# Patient Record
Sex: FEMALE | Race: WHITE | NOT HISPANIC OR LATINO | Employment: OTHER | URBAN - METROPOLITAN AREA
[De-identification: names, ages, dates, MRNs, and addresses within clinical notes are randomized per-mention and may not be internally consistent; named-entity substitution may affect disease eponyms.]

---

## 2017-01-13 DIAGNOSIS — E78.5 HYPERLIPIDEMIA LDL GOAL <100: Primary | ICD-10-CM

## 2017-01-13 DIAGNOSIS — I10 HYPERTENSION GOAL BP (BLOOD PRESSURE) < 140/90: ICD-10-CM

## 2017-01-17 RX ORDER — ATORVASTATIN CALCIUM 40 MG/1
TABLET, FILM COATED ORAL
Qty: 90 TABLET | Refills: 0 | Status: SHIPPED | OUTPATIENT
Start: 2017-01-17 | End: 2017-05-15

## 2017-01-17 RX ORDER — LOSARTAN POTASSIUM 100 MG/1
TABLET ORAL
Qty: 30 TABLET | Refills: 0 | Status: SHIPPED | OUTPATIENT
Start: 2017-01-17 | End: 2017-01-23

## 2017-01-17 NOTE — TELEPHONE ENCOUNTER
LOSARTAN 100MG      Last Written Prescription Date: 12/14/16  Last Fill Quantity: 30, # refills: 0  Last Office Visit with G, P or [facility] prescribing provider: 7/27/15       POTASSIUM   Date Value Ref Range Status   05/18/2015 4.3 3.4 - 5.3 mmol/L Final     CREATININE   Date Value Ref Range Status   05/18/2015 1.00 0.52 - 1.04 mg/dL Final     BP Readings from Last 3 Encounters:   07/27/15 136/64   07/06/15 128/66   06/22/15 138/66

## 2017-01-17 NOTE — TELEPHONE ENCOUNTER
ATORVASTATIN 40MG   Last Written Prescription Date: 5/9/16  Last Fill Quantity: 90, # refills: 0  Last Office Visit with [facility], [facility] or [facility] prescribing provider: 7/27/16       CHOL      231   5/18/2015  HDL       54   5/18/2015  LDL      159   5/18/2015  LDL      109   6/2/2014  TRIG       92   5/18/2015  CHOLHDLRATIO      4.3   5/18/2015    LOSARTAN 100MG   Last Written Prescription Date: 12/14/16  Last Fill Quantity: 30, # refills: 0  Last Office Visit with [facility], [facility] or [facility] prescribing provider: 7/27/16       POTASSIUM   Date Value Ref Range Status   05/18/2015 4.3 3.4 - 5.3 mmol/L Final     CREATININE   Date Value Ref Range Status   05/18/2015 1.00 0.52 - 1.04 mg/dL Final     BP Readings from Last 3 Encounters:   07/27/15 136/64   07/06/15 128/66   06/22/15 138/66

## 2017-01-19 RX ORDER — LOSARTAN POTASSIUM 100 MG/1
TABLET ORAL
Qty: 30 TABLET | Refills: 0
Start: 2017-01-19

## 2017-01-19 NOTE — TELEPHONE ENCOUNTER
Last Written Prescription Date: Losartan 100mg  Last Fill Quantity: 30,  # refills: 0   Last Office Visit with [facility], [facility] or [facility] prescribing provider: 7/27/15     Medication was filled 2 days ago by PCP with a note to pharmacy: need appt.

## 2017-01-23 ENCOUNTER — RADIANT APPOINTMENT (OUTPATIENT)
Dept: GENERAL RADIOLOGY | Facility: CLINIC | Age: 67
End: 2017-01-23
Attending: INTERNAL MEDICINE
Payer: COMMERCIAL

## 2017-01-23 ENCOUNTER — OFFICE VISIT (OUTPATIENT)
Dept: FAMILY MEDICINE | Facility: CLINIC | Age: 67
End: 2017-01-23
Payer: COMMERCIAL

## 2017-01-23 VITALS
HEART RATE: 66 BPM | TEMPERATURE: 98.9 F | BODY MASS INDEX: 25.27 KG/M2 | WEIGHT: 148 LBS | DIASTOLIC BLOOD PRESSURE: 62 MMHG | HEIGHT: 64 IN | RESPIRATION RATE: 20 BRPM | OXYGEN SATURATION: 96 % | SYSTOLIC BLOOD PRESSURE: 112 MMHG

## 2017-01-23 DIAGNOSIS — R10.12 ABDOMINAL PAIN, LEFT UPPER QUADRANT: Primary | ICD-10-CM

## 2017-01-23 DIAGNOSIS — E11.9 TYPE 2 DIABETES MELLITUS WITHOUT COMPLICATION, WITHOUT LONG-TERM CURRENT USE OF INSULIN (H): ICD-10-CM

## 2017-01-23 DIAGNOSIS — E03.9 HYPOTHYROIDISM, UNSPECIFIED TYPE: ICD-10-CM

## 2017-01-23 DIAGNOSIS — I10 HYPERTENSION GOAL BP (BLOOD PRESSURE) < 140/90: ICD-10-CM

## 2017-01-23 DIAGNOSIS — E78.5 HYPERLIPIDEMIA LDL GOAL <100: ICD-10-CM

## 2017-01-23 DIAGNOSIS — J06.9 UPPER RESPIRATORY TRACT INFECTION, UNSPECIFIED TYPE: ICD-10-CM

## 2017-01-23 DIAGNOSIS — F43.21 GRIEF REACTION: ICD-10-CM

## 2017-01-23 LAB
BASOPHILS # BLD AUTO: 0.1 10E9/L (ref 0–0.2)
BASOPHILS NFR BLD AUTO: 0.5 %
DIFFERENTIAL METHOD BLD: ABNORMAL
EOSINOPHIL # BLD AUTO: 0.2 10E9/L (ref 0–0.7)
EOSINOPHIL NFR BLD AUTO: 1.8 %
ERYTHROCYTE [DISTWIDTH] IN BLOOD BY AUTOMATED COUNT: 11.8 % (ref 10–15)
HBA1C MFR BLD: 11.2 % (ref 4.3–6)
HCT VFR BLD AUTO: 38.6 % (ref 35–47)
HGB BLD-MCNC: 12.9 G/DL (ref 11.7–15.7)
LYMPHOCYTES # BLD AUTO: 2.4 10E9/L (ref 0.8–5.3)
LYMPHOCYTES NFR BLD AUTO: 22.1 %
MCH RBC QN AUTO: 31.2 PG (ref 26.5–33)
MCHC RBC AUTO-ENTMCNC: 33.4 G/DL (ref 31.5–36.5)
MCV RBC AUTO: 94 FL (ref 78–100)
MONOCYTES # BLD AUTO: 1.7 10E9/L (ref 0–1.3)
MONOCYTES NFR BLD AUTO: 16 %
NEUTROPHILS # BLD AUTO: 6.4 10E9/L (ref 1.6–8.3)
NEUTROPHILS NFR BLD AUTO: 59.6 %
PLATELET # BLD AUTO: 526 10E9/L (ref 150–450)
RBC # BLD AUTO: 4.13 10E12/L (ref 3.8–5.2)
WBC # BLD AUTO: 10.7 10E9/L (ref 4–11)

## 2017-01-23 PROCEDURE — 36415 COLL VENOUS BLD VENIPUNCTURE: CPT | Performed by: INTERNAL MEDICINE

## 2017-01-23 PROCEDURE — 80061 LIPID PANEL: CPT | Performed by: INTERNAL MEDICINE

## 2017-01-23 PROCEDURE — 83036 HEMOGLOBIN GLYCOSYLATED A1C: CPT | Performed by: INTERNAL MEDICINE

## 2017-01-23 PROCEDURE — 99214 OFFICE O/P EST MOD 30 MIN: CPT | Performed by: INTERNAL MEDICINE

## 2017-01-23 PROCEDURE — 80050 GENERAL HEALTH PANEL: CPT | Performed by: INTERNAL MEDICINE

## 2017-01-23 PROCEDURE — 71020 XR CHEST 2 VW: CPT

## 2017-01-23 NOTE — NURSING NOTE
"Chief Complaint   Patient presents with     Cough     Abdominal Pain     Consult       Initial /62 mmHg  Pulse 66  Temp(Src) 98.9  F (37.2  C)  Resp 20  Ht 5' 3.5\" (1.613 m)  Wt 148 lb (67.132 kg)  BMI 25.80 kg/m2  SpO2 96%  Breastfeeding? No Estimated body mass index is 25.8 kg/(m^2) as calculated from the following:    Height as of this encounter: 5' 3.5\" (1.613 m).    Weight as of this encounter: 148 lb (67.132 kg).  BP completed using cuff size: lori Ernst LPN  "

## 2017-01-23 NOTE — MR AVS SNAPSHOT
After Visit Summary   1/23/2017    Keerthi Montoya    MRN: [id number]           Patient Information     Date Of Birth          1950        Visit Information        Provider Department      1/23/2017 2:00 PM Nathan Dhillon MD [facility]        Today's Diagnoses     Abdominal pain, left upper quadrant    -  1     Upper respiratory tract infection, unspecified type         Type 2 diabetes mellitus without complication, without long-term current use of insulin (H)         Hyperlipidemia LDL goal <100         Hypertension goal BP (blood pressure) < 140/90         Hypothyroidism, unspecified type           Care Instructions    I will let you know your lab results.   Please return for a physical exam.             Follow-ups after your visit        Follow-up notes from your care team     Return for Physical Exam.      Who to contact     If you have questions or need follow up information about today's clinic visit or your schedule please contact [facility] directly at [phone].  Normal or non-critical lab and imaging results will be communicated to you by MyChart, letter or phone within 4 business days after the clinic has received the results. If you do not hear from us within 7 days, please contact the clinic through MyChart or phone. If you have a critical or abnormal lab result, we will notify you by phone as soon as possible.  Submit refill requests through [email or web address] or call your pharmacy and they will forward the refill request to us. Please allow 3 business days for your refill to be completed.          Additional Information About Your Visit        Care EveryWhere ID     This is your Care EveryWhere ID. This could be used by other organizations to access your [place] medical records  [id number]        Your Vitals Were     Pulse Temperature Respirations Height BMI (Body Mass Index) Pulse Oximetry    66 98.9  F (37.2  C)  "20 5' 3.5\" (1.613 m) 25.80 kg/m2 96%    Breastfeeding?                   No            Blood Pressure from Last 3 Encounters:   01/23/17 112/62   07/27/15 136/64   07/06/15 128/66    Weight from Last 3 Encounters:   01/23/17 148 lb (67.132 kg)   07/27/15 156 lb (70.761 kg)   07/06/15 153 lb (69.4 kg)              We Performed the Following     CBC with platelets and differential     Comprehensive metabolic panel (BMP + Alb, Alk Phos, ALT, AST, Total. Bili, TP)     Hemoglobin A1c     Lipid Profile (Chol, Trig, HDL, LDL calc)     TSH with free T4 reflex        Primary Care Provider Office Phone # Fax #    Nathan Dhillon [phone]       [facility] [address]  [facility] [address]        Thank you!     Thank you for choosing [facility]  for your care. Our goal is always to provide you with excellent care. Hearing back from our patients is one way we can continue to improve our services. Please take a few minutes to complete the written survey that you may receive in the mail after your visit with us. Thank you!             Your Updated Medication List - Protect others around you: Learn how to safely use, store and throw away your medicines at www.disposemymeds.org.          This list is accurate as of: 1/23/17  2:57 PM.  Always use your most recent med list.                   Brand Name Dispense Instructions for use    * ACCU-CHEK FELIPE PLUS test strip   Generic drug:  blood glucose monitoring     100 each    TEST ONCE DAILY       * blood glucose monitoring test strip    ACCU-CHEK FELIPE PLUS    100 each    Pt tests 1x/day       amLODIPine 5 MG tablet    NORVASC    90 tablet    Take 1 tablet (5 mg) by mouth daily       atenolol 25 MG tablet    TENORMIN    90 tablet    Take 1 tablet (25 mg) by mouth daily       atorvastatin 40 MG tablet    LIPITOR    90 tablet    TAKE 1 TABLET BY MOUTH DAILY       glipiZIDE 10 MG 24 hr tablet    GLUCOTROL XL "    60 tablet    TAKE 1 TABLET BY MOUTH TWICE DAILY       levothyroxine 125 MCG tablet    SYNTHROID/LEVOTHROID    90 tablet    TAKE 1 TABLET BY MOUTH DAILY       losartan 100 MG tablet    COZAAR    90 tablet    Take 1 tablet (100 mg) by mouth daily       metFORMIN 500 MG tablet    GLUCOPHAGE    360 tablet    Take 2 tablets (1,000 mg) by mouth 2 times daily (with meals)       * Notice:  This list has 2 medication(s) that are the same as other medications prescribed for you. Read the directions carefully, and ask your doctor or other care provider to review them with you.

## 2017-01-23 NOTE — PROGRESS NOTES
"  SUBJECTIVE:                                                    Keerthi Montoya is a 66 year old female who presents to clinic today for the following health issues:      ABDOMINAL PAIN     Onset: 1-2 weeks     Description:   Character: Sharp, Dull ache and Gnawing  Location: left upper quadrant  Radiation: None    Intensity: moderate    Progression of Symptoms:  same    Accompanying Signs & Symptoms:  Fever/Chills?: no   Gas/Bloating: no   Nausea: no   Vomitting: no   Diarrhea?: no   Constipation:no   Dysuria or Hematuria: no    History:   Trauma: poss? Related to coughing   Previous similar pain: no    Previous tests done: none    Precipitating factors:   Does the pain change with:     Food: no      BM: no     Urination: no     Alleviating factors:  nothing    Therapies Tried and outcome: nothing    LMP:  not applicable     Acute Illness   Acute illness concerns: cough  Onset: 1-2 weeks     Fever: no     Chills/Sweats: no     Headache (location?): YES    Sinus Pressure:YES    Conjunctivitis:  no    Ear Pain: no    Rhinorrhea: YES    Congestion: occ.     Sore Throat: no      Cough: YES    Wheeze: no     Decreased Appetite: no     Nausea: no     Vomiting: no     Diarrhea:  no     Dysuria/Freq.: no     Fatigue/Achiness: YES- occ.    Sick/Strep Exposure: no      Therapies Tried and outcome: nothing        Sudden L sided pain after coughing,4 days ago.          VERY painful.               resp sx are slowly improving.                                           just passed away 2 wks ago.         Grieving.                                           Last OV  With me in 6/15.                     Only taking 500 mg bid metformin,and not this every day due to GI distress.             Checks glucoses occas; results are \"ok\".                    Taking her other meds.              Problem list and histories reviewed & adjusted, as indicated.      Patient Active Problem List    Diagnosis Date Noted     Colitis 09/10/2015 " "    Priority: High     Colonoscopy 8/15:  \"mildly active chronic colitis\" on bx of sigmoid and rectum; Dx?          Type 2 diabetes mellitus without complication (H) 07/26/2013     Priority: High     Hyperlipidemia LDL goal <100 07/26/2013     Priority: High      in 7/13; not taking prava 40 regularly       Hypertension goal BP (blood pressure) < 140/90 10/01/2012     Priority: High     Rectal bleeding 07/27/2015     Priority: Medium     Abdominal pain, left lower quadrant 06/28/2015     Priority: Medium     CT in 6/15 diverticulosis, no diverticulitis.         \"prominent lymph nodes of uncertain etiology\"; 6 month f/u CT suggested by Radiology             Aspirin not indicated 05/18/2015     Priority: Medium     Not tolerated by pt; GI distress       Diverticulosis of large intestine 10/01/2012     Priority: Medium     Colonoscopy 8/12; tics only, no strictures.            CT shows 2.6 cm R ovarian cyst.         Pt has hx of ovarian cysts on CT and US in 2006. In 1/07, she saw Dr. Stiven Griffin re: a 3 cm L ovarian cyst; CA-125 nml at 16 ( his 1/07 letter describes a L ovarian cyst; pt recalls this was R sided, like the current CT states)                     CT in 6/15 diverticulosis no diverticulitis; \"some increased number and mildly prominent nodes within the small bowel mesentery and posterior pelvis of ? Significance; f/u CT 3-6 months advised.               Problem list name updated by automated process. Provider to review       Vitamin D deficiency 10/01/2012     Priority: Medium     Problem list name updated by automated process. Provider to review       Hypercalcemia 10/01/2012     Priority: Medium     Hypothyroidism 10/01/2012     Priority: Medium     Problem list name updated by automated process. Provider to review       Preventive measure 07/26/2013     Priority: Low     APRIMA DATA BASE UNDER THE 7/26/13 NOTE  Colonoscopy 8/12; recheck in 10 yrs  Pap 2/10,6/15;                                " Mammogram 1/14, 6/16  DXA 2005; all scores >+1         Past Surgical History   Procedure Laterality Date     Hernia repair  12/2006     recurrent incisional hernia     Cholecystectomy  3/1987     Colon surgery  01/2001     Left colon resection; diverticulitis     Throat surgery  12/1974     thyroidectomy     Gyn surgery  9/1983     tubal ligation       Social History   Substance Use Topics     Smoking status: Never Smoker      Smokeless tobacco: Never Used     Alcohol Use: Yes      Comment: ocasionally     Family History   Problem Relation Age of Onset     CEREBROVASCULAR DISEASE Mother      CANCER Father      bladder         Current Outpatient Prescriptions   Medication Sig Dispense Refill     atorvastatin (LIPITOR) 40 MG tablet TAKE 1 TABLET BY MOUTH DAILY 90 tablet 0     glipiZIDE (GLUCOTROL XL) 10 MG 24 hr tablet TAKE 1 TABLET BY MOUTH TWICE DAILY 60 tablet 0     blood glucose monitoring (ACCU-CHEK FELIPE PLUS) test strip Pt tests 1x/day 100 each 1     levothyroxine (SYNTHROID, LEVOTHROID) 125 MCG tablet TAKE 1 TABLET BY MOUTH DAILY 90 tablet 0     ACCU-CHEK FELIPE PLUS test strip TEST ONCE DAILY 100 each 2     atenolol (TENORMIN) 25 MG tablet Take 1 tablet (25 mg) by mouth daily 90 tablet 2     losartan (COZAAR) 100 MG tablet Take 1 tablet (100 mg) by mouth daily 90 tablet 2     amLODIPine (NORVASC) 5 MG tablet Take 1 tablet (5 mg) by mouth daily 90 tablet 2     metFORMIN (GLUCOPHAGE) 500 MG tablet Take 2 tablets (1,000 mg) by mouth 2 times daily (with meals) 360 tablet 3                                                                     Allergies   Allergen Reactions     Animal Dander      Doxycycline Hives     Dust Mites      Grass      Keflex [Cephalexin Hcl] Hives     Metformin Diarrhea     At 1000mg     Other [Seasonal Allergies]      pollen     Synthroid [Levothroid] Swelling     ???     Tetracycline Hives     Tylenol Hives     Ziac [Bisoprolol-Hydrochlorothiazide]      BP Readings from Last 3 Encounters:  "  01/23/17 112/62   07/27/15 136/64   07/06/15 128/66    Wt Readings from Last 3 Encounters:   01/23/17 148 lb (67.132 kg)   07/27/15 156 lb (70.761 kg)   07/06/15 153 lb (69.4 kg)                    ROS:  CONSTITUTIONAL:POSITIVE  for weight loss and NEGATIVE  for chills and fever   ENT/MOUTH: NEGATIVE for rhinorrhea-purulent and sinus pressure  RESP:POSITIVE for cough-non productive and NEGATIVE for dyspnea on exertion  GI: NEGATIVE for melena and poor appetite    OBJECTIVE:                                                    /62 mmHg  Pulse 66  Temp(Src) 98.9  F (37.2  C)  Resp 20  Ht 5' 3.5\" (1.613 m)  Wt 148 lb (67.132 kg)  BMI 25.80 kg/m2  SpO2 96%  Breastfeeding? No  Body mass index is 25.8 kg/(m^2).  GENERAL APPEARANCE: alert, moderate distress and tearful  RESP: rhonchi bibasilar  CV: regular rates and rhythm, normal S1 S2, no S3 or S4 and no murmur, click or rub  ABDOMEN: tender L abdomen; but soft, no masses felt. Not tender over ribs.   PSYCH: tearful    Diagnostic test results:  CBC - WBC     10.7   1/23/2017  RBC     4.13   1/23/2017  HGB     12.9   1/23/2017  HCT     38.6   1/23/2017  No components found with this name: mct  MCV       94   1/23/2017  MCH     31.2   1/23/2017  MCHC     33.4   1/23/2017  RDW     11.8   1/23/2017  PLT      526   1/23/2017    CXR -   Recent Results (from the past 744 hour(s))   XR Chest 2 Views    Narrative    CHEST TWO VIEWS January 23, 2017 2:36 PM     HISTORY: Acute upper respiratory infection, unspecified.      Impression    IMPRESSION: PA and lateral views of the chest. Lungs are clear. Heart  is normal in size. No effusions are evident. No pneumothorax.    NAT ROBLERO MD          ASSESSMENT/PLAN:                                                        ICD-10-CM    1. Abdominal pain, left upper quadrant R10.12    2. Upper respiratory tract infection, unspecified type J06.9 XR Chest 2 Views     CBC with platelets and differential   3. Type 2 diabetes " mellitus without complication, without long-term current use of insulin (H) E11.9 Comprehensive metabolic panel (BMP + Alb, Alk Phos, ALT, AST, Total. Bili, TP)     Hemoglobin A1c   4. Grief reaction F43.20    5. Hyperlipidemia LDL goal <100 E78.5 Lipid Profile (Chol, Trig, HDL, LDL calc)   6. Hypertension goal BP (blood pressure) < 140/90 I10    7. Hypothyroidism, unspecified type E03.9 TSH with free T4 reflex     CBC with platelets and differential       Pain is c/w abdominal wall strain due to coughing.                   Antibiotics are not needed for URI sx.                                       Overdue for labs; very likely to have uncontrolled diabetes.           Will have new ins as of Feb 1.                        D/w pt grief rxn.       Patient Instructions   I will let you know your lab results.   Please return for a physical exam.             Nathan Dhillon MD  [facility]       Results for orders placed or performed in visit on 01/23/17   Comprehensive metabolic panel (BMP + Alb, Alk Phos, ALT, AST, Total. Bili, TP)   Result Value Ref Range    Sodium 134 133 - 144 mmol/L    Potassium 4.7 3.4 - 5.3 mmol/L    Chloride 100 94 - 109 mmol/L    Carbon Dioxide 26 20 - 32 mmol/L    Anion Gap 8 3 - 14 mmol/L    Glucose 231 (H) 70 - 99 mg/dL    Urea Nitrogen 22 7 - 30 mg/dL    Creatinine 1.32 (H) 0.52 - 1.04 mg/dL    GFR Estimate 40 (L) >60 mL/min/1.7m2    GFR Estimate If Black 49 (L) >60 mL/min/1.7m2    Calcium 9.2 8.5 - 10.1 mg/dL    Bilirubin Total 0.8 0.2 - 1.3 mg/dL    Albumin 3.4 3.4 - 5.0 g/dL    Protein Total 8.3 6.8 - 8.8 g/dL    Alkaline Phosphatase 132 40 - 150 U/L    ALT 13 0 - 50 U/L    AST 18 0 - 45 U/L   Lipid Profile (Chol, Trig, HDL, LDL calc)   Result Value Ref Range    Cholesterol 120 <200 mg/dL    Triglycerides 147 <150 mg/dL    HDL Cholesterol 32 (L) >49 mg/dL    LDL Cholesterol Calculated 59 <100 mg/dL    Non HDL Cholesterol 88 <130 mg/dL   Hemoglobin A1c    Result Value Ref Range    Hemoglobin A1C 11.2 (H) 4.3 - 6.0 %   TSH with free T4 reflex   Result Value Ref Range    TSH 3.09 0.40 - 4.00 mU/L                                                                                                                                     Discussed with patient.    Since her last visit with me, she went to the emergency room with rectal bleeding and ongoing chest wall pain. CT abdomen and pelvis suggested a muscle strain.          A colonoscopy was advised.     She has not had further bleeding.            She did receive IV contrast for the CT.    Later she developed some dysuria. Today she left a urine sample showing a few white cells and rare red blood cells, and the culture is pending.      We reviewed her lab findings including an A1c of 11.2.     She is not able to tolerate any metformin. She does not want to start on any injectable medication at this time.    She has new insurance starting tomorrow.     We discussed that there are no real good treatment options in terms of oral medications with an A1c that is this elevated.            Will add Actos 30 mg per day, and d/w pt further at her CPE in 3 wks.

## 2017-01-24 LAB
ALBUMIN SERPL-MCNC: 3.4 G/DL (ref 3.4–5)
ALP SERPL-CCNC: 132 U/L (ref 40–150)
ALT SERPL W P-5'-P-CCNC: 13 U/L (ref 0–50)
ANION GAP SERPL CALCULATED.3IONS-SCNC: 8 MMOL/L (ref 3–14)
AST SERPL W P-5'-P-CCNC: 18 U/L (ref 0–45)
BILIRUB SERPL-MCNC: 0.8 MG/DL (ref 0.2–1.3)
BUN SERPL-MCNC: 22 MG/DL (ref 7–30)
CALCIUM SERPL-MCNC: 9.2 MG/DL (ref 8.5–10.1)
CHLORIDE SERPL-SCNC: 100 MMOL/L (ref 94–109)
CHOLEST SERPL-MCNC: 120 MG/DL
CO2 SERPL-SCNC: 26 MMOL/L (ref 20–32)
CREAT SERPL-MCNC: 1.32 MG/DL (ref 0.52–1.04)
GFR SERPL CREATININE-BSD FRML MDRD: 40 ML/MIN/1.7M2
GLUCOSE SERPL-MCNC: 231 MG/DL (ref 70–99)
HDLC SERPL-MCNC: 32 MG/DL
LDLC SERPL CALC-MCNC: 59 MG/DL
NONHDLC SERPL-MCNC: 88 MG/DL
POTASSIUM SERPL-SCNC: 4.7 MMOL/L (ref 3.4–5.3)
PROT SERPL-MCNC: 8.3 G/DL (ref 6.8–8.8)
SODIUM SERPL-SCNC: 134 MMOL/L (ref 133–144)
TRIGL SERPL-MCNC: 147 MG/DL
TSH SERPL DL<=0.005 MIU/L-ACNC: 3.09 MU/L (ref 0.4–4)

## 2017-01-26 ENCOUNTER — HOSPITAL ENCOUNTER (EMERGENCY)
Facility: CLINIC | Age: 67
Discharge: HOME | End: 2017-01-26
Attending: EMERGENCY MEDICINE | Admitting: EMERGENCY MEDICINE
Payer: COMMERCIAL

## 2017-01-26 ENCOUNTER — TELEPHONE (OUTPATIENT)
Dept: NURSING | Facility: CLINIC | Age: 67
End: 2017-01-26

## 2017-01-26 ENCOUNTER — APPOINTMENT (OUTPATIENT)
Dept: CT IMAGING | Facility: CLINIC | Age: 67
End: 2017-01-26
Attending: EMERGENCY MEDICINE
Payer: COMMERCIAL

## 2017-01-26 VITALS
SYSTOLIC BLOOD PRESSURE: 122 MMHG | OXYGEN SATURATION: 99 % | HEIGHT: 62 IN | WEIGHT: 149 LBS | TEMPERATURE: 98.7 F | DIASTOLIC BLOOD PRESSURE: 58 MMHG | BODY MASS INDEX: 27.42 KG/M2

## 2017-01-26 DIAGNOSIS — S39.011A ABDOMINAL MUSCLE STRAIN, INITIAL ENCOUNTER: ICD-10-CM

## 2017-01-26 DIAGNOSIS — K62.5 RECTAL HEMORRHAGE: ICD-10-CM

## 2017-01-26 LAB
ABO + RH BLD: NORMAL
ABO + RH BLD: NORMAL
ANION GAP SERPL CALCULATED.3IONS-SCNC: 8 MMOL/L (ref 3–14)
BASOPHILS # BLD AUTO: 0.1 10E9/L (ref 0–0.2)
BASOPHILS NFR BLD AUTO: 0.6 %
BLD GP AB SCN SERPL QL: NORMAL
BLOOD BANK CMNT PATIENT-IMP: NORMAL
BUN SERPL-MCNC: 16 MG/DL (ref 7–30)
CALCIUM SERPL-MCNC: 9.2 MG/DL (ref 8.5–10.1)
CHLORIDE SERPL-SCNC: 101 MMOL/L (ref 94–109)
CO2 SERPL-SCNC: 26 MMOL/L (ref 20–32)
CREAT SERPL-MCNC: 1.02 MG/DL (ref 0.52–1.04)
DIFFERENTIAL METHOD BLD: ABNORMAL
EOSINOPHIL # BLD AUTO: 0.2 10E9/L (ref 0–0.7)
EOSINOPHIL NFR BLD AUTO: 1.9 %
ERYTHROCYTE [DISTWIDTH] IN BLOOD BY AUTOMATED COUNT: 11.9 % (ref 10–15)
GFR SERPL CREATININE-BSD FRML MDRD: 54 ML/MIN/1.7M2
GLUCOSE SERPL-MCNC: 227 MG/DL (ref 70–99)
HCT VFR BLD AUTO: 36.4 % (ref 35–47)
HGB BLD-MCNC: 12.4 G/DL (ref 11.7–15.7)
IMM GRANULOCYTES # BLD: 0 10E9/L (ref 0–0.4)
IMM GRANULOCYTES NFR BLD: 0.3 %
LYMPHOCYTES # BLD AUTO: 2.3 10E9/L (ref 0.8–5.3)
LYMPHOCYTES NFR BLD AUTO: 22.8 %
MCH RBC QN AUTO: 31.5 PG (ref 26.5–33)
MCHC RBC AUTO-ENTMCNC: 34.1 G/DL (ref 31.5–36.5)
MCV RBC AUTO: 92 FL (ref 78–100)
MONOCYTES # BLD AUTO: 1.4 10E9/L (ref 0–1.3)
MONOCYTES NFR BLD AUTO: 13.2 %
NEUTROPHILS # BLD AUTO: 6.3 10E9/L (ref 1.6–8.3)
NEUTROPHILS NFR BLD AUTO: 61.2 %
NRBC # BLD AUTO: 0 10*3/UL
NRBC BLD AUTO-RTO: 0 /100
PLATELET # BLD AUTO: 497 10E9/L (ref 150–450)
POTASSIUM SERPL-SCNC: 4 MMOL/L (ref 3.4–5.3)
RBC # BLD AUTO: 3.94 10E12/L (ref 3.8–5.2)
SODIUM SERPL-SCNC: 135 MMOL/L (ref 133–144)
SPECIMEN EXP DATE BLD: NORMAL
WBC # BLD AUTO: 10.3 10E9/L (ref 4–11)

## 2017-01-26 PROCEDURE — 25500064 ZZH RX 255 OP 636: Performed by: EMERGENCY MEDICINE

## 2017-01-26 PROCEDURE — 80048 BASIC METABOLIC PNL TOTAL CA: CPT | Performed by: EMERGENCY MEDICINE

## 2017-01-26 PROCEDURE — 86850 RBC ANTIBODY SCREEN: CPT | Performed by: EMERGENCY MEDICINE

## 2017-01-26 PROCEDURE — 85025 COMPLETE CBC W/AUTO DIFF WBC: CPT | Performed by: EMERGENCY MEDICINE

## 2017-01-26 PROCEDURE — 86901 BLOOD TYPING SEROLOGIC RH(D): CPT | Performed by: EMERGENCY MEDICINE

## 2017-01-26 PROCEDURE — 74177 CT ABD & PELVIS W/CONTRAST: CPT

## 2017-01-26 PROCEDURE — 99285 EMERGENCY DEPT VISIT HI MDM: CPT | Mod: 25

## 2017-01-26 PROCEDURE — 86900 BLOOD TYPING SEROLOGIC ABO: CPT | Performed by: EMERGENCY MEDICINE

## 2017-01-26 PROCEDURE — 25000125 ZZHC RX 250: Performed by: EMERGENCY MEDICINE

## 2017-01-26 RX ORDER — IOPAMIDOL 755 MG/ML
74 INJECTION, SOLUTION INTRAVASCULAR ONCE
Status: COMPLETED | OUTPATIENT
Start: 2017-01-26 | End: 2017-01-26

## 2017-01-26 RX ADMIN — IOPAMIDOL 74 ML: 755 INJECTION, SOLUTION INTRAVENOUS at 15:51

## 2017-01-26 RX ADMIN — SODIUM CHLORIDE 61 ML: 9 INJECTION, SOLUTION INTRAVENOUS at 15:51

## 2017-01-26 ASSESSMENT — ENCOUNTER SYMPTOMS
NAUSEA: 0
ABDOMINAL PAIN: 1
DIZZINESS: 0
VOMITING: 0
BLOOD IN STOOL: 1
RECTAL PAIN: 0
HEMATURIA: 0
DIARRHEA: 0
LIGHT-HEADEDNESS: 0
CONSTIPATION: 0
WEAKNESS: 0
SHORTNESS OF BREATH: 0
FEVER: 0

## 2017-01-26 NOTE — ED PROVIDER NOTES
"  History     Chief Complaint:  Rectal Bleeding    HPI   Keerthi Montoya is a 66 year old female not on blood thinners with a history of diverticulosis s/p colectomy (9 inch resection) with 11 x 7 cm mesh in her abdomen who presents with rectal bleeding. The patient reports that she has been dealing with an upper respiratory illness recent with a cough. She states she was coughing really hard last week and felt like something \"popped\" in her abdomen. She had left sided abdominal pain following this and was seen by her primary on Monday for this pain. She had a chest x-ray and blood work and was discharged to home. The patient reports that this morning she developed rectal bleeding and had 4 episodes of bloody stools, prompting her to come to the ED for evaluation. On arrival to the ED, the patient reports she is having moderately formed stools with bright red blood mixed into the toilet water. She states there are clots mixed in and reports there is \"quite a bit of blood\" but not a huge amount. The patient states she has mild lower abdominal pain along with the bleeding. The patient denies any rectal pain or hemorrhoids. She denies any diarrhea, constipation, light-headedness, weakness, or shortness of breath.     Allergies:  Doxycycline  Keflex  Metformin  Synthroid  Tetracycline  Tylenol  Ziac      Medications:    Atorvastatin  Glipizide  Levothyroxine  Atenolol  Losartan  Amlodipine  Metformin      Past Medical History:    Diverticulosis    Colitis  Type 2 diabetes  Hypertension  Hyperlipidemia     Past Surgical History:    Hernia repair  Cholecystectomy  Colon resection   Thyroidectomy  Tubal ligation      Family History:    Cerebrovascular disease  Bladder cancer     Social History:  Smoking status: No  Alcohol use: Yes, occasional   Marital Status:   [5]     Review of Systems   Constitutional: Negative for fever.   Respiratory: Negative for shortness of breath.    Gastrointestinal: Positive for " "abdominal pain and blood in stool. Negative for nausea, vomiting, diarrhea, constipation and rectal pain.   Genitourinary: Negative for hematuria.   Neurological: Negative for dizziness, weakness and light-headedness.   All other systems reviewed and are negative.      Physical Exam   Patient Vitals for the past 24 hrs:   BP Temp Temp src Heart Rate SpO2 Height Weight   01/26/17 1530 122/58 mmHg - - - 99 % - -   01/26/17 1349 144/67 mmHg 98.7  F (37.1  C) Oral 69 99 % 1.575 m (5' 2\") 67.586 kg (149 lb)        Physical Exam  GENERAL: well developed, pleasant  HEAD: atraumatic  EYES: pupils reactive, extraocular muscles intact, conjunctivae normal  ENT:  mucus membranes moist  NECK:  trachea midline, normal range of motion  RESPIRATORY: no tachypnea, breath sounds clear to auscultation   CVS: normal S1/S2, no murmurs, intact distal pulses  ABDOMEN: soft, nondistention. Mild lower abdominal tenderness.   MUSCULOSKELETAL: no deformities  SKIN: warm and dry, no acute rashes or ulceration  NEURO: GCS 15, cranial nerves intact, alert and oriented x3  PSYCH:  Mood/affect normal    Emergency Department Course   Imaging:  Radiographic findings were communicated with the patient who voiced understanding of the findings.    CT-scan Abdomen/Pelvis w contrast:  1. There is relative prominence of the left anterior abdominal wall  musculature when compared to the right with what appears to be mild  edema in the adjacent soft tissues. Given the history of pain and  coughing, this could represent a muscle strain. There is relative  atrophy of the remaining abdominal wall musculature.  2. Colonic diverticulosis with no evidence of diverticulitis. No other  gastrointestinal tract abnormality is identified.  Preliminary result per radiology.     Laboratory:  CBC: (H), o/w WNL (WBC 10.3, HGB 12.4)   BMP: Glucose 227(H), GFR 54(L), o/w WNL (Creatinine 1.02)    ABO/Rh: O Pos    Emergency Department Course:  Past medical records, " nursing notes, and vitals reviewed.  1459: I performed an exam of the patient and obtained history, as documented above.  IV inserted and blood drawn.  The patient was sent for a CT abdomen pelvis while in the emergency department, findings above.    1633: I rechecked the patient. Explained findings to the patient.    I rechecked the patient.  Findings and plan explained to the Patient. Patient discharged home with instructions regarding supportive care, medications, and reasons to return. The importance of close follow-up was reviewed.     Impression & Plan      Medical Decision Making:  Considered differential including abdominal muscle strain, hernia, tearing of the mesh. For the abdominal pain after coughing component and history of mesh, I had a difficult time correlating this with the rectal bleeding. Hemoglobin is stable. Rectal exam shows no hemorrhoids or fissure. She does have a history of diverticulosis, possibly this is a diverticuli bleed versus a polyp. The patient looks to be stable for discharge. She is not anticoagulated. She is comfortable with this. Discussed with her possibility of some ongoing bleeding which should be mild. If it accelerates or she is symptomatic she needs to return but ultimately needs to follow up with GI to likely have a repeat scope.     Diagnosis:    ICD-10-CM   1. Rectal bleed, stable  K62.5   2. Abdominal muscle strain, initial encounter S39.011A     Plan: Tylenol, follow up with GI, return if worse. I did refer her back to her gastroenterologist that scoped her in 2006.     Darby Schmidt  1/26/2017    EMERGENCY DEPARTMENT    I, Darby Schmidt, am serving as a scribe at 2:59 PM on 1/26/2017 to document services personally performed by Bucky Smiley MD based on my observations and the provider's statements to me.       Bucky Smiley MD  01/27/17 [id number]

## 2017-01-26 NOTE — ED NOTES
Assumed care of pt, report from VERONIQUE Salas. Pt resting quietly, ambulated to BR with steady gait, denies needs.

## 2017-01-26 NOTE — ED AVS SNAPSHOT
Emergency Department    [address] [facility] [address]    Phone:  [phone]    Fax:  [phone]                                       Keerthi Montoya   MRN: [id number]    Department:   Emergency Department   Date of Visit:  1/26/2017           Patient Information     Date Of Birth          1950        Your diagnoses for this visit were:     Rectal hemorrhage     Abdominal muscle strain, initial encounter        You were seen by Trierweiler, Chad A, MD and Bucky Smiley MD.      Follow-up Information     Follow up with Nathan Dhillon MD.    Specialty:  Internal Medicine    Contact information:    [facility]  [address]  [facility] [address] [phone]          Follow up with Tejinder Chauhan MD.    Specialty:  Gastroenterology    Contact information:    [facility]  [address]  [facility] [address]  [phone]          Discharge Instructions         Lower GI Bleeding (Stable)  You have signs of blood in your stool. This is called rectal bleeding. The bleeding may have begun in another part of your gastrointestinal (GI) tract. If the blood is bright red, it is likely coming from the lower part of the GI tract. If the blood is black or dark, it might be coming from higher up in the GI tract. Very small amounts of GI bleeding may not be visible and can only be discovered during a test on your stool. Possible causes of lower GI bleeding include:    Hemorrhoids    Anal fissures    Diverticulitis    Inflammatory bowel disease (Crohn's disease or ulcerative colitis)    Polyps (growths) in the intestine  Note: Iron supplements and medicines for diarrhea or upset stomach can cause black stools. Foods such as licorice and red beets can also discolor the stool and be mistaken for bleeding. These are not bleeding and are not a cause for alarm.  Home care  You have not lost a large amount of blood and your condition appears stable at this  time. You may resume normal activity as long as you feel well.  Avoid NSAIDs, such as aspirin, ibuprofen, or naproxen. They can irritate the stomach and cause further bleeding. If you are taking these medicines for other medical reasons, talk to your healthcare provider before you stop them.   Follow-up care  Follow up with your healthcare provider as advised. Further tests may be needed to find the cause of your bleeding.  When to seek medical advice  Call your healthcare provider for any of the following:    Large amount of rectal bleeding     Increasing abdominal pain    Weakness, dizziness  Call 911  Get emergency medical care if any of the following occur:    Loss of consciousness    Vomiting blood    2944-1554 [employer]. [address] [place] [address]. All rights reserved. This information is not intended as a substitute for professional medical care. Always follow your healthcare professional's instructions.          Future Appointments        Provider Department Dept Phone Center    2/20/2017 2:00 PM Nathan Dhillon MD [facility] [phone] [place]      24 Hour Appointment Hotline       To make an appointment at any [facility], call [phone]). If you don't have a family doctor or clinic, we will help you find one. [facility] are conveniently located to serve the needs of you and your family.             Review of your medicines      Our records show that you are taking the medicines listed below. If these are incorrect, please call your family doctor or clinic.        Dose / Directions Last dose taken    * ACCU-CHEK FELIPE PLUS test strip   Quantity:  100 each   Generic drug:  blood glucose monitoring        TEST ONCE DAILY   Refills:  2        * blood glucose monitoring test strip   Commonly known as:  ACCU-CHEK FELIPE PLUS   Quantity:  100 each        Pt tests 1x/day   Refills:  1        amLODIPine 5 MG  tablet   Commonly known as:  NORVASC   Dose:  5 mg   Quantity:  90 tablet        Take 1 tablet (5 mg) by mouth daily   Refills:  2        atenolol 25 MG tablet   Commonly known as:  TENORMIN   Dose:  25 mg   Quantity:  90 tablet        Take 1 tablet (25 mg) by mouth daily   Refills:  2        atorvastatin 40 MG tablet   Commonly known as:  LIPITOR   Quantity:  90 tablet        TAKE 1 TABLET BY MOUTH DAILY   Refills:  0        glipiZIDE 10 MG 24 hr tablet   Commonly known as:  GLUCOTROL XL   Quantity:  60 tablet        TAKE 1 TABLET BY MOUTH TWICE DAILY   Refills:  0        levothyroxine 125 MCG tablet   Commonly known as:  SYNTHROID/LEVOTHROID   Quantity:  90 tablet        TAKE 1 TABLET BY MOUTH DAILY   Refills:  0        losartan 100 MG tablet   Commonly known as:  COZAAR   Dose:  100 mg   Quantity:  90 tablet        Take 1 tablet (100 mg) by mouth daily   Refills:  2        metFORMIN 500 MG tablet   Commonly known as:  GLUCOPHAGE   Dose:  1000 mg   Quantity:  360 tablet        Take 2 tablets (1,000 mg) by mouth 2 times daily (with meals)   Refills:  3        * Notice:  This list has 2 medication(s) that are the same as other medications prescribed for you. Read the directions carefully, and ask your doctor or other care provider to review them with you.            Procedures and tests performed during your visit     ABO/Rh type and screen    Basic metabolic panel    CBC with platelets differential    CT Abdomen Pelvis w Contrast    Peripheral IV: Standard      Orders Needing Specimen Collection     None      Pending Results     No orders found from 1/25/2017 to 1/27/2017.            Pending Culture Results     No orders found from 1/25/2017 to 1/27/2017.       Test Results from your hospital stay           1/26/2017  2:39 PM - Interface, Flexerik Results      Component Results     Component Value Ref Range & Units Status    WBC 10.3 4.0 - 11.0 10e9/L Final    RBC Count 3.94 3.8 - 5.2 10e12/L Final    Hemoglobin  12.4 11.7 - 15.7 g/dL Final    Hematocrit 36.4 35.0 - 47.0 % Final    MCV 92 78 - 100 fl Final    MCH 31.5 26.5 - 33.0 pg Final    MCHC 34.1 31.5 - 36.5 g/dL Final    RDW 11.9 10.0 - 15.0 % Final    Platelet Count 497 (H) 150 - 450 10e9/L Final    Diff Method Automated Method  Final    % Neutrophils 61.2 % Final    % Lymphocytes 22.8 % Final    % Monocytes 13.2 % Final    % Eosinophils 1.9 % Final    % Basophils 0.6 % Final    % Immature Granulocytes 0.3 % Final    Nucleated RBCs 0 0 /100 Final    Absolute Neutrophil 6.3 1.6 - 8.3 10e9/L Final    Absolute Lymphocytes 2.3 0.8 - 5.3 10e9/L Final    Absolute Monocytes 1.4 (H) 0.0 - 1.3 10e9/L Final    Absolute Eosinophils 0.2 0.0 - 0.7 10e9/L Final    Absolute Basophils 0.1 0.0 - 0.2 10e9/L Final    Abs Immature Granulocytes 0.0 0 - 0.4 10e9/L Final    Absolute Nucleated RBC 0.0  Final         1/26/2017  3:03 PM - Interface, Flexilab Results      Component Results     Component Value Ref Range & Units Status    Sodium 135 133 - 144 mmol/L Final    Potassium 4.0 3.4 - 5.3 mmol/L Final    Chloride 101 94 - 109 mmol/L Final    Carbon Dioxide 26 20 - 32 mmol/L Final    Anion Gap 8 3 - 14 mmol/L Final    Glucose 227 (H) 70 - 99 mg/dL Final    Urea Nitrogen 16 7 - 30 mg/dL Final    Creatinine 1.02 0.52 - 1.04 mg/dL Final    GFR Estimate 54 (L) >60 mL/min/1.7m2 Final    Non  GFR Calc    GFR Estimate If Black 66 >60 mL/min/1.7m2 Final    African American GFR Calc    Calcium 9.2 8.5 - 10.1 mg/dL Final         1/26/2017  3:14 PM - Interface, Flexilab Results      Component Results     Component    ABO    O    RH(D)     Pos    Antibody Screen    Neg    Test Valid Only At    [facility]    Specimen Expires    01/29/2017 1/26/2017  4:33 PM - Interface, Radiant Ib      Narrative     CT ABDOMEN AND PELVIS WITH CONTRAST  1/26/2017 3:51 PM    HISTORY: Pain and rectal bleeding. Coughing.    COMPARISON: None.    TECHNIQUE: Routine transverse CT  imaging of the abdomen and pelvis was  performed following the uneventful administration of 74 mL Isovue-370  intravenous contrast. Radiation dose for this scan was reduced using  automated exposure control, adjustment of the mA and/or kV according  to patient size, or iterative reconstruction technique.    FINDINGS: The visualized lung bases are clear. The liver, spleen,  pancreas, adrenal glands, kidneys, and bladder are normal. No enlarged  lymph node or other abnormal mass is demonstrated. No free fluid is  seen. No free intraperitoneal gas is identified. There are a few  scattered diverticula of the distal colon with no evidence of  diverticulitis. No other gastrointestinal tract abnormality is seen.  The appendix is not identified. There is no additional evidence of  appendicitis. There is calcification of the vascular structures. There  are degenerative changes in the spine. There is overall diffuse  atrophy of the abdominal wall musculature. However, there is moderate  relative prominence of the left anterior wall musculature compared to  the right with suggestion of mild adjacent inflammation or edema. This  is at the level of the kidneys. No other abdominal or pelvic wall  pathology is noted.         Impression     IMPRESSION:   1. There is relative prominence of the left anterior abdominal wall  musculature when compared to the right with what appears to be mild  edema in the adjacent soft tissues. Given the history of pain and  coughing, this could represent a muscle strain. There is relative  atrophy of the remaining abdominal wall musculature.  2. Colonic diverticulosis with no evidence of diverticulitis. No other  gastrointestinal tract abnormality is identified.    RIVAS MONTESINOS MD                Clinical Quality Measure: Blood Pressure Screening     Your blood pressure was checked while you were in the emergency department today. The last reading we obtained was  BP: 122/58 mmHg . Please read  the guidelines below about what these numbers mean and what you should do about them.  If your systolic blood pressure (the top number) is less than 120 and your diastolic blood pressure (the bottom number) is less than 80, then your blood pressure is normal. There is nothing more that you need to do about it.  If your systolic blood pressure (the top number) is 120-139 or your diastolic blood pressure (the bottom number) is 80-89, your blood pressure may be higher than it should be. You should have your blood pressure rechecked within a year by a primary care provider.  If your systolic blood pressure (the top number) is 140 or greater or your diastolic blood pressure (the bottom number) is 90 or greater, you may have high blood pressure. High blood pressure is treatable, but if left untreated over time it can put you at risk for heart attack, stroke, or kidney failure. You should have your blood pressure rechecked by a primary care provider within the next 4 weeks.  If your provider in the emergency department today gave you specific instructions to follow-up with your doctor or provider even sooner than that, you should follow that instruction and not wait for up to 4 weeks for your follow-up visit.        Thank you for choosing [facility]       Thank you for choosing [facility] for your care. Our goal is always to provide you with excellent care. Hearing back from our patients is one way we can continue to improve our services. Please take a few minutes to complete the written survey that you may receive in the mail after you visit with us. Thank you!        Care EveryWhere ID     This is your Care EveryWhere ID. This could be used by other organizations to access your [facility] medical records  [id number]        After Visit Summary       This is your record. Keep this with you and show to your community pharmacist(s) and doctor(s) at your next visit.

## 2017-01-26 NOTE — ED AVS SNAPSHOT
Emergency Department    [address] [facility] [address]    Phone:  [phone]    Fax:  [phone]                                       Keerthi Montoya   MRN: [id number]    Department:   Emergency Department   Date of Visit:  1/26/2017           After Visit Summary Signature Page     I have received my discharge instructions, and my questions have been answered. I have discussed any challenges I see with this plan with the nurse or doctor.    ..........................................................................................................................................  Patient/Patient Representative Signature      ..........................................................................................................................................  Patient Representative Print Name and Relationship to Patient    ..................................................               ................................................  Date                                            Time    ..........................................................................................................................................  Reviewed by Signature/Title    ...................................................              ..............................................  Date                                                            Time

## 2017-01-26 NOTE — DISCHARGE INSTRUCTIONS
Lower GI Bleeding (Stable)  You have signs of blood in your stool. This is called rectal bleeding. The bleeding may have begun in another part of your gastrointestinal (GI) tract. If the blood is bright red, it is likely coming from the lower part of the GI tract. If the blood is black or dark, it might be coming from higher up in the GI tract. Very small amounts of GI bleeding may not be visible and can only be discovered during a test on your stool. Possible causes of lower GI bleeding include:    Hemorrhoids    Anal fissures    Diverticulitis    Inflammatory bowel disease (Crohn's disease or ulcerative colitis)    Polyps (growths) in the intestine  Note: Iron supplements and medicines for diarrhea or upset stomach can cause black stools. Foods such as licorice and red beets can also discolor the stool and be mistaken for bleeding. These are not bleeding and are not a cause for alarm.  Home care  You have not lost a large amount of blood and your condition appears stable at this time. You may resume normal activity as long as you feel well.  Avoid NSAIDs, such as aspirin, ibuprofen, or naproxen. They can irritate the stomach and cause further bleeding. If you are taking these medicines for other medical reasons, talk to your healthcare provider before you stop them.   Follow-up care  Follow up with your healthcare provider as advised. Further tests may be needed to find the cause of your bleeding.  When to seek medical advice  Call your healthcare provider for any of the following:    Large amount of rectal bleeding     Increasing abdominal pain    Weakness, dizziness  Call 911  Get emergency medical care if any of the following occur:    Loss of consciousness    Vomiting blood    5533-6709 [employer]. [address] [place] [address], [place] [address]. All rights reserved. This information is not intended as a substitute for professional medical care. Always follow your healthcare professional's  instructions.

## 2017-01-26 NOTE — TELEPHONE ENCOUNTER
Patient calling to say that she had a abdominal surgery 10 years ago which mesh was put in.She has been suffering from a respiratory infection and has been coughing and yesterday she was coughing so hard and felt a pop and states that now today she is having rectal bleeding with clots.Pt denies having hemmorhoids. Patient was advised to go to the ED.Will comply.

## 2017-01-27 ENCOUNTER — TELEPHONE (OUTPATIENT)
Dept: FAMILY MEDICINE | Facility: CLINIC | Age: 67
End: 2017-01-27

## 2017-01-27 DIAGNOSIS — R30.0 DYSURIA: Primary | ICD-10-CM

## 2017-01-27 NOTE — TELEPHONE ENCOUNTER
Patient called clinic, she went to ED for rectal bleeding on 1/26/17 and had a CT with contrast.  She now reports burning in the bladder and frequency (q20min.) which started after contrast.   She is drinking lots of water, urine is clear, and smells okay, no fever.   Per ED visit if bleeding accelerates and she is symptomatic - she is to f/u with GI to repeat scope.   Does patient need an appointment to give a urine sample?   Will route to providers team 2 for advice.

## 2017-01-27 NOTE — TELEPHONE ENCOUNTER
I put order in for UA/UC.  It may be just side effect of the contrast that she had for the CT yesterday 1/26/17

## 2017-01-31 DIAGNOSIS — R30.0 DYSURIA: ICD-10-CM

## 2017-01-31 DIAGNOSIS — R82.90 NONSPECIFIC FINDING ON EXAMINATION OF URINE: Primary | ICD-10-CM

## 2017-01-31 LAB
ALBUMIN UR-MCNC: NEGATIVE MG/DL
APPEARANCE UR: CLEAR
BACTERIA #/AREA URNS HPF: ABNORMAL /HPF
BILIRUB UR QL STRIP: NEGATIVE
COLOR UR AUTO: YELLOW
GLUCOSE UR STRIP-MCNC: NEGATIVE MG/DL
HGB UR QL STRIP: ABNORMAL
KETONES UR STRIP-MCNC: NEGATIVE MG/DL
LEUKOCYTE ESTERASE UR QL STRIP: ABNORMAL
NITRATE UR QL: NEGATIVE
NON-SQ EPI CELLS #/AREA URNS LPF: ABNORMAL /LPF
PH UR STRIP: 6 PH (ref 5–7)
RBC #/AREA URNS AUTO: ABNORMAL /HPF (ref 0–2)
SP GR UR STRIP: <=1.005 (ref 1–1.03)
URN SPEC COLLECT METH UR: ABNORMAL
UROBILINOGEN UR STRIP-ACNC: 0.2 EU/DL (ref 0.2–1)
WBC #/AREA URNS AUTO: ABNORMAL /HPF (ref 0–2)

## 2017-01-31 PROCEDURE — 81001 URINALYSIS AUTO W/SCOPE: CPT | Performed by: FAMILY MEDICINE

## 2017-01-31 PROCEDURE — 87088 URINE BACTERIA CULTURE: CPT | Performed by: FAMILY MEDICINE

## 2017-01-31 PROCEDURE — 87186 SC STD MICRODIL/AGAR DIL: CPT | Performed by: FAMILY MEDICINE

## 2017-01-31 PROCEDURE — 87086 URINE CULTURE/COLONY COUNT: CPT | Performed by: FAMILY MEDICINE

## 2017-01-31 NOTE — TELEPHONE ENCOUNTER
Call to patient without answer left detailed message regarding U/A,to call back with any concerns.

## 2017-02-01 RX ORDER — PIOGLITAZONEHYDROCHLORIDE 30 MG/1
30 TABLET ORAL DAILY
Qty: 30 TABLET | Refills: 3 | Status: SHIPPED | OUTPATIENT
Start: 2017-02-01 | End: 2017-05-15

## 2017-02-03 LAB
BACTERIA SPEC CULT: ABNORMAL
MICRO REPORT STATUS: ABNORMAL
MICROORGANISM SPEC CULT: ABNORMAL
SPECIMEN SOURCE: ABNORMAL

## 2017-02-03 RX ORDER — CIPROFLOXACIN 500 MG/1
500 TABLET, FILM COATED ORAL 2 TIMES DAILY
Qty: 14 TABLET | Refills: 0 | Status: SHIPPED | OUTPATIENT
Start: 2017-02-03 | End: 2017-02-20

## 2017-02-03 NOTE — PROGRESS NOTES
UC does show infection.  Discussed with Pt.  Rx with Cipro 500 mg BID for 7 days sent to her [facility] pharmacy

## 2017-02-13 DIAGNOSIS — I10 HYPERTENSION GOAL BP (BLOOD PRESSURE) < 140/90: ICD-10-CM

## 2017-02-13 DIAGNOSIS — E11.9 TYPE 2 DIABETES MELLITUS WITHOUT COMPLICATION, WITHOUT LONG-TERM CURRENT USE OF INSULIN (H): ICD-10-CM

## 2017-02-14 DIAGNOSIS — I10 HYPERTENSION GOAL BP (BLOOD PRESSURE) < 140/90: ICD-10-CM

## 2017-02-14 RX ORDER — ATENOLOL 25 MG/1
TABLET ORAL
Qty: 30 TABLET | Refills: 11 | Status: SHIPPED | OUTPATIENT
Start: 2017-02-14 | End: 2017-10-30 | Stop reason: ALTCHOICE

## 2017-02-14 NOTE — TELEPHONE ENCOUNTER
glipiZIDE (GLUCOTROL XL) 10 MG        Last Written Prescription Date: 12/14/16  Last Fill Quantity: 60, # refills: 0  Last Office Visit with [facility], [facility] or [facility] prescribing provider:  1/23/17   Next 5 appointments (look out 90 days)     Feb 20, 2017  2:00 PM CST   Office Visit with Nathan Dhillon MD   [facility] ([facility])    [address] [place] [address] [phone]                   BP Readings from Last 3 Encounters:   01/26/17 122/58   01/23/17 112/62   07/27/15 136/64     Lab Results   Component Value Date    MICROL 12 05/18/2015     No results found for: MICROALBUMIN  Creatinine   Date Value Ref Range Status   01/26/2017 1.02 0.52 - 1.04 mg/dL Final   ]  GFR Estimate   Date Value Ref Range Status   01/26/2017 54 (L) >60 mL/min/1.7m2 Final     Comment:     Non  GFR Calc   01/23/2017 40 (L) >60 mL/min/1.7m2 Final     Comment:     Non  GFR Calc   05/18/2015 56 (L) >60 mL/min/1.7m2 Final     GFR Estimate If Black   Date Value Ref Range Status   01/26/2017 66 >60 mL/min/1.7m2 Final     Comment:      GFR Calc   01/23/2017 49 (L) >60 mL/min/1.7m2 Final     Comment:      GFR Calc   05/18/2015 68 >60 mL/min/1.7m2 Final     Lab Results   Component Value Date    CHOL 120 01/23/2017     Lab Results   Component Value Date    HDL 32 01/23/2017     Lab Results   Component Value Date    LDL 59 01/23/2017     06/02/2014     Lab Results   Component Value Date    TRIG 147 01/23/2017     Lab Results   Component Value Date    CHOLHDLRATIO 4.3 05/18/2015     Lab Results   Component Value Date    AST 18 01/23/2017     Lab Results   Component Value Date    ALT 13 01/23/2017     Lab Results   Component Value Date    A1C 11.2 01/23/2017    A1C 9.4 05/18/2015    A1C 10.7 06/02/2014    A1C 10.7 07/17/2013    A1C 11.7 11/25/2011     Potassium   Date Value Ref Range  Status   01/26/2017 4.0 3.4 - 5.3 mmol/L Final       losartan (COZAAR) 100 MG    Last Written Prescription Date: 5/18/15  Last Fill Quantity: 90, # refills: 2  Last Office Visit with [facility], P or [facility] prescribing provider: 1/23/17  Next 5 appointments (look out 90 days)     Feb 20, 2017  2:00 PM CST   Office Visit with Nathan Dhillon MD   [facility] ([facility]    [address] [place] [address] [phone]                   Potassium   Date Value Ref Range Status   01/26/2017 4.0 3.4 - 5.3 mmol/L Final     Creatinine   Date Value Ref Range Status   01/26/2017 1.02 0.52 - 1.04 mg/dL Final     BP Readings from Last 3 Encounters:   01/26/17 122/58   01/23/17 112/62   07/27/15 136/64

## 2017-02-14 NOTE — TELEPHONE ENCOUNTER
Prescription approved per [facility] Refill Protocol.  Arelis Mar RN- Triage [employer]

## 2017-02-15 RX ORDER — GLIPIZIDE 10 MG/1
TABLET, FILM COATED, EXTENDED RELEASE ORAL
Qty: 60 TABLET | Refills: 5 | Status: SHIPPED | OUTPATIENT
Start: 2017-02-15 | End: 2017-08-10

## 2017-02-15 RX ORDER — LOSARTAN POTASSIUM 100 MG/1
TABLET ORAL
Qty: 30 TABLET | Refills: 11 | Status: SHIPPED | OUTPATIENT
Start: 2017-02-15 | End: 2017-10-30

## 2017-02-15 NOTE — TELEPHONE ENCOUNTER
Prescription approved per [facility] Refill Protocol.  Arelis Mar RN- Triage [employer]

## 2017-02-20 ENCOUNTER — OFFICE VISIT (OUTPATIENT)
Dept: FAMILY MEDICINE | Facility: CLINIC | Age: 67
End: 2017-02-20
Payer: MEDICARE

## 2017-02-20 VITALS
TEMPERATURE: 98 F | SYSTOLIC BLOOD PRESSURE: 124 MMHG | OXYGEN SATURATION: 98 % | DIASTOLIC BLOOD PRESSURE: 70 MMHG | WEIGHT: 153 LBS | RESPIRATION RATE: 16 BRPM | BODY MASS INDEX: 27.98 KG/M2 | HEART RATE: 60 BPM

## 2017-02-20 DIAGNOSIS — Z13.89 SCREENING FOR DIABETIC PERIPHERAL NEUROPATHY: ICD-10-CM

## 2017-02-20 DIAGNOSIS — I10 HYPERTENSION GOAL BP (BLOOD PRESSURE) < 140/90: ICD-10-CM

## 2017-02-20 DIAGNOSIS — E11.9 TYPE 2 DIABETES MELLITUS WITHOUT COMPLICATION, WITHOUT LONG-TERM CURRENT USE OF INSULIN (H): ICD-10-CM

## 2017-02-20 DIAGNOSIS — K62.5 RECTAL BLEEDING: ICD-10-CM

## 2017-02-20 DIAGNOSIS — K52.9 COLITIS: ICD-10-CM

## 2017-02-20 DIAGNOSIS — Z00.00 ROUTINE GENERAL MEDICAL EXAMINATION AT A HEALTH CARE FACILITY: Primary | ICD-10-CM

## 2017-02-20 PROCEDURE — 99396 PREV VISIT EST AGE 40-64: CPT | Performed by: INTERNAL MEDICINE

## 2017-02-20 PROCEDURE — 99207 C FOOT EXAM  NO CHARGE: CPT | Mod: 25 | Performed by: INTERNAL MEDICINE

## 2017-02-20 NOTE — MR AVS SNAPSHOT
After Visit Summary   2/20/2017    Keerthi Montoya    MRN: [id number]           Patient Information     Date Of Birth          1950        Visit Information        Provider Department      2/20/2017 2:00 PM Nathan Dhillon MD [facility]        Today's Diagnoses     Routine general medical examination at a health care facility    -  1    Type 2 diabetes mellitus without complication, without long-term current use of insulin (H)        Hypertension goal BP (blood pressure) < 140/90        Rectal bleeding        Colitis        Screening for diabetic peripheral neuropathy          Care Instructions     We are planning a colonoscopy.    Continue the glipizide alone, with your diet and exercise program.                        We should recheck some of your labs again in 3-4 months.              See you then.              Follow-ups after your visit        Additional Services     GASTROENTEROLOGY ADULT REF PROCEDURE ONLY       Last Lab Result: Creatinine (mg/dL)       Date                     Value                 01/26/2017               1.02             ----------  Body mass index is 27.98 kg/(m^2).     Needed:  No  Language:  English    Patient will be contacted to schedule procedure.     Please be aware that coverage of these services is subject to the terms and limitations of your health insurance plan.  Call member services at your health plan with any benefit or coverage questions.  Any procedures must be performed at a [place] facility OR coordinated by your clinic's referral office.    Please bring the following with you to your appointment:    (1) Any X-Rays, CTs or MRIs which have been performed.  Contact the facility where they were done to arrange for  prior to your scheduled appointment.    (2) List of current medications   (3) This referral request   (4) Any documents/labs given to you for this referral                  Who to contact      If you have questions or need follow up information about today's clinic visit or your schedule please contact [facility] directly at [phone].  Normal or non-critical lab and imaging results will be communicated to you by MyChart, letter or phone within 4 business days after the clinic has received the results. If you do not hear from us within 7 days, please contact the clinic through MyChart or phone. If you have a critical or abnormal lab result, we will notify you by phone as soon as possible.  Submit refill requests through [employer] or call your pharmacy and they will forward the refill request to us. Please allow 3 business days for your refill to be completed.          Additional Information About Your Visit        Care EveryWhere ID     This is your Care EveryWhere ID. This could be used by other organizations to access your [place] medical records  [id number]        Your Vitals Were     Pulse Temperature Respirations Pulse Oximetry BMI (Body Mass Index)       60 98  F (36.7  C) 16 98% 27.98 kg/m2        Blood Pressure from Last 3 Encounters:   02/20/17 124/70   01/26/17 122/58   01/23/17 112/62    Weight from Last 3 Encounters:   02/20/17 153 lb (69.4 kg)   01/26/17 149 lb (67.6 kg)   01/23/17 148 lb (67.1 kg)              We Performed the Following     FOOT EXAM  NO CHARGE [[id number]]     GASTROENTEROLOGY ADULT REF PROCEDURE ONLY        Primary Care Provider Office Phone # Fax #    Nathan Dhillon [phone]       [facility] [address]  [facility] [address]        Thank you!     Thank you for choosing [facility]  for your care. Our goal is always to provide you with excellent care. Hearing back from our patients is one way we can continue to improve our services. Please take a few minutes to complete the written survey that you may receive in the mail after your visit with us. Thank  you!             Your Updated Medication List - Protect others around you: Learn how to safely use, store and throw away your medicines at www.disposemymeds.org.          This list is accurate as of: 2/20/17  2:47 PM.  Always use your most recent med list.                   Brand Name Dispense Instructions for use    * ACCU-CHEK FELIPE PLUS test strip   Generic drug:  blood glucose monitoring     100 each    TEST ONCE DAILY       * blood glucose monitoring test strip    ACCU-CHEK FELIPE PLUS    100 each    Pt tests 1x/day       amLODIPine 5 MG tablet    NORVASC    90 tablet    Take 1 tablet (5 mg) by mouth daily       atenolol 25 MG tablet    TENORMIN    30 tablet    TAKE 1 TABLET BY MOUTH DAILY       atorvastatin 40 MG tablet    LIPITOR    90 tablet    TAKE 1 TABLET BY MOUTH DAILY       glipiZIDE 10 MG 24 hr tablet    GLUCOTROL XL    60 tablet    TAKE 1 TABLET BY MOUTH TWICE DAILY       levothyroxine 125 MCG tablet    SYNTHROID/LEVOTHROID    90 tablet    TAKE 1 TABLET BY MOUTH DAILY       losartan 100 MG tablet    COZAAR    30 tablet    TAKE 1 TABLET BY MOUTH DAILY       pioglitazone 30 MG tablet    ACTOS    30 tablet    Take 1 tablet (30 mg) by mouth daily       * Notice:  This list has 2 medication(s) that are the same as other medications prescribed for you. Read the directions carefully, and ask your doctor or other care provider to review them with you.

## 2017-02-20 NOTE — PATIENT INSTRUCTIONS
We are planning a colonoscopy.    Continue the glipizide alone, with your diet and exercise program.                        We should recheck some of your labs again in 3-4 months.              See you then.

## 2017-02-20 NOTE — NURSING NOTE
"Chief Complaint   Patient presents with     Physical       Initial /70  Pulse 60  Temp 98  F (36.7  C)  Resp 16  Wt 153 lb (69.4 kg)  SpO2 98%  BMI 27.98 kg/m2 Estimated body mass index is 27.98 kg/(m^2) as calculated from the following:    Height as of 1/26/17: 5' 2\" (1.575 m).    Weight as of this encounter: 153 lb (69.4 kg).  Medication Reconciliation: sophy Stevens CMA      "

## 2017-02-20 NOTE — PROGRESS NOTES
SUBJECTIVE:     CC: Keerthi Montoya is an 66 year old woman who presents for preventive health visit.     Healthy Habits:    Do you get at least three servings of calcium containing foods daily (dairy, green leafy vegetables, etc.)? yes    Amount of exercise or daily activities, outside of work: 7 day(s) per week    Problems taking medications regularly No    Medication side effects: No    Have you had an eye exam in the past two years? yes    Do you see a dentist twice per year? yes    Do you have sleep apnea, excessive snoring or daytime drowsiness?no                              from the addendum after her last visit:  Discussed with patient. Since her last visit with me, she went to the emergency room with rectal bleeding and ongoing chest wall pain. CT abdomen and pelvis suggested a muscle strain. A colonoscopy was advised. She has not had further bleeding. She did receive IV contrast for the CT. Later she developed some dysuria. Today she left a urine sample showing a few white cells and rare red blood cells, and the culture is pending. We reviewed her lab findings including an A1c of 11.2. She is not able to tolerate any metformin. She does not want to start on any injectable medication at this time. She has new insurance starting tomorrow. We discussed that there are no real good treatment options in terms of oral medications with an A1c that is this elevated. Will add Actos 30 mg per day, and d/w pt further at her CPE in 3 wks.              This information is new:                               Had fluid retention with actos; so she stopped right away.  She has new insurance.      Meds are through [facility].                  She has an ambitious plan to exercise and eat properly and manage her diabetes in that manner. She states her diabetic brother and sister both have been able to make very good progress via diet and exercise.       She is recovering emotionally after the death of her  after his  prolonged dementing illness and other severe medical problems.                                   Today's PHQ-2 Score:   PHQ-2 ( 1999 [employer]) 2/20/2017 1/23/2017   Q1: Little interest or pleasure in doing things 0 1   Q2: Feeling down, depressed or hopeless 0 1   PHQ-2 Score 0 2       Abuse: Current or Past(Physical, Sexual or Emotional)- No  Do you feel safe in your environment - Yes    Social History   Substance Use Topics     Smoking status: Never Smoker     Smokeless tobacco: Never Used     Alcohol use Yes      Comment: ocasionally     The patient does not drink >3 drinks per day nor >7 drinks per week.    Recent Labs   Lab Test  01/23/17   1433  05/18/15   0914   07/17/13   0817   CHOL  120  231*   --   196   HDL  32*  54   --   47*   LDL  59  159*   < >  126   TRIG  147  92   --   113   CHOLHDLRATIO   --   4.3   --   4.2   NHDL  88   --    --    --     < > = values in this interval not displayed.       Reviewed orders with patient.  Reviewed health maintenance and updated orders accordingly - Yes    Mammo Decision Support:  Patient over age 50, mutual decision to screen reflected in health maintenance.    Pertinent mammograms are reviewed under the imaging tab.  History of abnormal Pap smear: NO - age 30-65 PAP every 5 years with negative HPV co-testing recommended  All Histories reviewed and updated in Epic.      ROS:  C: NEGATIVE for fever, chills, change in weight  I: NEGATIVE for worrisome rashes, moles or lesions  E: NEGATIVE for vision changes or irritation  ENT: NEGATIVE for ear, mouth and throat problems  R: NEGATIVE for significant cough or SOB  B: NEGATIVE for masses, tenderness or discharge  CV: NEGATIVE for chest pain, palpitations or peripheral edema  GI: POSITIVE for still having some occasional rectal bleeding  : NEGATIVE for unusual urinary or vaginal symptoms. No vaginal bleeding.  M: NEGATIVE for significant arthralgias or myalgia  N: NEGATIVE for weakness, dizziness or paresthesias  P:  "NEGATIVE for changes in mood or affect     BP Readings from Last 3 Encounters:   02/20/17 124/70   01/26/17 122/58   01/23/17 112/62    Wt Readings from Last 3 Encounters:   02/20/17 153 lb (69.4 kg)   01/26/17 149 lb (67.6 kg)   01/23/17 148 lb (67.1 kg)                  Patient Active Problem List    Diagnosis Date Noted     Colitis 09/10/2015     Priority: High     Colonoscopy 8/15:  \"mildly active chronic colitis\" on bx of sigmoid and rectum; Dx?          Type 2 diabetes mellitus without complication (H) 07/26/2013     Priority: High     Hyperlipidemia LDL goal <100 07/26/2013     Priority: High      in 7/13; not taking prava 40 regularly       Hypertension goal BP (blood pressure) < 140/90 10/01/2012     Priority: High     Rectal bleeding 07/27/2015     Priority: Medium     Abdominal pain, left lower quadrant 06/28/2015     Priority: Medium     CT in 6/15 diverticulosis, no diverticulitis.         \"prominent lymph nodes of uncertain etiology\"; 6 month f/u CT suggested by Radiology             Aspirin not indicated 05/18/2015     Priority: Medium     Not tolerated by pt; GI distress       Diverticulosis of large intestine 10/01/2012     Priority: Medium     Colonoscopy 8/12; tics only, no strictures.            CT shows 2.6 cm R ovarian cyst.         Pt has hx of ovarian cysts on CT and US in 2006. In 1/07, she saw Dr. Stiven Griffin re: a 3 cm L ovarian cyst; CA-125 nml at 16 ( his 1/07 letter describes a L ovarian cyst; pt recalls this was R sided, like the current CT states)                     CT in 6/15 diverticulosis no diverticulitis; \"some increased number and mildly prominent nodes within the small bowel mesentery and posterior pelvis of ? Significance; f/u CT 3-6 months advised.               Problem list name updated by automated process. Provider to review       Vitamin D deficiency 10/01/2012     Priority: Medium     Problem list name updated by automated process. Provider to review       " Hypercalcemia 10/01/2012     Priority: Medium     Hypothyroidism 10/01/2012     Priority: Medium     Problem list name updated by automated process. Provider to review       Preventive measure 07/26/2013     Priority: Low     APRIMA DATA BASE UNDER THE 7/26/13 NOTE  Colonoscopy 8/12; recheck in 10 yrs  Pap 2/10,6/15;                                Mammogram 1/14, 6/16  DXA 2005; all scores >+1         Past Surgical History   Procedure Laterality Date     Hernia repair  12/2006     recurrent incisional hernia     Cholecystectomy  3/1987     Colon surgery  01/2001     Left colon resection; diverticulitis     Throat surgery  12/1974     thyroidectomy     Gyn surgery  9/1983     tubal ligation       Social History   Substance Use Topics     Smoking status: Never Smoker     Smokeless tobacco: Never Used     Alcohol use Yes      Comment: ocasionally     Family History   Problem Relation Age of Onset     CEREBROVASCULAR DISEASE Mother      CANCER Father      bladder         Current Outpatient Prescriptions   Medication Sig Dispense Refill     glipiZIDE (GLUCOTROL XL) 10 MG 24 hr tablet TAKE 1 TABLET BY MOUTH TWICE DAILY 60 tablet 5     losartan (COZAAR) 100 MG tablet TAKE 1 TABLET BY MOUTH DAILY 30 tablet 11     atenolol (TENORMIN) 25 MG tablet TAKE 1 TABLET BY MOUTH DAILY 30 tablet 11             atorvastatin (LIPITOR) 40 MG tablet TAKE 1 TABLET BY MOUTH DAILY 90 tablet 0     blood glucose monitoring (ACCU-CHEK FELIPE PLUS) test strip Pt tests 1x/day 100 each 1     levothyroxine (SYNTHROID, LEVOTHROID) 125 MCG tablet TAKE 1 TABLET BY MOUTH DAILY 90 tablet 0     ACCU-CHEK FELIPE PLUS test strip TEST ONCE DAILY 100 each 2     amLODIPine (NORVASC) 5 MG tablet Take 1 tablet (5 mg) by mouth daily 90 tablet 2                     Allergies   Allergen Reactions     Animal Dander      Doxycycline Hives     Dust Mites      Grass      Keflex [Cephalexin Hcl] Hives     Metformin Diarrhea     At 1000mg     Other [Seasonal Allergies]       pollen     Synthroid [Levothroid] Swelling     ???     Tetracycline Hives     Tylenol Hives     Ziac [Bisoprolol-Hydrochlorothiazide]      OBJECTIVE:     /70  Pulse 60  Temp 98  F (36.7  C)  Resp 16  Wt 153 lb (69.4 kg)  SpO2 98%  BMI 27.98 kg/m2  EXAM:  GENERAL APPEARANCE: alert and no distress  HENT: ear canals and TM's normal, nose and mouth without ulcers or lesions, oropharynx clear and oral mucous membranes moist  NECK: no adenopathy, no asymmetry, masses, or scars and thyroid normal to palpation  RESP: lungs clear to auscultation - no rales, rhonchi or wheezes  BREAST: normal without masses, tenderness or nipple discharge and no palpable axillary masses or adenopathy  CV: regular rate and rhythm, normal S1 S2, no S3 or S4, no murmur, click or rub, no peripheral edema and peripheral pulses strong  ABDOMEN: soft, nontender, no hepatosplenomegaly and no masses  MS: no musculoskeletal defects are noted and gait is age appropriate without ataxia  SKIN: no suspicious lesions or rashes  NEURO: Normal strength and tone, sensory exam grossly normal, mentation intact and speech normal  PSYCH: mentation appears normal and affect normal/bright    ASSESSMENT/PLAN:     Georgia was seen today for physical.    Diagnoses and all orders for this visit:    Routine general medical examination at a health care facility    Type 2 diabetes mellitus without complication, without long-term current use of insulin (H)    Hypertension goal BP (blood pressure) < 140/90    Rectal bleeding  -     GASTROENTEROLOGY ADULT REF PROCEDURE ONLY    Colitis    Screening for diabetic peripheral neuropathy  -     FOOT EXAM  NO CHARGE [[id number]]     Summary and implications:  We reviewed multiple issues today.            Patient Instructions    We are planning a colonoscopy.    Continue the glipizide alone, with your diet and exercise program.                        We should recheck some of your labs again in 3-4 months.              See you  "then.                 COUNSELING:   Reviewed preventive health counseling, as reflected in patient instructions  Special attention given to:        Regular exercise       Healthy diet/nutrition         reports that she has never smoked. She has never used smokeless tobacco.    Estimated body mass index is 27.98 kg/(m^2) as calculated from the following:    Height as of 1/26/17: 5' 2\" (1.575 m).    Weight as of this encounter: 153 lb (69.4 kg).   Weight management plan: Discussed healthy diet and exercise guidelines and patient will follow up in 12 months in clinic to re-evaluate.    Counseling Resources:  ATP IV Guidelines  Pooled Cohorts Equation Calculator  Breast Cancer Risk Calculator  FRAX Risk Assessment  ICSI Preventive Guidelines  Dietary Guidelines for Americans, 2010  USDA's MyPlate  ASA Prophylaxis  Lung CA Screening    Nathan Dhillon MD  [facility]  "

## 2017-03-02 ENCOUNTER — TELEPHONE (OUTPATIENT)
Dept: OTHER | Facility: CLINIC | Age: 67
End: 2017-03-02

## 2017-04-12 DIAGNOSIS — E03.9 HYPOTHYROIDISM: ICD-10-CM

## 2017-04-12 DIAGNOSIS — E78.5 HYPERLIPIDEMIA LDL GOAL <100: ICD-10-CM

## 2017-04-13 RX ORDER — LEVOTHYROXINE SODIUM 125 UG/1
TABLET ORAL
Qty: 90 TABLET | Refills: 2 | Status: SHIPPED | OUTPATIENT
Start: 2017-04-13 | End: 2018-01-07

## 2017-04-13 RX ORDER — ATORVASTATIN CALCIUM 40 MG/1
TABLET, FILM COATED ORAL
Qty: 90 TABLET | Refills: 2 | Status: SHIPPED | OUTPATIENT
Start: 2017-04-13 | End: 2018-01-07

## 2017-04-13 NOTE — TELEPHONE ENCOUNTER
Levothyroxine     Last Written Prescription Date: 5/31/16  Last Quantity: 90, # refills: 0  [place] [facility] [place] [facility] or  Health prescribing provider: 2/20/17   Next 5 appointments (look out 90 days)     May 15, 2017  2:00 PM CDT   SHORT with Nathan Dhillon MD   [facility]    [address] [place] [address] [address]  [facility] [address]   [phone]                   TSH   Date Value Ref Range Status   01/23/2017 3.09 0.40 - 4.00 mU/L Final     Atorvastatin     Last Written Prescription Date: 1/17/17  Last Fill Quantity: 90, # refills: 0  Last Office Visit [place] [facility] or  Health prescribing provider: 2/20/17  Next 5 appointments (look out 90 days)     May 15, 2017  2:00 PM CDT   SHORT with Nathan Dhillon MD   [facility]    [address] [place] [address] [address]  [facility] [address]   [phone]                   Lab Results   Component Value Date    CHOL 120 01/23/2017     Lab Results   Component Value Date    HDL 32 01/23/2017     Lab Results   Component Value Date    LDL 59 01/23/2017     Lab Results   Component Value Date    TRIG 147 01/23/2017     Lab Results   Component Value Date    CHOLHDLRATIO 4.3 05/18/2015

## 2017-04-17 ENCOUNTER — TELEPHONE (OUTPATIENT)
Dept: FAMILY MEDICINE | Facility: CLINIC | Age: 67
End: 2017-04-17

## 2017-04-17 NOTE — TELEPHONE ENCOUNTER
Reason for Call:  Form, our goal is to have forms completed with 72 hours, however, some forms may require a visit or additional information.    Type of letter, form or note:  Luz Marina    Who is the form from?: Luz Marina (if other please explain)    Where did the form come from: form was faxed in    What clinic location was the form placed at?: [facility]    Where the form was placed: Dr's Box: Nathan Dhillon MD    What number is listed as a contact on the form?: Luz Marina, fax # [phone]       Additional comments: Luz Marina - Diabetic Form - Test Strips    Call taken on 4/17/2017 at 10:00 AM by CANDIS BOYLE

## 2017-04-18 ENCOUNTER — MEDICAL CORRESPONDENCE (OUTPATIENT)
Dept: HEALTH INFORMATION MANAGEMENT | Facility: CLINIC | Age: 67
End: 2017-04-18

## 2017-04-18 NOTE — TELEPHONE ENCOUNTER
Form reviewed, completed, and signed by Dr. Nathan Dhillon and faxed to [facility].  [facility]  Teresa Salmon TC

## 2017-04-24 ENCOUNTER — SURGERY (OUTPATIENT)
Age: 67
End: 2017-04-24
Payer: MEDICARE

## 2017-04-24 ENCOUNTER — HOSPITAL ENCOUNTER (OUTPATIENT)
Facility: AMBULATORY SURGERY CENTER | Age: 67
Discharge: HOME | End: 2017-04-24
Attending: SURGERY | Admitting: SURGERY
Payer: MEDICARE

## 2017-04-24 VITALS
SYSTOLIC BLOOD PRESSURE: 128 MMHG | DIASTOLIC BLOOD PRESSURE: 61 MMHG | OXYGEN SATURATION: 98 % | RESPIRATION RATE: 16 BRPM | TEMPERATURE: 97.8 F

## 2017-04-24 LAB
COLONOSCOPY: NORMAL
GLUCOSE BLDC GLUCOMTR-MCNC: 191 MG/DL (ref 70–99)

## 2017-04-24 PROCEDURE — G8907 PT DOC NO EVENTS ON DISCHARG: HCPCS

## 2017-04-24 PROCEDURE — G8918 PT W/O PREOP ORDER IV AB PRO: HCPCS

## 2017-04-24 PROCEDURE — 45380 COLONOSCOPY AND BIOPSY: CPT | Performed by: SURGERY

## 2017-04-24 PROCEDURE — 45380 COLONOSCOPY AND BIOPSY: CPT

## 2017-04-24 RX ORDER — LIDOCAINE 40 MG/G
CREAM TOPICAL
Status: DISCONTINUED | OUTPATIENT
Start: 2017-04-24 | End: 2017-04-25 | Stop reason: HOSPADM

## 2017-04-24 RX ORDER — FLUMAZENIL 0.1 MG/ML
0.2 INJECTION, SOLUTION INTRAVENOUS
Status: CANCELLED | OUTPATIENT
Start: 2017-04-24 | End: 2017-04-24

## 2017-04-24 RX ORDER — NALOXONE HYDROCHLORIDE 0.4 MG/ML
.1-.4 INJECTION, SOLUTION INTRAMUSCULAR; INTRAVENOUS; SUBCUTANEOUS
Status: CANCELLED | OUTPATIENT
Start: 2017-04-24 | End: 2017-04-25

## 2017-04-24 RX ORDER — ONDANSETRON 2 MG/ML
4 INJECTION INTRAMUSCULAR; INTRAVENOUS EVERY 6 HOURS PRN
Status: CANCELLED | OUTPATIENT
Start: 2017-04-24

## 2017-04-24 RX ORDER — ONDANSETRON 2 MG/ML
4 INJECTION INTRAMUSCULAR; INTRAVENOUS
Status: DISCONTINUED | OUTPATIENT
Start: 2017-04-24 | End: 2017-04-25 | Stop reason: HOSPADM

## 2017-04-24 RX ORDER — ONDANSETRON 4 MG/1
4 TABLET, ORALLY DISINTEGRATING ORAL EVERY 6 HOURS PRN
Status: CANCELLED | OUTPATIENT
Start: 2017-04-24

## 2017-04-24 NOTE — LETTER
[facility]  [address] [facility] [place] [id number] [phone]           May 1, 2017    Keerthi Montoya                                                                                                                                                       [address] [place] [id number]              Dear Georgia,    As we discussed over the phone, the biopsies from your colonoscopy last week identified colitis, both in the rectum and in the sigmoid colon. I have placed a gastroenterology referral for you, and follow-up colonoscopy will be based on the treatment plan there. If you have any questions, please do not hesitate to contact us at [phone].           Sincerely,    Radha Salguero MD

## 2017-04-27 LAB — COPATH REPORT: NORMAL

## 2017-05-01 ENCOUNTER — TELEPHONE (OUTPATIENT)
Dept: SURGERY | Facility: CLINIC | Age: 67
End: 2017-05-01

## 2017-05-01 DIAGNOSIS — K52.9 COLITIS: Primary | ICD-10-CM

## 2017-05-10 ENCOUNTER — PRE VISIT (OUTPATIENT)
Dept: GASTROENTEROLOGY | Facility: CLINIC | Age: 67
End: 2017-05-10

## 2017-05-10 NOTE — TELEPHONE ENCOUNTER
1.  Date/reason for appt: 5/15/17 7:40AM Colitis   2.  Referring provider: REANNA HAWLEY MD   3.  Call to patient (Yes / No - short description): No, pt was referred.   4.  Previous care at / records requested from:  [facility]  5/18/15  Referral 5/1/17  Images in PACS   Colonoscopy - 4/24/17

## 2017-05-15 ENCOUNTER — TELEPHONE (OUTPATIENT)
Dept: GASTROENTEROLOGY | Facility: CLINIC | Age: 67
End: 2017-05-15

## 2017-05-15 ENCOUNTER — OFFICE VISIT (OUTPATIENT)
Dept: GASTROENTEROLOGY | Facility: CLINIC | Age: 67
End: 2017-05-15

## 2017-05-15 VITALS
BODY MASS INDEX: 27.98 KG/M2 | OXYGEN SATURATION: 97 % | HEART RATE: 61 BPM | DIASTOLIC BLOOD PRESSURE: 61 MMHG | WEIGHT: 153 LBS | SYSTOLIC BLOOD PRESSURE: 136 MMHG

## 2017-05-15 DIAGNOSIS — E89.0 POSTOPERATIVE HYPOTHYROIDISM: ICD-10-CM

## 2017-05-15 DIAGNOSIS — K51.311 ULCERATIVE RECTOSIGMOIDITIS WITH RECTAL BLEEDING (H): Primary | ICD-10-CM

## 2017-05-15 DIAGNOSIS — K51.311 ULCERATIVE RECTOSIGMOIDITIS WITH RECTAL BLEEDING (H): ICD-10-CM

## 2017-05-15 LAB
ALBUMIN SERPL-MCNC: 3.7 G/DL (ref 3.4–5)
ALP SERPL-CCNC: 178 U/L (ref 40–150)
ALT SERPL W P-5'-P-CCNC: 15 U/L (ref 0–50)
ANION GAP SERPL CALCULATED.3IONS-SCNC: 9 MMOL/L (ref 3–14)
AST SERPL W P-5'-P-CCNC: 18 U/L (ref 0–45)
BASOPHILS # BLD AUTO: 0.1 10E9/L (ref 0–0.2)
BASOPHILS NFR BLD AUTO: 1.2 %
BILIRUB DIRECT SERPL-MCNC: <0.1 MG/DL (ref 0–0.2)
BILIRUB SERPL-MCNC: 0.4 MG/DL (ref 0.2–1.3)
BUN SERPL-MCNC: 28 MG/DL (ref 7–30)
CALCIUM SERPL-MCNC: 9.4 MG/DL (ref 8.5–10.1)
CHLORIDE SERPL-SCNC: 100 MMOL/L (ref 94–109)
CO2 SERPL-SCNC: 22 MMOL/L (ref 20–32)
CREAT SERPL-MCNC: 1.05 MG/DL (ref 0.52–1.04)
CRP SERPL-MCNC: <2.9 MG/L (ref 0–8)
DIFFERENTIAL METHOD BLD: NORMAL
EOSINOPHIL # BLD AUTO: 0.3 10E9/L (ref 0–0.7)
EOSINOPHIL NFR BLD AUTO: 3.6 %
ERYTHROCYTE [DISTWIDTH] IN BLOOD BY AUTOMATED COUNT: 12.4 % (ref 10–15)
ERYTHROCYTE [SEDIMENTATION RATE] IN BLOOD BY WESTERGREN METHOD: 60 MM/H (ref 0–30)
FERRITIN SERPL-MCNC: 33 NG/ML (ref 8–252)
FOLATE SERPL-MCNC: 13.8 NG/ML
GFR SERPL CREATININE-BSD FRML MDRD: 53 ML/MIN/1.7M2
GLUCOSE SERPL-MCNC: 412 MG/DL (ref 70–99)
HCT VFR BLD AUTO: 38 % (ref 35–47)
HGB BLD-MCNC: 12.3 G/DL (ref 11.7–15.7)
IMM GRANULOCYTES # BLD: 0 10E9/L (ref 0–0.4)
IMM GRANULOCYTES NFR BLD: 0.1 %
IRON SATN MFR SERPL: 20 % (ref 15–46)
IRON SERPL-MCNC: 67 UG/DL (ref 35–180)
LYMPHOCYTES # BLD AUTO: 2.3 10E9/L (ref 0.8–5.3)
LYMPHOCYTES NFR BLD AUTO: 31.8 %
MCH RBC QN AUTO: 30.3 PG (ref 26.5–33)
MCHC RBC AUTO-ENTMCNC: 32.4 G/DL (ref 31.5–36.5)
MCV RBC AUTO: 94 FL (ref 78–100)
MONOCYTES # BLD AUTO: 0.9 10E9/L (ref 0–1.3)
MONOCYTES NFR BLD AUTO: 12.9 %
NEUTROPHILS # BLD AUTO: 3.7 10E9/L (ref 1.6–8.3)
NEUTROPHILS NFR BLD AUTO: 50.4 %
NRBC # BLD AUTO: 0 10*3/UL
NRBC BLD AUTO-RTO: 0 /100
PLATELET # BLD AUTO: 397 10E9/L (ref 150–450)
POTASSIUM SERPL-SCNC: 4.3 MMOL/L (ref 3.4–5.3)
PROT SERPL-MCNC: 8.6 G/DL (ref 6.8–8.8)
RBC # BLD AUTO: 4.06 10E12/L (ref 3.8–5.2)
SODIUM SERPL-SCNC: 131 MMOL/L (ref 133–144)
TIBC SERPL-MCNC: 335 UG/DL (ref 240–430)
TSH SERPL DL<=0.05 MIU/L-ACNC: 1.12 MU/L (ref 0.4–4)
VIT B12 SERPL-MCNC: 368 PG/ML (ref 193–986)
WBC # BLD AUTO: 7.3 10E9/L (ref 4–11)

## 2017-05-15 PROCEDURE — 83516 IMMUNOASSAY NONANTIBODY: CPT | Performed by: INTERNAL MEDICINE

## 2017-05-15 PROCEDURE — 82746 ASSAY OF FOLIC ACID SERUM: CPT | Performed by: INTERNAL MEDICINE

## 2017-05-15 RX ORDER — MESALAMINE 1.2 G/1
2400 TABLET, DELAYED RELEASE ORAL EVERY MORNING
Qty: 180 TABLET | Refills: 3 | Status: SHIPPED | OUTPATIENT
Start: 2017-05-15 | End: 2017-05-25

## 2017-05-15 ASSESSMENT — ENCOUNTER SYMPTOMS
JAUNDICE: 0
HEARTBURN: 1
CONSTIPATION: 0
ABDOMINAL PAIN: 1
BOWEL INCONTINENCE: 1
NAUSEA: 0
BLOOD IN STOOL: 1
BLOATING: 1
RECTAL PAIN: 1
RECTAL BLEEDING: 1
DIARRHEA: 1
VOMITING: 0

## 2017-05-15 NOTE — MR AVS SNAPSHOT
After Visit Summary   5/15/2017    Keerthi Montoya    MRN: [id number]           Patient Information     Date Of Birth          1950        Visit Information        Provider Department      5/15/2017 7:40 AM Tejinder Leal MD [facility] Gastroenterology and IBD        Today's Diagnoses     Ulcerative rectosigmoiditis with rectal bleeding (H)    -  1      Care Instructions    I've included a brief summary of our discussion and care plan from today's visit below.  Please review this information with your primary care provider.  _______________________________________________________________________      1. It was a pleasure getting a chance to meet with you to discuss the likely diagnosis of new-onset Ulcerative Colitis (proctosigmoid distribution) based on the clinical symptoms and persistent endoscopically/histologically-visible inflammation on your subsequent colonoscopies in 8/2015 and 4/2017. Discussed the diagnosis and its natural history, as well as treatment options for management (medical and surgical) at length today.  - Would advise considering starting treatment with a trial of mesalamine (5-ASA agent) as we discussed today, particularly as this is a non-immunosuppressive anti-inflammatory that can help improve symptoms and aid mucosal healing in some UC patients.  - Discussed the medication risks/benefits/alternatives with you today, including the short-term and long-term medication side effects and need for periodic monitoring if necessary. Discussed the hypersensitivity reaction today, contact us ASAP should this occur.  - Strongly advise contacting your insurances to discuss which 5-ASA agents they cover preferentially, particularly as this will help with cost/affordability over time. Our preferred agent for efficacy and ease of use would typically be Lialda or Asacol (preferably not Colazal if possible given the distribution/activity of your current disease).  - Briefly discussed  Preventive Care in IBD today.    2. Recommend routine studies including nutritional and inflammatory markers as we discussed today.   - Recommend having the following studies checked at least 2-3 times/year for disease and medication safety monitoring: BMP, LFT, CBC with differential, ESR/CRP.    3. Recommend checking out the Crohn's & Colitis Foundation of [place] website (www.ccfa.org), particularly as this is an excellent resource for patients.  Please feel free to bring any questions you have to clinic for us to discuss together, or you can call/email us as well.    4. Continue to monitor for the danger signs/symptoms we reviewed together today:  worsening abdominal pain, worsening diarrhea, obstructive symptoms (nausea/vomiting, abdominal distention, inability to pass stool/flatus), blood mixed into stools, persistent fevers/chills, progressive anemia (particulary with iron deficiency), unexpected weight loss, etc.  - Contact us via Fengxiafeit should these symptoms occur, particularly as we may advise further evaluation accordingly.    5. Return to GI Clinic with me in 2-3 months to review your progress, sooner if symptomatic.   - If you are unable to schedule this follow-up appointment today, please contact Yanelis Dejesus at [phone] within the next week to help set up this necessary appointment.    PREVENTIVE CARE IN INFLAMMATORY BOWEL DISEASE    - Strongly recommend tobacco abstinence.    - Recommend considering daily calcium + Vitamin D supplementation.    - Recommend age-appropriate cancer surveillance:  - Yearly Dermatology visit for visual skin inspection if immunosuppressed, monthly skin self-check after bathing.  - Dysplasia surveillance colonoscopy every 1-2 years in patients with Crohn's colitis or ulcerative colitis >8-10 years since diagnosis.  - (For men and women ages 9-26) Consideration for HPV vaccination, should be discussed with your PCP further if you feel you'd like to pursue this.  - (For  women) Annual Pap smears if immunosuppressed, mammogram when deemed appropriate by your PCP.    - Recommend follow-up with your PCP to ensure the following vaccinations have been updated: Hepatitis A/B, Tdap, Pneumococcal pneumonia, yearly flu SHOT, Meningococcal meningitis (if college-age), HPV (all aged 18-26), etc.  - Would also recommend VZV and MMR titers be checked to confirm immunity.  - Live/attenuated vaccines (such as the INTRANASAL flu vaccine, MMR, Yellow Fever, or Varicella/VZV vaccine) should not be administered to immunosuppressed patients, except in very specific instances which you should discuss with a GI and Infectious Disease provider first.    - Family planning:  If you or your partner are planning to become pregnant, please schedule time with us to discuss issues surrounding IBD and Fertility/Pregnancy.    _______________________________________________________________________    It was a pleasure seeing you in clinic today - please be in touch if there are any further questions that arise following today's visit.  During business hours, you may reach Clinic Nurse Triage Line at [phone].  For urgent/emergent questions after business hours, you may reach the on-call GI Fellow by contacting the [facility]  at [phone].    Any benign/non-urgent test results are usually communicated via letter or Quantancehart message within 1-2 weeks after completion.  Urgent results (those that require a change in the previously-discussed care plan) are usually communicated via a phone call once available from our clinic staff to discuss the results and the next steps in your evaluation.    I recommend signing up for SnapNames access if you have not already done so and are comfortable with using a computer.  This allows for online access to your lab results and also helps you communicate efficiently with my clinic should any questions arise in your care.    We have Financial Counseling  services available through our clinic.  If you have questions about your insurance coverage or payment responsibilities, particularly before you undergo any tests or procedures, please let us know and we can arrange a consultation accordingly to help you make informed decisions about your healthcare.    Sincerely,    Tejinder Leal MD    [facility] - Department of Medicine  Division of Gastroenterology          Follow-ups after your visit        Follow-up notes from your care team     Return in about 3 months (around 8/15/2017).      Your next 10 appointments already scheduled     May 15, 2017  8:45 AM CDT   LAB with  LAB   [facility] ([facility])    [address] [id number] [phone]           Patient must bring picture ID.  Patient should be prepared to give a urine specimen  Please do not eat 10-12 hours before your appointment if you are coming in fasting for labs on lipids, cholesterol, or glucose (sugar).  Pregnant women should follow their Care Team instructions. Water with medications is okay. Do not drink coffee or other fluids.   If you have concerns about taking  your medications, please ask at office or if scheduling via Vamp Communicationst, send a message by clicking on Secure Messaging, Message Your Care Team.            Jun 26, 2017  2:00 PM CDT   SHORT with Nathan Dhillon MD   [facility] ([facility])    [address]  [address]  [facility] [id number] [phone]            Aug 04, 2017  8:00 AM CDT   (Arrive by 7:45 AM)   RETURN IRRITABLE BOWEL DISEASE with Tejinder Leal MD   [facility] ([facility])    [address] [place] [address]  [address] [facility] [id number] [phone]              Who to contact     Please call your clinic at [phone] to:    Ask questions about  your health    Make or cancel appointments    Discuss your medicines    Learn about your test results    Speak to your doctor   If you have compliments or concerns about an experience at your clinic, or if you wish to file a complaint, please contact [facility] Patient Relations at [phone] or email us at [email or web address]         Additional Information About Your Visit        Sonarworkshart Information     [employer] is an electronic gateway that provides easy, online access to your medical records. With [employer], you can request a clinic appointment, read your test results, renew a prescription or communicate with your care team.     To sign up for [employer] visit the website at [email or web address]   You will be asked to enter the access code listed below, as well as some personal information. Please follow the directions to create your username and password.     Your access code is: [email or web address]  Expires: 2017  6:30 AM     Your access code will  in 90 days. If you need help or a new code, please contact your [facility] Clinic or call [phone] for assistance.        Care EveryWhere ID     This is your Care EveryWhere ID. This could be used by other organizations to access your [place] medical records  [id number]        Your Vitals Were     Pulse Pulse Oximetry BMI (Body Mass Index)             61 97% 27.98 kg/m2          Blood Pressure from Last 3 Encounters:   05/15/17 136/61   17 128/61   17 124/70    Weight from Last 3 Encounters:   05/15/17 69.4 kg (153 lb)   17 69.4 kg (153 lb)   17 67.6 kg (149 lb)              Today, you had the following     No orders found for display         Today's Medication Changes          These changes are accurate as of: 5/15/17  8:31 AM.  If you have any questions, ask your nurse or doctor.               Start taking these medicines.        Dose/Directions    mesalamine 1.2 G  EC tablet   Commonly known as:  LIALDA   Used for:  Ulcerative rectosigmoiditis with rectal bleeding (H)   Started by:  Tejinder Leal MD        Dose:  2400 mg   Take 2 tablets (2,400 mg) by mouth every morning Take 4 tablets daily for 6 weeks. Then take 2 tablets daily thereafter.   Quantity:  180 tablet   Refills:  3            Where to get your medicines      These medications were sent to [id number] [address] [facility]  [address] [address]    Hours:  24-hours Phone:  [phone]     mesalamine 1.2 G EC tablet                Primary Care Provider Office Phone # Fax #    Nathan Dhillon [phone]       [facility] [address] [facility] [address]        Thank you!     Thank you for choosing [facility] GASTROENTEROLOGY AND IBD  for your care. Our goal is always to provide you with excellent care. Hearing back from our patients is one way we can continue to improve our services. Please take a few minutes to complete the written survey that you may receive in the mail after your visit with us. Thank you!             Your Updated Medication List - Protect others around you: Learn how to safely use, store and throw away your medicines at www.disposemymeds.org.          This list is accurate as of: 5/15/17  8:31 AM.  Always use your most recent med list.                   Brand Name Dispense Instructions for use    * ACCU-CHEK FELIPE PLUS test strip   Generic drug:  blood glucose monitoring     100 each    TEST ONCE DAILY       * blood glucose monitoring test strip    ACCU-CHEK FELIPE PLUS    100 each    Pt tests 1x/day       atenolol 25 MG tablet    TENORMIN    30 tablet    TAKE 1 TABLET BY MOUTH DAILY       atorvastatin 40 MG tablet    LIPITOR    90 tablet    TAKE 1 TABLET BY MOUTH DAILY       glipiZIDE 10 MG 24 hr tablet    GLUCOTROL XL    60 tablet    TAKE 1 TABLET BY MOUTH TWICE  DAILY       levothyroxine 125 MCG tablet    SYNTHROID/LEVOTHROID    90 tablet    TAKE 1 TABLET BY MOUTH DAILY       losartan 100 MG tablet    COZAAR    30 tablet    TAKE 1 TABLET BY MOUTH DAILY       mesalamine 1.2 G EC tablet    LIALDA    180 tablet    Take 2 tablets (2,400 mg) by mouth every morning Take 4 tablets daily for 6 weeks. Then take 2 tablets daily thereafter.       * Notice:  This list has 2 medication(s) that are the same as other medications prescribed for you. Read the directions carefully, and ask your doctor or other care provider to review them with you.

## 2017-05-15 NOTE — NURSING NOTE
Chief Complaint   Patient presents with     Consult     colitis       Vitals:    05/15/17 0730   BP: 136/61   Pulse: 61   SpO2: 97%   Weight: 69.4 kg (153 lb)       Body mass index is 27.98 kg/(m^2).

## 2017-05-15 NOTE — PROGRESS NOTES
"GI CLINIC VISIT - NEW PATIENT    CC/REFERRING PROVIDER: Nathan Borrero  REASON FOR CONSULTATION: UC    HPI: 67 year old female w/ h/o HTN, DM2, multinodular goiter s/p complete thyroidectomy ~40yrs ago complicated by postoperative hypothyroidism, diverticulosis complicated by diverticulitis w/ prior limited L hemicolectomy 2002, s/p open cholecystectomy 2000, s/p ex-lap ~30yrs ago for presumed ectopic pregnancy (apparent diverticulitis, non-surgical management on discovery), s/p TL 1983, complicated post-surgical [facility] s/p mesh repair 2007, among other medical issues - presenting for evaluation of UC, recently established diagnosis. Reports having 3-5 variable consistency stools/day w/ intermittent BRBPR x1.5yrs; underwent diagnostic colonoscopy at [facility] in 8/2015 which revealed active inflammatory proctosigmoiditis (pt brings report w/ her today), suspected diverticulitis vs. SCAD. Underwent repeat colonoscopy in 4/2017 given persistence of intermittent BRBPR, confirmed active inflammatory proctosigmoiditis again (now present for >1yr, suspicion for IBD). Referred to [facility] for further evaluation accordingly. +marked urgency w/ mild tenesmus and mild rectal discomfort at time of defecation, +insecurity passing flatus, +intermittent fecal incontinence (2-3 \"accidents\" in the last 1yr). Denies any new perianal/nocturnal sxs noted. Denies any N/V/F/C/HA/NS or other const/syst/cardiopulmonary sxs, no melena/urinary changes, no unintentional wt loss or appetite/satiety changes. No other bowel/bladder habit changes, no dysphagia/odynophagia. No jaundice/icterus/pruritus, no acholic stools/steatorrhea, no new lumps/bumps, no jt pain/oral ulcer/rash/eye sxs noted.    ROS: 10pt ROS performed and otherwise negative.    PERTINENT PAST MEDICAL HISTORY:  As noted above.    PREVIOUS ABDOMINAL/GYNECOLOGIC SURGERIES:  As noted above.    PREVIOUS ENDOSCOPY:  - Colonoscopy 4/24/17 ([place], " Jose M): +inflammatory polyp x1 (histologically-confirmed), +active inflammation involving the sigmoid and rectum, colon o/w normal. TI was not evaluated.    PERTINENT MEDICATIONS:  Medications reviewed with patient today, see Medication List/Assessment for details.  No other NSAID/anticoagulation reported by patient.  No other OTC/herbal/supplements reported by patient.    SOCIAL HISTORY:  Rare social etoh, o/w negative.    FAMILY HISTORY:  Father w/ bladder CA. No colon/panc/esophageal/other GI CA, no other HNPCC-related [place]. No IBD/celiac, no other AI/liver/thyroid disease.    PHYSICAL EXAMINATION:  Vitals reviewed, AFVSS  Wt 153# today (stable)  Gen: aaox3, cooperative, pleasant, not diaphoretic, nad  HEENT: ncat, neck supple, no clad/sclad, normal op w/o ulcer/exudate, anicteric, mmm  Mallampati Score 2  Resp/CV without acute findings, not dyspneic/tachycardic  Abd: +nabs, soft, nt, nd, no peritoneal s/s noted. No ecchymoses, +RUQ surgical scar, +large midline vertical surgical scar w/ palpable mesh underneath, +suprapubic surgical scar, no persistent incisional VAH, no CVA/spinal tenderness noted.  Ext: no c/c/e  Skin: warm, perfused, no jaundice  Neuro: grossly intact, no asterixis noted    PERTINENT STUDIES:  Labs ordered today, currently pending    Surgical Pathology 4/25/17  A. COLON, SIGMOID AND RECTUM, BIOPSY:   - Moderate chronic active colitis with cryptitis, crypt abscess   formation, and focal ulceration, see Comment   - Negative for granuloma formation and dysplasia     B. COLON, RECTAL POLYP, POLYPECTOMY:   - Inflammatory polyp (polypoid granulation tissue)   - Negative for dysplasia and malignancy     ASSESSMENT/PLAN:    1. Chronic active inflammation of the distal colon spanning ~2 years with typical IBD intestinal symptoms, clinically suggestive of new diagnosis of Ulcerative Colitis (proctosigmoid distribution) - recommend moving forward with trial of PO +/- DE 5-ASA therapy (if renal  function allows), discussed role for judicious immunosuppressive therapy vs. surgical management at length today as well  1. It was a pleasure getting a chance to meet with you to discuss the likely diagnosis of new-onset Ulcerative Colitis (proctosigmoid distribution) based on the clinical symptoms and persistent endoscopically/histologically-visible inflammation on your subsequent colonoscopies in 8/2015 and 4/2017. Discussed the diagnosis and its natural history, as well as treatment options for management (medical and surgical) at length today.  - Would advise considering starting treatment with a trial of mesalamine (5-ASA agent) as we discussed today, particularly as this is a non-immunosuppressive anti-inflammatory that can help improve symptoms and aid mucosal healing in some UC patients.  - Discussed the medication risks/benefits/alternatives with you today, including the short-term and long-term medication side effects and need for periodic monitoring if necessary. Discussed the hypersensitivity reaction today, contact us ASAP should this occur.  - Strongly advise contacting your insurances to discuss which 5-ASA agents they cover preferentially, particularly as this will help with cost/affordability over time. Our preferred agent for efficacy and ease of use would typically be Lialda or Asacol (preferably not Colazal if possible given the distribution/activity of your current disease).  - Briefly discussed Preventive Care in IBD today.    2. Recommend routine studies including nutritional and inflammatory markers as we discussed today.   - Recommend having the following studies checked at least 2-3 times/year for disease and medication safety monitoring: BMP, LFT, CBC with differential, ESR/CRP.    3. Recommend checking out the Crohn's & Colitis Foundation of [place] website (www.ccfa.org), particularly as this is an excellent resource for patients.  Please feel free to bring any questions you have to  clinic for us to discuss together, or you can call/email us as well.    4. Continue to monitor for the danger signs/symptoms we reviewed together today:  worsening abdominal pain, worsening diarrhea, obstructive symptoms (nausea/vomiting, abdominal distention, inability to pass stool/flatus), blood mixed into stools, persistent fevers/chills, progressive anemia (particulary with iron deficiency), unexpected weight loss, etc.  - Contact us via MyChart should these symptoms occur, particularly as we may advise further evaluation accordingly.    2. Colorectal Cancer Screening  No known FH CRC, will readdress once active issues have stabilized.    RTC 2-3 months, sooner if symptomatic.      Thank you for this consultation. It was a pleasure to participate in the care of this patient; please contact us with any further questions.    Tejinder Leal MD   of Medicine  [facility] - Department of Medicine  Division of Gastroenterology

## 2017-05-15 NOTE — LETTER
"5/15/2017       RE: Keerthi Montoya  [address] [place] [id number]     Dear Colleague,    Thank you for referring your patient, Keerthi Montoya, to the [facility] GASTROENTEROLOGY AND IBD at [facility]. Please see a copy of my visit note below.    GI CLINIC VISIT - NEW PATIENT    CC/REFERRING PROVIDER: Nathan Borrero  REASON FOR CONSULTATION: UC    HPI: 67 year old female w/ h/o HTN, DM2, multinodular goiter s/p complete thyroidectomy ~40yrs ago complicated by postoperative hypothyroidism, diverticulosis complicated by diverticulitis w/ prior limited L hemicolectomy 2002, s/p open cholecystectomy 2000, s/p ex-lap ~30yrs ago for presumed ectopic pregnancy (apparent diverticulitis, non-surgical management on discovery), s/p TL 1983, complicated post-surgical [facility] s/p mesh repair 2007, among other medical issues - presenting for evaluation of UC, recently established diagnosis. Reports having 3-5 variable consistency stools/day w/ intermittent BRBPR x1.5yrs; underwent diagnostic colonoscopy at [facility] in 8/2015 which revealed active inflammatory proctosigmoiditis (pt brings report w/ her today), suspected diverticulitis vs. SCAD. Underwent repeat colonoscopy in 4/2017 given persistence of intermittent BRBPR, confirmed active inflammatory proctosigmoiditis again (now present for >1yr, suspicion for IBD). Referred to [facility] for further evaluation accordingly. +marked urgency w/ mild tenesmus and mild rectal discomfort at time of defecation, +insecurity passing flatus, +intermittent fecal incontinence (2-3 \"accidents\" in the last 1yr). Denies any new perianal/nocturnal sxs noted. Denies any N/V/F/C/HA/NS or other const/syst/cardiopulmonary sxs, no melena/urinary changes, no unintentional wt loss or appetite/satiety changes. No other bowel/bladder habit changes, no dysphagia/odynophagia. No jaundice/icterus/pruritus, no acholic " stools/steatorrhea, no new lumps/bumps, no jt pain/oral ulcer/rash/eye sxs noted.    ROS: 10pt ROS performed and otherwise negative.    PERTINENT PAST MEDICAL HISTORY:  As noted above.    PREVIOUS ABDOMINAL/GYNECOLOGIC SURGERIES:  As noted above.    PREVIOUS ENDOSCOPY:  - [place] +inflammatory polyp x1 (histologically-confirmed), +active inflammation involving the sigmoid and rectum, colon o/w normal. TI was not evaluated.    PERTINENT MEDICATIONS:  Medications reviewed with patient today, see Medication List/Assessment for details.  No other NSAID/anticoagulation reported by patient.  No other OTC/herbal/supplements reported by patient.    SOCIAL HISTORY:  Rare social etoh, o/w negative.    FAMILY HISTORY:  Father w/ bladder CA. No colon/panc/esophageal/other GI CA, no other HNPCC-related [place]. No IBD/celiac, no other AI/liver/thyroid disease.    PHYSICAL EXAMINATION:  Vitals reviewed, AFVSS  Wt 153# today (stable)  Gen: aaox3, cooperative, pleasant, not diaphoretic, nad  HEENT: ncat, neck supple, no clad/sclad, normal op w/o ulcer/exudate, anicteric, mmm  Mallampati Score 2  Resp/CV without acute findings, not dyspneic/tachycardic  Abd: +nabs, soft, nt, nd, no peritoneal s/s noted. No ecchymoses, +RUQ surgical scar, +large midline vertical surgical scar w/ palpable mesh underneath, +suprapubic surgical scar, no persistent incisional VAH, no CVA/spinal tenderness noted.  Ext: no c/c/e  Skin: warm, perfused, no jaundice  Neuro: grossly intact, no asterixis noted    PERTINENT STUDIES:  Labs ordered today, currently pending    Surgical Pathology 4/25/17  A. COLON, SIGMOID AND RECTUM, BIOPSY:   - Moderate chronic active colitis with cryptitis, crypt abscess   formation, and focal ulceration, see Comment   - Negative for granuloma formation and dysplasia     B. COLON, RECTAL POLYP, POLYPECTOMY:   - Inflammatory polyp (polypoid granulation tissue)   - Negative for dysplasia and  malignancy     ASSESSMENT/PLAN:    1. Chronic active inflammation of the distal colon spanning ~2 years with typical IBD intestinal symptoms, clinically suggestive of new diagnosis of Ulcerative Colitis (proctosigmoid distribution) - recommend moving forward with trial of PO +/- PA 5-ASA therapy (if renal function allows), discussed role for judicious immunosuppressive therapy vs. surgical management at length today as well  1. It was a pleasure getting a chance to meet with you to discuss the likely diagnosis of new-onset Ulcerative Colitis (proctosigmoid distribution) based on the clinical symptoms and persistent endoscopically/histologically-visible inflammation on your subsequent colonoscopies in 8/2015 and 4/2017. Discussed the diagnosis and its natural history, as well as treatment options for management (medical and surgical) at length today.  - Would advise considering starting treatment with a trial of mesalamine (5-ASA agent) as we discussed today, particularly as this is a non-immunosuppressive anti-inflammatory that can help improve symptoms and aid mucosal healing in some UC patients.  - Discussed the medication risks/benefits/alternatives with you today, including the short-term and long-term medication side effects and need for periodic monitoring if necessary. Discussed the hypersensitivity reaction today, contact us ASAP should this occur.  - Strongly advise contacting your insurances to discuss which 5-ASA agents they cover preferentially, particularly as this will help with cost/affordability over time. Our preferred agent for efficacy and ease of use would typically be Lialda or Asacol (preferably not Colazal if possible given the distribution/activity of your current disease).  - Briefly discussed Preventive Care in IBD today.    2. Recommend routine studies including nutritional and inflammatory markers as we discussed today.   - Recommend having the following studies checked at least 2-3  times/year for disease and medication safety monitoring: BMP, LFT, CBC with differential, ESR/CRP.    3. Recommend checking out the Crohn's & Colitis Foundation of [place] website (www.ccfa.org), particularly as this is an excellent resource for patients.  Please feel free to bring any questions you have to clinic for us to discuss together, or you can call/email us as well.    4. Continue to monitor for the danger signs/symptoms we reviewed together today:  worsening abdominal pain, worsening diarrhea, obstructive symptoms (nausea/vomiting, abdominal distention, inability to pass stool/flatus), blood mixed into stools, persistent fevers/chills, progressive anemia (particulary with iron deficiency), unexpected weight loss, etc.  - Contact us via Omniturehart should these symptoms occur, particularly as we may advise further evaluation accordingly.    2. Colorectal Cancer Screening  No known FH CRC, will readdress once active issues have stabilized.    RTC 2-3 months, sooner if symptomatic.      Thank you for this consultation. It was a pleasure to participate in the care of this patient; please contact us with any further questions.    Tejinder Leal MD   of Medicine  [facility] - Department of Medicine  Division of Gastroenterology

## 2017-05-15 NOTE — PATIENT INSTRUCTIONS
I've included a brief summary of our discussion and care plan from today's visit below.  Please review this information with your primary care provider.  _______________________________________________________________________      1. It was a pleasure getting a chance to meet with you to discuss the likely diagnosis of new-onset Ulcerative Colitis (proctosigmoid distribution) based on the clinical symptoms and persistent endoscopically/histologically-visible inflammation on your subsequent colonoscopies in 8/2015 and 4/2017. Discussed the diagnosis and its natural history, as well as treatment options for management (medical and surgical) at length today.  - Would advise considering starting treatment with a trial of mesalamine (5-ASA agent) as we discussed today, particularly as this is a non-immunosuppressive anti-inflammatory that can help improve symptoms and aid mucosal healing in some UC patients.  - Discussed the medication risks/benefits/alternatives with you today, including the short-term and long-term medication side effects and need for periodic monitoring if necessary. Discussed the hypersensitivity reaction today, contact us ASAP should this occur.  - Strongly advise contacting your insurances to discuss which 5-ASA agents they cover preferentially, particularly as this will help with cost/affordability over time. Our preferred agent for efficacy and ease of use would typically be Lialda or Asacol (preferably not Colazal if possible given the distribution/activity of your current disease).  - Briefly discussed Preventive Care in IBD today.    2. Recommend routine studies including nutritional and inflammatory markers as we discussed today.   - Recommend having the following studies checked at least 2-3 times/year for disease and medication safety monitoring: BMP, LFT, CBC with differential, ESR/CRP.    3. Recommend checking out the Crohn's & Colitis Foundation of [place] website (www.ccfa.org),  particularly as this is an excellent resource for patients.  Please feel free to bring any questions you have to clinic for us to discuss together, or you can call/email us as well.    4. Continue to monitor for the danger signs/symptoms we reviewed together today:  worsening abdominal pain, worsening diarrhea, obstructive symptoms (nausea/vomiting, abdominal distention, inability to pass stool/flatus), blood mixed into stools, persistent fevers/chills, progressive anemia (particulary with iron deficiency), unexpected weight loss, etc.  - Contact us via [email or web address] should these symptoms occur, particularly as we may advise further evaluation accordingly.    5. Return to GI Clinic with me in 2-3 months to review your progress, sooner if symptomatic.   - If you are unable to schedule this follow-up appointment today, please contact Yanelis Dejesus at [phone] within the next week to help set up this necessary appointment.    PREVENTIVE CARE IN INFLAMMATORY BOWEL DISEASE    - Strongly recommend tobacco abstinence.    - Recommend considering daily calcium + Vitamin D supplementation.    - Recommend age-appropriate cancer surveillance:  - Yearly Dermatology visit for visual skin inspection if immunosuppressed, monthly skin self-check after bathing.  - Dysplasia surveillance colonoscopy every 1-2 years in patients with Crohn's colitis or ulcerative colitis >8-10 years since diagnosis.  - (For men and women ages 9-26) Consideration for HPV vaccination, should be discussed with your PCP further if you feel you'd like to pursue this.  - (For women) Annual Pap smears if immunosuppressed, mammogram when deemed appropriate by your PCP.    - Recommend follow-up with your PCP to ensure the following vaccinations have been updated: Hepatitis A/B, Tdap, Pneumococcal pneumonia, yearly flu SHOT, Meningococcal meningitis (if college-age), HPV (all aged 18-26), etc.  - Would also recommend VZV and MMR titers be checked to confirm  immunity.  - Live/attenuated vaccines (such as the INTRANASAL flu vaccine, MMR, Yellow Fever, or Varicella/VZV vaccine) should not be administered to immunosuppressed patients, except in very specific instances which you should discuss with a GI and Infectious Disease provider first.    - Family planning:  If you or your partner are planning to become pregnant, please schedule time with us to discuss issues surrounding IBD and Fertility/Pregnancy.    _______________________________________________________________________    It was a pleasure seeing you in clinic today - please be in touch if there are any further questions that arise following today's visit.  During business hours, you may reach Clinic Nurse Triage Line at [phone].  For urgent/emergent questions after business hours, you may reach the on-call GI Fellow by contacting the [facility]  at [phone].    Any benign/non-urgent test results are usually communicated via letter or [employer] message within 1-2 weeks after completion.  Urgent results (those that require a change in the previously-discussed care plan) are usually communicated via a phone call once available from our clinic staff to discuss the results and the next steps in your evaluation.    I recommend signing up for K[employer]us access if you have not already done so and are comfortable with using a computer.  This allows for online access to your lab results and also helps you communicate efficiently with my clinic should any questions arise in your care.    We have Financial Counseling services available through our clinic.  If you have questions about your insurance coverage or payment responsibilities, particularly before you undergo any tests or procedures, please let us know and we can arrange a consultation accordingly to help you make informed decisions about your healthcare.    Sincerely,    Tejinder Leal MD    [facility] -  Department of Medicine  Division of Gastroenterology

## 2017-05-15 NOTE — TELEPHONE ENCOUNTER
Prior Authorization Specialty Medication Request    Medication/Dose: Lialda Take 4 tablets daily for 6 weeks. Then take 2 tablets daily thereafter.  Diagnosis and ICD: ulcerative colitis with rectal bleeding  New/Renewal/Insurance Change PA: New    Important Lab Values:     Previously Tried and Failed Therapies:    Rationale: ASSESSMENT/PLAN:     1. Chronic active inflammation of the distal colon spanning ~2 years with typical IBD intestinal symptoms, clinically suggestive of new diagnosis of Ulcerative Colitis (proctosigmoid distribution) - recommend moving forward with trial of PO +/- [place] 5-ASA therapy (if renal function allows), discussed role for judicious immunosuppressive therapy vs. surgical management at length today as well  1. It was a pleasure getting a chance to meet with you to discuss the likely diagnosis of new-onset Ulcerative Colitis (proctosigmoid distribution) based on the clinical symptoms and persistent endoscopically/histologically-visible inflammation on your subsequent colonoscopies in 8/2015 and 4/2017. Discussed the diagnosis and its natural history, as well as treatment options for management (medical and surgical) at length today.  - Would advise considering starting treatment with a trial of mesalamine (5-ASA agent) as we discussed today, particularly as this is a non-immunosuppressive anti-inflammatory that can help improve symptoms and aid mucosal healing in some UC patients.  - Discussed the medication risks/benefits/alternatives with you today, including the short-term and long-term medication side effects and need for periodic monitoring if necessary. Discussed the hypersensitivity reaction today, contact us ASAP should this occur.  - Strongly advise contacting your insurances to discuss which 5-ASA agents they cover preferentially, particularly as this will help with cost/affordability over time. Our preferred agent for efficacy and ease of use would typically be Lialda or Asacol  (preferably not Colazal if possible given the distribution/activity of your current disease).  - Briefly discussed Preventive Care in IBD today.     2. Recommend routine studies including nutritional and inflammatory markers as we discussed today.   - Recommend having the following studies checked at least 2-3 times/year for disease and medication safety monitoring: BMP, LFT, CBC with differential, ESR/CRP.     3. Recommend checking out the Crohn's & Colitis Foundation of [place] website (www.ccfa.org), particularly as this is an excellent resource for patients. Please feel free to bring any questions you have to clinic for us to discuss together, or you can call/email us as well.     4. Continue to monitor for the danger signs/symptoms we reviewed together today: worsening abdominal pain, worsening diarrhea, obstructive symptoms (nausea/vomiting, abdominal distention, inability to pass stool/flatus), blood mixed into stools, persistent fevers/chills, progressive anemia (particulary with iron deficiency), unexpected weight loss, etc.  - Contact us via [email or web address] should these symptoms occur, particularly as we may advise further evaluation accordingly.     2. Colorectal Cancer Screening  No known FH CRC, will readdress once active issues have stabilized.     RTC 2-3 months, sooner if symptomatic.      Thank you for this consultation. It was a pleasure to participate in the care of this patient; please contact us with any further questions.     Tejinder Leal MD   of Medicine  [facility] - Department of Medicine  Division of Gastroenterology       Would you like to include any research articles?    If yes please include the hyperlink(s) below or fax @ [phone].    (Include Name and MRN)    If you received a fax notification from an outside Pharmacy;

## 2017-05-16 LAB
TTG IGA SER-ACNC: 1 U/ML
TTG IGG SER-ACNC: 1 U/ML

## 2017-05-22 NOTE — TELEPHONE ENCOUNTER
[facility] Prior Authorization Team   Phone: [phone]  Fax: [phone]    PA Initiation    Medication: LIALDA 1.2GM  Insurance Company: [employer] ([facility]) - Phone [phone] Fax [phone]  Pharm[facility] the Rx[facility] [address] [place] [address] [address] AT [facility]  Filling Pharmacy Phone: [phone]  Filling Pharmacy Fax:    Start Date: 5/22/2017

## 2017-05-24 NOTE — TELEPHONE ENCOUNTER
PRIOR AUTHORIZATION DENIED    Medication: LIALDA 1.2GM - Denied    Denial Date: 5/24/2017    Denial Rational: Patient must have a history of trial & failure of 2 of the formulary alternative(s), or a contraindication or intolerance to the formulary alternative(s): Apriso and Sulfasalazine.  If you feel there is additional information related to this case that might affect this decision and an appeal is desired, please submit a written letter of medical necessity to our [place] Team.        Appeal Information:     Plan is faxing denial letter with the appeal information.

## 2017-05-25 ENCOUNTER — CARE COORDINATION (OUTPATIENT)
Dept: GASTROENTEROLOGY | Facility: CLINIC | Age: 67
End: 2017-05-25

## 2017-05-25 DIAGNOSIS — K51.311: Primary | ICD-10-CM

## 2017-05-25 RX ORDER — MESALAMINE 0.38 G/1
1.5 CAPSULE, EXTENDED RELEASE ORAL EVERY MORNING
Qty: 120 CAPSULE | Refills: 3 | Status: SHIPPED | OUTPATIENT
Start: 2017-05-25 | End: 2017-08-04

## 2017-05-25 NOTE — PROGRESS NOTES
Patient aware Lialda is not covered by her insurance. Will start Apriso 1.5g daily and have repeat BMP lab in 1-2 week after starting medication.

## 2017-06-07 DIAGNOSIS — K51.311: ICD-10-CM

## 2017-06-07 LAB
ANION GAP SERPL CALCULATED.3IONS-SCNC: 8 MMOL/L (ref 3–14)
BUN SERPL-MCNC: 18 MG/DL (ref 7–30)
CALCIUM SERPL-MCNC: 9.4 MG/DL (ref 8.5–10.1)
CHLORIDE SERPL-SCNC: 100 MMOL/L (ref 94–109)
CO2 SERPL-SCNC: 24 MMOL/L (ref 20–32)
CREAT SERPL-MCNC: 0.99 MG/DL (ref 0.52–1.04)
GFR SERPL CREATININE-BSD FRML MDRD: 56 ML/MIN/1.7M2
GLUCOSE SERPL-MCNC: 190 MG/DL (ref 70–99)
POTASSIUM SERPL-SCNC: 4.2 MMOL/L (ref 3.4–5.3)
SODIUM SERPL-SCNC: 132 MMOL/L (ref 133–144)

## 2017-06-10 DIAGNOSIS — E11.9 TYPE 2 DIABETES, HBA1C GOAL < 7% (H): ICD-10-CM

## 2017-06-12 RX ORDER — BLOOD SUGAR DIAGNOSTIC
STRIP MISCELLANEOUS
Qty: 100 STRIP | Refills: 1 | Status: SHIPPED | OUTPATIENT
Start: 2017-06-12 | End: 2017-06-13

## 2017-06-12 NOTE — TELEPHONE ENCOUNTER
ACCU-CHEK FELIPE PLUS test strip      Last Written Prescription Date: 10/13/15  Last Fill Quantity: 100,  # refills: 2   Last Office Visit with [facility], [facility] or [facility] prescribing provider: 2/20/17                                         Next 5 appointments (look out 90 days)     Jun 26, 2017  2:00 PM CDT   SHORT with Nathan Dhillon MD   [facility] ([facility]    [facility] [address] [address] [phone]

## 2017-06-13 DIAGNOSIS — E11.9 TYPE 2 DIABETES, HBA1C GOAL < 7% (H): ICD-10-CM

## 2017-06-14 RX ORDER — BLOOD SUGAR DIAGNOSTIC
STRIP MISCELLANEOUS
Qty: 100 STRIP | Refills: 1 | Status: SHIPPED | OUTPATIENT
Start: 2017-06-14 | End: 2018-06-25

## 2017-06-14 NOTE — TELEPHONE ENCOUNTER
ACCU-CHEK FELIPE PLUS test strip      Last Written Prescription Date: 6/12/17  Last Fill Quantity: 100,  # refills: 1   Last Office Visit with [facility], [facility] or [facility] prescribing provider: 2/20/17                                         Next 5 appointments (look out 90 days)     Aug 07, 2017  8:00 AM CDT   SHORT with Nathan Dhillon MD   [facility]    [facility] [address] [address] [phone]

## 2017-08-04 ENCOUNTER — TELEPHONE (OUTPATIENT)
Dept: GASTROENTEROLOGY | Facility: CLINIC | Age: 67
End: 2017-08-04

## 2017-08-04 ENCOUNTER — OFFICE VISIT (OUTPATIENT)
Dept: PULMONOLOGY | Facility: CLINIC | Age: 67
End: 2017-08-04
Attending: INTERNAL MEDICINE
Payer: MEDICARE

## 2017-08-04 ENCOUNTER — CARE COORDINATION (OUTPATIENT)
Dept: GASTROENTEROLOGY | Facility: CLINIC | Age: 67
End: 2017-08-04

## 2017-08-04 VITALS
HEART RATE: 56 BPM | WEIGHT: 152 LBS | OXYGEN SATURATION: 100 % | RESPIRATION RATE: 16 BRPM | DIASTOLIC BLOOD PRESSURE: 74 MMHG | BODY MASS INDEX: 27.8 KG/M2 | SYSTOLIC BLOOD PRESSURE: 167 MMHG

## 2017-08-04 DIAGNOSIS — K51.30 ULCERATIVE RECTOSIGMOIDITIS WITHOUT COMPLICATION (H): ICD-10-CM

## 2017-08-04 DIAGNOSIS — K51.311 ULCERATIVE RECTOSIGMOIDITIS WITH RECTAL BLEEDING (H): Primary | ICD-10-CM

## 2017-08-04 DIAGNOSIS — K51.30 ULCERATIVE RECTOSIGMOIDITIS WITHOUT COMPLICATION (H): Primary | ICD-10-CM

## 2017-08-04 LAB
ALBUMIN SERPL-MCNC: 3.9 G/DL (ref 3.4–5)
ALP SERPL-CCNC: 118 U/L (ref 40–150)
ALT SERPL W P-5'-P-CCNC: 18 U/L (ref 0–50)
ANION GAP SERPL CALCULATED.3IONS-SCNC: 9 MMOL/L (ref 3–14)
AST SERPL W P-5'-P-CCNC: 21 U/L (ref 0–45)
BASOPHILS # BLD AUTO: 0.1 10E9/L (ref 0–0.2)
BASOPHILS NFR BLD AUTO: 1.2 %
BILIRUB DIRECT SERPL-MCNC: 0.2 MG/DL (ref 0–0.2)
BILIRUB SERPL-MCNC: 0.7 MG/DL (ref 0.2–1.3)
BUN SERPL-MCNC: 19 MG/DL (ref 7–30)
CALCIUM SERPL-MCNC: 9.4 MG/DL (ref 8.5–10.1)
CHLORIDE SERPL-SCNC: 101 MMOL/L (ref 94–109)
CO2 SERPL-SCNC: 24 MMOL/L (ref 20–32)
CREAT SERPL-MCNC: 1.16 MG/DL (ref 0.52–1.04)
CRP SERPL-MCNC: <2.9 MG/L (ref 0–8)
DIFFERENTIAL METHOD BLD: NORMAL
EOSINOPHIL # BLD AUTO: 0.2 10E9/L (ref 0–0.7)
EOSINOPHIL NFR BLD AUTO: 2.6 %
ERYTHROCYTE [DISTWIDTH] IN BLOOD BY AUTOMATED COUNT: 11.9 % (ref 10–15)
ERYTHROCYTE [SEDIMENTATION RATE] IN BLOOD BY WESTERGREN METHOD: 19 MM/H (ref 0–30)
GFR SERPL CREATININE-BSD FRML MDRD: 47 ML/MIN/1.7M2
GLUCOSE SERPL-MCNC: 231 MG/DL (ref 70–99)
HCT VFR BLD AUTO: 42.6 % (ref 35–47)
HGB BLD-MCNC: 13.9 G/DL (ref 11.7–15.7)
IMM GRANULOCYTES # BLD: 0 10E9/L (ref 0–0.4)
IMM GRANULOCYTES NFR BLD: 0.2 %
LYMPHOCYTES # BLD AUTO: 2.1 10E9/L (ref 0.8–5.3)
LYMPHOCYTES NFR BLD AUTO: 25.5 %
MCH RBC QN AUTO: 30.8 PG (ref 26.5–33)
MCHC RBC AUTO-ENTMCNC: 32.6 G/DL (ref 31.5–36.5)
MCV RBC AUTO: 94 FL (ref 78–100)
MONOCYTES # BLD AUTO: 0.8 10E9/L (ref 0–1.3)
MONOCYTES NFR BLD AUTO: 9.3 %
NEUTROPHILS # BLD AUTO: 4.9 10E9/L (ref 1.6–8.3)
NEUTROPHILS NFR BLD AUTO: 61.2 %
NRBC # BLD AUTO: 0 10*3/UL
NRBC BLD AUTO-RTO: 0 /100
PLATELET # BLD AUTO: 318 10E9/L (ref 150–450)
POTASSIUM SERPL-SCNC: 4.4 MMOL/L (ref 3.4–5.3)
PROT SERPL-MCNC: 8.3 G/DL (ref 6.8–8.8)
RBC # BLD AUTO: 4.52 10E12/L (ref 3.8–5.2)
SODIUM SERPL-SCNC: 134 MMOL/L (ref 133–144)
WBC # BLD AUTO: 8.1 10E9/L (ref 4–11)

## 2017-08-04 PROCEDURE — 86140 C-REACTIVE PROTEIN: CPT | Performed by: INTERNAL MEDICINE

## 2017-08-04 PROCEDURE — 80076 HEPATIC FUNCTION PANEL: CPT | Performed by: INTERNAL MEDICINE

## 2017-08-04 PROCEDURE — 99212 OFFICE O/P EST SF 10 MIN: CPT | Mod: ZF

## 2017-08-04 PROCEDURE — 80048 BASIC METABOLIC PNL TOTAL CA: CPT | Performed by: INTERNAL MEDICINE

## 2017-08-04 PROCEDURE — 36415 COLL VENOUS BLD VENIPUNCTURE: CPT | Performed by: INTERNAL MEDICINE

## 2017-08-04 PROCEDURE — 85025 COMPLETE CBC W/AUTO DIFF WBC: CPT | Performed by: INTERNAL MEDICINE

## 2017-08-04 PROCEDURE — 85652 RBC SED RATE AUTOMATED: CPT | Performed by: INTERNAL MEDICINE

## 2017-08-04 RX ORDER — MESALAMINE 0.38 G/1
1.5 CAPSULE, EXTENDED RELEASE ORAL EVERY MORNING
Qty: 120 CAPSULE | Refills: 3 | Status: SHIPPED | OUTPATIENT
Start: 2017-08-04 | End: 2018-01-15

## 2017-08-04 ASSESSMENT — PAIN SCALES - GENERAL: PAINLEVEL: NO PAIN (0)

## 2017-08-04 NOTE — PATIENT INSTRUCTIONS
I've included a brief summary of our discussion and care plan from today's visit below.  Please review this information with your primary care provider.  _______________________________________________________________________      1. It was wonderful getting a chance to meet with you to discuss your UC Proctosigmoiditis, particularly in light of your excellent interval response to the Apriso - this is great to see, keep up the good work!  - Continue the full dose of Apriso 1.5g (4tabs) daily as ordered for now. Our experience is that dosages lower than this tend to be less effective, particularly when we're dealing with distal colonic inflammation such as yours.  - No immediate need for starting a rectal 5-ASA therapy at this time.  - Recommend follow-up colonoscopy to confirm endoscopic/histologic response (corroborating your excellent interval clinical response) in the next 3-4 months. This will be scheduled at [facility] under MAC sedation with me, preferably.  - Would like to have your clinical + endoscopic remission ingrained for at least 1-2 years before discussions regarding treatment holiday, briefly reviewed considerations for treatment holiday today.  - Discussed Preventive Care in IBD today.    2. Recommend routine studies including nutritional and inflammatory markers as we discussed today.   - Recommend having the following studies checked at least 2-3 times/year for disease and medication safety monitoring: BMP, LFT, CBC with differential, ESR/CRP.    3. Continue to monitor for the danger signs/symptoms we reviewed together today:  worsening abdominal pain, worsening diarrhea, obstructive symptoms (nausea/vomiting, abdominal distention, inability to pass stool/flatus), blood mixed into stools, persistent fevers/chills, progressive anemia (particulary with iron deficiency), unexpected weight loss, etc.  - Contact us via [facility] should these symptoms occur, particularly as we may advise further  evaluation accordingly.    4. Return to GI Clinic with my GI Physician Assistant (Jerry Robbins) in 6 months to review your progress, sooner if symptomatic.   - If you are unable to schedule this follow-up appointment today, please contact Yanelis Dejesus at [phone] within the next week to help set up this necessary appointment.    PREVENTIVE CARE IN INFLAMMATORY BOWEL DISEASE    - Strongly recommend tobacco abstinence.    - Recommend considering daily calcium + Vitamin D supplementation.    - Recommend age-appropriate cancer surveillance:  - Yearly Dermatology visit for visual skin inspection if immunosuppressed, monthly skin self-check after bathing.  - Dysplasia surveillance colonoscopy every 1-2 years in patients with Crohn's colitis or ulcerative colitis >8-10 years since diagnosis.  - (For men and women ages 9-26) Consideration for HPV vaccination, should be discussed with your PCP further if you feel you'd like to pursue this.  - (For women) Annual Pap smears if immunosuppressed, mammogram when deemed appropriate by your PCP.    - Recommend follow-up with your PCP to ensure the following vaccinations have been updated: Hepatitis A/B, Tdap, Pneumococcal pneumonia, yearly flu SHOT, Meningococcal meningitis (if college-age), HPV (all aged 18-26), etc.  - Would also recommend VZV and MMR titers be checked to confirm immunity.  - Live/attenuated vaccines (such as the INTRANASAL flu vaccine, MMR, Yellow Fever, or Varicella/VZV vaccine) should not be administered to immunosuppressed patients, except in very specific instances which you should discuss with a GI and Infectious Disease provider first.    - Family planning:  If you or your partner are planning to become pregnant, please schedule time with us to discuss issues surrounding IBD and Fertility/Pregnancy.    _______________________________________________________________________    It was a pleasure seeing you in clinic today - please be in touch if there  are any further questions that arise following today's visit.  During business hours, you may reach Clinic Nurse Triage Line at [phone].  For urgent/emergent questions after business hours, you may reach the on-call GI Fellow by contacting the [facility]  at [phone].    Any benign/non-urgent test results are usually communicated via letter or [email or web address] message within 1-2 weeks after completion.  Urgent results (those that require a change in the previously-discussed care plan) are usually communicated via a phone call once available from our clinic staff to discuss the results and the next steps in your evaluation.    I recommend signing up for [employer] Live access if you have not already done so and are comfortable with using a computer.  This allows for online access to your lab results and also helps you communicate efficiently with my clinic should any questions arise in your care.    We have Financial Counseling services available through our clinic.  If you have questions about your insurance coverage or payment responsibilities, particularly before you undergo any tests or procedures, please let us know and we can arrange a consultation accordingly to help you make informed decisions about your healthcare.    Sincerely,    Tejinder Leal MD    [facility] - Department of Medicine  Division of Gastroenterology

## 2017-08-04 NOTE — LETTER
8/4/2017       RE: Keerthi Montoya  [address] [place] [id number]     Dear Colleague,    Thank you for referring your patient, Keerthi Montoya, to the [facility]. Please see a copy of my visit note below.    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 Doing much better overall since starting PO 5-ASA, now reporting having 1-2 soft formed BM/day w/ very rare scant BRBPR (much improved!). Denies any tenesmus or insecurity passing flatus (both completely resolved since last visit w/ treatment), no fecal incontinence (also completely resolved since last visit w/ treatment) or new perianal/nocturnal sxs noted. Denies any N/V/F/C/HA/NS or other const/syst/cardiopulmonary sxs, no melena/urinary changes, no unintentional wt loss or appetite/satiety changes. No other bowel/bladder habit changes, no dysphagia/odynophagia. No jaundice/icterus/pruritus, no acholic stools/steatorrhea, no new lumps/bumps, no jt pain/oral ulcer/rash/eye sxs noted. +mild recurrent bloating and abd discomfort following reduction in Apriso to 2tabs (from 4tabs) daily, advised to resume standard 4tab dosing.     ROS: 10pt ROS performed and otherwise negative.    PERTINENT PAST MEDICAL/SURGICAL HISTORY:  As noted above.    PERTINENT MEDICATIONS:  -  Apriso 1.5g PO QDAY (recently reduced to 0.75g QDAY w/ some breakthrough sxs, advised to return to standard dosing)  Medications reviewed with patient today, see Medication List/Assessment for details.  No other NSAID/anticoagulation reported by patient.  No other OTC/herbal/supplements reported by patient.    SOCIAL HISTORY: Tobacco: none.    PHYSICAL EXAMINATION:  Vitals reviewed, AFVSS  Wt 152# today (stable)  Gen: aaox3, cooperative, pleasant, not diaphoretic, nad  HEENT: ncat, neck supple, no clad/sclad, normal op w/o ulcer/exudate, anicteric, mmm  Resp/CV without acute findings, not dyspneic/tachycardic  Abd: +nabs, soft, nt, nd, no peritoneal s/s noted  Ext: no c/c/e  Skin: warm, perfused, no jaundice  Neuro: grossly intact, no asterixis noted    PERTINENT STUDIES:  Labs ordered today, currently pending    ASSESSMENT/PLAN:    1. UC proctosigmoiditis with excellent brisk interval clinical response to PO 5-ASA - continue supportive care as ordered for now  1. It was wonderful getting a chance to meet with you to discuss your UC Proctosigmoiditis, particularly in light of your excellent interval response to the Apriso - this is great to see, keep up the good work!  - Continue the full dose of Apriso 1.5g (4tabs) daily as ordered for now. Our experience is that dosages lower than this tend to be less effective, particularly when we're dealing with distal colonic inflammation such as yours.  - No immediate need for starting a rectal 5-ASA therapy at this time.  - Recommend follow-up colonoscopy to confirm endoscopic/histologic response (corroborating your excellent interval clinical response) in the next 3-4 months. This will be scheduled at [facility] under MAC sedation with me, preferably.  - Would like to have your clinical + endoscopic remission ingrained for at least 1-2 years before discussions regarding treatment holiday, briefly reviewed considerations for treatment holiday today.  - Discussed  Preventive Care in IBD today.    2. Recommend routine studies including nutritional and inflammatory markers as we discussed today.   - Recommend having the following studies checked at least 2-3 times/year for disease and medication safety monitoring: BMP, LFT, CBC with differential, ESR/CRP.    3. Continue to monitor for the danger signs/symptoms we reviewed together today:  worsening abdominal pain, worsening diarrhea, obstructive symptoms (nausea/vomiting, abdominal distention, inability to pass stool/flatus), blood mixed into stools, persistent fevers/chills, progressive anemia (particulary with iron deficiency), unexpected weight loss, etc.  - Contact us via [employer] should these symptoms occur, particularly as we may advise further evaluation accordingly.    2. Colorectal Cancer Screening  No PSC or known FH CRC, will readdress once acute issues have stabilized.    RTC 6 months with GI PA, sooner if symptomatic.      Thank you for this consultation. It was a pleasure to participate in the care of this patient; please contact us with any further questions.     Tejinder Leal MD   of Medicine  [facility] - Department of Medicine  Division of Gastroenterology

## 2017-08-04 NOTE — PROGRESS NOTES
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 Doing much better overall since starting PO 5-ASA, now reporting having 1-2 soft formed BM/day w/ very rare scant BRBPR (much improved!). Denies any tenesmus or insecurity passing flatus (both completely resolved since last visit w/ treatment), no fecal incontinence (also completely resolved since last visit w/ treatment) or new perianal/nocturnal sxs noted. Denies any N/V/F/C/HA/NS or other const/syst/cardiopulmonary sxs, no melena/urinary changes, no unintentional wt loss or appetite/satiety changes. No other bowel/bladder habit changes, no dysphagia/odynophagia. No jaundice/icterus/pruritus, no acholic stools/steatorrhea, no new lumps/bumps, no jt pain/oral ulcer/rash/eye sxs noted. +mild recurrent bloating and abd discomfort following reduction in Apriso to 2tabs (from 4tabs) daily, advised to resume standard 4tab dosing.     ROS: 10pt ROS performed and otherwise negative.    PERTINENT PAST MEDICAL/SURGICAL HISTORY:  As noted above.    PERTINENT MEDICATIONS:  - Apriso 1.5g PO QDAY (recently reduced to 0.75g QDAY w/ some breakthrough sxs, advised to return to standard dosing)  Medications reviewed with patient today, see Medication List/Assessment for details.  No other NSAID/anticoagulation reported by patient.  No other OTC/herbal/supplements reported by  patient.    SOCIAL HISTORY: Tobacco: none.    PHYSICAL EXAMINATION:  Vitals reviewed, AFVSS  Wt 152# today (stable)  Gen: aaox3, cooperative, pleasant, not diaphoretic, nad  HEENT: ncat, neck supple, no clad/sclad, normal op w/o ulcer/exudate, anicteric, mmm  Resp/CV without acute findings, not dyspneic/tachycardic  Abd: +nabs, soft, nt, nd, no peritoneal s/s noted  Ext: no c/c/e  Skin: warm, perfused, no jaundice  Neuro: grossly intact, no asterixis noted    PERTINENT STUDIES:  Labs ordered today, currently pending    ASSESSMENT/PLAN:    1. UC proctosigmoiditis with excellent brisk interval clinical response to PO 5-ASA - continue supportive care as ordered for now  1. It was wonderful getting a chance to meet with you to discuss your UC Proctosigmoiditis, particularly in light of your excellent interval response to the Apriso - this is great to see, keep up the good work!  - Continue the full dose of Apriso 1.5g (4tabs) daily as ordered for now. Our experience is that dosages lower than this tend to be less effective, particularly when we're dealing with distal colonic inflammation such as yours.  - No immediate need for starting a rectal 5-ASA therapy at this time.  - Recommend follow-up colonoscopy to confirm endoscopic/histologic response (corroborating your excellent interval clinical response) in the next 3-4 months. This will be scheduled at [facility] under MAC sedation with me, preferably.  - Would like to have your clinical + endoscopic remission ingrained for at least 1-2 years before discussions regarding treatment holiday, briefly reviewed considerations for treatment holiday today.  - Discussed Preventive Care in IBD today.    2. Recommend routine studies including nutritional and inflammatory markers as we discussed today.   - Recommend having the following studies checked at least 2-3 times/year for disease and medication safety monitoring: BMP, LFT, CBC with differential, ESR/CRP.    3.  Continue to monitor for the danger signs/symptoms we reviewed together today:  worsening abdominal pain, worsening diarrhea, obstructive symptoms (nausea/vomiting, abdominal distention, inability to pass stool/flatus), blood mixed into stools, persistent fevers/chills, progressive anemia (particulary with iron deficiency), unexpected weight loss, etc.  - Contact us via MyChart should these symptoms occur, particularly as we may advise further evaluation accordingly.    2. Colorectal Cancer Screening  No PSC or known FH CRC, will readdress once acute issues have stabilized.    RTC 6 months with GI PA, sooner if symptomatic.      Thank you for this consultation. It was a pleasure to participate in the care of this patient; please contact us with any further questions.     Tejinder Leal MD   of Medicine  [facility] - Department of Medicine  Division of Gastroenterology

## 2017-08-04 NOTE — MR AVS SNAPSHOT
After Visit Summary   8/4/2017    Keerthi Montoya    MRN: [id number]           Patient Information     Date Of Birth          1950        Visit Information        Provider Department      8/4/2017 8:00 AM Tejinder Leal MD [facility]        Today's Diagnoses     Ulcerative rectosigmoiditis without complication (H)    -  1      Care Instructions    I've included a brief summary of our discussion and care plan from today's visit below.  Please review this information with your primary care provider.  _______________________________________________________________________      1. It was wonderful getting a chance to meet with you to discuss your UC Proctosigmoiditis, particularly in light of your excellent interval response to the Apriso - this is great to see, keep up the good work!  - Continue the full dose of Apriso 1.5g (4tabs) daily as ordered for now. Our experience is that dosages lower than this tend to be less effective, particularly when we're dealing with distal colonic inflammation such as yours.  - No immediate need for starting a rectal 5-ASA therapy at this time.  - Recommend follow-up colonoscopy to confirm endoscopic/histologic response (corroborating your excellent interval clinical response) in the next 3-4 months. This will be scheduled at [facility] under MAC sedation with me, preferably.  - Would like to have your clinical + endoscopic remission ingrained for at least 1-2 years before discussions regarding treatment holiday, briefly reviewed considerations for treatment holiday today.  - Discussed Preventive Care in IBD today.    2. Recommend routine studies including nutritional and inflammatory markers as we discussed today.   - Recommend having the following studies checked at least 2-3 times/year for disease and medication safety monitoring: BMP, LFT, CBC with differential, ESR/CRP.    3. Continue to monitor for the danger  signs/symptoms we reviewed together today:  worsening abdominal pain, worsening diarrhea, obstructive symptoms (nausea/vomiting, abdominal distention, inability to pass stool/flatus), blood mixed into stools, persistent fevers/chills, progressive anemia (particulary with iron deficiency), unexpected weight loss, etc.  - Contact us via [employer] should these symptoms occur, particularly as we may advise further evaluation accordingly.    4. Return to GI Clinic with my GI Physician Assistant (Jerry Robbins) in 6 months to review your progress, sooner if symptomatic.   - If you are unable to schedule this follow-up appointment today, please contact Yanelis Dejesus at [phone] within the next week to help set up this necessary appointment.    PREVENTIVE CARE IN INFLAMMATORY BOWEL DISEASE    - Strongly recommend tobacco abstinence.    - Recommend considering daily calcium + Vitamin D supplementation.    - Recommend age-appropriate cancer surveillance:  - Yearly Dermatology visit for visual skin inspection if immunosuppressed, monthly skin self-check after bathing.  - Dysplasia surveillance colonoscopy every 1-2 years in patients with Crohn's colitis or ulcerative colitis >8-10 years since diagnosis.  - (For men and women ages 9-26) Consideration for HPV vaccination, should be discussed with your PCP further if you feel you'd like to pursue this.  - (For women) Annual Pap smears if immunosuppressed, mammogram when deemed appropriate by your PCP.    - Recommend follow-up with your PCP to ensure the following vaccinations have been updated: Hepatitis A/B, Tdap, Pneumococcal pneumonia, yearly flu SHOT, Meningococcal meningitis (if college-age), HPV (all aged 18-26), etc.  - Would also recommend VZV and MMR titers be checked to confirm immunity.  - Live/attenuated vaccines (such as the INTRANASAL flu vaccine, MMR, Yellow Fever, or Varicella/VZV vaccine) should not be administered to immunosuppressed patients, except in very  specific instances which you should discuss with a GI and Infectious Disease provider first.    - Family planning:  If you or your partner are planning to become pregnant, please schedule time with us to discuss issues surrounding IBD and Fertility/Pregnancy.    _______________________________________________________________________    It was a pleasure seeing you in clinic today - please be in touch if there are any further questions that arise following today's visit.  During business hours, you may reach Clinic Nurse Triage Line at [phone].  For urgent/emergent questions after business hours, you may reach the on-call GI Fellow by contacting the [facility]  at [phone].    Any benign/non-urgent test results are usually communicated via letter or [employer] message within 1-2 weeks after completion.  Urgent results (those that require a change in the previously-discussed care plan) are usually communicated via a phone call once available from our clinic staff to discuss the results and the next steps in your evaluation.    I recommend signing up for [employer] access if you have not already done so and are comfortable with using a computer.  This allows for online access to your lab results and also helps you communicate efficiently with my clinic should any questions arise in your care.    We have Financial Counseling services available through our clinic.  If you have questions about your insurance coverage or payment responsibilities, particularly before you undergo any tests or procedures, please let us know and we can arrange a consultation accordingly to help you make informed decisions about your healthcare.    Sincerely,    Tejinder Leal MD    [facility] - Department of Medicine  Division of Gastroenterology            Follow-ups after your visit        Follow-up notes from your care team     Return in about 6 months (around 2/4/2018).      Your  next 10 appointments already scheduled     Aug 04, 2017  8:45 AM CDT   Lab with UC LAB   [facility] ([facility])    [address]  1st Floor  [place] [facility] [id number] [phone]            Sep 18, 2017  8:00 AM CDT   Office Visit with Nathan Dhillon MD   [facility] ([facility])    [address]  [address]  [place] [facility] [address] [phone]           Bring a current list of meds and any records pertaining to this visit. For Physicals, please bring immunization records and any forms needing to be filled out. Please arrive 10 minutes early to complete paperwork.              Future tests that were ordered for you today     Open Future Orders        Priority Expected Expires Ordered    CRP inflammation Routine  10/3/2017 8/4/2017    Basic metabolic panel Routine  10/3/2017 8/4/2017    CBC with platelets differential Routine  10/3/2017 8/4/2017    Hepatic panel Routine  10/3/2017 8/4/2017    Erythrocyte sedimentation rate auto Routine  10/3/2017 8/4/2017            Who to contact     If you have questions or need follow up information about today's clinic visit or your schedule please contact [facility] directly at [phone].  Normal or non-critical lab and imaging results will be communicated to you by [employer], letter or phone within 4 business days after the clinic has received the results. If you do not hear from us within 7 days, please contact the clinic through [employer] or phone. If you have a critical or abnormal lab result, we will notify you by phone as soon as possible.  Submit refill requests through [employer] or call your pharmacy and they will forward the refill request to us. Please allow 3 business days for your refill to be completed.          Additional Information About Your Visit        [employer] Information     [employer] gives you secure access to your  electronic health record. If you see a primary care provider, you can also send messages to your care team and make appointments. If you have questions, please call your primary care clinic.  If you do not have a primary care provider, please call [phone] and they will assist you.        Care EveryWhere ID     This is your Care EveryWhere ID. This could be used by other organizations to access your [facility] medical records  [id number]        Your Vitals Were     Pulse Respirations Pulse Oximetry BMI (Body Mass Index)          56 16 100% 27.8 kg/m2         Blood Pressure from Last 3 Encounters:   08/04/17 167/74   05/15/17 136/61   04/24/17 128/61    Weight from Last 3 Encounters:   08/04/17 68.9 kg (152 lb)   05/15/17 69.4 kg (153 lb)   02/20/17 69.4 kg (153 lb)               Primary Care Provider Office Phone # Fax #    Nathan Dhillon [phone]       [facility] [address] [facility] [address]        Equal Access to Services     MAGGI BRAUN : Hadii aad ku hadasho Soalexali, waaxda luqadaha, qaybta kaalmada aad, celia sullivan. So [facility] [phone].    ATENCIÓN: Si habla español, tiene a hobbs disposición servicios gratuitos de asistencia lingüística. [facility] [phone].    We comply with applicable federal civil rights laws and [place] laws. We do not discriminate on the basis of race, color, national origin, age, disability sex, sexual orientation or gender identity.            Thank you!     Thank you for choosing [facility]  for your care. Our goal is always to provide you with excellent care. Hearing back from our patients is one way we can continue to improve our services. Please take a few minutes to complete the written survey that you may receive in the mail after your visit with us. Thank you!             Your Updated Medication List - Protect others around you: Learn how to safely use,  store and throw away your medicines at www.disposemymeds.org.          This list is accurate as of: 8/4/17  8:19 AM.  Always use your most recent med list.                   Brand Name Dispense Instructions for use Diagnosis    atenolol 25 MG tablet    TENORMIN    30 tablet    TAKE 1 TABLET BY MOUTH DAILY    Hypertension goal BP (blood pressure) < 140/90       atorvastatin 40 MG tablet    LIPITOR    90 tablet    TAKE 1 TABLET BY MOUTH DAILY    Hyperlipidemia LDL goal <100       * blood glucose monitoring test strip    ACCU-CHEK FELIPE PLUS    100 each    Pt tests 1x/day    Type 2 diabetes mellitus without complication, without long-term current use of insulin (H)       * ACCU-CHEK FELIPE PLUS test strip   Generic drug:  blood glucose monitoring     100 strip    TEST DAILY    Type 2 diabetes, HbA1c goal < 7% (H)       glipiZIDE 10 MG 24 hr tablet    GLUCOTROL XL    60 tablet    TAKE 1 TABLET BY MOUTH TWICE DAILY    Type 2 diabetes mellitus without complication, without long-term current use of insulin (H)       levothyroxine 125 MCG tablet    SYNTHROID/LEVOTHROID    90 tablet    TAKE 1 TABLET BY MOUTH DAILY    Hypothyroidism       losartan 100 MG tablet    COZAAR    30 tablet    TAKE 1 TABLET BY MOUTH DAILY    Hypertension goal BP (blood pressure) < 140/90       mesalamine 0.375 G Cp24 24 hr capsule    APRISO ER    120 capsule    Take 4 capsules (1.5 g) by mouth every morning    Ulcerative rectosigmoiditis, with rectal bleeding       * Notice:  This list has 2 medication(s) that are the same as other medications prescribed for you. Read the directions carefully, and ask your doctor or other care provider to review them with you.

## 2017-08-04 NOTE — NURSING NOTE
Chief Complaint   Patient presents with     Gastrointestinal Problem     Patient is being seen for IBD      Alanis Renner CMA at 7:43 AM on 8/4/2017

## 2017-08-07 ENCOUNTER — TELEPHONE (OUTPATIENT)
Dept: GASTROENTEROLOGY | Facility: CLINIC | Age: 67
End: 2017-08-07

## 2017-08-08 ENCOUNTER — TELEPHONE (OUTPATIENT)
Dept: GASTROENTEROLOGY | Facility: CLINIC | Age: 67
End: 2017-08-08

## 2017-08-10 DIAGNOSIS — E11.9 TYPE 2 DIABETES MELLITUS WITHOUT COMPLICATION, WITHOUT LONG-TERM CURRENT USE OF INSULIN (H): ICD-10-CM

## 2017-08-11 NOTE — TELEPHONE ENCOUNTER
glipiZIDE (GLUCOTROL XL) 10 MG 24 hr tablet    Last Written Prescription Date: 02/15/2017  Last Fill Quantity: 60, # refills: 5  Last Office Visit with [facility], [facility] or [facility] prescribing provider:  01/23/2017   Next 5 appointments (look out 90 days)     Sep 18, 2017  8:00 AM CDT   Office Visit with Nathan Dhillon MD   [facility] ([facility]    [address] [address] [phone]                   BP Readings from Last 3 Encounters:   08/04/17 167/74   05/15/17 136/61   04/24/17 128/61     Lab Results   Component Value Date    MICROL 12 05/18/2015     Lab Results   Component Value Date    UMALCR 41.81 05/18/2015     Creatinine   Date Value Ref Range Status   08/04/2017 1.16 (H) 0.52 - 1.04 mg/dL Final   ]  GFR Estimate   Date Value Ref Range Status   08/04/2017 47 (L) >60 mL/min/1.7m2 Final     Comment:     Non  GFR Calc   06/07/2017 56 (L) >60 mL/min/1.7m2 Final     Comment:     Non  GFR Calc   05/15/2017 53 (L) >60 mL/min/1.7m2 Final     Comment:     Non  GFR Calc     GFR Estimate If Black   Date Value Ref Range Status   08/04/2017 57 (L) >60 mL/min/1.7m2 Final     Comment:      GFR Calc   06/07/2017 68 >60 mL/min/1.7m2 Final     Comment:      GFR Calc   05/15/2017 64 >60 mL/min/1.7m2 Final     Comment:      GFR Calc     Lab Results   Component Value Date    CHOL 120 01/23/2017     Lab Results   Component Value Date    HDL 32 01/23/2017     Lab Results   Component Value Date    LDL 59 01/23/2017     Lab Results   Component Value Date    TRIG 147 01/23/2017     Lab Results   Component Value Date    CHOLHDLRATIO 4.3 05/18/2015     Lab Results   Component Value Date    AST 21 08/04/2017     Lab Results   Component Value Date    ALT 18 08/04/2017     Lab Results   Component Value Date    A1C 11.2 01/23/2017    A1C  9.4 05/18/2015    A1C 10.7 06/02/2014    A1C 10.7 07/17/2013    A1C 11.7 11/25/2011     Potassium   Date Value Ref Range Status   08/04/2017 4.4 3.4 - 5.3 mmol/L Final

## 2017-08-14 RX ORDER — GLIPIZIDE 10 MG/1
TABLET, FILM COATED, EXTENDED RELEASE ORAL
Qty: 60 TABLET | Refills: 0 | Status: SHIPPED | OUTPATIENT
Start: 2017-08-14 | End: 2017-10-30

## 2017-08-14 NOTE — TELEPHONE ENCOUNTER
Medication is being filled for 1 time refill only due to:  Patient needs labs fasting labs and 6 month diabetic follow up office visit.

## 2017-09-11 DIAGNOSIS — I10 HYPERTENSION GOAL BP (BLOOD PRESSURE) < 140/90: ICD-10-CM

## 2017-09-12 RX ORDER — AMLODIPINE BESYLATE 5 MG/1
TABLET ORAL
Qty: 90 TABLET | Refills: 1 | OUTPATIENT
Start: 2017-09-12

## 2017-09-12 NOTE — TELEPHONE ENCOUNTER
Routing refill request to provider for review/approval because:  BP not to goal at 8-4-17 OV

## 2017-09-12 NOTE — TELEPHONE ENCOUNTER
AMLODIPINE BESYLATE 5MG TABLETS      Last Written Prescription Date: 9/11/2017  Last Fill Quantity: 90, # refills: 3    Last Office Visit with [facility], [facility] or [facility] prescribing provider:  2/20/2017   Future Office Visit:    Next 5 appointments (look out 90 days)     Oct 30, 2017  7:30 AM CDT   SHORT with Nathan Dhillon MD   [facility] ([facility]    [address] [place] [address] [phone]                    BP Readings from Last 3 Encounters:   08/04/17 167/74   05/15/17 136/61   04/24/17 128/61

## 2017-09-15 DIAGNOSIS — E11.9 TYPE 2 DIABETES MELLITUS WITHOUT COMPLICATION, WITHOUT LONG-TERM CURRENT USE OF INSULIN (H): ICD-10-CM

## 2017-09-15 DIAGNOSIS — I10 HYPERTENSION GOAL BP (BLOOD PRESSURE) < 140/90: ICD-10-CM

## 2017-09-18 RX ORDER — AMLODIPINE BESYLATE 5 MG/1
TABLET ORAL
Qty: 90 TABLET | Refills: 1 | Status: SHIPPED | OUTPATIENT
Start: 2017-09-18 | End: 2017-10-30

## 2017-09-18 RX ORDER — GLIPIZIDE 10 MG/1
TABLET, FILM COATED, EXTENDED RELEASE ORAL
Qty: 180 TABLET | Refills: 0 | Status: SHIPPED | OUTPATIENT
Start: 2017-09-18 | End: 2017-10-30

## 2017-09-18 NOTE — TELEPHONE ENCOUNTER
glipizide         Last Written Prescription Date: 8-18-17  Last Fill Quantity: 60, # refills: 0  Last Office Visit with G, P or  Health prescribing provider:  2-20-17   Next 5 appointments (look out 90 days)     Oct 30, 2017  7:30 AM CDT   SHORT with Nathan Dhillon MD   [facility]    [address] [place] [address] [phone]                   BP Readings from Last 3 Encounters:   08/04/17 167/74   05/15/17 136/61   04/24/17 128/61     Lab Results   Component Value Date    MICROL 12 05/18/2015     Lab Results   Component Value Date    UMALCR 41.81 05/18/2015     Creatinine   Date Value Ref Range Status   08/04/2017 1.16 (H) 0.52 - 1.04 mg/dL Final   ]  GFR Estimate   Date Value Ref Range Status   08/04/2017 47 (L) >60 mL/min/1.7m2 Final     Comment:     Non  GFR Calc   06/07/2017 56 (L) >60 mL/min/1.7m2 Final     Comment:     Non  GFR Calc   05/15/2017 53 (L) >60 mL/min/1.7m2 Final     Comment:     Non  GFR Calc     GFR Estimate If Black   Date Value Ref Range Status   08/04/2017 57 (L) >60 mL/min/1.7m2 Final     Comment:      GFR Calc   06/07/2017 68 >60 mL/min/1.7m2 Final     Comment:      GFR Calc   05/15/2017 64 >60 mL/min/1.7m2 Final     Comment:      GFR Calc     Lab Results   Component Value Date    CHOL 120 01/23/2017     Lab Results   Component Value Date    HDL 32 01/23/2017     Lab Results   Component Value Date    LDL 59 01/23/2017     Lab Results   Component Value Date    TRIG 147 01/23/2017     Lab Results   Component Value Date    CHOLHDLRATIO 4.3 05/18/2015     Lab Results   Component Value Date    AST 21 08/04/2017     Lab Results   Component Value Date    ALT 18 08/04/2017     Lab Results   Component Value Date    A1C 11.2 01/23/2017    A1C 9.4 05/18/2015    A1C 10.7 06/02/2014    A1C  10.7 07/17/2013    A1C 11.7 11/25/2011     Potassium   Date Value Ref Range Status   08/04/2017 4.4 3.4 - 5.3 mmol/L Final       norvasc      Last Written Prescription Date: 9-11-17  Last Fill Quantity: 90, # refills: 3    Last Office Visit with [facility] or [facility] prescribing provider:  2-20-17   Future Office Visit:    Next 5 appointments (look out 90 days)     Oct 30, 2017  7:30 AM CDT   SHORT with Nathan Dhillon MD   [facility]    [address] [place] [address] [phone]                    BP Readings from Last 3 Encounters:   08/04/17 167/74   05/15/17 136/61   04/24/17 128/61     Prescription approved per [facility] Refill Protocol.

## 2017-10-30 ENCOUNTER — OFFICE VISIT (OUTPATIENT)
Dept: FAMILY MEDICINE | Facility: CLINIC | Age: 67
End: 2017-10-30
Payer: MEDICARE

## 2017-10-30 VITALS
RESPIRATION RATE: 16 BRPM | TEMPERATURE: 96.9 F | DIASTOLIC BLOOD PRESSURE: 76 MMHG | BODY MASS INDEX: 29.08 KG/M2 | OXYGEN SATURATION: 100 % | HEART RATE: 57 BPM | WEIGHT: 159 LBS | SYSTOLIC BLOOD PRESSURE: 138 MMHG

## 2017-10-30 DIAGNOSIS — I10 HYPERTENSION GOAL BP (BLOOD PRESSURE) < 140/90: ICD-10-CM

## 2017-10-30 DIAGNOSIS — E78.5 HYPERLIPIDEMIA LDL GOAL <100: ICD-10-CM

## 2017-10-30 DIAGNOSIS — E11.9 TYPE 2 DIABETES MELLITUS WITHOUT COMPLICATION, WITHOUT LONG-TERM CURRENT USE OF INSULIN (H): Primary | ICD-10-CM

## 2017-10-30 DIAGNOSIS — E89.0 POSTOPERATIVE HYPOTHYROIDISM: ICD-10-CM

## 2017-10-30 LAB
ALBUMIN SERPL-MCNC: 4 G/DL (ref 3.4–5)
ALP SERPL-CCNC: 108 U/L (ref 40–150)
ALT SERPL W P-5'-P-CCNC: 17 U/L (ref 0–50)
ANION GAP SERPL CALCULATED.3IONS-SCNC: 11 MMOL/L (ref 3–14)
AST SERPL W P-5'-P-CCNC: 25 U/L (ref 0–45)
BILIRUB SERPL-MCNC: 0.6 MG/DL (ref 0.2–1.3)
BUN SERPL-MCNC: 14 MG/DL (ref 7–30)
CALCIUM SERPL-MCNC: 9.7 MG/DL (ref 8.5–10.1)
CHLORIDE SERPL-SCNC: 103 MMOL/L (ref 94–109)
CO2 SERPL-SCNC: 22 MMOL/L (ref 20–32)
CREAT SERPL-MCNC: 1.11 MG/DL (ref 0.52–1.04)
GFR SERPL CREATININE-BSD FRML MDRD: 49 ML/MIN/1.7M2
GLUCOSE SERPL-MCNC: 179 MG/DL (ref 70–99)
HBA1C MFR BLD: 8.6 % (ref 4.3–6)
LDLC SERPL DIRECT ASSAY-MCNC: 60 MG/DL
POTASSIUM SERPL-SCNC: 4.5 MMOL/L (ref 3.4–5.3)
PROT SERPL-MCNC: 8.5 G/DL (ref 6.8–8.8)
SODIUM SERPL-SCNC: 136 MMOL/L (ref 133–144)
TSH SERPL DL<=0.005 MIU/L-ACNC: 0.88 MU/L (ref 0.4–4)

## 2017-10-30 PROCEDURE — 83721 ASSAY OF BLOOD LIPOPROTEIN: CPT | Performed by: INTERNAL MEDICINE

## 2017-10-30 PROCEDURE — 82043 UR ALBUMIN QUANTITATIVE: CPT | Performed by: INTERNAL MEDICINE

## 2017-10-30 PROCEDURE — 84443 ASSAY THYROID STIM HORMONE: CPT | Performed by: INTERNAL MEDICINE

## 2017-10-30 PROCEDURE — 36415 COLL VENOUS BLD VENIPUNCTURE: CPT | Performed by: INTERNAL MEDICINE

## 2017-10-30 PROCEDURE — 80053 COMPREHEN METABOLIC PANEL: CPT | Performed by: INTERNAL MEDICINE

## 2017-10-30 PROCEDURE — 99214 OFFICE O/P EST MOD 30 MIN: CPT | Performed by: INTERNAL MEDICINE

## 2017-10-30 PROCEDURE — 83036 HEMOGLOBIN GLYCOSYLATED A1C: CPT | Performed by: INTERNAL MEDICINE

## 2017-10-30 RX ORDER — GLIPIZIDE 10 MG/1
10 TABLET, FILM COATED, EXTENDED RELEASE ORAL 2 TIMES DAILY
Qty: 180 TABLET | Refills: 3 | Status: SHIPPED | OUTPATIENT
Start: 2017-10-30 | End: 2018-12-31

## 2017-10-30 RX ORDER — METOPROLOL TARTRATE 25 MG/1
25 TABLET, FILM COATED ORAL 2 TIMES DAILY
Qty: 180 TABLET | Refills: 3 | Status: SHIPPED | OUTPATIENT
Start: 2017-10-30 | End: 2018-07-30

## 2017-10-30 RX ORDER — LOSARTAN POTASSIUM 100 MG/1
100 TABLET ORAL DAILY
Qty: 90 TABLET | Refills: 3 | Status: SHIPPED | OUTPATIENT
Start: 2017-10-30 | End: 2018-11-08

## 2017-10-30 NOTE — PROGRESS NOTES
SUBJECTIVE:   Keerthi Montoya is a 67 year old female who presents to clinic today for the following health issues:      Diabetes Follow-up    Patient is checking blood sugars: once daily.  Results are as follows:       am -  varies    Diabetic concerns: None     Symptoms of hypoglycemia (low blood sugar): none     Paresthesias (numbness or burning in feet) or sores: No   Date of last diabetic eye exam: last Dec.      Amount of exercise or physical activity: 4-5 days/week for an average of 45-60 minutes    Problems taking medications regularly: No    Medication side effects: none    Diet: regular (no restrictions)          Traveling; spreading ashes.          5 days per wk.      Now,trying to get back on a regular exercise schedule.                          Variable glucoses.                    She tried metformin again; tolerating 500 mg per day!        Her GI sx are in remission; she feels great!    Problem list and histories reviewed & adjusted, as indicated.  Additional history: as documented    Current Outpatient Prescriptions   Medication Sig Dispense Refill     amLODIPine (NORVASC) 5 MG tablet TAKE 1 TABLET BY MOUTH EVERY DAY 90 tablet 3     glipiZIDE (GLUCOTROL XL) 10 MG 24 hr tablet TAKE 1 TABLET BY MOUTH TWICE DAILY 60 tablet 0     mesalamine (APRISO ER) 0.375 G CP24 24 hr capsule Take 4 capsules (1.5 g) by mouth every morning 120 capsule 3     ACCU-CHEK FELIPE PLUS test strip TEST DAILY 100 strip 1     levothyroxine (SYNTHROID/LEVOTHROID) 125 MCG tablet TAKE 1 TABLET BY MOUTH DAILY 90 tablet 2     atorvastatin (LIPITOR) 40 MG tablet TAKE 1 TABLET BY MOUTH DAILY 90 tablet 2     losartan (COZAAR) 100 MG tablet TAKE 1 TABLET BY MOUTH DAILY 30 tablet 11     atenolol (TENORMIN) 25 MG tablet TAKE 1 TABLET BY MOUTH DAILY 30 tablet 11     blood glucose monitoring (ACCU-CHEK FELIPE PLUS) test strip Pt tests 1x/day 100 each 1                     Recent Labs   Lab Test  08/04/17   0836  06/07/17   0850   05/15/17   0912   01/23/17   1433  05/18/15   0914   06/02/14   1607  07/17/13   0817   A1C   --    --    --    --   11.2*  9.4*   --   10.7*  10.7*   LDL   --    --    --    --   59  159*   --   109  126   HDL   --    --    --    --   32*  54   --    --   47*   TRIG   --    --    --    --   147  92   --    --   113   ALT  18   --   15   --   13  14   < >   --   19   CR  1.16*  0.99  1.05*   < >  1.32*  1.00   --   1.20*  1.10*   GFRESTIMATED  47*  56*  53*   < >  40*  56*   --   46*  50*   GFRESTBLACK  57*  68  64   < >  49*  68   --   55*  61   POTASSIUM  4.4  4.2  4.3   < >  4.7  4.3   --   4.7  4.7   TSH   --    --   1.12   --   3.09  1.29   --    --   0.43    < > = values in this interval not displayed.      BP Readings from Last 3 Encounters:   10/30/17 138/76   08/04/17 167/74   05/15/17 136/61    Wt Readings from Last 3 Encounters:   10/30/17 159 lb (72.1 kg)   08/04/17 152 lb (68.9 kg)   05/15/17 153 lb (69.4 kg)                        Reviewed and updated as needed this visit by clinical staff     Reviewed and updated as needed this visit by Provider         ROS:  RESP:NEGATIVE for significant cough or SOB  CV: NEGATIVE for chest pain, palpitations or peripheral edema  NEURO: NEGATIVE for weakness, dizziness or paresthesias    OBJECTIVE:                                                    /76  Pulse 57  Temp 96.9  F (36.1  C) (Tympanic)  Resp 16  Wt 159 lb (72.1 kg)  SpO2 100%  BMI 29.08 kg/m2  Body mass index is 29.08 kg/(m^2).  GENERAL APPEARANCE: healthy, alert and no distress  CV: regular rates and rhythm, normal S1 S2, no S3 or S4 and no murmur, click or rub    Diagnostic test results:  pending       ASSESSMENT/PLAN:                                                        ICD-10-CM    1. Type 2 diabetes mellitus without complication, without long-term current use of insulin (H) E11.9 Comprehensive metabolic panel (BMP + Alb, Alk Phos, ALT, AST, Total. Bili, TP)     Hemoglobin A1c      Albumin Random Urine Quantitative with Creat Ratio     metFORMIN (GLUCOPHAGE) 500 MG tablet     glipiZIDE (GLUCOTROL XL) 10 MG 24 hr tablet   2. Hypertension goal BP (blood pressure) < 140/90 I10 Comprehensive metabolic panel (BMP + Alb, Alk Phos, ALT, AST, Total. Bili, TP)     metoprolol (LOPRESSOR) 25 MG tablet     losartan (COZAAR) 100 MG tablet   3. Hyperlipidemia LDL goal <100 E78.5 LDL cholesterol direct   4. Postoperative hypothyroidism E89.0 TSH with free T4 reflex     Summary and implications:  Multiple issues.                Excellent news re: her GI sx.           Addition of metformin may help with diabetes control.  She wants to avoid adding insulin.                    Patient Instructions   I will let you know your lab results.   You are planning to resume the metformin, at 500 mg twice per day.                                                                                                            You are also planning a physical after next February 20.                                                                                                                                  The metoprolol 25 mg twice per day replaces the atenolol.       Nathan Dhillon MD  [facility]                                Results for orders placed or performed in visit on 10/30/17   Comprehensive metabolic panel (BMP + Alb, Alk Phos, ALT, AST, Total. Bili, TP)   Result Value Ref Range    Sodium 136 133 - 144 mmol/L    Potassium 4.5 3.4 - 5.3 mmol/L    Chloride 103 94 - 109 mmol/L    Carbon Dioxide 22 20 - 32 mmol/L    Anion Gap 11 3 - 14 mmol/L    Glucose 179 (H) 70 - 99 mg/dL    Urea Nitrogen 14 7 - 30 mg/dL    Creatinine 1.11 (H) 0.52 - 1.04 mg/dL    GFR Estimate 49 (L) >60 mL/min/1.7m2    GFR Estimate If Black 60 (L) >60 mL/min/1.7m2    Calcium 9.7 8.5 - 10.1 mg/dL    Bilirubin Total 0.6 0.2 - 1.3 mg/dL    Albumin 4.0 3.4 - 5.0 g/dL    Protein Total 8.5 6.8 - 8.8 g/dL    Alkaline  Phosphatase 108 40 - 150 U/L    ALT 17 0 - 50 U/L    AST 25 0 - 45 U/L   Hemoglobin A1c   Result Value Ref Range    Hemoglobin A1C 8.6 (H) 4.3 - 6.0 %   Albumin Random Urine Quantitative with Creat Ratio   Result Value Ref Range    Creatinine Urine 20 mg/dL    Albumin Urine mg/L 126 mg/L    Albumin Urine mg/g Cr 623.76 (H) 0 - 25 mg/g Cr   LDL cholesterol direct   Result Value Ref Range    LDL Cholesterol Direct 60 <100 mg/dL   TSH with free T4 reflex   Result Value Ref Range    TSH 0.88 0.40 - 4.00 mU/L                  Phone: Results discussed with patient              significant microalbuminuria is concerning.                     Presumably, this is due to several years of uncontrolled diabetes.                        So far, she is tolerating low-dose metformin.                  I suggested adding Trulicity, 0.75 mg dose, weekly. Follow-up in 6 weeks.

## 2017-10-30 NOTE — NURSING NOTE
"Chief Complaint   Patient presents with     Diabetes       Initial /76  Pulse 57  Temp 96.9  F (36.1  C) (Tympanic)  Resp 16  Wt 159 lb (72.1 kg)  SpO2 100%  BMI 29.08 kg/m2 Estimated body mass index is 29.08 kg/(m^2) as calculated from the following:    Height as of 1/26/17: 5' 2\" (1.575 m).    Weight as of this encounter: 159 lb (72.1 kg).  Medication Reconciliation: complete   Maria De Jesus Caicedo CMA    "

## 2017-10-30 NOTE — MR AVS SNAPSHOT
After Visit Summary   10/30/2017    Keerthi Montoya    MRN: [id number]           Patient Information     Date Of Birth          1950        Visit Information        Provider Department      10/30/2017 7:30 AM Nathan Dhillon MD [facility]        Today's Diagnoses     Type 2 diabetes mellitus without complication, without long-term current use of insulin (H)    -  1    Hypertension goal BP (blood pressure) < 140/90        Hyperlipidemia LDL goal <100        Postoperative hypothyroidism          Care Instructions    I will let you know your lab results.   You are planning to resume the metformin, at 500 mg twice per day.                                                                                                            You are also planning a physical after next February 20.                                                                                                                                  The metoprolol 25 mg twice per day replaces the atenolol.           Follow-ups after your visit        Your next 10 appointments already scheduled     Dec 12, 2017  7:30 AM CST   Colonoscopy with Tejinder Leal MD   [facility] ([facility])    [address] [place] [id number] [phone]            Feb 05, 2018  8:40 AM CST   (Arrive by 8:25 AM)   Return Visit with Tejinder Leal MD   [facility] ([facility])    [address] [place] [id number] [phone]              Who to contact     If you have questions or need follow up information about today's clinic visit or your schedule please contact [facility] directly at [phone].  Normal or non-critical lab and imaging results will be communicated to you by MyChart, letter or phone within 4 business days after the clinic has  received the results. If you do not hear from us within 7 days, please contact the clinic through [employer] or phone. If you have a critical or abnormal lab result, we will notify you by phone as soon as possible.  Submit refill requests through [employer] or call your pharmacy and they will forward the refill request to us. Please allow 3 business days for your refill to be completed.          Additional Information About Your Visit        [facility] Information     [employer] gives you secure access to your electronic health record. If you see a primary care provider, you can also send messages to your care team and make appointments. If you have questions, please call your primary care clinic.  If you do not have a primary care provider, please call [phone] and they will assist you.        Care EveryWhere ID     This is your Care EveryWhere ID. This could be used by other organizations to access your [facility] medical records  [id number]        Your Vitals Were     Pulse Temperature Respirations Pulse Oximetry BMI (Body Mass Index)       57 96.9  F (36.1  C) (Tympanic) 16 100% 29.08 kg/m2        Blood Pressure from Last 3 Encounters:   10/30/17 138/76   08/04/17 167/74   05/15/17 136/61    Weight from Last 3 Encounters:   10/30/17 159 lb (72.1 kg)   08/04/17 152 lb (68.9 kg)   05/15/17 153 lb (69.4 kg)              We Performed the Following     Albumin Random Urine Quantitative with Creat Ratio     Comprehensive metabolic panel (BMP + Alb, Alk Phos, ALT, AST, Total. Bili, TP)     Hemoglobin A1c     LDL cholesterol direct     TSH with free T4 reflex          Today's Medication Changes          These changes are accurate as of: 10/30/17  7:51 AM.  If you have any questions, ask your nurse or doctor.               Start taking these medicines.        Dose/Directions    metFORMIN 500 MG tablet   Commonly known as:  GLUCOPHAGE   Used for:  Type 2 diabetes mellitus without complication, without long-term current use of  insulin (H)   Started by:  Nathan Dhillon MD        Dose:  500 mg   Take 1 tablet (500 mg) by mouth 2 times daily (with meals)   Quantity:  180 tablet   Refills:  3       metoprolol 25 MG tablet   Commonly known as:  LOPRESSOR   Used for:  Hypertension goal BP (blood pressure) < 140/90   Started by:  Nathan Dhillon MD        Dose:  25 mg   Take 1 tablet (25 mg) by mouth 2 times daily   Quantity:  180 tablet   Refills:  3         These medicines have changed or have updated prescriptions.        Dose/Directions    glipiZIDE 10 MG 24 hr tablet   Commonly known as:  GLUCOTROL XL   This may have changed:  See the new instructions.   Used for:  Type 2 diabetes mellitus without complication, without long-term current use of insulin (H)   Changed by:  Nathan Dhillon MD        Dose:  10 mg   Take 1 tablet (10 mg) by mouth 2 times daily   Quantity:  180 tablet   Refills:  3       losartan 100 MG tablet   Commonly known as:  COZAAR   This may have changed:  See the new instructions.   Used for:  Hypertension goal BP (blood pressure) < 140/90   Changed by:  Nathan Dhillon MD        Dose:  100 mg   Take 1 tablet (100 mg) by mouth daily   Quantity:  90 tablet   Refills:  3         Stop taking these medicines if you haven't already. Please contact your care team if you have questions.     atenolol 25 MG tablet   Commonly known as:  TENORMIN   Stopped by:  Nathan Dhillon MD                Where to get your medicines      These medications were sent to [facility] [address] [place] [address] [address] [place] [address]  [address] [facility] [address]    Hours:  24-hours Phone:  [phone]     glipiZIDE 10 MG 24 hr tablet    losartan 100 MG tablet    metFORMIN 500 MG tablet    metoprolol 25 MG tablet                Primary Care Provider Office Phone # Fax #    Nathan Dhillon [phone] [address]  [facility] [address]        Equal Access  to Services     MAGGI BRAUN : Parish Lomax, wajeanethda luqadaha, qaybta kaalmaángel caballero, celia sullivan. So [facility] [phone].    ATENCIÓN: Si habla barber, tiene a hobbs disposición servicios gratuitos de asistencia lingüística. Llame al [phone].    We comply with applicable federal civil rights laws and [place] laws. We do not discriminate on the basis of race, color, national origin, age, disability, sex, sexual orientation, or gender identity.            Thank you!     Thank you for choosing [facility]  for your care. Our goal is always to provide you with excellent care. Hearing back from our patients is one way we can continue to improve our services. Please take a few minutes to complete the written survey that you may receive in the mail after your visit with us. Thank you!             Your Updated Medication List - Protect others around you: Learn how to safely use, store and throw away your medicines at www.disposemymeds.org.          This list is accurate as of: 10/30/17  7:51 AM.  Always use your most recent med list.                   Brand Name Dispense Instructions for use Diagnosis    amLODIPine 5 MG tablet    NORVASC    90 tablet    TAKE 1 TABLET BY MOUTH EVERY DAY    Hypertension goal BP (blood pressure) < 140/90       atorvastatin 40 MG tablet    LIPITOR    90 tablet    TAKE 1 TABLET BY MOUTH DAILY    Hyperlipidemia LDL goal <100       * blood glucose monitoring test strip    ACCU-CHEK FELIPE PLUS    100 each    Pt tests 1x/day    Type 2 diabetes mellitus without complication, without long-term current use of insulin (H)       * ACCU-CHEK FELIPE PLUS test strip   Generic drug:  blood glucose monitoring     100 strip    TEST DAILY    Type 2 diabetes, HbA1c goal < 7% (H)       glipiZIDE 10 MG 24 hr tablet    GLUCOTROL XL    180 tablet    Take 1 tablet (10 mg) by mouth 2 times daily    Type 2 diabetes mellitus without  complication, without long-term current use of insulin (H)       levothyroxine 125 MCG tablet    SYNTHROID/LEVOTHROID    90 tablet    TAKE 1 TABLET BY MOUTH DAILY    Hypothyroidism       losartan 100 MG tablet    COZAAR    90 tablet    Take 1 tablet (100 mg) by mouth daily    Hypertension goal BP (blood pressure) < 140/90       mesalamine 0.375 G Cp24 24 hr capsule    APRISO ER    120 capsule    Take 4 capsules (1.5 g) by mouth every morning    Ulcerative rectosigmoiditis with rectal bleeding (H)       metFORMIN 500 MG tablet    GLUCOPHAGE    180 tablet    Take 1 tablet (500 mg) by mouth 2 times daily (with meals)    Type 2 diabetes mellitus without complication, without long-term current use of insulin (H)       metoprolol 25 MG tablet    LOPRESSOR    180 tablet    Take 1 tablet (25 mg) by mouth 2 times daily    Hypertension goal BP (blood pressure) < 140/90       * Notice:  This list has 2 medication(s) that are the same as other medications prescribed for you. Read the directions carefully, and ask your doctor or other care provider to review them with you.

## 2017-10-30 NOTE — PATIENT INSTRUCTIONS
I will let you know your lab results.   You are planning to resume the metformin, at 500 mg twice per day.                                                                                                            You are also planning a physical after next February 20.                                                                                                                                  The metoprolol 25 mg twice per day replaces the atenolol.

## 2017-11-01 LAB
CREAT UR-MCNC: 20 MG/DL
MICROALBUMIN UR-MCNC: 126 MG/L
MICROALBUMIN/CREAT UR: 623.76 MG/G CR (ref 0–25)

## 2017-12-05 ENCOUNTER — TELEPHONE (OUTPATIENT)
Dept: GASTROENTEROLOGY | Facility: OUTPATIENT CENTER | Age: 67
End: 2017-12-05

## 2017-12-05 NOTE — TELEPHONE ENCOUNTER
Patient taking any blood thinners ? no    Heart disease ? denies    Lung disease ? denies      Sleep apnea ? denies    Diabetic ? Type 2    Kidney disease ? denies    Dialysis ? n/a    Electronic implanted medical devices ? denies    Are you taking any narcotic pain medication ?  no What is your daily dosage ?    PTSD ? n/a    Prep instructions reviewed with patient ? Patient declined review.  policy, MAC sedation plan reviewed. Advised patient to have someone stay with her post exam    Ph[facility] : [facility]    Indication for procedure :Ulcerative rectosigmoiditis with rectal bleeding (H) [K51.311]     Referring provider :Tejinder Leal MD     Arrival Time : Instructed patient to arrive at 6:30 AM

## 2017-12-12 ENCOUNTER — TRANSFERRED RECORDS (OUTPATIENT)
Dept: HEALTH INFORMATION MANAGEMENT | Facility: CLINIC | Age: 67
End: 2017-12-12

## 2017-12-12 ENCOUNTER — DOCUMENTATION ONLY (OUTPATIENT)
Dept: GASTROENTEROLOGY | Facility: OUTPATIENT CENTER | Age: 67
End: 2017-12-12

## 2017-12-12 DIAGNOSIS — E11.9 TYPE 2 DIABETES, HBA1C GOAL < 7% (H): ICD-10-CM

## 2017-12-12 DIAGNOSIS — K51.30 ULCERATIVE RECTOSIGMOIDITIS (H): Primary | ICD-10-CM

## 2017-12-13 LAB — COPATH REPORT: NORMAL

## 2017-12-13 RX ORDER — BLOOD SUGAR DIAGNOSTIC
STRIP MISCELLANEOUS
Qty: 100 STRIP | Refills: 1 | Status: SHIPPED | OUTPATIENT
Start: 2017-12-13 | End: 2020-05-07

## 2017-12-13 NOTE — TELEPHONE ENCOUNTER
Prescription approved per [facility] Refill Protocol.  10-30-17 OV  Requested Prescriptions   Pending Prescriptions Disp Refills     ACCU-CHEK FELIPE PLUS test strip [Pharmacy Med Name: ACCU-CHEK FELIPE PLUS STRIPS 100'S] 100 strip 0     Sig: TEST DAILY    Diabetic Supplies Protocol Passed    12/12/2017 11:17 AM       Passed - Patient is 18 years of age or older       Passed - Patient has had appt within past 6 mos    IV to PO - Antibiotics     None

## 2018-01-07 DIAGNOSIS — E78.5 HYPERLIPIDEMIA LDL GOAL <100: ICD-10-CM

## 2018-01-07 DIAGNOSIS — E89.0 POSTOPERATIVE HYPOTHYROIDISM: Primary | ICD-10-CM

## 2018-01-10 RX ORDER — LEVOTHYROXINE SODIUM 125 UG/1
TABLET ORAL
Qty: 90 TABLET | Refills: 2 | Status: SHIPPED | OUTPATIENT
Start: 2018-01-10 | End: 2018-09-19

## 2018-01-10 RX ORDER — ATORVASTATIN CALCIUM 40 MG/1
TABLET, FILM COATED ORAL
Qty: 90 TABLET | Refills: 2 | Status: SHIPPED | OUTPATIENT
Start: 2018-01-10 | End: 2018-10-02

## 2018-01-10 NOTE — TELEPHONE ENCOUNTER
Routing refill request to provider for review/approval because:  Drug warning- allergy to Levothyroxine

## 2018-01-10 NOTE — TELEPHONE ENCOUNTER
"10-30-17 OV  Requested Prescriptions   Pending Prescriptions Disp Refills     levothyroxine (SYNTHROID/LEVOTHROID) 125 MCG tablet [Pharmacy Med Name: LEVOTHYROXINE 0.125MG (125MCG) TAB] 90 tablet 0     Sig: TAKE 1 TABLET BY MOUTH DAILY    Thyroid Protocol Passed    1/7/2018  9:50 AM       Passed - Patient is 12 years or older       Passed - Recent or future visit with authorizing provider's specialty    Patient had office visit in the last year or has a visit in the next 30 days with authorizing provider.  See \"Patient Info\" tab in inbasket, or \"Choose Columns\" in Meds & Orders section of the refill encounter.              Passed - Normal TSH on file in past 12 months    Recent Labs   Lab Test  10/30/17   0754   TSH  0.88             Passed - No active pregnancy on record    If patient is pregnant or has had a positive pregnancy test, please check TSH.         Passed - No positive pregnancy test in past 12 months    If patient is pregnant or has had a positive pregnancy test, please check TSH.          atorvastatin (LIPITOR) 40 MG tablet [Pharmacy Med Name: ATORVASTATIN 40MG TABLETS] 90 tablet 0     Sig: TAKE 1 TABLET BY MOUTH DAILY    Statins Protocol Passed    1/7/2018  9:50 AM       Passed - LDL on file in past 12 months    Recent Labs   Lab Test  10/30/17   0754   LDL  60            Passed - No abnormal creatine kinase in past 12 months    No lab results found.         Passed - Recent or future visit with authorizing provider    Patient had office visit in the last year or has a visit in the next 30 days with authorizing provider.  See \"Patient Info\" tab in inbasket, or \"Choose Columns\" in Meds & Orders section of the refill encounter.              Passed - Patient is age 18 or older       Passed - No active pregnancy on record       Passed - No positive pregnancy test in past 12 months        "

## 2018-01-15 DIAGNOSIS — K51.311 ULCERATIVE RECTOSIGMOIDITIS WITH RECTAL BLEEDING (H): ICD-10-CM

## 2018-01-16 RX ORDER — MESALAMINE 0.38 G/1
1.5 CAPSULE, EXTENDED RELEASE ORAL EVERY MORNING
Qty: 120 CAPSULE | Refills: 3 | Status: SHIPPED | OUTPATIENT
Start: 2018-01-16 | End: 2018-05-30

## 2018-01-16 NOTE — TELEPHONE ENCOUNTER
mesalamine (APRISO  Last Written Prescription Date:  8/4/17  Last Fill Quantity: 120,   # refills: 3  Last Office Visit : 8/4/17  Future Office visit:  2/19/18

## 2018-05-30 DIAGNOSIS — K51.311 ULCERATIVE RECTOSIGMOIDITIS WITH RECTAL BLEEDING (H): ICD-10-CM

## 2018-05-31 RX ORDER — MESALAMINE 0.38 G/1
1.5 CAPSULE, EXTENDED RELEASE ORAL EVERY MORNING
Qty: 120 CAPSULE | Refills: 4 | Status: SHIPPED | OUTPATIENT
Start: 2018-05-31 | End: 2018-09-10

## 2018-05-31 NOTE — TELEPHONE ENCOUNTER
mesalamine (APRISO ER) 0.375 G CP24 24 hr capsule      Last Written Prescription Date:  1/16/18  Last Fill Quantity: 120,   # refills: 3  Last Office Visit : 5/15/17  Future Office visit: 9/10/18    Creatinine   Date Value Ref Range Status   10/30/2017 1.11 (H) 0.52 - 1.04 mg/dL Final   ]      Routed because:  Mesalamine  Not on protocol for tabs. Creat H

## 2018-06-15 ENCOUNTER — TELEPHONE (OUTPATIENT)
Dept: FAMILY MEDICINE | Facility: CLINIC | Age: 68
End: 2018-06-15

## 2018-06-15 NOTE — TELEPHONE ENCOUNTER
Panel Management Review      Patient has the following on her problem list:     Diabetes    ASA: Failed    Last A1C  Lab Results   Component Value Date    A1C 8.6 10/30/2017    A1C 11.2 01/23/2017    A1C 9.4 05/18/2015    A1C 10.7 06/02/2014    A1C 10.7 07/17/2013     A1C tested: FAILED    Last LDL:    Lab Results   Component Value Date    CHOL 120 01/23/2017     Lab Results   Component Value Date    HDL 32 01/23/2017     Lab Results   Component Value Date    LDL 60 10/30/2017    LDL 59 01/23/2017     Lab Results   Component Value Date    TRIG 147 01/23/2017     Lab Results   Component Value Date    CHOLHDLRATIO 4.3 05/18/2015     Lab Results   Component Value Date    NHDL 88 01/23/2017       Is the patient on a Statin? YES             Is the patient on Aspirin? NO    Medications     HMG CoA Reductase Inhibitors    atorvastatin (LIPITOR) 40 MG tablet          Last three blood pressure readings:  BP Readings from Last 3 Encounters:   10/30/17 138/76   08/04/17 167/74   05/15/17 136/61       Date of last diabetes office visit: 10/30/2017     Tobacco History:     History   Smoking Status     Never Smoker   Smokeless Tobacco     Never Used           Composite cancer screening  Chart review shows that this patient is due/due soon for the following Pap Smear and Mammogram  Summary:    Patient is due/failing the following:   A1C, MAMMOGRAM and PAP    Action needed:   Patient needs office visit for wellness visit, pap, mammo, and diabetes.    Type of outreach:    Sent letter.    Questions for provider review:    None                                                                                                                                    DAVID Stevens [facility]       Chart routed to  .

## 2018-06-15 NOTE — LETTER
Rufina 15, 2018    Keerthi Montoya  [address] [place] [id number]    Dear Viraj Pendleton cares about your health and your health plan.  I have reviewed your medical conditions, medication list and lab results, and am making recommendations based on this review to better manage your health.    You are in particular need of attention regarding:  -Diabetes  -Breast Cancer Screening  -Cervical Cancer Screening  -Wellness (Physical) Visit     I am recommending that you:     -schedule a WELLNESS (Physical) APPOINTMENT with me.   I will check fasting labs the same day - nothing to eat except water and meds for 8-10 hours prior.    -schedule a MAMMOGRAM which is due.    1 in 8 women will develop invasive breast cancer during her lifetime and it is the most common non-skin cancer in American women.  EARLY detection, new treatments, and a better understanding of the disease have increased survival rates - the 5 year survival rate in the 1960s was 63% and today it is close to 90%.    If you are under/uninsured, we recommend you contact the [facility]. They offer mammograms at no charge or on a sliding fee charge. You can schedule with them at [phone]. Please have them send us the results.      Please disregard this reminder if you have had this exam elsewhere within the last year.  It would be helpful for us to have a copy of your mammogram report in your file so that we can best coordinate your care - please contact us with when your test was done so we can update your record.             -schedule a PAP SMEAR EXAM which is due.  Please disregard this reminder if you have had this exam elsewhere within the last year.  It would be helpful for us to have a copy of your recent pap smear report in our file so that we can best coordinate your care.    If you are under/uninsured, we recommend you contact the [facility]. They offer pap smears at no charge or on a sliding fee charge. You can  schedule with them at [phone]. Please have them send us the results.      Please call us at the [place] location:  [phone] or use [employer] to address the above recommendations.     Thank you for trusting [facility].  We appreciate the opportunity to serve you and look forward to supporting your healthcare in the future.    If you have (or plan to have) any of these tests done at a facility other than a [facility] or a [facility], please have the results sent to the [facility] location noted above.      Best Regards,    Nathan Dhillon MD

## 2018-06-23 PROBLEM — E11.9 TYPE 2 DIABETES MELLITUS WITHOUT COMPLICATION, WITHOUT LONG-TERM CURRENT USE OF INSULIN (H): Status: ACTIVE | Noted: 2017-10-30

## 2018-06-25 ENCOUNTER — OFFICE VISIT (OUTPATIENT)
Dept: FAMILY MEDICINE | Facility: CLINIC | Age: 68
End: 2018-06-25
Payer: MEDICARE

## 2018-06-25 VITALS
DIASTOLIC BLOOD PRESSURE: 82 MMHG | RESPIRATION RATE: 20 BRPM | TEMPERATURE: 97.8 F | BODY MASS INDEX: 29.44 KG/M2 | OXYGEN SATURATION: 100 % | HEART RATE: 58 BPM | SYSTOLIC BLOOD PRESSURE: 138 MMHG | HEIGHT: 62 IN | WEIGHT: 160 LBS

## 2018-06-25 DIAGNOSIS — Z13.89 SCREENING FOR DIABETIC PERIPHERAL NEUROPATHY: ICD-10-CM

## 2018-06-25 DIAGNOSIS — E53.8 VITAMIN B12 DEFICIENCY (NON ANEMIC): ICD-10-CM

## 2018-06-25 DIAGNOSIS — R80.9 PROTEINURIA, UNSPECIFIED TYPE: ICD-10-CM

## 2018-06-25 DIAGNOSIS — R80.9 MICROALBUMINURIA: ICD-10-CM

## 2018-06-25 DIAGNOSIS — E11.9 TYPE 2 DIABETES MELLITUS WITHOUT COMPLICATION, WITHOUT LONG-TERM CURRENT USE OF INSULIN (H): Primary | ICD-10-CM

## 2018-06-25 DIAGNOSIS — E78.5 HYPERLIPIDEMIA LDL GOAL <100: ICD-10-CM

## 2018-06-25 DIAGNOSIS — I10 HYPERTENSION GOAL BP (BLOOD PRESSURE) < 130/80: ICD-10-CM

## 2018-06-25 DIAGNOSIS — E89.0 POSTOPERATIVE HYPOTHYROIDISM: ICD-10-CM

## 2018-06-25 DIAGNOSIS — Z78.0 ASYMPTOMATIC POSTMENOPAUSAL STATUS: ICD-10-CM

## 2018-06-25 LAB
ALBUMIN SERPL-MCNC: 4 G/DL (ref 3.4–5)
ALP SERPL-CCNC: 112 U/L (ref 40–150)
ALT SERPL W P-5'-P-CCNC: 15 U/L (ref 0–50)
ANION GAP SERPL CALCULATED.3IONS-SCNC: 13 MMOL/L (ref 3–14)
AST SERPL W P-5'-P-CCNC: 20 U/L (ref 0–45)
BASOPHILS # BLD AUTO: 0.1 10E9/L (ref 0–0.2)
BASOPHILS NFR BLD AUTO: 0.6 %
BILIRUB SERPL-MCNC: 0.7 MG/DL (ref 0.2–1.3)
BUN SERPL-MCNC: 14 MG/DL (ref 7–30)
CALCIUM SERPL-MCNC: 9.6 MG/DL (ref 8.5–10.1)
CHLORIDE SERPL-SCNC: 106 MMOL/L (ref 94–109)
CO2 SERPL-SCNC: 21 MMOL/L (ref 20–32)
CREAT SERPL-MCNC: 0.89 MG/DL (ref 0.52–1.04)
CREAT UR-MCNC: 16 MG/DL
DIFFERENTIAL METHOD BLD: NORMAL
EOSINOPHIL # BLD AUTO: 0.4 10E9/L (ref 0–0.7)
EOSINOPHIL NFR BLD AUTO: 4 %
ERYTHROCYTE [DISTWIDTH] IN BLOOD BY AUTOMATED COUNT: 12.6 % (ref 10–15)
GFR SERPL CREATININE-BSD FRML MDRD: 64 ML/MIN/1.7M2
GLUCOSE SERPL-MCNC: 164 MG/DL (ref 70–99)
HBA1C MFR BLD: 7.6 % (ref 0–5.6)
HCT VFR BLD AUTO: 42.5 % (ref 35–47)
HGB BLD-MCNC: 14.1 G/DL (ref 11.7–15.7)
LDLC SERPL DIRECT ASSAY-MCNC: 62 MG/DL
LYMPHOCYTES # BLD AUTO: 1.8 10E9/L (ref 0.8–5.3)
LYMPHOCYTES NFR BLD AUTO: 19.4 %
MCH RBC QN AUTO: 31.7 PG (ref 26.5–33)
MCHC RBC AUTO-ENTMCNC: 33.2 G/DL (ref 31.5–36.5)
MCV RBC AUTO: 96 FL (ref 78–100)
MICROALBUMIN UR-MCNC: 511 MG/L
MICROALBUMIN/CREAT UR: 3213.84 MG/G CR (ref 0–25)
MONOCYTES # BLD AUTO: 1.1 10E9/L (ref 0–1.3)
MONOCYTES NFR BLD AUTO: 11.6 %
NEUTROPHILS # BLD AUTO: 6 10E9/L (ref 1.6–8.3)
NEUTROPHILS NFR BLD AUTO: 64.4 %
PLATELET # BLD AUTO: 359 10E9/L (ref 150–450)
POTASSIUM SERPL-SCNC: 4.3 MMOL/L (ref 3.4–5.3)
PROT SERPL-MCNC: 8.7 G/DL (ref 6.8–8.8)
RBC # BLD AUTO: 4.45 10E12/L (ref 3.8–5.2)
SODIUM SERPL-SCNC: 140 MMOL/L (ref 133–144)
TSH SERPL DL<=0.005 MIU/L-ACNC: 0.73 MU/L (ref 0.4–4)
VIT B12 SERPL-MCNC: 224 PG/ML (ref 193–986)
WBC # BLD AUTO: 9.2 10E9/L (ref 4–11)

## 2018-06-25 PROCEDURE — 83721 ASSAY OF BLOOD LIPOPROTEIN: CPT | Performed by: INTERNAL MEDICINE

## 2018-06-25 PROCEDURE — 82607 VITAMIN B-12: CPT | Performed by: INTERNAL MEDICINE

## 2018-06-25 PROCEDURE — 83036 HEMOGLOBIN GLYCOSYLATED A1C: CPT | Performed by: INTERNAL MEDICINE

## 2018-06-25 PROCEDURE — 99214 OFFICE O/P EST MOD 30 MIN: CPT | Mod: 25 | Performed by: INTERNAL MEDICINE

## 2018-06-25 PROCEDURE — 99207 C FOOT EXAM  NO CHARGE: CPT | Performed by: INTERNAL MEDICINE

## 2018-06-25 PROCEDURE — 85025 COMPLETE CBC W/AUTO DIFF WBC: CPT | Performed by: INTERNAL MEDICINE

## 2018-06-25 PROCEDURE — 36415 COLL VENOUS BLD VENIPUNCTURE: CPT | Performed by: INTERNAL MEDICINE

## 2018-06-25 PROCEDURE — 84443 ASSAY THYROID STIM HORMONE: CPT | Performed by: INTERNAL MEDICINE

## 2018-06-25 PROCEDURE — 82043 UR ALBUMIN QUANTITATIVE: CPT | Performed by: INTERNAL MEDICINE

## 2018-06-25 PROCEDURE — 80053 COMPREHEN METABOLIC PANEL: CPT | Performed by: INTERNAL MEDICINE

## 2018-06-25 RX ORDER — ATENOLOL 25 MG/1
25 TABLET ORAL DAILY
Qty: 90 TABLET | Refills: 3 | Status: SHIPPED | OUTPATIENT
Start: 2018-06-25 | End: 2019-01-28 | Stop reason: ALTCHOICE

## 2018-06-25 NOTE — PROGRESS NOTES
SUBJECTIVE:   Keerthi Montoya is a 68 year old female who presents to clinic today for the following health issues:      Diabetes Follow-up    Patient is checking blood sugars: once daily.    Diabetic concerns: None     Symptoms of hypoglycemia (low blood sugar): none     Paresthesias (numbness or burning in feet) or sores: No     Date of last diabetic eye exam: fall 2017    BP Readings from Last 2 Encounters:   10/30/17 138/76   08/04/17 167/74     Hemoglobin A1C (%)   Date Value   10/30/2017 8.6 (H)   01/23/2017 11.2 (H)     LDL Cholesterol Calculated (mg/dL)   Date Value   01/23/2017 59   05/18/2015 159 (H)     LDL Cholesterol Direct (mg/dL)   Date Value   10/30/2017 60     Hypertension Follow-up      Outpatient blood pressures are not being checked.    Low Salt Diet: no added salt    Hypothyroidism Follow-up      Since last visit, patient describes the following symptoms: Weight stable, no hair loss, no skin changes, no constipation, no loose stools      Amount of exercise or physical activity: occ.    Problems taking medications regularly: No    Medication side effects: poss? From Metoprolol    Diet: low salt, low fat/cholesterol and diabetic        C/o fatigue; attributes this to metoprolol.     Seemed to do better with atenolol.                    Similar SE with propranolol yrs ago.                   Never took Trulicity; never got the Rx.          Able to take metformin bid.              No bowel sx.          AM glucoses .               Occasionally higher.              Problem list and histories reviewed & adjusted, as indicated.      Current Outpatient Prescriptions   Medication Sig Dispense Refill     ACCU-CHEK FELIPE PLUS test strip TEST DAILY 100 strip 1     amLODIPine (NORVASC) 5 MG tablet TAKE 1 TABLET BY MOUTH EVERY DAY 90 tablet 3     atorvastatin (LIPITOR) 40 MG tablet TAKE 1 TABLET BY MOUTH DAILY 90 tablet 2     blood glucose monitoring (ACCU-CHEK FELIPE PLUS) test strip Pt tests 1x/day  100 each 1     glipiZIDE (GLUCOTROL XL) 10 MG 24 hr tablet Take 1 tablet (10 mg) by mouth 2 times daily 180 tablet 3     losartan (COZAAR) 100 MG tablet Take 1 tablet (100 mg) by mouth daily 90 tablet 3     mesalamine (APRISO ER) 0.375 g CP24 24 hr capsule Take 4 capsules (1.5 g) by mouth every morning 120 capsule 4     metFORMIN (GLUCOPHAGE) 500 MG tablet Take 1 tablet (500 mg) by mouth 2 times daily (with meals) 180 tablet 3     metoprolol (LOPRESSOR) 25 MG tablet Take 1 tablet (25 mg) by mouth 2 times daily 180 tablet 3             levothyroxine (SYNTHROID/LEVOTHROID) 125 MCG tablet TAKE 1 TABLET BY MOUTH DAILY 90 tablet 2             Allergies   Allergen Reactions     Actos [Pioglitazone]      Fluid retention     Animal Dander      Doxycycline Hives     Dust Mites      Grass      Keflex [Cephalexin Hcl] Hives     Metformin Diarrhea     At 1000mg     Other [Seasonal Allergies]      pollen     Synthroid [Levothroid] Swelling     ???     Tetracycline Hives     Tylenol Hives     Ziac [Bisoprolol-Hydrochlorothiazide]      Recent Labs   Lab Test  10/30/17   0754  08/04/17   0836   05/15/17   0912   01/23/17   1433  05/18/15   0914   07/17/13   0817   A1C  8.6*   --    --    --    --   11.2*  9.4*   < >  10.7*   LDL  60   --    --    --    --   59  159*   < >  126   HDL   --    --    --    --    --   32*  54   --   47*   TRIG   --    --    --    --    --   147  92   --   113   ALT  17  18   --   15   --   13  14   < >  19   CR  1.11*  1.16*   < >  1.05*   < >  1.32*  1.00   < >  1.10*   GFRESTIMATED  49*  47*   < >  53*   < >  40*  56*   < >  50*   GFRESTBLACK  60*  57*   < >  64   < >  49*  68   < >  61   POTASSIUM  4.5  4.4   < >  4.3   < >  4.7  4.3   < >  4.7   TSH  0.88   --    --   1.12   --   3.09  1.29   --   0.43    < > = values in this interval not displayed.      BP Readings from Last 3 Encounters:   06/25/18 138/82   10/30/17 138/76   08/04/17 167/74    Wt Readings from Last 3 Encounters:   06/25/18 160 lb  "(72.6 kg)   10/30/17 159 lb (72.1 kg)   08/04/17 152 lb (68.9 kg)                    Reviewed and updated as needed this visit by clinical staff       Reviewed and updated as needed this visit by Provider         ROS:  CONSTITUTIONAL:NEGATIVE for fever, chills, change in weight  RESP:NEGATIVE for significant cough or SOB  CV: NEGATIVE for chest pain/chest pressure and palpitations  GI: NEGATIVE for diarrhea and hematochezia  NEURO: NEGATIVE for numbness or tingling     OBJECTIVE:                                                    /82 (BP Location: Left arm, Patient Position: Chair, Cuff Size: Adult Regular)  Pulse 58  Temp 97.8  F (36.6  C)  Resp 20  Ht 5' 2\" (1.575 m)  Wt 160 lb (72.6 kg)  SpO2 100%  Breastfeeding? No  BMI 29.26 kg/m2  Body mass index is 29.26 kg/(m^2).  GENERAL APPEARANCE: alert and no distress  RESP: lungs clear to auscultation - no rales, rhonchi or wheezes  CV: regular rates and rhythm, normal S1 S2, no S3 or S4 and no murmur, click or rub  DIABETIC FOOT EXAM: no trophic changes or ulcerative lesions, DP reduced bilateral and PT reduced bilateral    Diagnostic test results:  pending     ASSESSMENT/PLAN:                                                        ICD-10-CM    1. Type 2 diabetes mellitus without complication, without long-term current use of insulin (H) E11.9 HEMOGLOBIN A1C     Comprehensive metabolic panel (BMP + Alb, Alk Phos, ALT, AST, Total. Bili, TP)     Albumin Random Urine Quantitative with Creat Ratio     Vitamin B12   2. Hypertension goal BP (blood pressure) < 130/80 I10 atenolol (TENORMIN) 25 MG tablet   3. Microalbuminuria R80.9 Albumin Random Urine Quantitative with Creat Ratio   4. Hyperlipidemia LDL goal <100 E78.5 LDL cholesterol direct     TSH with free T4 reflex   5. Postoperative hypothyroidism E89.0 CBC with platelets and differential   6. Asymptomatic postmenopausal status Z78.0 DEXA HIP/PELVIS/SPINE - Future   7. Screening for diabetic peripheral " neuropathy Z13.89 FOOT EXAM  NO CHARGE [69974.114]       Summary and implications:  Multiple issues.                Diabetes control likely not adequate.   I am reluctant to increase the metformin dose, given GI hx.                       Again consider GLP-1 agonist.                                                          Fatigue may be related to beta blocker.            Switch to atenolol.                Check TFT's and Hgb. Check B12 with metformin use.              Patient Instructions   I will let you know your lab results.   You are planning a physical exam with a Pap smear within the next few weeks.                  You are also planning a mammogram and a bone density.        Nathan Dhillon MD  [facility]          Results for orders placed or performed in visit on 06/25/18   HEMOGLOBIN A1C   Result Value Ref Range    Hemoglobin A1C 7.6 (H) 0 - 5.6 %   Comprehensive metabolic panel (BMP + Alb, Alk Phos, ALT, AST, Total. Bili, TP)   Result Value Ref Range    Sodium 140 133 - 144 mmol/L    Potassium 4.3 3.4 - 5.3 mmol/L    Chloride 106 94 - 109 mmol/L    Carbon Dioxide 21 20 - 32 mmol/L    Anion Gap 13 3 - 14 mmol/L    Glucose 164 (H) 70 - 99 mg/dL    Urea Nitrogen 14 7 - 30 mg/dL    Creatinine 0.89 0.52 - 1.04 mg/dL    GFR Estimate 64 >60 mL/min/1.7m2    GFR Estimate If Black 77 >60 mL/min/1.7m2    Calcium 9.6 8.5 - 10.1 mg/dL    Bilirubin Total 0.7 0.2 - 1.3 mg/dL    Albumin 4.0 3.4 - 5.0 g/dL    Protein Total 8.7 6.8 - 8.8 g/dL    Alkaline Phosphatase 112 40 - 150 U/L    ALT 15 0 - 50 U/L    AST 20 0 - 45 U/L   Albumin Random Urine Quantitative with Creat Ratio   Result Value Ref Range    Creatinine Urine 16 mg/dL    Albumin Urine mg/L 511 mg/L    Albumin Urine mg/g Cr 3213.84 (H) 0 - 25 mg/g Cr   LDL cholesterol direct   Result Value Ref Range    LDL Cholesterol Direct 62 <100 mg/dL   TSH with free T4 reflex   Result Value Ref Range    TSH 0.73 0.40 - 4.00 mU/L   CBC  with platelets and differential   Result Value Ref Range    WBC 9.2 4.0 - 11.0 10e9/L    RBC Count 4.45 3.8 - 5.2 10e12/L    Hemoglobin 14.1 11.7 - 15.7 g/dL    Hematocrit 42.5 35.0 - 47.0 %    MCV 96 78 - 100 fl    MCH 31.7 26.5 - 33.0 pg    MCHC 33.2 31.5 - 36.5 g/dL    RDW 12.6 10.0 - 15.0 %    Platelet Count 359 150 - 450 10e9/L    Diff Method Automated Method     % Neutrophils 64.4 %    % Lymphocytes 19.4 %    % Monocytes 11.6 %    % Eosinophils 4.0 %    % Basophils 0.6 %    Absolute Neutrophil 6.0 1.6 - 8.3 10e9/L    Absolute Lymphocytes 1.8 0.8 - 5.3 10e9/L    Absolute Monocytes 1.1 0.0 - 1.3 10e9/L    Absolute Eosinophils 0.4 0.0 - 0.7 10e9/L    Absolute Basophils 0.1 0.0 - 0.2 10e9/L   Vitamin B12   Result Value Ref Range    Vitamin B12 224 193 - 986 pg/mL                  6/26/18; 6:20 PM phone; YANNI FRANCISCO on 6/27/18.          Letter also sent.                                                       As per my phone message, you need to see a kidney specialist regarding the significant proteinuria that you now have.            You can see them in [place] at [phone], or in [place] at [phone]. Call for an appointment.   They use our computer system.                                                                    Your diabetes control is better; should be even better.       The A1C of 7.6 correlates to an average blood sugar of approximately 168.         Try to add a 3rd metformin tablet; take it with supper.                       Your vitamin B12 level is a bit low.  This is a very important vitamin for brain and nerve and bone marrow function.                 Please get some vitamin B12 and take 1,000 mcg every day and DO NOT STOP.        This is very important.           You do not need a prescription for this.

## 2018-06-25 NOTE — PATIENT INSTRUCTIONS
I will let you know your lab results.   You are planning a physical exam with a Pap smear within the next few weeks.                  You are also planning a mammogram and a bone density.

## 2018-06-25 NOTE — MR AVS SNAPSHOT
After Visit Summary   6/25/2018    Keerthi Montoya    MRN: [id number]           Patient Information     Date Of Birth          1950        Visit Information        Provider Department      6/25/2018 7:30 AM Nathan Dhillon MD [facility]        Today's Diagnoses     Type 2 diabetes mellitus without complication, without long-term current use of insulin (H)    -  1    Hypertension goal BP (blood pressure) < 130/80        Microalbuminuria        Hyperlipidemia LDL goal <100        Postoperative hypothyroidism        Asymptomatic postmenopausal status        Screening for diabetic peripheral neuropathy          Care Instructions    I will let you know your lab results.   You are planning a physical exam with a Pap smear within the next few weeks.                  You are also planning a mammogram and a bone density.            Follow-ups after your visit        Your next 10 appointments already scheduled     Sep 10, 2018  8:40 AM CDT   (Arrive by 8:25 AM)   Return Visit with Tejinder Leal MD   [facility] ([facility])    [address] [place] [id number] [phone]              Future tests that were ordered for you today     Open Future Orders        Priority Expected Expires Ordered    DEXA HIP/PELVIS/SPINE - Future Routine  6/25/2019 6/25/2018            Who to contact     If you have questions or need follow up information about today's clinic visit or your schedule please contact [facility] directly at [phone].  Normal or non-critical lab and imaging results will be communicated to you by MyChart, letter or phone within 4 business days after the clinic has received the results. If you do not hear from us within 7 days, please contact the clinic through MyChart or phone. If you have a critical or abnormal lab result, we will notify you by  "phone as soon as possible.  Submit refill requests through [employer] or call your pharmacy and they will forward the refill request to us. Please allow 3 business days for your refill to be completed.          Additional Information About Your Visit        [employer] Information     [employer] gives you secure access to your electronic health record. If you see a primary care provider, you can also send messages to your care team and make appointments. If you have questions, please call your primary care clinic.  If you do not have a primary care provider, please call [phone] and they will assist you.        Care EveryWhere ID     This is your Care EveryWhere ID. This could be used by other organizations to access your [facility] medical records  [id number]        Your Vitals Were     Pulse Temperature Respirations Height Pulse Oximetry Breastfeeding?    58 97.8  F (36.6  C) 20 5' 2\" (1.575 m) 100% No    BMI (Body Mass Index)                   29.26 kg/m2            Blood Pressure from Last 3 Encounters:   06/25/18 138/82   10/30/17 138/76   08/04/17 167/74    Weight from Last 3 Encounters:   06/25/18 160 lb (72.6 kg)   10/30/17 159 lb (72.1 kg)   08/04/17 152 lb (68.9 kg)              We Performed the Following     Albumin Random Urine Quantitative with Creat Ratio     CBC with platelets and differential     Comprehensive metabolic panel (BMP + Alb, Alk Phos, ALT, AST, Total. Bili, TP)     FOOT EXAM  NO CHARGE [45858.114]     HEMOGLOBIN A1C     LDL cholesterol direct     TSH with free T4 reflex     Vitamin B12          Today's Medication Changes          These changes are accurate as of 6/25/18  7:59 AM.  If you have any questions, ask your nurse or doctor.               Start taking these medicines.        Dose/Directions    atenolol 25 MG tablet   Commonly known as:  TENORMIN   Used for:  Hypertension goal BP (blood pressure) < 130/80   Started by:  Nathan Dhillon MD        Dose:  25 mg   Take 1 tablet (25 mg) by " mouth daily   Quantity:  90 tablet   Refills:  3            Where to get your medicines      These medications were sent to [id number] - [place], [place] - [address]  [address] [address]    Hours:  24-hours Phone:  [phone]     atenolol 25 MG tablet                Primary Care Provider Office Phone # Fax #    Nathan Dhillon [phone]       [address] [facility] [address]        Equal Access to Services     [facility]: Hadii aad ku hadasho Soomaali, waaxda luqadaha, qaybta kaalmada adeegyada, waxay idiin hayaan adeeg misbahararenay ramirez . So [facility] [phone].    ATENCIÓN: Si habla español, tiene a hobbs disposición servicios gratuitos de asistencia lingüística. [facility] [phone].    We comply with applicable federal civil rights laws and [place] laws. We do not discriminate on the basis of race, color, national origin, age, disability, sex, sexual orientation, or gender identity.            Thank you!     Thank you for choosing [facility]  for your care. Our goal is always to provide you with excellent care. Hearing back from our patients is one way we can continue to improve our services. Please take a few minutes to complete the written survey that you may receive in the mail after your visit with us. Thank you!             Your Updated Medication List - Protect others around you: Learn how to safely use, store and throw away your medicines at www.disposemymeds.org.          This list is accurate as of 6/25/18  7:59 AM.  Always use your most recent med list.                   Brand Name Dispense Instructions for use Diagnosis    amLODIPine 5 MG tablet    NORVASC    90 tablet    TAKE 1 TABLET BY MOUTH EVERY DAY    Hypertension goal BP (blood pressure) < 140/90       atenolol 25 MG tablet    TENORMIN    90 tablet    Take 1 tablet (25 mg) by mouth daily     Hypertension goal BP (blood pressure) < 130/80       atorvastatin 40 MG tablet    LIPITOR    90 tablet    TAKE 1 TABLET BY MOUTH DAILY    Hyperlipidemia LDL goal <100       * blood glucose monitoring test strip    ACCU-CHEK FELIPE PLUS    100 each    Pt tests 1x/day    Type 2 diabetes mellitus without complication, without long-term current use of insulin (H)       * ACCU-CHEK FELIPE PLUS test strip   Generic drug:  blood glucose monitoring     100 strip    TEST DAILY    Type 2 diabetes, HbA1c goal < 7% (H)       dulaglutide 0.75 MG/0.5ML pen    TRULICITY    2 mL    Inject 0.75 mg Subcutaneous every 7 days    Type 2 diabetes mellitus without complication, without long-term current use of insulin (H)       glipiZIDE 10 MG 24 hr tablet    GLUCOTROL XL    180 tablet    Take 1 tablet (10 mg) by mouth 2 times daily    Type 2 diabetes mellitus without complication, without long-term current use of insulin (H)       levothyroxine 125 MCG tablet    SYNTHROID/LEVOTHROID    90 tablet    TAKE 1 TABLET BY MOUTH DAILY    Postoperative hypothyroidism       losartan 100 MG tablet    COZAAR    90 tablet    Take 1 tablet (100 mg) by mouth daily    Hypertension goal BP (blood pressure) < 140/90       mesalamine 0.375 g Cp24 24 hr capsule    APRISO ER    120 capsule    Take 4 capsules (1.5 g) by mouth every morning    Ulcerative rectosigmoiditis with rectal bleeding (H)       metFORMIN 500 MG tablet    GLUCOPHAGE    180 tablet    Take 1 tablet (500 mg) by mouth 2 times daily (with meals)    Type 2 diabetes mellitus without complication, without long-term current use of insulin (H)       metoprolol tartrate 25 MG tablet    LOPRESSOR    180 tablet    Take 1 tablet (25 mg) by mouth 2 times daily    Hypertension goal BP (blood pressure) < 140/90       * Notice:  This list has 2 medication(s) that are the same as other medications prescribed for you. Read the directions carefully, and ask your doctor or other care provider to review them  with you.

## 2018-06-25 NOTE — LETTER
June 27, 2018      Keerthi Montoya  [address] [place] [id number]        Dear ,    We are writing to inform you of your test results.           As per my phone message, you need to see a kidney specialist regarding the significant proteinuria that you now have.     You can see them in [place] at [phone], or in [place] at [phone]. I have sent referrals to them.            Call for an appointment.   They use our computer system,so that all of your medical and lab information is available to them.             Your diabetes control is better; should be even better.       The A1C of 7.6 correlates to an average blood sugar of approximately 168.         Try to add a 3rd metformin tablet; take it with supper.                                                                                                                                                   Your vitamin B12 level is a bit low.  This is a very important vitamin for brain and nerve and bone marrow function.                 Please get some vitamin B12 and take 1,000 mcg every day and DO NOT STOP.        This is very important.           You do not need a prescription for this.          Your lab results are either good, or else stable, including the liver,bone marrow,cholesterol,and thyroid results.           Resulted Orders   HEMOGLOBIN A1C   Result Value Ref Range    Hemoglobin A1C 7.6 (H) 0 - 5.6 %      Comment:      Normal <5.7% Prediabetes 5.7-6.4%  Diabetes 6.5% or higher - adopted from ADA   consensus guidelines.     Comprehensive metabolic panel (BMP + Alb, Alk Phos, ALT, AST, Total. Bili, TP)   Result Value Ref Range    Sodium 140 133 - 144 mmol/L    Potassium 4.3 3.4 - 5.3 mmol/L    Chloride 106 94 - 109 mmol/L    Carbon Dioxide 21 20 - 32 mmol/L    Anion Gap 13 3 - 14 mmol/L    Glucose 164 (H) 70 - 99 mg/dL      Comment:      Fasting specimen    Urea Nitrogen 14 7 - 30 mg/dL    Creatinine 0.89 0.52 - 1.04 mg/dL     GFR Estimate 64 >60 mL/min/1.7m2      Comment:      Non  GFR Calc    GFR Estimate If Black 77 >60 mL/min/1.7m2      Comment:       GFR Calc    Calcium 9.6 8.5 - 10.1 mg/dL    Bilirubin Total 0.7 0.2 - 1.3 mg/dL    Albumin 4.0 3.4 - 5.0 g/dL    Protein Total 8.7 6.8 - 8.8 g/dL    Alkaline Phosphatase 112 40 - 150 U/L    ALT 15 0 - 50 U/L    AST 20 0 - 45 U/L   Albumin Random Urine Quantitative with Creat Ratio   Result Value Ref Range    Creatinine Urine 16 mg/dL    Albumin Urine mg/L 511 mg/L    Albumin Urine mg/g Cr 3213.84 (H) 0 - 25 mg/g Cr   LDL cholesterol direct   Result Value Ref Range    LDL Cholesterol Direct 62 <100 mg/dL      Comment:      Desirable:       <100 mg/dl   TSH with free T4 reflex   Result Value Ref Range    TSH 0.73 0.40 - 4.00 mU/L   CBC with platelets and differential   Result Value Ref Range    WBC 9.2 4.0 - 11.0 10e9/L    RBC Count 4.45 3.8 - 5.2 10e12/L    Hemoglobin 14.1 11.7 - 15.7 g/dL    Hematocrit 42.5 35.0 - 47.0 %    MCV 96 78 - 100 fl    MCH 31.7 26.5 - 33.0 pg    MCHC 33.2 31.5 - 36.5 g/dL    RDW 12.6 10.0 - 15.0 %    Platelet Count 359 150 - 450 10e9/L    Diff Method Automated Method     % Neutrophils 64.4 %    % Lymphocytes 19.4 %    % Monocytes 11.6 %    % Eosinophils 4.0 %    % Basophils 0.6 %    Absolute Neutrophil 6.0 1.6 - 8.3 10e9/L    Absolute Lymphocytes 1.8 0.8 - 5.3 10e9/L    Absolute Monocytes 1.1 0.0 - 1.3 10e9/L    Absolute Eosinophils 0.4 0.0 - 0.7 10e9/L    Absolute Basophils 0.1 0.0 - 0.2 10e9/L   Vitamin B12   Result Value Ref Range    Vitamin B12 224 193 - 986 pg/mL       If you have any questions or concerns, please call the clinic at the number listed above.       Sincerely,        Nathan Dhillon MD

## 2018-06-26 RX ORDER — CYANOCOBALAMIN (VITAMIN B-12) 1000 MCG
TABLET ORAL
COMMUNITY
Start: 2018-06-26 | End: 2019-01-29

## 2018-07-02 ENCOUNTER — TELEPHONE (OUTPATIENT)
Dept: FAMILY MEDICINE | Facility: CLINIC | Age: 68
End: 2018-07-02

## 2018-07-02 NOTE — TELEPHONE ENCOUNTER
Reason for Call:  Form, our goal is to have forms completed with 72 hours, however, some forms may require a visit or additional information.    Type of letter, form or note:  medical    Who is the form from?: Home care    Where did the form come from: form was faxed in    What clinic location was the form placed at?: [facility]    Where the form was placed: 's Box: Nathan Dhillon MD    What number is listed as a contact on the form?: [phone]  Phone [phone]       Additional comments: [facility] diabetic form  test strips    Call taken on 7/2/2018 at 4:09 PM by Oralia Connelly

## 2018-07-13 ENCOUNTER — RADIANT APPOINTMENT (OUTPATIENT)
Dept: BONE DENSITY | Facility: CLINIC | Age: 68
End: 2018-07-13
Attending: INTERNAL MEDICINE
Payer: MEDICARE

## 2018-07-13 DIAGNOSIS — Z78.0 ASYMPTOMATIC POSTMENOPAUSAL STATUS: ICD-10-CM

## 2018-07-16 ENCOUNTER — RADIANT APPOINTMENT (OUTPATIENT)
Dept: MAMMOGRAPHY | Facility: CLINIC | Age: 68
End: 2018-07-16
Attending: INTERNAL MEDICINE
Payer: MEDICARE

## 2018-07-16 DIAGNOSIS — Z12.31 VISIT FOR SCREENING MAMMOGRAM: ICD-10-CM

## 2018-07-16 PROCEDURE — 77067 SCR MAMMO BI INCL CAD: CPT

## 2018-07-20 DIAGNOSIS — E11.9 TYPE 2 DIABETES MELLITUS WITHOUT COMPLICATION, WITHOUT LONG-TERM CURRENT USE OF INSULIN (H): ICD-10-CM

## 2018-07-20 NOTE — TELEPHONE ENCOUNTER
Requested Prescriptions   Pending Prescriptions Disp Refills     blood glucose monitoring (ACCU-CHEK FELIPE PLUS) test strip  Last Written Prescription Date:  12/13/2017  Last Fill Quantity: 100 strip,  # refills: 1   Last Office Visit: 6/25/2018   Future Office Visit:    Next 5 appointments (look out 90 days)     Jul 30, 2018  7:30 AM CDT   PHYSICAL with Nathan Dhillon MD   [facility]    [address] [place] [id number] [phone]                100 each 1     Sig: Pt tests 1x/day    There is no refill protocol information for this order

## 2018-07-24 ENCOUNTER — TELEPHONE (OUTPATIENT)
Dept: FAMILY MEDICINE | Facility: CLINIC | Age: 68
End: 2018-07-24

## 2018-07-30 ENCOUNTER — OFFICE VISIT (OUTPATIENT)
Dept: FAMILY MEDICINE | Facility: CLINIC | Age: 68
End: 2018-07-30
Payer: MEDICARE

## 2018-07-30 VITALS
SYSTOLIC BLOOD PRESSURE: 122 MMHG | RESPIRATION RATE: 20 BRPM | WEIGHT: 156 LBS | OXYGEN SATURATION: 97 % | HEIGHT: 62 IN | BODY MASS INDEX: 28.71 KG/M2 | DIASTOLIC BLOOD PRESSURE: 70 MMHG | TEMPERATURE: 98.8 F | HEART RATE: 72 BPM

## 2018-07-30 DIAGNOSIS — Z00.00 ROUTINE GENERAL MEDICAL EXAMINATION AT A HEALTH CARE FACILITY: Primary | ICD-10-CM

## 2018-07-30 DIAGNOSIS — R80.9 TYPE 2 DIABETES MELLITUS WITH MICROALBUMINURIA, WITHOUT LONG-TERM CURRENT USE OF INSULIN (H): ICD-10-CM

## 2018-07-30 DIAGNOSIS — E11.29 TYPE 2 DIABETES MELLITUS WITH MICROALBUMINURIA, WITHOUT LONG-TERM CURRENT USE OF INSULIN (H): ICD-10-CM

## 2018-07-30 DIAGNOSIS — I10 HYPERTENSION GOAL BP (BLOOD PRESSURE) < 130/80: ICD-10-CM

## 2018-07-30 DIAGNOSIS — Z23 NEED FOR PNEUMOCOCCAL VACCINATION: ICD-10-CM

## 2018-07-30 DIAGNOSIS — Z12.4 SCREENING FOR MALIGNANT NEOPLASM OF CERVIX: ICD-10-CM

## 2018-07-30 LAB
CREAT UR-MCNC: 25 MG/DL
MICROALBUMIN UR-MCNC: 442 MG/L
MICROALBUMIN/CREAT UR: 1768 MG/G CR (ref 0–25)

## 2018-07-30 PROCEDURE — G0145 SCR C/V CYTO,THINLAYER,RESCR: HCPCS | Performed by: INTERNAL MEDICINE

## 2018-07-30 PROCEDURE — G0009 ADMIN PNEUMOCOCCAL VACCINE: HCPCS | Performed by: INTERNAL MEDICINE

## 2018-07-30 PROCEDURE — G0476 HPV COMBO ASSAY CA SCREEN: HCPCS | Performed by: INTERNAL MEDICINE

## 2018-07-30 PROCEDURE — G0438 PPPS, INITIAL VISIT: HCPCS | Performed by: INTERNAL MEDICINE

## 2018-07-30 PROCEDURE — G0123 SCREEN CERV/VAG THIN LAYER: HCPCS | Performed by: INTERNAL MEDICINE

## 2018-07-30 PROCEDURE — 82043 UR ALBUMIN QUANTITATIVE: CPT | Performed by: INTERNAL MEDICINE

## 2018-07-30 PROCEDURE — 90732 PPSV23 VACC 2 YRS+ SUBQ/IM: CPT | Performed by: INTERNAL MEDICINE

## 2018-07-30 ASSESSMENT — ACTIVITIES OF DAILY LIVING (ADL)
CURRENT_FUNCTION: NO ASSISTANCE NEEDED
I_NEED_ASSISTANCE_FOR_THE_FOLLOWING_DAILY_ACTIVITIES:: NO ASSISTANCE IS NEEDED

## 2018-07-30 NOTE — PROGRESS NOTES
SUBJECTIVE:   Keerthi Montoya is a 68 year old female who presents for Preventive Visit.      Are you in the first 12 months of your Medicare coverage?  No    Physical   Annual:     Getting at least 3 servings of Calcium per day:  Yes    Bi-annual eye exam:  Yes    Dental care twice a year:  Yes    Sleep apnea or symptoms of sleep apnea:  None    Diet:  Diabetic    Frequency of exercise:  2-3 days/week    Duration of exercise:  15-30 minutes    Taking medications regularly:  Yes    Medication side effects:  None    Additional concerns today:  No    Ability to successfully perform activities of daily living: no assistance needed    Home Safety:  No safety concerns identified    Hearing Impairment: no hearing concerns        Fall risk:     No falls/no injuriesd dueto within l;ast year  COGNITIVE SCREEN  1) Repeat 3 items (Leader, Season, Table)    2) Clock draw: NORMAL  3) 3 item recall: Recalls 3 objects  Results: 3 items recalled: COGNITIVE IMPAIRMENT LESS LIKELY    Mini-CogTM Copyright S [employer]ule[employer]. Licensed by the author for use in [facility]; reprinted with permission (javid@[facility]). All rights reserved.        Reviewed and updated as needed this visit by clinical staff  Allergies         Reviewed and updated as needed this visit by Provider            Alcohol Use 7/30/2018   If you drink alcohol do you typically have greater than 3 drinks per day OR greater than 7 drinks per week? No                     She has an appointment with GI September 10, and with nephrology September 11.                She has increased her metformin to 1000 mg twice daily.        Glucose was 85 today.                                   She is taking vitamin B12.                  Overall, her energy level is better since she stopped taking metoprolol and resumed low-dose atenolol.                                                     Today's PHQ-2 Score:   PHQ-2 ( 1999 Pf[employer]) 7/30/2018   Q1: Little interest or pleasure in  "doing things 0   Q2: Feeling down, depressed or hopeless 0   PHQ-2 Score 0   Q1: Little interest or pleasure in doing things Not at all   Q2: Feeling down, depressed or hopeless Not at all   PHQ-2 Score 0       Do you feel safe in your environment - Yes    Do you have a Health Care Directive?: Yes: Patient states has Advance Directive and will bring in a copy to clinic.    Current providers sharing in care for this patient include:   Patient Care Team:  Nathan Dhillon MD as PCP - General (Internal Medicine)    The following health maintenance items are reviewed in Epic and correct as of today:  Health Maintenance   Topic Date Due     EYE EXAM Q1 YEAR  02/23/1951     HEPATITIS C SCREENING  02/23/1970     ADVANCE DIRECTIVE PLANNING Q5 YRS  02/23/2007     FALL RISK ASSESSMENT  02/23/2017     PNEUMOCOCCAL (2 of 2 - PPSV23) 02/23/2017     PAP Q3 YR  06/22/2018     INFLUENZA VACCINE (1) 09/01/2018     A1C Q6 MO  12/25/2018     FOOT EXAM Q1 YEAR  06/25/2019     CREATININE Q1 YEAR  06/25/2019     LIPID MONITORING Q1 YEAR  06/25/2019     MICROALBUMIN Q1 YEAR  06/25/2019     PHQ-2 Q1 YR  06/25/2019     TSH W/ FREE T4 REFLEX Q2 YEAR  06/25/2020     MAMMO SCREEN Q2 YR (SYSTEM ASSIGNED)  07/16/2020     TETANUS IMMUNIZATION (SYSTEM ASSIGNED)  07/26/2023     COLON CANCER SCREEN (SYSTEM ASSIGNED)  12/12/2027     DEXA SCAN SCREENING (SYSTEM ASSIGNED)  Completed     Patient Active Problem List    Diagnosis Date Noted     Type 2 diabetes mellitus without complication, without long-term current use of insulin (H) 10/30/2017     Priority: High     Ulcerative rectosigmoiditis with rectal bleeding (H) 05/15/2017     Priority: High     Colitis 09/10/2015     Priority: High     Colonoscopy 8/15:  \"mildly active chronic colitis\" on bx of sigmoid and rectum; Dx?;                    In 4/17, \"colitis\" rectum and distal sigmoid; path pending          Type 2 diabetes mellitus without complication (H) 07/26/2013     Priority: High     Does not " "tolerate metformin or pioglitazone       Hyperlipidemia LDL goal <100 07/26/2013     Priority: High      in 7/13; not taking prava 40 regularly       Type 2 diabetes mellitus with microalbuminuria, without long-term current use of insulin (H) 07/30/2018     Priority: Medium     Hypertension goal BP (blood pressure) < 130/80 06/25/2018     Priority: Medium     Postoperative hypothyroidism 05/15/2017     Priority: Medium     Rectal bleeding 07/27/2015     Priority: Medium     Abdominal pain, left lower quadrant 06/28/2015     Priority: Medium     CT in 6/15 diverticulosis, no diverticulitis.         \"prominent lymph nodes of uncertain etiology\"; 6 month f/u CT suggested by Radiology             Aspirin not indicated 05/18/2015     Priority: Medium     Not tolerated by pt; GI distress       Diverticulosis of large intestine 10/01/2012     Priority: Medium     Colonoscopy 8/12; tics only, no strictures.            CT shows 2.6 cm R ovarian cyst.         Pt has hx of ovarian cysts on CT and US in 2006. In 1/07, she saw Dr. Stiven Griffin re: a 3 cm L ovarian cyst; CA-125 nml at 18 ( his 1/07 letter describes a L ovarian cyst; pt recalls this was R sided, like the current CT states)                     CT in 6/15 diverticulosis no diverticulitis; \"some increased number and mildly prominent nodes within the small bowel mesentery and posterior pelvis of ? Significance; f/u CT 3-6 months advised.               Problem list name updated by automated process. Provider to review       Vitamin D deficiency 10/01/2012     Priority: Medium     Problem list name updated by automated process. Provider to review       Hypercalcemia 10/01/2012     Priority: Medium     Preventive measure 07/26/2013     Priority: Low     APRIMA DATA BASE UNDER THE 7/26/13 NOTE  Colonoscopy 8/12; 8/15; 4/17, 12/17  Pap 2/10,6/15;                                Mammogram 1/14, 6/16, 7/17  DXA 2005; all scores >+1 ;                  in 7/18, spine " is +, fem necks are 0       Past Surgical History:   Procedure Laterality Date     CHOLECYSTECTOMY  3/1987     COLON SURGERY  2001    Left colon resection; diverticulitis     COLONOSCOPY N/A 2017    Procedure: COMBINED COLONOSCOPY, SINGLE OR MULTIPLE BIOPSY/POLYPECTOMY BY BIOPSY;;  Surgeon: Radha Salguero MD;  Location: [facility] OR     COLONOSCOPY WITH CO2 INSUFFLATION N/A 2017    Procedure: COLONOSCOPY WITH CO2 INSUFFLATION;  Colonoscopy Dx rectal bleeding, BMI 25.86, Pharm [facility] .[phone], ;  Surgeon: Radha Salguero MD;  Location: [facility] OR     GYN SURGERY  1983    tubal ligation     HERNIA REPAIR  2006    recurrent incisional hernia     THROAT SURGERY  1974    thyroidectomy     Social History     Social History     Marital status:      Spouse name: Raymundo; dementia;  16     Number of children: 3     Years of education: N/A     Occupational History      [employer]     Social History Main Topics     Smoking status: Never Smoker     Smokeless tobacco: Never Used     Alcohol use Yes      Comment: ocasionally     Drug use: No     Sexual activity: No     Other Topics Concern      Service No     Blood Transfusions No     Caffeine Concern No     Occupational Exposure No     Hobby Hazards No     Sleep Concern No     Stress Concern No     Weight Concern No     Special Diet No     Back Care No     Exercise Yes     off and on     Bike Helmet No     Seat Belt Yes     Self-Exams No     Social History Narrative    Laid off her job      Immunization History   Administered Date(s) Administered     Pneumo Conj 13-V (&after) 2015     TDAP Vaccine (Adacel) 2013     Tetanus 2000       BP Readings from Last 3 Encounters:   18 122/70   18 138/82   10/30/17 138/76    Wt Readings from Last 3 Encounters:   18 156 lb (70.8 kg)   18 160 lb (72.6 kg)   10/30/17 159 lb (72.1 kg)                  Current Outpatient Prescriptions    Medication Sig Dispense Refill     ACCU-CHEK FELIPE PLUS test strip TEST DAILY 100 strip 1     amLODIPine (NORVASC) 5 MG tablet TAKE 1 TABLET BY MOUTH EVERY DAY 90 tablet 3     atenolol (TENORMIN) 25 MG tablet Take 1 tablet (25 mg) by mouth daily 90 tablet 3     atorvastatin (LIPITOR) 40 MG tablet TAKE 1 TABLET BY MOUTH DAILY 90 tablet 2     blood glucose monitoring (ACCU-CHEK FELIPE PLUS) test strip Pt tests 1x/day 100 each 1     Cyanocobalamin (B-12) 1000 MCG TABS        glipiZIDE (GLUCOTROL XL) 10 MG 24 hr tablet Take 1 tablet (10 mg) by mouth 2 times daily 180 tablet 3     levothyroxine (SYNTHROID/LEVOTHROID) 125 MCG tablet TAKE 1 TABLET BY MOUTH DAILY 90 tablet 2     losartan (COZAAR) 100 MG tablet Take 1 tablet (100 mg) by mouth daily 90 tablet 3     mesalamine (APRISO ER) 0.375 g CP24 24 hr capsule Take 4 capsules (1.5 g) by mouth every morning 120 capsule 4     metFORMIN (GLUCOPHAGE) 500 MG tablet Take 1 tablet (500 mg) by mouth 2 times daily (with meals) 180 tablet 3     metoprolol (LOPRESSOR) 25 MG tablet Take 1 tablet (25 mg) by mouth 2 times daily 180 tablet 3     dulaglutide (TRULICITY) 0.75 MG/0.5ML pen Inject 0.75 mg Subcutaneous every 7 days (Patient not taking: Reported on 7/30/2018) 2 mL 11     Allergies   Allergen Reactions     Actos [Pioglitazone]      Fluid retention     Animal Dander      Doxycycline Hives     Dust Mites      Grass      Keflex [Cephalexin Hcl] Hives     Metformin Diarrhea     At 1000mg     Other [Seasonal Allergies]      pollen     Synthroid [Levothroid] Swelling     ???     Tetracycline Hives     Tylenol Hives     Ziac [Bisoprolol-Hydrochlorothiazide]            Review of Systems  CONSTITUTIONAL: NEGATIVE for fever, chills, change in weight  INTEGUMENTARY/SKIN: NEGATIVE for worrisome rashes, moles or lesions  EYES: NEGATIVE for vision changes or irritation  ENT/MOUTH: NEGATIVE for ear, mouth and throat problems  RESP: NEGATIVE for significant cough or SOB  BREAST: NEGATIVE for  "masses, tenderness or discharge  CV: NEGATIVE for chest pain, palpitations or peripheral edema  GI: NEGATIVE for nausea, abdominal pain, heartburn, or change in bowel habits  : NEGATIVE for frequency, dysuria, or hematuria  MUSCULOSKELETAL: NEGATIVE for significant arthralgias or myalgia  NEURO: NEGATIVE for weakness, dizziness or paresthesias  ENDOCRINE: NEGATIVE for temperature intolerance, skin/hair changes  HEME: NEGATIVE for bleeding problems  PSYCHIATRIC: NEGATIVE for changes in mood or affect    OBJECTIVE:   /70 (BP Location: Left arm, Patient Position: Chair, Cuff Size: Adult Regular)  Pulse 72  Temp 98.8  F (37.1  C)  Resp 20  Ht 5' 2\" (1.575 m)  Wt 156 lb (70.8 kg)  SpO2 97%  Breastfeeding? No  BMI 28.53 kg/m2 Estimated body mass index is 29.26 kg/(m^2) as calculated from the following:    Height as of 6/25/18: 5' 2\" (1.575 m).    Weight as of 6/25/18: 160 lb (72.6 kg).  Physical Exam  GENERAL APPEARANCE: healthy, alert and no distress  EYES: Eyes grossly normal to inspection, PERRL and conjunctivae and sclerae normal  HENT: ear canals and TM's normal, nose and mouth without ulcers or lesions, oropharynx clear and oral mucous membranes moist  NECK: no adenopathy, no asymmetry, masses, or scars and thyroid normal to palpation  RESP: lungs clear to auscultation - no rales, rhonchi or wheezes  BREAST: normal without masses, tenderness or nipple discharge and no palpable axillary masses or adenopathy  CV: regular rate and rhythm, normal S1 S2, no S3 or S4, no murmur, click or rub, no peripheral edema and peripheral pulses strong  ABDOMEN: soft, nontender, no hepatosplenomegaly, no masses and bowel sounds normal   (female): normal female external genitalia, normal urethral meatus, vaginal mucosal atrophy noted, normal cervix, adnexae, and uterus without masses or abnormal discharge  MS: no musculoskeletal defects are noted and gait is age appropriate without ataxia  SKIN: no suspicious lesions " or rashes  NEURO: Normal strength and tone, sensory exam grossly normal, mentation intact and speech normal  PSYCH: mentation appears normal and affect normal/bright    Diagnostic Test Results:  Pending    ASSESSMENT / PLAN:   [place] was seen today for physical.    Diagnoses and all orders for this visit:    Routine general medical examination at a health care facility    Type 2 diabetes mellitus with microalbuminuria, without long-term current use of insulin (H)  -     Albumin Random Urine Quantitative with Creat Ratio  -     metFORMIN (GLUCOPHAGE) 500 MG tablet; Take 2 tablets (1,000 mg) by mouth 2 times daily (with meals)    Hypertension goal BP (blood pressure) < 130/80    Need for pneumococcal vaccination    Screening for malignant neoplasm of cervix  -     Pap imaged thin layer screen with HPV - recommended age 30 - 65 years (select HPV order below)                      Summary and implications:       Diabetes control is improving.           Some risk of hypoglycemia with glipizide use.             BP controlled.              Almost up to date now on HCM.             GI status very stable; even able to tolerate full strength metformin.                    We will recheck microalbumin level today.              Question for renal is whether this is solely diabetic nephropathy, or whether there is some other renal disease present.                        PATIENT INSTRUCTIONS:                Please see me in 3 months, or earlier if needed.                                              You should cut back the glipizide if your glucoses get lower; then just take the morning dose.                                         Consider getting the shingles vaccine (Shingrix) at your pharmacy.   End of Life Planning:  Patient currently has an advanced directive: Yes.  Practitioner is supportive of decision.    COUNSELING:  Reviewed preventive health counseling, as reflected in patient instructions  Special attention given  "to:       Regular exercise       Healthy diet/nutrition    BP Readings from Last 1 Encounters:   06/25/18 138/82     Estimated body mass index is 29.26 kg/(m^2) as calculated from the following:    Height as of 6/25/18: 5' 2\" (1.575 m).    Weight as of 6/25/18: 160 lb (72.6 kg).           reports that she has never smoked. She has never used smokeless tobacco.      Appropriate preventive services were discussed with this patient, including applicable screening as appropriate for cardiovascular disease, diabetes, osteopenia/osteoporosis, and glaucoma.  As appropriate for age/gender, discussed screening for colorectal cancer, prostate cancer, breast cancer, and cervical cancer. Checklist reviewing preventive services available has been given to the patient.    Reviewed patients plan of care and provided an AVS. The Basic Care Plan (routine screening as documented in Health Maintenance) for [place] meets the Care Plan requirement. This Care Plan has been established and reviewed with the Patient.    Counseling Resources:  ATP IV Guidelines  Pooled Cohorts Equation Calculator  Breast Cancer Risk Calculator  FRAX Risk Assessment  ICSI Preventive Guidelines  Dietary Guidelines for Americans, 2010  [employer] MyPlate  ASA Prophylaxis  Lung CA Screening    Nathan Dhillon MD  [facility]  Answers for HPI/ROS submitted by the patient on 7/30/2018   PHQ-2 Score: 0     Results for orders placed or performed in visit on 07/30/18   Albumin Random Urine Quantitative with Creat Ratio   Result Value Ref Range    Creatinine Urine 25 mg/dL    Albumin Urine mg/L 442 mg/L    Albumin Urine mg/g Cr 1768.00 (H) 0 - 25 mg/g Cr     My chart message sent.             Less urine protein, but still too much.   "

## 2018-07-30 NOTE — MR AVS SNAPSHOT
After Visit Summary   7/30/2018    Keerthi Montoya    MRN: [id number]           Patient Information     Date Of Birth          1950        Visit Information        Provider Department      7/30/2018 7:30 AM Nathan Dhillon MD [facility]        Today's Diagnoses     Routine general medical examination at a health care facility    -  1    Type 2 diabetes mellitus with microalbuminuria, without long-term current use of insulin (H)        Hypertension goal BP (blood pressure) < 130/80        Need for pneumococcal vaccination        Screening for malignant neoplasm of cervix          Care Instructions      Please see me in 3 months, or earlier if needed.                                              You should cut back the glipizide if your glucoses get lower; then just take the morning dose.                                         Consider getting the shingles vaccine (Shingrix) at your pharmacy.           Follow-ups after your visit        Your next 10 appointments already scheduled     Sep 10, 2018  8:40 AM CDT   (Arrive by 8:25 AM)   Return Visit with Tejinder Leal MD   [facility] ([facility])    [address] [place] [id number] [phone]            Sep 11, 2018  4:00 PM CDT   New Visit with Swati Minor MD   [facility] ([facility])    [id number] [place] [id number] [phone]              Who to contact     If you have questions or need follow up information about today's clinic visit or your schedule please contact [facility] directly at [phone].  Normal or non-critical lab and imaging results will be communicated to you by MyChart, letter or phone within 4 business days after the clinic has received the results. If you do not hear from us within 7 days, please contact the  "clinic through [employer] or phone. If you have a critical or abnormal lab result, we will notify you by phone as soon as possible.  Submit refill requests through [employer] or call your pharmacy and they will forward the refill request to us. Please allow 3 business days for your refill to be completed.          Additional Information About Your Visit        [employer] Information     [employer] gives you secure access to your electronic health record. If you see a primary care provider, you can also send messages to your care team and make appointments. If you have questions, please call your primary care clinic.  If you do not have a primary care provider, please call [phone] and they will assist you.        Care EveryWhere ID     This is your Care EveryWhere ID. This could be used by other organizations to access your [facility] medical records  [id number]        Your Vitals Were     Pulse Temperature Respirations Height Pulse Oximetry Breastfeeding?    72 98.8  F (37.1  C) 20 5' 2\" (1.575 m) 97% No    BMI (Body Mass Index)                   28.53 kg/m2            Blood Pressure from Last 3 Encounters:   07/30/18 122/70   06/25/18 138/82   10/30/17 138/76    Weight from Last 3 Encounters:   07/30/18 156 lb (70.8 kg)   06/25/18 160 lb (72.6 kg)   10/30/17 159 lb (72.1 kg)              We Performed the Following     Albumin Random Urine Quantitative with Creat Ratio     Pap imaged thin layer screen with HPV - recommended age 30 - 65 years (select HPV order below)          Today's Medication Changes          These changes are accurate as of 7/30/18  8:08 AM.  If you have any questions, ask your nurse or doctor.               These medicines have changed or have updated prescriptions.        Dose/Directions    metFORMIN 500 MG tablet   Commonly known as:  GLUCOPHAGE   This may have changed:  how much to take   Used for:  Type 2 diabetes mellitus with microalbuminuria, without long-term current use of insulin (H)   Changed " by:  Nathan Dhillon MD        Dose:  1000 mg   Take 2 tablets (1,000 mg) by mouth 2 times daily (with meals)   Quantity:  360 tablet   Refills:  3         Stop taking these medicines if you haven't already. Please contact your care team if you have questions.     dulaglutide 0.75 MG/0.5ML pen   Commonly known as:  TRULICITY   Stopped by:  Nathan Dhillon MD           metoprolol tartrate 25 MG tablet   Commonly known as:  LOPRESSOR   Stopped by:  Nathan Dhillon MD                Where to get your medicines      These medications were sent to [facility] [address] [facility] [address] [place] [address]  [address] [facility] [address]    Hours:  24-hours Phone:  [phone]     metFORMIN 500 MG tablet                Primary Care Provider Office Phone # Fax #    Nathan Dhillon [phone] [address] [facility] [address]        Equal Access to Services     [facility]: Hadii aad ku hadasho Soomaali, waaxda luqadaha, qaybta kaalmada adeegyada, waxay idiin hayaaangy ramirez . So [facility] [phone].    ATENCIÓN: Si habla español, tiene a hobbs disposición servicios gratuitos de asistencia lingüística. [facility] [phone].    We comply with applicable federal civil rights laws and [place] laws. We do not discriminate on the basis of race, color, national origin, age, disability, sex, sexual orientation, or gender identity.            Thank you!     Thank you for choosing [facility]  for your care. Our goal is always to provide you with excellent care. Hearing back from our patients is one way we can continue to improve our services. Please take a few minutes to complete the written survey that you may receive in the mail after your visit with us. Thank you!             Your Updated Medication List - Protect others around you: Learn how to safely use, store and throw away your  medicines at www.disposemymeds.org.          This list is accurate as of 7/30/18  8:08 AM.  Always use your most recent med list.                   Brand Name Dispense Instructions for use Diagnosis    * ACCU-CHEK FELIPE PLUS test strip   Generic drug:  blood glucose monitoring     100 strip    TEST DAILY    Type 2 diabetes, HbA1c goal < 7% (H)       * blood glucose monitoring test strip    ACCU-CHEK FELIPE PLUS    100 each    Pt tests 1x/day    Type 2 diabetes mellitus without complication, without long-term current use of insulin (H)       amLODIPine 5 MG tablet    NORVASC    90 tablet    TAKE 1 TABLET BY MOUTH EVERY DAY    Hypertension goal BP (blood pressure) < 140/90       atenolol 25 MG tablet    TENORMIN    90 tablet    Take 1 tablet (25 mg) by mouth daily    Hypertension goal BP (blood pressure) < 130/80       atorvastatin 40 MG tablet    LIPITOR    90 tablet    TAKE 1 TABLET BY MOUTH DAILY    Hyperlipidemia LDL goal <100       B-12 1000 MCG Tabs           glipiZIDE 10 MG 24 hr tablet    GLUCOTROL XL    180 tablet    Take 1 tablet (10 mg) by mouth 2 times daily    Type 2 diabetes mellitus without complication, without long-term current use of insulin (H)       levothyroxine 125 MCG tablet    SYNTHROID/LEVOTHROID    90 tablet    TAKE 1 TABLET BY MOUTH DAILY    Postoperative hypothyroidism       losartan 100 MG tablet    COZAAR    90 tablet    Take 1 tablet (100 mg) by mouth daily    Hypertension goal BP (blood pressure) < 140/90       mesalamine 0.375 g Cp24 24 hr capsule    APRISO ER    120 capsule    Take 4 capsules (1.5 g) by mouth every morning    Ulcerative rectosigmoiditis with rectal bleeding (H)       metFORMIN 500 MG tablet    GLUCOPHAGE    360 tablet    Take 2 tablets (1,000 mg) by mouth 2 times daily (with meals)    Type 2 diabetes mellitus with microalbuminuria, without long-term current use of insulin (H)       * Notice:  This list has 2 medication(s) that are the same as other medications  prescribed for you. Read the directions carefully, and ask your doctor or other care provider to review them with you.

## 2018-07-30 NOTE — LETTER
August 8, 2018      Keerthi Montoya  [address] [place] [id number]    Dear ,      I am happy to inform you that your cervical cancer screening test (PAP smear) was normal and your Human Papillomavirus (HPV) test was negative.    Per current guidelines, you no longer need to have pap smears completed.     Please continue to be seen every year for an annual wellness visit and other preventative tests.     Please contact my office at [phone] if you have further questions.    Sincerely,      Nathan Dhillon MD/[facility]

## 2018-07-30 NOTE — PATIENT INSTRUCTIONS
Please see me in 3 months, or earlier if needed.                                              You should cut back the glipizide if your glucoses get lower; then just take the morning dose.                                         Consider getting the shingles vaccine (Shingrix) at your pharmacy.

## 2018-07-30 NOTE — NURSING NOTE
Prior to injection verified patient identity using patient's name and date of birth.  Due to injection administration, patient instructed to remain in clinic for 15 minutes  afterwards, and to report any adverse reaction to me immediately.  Screening Questionnaire for Adult Immunization    Are you sick today?   No   Do you have allergies to medications, food, a vaccine component or latex?   No   Have you ever had a serious reaction after receiving a vaccination?   No   Do you have a long-term health problem with heart disease, lung disease, asthma, kidney disease, metabolic disease (e.g. diabetes), anemia, or other blood disorder?   No   Do you have cancer, leukemia, HIV/AIDS, or any other immune system problem?   No   In the past 3 months, have you taken medications that affect  your immune system, such as prednisone, other steroids, or anticancer drugs; drugs for the treatment of rheumatoid arthritis, Crohn s disease, or psoriasis; or have you had radiation treatments?   No   Have you had a seizure, or a brain or other nervous system problem?   No   During the past year, have you received a transfusion of blood or blood     products, or been given immune (gamma) globulin or antiviral drug?   No   For women: Are you pregnant or is there a chance you could become        pregnant during the next month?   No   Have you received any vaccinations in the past 4 weeks?   No     Immunization questionnaire answers were all negative.        Per orders of Dr. Dhillon, injection of Pneumovax given by Georgette Ernst. Patient instructed to remain in clinic for 15 minutes afterwards, and to report any adverse reaction to me immediately.       Screening performed by Georgette Ernst on 7/30/2018 at 9:16 AM.

## 2018-08-01 LAB
COPATH REPORT: NORMAL
PAP: NORMAL

## 2018-08-03 LAB
FINAL DIAGNOSIS: NORMAL
HPV HR 12 DNA CVX QL NAA+PROBE: NEGATIVE
HPV16 DNA SPEC QL NAA+PROBE: NEGATIVE
HPV18 DNA SPEC QL NAA+PROBE: NEGATIVE
SPECIMEN DESCRIPTION: NORMAL
SPECIMEN SOURCE CVX/VAG CYTO: NORMAL

## 2018-08-07 PROBLEM — Z12.4 CERVICAL CANCER SCREENING: Status: ACTIVE | Noted: 2018-08-07

## 2018-08-31 ENCOUNTER — PRE VISIT (OUTPATIENT)
Dept: NEPHROLOGY | Facility: CLINIC | Age: 68
End: 2018-08-31

## 2018-08-31 NOTE — TELEPHONE ENCOUNTER
PREVISIT INFORMATION                                                    Keerthi oMntoya scheduled for future visit at [facility].    Patient is scheduled to see Dr. Minor on 9/11/18  Reason for visit: Proteinuria, unspecified type/Microalbuminuria  Referring provider Nathan Dhillon MD  Has patient seen previous specialist? No  Medical Records:  Available in chart.  Patient was previously seen at a [facility] or [facility] facility.    REVIEW                                                      New patient packet mailed to patient: Yes  Medication reconciliation complete: Yes      Current Outpatient Prescriptions   Medication Sig Dispense Refill     ACCU-CHEK FELIPE PLUS test strip TEST DAILY 100 strip 1     amLODIPine (NORVASC) 5 MG tablet TAKE 1 TABLET BY MOUTH EVERY DAY 90 tablet 3     atenolol (TENORMIN) 25 MG tablet Take 1 tablet (25 mg) by mouth daily 90 tablet 3     atorvastatin (LIPITOR) 40 MG tablet TAKE 1 TABLET BY MOUTH DAILY 90 tablet 2     blood glucose monitoring (ACCU-CHEK FELIPE PLUS) test strip Pt tests 1x/day 100 each 1     Cyanocobalamin (B-12) 1000 MCG TABS        glipiZIDE (GLUCOTROL XL) 10 MG 24 hr tablet Take 1 tablet (10 mg) by mouth 2 times daily 180 tablet 3     levothyroxine (SYNTHROID/LEVOTHROID) 125 MCG tablet TAKE 1 TABLET BY MOUTH DAILY 90 tablet 2     losartan (COZAAR) 100 MG tablet Take 1 tablet (100 mg) by mouth daily 90 tablet 3     mesalamine (APRISO ER) 0.375 g CP24 24 hr capsule Take 4 capsules (1.5 g) by mouth every morning 120 capsule 4     metFORMIN (GLUCOPHAGE) 500 MG tablet Take 2 tablets (1,000 mg) by mouth 2 times daily (with meals) 360 tablet 3       Allergies: Actos [pioglitazone]; Animal dander; Doxycycline; Dust mites; Grass; Keflex [cephalexin hcl]; Metformin; Other [seasonal allergies]; Ranitidine; Synthroid [levothroid]; Tetracycline; Tylenol; and Ziac [bisoprolol-hydrochlorothiazide]      PLAN/FOLLOW-UP NEEDED                                                       Previsit review complete.  Patient will see provider at future scheduled appointment.     Patient Reminders Given:  Please, make sure you bring an updated list of your medications.   If you are having a procedure, please, present 15 minutes early.  If you need to cancel or reschedule,please call [phone].    Lucy Purdy LPN

## 2018-09-06 ENCOUNTER — TELEPHONE (OUTPATIENT)
Dept: GASTROENTEROLOGY | Facility: CLINIC | Age: 68
End: 2018-09-06

## 2018-09-10 ENCOUNTER — OFFICE VISIT (OUTPATIENT)
Dept: GASTROENTEROLOGY | Facility: CLINIC | Age: 68
End: 2018-09-10
Payer: MEDICARE

## 2018-09-10 VITALS
BODY MASS INDEX: 28.26 KG/M2 | OXYGEN SATURATION: 97 % | HEIGHT: 63 IN | SYSTOLIC BLOOD PRESSURE: 148 MMHG | RESPIRATION RATE: 16 BRPM | HEART RATE: 67 BPM | DIASTOLIC BLOOD PRESSURE: 73 MMHG | TEMPERATURE: 97.8 F | WEIGHT: 159.5 LBS

## 2018-09-10 DIAGNOSIS — K51.00 ULCERATIVE PANCOLITIS WITHOUT COMPLICATION (H): ICD-10-CM

## 2018-09-10 DIAGNOSIS — K51.311 ULCERATIVE RECTOSIGMOIDITIS WITH RECTAL BLEEDING (H): ICD-10-CM

## 2018-09-10 DIAGNOSIS — K51.00 ULCERATIVE PANCOLITIS WITHOUT COMPLICATION (H): Primary | ICD-10-CM

## 2018-09-10 LAB
ALBUMIN SERPL-MCNC: 3.9 G/DL (ref 3.4–5)
ALP SERPL-CCNC: 106 U/L (ref 40–150)
ALT SERPL W P-5'-P-CCNC: 16 U/L (ref 0–50)
ANION GAP SERPL CALCULATED.3IONS-SCNC: 11 MMOL/L (ref 3–14)
AST SERPL W P-5'-P-CCNC: 20 U/L (ref 0–45)
BASOPHILS # BLD AUTO: 0.1 10E9/L (ref 0–0.2)
BASOPHILS NFR BLD AUTO: 0.9 %
BILIRUB DIRECT SERPL-MCNC: 0.2 MG/DL (ref 0–0.2)
BILIRUB SERPL-MCNC: 0.8 MG/DL (ref 0.2–1.3)
BUN SERPL-MCNC: 16 MG/DL (ref 7–30)
CALCIUM SERPL-MCNC: 9.7 MG/DL (ref 8.5–10.1)
CHLORIDE SERPL-SCNC: 103 MMOL/L (ref 94–109)
CO2 SERPL-SCNC: 21 MMOL/L (ref 20–32)
CREAT SERPL-MCNC: 0.93 MG/DL (ref 0.52–1.04)
CRP SERPL-MCNC: <2.9 MG/L (ref 0–8)
DIFFERENTIAL METHOD BLD: NORMAL
EOSINOPHIL # BLD AUTO: 0.6 10E9/L (ref 0–0.7)
EOSINOPHIL NFR BLD AUTO: 6 %
ERYTHROCYTE [DISTWIDTH] IN BLOOD BY AUTOMATED COUNT: 12.7 % (ref 10–15)
ERYTHROCYTE [SEDIMENTATION RATE] IN BLOOD BY WESTERGREN METHOD: 36 MM/H (ref 0–30)
GFR SERPL CREATININE-BSD FRML MDRD: 60 ML/MIN/1.7M2
GLUCOSE SERPL-MCNC: 161 MG/DL (ref 70–99)
HCT VFR BLD AUTO: 40.3 % (ref 35–47)
HGB BLD-MCNC: 13.4 G/DL (ref 11.7–15.7)
IMM GRANULOCYTES # BLD: 0 10E9/L (ref 0–0.4)
IMM GRANULOCYTES NFR BLD: 0.4 %
LYMPHOCYTES # BLD AUTO: 2.1 10E9/L (ref 0.8–5.3)
LYMPHOCYTES NFR BLD AUTO: 20.2 %
MCH RBC QN AUTO: 31.2 PG (ref 26.5–33)
MCHC RBC AUTO-ENTMCNC: 33.3 G/DL (ref 31.5–36.5)
MCV RBC AUTO: 94 FL (ref 78–100)
MONOCYTES # BLD AUTO: 1.1 10E9/L (ref 0–1.3)
MONOCYTES NFR BLD AUTO: 10.1 %
NEUTROPHILS # BLD AUTO: 6.6 10E9/L (ref 1.6–8.3)
NEUTROPHILS NFR BLD AUTO: 62.4 %
NRBC # BLD AUTO: 0 10*3/UL
NRBC BLD AUTO-RTO: 0 /100
PLATELET # BLD AUTO: 359 10E9/L (ref 150–450)
POTASSIUM SERPL-SCNC: 4.1 MMOL/L (ref 3.4–5.3)
PROT SERPL-MCNC: 8.7 G/DL (ref 6.8–8.8)
RBC # BLD AUTO: 4.29 10E12/L (ref 3.8–5.2)
SODIUM SERPL-SCNC: 135 MMOL/L (ref 133–144)
WBC # BLD AUTO: 10.6 10E9/L (ref 4–11)

## 2018-09-10 RX ORDER — MESALAMINE 0.38 G/1
1.5 CAPSULE, EXTENDED RELEASE ORAL EVERY MORNING
Qty: 120 CAPSULE | Refills: 7 | Status: SHIPPED | OUTPATIENT
Start: 2018-09-10 | End: 2019-07-16

## 2018-09-10 ASSESSMENT — PAIN SCALES - GENERAL: PAINLEVEL: NO PAIN (0)

## 2018-09-10 NOTE — NURSING NOTE
"Chief Complaint   Patient presents with     Gastrointestinal Problem     F/U for Ulcerative Colitis       Vitals:    09/10/18 0830   BP: 148/73   BP Location: Left arm   Patient Position: Sitting   Cuff Size: Adult Regular   Pulse: 67   Resp: 16   Temp: 97.8  F (36.6  C)   TempSrc: Oral   SpO2: 97%   Weight: 72.3 kg (159 lb 8 oz)   Height: 1.595 m (5' 2.8\")       Body mass index is 28.44 kg/(m^2).      Tati Lane LPN                          "

## 2018-09-10 NOTE — MR AVS SNAPSHOT
After Visit Summary   9/10/2018    Keerthi Montoya    MRN: [id number]           Patient Information     Date Of Birth          1950        Visit Information        Provider Department      9/10/2018 8:40 AM Tejinder Leal MD [facility]        Today's Diagnoses     Ulcerative pancolitis without complication (H)    -  1      Care Instructions    I've included a brief summary of our discussion and care plan from today's visit below.  Please review this information with your primary care provider.  _______________________________________________________________________      1. It was wonderful getting a chance to meet with you to discuss your UC Pan-colitis particularly given suspicion of pan-colonic involvement, overall well-controlled on your 12/2017 colonoscopy - discussed next steps in evaluation and management together today.  - Continue Apriso 1.5g daily as ordered for now, particularly given your excellent clinical + endoscopic response to PO 5-ASA therapy.  - Recommend follow-up with Nephrology as scheduled to evaluate your proteinuria further, pattern not suggestive of allergic phenomena seen rarely with 5-ASA treatment. Might be important to evaluate for Diabetic Nephropathy vs. other causes for this.  - Discussed Preventive Care in IBD today, recommend starting routine dysplasia surveillance colonoscopy in 1387-9879 unless symptomatic sooner.    2. Recommend routine studies including nutritional markers as we discussed today.   - Recommend having the following studies checked at least 2-3 times/year for disease and medication safety monitoring: BMP, LFT, CBC with differential, ESR/CRP.    3. Continue to monitor for the danger signs/symptoms we reviewed together today:  worsening abdominal pain, worsening diarrhea, obstructive symptoms (nausea/vomiting, abdominal distention, inability to pass stool/flatus), blood mixed into stools, persistent fevers/chills,  progressive anemia (particulary with iron deficiency), difficulty with swallowing, perianal/rectal discomfort (particularly with defecation), unexpected weight loss, etc.  - Contact us via MyChart or phone should these symptoms occur, particularly as we may advise further evaluation accordingly.    4. Return to GI Clinic with my GI Physician Assistants (Jerry or Kimberley) in 6-8 months to review your progress, sooner if symptomatic.   - If you are unable to schedule this follow-up appointment today, please contact our  at [phone] within the next week to help set up this necessary appointment.    PREVENTIVE CARE IN INFLAMMATORY BOWEL DISEASE    - Strongly recommend tobacco abstinence.    - Recommend considering daily calcium + Vitamin D supplementation.    - Recommend age-appropriate cancer surveillance:  - Yearly Dermatology visit for visual skin inspection if immunosuppressed, monthly skin self-check after bathing.  - Dysplasia surveillance colonoscopy every 1-2 years in patients with Crohn's colitis or ulcerative colitis >8-10 years since diagnosis.  - (For men and women ages 9-26) Consideration for HPV vaccination, should be discussed with your PCP further if you feel you'd like to pursue this.  - (For women, men with risk factors) Annual Pap smears if immunosuppressed, mammogram when deemed appropriate by your PCP.    - Recommend follow-up with your PCP to ensure the following vaccinations have been updated: Hepatitis A/B, Tdap, Pneumococcal pneumonia, yearly flu SHOT, Meningococcal meningitis (if college-age), HPV (all aged 18-26), etc.  - Would also recommend VZV and MMR titers be checked to confirm immunity.  - Live/attenuated vaccines (such as the INTRANASAL flu vaccine, MMR, Yellow Fever, or Zostavax vaccine) should not be administered to immunosuppressed patients, except in very specific instances which you should discuss with a GI and Infectious Disease provider first.    - Family  planning:  If you or your partner are planning to become pregnant, please schedule time with us to discuss issues surrounding IBD and Fertility/Pregnancy.    _______________________________________________________________________    It was a pleasure seeing you in clinic today - please be in touch if there are any further questions that arise following today's visit.  During business hours, you may reach Clinic Nurse Triage Line at [phone].  For urgent/emergent questions after business hours, you may reach the on-call GI Fellow by contacting the [facility]  at [phone].    Any benign/non-urgent test results are usually communicated via letter or Frontbackhart message within 1-2 weeks after completion.  Urgent results (those that require a change in the previously-discussed care plan) are usually communicated via a phone call once available from our clinic staff to discuss the results and the next steps in your evaluation.    I recommend signing up for [place] access if you have not already done so and are comfortable with using a computer.  This allows for online access to your lab results and also helps you communicate efficiently with my clinic should any questions arise in your care.    We have Financial Counseling services available through our clinic.  If you have questions about your insurance coverage or payment responsibilities, particularly before you undergo any tests or procedures, please let us know and we can arrange a consultation accordingly to help you make informed decisions about your healthcare.    Sincerely,    Tejinder Leal MD    [facility] - Department of Medicine  Division of Gastroenterology            Follow-ups after your visit        Follow-up notes from your care team     Return in about 6 months (around 3/10/2019).      Your next 10 appointments already scheduled     Sep 11, 2018  4:00 PM CDT   New Visit with Swati Arroyo  MD Mily   [facility] ([facility])    [address] [place] [address]   [phone]            Oct 29, 2018  7:30 AM CDT   SHORT with Nathan Dhillon MD   [facility]    [address] [address] [phone]              Future tests that were ordered for you today     Open Future Orders        Priority Expected Expires Ordered    CRP inflammation Routine  11/9/2018 9/10/2018    Basic metabolic panel Routine  11/9/2018 9/10/2018    CBC with platelets differential Routine  11/9/2018 9/10/2018    Erythrocyte sedimentation rate auto Routine  11/9/2018 9/10/2018    Hepatic panel Routine  11/9/2018 9/10/2018            Who to contact     Please call your clinic at [phone] to:    Ask questions about your health    Make or cancel appointments    Discuss your medicines    Learn about your test results    Speak to your doctor            Additional Information About Your Visit        [employer] Information     [employer] gives you secure access to your electronic health record. If you see a primary care provider, you can also send messages to your care team and make appointments. If you have questions, please call your primary care clinic.  If you do not have a primary care provider, please call [phone] and they will assist you.      [employer] is an electronic gateway that provides easy, online access to your medical records. With [employer], you can request a clinic appointment, read your test results, renew a prescription or communicate with your care team.     To access your existing account, please contact your [facility] or call [phone] for assistance.        Care EveryWhere ID     This is your Care EveryWhere ID. This could be used by other organizations to access your [facility] medical records  [id number]       "  Your Vitals Were     Pulse Temperature Respirations Height Pulse Oximetry BMI (Body Mass Index)    67 97.8  F (36.6  C) (Oral) 16 1.595 m (5' 2.8\") 97% 28.44 kg/m2       Blood Pressure from Last 3 Encounters:   09/10/18 148/73   07/30/18 122/70   06/25/18 138/82    Weight from Last 3 Encounters:   09/10/18 72.3 kg (159 lb 8 oz)   07/30/18 70.8 kg (156 lb)   06/25/18 72.6 kg (160 lb)               Primary Care Provider Office Phone # Fax #    Nathan Dhillon [phone] [address] [facility] [address]        Equal Access to Services     [facility] KADE : Hadii ronnell peterson hadasho Sojean, waaxda luqadaha, qaybta kaalmada adeegyada, celia ramirez . So [facility] [phone].    ATENCIÓN: Si habla español, tiene a hobbs disposición servicios gratuitos de asistencia lingüística. Llame al [phone].    We comply with applicable federal civil rights laws and [place] laws. We do not discriminate on the basis of race, color, national origin, age, disability, sex, sexual orientation, or gender identity.            Thank you!     Thank you for choosing [facility]  for your care. Our goal is always to provide you with excellent care. Hearing back from our patients is one way we can continue to improve our services. Please take a few minutes to complete the written survey that you may receive in the mail after your visit with us. Thank you!             Your Updated Medication List - Protect others around you: Learn how to safely use, store and throw away your medicines at www.disposemymeds.org.          This list is accurate as of 9/10/18  9:01 AM.  Always use your most recent med list.                   Brand Name Dispense Instructions for use Diagnosis    * ACCU-CHEK FELIPE PLUS test strip   Generic drug:  blood glucose monitoring     100 strip    TEST DAILY    Type 2 diabetes, HbA1c goal < 7% (H)       * blood glucose monitoring test strip    " ACCU-CHEK FELIPE PLUS    100 each    Pt tests 1x/day    Type 2 diabetes mellitus without complication, without long-term current use of insulin (H)       amLODIPine 5 MG tablet    NORVASC    90 tablet    TAKE 1 TABLET BY MOUTH EVERY DAY    Hypertension goal BP (blood pressure) < 140/90       atenolol 25 MG tablet    TENORMIN    90 tablet    Take 1 tablet (25 mg) by mouth daily    Hypertension goal BP (blood pressure) < 130/80       atorvastatin 40 MG tablet    LIPITOR    90 tablet    TAKE 1 TABLET BY MOUTH DAILY    Hyperlipidemia LDL goal <100       B-12 1000 MCG Tabs           glipiZIDE 10 MG 24 hr tablet    GLUCOTROL XL    180 tablet    Take 1 tablet (10 mg) by mouth 2 times daily    Type 2 diabetes mellitus without complication, without long-term current use of insulin (H)       levothyroxine 125 MCG tablet    SYNTHROID/LEVOTHROID    90 tablet    TAKE 1 TABLET BY MOUTH DAILY    Postoperative hypothyroidism       losartan 100 MG tablet    COZAAR    90 tablet    Take 1 tablet (100 mg) by mouth daily    Hypertension goal BP (blood pressure) < 140/90       mesalamine 0.375 g Cp24 24 hr capsule    APRISO ER    120 capsule    Take 4 capsules (1.5 g) by mouth every morning    Ulcerative rectosigmoiditis with rectal bleeding (H)       metFORMIN 500 MG tablet    GLUCOPHAGE    360 tablet    Take 2 tablets (1,000 mg) by mouth 2 times daily (with meals)    Type 2 diabetes mellitus with microalbuminuria, without long-term current use of insulin (H)       * Notice:  This list has 2 medication(s) that are the same as other medications prescribed for you. Read the directions carefully, and ask your doctor or other care provider to review them with you.

## 2018-09-10 NOTE — LETTER
9/10/2018       RE: Keerthi Montoya  [address] [place] [address]     Dear Colleague,    Thank you for referring your patient, Keerthi Montoya, to the [facility] at [facility]. Please see a copy of my visit note below.    GI CLINIC VISIT    CC/REFERRING PROVIDER: Nathan Dhillon  REASON FOR CONSULTATION: UC    HPI: 68 year old female w/ h/o UC Pan-colitis 8/2015 (prior proctosigmoiditis classification, confirmed pan-colitis 12/2017) currently on PO 5-ASA therapy, HTN, DM2, multinodular goiter s/p complete thyroidectomy ~40yrs ago complicated by postoperative hypothyroidism, diverticulosis complicated by diverticulitis w/ prior limited L hemicolectomy 2002, s/p open cholecystectomy 2000, s/p ex-lap ~30yrs ago for presumed ectopic pregnancy (apparent diverticulitis, non-surgical management on discovery), s/p TL 1983, complicated post-surgical [facility] s/p mesh repair 2007, among other medical issues - presenting for f/u UC. Doing well overall, reports having 2 soft formed BM/day w/o blood. Denies any tenesmus or insecurity passing flatus, no fecal incontinence or new perianal/nocturnal sxs noted. Denies any N/V/F/C/HA/NS or other const/syst/cardiopulmonary sxs, no BRBPR/melena/urinary changes, no unintentional wt loss or appetite/satiety changes. No other bowel/bladder habit changes, no dysphagia/odynophagia. No jaundice/icterus/pruritus, no acholic stools/steatorrhea, no new lumps/bumps, no jt pain/oral ulcer/rash/eye sxs noted.    ROS: 10pt ROS performed and otherwise negative.    PERTINENT PAST MEDICAL/SURGICAL HISTORY:  As noted above.    PERTINENT MEDICATIONS:  - Apriso 1.5g PO QDAY  Medications reviewed with patient today, see Medication List/Assessment for details.  No other NSAID/anticoagulation reported by patient.  No other OTC/herbal/supplements reported by patient.    SOCIAL HISTORY: Tobacco: none.    PHYSICAL  EXAMINATION:  Vitals reviewed, AFVSS  Wt 159# today (stable)  Gen: aaox3, cooperative, pleasant, not diaphoretic, nad  HEENT: ncat, neck supple, no clad/sclad, normal op w/o ulcer/exudate, anicteric, mmm  Resp/CV without acute findings, not dyspneic/tachycardic  Abd: +nabs, soft, nt, nd, no peritoneal s/s noted  Ext: no c/c/e  Skin: warm, perfused, no jaundice  Neuro: grossly intact, no asterixis noted    PERTINENT STUDIES:  Labs ordered today, currently pending    ASSESSMENT/PLAN:    1. UC Pan-colitis with excellent ongoing clinical + endoscopic response to PO 5-ASA therapy - continue supportive care as ordered for now  1. It was wonderful getting a chance to meet with you to discuss your UC Pan-colitis particularly given suspicion of pan-colonic involvement, overall well-controlled on your 12/2017 colonoscopy - discussed next steps in evaluation and management together today.  - Continue Apriso 1.5g daily as ordered for now, particularly given your excellent clinical + endoscopic response to PO 5-ASA therapy.  - Recommend follow-up with Nephrology as scheduled to evaluate your proteinuria further, pattern not suggestive of allergic phenomena seen rarely with 5-ASA treatment. Might be important to evaluate for Diabetic Nephropathy vs. other causes for this.  - Discussed Preventive Care in IBD today, recommend starting routine dysplasia surveillance colonoscopy in 3465-9034 unless symptomatic sooner.    2. Recommend routine studies including nutritional markers as we discussed today.   - Recommend having the following studies checked at least 2-3 times/year for disease and medication safety monitoring: BMP, LFT, CBC with differential, ESR/CRP.    3. Continue to monitor for the danger signs/symptoms we reviewed together today:  worsening abdominal pain, worsening diarrhea, obstructive symptoms (nausea/vomiting, abdominal distention, inability to pass stool/flatus), blood mixed into stools, persistent fevers/chills,  progressive anemia (particulary with iron deficiency), difficulty with swallowing, perianal/rectal discomfort (particularly with defecation), unexpected weight loss, etc.  - Contact us via MyChart or phone should these symptoms occur, particularly as we may advise further evaluation accordingly.    2. Cancer Screening  No PSC or known FH CRC, recommend starting routine dysplasia surveillance in 3499-6329 unless symptomatic sooner.    RTC 6-8 months with GI PA, sooner if symptomatic.      Thank you for this consultation. It was a pleasure to participate in the care of this patient; please contact us with any further questions.       Again, thank you for allowing me to participate in the care of your patient.      Sincerely,    Tejinder Leal MD

## 2018-09-10 NOTE — PROGRESS NOTES
GI CLINIC VISIT    CC/REFERRING PROVIDER: Nathan Dhillon  REASON FOR CONSULTATION: UC    HPI: 68 year old female w/ h/o UC Pan-colitis 8/2015 (prior proctosigmoiditis classification, confirmed pan-colitis 12/2017) currently on PO 5-ASA therapy, HTN, DM2, multinodular goiter s/p complete thyroidectomy ~40yrs ago complicated by postoperative hypothyroidism, diverticulosis complicated by diverticulitis w/ prior limited L hemicolectomy 2002, s/p open cholecystectomy 2000, s/p ex-lap ~30yrs ago for presumed ectopic pregnancy (apparent diverticulitis, non-surgical management on discovery), s/p TL 1983, complicated post-surgical [facility] s/p mesh repair 2007, among other medical issues - presenting for f/u UC. Doing well overall, reports having 2 soft formed BM/day w/o blood. Denies any tenesmus or insecurity passing flatus, no fecal incontinence or new perianal/nocturnal sxs noted. Denies any N/V/F/C/HA/NS or other const/syst/cardiopulmonary sxs, no BRBPR/melena/urinary changes, no unintentional wt loss or appetite/satiety changes. No other bowel/bladder habit changes, no dysphagia/odynophagia. No jaundice/icterus/pruritus, no acholic stools/steatorrhea, no new lumps/bumps, no jt pain/oral ulcer/rash/eye sxs noted.    ROS: 10pt ROS performed and otherwise negative.    PERTINENT PAST MEDICAL/SURGICAL HISTORY:  As noted above.    PERTINENT MEDICATIONS:  - Apriso 1.5g PO QDAY  Medications reviewed with patient today, see Medication List/Assessment for details.  No other NSAID/anticoagulation reported by patient.  No other OTC/herbal/supplements reported by patient.    SOCIAL HISTORY: Tobacco: none.    PHYSICAL EXAMINATION:  Vitals reviewed, AFVSS  Wt 159# today (stable)  Gen: aaox3, cooperative, pleasant, not diaphoretic, nad  HEENT: ncat, neck supple, no clad/sclad, normal op w/o ulcer/exudate, anicteric, mmm  Resp/CV without acute findings, not dyspneic/tachycardic  Abd: +nabs, soft, nt, nd, no peritoneal s/s noted  Ext: no  c/c/e  Skin: warm, perfused, no jaundice  Neuro: grossly intact, no asterixis noted    PERTINENT STUDIES:  Labs ordered today, currently pending    ASSESSMENT/PLAN:    1. UC Pan-colitis with excellent ongoing clinical + endoscopic response to PO 5-ASA therapy - continue supportive care as ordered for now  1. It was wonderful getting a chance to meet with you to discuss your UC Pan-colitis particularly given suspicion of pan-colonic involvement, overall well-controlled on your 12/2017 colonoscopy - discussed next steps in evaluation and management together today.  - Continue Apriso 1.5g daily as ordered for now, particularly given your excellent clinical + endoscopic response to PO 5-ASA therapy.  - Recommend follow-up with Nephrology as scheduled to evaluate your proteinuria further, pattern not suggestive of allergic phenomena seen rarely with 5-ASA treatment. Might be important to evaluate for Diabetic Nephropathy vs. other causes for this.  - Discussed Preventive Care in IBD today, recommend starting routine dysplasia surveillance colonoscopy in 2877-5710 unless symptomatic sooner.    2. Recommend routine studies including nutritional markers as we discussed today.   - Recommend having the following studies checked at least 2-3 times/year for disease and medication safety monitoring: BMP, LFT, CBC with differential, ESR/CRP.    3. Continue to monitor for the danger signs/symptoms we reviewed together today:  worsening abdominal pain, worsening diarrhea, obstructive symptoms (nausea/vomiting, abdominal distention, inability to pass stool/flatus), blood mixed into stools, persistent fevers/chills, progressive anemia (particulary with iron deficiency), difficulty with swallowing, perianal/rectal discomfort (particularly with defecation), unexpected weight loss, etc.  - Contact us via MyChart or phone should these symptoms occur, particularly as we may advise further evaluation accordingly.    2. Cancer Screening  No  PSC or known FH CRC, recommend starting routine dysplasia surveillance in 2628-2094 unless symptomatic sooner.    RTC 6-8 months with GI PA, sooner if symptomatic.      Thank you for this consultation. It was a pleasure to participate in the care of this patient; please contact us with any further questions.     Tejinder Leal MD   of Medicine  [facility] - Department of Medicine  Division of Gastroenterology

## 2018-09-10 NOTE — PATIENT INSTRUCTIONS
I've included a brief summary of our discussion and care plan from today's visit below.  Please review this information with your primary care provider.  _______________________________________________________________________      1. It was wonderful getting a chance to meet with you to discuss your UC Pan-colitis particularly given suspicion of pan-colonic involvement, overall well-controlled on your 12/2017 colonoscopy - discussed next steps in evaluation and management together today.  - Continue Apriso 1.5g daily as ordered for now, particularly given your excellent clinical + endoscopic response to PO 5-ASA therapy.  - Recommend follow-up with Nephrology as scheduled to evaluate your proteinuria further, pattern not suggestive of allergic phenomena seen rarely with 5-ASA treatment. Might be important to evaluate for Diabetic Nephropathy vs. other causes for this.  - Discussed Preventive Care in IBD today, recommend starting routine dysplasia surveillance colonoscopy in [id number] unless symptomatic sooner.    2. Recommend routine studies including nutritional markers as we discussed today.   - Recommend having the following studies checked at least 2-3 times/year for disease and medication safety monitoring: BMP, LFT, CBC with differential, ESR/CRP.    3. Continue to monitor for the danger signs/symptoms we reviewed together today:  worsening abdominal pain, worsening diarrhea, obstructive symptoms (nausea/vomiting, abdominal distention, inability to pass stool/flatus), blood mixed into stools, persistent fevers/chills, progressive anemia (particulary with iron deficiency), difficulty with swallowing, perianal/rectal discomfort (particularly with defecation), unexpected weight loss, etc.  - Contact us via MyChart or phone should these symptoms occur, particularly as we may advise further evaluation accordingly.    4. Return to GI Clinic with my GI Physician Assistants (Jerry or Kimberley) in 6-8 months to review  your progress, sooner if symptomatic.   - If you are unable to schedule this follow-up appointment today, please contact our  at [phone] within the next week to help set up this necessary appointment.    PREVENTIVE CARE IN INFLAMMATORY BOWEL DISEASE    - Strongly recommend tobacco abstinence.    - Recommend considering daily calcium + Vitamin D supplementation.    - Recommend age-appropriate cancer surveillance:  - Yearly Dermatology visit for visual skin inspection if immunosuppressed, monthly skin self-check after bathing.  - Dysplasia surveillance colonoscopy every 1-2 years in patients with Crohn's colitis or ulcerative colitis >8-10 years since diagnosis.  - (For men and women ages 9-26) Consideration for HPV vaccination, should be discussed with your PCP further if you feel you'd like to pursue this.  - (For women, men with risk factors) Annual Pap smears if immunosuppressed, mammogram when deemed appropriate by your PCP.    - Recommend follow-up with your PCP to ensure the following vaccinations have been updated: Hepatitis A/B, Tdap, Pneumococcal pneumonia, yearly flu SHOT, Meningococcal meningitis (if college-age), HPV (all aged 18-26), etc.  - Would also recommend VZV and MMR titers be checked to confirm immunity.  - Live/attenuated vaccines (such as the INTRANASAL flu vaccine, MMR, Yellow Fever, or Zostavax vaccine) should not be administered to immunosuppressed patients, except in very specific instances which you should discuss with a GI and Infectious Disease provider first.    - Family planning:  If you or your partner are planning to become pregnant, please schedule time with us to discuss issues surrounding IBD and Fertility/Pregnancy.    _______________________________________________________________________    It was a pleasure seeing you in clinic today - please be in touch if there are any further questions that arise following today's visit.  During business hours, you may reach  Clinic Nurse Triage Line at [phone].  For urgent/emergent questions after business hours, you may reach the on-call GI Fellow by contacting the [facility]  at [phone].    Any benign/non-urgent test results are usually communicated via letter or [employer] message within 1-2 weeks after completion.  Urgent results (those that require a change in the previously-discussed care plan) are usually communicated via a phone call once available from our clinic staff to discuss the results and the next steps in your evaluation.    I recommend signing up for [employer] access if you have not already done so and are comfortable with using a computer.  This allows for online access to your lab results and also helps you communicate efficiently with my clinic should any questions arise in your care.    We have Financial Counseling services available through our clinic.  If you have questions about your insurance coverage or payment responsibilities, particularly before you undergo any tests or procedures, please let us know and we can arrange a consultation accordingly to help you make informed decisions about your healthcare.    Sincerely,    Tejinder Leal MD    [facility] - Department of Medicine  Division of Gastroenterology

## 2018-09-11 ENCOUNTER — RADIANT APPOINTMENT (OUTPATIENT)
Dept: ULTRASOUND IMAGING | Facility: CLINIC | Age: 68
End: 2018-09-11
Attending: INTERNAL MEDICINE
Payer: MEDICARE

## 2018-09-11 ENCOUNTER — OFFICE VISIT (OUTPATIENT)
Dept: NEPHROLOGY | Facility: CLINIC | Age: 68
End: 2018-09-11
Attending: INTERNAL MEDICINE
Payer: MEDICARE

## 2018-09-11 VITALS
HEART RATE: 66 BPM | SYSTOLIC BLOOD PRESSURE: 160 MMHG | OXYGEN SATURATION: 97 % | WEIGHT: 158 LBS | DIASTOLIC BLOOD PRESSURE: 77 MMHG | BODY MASS INDEX: 28.17 KG/M2

## 2018-09-11 DIAGNOSIS — E89.0 POSTOPERATIVE HYPOTHYROIDISM: ICD-10-CM

## 2018-09-11 DIAGNOSIS — M79.89 RIGHT LEG SWELLING: Primary | ICD-10-CM

## 2018-09-11 DIAGNOSIS — I10 HYPERTENSION GOAL BP (BLOOD PRESSURE) < 130/80: ICD-10-CM

## 2018-09-11 DIAGNOSIS — M79.89 RIGHT LEG SWELLING: ICD-10-CM

## 2018-09-11 DIAGNOSIS — N18.2 CKD (CHRONIC KIDNEY DISEASE) STAGE 2, GFR 60-89 ML/MIN: ICD-10-CM

## 2018-09-11 DIAGNOSIS — K51.00 ULCERATIVE PANCOLITIS WITHOUT COMPLICATION (H): ICD-10-CM

## 2018-09-11 DIAGNOSIS — E11.9 TYPE 2 DIABETES MELLITUS WITHOUT COMPLICATION, UNSPECIFIED LONG TERM INSULIN USE STATUS: ICD-10-CM

## 2018-09-11 LAB
ALBUMIN UR-MCNC: 30 MG/DL
APPEARANCE UR: CLEAR
BILIRUB UR QL STRIP: NEGATIVE
COLOR UR AUTO: ABNORMAL
CREAT UR-MCNC: 25 MG/DL
GLUCOSE UR STRIP-MCNC: NEGATIVE MG/DL
HGB UR QL STRIP: NEGATIVE
KETONES UR STRIP-MCNC: NEGATIVE MG/DL
LEUKOCYTE ESTERASE UR QL STRIP: NEGATIVE
MICROALBUMIN UR-MCNC: 316 MG/L
MICROALBUMIN/CREAT UR: 1274.19 MG/G CR (ref 0–25)
NITRATE UR QL: NEGATIVE
NON-SQ EPI CELLS #/AREA URNS LPF: NORMAL /LPF
PH UR STRIP: 6 PH (ref 5–7)
PROT UR-MCNC: 0.35 G/L
PROT/CREAT 24H UR: 1.52 G/G CR (ref 0–0.2)
PTH-INTACT SERPL-MCNC: 50 PG/ML (ref 18–80)
RBC #/AREA URNS AUTO: NORMAL /HPF
SOURCE: ABNORMAL
SP GR UR STRIP: 1 (ref 1–1.03)
UROBILINOGEN UR STRIP-MCNC: NORMAL MG/DL (ref 0–2)
WBC #/AREA URNS AUTO: NORMAL /HPF

## 2018-09-11 PROCEDURE — 84165 PROTEIN E-PHORESIS SERUM: CPT | Performed by: INTERNAL MEDICINE

## 2018-09-11 PROCEDURE — 81001 URINALYSIS AUTO W/SCOPE: CPT | Performed by: INTERNAL MEDICINE

## 2018-09-11 PROCEDURE — 99204 OFFICE O/P NEW MOD 45 MIN: CPT | Performed by: INTERNAL MEDICINE

## 2018-09-11 PROCEDURE — 83883 ASSAY NEPHELOMETRY NOT SPEC: CPT | Performed by: INTERNAL MEDICINE

## 2018-09-11 PROCEDURE — 84156 ASSAY OF PROTEIN URINE: CPT | Performed by: INTERNAL MEDICINE

## 2018-09-11 PROCEDURE — 83970 ASSAY OF PARATHORMONE: CPT | Performed by: INTERNAL MEDICINE

## 2018-09-11 PROCEDURE — 00000402 ZZHCL STATISTIC TOTAL PROTEIN: Performed by: INTERNAL MEDICINE

## 2018-09-11 PROCEDURE — 82043 UR ALBUMIN QUANTITATIVE: CPT | Performed by: INTERNAL MEDICINE

## 2018-09-11 PROCEDURE — 36415 COLL VENOUS BLD VENIPUNCTURE: CPT | Performed by: INTERNAL MEDICINE

## 2018-09-11 PROCEDURE — 93971 EXTREMITY STUDY: CPT | Mod: RT | Performed by: RADIOLOGY

## 2018-09-11 RX ORDER — MULTIVIT-MIN/IRON/FOLIC ACID/K 18-600-40
CAPSULE ORAL
COMMUNITY
End: 2024-05-06

## 2018-09-11 ASSESSMENT — PAIN SCALES - GENERAL: PAINLEVEL: NO PAIN (0)

## 2018-09-11 NOTE — NURSING NOTE
Keerthi Montoya's goals for this visit include:   Chief Complaint   Patient presents with     Consult     Proteinuria, unspecified type/Microalbuminuria  RP: Nathan Dhillon MD       She requests these members of her care team be copied on today's visit information: no    PCP: Nathan Dhillon    Referring Provider:  Nathan Dhillon MD  [address] [place] [address]    /77 (BP Location: Right arm, Patient Position: Sitting, Cuff Size: Adult Regular)  Pulse 66  Wt 71.7 kg (158 lb)  SpO2 97%  BMI 28.17 kg/m2    Do you need any medication refills at today's visit? No    Lucy Purdy LPN

## 2018-09-11 NOTE — MR AVS SNAPSHOT
After Visit Summary   9/11/2018    Keerthi Montoya    MRN: [id number]           Patient Information     Date Of Birth          1950        Visit Information        Provider Department      9/11/2018 4:00 PM Swati Minor MD [facility]        Today's Diagnoses     Right leg swelling    -  1    CKD (chronic kidney disease) stage 2, GFR 60-89 ml/min          Care Instructions    1. Urine test today (maybe lab)  2. Avoid NSAIDs (ibuprofen,etc)  3. Follow blood pressure at home for the next 2 weeks. Please update if you are consistently outside our goal range of 110-130.   4. Follow-up in  6 months   5. Ultrasound of the right leg  6. Trial taking the amlodipine at night to see if that helps the swelling.           Follow-ups after your visit        Your next 10 appointments already scheduled     Sep 11, 2018  5:30 PM CDT   US LOWER EXTREMITY VENOUS DUPLEX RIGHT with MGUS1, MG US TECH   [facility] ([facility])    [address] [place] [address] [phone]           How do I prepare for my exam? (Food and drink instructions) No Food and Drink Restrictions.  How do I prepare for my exam? (Other instructions) You do not need to do anything special to prepare for your exam.  What should I wear: Wear comfortable clothes.  How long does the exam take: Most ultrasounds take 30 to 60 minutes.  What should I bring: Bring a list of your medicines, including vitamins, minerals and over-the-counter drugs. It is safest to leave personal items at home.  Do I need a :  No  is needed.  What do I need to tell my doctor: Tell your doctor about any allergies you may have.  What should I do after the exam: No restrictions, You may resume normal activities.  What is this test: An ultrasound uses sound waves to make pictures of the body. Sound waves do not cause pain. The only discomfort may be the pressure of the wand against  your skin or full bladder.  Who should I call with questions: If you have any questions, please call the Imaging Department where you will have your exam. Directions, parking instructions, and other information is available on our website, [email or web address].            Oct 29, 2018  7:30 AM CDT   SHORT with Nathan Dhillon MD   [facility]    [address]  [facility] [id number] [phone]            Mar 11, 2019  8:00 AM CDT   LAB with LAB FIRST FLOOR [facility] ([facility])    [address] [place] [address] [phone]           Please do not eat 10-12 hours before your appointment if you are coming in fasting for labs on lipids, cholesterol, or glucose (sugar). This does not apply to pregnant women. Water, hot tea and black coffee (with nothing added) are okay. Do not drink other fluids, diet soda or chew gum.            Mar 11, 2019  8:30 AM CDT   Return Visit with Swati Minor MD   [facility] ([facility])    [address] [place] [address] [phone]            Mar 13, 2019  7:40 AM CDT   (Arrive by 7:25 AM)   Return Visit with Jerry Robbins PA-C   [facility] ([facility])    [address] [place] [address]  [address] [id number] [phone]              Future tests that were ordered for you today     Open Future Orders        Priority Expected Expires Ordered    US Lower Extremity Venous Duplex Right Routine  9/11/2019 9/11/2018            Who to contact     If you have questions or need follow up information about today's clinic visit or your schedule please contact [facility] directly at [phone].  Normal or non-critical lab and imaging results will be communicated to you by Frank, letter  or phone within 4 business days after the clinic has received the results. If you do not hear from us within 7 days, please contact the clinic through [employer] or phone. If you have a critical or abnormal lab result, we will notify you by phone as soon as possible.  Submit refill requests through [employer] or call your pharmacy and they will forward the refill request to us. Please allow 3 business days for your refill to be completed.          Additional Information About Your Visit        [employer] Information     [employer] gives you secure access to your electronic health record. If you see a primary care provider, you can also send messages to your care team and make appointments. If you have questions, please call your primary care clinic.  If you do not have a primary care provider, please call [phone] and they will assist you.      [employer] is an electronic gateway that provides easy, online access to your medical records. With [employer], you can request a clinic appointment, read your test results, renew a prescription or communicate with your care team.     To access your existing account, please contact your [facility] or call [phone] for assistance.        Care EveryWhere ID     This is your Care EveryWhere ID. This could be used by other organizations to access your [facility] medical records  [id number]        Your Vitals Were     Pulse Pulse Oximetry BMI (Body Mass Index)             66 97% 28.17 kg/m2          Blood Pressure from Last 3 Encounters:   09/11/18 160/77   09/10/18 148/73   07/30/18 122/70    Weight from Last 3 Encounters:   09/11/18 71.7 kg (158 lb)   09/10/18 72.3 kg (159 lb 8 oz)   07/30/18 70.8 kg (156 lb)              We Performed the Following     Albumin Random Urine Quantitative with Creat Ratio     JUST IN CASE     Kappa and lambda light chain     Parathyroid Hormone Intact     Protein  random urine with Creat Ratio     Protein electrophoresis     UA  reflex to Microscopic        Primary Care Provider Office Phone # Fax #    Nathan Dhillon [phone]       [address]  [facility] [address]        Equal Access to Services     MAGGI BRAUN : Hadii aad ku hadrosso Soomaali, waaxda luqadaha, qaybta kaalmada adeegyada, waxje idiin hayaan kaylie cooper lasanjayangy sullivan. So [facility] [phone].    ATENCIÓN: Si habla español, tiene a hobbs disposición servicios gratuitos de asistencia lingüística. Llame al [phone].    We comply with applicable federal civil rights laws and [place] laws. We do not discriminate on the basis of race, color, national origin, age, disability, sex, sexual orientation, or gender identity.            Thank you!     Thank you for choosing [facility]  for your care. Our goal is always to provide you with excellent care. Hearing back from our patients is one way we can continue to improve our services. Please take a few minutes to complete the written survey that you may receive in the mail after your visit with us. Thank you!             Your Updated Medication List - Protect others around you: Learn how to safely use, store and throw away your medicines at www.disposemymeds.org.          This list is accurate as of 9/11/18  4:35 PM.  Always use your most recent med list.                   Brand Name Dispense Instructions for use Diagnosis    * ACCU-CHEK FELIPE PLUS test strip   Generic drug:  blood glucose monitoring     100 strip    TEST DAILY    Type 2 diabetes, HbA1c goal < 7% (H)       * blood glucose monitoring test strip    ACCU-CHEK FELIPE PLUS    100 each    Pt tests 1x/day    Type 2 diabetes mellitus without complication, without long-term current use of insulin (H)       amLODIPine 5 MG tablet    NORVASC    90 tablet    TAKE 1 TABLET BY MOUTH EVERY DAY    Hypertension goal BP (blood pressure) < 140/90       atenolol 25 MG tablet    TENORMIN    90 tablet    Take 1 tablet (25 mg) by mouth daily     Hypertension goal BP (blood pressure) < 130/80       atorvastatin 40 MG tablet    LIPITOR    90 tablet    TAKE 1 TABLET BY MOUTH DAILY    Hyperlipidemia LDL goal <100       B-12 1000 MCG Tabs           glipiZIDE 10 MG 24 hr tablet    GLUCOTROL XL    180 tablet    Take 1 tablet (10 mg) by mouth 2 times daily    Type 2 diabetes mellitus without complication, without long-term current use of insulin (H)       levothyroxine 125 MCG tablet    SYNTHROID/LEVOTHROID    90 tablet    TAKE 1 TABLET BY MOUTH DAILY    Postoperative hypothyroidism       losartan 100 MG tablet    COZAAR    90 tablet    Take 1 tablet (100 mg) by mouth daily    Hypertension goal BP (blood pressure) < 140/90       mesalamine 0.375 g Cp24 24 hr capsule    APRISO ER    120 capsule    Take 4 capsules (1.5 g) by mouth every morning    Ulcerative rectosigmoiditis with rectal bleeding (H)       metFORMIN 500 MG tablet    GLUCOPHAGE    360 tablet    Take 2 tablets (1,000 mg) by mouth 2 times daily (with meals)    Type 2 diabetes mellitus with microalbuminuria, without long-term current use of insulin (H)       vitamin D 2000 units Caps      Take by mouth daily        * Notice:  This list has 2 medication(s) that are the same as other medications prescribed for you. Read the directions carefully, and ask your doctor or other care provider to review them with you.

## 2018-09-12 LAB
ALBUMIN SERPL ELPH-MCNC: 4.7 G/DL (ref 3.7–5.1)
ALPHA1 GLOB SERPL ELPH-MCNC: 0.3 G/DL (ref 0.2–0.4)
ALPHA2 GLOB SERPL ELPH-MCNC: 1.1 G/DL (ref 0.5–0.9)
B-GLOBULIN SERPL ELPH-MCNC: 1.3 G/DL (ref 0.6–1)
GAMMA GLOB SERPL ELPH-MCNC: 1.8 G/DL (ref 0.7–1.6)
KAPPA LC UR-MCNC: 2.77 MG/DL (ref 0.33–1.94)
KAPPA LC/LAMBDA SER: 1.1 {RATIO} (ref 0.26–1.65)
LAMBDA LC SERPL-MCNC: 2.51 MG/DL (ref 0.57–2.63)
M PROTEIN SERPL ELPH-MCNC: 0 G/DL
PROT PATTERN SERPL ELPH-IMP: ABNORMAL

## 2018-09-12 NOTE — PROGRESS NOTES
September 11, 2018    I was asked to see this patient by Dr. Dhillon for evaluation of proteinuria.     CC: proteinuria    HPI: Keerthi Montoya is a 68 year old female who presents for evaluation of proteinuria. Ms. Montoya's hx is significant for DM dx at least 10 years ago - very poor control for years (documented A1C levels 9.4-11.7% from 2011 to 2017); no known retinopathy per patient. She has longstanding hypertension dx when she was 18 years old. Additional hx includes ulcerative colitis and hypothyroidism (following thyroidectomy at age 24). Her ulcerative colitis was dx 1.5 years ago but she feels she likely had trouble with this disease for years. He reports having loose stool for a long time prior to receiving therapy for her UC; she feels the blood in the stool may have contaminated previous urine samples potentially. Blood pressure is elevated in clinic today but she reports good control at home - 127/67 most recently. Creatinine has been 0.99-1.32 from 7069-9099 but 0.89-0.93 this year. She had a CT in 2017 which reported the kidneys as normal appearing. She has had albuminuria dating back to 2015.    - History of Hematuria: maybe at the time of a UTI  - Swelling: related to medications previously - she denies any swelling difficulties at this time, however, there is some noted on exam (see below)  - Hx of UTIs: no  - Hx of stones: no personal hx but there is a family hx of kidney stones  - Rashes/Joint pain: no  - Family hx of kidney disease: no kidney disease but family hx is positive for kidney stones  - NSAID use: seldom       Allergies   Allergen Reactions     Actos [Pioglitazone]      Fluid retention     Animal Dander      Doxycycline Hives     Dust Mites      Grass      Keflex [Cephalexin Hcl] Hives     Metformin Diarrhea     At 1000mg     Metoprolol      Swelling of lower legs and fatigue     Other [Seasonal Allergies]      pollen     Ranitidine      rash     Synthroid [Levothroid] Swelling      ???     Tetracycline Hives     Tylenol Hives     Ziac [Bisoprolol-Hydrochlorothiazide]          Current Outpatient Prescriptions on File Prior to Visit:  ACCU-CHEK FELIPE PLUS test strip TEST DAILY   amLODIPine (NORVASC) 5 MG tablet TAKE 1 TABLET BY MOUTH EVERY DAY   atenolol (TENORMIN) 25 MG tablet Take 1 tablet (25 mg) by mouth daily   atorvastatin (LIPITOR) 40 MG tablet TAKE 1 TABLET BY MOUTH DAILY   blood glucose monitoring (ACCU-CHEK FELIPE PLUS) test strip Pt tests 1x/day   Cyanocobalamin (B-12) 1000 MCG TABS    glipiZIDE (GLUCOTROL XL) 10 MG 24 hr tablet Take 1 tablet (10 mg) by mouth 2 times daily   levothyroxine (SYNTHROID/LEVOTHROID) 125 MCG tablet TAKE 1 TABLET BY MOUTH DAILY   losartan (COZAAR) 100 MG tablet Take 1 tablet (100 mg) by mouth daily   mesalamine (APRISO ER) 0.375 g CP24 24 hr capsule Take 4 capsules (1.5 g) by mouth every morning   metFORMIN (GLUCOPHAGE) 500 MG tablet Take 2 tablets (1,000 mg) by mouth 2 times daily (with meals)     No current facility-administered medications on file prior to visit.     Past Medical History:   Diagnosis Date     Diverticulosis of colon (without mention of hemorrhage) 10/1/2012       Past Surgical History:   Procedure Laterality Date     CHOLECYSTECTOMY  3/1987     COLON SURGERY  01/2001    Left colon resection; diverticulitis     COLONOSCOPY N/A 4/24/2017    Procedure: COMBINED COLONOSCOPY, SINGLE OR MULTIPLE BIOPSY/POLYPECTOMY BY BIOPSY;;  Surgeon: Radha Salguero MD;  Location: [facility] OR     COLONOSCOPY WITH CO2 INSUFFLATION N/A 4/24/2017    Procedure: COLONOSCOPY WITH CO2 INSUFFLATION;  Colonoscopy Dx rectal bleeding, BMI 25.86, Pharm [facility] .[phone], ;  Surgeon: Radha Salguero MD;  Location: [facility] OR     GYN SURGERY  9/1983    tubal ligation     HERNIA REPAIR  12/2006    recurrent incisional hernia     THROAT SURGERY  12/1974    thyroidectomy       Social History   Substance Use Topics     Smoking status: Never Smoker     Smokeless tobacco:  Never Used     Alcohol use 1.2 - 2.4 oz/week     1 - 2 Cans of beer, 1 - 2 Shots of liquor per week      Comment: occasional cocktail or beer       Family History   Problem Relation Age of Onset     Cerebrovascular Disease Mother      Cancer Father      bladder     KIDNEY DISEASE No family hx of        ROS: A 12 system review of systems was negative other than noted here or above.     Exam:  /77 (BP Location: Right arm, Patient Position: Sitting, Cuff Size: Adult Regular)  Pulse 66  Wt 71.7 kg (158 lb)  SpO2 97%  BMI 28.17 kg/m2    GENERAL APPEARANCE: alert and no distress  EYES: PERRL, no scleral icterus  HENT: mouth without ulcers or lesions  NECK: supple, no adenopathy  RESP: lungs clear to auscultation   CV: regular rhythm, normal rate, no rub  Extremities: no clubbing, cyanosis, edema bilaterally:0-trace in LLE, +1 edema in the RLE  SKIN: no rash  NEURO: mentation intact and speech normal  PSYCH: affect normal/bright    Results:    Office Visit on 09/11/2018   Component Date Value Ref Range Status     Albumin Fraction 09/11/2018 PENDING  3.7 - 5.1 g/dL Incomplete     Alpha 1 Fraction 09/11/2018 PENDING  0.2 - 0.4 g/dL Incomplete     Alpha 2 Fraction 09/11/2018 PENDING  0.5 - 0.9 g/dL Incomplete     Beta Fraction 09/11/2018 PENDING  0.6 - 1.0 g/dL Incomplete     Gamma Fraction 09/11/2018 PENDING  0.7 - 1.6 g/dL Incomplete     Monoclonal Peak 09/11/2018 PENDING  0.0 g/dL Incomplete     ELP Interpretation: 09/11/2018 PENDING   Incomplete     Kappa Free Lt Chain 09/11/2018 2.77* 0.33 - 1.94 mg/dL Final     Lambda Free Lt Chain 09/11/2018 2.51  0.57 - 2.63 mg/dL Final     Kappa Lambda Ratio 09/11/2018 1.10  0.26 - 1.65 Final     Parathyroid Hormone Intact 09/11/2018 50  18 - 80 pg/mL Final     Protein Random Urine 09/11/2018 0.35  g/L Final     Protein Total Urine g/gr Creatinine 09/11/2018 1.52* 0 - 0.2 g/g Cr Final     Creatinine Urine 09/11/2018 25  mg/dL Final     Albumin Urine mg/L 09/11/2018 316   "mg/L Final     Albumin Urine mg/g Cr 09/11/2018 1274.19* 0 - 25 mg/g Cr Final     Color Urine 09/11/2018 Straw   Final     Appearance Urine 09/11/2018 Clear   Final     Glucose Urine 09/11/2018 Negative  NEG^Negative mg/dL Final     Bilirubin Urine 09/11/2018 Negative  NEG^Negative Final     Ketones Urine 09/11/2018 Negative  NEG^Negative mg/dL Final     Specific Gravity Urine 09/11/2018 1.002* 1.003 - 1.035 Final     Blood Urine 09/11/2018 Negative  NEG^Negative Final     pH Urine 09/11/2018 6.0  5.0 - 7.0 pH Final     Protein Albumin Urine 09/11/2018 30* NEG^Negative mg/dL Final     Urobilinogen mg/dL 09/11/2018 Normal  0.0 - 2.0 mg/dL Final     Nitrite Urine 09/11/2018 Negative  NEG^Negative Final     Leukocyte Esterase Urine 09/11/2018 Negative  NEG^Negative Final     Source 09/11/2018 Midstream Urine   Final     WBC Urine 09/11/2018 0 - 5  OTO5^0 - 5 /HPF Final     RBC Urine 09/11/2018 O - 2  OTO2^O - 2 /HPF Final     Squamous Epithelial /LPF Urine 09/11/2018 Few  FEW^Few /LPF Final       Assessment/Plan:   1. CKD Stage 2: risks for CKD include poorly controlled diabetes as well as longstanding hypertension. Mesalamine has potential implications on the kidney but it appears her function has been stable most recently. She does have proteinuria without hematuria which does fit with diabetic nephropathy. She is already on losartan 100 mg daily. Educated on benefits of optimal weight, avoidance of NSAIDs, blood pressure well controlled and also diabetes control as the important things to keep the kidney function stable. SPEP pending but kappa/lamda ratio is normal making myeloma unlikely. Will plan 6 month follow-up to assure stable kidney function.     2. Hypertension: asked her to follow blood pressure at home for the next few weeks to assure optimal control. She should call if she finds her pressures are outside the range of 110-130. If above goal, will consider adjustments as noted below under \"edema\".     3. " Ulcerative colitis: she reports good control with mesalamine. Very rarely mesalamine causes an acute interstitial picture but there are reports of this occurring as low as 0.3% of the time. We will plan to follow. On discussion with her, she feels that she may have been chronically volume depleted prior to getting her treatment for ulcerative colitis and may be the reason her creatinine is improved this year.     4. Hypothyroidism: well controlled on last check at 0.73    5. Very poor control documented in our system frm 2011 to 2017 with A1C levels between 9.4 and 11.7% at this time. It is possible that it was uncontrolled even further back as A1C levels are not available at this time dating back further than 2011. She has had improved control more recently with A1C at 7.6%. Encouraged ongoing optimization of diabetes to help slow progression of kidney disease.     6. Right lower ext swelling: right leg is more edematous than the left - she is at risk for some edema in the setting of proteinuria, however, should be bilateral. Ultrasound done to rule out DVT. Encouraged her to trial taking the norvasc at night. If ongoing edema despite moving to the evening, another option would be to stop the norvasc and instead trial hydrochlorothiazide to avoid norvasc contributing to edema and potentially optimize swelling with the diuretic.     Patient Instructions   1. Urine test today (maybe lab)  2. Avoid NSAIDs (ibuprofen,etc)  3. Follow blood pressure at home for the next 2 weeks. Please update if you are consistently outside our goal range of 110-130.   4. Follow-up in  6 months   5. Ultrasound of the right leg  6. Trial taking the amlodipine at night to see if that helps the swelling.          Swati Minor, DO

## 2018-10-02 DIAGNOSIS — E78.5 HYPERLIPIDEMIA LDL GOAL <100: ICD-10-CM

## 2018-10-02 RX ORDER — ATORVASTATIN CALCIUM 40 MG/1
TABLET, FILM COATED ORAL
Qty: 90 TABLET | Refills: 3 | Status: SHIPPED | OUTPATIENT
Start: 2018-10-02 | End: 2019-09-19

## 2018-10-02 NOTE — TELEPHONE ENCOUNTER
"Requested Prescriptions   Pending Prescriptions Disp Refills     atorvastatin (LIPITOR) 40 MG tablet [Pharmacy Med Name: ATORVASTATIN 40MG TABLETS] 90 tablet 0      Last Written Prescription Date:  1/10/18  Last Fill Quantity: 90,  # refills: 2   Last office visit: 7/30/2018 with prescribing provider:     Future Office Visit:   Next 5 appointments (look out 90 days)     Oct 29, 2018  7:30 AM CDT   SHORT with Nathan Dhillon MD   [facility]    [facility] [address] [phone]                  Sig: TAKE 1 TABLET BY MOUTH DAILY    Statins Protocol Passed    10/2/2018  3:53 AM       Passed - LDL on file in past 12 months    Recent Labs   Lab Test  06/25/18   0758   LDL  62            Passed - No abnormal creatine kinase in past 12 months    No lab results found.            Passed - Recent (12 mo) or future (30 days) visit within the authorizing provider's specialty    Patient had office visit in the last 12 months or has a visit in the next 30 days with authorizing provider or within the authorizing provider's specialty.  See \"Patient Info\" tab in inbasket, or \"Choose Columns\" in Meds & Orders section of the refill encounter.           Passed - Patient is age 18 or older       Passed - No active pregnancy on record       Passed - No positive pregnancy test in past 12 months          "

## 2018-11-08 DIAGNOSIS — I10 HYPERTENSION GOAL BP (BLOOD PRESSURE) < 140/90: ICD-10-CM

## 2018-11-08 RX ORDER — LOSARTAN POTASSIUM 100 MG/1
100 TABLET ORAL DAILY
Qty: 90 TABLET | Refills: 3 | Status: SHIPPED | OUTPATIENT
Start: 2018-11-08 | End: 2019-11-14

## 2018-11-08 NOTE — TELEPHONE ENCOUNTER
Routing refill request to provider for review/approval because:  out of range:  blood pressure

## 2018-11-08 NOTE — TELEPHONE ENCOUNTER
"Requested Prescriptions   Pending Prescriptions Disp Refills     losartan (COZAAR) 100 MG tablet  Last Written Prescription Date:  10/30/2017  Last Fill Quantity: 90,  # refills: 3   Last office visit: 7/30/2018 with prescribing provider:   SERGO  Future Office Visit:   Next 5 appointments (look out 90 days)     Nov 19, 2018  7:30 AM CST   SHORT with Nathan Dhillon MD   [facility]    [address]  [address]  [facility] [address] [phone]                  90 tablet 3     Sig: Take 1 tablet (100 mg) by mouth daily    Angiotensin-II Receptors Failed    11/8/2018  1:41 PM       Failed - Blood pressure under 140/90 in past 12 months    BP Readings from Last 3 Encounters:   09/11/18 160/77   09/10/18 148/73   07/30/18 122/70                Passed - Recent (12 mo) or future (30 days) visit within the authorizing provider's specialty    Patient had office visit in the last 12 months or has a visit in the next 30 days with authorizing provider or within the authorizing provider's specialty.  See \"Patient Info\" tab in inbasket, or \"Choose Columns\" in Meds & Orders section of the refill encounter.             Passed - Patient is age 18 or older       Passed - No active pregnancy on record       Passed - Normal serum creatinine on file in past 12 months    Recent Labs   Lab Test  09/10/18   0928   CR  0.93            Passed - Normal serum potassium on file in past 12 months    Recent Labs   Lab Test  09/10/18   0928   POTASSIUM  4.1                   Passed - No positive pregnancy test in past 12 months          "

## 2018-12-26 DIAGNOSIS — E11.9 TYPE 2 DIABETES MELLITUS WITHOUT COMPLICATION, WITHOUT LONG-TERM CURRENT USE OF INSULIN (H): ICD-10-CM

## 2018-12-26 NOTE — TELEPHONE ENCOUNTER
glipiZIDE (GLUCOTROL XL) 10 MG 24 hr tablet  Last Written Prescription Date:  10/30/17  Last Fill Quantity: 180,  # refills: 3   Last office visit: 7/30/2018 with prescribing provider:  07/30/18   Future Office Visit:   Next 5 appointments (look out 90 days)    Jan 28, 2019  7:30 AM CST  SHORT with Nathan Dhillon MD  [facility] [address] [facility]  [facility] [address] [phone]   Mar 11, 2019  8:30 AM CDT  Return Visit with Swati Minor MD  [facility] [address] [address] [place] [address] [phone]         Requested Prescriptions   Pending Prescriptions Disp Refills     glipiZIDE (GLUCOTROL XL) 10 MG 24 hr tablet 180 tablet 3     Sig: Take 1 tablet (10 mg) by mouth 2 times daily    Sulfonylurea Agents Failed - 12/26/2018  1:34 PM       Failed - Blood pressure less than 140/90 in past 6 months    BP Readings from Last 3 Encounters:   09/11/18 160/77   09/10/18 148/73   07/30/18 122/70                Failed - Patient has documented A1c within the specified period of time.    If HgbA1C is 8 or greater, it needs to be on file within the past 3 months.  If less than 8, must be on file within the past 6 months.     Recent Labs   Lab Test 06/25/18  0758   A1C 7.6*            Passed - Patient has documented LDL within the past 12 mos.    Recent Labs   Lab Test 06/25/18  0758   LDL 62            Passed - Patient has had a Microalbumin in the past 12 mos.    Recent Labs   Lab Test 09/11/18  1651  11/25/11  1712   [id number]  --   --  5.0   [id number]  --   --  7.9   MICROL 316   < >  --    UMALCR 1,274.19*   < >  --     < > = values in this interval not displayed.            Passed - Patient is age 18 or older       Passed - No active pregnancy on record       Passed - Patient has a recent creatinine (normal) within the past 12 mos.    Recent Labs   Lab Test  "09/10/18  0928   CR 0.93            Passed - Patient has not had a positive pregnancy test within the past 12 mos.       Passed - Recent (6 mo) or future (30 days) visit within the authorizing provider's specialty    Patient had office visit in the last 6 months or has a visit in the next 30 days with authorizing provider or within the authorizing provider's specialty.  See \"Patient Info\" tab in inbasket, or \"Choose Columns\" in Meds & Orders section of the refill encounter.              "

## 2018-12-31 RX ORDER — GLIPIZIDE 10 MG/1
10 TABLET, FILM COATED, EXTENDED RELEASE ORAL 2 TIMES DAILY
Qty: 180 TABLET | Refills: 3 | Status: SHIPPED | OUTPATIENT
Start: 2018-12-31 | End: 2019-12-31

## 2018-12-31 NOTE — TELEPHONE ENCOUNTER
Routing refill request to provider for review/approval because:  Labs out of range:  Blood pressure and labs out of date and out of range.

## 2019-01-26 PROBLEM — R80.9 TYPE 2 DIABETES MELLITUS WITH MICROALBUMINURIA, WITHOUT LONG-TERM CURRENT USE OF INSULIN (H): Status: ACTIVE | Noted: 2018-07-30

## 2019-01-26 PROBLEM — E53.8 VITAMIN B12 DEFICIENCY (NON ANEMIC): Status: ACTIVE | Noted: 2019-01-26

## 2019-01-26 PROBLEM — E11.29 TYPE 2 DIABETES MELLITUS WITH MICROALBUMINURIA, WITHOUT LONG-TERM CURRENT USE OF INSULIN (H): Status: ACTIVE | Noted: 2018-07-30

## 2019-01-28 ENCOUNTER — OFFICE VISIT (OUTPATIENT)
Dept: FAMILY MEDICINE | Facility: CLINIC | Age: 69
End: 2019-01-28
Payer: MEDICARE

## 2019-01-28 VITALS
HEIGHT: 62 IN | SYSTOLIC BLOOD PRESSURE: 158 MMHG | RESPIRATION RATE: 20 BRPM | TEMPERATURE: 97.8 F | HEART RATE: 75 BPM | DIASTOLIC BLOOD PRESSURE: 80 MMHG | OXYGEN SATURATION: 96 % | BODY MASS INDEX: 30 KG/M2 | WEIGHT: 163 LBS

## 2019-01-28 DIAGNOSIS — R80.9 TYPE 2 DIABETES MELLITUS WITH MICROALBUMINURIA, WITHOUT LONG-TERM CURRENT USE OF INSULIN (H): Primary | ICD-10-CM

## 2019-01-28 DIAGNOSIS — N18.2 CKD (CHRONIC KIDNEY DISEASE) STAGE 2, GFR 60-89 ML/MIN: ICD-10-CM

## 2019-01-28 DIAGNOSIS — E11.29 TYPE 2 DIABETES MELLITUS WITH MICROALBUMINURIA, WITHOUT LONG-TERM CURRENT USE OF INSULIN (H): Primary | ICD-10-CM

## 2019-01-28 DIAGNOSIS — I10 HYPERTENSION GOAL BP (BLOOD PRESSURE) < 130/80: ICD-10-CM

## 2019-01-28 DIAGNOSIS — E53.8 VITAMIN B12 DEFICIENCY (NON ANEMIC): ICD-10-CM

## 2019-01-28 DIAGNOSIS — R80.9 PROTEINURIA, UNSPECIFIED TYPE: ICD-10-CM

## 2019-01-28 LAB
ALBUMIN SERPL-MCNC: 4 G/DL (ref 3.4–5)
ALP SERPL-CCNC: 114 U/L (ref 40–150)
ALT SERPL W P-5'-P-CCNC: 12 U/L (ref 0–50)
ANION GAP SERPL CALCULATED.3IONS-SCNC: 10 MMOL/L (ref 3–14)
AST SERPL W P-5'-P-CCNC: 17 U/L (ref 0–45)
BILIRUB SERPL-MCNC: 0.5 MG/DL (ref 0.2–1.3)
BUN SERPL-MCNC: 20 MG/DL (ref 7–30)
CALCIUM SERPL-MCNC: 10 MG/DL (ref 8.5–10.1)
CHLORIDE SERPL-SCNC: 103 MMOL/L (ref 94–109)
CO2 SERPL-SCNC: 21 MMOL/L (ref 20–32)
CREAT SERPL-MCNC: 0.96 MG/DL (ref 0.52–1.04)
GFR SERPL CREATININE-BSD FRML MDRD: 61 ML/MIN/{1.73_M2}
GLUCOSE SERPL-MCNC: 178 MG/DL (ref 70–99)
HBA1C MFR BLD: 7.9 % (ref 0–5.6)
POTASSIUM SERPL-SCNC: 4.2 MMOL/L (ref 3.4–5.3)
PROT SERPL-MCNC: 8.9 G/DL (ref 6.8–8.8)
SODIUM SERPL-SCNC: 132 MMOL/L (ref 133–144)
VIT B12 SERPL-MCNC: 4282 PG/ML (ref 193–986)

## 2019-01-28 PROCEDURE — 83036 HEMOGLOBIN GLYCOSYLATED A1C: CPT | Performed by: INTERNAL MEDICINE

## 2019-01-28 PROCEDURE — 82607 VITAMIN B-12: CPT | Performed by: INTERNAL MEDICINE

## 2019-01-28 PROCEDURE — 36415 COLL VENOUS BLD VENIPUNCTURE: CPT | Performed by: INTERNAL MEDICINE

## 2019-01-28 PROCEDURE — 99214 OFFICE O/P EST MOD 30 MIN: CPT | Performed by: INTERNAL MEDICINE

## 2019-01-28 PROCEDURE — 80053 COMPREHEN METABOLIC PANEL: CPT | Performed by: INTERNAL MEDICINE

## 2019-01-28 RX ORDER — CARVEDILOL 25 MG/1
25 TABLET ORAL 2 TIMES DAILY WITH MEALS
Qty: 180 TABLET | Refills: 3 | Status: SHIPPED | OUTPATIENT
Start: 2019-01-28 | End: 2020-01-13

## 2019-01-28 ASSESSMENT — MIFFLIN-ST. JEOR: SCORE: 1232.61

## 2019-01-28 NOTE — PATIENT INSTRUCTIONS
Today we switched from atenolol to carvedilol  to control your blood pressure better.               You are planning to get a blood pressure monitor; Om[employer] brand.                          Hopefully your blood pressure will be controlled when you see the kidney specialist.                  I will let you know your lab results.

## 2019-01-28 NOTE — PROGRESS NOTES
"  SUBJECTIVE:   Keerthi Montoya is a 68 year old female who presents to clinic today for the following health issues:      Diabetes Follow-up    Patient is checking blood sugars: once daily.      Diabetic concerns: None     Symptoms of hypoglycemia (low blood sugar): none     Paresthesias (numbness or burning in feet) or sores: No     Date of last diabetic eye exam: appt in 2 weeks    BP Readings from Last 2 Encounters:   01/28/19 158/80   09/11/18 160/77     Hemoglobin A1C (%)   Date Value   01/28/2019 7.9 (H)   06/25/2018 7.6 (H)     LDL Cholesterol Calculated (mg/dL)   Date Value   01/23/2017 59   05/18/2015 159 (H)     LDL Cholesterol Direct (mg/dL)   Date Value   06/25/2018 62   10/30/2017 60       Diabetes Management Resources    Amount of exercise or physical activity: occ.    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                Sees nephrology in 3/19, as well as gastroenterology                    am glucoses \"pretty good\"; 112-125; or higher.            Walks on a treadmill; even bid.                        Problem list and histories reviewed & adjusted, as indicated.      Current Outpatient Medications   Medication Sig Dispense Refill     ACCU-CHEK FELIPE PLUS test strip TEST DAILY 100 strip 1     amLODIPine (NORVASC) 5 MG tablet TAKE 1 TABLET BY MOUTH EVERY DAY 90 tablet 3     atenolol (TENORMIN) 25 MG tablet Take 1 tablet (25 mg) by mouth daily 90 tablet 3     atorvastatin (LIPITOR) 40 MG tablet TAKE 1 TABLET BY MOUTH DAILY 90 tablet 3     blood glucose monitoring (ACCU-CHEK FELIPE PLUS) test strip Pt tests 1x/day 100 each 1     Cholecalciferol (VITAMIN D) 2000 units CAPS Take by mouth daily       Cyanocobalamin (B-12) 1000 MCG TABS        glipiZIDE (GLUCOTROL XL) 10 MG 24 hr tablet Take 1 tablet (10 mg) by mouth 2 times daily 180 tablet 3     levothyroxine (SYNTHROID/LEVOTHROID) 125 MCG tablet TAKE 1 " TABLET BY MOUTH DAILY 90 tablet 3     losartan (COZAAR) 100 MG tablet Take 1 tablet (100 mg) by mouth daily 90 tablet 3     mesalamine (APRISO ER) 0.375 g CP24 24 hr capsule Take 4 capsules (1.5 g) by mouth every morning 120 capsule 7     metFORMIN (GLUCOPHAGE) 500 MG tablet Take 2 tablets (1,000 mg) by mouth 2 times daily (with meals) 360 tablet 3     Allergies   Allergen Reactions     Actos [Pioglitazone]      Fluid retention     Animal Dander      Doxycycline Hives     Dust Mites      Grass      Keflex [Cephalexin Hcl] Hives     Metformin Diarrhea     At 1000mg     Metoprolol      Swelling of lower legs and fatigue     Other [Seasonal Allergies]      pollen     Ranitidine      rash     Synthroid [Levothroid] Swelling     ???     Tetracycline Hives     Tylenol Hives     Ziac [Bisoprolol-Hydrochlorothiazide]      Recent Labs   Lab Test 01/28/19  0807 09/10/18  0928 06/25/18  0758 10/30/17  0754  01/23/17  1433 05/18/15  0914  07/17/13  0817   A1C 7.9*  --  7.6* 8.6*  --  11.2* 9.4*   < > 10.7*   LDL  --   --  62 60  --  59 159*   < > 126   HDL  --   --   --   --   --  32* 54  --  47*   TRIG  --   --   --   --   --  147 92  --  113   ALT  --  16 15 17   < > 13 14   < > 19   CR  --  0.93 0.89 1.11*   < > 1.32* 1.00   < > 1.10*   GFRESTIMATED  --  60* 64 49*   < > 40* 56*   < > 50*   GFRESTBLACK  --  73 77 60*   < > 49* 68   < > 61   POTASSIUM  --  4.1 4.3 4.5   < > 4.7 4.3   < > 4.7   TSH  --   --  0.73 0.88   < > 3.09 1.29  --  0.43    < > = values in this interval not displayed.      BP Readings from Last 3 Encounters:   01/28/19 158/80   09/11/18 160/77   09/10/18 148/73    Wt Readings from Last 3 Encounters:   01/28/19 73.9 kg (163 lb)   09/11/18 71.7 kg (158 lb)   09/10/18 72.3 kg (159 lb 8 oz)                    Reviewed and updated as needed this visit by clinical staff  Tobacco  Allergies  Meds  Med Hx  Surg Hx  Fam Hx  Soc Hx      Reviewed and updated as needed this visit by Provider  Allergies      "    ROS:  CONSTITUTIONAL:NEGATIVE for fever, chills, change in weight  RESP:NEGATIVE for significant cough or SOB  CV: NEGATIVE for chest pain, palpitations or peripheral edema  GI: NEGATIVE for nausea, abdominal pain, heartburn, or change in bowel habits  NEURO: NEGATIVE for weakness, dizziness or paresthesias    OBJECTIVE:                                                    /80   Pulse 75   Temp 97.8  F (36.6  C)   Resp 20   Ht 1.575 m (5' 2\")   Wt 73.9 kg (163 lb)   SpO2 96%   BMI 29.81 kg/m    Body mass index is 29.81 kg/m .  GENERAL APPEARANCE: alert and no distress  RESP: lungs clear to auscultation - no rales, rhonchi or wheezes  CV: regular rates and rhythm, normal S1 S2, no S3 or S4, no murmur, click or rub and no edema    Diagnostic test results:  Pending     ASSESSMENT/PLAN:                                                        ICD-10-CM    1. Type 2 diabetes mellitus with microalbuminuria, without long-term current use of insulin (H) E11.29 Comprehensive metabolic panel (BMP + Alb, Alk Phos, ALT, AST, Total. Bili, TP)    R80.9 Hemoglobin A1c   2. Vitamin B12 deficiency (non anemic) E53.8 Vitamin B12   3. Hypertension goal BP (blood pressure) < 130/80 I10 Comprehensive metabolic panel (BMP + Alb, Alk Phos, ALT, AST, Total. Bili, TP)     carvedilol (COREG) 25 MG tablet   4. CKD (chronic kidney disease) stage 2, GFR 60-89 ml/min N18.2 Comprehensive metabolic panel (BMP + Alb, Alk Phos, ALT, AST, Total. Bili, TP)   5. Proteinuria, unspecified type R80.9        Summary and implications:  We reviewed her situation at length.  We need better blood pressure control.           Her allergy list includes a combination blood pressure medication with 1 of the ingredients being hydrochlorothiazide.  The type of reaction is not specified, but she speculates that she had urticaria as a side effect.                   Check labs and adjust medications as indicated.    Patient Instructions   Today we switched " from atenolol to carvedilol  to control your blood pressure better.               You are planning to get a blood pressure monitor; Omron brand.                          Hopefully your blood pressure will be controlled when you see the kidney specialist.                  I will let you know your lab results.                         Return in about 6 months (around 7/28/2019) for yearly wellness visit, BP Recheck, diabetes.      Nathan Dhillon MD  [facility]                Results for orders placed or performed in visit on 01/28/19   Comprehensive metabolic panel (BMP + Alb, Alk Phos, ALT, AST, Total. Bili, TP)   Result Value Ref Range    Sodium 132 (L) 133 - 144 mmol/L    Potassium 4.2 3.4 - 5.3 mmol/L    Chloride 103 94 - 109 mmol/L    Carbon Dioxide 21 20 - 32 mmol/L    Anion Gap 10 3 - 14 mmol/L    Glucose 178 (H) 70 - 99 mg/dL    Urea Nitrogen 20 7 - 30 mg/dL    Creatinine 0.96 0.52 - 1.04 mg/dL    GFR Estimate 61 >60 mL/min/[1.73_m2]    GFR Estimate If Black 71 >60 mL/min/[1.73_m2]    Calcium 10.0 8.5 - 10.1 mg/dL    Bilirubin Total 0.5 0.2 - 1.3 mg/dL    Albumin 4.0 3.4 - 5.0 g/dL    Protein Total 8.9 (H) 6.8 - 8.8 g/dL    Alkaline Phosphatase 114 40 - 150 U/L    ALT 12 0 - 50 U/L    AST 17 0 - 45 U/L   Hemoglobin A1c   Result Value Ref Range    Hemoglobin A1C 7.9 (H) 0 - 5.6 %   Vitamin B12   Result Value Ref Range    Vitamin B12 4,282 (H) 193 - 986 pg/mL     My chart message sent.            Your diabetes control is slightly worse,after the holidays and after your guests have all left.       The A1C of 7.9 correlates to an average blood sugar of approximately 177.              You are planning to work harder on restricting carbohydrates ( starches, sweets, etc),and to increase your treadmill time.           Your B12 level is higher than it needs to be.           Please cut back the B12 to just 3 days per week.              Your other lab results are normal or  stable.

## 2019-01-29 RX ORDER — CYANOCOBALAMIN (VITAMIN B-12) 1000 MCG
1000 TABLET ORAL
COMMUNITY
Start: 2019-01-30

## 2019-03-04 ENCOUNTER — DOCUMENTATION ONLY (OUTPATIENT)
Dept: CARE COORDINATION | Facility: CLINIC | Age: 69
End: 2019-03-04

## 2019-03-28 ENCOUNTER — TELEPHONE (OUTPATIENT)
Dept: GASTROENTEROLOGY | Facility: CLINIC | Age: 69
End: 2019-03-28

## 2019-03-28 NOTE — TELEPHONE ENCOUNTER
Called and left message for patient reminding of appointment scheduled on 4/3/19 at 0700 with [facility]. Patient to arrive 15 min early. If need to be reschedule, patient to call [phone].      ANDREZ Jones

## 2019-04-02 DIAGNOSIS — I10 HYPERTENSION GOAL BP (BLOOD PRESSURE) < 140/90: ICD-10-CM

## 2019-04-02 NOTE — TELEPHONE ENCOUNTER
"Requested Prescriptions   Pending Prescriptions Disp Refills     amLODIPine (NORVASC) 5 MG tablet [Pharmacy Med Name: AMLODIPINE BESYLATE 5MG TABLETS]  Last Written Prescription Date:  3/19/2018  Last Fill Quantity: 90 tablet,  # refills: 3   Last office visit: 1/28/2019 with prescribing provider:  Alejo   Future Office Visit:   Next 5 appointments (look out 90 days)    Apr 08, 2019  4:30 PM CDT  Return Visit with Swati Minor MD  [facility] [address] [place] [id number] [phone]          90 tablet 0     Sig: TAKE 1 TABLET BY MOUTH EVERY DAY    Calcium Channel Blockers Protocol  Failed - 4/2/2019  8:40 AM       Failed - Blood pressure under 140/90 in past 12 months    BP Readings from Last 3 Encounters:   01/28/19 158/80   09/11/18 160/77   09/10/18 148/73                Passed - Recent (12 mo) or future (30 days) visit within the authorizing provider's specialty    Patient had office visit in the last 12 months or has a visit in the next 30 days with authorizing provider or within the authorizing provider's specialty.  See \"Patient Info\" tab in inbasket, or \"Choose Columns\" in Meds & Orders section of the refill encounter.             Passed - Medication is active on med list       Passed - Patient is age 18 or older       Passed - No active pregnancy on record       Passed - Normal serum creatinine on file in past 12 months    Recent Labs   Lab Test 01/28/19  0807   CR 0.96            Passed - No positive pregnancy test in past 12 months           "

## 2019-04-03 ENCOUNTER — OFFICE VISIT (OUTPATIENT)
Dept: GASTROENTEROLOGY | Facility: CLINIC | Age: 69
End: 2019-04-03
Payer: MEDICARE

## 2019-04-03 VITALS
BODY MASS INDEX: 29.22 KG/M2 | WEIGHT: 158.8 LBS | HEIGHT: 62 IN | HEART RATE: 68 BPM | SYSTOLIC BLOOD PRESSURE: 154 MMHG | DIASTOLIC BLOOD PRESSURE: 84 MMHG | OXYGEN SATURATION: 95 %

## 2019-04-03 DIAGNOSIS — K51.00 ULCERATIVE PANCOLITIS WITHOUT COMPLICATION (H): Primary | ICD-10-CM

## 2019-04-03 ASSESSMENT — PAIN SCALES - GENERAL: PAINLEVEL: NO PAIN (0)

## 2019-04-03 ASSESSMENT — MIFFLIN-ST. JEOR: SCORE: 1208.56

## 2019-04-03 NOTE — LETTER
4/3/2019       RE: Keerthi Montoya  [address] [place] [id number]     Dear Colleague,    Thank you for referring your patient, Keerthi Montoya, to the [facility] GASTRO[facility] IBD [facility]IN[facility] at [facility]. Please see a copy of my visit note below.    GI CLINIC VISIT    CC/REFERRING PROVIDER: Nathan Dhillon  REASON FOR CONSULTATION: UC    HPI: 69 year old female w/ h/o UC Pan-colitis 8/2015 (prior proctosigmoiditis classification, confirmed pan-colitis 12/2017) currently on PO 5-ASA therapy, HTN, DM2, diabetic nephropathy, multinodular goiter s/p complete thyroidectomy ~40yrs ago complicated by postoperative hypothyroidism, diverticulosis complicated by diverticulitis w/ prior limited L hemicolectomy 2002, s/p open cholecystectomy 2000, s/p ex-lap ~30yrs ago for presumed ectopic pregnancy (apparent diverticulitis, non-surgical management on discovery), s/p TL 1983, complicated post-surgical [facility] s/p mesh repair 2007, among other medical issues - presenting for f/u UC. Doing well overall, reports having 1-2 soft formed BM/day w/o blood. Denies any tenesmus or insecurity passing flatus, no fecal incontinence or new perianal/nocturnal sxs noted. Denies any N/V/F/C/HA/NS or other const/syst/cardiopulmonary sxs, no BRBPR/melena/urinary changes, no unintentional wt loss or appetite/satiety changes. No other bowel/bladder habit changes, no dysphagia/odynophagia. No jaundice/icterus/pruritus, no acholic stools/steatorrhea, no new lumps/bumps, no jt pain/oral ulcer/rash/eye sxs noted.    ROS: 10pt ROS performed and otherwise negative.    PERTINENT PAST MEDICAL/SURGICAL HISTORY:  As noted above.    PERTINENT MEDICATIONS:  - Apriso 1.5g PO QDAY  Medications reviewed with patient today, see Medication List/Assessment for details.  No other NSAID/anticoagulation reported by patient.  No other OTC/herbal/supplements reported by patient.    SOCIAL HISTORY: Tobacco:  "none.    PHYSICAL EXAMINATION:  Vitals reviewed, AFVSS  /84   Pulse 68   Ht 1.575 m (5' 2\")   Wt 72 kg (158 lb 12.8 oz)   SpO2 95%   BMI 29.04 kg/m     Wt 158# today (stable)  Gen: aaox3, cooperative, pleasant, not diaphoretic, nad  HEENT: ncat, neck supple, no clad/sclad, normal op w/o ulcer/exudate, anicteric, mmm  Resp/CV without acute findings, not dyspneic/tachycardic  Abd: +nabs, soft, nt, nd, no peritoneal s/s noted  Ext: no c/c/e  Skin: warm, perfused, no jaundice  Neuro: grossly intact, no asterixis noted    PERTINENT STUDIES:  Labs 1/28/19  Creatinine 0.96  LFTs wnl  Vit B12 4282    ASSESSMENT/PLAN:    1. UC Pan-colitis with excellent ongoing clinical + endoscopic response to PO 5-ASA therapy - continue supportive care as ordered for now  1. It was wonderful getting a chance to meet with you to discuss your UC Pan-colitis particularly given suspicion of pan-colonic involvement, overall well-controlled on your 12/2017 colonoscopy - discussed next steps in evaluation and management together today.  - Continue Apriso 1.5g daily as ordered for now, particularly given your excellent clinical + endoscopic response to PO 5-ASA therapy.  - Recommend follow-up with Nephrology as scheduled to evaluate your proteinuria further, pattern not suggestive of allergic phenomena seen rarely with 5-ASA treatment. Considerations of Diabetic Nephropathy vs. other causes for this. Seems unlikely to be related to mesalamine.  - Discussed Preventive Care in IBD today, recommend starting routine dysplasia surveillance colonoscopy in 7431-1873 unless symptomatic sooner.    2. Recommend routine studies including nutritional markers as we discussed today.   - Recommend having the following studies checked at least 2-3 times/year for disease and medication safety monitoring: BMP, LFT, CBC with differential, ESR/CRP.    3. Continue to monitor for the danger signs/symptoms we reviewed together today:  worsening abdominal " pain, worsening diarrhea, obstructive symptoms (nausea/vomiting, abdominal distention, inability to pass stool/flatus), blood mixed into stools, persistent fevers/chills, progressive anemia (particulary with iron deficiency), difficulty with swallowing, perianal/rectal discomfort (particularly with defecation), unexpected weight loss, etc.  - Contact us via MyChart or phone should these symptoms occur, particularly as we may advise further evaluation accordingly.    2. Cancer Screening  No PSC or known FH CRC, recommend starting routine dysplasia surveillance in 5579-3944 unless symptomatic sooner.    RTC 6-8 months, sooner if symptomatic.      Thank you for this consultation. It was a pleasure to participate in the care of this patient; please contact us with any further questions.     Again, thank you for allowing me to participate in the care of your patient.      Sincerely,    Jerry Robbins PA-C

## 2019-04-03 NOTE — PROGRESS NOTES
"GI CLINIC VISIT    CC/REFERRING PROVIDER: Nathan Dhillon  REASON FOR CONSULTATION: UC    HPI: 69 year old female w/ h/o UC Pan-colitis 8/2015 (prior proctosigmoiditis classification, confirmed pan-colitis 12/2017) currently on PO 5-ASA therapy, HTN, DM2, diabetic nephropathy, multinodular goiter s/p complete thyroidectomy ~40yrs ago complicated by postoperative hypothyroidism, diverticulosis complicated by diverticulitis w/ prior limited L hemicolectomy 2002, s/p open cholecystectomy 2000, s/p ex-lap ~30yrs ago for presumed ectopic pregnancy (apparent diverticulitis, non-surgical management on discovery), s/p TL 1983, complicated post-surgical [facility] s/p mesh repair 2007, among other medical issues - presenting for f/u UC. Doing well overall, reports having 1-2 soft formed BM/day w/o blood. Denies any tenesmus or insecurity passing flatus, no fecal incontinence or new perianal/nocturnal sxs noted. Denies any N/V/F/C/HA/NS or other const/syst/cardiopulmonary sxs, no BRBPR/melena/urinary changes, no unintentional wt loss or appetite/satiety changes. No other bowel/bladder habit changes, no dysphagia/odynophagia. No jaundice/icterus/pruritus, no acholic stools/steatorrhea, no new lumps/bumps, no jt pain/oral ulcer/rash/eye sxs noted.    ROS: 10pt ROS performed and otherwise negative.    PERTINENT PAST MEDICAL/SURGICAL HISTORY:  As noted above.    PERTINENT MEDICATIONS:  - Apriso 1.5g PO QDAY  Medications reviewed with patient today, see Medication List/Assessment for details.  No other NSAID/anticoagulation reported by patient.  No other OTC/herbal/supplements reported by patient.    SOCIAL HISTORY: Tobacco: none.    PHYSICAL EXAMINATION:  Vitals reviewed, AFVSS  /84   Pulse 68   Ht 1.575 m (5' 2\")   Wt 72 kg (158 lb 12.8 oz)   SpO2 95%   BMI 29.04 kg/m    Wt 158# today (stable)  Gen: aaox3, cooperative, pleasant, not diaphoretic, nad  HEENT: ncat, neck supple, no clad/sclad, normal op w/o ulcer/exudate, " anicteric, mmm  Resp/CV without acute findings, not dyspneic/tachycardic  Abd: +nabs, soft, nt, nd, no peritoneal s/s noted  Ext: no c/c/e  Skin: warm, perfused, no jaundice  Neuro: grossly intact, no asterixis noted    PERTINENT STUDIES:  Labs 1/28/19  Creatinine 0.96  LFTs wnl  Vit B12 4282    ASSESSMENT/PLAN:    1. UC Pan-colitis with excellent ongoing clinical + endoscopic response to PO 5-ASA therapy - continue supportive care as ordered for now  1. It was wonderful getting a chance to meet with you to discuss your UC Pan-colitis particularly given suspicion of pan-colonic involvement, overall well-controlled on your 12/2017 colonoscopy - discussed next steps in evaluation and management together today.  - Continue Apriso 1.5g daily as ordered for now, particularly given your excellent clinical + endoscopic response to PO 5-ASA therapy.  - Recommend follow-up with Nephrology as scheduled to evaluate your proteinuria further, pattern not suggestive of allergic phenomena seen rarely with 5-ASA treatment. Considerations of Diabetic Nephropathy vs. other causes for this. Seems unlikely to be related to mesalamine.  - Discussed Preventive Care in IBD today, recommend starting routine dysplasia surveillance colonoscopy in 3991-5799 unless symptomatic sooner.    2. Recommend routine studies including nutritional markers as we discussed today.   - Recommend having the following studies checked at least 2-3 times/year for disease and medication safety monitoring: BMP, LFT, CBC with differential, ESR/CRP.    3. Continue to monitor for the danger signs/symptoms we reviewed together today:  worsening abdominal pain, worsening diarrhea, obstructive symptoms (nausea/vomiting, abdominal distention, inability to pass stool/flatus), blood mixed into stools, persistent fevers/chills, progressive anemia (particulary with iron deficiency), difficulty with swallowing, perianal/rectal discomfort (particularly with defecation),  unexpected weight loss, etc.  - Contact us via MyChart or phone should these symptoms occur, particularly as we may advise further evaluation accordingly.    2. Cancer Screening  No PSC or known FH CRC, recommend starting routine dysplasia surveillance in 7366-6706 unless symptomatic sooner.    RTC 6-8 months, sooner if symptomatic.      Thank you for this consultation. It was a pleasure to participate in the care of this patient; please contact us with any further questions.       Jerry Robbins PA-C  Division of Gastroenterology, Hepatology and Nutrition  [facility]         Answers for HPI/ROS submitted by the patient on 4/3/2019   General Symptoms: No  Skin Symptoms: No  HENT Symptoms: No  EYE SYMPTOMS: No  HEART SYMPTOMS: No  LUNG SYMPTOMS: No  INTESTINAL SYMPTOMS: No  URINARY SYMPTOMS: No  GYNECOLOGIC SYMPTOMS: No  BREAST SYMPTOMS: No  SKELETAL SYMPTOMS: No  BLOOD SYMPTOMS: No  NERVOUS SYSTEM SYMPTOMS: No  MENTAL HEALTH SYMPTOMS: No

## 2019-04-03 NOTE — NURSING NOTE
"Chief Complaint   Patient presents with     RECHECK     Return, 6 months follow up       Vitals:    04/03/19 0648 04/03/19 0655   BP: 153/83 154/84   Pulse: 68    SpO2: 95%    Weight: 72 kg (158 lb 12.8 oz)    Height: 1.575 m (5' 2\")        Body mass index is 29.04 kg/m .    Ramonita Roe CMA  "

## 2019-04-03 NOTE — PATIENT INSTRUCTIONS
It was a pleasure taking care of you today.  I've included a brief summary of our discussion and care plan from today's visit below.  Please review this information with your primary care provider.  ______________________________________________________________________    My recommendations are summarized as follows:    -- Continue Apriso 1.5 g dialy   -- Labs next visit   -- Next endoscopic assessment: colonoscopy 2814-5418, unless symptomatic sooner  -- Patient with IBD we recommend supplementation vitamin D 1000 units daily and calcium 500 mg twice daily.  -- Vaccines/immunizations to be updated: Recommend yearly flu shot, pneumonia vaccines (Pneumovax 13 then 8 weeks later Pneumovax 23 then 5 years later Pneumovax 23), tetanus every 10 years.  -- No NSAIDs (ibuprofen, or anything containing ibuprofen)       For additional resources about inflammatory bowel disease visit [email or web address]      Return to GI Clinic in 6 months to review your progress.    ______________________________________________________________________    Who do I call with any questions after my visit?  Please be in touch if there are any further questions that arise following today's visit.  There are multiple ways to contact your gastroenterology care team.        During business hours, you may reach a Gastroenterology nurse at [phone], option 3.       To schedule or reschedule an appointment, please call [phone].       You can always send a secure message through [employer].  [employer] messages are answered by your nurse or doctor typically within 24 hours.  Please allow extra time on weekends and holidays.        For urgent/emergent questions after business hours, you may reach the on-call GI Fellow by contacting the [facility] at [phone].      In order for your refill to be processed in a timely fashion, it is your responsibility to ensure you follow the recommendations from your  provider regarding your laboratory studies and follow up appointments.       How will I get the results of any tests ordered?    You will receive all of your results.  If you have signed up for [employer], any tests ordered at your visit will be available to you after your physician reviews them.  Typically this takes 1-2 weeks.  If there are urgent results that require a change in your care plan, your physician or nurse will call you to discuss the next steps.      What is [employer]?  [employer] is a secure way for you to access all of your healthcare records from the [facility].  It is a web based computer program, so you can sign on to it from any location.  It also allows you to send secure messages to your care team.  I recommend signing up for [employer] access if you have not already done so and are comfortable with using a computer.      How to I schedule a follow-up visit?  If you did not schedule a follow-up visit today, please call [phone] to schedule a follow-up office visit.        Sincerely,    Jerry Robbins PA-C  [facility]  Division of Gastroenterology

## 2019-04-04 ENCOUNTER — DOCUMENTATION ONLY (OUTPATIENT)
Dept: LAB | Facility: CLINIC | Age: 69
End: 2019-04-04

## 2019-04-04 DIAGNOSIS — N18.2 CKD (CHRONIC KIDNEY DISEASE) STAGE 2, GFR 60-89 ML/MIN: Primary | ICD-10-CM

## 2019-04-04 RX ORDER — AMLODIPINE BESYLATE 5 MG/1
TABLET ORAL
Qty: 90 TABLET | Refills: 4 | Status: SHIPPED | OUTPATIENT
Start: 2019-04-04 | End: 2020-01-13

## 2019-04-04 NOTE — TELEPHONE ENCOUNTER
Routing refill request to provider for review/approval because:     Failed - Blood pressure under 140/90 in past 12 months

## 2019-04-10 ENCOUNTER — TELEPHONE (OUTPATIENT)
Dept: FAMILY MEDICINE | Facility: CLINIC | Age: 69
End: 2019-04-10

## 2019-04-10 NOTE — TELEPHONE ENCOUNTER
Panel Management Review      Patient has the following on her problem list:     Hypertension   Last three blood pressure readings:  BP Readings from Last 3 Encounters:   04/03/19 154/84   01/28/19 158/80   09/11/18 160/77     Blood pressure: FAILED    HTN Guidelines:  Age 18-59 BP range:  Less than 140/90  Age 60-85 with Diabetes:  Less than 140/90  Age 60-85 without Diabetes:  less than 150/90      Composite cancer screening  Chart review shows that this patient is due/due soon for the following None  Summary:    Patient is due/failing the following:   BP CHECK    Action needed:   Patient needs office visit for HTN.    Type of outreach:    Sent letter.    Questions for provider review:    None                                                                                                                                    Georgette Ernst LPN

## 2019-04-10 NOTE — LETTER
April 10, 2019    Keerthi Montoya  [address] [place] [id number]    Dear Viraj Pendleton cares about your health and your health plan.  I have reviewed your medical conditions, medication list and lab results, and am making recommendations based on this review to better manage your health.    You are in particular need of attention regarding:  -High Blood Pressure    I am recommending that you:     -schedule a FOLLOWUP OFFICE APPOINTMENT with me.        Please call us at the [facility] location:  [phone] or use [employer] to address the above recommendations.     Thank you for trusting [facility].  We appreciate the opportunity to serve you and look forward to supporting your healthcare in the future.    If you have (or plan to have) any of these tests done at a facility other than a [facility], [facility] or a [facility], please have the results sent to the [facility] location noted above.      Best Regards,    Nathan Dhillon MD

## 2019-05-14 ENCOUNTER — OFFICE VISIT (OUTPATIENT)
Dept: NEPHROLOGY | Facility: CLINIC | Age: 69
End: 2019-05-14
Payer: MEDICARE

## 2019-05-14 VITALS
WEIGHT: 159 LBS | HEART RATE: 73 BPM | SYSTOLIC BLOOD PRESSURE: 140 MMHG | BODY MASS INDEX: 29.08 KG/M2 | DIASTOLIC BLOOD PRESSURE: 83 MMHG | OXYGEN SATURATION: 99 %

## 2019-05-14 DIAGNOSIS — N18.30 CKD (CHRONIC KIDNEY DISEASE) STAGE 3, GFR 30-59 ML/MIN (H): Primary | ICD-10-CM

## 2019-05-14 DIAGNOSIS — I10 HYPERTENSION GOAL BP (BLOOD PRESSURE) < 130/80: ICD-10-CM

## 2019-05-14 DIAGNOSIS — K51.00 ULCERATIVE PANCOLITIS WITHOUT COMPLICATION (H): ICD-10-CM

## 2019-05-14 DIAGNOSIS — N18.2 CKD (CHRONIC KIDNEY DISEASE) STAGE 2, GFR 60-89 ML/MIN: ICD-10-CM

## 2019-05-14 DIAGNOSIS — R80.9 TYPE 2 DIABETES MELLITUS WITH MICROALBUMINURIA, WITHOUT LONG-TERM CURRENT USE OF INSULIN (H): ICD-10-CM

## 2019-05-14 DIAGNOSIS — E11.29 TYPE 2 DIABETES MELLITUS WITH MICROALBUMINURIA, WITHOUT LONG-TERM CURRENT USE OF INSULIN (H): ICD-10-CM

## 2019-05-14 DIAGNOSIS — E89.0 POSTOPERATIVE HYPOTHYROIDISM: ICD-10-CM

## 2019-05-14 LAB
ALBUMIN SERPL-MCNC: 3.8 G/DL (ref 3.4–5)
ANION GAP SERPL CALCULATED.3IONS-SCNC: 5 MMOL/L (ref 3–14)
BUN SERPL-MCNC: 17 MG/DL (ref 7–30)
CALCIUM SERPL-MCNC: 9.3 MG/DL (ref 8.5–10.1)
CHLORIDE SERPL-SCNC: 108 MMOL/L (ref 94–109)
CO2 SERPL-SCNC: 25 MMOL/L (ref 20–32)
CREAT SERPL-MCNC: 1.13 MG/DL (ref 0.52–1.04)
CREAT UR-MCNC: 18 MG/DL
GFR SERPL CREATININE-BSD FRML MDRD: 50 ML/MIN/{1.73_M2}
GLUCOSE SERPL-MCNC: 108 MG/DL (ref 70–99)
HGB BLD-MCNC: 12.4 G/DL (ref 11.7–15.7)
PHOSPHATE SERPL-MCNC: 2.9 MG/DL (ref 2.5–4.5)
POTASSIUM SERPL-SCNC: 4.7 MMOL/L (ref 3.4–5.3)
PROT UR-MCNC: 0.07 G/L
PROT/CREAT 24H UR: 0.42 G/G CR (ref 0–0.2)
PTH-INTACT SERPL-MCNC: 80 PG/ML (ref 18–80)
SODIUM SERPL-SCNC: 138 MMOL/L (ref 133–144)

## 2019-05-14 PROCEDURE — 80069 RENAL FUNCTION PANEL: CPT | Performed by: INTERNAL MEDICINE

## 2019-05-14 PROCEDURE — 36415 COLL VENOUS BLD VENIPUNCTURE: CPT | Performed by: INTERNAL MEDICINE

## 2019-05-14 PROCEDURE — 84156 ASSAY OF PROTEIN URINE: CPT | Performed by: INTERNAL MEDICINE

## 2019-05-14 PROCEDURE — 85018 HEMOGLOBIN: CPT | Performed by: INTERNAL MEDICINE

## 2019-05-14 PROCEDURE — 83970 ASSAY OF PARATHORMONE: CPT | Performed by: INTERNAL MEDICINE

## 2019-05-14 PROCEDURE — 99214 OFFICE O/P EST MOD 30 MIN: CPT | Performed by: INTERNAL MEDICINE

## 2019-05-14 ASSESSMENT — PAIN SCALES - GENERAL: PAINLEVEL: NO PAIN (0)

## 2019-05-14 NOTE — PATIENT INSTRUCTIONS
1. Trial stopping the amlodipine  2. Check BP again before you leave today.   3. Goal blood pressure 110-130 systolic, ideally less than 80 diastolic. Please MyChart if you find you are above this goal.   4. Plan 6 month follow-up.

## 2019-05-14 NOTE — NURSING NOTE
Keerthi Montoya's goals for this visit include: [facility]  [address] [place] [id number]  [phone]  Fax [phone]      She requests these members of her care team be copied on today's visit information: no    PCP: Nathan Dhillon    Referring Provider:  No referring provider defined for this encounter.    /80 (BP Location: Left arm, Patient Position: Sitting, Cuff Size: Adult Regular)   Pulse 73   Wt 72.1 kg (159 lb)   SpO2 99%   BMI 29.08 kg/m      Do you need any medication refills at today's visit? Divina Purdy LPN

## 2019-06-03 PROBLEM — N18.30 CKD (CHRONIC KIDNEY DISEASE) STAGE 3, GFR 30-59 ML/MIN (H): Status: ACTIVE | Noted: 2019-06-03

## 2019-06-04 NOTE — PROGRESS NOTES
May 14, 2019    CC: CKD     HPI: Keerthi Montoya is a 69 year old female who presents for follow-up of proteinuria. Ms. Montoya's hx is significant for DM dx at least 10 years ago - very poor control for years (documented A1C levels 9.4-11.7% from 2011 to 2017); no known retinopathy per patient. She has longstanding hypertension dx when she was 18 years old. Additional hx includes ulcerative colitis and hypothyroidism (following thyroidectomy at age 24). Her ulcerative colitis was dx ~2017 but she feels she likely had trouble with this disease for years. She reports having loose stool for a long time prior to receiving therapy for her UC; she feels the blood in the stool may have contaminated previous urine samples potentially.    Creatinine has been 0.89-0.96 over the past year; 1.13 now. She is not using tobacco, NSAIDs. She is on losartan 100 mg daily. Blood pressure at home has been in the 90s and she reports the majority of the time less than 120. She has right greater than left leg swelling - ultrasound done in Sept was negative for DVT in the right leg. She denies any difficulty with her UC recently - no diarrhea.        Allergies   Allergen Reactions     Actos [Pioglitazone]      Fluid retention     Animal Dander      Doxycycline Hives     Dust Mites      Grass      Keflex [Cephalexin Hcl] Hives     Metformin Diarrhea     At 1000mg     Metoprolol      Swelling of lower legs and fatigue     Other [Seasonal Allergies]      pollen     Ranitidine      rash     Synthroid [Levothroid] Swelling     ???     Tetracycline Hives     Tylenol Hives     Ziac [Bisoprolol-Hydrochlorothiazide]          Current Outpatient Medications on File Prior to Visit:  ACCU-CHEK FELIPE PLUS test strip TEST DAILY   amLODIPine (NORVASC) 5 MG tablet TAKE 1 TABLET BY MOUTH EVERY DAY   atorvastatin (LIPITOR) 40 MG tablet TAKE 1 TABLET BY MOUTH DAILY   blood glucose (ACCU-CHEK FELIPE PLUS) test strip TEST ONCE DAILY   carvedilol (COREG) 25 MG  tablet Take 1 tablet (25 mg) by mouth 2 times daily (with meals)   Cholecalciferol (VITAMIN D) 2000 units CAPS Take by mouth daily   Cyanocobalamin (B-12) 1000 MCG TABS Take 1,000 mcg by mouth three times a week   glipiZIDE (GLUCOTROL XL) 10 MG 24 hr tablet Take 1 tablet (10 mg) by mouth 2 times daily   levothyroxine (SYNTHROID/LEVOTHROID) 125 MCG tablet TAKE 1 TABLET BY MOUTH DAILY   losartan (COZAAR) 100 MG tablet Take 1 tablet (100 mg) by mouth daily   mesalamine (APRISO ER) 0.375 g CP24 24 hr capsule Take 4 capsules (1.5 g) by mouth every morning   metFORMIN (GLUCOPHAGE) 500 MG tablet Take 2 tablets (1,000 mg) by mouth 2 times daily (with meals)     No current facility-administered medications on file prior to visit.     Past Medical History:   Diagnosis Date     Diverticulosis of colon (without mention of hemorrhage) 10/1/2012       Past Surgical History:   Procedure Laterality Date     CHOLECYSTECTOMY  3/1987     COLON SURGERY  01/2001    Left colon resection; diverticulitis     COLONOSCOPY N/A 4/24/2017    Procedure: COMBINED COLONOSCOPY, SINGLE OR MULTIPLE BIOPSY/POLYPECTOMY BY BIOPSY;;  Surgeon: Radha Salugero MD;  Location: [facility]     COLONOSCOPY WITH CO2 INSUFFLATION N/A 4/24/2017    Procedure: COLONOSCOPY WITH CO2 INSUFFLATION;  Colonoscopy Dx rectal bleeding, BMI 25.86, Pharm [facility] .[phone], ;  Surgeon: Radha Salguero MD;  Location: [facility]     GYN SURGERY  9/1983    tubal ligation     HERNIA REPAIR  12/2006    recurrent incisional hernia     THROAT SURGERY  12/1974    thyroidectomy       Social History     Tobacco Use     Smoking status: Never Smoker     Smokeless tobacco: Never Used   Substance Use Topics     Alcohol use: Yes     Alcohol/week: 1.2 - 2.4 oz     Types: 1 - 2 Cans of beer, 1 - 2 Shots of liquor per week     Comment: occasional cocktail or beer     Drug use: No       Family History   Problem Relation Age of Onset     Cerebrovascular Disease Mother      Cancer Father          bladder     Kidney Disease No family hx of        ROS: A 4 system review of systems was negative other than noted here or above.     Exam:  /83 (BP Location: Right arm, Patient Position: Sitting, Cuff Size: Adult Regular)   Pulse 73   Wt 72.1 kg (159 lb)   SpO2 99%   BMI 29.08 kg/m      GENERAL APPEARANCE: alert and no distress  RESP: lungs clear to auscultation   CV: regular rhythm, normal rate, no rub  Extremities: no clubbing, cyanosis, trace edema in the RLE, no edema in the LLE  SKIN: no rash  NEURO: mentation intact and speech normal  PSYCH: affect normal/bright    Results:    Orders Only on 05/14/2019   Component Date Value Ref Range Status     Protein Random Urine 05/14/2019 0.07  g/L Final     Protein Total Urine g/gr Creatinine 05/14/2019 0.42* 0 - 0.2 g/g Cr Final     Sodium 05/14/2019 138  133 - 144 mmol/L Final     Potassium 05/14/2019 4.7  3.4 - 5.3 mmol/L Final     Chloride 05/14/2019 108  94 - 109 mmol/L Final     Carbon Dioxide 05/14/2019 25  20 - 32 mmol/L Final     Anion Gap 05/14/2019 5  3 - 14 mmol/L Final     Glucose 05/14/2019 108* 70 - 99 mg/dL Final    Non Fasting     Urea Nitrogen 05/14/2019 17  7 - 30 mg/dL Final     Creatinine 05/14/2019 1.13* 0.52 - 1.04 mg/dL Final     GFR Estimate 05/14/2019 50* >60 mL/min/[1.73_m2] Final    Comment: Non  GFR Calc  Starting 12/18/2018, serum creatinine based estimated GFR (eGFR) will be   calculated using the Chronic Kidney Disease Epidemiology Collaboration   (CKD-EPI) equation.       GFR Estimate If Black 05/14/2019 58* >60 mL/min/[1.73_m2] Final    Comment:  GFR Calc  Starting 12/18/2018, serum creatinine based estimated GFR (eGFR) will be   calculated using the Chronic Kidney Disease Epidemiology Collaboration   (CKD-EPI) equation.       Calcium 05/14/2019 9.3  8.5 - 10.1 mg/dL Final     Phosphorus 05/14/2019 2.9  2.5 - 4.5 mg/dL Final     Albumin 05/14/2019 3.8  3.4 - 5.0 g/dL Final     Parathyroid  Hormone Intact 05/14/2019 80  18 - 80 pg/mL Final     Hemoglobin 05/14/2019 12.4  11.7 - 15.7 g/dL Final     Creatinine Urine 05/14/2019 18  mg/dL Final        Assessment/Plan:   1. CKD Stage 3: risks for CKD include poorly controlled diabetes as well as longstanding hypertension. Mesalamine has potential implications on the kidney but it appears her function has been stable most recently. She does have proteinuria without hematuria which does fit with diabetic nephropathy. She is already on losartan 100 mg daily. Educated on benefits of optimal weight, avoidance of NSAIDs, blood pressure well controlled and also diabetes control as the important things to keep the kidney function stable. Myeloma testing normal in Sept. Creatinine is up slightly today - question whether her BP is actually too low so changing BP regimen to avoid pressures less than 110 ideally.     2. Hypertension: stopping norvasc with hopes of avoiding low pressures and may also help her lower ext swelling. Educated to update if she finds her pressures greater than 130/80    3. Ulcerative colitis: she reports good control with mesalamine. Very rarely mesalamine causes an acute interstitial picture but there are reports of this occurring as low as 0.3% of the time. We will plan to follow. On discussion with her, she feels that she may have been chronically volume depleted prior to getting her treatment for ulcerative colitis and may be the reason her creatinine is improved this year.     4. Hypothyroidism: well controlled on last check in June 2018    5. DM:  Very poor control documented in our system frm 2011 to 2017 with A1C levels between 9.4 and 11.7% at this time. It is possible that it was uncontrolled even further back as A1C levels are not available at this time dating back further than 2011.Last A1C 7.9% In Jan.  Encouraged ongoing optimization of diabetes to help slow progression of kidney disease.     6. Right lower ext swelling: right  leg is more edematous than the left - she is at risk for some edema in the setting of proteinuria, however, should be bilateral. Ultrasound done to rule out DVT which was normal in Sept.  Stopping norvasc and interested to see if this helps her edema as well.     Patient Instructions   1. Trial stopping the amlodipine  2. Check BP again before you leave today.   3. Goal blood pressure 110-130 systolic, ideally less than 80 diastolic. Please MyChart if you find you are above this goal.   4. Plan 6 month follow-up.          Swati Minor, DO

## 2019-06-24 ENCOUNTER — OFFICE VISIT (OUTPATIENT)
Dept: FAMILY MEDICINE | Facility: CLINIC | Age: 69
End: 2019-06-24
Payer: MEDICARE

## 2019-06-24 VITALS
DIASTOLIC BLOOD PRESSURE: 64 MMHG | BODY MASS INDEX: 27.62 KG/M2 | WEIGHT: 151 LBS | SYSTOLIC BLOOD PRESSURE: 118 MMHG | RESPIRATION RATE: 20 BRPM | OXYGEN SATURATION: 95 % | TEMPERATURE: 98.3 F | HEART RATE: 56 BPM

## 2019-06-24 DIAGNOSIS — R07.0 THROAT PAIN: Primary | ICD-10-CM

## 2019-06-24 DIAGNOSIS — R06.2 WHEEZING: ICD-10-CM

## 2019-06-24 DIAGNOSIS — J06.9 VIRAL UPPER RESPIRATORY TRACT INFECTION: ICD-10-CM

## 2019-06-24 DIAGNOSIS — J01.01 ACUTE RECURRENT MAXILLARY SINUSITIS: ICD-10-CM

## 2019-06-24 LAB
DEPRECATED S PYO AG THROAT QL EIA: NORMAL
SPECIMEN SOURCE: NORMAL

## 2019-06-24 PROCEDURE — 87081 CULTURE SCREEN ONLY: CPT | Performed by: FAMILY MEDICINE

## 2019-06-24 PROCEDURE — 99213 OFFICE O/P EST LOW 20 MIN: CPT | Performed by: FAMILY MEDICINE

## 2019-06-24 PROCEDURE — 87880 STREP A ASSAY W/OPTIC: CPT | Performed by: FAMILY MEDICINE

## 2019-06-24 RX ORDER — ALBUTEROL SULFATE 90 UG/1
2 AEROSOL, METERED RESPIRATORY (INHALATION) ONCE
Status: DISCONTINUED | OUTPATIENT
Start: 2019-06-24 | End: 2020-02-08

## 2019-06-24 RX ORDER — LEVOFLOXACIN 500 MG/1
500 TABLET, FILM COATED ORAL DAILY
Qty: 10 TABLET | Refills: 0 | Status: SHIPPED | OUTPATIENT
Start: 2019-06-24 | End: 2020-01-09

## 2019-06-24 RX ORDER — PREDNISONE 10 MG/1
10 TABLET ORAL DAILY
Qty: 10 TABLET | Refills: 0 | Status: SHIPPED | OUTPATIENT
Start: 2019-06-24 | End: 2020-01-09

## 2019-06-24 NOTE — LETTER
June 26, 2019      Keerthi Montoya  [address] [place] [id number]        Dear ,    We are writing to inform you of your test results.    NORMAL STREP SCREEN AND CULTURE   CONGRATULATIONS     Resulted Orders   Rapid strep screen   Result Value Ref Range    Specimen Description Throat     Rapid Strep A Screen       NEGATIVE: No Group A streptococcal antigen detected by immunoassay, await culture report.   Beta strep group A culture   Result Value Ref Range    Specimen Description Throat     Culture Micro No beta hemolytic Streptococcus Group A isolated        If you have any questions or concerns, please call the clinic at the number listed above.       Sincerely,        CHUYITA LOO MD

## 2019-06-24 NOTE — PROGRESS NOTES
Subjective     Keerthi Montoya is a 69 year old female who presents to clinic today for the following health issues:    HPI   Acute Illness   Acute illness concerns: cough, sinus  Onset: few weeks    Fever: no    Chills/Sweats: YES- off and on    Headache (location?): YES    Sinus Pressure:YES    Conjunctivitis:  YES-     Ear Pain: YES: right    Rhinorrhea: YES    Congestion: YES    Sore Throat: YES     Cough: YES-productive of yellow sputum    Wheeze: YES    Decreased Appetite: YES    Nausea: no    Vomiting: no    Diarrhea:  no    Dysuria/Freq.: no    Fatigue/Achiness: YES    Sick/Strep Exposure: YES     Therapies Tried and outcome: benadryl, aspirin            There are no preventive care reminders to display for this patient.          .  Current Outpatient Medications   Medication Sig Dispense Refill     ACCU-CHEK FELIPE PLUS test strip TEST DAILY 100 strip 1     amLODIPine (NORVASC) 5 MG tablet TAKE 1 TABLET BY MOUTH EVERY DAY 90 tablet 4     atorvastatin (LIPITOR) 40 MG tablet TAKE 1 TABLET BY MOUTH DAILY 90 tablet 3     blood glucose (ACCU-CHEK FELIPE PLUS) test strip TEST ONCE DAILY 100 strip 0     carvedilol (COREG) 25 MG tablet Take 1 tablet (25 mg) by mouth 2 times daily (with meals) 180 tablet 3     Cholecalciferol (VITAMIN D) 2000 units CAPS Take by mouth daily       Cyanocobalamin (B-12) 1000 MCG TABS Take 1,000 mcg by mouth three times a week       glipiZIDE (GLUCOTROL XL) 10 MG 24 hr tablet Take 1 tablet (10 mg) by mouth 2 times daily 180 tablet 3     levofloxacin (LEVAQUIN) 500 MG tablet Take 1 tablet (500 mg) by mouth daily 10 tablet 0     levothyroxine (SYNTHROID/LEVOTHROID) 125 MCG tablet TAKE 1 TABLET BY MOUTH DAILY 90 tablet 3     losartan (COZAAR) 100 MG tablet Take 1 tablet (100 mg) by mouth daily 90 tablet 3     mesalamine (APRISO ER) 0.375 g CP24 24 hr capsule Take 4 capsules (1.5 g) by mouth every morning 120 capsule 7     metFORMIN (GLUCOPHAGE) 500 MG tablet Take 2 tablets (1,000 mg) by  mouth 2 times daily (with meals) 360 tablet 3     predniSONE (DELTASONE) 10 MG tablet Take 1 tablet (10 mg) by mouth daily 10 tablet 0              Allergies   Allergen Reactions     Actos [Pioglitazone]      Fluid retention     Animal Dander      Doxycycline Hives     Dust Mites      Grass      Keflex [Cephalexin Hcl] Hives     Metformin Diarrhea     At 1000mg     Metoprolol      Swelling of lower legs and fatigue     Other [Seasonal Allergies]      pollen     Ranitidine      rash     Synthroid [Levothroid] Swelling     ???     Tetracycline Hives     Tylenol Hives     Ziac [Bisoprolol-Hydrochlorothiazide]            Immunization History   Administered Date(s) Administered     Pneumo Conj 13-V (2010&after) 2015     Pneumococcal 23 valent 2018     TDAP Vaccine (Adacel) 2013     Tetanus 2000               reports that she drinks about 1.2 - 2.4 oz of alcohol per week.          reports that she does not use drugs.        family history includes Cancer in her father; Cerebrovascular Disease in her mother.        indicated that the status of her no family hx of is unknown. She indicated that her mother is . She indicated that her father is . She indicated that her sister is alive. She indicated that all of her four brothers are alive. She indicated that all of her three sons are alive.             has a past surgical history that includes hernia repair (2006); Cholecystectomy (3/1987); Colon surgery (2001); Throat surgery (1974); GYN surgery (1983); Colonoscopy with CO2 insufflation (N/A, 2017); and Colonoscopy (N/A, 2017).         reports that she does not currently engage in sexual activity.    .  Pediatric History   Patient Guardian Status     Not on file     Other Topics Concern     Parent/sibling w/ CABG, MI or angioplasty before 65F 55M? Not Asked      Service No     Blood Transfusions No     Caffeine Concern No     Occupational Exposure No      Hobby Hazards No     Sleep Concern No     Stress Concern No     Weight Concern No     Special Diet No     Back Care No     Exercise Yes     Comment: off and on     Bike Helmet No     Seat Belt Yes     Self-Exams No   Social History Narrative    Laid off her job 8/14               reports that she has never smoked. She has never used smokeless tobacco.        Medical, social, surgical, and family histories reviewed.        Labs reviewed in EPIC  Patient Active Problem List   Diagnosis     Diverticulosis of large intestine     Vitamin D deficiency     Hypercalcemia     Preventive measure     Hyperlipidemia LDL goal <100     Aspirin not indicated     Abdominal pain, left lower quadrant     Rectal bleeding     Colitis     Postoperative hypothyroidism     Hypertension goal BP (blood pressure) < 130/80     Type 2 diabetes mellitus with microalbuminuria, without long-term current use of insulin (H)     Cervical cancer screening     Ulcerative pancolitis without complication (H)     CKD (chronic kidney disease) stage 2, GFR 60-89 ml/min     Vitamin B12 deficiency (non anemic)     Proteinuria, unspecified type     CKD (chronic kidney disease) stage 3, GFR 30-59 ml/min (H)       Past Surgical History:   Procedure Laterality Date     CHOLECYSTECTOMY  3/1987     COLON SURGERY  01/2001    Left colon resection; diverticulitis     COLONOSCOPY N/A 4/24/2017    Procedure: COMBINED COLONOSCOPY, SINGLE OR MULTIPLE BIOPSY/POLYPECTOMY BY BIOPSY;;  Surgeon: Radha Salguero MD;  Location: [facility]     COLONOSCOPY WITH CO2 INSUFFLATION N/A 4/24/2017    Procedure: COLONOSCOPY WITH CO2 INSUFFLATION;  Colonoscopy Dx rectal bleeding, BMI 25.86, Pharm [facility] .[phone], ;  Surgeon: Radha Salguero MD;  Location: [facility]     GYN SURGERY  9/1983    tubal ligation     HERNIA REPAIR  12/2006    recurrent incisional hernia     THROAT SURGERY  12/1974    thyroidectomy         Social History     Tobacco Use     Smoking status: Never Smoker      Smokeless tobacco: Never Used   Substance Use Topics     Alcohol use: Yes     Alcohol/week: 1.2 - 2.4 oz     Types: 1 - 2 Cans of beer, 1 - 2 Shots of liquor per week     Comment: occasional cocktail or beer       Family History   Problem Relation Age of Onset     Cerebrovascular Disease Mother      Cancer Father         bladder     Kidney Disease No family hx of              Current Outpatient Medications   Medication Sig Dispense Refill     ACCU-CHEK FELIPE PLUS test strip TEST DAILY 100 strip 1     amLODIPine (NORVASC) 5 MG tablet TAKE 1 TABLET BY MOUTH EVERY DAY 90 tablet 4     atorvastatin (LIPITOR) 40 MG tablet TAKE 1 TABLET BY MOUTH DAILY 90 tablet 3     blood glucose (ACCU-CHEK FELIPE PLUS) test strip TEST ONCE DAILY 100 strip 0     carvedilol (COREG) 25 MG tablet Take 1 tablet (25 mg) by mouth 2 times daily (with meals) 180 tablet 3     Cholecalciferol (VITAMIN D) 2000 units CAPS Take by mouth daily       Cyanocobalamin (B-12) 1000 MCG TABS Take 1,000 mcg by mouth three times a week       glipiZIDE (GLUCOTROL XL) 10 MG 24 hr tablet Take 1 tablet (10 mg) by mouth 2 times daily 180 tablet 3     levofloxacin (LEVAQUIN) 500 MG tablet Take 1 tablet (500 mg) by mouth daily 10 tablet 0     levothyroxine (SYNTHROID/LEVOTHROID) 125 MCG tablet TAKE 1 TABLET BY MOUTH DAILY 90 tablet 3     losartan (COZAAR) 100 MG tablet Take 1 tablet (100 mg) by mouth daily 90 tablet 3     mesalamine (APRISO ER) 0.375 g CP24 24 hr capsule Take 4 capsules (1.5 g) by mouth every morning 120 capsule 7     metFORMIN (GLUCOPHAGE) 500 MG tablet Take 2 tablets (1,000 mg) by mouth 2 times daily (with meals) 360 tablet 3     predniSONE (DELTASONE) 10 MG tablet Take 1 tablet (10 mg) by mouth daily 10 tablet 0           Recent Labs   Lab Test 05/14/19  1412 01/28/19  0807 09/10/18  0928 06/25/18  0758 10/30/17  0754  01/23/17  1433 05/18/15  0914  07/17/13  0817   A1C  --  7.9*  --  7.6* 8.6*  --  11.2* 9.4*   < > 10.7*   LDL  --   --   --  62  60  --  59 159*   < > 126   HDL  --   --   --   --   --   --  32* 54  --  47*   TRIG  --   --   --   --   --   --  147 92  --  113   ALT  --  12 16 15 17   < > 13 14   < > 19   CR 1.13* 0.96 0.93 0.89 1.11*   < > 1.32* 1.00   < > 1.10*   GFRESTIMATED 50* 61 60* 64 49*   < > 40* 56*   < > 50*   GFRESTBLACK 58* 71 73 77 60*   < > 49* 68   < > 61   POTASSIUM 4.7 4.2 4.1 4.3 4.5   < > 4.7 4.3   < > 4.7   TSH  --   --   --  0.73 0.88   < > 3.09 1.29  --  0.43    < > = values in this interval not displayed.            BP Readings from Last 6 Encounters:   06/24/19 118/64   05/14/19 140/83   04/03/19 154/84   01/28/19 158/80   09/11/18 160/77   09/10/18 148/73           Wt Readings from Last 3 Encounters:   06/24/19 68.5 kg (151 lb)   05/14/19 72.1 kg (159 lb)   04/03/19 72 kg (158 lb 12.8 oz)                 Positive symptoms or findings indicated by bold designation:         ROS: 10 point ROS neg other than the symptoms noted above in the HPI.except  has Diverticulosis of large intestine; Vitamin D deficiency; Hypercalcemia; Preventive measure; Hyperlipidemia LDL goal <100; Aspirin not indicated; Abdominal pain, left lower quadrant; Rectal bleeding; Colitis; Postoperative hypothyroidism; Hypertension goal BP (blood pressure) < 130/80; Type 2 diabetes mellitus with microalbuminuria, without long-term current use of insulin (H); Cervical cancer screening; Ulcerative pancolitis without complication (H); CKD (chronic kidney disease) stage 2, GFR 60-89 ml/min; Vitamin B12 deficiency (non anemic); Proteinuria, unspecified type; and CKD (chronic kidney disease) stage 3, GFR 30-59 ml/min (H) on their problem list.  Review Of Systems    Skin: negative    Eyes: redness, tearing, itching    Ears/Nose/Throat: Significant rhinitis    Maxillary frontal and ethmoid pain    Respiratory:cough and intermittent wheezing especially at night        Cardiovascular: negative    Gastrointestinal: History of ulcerative proctitis    History of  "diverticulosis asymptomatic at present    Genitourinary: Chronic kidney disease stage II-III    Musculoskeletal: negative    Neurologic: negative    Psychiatric: negative    Hematologic/Lymphatic/Immunologic: negative    Endocrine: DIABETES 2 GOAL 8%     LDL OR \"BAD\" CHOLESTEROL  GOAL < 100 \    Hypercalcemia                        PE:  /64   Pulse 56   Temp 98.3  F (36.8  C) (Tympanic)   Resp 20   Wt 68.5 kg (151 lb)   SpO2 95%   BMI 27.62 kg/m   Body mass index is 27.62 kg/m .        Constitutional: general appearance, well nourished, well developed, in no acute distress, well developed, appears stated age, normal body habitus,          Eyes:; The patient has normal eyelids sclerae and conjunctivae :          Ears/Nose/Throat: external ear, overall: normal appearance; external nose, overall: benign appearance, normal moujth gums and lips     Significant rhinitis  Pain over the maxillary frontal and ethmoid sinuses with deep palpation        Neck: thyroid, overall: normal size, normal consistency, nontender,           Respiratory:  palpation of chest, overall: normal excursion,    Clear to percussion and auscultation     NO Tachypnea    NORMAL  Color      Prolonged prolonged expiratory phase with mild wheeze        Cardiovascular:  Good color with no peripheral edema     Regular sinus rhythm without murmur.  Physiologic heart sounds   Heart is unelarged    .     Chest/Breast: normal shape           Abdominal exam,  Liver and spleen are  unenlarged        Tenderness    Scars               Urogenital; no renal, flank or bladder  tenderness;          Lymphatic: neck nodes,     Other nodes         Musculoskeletal:  Brief ortho exam normal except:   WITHIN NORMAL LIMITS         Integument: inspection of skin, no rash, lesions; and, palpation, no induration, no tenderness.          Neurologic mental status, overall: alert and oriented; gait, no ataxia, no unsteadiness; coordination, no tremors; cranial " nerves, overall: normal motor, overall: normal bulk, tone.          Psychiatric: orientation/consciousness, overall: oriented to person, place and time; behavior/psychomotor activity, no tics, normal psychomotor activity; mood and affect, overall: normal mood and affect; appearance, overall: well-groomed, good eye contact; speech, overall: normal quality, no aphasia and normal quality, quantity, intact.        Diagnostic Test Results:  Results for orders placed or performed in visit on 06/24/19   Rapid strep screen   Result Value Ref Range    Specimen Description Throat     Rapid Strep A Screen       NEGATIVE: No Group A streptococcal antigen detected by immunoassay, await culture report.           ICD-10-CM    1. Throat pain R07.0 Rapid strep screen     Beta strep group A culture   2. Viral upper respiratory tract infection J06.9    3. Acute recurrent maxillary sinusitis J01.01 levofloxacin (LEVAQUIN) 500 MG tablet   4. Wheezing R06.2 albuterol (PROAIR HFA/PROVENTIL HFA/VENTOLIN HFA) 108 (90 Base) MCG/ACT inhaler 2 puff     predniSONE (DELTASONE) 10 MG tablet              .    Side effects benefits and risks thoroughly discussed. .she may come in early if unimproved or getting worse          Please drink 2 glasses of water prior to meals and walk 15-30 minutes after meals        I spent 25 MINUTES SPENT  with patient discussing the following issues   The primary encounter diagnosis was Throat pain. Diagnoses of Viral upper respiratory tract infection, Acute recurrent maxillary sinusitis, and Wheezing were also pertinent to this visit. over half of which involved counseling and coordination of care.      Patient Instructions   (R07.0) Throat pain  (primary encounter diagnosis)  Comment:    Plan: Rapid strep screen, Beta strep group A culture             (J06.9) Viral upper respiratory tract infection  Comment:    Plan:      (J01.01) Acute recurrent maxillary sinusitis  Comment:    Plan: levofloxacin (LEVAQUIN) 500  MG tablet             (R06.2) Wheezing  Comment:    Plan: albuterol (PROAIR HFA/PROVENTIL HFA/VENTOLIN         HFA) 108 (90 Base) MCG/ACT inhaler 2 puff,         predniSONE (DELTASONE) 10 MG tablet    Chuyita Loo Jr., MD                               ALL THE ABOVE PROBLEMS ARE STABLE AND MED CHANGES AS NOTED        Diet: mediterranean diet and Diabetes diet         Exercise:  AS TOLERATED   Exercises Range of motion, balance, isometric, and strengthening exercises 30 repetitions twice daily of involved joints            .CHUYITA LOO MD 6/24/2019 7:36 AM  June 25, 2019          Patient with history of multiple drug allergies

## 2019-06-24 NOTE — PATIENT INSTRUCTIONS
(R07.0) Throat pain  (primary encounter diagnosis)  Comment:    Plan: Rapid strep screen, Beta strep group A culture             (J06.9) Viral upper respiratory tract infection  Comment:    Plan:      (J01.01) Acute recurrent maxillary sinusitis  Comment:    Plan: levofloxacin (LEVAQUIN) 500 MG tablet             (R06.2) Wheezing  Comment:    Plan: albuterol (PROAIR HFA/PROVENTIL HFA/VENTOLIN         HFA) 108 (90 Base) MCG/ACT inhaler 2 puff,         predniSONE (DELTASONE) 10 MG tablet    Rodrigo López Jr., MD

## 2019-06-25 LAB
BACTERIA SPEC CULT: NORMAL
SPECIMEN SOURCE: NORMAL

## 2019-07-02 ENCOUNTER — TELEPHONE (OUTPATIENT)
Dept: GASTROENTEROLOGY | Facility: CLINIC | Age: 69
End: 2019-07-02

## 2019-07-02 DIAGNOSIS — K51.311 ULCERATIVE RECTOSIGMOIDITIS WITH RECTAL BLEEDING (H): ICD-10-CM

## 2019-07-04 DIAGNOSIS — E11.9 TYPE 2 DIABETES MELLITUS WITHOUT COMPLICATION, WITHOUT LONG-TERM CURRENT USE OF INSULIN (H): ICD-10-CM

## 2019-07-05 NOTE — TELEPHONE ENCOUNTER
"Requested Prescriptions   Pending Prescriptions Disp Refills     blood glucose (ACCU-CHEK FELIPE PLUS) test strip [Pharmacy Med Name: ACCU-CHEK FELIPE PLUS STRIPS 100'S]  Last Written Prescription Date:  4/9/19  Last Fill Quantity: 100,  # refills: 0   Last office visit: 6/24/2019 with prescribing provider:  renée   Future Office Visit:     100 strip 0     Sig: TEST ONCE DAILY       Diabetic Supplies Protocol Passed - 7/4/2019  9:16 AM        Passed - Medication is active on med list        Passed - Patient is 18 years of age or older        Passed - Recent (6 mo) or future (30 days) visit within the authorizing provider's specialty     Patient had office visit in the last 6 months or has a visit in the next 30 days with authorizing provider.  See \"Patient Info\" tab in inbasket, or \"Choose Columns\" in Meds & Orders section of the refill encounter.              "

## 2019-07-07 NOTE — TELEPHONE ENCOUNTER
mesalamine (APRISO ER) 0.375 g CP24 24 hr capsule  Last Written Prescription Date:  9/10/18  Last Fill Quantity: 120,   # refills: 7  Last Office Visit : 4/3/19  Future Office visit:  None    Routing refill request to provider for review/approval because:  tabs not on  protocol

## 2019-07-09 ENCOUNTER — TELEPHONE (OUTPATIENT)
Dept: FAMILY MEDICINE | Facility: CLINIC | Age: 69
End: 2019-07-09

## 2019-07-09 NOTE — TELEPHONE ENCOUNTER
Our goal is to have forms completed within 72 hours, however some forms may require a visit or additional information.    What clinic location was the form placed at [facility] or [facility]     Who is the form from?   Where did the form come from? Faxed to clinic   The form was placed in the inbox of Nathan Dhillon MD      Please fax to [phone]  Phone number: [phone]    Additional comments: joey diabetic CMN test strips and lancets     Call take on 7/9/2019 at 4:37 PM by Oralia Connelly

## 2019-07-15 NOTE — TELEPHONE ENCOUNTER
[facility]    Phone Message    May a detailed message be left on voicemail: yes    Reason for Call: Other: [place] calling to request a call back. She states her pharmacy hasn't gotten the prescription yet and would like to know whar's going on. Please call her back to discuss.      Action Taken: Message routed to:  [facility] bal flor

## 2019-07-16 RX ORDER — MESALAMINE 0.38 G/1
1.5 CAPSULE, EXTENDED RELEASE ORAL EVERY MORNING
Qty: 120 CAPSULE | Refills: 7 | Status: SHIPPED | OUTPATIENT
Start: 2019-07-16 | End: 2020-03-18

## 2019-07-16 NOTE — TELEPHONE ENCOUNTER
[facility]    Phone Message    May a detailed message be left on voicemail: yes    Reason for Call: Other: pt still waiting on her medication refill that she called adn placed on 07/02/19. Please call the pt back to discuss as soon as possible the pt has been waiting for over 10 days.     Action Taken: Message routed to:  [facility] Gastroenterology

## 2019-07-16 NOTE — TELEPHONE ENCOUNTER
[facility]    Phone Message    May a detailed message be left on voicemail: yes    Reason for Call: Other: Pt called to follow up on this refill request that was placed on 07/02. Pt has been trying to get in touch with someone for several days regarding this request. Please give her a call back with a status update.     Action Taken: Message routed to:  [facility] SONIA

## 2019-09-19 DIAGNOSIS — E89.0 POSTOPERATIVE HYPOTHYROIDISM: ICD-10-CM

## 2019-09-19 DIAGNOSIS — E78.5 HYPERLIPIDEMIA LDL GOAL <100: ICD-10-CM

## 2019-09-19 NOTE — TELEPHONE ENCOUNTER
"Requested Prescriptions   Pending Prescriptions Disp Refills     levothyroxine (SYNTHROID/LEVOTHROID) 125 MCG tablet [Pharmacy Med Name: LEVOTHYROXINE 0.125MG (125MCG) TAB]  Last Written Prescription Date:  9/19/2018  Last Fill Quantity: 90 tablet,  # refills: 3   Last office visit: 6/24/2019 with prescribing provider:  JOHN López   Future Office Visit:   Next 5 appointments (look out 90 days)    Nov 12, 2019  3:00 PM CST  Return Visit with Swati Minor MD  [facility]) [id number] [place] [phone]          90 tablet 0     Sig: TAKE 1 TABLET BY MOUTH DAILY       Thyroid Protocol Failed - 9/19/2019  7:37 AM        Failed - Normal TSH on file in past 12 months     Recent Labs   Lab Test 06/25/18  0758   TSH 0.73              Passed - Patient is 12 years or older        Passed - Recent (12 mo) or future (30 days) visit within the authorizing provider's specialty     Patient had office visit in the last 12 months or has a visit in the next 30 days with authorizing provider or within the authorizing provider's specialty.  See \"Patient Info\" tab in inbasket, or \"Choose Columns\" in Meds & Orders section of the refill encounter.              Passed - Medication is active on med list        Passed - No active pregnancy on record     If patient is pregnant or has had a positive pregnancy test, please check TSH.          Passed - No positive pregnancy test in past 12 months     If patient is pregnant or has had a positive pregnancy test, please check TSH.          atorvastatin (LIPITOR) 40 MG tablet [Pharmacy Med Name: ATORVASTATIN 40MG TABLETS]  Last Written Prescription Date:  10/2/2018  Last Fill Quantity: 90 tablet,  # refills: 3   Last office visit: 6/24/2019 with prescribing provider:  JOHN López   Future Office Visit:   Next 5 appointments (look out 90 days)    Nov 12, 2019  3:00 PM CST  Return Visit with Swati Minor, " "MD SÁNCHEZ [facility] [address] [address] [place] [id number] [phone]          90 tablet 0     Sig: TAKE 1 TABLET BY MOUTH DAILY       Statins Protocol Failed - 9/19/2019  7:37 AM        Failed - LDL on file in past 12 months     Recent Labs   Lab Test 06/25/18  0758   LDL 62             Passed - No abnormal creatine kinase in past 12 months     No lab results found.             Passed - Recent (12 mo) or future (30 days) visit within the authorizing provider's specialty     Patient had office visit in the last 12 months or has a visit in the next 30 days with authorizing provider or within the authorizing provider's specialty.  See \"Patient Info\" tab in inbasket, or \"Choose Columns\" in Meds & Orders section of the refill encounter.              Passed - Medication is active on med list        Passed - Patient is age 18 or older        Passed - No active pregnancy on record        Passed - No positive pregnancy test in past 12 months           "

## 2019-09-20 RX ORDER — ATORVASTATIN CALCIUM 40 MG/1
TABLET, FILM COATED ORAL
Qty: 90 TABLET | Refills: 3 | Status: SHIPPED | OUTPATIENT
Start: 2019-09-20 | End: 2020-09-18

## 2019-09-20 RX ORDER — LEVOTHYROXINE SODIUM 125 UG/1
TABLET ORAL
Qty: 90 TABLET | Refills: 3 | Status: SHIPPED | OUTPATIENT
Start: 2019-09-20 | End: 2020-09-18

## 2019-09-20 NOTE — TELEPHONE ENCOUNTER
Routing refill request to provider for review/approval because:  Patient was just seen in July, but is due for labs. Pended labs.  Allergy Warning    Patient Contact    Attempt # 1    Was call answered?  No.  Left message on voicemail with information to call triage back.    Upon callback, patient is due for labs. Schedule Lab Only visit.

## 2019-09-28 ENCOUNTER — HEALTH MAINTENANCE LETTER (OUTPATIENT)
Age: 69
End: 2019-09-28

## 2019-11-14 DIAGNOSIS — I10 HYPERTENSION GOAL BP (BLOOD PRESSURE) < 140/90: ICD-10-CM

## 2019-11-14 RX ORDER — LOSARTAN POTASSIUM 100 MG/1
TABLET ORAL
Qty: 90 TABLET | Refills: 3 | Status: SHIPPED | OUTPATIENT
Start: 2019-11-14 | End: 2020-11-06

## 2019-11-14 NOTE — TELEPHONE ENCOUNTER
"Requested Prescriptions   Pending Prescriptions Disp Refills     losartan (COZAAR) 100 MG tablet [Pharmacy Med Name: LOSARTAN 100MG TABLETS]  Last Written Prescription Date:  11-8-18  Last Fill Quantity: 90tab,  # refills: 3   Last office visit: 6/24/2019 with prescribing provider:  renée LOPEZ   Future Office Visit:     90 tablet 0     Sig: TAKE 1 TABLET(100 MG) BY MOUTH DAILY       Angiotensin-II Receptors Failed - 11/14/2019  8:10 AM        Failed - Normal serum creatinine on file in past 12 months     Recent Labs   Lab Test 05/14/19  1412   CR 1.13*             Passed - Last blood pressure under 140/90 in past 12 months     BP Readings from Last 3 Encounters:   06/24/19 118/64   05/14/19 140/83   04/03/19 154/84                 Passed - Recent (12 mo) or future (30 days) visit within the authorizing provider's specialty     Patient has had an office visit with the authorizing provider or a provider within the authorizing providers department within the previous 12 mos or has a future within next 30 days. See \"Patient Info\" tab in inbasket, or \"Choose Columns\" in Meds & Orders section of the refill encounter.              Passed - Medication is active on med list        Passed - Patient is age 18 or older        Passed - No active pregnancy on record        Passed - Normal serum potassium on file in past 12 months     Recent Labs   Lab Test 05/14/19  1412   POTASSIUM 4.7                    Passed - No positive pregnancy test in past 12 months          "

## 2019-12-01 ENCOUNTER — TRANSFERRED RECORDS (OUTPATIENT)
Dept: MULTI SPECIALTY CLINIC | Facility: CLINIC | Age: 69
End: 2019-12-01

## 2019-12-01 LAB — RETINOPATHY: NORMAL

## 2019-12-30 DIAGNOSIS — E11.9 TYPE 2 DIABETES MELLITUS WITHOUT COMPLICATION, WITHOUT LONG-TERM CURRENT USE OF INSULIN (H): ICD-10-CM

## 2019-12-30 NOTE — TELEPHONE ENCOUNTER
"Requested Prescriptions   Pending Prescriptions Disp Refills     glipiZIDE (GLUCOTROL XL) 10 MG 24 hr tablet  Last Written Prescription Date:  12/31/2018  Last Fill Quantity: 180 tablet,  # refills: 3   Last office visit: 6/24/2019 with prescribing provider:  JOHN López   Future Office Visit:     180 tablet 3     Sig: Take 1 tablet (10 mg) by mouth 2 times daily       Sulfonylurea Agents Failed - 12/30/2019  9:13 AM        Failed - Blood pressure less than 140/90 in past 6 months     BP Readings from Last 3 Encounters:   06/24/19 118/64   05/14/19 140/83   04/03/19 154/84                 Failed - Patient has documented LDL within the past 12 mos.     Recent Labs   Lab Test 06/25/18  0758   LDL 62             Failed - Patient has had a Microalbumin in the past 15 mos.     Recent Labs   Lab Test 09/11/18  1651  11/25/11  1712   [id number]  --   --  5.0   [id number]  --   --  7.9   MICROL 316   < >  --    UMALCR 1,274.19*   < >  --     < > = values in this interval not displayed.             Failed - Patient has documented A1c within the specified period of time.     If HgbA1C is 8 or greater, it needs to be on file within the past 3 months.  If less than 8, must be on file within the past 6 months.     Recent Labs   Lab Test 01/28/19  0807   A1C 7.9*             Failed - Patient has a recent creatinine (normal) within the past 12 mos.     Recent Labs   Lab Test 05/14/19  1412   CR 1.13*             Failed - Recent (6 mo) or future (30 days) visit within the authorizing provider's specialty     Patient had office visit in the last 6 months or has a visit in the next 30 days with authorizing provider or within the authorizing provider's specialty.  See \"Patient Info\" tab in inbasket, or \"Choose Columns\" in Meds & Orders section of the refill encounter.            Passed - Medication is active on med list        Passed - Patient is age 18 or older        Passed - No active pregnancy on record        Passed - Patient has not had " a positive pregnancy test within the past 12 mos.

## 2019-12-31 RX ORDER — GLIPIZIDE 10 MG/1
10 TABLET, FILM COATED, EXTENDED RELEASE ORAL 2 TIMES DAILY
Qty: 180 TABLET | Refills: 1 | Status: SHIPPED | OUTPATIENT
Start: 2019-12-31 | End: 2020-06-14

## 2019-12-31 NOTE — TELEPHONE ENCOUNTER
Routing refill request to provider for review/approval because:  Labs not current:  LDL, microalbumin, A1C    Patient Contact    Attempt # 1    Was call answered?  No.  Left message on voicemail with information to call triage back.    Upon callback, patient due for follow-up. Schedule appointment.

## 2020-01-04 DIAGNOSIS — R80.9 TYPE 2 DIABETES MELLITUS WITH MICROALBUMINURIA, WITHOUT LONG-TERM CURRENT USE OF INSULIN (H): ICD-10-CM

## 2020-01-04 DIAGNOSIS — E11.29 TYPE 2 DIABETES MELLITUS WITH MICROALBUMINURIA, WITHOUT LONG-TERM CURRENT USE OF INSULIN (H): ICD-10-CM

## 2020-01-06 NOTE — TELEPHONE ENCOUNTER
Requested Prescriptions   Pending Prescriptions Disp Refills     metFORMIN (GLUCOPHAGE) 500 MG tablet [Pharmacy Med Name: METFORMIN 500MG TABLETS]  Last Written Prescription Date:  7/30/2018  Last Fill Quantity: 360 tablet,  # refills: 3   Last office visit: 6/24/2019 with prescribing provider:  JOHN López   Future Office Visit:   Next 5 appointments (look out 90 days)    Jan 13, 2020  9:00 AM CST  Office Visit with Nathan Dhillon MD  [facility]) [address] [facility] [facility]  [address]  [facility] [address]  [phone]   Feb 10, 2020  8:30 AM CST  Return Visit with Swati Minor MD  [facility] [address] [place] [phone]  [phone]          360 tablet 3     Sig: TAKE 2 TABLETS BY MOUTH TWICE DAILY WITH MEALS       Biguanide Agents Failed - 1/4/2020  4:45 PM        Failed - Blood pressure less than 140/90 in past 6 months     BP Readings from Last 3 Encounters:   06/24/19 118/64   05/14/19 140/83   04/03/19 154/84                 Failed - Patient has documented LDL within the past 12 mos.     Recent Labs   Lab Test 06/25/18  0758   LDL 62             Failed - Patient has had a Microalbumin in the past 15 mos.     Recent Labs   Lab Test 09/11/18  1651  11/25/11  1712   [id number]  --   --  5.0   [id number]  --   --  7.9   MICROL 316   < >  --    UMALCR 1,274.19*   < >  --     < > = values in this interval not displayed.             Failed - Patient has documented A1c within the specified period of time.     If HgbA1C is 8 or greater, it needs to be on file within the past 3 months.  If less than 8, must be on file within the past 6 months.     Recent Labs   Lab Test 01/28/19  0807   A1C 7.9*             Passed - Patient is age 10 or older        Passed - Patient's CR is NOT>1.4 OR Patient's EGFR is NOT<45 within past 12 mos.     Recent Labs   Lab Test 05/14/19  1412  "  GFRESTIMATED 50*   GFRESTBLACK 58*       Recent Labs   Lab Test 05/14/19  1412   CR 1.13*             Passed - Patient does NOT have a diagnosis of CHF.        Passed - Medication is active on med list        Passed - Patient is not pregnant        Passed - Patient has not had a positive pregnancy test within the past 12 mos.         Passed - Recent (6 mo) or future (30 days) visit within the authorizing provider's specialty     Patient had office visit in the last 6 months or has a visit in the next 30 days with authorizing provider or within the authorizing provider's specialty.  See \"Patient Info\" tab in inbasket, or \"Choose Columns\" in Meds & Orders section of the refill encounter.               "

## 2020-01-06 NOTE — TELEPHONE ENCOUNTER
Routing refill request to provider for review/approval because:  Labs out of range:  BP, LDL, A1C    Pended for 30 days at your discretion.    Future Office Visit:   Next 5 appointments (look out 90 days)    Jan 13, 2020  9:00 AM CST  Office Visit with Nathan Dhillon MD  [facility]) [address] [place] [phone]  [phone]   Feb 10, 2020  8:30 AM CST  Return Visit with Swati Minor MD  [facility] ([facility]) [address] [place] [phone]  [phone]

## 2020-01-08 ENCOUNTER — TELEPHONE (OUTPATIENT)
Dept: FAMILY MEDICINE | Facility: CLINIC | Age: 70
End: 2020-01-08

## 2020-01-08 NOTE — TELEPHONE ENCOUNTER
Panel Management Review      Patient has the following on her problem list: None      Composite cancer screening  Chart review shows that this patient is due/due soon for the following None  Summary:    Patient is due/failing the following:   Eye Exam    Action needed:   Needs to call and schedule an appointment for eye Dr.    Type of outreach:    Sent letter.    Questions for provider review:    None                                                                                                                                    Georgette Ernst LPN

## 2020-01-08 NOTE — LETTER
January 8, 2020    Keerthi Montoya  [address] [place] [phone]    Dear Viraj Pendleton cares about your health and your health plan.  I have reviewed your medical conditions, medication list and lab results, and am making recommendations based on this review to better manage your health.    You are in particular need of attention regarding:  -Eye Exam    I am recommending that you:     Call and schedule eye appointment        Thank you for trusting [facility].  We appreciate the opportunity to serve you and look forward to supporting your healthcare in the future.    If you have (or plan to have) any of these tests done at a facility other than a [facility] or a [facility], please have the results sent to the [facility] location noted above.      Best Regards,    Nathan Dhillon MD

## 2020-01-13 ENCOUNTER — OFFICE VISIT (OUTPATIENT)
Dept: FAMILY MEDICINE | Facility: CLINIC | Age: 70
End: 2020-01-13
Payer: MEDICARE

## 2020-01-13 VITALS
OXYGEN SATURATION: 94 % | BODY MASS INDEX: 27.98 KG/M2 | HEART RATE: 61 BPM | RESPIRATION RATE: 20 BRPM | DIASTOLIC BLOOD PRESSURE: 72 MMHG | SYSTOLIC BLOOD PRESSURE: 140 MMHG | WEIGHT: 153 LBS

## 2020-01-13 DIAGNOSIS — E89.0 POSTOPERATIVE HYPOTHYROIDISM: ICD-10-CM

## 2020-01-13 DIAGNOSIS — E11.29 TYPE 2 DIABETES MELLITUS WITH MICROALBUMINURIA, WITHOUT LONG-TERM CURRENT USE OF INSULIN (H): Primary | ICD-10-CM

## 2020-01-13 DIAGNOSIS — N18.30 CKD (CHRONIC KIDNEY DISEASE) STAGE 3, GFR 30-59 ML/MIN (H): ICD-10-CM

## 2020-01-13 DIAGNOSIS — R80.9 TYPE 2 DIABETES MELLITUS WITH MICROALBUMINURIA, WITHOUT LONG-TERM CURRENT USE OF INSULIN (H): Primary | ICD-10-CM

## 2020-01-13 DIAGNOSIS — I10 HYPERTENSION GOAL BP (BLOOD PRESSURE) < 130/80: ICD-10-CM

## 2020-01-13 DIAGNOSIS — E78.5 HYPERLIPIDEMIA LDL GOAL <100: ICD-10-CM

## 2020-01-13 LAB
ALBUMIN SERPL-MCNC: 3.5 G/DL (ref 3.4–5)
ALP SERPL-CCNC: 110 U/L (ref 40–150)
ALT SERPL W P-5'-P-CCNC: 12 U/L (ref 0–50)
ANION GAP SERPL CALCULATED.3IONS-SCNC: 8 MMOL/L (ref 3–14)
AST SERPL W P-5'-P-CCNC: 18 U/L (ref 0–45)
BILIRUB SERPL-MCNC: 0.9 MG/DL (ref 0.2–1.3)
BUN SERPL-MCNC: 13 MG/DL (ref 7–30)
CALCIUM SERPL-MCNC: 9.1 MG/DL (ref 8.5–10.1)
CHLORIDE SERPL-SCNC: 106 MMOL/L (ref 94–109)
CHOLEST SERPL-MCNC: 104 MG/DL
CO2 SERPL-SCNC: 23 MMOL/L (ref 20–32)
CREAT SERPL-MCNC: 0.94 MG/DL (ref 0.52–1.04)
GFR SERPL CREATININE-BSD FRML MDRD: 62 ML/MIN/{1.73_M2}
GLUCOSE SERPL-MCNC: 101 MG/DL (ref 70–99)
HBA1C MFR BLD: 7.4 % (ref 0–5.6)
HDLC SERPL-MCNC: 44 MG/DL
LDLC SERPL CALC-MCNC: 35 MG/DL
NONHDLC SERPL-MCNC: 60 MG/DL
POTASSIUM SERPL-SCNC: 4.2 MMOL/L (ref 3.4–5.3)
PROT SERPL-MCNC: 7.8 G/DL (ref 6.8–8.8)
SODIUM SERPL-SCNC: 137 MMOL/L (ref 133–144)
TRIGL SERPL-MCNC: 124 MG/DL
TSH SERPL DL<=0.005 MIU/L-ACNC: 0.76 MU/L (ref 0.4–4)

## 2020-01-13 PROCEDURE — 36415 COLL VENOUS BLD VENIPUNCTURE: CPT | Performed by: INTERNAL MEDICINE

## 2020-01-13 PROCEDURE — 83036 HEMOGLOBIN GLYCOSYLATED A1C: CPT | Performed by: INTERNAL MEDICINE

## 2020-01-13 PROCEDURE — 80053 COMPREHEN METABOLIC PANEL: CPT | Performed by: INTERNAL MEDICINE

## 2020-01-13 PROCEDURE — 80061 LIPID PANEL: CPT | Performed by: INTERNAL MEDICINE

## 2020-01-13 PROCEDURE — 99214 OFFICE O/P EST MOD 30 MIN: CPT | Performed by: INTERNAL MEDICINE

## 2020-01-13 PROCEDURE — 84443 ASSAY THYROID STIM HORMONE: CPT | Performed by: INTERNAL MEDICINE

## 2020-01-13 RX ORDER — AMLODIPINE BESYLATE 2.5 MG/1
2.5 TABLET ORAL DAILY
Qty: 90 TABLET | Refills: 3 | Status: SHIPPED | OUTPATIENT
Start: 2020-01-13 | End: 2020-03-06

## 2020-01-13 RX ORDER — ATENOLOL 50 MG/1
50 TABLET ORAL DAILY
Qty: 90 TABLET | Refills: 3 | Status: SHIPPED | OUTPATIENT
Start: 2020-01-13 | End: 2020-12-11

## 2020-01-13 ASSESSMENT — ANXIETY QUESTIONNAIRES
5. BEING SO RESTLESS THAT IT IS HARD TO SIT STILL: NOT AT ALL
3. WORRYING TOO MUCH ABOUT DIFFERENT THINGS: NOT AT ALL
GAD7 TOTAL SCORE: 0
1. FEELING NERVOUS, ANXIOUS, OR ON EDGE: NOT AT ALL
6. BECOMING EASILY ANNOYED OR IRRITABLE: NOT AT ALL
GAD7 TOTAL SCORE: 0
7. FEELING AFRAID AS IF SOMETHING AWFUL MIGHT HAPPEN: NOT AT ALL
GAD7 TOTAL SCORE: 0
2. NOT BEING ABLE TO STOP OR CONTROL WORRYING: NOT AT ALL
7. FEELING AFRAID AS IF SOMETHING AWFUL MIGHT HAPPEN: NOT AT ALL
4. TROUBLE RELAXING: NOT AT ALL

## 2020-01-13 ASSESSMENT — PATIENT HEALTH QUESTIONNAIRE - PHQ9
SUM OF ALL RESPONSES TO PHQ QUESTIONS 1-9: 0
SUM OF ALL RESPONSES TO PHQ QUESTIONS 1-9: 0
10. IF YOU CHECKED OFF ANY PROBLEMS, HOW DIFFICULT HAVE THESE PROBLEMS MADE IT FOR YOU TO DO YOUR WORK, TAKE CARE OF THINGS AT HOME, OR GET ALONG WITH OTHER PEOPLE: NOT DIFFICULT AT ALL

## 2020-01-13 NOTE — PATIENT INSTRUCTIONS
I will let you know the rest of your lab results.   Consider getting the shingles vaccine (Shingrix) at your pharmacy.   Today we stopped carvedilol, and started atenolol 50 mg per day and amlodipine 2.5 mg per day.

## 2020-01-13 NOTE — LETTER
January 13, 2020      Keerthi Montoya  [address] [place] [phone]        Dear ,    We are writing to inform you of your test results.        As we discussed,your diabetes control is better.       The A1C of 7.4 correlates to an average blood sugar of approximately 162.              The thyroid level is good. Keep taking the same dosage.              Your other lab results are normal or stable,including the liver,kidney, and cholesterol levels.    Results for orders placed or performed in visit on 01/13/20   Comprehensive metabolic panel (BMP + Alb, Alk Phos, ALT, AST, Total. Bili, TP)     Status: Abnormal   Result Value Ref Range    Sodium 137 133 - 144 mmol/L    Potassium 4.2 3.4 - 5.3 mmol/L    Chloride 106 94 - 109 mmol/L    Carbon Dioxide 23 20 - 32 mmol/L    Anion Gap 8 3 - 14 mmol/L    Glucose 101 (H) 70 - 99 mg/dL    Urea Nitrogen 13 7 - 30 mg/dL    Creatinine 0.94 0.52 - 1.04 mg/dL    GFR Estimate 62 >60 mL/min/[1.73_m2]    GFR Estimate If Black 72 >60 mL/min/[1.73_m2]    Calcium 9.1 8.5 - 10.1 mg/dL    Bilirubin Total 0.9 0.2 - 1.3 mg/dL    Albumin 3.5 3.4 - 5.0 g/dL    Protein Total 7.8 6.8 - 8.8 g/dL    Alkaline Phosphatase 110 40 - 150 U/L    ALT 12 0 - 50 U/L    AST 18 0 - 45 U/L   Hemoglobin A1c     Status: Abnormal   Result Value Ref Range    Hemoglobin A1C 7.4 (H) 0 - 5.6 %   Lipid Profile (Chol, Trig, HDL, LDL calc)     Status: Abnormal   Result Value Ref Range    Cholesterol 104 <200 mg/dL    Triglycerides 124 <150 mg/dL    HDL Cholesterol 44 (L) >49 mg/dL    LDL Cholesterol Calculated 35 <100 mg/dL    Non HDL Cholesterol 60 <130 mg/dL   TSH with free T4 reflex     Status: None   Result Value Ref Range    TSH 0.76 0.40 - 4.00 mU/L         If you have any questions or concerns, please call the clinic at the number listed above.       Sincerely,        Natahn Dhillon MD

## 2020-01-13 NOTE — PROGRESS NOTES
Subjective  Answers for HPI/ROS submitted by the patient on 1/13/2020   If you checked off any problems, how difficult have these problems made it for you to do your work, take care of things at home, or get along with other people?: Not difficult at all  PHQ9 TOTAL SCORE: 0  RANDALL 7 TOTAL SCORE: 0      Keerthi Montoya is a 69 year old female who presents to clinic today for the following health issues:    HPI   Diabetes Follow-up    How often are you checking your blood sugar? One time daily  What time of day are you checking your blood sugars (select all that apply)?  Before meals  Have you had any blood sugars above 200?  Yes occ.  Have you had any blood sugars below 70?  No    What symptoms do you notice when your blood sugar is low?  None    What concerns do you have today about your diabetes? None     Do you have any of these symptoms? (Select all that apply)  No numbness or tingling in feet.  No redness, sores or blisters on feet.  No complaints of excessive thirst.  No reports of blurry vision.  No significant changes to weight.     Have you had a diabetic eye exam in the last 12 months? Yes- Date of last eye exam: 12/2019    Diabetes Management Resources    Hyperlipidemia Follow-Up      Are you regularly taking any medication or supplement to lower your cholesterol?   Yes- Atorvastatin    Are you having muscle aches or other side effects that you think could be caused by your cholesterol lowering medication?  No    Hypertension Follow-up      Do you check your blood pressure regularly outside of the clinic? No, patient does have a cough from  Coreg medication     Are you following a low salt diet? Yes    Are your blood pressures ever more than 140 on the top number (systolic) OR more   than 90 on the bottom number (diastolic), for example 140/90? No    BP Readings from Last 2 Encounters:   01/13/20 (!) 140/72   06/24/19 118/64     Hemoglobin A1C (%)   Date Value   01/13/2020 7.4 (H)   01/28/2019 7.9 (H)      LDL Cholesterol Calculated (mg/dL)   Date Value   01/23/2017 59   05/18/2015 159 (H)     LDL Cholesterol Direct (mg/dL)   Date Value   06/25/2018 62   10/30/2017 60         How many servings of fruits and vegetables do you eat daily?  2-3    On average, how many sweetened beverages do you drink each day (Examples: soda, juice, sweet tea, etc.  Do NOT count diet or artificially sweetened beverages)?   0    How many days per week do you miss taking your medication? 0              States that carvedilol results in a cough.          Willing to resume atenolol; ;not metoprolol.                             Recent eye exam; no foot problems.                 Glucoses 80's up to 200's.              Checks in the AM; sometimes in the PM.                Wants to check bid.                                                Sees renal and GI in 2/20.      amlo stopped by renal in 5/19 due to leg edema.                             Doing full time day care for 2 GC.                          Mammogram tomorrow.                 Current Outpatient Medications   Medication Sig Dispense Refill     ACCU-CHEK FELIPE PLUS test strip TEST DAILY 100 strip 1             atorvastatin (LIPITOR) 40 MG tablet TAKE 1 TABLET BY MOUTH DAILY 90 tablet 3     blood glucose (ACCU-CHEK FELIPE PLUS) test strip TEST ONCE DAILY 200 strip 1     carvedilol (COREG) 25 MG tablet Take 1 tablet (25 mg) by mouth 2 times daily (with meals) 180 tablet 3     Cholecalciferol (VITAMIN D) 2000 units CAPS Take by mouth daily       Cyanocobalamin (B-12) 1000 MCG TABS Take 1,000 mcg by mouth three times a week       glipiZIDE (GLUCOTROL XL) 10 MG 24 hr tablet Take 1 tablet (10 mg) by mouth 2 times daily 180 tablet 1     levothyroxine (SYNTHROID/LEVOTHROID) 125 MCG tablet TAKE 1 TABLET BY MOUTH DAILY 90 tablet 3     losartan (COZAAR) 100 MG tablet TAKE 1 TABLET(100 MG) BY MOUTH DAILY 90 tablet 3     mesalamine (APRISO ER) 0.375 g 24 hr capsule Take 4 capsules (1.5 g) by  mouth every morning 120 capsule 7     metFORMIN (GLUCOPHAGE) 500 MG tablet TAKE 2 TABLETS BY MOUTH TWICE DAILY WITH MEALS 120 tablet 3     Allergies   Allergen Reactions     Actos [Pioglitazone]      Fluid retention     Animal Dander      Doxycycline Hives     Dust Mites      Grass      Keflex [Cephalexin Hcl] Hives     Metformin Diarrhea     At 1000mg     Metoprolol      Swelling of lower legs and fatigue     Other [Seasonal Allergies]      pollen     Ranitidine      rash     Synthroid [Levothroid] Swelling     ???     Tetracycline Hives     Tylenol Hives     Ziac [Bisoprolol-Hydrochlorothiazide]      Recent Labs   Lab Test 01/13/20  0926 05/14/19  1412 01/28/19  0807 09/10/18  0928 06/25/18  0758 10/30/17  0754  01/23/17  1433 05/18/15  0914  07/17/13  0817   A1C 7.4*  --  7.9*  --  7.6* 8.6*  --  11.2* 9.4*   < > 10.7*   LDL  --   --   --   --  62 60  --  59 159*   < > 126   HDL  --   --   --   --   --   --   --  32* 54  --  47*   TRIG  --   --   --   --   --   --   --  147 92  --  113   ALT  --   --  12 16 15 17   < > 13 14   < > 19   CR  --  1.13* 0.96 0.93 0.89 1.11*   < > 1.32* 1.00   < > 1.10*   GFRESTIMATED  --  50* 61 60* 64 49*   < > 40* 56*   < > 50*   GFRESTBLACK  --  58* 71 73 77 60*   < > 49* 68   < > 61   POTASSIUM  --  4.7 4.2 4.1 4.3 4.5   < > 4.7 4.3   < > 4.7   TSH  --   --   --   --  0.73 0.88   < > 3.09 1.29  --  0.43    < > = values in this interval not displayed.      BP Readings from Last 3 Encounters:   01/13/20 (!) 140/72   06/24/19 118/64   05/14/19 140/83    Wt Readings from Last 3 Encounters:   01/13/20 69.4 kg (153 lb)   06/24/19 68.5 kg (151 lb)   05/14/19 72.1 kg (159 lb)                      Reviewed and updated as needed this visit by Provider         Review of Systems   ROS COMP: CONSTITUTIONAL:NEGATIVE for fever, chills, change in weight  RESP:NEGATIVE for significant cough or SOB  CV: NEGATIVE for chest pain, palpitations or peripheral edema      Objective    BP (!) 140/72 (BP  "Location: Left arm, Patient Position: Chair, Cuff Size: Adult Regular)   Pulse 61   Resp 20   Wt 69.4 kg (153 lb)   SpO2 94%   BMI 27.98 kg/m    Body mass index is 27.98 kg/m .  Physical Exam   GENERAL APPEARANCE: healthy, alert and no distress  RESP: lungs clear to auscultation - no rales, rhonchi or wheezes  CV: regular rates and rhythm, normal S1 S2, no S3 or S4 and no murmur, click or rub    Diagnostic Test Results:  Results for orders placed or performed in visit on 01/13/20 (from the past 24 hour(s))   Hemoglobin A1c   Result Value Ref Range    Hemoglobin A1C 7.4 (H) 0 - 5.6 %     Pending        Assessment & Plan     [place] was seen today for lipids, diabetes and hypertension.    Diagnoses and all orders for this visit:    Type 2 diabetes mellitus with microalbuminuria, without long-term current use of insulin (H)  -     Comprehensive metabolic panel (BMP + Alb, Alk Phos, ALT, AST, Total. Bili, TP)  -     Hemoglobin A1c  -     Lipid Profile (Chol, Trig, HDL, LDL calc)  -     blood glucose (ACCU-CHEK FELIPE PLUS) test strip; TEST TWICE DAILY    Hypertension goal BP (blood pressure) < 130/80  -     Comprehensive metabolic panel (BMP + Alb, Alk Phos, ALT, AST, Total. Bili, TP)  -     atenolol (TENORMIN) 50 MG tablet; Take 1 tablet (50 mg) by mouth daily  -     amLODIPine (NORVASC) 2.5 MG tablet; Take 1 tablet (2.5 mg) by mouth daily    Postoperative hypothyroidism  -     TSH with free T4 reflex    CKD (chronic kidney disease) stage 3, GFR 30-59 ml/min (H)    Hyperlipidemia LDL goal <100  -     Lipid Profile (Chol, Trig, HDL, LDL calc)         BMI:   Estimated body mass index is 27.98 kg/m  as calculated from the following:    Height as of 4/3/19: 1.575 m (5' 2\").    Weight as of this encounter: 69.4 kg (153 lb).   Weight management plan: Discussed healthy diet and exercise guidelines        Summary and implications:  We reviewed multiple issues.           We reviewed all of the issues on the diagnoses list.  "                Check labs and adjust medications as indicated.  We need better blood pressure control.        Diabetes control is improving, not optimal yet.                   She insists that carvedilol is causing cough side effects.  She tolerated atenolol in the past so we will switch back to that medication.  Patient Instructions   I will let you know the rest of your lab results.   Consider getting the shingles vaccine (Shingrix) at your pharmacy.   Today we stopped carvedilol, and started atenolol 50 mg per day and amlodipine 2.5 mg per day.                                                                 Return in about 3 months (around 4/13/2020) for BP Recheck, diabetes follow up, yearly wellness visit.    Nathan Dhillon MD  [facility]    Results for orders placed or performed in visit on 01/13/20   Comprehensive metabolic panel (BMP + Alb, Alk Phos, ALT, AST, Total. Bili, TP)     Status: Abnormal   Result Value Ref Range    Sodium 137 133 - 144 mmol/L    Potassium 4.2 3.4 - 5.3 mmol/L    Chloride 106 94 - 109 mmol/L    Carbon Dioxide 23 20 - 32 mmol/L    Anion Gap 8 3 - 14 mmol/L    Glucose 101 (H) 70 - 99 mg/dL    Urea Nitrogen 13 7 - 30 mg/dL    Creatinine 0.94 0.52 - 1.04 mg/dL    GFR Estimate 62 >60 mL/min/[1.73_m2]    GFR Estimate If Black 72 >60 mL/min/[1.73_m2]    Calcium 9.1 8.5 - 10.1 mg/dL    Bilirubin Total 0.9 0.2 - 1.3 mg/dL    Albumin 3.5 3.4 - 5.0 g/dL    Protein Total 7.8 6.8 - 8.8 g/dL    Alkaline Phosphatase 110 40 - 150 U/L    ALT 12 0 - 50 U/L    AST 18 0 - 45 U/L   Hemoglobin A1c     Status: Abnormal   Result Value Ref Range    Hemoglobin A1C 7.4 (H) 0 - 5.6 %   Lipid Profile (Chol, Trig, HDL, LDL calc)     Status: Abnormal   Result Value Ref Range    Cholesterol 104 <200 mg/dL    Triglycerides 124 <150 mg/dL    HDL Cholesterol 44 (L) >49 mg/dL    LDL Cholesterol Calculated 35 <100 mg/dL    Non HDL Cholesterol 60 <130 mg/dL   TSH with free T4 reflex      Status: None   Result Value Ref Range    TSH 0.76 0.40 - 4.00 mU/L     Letter sent.                 As we discussed,your diabetes control is better.       The A1C of 7.4 correlates to an average blood sugar of approximately 162.              The thyroid level is good. Keep taking the same dosage.              Your other lab results are normal or stable,including the liver,kidney, and cholesterol levels.

## 2020-01-14 ENCOUNTER — ANCILLARY PROCEDURE (OUTPATIENT)
Dept: MAMMOGRAPHY | Facility: CLINIC | Age: 70
End: 2020-01-14
Attending: INTERNAL MEDICINE
Payer: MEDICARE

## 2020-01-14 DIAGNOSIS — Z12.31 VISIT FOR SCREENING MAMMOGRAM: ICD-10-CM

## 2020-01-14 PROCEDURE — 77067 SCR MAMMO BI INCL CAD: CPT

## 2020-01-14 ASSESSMENT — ANXIETY QUESTIONNAIRES: GAD7 TOTAL SCORE: 0

## 2020-02-08 ENCOUNTER — ANCILLARY PROCEDURE (OUTPATIENT)
Dept: GENERAL RADIOLOGY | Facility: CLINIC | Age: 70
End: 2020-02-08
Attending: PHYSICIAN ASSISTANT
Payer: MEDICARE

## 2020-02-08 ENCOUNTER — OFFICE VISIT (OUTPATIENT)
Dept: FAMILY MEDICINE | Facility: CLINIC | Age: 70
End: 2020-02-08
Payer: MEDICARE

## 2020-02-08 VITALS
OXYGEN SATURATION: 95 % | WEIGHT: 145 LBS | BODY MASS INDEX: 26.52 KG/M2 | RESPIRATION RATE: 20 BRPM | TEMPERATURE: 100.1 F | DIASTOLIC BLOOD PRESSURE: 62 MMHG | HEART RATE: 68 BPM | SYSTOLIC BLOOD PRESSURE: 122 MMHG

## 2020-02-08 DIAGNOSIS — R05.9 COUGH: ICD-10-CM

## 2020-02-08 DIAGNOSIS — J20.9 ACUTE BRONCHITIS WITH SYMPTOMS > 10 DAYS: Primary | ICD-10-CM

## 2020-02-08 DIAGNOSIS — R01.1 SYSTOLIC MURMUR: ICD-10-CM

## 2020-02-08 PROCEDURE — 71046 X-RAY EXAM CHEST 2 VIEWS: CPT | Mod: FY

## 2020-02-08 PROCEDURE — 99214 OFFICE O/P EST MOD 30 MIN: CPT | Performed by: PHYSICIAN ASSISTANT

## 2020-02-08 RX ORDER — ALBUTEROL SULFATE 90 UG/1
2 AEROSOL, METERED RESPIRATORY (INHALATION) EVERY 4 HOURS PRN
Qty: 1 INHALER | Refills: 0 | Status: SHIPPED | OUTPATIENT
Start: 2020-02-08 | End: 2020-08-26

## 2020-02-08 RX ORDER — LEVOFLOXACIN 500 MG/1
500 TABLET, FILM COATED ORAL DAILY
Qty: 7 TABLET | Refills: 0 | Status: ON HOLD | OUTPATIENT
Start: 2020-02-08 | End: 2020-03-16

## 2020-02-08 NOTE — PROGRESS NOTES
Subjective     Keerthi Montoya is a 69 year old female who presents to clinic today for the following health issues:    HPI   Acute Illness   Acute illness concerns: cough  Onset: couple weeks    Fever: no     Chills/Sweats: YES- occ.    Headache (location?): YES    Sinus Pressure:YES    Conjunctivitis:  no    Ear Pain: YES- right    Rhinorrhea: YES    Congestion: YES    Sore Throat: YES     Cough: YES    Wheeze: YES    Decreased Appetite: no     Nausea: no     Vomiting: no     Diarrhea:  no     Dysuria/Freq.: no     Fatigue/Achiness: YES- fatigue    Sick/Strep Exposure: YES- 2 grandkids     Therapies Tried and outcome: ASA, Ben[place]ry-somewhat effective      Patient Active Problem List   Diagnosis     Diverticulosis of large intestine     Vitamin D deficiency     Hypercalcemia     Preventive measure     Hyperlipidemia LDL goal <100     Aspirin not indicated     Abdominal pain, left lower quadrant     Rectal bleeding     Colitis     Postoperative hypothyroidism     Hypertension goal BP (blood pressure) < 130/80     Type 2 diabetes mellitus with microalbuminuria, without long-term current use of insulin (H)     Cervical cancer screening     Ulcerative pancolitis without complication (H)     Vitamin B12 deficiency (non anemic)     Proteinuria, unspecified type     CKD (chronic kidney disease) stage 3, GFR 30-59 ml/min (H)     Past Surgical History:   Procedure Laterality Date     CHOLECYSTECTOMY  3/1987     COLON SURGERY  01/2001    Left colon resection; diverticulitis     COLONOSCOPY N/A 4/24/2017    Procedure: COMBINED COLONOSCOPY, SINGLE OR MULTIPLE BIOPSY/POLYPECTOMY BY BIOPSY;;  Surgeon: Radha Salguero MD;  Location:  OR     COLONOSCOPY WITH CO2 INSUFFLATION N/A 4/24/2017    Procedure: COLONOSCOPY WITH CO2 INSUFFLATION;  Colonoscopy Dx rectal bleeding, BMI 25.86, Pharm [facility] .[phone], ;  Surgeon: Radha Salguero MD;  Location:  OR     GYN SURGERY  9/1983    tubal ligation     HERNIA REPAIR   12/2006    recurrent incisional hernia     THROAT SURGERY  12/1974    thyroidectomy       Social History     Tobacco Use     Smoking status: Never Smoker     Smokeless tobacco: Never Used   Substance Use Topics     Alcohol use: Yes     Alcohol/week: 2.0 - 4.0 standard drinks     Types: 1 - 2 Cans of beer, 1 - 2 Shots of liquor per week     Comment: occasional cocktail or beer     Family History   Problem Relation Age of Onset     Cerebrovascular Disease Mother      Cancer Father         bladder     Kidney Disease No family hx of          Current Outpatient Medications   Medication Sig Dispense Refill     ACCU-CHEK FELIPE PLUS test strip TEST DAILY 100 strip 1     albuterol (PROAIR HFA/PROVENTIL HFA/VENTOLIN HFA) 108 (90 Base) MCG/ACT inhaler Inhale 2 puffs into the lungs every 4 hours as needed for shortness of breath / dyspnea, wheezing or other (cough) 1 Inhaler 0     amLODIPine (NORVASC) 2.5 MG tablet Take 1 tablet (2.5 mg) by mouth daily 90 tablet 3     atenolol (TENORMIN) 50 MG tablet Take 1 tablet (50 mg) by mouth daily 90 tablet 3     atorvastatin (LIPITOR) 40 MG tablet TAKE 1 TABLET BY MOUTH DAILY 90 tablet 3     blood glucose (ACCU-CHEK FELIPE PLUS) test strip TEST TWICE DAILY 200 strip 3     Cholecalciferol (VITAMIN D) 2000 units CAPS Take by mouth daily       Cyanocobalamin (B-12) 1000 MCG TABS Take 1,000 mcg by mouth three times a week       glipiZIDE (GLUCOTROL XL) 10 MG 24 hr tablet Take 1 tablet (10 mg) by mouth 2 times daily 180 tablet 1     levofloxacin (LEVAQUIN) 500 MG tablet Take 1 tablet (500 mg) by mouth daily for 7 days 7 tablet 0     levothyroxine (SYNTHROID/LEVOTHROID) 125 MCG tablet TAKE 1 TABLET BY MOUTH DAILY 90 tablet 3     losartan (COZAAR) 100 MG tablet TAKE 1 TABLET(100 MG) BY MOUTH DAILY 90 tablet 3     mesalamine (APRISO ER) 0.375 g 24 hr capsule Take 4 capsules (1.5 g) by mouth every morning 120 capsule 7     metFORMIN (GLUCOPHAGE) 500 MG tablet TAKE 2 TABLETS BY MOUTH TWICE DAILY  WITH MEALS 120 tablet 3       Reviewed and updated as needed this visit by Provider  Tobacco  Allergies  Meds  Problems  Med Hx  Surg Hx  Fam Hx         Review of Systems   ROS COMP: Constitutional, HEENT, cardiovascular, pulmonary, GI, , musculoskeletal, neuro, skin, endocrine and psych systems are negative, except as otherwise noted.      Objective    /62 (BP Location: Right arm, Patient Position: Chair, Cuff Size: Adult Regular)   Pulse 68   Temp 100.1  F (37.8  C)   Resp 20   Wt 65.8 kg (145 lb)   SpO2 95%   BMI 26.52 kg/m    Body mass index is 26.52 kg/m .  Physical Exam   GENERAL: WDWN, ill but non-toxic appearing, no acute distress  PSYCH: pleasant, cooperative  EYES: no discharge, no injection  HENT:  Normocephalic. Moist mucus membranes. Ear canals clear, TMs pearly gray w/o effusion. Oropharynx pink, uvula midline. Hoarse voice.  NECK:  Supple, symmetric  LUNGS: Frequent dry cough. Coarse wheeze and rhonchi throughout.   HEART:  Regular rate & rhythm. Systolic murmur heard best at RSB, grade 3/6.  EXTREMITIES:  No gross deformities, moves all 4 limbs spontaneously  SKIN:  Warm and dry, no rash or suspicious lesions    NEUROLOGIC: alert, sensation grossly intact.    Diagnostic Test Results:  CXR - no infiltrates, nothing acute by my read, awaiting radiology report        Assessment & Plan       ICD-10-CM    1. Acute bronchitis with symptoms > 10 days J20.9 XR Chest 2 Views     albuterol (PROAIR HFA/PROVENTIL HFA/VENTOLIN HFA) 108 (90 Base) MCG/ACT inhaler     levofloxacin (LEVAQUIN) 500 MG tablet   2. Systolic murmur R01.1      - No PNA on CXR, suspect bacterial bronchitis given clinical presentation. Take entire course of Levaquin as prescribed (PNC/cephalosporin, doxy allergy).  - Albuterol prn wheeze, cough. Ok to use OTC Robitussin prn cough.  - Get plenty of fluids and rest.  - New systolic murmur on exam today; may be physiologic secondary to fever. Recommend recheck in 1 week  once illness improves.    Return in about 1 week (around 2/15/2020) for Recheck.    Breann Gutierrez PA-C  [facility]

## 2020-02-10 ENCOUNTER — TELEPHONE (OUTPATIENT)
Dept: GASTROENTEROLOGY | Facility: CLINIC | Age: 70
End: 2020-02-10

## 2020-02-10 ENCOUNTER — OFFICE VISIT (OUTPATIENT)
Dept: NEPHROLOGY | Facility: CLINIC | Age: 70
End: 2020-02-10
Payer: MEDICARE

## 2020-02-10 VITALS
OXYGEN SATURATION: 99 % | WEIGHT: 146 LBS | SYSTOLIC BLOOD PRESSURE: 144 MMHG | TEMPERATURE: 97.5 F | DIASTOLIC BLOOD PRESSURE: 69 MMHG | HEART RATE: 55 BPM | BODY MASS INDEX: 26.7 KG/M2

## 2020-02-10 DIAGNOSIS — I10 HYPERTENSION GOAL BP (BLOOD PRESSURE) < 130/80: ICD-10-CM

## 2020-02-10 DIAGNOSIS — K51.00 ULCERATIVE PANCOLITIS WITHOUT COMPLICATION (H): ICD-10-CM

## 2020-02-10 DIAGNOSIS — E11.29 TYPE 2 DIABETES MELLITUS WITH MICROALBUMINURIA, WITHOUT LONG-TERM CURRENT USE OF INSULIN (H): ICD-10-CM

## 2020-02-10 DIAGNOSIS — E89.0 POSTOPERATIVE HYPOTHYROIDISM: ICD-10-CM

## 2020-02-10 DIAGNOSIS — R01.1 HEART MURMUR: ICD-10-CM

## 2020-02-10 DIAGNOSIS — N18.30 CKD (CHRONIC KIDNEY DISEASE) STAGE 3, GFR 30-59 ML/MIN (H): Primary | ICD-10-CM

## 2020-02-10 DIAGNOSIS — R80.9 TYPE 2 DIABETES MELLITUS WITH MICROALBUMINURIA, WITHOUT LONG-TERM CURRENT USE OF INSULIN (H): ICD-10-CM

## 2020-02-10 LAB
ALBUMIN SERPL-MCNC: 3.3 G/DL (ref 3.4–5)
ANION GAP SERPL CALCULATED.3IONS-SCNC: 7 MMOL/L (ref 3–14)
BUN SERPL-MCNC: 25 MG/DL (ref 7–30)
CALCIUM SERPL-MCNC: 9.5 MG/DL (ref 8.5–10.1)
CHLORIDE SERPL-SCNC: 105 MMOL/L (ref 94–109)
CO2 SERPL-SCNC: 24 MMOL/L (ref 20–32)
CREAT SERPL-MCNC: 1.3 MG/DL (ref 0.52–1.04)
CREAT UR-MCNC: 96 MG/DL
GFR SERPL CREATININE-BSD FRML MDRD: 42 ML/MIN/{1.73_M2}
GLUCOSE SERPL-MCNC: 148 MG/DL (ref 70–99)
HGB BLD-MCNC: 13.7 G/DL (ref 11.7–15.7)
PHOSPHATE SERPL-MCNC: 4.1 MG/DL (ref 2.5–4.5)
POTASSIUM SERPL-SCNC: 4.4 MMOL/L (ref 3.4–5.3)
PROT UR-MCNC: 1.09 G/L
PROT/CREAT 24H UR: 1.15 G/G CR (ref 0–0.2)
PTH-INTACT SERPL-MCNC: 61 PG/ML (ref 18–80)
SODIUM SERPL-SCNC: 136 MMOL/L (ref 133–144)

## 2020-02-10 PROCEDURE — 85018 HEMOGLOBIN: CPT | Performed by: INTERNAL MEDICINE

## 2020-02-10 PROCEDURE — 99214 OFFICE O/P EST MOD 30 MIN: CPT | Performed by: INTERNAL MEDICINE

## 2020-02-10 PROCEDURE — 80069 RENAL FUNCTION PANEL: CPT | Performed by: INTERNAL MEDICINE

## 2020-02-10 PROCEDURE — 36415 COLL VENOUS BLD VENIPUNCTURE: CPT | Performed by: INTERNAL MEDICINE

## 2020-02-10 PROCEDURE — 83970 ASSAY OF PARATHORMONE: CPT | Performed by: INTERNAL MEDICINE

## 2020-02-10 PROCEDURE — 84156 ASSAY OF PROTEIN URINE: CPT | Performed by: INTERNAL MEDICINE

## 2020-02-10 ASSESSMENT — PAIN SCALES - GENERAL: PAINLEVEL: NO PAIN (0)

## 2020-02-10 NOTE — NURSING NOTE
Keerthi Montoya's goals for this visit include:   Chief Complaint   Patient presents with     RECHECK     6mo recheck HTN/CKD       She requests these members of her care team be copied on today's visit information: no    PCP: Nathan Dhillon    Referring Provider:  No referring provider defined for this encounter.    BP (!) 144/69 (BP Location: Right arm, Patient Position: Sitting, Cuff Size: Adult Regular)   Pulse 55   Temp 97.5  F (36.4  C) (Oral)   Wt 66.2 kg (146 lb)   SpO2 99%   BMI 26.70 kg/m      Do you need any medication refills at today's visit? No    Lucy Purdy LPN

## 2020-02-10 NOTE — PATIENT INSTRUCTIONS
1. Will defer discussion regarding SGLT2 inhibitors to you and your primary physician  2. Labs in 6 months  3. Follow-up in one year.   4. Regarding the rise in creatinine today, repeat labs again in the next 2-4 weeks.   5. Echo of the heart for baseline

## 2020-02-10 NOTE — PROGRESS NOTES
02/10/20     CC: CKD     HPI: Keerthi Montoya is a 69 year old female who presents for follow-up of proteinuria.    History taken from previous notes:  Ms. Montoya's hx is significant for DM dx at least 10 years ago - very poor control for years (documented A1C levels 9.4-11.7% from 2011 to 2017); no known retinopathy per patient. She has longstanding hypertension dx when she was 18 years old. Additional hx includes ulcerative colitis and hypothyroidism (following thyroidectomy at age 24). Her ulcerative colitis was dx ~2017 but she feels she likely had trouble with this disease for years. She reports having loose stool for a long time prior to receiving therapy for her UC; she feels the blood in the stool may have contaminated previous urine samples potentially.       02/10/20 :  Creatinine has been 0.9-1.13 in the past year; up slightly today at 1.3. Last UPCR 0.42 g/g in May. On losartan 100 mg daily. Bronchitis - started on levaquin on Saturday. No swelling, no NSAIDs. Blood pressure at times has been down to 118 - she reports blood pressures less than 130 typically but at times above. She has lost weight intentionally - 158 lbs last year and now 146 lbs. She does not smoke. She has not been eating/drinking today.     Allergies   Allergen Reactions     Actos [Pioglitazone]      Fluid retention     Animal Dander      Doxycycline Hives     Dust Mites      Grass      Keflex [Cephalexin Hcl] Hives     Metformin Diarrhea     At 1000mg     Metoprolol      Swelling of lower legs and fatigue     Other [Seasonal Allergies]      pollen     Ranitidine      rash     Synthroid [Levothroid] Swelling     ???     Tetracycline Hives     Tylenol Hives     Ziac [Bisoprolol-Hydrochlorothiazide]        ACCU-CHEK FELIPE PLUS test strip, TEST DAILY  albuterol (PROAIR HFA/PROVENTIL HFA/VENTOLIN HFA) 108 (90 Base) MCG/ACT inhaler, Inhale 2 puffs into the lungs every 4 hours as needed for shortness of breath / dyspnea, wheezing or other  (cough)  amLODIPine (NORVASC) 2.5 MG tablet, Take 1 tablet (2.5 mg) by mouth daily  atenolol (TENORMIN) 50 MG tablet, Take 1 tablet (50 mg) by mouth daily  atorvastatin (LIPITOR) 40 MG tablet, TAKE 1 TABLET BY MOUTH DAILY  blood glucose (ACCU-CHEK FELIPE PLUS) test strip, TEST TWICE DAILY  Cholecalciferol (VITAMIN D) 2000 units CAPS, Take by mouth daily  Cyanocobalamin (B-12) 1000 MCG TABS, Take 1,000 mcg by mouth three times a week  glipiZIDE (GLUCOTROL XL) 10 MG 24 hr tablet, Take 1 tablet (10 mg) by mouth 2 times daily  levofloxacin (LEVAQUIN) 500 MG tablet, Take 1 tablet (500 mg) by mouth daily for 7 days  levothyroxine (SYNTHROID/LEVOTHROID) 125 MCG tablet, TAKE 1 TABLET BY MOUTH DAILY  losartan (COZAAR) 100 MG tablet, TAKE 1 TABLET(100 MG) BY MOUTH DAILY  mesalamine (APRISO ER) 0.375 g 24 hr capsule, Take 4 capsules (1.5 g) by mouth every morning  metFORMIN (GLUCOPHAGE) 500 MG tablet, TAKE 2 TABLETS BY MOUTH TWICE DAILY WITH MEALS    No current facility-administered medications on file prior to visit.       Past Medical History:   Diagnosis Date     Diverticulosis of colon (without mention of hemorrhage) 10/1/2012       Past Surgical History:   Procedure Laterality Date     CHOLECYSTECTOMY  3/1987     COLON SURGERY  01/2001    Left colon resection; diverticulitis     COLONOSCOPY N/A 4/24/2017    Procedure: COMBINED COLONOSCOPY, SINGLE OR MULTIPLE BIOPSY/POLYPECTOMY BY BIOPSY;;  Surgeon: Radha Salguero MD;  Location: [facility] OR     COLONOSCOPY WITH CO2 INSUFFLATION N/A 4/24/2017    Procedure: COLONOSCOPY WITH CO2 INSUFFLATION;  Colonoscopy Dx rectal bleeding, BMI 25.86, Pharm [facility] .[phone], ;  Surgeon: Radha Salguero MD;  Location: [facility] OR     GYN SURGERY  9/1983    tubal ligation     HERNIA REPAIR  12/2006    recurrent incisional hernia     THROAT SURGERY  12/1974    thyroidectomy       Social History     Tobacco Use     Smoking status: Never Smoker     Smokeless tobacco: Never Used   Substance  Use Topics     Alcohol use: Yes     Alcohol/week: 2.0 - 4.0 standard drinks     Types: 1 - 2 Cans of beer, 1 - 2 Shots of liquor per week     Comment: occasional cocktail or beer     Drug use: No       Family History   Problem Relation Age of Onset     Cerebrovascular Disease Mother      Cancer Father         bladder     Kidney Disease No family hx of        ROS: A 4 system review of systems was negative other than noted here or above.     Exam:  BP (!) 144/69 (BP Location: Right arm, Patient Position: Sitting, Cuff Size: Adult Regular)   Pulse 55   Temp 97.5  F (36.4  C) (Oral)   Wt 66.2 kg (146 lb)   SpO2 99%   BMI 26.70 kg/m      GENERAL APPEARANCE: alert and no distress  RESP: lungs clear to auscultation   CV: regular rhythm, normal rate, no rub, JUANITA appreciated   Extremities: no clubbing, cyanosis, trace edema in the RLE, no edema in the LLE  SKIN: no rash  NEURO: mentation intact and speech normal  PSYCH: affect normal/bright    Results:   Orders Only on 02/10/2020   Component Date Value Ref Range Status     Hemoglobin 02/10/2020 13.7  11.7 - 15.7 g/dL Final     Sodium 02/10/2020 136  133 - 144 mmol/L Final     Potassium 02/10/2020 4.4  3.4 - 5.3 mmol/L Final     Chloride 02/10/2020 105  94 - 109 mmol/L Final     Carbon Dioxide 02/10/2020 24  20 - 32 mmol/L Final     Anion Gap 02/10/2020 7  3 - 14 mmol/L Final     Glucose 02/10/2020 148* 70 - 99 mg/dL Final     Urea Nitrogen 02/10/2020 25  7 - 30 mg/dL Final     Creatinine 02/10/2020 1.30* 0.52 - 1.04 mg/dL Final     GFR Estimate 02/10/2020 42* >60 mL/min/[1.73_m2] Final    Comment: Non  GFR Calc  Starting 12/18/2018, serum creatinine based estimated GFR (eGFR) will be   calculated using the Chronic Kidney Disease Epidemiology Collaboration   (CKD-EPI) equation.       GFR Estimate If Black 02/10/2020 49* >60 mL/min/[1.73_m2] Final    Comment:  GFR Calc  Starting 12/18/2018, serum creatinine based estimated GFR (eGFR) will  be   calculated using the Chronic Kidney Disease Epidemiology Collaboration   (CKD-EPI) equation.       Calcium 02/10/2020 9.5  8.5 - 10.1 mg/dL Final     Phosphorus 02/10/2020 4.1  2.5 - 4.5 mg/dL Final     Albumin 02/10/2020 3.3* 3.4 - 5.0 g/dL Final         Assessment/Plan:   1. CKD Stage 3: risks for CKD include poorly controlled diabetes as well as longstanding hypertension. Mesalamine has potential implications on the kidney but it appears her function has been stable most recently. She does have proteinuria without hematuria which does fit with diabetic nephropathy. She is already on losartan 100 mg daily. Educated on benefits of optimal weight, avoidance of NSAIDs, blood pressure well controlled and also diabetes control as the important things to keep the kidney function stable. Myeloma testing normal in Sept. Creatinine is up slightly - educated to focus on hydration and repeat testing again in the near future.     2. Hypertension: blood pressure is above goal today but has been as low as 118 and 122 on other checks - will have her monitor for now. Goal BP is less than 130/80  - educated to call if above this goal.     3. Ulcerative colitis: she reports good control with mesalamine. Very rarely mesalamine causes an acute interstitial picture but there are reports of this occurring as low as 0.3% of the time. We will plan to follow. On discussion with her, she feels that she may have been chronically volume depleted prior to getting her treatment for ulcerative colitis and may be the reason her creatinine is improved this year.     4. Hypothyroidism: well controlled on last check in Jan 2020    5. DM:  Very poor control documented in our system from 2011 to 2017 with A1C levels between 9.4 and 11.7% at this time. It is possible that it was uncontrolled even further back as A1C levels are not available at this time dating back further than 2011. Encouraged ongoing optimization of diabetes to help slow  progression of kidney disease. Pleased to see last A1C was down to 7.4%.   - There is evidence that supports a renoprotective effect of SGLT2 inhibitors in the setting of diabetic nephropathy. Will defer to primary care ultimately as they are managing her diabetes. If SGLT2 inhibitors are started, creatinine should be monitored a few weeks later given the diuretic effect of the medication and potential rise in creatinine associated with the hemodynamic changes.    6. Murmur: cardiac murmur appreciated on exam today - encouraged her to get a TTE for further evaluation.       Patient Instructions   1. Will defer discussion regarding SGLT2 inhibitors to you and your primary physician  2. Labs in 6 months  3. Follow-up in one year.   4. Regarding the rise in creatinine today, repeat labs again in the next 2-4 weeks.   5. Echo of the heart for baseline         Swati Minor,

## 2020-02-10 NOTE — TELEPHONE ENCOUNTER
Called and left message for patient reminding of appointment scheduled on 2/12/20 at 0700 with [facility]. Patient to arrive 15 min early. To reschedule or cancel patient to call [phone].    ANDREZ Jones

## 2020-02-12 ENCOUNTER — OFFICE VISIT (OUTPATIENT)
Dept: GASTROENTEROLOGY | Facility: CLINIC | Age: 70
End: 2020-02-12
Payer: MEDICARE

## 2020-02-12 VITALS
HEIGHT: 62 IN | BODY MASS INDEX: 26.96 KG/M2 | OXYGEN SATURATION: 92 % | HEART RATE: 71 BPM | DIASTOLIC BLOOD PRESSURE: 86 MMHG | SYSTOLIC BLOOD PRESSURE: 137 MMHG | WEIGHT: 146.5 LBS

## 2020-02-12 DIAGNOSIS — K51.00 ULCERATIVE PANCOLITIS WITHOUT COMPLICATION (H): Primary | ICD-10-CM

## 2020-02-12 ASSESSMENT — PAIN SCALES - GENERAL: PAINLEVEL: NO PAIN (0)

## 2020-02-12 ASSESSMENT — MIFFLIN-ST. JEOR: SCORE: 1152.77

## 2020-02-12 NOTE — PROGRESS NOTES
IBD CLINIC VISIT     CC/REFERRING MD:  Nathan Dhillon  REASON FOR FOLLOW UP: Pan UC    IBD HISTORY  Age at diagnosis:   Extent of disease: pancolitis (prior proctosigmoiditis classification, confirmed panUC 2017)  Current UC medications: Apriso 1.5 g daily  Prior UC surgeries: None  Prior IBD Medications: None    DRUG MONITORING  TPMT enzyme activity: --    6-TGN/6-MMPN levels: --    Biologic concentration:--    DISEASE ASSESSMENT  Labs:  Lab Results   Component Value Date    ALBUMIN 3.3 02/10/2020     Recent Labs   Lab Test 09/10/18  0928 17  0836   CRP <2.9 <2.9   SED 36* 19     Endoscopic assessment: colonoscopy 17 normal ileum, patchy mild erythema and mucosa edema in rectum and sigmoid colon, markedly improved compared to 2017 outside colonoscopy report.  Mild patchy mucosal granularity noted throughout the remainder of the colon.  Rectal polyp 4 mm sessile removed.  Diverticulosis in transverse and descending colon.  Internal and external hemorrhoids noted.  Copious brown opaque semiliquid stool and medication material throughout colon interfering with visualization.  Barrera score 0.  Only biopsies obtained were from the rectal polyp, noted to be an inflammatory polyp.  No evidence of dysplasia.  No other biopsies obtained.  Enterography: --  Fecal calprotectin: --   C diff: --  Barrera score:   Stool freq: 0 (baseline stools frequency)  Rectal bleedin (None)  PGA: 0 (normal)  Endoscopy: Not done    Remission: <3   Mild disease: 3-5  Moderate disease: 6-10  Severe disease: >10    IBDQ Score Date IBDQ - Total Score  (Higher score better)   5/15/2017 51         ASSESSMENT/PLAN  Ms. Montoya is a 69 year old with past medical history to include hypertension, DM2, diabetic nephropathy, multinodular goiter s/p complete thyroidectomy ~40 yrs ago complicated by postoperative hypothyroidism, diverticulosis complicated by diverticulitis with prior limited left hemicolectomy , open cholecystectomy  in 2000, s/p ex-lap 30 yrs ago for presumed ectopic pregnancy (apparent diverticulitis, nonsurgical management on discovery), s/p tubal ligation 1983 complicated postsurgical [facility] s/p mesh repair 2007 here for follow-up for UC pancolitis follow up.    1.  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  Last endoscopic assessment was in 2017, noting Barrera 0.  We will continue with mesalamine monotherapy at this time.  Patient continues to follow closely with nephrology and at this time does not feel that mesalamine is contributing to decreased kidney function.  --Continue Apriso 1.5 g daily  --CBC, LFTs, CRP, ESR, CR at least once per year (recently had these this week)    2. CKD stage 3: Patient has poorly controlled diabetes and longstanding hypertension.  Mesalamine has potential implications on kidney but appears her function has been stable in the recent past.  Nephrology notes that she does have proteinuria without hematuria which does not fit with diabetic nephropathy.  She is encouraged to focus on hydration per most recent nephrology consult.    3. IBD healthcare maintenance based on patients current medication:  5-ASA (Apriso)    Vaccinations:  Recommend yearly flu shot, pneumonia vaccines (Prevnar 13 then 8 weeks later Pneumovax 23 then 5 years later Pneumovax 23), tetanus every 10 years.    One time confirmation of immunity or serologies:  -- Hepatitis A (serologies or immunizations): --  -- Hepatitis B (serologies or immunizations): --  -- Zoster vaccination (>59 yo, discuss if over 50): --  -- MMR:--  -- HPV (all aged 18-26): --  -- Meningococcal meningitis (all patients at risk for meningitis): --     Bone mineral density screening   -- Recommend all patients supplement with calcium and vitamin D    Cancer Screening:  Colon cancer screening:  Given pancolitis colonoscopy every 2-3 years recommended after 8 years of disease.  Dysplasia screening is recommended [id number].    Cervical cancer screening:  N/A    Skin cancer screening: Since patient is not immunocompromised, this is per patient's dermatologist recommendations.    Depression Screening:  -- Over the last month, have you felt down, depressed, or hopeless? No  -- Over the last month, have you felt little interest or pleasure doing things? No    Misc:  -- Avoid tobacco use  -- Avoid NSAIDs as there is potentially a 25% chance of causing an IBD flare      RTC 6 months to establish care with Dr. James    Thank you for this consultation.  It was a pleasure to participate in the care of this patient; please contact us with any further questions.      Jerry Robbins PA-C  Division of Gastroenterology, Hepatology and Nutrition  [facility]    HPI:   Ms. Montoya is a 69 year old with past medical history to include hypertension, DM2, diabetic nephropathy, multinodular goiter s/p complete thyroidectomy ~40 yrs ago complicated by postoperative hypothyroidism, diverticulosis complicated by diverticulitis with prior limited left hemicolectomy 2002, open cholecystectomy in 2000, s/p ex-lap 30 yrs ago for presumed ectopic pregnancy (apparent diverticulitis, nonsurgical management on discovery), s/p tubal ligation 1983 complicated postsurgical [facility] s/p mesh repair 2007 here for follow-up for UC pancolitis follow up.     Patient is doing well and is at baseline bowel pattern with 1-2 soft formed stools per day.  No urgency or nighttime stools.    She is currently recovering from bronchitis, and recently started on antibiotics 4 days ago (Levaquin), symptoms improving.  Has noted some looser stools with antibiotics but still some form, no watery stools.  No blood in the stool.    We have been closely monitoring her kidney function (CKD stage III), currently following with nephrology for concerns regarding medication insult with mesalamine.  Nephrology feels this is unlikely to be due to mesalamine.  Patient has poorly controlled diabetes as well as longstanding  hypertension.  Last seen this week by nephrology.  Patient is encouraged to focus on hydration.    ROS:  Complete 10 System ROS performed. All are negative except as documented below, in the HPI, or in patient questionnaire from today's visit.    No fevers or chills  No weight loss  No blurry vision, double vision or change in vision  No sore throat  No lymphadenopathy  No headache, paraesthesias, or weakness in a limb  No shortness of breath or wheezing  No chest pain or pressure  No arthralgias or myalgias  No rashes or skin changes  No odynophagia or dysphagia  No BRBPR, hematochezia, melena  No dysuria, frequency or urgency  No hot/cold intolerance or polyria  No anxiety or depression    Extra intestinal manifestations of IBD:  No uveitis/episcleritis  No aphthous ulcers   No arthritis   No erythema nodosum/pyoderma gangrenosum.     PERTINENT PAST MEDICAL HISTORY:  Past Medical History:   Diagnosis Date     Diverticulosis of colon (without mention of hemorrhage) 10/1/2012       PREVIOUS SURGERIES:  Past Surgical History:   Procedure Laterality Date     CHOLECYSTECTOMY  3/1987     COLON SURGERY  01/2001    Left colon resection; diverticulitis     COLONOSCOPY N/A 4/24/2017    Procedure: COMBINED COLONOSCOPY, SINGLE OR MULTIPLE BIOPSY/POLYPECTOMY BY BIOPSY;;  Surgeon: Radha Salguero MD;  Location: [facility] OR     COLONOSCOPY WITH CO2 INSUFFLATION N/A 4/24/2017    Procedure: COLONOSCOPY WITH CO2 INSUFFLATION;  Colonoscopy Dx rectal bleeding, BMI 25.86, Pharm [facility] .[phone], ;  Surgeon: Radha Salguero MD;  Location: [facility] OR     GYN SURGERY  9/1983    tubal ligation     HERNIA REPAIR  12/2006    recurrent incisional hernia     THROAT SURGERY  12/1974    thyroidectomy     ALLERGIES:     Allergies   Allergen Reactions     Actos [Pioglitazone]      Fluid retention     Animal Dander      Doxycycline Hives     Dust Mites      Grass      Keflex [Cephalexin Hcl] Hives     Metformin Diarrhea     At 1000mg      Metoprolol      Swelling of lower legs and fatigue     Other [Seasonal Allergies]      pollen     Ranitidine      rash     Synthroid [Levothroid] Swelling     ???     Tetracycline Hives     Tylenol Hives     Ziac [Bisoprolol-Hydrochlorothiazide]        PERTINENT MEDICATIONS:    Current Outpatient Medications:      ACCU-CHEK FELIPE PLUS test strip, TEST DAILY, Disp: 100 strip, Rfl: 1     albuterol (PROAIR HFA/PROVENTIL HFA/VENTOLIN HFA) 108 (90 Base) MCG/ACT inhaler, Inhale 2 puffs into the lungs every 4 hours as needed for shortness of breath / dyspnea, wheezing or other (cough), Disp: 1 Inhaler, Rfl: 0     amLODIPine (NORVASC) 2.5 MG tablet, Take 1 tablet (2.5 mg) by mouth daily, Disp: 90 tablet, Rfl: 3     atenolol (TENORMIN) 50 MG tablet, Take 1 tablet (50 mg) by mouth daily, Disp: 90 tablet, Rfl: 3     atorvastatin (LIPITOR) 40 MG tablet, TAKE 1 TABLET BY MOUTH DAILY, Disp: 90 tablet, Rfl: 3     blood glucose (ACCU-CHEK FELIPE PLUS) test strip, TEST TWICE DAILY, Disp: 200 strip, Rfl: 3     Cholecalciferol (VITAMIN D) 2000 units CAPS, Take by mouth daily, Disp: , Rfl:      Cyanocobalamin (B-12) 1000 MCG TABS, Take 1,000 mcg by mouth three times a week, Disp: , Rfl:      glipiZIDE (GLUCOTROL XL) 10 MG 24 hr tablet, Take 1 tablet (10 mg) by mouth 2 times daily, Disp: 180 tablet, Rfl: 1     levofloxacin (LEVAQUIN) 500 MG tablet, Take 1 tablet (500 mg) by mouth daily for 7 days, Disp: 7 tablet, Rfl: 0     levothyroxine (SYNTHROID/LEVOTHROID) 125 MCG tablet, TAKE 1 TABLET BY MOUTH DAILY, Disp: 90 tablet, Rfl: 3     losartan (COZAAR) 100 MG tablet, TAKE 1 TABLET(100 MG) BY MOUTH DAILY, Disp: 90 tablet, Rfl: 3     mesalamine (APRISO ER) 0.375 g 24 hr capsule, Take 4 capsules (1.5 g) by mouth every morning, Disp: 120 capsule, Rfl: 7     metFORMIN (GLUCOPHAGE) 500 MG tablet, TAKE 2 TABLETS BY MOUTH TWICE DAILY WITH MEALS, Disp: 120 tablet, Rfl: 3    SOCIAL HISTORY:  Social History     Socioeconomic History     Marital  status:      Spouse name: Raymundo; dementia;  16     Number of children: 3     Years of education: Not on file     Highest education level: Not on file   Occupational History     Employer: [employer]   Social Needs     Financial resource strain: Not on file     Food insecurity:     Worry: Not on file     Inability: Not on file     Transportation needs:     Medical: Not on file     Non-medical: Not on file   Tobacco Use     Smoking status: Never Smoker     Smokeless tobacco: Never Used   Substance and Sexual Activity     Alcohol use: Yes     Alcohol/week: 2.0 - 4.0 standard drinks     Types: 1 - 2 Cans of beer, 1 - 2 Shots of liquor per week     Comment: occasional cocktail or beer     Drug use: No     Sexual activity: Not Currently   Lifestyle     Physical activity:     Days per week: Not on file     Minutes per session: Not on file     Stress: Not on file   Relationships     Social connections:     Talks on phone: Not on file     Gets together: Not on file     Attends Restorationist service: Not on file     Active member of club or organization: Not on file     Attends meetings of clubs or organizations: Not on file     Relationship status: Not on file     Intimate partner violence:     Fear of current or ex partner: Not on file     Emotionally abused: Not on file     Physically abused: Not on file     Forced sexual activity: Not on file   Other Topics Concern     Parent/sibling w/ CABG, MI or angioplasty before 65F 55M? 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        FAMILY HISTORY:  Family History   Problem Relation Age of Onset     Cerebrovascular Disease Mother   "    Cancer Father         bladder     Kidney Disease No family hx of        Past/family/social history reviewed and no changes    PHYSICAL EXAMINATION:  Constitutional: aaox3, cooperative, pleasant, not dyspneic/diaphoretic, no acute distress  Vitals reviewed: /86 (BP Location: Left arm, Patient Position: Sitting, Cuff Size: Adult Regular)   Pulse 71   Ht 1.575 m (5' 2\")   Wt 66.5 kg (146 lb 8 oz)   SpO2 92%   BMI 26.80 kg/m    Wt:   Wt Readings from Last 2 Encounters:   02/12/20 66.5 kg (146 lb 8 oz)   02/10/20 66.2 kg (146 lb)      Eyes: Sclera anicteric/injected  Ears/nose/mouth/throat: Normal oropharynx without ulcers or exudate, mucus membranes moist, hearing intact  Neck: supple, thyroid normal size  CV: No edema  Respiratory: Unlabored breathing  Lymph: No axillary, submandibular, supraclavicular or inguinal lymphadenopathy  Abd: Nondistended, +bs, no hepatosplenomegaly, nontender, no peritoneal signs  Skin: warm, perfused, no jaundice  Psych: Normal affect  MSK: Normal gait      PERTINENT STUDIES:  Most recent CBC:  Recent Labs   Lab Test 02/10/20  0804 05/14/19  1412 09/10/18  0928 06/25/18  0758   WBC  --   --  10.6 9.2   HGB 13.7 12.4 13.4 14.1   HCT  --   --  40.3 42.5   PLT  --   --  359 359     Most recent hepatic panel:  Recent Labs   Lab Test 01/13/20  0926 01/28/19  0807   ALT 12 12   AST 18 17     Most recent creatinine:  Recent Labs   Lab Test 02/10/20  0804 01/13/20  0926   CR 1.30* 0.94             Answers for HPI/ROS submitted by the patient on 2/12/2020   General Symptoms: No  Skin Symptoms: No  HENT Symptoms: No  EYE SYMPTOMS: No  HEART SYMPTOMS: No  LUNG SYMPTOMS: No  INTESTINAL SYMPTOMS: No  URINARY SYMPTOMS: No  GYNECOLOGIC SYMPTOMS: No  BREAST SYMPTOMS: No  SKELETAL SYMPTOMS: No  BLOOD SYMPTOMS: No  NERVOUS SYSTEM SYMPTOMS: No  MENTAL HEALTH SYMPTOMS: No    "

## 2020-02-12 NOTE — LETTER
2020       RE: Keerthi Montoya  [address] [place] [id number]     Dear Colleague,    Thank you for referring your patient, Keerthi Montoya, to the [facility] GASTROENTEROLOGY AND IBD CLINIC at [facility]. Please see a copy of my visit note below.    IBD CLINIC VISIT     CC/REFERRING MD:  Nathan Dhillon  REASON FOR FOLLOW UP: Pan UC    IBD HISTORY  Age at diagnosis:   Extent of disease: pancolitis (prior proctosigmoiditis classification, confirmed panUC 2017)  Current UC medications: Apriso 1.5 g daily  Prior UC surgeries: None  Prior IBD Medications: None    DRUG MONITORING  TPMT enzyme activity: --    6-TGN/6-MMPN levels: --    Biologic concentration:--    DISEASE ASSESSMENT  Labs:  Lab Results   Component Value Date    ALBUMIN 3.3 02/10/2020     Recent Labs   Lab Test 09/10/18  0928 17  0836   CRP <2.9 <2.9   SED 36* 19     Endoscopic assessment: colonoscopy 17 normal ileum, patchy mild erythema and mucosa edema in rectum and sigmoid colon, markedly improved compared to 2017 outside colonoscopy report.  Mild patchy mucosal granularity noted throughout the remainder of the colon.  Rectal polyp 4 mm sessile removed.  Diverticulosis in transverse and descending colon.  Internal and external hemorrhoids noted.  Copious brown opaque semiliquid stool and medication material throughout colon interfering with visualization.  Barrera score 0.  Only biopsies obtained were from the rectal polyp, noted to be an inflammatory polyp.  No evidence of dysplasia.  No other biopsies obtained.  Enterography: --  Fecal calprotectin: --   C diff: --  Barrera score:   Stool freq: 0 (baseline stools frequency)  Rectal bleedin (None)  PGA: 0 (normal)  Endoscopy: Not done    Remission: <3   Mild disease: 3-5  Moderate disease: 6-10  Severe disease: >10    IBDQ Score Date IBDQ - Total Score  (Higher score better)   5/15/2017 51         ASSESSMENT/PLAN  Ms.  Jan is a 69 year old with past medical history to include hypertension, DM2, diabetic nephropathy, multinodular goiter s/p complete thyroidectomy ~40 yrs ago complicated by postoperative hypothyroidism, diverticulosis complicated by diverticulitis with prior limited left hemicolectomy 2002, open cholecystectomy in 2000, s/p ex-lap 30 yrs ago for presumed ectopic pregnancy (apparent diverticulitis, nonsurgical management on discovery), s/p tubal ligation 1983 complicated postsurgical [facility] s/p mesh repair 2007 here for follow-up for UC pancolitis follow up.    1.  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  Last endoscopic assessment was in 2017, noting Barrera 0.  We will continue with mesalamine monotherapy at this time.  Patient continues to follow closely with nephrology and at this time does not feel that mesalamine is contributing to decreased kidney function.  --Continue Apriso 1.5 g daily  --CBC, LFTs, CRP, ESR, CR at least once per year (recently had these this week)    2. CKD stage 3: Patient has poorly controlled diabetes and longstanding hypertension.  Mesalamine has potential implications on kidney but appears her function has been stable in the recent past.  Nephrology notes that she does have proteinuria without hematuria which does not fit with diabetic nephropathy.  She is encouraged to focus on hydration per most recent nephrology consult.    3. IBD healthcare maintenance based on patients current medication:  5-ASA (Apriso)    Vaccinations:  Recommend yearly flu shot, pneumonia vaccines (Prevnar 13 then 8 weeks later Pneumovax 23 then 5 years later Pneumovax 23), tetanus every 10 years.    One time confirmation of immunity or serologies:  -- Hepatitis A (serologies or immunizations): --  -- Hepatitis B (serologies or immunizations): --  -- Zoster vaccination (>59 yo, discuss if over 50): --  -- MMR:--  -- HPV (all aged 18-26): --  -- Meningococcal meningitis (all patients at risk for meningitis): --      Bone mineral density screening   -- Recommend all patients supplement with calcium and vitamin D    Cancer Screening:  Colon cancer screening:  Given pancolitis colonoscopy every 2-3 years recommended after 8 years of disease.  Dysplasia screening is recommended [id number].    Cervical cancer screening: N/A    Skin cancer screening: Since patient is not immunocompromised, this is per patient's dermatologist recommendations.    Depression Screening:  -- Over the last month, have you felt down, depressed, or hopeless? No  -- Over the last month, have you felt little interest or pleasure doing things? No    Misc:  -- Avoid tobacco use  -- Avoid NSAIDs as there is potentially a 25% chance of causing an IBD flare      RTC 6 months to establish care with Dr. James    Thank you for this consultation.  It was a pleasure to participate in the care of this patient; please contact us with any further questions.      Jerry Robbins PA-C  Division of Gastroenterology, Hepatology and Nutrition  [facility]    HPI:   Ms. Montoya is a 69 year old with past medical history to include hypertension, DM2, diabetic nephropathy, multinodular goiter s/p complete thyroidectomy ~40 yrs ago complicated by postoperative hypothyroidism, diverticulosis complicated by diverticulitis with prior limited left hemicolectomy 2002, open cholecystectomy in 2000, s/p ex-lap 30 yrs ago for presumed ectopic pregnancy (apparent diverticulitis, nonsurgical management on discovery), s/p tubal ligation 1983 complicated postsurgical [facility] s/p mesh repair 2007 here for follow-up for UC pancolitis follow up.     Patient is doing well and is at baseline bowel pattern with 1-2 soft formed stools per day.  No urgency or nighttime stools.    She is currently recovering from bronchitis, and recently started on antibiotics 4 days ago (Levaquin), symptoms improving.  Has noted some looser stools with antibiotics but still some form, no watery stools.  No  blood in the stool.    We have been closely monitoring her kidney function (CKD stage III), currently following with nephrology for concerns regarding medication insult with mesalamine.  Nephrology feels this is unlikely to be due to mesalamine.  Patient has poorly controlled diabetes as well as longstanding hypertension.  Last seen this week by nephrology.  Patient is encouraged to focus on hydration.    ROS:  Complete 10 System ROS performed. All are negative except as documented below, in the HPI, or in patient questionnaire from today's visit.    No fevers or chills  No weight loss  No blurry vision, double vision or change in vision  No sore throat  No lymphadenopathy  No headache, paraesthesias, or weakness in a limb  No shortness of breath or wheezing  No chest pain or pressure  No arthralgias or myalgias  No rashes or skin changes  No odynophagia or dysphagia  No BRBPR, hematochezia, melena  No dysuria, frequency or urgency  No hot/cold intolerance or polyria  No anxiety or depression    Extra intestinal manifestations of IBD:  No uveitis/episcleritis  No aphthous ulcers   No arthritis   No erythema nodosum/pyoderma gangrenosum.     PERTINENT PAST MEDICAL HISTORY:  Past Medical History:   Diagnosis Date     Diverticulosis of colon (without mention of hemorrhage) 10/1/2012       PREVIOUS SURGERIES:  Past Surgical History:   Procedure Laterality Date     CHOLECYSTECTOMY  3/1987     COLON SURGERY  01/2001    Left colon resection; diverticulitis     COLONOSCOPY N/A 4/24/2017    Procedure: COMBINED COLONOSCOPY, SINGLE OR MULTIPLE BIOPSY/POLYPECTOMY BY BIOPSY;;  Surgeon: Radha Salguero MD;  Location: [facility] OR     COLONOSCOPY WITH CO2 INSUFFLATION N/A 4/24/2017    Procedure: COLONOSCOPY WITH CO2 INSUFFLATION;  Colonoscopy Dx rectal bleeding, BMI 25.86, Pharm [facility] .[phone], ;  Surgeon: Radha Salguero MD;  Location: [facility] OR     GYN SURGERY  9/1983    tubal ligation     HERNIA REPAIR  12/2006     recurrent incisional hernia     THROAT SURGERY  12/1974    thyroidectomy     ALLERGIES:     Allergies   Allergen Reactions     Actos [Pioglitazone]      Fluid retention     Animal Dander      Doxycycline Hives     Dust Mites      Grass      Keflex [Cephalexin Hcl] Hives     Metformin Diarrhea     At 1000mg     Metoprolol      Swelling of lower legs and fatigue     Other [Seasonal Allergies]      pollen     Ranitidine      rash     Synthroid [Levothroid] Swelling     ???     Tetracycline Hives     Tylenol Hives     Ziac [Bisoprolol-Hydrochlorothiazide]        PERTINENT MEDICATIONS:    Current Outpatient Medications:      ACCU-CHEK FELIPE PLUS test strip, TEST DAILY, Disp: 100 strip, Rfl: 1     albuterol (PROAIR HFA/PROVENTIL HFA/VENTOLIN HFA) 108 (90 Base) MCG/ACT inhaler, Inhale 2 puffs into the lungs every 4 hours as needed for shortness of breath / dyspnea, wheezing or other (cough), Disp: 1 Inhaler, Rfl: 0     amLODIPine (NORVASC) 2.5 MG tablet, Take 1 tablet (2.5 mg) by mouth daily, Disp: 90 tablet, Rfl: 3     atenolol (TENORMIN) 50 MG tablet, Take 1 tablet (50 mg) by mouth daily, Disp: 90 tablet, Rfl: 3     atorvastatin (LIPITOR) 40 MG tablet, TAKE 1 TABLET BY MOUTH DAILY, Disp: 90 tablet, Rfl: 3     blood glucose (ACCU-CHEK FELIPE PLUS) test strip, TEST TWICE DAILY, Disp: 200 strip, Rfl: 3     Cholecalciferol (VITAMIN D) 2000 units CAPS, Take by mouth daily, Disp: , Rfl:      Cyanocobalamin (B-12) 1000 MCG TABS, Take 1,000 mcg by mouth three times a week, Disp: , Rfl:      glipiZIDE (GLUCOTROL XL) 10 MG 24 hr tablet, Take 1 tablet (10 mg) by mouth 2 times daily, Disp: 180 tablet, Rfl: 1     levofloxacin (LEVAQUIN) 500 MG tablet, Take 1 tablet (500 mg) by mouth daily for 7 days, Disp: 7 tablet, Rfl: 0     levothyroxine (SYNTHROID/LEVOTHROID) 125 MCG tablet, TAKE 1 TABLET BY MOUTH DAILY, Disp: 90 tablet, Rfl: 3     losartan (COZAAR) 100 MG tablet, TAKE 1 TABLET(100 MG) BY MOUTH DAILY, Disp: 90 tablet, Rfl: 3      mesalamine (APRISO ER) 0.375 g 24 hr capsule, Take 4 capsules (1.5 g) by mouth every morning, Disp: 120 capsule, Rfl: 7     metFORMIN (GLUCOPHAGE) 500 MG tablet, TAKE 2 TABLETS BY MOUTH TWICE DAILY WITH MEALS, Disp: 120 tablet, Rfl: 3    SOCIAL HISTORY:  Social History     Socioeconomic History     Marital status:      Spouse name: Raymundo; dementia;  16     Number of children: 3     Years of education: Not on file     Highest education level: Not on file   Occupational History     Employer: [employer]   Social Needs     Financial resource strain: Not on file     Food insecurity:     Worry: Not on file     Inability: Not on file     Transportation needs:     Medical: Not on file     Non-medical: Not on file   Tobacco Use     Smoking status: Never Smoker     Smokeless tobacco: Never Used   Substance and Sexual Activity     Alcohol use: Yes     Alcohol/week: 2.0 - 4.0 standard drinks     Types: 1 - 2 Cans of beer, 1 - 2 Shots of liquor per week     Comment: occasional cocktail or beer     Drug use: No     Sexual activity: Not Currently   Lifestyle     Physical activity:     Days per week: Not on file     Minutes per session: Not on file     Stress: Not on file   Relationships     Social connections:     Talks on phone: Not on file     Gets together: Not on file     Attends Christianity service: Not on file     Active member of club or organization: Not on file     Attends meetings of clubs or organizations: Not on file     Relationship status: Not on file     Intimate partner violence:     Fear of current or ex partner: Not on file     Emotionally abused: Not on file     Physically abused: Not on file     Forced sexual activity: Not on file   Other Topics Concern     Parent/sibling w/ CABG, MI or angioplasty before 65F 55M? Not Asked      Service No     Blood Transfusions No     Caffeine Concern No     Occupational Exposure No     Hobby Hazards No     Sleep Concern No     Stress Concern No      "Weight Concern No     Special Diet No     Back Care No     Exercise Yes     Comment: off and on     Bike Helmet No     Seat Belt Yes     Self-Exams No   Social History Narrative    Laid off her job 8/14       FAMILY HISTORY:  Family History   Problem Relation Age of Onset     Cerebrovascular Disease Mother      Cancer Father         bladder     Kidney Disease No family hx of        Past/family/social history reviewed and no changes    PHYSICAL EXAMINATION:  Constitutional: aaox3, cooperative, pleasant, not dyspneic/diaphoretic, no acute distress  Vitals reviewed: /86 (BP Location: Left arm, Patient Position: Sitting, Cuff Size: Adult Regular)   Pulse 71   Ht 1.575 m (5' 2\")   Wt 66.5 kg (146 lb 8 oz)   SpO2 92%   BMI 26.80 kg/m     Wt:   Wt Readings from Last 2 Encounters:   02/12/20 66.5 kg (146 lb 8 oz)   02/10/20 66.2 kg (146 lb)      Eyes: Sclera anicteric/injected  Ears/nose/mouth/throat: Normal oropharynx without ulcers or exudate, mucus membranes moist, hearing intact  Neck: supple, thyroid normal size  CV: No edema  Respiratory: Unlabored breathing  Lymph: No axillary, submandibular, supraclavicular or inguinal lymphadenopathy  Abd: Nondistended, +bs, no hepatosplenomegaly, nontender, no peritoneal signs  Skin: warm, perfused, no jaundice  Psych: Normal affect  MSK: Normal gait      PERTINENT STUDIES:  Most recent CBC:  Recent Labs   Lab Test 02/10/20  0804 05/14/19  1412 09/10/18  0928 06/25/18  0758   WBC  --   --  10.6 9.2   HGB 13.7 12.4 13.4 14.1   HCT  --   --  40.3 42.5   PLT  --   --  359 359     Most recent hepatic panel:  Recent Labs   Lab Test 01/13/20  0926 01/28/19  0807   ALT 12 12   AST 18 17     Most recent creatinine:  Recent Labs   Lab Test 02/10/20  0804 01/13/20  0926   CR 1.30* 0.94             Answers for HPI/ROS submitted by the patient on 2/12/2020   General Symptoms: No  Skin Symptoms: No  HENT Symptoms: No  EYE SYMPTOMS: No  HEART SYMPTOMS: No  LUNG SYMPTOMS: " No  INTESTINAL SYMPTOMS: No  URINARY SYMPTOMS: No  GYNECOLOGIC SYMPTOMS: No  BREAST SYMPTOMS: No  SKELETAL SYMPTOMS: No  BLOOD SYMPTOMS: No  NERVOUS SYSTEM SYMPTOMS: No  MENTAL HEALTH SYMPTOMS: No    Jerry Robbins PA-C

## 2020-02-12 NOTE — PATIENT INSTRUCTIONS
It was a pleasure taking care of you today.  I've included a brief summary of our discussion and care plan from today's visit below.  Please review this information with your primary care provider.  ______________________________________________________________________    My recommendations are summarized as follows:    -- Continue Apriso 1.5 g per day   -- Next endoscopic assessment: colonoscopy in 5911-9925  -- Patient with IBD we recommend supplementation vitamin D 1000 units daily and calcium 500 mg twice daily.  -- Vaccines/immunizations to be updated: Recommend yearly flu shot, pneumonia vaccines (Prevnar 13 then 8 weeks later Pneumovax 23 then 5 years later Pneumovax 23), tetanus every 10 years.  -- No NSAIDs (ibuprofen, or anything containing ibuprofen)       For additional resources about inflammatory bowel disease visit http://[email or web address].[email or web address]      Return to GI Clinic in 6 months with Dr. James to review your progress.    ______________________________________________________________________    Who do I call with any questions after my visit?  Please be in touch if there are any further questions that arise following today's visit.  There are multiple ways to contact your gastroenterology care team.        During business hours, you may reach a Gastroenterology nurse at [phone], option 3.       To schedule or reschedule an appointment, please call [phone].       You can always send a secure message through [employer].  [employer] messages are answered by your nurse or doctor typically within 24 hours.  Please allow extra time on weekends and holidays.        For urgent/emergent questions after business hours, you may reach the on-call GI Fellow by contacting the [facility] at [phone].      In order for your refill to be processed in a timely fashion, it is your responsibility to ensure you follow the recommendations from your provider regarding your laboratory  studies and follow up appointments.       How will I get the results of any tests ordered?    You will receive all of your results.  If you have signed up for [facility], any tests ordered at your visit will be available to you after your physician reviews them.  Typically this takes 1-2 weeks.  If there are urgent results that require a change in your care plan, your physician or nurse will call you to discuss the next steps.      What is [facility]?  [employer] is a secure way for you to access all of your healthcare records from the [facility].  It is a web based computer program, so you can sign on to it from any location.  It also allows you to send secure messages to your care team.  I recommend signing up for [employer] access if you have not already done so and are comfortable with using a computer.      How to I schedule a follow-up visit?  If you did not schedule a follow-up visit today, please call [phone] to schedule a follow-up office visit.        Sincerely,    Jerry Robbins PA-C  [facility]  Division of Gastroenterology

## 2020-02-12 NOTE — NURSING NOTE
"Chief Complaint   Patient presents with     RECHECK     6 month follow up appointment.       Vitals:    02/12/20 0705   BP: 137/86   BP Location: Left arm   Patient Position: Sitting   Cuff Size: Adult Regular   Pulse: 71   SpO2: 92%   Weight: 66.5 kg (146 lb 8 oz)   Height: 1.575 m (5' 2\")       Body mass index is 26.8 kg/m .                            SHANIQUA ELDRIDGE, EMT    "

## 2020-02-17 ENCOUNTER — ANCILLARY PROCEDURE (OUTPATIENT)
Dept: CARDIOLOGY | Facility: CLINIC | Age: 70
End: 2020-02-17
Attending: INTERNAL MEDICINE
Payer: MEDICARE

## 2020-02-17 DIAGNOSIS — R01.1 HEART MURMUR: ICD-10-CM

## 2020-02-17 DIAGNOSIS — I10 HYPERTENSION GOAL BP (BLOOD PRESSURE) < 130/80: ICD-10-CM

## 2020-02-17 PROCEDURE — 93306 TTE W/DOPPLER COMPLETE: CPT | Performed by: INTERNAL MEDICINE

## 2020-02-17 RX ORDER — CARVEDILOL 25 MG/1
TABLET ORAL
Qty: 180 TABLET | Refills: 3 | OUTPATIENT
Start: 2020-02-17

## 2020-02-17 NOTE — TELEPHONE ENCOUNTER
"Requested Prescriptions   Pending Prescriptions Disp Refills     carvedilol (COREG) 25 MG tablet [Pharmacy Med Name: CARVEDILOL 25MG TABLETS]    The source prescription was discontinued on 1/13/2020 by Nathan Dhillon MD.    Last Written Prescription Date:  01/28/2019 END 01/13/2020  Last Fill Quantity: 180 tablet,  # refills: 3   Last office visit: 2/8/2020 with prescribing provider:  Brenda   Future Office Visit:     Next 5 appointments (look out 90 days)    May 07, 2020  7:30 AM CDT  SHORT with Nathan Dhillon MD  [facility] [address] [address] [phone]          180 tablet 3     Sig: TAKE 1 TABLET BY MOUTH TWICE DAILY. TAKE WITH MEALS       Beta-Blockers Protocol Failed - 2/17/2020  9:06 AM        Failed - Medication is active on med list        Passed - Blood pressure under 140/90 in past 12 months     BP Readings from Last 3 Encounters:   02/12/20 137/86   02/10/20 (!) 144/69   02/08/20 122/62                 Passed - Patient is age 6 or older        Passed - Recent (12 mo) or future (30 days) visit within the authorizing provider's specialty     Patient has had an office visit with the authorizing provider or a provider within the authorizing providers department within the previous 12 mos or has a future within next 30 days. See \"Patient Info\" tab in inbasket, or \"Choose Columns\" in Meds & Orders section of the refill encounter.                 "

## 2020-02-24 ENCOUNTER — DOCUMENTATION ONLY (OUTPATIENT)
Dept: FAMILY MEDICINE | Facility: CLINIC | Age: 70
End: 2020-02-24

## 2020-02-25 DIAGNOSIS — N18.30 CKD (CHRONIC KIDNEY DISEASE) STAGE 3, GFR 30-59 ML/MIN (H): ICD-10-CM

## 2020-02-25 PROBLEM — I27.20 PULMONARY HYPERTENSION (H): Status: ACTIVE | Noted: 2020-02-25

## 2020-02-25 PROBLEM — I35.0 AORTIC STENOSIS: Status: ACTIVE | Noted: 2020-02-25

## 2020-02-25 LAB
ALBUMIN SERPL-MCNC: 3.5 G/DL (ref 3.4–5)
ANION GAP SERPL CALCULATED.3IONS-SCNC: 5 MMOL/L (ref 3–14)
BUN SERPL-MCNC: 12 MG/DL (ref 7–30)
CALCIUM SERPL-MCNC: 9.1 MG/DL (ref 8.5–10.1)
CHLORIDE SERPL-SCNC: 108 MMOL/L (ref 94–109)
CO2 SERPL-SCNC: 26 MMOL/L (ref 20–32)
CREAT SERPL-MCNC: 0.88 MG/DL (ref 0.52–1.04)
GFR SERPL CREATININE-BSD FRML MDRD: 67 ML/MIN/{1.73_M2}
GLUCOSE SERPL-MCNC: 96 MG/DL (ref 70–99)
PHOSPHATE SERPL-MCNC: 3.4 MG/DL (ref 2.5–4.5)
POTASSIUM SERPL-SCNC: 4.1 MMOL/L (ref 3.4–5.3)
SODIUM SERPL-SCNC: 139 MMOL/L (ref 133–144)

## 2020-02-25 PROCEDURE — 36415 COLL VENOUS BLD VENIPUNCTURE: CPT | Performed by: INTERNAL MEDICINE

## 2020-02-25 PROCEDURE — 80069 RENAL FUNCTION PANEL: CPT | Performed by: INTERNAL MEDICINE

## 2020-02-25 PROCEDURE — 84156 ASSAY OF PROTEIN URINE: CPT | Performed by: INTERNAL MEDICINE

## 2020-02-25 PROCEDURE — 83970 ASSAY OF PARATHORMONE: CPT | Performed by: INTERNAL MEDICINE

## 2020-02-27 DIAGNOSIS — I51.89 DIASTOLIC DYSFUNCTION: ICD-10-CM

## 2020-02-27 DIAGNOSIS — I35.0 AORTIC VALVE STENOSIS, ETIOLOGY OF CARDIAC VALVE DISEASE UNSPECIFIED: Primary | ICD-10-CM

## 2020-02-27 DIAGNOSIS — I27.20 PULMONARY HYPERTENSION (H): ICD-10-CM

## 2020-02-27 NOTE — PROGRESS NOTES
I have reviewed the echocardiogram.         I have tried to call the patient several times to discuss the results.       She needs to see cardiology regarding aortic stenosis, diastolic dysfunction, and pulmonary hypertension.       I left a message on her answering machine, and I will send her a note via my chart also.

## 2020-03-01 DIAGNOSIS — I35.0 AORTIC VALVE STENOSIS, ETIOLOGY OF CARDIAC VALVE DISEASE UNSPECIFIED: Primary | ICD-10-CM

## 2020-03-01 DIAGNOSIS — I51.89 DIASTOLIC DYSFUNCTION: ICD-10-CM

## 2020-03-01 DIAGNOSIS — I27.20 PULMONARY HYPERTENSION (H): ICD-10-CM

## 2020-03-04 ENCOUNTER — TELEPHONE (OUTPATIENT)
Dept: NEPHROLOGY | Facility: CLINIC | Age: 70
End: 2020-03-04

## 2020-03-04 LAB
CREAT UR-MCNC: NORMAL MG/DL
HGB BLD-MCNC: NORMAL G/DL (ref 11.7–15.7)
PROT UR-MCNC: NORMAL G/L
PROT/CREAT 24H UR: NORMAL G/G CR (ref 0–0.2)
PTH-INTACT SERPL-MCNC: NORMAL PG/ML (ref 18–80)

## 2020-03-04 NOTE — TELEPHONE ENCOUNTER
Will contact patient to discuss.    Because of the murmur appreciated at your visit, I recommended getting an echo of the heart. There are some findings on the echo that would explain the murmur - for example, there is some blockage of blood flow as it leaves the left side of the heart at the aortic valve. Along with that finding, there are a few other abnormal findings - for example, the heart does not seem to relax as it should and there are also some higher pressures on the right side of the heart.     I think the take away from the echo findings is that we should have you see cardiology so they can weigh in on these findings and assure we have your heart optimized. We can help to get you scheduled with this appt in [place] if that location is a good site for you. Please call or [facility] with questions or concerns.     Sincerely,   Swati Minor DO     Team: please help to get arranged with cardiology. I will place the order. KW     I'm pleased to see that your repeat creatinine was back to your previous baseline. This is a reassuring finding. Please call or [facility] with questions or concerns.     Sincerely,   Swati Minor DO     Team: patient should not have had all of her standing order drawn - can you update the lab as we should really cancel these labs so she isn't billed for labs that were done 2 weeks ago. EMILY

## 2020-03-06 ENCOUNTER — OFFICE VISIT (OUTPATIENT)
Dept: CARDIOLOGY | Facility: CLINIC | Age: 70
End: 2020-03-06
Attending: INTERNAL MEDICINE
Payer: MEDICARE

## 2020-03-06 VITALS
BODY MASS INDEX: 27.36 KG/M2 | SYSTOLIC BLOOD PRESSURE: 178 MMHG | HEART RATE: 68 BPM | DIASTOLIC BLOOD PRESSURE: 86 MMHG | WEIGHT: 149.6 LBS

## 2020-03-06 DIAGNOSIS — I27.20 PULMONARY HYPERTENSION (H): Primary | ICD-10-CM

## 2020-03-06 DIAGNOSIS — I10 HYPERTENSION GOAL BP (BLOOD PRESSURE) < 130/80: ICD-10-CM

## 2020-03-06 DIAGNOSIS — Z11.59 ENCOUNTER FOR SCREENING FOR OTHER VIRAL DISEASES: ICD-10-CM

## 2020-03-06 DIAGNOSIS — I50.31 ACUTE DIASTOLIC (CONGESTIVE) HEART FAILURE (H): ICD-10-CM

## 2020-03-06 DIAGNOSIS — I13.0 HYPERTENSIVE HEART AND CHRONIC KIDNEY DISEASE WITH HEART FAILURE AND STAGE 1 THROUGH STAGE 4 CHRONIC KIDNEY DISEASE, OR UNSPECIFIED CHRONIC KIDNEY DISEASE (H): ICD-10-CM

## 2020-03-06 DIAGNOSIS — I35.0 AORTIC VALVE STENOSIS, ETIOLOGY OF CARDIAC VALVE DISEASE UNSPECIFIED: ICD-10-CM

## 2020-03-06 DIAGNOSIS — I51.89 DIASTOLIC DYSFUNCTION: ICD-10-CM

## 2020-03-06 PROCEDURE — 93000 ELECTROCARDIOGRAM COMPLETE: CPT | Performed by: INTERNAL MEDICINE

## 2020-03-06 PROCEDURE — 99204 OFFICE O/P NEW MOD 45 MIN: CPT | Performed by: INTERNAL MEDICINE

## 2020-03-06 RX ORDER — AMLODIPINE BESYLATE 10 MG/1
10 TABLET ORAL DAILY
Qty: 90 TABLET | Refills: 3 | Status: SHIPPED | OUTPATIENT
Start: 2020-03-06 | End: 2020-08-14

## 2020-03-06 ASSESSMENT — PAIN SCALES - GENERAL: PAINLEVEL: NO PAIN (0)

## 2020-03-06 NOTE — NURSING NOTE
Keerthi Montoya's goals for this visit include:   Chief Complaint   Patient presents with     Consult     aortic valve stenosis, etiology of cardiac valve disease, pumonary hypertension, diastolic dysfunction       She requests these members of her care team be copied on today's visit information: no    PCP: Nathan Dhillon    Referring Provider:  Nathan Dhillon MD  [address] [place] [address]    BP (!) 178/86 (BP Location: Left arm, Patient Position: Sitting, Cuff Size: Adult Regular)   Pulse 68   Wt 67.9 kg (149 lb 9.6 oz)   BMI 27.36 kg/m      Do you need any medication refills at today's visit? no

## 2020-03-06 NOTE — PROGRESS NOTES
[facility] Cardiology Consultation:    Assessment and Plan:     1.  Severe pulmonary hypertension, normal LV function with mild dilation, functional class I-II.  The etiology is likely group 2, and related to underlying diastolic dysfunction from longstanding hypertension and diabetes.  The caveat is that she does not have any significant LVH on echocardiogram, appears euvolemic on exam, and has no symptoms.  I would like to confirm the findings with a right heart catheterization.  Risk benefits were discussed with the patient and she is agreeable.  In addition, will also run other serologies, and a VQ scan for evaluation.  2.  Hypertension, with poor recent control: Increase amlodipine to 10 mg.  Instructed to keep home blood pressure log.  3.  Mild-moderate aortic stenosis, possible bicuspid aortic valve: Serial echoes  4. Diabetes: Consider adding SGLT1i in the future  5.  Dyslipidemia, on Lipitor  6.  CKD stage 3  7. Ulcerative colitis    RTC 4 weeks    David Almendarez MD    Cardiac Imaging and Prevention  [facility]  [email or web address] Pager: [phone]    HPI: She is referred by her nephrologist, Dr. Minor, for evaluation of abnormal echocardiogram.  I reviewed her echocardiogram that was done recently.  It shows normal LV function, severe grade 3 diastolic dysfunction with elevated left-sided filling pressure, normal right-sided filling pressure, and severe pulmonary hypertension.  There is also notching of her RVOT Doppler, suggesting elevated PVR.  RV function is normal, with mild dilation, and there is mild systolic movement of her septum.  She has mild-moderate aortic stenosis, with a aortic valve that may be bicuspid with partial fusion.  There are no prior echocardiograms to review.  Her medical history is significant for longstanding hypertension since she was in her teens, diabetes, dyslipidemia, and ulcerative colitis.  She tells me that her  hypertension has been under fairly good control until recently.  She just bought a home blood pressure monitor, though I do not have any readings to review.  She tells me that metoprolol was stopped recently, and low-dose amlodipine was added.  She is fairly active and asymptomatic.  She watches her grandchildren from time to time and feels no limitation and what she is able to do. Denies any chest pain or pressure, shortness of breath/dyspnea, orthopnea, pnd, palpitations, syncope/presyncope or edema.  Family history is significant for aortic valve stenosis that her mother developed in her 50s, she is not sure if she had a bicuspid aortic valve.  There is no other relevant cardiac history  She has no history of lung disease or symptoms suggestive of sleep apnea.  EKG done in clinic today was reviewed by me.  It shows normal sinus rhythm at 61 bpm, occasional PACs, T wave inversions in V1 to V3, with no other concerning changes.    EXAM:  BP (!) 178/86 (BP Location: Left arm, Patient Position: Sitting, Cuff Size: Adult Regular)   Pulse 68   Wt 67.9 kg (149 lb 9.6 oz)   BMI 27.36 kg/m    GEN/CONSTITUIONAL: Appears comfortable, in no apparent distress   EYES: No icterus  ENT/MOUTH: Normal  JVP:  Not visible  RESPIRATORY: Clear to auscultation bilaterally   CARDIOVASCULAR: Regular S1 and S2, mild AS murmur, rubs, or gallops.   ABDOMEN: Soft, non-tender, positive bowel sounds   NEUROLOGIC: Grossly non-focal   PSYCHIATRIC: Normal affect  EXT: No cyanosis, clubbing, edema. Normal pedal pulses.  Skin: No petechiae, purpura or rash    PAST MEDICAL HISTORY:  Past Medical History:   Diagnosis Date     Diabetes mellitus (H)      Diverticulosis of colon (without mention of hemorrhage) 10/1/2012     Hypertension      Ulcerative (chronic) enterocolitis (H)        CURRENT MEDICATIONS:  Current Outpatient Medications   Medication     ACCU-CHEK FELIPE PLUS test strip     albuterol (PROAIR HFA/PROVENTIL HFA/VENTOLIN HFA) 108 (90  Base) MCG/ACT inhaler     amLODIPine (NORVASC) 2.5 MG tablet     atenolol (TENORMIN) 50 MG tablet     atorvastatin (LIPITOR) 40 MG tablet     blood glucose (ACCU-CHEK FELIPE PLUS) test strip     Cholecalciferol (VITAMIN D) 2000 units CAPS     Cyanocobalamin (B-12) 1000 MCG TABS     glipiZIDE (GLUCOTROL XL) 10 MG 24 hr tablet     levothyroxine (SYNTHROID/LEVOTHROID) 125 MCG tablet     losartan (COZAAR) 100 MG tablet     mesalamine (APRISO ER) 0.375 g 24 hr capsule     metFORMIN (GLUCOPHAGE) 500 MG tablet     No current facility-administered medications for this visit.        PAST SURGICAL HISTORY:  Past Surgical History:   Procedure Laterality Date     CHOLECYSTECTOMY  3/1987     COLON SURGERY  01/2001    Left colon resection; diverticulitis     COLONOSCOPY N/A 4/24/2017    Procedure: COMBINED COLONOSCOPY, SINGLE OR MULTIPLE BIOPSY/POLYPECTOMY BY BIOPSY;;  Surgeon: Radha Salguero MD;  Location: [facility] OR     COLONOSCOPY WITH CO2 INSUFFLATION N/A 4/24/2017    Procedure: COLONOSCOPY WITH CO2 INSUFFLATION;  Colonoscopy Dx rectal bleeding, BMI 25.86, Pharm [facility] .[phone], ;  Surgeon: Radha Salguero MD;  Location: [facility] OR     GYN SURGERY  9/1983    tubal ligation     HERNIA REPAIR  12/2006    recurrent incisional hernia     THROAT SURGERY  12/1974    thyroidectomy       ALLERGIES     Allergies   Allergen Reactions     Actos [Pioglitazone]      Fluid retention     Animal Dander      Doxycycline Hives     Dust Mites      Grass      Keflex [Cephalexin Hcl] Hives     Metformin Diarrhea     At 1000mg     Metoprolol      Swelling of lower legs and fatigue     Other [Seasonal Allergies]      pollen     Ranitidine      rash     Synthroid [Levothroid] Swelling     ???     Tetracycline Hives     Tylenol Hives     Ziac [Bisoprolol-Hydrochlorothiazide]        FAMILY HISTORY:  Family History   Problem Relation Age of Onset     Cerebrovascular Disease Mother      Cancer Father         bladder     Kidney Disease No family  hx of        SOCIAL HISTORY:  Social History     Socioeconomic History     Marital status:      Spouse name: Raymundo; dementia;  16     Number of children: 3     Years of education: Not on file     Highest education level: Not on file   Occupational History     Employer: [employer]   Social Needs     Financial resource strain: Not on file     Food insecurity:     Worry: Not on file     Inability: Not on file     Transportation needs:     Medical: Not on file     Non-medical: Not on file   Tobacco Use     Smoking status: Never Smoker     Smokeless tobacco: Never Used   Substance and Sexual Activity     Alcohol use: Yes     Alcohol/week: 2.0 - 4.0 standard drinks     Types: 1 - 2 Cans of beer, 1 - 2 Shots of liquor per week     Comment: occasional cocktail or beer     Drug use: No     Sexual activity: Not Currently   Lifestyle     Physical activity:     Days per week: Not on file     Minutes per session: Not on file     Stress: Not on file   Relationships     Social connections:     Talks on phone: Not on file     Gets together: Not on file     Attends [employer] service: Not on file     Active member of club or organization: Not on file     Attends meetings of clubs or organizations: Not on file     Relationship status: Not on file     Intimate partner violence:     Fear of current or ex partner: Not on file     Emotionally abused: Not on file     Physically abused: Not on file     Forced sexual activity: Not on file   Other Topics Concern     Parent/sibling w/ CABG, MI or angioplasty before 65F 55M? 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        ROS:    Constitutional: No fever, chills, or sweats. No weight gain/loss   ENT: No visual disturbance, ear ache, epistaxis, sore throat  Allergies/Immunologic: Negative.   Respiratory: No cough, hemoptysia  Cardiovascular: As per HPI  GI: No nausea, vomiting, hematemesis, melena, or hematochezia  : No urinary frequency, dysuria, or hematuria  Integument: Negative  Psychiatric: Negative  Neuro: Negative  Endocrinology: Negative   Musculoskeletal: Negative    ADDITIONAL COMMENTS:     I reviewed the patient's medications:     I reviewed the patient's pertinent clinical laboratory studies:     I reviewed the patient's pertinent imaging studies:   I reviewed the patient's ECG:       Echo Interpretation Summary     Mild aortic stenosis (PV 2.5 m/s MG 12 mmHg.) Cannot exclude a bicuspid aortic  valve. This is a plausible basis for a systolic murmur.  Left ventricular function, chamber size, wall motion, and wall thickness are  normal.The EF is 60-65%.  Mildly dilated RV with normal RV function.  Mild to moderate tricuspid insufficiency is present.  Grade 3 diastolic dysfunction with elevated left-sided filling pressures.  Severe pulmonary hypertension. Pulmonary artery systolic pressure is 89 mmHg  (86 mmHg+RA pressure).  Previous study not available for comparison.

## 2020-03-06 NOTE — PATIENT INSTRUCTIONS
Increase amlodipine to 10 mg every day  Labs with next blood draw  VQ scan here at [facility]  RHC at [facility] on March 16 th at 7:00 am at the [facility] room  1 month f/u    A right heart catheterization is performed to determine how well the heart is pumping and to measure the pressures in the heart and lungs.  In a right heart cath, the doctor guides a special catheter (a small, hollow tube) called a pulmonary artery (PA) catheter to the right side of the heart and passes it into the pulmonary artery, the main artery carrying blood to the lungs. The doctor observes blood flow through the heart and measures the pressures inside the heart and in the lungs.    Patient Instructions:  Check-In  Time  Check-In Location  Estimated Length  Procedure   Name       [facility] room  60-90 minutes  Right Heart Catheterization**      Procedure Preparations & Instructions   This is an invasive procedure that DOES require preparation:    Nothing to eat for 6 hours    You may have clear liquids up until the time of your procedure    A ride should be arranged for you in the instance you are unable to drive home, however you should be able to function as you normally would after the procedure     *For Patients with Diabetes:    DO NOT take any oral diabetic medication, short-acting diabetes medications/insulin, humalog or regular insulin the morning of your test    Take   dose of long-acting insulin (Lantus, Levemir) the day of your test    Remember to bring your glucometer and insulin with you to take after your test if needed               Remedios Santoro, RN  Cardiology Care Coordinator  [facility]  Phone: [phone]

## 2020-03-06 NOTE — NURSING NOTE
Cardiac Testing: Patient given instructions regarding  VQ scan. Discussed purpose and when to expect results reported back to the patient. Patient demonstrated understanding of this information and agreed to call with further questions or concerns.  Right Heart Catheterization: Patient was instructed regarding right heart catheterization, including discussion of the procedure, preparation, intra-procedural steps, and recovery at home. Patient demonstrated understanding of this information and agreed to call with further questions or concerns.  Patient stated she understood all health information given and agreed to call with further questions or concerns.

## 2020-03-10 ENCOUNTER — ANCILLARY PROCEDURE (OUTPATIENT)
Dept: NUCLEAR MEDICINE | Facility: CLINIC | Age: 70
End: 2020-03-10
Attending: INTERNAL MEDICINE
Payer: MEDICARE

## 2020-03-10 ENCOUNTER — ANCILLARY PROCEDURE (OUTPATIENT)
Dept: GENERAL RADIOLOGY | Facility: CLINIC | Age: 70
End: 2020-03-10
Attending: INTERNAL MEDICINE
Payer: MEDICARE

## 2020-03-10 DIAGNOSIS — I27.20 PULMONARY HYPERTENSION (H): ICD-10-CM

## 2020-03-10 PROCEDURE — 71046 X-RAY EXAM CHEST 2 VIEWS: CPT | Performed by: RADIOLOGY

## 2020-03-10 PROCEDURE — A9540 TC99M MAA: HCPCS | Performed by: INTERNAL MEDICINE

## 2020-03-10 PROCEDURE — 78582 LUNG VENTILAT&PERFUS IMAGING: CPT | Performed by: RADIOLOGY

## 2020-03-11 DIAGNOSIS — K51.311 ULCERATIVE RECTOSIGMOIDITIS WITH RECTAL BLEEDING (H): ICD-10-CM

## 2020-03-15 ENCOUNTER — HEALTH MAINTENANCE LETTER (OUTPATIENT)
Age: 70
End: 2020-03-15

## 2020-03-16 ENCOUNTER — APPOINTMENT (OUTPATIENT)
Dept: MEDSURG UNIT | Facility: CLINIC | Age: 70
End: 2020-03-16
Attending: INTERNAL MEDICINE
Payer: MEDICARE

## 2020-03-16 ENCOUNTER — HOSPITAL ENCOUNTER (OUTPATIENT)
Facility: CLINIC | Age: 70
Discharge: HOME | End: 2020-03-16
Attending: INTERNAL MEDICINE | Admitting: INTERNAL MEDICINE
Payer: MEDICARE

## 2020-03-16 VITALS
SYSTOLIC BLOOD PRESSURE: 143 MMHG | RESPIRATION RATE: 20 BRPM | BODY MASS INDEX: 27.07 KG/M2 | OXYGEN SATURATION: 90 % | TEMPERATURE: 97.7 F | WEIGHT: 148 LBS | DIASTOLIC BLOOD PRESSURE: 43 MMHG

## 2020-03-16 DIAGNOSIS — Z11.59 ENCOUNTER FOR SCREENING FOR OTHER VIRAL DISEASES: ICD-10-CM

## 2020-03-16 DIAGNOSIS — I27.20 PULMONARY HYPERTENSION (H): ICD-10-CM

## 2020-03-16 DIAGNOSIS — I50.31 ACUTE DIASTOLIC (CONGESTIVE) HEART FAILURE (H): ICD-10-CM

## 2020-03-16 DIAGNOSIS — I13.0 HYPERTENSIVE HEART AND CHRONIC KIDNEY DISEASE WITH HEART FAILURE AND STAGE 1 THROUGH STAGE 4 CHRONIC KIDNEY DISEASE, OR UNSPECIFIED CHRONIC KIDNEY DISEASE (H): ICD-10-CM

## 2020-03-16 LAB
ANION GAP SERPL CALCULATED.3IONS-SCNC: 9 MMOL/L (ref 3–14)
BASOPHILS # BLD AUTO: 0.1 10E9/L (ref 0–0.2)
BASOPHILS NFR BLD AUTO: 0.8 %
BUN SERPL-MCNC: 20 MG/DL (ref 7–30)
CALCIUM SERPL-MCNC: 9 MG/DL (ref 8.5–10.1)
CHLORIDE SERPL-SCNC: 108 MMOL/L (ref 94–109)
CO2 SERPL-SCNC: 18 MMOL/L (ref 20–32)
CREAT SERPL-MCNC: 0.91 MG/DL (ref 0.52–1.04)
CRP SERPL-MCNC: <2.9 MG/L (ref 0–8)
DIFFERENTIAL METHOD BLD: ABNORMAL
EOSINOPHIL # BLD AUTO: 0.3 10E9/L (ref 0–0.7)
EOSINOPHIL NFR BLD AUTO: 2.4 %
ERYTHROCYTE [DISTWIDTH] IN BLOOD BY AUTOMATED COUNT: 13.1 % (ref 10–15)
GFR SERPL CREATININE-BSD FRML MDRD: 65 ML/MIN/{1.73_M2}
GLUCOSE SERPL-MCNC: 208 MG/DL (ref 70–99)
HBV CORE AB SERPL QL IA: NONREACTIVE
HBV SURFACE AB SERPL IA-ACNC: 0 M[IU]/ML
HBV SURFACE AG SERPL QL IA: NONREACTIVE
HCT VFR BLD AUTO: 36.5 % (ref 35–47)
HCV AB SERPL QL IA: NONREACTIVE
HGB BLD-MCNC: 11.8 G/DL (ref 11.7–15.7)
HIV 1+2 AB+HIV1 P24 AG SERPL QL IA: NONREACTIVE
IMM GRANULOCYTES # BLD: 0 10E9/L (ref 0–0.4)
IMM GRANULOCYTES NFR BLD: 0.4 %
LYMPHOCYTES # BLD AUTO: 2.9 10E9/L (ref 0.8–5.3)
LYMPHOCYTES NFR BLD AUTO: 26.5 %
MCH RBC QN AUTO: 31.4 PG (ref 26.5–33)
MCHC RBC AUTO-ENTMCNC: 32.3 G/DL (ref 31.5–36.5)
MCV RBC AUTO: 97 FL (ref 78–100)
MONOCYTES # BLD AUTO: 1.3 10E9/L (ref 0–1.3)
MONOCYTES NFR BLD AUTO: 11.7 %
NEUTROPHILS # BLD AUTO: 6.3 10E9/L (ref 1.6–8.3)
NEUTROPHILS NFR BLD AUTO: 58.2 %
NRBC # BLD AUTO: 0 10*3/UL
NRBC BLD AUTO-RTO: 0 /100
NT-PROBNP SERPL-MCNC: 2679 PG/ML (ref 0–125)
PLATELET # BLD AUTO: 320 10E9/L (ref 150–450)
POTASSIUM SERPL-SCNC: 4.3 MMOL/L (ref 3.4–5.3)
RBC # BLD AUTO: 3.76 10E12/L (ref 3.8–5.2)
RHEUMATOID FACT SER NEPH-ACNC: <7 IU/ML (ref 0–20)
SODIUM SERPL-SCNC: 135 MMOL/L (ref 133–144)
WBC # BLD AUTO: 10.8 10E9/L (ref 4–11)

## 2020-03-16 PROCEDURE — 25000125 ZZHC RX 250: Performed by: INTERNAL MEDICINE

## 2020-03-16 PROCEDURE — 86706 HEP B SURFACE ANTIBODY: CPT | Performed by: INTERNAL MEDICINE

## 2020-03-16 PROCEDURE — G0499 HEPB SCREEN HIGH RISK INDIV: HCPCS | Performed by: INTERNAL MEDICINE

## 2020-03-16 PROCEDURE — 27210794 ZZH OR GENERAL SUPPLY STERILE: Performed by: INTERNAL MEDICINE

## 2020-03-16 PROCEDURE — 86038 ANTINUCLEAR ANTIBODIES: CPT | Performed by: INTERNAL MEDICINE

## 2020-03-16 PROCEDURE — 40000166 ZZH STATISTIC PP CARE STAGE 1

## 2020-03-16 PROCEDURE — 83880 ASSAY OF NATRIURETIC PEPTIDE: CPT | Performed by: INTERNAL MEDICINE

## 2020-03-16 PROCEDURE — 85613 RUSSELL VIPER VENOM DILUTED: CPT | Performed by: INTERNAL MEDICINE

## 2020-03-16 PROCEDURE — 86431 RHEUMATOID FACTOR QUANT: CPT | Performed by: INTERNAL MEDICINE

## 2020-03-16 PROCEDURE — 86704 HEP B CORE ANTIBODY TOTAL: CPT | Performed by: INTERNAL MEDICINE

## 2020-03-16 PROCEDURE — 86140 C-REACTIVE PROTEIN: CPT | Performed by: INTERNAL MEDICINE

## 2020-03-16 PROCEDURE — 80048 BASIC METABOLIC PNL TOTAL CA: CPT | Performed by: INTERNAL MEDICINE

## 2020-03-16 PROCEDURE — 00000401 ZZHCL STATISTIC THROMBIN TIME NC: Performed by: INTERNAL MEDICINE

## 2020-03-16 PROCEDURE — G0472 HEP C SCREEN HIGH RISK/OTHER: HCPCS | Performed by: INTERNAL MEDICINE

## 2020-03-16 PROCEDURE — 36415 COLL VENOUS BLD VENIPUNCTURE: CPT | Performed by: INTERNAL MEDICINE

## 2020-03-16 PROCEDURE — 87389 HIV-1 AG W/HIV-1&-2 AB AG IA: CPT | Performed by: INTERNAL MEDICINE

## 2020-03-16 PROCEDURE — 00000167 ZZHCL STATISTIC INR NC: Performed by: INTERNAL MEDICINE

## 2020-03-16 PROCEDURE — 85025 COMPLETE CBC W/AUTO DIFF WBC: CPT

## 2020-03-16 PROCEDURE — 93451 RIGHT HEART CATH: CPT | Performed by: INTERNAL MEDICINE

## 2020-03-16 PROCEDURE — 85730 THROMBOPLASTIN TIME PARTIAL: CPT | Performed by: INTERNAL MEDICINE

## 2020-03-16 RX ORDER — LIDOCAINE 40 MG/G
CREAM TOPICAL
Status: COMPLETED | OUTPATIENT
Start: 2020-03-16 | End: 2020-03-16

## 2020-03-16 RX ADMIN — LIDOCAINE: 40 CREAM TOPICAL at 07:38

## 2020-03-16 NOTE — IP AVS SNAPSHOT
[address] [address]                                    After Visit Summary   3/16/2020    Keerthi Montoya    MRN: [id number]           After Visit Summary Signature Page    I have received my discharge instructions, and my questions have been answered. I have discussed any challenges I see with this plan with the nurse or doctor.    ..........................................................................................................................................  Patient/Patient Representative Signature      ..........................................................................................................................................  Patient Representative Print Name and Relationship to Patient    ..................................................               ................................................  Date                                   Time    ..........................................................................................................................................  Reviewed by Signature/Title    ...................................................              ..............................................  Date                                               Time          22EPIC Rev 08/18

## 2020-03-16 NOTE — DISCHARGE INSTRUCTIONS
[facility]                        Interventional Cardiology  Discharge Instructions   Post Right Heart Cath      AFTER YOU GO HOME:    DO drink plenty of fluids    DO resume your regular diet and medications unless otherwise instructed by your Primary Physician    Do Not scrub the procedure site vigorously    No lotion or powder to the puncture site for 3 days    CALL YOUR PRIMARY PHYSICIAN IF: You may resume all normal activity.  Monitor neck site for bleeding, swelling, or voice changes. If you notice bleeding or swelling immediately apply pressure to the site and call number below to speak with Cardiology Fellow.  If you experience any changes in your breathing you should call your doctor immediately or come to the closest Emergency Department.  Do not drive yourself.    ADDITIONAL INSTRUCTIONS: Medications: You are to resume all home medications including anticoagulation therapy unless otherwise advised by your primary cardiologist or nurse coordinator.    Follow Up: Per your primary cardiology team    If you have any questions or concerns regarding your procedure site please call [phone] at anytime and ask for Cardiology Fellow on call.  They are available 24 hours a day.  You may also contact the Cardiology Clinic after hours number at [phone].                                                       Telephone Numbers [phone] Monday-Friday 8:00 am to 4:30 pm    [phone] After 4:30 pm Monday-Friday, Weekends & Holidays  Ask for Interventional Cardiologist on call. Someone is on call 24 hours/day   [facility] toll free number [phone] Monday-Friday 8:00 am to 4:30 pm   [facility] Emergency Dept [phone]

## 2020-03-16 NOTE — PROGRESS NOTES
Pt arrived on 2a post RHC. VSS. Dressing c/d/i. No pain. Discharge instructions reviewed, copy given to pt. Pt discharged home accompanied by son.

## 2020-03-16 NOTE — IP AVS SNAPSHOT
MRN:[id number]                      After Visit Summary   3/16/2020    Keerthi Montoya    MRN: [id number]           Visit Information        Department      3/16/2020  6:39 AM [address] [facility] [place]          Review of your medicines      UNREVIEWED medicines. Ask your doctor about these medicines       Dose / Directions   albuterol 108 (90 Base) MCG/ACT inhaler  Commonly known as:  PROAIR HFA/PROVENTIL HFA/VENTOLIN HFA  Used for:  Acute bronchitis with symptoms > 10 days      Dose:  2 puff  Inhale 2 puffs into the lungs every 4 hours as needed for shortness of breath / dyspnea, wheezing or other (cough)  Quantity:  1 Inhaler  Refills:  0     amLODIPine 10 MG tablet  Commonly known as:  NORVASC  Used for:  Hypertension goal BP (blood pressure) < 130/80      Dose:  10 mg  Take 1 tablet (10 mg) by mouth daily  Quantity:  90 tablet  Refills:  3     atenolol 50 MG tablet  Commonly known as:  TENORMIN  Used for:  Hypertension goal BP (blood pressure) < 130/80      Dose:  50 mg  Take 1 tablet (50 mg) by mouth daily  Quantity:  90 tablet  Refills:  3     atorvastatin 40 MG tablet  Commonly known as:  LIPITOR  Used for:  Hyperlipidemia LDL goal <100      TAKE 1 TABLET BY MOUTH DAILY  Quantity:  90 tablet  Refills:  3     B-12 1000 MCG Tabs      Dose:  1,000 mcg  Take 1,000 mcg by mouth three times a week  Refills:  0     glipiZIDE 10 MG 24 hr tablet  Commonly known as:  GLUCOTROL XL  Used for:  Type 2 diabetes mellitus without complication, without long-term current use of insulin (H)      Dose:  10 mg  Take 1 tablet (10 mg) by mouth 2 times daily  Quantity:  180 tablet  Refills:  1     levofloxacin 500 MG tablet  Commonly known as:  LEVAQUIN  Used for:  Acute bronchitis with symptoms > 10 days  Ask about: Should I take this medication?      Dose:  500 mg  Take 1 tablet (500 mg) by mouth daily for 7 days  Quantity:  7 tablet  Refills:  0     levothyroxine 125 MCG tablet  Commonly known as:   SYNTHROID/LEVOTHROID  Used for:  Postoperative hypothyroidism      TAKE 1 TABLET BY MOUTH DAILY  Quantity:  90 tablet  Refills:  3     losartan 100 MG tablet  Commonly known as:  COZAAR  Used for:  Hypertension goal BP (blood pressure) < 140/90      TAKE 1 TABLET(100 MG) BY MOUTH DAILY  Quantity:  90 tablet  Refills:  3     mesalamine 0.375 g 24 hr capsule  Commonly known as:  APRISO ER  Used for:  Ulcerative rectosigmoiditis with rectal bleeding (H)      Dose:  1.5 g  Take 4 capsules (1.5 g) by mouth every morning  Quantity:  120 capsule  Refills:  7     metFORMIN 500 MG tablet  Commonly known as:  GLUCOPHAGE  Used for:  Type 2 diabetes mellitus with microalbuminuria, without long-term current use of insulin (H)      TAKE 2 TABLETS BY MOUTH TWICE DAILY WITH MEALS  Quantity:  120 tablet  Refills:  3     vitamin D 50 MCG (2000 UT) Caps      Take by mouth daily  Refills:  0        CONTINUE these medicines which have NOT CHANGED       Dose / Directions   * Accu-Chek Aaliyah Plus test strip  Used for:  Type 2 diabetes, HbA1c goal < 7% (H)  Generic drug:  blood glucose      TEST DAILY  Quantity:  100 strip  Refills:  1     * blood glucose test strip  Commonly known as:  Accu-Chek Aaliyah Plus  Used for:  Type 2 diabetes mellitus with microalbuminuria, without long-term current use of insulin (H)      TEST TWICE DAILY  Quantity:  200 strip  Refills:  3         * This list has 2 medication(s) that are the same as other medications prescribed for you. Read the directions carefully, and ask your doctor or other care provider to review them with you.                  Protect others around you: Learn how to safely use, store and throw away your medicines at www.disposemymeds.org.       Follow-ups after your visit       Your next 10 appointments already scheduled    Mar 16, 2020  7:30 AM CDT  Procedure - 2.5 hour with U2A ROOM 9  Unit [address] [facility] ([facility]) [address] [place] [address]  [facility] [address]      Mar 16, 2020  Procedure with Nader Muñoz MD  [facility], [place],  Heart Cath Lab ([facility], [facility]) [address] [facility] [address]  [phone]   The [facility] is located on the corner of [facility] and [facility] on the [facility]. It is easily accessible from virtually any point in the [facility] area, via [address] and [address].   Apr 03, 2020  4:30 PM CDT  Return Visit with David Almendarez MD  [facility] ([facility]) [address] [place] [address]  [phone]      May 07, 2020  7:30 AM CDT  SHORT with Nathan Dhillon MD  [facility] [address]  [facility] [address]  [phone]      Aug 10, 2020  7:10 AM CDT  LAB with LAB FIRST FLOOR [facility] ([facility]) [address] [address] [place] [address]  [phone]   Please do not eat 10-12 hours before your appointment if you are coming in fasting for labs on lipids, cholesterol, or glucose (sugar). Does not apply to pregnant women. Water, tea and black coffee (with nothing added) is okay. Do not drink other fluids, diet soda or gum. If you have concerns about taking your medications, please send a message by clicking on Secure Messaging, Message Your Care Team.     Feb 09, 2021  7:30 AM CST  LAB with LAB FIRST FLOOR [facility] ([facility]) [address] [facility] [address]  [phone]   Please do not eat 10-12 hours before your appointment if you are coming in fasting for labs on lipids, cholesterol, or glucose (sugar). Does not apply to pregnant women. Water, tea and black coffee (with nothing added) is okay. Do not drink other fluids, diet soda  or gum. If you have concerns about taking your medications, please send a message by clicking on Secure Messaging, Message Your Care Team.     Feb 09, 2021  8:00 AM CST  Return Visit with Swati Minor MD  [facility] ([facility]) [address] [place] [address] [phone]         Care Instructions       Further instructions from your care team       [facility]                        Interventional Cardiology  Discharge Instructions   Post Right Heart Cath      AFTER YOU GO HOME:    DO drink plenty of fluids    DO resume your regular diet and medications unless otherwise instructed by your Primary Physician    Do Not scrub the procedure site vigorously    No lotion or powder to the puncture site for 3 days    CALL YOUR PRIMARY PHYSICIAN IF: You may resume all normal activity.  Monitor neck site for bleeding, swelling, or voice changes. If you notice bleeding or swelling immediately apply pressure to the site and call number below to speak with Cardiology Fellow.  If you experience any changes in your breathing you should call your doctor immediately or come to the closest Emergency Department.  Do not drive yourself.    ADDITIONAL INSTRUCTIONS: Medications: You are to resume all home medications including anticoagulation therapy unless otherwise advised by your primary cardiologist or nurse coordinator.    Follow Up: Per your primary cardiology team    If you have any questions or concerns regarding your procedure site please call [phone] at anytime and ask for Cardiology Fellow on call.  They are available 24 hours a day.  You may also contact the Cardiology Clinic after hours number at [phone].                                                       Telephone Numbers [phone] Monday-Friday 8:00 am to 4:30 pm    [phone] After 4:30 pm Monday-Friday, Weekends & Holidays  Ask for Interventional Cardiologist  on call. Someone is on call 24 hours/day   [facility] toll free number [phone] Monday-Friday 8:00 am to 4:30 pm   [facility] Emergency Dept [phone]                   Additional Information About Your Visit       MyChart Information    [employer] gives you secure access to your electronic health record. If you see a primary care provider, you can also send messages to your care team and make appointments. If you have questions, please call your primary care clinic.  If you do not have a primary care provider, please call [phone] and they will assist you.       Care EveryWhere ID    This is your Care EveryWhere ID. This could be used by other organizations to access your [facility] medical records  [id number]       Your Vitals Were  Most recent update: 3/16/2020  7:24 AM    Blood Pressure   156/57   (BP Location: Left arm, Cuff Size: Adult Regular)    Temperature   97.7  F (36.5  C) (Oral)    Respirations   20    Pulse Oximetry   93%           Primary Care Provider Office Phone # Fax #    Nathan Dhillon [phone]      Equal Access to Services    JOSEFINA [facility] Hospital[facility]ANIE : Hadii ronnell peterson hadasho Sojean, waaxda luqadaha, qaybta kaalmaángel caballero, celia sullivan. So [facility] [phone].    ATENCIÓN: Si habla español, tiene a hobbs disposición servicios gratuitos de asistencia lingüística. Jess al [phone].    We comply with applicable federal and state civil rights laws, including the [place] Human Rights Act. We do not discriminate on the basis of race, color, creed, Jainism, national origin, marital status, age, disability, sex, sexual orientation, or gender identity.       Thank you!    Thank you for choosing [facility] for your care. Our goal is always to provide you with excellent care. Hearing back from our patients is one way we can continue to improve our services. Please take a few minutes to complete the written survey that you may receive in the mail after you visit with  us. Thank you!            Medication List      Medications          Morning Afternoon Evening Bedtime As Needed    * Accu-Chek Aaliyah Plus test strip  INSTRUCTIONS:  TEST DAILY  Generic drug:  blood glucose                     * blood glucose test strip  Also known as:  Accu-Chek Aaliyah Plus  INSTRUCTIONS:  TEST TWICE DAILY  Doctor's comments:  VARIABLE CONTROL; AT RISK OF HYPOGLYCEMIA                        * This list has 2 medication(s) that are the same as other medications prescribed for you. Read the directions carefully, and ask your doctor or other care provider to review them with you.            ASK your doctor about these medications          Morning Afternoon Evening Bedtime As Needed    albuterol 108 (90 Base) MCG/ACT inhaler  Also known as:  PROAIR HFA/PROVENTIL HFA/VENTOLIN HFA  INSTRUCTIONS:  Inhale 2 puffs into the lungs every 4 hours as needed for shortness of breath / dyspnea, wheezing or other (cough)  Doctor's comments:  Pharmacy may dispense brand covered by insurance (Proair, or proventil or ventolin or generic albuterol inhaler)                     amLODIPine 10 MG tablet  Also known as:  NORVASC  INSTRUCTIONS:  Take 1 tablet (10 mg) by mouth daily                     atenolol 50 MG tablet  Also known as:  TENORMIN  INSTRUCTIONS:  Take 1 tablet (50 mg) by mouth daily  Doctor's comments:  This replaces carvedilol.                     atorvastatin 40 MG tablet  Also known as:  LIPITOR  INSTRUCTIONS:  TAKE 1 TABLET BY MOUTH DAILY                     B-12 1000 MCG Tabs  INSTRUCTIONS:  Take 1,000 mcg by mouth three times a week                     glipiZIDE 10 MG 24 hr tablet  Also known as:  GLUCOTROL XL  INSTRUCTIONS:  Take 1 tablet (10 mg) by mouth 2 times daily                     levofloxacin 500 MG tablet  Also known as:  LEVAQUIN  INSTRUCTIONS:  Take 1 tablet (500 mg) by mouth daily for 7 days  Ask about: Should I take this medication?                     levothyroxine 125 MCG tablet  Also  known as:  SYNTHROID/LEVOTHROID  INSTRUCTIONS:  TAKE 1 TABLET BY MOUTH DAILY                     losartan 100 MG tablet  Also known as:  COZAAR  INSTRUCTIONS:  TAKE 1 TABLET(100 MG) BY MOUTH DAILY                     mesalamine 0.375 g 24 hr capsule  Also known as:  APRISO ER  INSTRUCTIONS:  Take 4 capsules (1.5 g) by mouth every morning                     metFORMIN 500 MG tablet  Also known as:  GLUCOPHAGE  INSTRUCTIONS:  TAKE 2 TABLETS BY MOUTH TWICE DAILY WITH MEALS                     vitamin D 50 MCG (2000 UT) Caps  INSTRUCTIONS:  Take by mouth daily

## 2020-03-16 NOTE — PROGRESS NOTES
[facility]   Interventional Cardiology        Consenting/Education for Right Heart Catheterization     Patient understands due to their history of SOB and echo findings consistent with pulmonary hypertension we would like to perform right heart catheterization.  This procedure will be performed by Dr. Muñoz    Patient understands during the right heart catheterization, a fine tube (catheter) is put into the vein of the groin/neck.  It is carefully passed along until it reaches the heart and then goes up into the blood vessels of the lungs. This is done to measure a variety of pressures in your heart and can tell us how well the heart is filling and emptying, as well as monitor fluid status. This is usually painless. The doctor uses x-ray imaging to see the catheter. Afterwards, the catheter is removed, and incision site covered before leaving the cath lab. This procedure is done with no sedation, usually only requiring Lidocaine.     Patient also understands risks and complications of the procedure which include, but are not limited to bruising/swelling around the incision site, infection, bleeding, allergic reaction to local anesthetic, air embolism, arterial puncture, stroke, heart attack.       Patient verbalized understanding of risks and benefits of the right heart catheterization and has elected to proceed with right heart catheterization.       Lamar ADAME, GENE  [facility] Interventional Cardiology  [phone]

## 2020-03-17 LAB
ANA SER QL IF: NEGATIVE
LA PPP-IMP: NEGATIVE

## 2020-03-18 ENCOUNTER — TELEPHONE (OUTPATIENT)
Dept: CARDIOLOGY | Facility: CLINIC | Age: 70
End: 2020-03-18

## 2020-03-18 DIAGNOSIS — I27.20 PULMONARY HYPERTENSION (H): Primary | ICD-10-CM

## 2020-03-18 RX ORDER — MESALAMINE 0.38 G/1
CAPSULE, EXTENDED RELEASE ORAL
Qty: 120 CAPSULE | Refills: 7 | Status: SHIPPED | OUTPATIENT
Start: 2020-03-18 | End: 2020-10-20

## 2020-03-18 NOTE — TELEPHONE ENCOUNTER
----- Message from David Almendarez MD sent at 3/16/2020  1:06 PM CDT -----  She was there for a RHC today. I would like her to start lasix 20 mg bid (need to order), and get a BMP after 1 week. I called her cell and left a message. Is CORE clinic still open? Ideally she should be seen in 2 weeks or so.     Thanks,     Pracheyenne

## 2020-03-19 RX ORDER — FUROSEMIDE 20 MG
20 TABLET ORAL 2 TIMES DAILY
Qty: 60 TABLET | Refills: 1 | Status: SHIPPED | OUTPATIENT
Start: 2020-03-19 | End: 2020-05-20

## 2020-03-19 NOTE — TELEPHONE ENCOUNTER
Called and spoke to patient. She does not remember a message from Dr Almendarez. Explained his recommendations and follow up. She is agreeable. Explained lasix and needed lab in one week. Appointment scheduled with CORE clinic and lab prior. Instructed to call with any questions   VERONIQUE Torres

## 2020-03-26 ENCOUNTER — TELEPHONE (OUTPATIENT)
Dept: CARDIOLOGY | Facility: CLINIC | Age: 70
End: 2020-03-26

## 2020-03-26 NOTE — TELEPHONE ENCOUNTER
Pt new lab appt is scheduled 3/31 at 420 pm    Batul appt is at 845 am and has been changed to a telephone visit. Pt wasn't comfortable with video visit.    Diana Bang CMA on 3/26/2020 at 10:57 AM

## 2020-03-31 DIAGNOSIS — I27.20 PULMONARY HYPERTENSION (H): ICD-10-CM

## 2020-03-31 LAB
ANION GAP SERPL CALCULATED.3IONS-SCNC: 6 MMOL/L (ref 3–14)
BUN SERPL-MCNC: 30 MG/DL (ref 7–30)
CALCIUM SERPL-MCNC: 10 MG/DL (ref 8.5–10.1)
CHLORIDE SERPL-SCNC: 106 MMOL/L (ref 94–109)
CO2 SERPL-SCNC: 24 MMOL/L (ref 20–32)
CREAT SERPL-MCNC: 1.25 MG/DL (ref 0.52–1.04)
GFR SERPL CREATININE-BSD FRML MDRD: 44 ML/MIN/{1.73_M2}
GLUCOSE SERPL-MCNC: 87 MG/DL (ref 70–99)
POTASSIUM SERPL-SCNC: 4.5 MMOL/L (ref 3.4–5.3)
SODIUM SERPL-SCNC: 136 MMOL/L (ref 133–144)

## 2020-03-31 PROCEDURE — 80048 BASIC METABOLIC PNL TOTAL CA: CPT | Performed by: NURSE PRACTITIONER

## 2020-03-31 PROCEDURE — 36415 COLL VENOUS BLD VENIPUNCTURE: CPT | Performed by: NURSE PRACTITIONER

## 2020-04-01 ENCOUNTER — VIRTUAL VISIT (OUTPATIENT)
Dept: CARDIOLOGY | Facility: CLINIC | Age: 70
End: 2020-04-01
Payer: MEDICARE

## 2020-04-01 DIAGNOSIS — I27.20 PULMONARY HYPERTENSION (H): ICD-10-CM

## 2020-04-01 DIAGNOSIS — I10 HYPERTENSION GOAL BP (BLOOD PRESSURE) < 130/80: ICD-10-CM

## 2020-04-01 DIAGNOSIS — I35.0 AORTIC VALVE STENOSIS, ETIOLOGY OF CARDIAC VALVE DISEASE UNSPECIFIED: ICD-10-CM

## 2020-04-01 DIAGNOSIS — I51.89 DIASTOLIC DYSFUNCTION: Primary | ICD-10-CM

## 2020-04-01 DIAGNOSIS — N18.30 CKD (CHRONIC KIDNEY DISEASE) STAGE 3, GFR 30-59 ML/MIN (H): ICD-10-CM

## 2020-04-01 PROCEDURE — 99443 ZZC PHYSICIAN TELEPHONE EVALUATION 21-30 MIN: CPT | Performed by: NURSE PRACTITIONER

## 2020-04-01 NOTE — PATIENT INSTRUCTIONS
Take your medicines every day, as directed    Changes made today:  o No changes    Increase your water intake daily ( 60 -64 oz daily of fluid)  Take Norvasc in the evening daily.   Monitor Your Weight and Symptoms    Contact us if you:      Gain 2 pounds in one day or 5 pounds in one week    Feel more short of breath    Notice more leg swelling    Feel lightheadeded   Change your lifestyle    Limit Salt or Sodium:    2000 mg  Limit Fluids:    2000 mL or approximately 64 ounces  Eat a Heart Healthy Diet    Low in saturated fats  Stay Active:    Aim to move at least 150 minutes every  week         To Contact us    During Business Hours:  [phone], option # 1 (University)  Then option # 4 (medical questions)     After hours, weekends or holidays:   [phone], Option #4  Ask to speak to the On-Call Cardiologist. Inform them you are a CORE/heart failure patient at the [facility].     Use [employer] allows you to communicate directly with your heart team through secure messaging.    [employer] can be accessed any time on your phone, computer, or tablet.    If you need assistance, we'd be happy to help!         Keep your Heart Appointments:    CORE follow up in one month with BMP ( labs) prior

## 2020-04-01 NOTE — PROGRESS NOTES
"Keerthi Motnoya is a 70 year old female who is being evaluated via a billable telephone visit.      The patient has been notified of following:     \"This telephone visit will be conducted via a call between you and your physician/provider. We have found that certain health care needs can be provided without the need for a physical exam.  This service lets us provide the care you need with a short phone conversation.  If a prescription is necessary we can send it directly to your pharmacy.  If lab work is needed we can place an order for that and you can then stop by our lab to have the test done at a later time.    If during the course of the call the physician/provider feels a telephone visit is not appropriate, you will not be charged for this service.\"     Patient has given verbal consent for Telephone visit?  yes    Keerthi Montoya is a new patient to [facility]. She was referred to [facility] by Dr. Almendarez.  She recently had a RHC and was noted to hypervolemic and starting on Lasix 20 mg BID with labs in a couple weeks. Her creatinine is noted to be a little higher than her last check on 3/16. Patient states she has no SOB with or without activity. 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 Weights 143- 147 lbs at home. She hasn't noticed too big of a difference with the lasix. She does have some leg cramps the last two days. She isn't drinking enough possibly, she says. Patient's BP today 130's/60-50's.  She has noted a change in her BPs- she was 160-180's. BS' she says are well controlled.   Chief Complaint   Patient presents with     RECHECK     diastolic heart failure     Patient has questions about amlodipine.    I have reviewed and updated the patient's Past Medical History, Social History, Family History and Medication List.    ALLERGIES  Actos [pioglitazone]; Animal dander; Doxycycline; Dust mites; Grass; Keflex [cephalexin hcl]; Metoprolol; Other [seasonal allergies]; Ranitidine; Synthroid " [levothroid]; Tetracycline; Tylenol; and Ziac [bisoprolol-hydrochlorothiazide]    Assessment and Plan:      1.  Severe pulmonary hypertension, normal LV function with mild dilation, functional class I-II.  The etiology is likely group 2, and related to underlying diastolic dysfunction from longstanding hypertension and diabetes.    2.  Hypertension, with poor recent control:  amlodipine to 10 mg.  Instructed to keep home blood pressure log.  3.  Mild-moderate aortic stenosis, possible bicuspid aortic valve: Serial echoes  4. Diabetes: PCP following  5.  Dyslipidemia, on Lipitor  6.  CKD stage 3  7. Ulcerative colitis    Additional provider notes:   Patient will try to take the Norvasc at night to see if she has an improvement in swelling , if not she will let us know. She will also increase her fluid intake to 60-64 oz daily of water. She is a heavy coffee drinker she says.     Follow up in one month with BMP prior.       Phone call duration: 30 minutes    Carolyn LLAMAS

## 2020-05-06 NOTE — PROGRESS NOTES
"Keerthi Montoya is a 70 year old female who is being evaluated via a billable telephone visit.      The patient has been notified of following:     \"This telephone visit will be conducted via a call between you and your physician/provider. We have found that certain health care needs can be provided without the need for a physical exam.  This service lets us provide the care you need with a short phone conversation.  If a prescription is necessary we can send it directly to your pharmacy.  If lab work is needed we can place an order for that and you can then stop by our lab to have the test done at a later time.    Telephone visits are billed at different rates depending on your insurance coverage. During this emergency period, for some insurers they may be billed the same as an in-person visit.  Please reach out to your insurance provider with any questions.    If during the course of the call the physician/provider feels a telephone visit is not appropriate, you will not be charged for this service.\"    Patient has given verbal consent for Telephone visit?  Yes    What phone number would you like to be contacted at? [phone]    How would you like to obtain your AVS? Keanuharsimón Daly     Keerthi Montoya is a 70 year old female who presents to clinic today for the following health issues:    HPI  Medication Followup     Taking Medication as prescribed: yes    Side Effects:  None    Medication Helping Symptoms:  yes          Still taking furosemide 20 mg bid.               's to 130's / 50's or 60's.         Still some edema; back on amlodipine; dose was increased to 10 mg per day.                 No cp or sob.             Was not sob prior to R heart cath.                                       Denies sx of PURA.        She would like a navarrete explanation of the R heart cath findings and implications.                                            Glucoses \"are good\"; as low as 70, as high as 200; checks " "once per day, usually in the AM.        She is caring for 2 grandchildren M-F, all day long.                       Has a cardio phone appt with labs first on 5/13,and has labs with renal ordered for 8/20.           Patient Active Problem List    Diagnosis Date Noted     Aortic stenosis 02/25/2020     Priority: High     Mild aortic stenosis (PV 2.5 m/s MG 12 mmHg.) Cannot exclude a bicuspid aortic  valve. This is a plausible basis for a systolic murmur.  Left ventricular function, chamber size, wall motion, and wall thickness are  normal.The EF is 60-65%.  Mildly dilated RV with normal RV function.  Mild to moderate tricuspid insufficiency is present.  Grade 3 diastolic dysfunction with elevated left-sided filling pressures.  Severe pulmonary hypertension.       Pulmonary hypertension (H) 02/25/2020     Priority: High     Mild aortic stenosis (PV 2.5 m/s MG 12 mmHg.) Cannot exclude a bicuspid aortic  valve. This is a plausible basis for a systolic murmur.  Left ventricular function, chamber size, wall motion, and wall thickness are  normal.The EF is 60-65%.  Mildly dilated RV with normal RV function.  Mild to moderate tricuspid insufficiency is present.  Grade 3 diastolic dysfunction with elevated left-sided filling pressures.  Severe pulmonary hypertension.       Vitamin B12 deficiency (non anemic) 01/26/2019     Priority: High     224 in 6/18; po B12 added       Type 2 diabetes mellitus with microalbuminuria, without long-term current use of insulin (H) 07/30/2018     Priority: High     Colitis 09/10/2015     Priority: High     Colonoscopy 8/15:  \"mildly active chronic colitis\" on bx of sigmoid and rectum; Dx?;                    In 4/17, \"colitis\" rectum and distal sigmoid; path pending          Hyperlipidemia LDL goal <100 07/26/2013     Priority: High      in 7/13; not taking prava 40 regularly       Diastolic dysfunction 04/01/2020     Priority: Medium     CKD (chronic kidney disease) stage 3, GFR 30-59 " "ml/min (H) 06/03/2019     Priority: Medium     Proteinuria, unspecified type 01/28/2019     Priority: Medium     Ulcerative pancolitis without complication (H) 09/10/2018     Priority: Medium     Cervical cancer screening 08/07/2018     Priority: Medium     Pt age 68  Normal paps in   2010: NIL pap  2015: NIL/Neg cotest  2018:NIL/Neg cotest  No history of LUBNA 2 or greater found in epic.   No further cervical cancer screening recommended.   Hm updated.              Hypertension goal BP (blood pressure) < 130/80 06/25/2018     Priority: Medium     Postoperative hypothyroidism 05/15/2017     Priority: Medium     Rectal bleeding 07/27/2015     Priority: Medium     Abdominal pain, left lower quadrant 06/28/2015     Priority: Medium     CT in 6/15 diverticulosis, no diverticulitis.         \"prominent lymph nodes of uncertain etiology\"; 6 month f/u CT suggested by Radiology             Aspirin not indicated 05/18/2015     Priority: Medium     Not tolerated by pt; GI distress       Diverticulosis of large intestine 10/01/2012     Priority: Medium     Colonoscopy 8/12; tics only, no strictures.            CT shows 2.6 cm R ovarian cyst.         Pt has hx of ovarian cysts on CT and US in 2006. In 1/07, she saw Dr. Stiven Griffin re: a 3 cm L ovarian cyst; CA-125 nml at 16 ( his 1/07 letter describes a L ovarian cyst; pt recalls this was R sided, like the current CT states)                     CT in 6/15 diverticulosis no diverticulitis; \"some increased number and mildly prominent nodes within the small bowel mesentery and posterior pelvis of ? Significance; f/u CT 3-6 months advised.               Problem list name updated by automated process. Provider to review       Vitamin D deficiency 10/01/2012     Priority: Medium     Problem list name updated by automated process. Provider to review       Hypercalcemia 10/01/2012     Priority: Medium     Preventive measure 07/26/2013     Priority: Low     [facility] " 7/26/13 NOTE  Colonoscopy 8/12; 8/15; 4/17, 12/17  Pap 2/10,6/15;8/18                                Mammogram 1/14, 6/16, 7/17, 7/18, 1/20  DXA 2005; all scores >+1 ;                  in 7/18, spine is +, fem necks are 0       Past Surgical History:   Procedure Laterality Date     CHOLECYSTECTOMY  3/1987     COLON SURGERY  01/2001    Left colon resection; diverticulitis     COLONOSCOPY N/A 4/24/2017    Procedure: COMBINED COLONOSCOPY, SINGLE OR MULTIPLE BIOPSY/POLYPECTOMY BY BIOPSY;;  Surgeon: Radha Salguero MD;  Location: [facility] OR     COLONOSCOPY WITH CO2 INSUFFLATION N/A 4/24/2017    Procedure: COLONOSCOPY WITH CO2 INSUFFLATION;  Colonoscopy Dx rectal bleeding, BMI 25.86, Pharm [facility] .[phone], ;  Surgeon: Radha Salguero MD;  Location: [facility] OR     CV RIGHT HEART CATH N/A 3/16/2020    Procedure: CV RIGHT HEART CATH;  Surgeon: Nader Muñoz MD;  Location:  HEART CARDIAC CATH LAB     GYN SURGERY  9/1983    tubal ligation     HERNIA REPAIR  12/2006    recurrent incisional hernia     THROAT SURGERY  12/1974    thyroidectomy       Current Outpatient Medications   Medication Sig Dispense Refill     albuterol (PROAIR HFA/PROVENTIL HFA/VENTOLIN HFA) 108 (90 Base) MCG/ACT inhaler Inhale 2 puffs into the lungs every 4 hours as needed for shortness of breath / dyspnea, wheezing or other (cough) 1 Inhaler 0     amLODIPine (NORVASC) 10 MG tablet Take 1 tablet (10 mg) by mouth daily 90 tablet 3     atenolol (TENORMIN) 50 MG tablet Take 1 tablet (50 mg) by mouth daily 90 tablet 3     atorvastatin (LIPITOR) 40 MG tablet TAKE 1 TABLET BY MOUTH DAILY 90 tablet 3     blood glucose (ACCU-CHEK FELIPE PLUS) test strip 200 strips by In Vitro route 2 times daily Use to test blood sugar 2 times daily or as directed. 200 strip 4     blood glucose (ACCU-CHEK FELIPE PLUS) test strip TEST TWICE DAILY 200 strip 3     Cholecalciferol (VITAMIN D) 2000 units CAPS Take by mouth daily       Cyanocobalamin (B-12)  1000 MCG TABS Take 1,000 mcg by mouth three times a week       furosemide (LASIX) 20 MG tablet Take 1 tablet (20 mg) by mouth 2 times daily 60 tablet 1     glipiZIDE (GLUCOTROL XL) 10 MG 24 hr tablet Take 1 tablet (10 mg) by mouth 2 times daily 180 tablet 1     levothyroxine (SYNTHROID/LEVOTHROID) 125 MCG tablet TAKE 1 TABLET BY MOUTH DAILY 90 tablet 3     losartan (COZAAR) 100 MG tablet TAKE 1 TABLET(100 MG) BY MOUTH DAILY 90 tablet 3     mesalamine (APRISO ER) 0.375 g 24 hr capsule TAKE 4 CAPSULES(1.5 GRAMS) BY MOUTH EVERY MORNING 120 capsule 7     metFORMIN (GLUCOPHAGE) 500 MG tablet TAKE 2 TABLETS BY MOUTH TWICE DAILY WITH MEALS 120 tablet 3     Allergies   Allergen Reactions     Actos [Pioglitazone]      Fluid retention     Animal Dander      Doxycycline Hives     Dust Mites      Grass      Keflex [Cephalexin Hcl] Hives     Metoprolol      Swelling of lower legs and fatigue     Other [Seasonal Allergies]      pollen     Ranitidine      rash     Synthroid [Levothroid] Swelling     ???     Tetracycline Hives     Tylenol Hives     Ziac [Bisoprolol-Hydrochlorothiazide]      BP Readings from Last 3 Encounters:   03/16/20 (!) 143/43   03/06/20 (!) 178/86   02/12/20 137/86    Wt Readings from Last 3 Encounters:   03/16/20 67.1 kg (148 lb)   03/06/20 67.9 kg (149 lb 9.6 oz)   02/12/20 66.5 kg (146 lb 8 oz)                    Reviewed and updated as needed this visit by Provider         Review of Systems   ROS COMP: CONSTITUTIONAL:NEGATIVE for fever, chills, change in weight  RESP:NEGATIVE for significant cough or SOB  CV: NEGATIVE for chest pain/chest pressure and palpitations  GI: NEGATIVE for nausea, abdominal pain, heartburn, or change in bowel habits       Objective   Reported vitals:  There were no vitals taken for this visit.     PSYCH: Alert and oriented times 3; coherent speech, normal   rate and volume, able to articulate logical thoughts, able   to abstract reason, no tangential thoughts, no hallucinations    or delusions  Her affect is normal  RESP: No cough, no audible wheezing, able to talk in full sentences  Remainder of exam unable to be completed due to telephone visits    Diagnostic Test Results:  none         Assessment/Plan:  1. Type 2 diabetes mellitus with microalbuminuria, without long-term current use of insulin (H)  At risk of hypoglycemia; try to check glucoses more often; Rx sent for glucose strips bid.  May need to cut back glipizide dose.            Check A1C with next labs; 5/13    2. Hypertension goal BP (blood pressure) < 130/80  Better control; need to monitor lytes and renal fxn with bid furosemide.     3. CKD (chronic kidney disease) stage 3, GFR 30-59 ml/min (H)      4. Pulmonary hypertension (H)      5. Aortic valve stenosis, etiology of cardiac valve disease unspecified        Return in about 4 months (around 9/7/2020) for follow up of several issues.      Phone call duration:  15 minutes    Nathan Dhillon MD      Recent Results (from the past 48 hour(s))   Hemoglobin A1c    Collection Time: 05/12/20  3:14 PM   Result Value Ref Range    Hemoglobin A1C 7.0 (H) 0 - 5.6 %   **Basic metabolic panel FUTURE anytime    Collection Time: 05/12/20  3:14 PM   Result Value Ref Range    Sodium 134 133 - 144 mmol/L    Potassium 4.4 3.4 - 5.3 mmol/L    Chloride 104 94 - 109 mmol/L    Carbon Dioxide 27 20 - 32 mmol/L    Anion Gap 3 3 - 14 mmol/L    Glucose 128 (H) 70 - 99 mg/dL    Urea Nitrogen 24 7 - 30 mg/dL    Creatinine 1.18 (H) 0.52 - 1.04 mg/dL    GFR Estimate 47 (L) >60 mL/min/[1.73_m2]    GFR Estimate If Black 55 (L) >60 mL/min/[1.73_m2]    Calcium 9.7 8.5 - 10.1 mg/dL     Letter sent.  My chart message sent also.              Your diabetes control is improving.       The A1C of 7.0 correlates to an average blood sugar of approximately 150.                             Your other lab results are normal or stable,including the liver,kidney,and potassium levels.

## 2020-05-07 ENCOUNTER — VIRTUAL VISIT (OUTPATIENT)
Dept: FAMILY MEDICINE | Facility: CLINIC | Age: 70
End: 2020-05-07
Payer: MEDICARE

## 2020-05-07 DIAGNOSIS — I35.0 AORTIC VALVE STENOSIS, ETIOLOGY OF CARDIAC VALVE DISEASE UNSPECIFIED: ICD-10-CM

## 2020-05-07 DIAGNOSIS — N18.30 CKD (CHRONIC KIDNEY DISEASE) STAGE 3, GFR 30-59 ML/MIN (H): ICD-10-CM

## 2020-05-07 DIAGNOSIS — I27.20 PULMONARY HYPERTENSION (H): ICD-10-CM

## 2020-05-07 DIAGNOSIS — I10 HYPERTENSION GOAL BP (BLOOD PRESSURE) < 130/80: ICD-10-CM

## 2020-05-07 DIAGNOSIS — E11.29 TYPE 2 DIABETES MELLITUS WITH MICROALBUMINURIA, WITHOUT LONG-TERM CURRENT USE OF INSULIN (H): Primary | ICD-10-CM

## 2020-05-07 DIAGNOSIS — R80.9 TYPE 2 DIABETES MELLITUS WITH MICROALBUMINURIA, WITHOUT LONG-TERM CURRENT USE OF INSULIN (H): Primary | ICD-10-CM

## 2020-05-07 PROCEDURE — 99442 ZZC PHYSICIAN TELEPHONE EVALUATION 11-20 MIN: CPT | Performed by: INTERNAL MEDICINE

## 2020-05-07 RX ORDER — BLOOD SUGAR DIAGNOSTIC
200 STRIP MISCELLANEOUS 2 TIMES DAILY
Qty: 200 STRIP | Refills: 4 | Status: SHIPPED | OUTPATIENT
Start: 2020-05-07

## 2020-05-07 NOTE — LETTER
May 12, 2020      Keerthi Montoya  [address] [place] [id number]        Dear ,    We are writing to inform you of your test results.      Your diabetes control is improving.       The A1C of 7.0 correlates to an average blood sugar of approximately 150.                             Your other lab results are normal or stable,including the liver,kidney,and potassium levels.      Recent Results (from the past 48 hour(s))   Hemoglobin A1c    Collection Time: 05/12/20  3:14 PM   Result Value Ref Range    Hemoglobin A1C 7.0 (H) 0 - 5.6 %   **Basic metabolic panel FUTURE anytime    Collection Time: 05/12/20  3:14 PM   Result Value Ref Range    Sodium 134 133 - 144 mmol/L    Potassium 4.4 3.4 - 5.3 mmol/L    Chloride 104 94 - 109 mmol/L    Carbon Dioxide 27 20 - 32 mmol/L    Anion Gap 3 3 - 14 mmol/L    Glucose 128 (H) 70 - 99 mg/dL    Urea Nitrogen 24 7 - 30 mg/dL    Creatinine 1.18 (H) 0.52 - 1.04 mg/dL    GFR Estimate 47 (L) >60 mL/min/[1.73_m2]    GFR Estimate If Black 55 (L) >60 mL/min/[1.73_m2]    Calcium 9.7 8.5 - 10.1 mg/dL         If you have any questions or concerns, please call the clinic at the number listed above.       Sincerely,        Nathan Dhillon MD

## 2020-05-12 DIAGNOSIS — I27.20 PULMONARY HYPERTENSION (H): ICD-10-CM

## 2020-05-12 DIAGNOSIS — R80.9 TYPE 2 DIABETES MELLITUS WITH MICROALBUMINURIA, WITHOUT LONG-TERM CURRENT USE OF INSULIN (H): ICD-10-CM

## 2020-05-12 DIAGNOSIS — E11.29 TYPE 2 DIABETES MELLITUS WITH MICROALBUMINURIA, WITHOUT LONG-TERM CURRENT USE OF INSULIN (H): ICD-10-CM

## 2020-05-12 LAB
ANION GAP SERPL CALCULATED.3IONS-SCNC: 3 MMOL/L (ref 3–14)
BUN SERPL-MCNC: 24 MG/DL (ref 7–30)
CALCIUM SERPL-MCNC: 9.7 MG/DL (ref 8.5–10.1)
CHLORIDE SERPL-SCNC: 104 MMOL/L (ref 94–109)
CO2 SERPL-SCNC: 27 MMOL/L (ref 20–32)
CREAT SERPL-MCNC: 1.18 MG/DL (ref 0.52–1.04)
GFR SERPL CREATININE-BSD FRML MDRD: 47 ML/MIN/{1.73_M2}
GLUCOSE SERPL-MCNC: 128 MG/DL (ref 70–99)
HBA1C MFR BLD: 7 % (ref 0–5.6)
POTASSIUM SERPL-SCNC: 4.4 MMOL/L (ref 3.4–5.3)
SODIUM SERPL-SCNC: 134 MMOL/L (ref 133–144)

## 2020-05-12 PROCEDURE — 36415 COLL VENOUS BLD VENIPUNCTURE: CPT | Performed by: INTERNAL MEDICINE

## 2020-05-12 PROCEDURE — 80048 BASIC METABOLIC PNL TOTAL CA: CPT | Performed by: INTERNAL MEDICINE

## 2020-05-12 PROCEDURE — 83036 HEMOGLOBIN GLYCOSYLATED A1C: CPT | Performed by: INTERNAL MEDICINE

## 2020-05-13 ENCOUNTER — VIRTUAL VISIT (OUTPATIENT)
Dept: CARDIOLOGY | Facility: CLINIC | Age: 70
End: 2020-05-13
Payer: MEDICARE

## 2020-05-13 DIAGNOSIS — I27.20 PULMONARY HYPERTENSION (H): Primary | ICD-10-CM

## 2020-05-13 DIAGNOSIS — I10 HYPERTENSION GOAL BP (BLOOD PRESSURE) < 130/80: ICD-10-CM

## 2020-05-13 DIAGNOSIS — R80.9 TYPE 2 DIABETES MELLITUS WITH MICROALBUMINURIA, WITHOUT LONG-TERM CURRENT USE OF INSULIN (H): ICD-10-CM

## 2020-05-13 DIAGNOSIS — N18.30 CKD (CHRONIC KIDNEY DISEASE) STAGE 3, GFR 30-59 ML/MIN (H): ICD-10-CM

## 2020-05-13 DIAGNOSIS — I51.89 DIASTOLIC DYSFUNCTION: ICD-10-CM

## 2020-05-13 DIAGNOSIS — E11.29 TYPE 2 DIABETES MELLITUS WITH MICROALBUMINURIA, WITHOUT LONG-TERM CURRENT USE OF INSULIN (H): ICD-10-CM

## 2020-05-13 DIAGNOSIS — R01.1 HEART MURMUR: ICD-10-CM

## 2020-05-13 PROCEDURE — 99214 OFFICE O/P EST MOD 30 MIN: CPT | Mod: 95 | Performed by: NURSE PRACTITIONER

## 2020-05-13 NOTE — RESULT ENCOUNTER NOTE
The test result is stable and unchanged. No change in clinical plan.     Clinic staff - please schedule pt to see me for a 6 month f/u in August with an echo prior.

## 2020-05-13 NOTE — PROGRESS NOTES
"Keerthi Montoya is a 70 year old female who is being evaluated via a billable video visit.      The patient has been notified of following:     \"This video visit will be conducted via a call between you and your physician/provider. We have found that certain health care needs can be provided without the need for an in-person physical exam.  This service lets us provide the care you need with a video conversation.  If a prescription is necessary we can send it directly to your pharmacy.  If lab work is needed we can place an order for that and you can then stop by our lab to have the test done at a later time.    Video visits are billed at different rates depending on your insurance coverage.  Please reach out to your insurance provider with any questions.    If during the course of the call the physician/provider feels a video visit is not appropriate, you will not be charged for this service.\"    Patient has given verbal consent for Video visit? YES    Patient would like the video invitation sent by:[employer]bakus [phone]          Video-Visit Details    Type of service:  Video Visit    Video Start Time: 8:50  Video End Time: 9:12    Originating Location (pt. Location): her son's home    Distant Location (provider location):  [facility]     Platform used for Video Visit: Duncan Pendleton is a 70 year old female with a  medical history is significant for longstanding hypertension since she was in her teens, diabetes, dyslipidemia, and ulcerative colitis.    Patient states she has no SOB with or without activity. 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 Weights 143- 146 lbs at home. She hasn't noticed she is urinating more. She is actively trying to drink more water about 60 oz daily. Patient's BP's range from 125-136/58-65. She states she noticed a big difference with her edema by taking the Norvasc at night rather than in the day.  She has noted her BS to be stable. She " feels well overall and denies chest pain, palpitations, lightheadedness, dizziness.      REVIEW OF SYSTEMS:      Constitutional: No fevers or chills  Skin: No new rash or itching  Eyes: No acute change in vision  Ears/Nose/Throat: No purulent rhinorrhea, new hearing loss, or new vertigo  Respiratory:No PROCTOR. No cough or hemoptysis  Cardiovascular: See HPI  Musculoskeletal: No new back pain, neck pain or muscle pain  Neurologic: No new headaches, focal weakness or behavior changes.   lightheadedness with standing.   Hematologic/Lymphatic/Immunologic: No bleeding, chills, fever, night sweats or weight loss          PHYSICAL EXAMINATION:   No vitals due to virtual visit.     GENERAL: No acute distress.  HEENT:  Sclerae white, not injected.   Lungs: No wheezing, non-labored breathing  .    Assessment and Plan:  [place]ia is doing well, we went over her labs and discussed the results. She is making a better effort to stay hydrated.  She is busy watching her 2 grand kids and is happy she an keep up with them.  She will call us with changes in BP and or increase in weights, change in symptoms.     1.  Severe pulmonary hypertension, normal LV function with mild dilation, functional class I-II.  The etiology is likely group 2, and related to underlying diastolic dysfunction from longstanding hypertension and diabetes.    2.  Hypertension, with poor recent control:  amlodipine to 10 mg.  Instructed to keep home blood pressure log.  3.  Mild-moderate aortic stenosis, possible bicuspid aortic valve: Serial echoes- next echo in August 2020  4. Diabetes: PCP following  5.  Dyslipidemia, on Lipitor  6.  CKD stage 3  7. Ulcerative colitis      Plan:  Per Dr. Almendarez : 6 month f/u in August with an echo prior.    CORE : follow up November     Carolyn ADAME NP-C

## 2020-05-13 NOTE — PATIENT INSTRUCTIONS
"You were seen today in the CORE Clinic at [facility]    Follow up and medication changes:    Per Dr. Almendarez : 6 month f/u in August with an echo prior.     CORE : follow up November         Please limit your fluid intake to 2 L (68 ounces) daily.  2 Liters a day = 8.5 cups, or 68 ounces.  Please limit your salt intake to 2 grams a day or less.     If you gain 2 pounds in 24 hours or 5 pounds in one week call the CORE Clinic at the [facility] so we can adjust your medications as needed over the phone.     Questions: [phone].   First press #1 for the University and then press #3 for \"Medical Questions\" to reach the Cardiology Nurses.      On Call Cardiologist for after hours or on weekends: [phone]   option #4 and ask to speak to the on-call Cardiologist. Inform them you are a CORE/heart failure patient at the [facility].    If you need to schedule or reschedule your appointment please call [phone].          If you need a medication refill please contact your pharmacy.  Please allow 3 business days for your refill to be completed.  _______________________________________________________  [facility]O.R[facility]IN[facility] Cardiomyopathy, Optimization, Rehabilitation, Education   The [facility].R.E[facility] CLINIC is a heart failure specialty clinic within the [facility] where you will work with specialized nurse practitioners dedicated to helping patients with heart failure carefully adjust medications, receive education, and learn who and when to call if symptoms develop. They specialize in helping you better understand your condition, slow the progression of your disease, improve the length and quality of your life, help you detect future heart problems before they become life threatening, and avoid hospitalizations.  As always, thank you for trusting us with your health care needs!        "

## 2020-05-14 ENCOUNTER — TELEPHONE (OUTPATIENT)
Dept: NEUROLOGY | Facility: CLINIC | Age: 70
End: 2020-05-14

## 2020-05-14 NOTE — TELEPHONE ENCOUNTER
----- Message from David Almendarez MD sent at 5/13/2020  2:07 AM CDT -----  The test result is stable and unchanged. No change in clinical plan.     Clinic staff - please schedule pt to see me for a 6 month f/u in August with an echo prior.

## 2020-05-19 DIAGNOSIS — I27.20 PULMONARY HYPERTENSION (H): ICD-10-CM

## 2020-05-20 ENCOUNTER — MYC REFILL (OUTPATIENT)
Dept: CARDIOLOGY | Facility: CLINIC | Age: 70
End: 2020-05-20

## 2020-05-20 DIAGNOSIS — I27.20 PULMONARY HYPERTENSION (H): ICD-10-CM

## 2020-05-20 RX ORDER — FUROSEMIDE 20 MG
20 TABLET ORAL 2 TIMES DAILY
Qty: 60 TABLET | Refills: 1 | Status: CANCELLED | OUTPATIENT
Start: 2020-05-20

## 2020-05-20 RX ORDER — FUROSEMIDE 20 MG
20 TABLET ORAL 2 TIMES DAILY
Qty: 180 TABLET | Refills: 0 | Status: SHIPPED | OUTPATIENT
Start: 2020-05-20 | End: 2020-08-14

## 2020-05-20 NOTE — TELEPHONE ENCOUNTER
5/13/2020  [facility]   Carolyn Bonilla, WENDI CNP   Nurse Practitioner   NV: 8/14/20

## 2020-05-27 ENCOUNTER — PATIENT OUTREACH (OUTPATIENT)
Dept: CARDIOLOGY | Facility: CLINIC | Age: 70
End: 2020-05-27

## 2020-05-27 DIAGNOSIS — I10 HYPERTENSION GOAL BP (BLOOD PRESSURE) < 130/80: Primary | ICD-10-CM

## 2020-05-27 NOTE — TELEPHONE ENCOUNTER
LM to follow up on patient's BP. Need to get BP results for the last 2 weeks.     Rashad Gross RN   Medical Specialty Clinic Care Coordinator  [facility]         From: Carolyn Bonilla APRN CNP   Sent: 5/27/2020   To: WENDI Arreaga CNP   Subject: increase in BP therapy                           Patient is on 10 mg Norvasc for a month now- should re-check BP's in 2 weeks with patient if not close to 120's systolic per Dr. Almendarez - that's preferred - may need to add medication

## 2020-06-01 NOTE — TELEPHONE ENCOUNTER
"Patient not at home so doesn't have daily numbers to report-     BP for today: 133/64  \"THE NUMBERS HAVE BEEN RUNNING FROM 130s-120s/60s-50s\"    No one avail in clinic at time of patients call back.  "

## 2020-06-01 NOTE — TELEPHONE ENCOUNTER
I called and spoke to patient to get more details on her blood pressure. Patient states she is not home so she does not have her exact blood pressures with her. She said today it was 133/64, they have been running in th 130's-120's/60s-50s.     Will route to Carolyn to review and advise if she wants to make any changes.     Rashad Gross RN   Medical Specialty Clinic Care Coordinator  [facility]

## 2020-06-01 NOTE — TELEPHONE ENCOUNTER
DUSTIN for patient notifying her that we need the exact BP readings for the past 2 weeks. I asked her to call back with the information.     Rashad Gross RN   Medical Specialty Clinic Care Coordinator  [facility]

## 2020-06-05 ENCOUNTER — TELEPHONE (OUTPATIENT)
Dept: FAMILY MEDICINE | Facility: CLINIC | Age: 70
End: 2020-06-05

## 2020-06-05 NOTE — TELEPHONE ENCOUNTER
Central Prior Authorization Team   Phone: [phone]      PA Initiation    Medication: blood glucose (ACCU-CHEK FELIPE PLUS) test strip  Insurance Company: [employer] ([facility]) - Phone [phone] Fax [phone]  Pharmacy Filling the Rx: [id number] - [place], [place] - [address] [facility]  Filling Pharmacy Phone: [phone]  Filling Pharmacy Fax:    Start Date: 6/5/2020

## 2020-06-05 NOTE — TELEPHONE ENCOUNTER
Prior Authorization Retail Medication Request    Medication/Dose: blood glucose (ACCU-CHEK FELIPE PLUS) test strip   ICD code (if different than what is on RX):     Previously Tried and Failed:     Rationale:       Insurance Name:     Insurance ID:         Pharmacy Information (if different than what is on RX)  Name:     Phone:

## 2020-06-05 NOTE — TELEPHONE ENCOUNTER
PRIOR AUTHORIZATION DENIED    Medication: blood glucose (ACCU-CHEK FELIPE PLUS) test strip    Denial Date: 6/5/2020    Denial Rational:  Medicare Part D Insurance does not cover diabetic supplies. This will have to be billed through medical benefits (Medicare Part B).  This may be covered under Medicare Part A or Part B.  Patient can call 1-800-MEDICARE for more information. Central PA team does not process medical benefit PA's.      Appeal Information:  N/A

## 2020-06-08 ENCOUNTER — TELEPHONE (OUTPATIENT)
Dept: CARDIOLOGY | Facility: CLINIC | Age: 70
End: 2020-06-08

## 2020-06-08 RX ORDER — HYDRALAZINE HYDROCHLORIDE 10 MG/1
10 TABLET, FILM COATED ORAL 3 TIMES DAILY
Qty: 90 TABLET | Refills: 1 | Status: SHIPPED | OUTPATIENT
Start: 2020-06-08 | End: 2020-08-14

## 2020-06-08 NOTE — TELEPHONE ENCOUNTER
Called pharmacy and they claim that the amount of times to check Blood sugar changed so now a CMN form is needed.  This form will be faxed from pharmacy

## 2020-06-08 NOTE — TELEPHONE ENCOUNTER
What do you want me to do with this?             This is merely a request for diabetic test strips.

## 2020-06-08 NOTE — TELEPHONE ENCOUNTER
The pt was notified of Carolyn's recommendations. She will keep a log of her blood pressures daily and call in 2 weeks to let us know her readings.   RX pending in  and routed to the provider to review and sign.   Ena Chapin, RNCC  Neurology

## 2020-06-08 NOTE — TELEPHONE ENCOUNTER
Carolyn Bonilla APRN CNP Wells, Alison M, RN; Remedios Santoro RN; Carolyn Bonilla APRN CNP    Caller: Unspecified (1 week ago)               Thanks the BP's were helpful to see.   Dr. Almendarez would like to see her BP's mainly in the 120's systolic.  So we will have to start another therapy, which the patient was made aware of if her BP's weren't in that range.     Start Hydralazine   10mg TID and BP- maintain log and send us the numbers - we are looking for 2 weeks worth after starting the new medication.      Called and left message for patient asking her to call back and discuss above plan.   VERONIQUE Torres

## 2020-06-08 NOTE — TELEPHONE ENCOUNTER
[facility] Call Center    Phone Message    May a detailed message be left on voicemail: yes     Reason for Call: Patient returning a call with blood pressure readings.     05/24/2020- 138/64  05/25/2020- 130/58  05/26/2020- 129/54  05/27/2020-  137/57  05/28/2020-  128/56  05/29/2020-  130/58  05/30/2020-  134/66  05/31/2020-  136/65  06/01/2020-  132/61  06/02/2020-  125/59  06/03/2020-  130/62  06/04/2020-  136/57  06/05/2020-  134/62  06/06/2020-  132/69  06/07/2020-  134/64  06/08/2020-  135/60    Action Taken: Message routed to:  Adult Clinics: Cardiology p [id number]    Travel Screening: Not Applicable

## 2020-06-08 NOTE — TELEPHONE ENCOUNTER
[facility] Call [facility]    Phone Message    May a detailed message be left on voicemail: yes     Reason for Call: Other: Pt returned phone call to [facility]. Please call pt back.      Action Taken: Message routed to:  Adult Clinics: Cardiology p [id number]    Travel Screening: Not Applicable

## 2020-06-09 ENCOUNTER — TELEPHONE (OUTPATIENT)
Dept: FAMILY MEDICINE | Facility: CLINIC | Age: 70
End: 2020-06-09

## 2020-06-09 NOTE — TELEPHONE ENCOUNTER
Reason for Call:  Form, our goal is to have forms completed with 72 hours, however, some forms may require a visit or additional information.    Type of letter, form or note:  medical    Who is the form from?: Pharmacy (if other please explain)    Where did the form come from: form was faxed in    What clinic location was the form placed at?: [facility]    What number is listed as a contact on the form?: [phone]       Additional comments: Luz Marina: Diabetic Supplies    Call taken on 6/9/2020 at 1:38 PM by Saba Spears

## 2020-06-12 DIAGNOSIS — E11.29 TYPE 2 DIABETES MELLITUS WITH MICROALBUMINURIA, WITHOUT LONG-TERM CURRENT USE OF INSULIN (H): ICD-10-CM

## 2020-06-12 DIAGNOSIS — R80.9 TYPE 2 DIABETES MELLITUS WITH MICROALBUMINURIA, WITHOUT LONG-TERM CURRENT USE OF INSULIN (H): ICD-10-CM

## 2020-06-14 DIAGNOSIS — E11.9 TYPE 2 DIABETES MELLITUS WITHOUT COMPLICATION, WITHOUT LONG-TERM CURRENT USE OF INSULIN (H): ICD-10-CM

## 2020-06-14 RX ORDER — GLIPIZIDE 10 MG/1
TABLET, FILM COATED, EXTENDED RELEASE ORAL
Qty: 180 TABLET | Refills: 1 | Status: SHIPPED | OUTPATIENT
Start: 2020-06-14 | End: 2020-12-22

## 2020-06-26 ENCOUNTER — PATIENT OUTREACH (OUTPATIENT)
Dept: CARDIOLOGY | Facility: CLINIC | Age: 70
End: 2020-06-26

## 2020-06-26 NOTE — TELEPHONE ENCOUNTER
Called patient to get BP readings since starting Hydralazine. She did read [employer] message and thought she sent her readings. Confirmed she is taking all cardiac medications listed in Epic. She denies any symptoms, no dizziness. Feels fine.   6//54  6//58  6//56  6//55  6//51  6//52  6//53  6//54  6//58  6//54  Routing to Carolyn. Will call her back with recommendations.     VERONIQUE Torres

## 2020-06-26 NOTE — TELEPHONE ENCOUNTER
Freda Knapp APRN CNP  You 1 hour ago (1:49 PM)       Continue current regimen.     WENDI Castellon CNP   6/26/2020      Called and informed patient of above information. Asked her to continue to monitor her BP every few days and call if it is consistently >130. She said she will do so.     VERONIQUE Torres

## 2020-07-31 ENCOUNTER — TELEPHONE (OUTPATIENT)
Dept: CARDIOLOGY | Facility: CLINIC | Age: 70
End: 2020-07-31

## 2020-07-31 NOTE — TELEPHONE ENCOUNTER
Left message for pt to return call regarding appt with Dr. Minor on 8/3. Due to COVID pt is unable to come to clinic for in person visit. Dr. Minor would like to convert the appt to a video visit and keep their existing date and time for the appt.     Labs need to be rescheduled. Hopefully she can do them today.     Diana Andres CMA......July 31, 2020     8:28 AM

## 2020-08-03 ENCOUNTER — VIRTUAL VISIT (OUTPATIENT)
Dept: NEPHROLOGY | Facility: CLINIC | Age: 70
End: 2020-08-03
Payer: MEDICARE

## 2020-08-03 DIAGNOSIS — N18.30 CKD (CHRONIC KIDNEY DISEASE) STAGE 3, GFR 30-59 ML/MIN (H): Primary | ICD-10-CM

## 2020-08-03 DIAGNOSIS — E11.29 TYPE 2 DIABETES MELLITUS WITH MICROALBUMINURIA, WITHOUT LONG-TERM CURRENT USE OF INSULIN (H): ICD-10-CM

## 2020-08-03 DIAGNOSIS — N18.30 CKD (CHRONIC KIDNEY DISEASE) STAGE 3, GFR 30-59 ML/MIN (H): ICD-10-CM

## 2020-08-03 DIAGNOSIS — I10 HYPERTENSION GOAL BP (BLOOD PRESSURE) < 130/80: ICD-10-CM

## 2020-08-03 DIAGNOSIS — I51.89 DIASTOLIC DYSFUNCTION: ICD-10-CM

## 2020-08-03 DIAGNOSIS — R80.9 TYPE 2 DIABETES MELLITUS WITH MICROALBUMINURIA, WITHOUT LONG-TERM CURRENT USE OF INSULIN (H): ICD-10-CM

## 2020-08-03 DIAGNOSIS — D64.9 ANEMIA, UNSPECIFIED TYPE: ICD-10-CM

## 2020-08-03 LAB
ALBUMIN SERPL-MCNC: 3.7 G/DL (ref 3.4–5)
ANION GAP SERPL CALCULATED.3IONS-SCNC: 7 MMOL/L (ref 3–14)
BUN SERPL-MCNC: 20 MG/DL (ref 7–30)
CALCIUM SERPL-MCNC: 9.2 MG/DL (ref 8.5–10.1)
CHLORIDE SERPL-SCNC: 106 MMOL/L (ref 94–109)
CO2 SERPL-SCNC: 22 MMOL/L (ref 20–32)
CREAT SERPL-MCNC: 1.2 MG/DL (ref 0.52–1.04)
CREAT UR-MCNC: 132 MG/DL
FERRITIN SERPL-MCNC: 60 NG/ML (ref 8–252)
GFR SERPL CREATININE-BSD FRML MDRD: 46 ML/MIN/{1.73_M2}
GLUCOSE SERPL-MCNC: 114 MG/DL (ref 70–99)
HGB BLD-MCNC: 11.3 G/DL (ref 11.7–15.7)
IRON SATN MFR SERPL: 11 % (ref 15–46)
IRON SERPL-MCNC: 37 UG/DL (ref 35–180)
PHOSPHATE SERPL-MCNC: 3.5 MG/DL (ref 2.5–4.5)
POTASSIUM SERPL-SCNC: 4.2 MMOL/L (ref 3.4–5.3)
PROT UR-MCNC: 0.36 G/L
PROT/CREAT 24H UR: 0.28 G/G CR (ref 0–0.2)
PTH-INTACT SERPL-MCNC: 86 PG/ML (ref 18–80)
SODIUM SERPL-SCNC: 135 MMOL/L (ref 133–144)
TIBC SERPL-MCNC: 343 UG/DL (ref 240–430)

## 2020-08-03 PROCEDURE — 83550 IRON BINDING TEST: CPT | Performed by: INTERNAL MEDICINE

## 2020-08-03 PROCEDURE — 83540 ASSAY OF IRON: CPT | Performed by: INTERNAL MEDICINE

## 2020-08-03 PROCEDURE — 83970 ASSAY OF PARATHORMONE: CPT | Performed by: INTERNAL MEDICINE

## 2020-08-03 PROCEDURE — 99441 ZZC PHYSICIAN TELEPHONE EVALUATION 5-10 MIN: CPT | Performed by: INTERNAL MEDICINE

## 2020-08-03 PROCEDURE — 84156 ASSAY OF PROTEIN URINE: CPT | Performed by: INTERNAL MEDICINE

## 2020-08-03 PROCEDURE — 36415 COLL VENOUS BLD VENIPUNCTURE: CPT | Performed by: INTERNAL MEDICINE

## 2020-08-03 PROCEDURE — 82728 ASSAY OF FERRITIN: CPT | Performed by: INTERNAL MEDICINE

## 2020-08-03 PROCEDURE — 80069 RENAL FUNCTION PANEL: CPT | Performed by: INTERNAL MEDICINE

## 2020-08-03 PROCEDURE — 85018 HEMOGLOBIN: CPT | Performed by: INTERNAL MEDICINE

## 2020-08-03 NOTE — PROGRESS NOTES
"08/03/20     CC: CKD     HPI: Keerthi Montoya is a 69 year old female who presents for follow-up of proteinuria.    History taken from previous notes:  Ms. Montoya's hx is significant for DM dx at least 10 years ago - very poor control for years (documented A1C levels 9.4-11.7% from 2011 to 2017); no known retinopathy per patient. She has longstanding hypertension dx when she was 18 years old. Additional hx includes ulcerative colitis and hypothyroidism (following thyroidectomy at age 24). Her ulcerative colitis was dx ~2017 but she feels she likely had trouble with this disease for years. She reports having loose stool for a long time prior to receiving therapy for her UC; she feels the blood in the stool may have contaminated previous urine samples potentially.       02/10/20 :  Creatinine has been 0.9-1.13 in the past year; up slightly today at 1.3. Last UPCR 0.42 g/g in May. On losartan 100 mg daily. Bronchitis - started on levaquin on Saturday. No swelling, no NSAIDs. Blood pressure at times has been down to 118 - she reports blood pressures less than 130 typically but at times above. She has lost weight intentionally - 158 lbs last year and now 146 lbs. She does not smoke. She has not been eating/drinking today.     08/03/20:  In the setting of COVID-19 pandemic, this visit was changed to a telephone visit. Patient did not have video capability.  Because of murmur appreciated on exam at our previous visit, I sent her for a TTE which showed aortic stenosis as well as diastolic dysfunction. She was seen by cardiology in March and continues to follow with them at this time. She is felt to have severe pulmonary hypertension, CHF, along with aortic stenosis. She is currently taking lasix 20 mg BID. She remains on losartan 100 mg daily. She reports feeling \"great\". She does have some swelling at times - Dr. Almendarez restarted norvasc. BP has been 124/58; typically 120s. Breathing has been stable. Stable UC sxs. "     Allergies   Allergen Reactions     Actos [Pioglitazone]      Fluid retention     Animal Dander      Doxycycline Hives     Dust Mites      Grass      Keflex [Cephalexin Hcl] Hives     Metoprolol      Swelling of lower legs and fatigue     Other [Seasonal Allergies]      pollen     Ranitidine      rash     Synthroid [Levothroid] Swelling     ???     Tetracycline Hives     Tylenol Hives     Ziac [Bisoprolol-Hydrochlorothiazide]        amLODIPine (NORVASC) 10 MG tablet, Take 1 tablet (10 mg) by mouth daily  atenolol (TENORMIN) 50 MG tablet, Take 1 tablet (50 mg) by mouth daily  atorvastatin (LIPITOR) 40 MG tablet, TAKE 1 TABLET BY MOUTH DAILY  blood glucose (ACCU-CHEK FELIPE PLUS) test strip, 200 strips by In Vitro route 2 times daily Use to test blood sugar 2 times daily or as directed.  blood glucose (ACCU-CHEK FELIPE PLUS) test strip, TEST TWICE DAILY  Cholecalciferol (VITAMIN D) 2000 units CAPS, Take by mouth daily  Cyanocobalamin (B-12) 1000 MCG TABS, Take 1,000 mcg by mouth three times a week  furosemide (LASIX) 20 MG tablet, Take 1 tablet (20 mg) by mouth 2 times daily  glipiZIDE (GLUCOTROL XL) 10 MG 24 hr tablet, TAKE 1 TABLET BY MOUTH TWICE DAILY  hydrALAZINE (APRESOLINE) 10 MG tablet, Take 1 tablet (10 mg) by mouth 3 times daily  levothyroxine (SYNTHROID/LEVOTHROID) 125 MCG tablet, TAKE 1 TABLET BY MOUTH DAILY  losartan (COZAAR) 100 MG tablet, TAKE 1 TABLET(100 MG) BY MOUTH DAILY  mesalamine (APRISO ER) 0.375 g 24 hr capsule, TAKE 4 CAPSULES(1.5 GRAMS) BY MOUTH EVERY MORNING  metFORMIN (GLUCOPHAGE) 500 MG tablet, TAKE 2 TABLETS BY MOUTH TWICE DAILY WITH MEALS  albuterol (PROAIR HFA/PROVENTIL HFA/VENTOLIN HFA) 108 (90 Base) MCG/ACT inhaler, Inhale 2 puffs into the lungs every 4 hours as needed for shortness of breath / dyspnea, wheezing or other (cough) (Patient not taking: Reported on 8/3/2020)    No current facility-administered medications on file prior to visit.       Past Medical History:   Diagnosis  Date     Diabetes mellitus (H)      Diverticulosis of colon (without mention of hemorrhage) 10/1/2012     Hypertension      Pulmonary hypertension (H)      Ulcerative (chronic) enterocolitis (H)        Past Surgical History:   Procedure Laterality Date     CHOLECYSTECTOMY  3/1987     COLON SURGERY  01/2001    Left colon resection; diverticulitis     COLONOSCOPY N/A 4/24/2017    Procedure: COMBINED COLONOSCOPY, SINGLE OR MULTIPLE BIOPSY/POLYPECTOMY BY BIOPSY;;  Surgeon: Radha Salguero MD;  Location: [facility] OR     COLONOSCOPY WITH CO2 INSUFFLATION N/A 4/24/2017    Procedure: COLONOSCOPY WITH CO2 INSUFFLATION;  Colonoscopy Dx rectal bleeding, BMI 25.86, Pharm [facility] .[phone], ;  Surgeon: Radha Salguero MD;  Location: [facility] OR     CV RIGHT HEART CATH N/A 3/16/2020    Procedure: CV RIGHT HEART CATH;  Surgeon: Nader Muñoz MD;  Location: [facility]     GYN SURGERY  9/1983    tubal ligation     HERNIA REPAIR  12/2006    recurrent incisional hernia     THROAT SURGERY  12/1974    thyroidectomy       Social History     Tobacco Use     Smoking status: Never Smoker     Smokeless tobacco: Never Used   Substance Use Topics     Alcohol use: Yes     Alcohol/week: 2.0 - 4.0 standard drinks     Types: 1 - 2 Cans of beer, 1 - 2 Shots of liquor per week     Comment: occasional cocktail or beer     Drug use: No       Family History   Problem Relation Age of Onset     Cerebrovascular Disease Mother      Cancer Father         bladder     Kidney Disease No family hx of        ROS: A 4 system review of systems was negative other than noted here or above.     Exam:  There were no vitals taken for this visit.    Telephone visit - no exam    Results:   Orders Only on 08/03/2020   Component Date Value Ref Range Status     Sodium 08/03/2020 135  133 - 144 mmol/L Final     Potassium 08/03/2020 4.2  3.4 - 5.3 mmol/L Final     Chloride 08/03/2020 106  94 - 109 mmol/L Final     Carbon Dioxide 08/03/2020  22  20 - 32 mmol/L Final     Anion Gap 08/03/2020 7  3 - 14 mmol/L Final     Glucose 08/03/2020 114* 70 - 99 mg/dL Final     Urea Nitrogen 08/03/2020 20  7 - 30 mg/dL Final     Creatinine 08/03/2020 1.20* 0.52 - 1.04 mg/dL Final     GFR Estimate 08/03/2020 46* >60 mL/min/[1.73_m2] Final    Comment: Non  GFR Calc  Starting 12/18/2018, serum creatinine based estimated GFR (eGFR) will be   calculated using the Chronic Kidney Disease Epidemiology Collaboration   (CKD-EPI) equation.       GFR Estimate If Black 08/03/2020 54* >60 mL/min/[1.73_m2] Final    Comment:  GFR Calc  Starting 12/18/2018, serum creatinine based estimated GFR (eGFR) will be   calculated using the Chronic Kidney Disease Epidemiology Collaboration   (CKD-EPI) equation.       Calcium 08/03/2020 9.2  8.5 - 10.1 mg/dL Final     Phosphorus 08/03/2020 3.5  2.5 - 4.5 mg/dL Final     Albumin 08/03/2020 3.7  3.4 - 5.0 g/dL Final     Hemoglobin 08/03/2020 11.3* 11.7 - 15.7 g/dL Final          Assessment/Plan:   1. CKD Stage 3: risks for CKD include poorly controlled diabetes as well as longstanding hypertension. Mesalamine has potential implications on the kidney but it appears her function has been stable most recently. She does have proteinuria without hematuria which does fit with diabetic nephropathy. She is already on losartan 100 mg daily. Educated on benefits of optimal weight, avoidance of NSAIDs, blood pressure well controlled and also diabetes control as the important things to keep the kidney function stable. Myeloma testing normal in Sept. She is on lasix - educated on holding lasix should she have sxs of dehydration but otherwise no other changes today. Will plan 6 month follow-up.     2. Hypertension: at goal of <130/80 - no changes made today. I previously stopped amlodipine as it was causing swelling for her. She was restarted on it by cardiology - defer mgmt of the [facility] to cardiology - she has follow-up with  "them in the coming weeks.     3. Ulcerative colitis: she reports good control with mesalamine. Very rarely mesalamine causes an acute interstitial picture but there are reports of this occurring as low as 0.3% of the time. We will plan to follow. On discussion with her, she feels that she may have been chronically volume depleted prior to getting her treatment for ulcerative colitis and may be the reason her creatinine is improved this year.     4. Hypothyroidism: well controlled on last check in Jan 2020    5. DM:  Very poor control documented in our system from 2011 to 2017 with A1C levels between 9.4 and 11.7% at this time. It is possible that it was uncontrolled even further back as A1C levels are not available at this time dating back further than 2011. Encouraged ongoing optimization of diabetes to help slow progression of kidney disease. Pleased to see last A1C was down to 7%  - There is evidence that supports a renoprotective effect of SGLT2 inhibitors in the setting of diabetic nephropathy. Will defer to primary care ultimately as they are managing her diabetes. If SGLT2 inhibitors are started, creatinine should be monitored a few weeks later given the diuretic effect of the medication and potential rise in creatinine associated with the hemodynamic changes.    6. CHF/pulmonary hypertension/aortic stenosis: followed by Dr. Almendarez - currently on lasix 20 mg BID and volume is well controlled.       Patient Instructions   Plan 6 month follow-up.          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      The patient has been notified of following:     \"This telephone visit will be conducted via a call between you and your physician/provider. We have found that certain health care needs can be provided without the need for a physical exam.  This service lets us provide the care you need with a short phone conversation.  If a prescription " "is necessary we can send it directly to your pharmacy.  If lab work is needed we can place an order for that and you can then stop by our lab to have the test done at a later time.    Telephone visits are billed at different rates depending on your insurance coverage. During this emergency period, for some insurers they may be billed the same as an in-person visit.  Please reach out to your insurance provider with any questions.    If during the course of the call the physician/provider feels a telephone visit is not appropriate, you will not be charged for this service.\"    Patient has given verbal consent for Telephone visit?  Yes    What phone number would you like to be contacted at? [phone]    How would you like to obtain your AVS? Frank Purdy LPN      Phone call duration: 8 minutes    Swati Minor MD    "

## 2020-08-07 ENCOUNTER — TELEPHONE (OUTPATIENT)
Dept: FAMILY MEDICINE | Facility: CLINIC | Age: 70
End: 2020-08-07

## 2020-08-07 NOTE — LETTER
August 7, 2020    Keerthi Montoya  [address] [place] [id number]    Dear Viraj Pendleton cares about your health and your health plan.  I have reviewed your medical conditions, medication list and lab results, and am making recommendations based on this review to better manage your health.    You are in particular need of attention regarding:  -Wellness (Physical) Visit     I am recommending that you:     -schedule a WELLNESS (Physical) APPOINTMENT with me.    We can also do this as a video visit.      Please call us at the [facility] location:  [phone] or use [employer] to address the above recommendations.     Thank you for trusting [facility]  We appreciate the opportunity to serve you and look forward to supporting your healthcare in the future.    If you have (or plan to have) any of these tests done at a facility other than a [facility] or a [facility], please have the results sent to the [facility] location noted above.      Best Regards,    Nathan Dhillon MD

## 2020-08-07 NOTE — TELEPHONE ENCOUNTER
Patient Quality Outreach      Summary:    Patient is due/failing the following:   Annual wellness, date due: 7/30/2019    Type of outreach:    Sent letter.    Questions for provider review:    None                                                                                   **Start Working phrase here:**       Patient has the following on her problem list/HM: None                                                DAVID Stevens [facility]       Chart routed to .

## 2020-08-11 DIAGNOSIS — I10 HYPERTENSION GOAL BP (BLOOD PRESSURE) < 130/80: ICD-10-CM

## 2020-08-12 RX ORDER — HYDRALAZINE HYDROCHLORIDE 10 MG/1
10 TABLET, FILM COATED ORAL 3 TIMES DAILY
Qty: 90 TABLET | Refills: 1 | OUTPATIENT
Start: 2020-08-12

## 2020-08-12 NOTE — TELEPHONE ENCOUNTER
hydrALAZINE (APRESOLINE) 10 MG tablet  90 tablet  1  6/8/2020   --    Sig - Route: Take 1 tablet (10 mg) by mouth 3 times daily - Oral    Sent to pharmacy as: hydrALAZINE HCl 10 MG Oral Tablet (APRESOLINE)    Class: E-Prescribe    Order: [id number]    E-Prescribing Status: Receipt confirmed by pharmacy (6/8/2020  4:11 PM CDT)    Printout Tracking     External Result Report    Pharmacy     [id number] - [place], [place] - [address] AT [facility] [address]

## 2020-08-14 ENCOUNTER — OFFICE VISIT (OUTPATIENT)
Dept: CARDIOLOGY | Facility: CLINIC | Age: 70
End: 2020-08-14
Payer: MEDICARE

## 2020-08-14 ENCOUNTER — ANCILLARY PROCEDURE (OUTPATIENT)
Dept: CARDIOLOGY | Facility: CLINIC | Age: 70
End: 2020-08-14
Attending: INTERNAL MEDICINE
Payer: MEDICARE

## 2020-08-14 VITALS
SYSTOLIC BLOOD PRESSURE: 123 MMHG | DIASTOLIC BLOOD PRESSURE: 64 MMHG | WEIGHT: 156.3 LBS | HEART RATE: 61 BPM | BODY MASS INDEX: 28.59 KG/M2 | OXYGEN SATURATION: 94 %

## 2020-08-14 DIAGNOSIS — I27.20 PULMONARY HYPERTENSION (H): ICD-10-CM

## 2020-08-14 DIAGNOSIS — R01.1 HEART MURMUR: ICD-10-CM

## 2020-08-14 DIAGNOSIS — I10 HYPERTENSION GOAL BP (BLOOD PRESSURE) < 130/80: ICD-10-CM

## 2020-08-14 PROCEDURE — 99215 OFFICE O/P EST HI 40 MIN: CPT | Performed by: INTERNAL MEDICINE

## 2020-08-14 PROCEDURE — 93306 TTE W/DOPPLER COMPLETE: CPT | Performed by: INTERNAL MEDICINE

## 2020-08-14 RX ORDER — HYDRALAZINE HYDROCHLORIDE 25 MG/1
25 TABLET, FILM COATED ORAL 3 TIMES DAILY
Qty: 90 TABLET | Refills: 3 | Status: SHIPPED | OUTPATIENT
Start: 2020-08-14 | End: 2020-10-28

## 2020-08-14 RX ORDER — FUROSEMIDE 20 MG
20 TABLET ORAL 2 TIMES DAILY
Qty: 180 TABLET | Refills: 0 | Status: CANCELLED | OUTPATIENT
Start: 2020-08-14

## 2020-08-14 RX ORDER — FUROSEMIDE 40 MG
40 TABLET ORAL 2 TIMES DAILY
Qty: 90 TABLET | Refills: 3 | Status: SHIPPED | OUTPATIENT
Start: 2020-08-14 | End: 2020-10-28

## 2020-08-14 ASSESSMENT — PAIN SCALES - GENERAL: PAINLEVEL: NO PAIN (0)

## 2020-08-14 NOTE — PATIENT INSTRUCTIONS
Increase lasix to 40 mg twice daily.   Stop amlodipine.   Increase Hydralazine to 25 mg thrice daily.   Referral to Pulmonary hypertension - Dr Marcus or Wood.   Lab ([facility]) in 1 week.   Follow with Core Batmelyssa in 2 weeks.   RTC 6 months.

## 2020-08-14 NOTE — NURSING NOTE
Keerthi Montoya's goals for this visit include:   Chief Complaint   Patient presents with     RECHECK     follow up HF, echo prior       She requests these members of her care team be copied on today's visit information: no    PCP: Nathan Dhillon    Referring Provider:  WENDI Arreaga CNP  [address] [place] [address]    /64 (BP Location: Left arm, Patient Position: Sitting, Cuff Size: Adult Regular)   Pulse 61   Wt 70.9 kg (156 lb 4.8 oz)   SpO2 94%   BMI 28.59 kg/m      Do you need any medication refills at today's visit? No    Diana Andres CMA......August 14, 2020     2:28 PM

## 2020-08-14 NOTE — PROGRESS NOTES
[facility] Cardiology Consultation:    Assessment and Plan:     1.  Severe pulmonary hypertension, mildly reduced RV function, functional class I-II.  The etiology is likely group 2, and related to underlying diastolic dysfunction from longstanding hypertension and diabetes.  Normal VQ scan, and normal serologies.  Despite her lack of any functional limitation, I am concerned about her mildly reduced RV function and lower extremity edema.  I will increase her Lasix to 40 mg twice daily.  Check BMP in 1 week. Follow up in core clinic in 2 weeks for diuretic adjustment.  Will refer to our pulmonary hypertension specialist to get an opinion.  2.  Hypertension: Stop amlodipine per pt request. Increase Hydralazine to 25 mg thrice daily.   3.  Mild-moderate aortic stenosis, possible bicuspid aortic valve: Serial echoes  4. Diabetes: Consider adding SGLT1i in the future  5.  Dyslipidemia, on Lipitor  6.  CKD stage 3  7. Ulcerative colitis    RTC 6 months    David Almendarez MD    Cardiac Imaging and Prevention  [facility]  [email or web address] Pager: [phone]    HPI: Routine pulmonary hypertension follow-up.  Right heart catheterization had confirmed the severe pulmonary hypertension, with biventricular elevated filling pressures.  She did not tolerate nipride due to hypoxia.  Findings were felt to be consistent with pulmonary hypertension related to diastolic dysfunction.  She has tolerated the low-dose Lasix with stable kidney function.  She continues to have lower extremity edema.  Though she has continued to take her amlodipine, she remains convinced that the lower extremity edema is due to the amlodipine and would like to stop it.  Due to her underlying advanced CKD, I had only done a VQ scan which ruled out thromboembolic disease.  Extensive serology work-up was normal.  I reviewed her echocardiogram that was done today.  It shows severe pulmonary hypertension.  However  now there is a mild decrease in RV function.  Her filling pressures are elevated.  She continues to be physically active without any limitation. Denies any chest pain or pressure, shortness of breath/dyspnea, orthopnea, pnd, palpitations, syncope/presyncope.      EXAM:  /64 (BP Location: Left arm, Patient Position: Sitting, Cuff Size: Adult Regular)   Pulse 61   Wt 70.9 kg (156 lb 4.8 oz)   SpO2 94%   BMI 28.59 kg/m    GEN/CONSTITUIONAL: Appears comfortable, in no apparent distress   EYES: No icterus  ENT/MOUTH: Normal  JVP:  Mildly elevated with HJ reflex  RESPIRATORY: Clear to auscultation bilaterally   CARDIOVASCULAR: Regular S1 and S2, mild AS murmur, rubs, or gallops.   ABDOMEN: Soft, non-tender, positive bowel sounds   NEUROLOGIC: Grossly non-focal   PSYCHIATRIC: Normal affect  EXT: 1+ LEedema. Normal pedal pulses.  Skin: No petechiae, purpura or rash    PAST MEDICAL HISTORY:  Past Medical History:   Diagnosis Date     Diabetes mellitus (H)      Diverticulosis of colon (without mention of hemorrhage) 10/1/2012     Hypertension      Pulmonary hypertension (H)      Ulcerative (chronic) enterocolitis (H)        CURRENT MEDICATIONS:  Current Outpatient Medications   Medication     amLODIPine (NORVASC) 10 MG tablet     atenolol (TENORMIN) 50 MG tablet     atorvastatin (LIPITOR) 40 MG tablet     Cholecalciferol (VITAMIN D) 2000 units CAPS     Cyanocobalamin (B-12) 1000 MCG TABS     furosemide (LASIX) 20 MG tablet     glipiZIDE (GLUCOTROL XL) 10 MG 24 hr tablet     hydrALAZINE (APRESOLINE) 10 MG tablet     levothyroxine (SYNTHROID/LEVOTHROID) 125 MCG tablet     losartan (COZAAR) 100 MG tablet     mesalamine (APRISO ER) 0.375 g 24 hr capsule     metFORMIN (GLUCOPHAGE) 500 MG tablet     albuterol (PROAIR HFA/PROVENTIL HFA/VENTOLIN HFA) 108 (90 Base) MCG/ACT inhaler     blood glucose (ACCU-CHEK FELIPE PLUS) test strip     blood glucose (ACCU-CHEK FELIPE PLUS) test strip     No current facility-administered  medications for this visit.        PAST SURGICAL HISTORY:  Past Surgical History:   Procedure Laterality Date     CHOLECYSTECTOMY  3/1987     COLON SURGERY  2001    Left colon resection; diverticulitis     COLONOSCOPY N/A 2017    Procedure: COMBINED COLONOSCOPY, SINGLE OR MULTIPLE BIOPSY/POLYPECTOMY BY BIOPSY;;  Surgeon: Radha Salguero MD;  Location: [facility] OR     COLONOSCOPY WITH CO2 INSUFFLATION N/A 2017    Procedure: COLONOSCOPY WITH CO2 INSUFFLATION;  Colonoscopy Dx rectal bleeding, BMI 25.86, Pharm [facility] .[phone], ;  Surgeon: Radha Salguero MD;  Location: [facility] OR     CV RIGHT HEART CATH N/A 3/16/2020    Procedure: CV RIGHT HEART CATH;  Surgeon: Nader Muñoz MD;  Location:  HEART CARDIAC CATH LAB     GYN SURGERY  1983    tubal ligation     HERNIA REPAIR  2006    recurrent incisional hernia     THROAT SURGERY  1974    thyroidectomy       ALLERGIES     Allergies   Allergen Reactions     Actos [Pioglitazone]      Fluid retention     Amlodipine      Leg swelling, hand swelling     Animal Dander      Doxycycline Hives     Dust Mites      Grass      Keflex [Cephalexin Hcl] Hives     Metoprolol      Swelling of lower legs and fatigue     Other [Seasonal Allergies]      pollen     Ranitidine      rash     Synthroid [Levothroid] Swelling     ???     Tetracycline Hives     Tylenol Hives     Ziac [Bisoprolol-Hydrochlorothiazide]        FAMILY HISTORY:  Family History   Problem Relation Age of Onset     Cerebrovascular Disease Mother      Cancer Father         bladder     Kidney Disease No family hx of        SOCIAL HISTORY:  Social History     Socioeconomic History     Marital status:      Spouse name: Raymundo; dementia;  16     Number of children: 3     Years of education: Not on file     Highest education level: Not on file   Occupational History     Employer: [employer]   Social Needs     Financial resource strain: Not on file     Food insecurity      Worry: Not on file     Inability: Not on file     Transportation needs     Medical: Not on file     Non-medical: Not on file   Tobacco Use     Smoking status: Never Smoker     Smokeless tobacco: Never Used   Substance and Sexual Activity     Alcohol use: Yes     Alcohol/week: 2.0 - 4.0 standard drinks     Types: 1 - 2 Cans of beer, 1 - 2 Shots of liquor per week     Comment: occasional cocktail or beer     Drug use: No     Sexual activity: Not Currently   Lifestyle     Physical activity     Days per week: Not on file     Minutes per session: Not on file     Stress: Not on file   Relationships     Social connections     Talks on phone: Not on file     Gets together: Not on file     Attends Orthodox service: Not on file     Active member of club or organization: Not on file     Attends meetings of clubs or organizations: Not on file     Relationship status: Not on file     Intimate partner violence     Fear of current or ex partner: Not on file     Emotionally abused: Not on file     Physically abused: Not on file     Forced sexual activity: Not on file   Other Topics Concern     Parent/sibling w/ CABG, MI or angioplasty before 65F 55M? 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 8/14       ROS:   Constitutional: No fever, chills, or sweats. No weight gain/loss   ENT: No visual disturbance, ear ache, epistaxis, sore throat  Allergies/Immunologic: Negative.   Respiratory: No cough, hemoptysia  Cardiovascular: As per HPI  GI: No nausea, vomiting, hematemesis, melena, or hematochezia  : No urinary frequency, dysuria, or hematuria  Integument: Negative  Psychiatric: Negative  Neuro:  Negative  Endocrinology: Negative   Musculoskeletal: Negative    ADDITIONAL COMMENTS:     I reviewed the patient's medications:     I reviewed the patient's pertinent clinical laboratory studies:     I reviewed the patient's pertinent imaging studies:   I reviewed the patient's ECG:       Echo Interpretation Summary     Mild aortic stenosis (PV 2.5 m/s MG 12 mmHg.) Cannot exclude a bicuspid aortic  valve. This is a plausible basis for a systolic murmur.  Left ventricular function, chamber size, wall motion, and wall thickness are  normal.The EF is 60-65%.  Mildly dilated RV with normal RV function.  Mild to moderate tricuspid insufficiency is present.  Grade 3 diastolic dysfunction with elevated left-sided filling pressures.  Severe pulmonary hypertension. Pulmonary artery systolic pressure is 89 mmHg  (86 mmHg+RA pressure).  Previous study not available for comparison.

## 2020-08-18 ENCOUNTER — TELEPHONE (OUTPATIENT)
Dept: CARDIOLOGY | Facility: CLINIC | Age: 70
End: 2020-08-18

## 2020-08-18 NOTE — TELEPHONE ENCOUNTER
Left message for pt to return call regarding appt with Carolyn on 8/26. Due to COVID pt is unable to come to clinic for in person visit. Carolyn would like to convert the appt to a video visit and keep their existing date and time for the appt.     Diana Andres CMA......August 18, 2020     9:38 AM

## 2020-08-21 DIAGNOSIS — I27.20 PULMONARY HYPERTENSION (H): ICD-10-CM

## 2020-08-21 LAB
ANION GAP SERPL CALCULATED.3IONS-SCNC: 10 MMOL/L (ref 3–14)
BUN SERPL-MCNC: 35 MG/DL (ref 7–30)
CALCIUM SERPL-MCNC: 9.4 MG/DL (ref 8.5–10.1)
CHLORIDE SERPL-SCNC: 104 MMOL/L (ref 94–109)
CO2 SERPL-SCNC: 22 MMOL/L (ref 20–32)
CREAT SERPL-MCNC: 1.55 MG/DL (ref 0.52–1.04)
GFR SERPL CREATININE-BSD FRML MDRD: 34 ML/MIN/{1.73_M2}
GLUCOSE SERPL-MCNC: 100 MG/DL (ref 70–99)
POTASSIUM SERPL-SCNC: 4.2 MMOL/L (ref 3.4–5.3)
SODIUM SERPL-SCNC: 136 MMOL/L (ref 133–144)

## 2020-08-21 PROCEDURE — 80048 BASIC METABOLIC PNL TOTAL CA: CPT | Performed by: INTERNAL MEDICINE

## 2020-08-21 PROCEDURE — 36415 COLL VENOUS BLD VENIPUNCTURE: CPT | Performed by: INTERNAL MEDICINE

## 2020-08-26 ENCOUNTER — VIRTUAL VISIT (OUTPATIENT)
Dept: CARDIOLOGY | Facility: CLINIC | Age: 70
End: 2020-08-26
Payer: MEDICARE

## 2020-08-26 DIAGNOSIS — I10 HYPERTENSION GOAL BP (BLOOD PRESSURE) < 130/80: ICD-10-CM

## 2020-08-26 DIAGNOSIS — I51.89 DIASTOLIC DYSFUNCTION: ICD-10-CM

## 2020-08-26 DIAGNOSIS — R80.9 TYPE 2 DIABETES MELLITUS WITH MICROALBUMINURIA, WITHOUT LONG-TERM CURRENT USE OF INSULIN (H): ICD-10-CM

## 2020-08-26 DIAGNOSIS — E11.29 TYPE 2 DIABETES MELLITUS WITH MICROALBUMINURIA, WITHOUT LONG-TERM CURRENT USE OF INSULIN (H): ICD-10-CM

## 2020-08-26 DIAGNOSIS — I27.20 PULMONARY HYPERTENSION (H): Primary | ICD-10-CM

## 2020-08-26 DIAGNOSIS — N18.30 CKD (CHRONIC KIDNEY DISEASE) STAGE 3, GFR 30-59 ML/MIN (H): ICD-10-CM

## 2020-08-26 PROCEDURE — 99443 ZZC PHYSICIAN TELEPHONE EVALUATION 21-30 MIN: CPT | Mod: 95 | Performed by: NURSE PRACTITIONER

## 2020-08-26 NOTE — PROGRESS NOTES
"Keerthi Montoya is a 70 year old female who is being evaluated via a billable telephone visit.      The patient has been notified of following:     \"This telephone visit will be conducted via a call between you and your physician/provider. We have found that certain health care needs can be provided without the need for a physical exam.  This service lets us provide the care you need with a short phone conversation.  If a prescription is necessary we can send it directly to your pharmacy.  If lab work is needed we can place an order for that and you can then stop by our lab to have the test done at a later time.    Telephone visits are billed at different rates depending on your insurance coverage. During this emergency period, for some insurers they may be billed the same as an in-person visit.  Please reach out to your insurance provider with any questions.    If during the course of the call the physician/provider feels a telephone visit is not appropriate, you will not be charged for this service.\"    Patient has given verbal consent for Telephone visit? yes    What phone number would you like to be contacted at? [phone]    How would you like to obtain your AVS? angelica    Phone call duration: 23 minutes    Georgia is a 70 year old female with a  medical history is significant for longstanding hypertension since she was in her teens, diabetes, dyslipidemia, and ulcerative colitis.    Patient states she has no SOB with or without activity. She had seen Dr. Almendarez a couple weeks ago and he had increased her diuretic and asked for another labs test as well.BP- 120's/50-60  Weights 144-145 lbs at home. She didn't feel the increased diuretic( Torsemide 40 mg BID)  made a big difference with urination or weight. She feels stopping the Norvasc helped with the LE edema.  No swelling, + dry mouth and  lately. No SOB with activity- watches grandkids ages 3 and 1 every day.  She has noted her BS to be " stable. She feels well overall and denies chest pain, palpitations, lightheadedness, dizziness.      REVIEW OF SYSTEMS:      Constitutional: No fevers or chills  Skin: No new rash or itching  Eyes: No acute change in vision  Ears/Nose/Throat: No purulent rhinorrhea, new hearing loss, or new vertigo  Respiratory:No PROCTOR. No cough or hemoptysis  Cardiovascular: See HPI  Musculoskeletal: No new back pain, neck pain or muscle pain  Neurologic: No new headaches, focal weakness or behavior changes.   lightheadedness with standing.   Hematologic/Lymphatic/Immunologic: No bleeding, chills, fever, night sweats or weight loss          PHYSICAL EXAMINATION:   No vitals due to virtual visit.     GENERAL: No acute distress.  HEENT:  Sclerae white, not injected.   Lungs: No wheezing, non-labored breathing  .    Assessment and Plan:  Georgia is well today, despite her lack of any functional limitation, the ongoing concern about her mildly reduced RV function is still present.  She appears hypovolemic and we will decrease her diuretics a bit. We went over her labs and discussed the results. She is making a better effort to stay hydrated.  She is busy watching her 2 grand kids and is happy she an keep up with them.  She will call us with changes in BP and or increase in weights, change in symptoms.    1.  Severe pulmonary hypertension, normal LV function with mild dilation, functional class I-II.The etiology is likely group 2, and related to underlying diastolic dysfunction from longstanding hypertension and diabetes.    2.  Hypertension, with poor recent control:  amlodipine stopped on 8/14.  Instructed to keep home blood pressure log.  3.  Mild-moderate aortic stenosis, possible bicuspid aortic valve:   4. Diabetes: PCP following  5.  Dyslipidemia, on Lipitor  6.  CKD stage 3  7. Ulcerative colitis      Plan:  Decrease Torsemide to 40 mg am/20 mg pm  BMP in one week  PH consult per Dr. Almendarez  Follow up in CORE in 2 months- virtual  antwon ADAME NP-C

## 2020-08-26 NOTE — PATIENT INSTRUCTIONS
Take your medicines every day, as directed    Changes made today:  o Torsemide 40 mg am/20 mg pm   o Hold pm dose today only start the above regiment tomorrow   o   o    Monitor Your Weight and Symptoms    Contact us if you:      Gain 2 pounds in one day or 5 pounds in one week    Feel more short of breath    Notice more leg swelling    Feel lightheadeded   Change your lifestyle    Limit Salt or Sodium:    2000 mg  Limit Fluids:    2000 mL or approximately 64 ounces  Eat a Heart Healthy Diet    Low in saturated fats  Stay Active:    Aim to move at least 150 minutes every  week         To Contact us    During Business Hours:  [phone], option # 1 (University)  Then option # 4 (medical questions)     After hours, weekends or holidays:   [phone], Option #4  Ask to speak to the On-Call Cardiologist. Inform them you are a CORE/heart failure patient at the [facility].     Use [employer] allows you to communicate directly with your heart team through secure messaging.    [employer] can be accessed any time on your phone, computer, or tablet.    If you need assistance, we'd be happy to help!         Keep your Heart Appointments:    BMP in one week    Follow up CORE in 2 months

## 2020-09-01 ENCOUNTER — TELEPHONE (OUTPATIENT)
Dept: CARDIOLOGY | Facility: CLINIC | Age: 70
End: 2020-09-01

## 2020-09-01 NOTE — TELEPHONE ENCOUNTER
Calling to schedule lab from last week appt with Carolyn. She is unable to come in the rest of the week, cheduled for Sept 8th per her request     VERONIQUE Torres

## 2020-09-08 DIAGNOSIS — I10 HYPERTENSION GOAL BP (BLOOD PRESSURE) < 130/80: ICD-10-CM

## 2020-09-08 LAB
ANION GAP SERPL CALCULATED.3IONS-SCNC: 6 MMOL/L (ref 3–14)
BUN SERPL-MCNC: 35 MG/DL (ref 7–30)
CALCIUM SERPL-MCNC: 9.6 MG/DL (ref 8.5–10.1)
CHLORIDE SERPL-SCNC: 103 MMOL/L (ref 94–109)
CO2 SERPL-SCNC: 25 MMOL/L (ref 20–32)
CREAT SERPL-MCNC: 1.32 MG/DL (ref 0.52–1.04)
GFR SERPL CREATININE-BSD FRML MDRD: 41 ML/MIN/{1.73_M2}
GLUCOSE SERPL-MCNC: 109 MG/DL (ref 70–99)
POTASSIUM SERPL-SCNC: 4.5 MMOL/L (ref 3.4–5.3)
SODIUM SERPL-SCNC: 134 MMOL/L (ref 133–144)

## 2020-09-08 PROCEDURE — 80048 BASIC METABOLIC PNL TOTAL CA: CPT | Performed by: NURSE PRACTITIONER

## 2020-09-08 PROCEDURE — 36415 COLL VENOUS BLD VENIPUNCTURE: CPT | Performed by: NURSE PRACTITIONER

## 2020-09-10 ENCOUNTER — TELEPHONE (OUTPATIENT)
Dept: CARDIOLOGY | Facility: CLINIC | Age: 70
End: 2020-09-10

## 2020-09-10 NOTE — TELEPHONE ENCOUNTER
Called and left message asking patient to call back with status report, lab looks better.   DBajuan, VERONIQUE

## 2020-09-10 NOTE — TELEPHONE ENCOUNTER
----- Message from WENDI Arreaga CNP sent at 9/8/2020  5:13 PM CDT -----  Please see how she is - weights  Etc?-  Creat is better.

## 2020-09-15 DIAGNOSIS — K51.311 ULCERATIVE RECTOSIGMOIDITIS WITH RECTAL BLEEDING (H): ICD-10-CM

## 2020-09-15 NOTE — TELEPHONE ENCOUNTER
Carolyn Bonilla, Remedios Robles CNP RN    Caller: Unspecified (5 days ago,  4:14 PM)               Thanks Remedios - we keep our plan as is. I believe she is being seen by cardiologist this week

## 2020-09-15 NOTE — TELEPHONE ENCOUNTER
Patient was seen in the CORE clinic on 8/26/20. The plan following the visit was decrease Lasix to 40 mg in am and 20 mg in pm. At that visit the patient reported their weight to be 144-145.     I called and spoke to the patient today to see how they have been doing since their visit. Informed that labs reviewed by Carolyn, kidney function better    Weights since last visit: 144-146    Patient states their shortness of breath is same, no shortness of breath     Patient states their swelling is none. The swelling is primarily in their none.     Other: BP today 126/60     Current Medications  Diuretic: Lasix 40 mg am , 20 mg pm  Potassium: none  Blood Pressure Medication: Hydralazine 25 mg TID, Atenolol 50 mg daily, Losartan 100 mg daily    Message will be sent to Carolyn Bonilla CNP to review.

## 2020-09-16 NOTE — TELEPHONE ENCOUNTER
Health Call Center    Phone Message    May a detailed message be left on voicemail: yes     Reason for Call: Medication Refill Request    Has the patient contacted the pharmacy for the refill? Yes     Name of medication being requested: mesalamine (APRISO ER) 0.375 g 24 hr capsule     Provider who prescribed the medication: Dr. Tejinder Leal    Pharmacy: [facility] #[id number]    Date medication is needed: ASAP - patient has one dose left.     NOTE:  Patient has appointment with Dr. Preeti James on 10/20/20.      Action Taken: Message routed to:  [facility]    Travel Screening: Not Applicable

## 2020-09-16 NOTE — TELEPHONE ENCOUNTER
RECORDS RECEIVED FROM: N/A   DATE RECEIVED: 10/20/2020   NOTES STATUS DETAILS   OFFICE NOTE from referring provider N/A    OFFICE NOTE from other specialist Internal 2/12/2020, 4/3/19 Office visit with Jerry Robbins PA-C ([facility])     9/10/18, 8/4/17, 5/15/17 Office visit with Dr. Tejinder Leal ([facility])     2/20/17, 1/23/17 Office visit with Dr. Nathan Dhillon ([facility])    DISCHARGE SUMMARY from hospital Internal 1/26/17 ([facility])   4/24/17 ([facility])    OPERATIVE REPORT Internal    MEDICATION LIST Internal         ENDOSCOPY  N.A    COLONOSCOPY Received/ Internal  12/12/17 ([facility])    ERCP N/A    EUS N/A    STOOL TESTING N/A    PERTINENT LABS Internal    PATHOLOGY REPORTS (RELATED) Internal/[place] 8/26/15 ([facility])    IMAGING (CT, MRI, EGD) Internal/ Care Everywhere CT Abdomen Pelvis: 1/26/17, 8/28/12    [facility]:  - CT Abdomen Pelvis: 6/25/15     REFERRAL INFORMATION    Date referral was placed:    Date all records received:    Date records were scanned into EPIC:    Date records were sent to Provider to review:    Date and recommendation received from provider:  LETTER SENT  SCHEDULE APPOINTMENT   Date patient was contacted to schedule:

## 2020-09-18 DIAGNOSIS — E89.0 POSTOPERATIVE HYPOTHYROIDISM: ICD-10-CM

## 2020-09-18 DIAGNOSIS — E78.5 HYPERLIPIDEMIA LDL GOAL <100: ICD-10-CM

## 2020-09-18 RX ORDER — MESALAMINE 0.38 G/1
CAPSULE, EXTENDED RELEASE ORAL
Qty: 120 CAPSULE | Refills: 7 | OUTPATIENT
Start: 2020-09-18

## 2020-09-18 RX ORDER — LEVOTHYROXINE SODIUM 125 UG/1
125 TABLET ORAL DAILY
Qty: 90 TABLET | Refills: 3 | Status: SHIPPED | OUTPATIENT
Start: 2020-09-18 | End: 2020-10-29

## 2020-09-18 RX ORDER — ATORVASTATIN CALCIUM 40 MG/1
40 TABLET, FILM COATED ORAL DAILY
Qty: 90 TABLET | Refills: 1 | Status: SHIPPED | OUTPATIENT
Start: 2020-09-18 | End: 2021-03-16

## 2020-09-18 NOTE — TELEPHONE ENCOUNTER
DUP: LAST FILL 3-18-20  TAB W/7 RF  SPOKE W/PHARM, CONFIRM RF AVAILABLE  LEFT MESSAGE ON PT VOICE MAIL- RF AVAILABLE @ PHARM

## 2020-09-28 ENCOUNTER — OFFICE VISIT (OUTPATIENT)
Dept: CARDIOLOGY | Facility: CLINIC | Age: 70
End: 2020-09-28
Attending: INTERNAL MEDICINE
Payer: MEDICARE

## 2020-09-28 VITALS
HEIGHT: 63 IN | WEIGHT: 149.5 LBS | DIASTOLIC BLOOD PRESSURE: 76 MMHG | SYSTOLIC BLOOD PRESSURE: 163 MMHG | BODY MASS INDEX: 26.49 KG/M2 | OXYGEN SATURATION: 97 % | HEART RATE: 68 BPM

## 2020-09-28 DIAGNOSIS — I27.20 PULMONARY HYPERTENSION (H): ICD-10-CM

## 2020-09-28 DIAGNOSIS — I50.30 HEART FAILURE WITH PRESERVED EJECTION FRACTION, NYHA CLASS I (H): ICD-10-CM

## 2020-09-28 DIAGNOSIS — I35.0 AORTIC VALVE STENOSIS, ETIOLOGY OF CARDIAC VALVE DISEASE UNSPECIFIED: Primary | ICD-10-CM

## 2020-09-28 PROCEDURE — 99214 OFFICE O/P EST MOD 30 MIN: CPT | Mod: ZP | Performed by: INTERNAL MEDICINE

## 2020-09-28 PROCEDURE — G0463 HOSPITAL OUTPT CLINIC VISIT: HCPCS | Mod: ZF

## 2020-09-28 ASSESSMENT — MIFFLIN-ST. JEOR: SCORE: 1169.32

## 2020-09-28 ASSESSMENT — PAIN SCALES - GENERAL: PAINLEVEL: NO PAIN (0)
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
## 2020-09-28 NOTE — PATIENT INSTRUCTIONS
Medication Changes:  - No medication changes at this time. Continue current regiment    Patient Instructions:  1. Continue staying active and eat a heart healthy diet.    2. Please keep current list of medications with you at all times.    3. Remember to weigh yourself daily after voiding and before you consume any food or beverages and log the numbers.  If you have gained 2 pounds overnight or 5 pounds in a week contact us immediately for medication adjustments or further instructions.    4. **Please call us immediately if you have any syncope (fainting or passing out), chest pain, edema (swelling or weight gain), or decline in your functional status (general decline in how you are feeling).    Follow up Appointment Information:  - Continue staying active and eat a heart healthy diet. Limit fluids to no more than 2 liters a day and salt to no more than 2g day  - Mild exercise, 20 minutes 5 times a day  - Technetium Phyrophosphate scan to follow up with your amyloidosis with labs prior  - Please keep a log of your blood pressures in the morning and evenings, starting today, and bring them with you to your next C[facility] appt with Carolyn.       We are located on the third floor of [facility] on the [facility].  Our address is     [address] [place] [address] [place] [address] [place] [address]    Thank you for allowing us to be a part of your care here at the [facility]    If you have questions or concerns please contact us at:    Manoj Barker, RN, BSN   Jesica Zaidi (Schedule,Prior Auth)  Nurse Coordinator     Clinic   Pulmonary Hypertension   Pulmonary Hypertension  [facility]  [facility]  (Phone)[phone]    (Phone) [phone]        (Fax) [phone]    ** Please note that you will NOT receive a reminder call regarding your scheduled testing, reminder calls are for  provider appointments only.  If you are scheduled for testing within the [facility] system you may receive a call regarding pre-registration for billing purposes only.**     Remember to weigh yourself daily after voiding and before you consume any food or beverages and log the numbers.  If you have gained/lost 2 pounds overnight or 5 pounds in a week contact us immediately for medication adjustments or further instructions.   **Please call us immediately if you have any syncope, chest pain, edema, or decline in your functional status.    Support Group:  Pulmonary Hypertension Association  [email or web address].phassociation.org/  **Look at the Events Tab** They even have Support Groups that you can call into    [facility] PH Support Group  Second Saturday of the Month from 1-3 PM   Location: [address] [place] [id number]  Leader: Shannen Silva and Yisel Werner  Phone: [phone] or [phone]  Email: [email or web address]
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
## 2020-09-28 NOTE — NURSING NOTE
Vitals were taken and medications were reconciled.    Denis Yanez, NIC    7:54 AM    Chief Complaint   Patient presents with     New Patient     New Primary Pulm

## 2020-09-29 PROBLEM — I50.30 HEART FAILURE WITH PRESERVED EJECTION FRACTION, NYHA CLASS I (H): Status: ACTIVE | Noted: 2020-09-29

## 2020-10-12 ENCOUNTER — HOSPITAL ENCOUNTER (OUTPATIENT)
Dept: NUCLEAR MEDICINE | Facility: CLINIC | Age: 70
Setting detail: NUCLEAR MEDICINE
End: 2020-10-12
Attending: INTERNAL MEDICINE
Payer: MEDICARE

## 2020-10-12 DIAGNOSIS — I27.20 PULMONARY HYPERTENSION (H): ICD-10-CM

## 2020-10-12 DIAGNOSIS — I35.0 AORTIC VALVE STENOSIS, ETIOLOGY OF CARDIAC VALVE DISEASE UNSPECIFIED: ICD-10-CM

## 2020-10-12 DIAGNOSIS — I50.30 HEART FAILURE WITH PRESERVED EJECTION FRACTION, NYHA CLASS I (H): ICD-10-CM

## 2020-10-12 PROCEDURE — 36415 COLL VENOUS BLD VENIPUNCTURE: CPT | Performed by: PATHOLOGY

## 2020-10-12 PROCEDURE — A9538 TC99M PYROPHOSPHATE: HCPCS | Performed by: INTERNAL MEDICINE

## 2020-10-12 PROCEDURE — 343N000001 HC RX 343: Performed by: INTERNAL MEDICINE

## 2020-10-12 PROCEDURE — 83883 ASSAY NEPHELOMETRY NOT SPEC: CPT | Performed by: PATHOLOGY

## 2020-10-12 PROCEDURE — 999N000128 HC STATISTIC PERIPHERAL IV START W/O US GUIDANCE

## 2020-10-12 PROCEDURE — 78830 RP LOCLZJ TUM SPECT W/CT 1: CPT

## 2020-10-12 PROCEDURE — 999N001036 HC STATISTIC TOTAL PROTEIN: Performed by: PATHOLOGY

## 2020-10-12 PROCEDURE — 78830 RP LOCLZJ TUM SPECT W/CT 1: CPT | Mod: 26 | Performed by: RADIOLOGY

## 2020-10-12 RX ORDER — TECHNETIUM TC99M PYROPHOSPHATE 12 MG/10ML
10.4 INJECTION INTRAVENOUS ONCE
Status: COMPLETED | OUTPATIENT
Start: 2020-10-12 | End: 2020-10-12

## 2020-10-12 RX ADMIN — TECHNETIUM TC99M PYROPHOSPHATE 10.4 MILLICURIE: 12 INJECTION INTRAVENOUS at 13:36

## 2020-10-13 DIAGNOSIS — I50.30 HEART FAILURE WITH PRESERVED EJECTION FRACTION, NYHA CLASS I (H): ICD-10-CM

## 2020-10-13 DIAGNOSIS — I27.20 PULMONARY HYPERTENSION (H): ICD-10-CM

## 2020-10-13 DIAGNOSIS — I35.0 AORTIC VALVE STENOSIS, ETIOLOGY OF CARDIAC VALVE DISEASE UNSPECIFIED: ICD-10-CM

## 2020-10-13 LAB
KAPPA LC UR-MCNC: 7.78 MG/DL (ref 0.33–1.94)
KAPPA LC/LAMBDA SER: 1.68 {RATIO} (ref 0.26–1.65)
LAMBDA LC SERPL-MCNC: 4.63 MG/DL (ref 0.57–2.63)

## 2020-10-15 LAB
ALBUMIN SERPL ELPH-MCNC: 3.5 G/DL (ref 3.7–5.1)
ALPHA1 GLOB SERPL ELPH-MCNC: 0.2 G/DL (ref 0.2–0.4)
ALPHA2 GLOB SERPL ELPH-MCNC: 0.7 G/DL (ref 0.5–0.9)
B-GLOBULIN SERPL ELPH-MCNC: 0.9 G/DL (ref 0.6–1)
GAMMA GLOB SERPL ELPH-MCNC: 1.4 G/DL (ref 0.7–1.6)
M PROTEIN SERPL ELPH-MCNC: 0 G/DL
PROT PATTERN SERPL ELPH-IMP: ABNORMAL

## 2020-10-19 ENCOUNTER — DOCUMENTATION ONLY (OUTPATIENT)
Dept: LAB | Facility: CLINIC | Age: 70
End: 2020-10-19

## 2020-10-20 ENCOUNTER — VIRTUAL VISIT (OUTPATIENT)
Dept: GASTROENTEROLOGY | Facility: CLINIC | Age: 70
End: 2020-10-20
Payer: MEDICARE

## 2020-10-20 ENCOUNTER — PRE VISIT (OUTPATIENT)
Dept: GASTROENTEROLOGY | Facility: CLINIC | Age: 70
End: 2020-10-20

## 2020-10-20 VITALS — WEIGHT: 143 LBS | HEIGHT: 62 IN | BODY MASS INDEX: 26.31 KG/M2

## 2020-10-20 DIAGNOSIS — K51.311 ULCERATIVE RECTOSIGMOIDITIS WITH RECTAL BLEEDING (H): ICD-10-CM

## 2020-10-20 PROCEDURE — 99214 OFFICE O/P EST MOD 30 MIN: CPT | Mod: 95 | Performed by: INTERNAL MEDICINE

## 2020-10-20 RX ORDER — MESALAMINE 0.38 G/1
1.5 CAPSULE, EXTENDED RELEASE ORAL DAILY
Qty: 360 CAPSULE | Refills: 3 | Status: SHIPPED | OUTPATIENT
Start: 2020-10-20 | End: 2021-12-23

## 2020-10-20 ASSESSMENT — PAIN SCALES - GENERAL: PAINLEVEL: NO PAIN (0)

## 2020-10-20 ASSESSMENT — MIFFLIN-ST. JEOR: SCORE: 1131.89

## 2020-10-20 NOTE — NURSING NOTE
"Chief Complaint   Patient presents with     New Patient     ulcerative colitis       Vitals:    10/20/20 1313   Weight: 64.9 kg (143 lb)   Height: 1.575 m (5' 2\")       Body mass index is 26.16 kg/m .      Angel Zelaya LPN                        "

## 2020-10-20 NOTE — LETTER
"    10/20/2020         RE: Keerthi Montoya  [address] [place] [id number]        Dear Colleague,    Thank you for referring your patient, Keerthi Montoya, to the [facility] [place]. Please see a copy of my visit note below.    Keerthi Montoya is a 70 year old female who is being evaluated via a billable video visit.      The patient has been notified of following:     \"This video visit will be conducted via a call between you and your physician/provider. We have found that certain health care needs can be provided without the need for an in-person physical exam.  This service lets us provide the care you need with a video conversation.  If a prescription is necessary we can send it directly to your pharmacy.  If lab work is needed we can place an order for that and you can then stop by our lab to have the test done at a later time.    Video visits are billed at different rates depending on your insurance coverage.  Please reach out to your insurance provider with any questions.    If during the course of the call the physician/provider feels a video visit is not appropriate, you will not be charged for this service.\"    Patient has given verbal consent for Video visit? Yes  How would you like to obtain your AVS? MyChart  If you are dropped from the video visit, the video invite should be resent to: Text to cell phone: [phone]  Will anyone else be joining your video visit? 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  Mild patchy mucosal granularity noted throughout the remainder of the colon.  Rectal polyp 4 mm sessile removed.  Diverticulosis in transverse and descending colon.  Internal and external hemorrhoids noted.  Copious brown opaque semiliquid stool and medication material throughout colon interfering with visualization.  Barrera score 0.  Only biopsies obtained were from the rectal polyp, noted to be an inflammatory polyp.  No evidence of dysplasia.  No other biopsies obtained.  Enterography: --  Fecal calprotectin: --   C diff: --  Barrera score:   Stool freq: 0 (baseline stools frequency)  Rectal bleedin (None)  PGA: 0 (normal)  Endoscopy: Not done    Remission: <3   Mild disease: 3-5  Moderate disease: 6-10  Severe disease: >10    IBDQ Score Date IBDQ - Total Score  (Higher score better)   5/15/2017 51         ASSESSMENT/PLAN  Ms. Montoya is a 70 year old with past medical history to include hypertension, DM2, diabetic nephropathy, multinodular goiter s/p complete thyroidectomy ~40 yrs ago complicated by postoperative hypothyroidism, diverticulosis complicated by diverticulitis with prior limited left hemicolectomy , open cholecystectomy in , s/p ex-lap 30 yrs ago for presumed ectopic pregnancy (apparent diverticulitis, nonsurgical management on discovery), s/p tubal ligation  complicated postsurgical [facility] s/p mesh repair  here for follow-up for UC pancolitis follow up.    1.  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  Last endoscopic assessment was in , noting Barrera 0.  We will continue with mesalamine monotherapy at this time.  Patient continues to follow closely with nephrology (last seen 2020; sees neph every 6 months) and her kidney function is stable.    --Continue Apriso 1.5 g daily (refills provided)  --CBC, LFTs, CRP, ESR, CR at least once per year   --Will check a UA to rule out AIN (given mesalamine use and CKD 3)    2. CKD stage 3: Patient has poorly controlled diabetes and longstanding hypertension.  Mesalamine has potential implications on kidney but appears her function has been stable in the recent past.  Nephrology notes that she does have proteinuria without hematuria which does not fit with diabetic nephropathy.  She is encouraged to focus on hydration per most recent nephrology consult.    3. IBD healthcare maintenance based on patients current medication:  5-ASA (Apriso)      Vaccinations:  Plans to get flu shot tomorrow.   Pneumonia vaccines (Prevnar 13 then 8 weeks later Pneumovax 23 then 5 years later Pneumovax 23), tetanus every 10 years.    One time confirmation of immunity or serologies:  -- Hepatitis A (serologies or immunizations): --  -- Hepatitis B (serologies or immunizations): --  -- Zoster vaccination (>61 yo, discuss if over 50): --  -- MMR:--  -- HPV (all aged 18-26): --  -- Meningococcal meningitis (all patients at risk for meningitis): --     Bone mineral density screening   -- Recommend all patients supplement with calcium and vitamin D    Cancer Screening:  Colon cancer screening:  Given pancolitis colonoscopy every 2-3 years recommended after 8 years of disease.  Dysplasia screening is recommended 2023.    Cervical cancer screening: N/A    Skin cancer screening: Since patient is not immunocompromised, this is per patient's dermatologist recommendations.    Depression Screening:  -- Over the last month, have you felt down, depressed, or hopeless? No  -- Over the last month, have you felt little interest or pleasure doing things? No    Misc:  -- Avoid tobacco use  -- Avoid NSAIDs as there is potentially a 25% chance of causing an IBD flare        HPI:   Ms. Montoya is a 69 year old with past medical history to include hypertension,  DM2, diabetic nephropathy, multinodular goiter s/p complete thyroidectomy ~40 yrs ago complicated by postoperative hypothyroidism, diverticulosis complicated by diverticulitis with prior limited left hemicolectomy 2002, open cholecystectomy in 2000, s/p ex-lap 30 yrs ago for presumed ectopic pregnancy (apparent diverticulitis, nonsurgical management on discovery), s/p tubal ligation 1983 complicated postsurgical [facility] s/p mesh repair 2007 here for follow-up for UC pancolitis follow up.     Patient is doing well and is at baseline bowel pattern with 1-2 soft formed stools per day.  No urgency or nighttime stools.    She is currently recovering from bronchitis, and recently started on antibiotics 4 days ago (Levaquin), symptoms improving.  Has noted some looser stools with antibiotics but still some form, no watery stools.  No blood in the stool.    We have been closely monitoring her kidney function (CKD stage III), currently following with nephrology for concerns regarding medication insult with mesalamine.  Nephrology feels this is unlikely to be due to mesalamine.  Patient has poorly controlled diabetes as well as longstanding hypertension.  Last seen this week by nephrology.  Patient is encouraged to focus on hydration.    Interval history, 10/2020 (video visit)  Continues to feel well. No GI complaints today.   Having 2-3 BMs per day, no blood. No urgency. Weight has been stable.   Saw nephrologist in August. Kidney function is stable.     ROS:  Complete 10 System ROS performed. All are negative except as documented below, in the HPI, or in patient questionnaire from today's visit.    No fevers or chills  No weight loss  No blurry vision, double vision or change in vision  No sore throat  No lymphadenopathy  No headache, paraesthesias, or weakness in a limb  No shortness of breath or wheezing  No chest pain or pressure  No arthralgias or myalgias  No rashes or skin changes  No odynophagia or dysphagia  No BRBPR,  hematochezia, melena  No dysuria, frequency or urgency  No hot/cold intolerance or polyria  No anxiety or depression    Extra intestinal manifestations of IBD:  No uveitis/episcleritis  No aphthous ulcers   No arthritis   No erythema nodosum/pyoderma gangrenosum.     PERTINENT PAST MEDICAL HISTORY:  Past Medical History:   Diagnosis Date     Diabetes mellitus (H)      Diverticulosis of colon (without mention of hemorrhage) 10/1/2012     Hypertension      Pulmonary hypertension (H)      Ulcerative (chronic) enterocolitis (H)        PREVIOUS SURGERIES:  Past Surgical History:   Procedure Laterality Date     CHOLECYSTECTOMY  3/1987     COLON SURGERY  01/2001    Left colon resection; diverticulitis     COLONOSCOPY N/A 4/24/2017    Procedure: COMBINED COLONOSCOPY, SINGLE OR MULTIPLE BIOPSY/POLYPECTOMY BY BIOPSY;;  Surgeon: Radha Salguero MD;  Location: [facility] OR     COLONOSCOPY WITH CO2 INSUFFLATION N/A 4/24/2017    Procedure: COLONOSCOPY WITH CO2 INSUFFLATION;  Colonoscopy Dx rectal bleeding, BMI 25.86, Pharm [facility] .[phone], ;  Surgeon: Radha Salguero MD;  Location: [facility] OR     CV RIGHT HEART CATH N/A 3/16/2020    Procedure: CV RIGHT HEART CATH;  Surgeon: Nader Muñoz MD;  Location: [facility]     GYN SURGERY  9/1983    tubal ligation     HERNIA REPAIR  12/2006    recurrent incisional hernia     THROAT SURGERY  12/1974    thyroidectomy     ALLERGIES:     Allergies   Allergen Reactions     Actos [Pioglitazone]      Fluid retention     Amlodipine      Leg swelling, hand swelling     Animal Dander      Doxycycline Hives     Dust Mites      Grass      Keflex [Cephalexin Hcl] Hives     Metoprolol      Swelling of lower legs and fatigue     Other [Seasonal Allergies]      pollen     Ranitidine      rash     Synthroid [Levothroid] Swelling     ???     Tetracycline Hives     Tylenol Hives     Ziac [Bisoprolol-Hydrochlorothiazide]        PERTINENT MEDICATIONS:    Current  Outpatient Medications:      atenolol (TENORMIN) 50 MG tablet, Take 1 tablet (50 mg) by mouth daily, Disp: 90 tablet, Rfl: 3     atorvastatin (LIPITOR) 40 MG tablet, Take 1 tablet (40 mg) by mouth daily, Disp: 90 tablet, Rfl: 1     blood glucose (ACCU-CHEK FELIPE PLUS) test strip, 200 strips by In Vitro route 2 times daily Use to test blood sugar 2 times daily or as directed., Disp: 200 strip, Rfl: 4     blood glucose (ACCU-CHEK FELIPE PLUS) test strip, TEST TWICE DAILY, Disp: 200 strip, Rfl: 3     Cholecalciferol (VITAMIN D) 2000 units CAPS, Take by mouth daily, Disp: , Rfl:      Cyanocobalamin (B-12) 1000 MCG TABS, Take 1,000 mcg by mouth three times a week, Disp: , Rfl:      furosemide (LASIX) 40 MG tablet, Take 1 tablet (40 mg) by mouth 2 times daily, Disp: 90 tablet, Rfl: 3     glipiZIDE (GLUCOTROL XL) 10 MG 24 hr tablet, TAKE 1 TABLET BY MOUTH TWICE DAILY, Disp: 180 tablet, Rfl: 1     hydrALAZINE (APRESOLINE) 25 MG tablet, Take 1 tablet (25 mg) by mouth 3 times daily, Disp: 90 tablet, Rfl: 3     levothyroxine (SYNTHROID/LEVOTHROID) 125 MCG tablet, Take 1 tablet (125 mcg) by mouth daily, Disp: 90 tablet, Rfl: 3     losartan (COZAAR) 100 MG tablet, TAKE 1 TABLET(100 MG) BY MOUTH DAILY, Disp: 90 tablet, Rfl: 3     mesalamine (APRISO ER) 0.375 g 24 hr capsule, TAKE 4 CAPSULES(1.5 GRAMS) BY MOUTH EVERY MORNING, Disp: 120 capsule, Rfl: 7     metFORMIN (GLUCOPHAGE) 500 MG tablet, TAKE 2 TABLETS BY MOUTH TWICE DAILY WITH MEALS, Disp: 120 tablet, Rfl: 3    SOCIAL HISTORY:  Social History     Socioeconomic History     Marital status:      Spouse name: Raymundo; dementia;  16     Number of children: 3     Years of education: Not on file     Highest education level: Not on file   Occupational History     Employer: [employer]   Social Needs     Financial resource strain: Not on file     Food insecurity     Worry: Not on file     Inability: Not on file     Transportation needs     Medical: Not on file      Non-medical: Not on file   Tobacco Use     Smoking status: Never Smoker     Smokeless tobacco: Never Used   Substance and Sexual Activity     Alcohol use: Yes     Alcohol/week: 2.0 - 4.0 standard drinks     Types: 1 - 2 Cans of beer, 1 - 2 Shots of liquor per week     Comment: occasional cocktail or beer     Drug use: No     Sexual activity: Not Currently   Lifestyle     Physical activity     Days per week: Not on file     Minutes per session: Not on file     Stress: Not on file   Relationships     Social connections     Talks on phone: Not on file     Gets together: Not on file     Attends Worship service: Not on file     Active member of club or organization: Not on file     Attends meetings of clubs or organizations: Not on file     Relationship status: Not on file     Intimate partner violence     Fear of current or ex partner: Not on file     Emotionally abused: Not on file     Physically abused: Not on file     Forced sexual activity: Not on file   Other Topics Concern     Parent/sibling w/ CABG, MI or angioplasty before 65F 55M? 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 8/14       FAMILY HISTORY:  Family History   Problem Relation Age of Onset     Cerebrovascular Disease Mother      Cancer Father         bladder     Diverticulitis Brother      Diverticulitis Brother      Kidney Disease No family hx of      Crohn's Disease No family hx of      Ulcerative Colitis No family hx of      Stomach Cancer No family hx of      GERD No family hx of      Celiac Disease No family hx of        Past/family/social history reviewed and no changes    PHYSICAL EXAMINATION:  Constitutional:  "aaox3, cooperative, pleasant, not dyspneic/diaphoretic, no acute distress  Vitals reviewed: Ht 1.575 m (5' 2\")   Wt 64.9 kg (143 lb)   BMI 26.16 kg/m    Wt:   Wt Readings from Last 2 Encounters:   10/20/20 64.9 kg (143 lb)   09/28/20 67.8 kg (149 lb 8 oz)      General appearance  Healthy appearing adult, in no acute distress     Eyes  Sclera anicteric  Pupils round and reactive to light     Ears, nose, mouth and throat  No obvious external lesions of ears and nose  Hearing intact     Neck  Symmetric  No obvious external lesions     Respiratory  Normal respiration, no use of accessory muscles      MSK  Gait normal     Skin  No rashes or jaundice      Psychiatric  Oriented to person, place and time  Appropriate mood and affect.     PERTINENT STUDIES:  Most recent CBC:  Recent Labs   Lab Test 08/03/20  0744 03/16/20  0710 09/10/18  0928 09/10/18  0928   WBC  --  10.8  --  10.6   HGB 11.3* 11.8   < > 13.4   HCT  --  36.5  --  40.3   PLT  --  320  --  359    < > = values in this interval not displayed.     Most recent hepatic panel:  Recent Labs   Lab Test 01/13/20  0926 01/28/19  0807   ALT 12 12   AST 18 17     Most recent creatinine:  Recent Labs   Lab Test 09/08/20  1624 08/21/20  1640   CR 1.32* 1.55*       Video-Visit Details    Type of service:  Video Visit    Video Start Time: 1:40 PM  Video End Time: 2:02 PM    Originating Location (pt. Location): Home    Distant Location (provider location):  [facility] [place]     Platform used for Video Visit: Well                Again, thank you for allowing me to participate in the care of your patient.        Sincerely,        Preeti James MD    "

## 2020-10-20 NOTE — PROGRESS NOTES
"Keerthi Montoya is a 70 year old female who is being evaluated via a billable video visit.      The patient has been notified of following:     \"This video visit will be conducted via a call between you and your physician/provider. We have found that certain health care needs can be provided without the need for an in-person physical exam.  This service lets us provide the care you need with a video conversation.  If a prescription is necessary we can send it directly to your pharmacy.  If lab work is needed we can place an order for that and you can then stop by our lab to have the test done at a later time.    Video visits are billed at different rates depending on your insurance coverage.  Please reach out to your insurance provider with any questions.    If during the course of the call the physician/provider feels a video visit is not appropriate, you will not be charged for this service.\"    Patient has given verbal consent for Video visit? Yes  How would you like to obtain your AVS? MyChart  If you are dropped from the video visit, the video invite should be resent to: Text to cell phone: [phone]  Will anyone else be joining your video visit? 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  Mild patchy mucosal granularity noted throughout " the remainder of the colon.  Rectal polyp 4 mm sessile removed.  Diverticulosis in transverse and descending colon.  Internal and external hemorrhoids noted.  Copious brown opaque semiliquid stool and medication material throughout colon interfering with visualization.  Barrera score 0.  Only biopsies obtained were from the rectal polyp, noted to be an inflammatory polyp.  No evidence of dysplasia.  No other biopsies obtained.  Enterography: --  Fecal calprotectin: --   C diff: --  Barrera score:   Stool freq: 0 (baseline stools frequency)  Rectal bleedin (None)  PGA: 0 (normal)  Endoscopy: Not done    Remission: <3   Mild disease: 3-5  Moderate disease: 6-10  Severe disease: >10    IBDQ Score Date IBDQ - Total Score  (Higher score better)   5/15/2017 51         ASSESSMENT/PLAN  Ms. Montoya is a 70 year old with past medical history to include hypertension, DM2, diabetic nephropathy, multinodular goiter s/p complete thyroidectomy ~40 yrs ago complicated by postoperative hypothyroidism, diverticulosis complicated by diverticulitis with prior limited left hemicolectomy , open cholecystectomy in , s/p ex-lap 30 yrs ago for presumed ectopic pregnancy (apparent diverticulitis, nonsurgical management on discovery), s/p tubal ligation  complicated postsurgical [facility] s/p mesh repair  here for follow-up for UC pancolitis follow up.    1.  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  Last endoscopic assessment was in , noting Barrera 0.  We will continue with mesalamine monotherapy at this time.  Patient continues to follow closely with nephrology (last seen 2020; sees neph every 6 months) and her kidney function is stable.   --Continue Apriso 1.5 g daily (refills provided)  --CBC, LFTs, CRP, ESR, CR at least once per year   --Will check a UA to rule out AIN (given mesalamine use and CKD 3)    2. CKD stage 3: Patient has poorly controlled diabetes and longstanding hypertension.  Mesalamine has potential  implications on kidney but appears her function has been stable in the recent past.  Nephrology notes that she does have proteinuria without hematuria which does not fit with diabetic nephropathy.  She is encouraged to focus on hydration per most recent nephrology consult.    3. IBD healthcare maintenance based on patients current medication:  5-ASA (Apriso)      Vaccinations:  Plans to get flu shot tomorrow.   Pneumonia vaccines (Prevnar 13 then 8 weeks later Pneumovax 23 then 5 years later Pneumovax 23), tetanus every 10 years.    One time confirmation of immunity or serologies:  -- Hepatitis A (serologies or immunizations): --  -- Hepatitis B (serologies or immunizations): --  -- Zoster vaccination (>59 yo, discuss if over 50): --  -- MMR:--  -- HPV (all aged 18-26): --  -- Meningococcal meningitis (all patients at risk for meningitis): --     Bone mineral density screening   -- Recommend all patients supplement with calcium and vitamin D    Cancer Screening:  Colon cancer screening:  Given pancolitis colonoscopy every 2-3 years recommended after 8 years of disease.  Dysplasia screening is recommended 2023.    Cervical cancer screening: N/A    Skin cancer screening: Since patient is not immunocompromised, this is per patient's dermatologist recommendations.    Depression Screening:  -- Over the last month, have you felt down, depressed, or hopeless? No  -- Over the last month, have you felt little interest or pleasure doing things? No    Misc:  -- Avoid tobacco use  -- Avoid NSAIDs as there is potentially a 25% chance of causing an IBD flare        HPI:   Ms. Montoya is a 69 year old with past medical history to include hypertension, DM2, diabetic nephropathy, multinodular goiter s/p complete thyroidectomy ~40 yrs ago complicated by postoperative hypothyroidism, diverticulosis complicated by diverticulitis with prior limited left hemicolectomy 2002, open cholecystectomy in 2000, s/p ex-lap 30 yrs ago for presumed  ectopic pregnancy (apparent diverticulitis, nonsurgical management on discovery), s/p tubal ligation 1983 complicated postsurgical [facility] s/p mesh repair 2007 here for follow-up for UC pancolitis follow up.     Patient is doing well and is at baseline bowel pattern with 1-2 soft formed stools per day.  No urgency or nighttime stools.    She is currently recovering from bronchitis, and recently started on antibiotics 4 days ago (Levaquin), symptoms improving.  Has noted some looser stools with antibiotics but still some form, no watery stools.  No blood in the stool.    We have been closely monitoring her kidney function (CKD stage III), currently following with nephrology for concerns regarding medication insult with mesalamine.  Nephrology feels this is unlikely to be due to mesalamine.  Patient has poorly controlled diabetes as well as longstanding hypertension.  Last seen this week by nephrology.  Patient is encouraged to focus on hydration.    Interval history, 10/2020 (video visit)  Continues to feel well. No GI complaints today.   Having 2-3 BMs per day, no blood. No urgency. Weight has been stable.   Saw nephrologist in August. Kidney function is stable.     ROS:  Complete 10 System ROS performed. All are negative except as documented below, in the HPI, or in patient questionnaire from today's visit.    No fevers or chills  No weight loss  No blurry vision, double vision or change in vision  No sore throat  No lymphadenopathy  No headache, paraesthesias, or weakness in a limb  No shortness of breath or wheezing  No chest pain or pressure  No arthralgias or myalgias  No rashes or skin changes  No odynophagia or dysphagia  No BRBPR, hematochezia, melena  No dysuria, frequency or urgency  No hot/cold intolerance or polyria  No anxiety or depression    Extra intestinal manifestations of IBD:  No uveitis/episcleritis  No aphthous ulcers   No arthritis   No erythema nodosum/pyoderma gangrenosum.     PERTINENT PAST  MEDICAL HISTORY:  Past Medical History:   Diagnosis Date     Diabetes mellitus (H)      Diverticulosis of colon (without mention of hemorrhage) 10/1/2012     Hypertension      Pulmonary hypertension (H)      Ulcerative (chronic) enterocolitis (H)        PREVIOUS SURGERIES:  Past Surgical History:   Procedure Laterality Date     CHOLECYSTECTOMY  3/1987     COLON SURGERY  01/2001    Left colon resection; diverticulitis     COLONOSCOPY N/A 4/24/2017    Procedure: COMBINED COLONOSCOPY, SINGLE OR MULTIPLE BIOPSY/POLYPECTOMY BY BIOPSY;;  Surgeon: Radha Salguero MD;  Location: [facility] OR     COLONOSCOPY WITH CO2 INSUFFLATION N/A 4/24/2017    Procedure: COLONOSCOPY WITH CO2 INSUFFLATION;  Colonoscopy Dx rectal bleeding, BMI 25.86, Pharm [facility] .[phone], ;  Surgeon: Radha Salguero MD;  Location: [facility] OR     CV RIGHT HEART CATH N/A 3/16/2020    Procedure: CV RIGHT HEART CATH;  Surgeon: Nader Muñoz MD;  Location: [facility]     GYN SURGERY  9/1983    tubal ligation     HERNIA REPAIR  12/2006    recurrent incisional hernia     THROAT SURGERY  12/1974    thyroidectomy     ALLERGIES:     Allergies   Allergen Reactions     Actos [Pioglitazone]      Fluid retention     Amlodipine      Leg swelling, hand swelling     Animal Dander      Doxycycline Hives     Dust Mites      Grass      Keflex [Cephalexin Hcl] Hives     Metoprolol      Swelling of lower legs and fatigue     Other [Seasonal Allergies]      pollen     Ranitidine      rash     Synthroid [Levothroid] Swelling     ???     Tetracycline Hives     Tylenol Hives     Ziac [Bisoprolol-Hydrochlorothiazide]        PERTINENT MEDICATIONS:    Current Outpatient Medications:      atenolol (TENORMIN) 50 MG tablet, Take 1 tablet (50 mg) by mouth daily, Disp: 90 tablet, Rfl: 3     atorvastatin (LIPITOR) 40 MG tablet, Take 1 tablet (40 mg) by mouth daily, Disp: 90 tablet, Rfl: 1     blood glucose (ACCU-CHEK FELIPE PLUS) test strip, 200 strips  by In Vitro route 2 times daily Use to test blood sugar 2 times daily or as directed., Disp: 200 strip, Rfl: 4     blood glucose (ACCU-CHEK FELIPE PLUS) test strip, TEST TWICE DAILY, Disp: 200 strip, Rfl: 3     Cholecalciferol (VITAMIN D) 2000 units CAPS, Take by mouth daily, Disp: , Rfl:      Cyanocobalamin (B-12) 1000 MCG TABS, Take 1,000 mcg by mouth three times a week, Disp: , Rfl:      furosemide (LASIX) 40 MG tablet, Take 1 tablet (40 mg) by mouth 2 times daily, Disp: 90 tablet, Rfl: 3     glipiZIDE (GLUCOTROL XL) 10 MG 24 hr tablet, TAKE 1 TABLET BY MOUTH TWICE DAILY, Disp: 180 tablet, Rfl: 1     hydrALAZINE (APRESOLINE) 25 MG tablet, Take 1 tablet (25 mg) by mouth 3 times daily, Disp: 90 tablet, Rfl: 3     levothyroxine (SYNTHROID/LEVOTHROID) 125 MCG tablet, Take 1 tablet (125 mcg) by mouth daily, Disp: 90 tablet, Rfl: 3     losartan (COZAAR) 100 MG tablet, TAKE 1 TABLET(100 MG) BY MOUTH DAILY, Disp: 90 tablet, Rfl: 3     mesalamine (APRISO ER) 0.375 g 24 hr capsule, TAKE 4 CAPSULES(1.5 GRAMS) BY MOUTH EVERY MORNING, Disp: 120 capsule, Rfl: 7     metFORMIN (GLUCOPHAGE) 500 MG tablet, TAKE 2 TABLETS BY MOUTH TWICE DAILY WITH MEALS, Disp: 120 tablet, Rfl: 3    SOCIAL HISTORY:  Social History     Socioeconomic History     Marital status:      Spouse name: Raymundo; dementia;  16     Number of children: 3     Years of education: Not on file     Highest education level: Not on file   Occupational History     Employer: [employer]   Social Needs     Financial resource strain: Not on file     Food insecurity     Worry: Not on file     Inability: Not on file     Transportation needs     Medical: Not on file     Non-medical: Not on file   Tobacco Use     Smoking status: Never Smoker     Smokeless tobacco: Never Used   Substance and Sexual Activity     Alcohol use: Yes     Alcohol/week: 2.0 - 4.0 standard drinks     Types: 1 - 2 Cans of beer, 1 - 2 Shots of liquor per week     Comment: occasional  "cocktail or beer     Drug use: No     Sexual activity: Not Currently   Lifestyle     Physical activity     Days per week: Not on file     Minutes per session: Not on file     Stress: Not on file   Relationships     Social connections     Talks on phone: Not on file     Gets together: Not on file     Attends Mosque service: Not on file     Active member of club or organization: Not on file     Attends meetings of clubs or organizations: Not on file     Relationship status: Not on file     Intimate partner violence     Fear of current or ex partner: Not on file     Emotionally abused: Not on file     Physically abused: Not on file     Forced sexual activity: Not on file   Other Topics Concern     Parent/sibling w/ CABG, MI or angioplasty before 65F 55M? 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 8/14       FAMILY HISTORY:  Family History   Problem Relation Age of Onset     Cerebrovascular Disease Mother      Cancer Father         bladder     Diverticulitis Brother      Diverticulitis Brother      Kidney Disease No family hx of      Crohn's Disease No family hx of      Ulcerative Colitis No family hx of      Stomach Cancer No family hx of      GERD No family hx of      Celiac Disease No family hx of        Past/family/social history reviewed and no changes    PHYSICAL EXAMINATION:  Constitutional: aaox3, cooperative, pleasant, not dyspneic/diaphoretic, no acute distress  Vitals reviewed: Ht 1.575 m (5' 2\")   Wt 64.9 kg (143 lb)   BMI 26.16 kg/m    Wt:   Wt Readings from Last 2 Encounters:   10/20/20 64.9 kg (143 lb)   09/28/20 67.8 kg (149 lb 8 oz)      General appearance  Healthy " appearing adult, in no acute distress     Eyes  Sclera anicteric  Pupils round and reactive to light     Ears, nose, mouth and throat  No obvious external lesions of ears and nose  Hearing intact     Neck  Symmetric  No obvious external lesions     Respiratory  Normal respiration, no use of accessory muscles      MSK  Gait normal     Skin  No rashes or jaundice      Psychiatric  Oriented to person, place and time  Appropriate mood and affect.     PERTINENT STUDIES:  Most recent CBC:  Recent Labs   Lab Test 08/03/20  0744 03/16/20  0710 09/10/18  0928 09/10/18  0928   WBC  --  10.8  --  10.6   HGB 11.3* 11.8   < > 13.4   HCT  --  36.5  --  40.3   PLT  --  320  --  359    < > = values in this interval not displayed.     Most recent hepatic panel:  Recent Labs   Lab Test 01/13/20  0926 01/28/19  0807   ALT 12 12   AST 18 17     Most recent creatinine:  Recent Labs   Lab Test 09/08/20  1624 08/21/20  1640   CR 1.32* 1.55*       Video-Visit Details    Type of service:  Video Visit    Video Start Time: 1:40 PM  Video End Time: 2:02 PM    Originating Location (pt. Location): Home    Distant Location (provider location):  [facility] [place]     Platform used for Video Visit: [facility]

## 2020-10-20 NOTE — LETTER
10/20/2020       RE: Keerthi Montoya  [address] [place] [id number]     Dear Colleague,    Thank you for referring your patient, Keerthi Montoya, to the [facility] [place] at [facility]. Please see a copy of my visit note below.    IBD CLINIC VISIT     CC/REFERRING MD:  Nathan Dhillon  REASON FOR FOLLOW UP: Pan UC    IBD HISTORY  Age at diagnosis:   Extent of disease: pancolitis (prior proctosigmoiditis classification, confirmed panUC 2017)  Current UC medications: Apriso 1.5 g daily  Prior UC surgeries: None  Prior IBD Medications: None    DISEASE ASSESSMENT  Labs:  Lab Results   Component Value Date    ALBUMIN 3.3 02/10/2020     Recent Labs   Lab Test 20  0711 09/10/18  0928 17  0836   CRP <2.9 <2.9 <2.9   SED  --  36* 19     Endoscopic assessment: colonoscopy 17 normal ileum, patchy mild erythema and mucosa edema in rectum and sigmoid colon, markedly improved compared to 2017 outside colonoscopy report.  Mild patchy mucosal granularity noted throughout the remainder of the colon.  Rectal polyp 4 mm sessile removed.  Diverticulosis in transverse and descending colon.  Internal and external hemorrhoids noted.  Copious brown opaque semiliquid stool and medication material throughout colon interfering with visualization.  Barrera score 0.  Only biopsies obtained were from the rectal polyp, noted to be an inflammatory polyp.  No evidence of dysplasia.  No other biopsies obtained.  Enterography: --  Fecal calprotectin: --   C diff: --  Barrera score:   Stool freq: 0 (baseline stools frequency)  Rectal bleedin (None)  PGA: 0 (normal)  Endoscopy: Not done    Remission: <3   Mild disease: 3-5  Moderate disease: 6-10  Severe disease: >10    IBDQ Score Date IBDQ - Total Score  (Higher score better)   5/15/2017 51     ASSESSMENT/PLAN  Ms. Montoya is a 70 year old with past medical history to include  hypertension, DM2, diabetic nephropathy, multinodular goiter s/p complete thyroidectomy ~40 yrs ago complicated by postoperative hypothyroidism, diverticulosis complicated by diverticulitis with prior limited left hemicolectomy 2002, open cholecystectomy in 2000, s/p ex-lap 30 yrs ago for presumed ectopic pregnancy (apparent diverticulitis, nonsurgical management on discovery), s/p tubal ligation 1983 complicated postsurgical [facility] s/p mesh repair 2007 here for follow-up for UC pancolitis follow up.    1.  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  Last endoscopic assessment was in 2017, noting Barrera 0.  We will continue with mesalamine monotherapy at this time.  Patient continues to follow closely with nephrology (last seen 8/2020; sees neph every 6 months) and her kidney function is stable.   --Continue Apriso 1.5 g daily (refills provided)  --CBC, LFTs, CRP, ESR, CR at least once per year   --Will check a UA to rule out AIN (given mesalamine use and CKD 3)    2. CKD stage 3: Patient has poorly controlled diabetes and longstanding hypertension.  Mesalamine has potential implications on kidney but appears her function has been stable in the recent past.  Nephrology notes that she does have proteinuria without hematuria which does not fit with diabetic nephropathy.  She is encouraged to focus on hydration per most recent nephrology consult.    3. IBD healthcare maintenance based on patients current medication:  5-ASA (Apriso)      Vaccinations:  Plans to get flu shot tomorrow.   Pneumonia vaccines (Prevnar 13 then 8 weeks later Pneumovax 23 then 5 years later Pneumovax 23), tetanus every 10 years.    One time confirmation of immunity or serologies:  -- Hepatitis A (serologies or immunizations): --  -- Hepatitis B (serologies or immunizations): --  -- Zoster vaccination (>61 yo, discuss if over 50): --  -- MMR:--  -- HPV (all aged 18-26): --  -- Meningococcal meningitis (all patients at risk for meningitis): --     Bone  mineral density screening   -- Recommend all patients supplement with calcium and vitamin D    Cancer Screening:  Colon cancer screening:  Given pancolitis colonoscopy every 2-3 years recommended after 8 years of disease.  Dysplasia screening is recommended 2023.    Cervical cancer screening: N/A    Skin cancer screening: Since patient is not immunocompromised, this is per patient's dermatologist recommendations.    Depression Screening:  -- Over the last month, have you felt down, depressed, or hopeless? No  -- Over the last month, have you felt little interest or pleasure doing things? No    Misc:  -- Avoid tobacco use  -- Avoid NSAIDs as there is potentially a 25% chance of causing an IBD flare    HPI:   Ms. Montoya is a 69 year old with past medical history to include hypertension, DM2, diabetic nephropathy, multinodular goiter s/p complete thyroidectomy ~40 yrs ago complicated by postoperative hypothyroidism, diverticulosis complicated by diverticulitis with prior limited left hemicolectomy 2002, open cholecystectomy in 2000, s/p ex-lap 30 yrs ago for presumed ectopic pregnancy (apparent diverticulitis, nonsurgical management on discovery), s/p tubal ligation 1983 complicated postsurgical [facility] s/p mesh repair 2007 here for follow-up for UC pancolitis follow up.     Patient is doing well and is at baseline bowel pattern with 1-2 soft formed stools per day.  No urgency or nighttime stools.    She is currently recovering from bronchitis, and recently started on antibiotics 4 days ago (Levaquin), symptoms improving.  Has noted some looser stools with antibiotics but still some form, no watery stools.  No blood in the stool.    We have been closely monitoring her kidney function (CKD stage III), currently following with nephrology for concerns regarding medication insult with mesalamine.  Nephrology feels this is unlikely to be due to mesalamine.  Patient has poorly controlled diabetes as well as longstanding  hypertension.  Last seen this week by nephrology.  Patient is encouraged to focus on hydration.      Interval history, 10/2020 (video visit)  Continues to feel well. No GI complaints today.   Having 2-3 BMs per day, no blood. No urgency. Weight has been stable.   Saw nephrologist in August. Kidney function is stable.     ROS:  Complete 10 System ROS performed. All are negative except as documented below, in the HPI, or in patient questionnaire from today's visit.    No fevers or chills  No weight loss  No blurry vision, double vision or change in vision  No sore throat  No lymphadenopathy  No headache, paraesthesias, or weakness in a limb  No shortness of breath or wheezing  No chest pain or pressure  No arthralgias or myalgias  No rashes or skin changes  No odynophagia or dysphagia  No BRBPR, hematochezia, melena  No dysuria, frequency or urgency  No hot/cold intolerance or polyria  No anxiety or depression    Extra intestinal manifestations of IBD:  No uveitis/episcleritis  No aphthous ulcers   No arthritis   No erythema nodosum/pyoderma gangrenosum.     PERTINENT PAST MEDICAL HISTORY:  Past Medical History:   Diagnosis Date     Diabetes mellitus (H)      Diverticulosis of colon (without mention of hemorrhage) 10/1/2012     Hypertension      Pulmonary hypertension (H)      Ulcerative (chronic) enterocolitis (H)      PREVIOUS SURGERIES:  Past Surgical History:   Procedure Laterality Date     CHOLECYSTECTOMY  3/1987     COLON SURGERY  01/2001    Left colon resection; diverticulitis     COLONOSCOPY N/A 4/24/2017    Procedure: COMBINED COLONOSCOPY, SINGLE OR MULTIPLE BIOPSY/POLYPECTOMY BY BIOPSY;;  Surgeon: Radha Salguero MD;  Location: [facility] OR     COLONOSCOPY WITH CO2 INSUFFLATION N/A 4/24/2017    Procedure: COLONOSCOPY WITH CO2 INSUFFLATION;  Colonoscopy Dx rectal bleeding, BMI 25.86, Pharm [facility] .[phone], ;  Surgeon: Radha Salguero MD;  Location: [facility] OR     CV RIGHT HEART CATH N/A 3/16/2020     Procedure: CV RIGHT HEART CATH;  Surgeon: Nader Muñoz MD;  Location:  HEART CARDIAC CATH LAB     GYN SURGERY  9/1983    tubal ligation     HERNIA REPAIR  12/2006    recurrent incisional hernia     THROAT SURGERY  12/1974    thyroidectomy     ALLERGIES:  Allergies   Allergen Reactions     Actos [Pioglitazone]      Fluid retention     Amlodipine      Leg swelling, hand swelling     Animal Dander      Doxycycline Hives     Dust Mites      Grass      Keflex [Cephalexin Hcl] Hives     Metoprolol      Swelling of lower legs and fatigue     Other [Seasonal Allergies]      pollen     Ranitidine      rash     Synthroid [Levothroid] Swelling     ???     Tetracycline Hives     Tylenol Hives     Ziac [Bisoprolol-Hydrochlorothiazide]      PERTINENT MEDICATIONS:  Current Outpatient Medications:      atenolol (TENORMIN) 50 MG tablet, Take 1 tablet (50 mg) by mouth daily, Disp: 90 tablet, Rfl: 3     atorvastatin (LIPITOR) 40 MG tablet, Take 1 tablet (40 mg) by mouth daily, Disp: 90 tablet, Rfl: 1     blood glucose (ACCU-CHEK FELIPE PLUS) test strip, 200 strips by In Vitro route 2 times daily Use to test blood sugar 2 times daily or as directed., Disp: 200 strip, Rfl: 4     blood glucose (ACCU-CHEK FELIPE PLUS) test strip, TEST TWICE DAILY, Disp: 200 strip, Rfl: 3     Cholecalciferol (VITAMIN D) 2000 units CAPS, Take by mouth daily, Disp: , Rfl:      Cyanocobalamin (B-12) 1000 MCG TABS, Take 1,000 mcg by mouth three times a week, Disp: , Rfl:      furosemide (LASIX) 40 MG tablet, Take 1 tablet (40 mg) by mouth 2 times daily, Disp: 90 tablet, Rfl: 3     glipiZIDE (GLUCOTROL XL) 10 MG 24 hr tablet, TAKE 1 TABLET BY MOUTH TWICE DAILY, Disp: 180 tablet, Rfl: 1     hydrALAZINE (APRESOLINE) 25 MG tablet, Take 1 tablet (25 mg) by mouth 3 times daily, Disp: 90 tablet, Rfl: 3     levothyroxine (SYNTHROID/LEVOTHROID) 125 MCG tablet, Take 1 tablet (125 mcg) by mouth daily, Disp: 90 tablet, Rfl: 3     losartan (COZAAR) 100 MG  tablet, TAKE 1 TABLET(100 MG) BY MOUTH DAILY, Disp: 90 tablet, Rfl: 3     mesalamine (APRISO ER) 0.375 g 24 hr capsule, TAKE 4 CAPSULES(1.5 GRAMS) BY MOUTH EVERY MORNING, Disp: 120 capsule, Rfl: 7     metFORMIN (GLUCOPHAGE) 500 MG tablet, TAKE 2 TABLETS BY MOUTH TWICE DAILY WITH MEALS, Disp: 120 tablet, Rfl: 3    SOCIAL HISTORY:  Social History     Socioeconomic History     Marital status:      Spouse name: Raymundo; dementia;  16     Number of children: 3     Years of education: Not on file     Highest education level: Not on file   Occupational History     Employer: [employer]   Social Needs     Financial resource strain: Not on file     Food insecurity     Worry: Not on file     Inability: Not on file     Transportation needs     Medical: Not on file     Non-medical: Not on file   Tobacco Use     Smoking status: Never Smoker     Smokeless tobacco: Never Used   Substance and Sexual Activity     Alcohol use: Yes     Alcohol/week: 2.0 - 4.0 standard drinks     Types: 1 - 2 Cans of beer, 1 - 2 Shots of liquor per week     Comment: occasional cocktail or beer     Drug use: No     Sexual activity: Not Currently   Lifestyle     Physical activity     Days per week: Not on file     Minutes per session: Not on file     Stress: Not on file   Relationships     Social connections     Talks on phone: Not on file     Gets together: Not on file     Attends Congregational service: Not on file     Active member of club or organization: Not on file     Attends meetings of clubs or organizations: Not on file     Relationship status: Not on file     Intimate partner violence     Fear of current or ex partner: Not on file     Emotionally abused: Not on file     Physically abused: Not on file     Forced sexual activity: Not on file   Other Topics Concern     Parent/sibling w/ CABG, MI or angioplasty before 65F 55M? Not Asked      Service No     Blood Transfusions No     Caffeine Concern No     Occupational Exposure  "No     Hobby Hazards No     Sleep Concern No     Stress Concern No     Weight Concern No     Special Diet No     Back Care No     Exercise Yes     Comment: off and on     Bike Helmet No     Seat Belt Yes     Self-Exams No   Social History Narrative    Laid off her job 8/14     FAMILY HISTORY:  Family History   Problem Relation Age of Onset     Cerebrovascular Disease Mother      Cancer Father         bladder     Diverticulitis Brother      Diverticulitis Brother      Kidney Disease No family hx of      Crohn's Disease No family hx of      Ulcerative Colitis No family hx of      Stomach Cancer No family hx of      GERD No family hx of      Celiac Disease No family hx of      Past/family/social history reviewed and no changes    PHYSICAL EXAMINATION:  Constitutional: aaox3, cooperative, pleasant, not dyspneic/diaphoretic, no acute distress  Vitals reviewed: Ht 1.575 m (5' 2\")   Wt 64.9 kg (143 lb)   BMI 26.16 kg/m    Wt:   Wt Readings from Last 2 Encounters:   10/20/20 64.9 kg (143 lb)   09/28/20 67.8 kg (149 lb 8 oz)      General appearance  Healthy appearing adult, in no acute distress     Eyes  Sclera anicteric  Pupils round and reactive to light     Ears, nose, mouth and throat  No obvious external lesions of ears and nose  Hearing intact     Neck  Symmetric  No obvious external lesions     Respiratory  Normal respiration, no use of accessory muscles      MSK  Gait normal     Skin  No rashes or jaundice      Psychiatric  Oriented to person, place and time  Appropriate mood and affect.     PERTINENT STUDIES:  Most recent CBC:  Recent Labs   Lab Test 08/03/20  0744 03/16/20  0710 09/10/18  0928 09/10/18  0928   WBC  --  10.8  --  10.6   HGB 11.3* 11.8   < > 13.4   HCT  --  36.5  --  40.3   PLT  --  320  --  359    < > = values in this interval not displayed.     Most recent hepatic panel:  Recent Labs   Lab Test 01/13/20  0926 01/28/19  0807   ALT 12 12   AST 18 17     Most recent creatinine:  Recent Labs   Lab " Test 09/08/20  1624 08/21/20  1640   CR 1.32* 1.55*     Again, thank you for allowing me to participate in the care of your patient.  Sincerely,    Preeti James MD

## 2020-10-21 ENCOUNTER — OFFICE VISIT (OUTPATIENT)
Dept: CARDIOLOGY | Facility: CLINIC | Age: 70
End: 2020-10-21
Payer: MEDICARE

## 2020-10-21 VITALS
OXYGEN SATURATION: 99 % | HEART RATE: 66 BPM | WEIGHT: 149.2 LBS | DIASTOLIC BLOOD PRESSURE: 71 MMHG | BODY MASS INDEX: 27.29 KG/M2 | SYSTOLIC BLOOD PRESSURE: 172 MMHG

## 2020-10-21 DIAGNOSIS — E11.29 TYPE 2 DIABETES MELLITUS WITH MICROALBUMINURIA, WITHOUT LONG-TERM CURRENT USE OF INSULIN (H): ICD-10-CM

## 2020-10-21 DIAGNOSIS — I27.20 PULMONARY HYPERTENSION (H): ICD-10-CM

## 2020-10-21 DIAGNOSIS — N18.30 STAGE 3 CHRONIC KIDNEY DISEASE, UNSPECIFIED WHETHER STAGE 3A OR 3B CKD (H): ICD-10-CM

## 2020-10-21 DIAGNOSIS — I50.30 HEART FAILURE WITH PRESERVED EJECTION FRACTION, NYHA CLASS I (H): ICD-10-CM

## 2020-10-21 DIAGNOSIS — R80.9 TYPE 2 DIABETES MELLITUS WITH MICROALBUMINURIA, WITHOUT LONG-TERM CURRENT USE OF INSULIN (H): ICD-10-CM

## 2020-10-21 DIAGNOSIS — I50.30 HEART FAILURE WITH PRESERVED EJECTION FRACTION, NYHA CLASS I (H): Primary | ICD-10-CM

## 2020-10-21 DIAGNOSIS — I51.89 DIASTOLIC DYSFUNCTION: Primary | ICD-10-CM

## 2020-10-21 DIAGNOSIS — I10 HYPERTENSION GOAL BP (BLOOD PRESSURE) < 130/80: ICD-10-CM

## 2020-10-21 LAB
ANION GAP SERPL CALCULATED.3IONS-SCNC: 4 MMOL/L (ref 3–14)
BUN SERPL-MCNC: 32 MG/DL (ref 7–30)
CALCIUM SERPL-MCNC: 9.7 MG/DL (ref 8.5–10.1)
CHLORIDE SERPL-SCNC: 102 MMOL/L (ref 94–109)
CO2 SERPL-SCNC: 28 MMOL/L (ref 20–32)
CREAT SERPL-MCNC: 1.28 MG/DL (ref 0.52–1.04)
GFR SERPL CREATININE-BSD FRML MDRD: 43 ML/MIN/{1.73_M2}
GLUCOSE SERPL-MCNC: 70 MG/DL (ref 70–99)
POTASSIUM SERPL-SCNC: 4.6 MMOL/L (ref 3.4–5.3)
SODIUM SERPL-SCNC: 134 MMOL/L (ref 133–144)

## 2020-10-21 PROCEDURE — 99214 OFFICE O/P EST MOD 30 MIN: CPT | Performed by: NURSE PRACTITIONER

## 2020-10-21 PROCEDURE — 36415 COLL VENOUS BLD VENIPUNCTURE: CPT | Performed by: NURSE PRACTITIONER

## 2020-10-21 PROCEDURE — 80048 BASIC METABOLIC PNL TOTAL CA: CPT | Performed by: NURSE PRACTITIONER

## 2020-10-21 ASSESSMENT — PAIN SCALES - GENERAL: PAINLEVEL: NO PAIN (0)

## 2020-10-21 NOTE — PATIENT INSTRUCTIONS
Take your medicines every day, as directed    Changes made today:  o None  o    Monitor Your Weight and Symptoms    Contact us if you:      Gain 2 pounds in one day or 5 pounds in one week    Feel more short of breath    Notice more leg swelling    Feel lightheadeded   Change your lifestyle    Limit Salt or Sodium:    2000 mg  Limit Fluids:    2000 mL or approximately 64 ounces  Eat a Heart Healthy Diet    Low in saturated fats  Stay Active:    Aim to move at least 150 minutes every  week         To Contact us    During Business Hours:  [phone], option # 1 (University)  Then option # 4 (medical questions)     After hours, weekends or holidays:   [phone], Option #4  Ask to speak to the On-Call Cardiologist. Inform them you are a CORE/heart failure patient at the [place] [facility].     Use [email or web address] allows you to communicate directly with your heart team through secure messaging.    [employer] can be accessed any time on your phone, computer, or tablet.    If you need assistance, we'd be happy to help!         Keep your Heart Appointments:    Dr. Almendarez- February     [facility] -April

## 2020-10-21 NOTE — PROGRESS NOTES
Georgia is a 70 year old female with a  medical history is significant for longstanding hypertension since she was in her teens, diabetes, dyslipidemia, and ulcerative colitis.    Patient states she has no SOB with or without activity.She saw Dr. Marcus in September in consult for PH. He made no changes to her medications and recommended that decrease her sodium in her diet.  Weights 143 lbs at home. She takes diuretic( Torsemide 40mg am /20 mg pm)  No swelling, No SOB with activity- watches grandkids ages 3 and 1 every day.  She has noted her BS to be stable. She feels well overall and denies chest pain, palpitations, lightheadedness, dizziness. Her BP's at home are 117-120's/50-60's/. She says coming to clinic for an appt increases her BP's.      ROS:   Constitutional: No fever, chills, or sweats.  ENT: No visual disturbance, ear ache, epistaxis, sore throat.   Allergies/Immunologic: Negative  Respiratory: No cough, hemoptysis.   Cardiovascular: As per HPI.   GI: As per HPI.   : No urinary frequency, dysuria, or hematuria.   Integument: Negative.   Psychiatric: Negative.   Neuro: Negative.   Endocrinology: negative   Musculoskeletal: negative    EXAM:   Constitutional - 181/72, 66, 149 lbs   Eyes - no redness or discharge, nonicteric sclera  Respiratory - nonlabored breathing, able to speak in full sentences without difficulty  CV: no visible edema of visualized upper extremities. JVP neg at 90 degrees  Neurological - alert and oriented, speech fluent/appropriate  PSYCH: cooperative, affect appropriate  EXT- neg for edema in the legs  DERM: no rashes on visualized face/neck/upper extremities       .  Assessment and Plan:  Georgia is well today, despite her lack of any functional limitation, the ongoing concern about her mildly reduced RV function is still present.  She appears euvolemic. We went over her labs and discussed the results. She is making a better effort to stay hydrated. She has been watching  her sodium more.  She is busy watching her 2 grand kids and is happy she an keep up with them.  She will call us with changes in BP and or increase in weights, change in symptoms.    1.  Severe pulmonary hypertension, normal LV function with mild dilation, functional class I-II.The etiology is likely group 2, and related to underlying diastolic dysfunction from longstanding hypertension and diabetes.    2.  Hypertension, with better recent control:  amlodipine stopped on 8/14.  Instructed to keep home blood pressure log.  3.  Mild-moderate aortic stenosis, possible bicuspid aortic valve:   4. Diabetes: PCP following  5.  Dyslipidemia, on Lipitor  6.  CKD stage 3  7. Ulcerative colitis      Plan:  Dr. Almendarez in February April - in [facility]         Carolyn ADAME NP-C

## 2020-10-28 ENCOUNTER — OFFICE VISIT (OUTPATIENT)
Dept: FAMILY MEDICINE | Facility: CLINIC | Age: 70
End: 2020-10-28
Payer: MEDICARE

## 2020-10-28 VITALS
OXYGEN SATURATION: 93 % | SYSTOLIC BLOOD PRESSURE: 150 MMHG | HEART RATE: 70 BPM | DIASTOLIC BLOOD PRESSURE: 80 MMHG | WEIGHT: 149 LBS | TEMPERATURE: 97.9 F | BODY MASS INDEX: 27.25 KG/M2

## 2020-10-28 DIAGNOSIS — Z00.00 ENCOUNTER FOR ANNUAL WELLNESS EXAM IN MEDICARE PATIENT: Primary | ICD-10-CM

## 2020-10-28 DIAGNOSIS — I10 HYPERTENSION GOAL BP (BLOOD PRESSURE) < 130/80: ICD-10-CM

## 2020-10-28 DIAGNOSIS — E11.29 TYPE 2 DIABETES MELLITUS WITH MICROALBUMINURIA, WITHOUT LONG-TERM CURRENT USE OF INSULIN (H): ICD-10-CM

## 2020-10-28 DIAGNOSIS — I35.0 AORTIC VALVE STENOSIS, ETIOLOGY OF CARDIAC VALVE DISEASE UNSPECIFIED: ICD-10-CM

## 2020-10-28 DIAGNOSIS — R80.9 TYPE 2 DIABETES MELLITUS WITH MICROALBUMINURIA, WITHOUT LONG-TERM CURRENT USE OF INSULIN (H): ICD-10-CM

## 2020-10-28 DIAGNOSIS — D64.9 ANEMIA, UNSPECIFIED TYPE: ICD-10-CM

## 2020-10-28 DIAGNOSIS — R76.8 ELEVATED SERUM IMMUNOGLOBULIN FREE LIGHT CHAINS: ICD-10-CM

## 2020-10-28 DIAGNOSIS — E53.8 VITAMIN B12 DEFICIENCY (NON ANEMIC): ICD-10-CM

## 2020-10-28 DIAGNOSIS — K51.00 ULCERATIVE PANCOLITIS WITHOUT COMPLICATION (H): ICD-10-CM

## 2020-10-28 DIAGNOSIS — I27.20 PULMONARY HYPERTENSION (H): ICD-10-CM

## 2020-10-28 DIAGNOSIS — E78.5 HYPERLIPIDEMIA LDL GOAL <100: ICD-10-CM

## 2020-10-28 DIAGNOSIS — E89.0 POSTOPERATIVE HYPOTHYROIDISM: ICD-10-CM

## 2020-10-28 DIAGNOSIS — N18.32 STAGE 3B CHRONIC KIDNEY DISEASE (H): ICD-10-CM

## 2020-10-28 LAB
BASOPHILS # BLD AUTO: 0 10E9/L (ref 0–0.2)
BASOPHILS NFR BLD AUTO: 0.6 %
DIFFERENTIAL METHOD BLD: NORMAL
EOSINOPHIL # BLD AUTO: 0.4 10E9/L (ref 0–0.7)
EOSINOPHIL NFR BLD AUTO: 5 %
ERYTHROCYTE [DISTWIDTH] IN BLOOD BY AUTOMATED COUNT: 13.9 % (ref 10–15)
HBA1C MFR BLD: 6.6 % (ref 0–5.6)
HCT VFR BLD AUTO: 38.2 % (ref 35–47)
HGB BLD-MCNC: 12.3 G/DL (ref 11.7–15.7)
LYMPHOCYTES # BLD AUTO: 2 10E9/L (ref 0.8–5.3)
LYMPHOCYTES NFR BLD AUTO: 27.6 %
MCH RBC QN AUTO: 30.8 PG (ref 26.5–33)
MCHC RBC AUTO-ENTMCNC: 32.2 G/DL (ref 31.5–36.5)
MCV RBC AUTO: 96 FL (ref 78–100)
MONOCYTES # BLD AUTO: 1 10E9/L (ref 0–1.3)
MONOCYTES NFR BLD AUTO: 14.3 %
NEUTROPHILS # BLD AUTO: 3.8 10E9/L (ref 1.6–8.3)
NEUTROPHILS NFR BLD AUTO: 52.5 %
PLATELET # BLD AUTO: 277 10E9/L (ref 150–450)
RBC # BLD AUTO: 3.99 10E12/L (ref 3.8–5.2)
WBC # BLD AUTO: 7.3 10E9/L (ref 4–11)

## 2020-10-28 PROCEDURE — 85025 COMPLETE CBC W/AUTO DIFF WBC: CPT | Performed by: INTERNAL MEDICINE

## 2020-10-28 PROCEDURE — 80053 COMPREHEN METABOLIC PANEL: CPT | Performed by: INTERNAL MEDICINE

## 2020-10-28 PROCEDURE — G0439 PPPS, SUBSEQ VISIT: HCPCS | Performed by: INTERNAL MEDICINE

## 2020-10-28 PROCEDURE — 80061 LIPID PANEL: CPT | Performed by: INTERNAL MEDICINE

## 2020-10-28 PROCEDURE — 36415 COLL VENOUS BLD VENIPUNCTURE: CPT | Performed by: INTERNAL MEDICINE

## 2020-10-28 PROCEDURE — 82728 ASSAY OF FERRITIN: CPT | Performed by: INTERNAL MEDICINE

## 2020-10-28 PROCEDURE — 82784 ASSAY IGA/IGD/IGG/IGM EACH: CPT | Performed by: INTERNAL MEDICINE

## 2020-10-28 PROCEDURE — 86334 IMMUNOFIX E-PHORESIS SERUM: CPT | Performed by: PATHOLOGY

## 2020-10-28 PROCEDURE — 84439 ASSAY OF FREE THYROXINE: CPT | Performed by: INTERNAL MEDICINE

## 2020-10-28 PROCEDURE — 90662 IIV NO PRSV INCREASED AG IM: CPT | Performed by: INTERNAL MEDICINE

## 2020-10-28 PROCEDURE — 83036 HEMOGLOBIN GLYCOSYLATED A1C: CPT | Performed by: INTERNAL MEDICINE

## 2020-10-28 PROCEDURE — 99214 OFFICE O/P EST MOD 30 MIN: CPT | Mod: 25 | Performed by: INTERNAL MEDICINE

## 2020-10-28 PROCEDURE — G0008 ADMIN INFLUENZA VIRUS VAC: HCPCS | Performed by: INTERNAL MEDICINE

## 2020-10-28 PROCEDURE — 84443 ASSAY THYROID STIM HORMONE: CPT | Performed by: INTERNAL MEDICINE

## 2020-10-28 RX ORDER — FUROSEMIDE 40 MG
TABLET ORAL
Qty: 90 TABLET | Refills: 3 | COMMUNITY
Start: 2020-10-28 | End: 2021-05-19

## 2020-10-28 RX ORDER — HYDRALAZINE HYDROCHLORIDE 50 MG/1
50 TABLET, FILM COATED ORAL 3 TIMES DAILY
Qty: 270 TABLET | Refills: 4 | Status: SHIPPED | OUTPATIENT
Start: 2020-10-28 | End: 2021-12-03

## 2020-10-28 ASSESSMENT — ACTIVITIES OF DAILY LIVING (ADL): CURRENT_FUNCTION: NO ASSISTANCE NEEDED

## 2020-10-28 NOTE — PROGRESS NOTES
"SUBJECTIVE:   Keerthi Montoya is a 70 year old female who presents for Preventive Visit.      Patient has been advised of split billing requirements and indicates understanding: Yes   Are you in the first 12 months of your Medicare coverage?  No    Healthy Habits:     In general, how would you rate your overall health?  Good    Frequency of exercise:  1 day/week    Duration of exercise:  15-30 minutes    Do you usually eat at least 4 servings of fruit and vegetables a day, include whole grains    & fiber and avoid regularly eating high fat or \"junk\" foods?  No    Taking medications regularly:  Yes    Medication side effects:  Not applicable    Ability to successfully perform activities of daily living:  No assistance needed    Home Safety:  No safety concerns identified    Hearing Impairment:  No hearing concerns    In the past 6 months, have you been bothered by leaking of urine?  No    In general, how would you rate your overall mental or emotional health?  Good      PHQ-2 Total Score: 0    Additional concerns today:  No    Do you feel safe in your environment? Yes    Have you ever done Advance Care Planning? (For example, a Health Directive, POLST, or a discussion with a medical provider or your loved ones about your wishes): No, advance care planning information given to patient to review.  Patient plans to discuss their wishes with loved ones or provider.        Fall risk  Fallen 2 or more times in the past year?: No  Any fall with injury in the past year?: No    Cognitive Screening   1) Repeat 3 items (Leader, Season, Table)    2) Clock draw: NORMAL  3) 3 item recall: Recalls 1 object   Results: NORMAL clock, 1-2 items recalled: COGNITIVE IMPAIRMENT LESS LIKELY    Mini-CogTM Copyright DAVID To. Licensed by the author for use in [facility]; reprinted with permission (javid@.[facility]). All rights reserved.      Do you have sleep apnea, excessive snoring or daytime drowsiness?: no    Reviewed and " "updated as needed this visit by clinical staff  Tobacco  Allergies  Meds   Med Hx  Surg Hx  Fam Hx  Soc Hx        Reviewed and updated as needed this visit by Provider                Social History     Tobacco Use     Smoking status: Never Smoker     Smokeless tobacco: Never Used   Substance Use Topics     Alcohol use: Yes     Alcohol/week: 2.0 - 4.0 standard drinks     Types: 1 - 2 Cans of beer, 1 - 2 Shots of liquor per week     Comment: occasional cocktail or beer     If you drink alcohol do you typically have >3 drinks per day or >7 drinks per week? No    Alcohol Use 10/28/2020   Prescreen: >3 drinks/day or >7 drinks/week? Not Applicable   Prescreen: >3 drinks/day or >7 drinks/week? -           Home 's to 130's / 60's          Office BP has been higher than her home BP results.   She has an old monitor.                                Checks glucoses every day or bid; various times.               \"pretty good\" results.            Not getting much exercise.                       She has a plan for this.                   We reviewed the extensive cardiology work-up, plus the nephrology and gastroenterology notes.                    Not having any bowel symptoms as long as she takes mesalamine.                                                 She had an eye exam earlier this year.  Good results.  No symptoms of peripheral neuropathy.                      Current providers sharing in care for this patient include:   Patient Care Team:  Nathan Dhillon MD as PCP - General (Internal Medicine)  Jerry Robbins PA-C as Physician Assistant (Physician Assistant)  Preeti James MD as MD (Gastroenterology)  Carolyn Bonilla APRN CNP as Assigned PCP  Christal Barker RN as Specialty Care Coordinator (Cardiology)  Mamie Grove RN as Specialty Care Coordinator (Cardiology)  Stacy Stokes, RN as Specialty Care Coordinator (Cardiology)  David Almendarez MD as Assigned Heart and Vascular " Provider  Swati Minor MD as Assigned Nephrology Provider    The following health maintenance items are reviewed in Epic and correct as of today:  Health Maintenance   Topic Date Due     HF ACTION PLAN  1950     HEPATITIS B IMMUNIZATION (1 of 3 - Risk 3-dose series) 02/23/1971     ZOSTER IMMUNIZATION (1 of 2) 02/23/2002     DIABETIC FOOT EXAM  06/25/2019     FALL RISK ASSESSMENT  09/11/2019     INFLUENZA VACCINE (1) 09/01/2020     A1C  11/12/2020     EYE EXAM  12/01/2020     ALT  01/13/2021     LIPID  01/13/2021     CBC  03/16/2021     BMP  04/21/2021     MICROALBUMIN  08/03/2021     MEDICARE ANNUAL WELLNESS VISIT  10/28/2021     MAMMO SCREENING  01/14/2022     DTAP/TDAP/TD IMMUNIZATION (2 - Td) 07/26/2023     ADVANCE CARE PLANNING  10/28/2025     COLORECTAL CANCER SCREENING  12/12/2027     DEXA  Completed     TSH W/FREE T4 REFLEX  Completed     HEPATITIS C SCREENING  Completed     PHQ-2  Completed     Pneumococcal Vaccine: 65+ Years  Completed     Pneumococcal Vaccine: Pediatrics (0 to 5 Years) and At-Risk Patients (6 to 64 Years)  Aged Out     IPV IMMUNIZATION  Aged Out     MENINGITIS IMMUNIZATION  Aged Out     Patient Active Problem List    Diagnosis Date Noted     Aortic stenosis 02/25/2020     Priority: High     Mild aortic stenosis (PV 2.5 m/s MG 12 mmHg.) Cannot exclude a bicuspid aortic  valve. This is a plausible basis for a systolic murmur.  Left ventricular function, chamber size, wall motion, and wall thickness are  normal.The EF is 60-65%.  Mildly dilated RV with normal RV function.  Mild to moderate tricuspid insufficiency is present.  Grade 3 diastolic dysfunction with elevated left-sided filling pressures.  Severe pulmonary hypertension.       Pulmonary hypertension (H) 02/25/2020     Priority: High     Mild aortic stenosis (PV 2.5 m/s MG 12 mmHg.) Cannot exclude a bicuspid aortic  valve. This is a plausible basis for a systolic murmur.  Left ventricular function, chamber size, wall  "motion, and wall thickness are  normal.The EF is 60-65%.  Mildly dilated RV with normal RV function.  Mild to moderate tricuspid insufficiency is present.  Grade 3 diastolic dysfunction with elevated left-sided filling pressures.  Severe pulmonary hypertension.       Vitamin B12 deficiency (non anemic) 01/26/2019     Priority: High     224 in 6/18; po B12 added       Type 2 diabetes mellitus with microalbuminuria, without long-term current use of insulin (H) 07/30/2018     Priority: High     Colitis 09/10/2015     Priority: High     Colonoscopy 8/15:  \"mildly active chronic colitis\" on bx of sigmoid and rectum; Dx?;                    In 4/17, \"colitis\" rectum and distal sigmoid; path pending          Hyperlipidemia LDL goal <100 07/26/2013     Priority: High      in 7/13; not taking prava 40 regularly       Heart failure with preserved ejection fraction, NYHA class I (H) 09/29/2020     Priority: Medium     Diastolic dysfunction 04/01/2020     Priority: Medium     CKD (chronic kidney disease) stage 3, GFR 30-59 ml/min 06/03/2019     Priority: Medium     Proteinuria, unspecified type 01/28/2019     Priority: Medium     Ulcerative pancolitis without complication (H) 09/10/2018     Priority: Medium     Cervical cancer screening 08/07/2018     Priority: Medium     Pt age 68  Normal paps in   2010: NIL pap  2015: NIL/Neg cotest  2018:NIL/Neg cotest  No history of LUBNA 2 or greater found in epic.   No further cervical cancer screening recommended.   Hm updated.              Hypertension goal BP (blood pressure) < 130/80 06/25/2018     Priority: Medium     Postoperative hypothyroidism 05/15/2017     Priority: Medium     Rectal bleeding 07/27/2015     Priority: Medium     Abdominal pain, left lower quadrant 06/28/2015     Priority: Medium     CT in 6/15 diverticulosis, no diverticulitis.         \"prominent lymph nodes of uncertain etiology\"; 6 month f/u CT suggested by Radiology             Aspirin not indicated " "05/18/2015     Priority: Medium     Not tolerated by pt; GI distress       Diverticulosis of large intestine 10/01/2012     Priority: Medium     Colonoscopy 8/12; tics only, no strictures.            CT shows 2.6 cm R ovarian cyst.         Pt has hx of ovarian cysts on CT and US in 2006. In 1/07, she saw Dr. Stiven Griffin re: a 3 cm L ovarian cyst; CA-125 nml at 16 ( his 1/07 letter describes a L ovarian cyst; pt recalls this was R sided, like the current CT states)                     CT in 6/15 diverticulosis no diverticulitis; \"some increased number and mildly prominent nodes within the small bowel mesentery and posterior pelvis of ? Significance; f/u CT 3-6 months advised.               Problem list name updated by automated process. Provider to review       Vitamin D deficiency 10/01/2012     Priority: Medium     Problem list name updated by automated process. Provider to review       Hypercalcemia 10/01/2012     Priority: Medium     Preventive measure 07/26/2013     Priority: Low     APRIMA DATA BASE UNDER THE 7/26/13 NOTE  Colonoscopy 8/12; 8/15; 4/17, 12/17  Pap 2/10,6/15;8/18                                Mammogram 1/14, 6/16, 7/17, 7/18, 1/20  DXA 2005; all scores >+1 ;                  in 7/18, spine is +, fem necks are 0       Past Surgical History:   Procedure Laterality Date     CHOLECYSTECTOMY  3/1987     COLON SURGERY  01/2001    Left colon resection; diverticulitis     COLONOSCOPY N/A 4/24/2017    Procedure: COMBINED COLONOSCOPY, SINGLE OR MULTIPLE BIOPSY/POLYPECTOMY BY BIOPSY;;  Surgeon: Radha Salguero MD;  Location: [facility] OR     COLONOSCOPY WITH CO2 INSUFFLATION N/A 4/24/2017    Procedure: COLONOSCOPY WITH CO2 INSUFFLATION;  Colonoscopy Dx rectal bleeding, BMI 25.86, Pharm [facility] .[phone], ;  Surgeon: Radha Salguero MD;  Location: [facility] OR     CV RIGHT HEART CATH N/A 3/16/2020    Procedure: CV RIGHT HEART CATH;  Surgeon: Nader Muñoz MD;  [facility]     GYN SURGERY  1983    tubal ligation     HERNIA REPAIR  2006    recurrent incisional hernia     THROAT SURGERY  1974    thyroidectomy     Social History     Socioeconomic History     Marital status:      Spouse name: Raymundo; dementia;  16     Number of children: 3     Years of education: Not on file     Highest education level: Not on file   Occupational History     Employer: [employer]   Social Needs     Financial resource strain: Not on file     Food insecurity     Worry: Not on file     Inability: Not on file     Transportation needs     Medical: Not on file     Non-medical: Not on file   Tobacco Use     Smoking status: Never Smoker     Smokeless tobacco: Never Used   Substance and Sexual Activity     Alcohol use: Yes     Alcohol/week: 2.0 - 4.0 standard drinks     Types: 1 - 2 Cans of beer, 1 - 2 Shots of liquor per week     Comment: occasional cocktail or beer     Drug use: No     Sexual activity: Not Currently   Lifestyle     Physical activity     Days per week: Not on file     Minutes per session: Not on file     Stress: Not on file   Relationships     Social connections     Talks on phone: Not on file     Gets together: Not on file     Attends Yazdanism service: Not on file     Active member of club or organization: Not on file     Attends meetings of clubs or organizations: Not on file     Relationship status: Not on file     Intimate partner violence     Fear of current or ex partner: Not on file     Emotionally abused: Not on file     Physically abused: Not on file     Forced sexual activity: Not on file   Other Topics Concern     Parent/sibling w/ CABG, MI or angioplasty before 65F 55M? Not Asked      Service No     Blood Transfusions No     Caffeine Concern No     Occupational Exposure No     Hobby Hazards No     Sleep Concern No     Stress Concern No     Weight Concern No     Special Diet No     Back Care No     Exercise Yes     Comment: off and on      Bike Helmet No     Seat Belt Yes     Self-Exams No   Social History Narrative    Laid off her job 8/14       BP Readings from Last 3 Encounters:   10/28/20 (!) 150/80   10/21/20 (!) 172/71   09/28/20 (!) 163/76    Wt Readings from Last 3 Encounters:   10/28/20 67.6 kg (149 lb)   10/21/20 67.7 kg (149 lb 3.2 oz)   10/20/20 64.9 kg (143 lb)                  Current Outpatient Medications   Medication Sig Dispense Refill     atenolol (TENORMIN) 50 MG tablet Take 1 tablet (50 mg) by mouth daily 90 tablet 3     atorvastatin (LIPITOR) 40 MG tablet Take 1 tablet (40 mg) by mouth daily 90 tablet 1     blood glucose (ACCU-CHEK FELIPE PLUS) test strip 200 strips by In Vitro route 2 times daily Use to test blood sugar 2 times daily or as directed. 200 strip 4     blood glucose (ACCU-CHEK FELIPE PLUS) test strip TEST TWICE DAILY 200 strip 3     Cholecalciferol (VITAMIN D) 2000 units CAPS Take by mouth daily       Cyanocobalamin (B-12) 1000 MCG TABS Take 1,000 mcg by mouth three times a week       furosemide (LASIX) 40 MG tablet Take 1 tablet (40 mg) AM and 20 mg PM 90 tablet 3     glipiZIDE (GLUCOTROL XL) 10 MG 24 hr tablet TAKE 1 TABLET BY MOUTH TWICE DAILY 180 tablet 1     hydrALAZINE (APRESOLINE) 25 MG tablet Take 1 tablet (25 mg) by mouth 3 times daily 90 tablet 3     levothyroxine (SYNTHROID/LEVOTHROID) 125 MCG tablet Take 1 tablet (125 mcg) by mouth daily 90 tablet 3     losartan (COZAAR) 100 MG tablet TAKE 1 TABLET(100 MG) BY MOUTH DAILY 90 tablet 3     mesalamine (APRISO ER) 0.375 g 24 hr capsule Take 4 capsules (1.5 g) by mouth daily 360 capsule 3     metFORMIN (GLUCOPHAGE) 500 MG tablet TAKE 2 TABLETS BY MOUTH TWICE DAILY WITH MEALS 120 tablet 3     Allergies   Allergen Reactions     Actos [Pioglitazone]      Fluid retention     Amlodipine      Leg swelling, hand swelling     Animal Dander      Doxycycline Hives     Dust Mites      Grass      Keflex [Cephalexin Hcl] Hives     Metoprolol      Swelling of lower legs  "and fatigue     Other [Seasonal Allergies]      pollen     Ranitidine      rash     Synthroid [Levothroid] Swelling     ???     Tetracycline Hives     Tylenol Hives     Ziac [Bisoprolol-Hydrochlorothiazide]          Review of Systems  CONSTITUTIONAL: NEGATIVE for fever, chills, change in weight  INTEGUMENTARY/SKIN: NEGATIVE for worrisome rashes, moles or lesions  EYES: NEGATIVE for vision changes or irritation  ENT/MOUTH: NEGATIVE for ear, mouth and throat problems  RESP: NEGATIVE for significant cough or SOB  BREAST: NEGATIVE for masses, tenderness or discharge  CV: NEGATIVE for chest pain, palpitations or peripheral edema  GI: NEGATIVE for nausea, abdominal pain, heartburn, or change in bowel habits  : NEGATIVE for frequency, dysuria, or hematuria  MUSCULOSKELETAL: NEGATIVE for significant arthralgias or myalgia  NEURO: NEGATIVE for weakness, dizziness or paresthesias  ENDOCRINE: NEGATIVE for temperature intolerance, skin/hair changes  HEME: NEGATIVE for bleeding problems  PSYCHIATRIC: NEGATIVE for changes in mood or affect    OBJECTIVE:   BP (!) 150/80   Pulse 70   Temp 97.9  F (36.6  C) (Tympanic)   Wt 67.6 kg (149 lb)   SpO2 93%   BMI 27.25 kg/m   Estimated body mass index is 27.25 kg/m  as calculated from the following:    Height as of 10/20/20: 1.575 m (5' 2\").    Weight as of this encounter: 67.6 kg (149 lb).  Physical Exam  GENERAL APPEARANCE: healthy, alert and no distress  EYES: Eyes grossly normal to inspection, PERRL and conjunctivae and sclerae normal  HENT: ear canals and TM's normal  NECK: no adenopathy, no asymmetry, masses, or scars and thyroid normal to palpation  RESP: lungs clear to auscultation - no rales, rhonchi or wheezes  BREAST: normal without masses, tenderness or nipple discharge and no palpable axillary masses or adenopathy  CV: regular rates and rhythm, no peripheral edema, peripheral pulses strong and harsh systolic murmur  ABDOMEN: soft, nontender, no hepatosplenomegaly and no " masses  MS: no musculoskeletal defects are noted and gait is age appropriate without ataxia  SKIN: no suspicious lesions or rashes  NEURO: Normal strength and tone, mentation intact and speech normal  DIABETIC FOOT EXAM: normal DP and PT pulses and no trophic changes or ulcerative lesions  PSYCH: mentation appears normal and affect normal/bright    Diagnostic Test Results:  Labs are pending    ASSESSMENT / PLAN:   [place] was seen today for physical.    Diagnoses and all orders for this visit:    Encounter for annual wellness exam in Medicare patient    Type 2 diabetes mellitus with microalbuminuria, without long-term current use of insulin (H)  -     Comprehensive metabolic panel (BMP + Alb, Alk Phos, ALT, AST, Total. Bili, TP)  -     Hemoglobin A1c  -     metFORMIN (GLUCOPHAGE) 500 MG tablet; TAKE 2 TABLETS BY MOUTH TWICE DAILY WITH MEALS    Pulmonary hypertension (H)  -     Comprehensive metabolic panel (BMP + Alb, Alk Phos, ALT, AST, Total. Bili, TP)    Hypertension goal BP (blood pressure) < 130/80  -     Comprehensive metabolic panel (BMP + Alb, Alk Phos, ALT, AST, Total. Bili, TP)  -     hydrALAZINE (APRESOLINE) 50 MG tablet; Take 1 tablet (50 mg) by mouth 3 times daily    Stage 3b chronic kidney disease  -     Comprehensive metabolic panel (BMP + Alb, Alk Phos, ALT, AST, Total. Bili, TP)    Hyperlipidemia LDL goal <100  -     Lipid Profile (Chol, Trig, HDL, LDL calc)    Postoperative hypothyroidism  -     TSH with free T4 reflex    Aortic valve stenosis, etiology of cardiac valve disease unspecified    Anemia, unspecified type  -     Ferritin  -     CBC with platelets and differential    Ulcerative pancolitis without complication (H)    Elevated serum immunoglobulin free light chains  -     Protein Immunofixation Serum    Other orders  -     FLUZONE HIGH DOSE 65+  [[id number]]                   Summary and implications:  We reviewed multiple issues.           We reviewed all of the issues on the diagnoses list.    "         Her blood pressure remains elevated on recheck.             We will increase the hydralazine dosage.                She plans to get a new blood pressure monitor.  Check labs and adjust medications as indicated.           Patient Instructions     Let's do this:       Increase the hydralazine to 50 mg three times per day. Let the cardiologist and me know your blood pressure results with your new blood pressure monitor.                                                                                                                                     I will let you know your lab results.          Return in about 6 months (around 4/28/2021) for follow up of several issues.      Patient has been advised of split billing requirements and indicates understanding: Yes  COUNSELING:  Reviewed preventive health counseling, as reflected in patient instructions  Special attention given to:       Regular exercise       Healthy diet/nutrition    Estimated body mass index is 27.25 kg/m  as calculated from the following:    Height as of 10/20/20: 1.575 m (5' 2\").    Weight as of this encounter: 67.6 kg (149 lb).        She reports that she has never smoked. She has never used smokeless tobacco.      Appropriate preventive services were discussed with this patient, including applicable screening as appropriate for cardiovascular disease, diabetes, osteopenia/osteoporosis, and glaucoma.  As appropriate for age/gender, discussed screening for colorectal cancer, prostate cancer, breast cancer, and cervical cancer. Checklist reviewing preventive services available has been given to the patient.    Reviewed patients plan of care and provided an AVS. The Basic Care Plan (routine screening as documented in Health Maintenance) for [place] meets the Care Plan requirement. This Care Plan has been established and reviewed with the Patient.    Counseling Resources:  ATP IV Guidelines  Pooled Cohorts Equation Calculator  Breast Cancer " Risk Calculator  Breast Cancer: Medication to Reduce Risk  FRAX Risk Assessment  ICSI Preventive Guidelines  Dietary Guidelines for Americans, 2010  USDA's MyPlate  ASA Prophylaxis  Lung CA Screening    Nathan Dhillon MD  [facility]    Results for orders placed or performed in visit on 10/28/20   Comprehensive metabolic panel (BMP + Alb, Alk Phos, ALT, AST, Total. Bili, TP)     Status: Abnormal   Result Value Ref Range    Sodium 133 133 - 144 mmol/L    Potassium 4.1 3.4 - 5.3 mmol/L    Chloride 100 94 - 109 mmol/L    Carbon Dioxide 24 20 - 32 mmol/L    Anion Gap 9 3 - 14 mmol/L    Glucose 142 (H) 70 - 99 mg/dL    Urea Nitrogen 37 (H) 7 - 30 mg/dL    Creatinine 1.30 (H) 0.52 - 1.04 mg/dL    GFR Estimate 42 (L) >60 mL/min/[1.73_m2]    GFR Estimate If Black 49 (L) >60 mL/min/[1.73_m2]    Calcium 9.3 8.5 - 10.1 mg/dL    Bilirubin Total 0.6 0.2 - 1.3 mg/dL    Albumin 3.9 3.4 - 5.0 g/dL    Protein Total 8.0 6.8 - 8.8 g/dL    Alkaline Phosphatase 104 40 - 150 U/L    ALT 18 0 - 50 U/L    AST 19 0 - 45 U/L   Ferritin     Status: None   Result Value Ref Range    Ferritin 52 8 - 252 ng/mL   Lipid Profile (Chol, Trig, HDL, LDL calc)     Status: Abnormal   Result Value Ref Range    Cholesterol 93 <200 mg/dL    Triglycerides 108 <150 mg/dL    HDL Cholesterol 38 (L) >49 mg/dL    LDL Cholesterol Calculated 33 <100 mg/dL    Non HDL Cholesterol 55 <130 mg/dL   TSH with free T4 reflex     Status: Abnormal   Result Value Ref Range    TSH 0.10 (L) 0.40 - 4.00 mU/L   CBC with platelets and differential     Status: None   Result Value Ref Range    WBC 7.3 4.0 - 11.0 10e9/L    RBC Count 3.99 3.8 - 5.2 10e12/L    Hemoglobin 12.3 11.7 - 15.7 g/dL    Hematocrit 38.2 35.0 - 47.0 %    MCV 96 78 - 100 fl    MCH 30.8 26.5 - 33.0 pg    MCHC 32.2 31.5 - 36.5 g/dL    RDW 13.9 10.0 - 15.0 %    Platelet Count 277 150 - 450 10e9/L    % Neutrophils 52.5 %    % Lymphocytes 27.6 %    % Monocytes 14.3 %    %  Eosinophils 5.0 %    % Basophils 0.6 %    Absolute Neutrophil 3.8 1.6 - 8.3 10e9/L    Absolute Lymphocytes 2.0 0.8 - 5.3 10e9/L    Absolute Monocytes 1.0 0.0 - 1.3 10e9/L    Absolute Eosinophils 0.4 0.0 - 0.7 10e9/L    Absolute Basophils 0.0 0.0 - 0.2 10e9/L    Diff Method Automated Method    Hemoglobin A1c     Status: Abnormal   Result Value Ref Range    Hemoglobin A1C 6.6 (H) 0 - 5.6 %   T4 free     Status: Abnormal   Result Value Ref Range    T4 Free 1.82 (H) 0.76 - 1.46 ng/dL     My chart message sent.             Now that you are older and have lost some weight, your thyroid dose is too high.             Do not take any for 2 days, and then cut back to 112 mcg per day.                 I have sent an Rx to your pharmacy for the new dosage.                 Your diabetes control is very good.       The A1C of 6.6 correlates to an average blood sugar of approximately 138.                Your other lab results are normal or stable,including the liver,kidney,cholesterol,potassium,bone marrow, and the iron level.

## 2020-10-28 NOTE — PATIENT INSTRUCTIONS
Let's do this:       Increase the hydralazine to 50 mg three times per day. Let the cardiologist and me know your blood pressure results with your new blood pressure monitor.                                                                                                                                     I will let you know your lab results.

## 2020-10-29 LAB
ALBUMIN SERPL-MCNC: 3.9 G/DL (ref 3.4–5)
ALP SERPL-CCNC: 104 U/L (ref 40–150)
ALT SERPL W P-5'-P-CCNC: 18 U/L (ref 0–50)
ANION GAP SERPL CALCULATED.3IONS-SCNC: 9 MMOL/L (ref 3–14)
AST SERPL W P-5'-P-CCNC: 19 U/L (ref 0–45)
BILIRUB SERPL-MCNC: 0.6 MG/DL (ref 0.2–1.3)
BUN SERPL-MCNC: 37 MG/DL (ref 7–30)
CALCIUM SERPL-MCNC: 9.3 MG/DL (ref 8.5–10.1)
CHLORIDE SERPL-SCNC: 100 MMOL/L (ref 94–109)
CHOLEST SERPL-MCNC: 93 MG/DL
CO2 SERPL-SCNC: 24 MMOL/L (ref 20–32)
CREAT SERPL-MCNC: 1.3 MG/DL (ref 0.52–1.04)
FERRITIN SERPL-MCNC: 52 NG/ML (ref 8–252)
GFR SERPL CREATININE-BSD FRML MDRD: 42 ML/MIN/{1.73_M2}
GLUCOSE SERPL-MCNC: 142 MG/DL (ref 70–99)
HDLC SERPL-MCNC: 38 MG/DL
LDLC SERPL CALC-MCNC: 33 MG/DL
NONHDLC SERPL-MCNC: 55 MG/DL
POTASSIUM SERPL-SCNC: 4.1 MMOL/L (ref 3.4–5.3)
PROT SERPL-MCNC: 8 G/DL (ref 6.8–8.8)
SODIUM SERPL-SCNC: 133 MMOL/L (ref 133–144)
T4 FREE SERPL-MCNC: 1.82 NG/DL (ref 0.76–1.46)
TRIGL SERPL-MCNC: 108 MG/DL
TSH SERPL DL<=0.005 MIU/L-ACNC: 0.1 MU/L (ref 0.4–4)

## 2020-10-29 RX ORDER — LEVOTHYROXINE SODIUM 112 UG/1
112 TABLET ORAL DAILY
Qty: 90 TABLET | Refills: 4 | Status: SHIPPED | OUTPATIENT
Start: 2020-10-29 | End: 2021-12-16

## 2020-11-02 LAB
IGA SERPL-MCNC: 353 MG/DL (ref 84–499)
IGG SERPL-MCNC: 1757 MG/DL (ref 610–1616)
IGM SERPL-MCNC: 48 MG/DL (ref 35–242)
PROT PATTERN SERPL IFE-IMP: ABNORMAL

## 2020-11-06 DIAGNOSIS — I10 HYPERTENSION GOAL BP (BLOOD PRESSURE) < 140/90: ICD-10-CM

## 2020-11-06 RX ORDER — LOSARTAN POTASSIUM 100 MG/1
100 TABLET ORAL DAILY
Qty: 90 TABLET | Refills: 3 | Status: SHIPPED | OUTPATIENT
Start: 2020-11-06 | End: 2021-10-22

## 2020-11-06 NOTE — TELEPHONE ENCOUNTER
Routing refill request to provider for review/approval because:  Labs out of range:  BP, creat

## 2020-11-16 DIAGNOSIS — K51.311 ULCERATIVE RECTOSIGMOIDITIS WITH RECTAL BLEEDING (H): ICD-10-CM

## 2020-11-18 ENCOUNTER — TRANSFERRED RECORDS (OUTPATIENT)
Dept: HEALTH INFORMATION MANAGEMENT | Facility: CLINIC | Age: 70
End: 2020-11-18

## 2020-11-19 RX ORDER — MESALAMINE 0.38 G/1
CAPSULE, EXTENDED RELEASE ORAL
Qty: 120 CAPSULE | OUTPATIENT
Start: 2020-11-19

## 2020-12-11 DIAGNOSIS — I10 HYPERTENSION GOAL BP (BLOOD PRESSURE) < 130/80: ICD-10-CM

## 2020-12-11 RX ORDER — ATENOLOL 50 MG/1
TABLET ORAL
Qty: 90 TABLET | Refills: 3 | Status: SHIPPED | OUTPATIENT
Start: 2020-12-11 | End: 2021-12-16

## 2020-12-19 DIAGNOSIS — E11.9 TYPE 2 DIABETES MELLITUS WITHOUT COMPLICATION, WITHOUT LONG-TERM CURRENT USE OF INSULIN (H): ICD-10-CM

## 2020-12-22 RX ORDER — GLIPIZIDE 10 MG/1
TABLET, EXTENDED RELEASE ORAL
Qty: 180 TABLET | Refills: 1 | Status: SHIPPED | OUTPATIENT
Start: 2020-12-22 | End: 2021-06-16

## 2021-01-06 PROBLEM — H43.812 POSTERIOR VITREOUS DETACHMENT, LEFT: Status: ACTIVE | Noted: 2021-01-06

## 2021-01-22 ENCOUNTER — TELEPHONE (OUTPATIENT)
Dept: NEPHROLOGY | Facility: CLINIC | Age: 71
End: 2021-01-22

## 2021-01-22 NOTE — TELEPHONE ENCOUNTER
Dolor, Causa Incierta [Pain, Uncertain Cause]  El dolor es la manera que tiene el cuerpo de llamar la atención sobre un problema. El dolor puede ser causado por muchas condiciones - algunas menores, algunas serias.  En naranjo parker, no pudimos encontrar la causa exacta de naranjo dolor. Sin embargo, en estos momentos no hay ningún signo de que dawna enfermedad seria o amenazante para la khushboo esté provocando naranjo dolor. Algunas veces se necesitarán más pruebas para determinar la causa. Otras veces, solo dejando pasar más tiempo se aclarará cuál es el problema, o hará que el dolor se vaya solo.  Cuidado En Casa:  1) Puede usar acetaminofén (Tylenol) o ibuprofeno (Motrin, Advil) para controlar el dolor, a menos que le receten otro medicamento. [ NOTA : Si usted tiene dawna enfermedad crónica del hígado o del riñón o ha tenido alguna vez dawna úlcera de estómago o hemorragia gastrointestinal, hable con naranjo médico antes de usar estos medicamentos.]  Seguimiento  con naranjo médico o de acuerdo a lo indicado por nuestro personal.  Busque Prontamente Atención Médica  si algo de lo siguiente ocurre:  · Cambios en los patrones de naranjo dolor  · Aparición de síntomas nuevos  · Fiebre de 100.4°F (38°C) o más tomas, o carmelina le haya indicado naranjo proveedor de atención médica   © 7120-0760 [employer]. [address] [place] [address], [place] [address]. Todos los derechos reservados. Esta información no pretende sustituir la atención médica profesional. Sólo naranjo médico puede diagnosticar y tratar un problema de crys.         Attempted to contact pt.  No answer.  Message left on cell VM mail to return call.    [facility] is focusing on minimizing the spread of COVID-19, and we are using virtual care to reduce the number of in-person visits.    Pt is scheduled with Dr. Minor 2/09/21 at 8:00am.  Need to convert to Virtual.    Lab appt will need to be scheduled to a date prior.    Lucy Purdy LPN

## 2021-01-22 NOTE — TELEPHONE ENCOUNTER
Chart reviewed. Documentation from Call Center Staff.  Clinic appt changed to Video visit.    Lab appt did not get changed.    Called and spoke with pt.  rescheduled lab appt to 2/08/21 at 5:30pm.    Encouraged pt to call if any further questions or concerns.    Lucy Purdy LPN

## 2021-02-08 DIAGNOSIS — N18.30 CKD (CHRONIC KIDNEY DISEASE) STAGE 3, GFR 30-59 ML/MIN (H): ICD-10-CM

## 2021-02-08 LAB
ALBUMIN SERPL-MCNC: 4.1 G/DL (ref 3.4–5)
ANION GAP SERPL CALCULATED.3IONS-SCNC: 6 MMOL/L (ref 3–14)
BUN SERPL-MCNC: 23 MG/DL (ref 7–30)
CALCIUM SERPL-MCNC: 9.4 MG/DL (ref 8.5–10.1)
CHLORIDE SERPL-SCNC: 102 MMOL/L (ref 94–109)
CO2 SERPL-SCNC: 25 MMOL/L (ref 20–32)
CREAT SERPL-MCNC: 1.15 MG/DL (ref 0.52–1.04)
CREAT UR-MCNC: 26 MG/DL
GFR SERPL CREATININE-BSD FRML MDRD: 49 ML/MIN/{1.73_M2}
GLUCOSE SERPL-MCNC: 134 MG/DL (ref 70–99)
HGB BLD-MCNC: 11.8 G/DL (ref 11.7–15.7)
PHOSPHATE SERPL-MCNC: 3.5 MG/DL (ref 2.5–4.5)
POTASSIUM SERPL-SCNC: 4.3 MMOL/L (ref 3.4–5.3)
PROT UR-MCNC: 0.17 G/L
PROT/CREAT 24H UR: 0.66 G/G CR (ref 0–0.2)
PTH-INTACT SERPL-MCNC: 64 PG/ML (ref 18–80)
SODIUM SERPL-SCNC: 133 MMOL/L (ref 133–144)

## 2021-02-08 PROCEDURE — 85018 HEMOGLOBIN: CPT | Performed by: INTERNAL MEDICINE

## 2021-02-08 PROCEDURE — 80069 RENAL FUNCTION PANEL: CPT | Performed by: INTERNAL MEDICINE

## 2021-02-08 PROCEDURE — 84156 ASSAY OF PROTEIN URINE: CPT | Performed by: INTERNAL MEDICINE

## 2021-02-08 PROCEDURE — 36415 COLL VENOUS BLD VENIPUNCTURE: CPT | Performed by: INTERNAL MEDICINE

## 2021-02-08 PROCEDURE — 83970 ASSAY OF PARATHORMONE: CPT | Performed by: INTERNAL MEDICINE

## 2021-02-09 ENCOUNTER — VIRTUAL VISIT (OUTPATIENT)
Dept: NEPHROLOGY | Facility: CLINIC | Age: 71
End: 2021-02-09
Payer: MEDICARE

## 2021-02-09 VITALS
SYSTOLIC BLOOD PRESSURE: 162 MMHG | HEART RATE: 54 BPM | OXYGEN SATURATION: 98 % | BODY MASS INDEX: 28.17 KG/M2 | WEIGHT: 154 LBS | DIASTOLIC BLOOD PRESSURE: 76 MMHG

## 2021-02-09 DIAGNOSIS — R80.9 TYPE 2 DIABETES MELLITUS WITH MICROALBUMINURIA, WITHOUT LONG-TERM CURRENT USE OF INSULIN (H): ICD-10-CM

## 2021-02-09 DIAGNOSIS — I10 HYPERTENSION GOAL BP (BLOOD PRESSURE) < 130/80: ICD-10-CM

## 2021-02-09 DIAGNOSIS — N18.31 STAGE 3A CHRONIC KIDNEY DISEASE (H): Primary | ICD-10-CM

## 2021-02-09 DIAGNOSIS — E11.29 TYPE 2 DIABETES MELLITUS WITH MICROALBUMINURIA, WITHOUT LONG-TERM CURRENT USE OF INSULIN (H): ICD-10-CM

## 2021-02-09 DIAGNOSIS — I51.89 DIASTOLIC DYSFUNCTION: ICD-10-CM

## 2021-02-09 PROCEDURE — 99214 OFFICE O/P EST MOD 30 MIN: CPT | Mod: 95 | Performed by: INTERNAL MEDICINE

## 2021-02-09 NOTE — NURSING NOTE
After visit instructions per Dr. Minor:    Dispositions Check-out Note   0 Return in about 1 year (around 2/9/2022) for can be either virtual or in person. Patient Instructions   1. We will arrange a nurse visit - ideally today - for a blood pressure   check. Her home cuff has not been working.   2. Labs in 6 months   3. Follow-up in one year.       Pt arrived at Clinic at 10:00am.  This LPN weighed pt and took B/P, each arm, twice.  Readings listed in this Encounter.  Patients own home B/P machine did not inflate.  Pt stated she will be buying a new machine.    Assisted with scheduling Future appts recommended by Dr. Minor.  AVS printed and given to pt.    Dr. Minor notified of B/P readings taken.    Lucy Purdy LPN

## 2021-02-09 NOTE — PROGRESS NOTES
Georgia is a 70 year old who is being evaluated via a billable video visit.      How would you like to obtain your AVS? MyChart  If the video visit is dropped, the invitation should be resent by: Send to e-mail at: [email or web address] video  Will anyone else be joining your video visit? No

## 2021-02-09 NOTE — PATIENT INSTRUCTIONS
1. We will arrange a nurse visit - ideally today - for a blood pressure check. Her home cuff has not been working.   2. Labs in 6 months  3. Follow-up in one year.

## 2021-02-09 NOTE — PROGRESS NOTES
"02/09/21  HPI: Keerthi Montoya is a 70 year old female who presents for follow-up of proteinuria.     History taken from previous notes:  Ms. Montoya's hx is significant for DM dx at least 10 years ago - very poor control for years (documented A1C levels 9.4-11.7% from 2011 to 2017); no known retinopathy per patient. She has longstanding hypertension dx when she was 18 years old. Additional hx includes ulcerative colitis and hypothyroidism (following thyroidectomy at age 24). Her ulcerative colitis was dx ~2017 but she feels she likely had trouble with this disease for years. She reports having loose stool for a long time prior to receiving therapy for her UC; she feels the blood in the stool may have contaminated previous urine samples potentially.                  02/10/20 :  Creatinine has been 0.9-1.13 in the past year; up slightly today at 1.3. Last UPCR 0.42 g/g in May. On losartan 100 mg daily. Bronchitis - started on levaquin on Saturday. No swelling, no NSAIDs. Blood pressure at times has been down to 118 - she reports blood pressures less than 130 typically but at times above. She has lost weight intentionally - 158 lbs last year and now 146 lbs. She does not smoke. She has not been eating/drinking today.      08/03/20:  In the setting of COVID-19 pandemic, this visit was changed to a telephone visit. Patient did not have video capability.  Because of murmur appreciated on exam at our previous visit, I sent her for a TTE which showed aortic stenosis as well as diastolic dysfunction. She was seen by cardiology in March and continues to follow with them at this time. She is felt to have severe pulmonary hypertension, CHF, along with aortic stenosis. She is currently taking lasix 20 mg BID. She remains on losartan 100 mg daily. She reports feeling \"great\". She does have some swelling at times - Dr. Almendarez restarted norvasc. BP has been 124/58; typically 120s. Breathing has been stable. Stable UC " sxs.    02/09/21:  video visit. Cr 1.18-1.55 in the past year; 1.15 last week. Feeling well. No issues with ulcerative colitis. BP at home has not been registering recently. She is seeing cardiology next week but again virtual. No swelling concerns. Currently taking lasix 40 mg AM and 20 mg PM. No NSAIDs.        atenolol (TENORMIN) 50 MG tablet, TAKE 1 TABLET BY MOUTH DAILY       atorvastatin (LIPITOR) 40 MG tablet, Take 1 tablet (40 mg) by mouth daily       blood glucose (ACCU-CHEK FELIPE PLUS) test strip, 200 strips by In Vitro route 2 times daily Use to test blood sugar 2 times daily or as directed.       blood glucose (ACCU-CHEK FELIPE PLUS) test strip, TEST TWICE DAILY       Cholecalciferol (VITAMIN D) 2000 units CAPS, Take by mouth daily       Cyanocobalamin (B-12) 1000 MCG TABS, Take 1,000 mcg by mouth three times a week       furosemide (LASIX) 40 MG tablet, 40 mg AM, 20 mg PM       GLIPIZIDE XL 10 MG 24 hr tablet, TAKE 1 TABLET BY MOUTH TWICE DAILY       hydrALAZINE (APRESOLINE) 50 MG tablet, Take 1 tablet (50 mg) by mouth 3 times daily       levothyroxine (SYNTHROID/LEVOTHROID) 112 MCG tablet, Take 1 tablet (112 mcg) by mouth daily       losartan (COZAAR) 100 MG tablet, Take 1 tablet (100 mg) by mouth daily       mesalamine (APRISO ER) 0.375 g 24 hr capsule, Take 4 capsules (1.5 g) by mouth daily       metFORMIN (GLUCOPHAGE) 500 MG tablet, TAKE 2 TABLETS BY MOUTH TWICE DAILY WITH MEALS    No current facility-administered medications on file prior to visit.       Exam:  There were no vitals taken for this visit.  GENERAL APPEARANCE: alert and no distress    Results:    No visits with results within 1 Day(s) from this visit.   Latest known visit with results is:   Orders Only on 02/08/2021   Component Date Value Ref Range Status     Sodium 02/08/2021 133  133 - 144 mmol/L Final     Potassium 02/08/2021 4.3  3.4 - 5.3 mmol/L Final     Chloride 02/08/2021 102  94 - 109 mmol/L Final     Carbon Dioxide 02/08/2021  25  20 - 32 mmol/L Final     Anion Gap 02/08/2021 6  3 - 14 mmol/L Final     Glucose 02/08/2021 134* 70 - 99 mg/dL Final     Urea Nitrogen 02/08/2021 23  7 - 30 mg/dL Final     Creatinine 02/08/2021 1.15* 0.52 - 1.04 mg/dL Final     GFR Estimate 02/08/2021 49* >60 mL/min/[1.73_m2] Final    Comment: Non  GFR Calc  Starting 12/18/2018, serum creatinine based estimated GFR (eGFR) will be   calculated using the Chronic Kidney Disease Epidemiology Collaboration   (CKD-EPI) equation.       GFR Estimate If Black 02/08/2021 56* >60 mL/min/[1.73_m2] Final    Comment:  GFR Calc  Starting 12/18/2018, serum creatinine based estimated GFR (eGFR) will be   calculated using the Chronic Kidney Disease Epidemiology Collaboration   (CKD-EPI) equation.       Calcium 02/08/2021 9.4  8.5 - 10.1 mg/dL Final     Phosphorus 02/08/2021 3.5  2.5 - 4.5 mg/dL Final     Albumin 02/08/2021 4.1  3.4 - 5.0 g/dL Final     Protein Random Urine 02/08/2021 0.17  g/L Final     Protein Total Urine g/gr Creatinine 02/08/2021 0.66* 0 - 0.2 g/g Cr Final     Parathyroid Hormone Intact 02/08/2021 64  18 - 80 pg/mL Final     Hemoglobin 02/08/2021 11.8  11.7 - 15.7 g/dL Final     Creatinine Urine 02/08/2021 26  mg/dL Final      Lab results were reviewed and interpreted.       Assessment/Plan:   1. CKD Stage 3: risks for CKD include poorly controlled diabetes as well as longstanding hypertension. Mesalamine has potential implications on the kidney but with her function stable, this seems less likely to be causing kidney injury. She does have proteinuria without hematuria which does fit with diabetic nephropathy. She is already on losartan 100 mg daily. Educated on benefits of optimal weight, avoidance of NSAIDs, blood pressure well controlled and also diabetes control as the important things to keep the kidney function stable. Myeloma testing normal previously. She is on lasix - educated on holding lasix should she have sxs of  dehydration but otherwise no other changes today.   - could consider SGLT2 inhibitor but will defer this change to primary care given will affect her diabetes management.   - Will follow routinely with labs in 6 months and follow-up in one year.      2. Hypertension: goal of <130/80 - no changes made today. She has not been able to get home readings on her cuff. She plans to get a new cuff. She would be interested in a blood pressure check visit at the clinic to have this information prior to her visit with Dr. Almendarez next week.      3. Ulcerative colitis: she reports good control with mesalamine. Very rarely mesalamine causes an acute interstitial picture but there are reports of this occurring as low as 0.3% of the time. We will plan to follow. On discussion with her, she feels that she may have been chronically volume depleted prior to getting her treatment for ulcerative colitis and may be the reason her creatinine is improved this past year.      4. Hypothyroidism: well controlled on last check in Jan 2020     5. DM:  Very poor control documented in our system from 2011 to 2017 with A1C levels between 9.4 and 11.7% at this time. It is possible that it was uncontrolled even further back as A1C levels are not available at this time dating back further than 2011. Encouraged ongoing optimization of diabetes to help slow progression of kidney disease. Pleased to see last A1C was down to 6.6%    - There is evidence that supports a renoprotective effect of SGLT2 inhibitors in the setting of diabetic nephropathy. Will defer to primary care ultimately as they are managing her diabetes. If SGLT2 inhibitors are started, creatinine should be monitored a few weeks later given the diuretic effect of the medication and potential rise in creatinine associated with the hemodynamic changes. Given she is on lasix, would consider lowering the dose of lasix slightly when starting SGLT2 inhibitor as it will also provide a diuretic  effect.      6. CHF/pulmonary hypertension/aortic stenosis: followed by Dr. Almendarez        There are no Patient Instructions on file for this visit.     759 AM to 806 AM video visit.   Swati Minor DO

## 2021-02-12 ENCOUNTER — VIRTUAL VISIT (OUTPATIENT)
Dept: CARDIOLOGY | Facility: CLINIC | Age: 71
End: 2021-02-12
Payer: MEDICARE

## 2021-02-12 DIAGNOSIS — I27.20 PULMONARY HYPERTENSION (H): ICD-10-CM

## 2021-02-12 DIAGNOSIS — I50.30 HEART FAILURE WITH PRESERVED EJECTION FRACTION, NYHA CLASS I (H): Primary | ICD-10-CM

## 2021-02-12 PROCEDURE — 99442 PR PHYSICIAN TELEPHONE EVALUATION 11-20 MIN: CPT | Mod: 95 | Performed by: INTERNAL MEDICINE

## 2021-02-12 NOTE — PROGRESS NOTES
Georgia is a 70 year old who is being evaluated via a billable telephone visit.      Phone call duration: 20 minutes    [facility] Cardiology Virtual Visit:    Assessment and Plan:     1.    Chronic HFpEF, group 2 severe pulmonary hypertension related to diastolic dysfunction, mildly reduced RV function, functional class I-II.    Cont Lasix, Follows in core clinic.  Plan for annual echo  2.  Hypertension: Controlled at home on current regimen  3.  Mild-moderate aortic stenosis, possible bicuspid aortic valve: Serial echoes  4. Diabetes: Consider adding SGLT1i in the future  5.  Dyslipidemia, subclinical CAD with moderate CAC on CT: on Lipitor.  We discussed pros and cons of starting aspirin.  She has concerns about GI bleeding given her history of ulcerative colitis and prior colectomy.  However, she tells me that her GI team has told her it is okay to take aspirin, and she is agreeable to try.  Start baby aspirin.  6.  CKD stage 3  7. Ulcerative colitis    Follow-up in 6 months with echo    David Almendarez MD    Cardiac Imaging and Prevention  [facility]  [email or web address] I Pager: [phone]    HPI: HFpEF, pulmonary hypertension, routine follow-up, telephone visit.  Since her last visit, I referred her to see a pulmonary hypertension specialist, Dr. Marcus.  He felt that the etiology was group 2 related to underlying diastolic dysfunction, and that no specific pH therapies were indicated.  He ordered pyrophosphate scan that excluded TTR amyloid.  The associated CT scan showed moderate coronary artery calcifications.  She is also been following up in the core clinic.  Her hydralazine has been adjusted.  Home blood pressure monitoring has shown blood pressure in the 110s.  Her basic labs are normal.  She feels well and denies any symptoms.  Denies any chest pain or pressure, shortness of breath/dyspnea, orthopnea, pnd, palpitations, syncope/presyncope or  edema.      PAST MEDICAL HISTORY:  Past Medical History:   Diagnosis Date     Diabetes mellitus (H)      Diverticulosis of colon (without mention of hemorrhage) 10/1/2012     Hypertension      Pulmonary hypertension (H)      Ulcerative (chronic) enterocolitis (H)        CURRENT MEDICATIONS:  Current Outpatient Medications   Medication     atenolol (TENORMIN) 50 MG tablet     atorvastatin (LIPITOR) 40 MG tablet     Cholecalciferol (VITAMIN D) 2000 units CAPS     Cyanocobalamin (B-12) 1000 MCG TABS     furosemide (LASIX) 40 MG tablet     GLIPIZIDE XL 10 MG 24 hr tablet     hydrALAZINE (APRESOLINE) 50 MG tablet     levothyroxine (SYNTHROID/LEVOTHROID) 112 MCG tablet     losartan (COZAAR) 100 MG tablet     mesalamine (APRISO ER) 0.375 g 24 hr capsule     metFORMIN (GLUCOPHAGE) 500 MG tablet     blood glucose (ACCU-CHEK FELIPE PLUS) test strip     blood glucose (ACCU-CHEK FELIPE PLUS) test strip     No current facility-administered medications for this visit.        PAST SURGICAL HISTORY:  Past Surgical History:   Procedure Laterality Date     CHOLECYSTECTOMY  3/1987     COLON SURGERY  01/2001    Left colon resection; diverticulitis     COLONOSCOPY N/A 4/24/2017    Procedure: COMBINED COLONOSCOPY, SINGLE OR MULTIPLE BIOPSY/POLYPECTOMY BY BIOPSY;;  Surgeon: Radha Salguero MD;  Location: [facility] OR     COLONOSCOPY WITH CO2 INSUFFLATION N/A 4/24/2017    Procedure: COLONOSCOPY WITH CO2 INSUFFLATION;  Colonoscopy Dx rectal bleeding, BMI 25.86, Pharm [facility] .[phone], ;  Surgeon: Radha Salguero MD;  Location: [facility] OR     CV RIGHT HEART CATH MEASUREMENTS RECORDED N/A 3/16/2020    Procedure: CV RIGHT HEART CATH;  Surgeon: Nader Muñoz MD;  Location:  HEART CARDIAC CATH LAB     GYN SURGERY  9/1983    tubal ligation     HERNIA REPAIR  12/2006    recurrent incisional hernia     THROAT SURGERY  12/1974    thyroidectomy       ALLERGIES     Allergies   Allergen Reactions     Actos [Pioglitazone]       Fluid retention     Amlodipine      Leg swelling, hand swelling     Animal Dander      Doxycycline Hives     Dust Mites      Grass      Keflex [Cephalexin Hcl] Hives     Metoprolol      Swelling of lower legs and fatigue     Other [Seasonal Allergies]      pollen     Ranitidine      rash     Synthroid [Levothroid] Swelling     ???     Tetracycline Hives     Tylenol Hives     Ziac [Bisoprolol-Hydrochlorothiazide]        FAMILY HISTORY:  Family History   Problem Relation Age of Onset     Cerebrovascular Disease Mother      Cancer Father         bladder     Diverticulitis Brother      Diverticulitis Brother      Kidney Disease No family hx of      Crohn's Disease No family hx of      Ulcerative Colitis No family hx of      Stomach Cancer No family hx of      GERD No family hx of      Celiac Disease No family hx of        SOCIAL HISTORY:  Social History     Socioeconomic History     Marital status:      Spouse name: Raymundo; dementia;  16     Number of children: 3     Years of education: Not on file     Highest education level: Not on file   Occupational History     Employer: [employer]   Social Needs     Financial resource strain: Not on file     Food insecurity     Worry: Not on file     Inability: Not on file     Transportation needs     Medical: Not on file     Non-medical: Not on file   Tobacco Use     Smoking status: Never Smoker     Smokeless tobacco: Never Used   Substance and Sexual Activity     Alcohol use: Yes     Alcohol/week: 2.0 - 4.0 standard drinks     Types: 1 - 2 Cans of beer, 1 - 2 Shots of liquor per week     Comment: occasional cocktail or beer     Drug use: No     Sexual activity: Not Currently   Lifestyle     Physical activity     Days per week: Not on file     Minutes per session: Not on file     Stress: Not on file   Relationships     Social connections     Talks on phone: Not on file     Gets together: Not on file     Attends Protestant service: Not on file     Active member of  club or organization: Not on file     Attends meetings of clubs or organizations: Not on file     Relationship status: Not on file     Intimate partner violence     Fear of current or ex partner: Not on file     Emotionally abused: Not on file     Physically abused: Not on file     Forced sexual activity: Not on file   Other Topics Concern     Parent/sibling w/ CABG, MI or angioplasty before 65F 55M? 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 8/14       ROS:   Constitutional: No fever, chills, or sweats. No weight gain/loss   ENT: No visual disturbance, ear ache, epistaxis, sore throat  Allergies/Immunologic: Negative.   Respiratory: No cough, hemoptysia  Cardiovascular: As per HPI  GI: No nausea, vomiting, hematemesis, melena, or hematochezia  : No urinary frequency, dysuria, or hematuria  Integument: Negative  Psychiatric: Negative  Neuro: Negative  Endocrinology: Negative   Musculoskeletal: Negative    ADDITIONAL COMMENTS:     I reviewed the patient's medications:     I reviewed the patient's pertinent clinical laboratory studies:     I reviewed the patient's pertinent imaging studies:   I reviewed the patient's ECG:

## 2021-02-17 NOTE — PROGRESS NOTES
Please put in orders for lab appointment on 4/8/19.    Thanks,   Iris HAUSER      Any Nosebleeds?: No

## 2021-03-16 DIAGNOSIS — E78.5 HYPERLIPIDEMIA LDL GOAL <100: ICD-10-CM

## 2021-03-16 RX ORDER — ATORVASTATIN CALCIUM 40 MG/1
TABLET, FILM COATED ORAL
Qty: 90 TABLET | Refills: 2 | Status: SHIPPED | OUTPATIENT
Start: 2021-03-16 | End: 2021-12-03

## 2021-04-21 DIAGNOSIS — I50.30 HEART FAILURE WITH PRESERVED EJECTION FRACTION, NYHA CLASS I (H): Primary | ICD-10-CM

## 2021-04-28 ENCOUNTER — OFFICE VISIT (OUTPATIENT)
Dept: CARDIOLOGY | Facility: CLINIC | Age: 71
End: 2021-04-28
Payer: MEDICARE

## 2021-04-28 VITALS
OXYGEN SATURATION: 99 % | WEIGHT: 156 LBS | DIASTOLIC BLOOD PRESSURE: 81 MMHG | HEART RATE: 72 BPM | SYSTOLIC BLOOD PRESSURE: 167 MMHG | BODY MASS INDEX: 28.53 KG/M2

## 2021-04-28 DIAGNOSIS — E11.29 TYPE 2 DIABETES MELLITUS WITH MICROALBUMINURIA, WITHOUT LONG-TERM CURRENT USE OF INSULIN (H): ICD-10-CM

## 2021-04-28 DIAGNOSIS — I50.30 HEART FAILURE WITH PRESERVED EJECTION FRACTION, NYHA CLASS I (H): ICD-10-CM

## 2021-04-28 DIAGNOSIS — I27.20 PULMONARY HYPERTENSION (H): ICD-10-CM

## 2021-04-28 DIAGNOSIS — I51.89 DIASTOLIC DYSFUNCTION: Primary | ICD-10-CM

## 2021-04-28 DIAGNOSIS — I10 HYPERTENSION GOAL BP (BLOOD PRESSURE) < 130/80: ICD-10-CM

## 2021-04-28 DIAGNOSIS — N18.30 STAGE 3 CHRONIC KIDNEY DISEASE, UNSPECIFIED WHETHER STAGE 3A OR 3B CKD (H): ICD-10-CM

## 2021-04-28 DIAGNOSIS — R80.9 TYPE 2 DIABETES MELLITUS WITH MICROALBUMINURIA, WITHOUT LONG-TERM CURRENT USE OF INSULIN (H): ICD-10-CM

## 2021-04-28 LAB
ANION GAP SERPL CALCULATED.3IONS-SCNC: 8 MMOL/L (ref 3–14)
BUN SERPL-MCNC: 23 MG/DL (ref 7–30)
CALCIUM SERPL-MCNC: 9.8 MG/DL (ref 8.5–10.1)
CHLORIDE SERPL-SCNC: 100 MMOL/L (ref 94–109)
CO2 SERPL-SCNC: 26 MMOL/L (ref 20–32)
CREAT SERPL-MCNC: 1.21 MG/DL (ref 0.52–1.04)
GFR SERPL CREATININE-BSD FRML MDRD: 46 ML/MIN/{1.73_M2}
GLUCOSE SERPL-MCNC: 57 MG/DL (ref 70–99)
POTASSIUM SERPL-SCNC: 4.5 MMOL/L (ref 3.4–5.3)
SODIUM SERPL-SCNC: 134 MMOL/L (ref 133–144)

## 2021-04-28 PROCEDURE — 80048 BASIC METABOLIC PNL TOTAL CA: CPT | Performed by: NURSE PRACTITIONER

## 2021-04-28 PROCEDURE — 99214 OFFICE O/P EST MOD 30 MIN: CPT | Performed by: NURSE PRACTITIONER

## 2021-04-28 PROCEDURE — 36415 COLL VENOUS BLD VENIPUNCTURE: CPT | Performed by: NURSE PRACTITIONER

## 2021-04-28 RX ORDER — ASPIRIN 81 MG/1
81 TABLET ORAL DAILY
COMMUNITY
End: 2021-09-01

## 2021-04-28 ASSESSMENT — PAIN SCALES - GENERAL: PAINLEVEL: NO PAIN (0)

## 2021-04-28 NOTE — PATIENT INSTRUCTIONS
Take your medicines every day, as directed    Changes made today:  o No med changes  o Send us your blood pressures in 1 month    Monitor Your Weight and Symptoms    Contact us if you:      Gain 2 pounds in one day or 5 pounds in one week    Feel more short of breath    Notice more leg swelling    Feel lightheadeded   Change your lifestyle    Limit Salt or Sodium:    2000 mg  Limit Fluids:    2000 mL or approximately 64 ounces  Eat a Heart Healthy Diet    Low in saturated fats  Stay Active:    Aim to move at least 150 minutes every  week         To Contact us    During Business Hours:  [phone], option # 1 (University)  Then option # 4 (medical questions)     After hours, weekends or holidays:   [phone], Option #4  Ask to speak to the On-Call Cardiologist. Inform them you are a CORE/heart failure patient at the [facility].     Use [employer] allows you to communicate directly with your heart team through secure messaging.    [employer] can be accessed any time on your phone, computer, or tablet.    If you need assistance, we'd be happy to help!         Keep your Heart Appointments:    Follow up in CORE clinic in July with labs prior

## 2021-04-28 NOTE — PROGRESS NOTES
Georgia is a 71 year old female with a  medical history is significant for longstanding hypertension since she was in her teens, diabetes, dyslipidemia, and ulcerative colitis.    She feels she hasn't been watching her diet lately. She feels she has been eating fast food more than prior. Patient states she has no SOB with or without activity. She saw Dr. Marcus in September in consult for PH.  Weights 147 lbs at home. She takes diuretic( Furosemide 40mg am /20 mg pm)  No swelling in LE,watches grandkids ages 4 and 2 every day.  She has noted her BS to be stable. She feels well overall and denies chest pain, palpitations, lightheadedness, dizziness. Her BP's at home are 117-120's/50-60's/. She says coming to clinic for an appt increases her BP's.    Current meds:  Atenolol 50 mg daily  Lasix 40 mg am/20 mg pm  Hydralazine 50 mg TID  Losartan 100 mg daily      ROS:   Constitutional: No fever, chills, or sweats.  ENT: No visual disturbance, ear ache, epistaxis, sore throat.   Allergies/Immunologic: Negative  Respiratory: No cough, hemoptysis.   Cardiovascular: As per HPI.   GI: As per HPI.   : No urinary frequency, dysuria, or hematuria.   Integument: Negative.   Psychiatric: Negative.   Neuro: Negative.   Endocrinology: negative   Musculoskeletal: negative    EXAM:   Constitutional -   Eyes - no redness or discharge, nonicteric sclera  Respiratory - nonlabored breathing, able to speak in full sentences without difficulty  CV: trace edema of  LE. JVP neg at 90 degrees  Neurological - alert and oriented, speech fluent/appropriate  PSYCH: cooperative, affect appropriate  EXT- neg for edema in the legs  DERM: no rashes on visualized face/neck/upper extremities     ECHO 8/14   Interpretation Summary     Left ventricular function, chamber size, wall motion, and wall thickness are  normal.The EF is 60-65%.  Mild right ventricular dilation is present. Global right ventricular function  is mildly reduced, RVFAC 33%.  Flattened septum is consistent with right  ventricular pressure overload.  Severe pulmonary hypertension. Pulmonary artery systolic pressure is 92 mmHg  (77 mmHg+RA pressure 15 mmHg.)  Mild aortic stenosis (PV 2.6 m/s MG 13 mmHg.)  Moderate to severe TR.  Grade III LV diastolic dysfunction with elevated left and right-sided filling  pressures.  This study was compared with the study from 2/17/20: RA pressure has increased  and TR has worsened. RV function appears similar, however the RV is better  imaged on today's study. Aortic stenosis is unchanged.    .  Assessment and Plan:  Georgia is well today, despite her lack of any functional limitation, the ongoing concern about her mildly reduced RV function is still present.  She appears euvolemic. We went over her labs and discussed the results. She is making a better effort to stay hydrated. She will start back on being careful with her sodium intake and consumption.  She is busy watching her 2 grand kids and is happy she can keep up with them.        1.  Severe pulmonary hypertension, normal LV function with mild dilation, functional class I-II.The etiology is likely group 2, and related to underlying diastolic dysfunction from longstanding hypertension and diabetes.    2.  Hypertension, with better recent control:  amlodipine stopped on 8/14 ( swelling).  Instructed to keep home blood pressure log.  3.  Mild-moderate aortic stenosis, possible bicuspid aortic valve:   4. Diabetes: PCP following  5.  Dyslipidemia, on Lipitor  6.  CKD stage 3  7. Ulcerative colitis      Plan:  BP readings to be sent via My chart in 4 weeks. If elevated - will increase Hydralazine.  CORE in July         Carolyn LLAMAS

## 2021-04-28 NOTE — NURSING NOTE
Keerthi Montoya's goals for this visit include:   Chief Complaint   Patient presents with     RECHECK     2mo recheck        She requests these members of her care team be copied on today's visit information: no    PCP: Nathan Dhillon    Referring Provider:  WENDI Arreaga [facility]  [address] [place] [address]    BP (!) 174/81 (BP Location: Left arm, Patient Position: Sitting, Cuff Size: Adult Regular)   Pulse 72   Wt 70.8 kg (156 lb)   SpO2 99%   BMI 28.53 kg/m      Do you need any medication refills at today's visit? No    Lucy Purdy LPN

## 2021-05-08 ENCOUNTER — HEALTH MAINTENANCE LETTER (OUTPATIENT)
Age: 71
End: 2021-05-08

## 2021-05-18 DIAGNOSIS — I27.20 PULMONARY HYPERTENSION (H): ICD-10-CM

## 2021-05-19 RX ORDER — FUROSEMIDE 40 MG
TABLET ORAL
Qty: 135 TABLET | Refills: 4 | Status: SHIPPED | OUTPATIENT
Start: 2021-05-19 | End: 2021-08-13

## 2021-05-19 NOTE — TELEPHONE ENCOUNTER
furosemide (LASIX) 40 MG tablet  Last Written Prescription Date:  10/28/2020  Last Fill Quantity: 90,   # refills: 3  Last Office Visit : 10/28/2020  Future Office visit:  None    Routing refill request to provider for review/approval because:  Medication is reported/historical  Refer to Provider at [facility] for review     Rhonda Ambriz RN  Central Triage Red Flags/Med Refills

## 2021-06-16 DIAGNOSIS — E11.9 TYPE 2 DIABETES MELLITUS WITHOUT COMPLICATION, WITHOUT LONG-TERM CURRENT USE OF INSULIN (H): ICD-10-CM

## 2021-06-16 RX ORDER — GLIPIZIDE 10 MG/1
TABLET, FILM COATED, EXTENDED RELEASE ORAL
Qty: 180 TABLET | Refills: 1 | Status: SHIPPED | OUTPATIENT
Start: 2021-06-16 | End: 2021-12-16

## 2021-07-10 NOTE — TELEPHONE ENCOUNTER
Per Carolyn vicente to call patient to have appt converted to telephone visit or video visit.     Diana Bang CMA on 3/26/2020 at 10:43 AM     Health Care Proxy (HCP)

## 2021-08-01 ENCOUNTER — TRANSFERRED RECORDS (OUTPATIENT)
Dept: HEALTH INFORMATION MANAGEMENT | Facility: CLINIC | Age: 71
End: 2021-08-01
Payer: MEDICARE

## 2021-08-01 LAB — RETINOPATHY: NORMAL

## 2021-08-09 ENCOUNTER — LAB (OUTPATIENT)
Dept: LAB | Facility: CLINIC | Age: 71
End: 2021-08-09
Payer: MEDICARE

## 2021-08-09 DIAGNOSIS — N18.31 STAGE 3A CHRONIC KIDNEY DISEASE (H): ICD-10-CM

## 2021-08-09 LAB
ALBUMIN SERPL-MCNC: 4.1 G/DL (ref 3.4–5)
ALBUMIN UR-MCNC: NEGATIVE MG/DL
ANION GAP SERPL CALCULATED.3IONS-SCNC: 10 MMOL/L (ref 3–14)
APPEARANCE UR: CLEAR
BACTERIA #/AREA URNS HPF: ABNORMAL /HPF
BILIRUB UR QL STRIP: NEGATIVE
BUN SERPL-MCNC: 36 MG/DL (ref 7–30)
CALCIUM SERPL-MCNC: 9.1 MG/DL (ref 8.5–10.1)
CHLORIDE BLD-SCNC: 102 MMOL/L (ref 94–109)
CO2 SERPL-SCNC: 22 MMOL/L (ref 20–32)
COLOR UR AUTO: ABNORMAL
CREAT SERPL-MCNC: 1.44 MG/DL (ref 0.52–1.04)
CREAT UR-MCNC: 33 MG/DL
GFR SERPL CREATININE-BSD FRML MDRD: 37 ML/MIN/1.73M2
GLUCOSE BLD-MCNC: 62 MG/DL (ref 70–99)
GLUCOSE UR STRIP-MCNC: NEGATIVE MG/DL
HGB BLD-MCNC: 11 G/DL (ref 11.7–15.7)
HGB UR QL STRIP: NEGATIVE
KETONES UR STRIP-MCNC: NEGATIVE MG/DL
LEUKOCYTE ESTERASE UR QL STRIP: ABNORMAL
NITRATE UR QL: NEGATIVE
PH UR STRIP: 6 [PH] (ref 5–7)
PHOSPHATE SERPL-MCNC: 3.4 MG/DL (ref 2.5–4.5)
POTASSIUM BLD-SCNC: 4.2 MMOL/L (ref 3.4–5.3)
PROT UR-MCNC: 0.12 G/L
PROT/CREAT 24H UR: 0.36 G/G CR (ref 0–0.2)
PTH-INTACT SERPL-MCNC: 135 PG/ML (ref 18–80)
RBC #/AREA URNS AUTO: ABNORMAL /HPF
SKIP: ABNORMAL
SODIUM SERPL-SCNC: 134 MMOL/L (ref 133–144)
SP GR UR STRIP: 1 (ref 1–1.03)
SQUAMOUS #/AREA URNS AUTO: ABNORMAL /LPF
UROBILINOGEN UR STRIP-MCNC: NORMAL MG/DL
WBC #/AREA URNS AUTO: ABNORMAL /HPF

## 2021-08-09 PROCEDURE — 36415 COLL VENOUS BLD VENIPUNCTURE: CPT

## 2021-08-09 PROCEDURE — 81001 URINALYSIS AUTO W/SCOPE: CPT

## 2021-08-09 PROCEDURE — 83970 ASSAY OF PARATHORMONE: CPT

## 2021-08-09 PROCEDURE — 80069 RENAL FUNCTION PANEL: CPT

## 2021-08-09 PROCEDURE — 84156 ASSAY OF PROTEIN URINE: CPT

## 2021-08-09 PROCEDURE — 85018 HEMOGLOBIN: CPT

## 2021-08-10 ENCOUNTER — TELEPHONE (OUTPATIENT)
Dept: NEPHROLOGY | Facility: CLINIC | Age: 71
End: 2021-08-10

## 2021-08-10 DIAGNOSIS — R30.0 DYSURIA: Primary | ICD-10-CM

## 2021-08-10 NOTE — TELEPHONE ENCOUNTER
[facility] Call Center    Phone Message    May a detailed message be left on voicemail: yes     Reason for Call: Other: Patient is requesting a call back to discuss patient possible having a uninary tract infection, please call patient at [phone] to advise.     Action Taken: Message routed to:  [facility] ([facility] JASSON Ham    Travel Screening: Not Applicable

## 2021-08-11 RX ORDER — CIPROFLOXACIN 250 MG/1
250 TABLET, FILM COATED ORAL 2 TIMES DAILY
Qty: 6 TABLET | Refills: 0 | Status: SHIPPED | OUTPATIENT
Start: 2021-08-11 | End: 2021-08-14

## 2021-08-11 NOTE — TELEPHONE ENCOUNTER
[facility] Call Center    Phone Message    May a detailed message be left on voicemail: yes     Reason for Call: Other: Patient calling looking to speak with a nurse on Dr. Minor care team. States that she believes she has a urinary tract infection and would like to discuss with care team.     Please advise and call patient back to discuss further     Action Taken: Other: [facility]    Travel Screening: Not Applicable

## 2021-08-11 NOTE — CONFIDENTIAL NOTE
Spoke to patient. Reviewed recently done UA. Culture was not done as it was not deemed necessary (no WBC or nitrates). Reviewed with Dr. Minor who was willing to order antibiotic Cipro 250 mg BID x 3 days given symptoms. Advised patient that if antibiotic does not take care of symptoms, she should seek PCP follow up. Patient was agreeable.     Heidi Marrero RN, BSN  Nephrology Care Coordinator  [facility]

## 2021-08-13 ENCOUNTER — ANCILLARY PROCEDURE (OUTPATIENT)
Dept: CARDIOLOGY | Facility: CLINIC | Age: 71
End: 2021-08-13
Attending: INTERNAL MEDICINE
Payer: MEDICARE

## 2021-08-13 ENCOUNTER — OFFICE VISIT (OUTPATIENT)
Dept: CARDIOLOGY | Facility: CLINIC | Age: 71
End: 2021-08-13
Payer: MEDICARE

## 2021-08-13 VITALS
DIASTOLIC BLOOD PRESSURE: 70 MMHG | BODY MASS INDEX: 27.36 KG/M2 | WEIGHT: 149.6 LBS | OXYGEN SATURATION: 97 % | HEART RATE: 60 BPM | SYSTOLIC BLOOD PRESSURE: 162 MMHG

## 2021-08-13 DIAGNOSIS — I50.30 HEART FAILURE WITH PRESERVED EJECTION FRACTION, NYHA CLASS I (H): Primary | ICD-10-CM

## 2021-08-13 DIAGNOSIS — I50.30 HEART FAILURE WITH PRESERVED EJECTION FRACTION, NYHA CLASS I (H): ICD-10-CM

## 2021-08-13 DIAGNOSIS — I27.20 PULMONARY HYPERTENSION (H): ICD-10-CM

## 2021-08-13 DIAGNOSIS — I35.0 NONRHEUMATIC AORTIC VALVE STENOSIS: ICD-10-CM

## 2021-08-13 DIAGNOSIS — K57.30 DIVERTICULOSIS OF LARGE INTESTINE WITHOUT HEMORRHAGE: ICD-10-CM

## 2021-08-13 LAB — LVEF ECHO: NORMAL

## 2021-08-13 PROCEDURE — 93306 TTE W/DOPPLER COMPLETE: CPT | Performed by: INTERNAL MEDICINE

## 2021-08-13 PROCEDURE — 99215 OFFICE O/P EST HI 40 MIN: CPT | Performed by: INTERNAL MEDICINE

## 2021-08-13 RX ORDER — CIPROFLOXACIN 250 MG/1
250 TABLET, FILM COATED ORAL 2 TIMES DAILY
Qty: 8 TABLET | Refills: 0 | Status: SHIPPED | OUTPATIENT
Start: 2021-08-13 | End: 2021-08-17

## 2021-08-13 RX ORDER — TORSEMIDE 20 MG/1
20 TABLET ORAL 2 TIMES DAILY
Qty: 90 TABLET | Refills: 3 | Status: SHIPPED | OUTPATIENT
Start: 2021-08-13 | End: 2021-09-01

## 2021-08-13 RX ORDER — METRONIDAZOLE 500 MG/1
500 TABLET ORAL 2 TIMES DAILY
Qty: 14 TABLET | Refills: 0 | Status: SHIPPED | OUTPATIENT
Start: 2021-08-13 | End: 2021-08-20

## 2021-08-13 NOTE — PROGRESS NOTES
[facility] Cardiology Consultation:    Assessment and Plan:     1.    Chronic HFpEF, group 2 severe pulmonary hypertension related to diastolic dysfunction, moderately reduced RV function, functional class I-II.    Follows in core clinic.  Given echo findings, switch Lasix to torsemide 20 twice daily.  Discussed risk of worsening renal function, check BMP in 1 week.  Follow-up in core clinic in 2 weeks  2.  Hypertension, high office BP: Asked to monitor closely at home and keep a diary and send us numbers in 2 weeks.  3.  Moderate paradoxical low flow aortic stenosis, possible bicuspid aortic valve: Check echo in 6 months  4. Diabetes given antibiotic regimen  5.  Dyslipidemia, subclinical CAD with moderate CAC on CT: on Lipitor and baby aspirin.  6.  CKD stage 3  7. Ulcerative colitis: Given antibiotic regimen for diverticulitis per patient's request as she is unable to get in to see her PCP till next month.     Follow-up in 6 months with echo.    David Almendarez MD    Cardiac Imaging and Prevention  [facility]  [email or web address] Pager: [phone]    HPI: HFpEF, pulmonary hypertension follow-up.  I reviewed her echocardiogram that was done today.  It shows normal LV function, grade 3 diastolic dysfunction with elevated LVEDP, paradoxical septum, moderate pulmonary hypertension with RVSP of 60 mmHg, moderate RV dilation with mild to moderate dysfunction, CVP of 15, possible bicuspid aortic valve with moderate paradoxical low flow aortic stenosis.  Compared to prior study from 2020, measured RVSP is lower but no other significant change.  She has been doing well, and has no functional limitation.  Denies any chest pain or pressure, shortness of breath/dyspnea, orthopnea, pnd, palpitations, syncope/presyncope or edema.She continues to follow-up in core clinic.  Renal function is stable.  She reports recent onset of presumed diverticulitis, with left lower quadrant  discomfort.  She has had diverticulitis in the past that is usually treated with liquid diet and antibiotics.  She is unable to get into see her PCP, and requests antibiotics.      EXAM:  BP (!) 162/70 (BP Location: Left arm, Patient Position: Sitting, Cuff Size: Adult Regular)   Pulse 60   Wt 67.9 kg (149 lb 9.6 oz)   SpO2 97%   BMI 27.36 kg/m    GEN/CONSTITUIONAL: Appears comfortable, in no apparent distress   EYES: No icterus  ENT/MOUTH: Normal  JVP:  Elevated to below clavicle  RESPIRATORY: Clear to auscultation bilaterally   CARDIOVASCULAR: Regular S1 and S2, 2/6 JUANITA.   ABDOMEN: Soft, non-tender, positive bowel sounds   NEUROLOGIC: Grossly non-focal   PSYCHIATRIC: Normal affect  EXT: 1+ LE edema. Normal pedal pulses.  Skin: No petechiae, purpura or rash    PAST MEDICAL HISTORY:  Past Medical History:   Diagnosis Date     Diabetes mellitus (H)      Diverticulosis of colon (without mention of hemorrhage) 10/1/2012     Hypertension      Pulmonary hypertension (H)      Ulcerative (chronic) enterocolitis (H)        CURRENT MEDICATIONS:  Current Outpatient Medications   Medication     aspirin 81 MG EC tablet     atenolol (TENORMIN) 50 MG tablet     atorvastatin (LIPITOR) 40 MG tablet     blood glucose (ACCU-CHEK FELIPE PLUS) test strip     blood glucose (ACCU-CHEK FELIPE PLUS) test strip     Cholecalciferol (VITAMIN D) 2000 units CAPS     ciprofloxacin (CIPRO) 250 MG tablet     Cyanocobalamin (B-12) 1000 MCG TABS     furosemide (LASIX) 40 MG tablet     glipiZIDE (GLUCOTROL XL) 10 MG 24 hr tablet     hydrALAZINE (APRESOLINE) 50 MG tablet     levothyroxine (SYNTHROID/LEVOTHROID) 112 MCG tablet     losartan (COZAAR) 100 MG tablet     mesalamine (APRISO ER) 0.375 g 24 hr capsule     metFORMIN (GLUCOPHAGE) 500 MG tablet     No current facility-administered medications for this visit.       PAST SURGICAL HISTORY:  Past Surgical History:   Procedure Laterality Date     CHOLECYSTECTOMY  3/1987     COLON SURGERY  01/2001     Left colon resection; diverticulitis     COLONOSCOPY N/A 2017    Procedure: COMBINED COLONOSCOPY, SINGLE OR MULTIPLE BIOPSY/POLYPECTOMY BY BIOPSY;;  Surgeon: Radha Salguero MD;  Location: [facility] OR     COLONOSCOPY WITH CO2 INSUFFLATION N/A 2017    Procedure: COLONOSCOPY WITH CO2 INSUFFLATION;  Colonoscopy Dx rectal bleeding, BMI 25.86, Pharm [facility] .[phone], ;  Surgeon: Radha Salguero MD;  Location: [facility] OR     CV RIGHT HEART CATH MEASUREMENTS RECORDED N/A 3/16/2020    Procedure: CV RIGHT HEART CATH;  Surgeon: Nader Muñoz MD;  Location:  HEART CARDIAC CATH LAB     GYN SURGERY  1983    tubal ligation     HERNIA REPAIR  2006    recurrent incisional hernia     THROAT SURGERY  1974    thyroidectomy       ALLERGIES     Allergies   Allergen Reactions     Actos [Pioglitazone]      Fluid retention     Amlodipine      Leg swelling, hand swelling     Animal Dander      Doxycycline Hives     Dust Mites      Grass      Keflex [Cephalexin Hcl] Hives     Metoprolol      Swelling of lower legs and fatigue     Other [Seasonal Allergies]      pollen     Ranitidine      rash     Synthroid [Levothroid] Swelling     ???     Tetracycline Hives     Tylenol Hives     Ziac [Bisoprolol-Hydrochlorothiazide]        FAMILY HISTORY:  Family History   Problem Relation Age of Onset     Cerebrovascular Disease Mother      Cancer Father         bladder     Diverticulitis Brother      Diverticulitis Brother      Kidney Disease No family hx of      Crohn's Disease No family hx of      Ulcerative Colitis No family hx of      Stomach Cancer No family hx of      GERD No family hx of      Celiac Disease No family hx of        SOCIAL HISTORY:  Social History     Socioeconomic History     Marital status:      Spouse name: Raymundo; dementia;  16     Number of children: 3     Years of education: Not on file     Highest education level: Not on file   Occupational History     Employer: [employer]   Tobacco Use     Smoking status: Never Smoker     Smokeless tobacco: Never Used   Substance and Sexual Activity     Alcohol use: Yes     Alcohol/week: 2.0 - 4.0 standard drinks     Types: 1 - 2 Cans of beer, 1 - 2 Shots of liquor per week     Comment: occasional cocktail or beer     Drug use: No     Sexual activity: Not Currently   Other Topics Concern     Parent/sibling w/ CABG, MI or angioplasty before 65F 55M? 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 8/14     Social Determinants of Health     Financial Resource Strain:      Difficulty of Paying Living Expenses:    Food Insecurity:      Worried About Running Out of Food in the Last Year:      Ran Out of Food in the Last Year:    Transportation Needs:      Lack of Transportation (Medical):      Lack of Transportation (Non-Medical):    Physical Activity:      Days of Exercise per Week:      Minutes of Exercise per Session:    Stress:      Feeling of Stress :    Social Connections:      Frequency of Communication with Friends and Family:      Frequency of Social Gatherings with Friends and Family:      Attends Taoist Services:      Active Member of Clubs or Organizations:      Attends Club or Organization Meetings:      Marital Status:    Intimate Partner Violence:      Fear of Current or Ex-Partner:      Emotionally Abused:      Physically Abused:      Sexually Abused:        ROS:   Constitutional: No fever, chills, or sweats. No weight gain/loss   ENT: No visual disturbance, ear ache, epistaxis, sore throat  Allergies/Immunologic: Negative.   Respiratory: No cough, hemoptysia  Cardiovascular: As per HPI  GI: No  nausea, vomiting, hematemesis, melena, or hematochezia  : No urinary frequency, dysuria, or hematuria  Integument: Negative  Psychiatric: Negative  Neuro: Negative  Endocrinology: Negative   Musculoskeletal: Negative    ADDITIONAL COMMENTS:     I reviewed the patient's medications:     I reviewed the patient's pertinent clinical laboratory studies:     I reviewed the patient's pertinent imaging studies:   I reviewed the patient's ECG:

## 2021-08-13 NOTE — PATIENT INSTRUCTIONS
Stop lasix  Start torsemide 20 mg twice daily.   Check lab (bmp) in 1 week  CORE f/u with Batmelyssa in 2 weeks  Check BP at home twice daily, keep a diary, and send us numbers in 2 weeks.   Cipro 250 and flaqyl 500 twice daily for 7 days.   F/u with me in 6 months with echo

## 2021-08-20 ENCOUNTER — LAB (OUTPATIENT)
Dept: LAB | Facility: CLINIC | Age: 71
End: 2021-08-20
Payer: MEDICARE

## 2021-08-20 DIAGNOSIS — I50.30 HEART FAILURE WITH PRESERVED EJECTION FRACTION, NYHA CLASS I (H): ICD-10-CM

## 2021-08-20 LAB
ANION GAP SERPL CALCULATED.3IONS-SCNC: 5 MMOL/L (ref 3–14)
BUN SERPL-MCNC: 42 MG/DL (ref 7–30)
CALCIUM SERPL-MCNC: 9.2 MG/DL (ref 8.5–10.1)
CHLORIDE BLD-SCNC: 105 MMOL/L (ref 94–109)
CO2 SERPL-SCNC: 24 MMOL/L (ref 20–32)
CREAT SERPL-MCNC: 1.55 MG/DL (ref 0.52–1.04)
GFR SERPL CREATININE-BSD FRML MDRD: 34 ML/MIN/1.73M2
GLUCOSE BLD-MCNC: 112 MG/DL (ref 70–99)
NT-PROBNP SERPL-MCNC: 9278 PG/ML (ref 0–125)
POTASSIUM BLD-SCNC: 4.9 MMOL/L (ref 3.4–5.3)
SODIUM SERPL-SCNC: 134 MMOL/L (ref 133–144)

## 2021-08-20 PROCEDURE — 36415 COLL VENOUS BLD VENIPUNCTURE: CPT

## 2021-08-20 PROCEDURE — 83880 ASSAY OF NATRIURETIC PEPTIDE: CPT

## 2021-08-20 PROCEDURE — 80048 BASIC METABOLIC PNL TOTAL CA: CPT

## 2021-08-20 NOTE — RESULT ENCOUNTER NOTE
Kidney function is slightly worse, but for now no change in clinical plan. Follow up in CORE clinic as recommended.     Carolyn Gooden I was anticipating this, but echo shows extra fluid with worsening PH so want to give diuresis a good shot.     Dr Almendarez

## 2021-08-25 ENCOUNTER — OFFICE VISIT (OUTPATIENT)
Dept: CARDIOLOGY | Facility: CLINIC | Age: 71
End: 2021-08-25
Payer: MEDICARE

## 2021-08-25 ENCOUNTER — PATIENT OUTREACH (OUTPATIENT)
Dept: CARDIOLOGY | Facility: CLINIC | Age: 71
End: 2021-08-25

## 2021-08-25 VITALS
OXYGEN SATURATION: 100 % | WEIGHT: 145.6 LBS | HEIGHT: 63 IN | BODY MASS INDEX: 25.8 KG/M2 | SYSTOLIC BLOOD PRESSURE: 145 MMHG | HEART RATE: 66 BPM | DIASTOLIC BLOOD PRESSURE: 63 MMHG

## 2021-08-25 DIAGNOSIS — N18.30 STAGE 3 CHRONIC KIDNEY DISEASE, UNSPECIFIED WHETHER STAGE 3A OR 3B CKD (H): ICD-10-CM

## 2021-08-25 DIAGNOSIS — I50.30 HEART FAILURE WITH PRESERVED EJECTION FRACTION, NYHA CLASS I (H): Primary | ICD-10-CM

## 2021-08-25 DIAGNOSIS — I10 HYPERTENSION GOAL BP (BLOOD PRESSURE) < 130/80: ICD-10-CM

## 2021-08-25 DIAGNOSIS — I35.0 NONRHEUMATIC AORTIC VALVE STENOSIS: ICD-10-CM

## 2021-08-25 DIAGNOSIS — I27.20 PULMONARY HYPERTENSION (H): ICD-10-CM

## 2021-08-25 DIAGNOSIS — R80.9 TYPE 2 DIABETES MELLITUS WITH MICROALBUMINURIA, WITHOUT LONG-TERM CURRENT USE OF INSULIN (H): ICD-10-CM

## 2021-08-25 DIAGNOSIS — E11.29 TYPE 2 DIABETES MELLITUS WITH MICROALBUMINURIA, WITHOUT LONG-TERM CURRENT USE OF INSULIN (H): ICD-10-CM

## 2021-08-25 LAB
ANION GAP SERPL CALCULATED.3IONS-SCNC: 7 MMOL/L (ref 3–14)
BUN SERPL-MCNC: 45 MG/DL (ref 7–30)
CALCIUM SERPL-MCNC: 9.4 MG/DL (ref 8.5–10.1)
CHLORIDE BLD-SCNC: 102 MMOL/L (ref 94–109)
CO2 SERPL-SCNC: 27 MMOL/L (ref 20–32)
CREAT SERPL-MCNC: 1.52 MG/DL (ref 0.52–1.04)
GFR SERPL CREATININE-BSD FRML MDRD: 35 ML/MIN/1.73M2
GLUCOSE BLD-MCNC: 160 MG/DL (ref 70–99)
POTASSIUM BLD-SCNC: 4.6 MMOL/L (ref 3.4–5.3)
SODIUM SERPL-SCNC: 136 MMOL/L (ref 133–144)

## 2021-08-25 PROCEDURE — 99213 OFFICE O/P EST LOW 20 MIN: CPT | Performed by: NURSE PRACTITIONER

## 2021-08-25 PROCEDURE — 36415 COLL VENOUS BLD VENIPUNCTURE: CPT | Performed by: NURSE PRACTITIONER

## 2021-08-25 PROCEDURE — 80048 BASIC METABOLIC PNL TOTAL CA: CPT | Performed by: NURSE PRACTITIONER

## 2021-08-25 ASSESSMENT — MIFFLIN-ST. JEOR: SCORE: 1154.44

## 2021-08-25 NOTE — PROGRESS NOTES
Georgia is a 71 year old female with a  medical history is significant for longstanding hypertension since she was in her teens, diabetes, dyslipidemia, and ulcerative colitis.    She is having a hard time with allergies.  She feels with the change in diuretic ( Torsemide 20 mg BID. No potassium) has been very helpful.  She says she has lost about 5 lbs since the change . She has junk food once in a while. Patient states she has no SOB with or without activity. Weights 141 lbs at home.   No swelling in LE,watches grandkids ages 4 and 2 every day.  She has noted her BS to be stable. She feels well overall and denies chest pain, palpitations, lightheadedness, dizziness. Her BP's at home are 101-134/50-60. She says coming to clinic for an appt increases her BP's.    Current meds:  Atenolol 50 mg daily  Torsemide 40 mg   Hydralazine 50 mg TID  Losartan 100 mg daily      ROS:   Constitutional: No fever, chills, or sweats.  ENT: No visual disturbance, ear ache, epistaxis, sore throat.   Allergies/Immunologic: Negative  Respiratory: No cough, hemoptysis.   Cardiovascular: As per HPI.   GI: As per HPI.   : No urinary frequency, dysuria, or hematuria.   Integument: Negative.   Psychiatric: Negative.   Neuro: Negative.   Endocrinology: negative   Musculoskeletal: negative    EXAM:   GEN/CONSTITUIONAL: Appears comfortable, in no apparent distress   EYES: No icterus  ENT/MOUTH: Normal  JVP:  Elevated to below clavicle  RESPIRATORY: Clear to auscultation bilaterally   CARDIOVASCULAR: Regular S1 and S2, 2/6 JUANITA.   ABDOMEN: Soft, non-tender, positive bowel sounds   NEUROLOGIC: Grossly non-focal   PSYCHIATRIC: Normal affect  EXT: no LE edema. Normal pedal pulses.  Skin: No petechiae, purpura or rash       ECHO 8/25  Interpretation Summary   Global and regional left ventricular function is normal with an EF of 60-65%.  Paradoxical septal motion consistent with right ventricular pressure and  volume overload is present.  Moderate  to severe right ventricular dilation is present.  Global right ventricular function is moderately to severely reduced.  Calculated aortic valve area in severe AS range. Consider additional  evaluation if indicated.  Moderate to severe tricuspid insufficiency is present.  Dilation of the inferior vena cava is present with abnormal respiratory  variation in diameter.  Trivial pericardial effusion is present.    .  Assessment and Plan:  Georgia is well today, despite her lack of any functional limitation, the ongoing concern about her mildly reduced RV function is still present.  She appears euvolemic. We went over the previous labs. She was only on the  diuretic for 4 days before we got those labs.  She is making a better effort to stay hydrated.     1.  Severe pulmonary hypertension, normal LV function with mild dilation, functional class I-II.The etiology is likely group 2, and related to underlying diastolic dysfunction from longstanding hypertension and diabetes.    2.  Hypertension, with better recent control:  amlodipine stopped on 8/14 ( swelling).  Instructed to keep home blood pressure log.  3.  Mild-moderate aortic stenosis, possible bicuspid aortic valve:   4. Diabetes: PCP following  5.  Dyslipidemia, on Lipitor  6.  CKD stage 3  7. Ulcerative colitis      Plan:  [facility] today   Follow up  9/17- at [facility] 7:30 am        Carolyn ADAME NP-C      Addendum:  Torsemide 30 mg daily recheck labs in one week

## 2021-08-25 NOTE — PROGRESS NOTES
"Keerthi Montoya's goals for this visit include: Blood pressure and making sure she can get back to good ranges    She requests these members of her care team be copied on today's visit information: PCP    PCP: Nathan Dhillon    Referring Provider:  WENDI Arreaga CNP  [address] [place] [address]    BP (!) 145/63 (BP Location: Left arm, Patient Position: Sitting, Cuff Size: Adult Regular)   Pulse 66   Ht 1.6 m (5' 2.99\")   Wt 66 kg (145 lb 9.6 oz)   SpO2 100%   BMI 25.80 kg/m      Do you need any medication refills at today's visit? No    "

## 2021-08-25 NOTE — PATIENT INSTRUCTIONS
Take your medicines every day, as directed    Changes made today:  o No med changes  o    Monitor Your Weight and Symptoms    Contact us if you:      Gain 2 pounds in one day or 5 pounds in one week    Feel more short of breath    Notice more leg swelling    Feel lightheadeded   Change your lifestyle    Limit Salt or Sodium:    2000 mg  Limit Fluids:    2000 mL or approximately 64 ounces  Eat a Heart Healthy Diet    Low in saturated fats  Stay Active:    Aim to move at least 150 minutes every  week         To Contact us    During Business Hours:  [phone], option # 1 (University)  Then option # 4 (medical questions)     After hours, weekends or holidays:   [phone], Option #4  Ask to speak to the On-Call Cardiologist. Inform them you are a CORE/heart failure patient at the [facility].     Use [employer] allows you to communicate directly with your heart team through secure messaging.    [employer] can be accessed any time on your phone, computer, or tablet.    If you need assistance, we'd be happy to help!         Keep your Heart Appointments:    [facility] today     [facility] appt on 9/17    Blood pressures take 2 hours after the morning medications.

## 2021-08-25 NOTE — TELEPHONE ENCOUNTER
Follow up from today's appointment. Lab results were received. Patient advised to reduce Torsemide to 30mg daily and get repeat labs in 1 week. Will send patient weight/BP tracking hoa names via [employer].     Rashad Gross RN   Cardiology Care Coordinator  [facility]   Phone: [phone]  Fax: [phone]

## 2021-08-31 ENCOUNTER — LAB (OUTPATIENT)
Dept: LAB | Facility: CLINIC | Age: 71
End: 2021-08-31
Payer: MEDICARE

## 2021-08-31 DIAGNOSIS — I50.30 HEART FAILURE WITH PRESERVED EJECTION FRACTION, NYHA CLASS I (H): ICD-10-CM

## 2021-08-31 LAB
ANION GAP SERPL CALCULATED.3IONS-SCNC: 6 MMOL/L (ref 3–14)
BUN SERPL-MCNC: 36 MG/DL (ref 7–30)
CALCIUM SERPL-MCNC: 9.2 MG/DL (ref 8.5–10.1)
CHLORIDE BLD-SCNC: 104 MMOL/L (ref 94–109)
CO2 SERPL-SCNC: 27 MMOL/L (ref 20–32)
CREAT SERPL-MCNC: 1.45 MG/DL (ref 0.52–1.04)
GFR SERPL CREATININE-BSD FRML MDRD: 37 ML/MIN/1.73M2
GLUCOSE BLD-MCNC: 101 MG/DL (ref 70–99)
POTASSIUM BLD-SCNC: 4.3 MMOL/L (ref 3.4–5.3)
SODIUM SERPL-SCNC: 137 MMOL/L (ref 133–144)

## 2021-08-31 PROCEDURE — 80048 BASIC METABOLIC PNL TOTAL CA: CPT

## 2021-08-31 PROCEDURE — 36415 COLL VENOUS BLD VENIPUNCTURE: CPT

## 2021-08-31 NOTE — PATIENT INSTRUCTIONS
I will let you know your lab results.                 Consider getting the shingles vaccine (Shingrix) at your pharmacy.                      Please note that I am planning to retire on December 31, 2021.                          I suggest that you switch to a clinic which is closer to your residence.

## 2021-09-01 ENCOUNTER — PATIENT OUTREACH (OUTPATIENT)
Dept: CARDIOLOGY | Facility: CLINIC | Age: 71
End: 2021-09-01

## 2021-09-01 ENCOUNTER — ANCILLARY PROCEDURE (OUTPATIENT)
Dept: MAMMOGRAPHY | Facility: CLINIC | Age: 71
End: 2021-09-01
Attending: INTERNAL MEDICINE
Payer: MEDICARE

## 2021-09-01 ENCOUNTER — OFFICE VISIT (OUTPATIENT)
Dept: INTERNAL MEDICINE | Facility: CLINIC | Age: 71
End: 2021-09-01
Payer: MEDICARE

## 2021-09-01 VITALS
HEART RATE: 64 BPM | WEIGHT: 144.4 LBS | BODY MASS INDEX: 25.59 KG/M2 | SYSTOLIC BLOOD PRESSURE: 128 MMHG | DIASTOLIC BLOOD PRESSURE: 60 MMHG

## 2021-09-01 DIAGNOSIS — N18.30 STAGE 3 CHRONIC KIDNEY DISEASE, UNSPECIFIED WHETHER STAGE 3A OR 3B CKD (H): ICD-10-CM

## 2021-09-01 DIAGNOSIS — E89.0 POSTOPERATIVE HYPOTHYROIDISM: ICD-10-CM

## 2021-09-01 DIAGNOSIS — Z12.31 VISIT FOR SCREENING MAMMOGRAM: ICD-10-CM

## 2021-09-01 DIAGNOSIS — K51.00 ULCERATIVE PANCOLITIS WITHOUT COMPLICATION (H): ICD-10-CM

## 2021-09-01 DIAGNOSIS — R80.9 TYPE 2 DIABETES MELLITUS WITH MICROALBUMINURIA, WITHOUT LONG-TERM CURRENT USE OF INSULIN (H): Primary | ICD-10-CM

## 2021-09-01 DIAGNOSIS — D64.9 ANEMIA, UNSPECIFIED TYPE: ICD-10-CM

## 2021-09-01 DIAGNOSIS — I50.30 HEART FAILURE WITH PRESERVED EJECTION FRACTION, NYHA CLASS I (H): ICD-10-CM

## 2021-09-01 DIAGNOSIS — E11.29 TYPE 2 DIABETES MELLITUS WITH MICROALBUMINURIA, WITHOUT LONG-TERM CURRENT USE OF INSULIN (H): Primary | ICD-10-CM

## 2021-09-01 DIAGNOSIS — I10 HYPERTENSION GOAL BP (BLOOD PRESSURE) < 130/80: ICD-10-CM

## 2021-09-01 LAB
BASOPHILS # BLD AUTO: 0 10E3/UL (ref 0–0.2)
BASOPHILS NFR BLD AUTO: 0 %
EOSINOPHIL # BLD AUTO: 0.6 10E3/UL (ref 0–0.7)
EOSINOPHIL NFR BLD AUTO: 6 %
ERYTHROCYTE [DISTWIDTH] IN BLOOD BY AUTOMATED COUNT: 15.4 % (ref 10–15)
FERRITIN SERPL-MCNC: 60 NG/ML (ref 8–252)
HBA1C MFR BLD: 6.6 % (ref 0–5.6)
HCT VFR BLD AUTO: 37.8 % (ref 35–47)
HGB BLD-MCNC: 11.9 G/DL (ref 11.7–15.7)
LYMPHOCYTES # BLD AUTO: 1.8 10E3/UL (ref 0.8–5.3)
LYMPHOCYTES NFR BLD AUTO: 20 %
MCH RBC QN AUTO: 31.3 PG (ref 26.5–33)
MCHC RBC AUTO-ENTMCNC: 31.5 G/DL (ref 31.5–36.5)
MCV RBC AUTO: 100 FL (ref 78–100)
MONOCYTES # BLD AUTO: 1.2 10E3/UL (ref 0–1.3)
MONOCYTES NFR BLD AUTO: 13 %
NEUTROPHILS # BLD AUTO: 5.3 10E3/UL (ref 1.6–8.3)
NEUTROPHILS NFR BLD AUTO: 60 %
PLATELET # BLD AUTO: 261 10E3/UL (ref 150–450)
RBC # BLD AUTO: 3.8 10E6/UL (ref 3.8–5.2)
TSH SERPL DL<=0.005 MIU/L-ACNC: 3.37 MU/L (ref 0.4–4)
WBC # BLD AUTO: 9 10E3/UL (ref 4–11)

## 2021-09-01 PROCEDURE — 83036 HEMOGLOBIN GLYCOSYLATED A1C: CPT | Performed by: INTERNAL MEDICINE

## 2021-09-01 PROCEDURE — 77063 BREAST TOMOSYNTHESIS BI: CPT | Mod: TC | Performed by: RADIOLOGY

## 2021-09-01 PROCEDURE — 85025 COMPLETE CBC W/AUTO DIFF WBC: CPT | Performed by: INTERNAL MEDICINE

## 2021-09-01 PROCEDURE — 77067 SCR MAMMO BI INCL CAD: CPT | Mod: TC | Performed by: RADIOLOGY

## 2021-09-01 PROCEDURE — 36415 COLL VENOUS BLD VENIPUNCTURE: CPT | Performed by: INTERNAL MEDICINE

## 2021-09-01 PROCEDURE — 99214 OFFICE O/P EST MOD 30 MIN: CPT | Performed by: INTERNAL MEDICINE

## 2021-09-01 PROCEDURE — 84443 ASSAY THYROID STIM HORMONE: CPT | Performed by: INTERNAL MEDICINE

## 2021-09-01 PROCEDURE — 82728 ASSAY OF FERRITIN: CPT | Performed by: INTERNAL MEDICINE

## 2021-09-01 RX ORDER — TORSEMIDE 20 MG/1
TABLET ORAL
Qty: 90 TABLET | Refills: 3 | COMMUNITY
Start: 2021-09-01 | End: 2021-10-04

## 2021-09-01 NOTE — TELEPHONE ENCOUNTER
Patient was seen in the CORE clinic on 8/25/21. The plan following the visit was Torsemide 30 mg daily recheck labs in one week . At that visit the patient reported their weight to be 141 pounds.     Patient had labs on 8/31/21, Labs are stable and creatinine and BUN improved.     LM for patient to call back to discuss.     Most recent weights: 141 pounds     Denies SOB and Swelling.     Current Medications: 30mg Torsemide daily.     Follow up Appointments: 9/17/21 Labs and CORE appt.     Per alesha Leonay to continue current medications.     Rashad Gross RN   Cardiology Care Coordinator  [facility]   Phone: [phone]  Fax: [phone]

## 2021-09-01 NOTE — PROGRESS NOTES
"    Assessment & Plan     Type 2 diabetes mellitus with microalbuminuria, without long-term current use of insulin (H)         She is describing some early morning hypoglycemia.     In addition, she has renal insufficiency.                 We need to cut back the Metformin dosage.  We may also need to cut back the glipizide dosage.  - Hemoglobin A1c  - metFORMIN (GLUCOPHAGE) 500 MG tablet  Dispense: 180 tablet; Refill: 3    Postoperative hypothyroidism  Check labs and adjust medications as indicated.    - TSH with free T4 reflex    Hypertension goal BP (blood pressure) < 130/80  Good control    Stage 3 chronic kidney disease, unspecified whether stage 3a or 3b CKD  Stable and monitored by nephrology    Anemia, unspecified type  Chronic and multifactorial  - Ferritin  - CBC with platelets and differential    Heart failure with preserved ejection fraction, NYHA class I (H)  Stable with current torsemide dosage.  She has no peripheral edema or signs of pulmonary congestion  - torsemide (DEMADEX) 20 MG tablet  Dispense: 90 tablet; Refill: 3      Ulcerative colitis-: Stable with mesalamine treatment.              This diagnosis probably makes it difficult for her to tolerate aspirin therapy without some bleeding complications.         BMI:   Estimated body mass index is 25.59 kg/m  as calculated from the following:    Height as of 8/25/21: 1.6 m (5' 2.99\").    Weight as of this encounter: 65.5 kg (144 lb 6.4 oz).       Patient Instructions               I will let you know your lab results.                 Consider getting the shingles vaccine (Shingrix) at your pharmacy.                      Please note that I am planning to retire on December 31, 2021.                          I suggest that you switch to a clinic which is closer to your residence.           Return in about 3 months (around 12/16/2021) for follow up of several issues.    Nathan Dhillon MD  [facility]  ADDENDUM:  Results for orders placed or performed in visit on 09/01/21   Ferritin     Status: Normal   Result Value Ref Range    Ferritin 60 8 - 252 ng/mL   CBC with platelets and differential     Status: Abnormal    Narrative    The following orders were created for panel order CBC with platelets and differential.  Procedure                               Abnormality         Status                     ---------                               -----------         ------                     CBC with platelets and d...[[id number]]  Abnormal            Final result                 Please view results for these tests on the individual orders.   TSH with free T4 reflex     Status: Normal   Result Value Ref Range    TSH 3.37 0.40 - 4.00 mU/L   Hemoglobin A1c     Status: Abnormal   Result Value Ref Range    Hemoglobin A1C 6.6 (H) 0.0 - 5.6 %   CBC with platelets and differential     Status: Abnormal   Result Value Ref Range    WBC Count 9.0 4.0 - 11.0 10e3/uL    RBC Count 3.80 3.80 - 5.20 10e6/uL    Hemoglobin 11.9 11.7 - 15.7 g/dL    Hematocrit 37.8 35.0 - 47.0 %     78 - 100 fL    MCH 31.3 26.5 - 33.0 pg    MCHC 31.5 31.5 - 36.5 g/dL    RDW 15.4 (H) 10.0 - 15.0 %    Platelet Count 261 150 - 450 10e3/uL    % Neutrophils 60 %    % Lymphocytes 20 %    % Monocytes 13 %    % Eosinophils 6 %    % Basophils 0 %    Absolute Neutrophils 5.3 1.6 - 8.3 10e3/uL    Absolute Lymphocytes 1.8 0.8 - 5.3 10e3/uL    Absolute Monocytes 1.2 0.0 - 1.3 10e3/uL    Absolute Eosinophils 0.6 0.0 - 0.7 10e3/uL    Absolute Basophils 0.0 0.0 - 0.2 10e3/uL     My chart message sent.                     Your diabetes control is good.       The A1C of 6.6 correlates to an average blood sugar of approximately 138.                If you continue to have low blood sugars in the morning, then I suggest that you stop taking the afternoon/evening dose of glipizide.                        The thyroid level is good. Keep taking the same dosage.        "The hemoglobin is stable, and the iron level is normal.    Subjective   Georgia is a 71 year old who presents for the following health issues     HPI                 Torsemide down to 30 mg per day per cardiology.         She has no shortness of breath or chest pain.     Some hypoglycemia in the AM; 60 or so.                          She stopped taking ASA due to GIB. Does not want to resume.                 Home BP has been \"good\".                      She is still doing  for 2 grandchildren.              She tries to be physically active.  She had a recent eye exam.    She also had recent dental care.     She is due for a mammogram.            Review of Systems         Current Outpatient Medications   Medication Sig Dispense Refill     torsemide (DEMADEX) 20 MG tablet 30 mg per day in 9/21 90 tablet 3             atenolol (TENORMIN) 50 MG tablet TAKE 1 TABLET BY MOUTH DAILY 90 tablet 3     atorvastatin (LIPITOR) 40 MG tablet TAKE 1 TABLET(40 MG) BY MOUTH DAILY 90 tablet 2     blood glucose (ACCU-CHEK FELIPE PLUS) test strip 200 strips by In Vitro route 2 times daily Use to test blood sugar 2 times daily or as directed. 200 strip 4     Cholecalciferol (VITAMIN D) 2000 units CAPS Take by mouth daily       Cyanocobalamin (B-12) 1000 MCG TABS Take 1,000 mcg by mouth three times a week       glipiZIDE (GLUCOTROL XL) 10 MG 24 hr tablet TAKE 1 TABLET BY MOUTH TWICE DAILY 180 tablet 1     hydrALAZINE (APRESOLINE) 50 MG tablet Take 1 tablet (50 mg) by mouth 3 times daily 270 tablet 4     levothyroxine (SYNTHROID/LEVOTHROID) 112 MCG tablet Take 1 tablet (112 mcg) by mouth daily 90 tablet 4     losartan (COZAAR) 100 MG tablet Take 1 tablet (100 mg) by mouth daily 90 tablet 3     mesalamine (APRISO ER) 0.375 g 24 hr capsule Take 4 capsules (1.5 g) by mouth daily 360 capsule 3     metFORMIN (GLUCOPHAGE) 500 MG tablet TAKE 2 TABLETS BY MOUTH TWICE DAILY WITH MEALS 360 tablet 3     Wt Readings from Last 4 Encounters: "   09/01/21 65.5 kg (144 lb 6.4 oz)   08/25/21 66 kg (145 lb 9.6 oz)   08/13/21 67.9 kg (149 lb 9.6 oz)   04/28/21 70.8 kg (156 lb)       Objective    /60   Pulse 64   Wt 65.5 kg (144 lb 6.4 oz)   BMI 25.59 kg/m    Body mass index is 25.59 kg/m .  Physical Exam   GENERAL APPEARANCE: alert and no distress  RESP: lungs clear to auscultation - no rales, rhonchi or wheezes  CV: regular rates and rhythm, normal S1 S2, no S3 or S4 and systolic murmur as before, and no peripheral edema  DIABETIC FOOT EXAM: normal DP and PT pulses and no trophic changes or ulcerative lesions    Recent Results (from the past 40 hour(s))   Basic metabolic panel    Collection Time: 08/31/21  4:11 PM   Result Value Ref Range    Sodium 137 133 - 144 mmol/L    Potassium 4.3 3.4 - 5.3 mmol/L    Chloride 104 94 - 109 mmol/L    Carbon Dioxide (CO2) 27 20 - 32 mmol/L    Anion Gap 6 3 - 14 mmol/L    Urea Nitrogen 36 (H) 7 - 30 mg/dL    Creatinine 1.45 (H) 0.52 - 1.04 mg/dL    Calcium 9.2 8.5 - 10.1 mg/dL    Glucose 101 (H) 70 - 99 mg/dL    GFR Estimate 37 (L) >60 mL/min/1.73m2

## 2021-09-17 ENCOUNTER — OFFICE VISIT (OUTPATIENT)
Dept: CARDIOLOGY | Facility: CLINIC | Age: 71
End: 2021-09-17
Attending: INTERNAL MEDICINE
Payer: MEDICARE

## 2021-09-17 ENCOUNTER — LAB (OUTPATIENT)
Dept: LAB | Facility: CLINIC | Age: 71
End: 2021-09-17
Payer: MEDICARE

## 2021-09-17 VITALS
HEIGHT: 63 IN | WEIGHT: 149 LBS | HEART RATE: 75 BPM | OXYGEN SATURATION: 96 % | BODY MASS INDEX: 26.4 KG/M2 | DIASTOLIC BLOOD PRESSURE: 73 MMHG | SYSTOLIC BLOOD PRESSURE: 150 MMHG

## 2021-09-17 DIAGNOSIS — I51.89 DIASTOLIC DYSFUNCTION: ICD-10-CM

## 2021-09-17 DIAGNOSIS — I50.30 HEART FAILURE WITH PRESERVED EJECTION FRACTION, NYHA CLASS I (H): Primary | ICD-10-CM

## 2021-09-17 DIAGNOSIS — K57.30 DIVERTICULOSIS OF LARGE INTESTINE WITHOUT HEMORRHAGE: ICD-10-CM

## 2021-09-17 DIAGNOSIS — N18.30 STAGE 3 CHRONIC KIDNEY DISEASE, UNSPECIFIED WHETHER STAGE 3A OR 3B CKD (H): ICD-10-CM

## 2021-09-17 DIAGNOSIS — E11.29 TYPE 2 DIABETES MELLITUS WITH MICROALBUMINURIA, WITHOUT LONG-TERM CURRENT USE OF INSULIN (H): ICD-10-CM

## 2021-09-17 DIAGNOSIS — I35.0 NONRHEUMATIC AORTIC VALVE STENOSIS: ICD-10-CM

## 2021-09-17 DIAGNOSIS — I27.20 PULMONARY HYPERTENSION (H): ICD-10-CM

## 2021-09-17 DIAGNOSIS — R80.9 TYPE 2 DIABETES MELLITUS WITH MICROALBUMINURIA, WITHOUT LONG-TERM CURRENT USE OF INSULIN (H): ICD-10-CM

## 2021-09-17 LAB
ANION GAP SERPL CALCULATED.3IONS-SCNC: 9 MMOL/L (ref 3–14)
BUN SERPL-MCNC: 29 MG/DL (ref 7–30)
CALCIUM SERPL-MCNC: 9.1 MG/DL (ref 8.5–10.1)
CHLORIDE BLD-SCNC: 103 MMOL/L (ref 94–109)
CO2 SERPL-SCNC: 25 MMOL/L (ref 20–32)
CREAT SERPL-MCNC: 1.24 MG/DL (ref 0.52–1.04)
GFR SERPL CREATININE-BSD FRML MDRD: 44 ML/MIN/1.73M2
GLUCOSE BLD-MCNC: 278 MG/DL (ref 70–99)
POTASSIUM BLD-SCNC: 4.1 MMOL/L (ref 3.4–5.3)
SODIUM SERPL-SCNC: 137 MMOL/L (ref 133–144)

## 2021-09-17 PROCEDURE — 80048 BASIC METABOLIC PNL TOTAL CA: CPT | Performed by: PATHOLOGY

## 2021-09-17 PROCEDURE — 36415 COLL VENOUS BLD VENIPUNCTURE: CPT | Performed by: PATHOLOGY

## 2021-09-17 PROCEDURE — 99213 OFFICE O/P EST LOW 20 MIN: CPT | Performed by: NURSE PRACTITIONER

## 2021-09-17 PROCEDURE — G0463 HOSPITAL OUTPT CLINIC VISIT: HCPCS

## 2021-09-17 ASSESSMENT — MIFFLIN-ST. JEOR: SCORE: 1169.83

## 2021-09-17 ASSESSMENT — PAIN SCALES - GENERAL: PAINLEVEL: NO PAIN (0)

## 2021-09-17 NOTE — PROGRESS NOTES
Georgia is a 71 year old female with a  medical history is significant for longstanding hypertension since she was in her teens, diabetes, dyslipidemia, and ulcerative colitis.    She is having a hard time with seasonal allergies.  She feels with the change in diuretic ( Torsemide 30 mg daily. No potassium) has been very helpful.  She says she has lost about 5 lbs since the change . She has junk food once in a while. Patient states she has no SOB with or without activity. Weights 142 lbs at home.   No swelling in LE,watches grandkids ages 4 and 2 every day.  She has noted her BS to be stable. She feels well overall and denies chest pain, palpitations, lightheadedness, dizziness. Her BP's at home are about- 112-115-50's. She says coming to clinic for an appt increases her BP's. She took her pills like an hour ago. She bought a new treadmill and will start to use.     Current meds:  Atenolol 50 mg daily  Torsemide 30 mg   Hydralazine 50 mg TID  Losartan 100 mg daily      ROS:   Constitutional: No fever, chills, or sweats.  ENT: No visual disturbance, ear ache, epistaxis, sore throat.   Allergies/Immunologic: Negative  Respiratory: No cough, hemoptysis.   Cardiovascular: As per HPI.   GI: As per HPI.   : No urinary frequency, dysuria, or hematuria.   Integument: Negative.   Psychiatric: Negative.   Neuro: Negative.   Endocrinology: negative   Musculoskeletal: negative    EXAM:   GEN/CONSTITUIONAL: Appears comfortable, in no apparent distress   EYES: No icterus  ENT/MOUTH: Normal  JVP:  below clavicle   RESPIRATORY: Clear to auscultation bilaterally   CARDIOVASCULAR: Regular S1 and S2, 2/6 JUANITA.   ABDOMEN: Soft, non-tender, positive bowel sounds   NEUROLOGIC: Grossly non-focal   PSYCHIATRIC: Normal affect  EXT: no LE edema. Normal pedal pulses.  Skin: No petechiae, purpura or rash       ECHO 8/25  Interpretation Summary   Global and regional left ventricular function is normal with an EF of 60-65%.  Paradoxical  septal motion consistent with right ventricular pressure and  volume overload is present.  Moderate to severe right ventricular dilation is present.  Global right ventricular function is moderately to severely reduced.  Calculated aortic valve area in severe AS range. Consider additional  evaluation if indicated.  Moderate to severe tricuspid insufficiency is present.  Dilation of the inferior vena cava is present with abnormal respiratory  variation in diameter.  Trivial pericardial effusion is present.    .  Assessment and Plan:  Georgia is well today, despite her lack of any functional limitation, the ongoing concern about her mildly reduced RV function is still present.  She appears euvolemic. She is making a better effort to stay hydrated.     1.  Severe pulmonary hypertension, normal LV function with mild dilation, functional class I-II.The etiology is likely group 2, and related to underlying diastolic dysfunction from longstanding hypertension and diabetes.    2.  Hypertension, with better recent control:  amlodipine stopped on 8/14 ( swelling).  Instructed to keep home blood pressure log.  3.  Mild-moderate aortic stenosis, possible bicuspid aortic valve:   4. Diabetes: PCP following  5.  Dyslipidemia, on Lipitor  6.  CKD stage 3  7. Ulcerative colitis      Plan:  Labs pending  CORE in 3 months         Carolyn ADAME NP-C

## 2021-09-17 NOTE — NURSING NOTE
Return Appointment: Patient given instructions regarding scheduling next clinic visit. Patient demonstrated understanding of this information and agreed to call with further questions or concerns.  Patient stated she understood all health information given and agreed to call with further questions or concerns.    Lorena Ambriz RN

## 2021-09-17 NOTE — LETTER
9/17/2021      RE: Keerthi Montoya  [address] [place] [id number]       Dear Colleague,    Thank you for the opportunity to participate in the care of your patient, Keerthi Montoya, at the [facility] [place] [facility]. Please see a copy of my visit note below.      Georgia is a 71 year old female with a  medical history is significant for longstanding hypertension since she was in her teens, diabetes, dyslipidemia, and ulcerative colitis.    She is having a hard time with seasonal allergies.  She feels with the change in diuretic ( Torsemide 30 mg daily. No potassium) has been very helpful.  She says she has lost about 5 lbs since the change . She has junk food once in a while. Patient states she has no SOB with or without activity. Weights 142 lbs at home.   No swelling in LE,watches grandkids ages 4 and 2 every day.  She has noted her BS to be stable. She feels well overall and denies chest pain, palpitations, lightheadedness, dizziness. Her BP's at home are about- 112-115-50's. She says coming to clinic for an appt increases her BP's. She took her pills like an hour ago. She bought a new treadmill and will start to use.     Current meds:  Atenolol 50 mg daily  Torsemide 30 mg   Hydralazine 50 mg TID  Losartan 100 mg daily      ROS:   Constitutional: No fever, chills, or sweats.  ENT: No visual disturbance, ear ache, epistaxis, sore throat.   Allergies/Immunologic: Negative  Respiratory: No cough, hemoptysis.   Cardiovascular: As per HPI.   GI: As per HPI.   : No urinary frequency, dysuria, or hematuria.   Integument: Negative.   Psychiatric: Negative.   Neuro: Negative.   Endocrinology: negative   Musculoskeletal: negative    EXAM:   GEN/CONSTITUIONAL: Appears comfortable, in no apparent distress   EYES: No icterus  ENT/MOUTH: Normal  JVP:  below clavicle   RESPIRATORY: Clear to auscultation bilaterally   CARDIOVASCULAR:  Regular S1 and S2, 2/6 JUANITA.   ABDOMEN: Soft, non-tender, positive bowel sounds   NEUROLOGIC: Grossly non-focal   PSYCHIATRIC: Normal affect  EXT: no LE edema. Normal pedal pulses.  Skin: No petechiae, purpura or rash       ECHO 8/25  Interpretation Summary   Global and regional left ventricular function is normal with an EF of 60-65%.  Paradoxical septal motion consistent with right ventricular pressure and  volume overload is present.  Moderate to severe right ventricular dilation is present.  Global right ventricular function is moderately to severely reduced.  Calculated aortic valve area in severe AS range. Consider additional  evaluation if indicated.  Moderate to severe tricuspid insufficiency is present.  Dilation of the inferior vena cava is present with abnormal respiratory  variation in diameter.  Trivial pericardial effusion is present.    .  Assessment and Plan:  Georgia is well today, despite her lack of any functional limitation, the ongoing concern about her mildly reduced RV function is still present.  She appears euvolemic. She is making a better effort to stay hydrated.     1.  Severe pulmonary hypertension, normal LV function with mild dilation, functional class I-II.The etiology is likely group 2, and related to underlying diastolic dysfunction from longstanding hypertension and diabetes.    2.  Hypertension, with better recent control:  amlodipine stopped on 8/14 ( swelling).  Instructed to keep home blood pressure log.  3.  Mild-moderate aortic stenosis, possible bicuspid aortic valve:   4. Diabetes: PCP following  5.  Dyslipidemia, on Lipitor  6.  CKD stage 3  7. Ulcerative colitis      Plan:  Labs pending  CORE in 3 months         Carolyn LLAMAS

## 2021-09-17 NOTE — PATIENT INSTRUCTIONS
Take your medicines every day, as directed    Changes made today:  o No medication changes     Monitor Your Weight and Symptoms    Contact us if you:      Gain 2 pounds in one day or 5 pounds in one week    Feel more short of breath    Notice more leg swelling    Feel lightheadeded   Change your lifestyle    Limit Salt or Sodium:    2000 mg  Limit Fluids:    2000 mL or approximately 64 ounces  Eat a Heart Healthy Diet    Low in saturated fats  Stay Active:    Aim to move at least 150 minutes every  week         To Contact us    During Business Hours:  [phone], option # 1 (University)  Then option # 4 (medical questions)     After hours, weekends or holidays:   [phone], Option #4  Ask to speak to the On-Call Cardiologist. Inform them you are a CORE/heart failure patient at the [place].     Use [employer] allows you to communicate directly with your heart team through secure messaging.    [employer] can be accessed any time on your phone, computer, or tablet.    If you need assistance, we'd be happy to help!         Keep your Heart Appointments:    Follow up with [facility] in 3 months with labs prior.     as

## 2021-10-04 DIAGNOSIS — I50.30 HEART FAILURE WITH PRESERVED EJECTION FRACTION, NYHA CLASS I (H): ICD-10-CM

## 2021-10-04 RX ORDER — TORSEMIDE 20 MG/1
TABLET ORAL
Qty: 135 TABLET | Refills: 0 | Status: SHIPPED | OUTPATIENT
Start: 2021-10-04 | End: 2021-12-16

## 2021-10-22 DIAGNOSIS — I10 HYPERTENSION GOAL BP (BLOOD PRESSURE) < 140/90: ICD-10-CM

## 2021-10-22 RX ORDER — LOSARTAN POTASSIUM 100 MG/1
TABLET ORAL
Qty: 90 TABLET | Refills: 3 | Status: SHIPPED | OUTPATIENT
Start: 2021-10-22 | End: 2022-10-29

## 2021-10-22 NOTE — TELEPHONE ENCOUNTER
Routing refill request to provider for review/approval because:  BP Readings from Last 3 Encounters:   09/17/21 (!) 150/73   09/01/21 128/60   08/25/21 (!) 145/63     Creatinine   Date Value Ref Range Status   09/17/2021 1.24 (H) 0.52 - 1.04 mg/dL Final   04/28/2021 1.21 (H) 0.52 - 1.04 mg/dL Final           Alice Meza RN  [facility]

## 2021-10-23 ENCOUNTER — HEALTH MAINTENANCE LETTER (OUTPATIENT)
Age: 71
End: 2021-10-23

## 2021-12-02 DIAGNOSIS — I10 HYPERTENSION GOAL BP (BLOOD PRESSURE) < 130/80: ICD-10-CM

## 2021-12-02 DIAGNOSIS — E78.5 HYPERLIPIDEMIA LDL GOAL <100: ICD-10-CM

## 2021-12-03 RX ORDER — HYDRALAZINE HYDROCHLORIDE 50 MG/1
TABLET, FILM COATED ORAL
Qty: 270 TABLET | Refills: 3 | Status: SHIPPED | OUTPATIENT
Start: 2021-12-03 | End: 2022-08-19

## 2021-12-03 RX ORDER — ATORVASTATIN CALCIUM 40 MG/1
TABLET, FILM COATED ORAL
Qty: 90 TABLET | Refills: 3 | Status: SHIPPED | OUTPATIENT
Start: 2021-12-03 | End: 2021-12-16

## 2021-12-09 DIAGNOSIS — I50.30 HEART FAILURE WITH PRESERVED EJECTION FRACTION, NYHA CLASS I (H): Primary | ICD-10-CM

## 2021-12-16 ENCOUNTER — OFFICE VISIT (OUTPATIENT)
Dept: INTERNAL MEDICINE | Facility: CLINIC | Age: 71
End: 2021-12-16
Payer: MEDICARE

## 2021-12-16 VITALS
SYSTOLIC BLOOD PRESSURE: 150 MMHG | BODY MASS INDEX: 26.23 KG/M2 | WEIGHT: 148 LBS | DIASTOLIC BLOOD PRESSURE: 66 MMHG | HEART RATE: 50 BPM | OXYGEN SATURATION: 99 % | TEMPERATURE: 97.8 F

## 2021-12-16 DIAGNOSIS — R00.1 BRADYCARDIA: ICD-10-CM

## 2021-12-16 DIAGNOSIS — R80.9 TYPE 2 DIABETES MELLITUS WITH MICROALBUMINURIA, WITHOUT LONG-TERM CURRENT USE OF INSULIN (H): Primary | ICD-10-CM

## 2021-12-16 DIAGNOSIS — E89.0 POSTOPERATIVE HYPOTHYROIDISM: ICD-10-CM

## 2021-12-16 DIAGNOSIS — Z23 HIGH PRIORITY FOR 2019-NCOV VACCINE: ICD-10-CM

## 2021-12-16 DIAGNOSIS — I10 HYPERTENSION GOAL BP (BLOOD PRESSURE) < 130/80: ICD-10-CM

## 2021-12-16 DIAGNOSIS — E11.29 TYPE 2 DIABETES MELLITUS WITH MICROALBUMINURIA, WITHOUT LONG-TERM CURRENT USE OF INSULIN (H): Primary | ICD-10-CM

## 2021-12-16 DIAGNOSIS — I50.30 HEART FAILURE WITH PRESERVED EJECTION FRACTION, NYHA CLASS I (H): ICD-10-CM

## 2021-12-16 DIAGNOSIS — E78.5 HYPERLIPIDEMIA LDL GOAL <100: ICD-10-CM

## 2021-12-16 LAB
ANION GAP SERPL CALCULATED.3IONS-SCNC: 8 MMOL/L (ref 3–14)
BUN SERPL-MCNC: 37 MG/DL (ref 7–30)
CALCIUM SERPL-MCNC: 9.7 MG/DL (ref 8.5–10.1)
CHLORIDE BLD-SCNC: 103 MMOL/L (ref 94–109)
CO2 SERPL-SCNC: 23 MMOL/L (ref 20–32)
CREAT SERPL-MCNC: 1.43 MG/DL (ref 0.52–1.04)
GFR SERPL CREATININE-BSD FRML MDRD: 37 ML/MIN/1.73M2
GLUCOSE BLD-MCNC: 106 MG/DL (ref 70–99)
HBA1C MFR BLD: 7 % (ref 0–5.6)
POTASSIUM BLD-SCNC: 4.3 MMOL/L (ref 3.4–5.3)
SODIUM SERPL-SCNC: 134 MMOL/L (ref 133–144)

## 2021-12-16 PROCEDURE — G0008 ADMIN INFLUENZA VIRUS VAC: HCPCS | Performed by: INTERNAL MEDICINE

## 2021-12-16 PROCEDURE — 83036 HEMOGLOBIN GLYCOSYLATED A1C: CPT | Performed by: INTERNAL MEDICINE

## 2021-12-16 PROCEDURE — 90662 IIV NO PRSV INCREASED AG IM: CPT | Performed by: INTERNAL MEDICINE

## 2021-12-16 PROCEDURE — 36415 COLL VENOUS BLD VENIPUNCTURE: CPT | Performed by: INTERNAL MEDICINE

## 2021-12-16 PROCEDURE — 80048 BASIC METABOLIC PNL TOTAL CA: CPT | Performed by: INTERNAL MEDICINE

## 2021-12-16 PROCEDURE — 91300 COVID-19,PF,PFIZER (12+ YRS): CPT | Performed by: INTERNAL MEDICINE

## 2021-12-16 PROCEDURE — 99213 OFFICE O/P EST LOW 20 MIN: CPT | Mod: 25 | Performed by: INTERNAL MEDICINE

## 2021-12-16 PROCEDURE — 0004A COVID-19,PF,PFIZER (12+ YRS): CPT | Performed by: INTERNAL MEDICINE

## 2021-12-16 RX ORDER — ATORVASTATIN CALCIUM 40 MG/1
40 TABLET, FILM COATED ORAL DAILY
Qty: 90 TABLET | Refills: 3 | Status: SHIPPED | OUTPATIENT
Start: 2021-12-16 | End: 2023-02-24

## 2021-12-16 RX ORDER — GLIPIZIDE 10 MG/1
10 TABLET, FILM COATED, EXTENDED RELEASE ORAL DAILY
Qty: 90 TABLET | Refills: 3 | Status: SHIPPED | OUTPATIENT
Start: 2021-12-16 | End: 2022-09-16

## 2021-12-16 RX ORDER — ATENOLOL 50 MG/1
50 TABLET ORAL DAILY
Qty: 90 TABLET | Refills: 3 | Status: SHIPPED | OUTPATIENT
Start: 2021-12-16 | End: 2022-12-17

## 2021-12-16 RX ORDER — TORSEMIDE 20 MG/1
TABLET ORAL
Qty: 135 TABLET | Refills: 3 | Status: SHIPPED | OUTPATIENT
Start: 2021-12-16 | End: 2022-04-26

## 2021-12-16 RX ORDER — GLIPIZIDE 10 MG/1
10 TABLET, FILM COATED, EXTENDED RELEASE ORAL DAILY
Qty: 90 TABLET | Refills: 3 | COMMUNITY
Start: 2021-12-16 | End: 2021-12-16

## 2021-12-16 RX ORDER — LEVOTHYROXINE SODIUM 112 UG/1
112 TABLET ORAL DAILY
Qty: 90 TABLET | Refills: 4 | Status: SHIPPED | OUTPATIENT
Start: 2021-12-16 | End: 2023-01-26

## 2021-12-16 NOTE — PROGRESS NOTES
Assessment & Plan     Type 2 diabetes mellitus with microalbuminuria, without long-term current use of insulin (H)  Her glipizide dose may need to be decreased  - Hemoglobin A1c  -     Hypertension goal BP (blood pressure) < 130/80  There seems to be a definite component of office hypertension  - atenolol (TENORMIN) 50 MG tablet  Dispense: 90 tablet; Refill: 3    High priority for 2019-nCoV vaccine  Booster given today  - COVID-19,PF,PFIZER (12+ Yrs PURPLE LABEL)        Hyperlipidemia LDL goal <100  Refill sent  - atorvastatin (LIPITOR) 40 MG tablet  Dispense: 90 tablet; Refill: 3    Postoperative hypothyroidism  Refill sent  - levothyroxine (SYNTHROID/LEVOTHROID) 112 MCG tablet  Dispense: 90 tablet; Refill: 4    Heart failure with preserved ejection fraction, NYHA class I (H)  Refill sent.  Cardiology appointment tomorrow                           - torsemide (DEMADEX) 20 MG tablet  Dispense: 135 tablet; Refill: 3  - Basic metabolic panel    Bradycardia  Asymptomatic.   Change in beta-blocker dose per cardiology.               Patient Instructions               I will let you know your lab results.    Best wishes!             Return in about 6 months (around 6/16/2022) for follow up of several issues.    Nathan Dhillon MD  [facility]           ADDENDUM:  Results for orders placed or performed in visit on 12/16/21   Basic metabolic panel     Status: Abnormal   Result Value Ref Range    Sodium 134 133 - 144 mmol/L    Potassium 4.3 3.4 - 5.3 mmol/L    Chloride 103 94 - 109 mmol/L    Carbon Dioxide (CO2) 23 20 - 32 mmol/L    Anion Gap 8 3 - 14 mmol/L    Urea Nitrogen 37 (H) 7 - 30 mg/dL    Creatinine 1.43 (H) 0.52 - 1.04 mg/dL    Calcium 9.7 8.5 - 10.1 mg/dL    Glucose 106 (H) 70 - 99 mg/dL    GFR Estimate 37 (L) >60 mL/min/1.73m2   Hemoglobin A1c     Status: Abnormal   Result Value Ref Range    Hemoglobin A1C 7.0 (H) 0.0 - 5.6 %                          My chart message sent.              Your diabetes control is stable.       The A1C of 7.0 correlates to an average blood sugar of approximately 150.       Keep taking the same dose of glipizide and metformin.        Addy Pendleton is a 71 year old who presents for the following health issues     HPI     Diabetes Follow-up    How often are you checking your blood sugar? One time daily  What time of day are you checking your blood sugars (select all that apply)?  varies  Have you had any blood sugars above 200?  No  Have you had any blood sugars below 70?  Yes a couple    What symptoms do you notice when your blood sugar is low?  None    What concerns do you have today about your diabetes? None     Do you have any of these symptoms? (Select all that apply)  No numbness or tingling in feet.  No redness, sores or blisters on feet.  No complaints of excessive thirst.  No reports of blurry vision.  No significant changes to weight.    Have you had a diabetic eye exam in the last 12 months? Yes- Date of last eye exam: 8-2021,  Location: [facility]        BP Readings from Last 2 Encounters:   09/17/21 (!) 150/73   09/01/21 128/60     Hemoglobin A1C POCT (%)   Date Value   10/28/2020 6.6 (H)   05/12/2020 7.0 (H)     Hemoglobin A1C (%)   Date Value   09/01/2021 6.6 (H)     LDL Cholesterol Calculated (mg/dL)   Date Value   10/28/2020 33   01/13/2020 35                 How many servings of fruits and vegetables do you eat daily?  2-3    On average, how many sweetened beverages do you drink each day (Examples: soda, juice, sweet tea, etc.  Do NOT count diet or artificially sweetened beverages)?   0    How many days per week do you exercise enough to make your heart beat faster? 3 or less-treadmill and watches grand kids 5 days a week    How many minutes a day do you exercise enough to make your heart beat faster? 20 - 29    How many days per week do you miss taking your medication? 0        Fluctuating BP.           Sometimes very  good at home.                         This patient has a cardiology appointment tomorrow in [place], and early next year the GI clinic appointment in [place], and a nephrology appointment in [place].                              We discussed that she should transfer her primary care to a clinic closer to her home which is in [place].                     She reports occasional minor hypoglycemia.            Her glipizide dose is 10 mg/day.                       Overall, she states she feels well.    Review of Systems         Current Outpatient Medications   Medication Sig Dispense Refill     atenolol (TENORMIN) 50 MG tablet TAKE 1 TABLET BY MOUTH DAILY 90 tablet 3     atorvastatin (LIPITOR) 40 MG tablet TAKE 1 TABLET(40 MG) BY MOUTH DAILY 90 tablet 3     blood glucose (ACCU-CHEK FELIPE PLUS) test strip 200 strips by In Vitro route 2 times daily Use to test blood sugar 2 times daily or as directed. 200 strip 4     Cholecalciferol (VITAMIN D) 2000 units CAPS Take by mouth daily       Cyanocobalamin (B-12) 1000 MCG TABS Take 1,000 mcg by mouth three times a week       glipiZIDE (GLUCOTROL XL) 10 MG 24 hr tablet TAKE 1 TABLET BY MOUTH TWICE DAILY (Patient taking differently: Take 10 mg by mouth daily ) 180 tablet 1     hydrALAZINE (APRESOLINE) 50 MG tablet TAKE 1 TABLET(50 MG) BY MOUTH THREE TIMES DAILY 270 tablet 3     levothyroxine (SYNTHROID/LEVOTHROID) 112 MCG tablet Take 1 tablet (112 mcg) by mouth daily 90 tablet 4     losartan (COZAAR) 100 MG tablet TAKE 1 TABLET(100 MG) BY MOUTH DAILY 90 tablet 3     mesalamine (APRISO ER) 0.375 g 24 hr capsule Take 4 capsules (1.5 g) by mouth daily 360 capsule 3     metFORMIN (GLUCOPHAGE) 500 MG tablet 1 tablet (500 mg) 2 times daily (with meals) 180 tablet 3     torsemide (DEMADEX) 20 MG tablet 30 mg per day 135 tablet 0     Wt Readings from Last 4 Encounters:   12/16/21 67.1 kg (148 lb)   09/17/21 67.6 kg (149 lb)   09/01/21 65.5 kg (144 lb 6.4 oz)   08/25/21  66 kg (145 lb 9.6 oz)       Objective    BP (!) 160/64   Pulse 50   Temp 97.8  F (36.6  C) (Oral)   Wt 67.1 kg (148 lb)   LMP  (LMP Unknown)   SpO2 99%   Breastfeeding No   BMI 26.23 kg/m    There is no height or weight on file to calculate BMI.  Physical Exam   GENERAL APPEARANCE: alert and no distress  CV: regular rates and rhythm, normal S1 S2, no S3 or S4, bradycardia and systolic murmur        Labs are pending

## 2021-12-17 ENCOUNTER — OFFICE VISIT (OUTPATIENT)
Dept: CARDIOLOGY | Facility: CLINIC | Age: 71
End: 2021-12-17
Attending: NURSE PRACTITIONER
Payer: MEDICARE

## 2021-12-17 VITALS
SYSTOLIC BLOOD PRESSURE: 154 MMHG | HEIGHT: 63 IN | DIASTOLIC BLOOD PRESSURE: 70 MMHG | OXYGEN SATURATION: 98 % | HEART RATE: 64 BPM | WEIGHT: 145.3 LBS | BODY MASS INDEX: 25.75 KG/M2

## 2021-12-17 DIAGNOSIS — I50.30 HEART FAILURE WITH PRESERVED EJECTION FRACTION, NYHA CLASS I (H): Primary | ICD-10-CM

## 2021-12-17 DIAGNOSIS — I27.20 PULMONARY HYPERTENSION (H): ICD-10-CM

## 2021-12-17 DIAGNOSIS — I35.0 NONRHEUMATIC AORTIC VALVE STENOSIS: ICD-10-CM

## 2021-12-17 DIAGNOSIS — N18.30 STAGE 3 CHRONIC KIDNEY DISEASE, UNSPECIFIED WHETHER STAGE 3A OR 3B CKD (H): ICD-10-CM

## 2021-12-17 DIAGNOSIS — E11.29 TYPE 2 DIABETES MELLITUS WITH MICROALBUMINURIA, WITHOUT LONG-TERM CURRENT USE OF INSULIN (H): ICD-10-CM

## 2021-12-17 DIAGNOSIS — R80.9 TYPE 2 DIABETES MELLITUS WITH MICROALBUMINURIA, WITHOUT LONG-TERM CURRENT USE OF INSULIN (H): ICD-10-CM

## 2021-12-17 DIAGNOSIS — K57.30 DIVERTICULOSIS OF LARGE INTESTINE WITHOUT HEMORRHAGE: ICD-10-CM

## 2021-12-17 PROCEDURE — G0463 HOSPITAL OUTPT CLINIC VISIT: HCPCS

## 2021-12-17 PROCEDURE — 99213 OFFICE O/P EST LOW 20 MIN: CPT | Performed by: NURSE PRACTITIONER

## 2021-12-17 RX ORDER — GLIPIZIDE 10 MG/1
TABLET, FILM COATED, EXTENDED RELEASE ORAL
Qty: 180 TABLET | OUTPATIENT
Start: 2021-12-17

## 2021-12-17 ASSESSMENT — PAIN SCALES - GENERAL: PAINLEVEL: NO PAIN (0)

## 2021-12-17 ASSESSMENT — MIFFLIN-ST. JEOR: SCORE: 1153.21

## 2021-12-17 NOTE — PATIENT INSTRUCTIONS
Take your medicines every day, as directed    Changes made today:  o No medication changes  o [phone] (Brittany Garcia)   Monitor Your Weight and Symptoms    Contact us if you:      Gain 2 pounds in one day or 5 pounds in one week    Feel more short of breath    Notice more leg swelling    Feel lightheadeded   Change your lifestyle    Limit Salt or Sodium:    2000 mg  Limit Fluids:    2000 mL or approximately 64 ounces  Eat a Heart Healthy Diet    Low in saturated fats  Stay Active:    Aim to move at least 150 minutes every  week         To Contact us    During Business Hours:  [phone], option # 1 (University)  Then option # 4 (medical questions)     After hours, weekends or holidays:   [phone], Option #4  Ask to speak to the On-Call Cardiologist. Inform them you are a CORE/heart failure patient at the [place].     Use [employer] allows you to communicate directly with your heart team through secure messaging.    [employer] can be accessed any time on your phone, computer, or tablet.    If you need assistance, we'd be happy to help!         Keep your Heart Appointments:    Follow up with [facility] in May.

## 2021-12-17 NOTE — NURSING NOTE
Return Appointment: Patient given instructions regarding scheduling next clinic visit. Patient demonstrated understanding of this information and agreed to call with further questions or concerns. CORE in May.    Patient stated she understood all health information given and agreed to call with further questions or concerns.    Lorena Ambriz RN

## 2021-12-17 NOTE — LETTER
12/17/2021      RE: Keerthi Montoya  [address] [place] [id number]       Dear Colleague,    Thank you for the opportunity to participate in the care of your patient, Keerthi Montoya, at the [facility] [place] [facility]. Please see a copy of my visit note below.      Georgia is a 71 year old female with a  medical history is significant for longstanding hypertension since she was in her teens, diabetes, dyslipidemia, and ulcerative colitis.    She is having a hard time with seasonal allergies.  She feels with the change in diuretic ( Torsemide 30 mg daily. No potassium) has been very helpful. She has junk food once in a while. Patient states she has no SOB with or without activity. Weights 142 lbs at home.   No swelling in LE,watches grandkids ages 4 and 2 every day.  She has noted her BS to be stable. She feels well overall and denies chest pain, palpitations, lightheadedness, dizziness. Her BP's at home are about- 115/60's. She says coming to clinic for an appt increases her BP's. She took her pills like an hour ago. She has a treadmill and has been using it 2-3 times a week    Current meds:  Atenolol 50 mg daily  Torsemide 30 mg   Hydralazine 50 mg TID  Losartan 100 mg daily      ROS:   Constitutional: No fever, chills, or sweats.  ENT: No visual disturbance, ear ache, epistaxis, sore throat.   Allergies/Immunologic: Negative  Respiratory: No cough, hemoptysis.   Cardiovascular: As per HPI.   GI: As per HPI.   : No urinary frequency, dysuria, or hematuria.   Integument: Negative.   Psychiatric: Negative.   Neuro: Negative.   Endocrinology: negative   Musculoskeletal: negative    EXAM:   GEN/CONSTITUIONAL: Appears comfortable, in no apparent distress   EYES: No icterus  ENT/MOUTH: Normal  JVP:  below clavicle   RESPIRATORY: Clear to auscultation bilaterally   CARDIOVASCULAR: Regular S1 and S2, 2/6 JUANITA.   ABDOMEN: Soft,  non-tender, positive bowel sounds   NEUROLOGIC: Grossly non-focal   PSYCHIATRIC: Normal affect  EXT: no LE edema. Normal pedal pulses.  Skin: No petechiae, purpura or rash       ECHO 8/25  Interpretation Summary   Global and regional left ventricular function is normal with an EF of 60-65%.  Paradoxical septal motion consistent with right ventricular pressure and  volume overload is present.  Moderate to severe right ventricular dilation is present.  Global right ventricular function is moderately to severely reduced.  Calculated aortic valve area in severe AS range. Consider additional  evaluation if indicated.  Moderate to severe tricuspid insufficiency is present.  Dilation of the inferior vena cava is present with abnormal respiratory  variation in diameter.  Trivial pericardial effusion is present.    .  Assessment and Plan:  Georgia is well today, despite her lack of any functional limitation, the ongoing concern about her mildly reduced RV function is still present.  She appears euvolemic. She is making a better effort to stay hydrated.     1.  Severe pulmonary hypertension, normal LV function with mild dilation, functional class I-II.The etiology is likely group 2, and related to underlying diastolic dysfunction from longstanding hypertension and diabetes.    2.  Hypertension, with better recent control:  amlodipine stopped on 8/14 ( swelling).  Instructed to keep home blood pressure log.  3.  Mild-moderate aortic stenosis, possible bicuspid aortic valve:   4. Diabetes: PCP following  5.  Dyslipidemia, on Lipitor  6.  CKD stage 3  7. Ulcerative colitis      Plan:  No med changes  CORE in Evie ADAME NP-C

## 2021-12-17 NOTE — PROGRESS NOTES
Georgia is a 71 year old female with a  medical history is significant for longstanding hypertension since she was in her teens, diabetes, dyslipidemia, and ulcerative colitis.    She is having a hard time with seasonal allergies.  She feels with the change in diuretic ( Torsemide 30 mg daily. No potassium) has been very helpful. She has junk food once in a while. Patient states she has no SOB with or without activity. Weights 142 lbs at home.   No swelling in LE,watches grandkids ages 4 and 2 every day.  She has noted her BS to be stable. She feels well overall and denies chest pain, palpitations, lightheadedness, dizziness. Her BP's at home are about- 115/60's. She says coming to clinic for an appt increases her BP's. She took her pills like an hour ago. She has a treadmill and has been using it 2-3 times a week    Current meds:  Atenolol 50 mg daily  Torsemide 30 mg   Hydralazine 50 mg TID  Losartan 100 mg daily      ROS:   Constitutional: No fever, chills, or sweats.  ENT: No visual disturbance, ear ache, epistaxis, sore throat.   Allergies/Immunologic: Negative  Respiratory: No cough, hemoptysis.   Cardiovascular: As per HPI.   GI: As per HPI.   : No urinary frequency, dysuria, or hematuria.   Integument: Negative.   Psychiatric: Negative.   Neuro: Negative.   Endocrinology: negative   Musculoskeletal: negative    EXAM:   GEN/CONSTITUIONAL: Appears comfortable, in no apparent distress   EYES: No icterus  ENT/MOUTH: Normal  JVP:  below clavicle   RESPIRATORY: Clear to auscultation bilaterally   CARDIOVASCULAR: Regular S1 and S2, 2/6 JUANITA.   ABDOMEN: Soft, non-tender, positive bowel sounds   NEUROLOGIC: Grossly non-focal   PSYCHIATRIC: Normal affect  EXT: no LE edema. Normal pedal pulses.  Skin: No petechiae, purpura or rash       ECHO 8/25  Interpretation Summary   Global and regional left ventricular function is normal with an EF of 60-65%.  Paradoxical septal motion consistent with right ventricular  pressure and  volume overload is present.  Moderate to severe right ventricular dilation is present.  Global right ventricular function is moderately to severely reduced.  Calculated aortic valve area in severe AS range. Consider additional  evaluation if indicated.  Moderate to severe tricuspid insufficiency is present.  Dilation of the inferior vena cava is present with abnormal respiratory  variation in diameter.  Trivial pericardial effusion is present.    .  Assessment and Plan:  Georgia is well today, despite her lack of any functional limitation, the ongoing concern about her mildly reduced RV function is still present.  She appears euvolemic. She is making a better effort to stay hydrated.     1.  Severe pulmonary hypertension, normal LV function with mild dilation, functional class I-II.The etiology is likely group 2, and related to underlying diastolic dysfunction from longstanding hypertension and diabetes.    2.  Hypertension, with better recent control:  amlodipine stopped on 8/14 ( swelling).  Instructed to keep home blood pressure log.  3.  Mild-moderate aortic stenosis, possible bicuspid aortic valve:   4. Diabetes: PCP following  5.  Dyslipidemia, on Lipitor  6.  CKD stage 3  7. Ulcerative colitis      Plan:  No med changes  CORE in May         Carolyn ADAME NP-C

## 2021-12-18 ENCOUNTER — HEALTH MAINTENANCE LETTER (OUTPATIENT)
Age: 71
End: 2021-12-18

## 2021-12-23 ENCOUNTER — TELEPHONE (OUTPATIENT)
Dept: GASTROENTEROLOGY | Facility: CLINIC | Age: 71
End: 2021-12-23
Payer: MEDICARE

## 2021-12-23 DIAGNOSIS — K51.311 ULCERATIVE RECTOSIGMOIDITIS WITH RECTAL BLEEDING (H): ICD-10-CM

## 2021-12-23 RX ORDER — MESALAMINE 0.38 G/1
1.5 CAPSULE, EXTENDED RELEASE ORAL DAILY
Qty: 120 CAPSULE | Refills: 0 | Status: SHIPPED | OUTPATIENT
Start: 2021-12-23 | End: 2022-01-27

## 2021-12-23 NOTE — TELEPHONE ENCOUNTER
----- Message from Maria Antonia Vaca RN sent at 12/20/2021 11:17 AM CST -----  Regarding: RE: med refill needed  Hello  She has an appointment scheduled with you  on 1-27.   Maria Antonia   ----- Message -----  From: Jerry Robbins PA-C  Sent: 12/20/2021  10:47 AM CST  To: Maria Antonia Vaca RN, Kathi Portillo RN  Subject: FW: med refill needed                            Hey guys!  I received this message from a provider. It looks like she has not been seen since Oct 2020 which is probably why she has not received her meds. Can she see me or santos soon?  ----- Message -----  From: Carolyn Bonilla APRN CNP  Sent: 12/17/2021   7:54 AM CST  To: Jerry Robbins PA-C  Subject: med refill needed                                Keerthi Rudolph has been trying to request a refill of her meds and hasn't gotten a response yet.  If you could please look into this. She says the main MD she saw is on maternity leave.      Carolyn

## 2021-12-23 NOTE — TELEPHONE ENCOUNTER
Order placed for the patient to have a one month refill until can be seen by a provider in a clinic visit

## 2022-01-25 DIAGNOSIS — E89.0 POSTOPERATIVE HYPOTHYROIDISM: ICD-10-CM

## 2022-01-26 RX ORDER — LEVOTHYROXINE SODIUM 112 UG/1
TABLET ORAL
Qty: 90 TABLET | Refills: 4 | OUTPATIENT
Start: 2022-01-26

## 2022-01-26 NOTE — TELEPHONE ENCOUNTER
Should have refills on file  E-Prescribing Status: Receipt confirmed by pharmacy (12/16/2021  9:17 AM CST)  Swathi LEE RN, BSN

## 2022-01-27 ENCOUNTER — OFFICE VISIT (OUTPATIENT)
Dept: GASTROENTEROLOGY | Facility: CLINIC | Age: 72
End: 2022-01-27
Payer: MEDICARE

## 2022-01-27 VITALS
HEART RATE: 54 BPM | BODY MASS INDEX: 25.83 KG/M2 | WEIGHT: 145.8 LBS | HEIGHT: 63 IN | OXYGEN SATURATION: 95 % | DIASTOLIC BLOOD PRESSURE: 62 MMHG | SYSTOLIC BLOOD PRESSURE: 133 MMHG

## 2022-01-27 DIAGNOSIS — K51.311 ULCERATIVE RECTOSIGMOIDITIS WITH RECTAL BLEEDING (H): ICD-10-CM

## 2022-01-27 PROCEDURE — 99214 OFFICE O/P EST MOD 30 MIN: CPT | Performed by: PHYSICIAN ASSISTANT

## 2022-01-27 RX ORDER — MESALAMINE 0.38 G/1
1.5 CAPSULE, EXTENDED RELEASE ORAL DAILY
Qty: 360 CAPSULE | Refills: 1 | Status: SHIPPED | OUTPATIENT
Start: 2022-01-27 | End: 2022-04-27

## 2022-01-27 ASSESSMENT — PAIN SCALES - GENERAL: PAINLEVEL: NO PAIN (0)

## 2022-01-27 ASSESSMENT — MIFFLIN-ST. JEOR: SCORE: 1155.47

## 2022-01-27 NOTE — PROGRESS NOTES
IBD CLINIC VISIT     CC/REFERRING MD:  Nathan Dhillon  REASON FOR FOLLOW UP: Pan UC    IBD HISTORY  Age at diagnosis:   Extent of disease: pancolitis (prior proctosigmoiditis classification, confirmed panUC 2017)  Current UC medications: Apriso 1.5 g daily  Prior UC surgeries: None  Prior IBD Medications: None    DRUG MONITORING  TPMT enzyme activity: --    6-TGN/6-MMPN levels: --    Biologic concentration:--    DISEASE ASSESSMENT  Labs:  Lab Results   Component Value Date    ALBUMIN 3.3 02/10/2020     Recent Labs   Lab Test 20  0711 09/10/18  0928 17  0836   CRP <2.9 <2.9 <2.9   SED  --  36* 19     Endoscopic assessment: colonoscopy 17 normal ileum, patchy mild erythema and mucosa edema in rectum and sigmoid colon, markedly improved compared to 2017 outside colonoscopy report.  Mild patchy mucosal granularity noted throughout the remainder of the colon.  Rectal polyp 4 mm sessile removed.  Diverticulosis in transverse and descending colon.  Internal and external hemorrhoids noted.  Copious brown opaque semiliquid stool and medication material throughout colon interfering with visualization.  Barrera score 0.  Only biopsies obtained were from the rectal polyp, noted to be an inflammatory polyp.  No evidence of dysplasia.  No other biopsies obtained.  Enterography: --  Fecal calprotectin: --   C diff: --  Barrera score:   Stool freq: 0 (baseline stools frequency)  Rectal bleedin (None)  PGA: 0 (normal)  Endoscopy: Not done    Remission: <3   Mild disease: 3-5  Moderate disease: 6-10  Severe disease: >10    IBDQ Score Date IBDQ - Total Score  (Higher score better)   5/15/2017 51     ASSESSMENT/PLAN  Ms. Montoya is a 71 year old with past medical history to include hypertension, DM2, diabetic nephropathy, multinodular goiter s/p complete thyroidectomy ~40 yrs ago complicated by postoperative hypothyroidism, diverticulosis complicated by diverticulitis with prior limited left hemicolectomy ,  open cholecystectomy in 2000, s/p ex-lap 30 yrs ago for presumed ectopic pregnancy (apparent diverticulitis, nonsurgical management on discovery), s/p tubal ligation 1983 complicated postsurgical [facility] s/p mesh repair 2007 here for follow-up for UC pancolitis follow up.    1.  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  Last endoscopic assessment was in 2017, noting Barrera 0.  We will continue with mesalamine monotherapy at this time.  Patient continues to follow closely with nephrology and at this time does not feel that mesalamine is contributing to decreased kidney function.  --Continue Apriso 1.5 g daily  --CBC, LFTs, CRP, ESR, CR at least once per year (creatinine completed 12/16/21, remaining labs done 9/2021)    2. CKD stage 3: Patient has poorly controlled diabetes and longstanding hypertension.  Mesalamine has potential implications on kidney but appears her function has been stable in the recent past.  Nephrology notes that she does have proteinuria without hematuria which does not fit with diabetic nephropathy.  She is encouraged to focus on hydration per most recent nephrology consult.    3. IBD healthcare maintenance based on patients current medication:  5-ASA (Apriso)    Vaccinations:  Recommend yearly flu shot, pneumonia vaccines (Prevnar 13 then 8 weeks later Pneumovax 23 then 5 years later Pneumovax 23), tetanus every 10 years.    One time confirmation of immunity or serologies:  -- Hepatitis A (serologies or immunizations): --  -- Hepatitis B (serologies or immunizations): --  -- Zoster vaccination (>61 yo, discuss if over 50): --  -- MMR:--  -- HPV (all aged 18-26): --  -- Meningococcal meningitis (all patients at risk for meningitis): --     Bone mineral density screening   -- Recommend all patients supplement with calcium and vitamin D    Cancer Screening:  Colon cancer screening:  Given pancolitis colonoscopy every 2-3 years recommended after 8 years of disease.  Dysplasia screening is recommended  [id number].    Cervical cancer screening: N/A    Skin cancer screening: Since patient is not immunocompromised, this is per patient's dermatologist recommendations.    Depression Screening:  -- Over the last month, have you felt down, depressed, or hopeless? No  -- Over the last month, have you felt little interest or pleasure doing things? No    Misc:  -- Avoid tobacco use  -- Avoid NSAIDs as there is potentially a 25% chance of causing an IBD flare      RTC 6 months     Thank you for this consultation.  It was a pleasure to participate in the care of this patient; please contact us with any further questions.      Jerry Robbins PA-C  Division of Gastroenterology, Hepatology and Nutrition  [facility]    HPI:   Ms. Montoya is a 71 year old with past medical history to include hypertension, DM2, diabetic nephropathy, multinodular goiter s/p complete thyroidectomy ~40 yrs ago complicated by postoperative hypothyroidism, diverticulosis complicated by diverticulitis with prior limited left hemicolectomy 2002, open cholecystectomy in 2000, s/p ex-lap 30 yrs ago for presumed ectopic pregnancy (apparent diverticulitis, nonsurgical management on discovery), s/p tubal ligation 1983 complicated postsurgical [facility] s/p mesh repair 2007 here for follow-up for UC pancolitis follow up.     Patient is doing well and is at baseline bowel pattern with 1-2 soft formed stools per day.  No urgency or nighttime stools. No blood in the stool.    We have been closely monitoring her kidney function (CKD stage III), currently following with nephrology for concerns regarding medication insult with mesalamine.  Nephrology feels this is unlikely to be due to mesalamine.  Patient has poorly controlled diabetes as well as longstanding hypertension.  Last seen this week by nephrology.  Patient is encouraged to focus on hydration.    ROS:  Complete 10 System ROS performed. All are negative except as documented below, in the HPI, or in  patient questionnaire from today's visit.    No fevers or chills  No weight loss  No blurry vision, double vision or change in vision  No sore throat  No lymphadenopathy  No headache, paraesthesias, or weakness in a limb  No shortness of breath or wheezing  No chest pain or pressure  No arthralgias or myalgias  No rashes or skin changes  No odynophagia or dysphagia  No BRBPR, hematochezia, melena  No dysuria, frequency or urgency  No hot/cold intolerance or polyria  No anxiety or depression    Extra intestinal manifestations of IBD:  No uveitis/episcleritis  No aphthous ulcers   No arthritis   No erythema nodosum/pyoderma gangrenosum.     PERTINENT PAST MEDICAL HISTORY:  Past Medical History:   Diagnosis Date     Diabetes mellitus (H)      Diverticulosis of colon (without mention of hemorrhage) 10/1/2012     Hypertension      Pulmonary hypertension (H)      Ulcerative (chronic) enterocolitis (H)        PREVIOUS SURGERIES:  Past Surgical History:   Procedure Laterality Date     CHOLECYSTECTOMY  3/1987     COLON SURGERY  01/2001    Left colon resection; diverticulitis     COLONOSCOPY N/A 4/24/2017    Procedure: COMBINED COLONOSCOPY, SINGLE OR MULTIPLE BIOPSY/POLYPECTOMY BY BIOPSY;;  Surgeon: Radha Salguero MD;  Location: [facility] OR     COLONOSCOPY WITH CO2 INSUFFLATION N/A 4/24/2017    Procedure: COLONOSCOPY WITH CO2 INSUFFLATION;  Colonoscopy Dx rectal bleeding, BMI 25.86, Pharm [facility] .[phone], ;  Surgeon: Radha Salguero MD;  Location: [facility] OR     CV RIGHT HEART CATH MEASUREMENTS RECORDED N/A 3/16/2020    Procedure: CV RIGHT HEART CATH;  Surgeon: Nader Muñoz MD;  Location:  HEART CARDIAC CATH LAB     GYN SURGERY  9/1983    tubal ligation     HERNIA REPAIR  12/2006    recurrent incisional hernia     THROAT SURGERY  12/1974    thyroidectomy     ALLERGIES:     Allergies   Allergen Reactions     Actos [Pioglitazone]      Fluid retention     Amlodipine      Leg swelling, hand swelling      Animal Dander      Doxycycline Hives     Dust Mites      Grass      Keflex [Cephalexin Hcl] Hives     Metoprolol      Swelling of lower legs and fatigue     Other [Seasonal Allergies]      pollen     Ranitidine      rash     Synthroid [Levothroid] Swelling     ???     Tetracycline Hives     Tylenol Hives     Ziac [Bisoprolol-Hydrochlorothiazide]        PERTINENT MEDICATIONS:    Current Outpatient Medications:      atenolol (TENORMIN) 50 MG tablet, Take 1 tablet (50 mg) by mouth daily, Disp: 90 tablet, Rfl: 3     atorvastatin (LIPITOR) 40 MG tablet, Take 1 tablet (40 mg) by mouth daily, Disp: 90 tablet, Rfl: 3     blood glucose (ACCU-CHEK FELIPE PLUS) test strip, 200 strips by In Vitro route 2 times daily Use to test blood sugar 2 times daily or as directed., Disp: 200 strip, Rfl: 4     Cholecalciferol (VITAMIN D) 2000 units CAPS, Take by mouth daily, Disp: , Rfl:      Cyanocobalamin (B-12) 1000 MCG TABS, Take 1,000 mcg by mouth three times a week, Disp: , Rfl:      glipiZIDE (GLUCOTROL XL) 10 MG 24 hr tablet, Take 1 tablet (10 mg) by mouth daily, Disp: 90 tablet, Rfl: 3     hydrALAZINE (APRESOLINE) 50 MG tablet, TAKE 1 TABLET(50 MG) BY MOUTH THREE TIMES DAILY, Disp: 270 tablet, Rfl: 3     levothyroxine (SYNTHROID/LEVOTHROID) 112 MCG tablet, Take 1 tablet (112 mcg) by mouth daily, Disp: 90 tablet, Rfl: 4     losartan (COZAAR) 100 MG tablet, TAKE 1 TABLET(100 MG) BY MOUTH DAILY, Disp: 90 tablet, Rfl: 3     metFORMIN (GLUCOPHAGE) 500 MG tablet, 1 tablet (500 mg) 2 times daily (with meals), Disp: 180 tablet, Rfl: 3     torsemide (DEMADEX) 20 MG tablet, 30 mg per day, Disp: 135 tablet, Rfl: 3    SOCIAL HISTORY:  Social History     Socioeconomic History     Marital status:      Spouse name: Raymundo; dementia;       Number of children: 3     Years of education: Not on file     Highest education level: Not on file   Occupational History     Employer: [employer]   Tobacco Use     Smoking status: Never  "Smoker     Smokeless tobacco: Never Used   Substance and Sexual Activity     Alcohol use: Yes     Alcohol/week: 2.0 - 4.0 standard drinks     Types: 1 - 2 Cans of beer, 1 - 2 Shots of liquor per week     Comment: occasional cocktail or beer     Drug use: No     Sexual activity: Not Currently   Other Topics Concern     Parent/sibling w/ CABG, MI or angioplasty before 65F 55M? 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 8/14     Social Determinants of Health     Financial Resource Strain: Not on file   Food Insecurity: Not on file   Transportation Needs: Not on file   Physical Activity: Not on file   Stress: Not on file   Social Connections: Not on file   Intimate Partner Violence: Not on file   Housing Stability: Not on file       FAMILY HISTORY:  Family History   Problem Relation Age of Onset     Cerebrovascular Disease Mother      Cancer Father         bladder     Diverticulitis Brother      Diverticulitis Brother      Kidney Disease No family hx of      Crohn's Disease No family hx of      Ulcerative Colitis No family hx of      Stomach Cancer No family hx of      GERD No family hx of      Celiac Disease No family hx of        Past/family/social history reviewed and no changes    PHYSICAL EXAMINATION:  Constitutional: aaox3, cooperative, pleasant, not dyspneic/diaphoretic, no acute distress  Vitals reviewed: /62   Pulse 54   Ht 1.6 m (5' 3\")   Wt 66.1 kg (145 lb 12.8 oz)   LMP  (LMP Unknown)   SpO2 95%   BMI 25.83 kg/m    Wt:   Wt Readings from Last 2 Encounters:   01/27/22 66.1 kg (145 lb 12.8 oz)   12/17/21 65.9 kg (145 lb 4.8 oz)      Eyes: Sclera " anicteric/injected  Ears/nose/mouth/throat: Normal oropharynx without ulcers or exudate, mucus membranes moist, hearing intact  Neck: supple, thyroid normal size  CV: No edema  Respiratory: Unlabored breathing  Lymph: No axillary, submandibular, supraclavicular or inguinal lymphadenopathy  Abd: Nondistended, +bs, no hepatosplenomegaly, nontender, no peritoneal signs  Skin: warm, perfused, no jaundice  Psych: Normal affect  MSK: Normal gait      PERTINENT STUDIES:  Most recent CBC:  Recent Labs   Lab Test 09/01/21  0755 08/09/21  1723 02/08/21  1712 10/28/20  1629   WBC 9.0  --   --  7.3   HGB 11.9 11.0*   < > 12.3   HCT 37.8  --   --  38.2     --   --  277    < > = values in this interval not displayed.     Most recent hepatic panel:  Recent Labs   Lab Test 10/28/20  1629 01/13/20  0926   ALT 18 12   AST 19 18     Most recent creatinine:  Recent Labs   Lab Test 12/16/21  0955 09/17/21  0654   CR 1.43* 1.24*

## 2022-01-27 NOTE — LETTER
2022         RE: Keerthi Montoya  [address] [place] [id number]        Dear Colleague,    Thank you for referring your patient, Keerthi Montoya, to the [facility] GASTROENTEROLOGY CLINIC [place]. Please see a copy of my visit note below.    IBD CLINIC VISIT     CC/REFERRING MD:  Nathan Dhillon  REASON FOR FOLLOW UP: Pan UC    IBD HISTORY  Age at diagnosis:   Extent of disease: pancolitis (prior proctosigmoiditis classification, confirmed panUC 2017)  Current UC medications: Apriso 1.5 g daily  Prior UC surgeries: None  Prior IBD Medications: None    DRUG MONITORING  TPMT enzyme activity: --    6-TGN/6-MMPN levels: --    Biologic concentration:--    DISEASE ASSESSMENT  Labs:  Lab Results   Component Value Date    ALBUMIN 3.3 02/10/2020     Recent Labs   Lab Test 20  0711 09/10/18  0928 17  0836   CRP <2.9 <2.9 <2.9   SED  --  36* 19     Endoscopic assessment: colonoscopy 17 normal ileum, patchy mild erythema and mucosa edema in rectum and sigmoid colon, markedly improved compared to 2017 outside colonoscopy report.  Mild patchy mucosal granularity noted throughout the remainder of the colon.  Rectal polyp 4 mm sessile removed.  Diverticulosis in transverse and descending colon.  Internal and external hemorrhoids noted.  Copious brown opaque semiliquid stool and medication material throughout colon interfering with visualization.  Barrera score 0.  Only biopsies obtained were from the rectal polyp, noted to be an inflammatory polyp.  No evidence of dysplasia.  No other biopsies obtained.  Enterography: --  Fecal calprotectin: --   C diff: --  Barrera score:   Stool freq: 0 (baseline stools frequency)  Rectal bleedin (None)  PGA: 0 (normal)  Endoscopy: Not done    Remission: <3   Mild disease: 3-5  Moderate disease: 6-10  Severe disease: >10    IBDQ Score Date IBDQ - Total Score  (Higher score better)   5/15/2017 51     ASSESSMENT/PLAN  Ms. Montoya is a  71 year old with past medical history to include hypertension, DM2, diabetic nephropathy, multinodular goiter s/p complete thyroidectomy ~40 yrs ago complicated by postoperative hypothyroidism, diverticulosis complicated by diverticulitis with prior limited left hemicolectomy 2002, open cholecystectomy in 2000, s/p ex-lap 30 yrs ago for presumed ectopic pregnancy (apparent diverticulitis, nonsurgical management on discovery), s/p tubal ligation 1983 complicated postsurgical [facility] s/p mesh repair 2007 here for follow-up for UC pancolitis follow up.    1.  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  Last endoscopic assessment was in 2017, noting Barrera 0.  We will continue with mesalamine monotherapy at this time.  Patient continues to follow closely with nephrology and at this time does not feel that mesalamine is contributing to decreased kidney function.  --Continue Apriso 1.5 g daily  --CBC, LFTs, CRP, ESR, CR at least once per year (creatinine completed 12/16/21, remaining labs done 9/2021)    2. CKD stage 3: Patient has poorly controlled diabetes and longstanding hypertension.  Mesalamine has potential implications on kidney but appears her function has been stable in the recent past.  Nephrology notes that she does have proteinuria without hematuria which does not fit with diabetic nephropathy.  She is encouraged to focus on hydration per most recent nephrology consult.    3. IBD healthcare maintenance based on patients current medication:  5-ASA (Apriso)    Vaccinations:  Recommend yearly flu shot, pneumonia vaccines (Prevnar 13 then 8 weeks later Pneumovax 23 then 5 years later Pneumovax 23), tetanus every 10 years.    One time confirmation of immunity or serologies:  -- Hepatitis A (serologies or immunizations): --  -- Hepatitis B (serologies or immunizations): --  -- Zoster vaccination (>59 yo, discuss if over 50): --  -- MMR:--  -- HPV (all aged 18-26): --  -- Meningococcal meningitis (all patients at risk for  meningitis): --     Bone mineral density screening   -- Recommend all patients supplement with calcium and vitamin D    Cancer Screening:  Colon cancer screening:  Given pancolitis colonoscopy every 2-3 years recommended after 8 years of disease.  Dysplasia screening is recommended 5734-0181.    Cervical cancer screening: N/A    Skin cancer screening: Since patient is not immunocompromised, this is per patient's dermatologist recommendations.    Depression Screening:  -- Over the last month, have you felt down, depressed, or hopeless? No  -- Over the last month, have you felt little interest or pleasure doing things? No    Misc:  -- Avoid tobacco use  -- Avoid NSAIDs as there is potentially a 25% chance of causing an IBD flare      RTC 6 months     Thank you for this consultation.  It was a pleasure to participate in the care of this patient; please contact us with any further questions.      Jerry Robbins PA-C  Division of Gastroenterology, Hepatology and Nutrition  [facility]    HPI:   Ms. Montoya is a 71 year old with past medical history to include hypertension, DM2, diabetic nephropathy, multinodular goiter s/p complete thyroidectomy ~40 yrs ago complicated by postoperative hypothyroidism, diverticulosis complicated by diverticulitis with prior limited left hemicolectomy 2002, open cholecystectomy in 2000, s/p ex-lap 30 yrs ago for presumed ectopic pregnancy (apparent diverticulitis, nonsurgical management on discovery), s/p tubal ligation 1983 complicated postsurgical [facility] s/p mesh repair 2007 here for follow-up for UC pancolitis follow up.     Patient is doing well and is at baseline bowel pattern with 1-2 soft formed stools per day.  No urgency or nighttime stools. No blood in the stool.    We have been closely monitoring her kidney function (CKD stage III), currently following with nephrology for concerns regarding medication insult with mesalamine.  Nephrology feels this is unlikely to be due to  mesalamine.  Patient has poorly controlled diabetes as well as longstanding hypertension.  Last seen this week by nephrology.  Patient is encouraged to focus on hydration.    ROS:  Complete 10 System ROS performed. All are negative except as documented below, in the HPI, or in patient questionnaire from today's visit.    No fevers or chills  No weight loss  No blurry vision, double vision or change in vision  No sore throat  No lymphadenopathy  No headache, paraesthesias, or weakness in a limb  No shortness of breath or wheezing  No chest pain or pressure  No arthralgias or myalgias  No rashes or skin changes  No odynophagia or dysphagia  No BRBPR, hematochezia, melena  No dysuria, frequency or urgency  No hot/cold intolerance or polyria  No anxiety or depression    Extra intestinal manifestations of IBD:  No uveitis/episcleritis  No aphthous ulcers   No arthritis   No erythema nodosum/pyoderma gangrenosum.     PERTINENT PAST MEDICAL HISTORY:  Past Medical History:   Diagnosis Date     Diabetes mellitus (H)      Diverticulosis of colon (without mention of hemorrhage) 10/1/2012     Hypertension      Pulmonary hypertension (H)      Ulcerative (chronic) enterocolitis (H)        PREVIOUS SURGERIES:  Past Surgical History:   Procedure Laterality Date     CHOLECYSTECTOMY  3/1987     COLON SURGERY  01/2001    Left colon resection; diverticulitis     COLONOSCOPY N/A 4/24/2017    Procedure: COMBINED COLONOSCOPY, SINGLE OR MULTIPLE BIOPSY/POLYPECTOMY BY BIOPSY;;  Surgeon: Radha Salguero MD;  Location: [facility] OR     COLONOSCOPY WITH CO2 INSUFFLATION N/A 4/24/2017    Procedure: COLONOSCOPY WITH CO2 INSUFFLATION;  Colonoscopy Dx rectal bleeding, BMI 25.86, Pharm [facility] .[phone], ;  Surgeon: Radha Salguero MD;  Location: [facility] OR     CV RIGHT HEART CATH MEASUREMENTS RECORDED N/A 3/16/2020    Procedure: CV RIGHT HEART CATH;  Surgeon: Nader Muñoz MD;  Location:  HEART CARDIAC CATH LAB     GYN  SURGERY  9/1983    tubal ligation     HERNIA REPAIR  12/2006    recurrent incisional hernia     THROAT SURGERY  12/1974    thyroidectomy     ALLERGIES:     Allergies   Allergen Reactions     Actos [Pioglitazone]      Fluid retention     Amlodipine      Leg swelling, hand swelling     Animal Dander      Doxycycline Hives     Dust Mites      Grass      Keflex [Cephalexin Hcl] Hives     Metoprolol      Swelling of lower legs and fatigue     Other [Seasonal Allergies]      pollen     Ranitidine      rash     Synthroid [Levothroid] Swelling     ???     Tetracycline Hives     Tylenol Hives     Ziac [Bisoprolol-Hydrochlorothiazide]        PERTINENT MEDICATIONS:    Current Outpatient Medications:      atenolol (TENORMIN) 50 MG tablet, Take 1 tablet (50 mg) by mouth daily, Disp: 90 tablet, Rfl: 3     atorvastatin (LIPITOR) 40 MG tablet, Take 1 tablet (40 mg) by mouth daily, Disp: 90 tablet, Rfl: 3     blood glucose (ACCU-CHEK FELIPE PLUS) test strip, 200 strips by In Vitro route 2 times daily Use to test blood sugar 2 times daily or as directed., Disp: 200 strip, Rfl: 4     Cholecalciferol (VITAMIN D) 2000 units CAPS, Take by mouth daily, Disp: , Rfl:      Cyanocobalamin (B-12) 1000 MCG TABS, Take 1,000 mcg by mouth three times a week, Disp: , Rfl:      glipiZIDE (GLUCOTROL XL) 10 MG 24 hr tablet, Take 1 tablet (10 mg) by mouth daily, Disp: 90 tablet, Rfl: 3     hydrALAZINE (APRESOLINE) 50 MG tablet, TAKE 1 TABLET(50 MG) BY MOUTH THREE TIMES DAILY, Disp: 270 tablet, Rfl: 3     levothyroxine (SYNTHROID/LEVOTHROID) 112 MCG tablet, Take 1 tablet (112 mcg) by mouth daily, Disp: 90 tablet, Rfl: 4     losartan (COZAAR) 100 MG tablet, TAKE 1 TABLET(100 MG) BY MOUTH DAILY, Disp: 90 tablet, Rfl: 3     metFORMIN (GLUCOPHAGE) 500 MG tablet, 1 tablet (500 mg) 2 times daily (with meals), Disp: 180 tablet, Rfl: 3     torsemide (DEMADEX) 20 MG tablet, 30 mg per day, Disp: 135 tablet, Rfl: 3    SOCIAL HISTORY:  Social History      Socioeconomic History     Marital status:      Spouse name: Raymundo; dementia;  2016     Number of children: 3     Years of education: Not on file     Highest education level: Not on file   Occupational History     Employer: [employer]   Tobacco Use     Smoking status: Never Smoker     Smokeless tobacco: Never Used   Substance and Sexual Activity     Alcohol use: Yes     Alcohol/week: 2.0 - 4.0 standard drinks     Types: 1 - 2 Cans of beer, 1 - 2 Shots of liquor per week     Comment: occasional cocktail or beer     Drug use: No     Sexual activity: Not Currently   Other Topics Concern     Parent/sibling w/ CABG, MI or angioplasty before 65F 55M? 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      Social Determinants of Health     Financial Resource Strain: Not on file   Food Insecurity: Not on file   Transportation Needs: Not on file   Physical Activity: Not on file   Stress: Not on file   Social Connections: Not on file   Intimate Partner Violence: Not on file   Housing Stability: Not on file       FAMILY HISTORY:  Family History   Problem Relation Age of Onset     Cerebrovascular Disease Mother      Cancer Father         bladder     Diverticulitis Brother      Diverticulitis Brother      Kidney Disease No family hx of      Crohn's Disease No family hx of      Ulcerative Colitis No family hx of      Stomach Cancer No family hx of      GERD No family hx of      Celiac Disease No family hx of        Past/family/social history reviewed and no changes    PHYSICAL EXAMINATION:  Constitutional: aaox3, cooperative, pleasant, not dyspneic/diaphoretic, no acute  "distress  Vitals reviewed: /62   Pulse 54   Ht 1.6 m (5' 3\")   Wt 66.1 kg (145 lb 12.8 oz)   LMP  (LMP Unknown)   SpO2 95%   BMI 25.83 kg/m    Wt:   Wt Readings from Last 2 Encounters:   01/27/22 66.1 kg (145 lb 12.8 oz)   12/17/21 65.9 kg (145 lb 4.8 oz)      Eyes: Sclera anicteric/injected  Ears/nose/mouth/throat: Normal oropharynx without ulcers or exudate, mucus membranes moist, hearing intact  Neck: supple, thyroid normal size  CV: No edema  Respiratory: Unlabored breathing  Lymph: No axillary, submandibular, supraclavicular or inguinal lymphadenopathy  Abd: Nondistended, +bs, no hepatosplenomegaly, nontender, no peritoneal signs  Skin: warm, perfused, no jaundice  Psych: Normal affect  MSK: Normal gait      PERTINENT STUDIES:  Most recent CBC:  Recent Labs   Lab Test 09/01/21  0755 08/09/21  1723 02/08/21  1712 10/28/20  1629   WBC 9.0  --   --  7.3   HGB 11.9 11.0*   < > 12.3   HCT 37.8  --   --  38.2     --   --  277    < > = values in this interval not displayed.     Most recent hepatic panel:  Recent Labs   Lab Test 10/28/20  1629 01/13/20  0926   ALT 18 12   AST 19 18     Most recent creatinine:  Recent Labs   Lab Test 12/16/21  0955 09/17/21  0654   CR 1.43* 1.24*         Jerry Robbins PA-C  "

## 2022-01-27 NOTE — NURSING NOTE
"Chief Complaint   Patient presents with     Follow Up       Vitals:    01/27/22 0752   BP: 133/62   Pulse: 54   SpO2: 95%   Weight: 66.1 kg (145 lb 12.8 oz)   Height: 1.6 m (5' 3\")       Body mass index is 25.83 kg/m .    Edilia Mayo MA    "

## 2022-01-27 NOTE — PATIENT INSTRUCTIONS
It was a pleasure taking care of you today.  I've included a brief summary of our discussion and care plan from today's visit below.  Please review this information with your primary care provider.  ______________________________________________________________________    My recommendations are summarized as follows:    -- Continue Apriso 1.5 g per day (4 pills per day)  -- Next endoscopic assessment: colonoscopy in [id number]  -- Patient with IBD we recommend supplementation vitamin D 1000 units daily and calcium 500 mg twice daily.  -- Vaccines/immunizations to be updated: Recommend yearly flu shot, pneumonia vaccines (Prevnar 13 then 8 weeks later Pneumovax 23 then 5 years later Pneumovax 23), tetanus every 10 years.  -- No NSAIDs (ibuprofen, or anything containing ibuprofen)       For additional resources about inflammatory bowel disease visit [email or web address]      Return to GI Clinic in 6 months with Dr. James to review your progress.    ______________________________________________________________________    Who do I call with any questions after my visit?  Please be in touch if there are any further questions that arise following today's visit.  There are multiple ways to contact your gastroenterology care team.        During business hours, you may reach a Gastroenterology nurse at [phone], option 3.       To schedule or reschedule an appointment, please call [phone].       You can always send a secure message through [employer].  [employer] messages are answered by your nurse or doctor typically within 24 hours.  Please allow extra time on weekends and holidays.        For urgent/emergent questions after business hours, you may reach the on-call GI Fellow by contacting the [facility] at [phone].      In order for your refill to be processed in a timely fashion, it is your responsibility to ensure you follow the recommendations from your provider regarding  your laboratory studies and follow up appointments.       How will I get the results of any tests ordered?    You will receive all of your results.  If you have signed up for [employer], any tests ordered at your visit will be available to you after your physician reviews them.  Typically this takes 1-2 weeks.  If there are urgent results that require a change in your care plan, your physician or nurse will call you to discuss the next steps.      What is [employer]?  [employer] [place] is a secure way for you to access all of your healthcare records from the [facility].  It is a web based computer program, so you can sign on to it from any location.  It also allows you to send secure messages to your care team.  I recommend signing up for [employer] access if you have not already done so and are comfortable with using a computer.      How to I schedule a follow-up visit?  If you did not schedule a follow-up visit today, please call [phone] to schedule a follow-up office visit.        Sincerely,    Jerry Robbins PA-C  [facility]  Division of Gastroenterology

## 2022-02-18 ENCOUNTER — ANCILLARY PROCEDURE (OUTPATIENT)
Dept: CARDIOLOGY | Facility: CLINIC | Age: 72
End: 2022-02-18
Attending: INTERNAL MEDICINE
Payer: MEDICARE

## 2022-02-18 ENCOUNTER — OFFICE VISIT (OUTPATIENT)
Dept: CARDIOLOGY | Facility: CLINIC | Age: 72
End: 2022-02-18
Payer: MEDICARE

## 2022-02-18 ENCOUNTER — TELEPHONE (OUTPATIENT)
Dept: CARDIOLOGY | Facility: CLINIC | Age: 72
End: 2022-02-18

## 2022-02-18 VITALS
SYSTOLIC BLOOD PRESSURE: 172 MMHG | BODY MASS INDEX: 25.8 KG/M2 | WEIGHT: 145.6 LBS | DIASTOLIC BLOOD PRESSURE: 69 MMHG | OXYGEN SATURATION: 99 % | HEIGHT: 63 IN | HEART RATE: 59 BPM

## 2022-02-18 DIAGNOSIS — E11.29 TYPE 2 DIABETES MELLITUS WITH MICROALBUMINURIA, WITHOUT LONG-TERM CURRENT USE OF INSULIN (H): Primary | ICD-10-CM

## 2022-02-18 DIAGNOSIS — I50.30 HEART FAILURE WITH PRESERVED EJECTION FRACTION, NYHA CLASS I (H): ICD-10-CM

## 2022-02-18 DIAGNOSIS — E78.5 HYPERLIPIDEMIA LDL GOAL <100: ICD-10-CM

## 2022-02-18 DIAGNOSIS — I27.20 PULMONARY HYPERTENSION (H): Primary | ICD-10-CM

## 2022-02-18 DIAGNOSIS — R80.9 TYPE 2 DIABETES MELLITUS WITH MICROALBUMINURIA, WITHOUT LONG-TERM CURRENT USE OF INSULIN (H): Primary | ICD-10-CM

## 2022-02-18 DIAGNOSIS — I35.0 NONRHEUMATIC AORTIC VALVE STENOSIS: ICD-10-CM

## 2022-02-18 LAB — LVEF ECHO: NORMAL

## 2022-02-18 PROCEDURE — 93306 TTE W/DOPPLER COMPLETE: CPT | Performed by: INTERNAL MEDICINE

## 2022-02-18 PROCEDURE — 99214 OFFICE O/P EST MOD 30 MIN: CPT | Performed by: INTERNAL MEDICINE

## 2022-02-18 RX ADMIN — Medication 5 ML: at 09:04

## 2022-02-18 NOTE — PATIENT INSTRUCTIONS
Here is the website to enroll in a cost savings program from the dug  to reduce out of pocket cost. If it is too expensive, let us know.   Remember you will need a non fasting lab drawn 7-10 day after you start the medication   We can try Jardiance 10 mg daily.        [email or web address]     RTC 6 months with echo

## 2022-02-18 NOTE — PROGRESS NOTES
"[facility] Cardiology Consultation:    Assessment and Plan:     1.    Chronic HFpEF, group 2 severe pulmonary hypertension related to diastolic dysfunction, moderately reduced RV function, functional class I-II.    Follows in core clinic.  On torsemide 20/10 twice daily.    Start SGLT2 inhibitor, will run co-pays.  2.  Hypertension, high office BP, controlled at home on current regimen.   3.  Moderate paradoxical low flow aortic stenosis, possible bicuspid aortic valve: Continue periodic monitoring  4. Diabetes   5.  Dyslipidemia, subclinical CAD with moderate CAC on CT: on Lipitor and baby aspirin.  6.  CKD stage 3  7. Ulcerative colitis    Follow-up in 6 months     David Almendarez MD    Cardiac Imaging and Prevention  [facility]  [email or web address] Pager: [phone]    HPI: HFpEF, pulmonary hypertension follow-up.  I reviewed her echocardiogram that was done today.  It shows normal LV function, grade 3 diastolic dysfunction with elevated LVEDP, paradoxical septum, moderate pulmonary hypertension with RVSP of 60 mmHg, moderate RV dilation with mild to moderate dysfunction, CVP of 15, mild-moderate paradoxical low flow aortic stenosis.  Compared to prior study, nosignificant change.  She has been doing well, and has no functional limitation.  Denies any chest pain or pressure, shortness of breath/dyspnea, orthopnea, pnd, palpitations, syncope/presyncope or edema. blood pressure at home is checked regularly and runs in the 120s average.  She continues to follow-up in core clinic.  Renal function is stable.      EXAM:  BP (!) 172/69 (BP Location: Left arm, Patient Position: Sitting, Cuff Size: Adult Regular)   Pulse 59   Ht 1.6 m (5' 2.99\")   Wt 66 kg (145 lb 9.6 oz)   LMP  (LMP Unknown)   SpO2 99%   BMI 25.80 kg/m    GEN/CONSTITUIONAL: Appears comfortable, in no apparent distress   EYES: No icterus  ENT/MOUTH: Normal  JVP:  Elevated to mid neck  RESPIRATORY: Clear " to auscultation bilaterally   CARDIOVASCULAR: Regular S1 and S2, 2/6 JUANITA.   ABDOMEN: Soft, non-tender, positive bowel sounds   NEUROLOGIC: Grossly non-focal   PSYCHIATRIC: Normal affect  EXT: No LE edema. Normal pedal pulses.  Skin: No petechiae, purpura or rash    PAST MEDICAL HISTORY:  Past Medical History:   Diagnosis Date     Diabetes mellitus (H)      Diverticulosis of colon (without mention of hemorrhage) 10/1/2012     Hypertension      Pulmonary hypertension (H)      Ulcerative (chronic) enterocolitis (H)        CURRENT MEDICATIONS:  Current Outpatient Medications   Medication     atenolol (TENORMIN) 50 MG tablet     atorvastatin (LIPITOR) 40 MG tablet     blood glucose (ACCU-CHEK FELIPE PLUS) test strip     Cholecalciferol (VITAMIN D) 2000 units CAPS     Cyanocobalamin (B-12) 1000 MCG TABS     glipiZIDE (GLUCOTROL XL) 10 MG 24 hr tablet     hydrALAZINE (APRESOLINE) 50 MG tablet     levothyroxine (SYNTHROID/LEVOTHROID) 112 MCG tablet     losartan (COZAAR) 100 MG tablet     mesalamine (APRISO ER) 0.375 g 24 hr capsule     metFORMIN (GLUCOPHAGE) 500 MG tablet     torsemide (DEMADEX) 20 MG tablet     No current facility-administered medications for this visit.     Facility-Administered Medications Ordered in Other Visits   Medication     sodium chloride (PF) 0.9% PF flush 10 mL       PAST SURGICAL HISTORY:  Past Surgical History:   Procedure Laterality Date     CHOLECYSTECTOMY  3/1987     COLON SURGERY  01/2001    Left colon resection; diverticulitis     COLONOSCOPY N/A 4/24/2017    Procedure: COMBINED COLONOSCOPY, SINGLE OR MULTIPLE BIOPSY/POLYPECTOMY BY BIOPSY;;  Surgeon: Radha Salguero MD;  Location: [facility] OR     COLONOSCOPY WITH CO2 INSUFFLATION N/A 4/24/2017    Procedure: COLONOSCOPY WITH CO2 INSUFFLATION;  Colonoscopy Dx rectal bleeding, BMI 25.86, Pharm [facility] .[phone], ;  Surgeon: Radha Salguero MD;  Location: [facility] OR     CV RIGHT HEART CATH MEASUREMENTS RECORDED N/A 3/16/2020     Procedure: CV RIGHT HEART CATH;  Surgeon: Nader Muñoz MD;  Location:  HEART CARDIAC CATH LAB     GYN SURGERY  1983    tubal ligation     HERNIA REPAIR  2006    recurrent incisional hernia     THROAT SURGERY  1974    thyroidectomy       ALLERGIES     Allergies   Allergen Reactions     Actos [Pioglitazone]      Fluid retention     Amlodipine      Leg swelling, hand swelling     Animal Dander      Doxycycline Hives     Dust Mites      Grass      Keflex [Cephalexin Hcl] Hives     Metoprolol      Swelling of lower legs and fatigue     Other [Seasonal Allergies]      pollen     Ranitidine      rash     Synthroid [Levothroid] Swelling     ???     Tetracycline Hives     Tylenol Hives     Ziac [Bisoprolol-Hydrochlorothiazide]        FAMILY HISTORY:  Family History   Problem Relation Age of Onset     Cerebrovascular Disease Mother      Cancer Father         bladder     Diverticulitis Brother      Diverticulitis Brother      Kidney Disease No family hx of      Crohn's Disease No family hx of      Ulcerative Colitis No family hx of      Stomach Cancer No family hx of      GERD No family hx of      Celiac Disease No family hx of        SOCIAL HISTORY:  Social History     Socioeconomic History     Marital status:      Spouse name: Raymundo; dementia;  2016     Number of children: 3     Years of education: Not on file     Highest education level: Not on file   Occupational History     Employer: [employer]   Tobacco Use     Smoking status: Never Smoker     Smokeless tobacco: Never Used   Substance and Sexual Activity     Alcohol use: Yes     Alcohol/week: 2.0 - 4.0 standard drinks     Types: 1 - 2 Cans of beer, 1 - 2 Shots of liquor per week     Comment: occasional cocktail or beer     Drug use: No     Sexual activity: Not Currently   Other Topics Concern     Parent/sibling w/ CABG, MI or angioplasty before 65F 55M? Not Asked      Service No     Blood Transfusions No     Caffeine  Concern No     Occupational Exposure No     Hobby Hazards No     Sleep Concern No     Stress Concern No     Weight Concern No     Special Diet No     Back Care No     Exercise Yes     Comment: off and on     Bike Helmet No     Seat Belt Yes     Self-Exams No   Social History Narrative    Laid off her job 8/14     Social Determinants of Health     Financial Resource Strain: Not on file   Food Insecurity: Not on file   Transportation Needs: Not on file   Physical Activity: Not on file   Stress: Not on file   Social Connections: Not on file   Intimate Partner Violence: Not on file   Housing Stability: Not on file       ROS:   Constitutional: No fever, chills, or sweats. No weight gain/loss   ENT: No visual disturbance, ear ache, epistaxis, sore throat  Allergies/Immunologic: Negative.   Respiratory: No cough, hemoptysia  Cardiovascular: As per HPI  GI: No nausea, vomiting, hematemesis, melena, or hematochezia  : No urinary frequency, dysuria, or hematuria  Integument: Negative  Psychiatric: Negative  Neuro: Negative  Endocrinology: Negative   Musculoskeletal: Negative    ADDITIONAL COMMENTS:     I reviewed the patient's medications:     I reviewed the patient's pertinent clinical laboratory studies:     I reviewed the patient's pertinent imaging studies:   I reviewed the patient's ECG:

## 2022-02-18 NOTE — TELEPHONE ENCOUNTER
Rx sent to pharmacy per Dr Erickson Santoro, RN  Medical Speciality Care Coordinator  [facility] [place]  Phone: [phone]

## 2022-02-18 NOTE — PROGRESS NOTES
"Georgia LENI Jan's goals for this visit include: Nothing specific. Feeling good.     She requests these members of her care team be copied on today's visit information: PCP    PCP: Nathan Dhillon    Referring Provider:  David Almendarez MD  [address] [place] [address] [address]  [place] [address]    BP (!) 176/71 (BP Location: Left arm, Patient Position: Sitting, Cuff Size: Adult Regular)   Ht 1.6 m (5' 2.99\")   Wt 66 kg (145 lb 9.6 oz)   LMP  (LMP Unknown)   BMI 25.80 kg/m      Do you need any medication refills at today's visit? NO.    Kian Bowman, EMT  Clinic Support  [facility]    [phone]    Employed by [facility] [employer]        "

## 2022-02-18 NOTE — TELEPHONE ENCOUNTER
[facility] Call Center    Phone Message    May a detailed message be left on voicemail: yes     Reason for Call: Medication Question or concern regarding medication   Prescription Clarification  Name of Medication: Jardian or Jardiance  Prescribing Provider: Dr Almendarez   Pharmacy: [id number] - [place], [place] - [address] AT [facility] [address]    What on the order needs clarification? The patient was seen today and she was told to check with Pharmacy to see how much this medication would cost her. Pharmacy said they cannot do this without the prescription and they need that to run it through insurance     Action Taken: Message routed to:  Other: cardiology    Travel Screening: Not Applicable

## 2022-02-22 ENCOUNTER — TELEPHONE (OUTPATIENT)
Dept: CARDIOLOGY | Facility: CLINIC | Age: 72
End: 2022-02-22
Payer: MEDICARE

## 2022-02-22 DIAGNOSIS — I10 HYPERTENSION GOAL BP (BLOOD PRESSURE) < 130/80: Primary | ICD-10-CM

## 2022-02-22 NOTE — TELEPHONE ENCOUNTER
Patient states she started Jardiance on 2/20. It costs $40/month. She was scheduled for labs on 3/1/22    Remedios Santoro RN  Medical Speciality Care Coordinator  [facility]  Phone: [phone]

## 2022-02-22 NOTE — TELEPHONE ENCOUNTER
[facility] Call Center    Phone Message    May a detailed message be left on voicemail: yes     Reason for Call: Other: Per pt started taking meds on Sunday 2/20/22 - states was suppose to tell Dr. Almendarez RN so she can see when the next labs should be taken req for a call back today because not sure if lab should be this week.    Action Taken: Message routed to:  Other: Cardiiology    Travel Screening: Not Applicable

## 2022-03-03 ENCOUNTER — LAB (OUTPATIENT)
Dept: LAB | Facility: CLINIC | Age: 72
End: 2022-03-03
Payer: MEDICARE

## 2022-03-03 DIAGNOSIS — I10 HYPERTENSION GOAL BP (BLOOD PRESSURE) < 130/80: ICD-10-CM

## 2022-03-03 LAB
ANION GAP SERPL CALCULATED.3IONS-SCNC: 8 MMOL/L (ref 3–14)
BUN SERPL-MCNC: 57 MG/DL (ref 7–30)
CALCIUM SERPL-MCNC: 9.6 MG/DL (ref 8.5–10.1)
CHLORIDE BLD-SCNC: 102 MMOL/L (ref 94–109)
CO2 SERPL-SCNC: 22 MMOL/L (ref 20–32)
CREAT SERPL-MCNC: 1.55 MG/DL (ref 0.52–1.04)
GFR SERPL CREATININE-BSD FRML MDRD: 36 ML/MIN/1.73M2
GLUCOSE BLD-MCNC: 116 MG/DL (ref 70–99)
POTASSIUM BLD-SCNC: 4.8 MMOL/L (ref 3.4–5.3)
SODIUM SERPL-SCNC: 132 MMOL/L (ref 133–144)

## 2022-03-03 PROCEDURE — 80048 BASIC METABOLIC PNL TOTAL CA: CPT

## 2022-03-03 PROCEDURE — 36415 COLL VENOUS BLD VENIPUNCTURE: CPT

## 2022-03-03 NOTE — RESULT ENCOUNTER NOTE
There is a mild change in your electrolytes. Did you start the Jardiance? If you did, I would like you to have another lab check in 1 week.     Clinic staff: Pls order BMP if needed.     Dr Almendarez

## 2022-03-04 ENCOUNTER — MYC MEDICAL ADVICE (OUTPATIENT)
Dept: CARDIOLOGY | Facility: CLINIC | Age: 72
End: 2022-03-04
Payer: MEDICARE

## 2022-03-04 DIAGNOSIS — I10 HYPERTENSION GOAL BP (BLOOD PRESSURE) < 130/80: Primary | ICD-10-CM

## 2022-03-10 ENCOUNTER — LAB (OUTPATIENT)
Dept: LAB | Facility: CLINIC | Age: 72
End: 2022-03-10
Payer: MEDICARE

## 2022-03-10 DIAGNOSIS — I10 HYPERTENSION GOAL BP (BLOOD PRESSURE) < 130/80: ICD-10-CM

## 2022-03-10 LAB
ANION GAP SERPL CALCULATED.3IONS-SCNC: 8 MMOL/L (ref 3–14)
BUN SERPL-MCNC: 42 MG/DL (ref 7–30)
CALCIUM SERPL-MCNC: 9.4 MG/DL (ref 8.5–10.1)
CHLORIDE BLD-SCNC: 107 MMOL/L (ref 94–109)
CO2 SERPL-SCNC: 21 MMOL/L (ref 20–32)
CREAT SERPL-MCNC: 1.52 MG/DL (ref 0.52–1.04)
GFR SERPL CREATININE-BSD FRML MDRD: 36 ML/MIN/1.73M2
GLUCOSE BLD-MCNC: 91 MG/DL (ref 70–99)
POTASSIUM BLD-SCNC: 4.5 MMOL/L (ref 3.4–5.3)
SODIUM SERPL-SCNC: 136 MMOL/L (ref 133–144)

## 2022-03-10 PROCEDURE — 80048 BASIC METABOLIC PNL TOTAL CA: CPT

## 2022-03-10 PROCEDURE — 36415 COLL VENOUS BLD VENIPUNCTURE: CPT

## 2022-03-10 NOTE — RESULT ENCOUNTER NOTE
The test result is stable and unchanged. Did you start the Jardiance?  No change in clinical plan. Follow up as recommended.     Dr Almendarez

## 2022-04-18 DIAGNOSIS — N18.31 STAGE 3A CHRONIC KIDNEY DISEASE (H): Primary | ICD-10-CM

## 2022-04-22 ENCOUNTER — LAB (OUTPATIENT)
Dept: LAB | Facility: CLINIC | Age: 72
End: 2022-04-22
Payer: MEDICARE

## 2022-04-22 DIAGNOSIS — N18.31 STAGE 3A CHRONIC KIDNEY DISEASE (H): ICD-10-CM

## 2022-04-22 LAB
ALBUMIN SERPL-MCNC: 4 G/DL (ref 3.4–5)
ANION GAP SERPL CALCULATED.3IONS-SCNC: 9 MMOL/L (ref 3–14)
BUN SERPL-MCNC: 38 MG/DL (ref 7–30)
CALCIUM SERPL-MCNC: 9.6 MG/DL (ref 8.5–10.1)
CHLORIDE BLD-SCNC: 107 MMOL/L (ref 94–109)
CO2 SERPL-SCNC: 22 MMOL/L (ref 20–32)
CREAT SERPL-MCNC: 1.8 MG/DL (ref 0.52–1.04)
CREAT UR-MCNC: 36 MG/DL
GFR SERPL CREATININE-BSD FRML MDRD: 30 ML/MIN/1.73M2
GLUCOSE BLD-MCNC: 143 MG/DL (ref 70–99)
HGB BLD-MCNC: 11.5 G/DL (ref 11.7–15.7)
PHOSPHATE SERPL-MCNC: 3.8 MG/DL (ref 2.5–4.5)
POTASSIUM BLD-SCNC: 4.1 MMOL/L (ref 3.4–5.3)
PROT UR-MCNC: 0.14 G/L
PROT/CREAT 24H UR: 0.39 G/G CR (ref 0–0.2)
PTH-INTACT SERPL-MCNC: 99 PG/ML (ref 18–80)
SODIUM SERPL-SCNC: 138 MMOL/L (ref 133–144)

## 2022-04-22 PROCEDURE — 83970 ASSAY OF PARATHORMONE: CPT

## 2022-04-22 PROCEDURE — 84156 ASSAY OF PROTEIN URINE: CPT

## 2022-04-22 PROCEDURE — 80069 RENAL FUNCTION PANEL: CPT

## 2022-04-22 PROCEDURE — 85018 HEMOGLOBIN: CPT

## 2022-04-22 PROCEDURE — 36415 COLL VENOUS BLD VENIPUNCTURE: CPT

## 2022-04-26 ENCOUNTER — VIRTUAL VISIT (OUTPATIENT)
Dept: NEPHROLOGY | Facility: CLINIC | Age: 72
End: 2022-04-26
Payer: MEDICARE

## 2022-04-26 DIAGNOSIS — D64.9 ANEMIA, UNSPECIFIED TYPE: ICD-10-CM

## 2022-04-26 DIAGNOSIS — E11.29 TYPE 2 DIABETES MELLITUS WITH MICROALBUMINURIA, WITHOUT LONG-TERM CURRENT USE OF INSULIN (H): ICD-10-CM

## 2022-04-26 DIAGNOSIS — N25.81 SECONDARY RENAL HYPERPARATHYROIDISM (H): ICD-10-CM

## 2022-04-26 DIAGNOSIS — I10 HYPERTENSION GOAL BP (BLOOD PRESSURE) < 130/80: ICD-10-CM

## 2022-04-26 DIAGNOSIS — I50.30 HEART FAILURE WITH PRESERVED EJECTION FRACTION, NYHA CLASS I (H): ICD-10-CM

## 2022-04-26 DIAGNOSIS — N18.31 STAGE 3A CHRONIC KIDNEY DISEASE (H): Primary | ICD-10-CM

## 2022-04-26 DIAGNOSIS — R80.9 TYPE 2 DIABETES MELLITUS WITH MICROALBUMINURIA, WITHOUT LONG-TERM CURRENT USE OF INSULIN (H): ICD-10-CM

## 2022-04-26 PROCEDURE — 99214 OFFICE O/P EST MOD 30 MIN: CPT | Mod: 95 | Performed by: INTERNAL MEDICINE

## 2022-04-26 RX ORDER — TORSEMIDE 20 MG/1
20 TABLET ORAL DAILY
Qty: 90 TABLET | Refills: 3 | Status: SHIPPED | OUTPATIENT
Start: 2022-04-26 | End: 2022-06-24 | Stop reason: ALTCHOICE

## 2022-04-26 NOTE — PROGRESS NOTES
Georgia is a 72 year old who is being evaluated via a billable video visit.      How would you like to obtain your AVS? MyChart  If the video visit is dropped, the invitation should be resent by: Send to e-mail at: [email or web address]  Will anyone else be joining your video visit? No

## 2022-04-26 NOTE — PATIENT INSTRUCTIONS
Recommend decreasing the torsemide to 20 mg daily  Recommend repeat labs in a week to assure kidney function improvement.   Recommend labs in 6 months and follow-up in one year but we will be in touch next week after seeing your test results.

## 2022-04-26 NOTE — PROGRESS NOTES
"04/26/22   HPI: Keerthi Montoya is a 72 year old female who presents for follow-up of proteinuria.     History taken from previous notes:  Ms. Montoya's hx is significant for DM dx at least 10 years ago - very poor control for years (documented A1C levels 9.4-11.7% from 2011 to 2017); no known retinopathy per patient. She has longstanding hypertension dx when she was 18 years old. Additional hx includes ulcerative colitis and hypothyroidism (following thyroidectomy at age 24). Her ulcerative colitis was dx ~2017 but she feels she likely had trouble with this disease for years. She reports having loose stool for a long time prior to receiving therapy for her UC; she feels the blood in the stool may have contaminated previous urine samples potentially.                  02/10/20 :  Creatinine has been 0.9-1.13 in the past year; up slightly today at 1.3. Last UPCR 0.42 g/g in May. On losartan 100 mg daily. Bronchitis - started on levaquin on Saturday. No swelling, no NSAIDs. Blood pressure at times has been down to 118 - she reports blood pressures less than 130 typically but at times above. She has lost weight intentionally - 158 lbs last year and now 146 lbs. She does not smoke. She has not been eating/drinking today.      08/03/20:  In the setting of COVID-19 pandemic, this visit was changed to a telephone visit. Patient did not have video capability.  Because of murmur appreciated on exam at our previous visit, I sent her for a TTE which showed aortic stenosis as well as diastolic dysfunction. She was seen by cardiology in March and continues to follow with them at this time. She is felt to have severe pulmonary hypertension, CHF, along with aortic stenosis. She is currently taking lasix 20 mg BID. She remains on losartan 100 mg daily. She reports feeling \"great\". She does have some swelling at times - Dr. Almendarez restarted norvasc. BP has been 124/58; typically 120s. Breathing has been stable. Stable UC " sxs.    02/09/21:  video visit. Cr 1.18-1.55 in the past year; 1.15 last week. Feeling well. No issues with ulcerative colitis. BP at home has not been registering recently. She is seeing cardiology next week but again virtual. No swelling concerns. Currently taking lasix 40 mg AM and 20 mg PM. No NSAIDs.     04/26/22: video visit. Creatinine is up at this time. She reports feeling good. Babysitting full time. She reports that she cut be slightly dry at this time. Weights have been right around 140. No swelling. Breathing is stable. Blood pressure at home three times a week - has been 120s/60s. She is taking torsemide 20 mg AM and 10 mg afternoon. She has been active. UC is well controlled. She has been on Jardiance for the past month.     atenolol (TENORMIN) 50 MG tablet, Take 1 tablet (50 mg) by mouth daily  atorvastatin (LIPITOR) 40 MG tablet, Take 1 tablet (40 mg) by mouth daily  blood glucose (ACCU-CHEK FELIPE PLUS) test strip, 200 strips by In Vitro route 2 times daily Use to test blood sugar 2 times daily or as directed.  Cholecalciferol (VITAMIN D) 2000 units CAPS, Take by mouth daily  Cyanocobalamin (B-12) 1000 MCG TABS, Take 1,000 mcg by mouth three times a week  empagliflozin (JARDIANCE) 10 MG TABS tablet, Take 1 tablet (10 mg) by mouth daily  glipiZIDE (GLUCOTROL XL) 10 MG 24 hr tablet, Take 1 tablet (10 mg) by mouth daily  hydrALAZINE (APRESOLINE) 50 MG tablet, TAKE 1 TABLET(50 MG) BY MOUTH THREE TIMES DAILY  levothyroxine (SYNTHROID/LEVOTHROID) 112 MCG tablet, Take 1 tablet (112 mcg) by mouth daily  losartan (COZAAR) 100 MG tablet, TAKE 1 TABLET(100 MG) BY MOUTH DAILY  mesalamine (APRISO ER) 0.375 g 24 hr capsule, Take 4 capsules (1.5 g) by mouth daily  metFORMIN (GLUCOPHAGE) 500 MG tablet, 1 tablet (500 mg) 2 times daily (with meals)    sodium chloride (PF) 0.9% PF flush 10 mL        Exam:GENERAL APPEARANCE: alert and no distress    Results:    No visits with results within 1 Day(s) from this visit.    Latest known visit with results is:   Lab on 04/22/2022   Component Date Value Ref Range Status     Sodium 04/22/2022 138  133 - 144 mmol/L Final     Potassium 04/22/2022 4.1  3.4 - 5.3 mmol/L Final     Chloride 04/22/2022 107  94 - 109 mmol/L Final     Carbon Dioxide (CO2) 04/22/2022 22  20 - 32 mmol/L Final     Anion Gap 04/22/2022 9  3 - 14 mmol/L Final     Urea Nitrogen 04/22/2022 38 (A) 7 - 30 mg/dL Final     Creatinine 04/22/2022 1.80 (A) 0.52 - 1.04 mg/dL Final     Calcium 04/22/2022 9.6  8.5 - 10.1 mg/dL Final     Glucose 04/22/2022 143 (A) 70 - 99 mg/dL Final     Albumin 04/22/2022 4.0  3.4 - 5.0 g/dL Final     Phosphorus 04/22/2022 3.8  2.5 - 4.5 mg/dL Final     GFR Estimate 04/22/2022 30 (A) >60 mL/min/1.73m2 Final    Effective December 21, 2021 eGFRcr in adults is calculated using the 2021 CKD-EPI creatinine equation which includes age and gender (Artie et al., NEJ, DOI: 10.1056/HQOQqo2644879)     Total Protein Random Urine g/L 04/22/2022 0.14  g/L Final    The reference range has not been established for total protein in random urine samples.  The result should be integrated into the clinical context for interpretation.     Total Protein Urine g/gr Creatinine 04/22/2022 0.39 (A) 0.00 - 0.20 g/g Cr Final     Creatinine Urine mg/dL 04/22/2022 36  mg/dL Final     Parathyroid Hormone Intact 04/22/2022 99 (A) 18 - 80 pg/mL Final     Hemoglobin 04/22/2022 11.5 (A) 11.7 - 15.7 g/dL Final       Lab results were reviewed and interpreted.       Assessment/Plan:   1. CKD Stage 3: risks for CKD include poorly controlled diabetes as well as longstanding hypertension. Mesalamine has potential implications on the kidney but with her function stable, this seems less likely to be causing kidney injury previously. She does have proteinuria without hematuria which does fit with diabetic nephropathy. She is already on losartan 100 mg daily. Educated on benefits of optimal weight, avoidance of NSAIDs, blood pressure well  controlled and also diabetes control as the important things to keep the kidney function stable. Myeloma testing normal previously. She is on torsemide - educated on holding torsemide should she have sxs of dehydration but otherwise no other changes today.   - SGLT2 inhibitor was started last month which will provide additional renoprotection longterm.   - Kidney function is worse on this most recent check. I question whether she could be prerenal with the addition of Jardiance on top of her diuretics she is on as well. Jardiance does provide additional diuretic effect. Will lower torsemide to just 20 mg daily at this time and repeat labs in a week. If creatinine has not improved, would consider a trial of decreasing torsemide to even 10 mg daily if needed.      2. Hypertension: goal of <130/80 - no changes made today.      3. Ulcerative colitis: she reports good control with mesalamine. Very rarely mesalamine causes an acute interstitial picture but there are reports of this occurring as low as 0.3% of the time. We will plan to follow. On discussion with her, she feels that she may have been chronically volume depleted prior to getting her treatment for ulcerative colitis.     4. Hypothyroidism: well controlled on last check in Sp[place] 2021     5. DM:  Very poor control documented in our system from 2011 to 2017 with A1C levels between 9.4 and 11.7% at this time. It is possible that it was uncontrolled even further back as A1C levels are not available at this time dating back further than 2011. Encouraged ongoing optimization of diabetes to help slow progression of kidney disease. Pleased to see last A1C was down to 7%.     - now on SGLT 2 inhibitor     6. CHF/pulmonary hypertension/aortic stenosis: followed by Dr. Almendarez - decreasing torsemide today as noted above given additional diuretic effect with the addt of Jardiance.     7. Anemia: will get iron studies next week. No need for BERNIE at this level of  hemoglobin    8. SEcondary hyperparathyroidism: will get vitamin D studies with next week's labs.        Patient Instructions   1. Recommend decreasing the torsemide to 20 mg daily  2. Recommend repeat labs in a week to assure kidney function improvement.   3. Recommend labs in 6 months and follow-up in one year but we will be in touch next week after seeing your test results.        [id number] AM video visit via MICHELLE Minor DO

## 2022-05-05 ENCOUNTER — LAB (OUTPATIENT)
Dept: LAB | Facility: CLINIC | Age: 72
End: 2022-05-05
Payer: MEDICARE

## 2022-05-05 DIAGNOSIS — D64.9 ANEMIA, UNSPECIFIED TYPE: ICD-10-CM

## 2022-05-05 DIAGNOSIS — N25.81 SECONDARY RENAL HYPERPARATHYROIDISM (H): ICD-10-CM

## 2022-05-05 DIAGNOSIS — N18.31 STAGE 3A CHRONIC KIDNEY DISEASE (H): ICD-10-CM

## 2022-05-05 LAB
ANION GAP SERPL CALCULATED.3IONS-SCNC: 6 MMOL/L (ref 3–14)
BUN SERPL-MCNC: 33 MG/DL (ref 7–30)
CALCIUM SERPL-MCNC: 9.4 MG/DL (ref 8.5–10.1)
CHLORIDE BLD-SCNC: 105 MMOL/L (ref 94–109)
CO2 SERPL-SCNC: 23 MMOL/L (ref 20–32)
CREAT SERPL-MCNC: 1.69 MG/DL (ref 0.52–1.04)
FERRITIN SERPL-MCNC: 26 NG/ML (ref 8–252)
GFR SERPL CREATININE-BSD FRML MDRD: 32 ML/MIN/1.73M2
GLUCOSE BLD-MCNC: 142 MG/DL (ref 70–99)
IRON SATN MFR SERPL: 13 % (ref 15–46)
IRON SERPL-MCNC: 50 UG/DL (ref 35–180)
POTASSIUM BLD-SCNC: 4.5 MMOL/L (ref 3.4–5.3)
SODIUM SERPL-SCNC: 134 MMOL/L (ref 133–144)
TIBC SERPL-MCNC: 400 UG/DL (ref 240–430)

## 2022-05-05 PROCEDURE — 82306 VITAMIN D 25 HYDROXY: CPT

## 2022-05-05 PROCEDURE — 83550 IRON BINDING TEST: CPT

## 2022-05-05 PROCEDURE — 80048 BASIC METABOLIC PNL TOTAL CA: CPT

## 2022-05-05 PROCEDURE — 82728 ASSAY OF FERRITIN: CPT

## 2022-05-05 PROCEDURE — 36415 COLL VENOUS BLD VENIPUNCTURE: CPT

## 2022-05-09 DIAGNOSIS — R80.9 TYPE 2 DIABETES MELLITUS WITH MICROALBUMINURIA, WITHOUT LONG-TERM CURRENT USE OF INSULIN (H): ICD-10-CM

## 2022-05-09 DIAGNOSIS — E11.29 TYPE 2 DIABETES MELLITUS WITH MICROALBUMINURIA, WITHOUT LONG-TERM CURRENT USE OF INSULIN (H): ICD-10-CM

## 2022-05-09 LAB
DEPRECATED CALCIDIOL+CALCIFEROL SERPL-MC: <39 UG/L (ref 20–75)
VITAMIN D2 SERPL-MCNC: <5 UG/L
VITAMIN D3 SERPL-MCNC: 34 UG/L

## 2022-05-11 NOTE — TELEPHONE ENCOUNTER
Routing refill request to provider for review/approval because:  Failed protocol due to:  Creatinine   Date Value Ref Range Status   05/05/2022 1.69 (H) 0.52 - 1.04 mg/dL Final   04/28/2021 1.21 (H) 0.52 - 1.04 mg/dL Final     Patient scheduled for an appt 7/22/22.    Lauryn Martinez RN

## 2022-05-12 ENCOUNTER — TELEPHONE (OUTPATIENT)
Dept: INTERNAL MEDICINE | Facility: CLINIC | Age: 72
End: 2022-05-12
Payer: MEDICARE

## 2022-05-12 DIAGNOSIS — R73.09 INCREASED GLUCOSE LEVEL: Primary | ICD-10-CM

## 2022-05-12 NOTE — TELEPHONE ENCOUNTER
Dear Migel;;    I don't think I have seen this patient before. She is to see me 7/2022. Did not refill Metformin because of elevated creatinine. Placed MTM referral with Bunny Santa to follow up on DM2    Thanks, CAROLINE Felton

## 2022-05-12 NOTE — TELEPHONE ENCOUNTER
Called and informed patient that metformin was declined and that MTM referral was placed. PT verbalized understanding.

## 2022-05-12 NOTE — TELEPHONE ENCOUNTER
See 05/09/22 refill encounter.      Migel Roe CMA ([facility]) at 1:26 PM on 5/12/2022

## 2022-05-13 NOTE — PATIENT INSTRUCTIONS
----- Message from Fabiola Perkins sent at 5/13/2022 11:36 AM EDT -----  Subject: Message to Provider    QUESTIONS  Information for Provider? Pt is calling in to schedule an appointment with   Delon Rogers rheumatologist for arthritis. Please advise.   ---------------------------------------------------------------------------  --------------  CALL BACK INFO  What is the best way for the office to contact you? OK to leave message on   voicemail  Preferred Call Back Phone Number? [phone]  ---------------------------------------------------------------------------  --------------  SCRIPT ANSWERS  Relationship to Patient?  Self 1. Urine test today (maybe lab)  2. Avoid NSAIDs (ibuprofen,etc)  3. Follow blood pressure at home for the next 2 weeks. Please update if you are consistently outside our goal range of 110-130.   4. Follow-up in  6 months   5. Ultrasound of the right leg  6. Trial taking the amlodipine at night to see if that helps the swelling.

## 2022-05-15 DIAGNOSIS — N18.30 STAGE 3 CHRONIC KIDNEY DISEASE, UNSPECIFIED WHETHER STAGE 3A OR 3B CKD (H): Primary | ICD-10-CM

## 2022-05-17 ENCOUNTER — TELEPHONE (OUTPATIENT)
Dept: INTERNAL MEDICINE | Facility: CLINIC | Age: 72
End: 2022-05-17
Payer: MEDICARE

## 2022-05-17 DIAGNOSIS — I50.30 HEART FAILURE WITH PRESERVED EJECTION FRACTION, NYHA CLASS I (H): Primary | ICD-10-CM

## 2022-05-17 NOTE — TELEPHONE ENCOUNTER
MTM referral from: [facility] visit (referral by provider)    MTM referral outreach attempt #2 on May 17, 2022 at 10:35 AM      Outcome: Patient not reachable after several attempts, will route to MT Pharmacist/Provider as an FYI.  [facility] scheduling number is [phone].  Thank you for the referral.    Jennifer Vora [facility]

## 2022-05-18 ENCOUNTER — TELEPHONE (OUTPATIENT)
Dept: NEPHROLOGY | Facility: CLINIC | Age: 72
End: 2022-05-18
Payer: MEDICARE

## 2022-05-18 NOTE — RESULT ENCOUNTER NOTE
System Notification. pt has not read [employer] message sent by Dr. Minor.  Telephone Encounter started.  Lucy Purdy LPN

## 2022-05-18 NOTE — TELEPHONE ENCOUNTER
Attempted to contact pt.  No answer.  Message left on  to return call.    Calling to discuss the following:    System Notification that pt has not read [employer] Message sent by Dr. Minor 5/15/22:    Your creatinine was 1.8 prior to the changes we made at your visit. Your creatinine improved to 1.69 now. I recommend that we repeat labs one additional time in 2 weeks to assure ongoing improvement in kidney function with the changes made. Your vitamin d level is at goal. Hemoglobin is 11.5. Your iron saturation is low. I recommend starting an iron supplement. Iron can cause constipation so only take as tolerated.      Summary:  1. Recommend labs again in 2 weeks  2. Start ferrous sulfate 325 mg daily or ferrous gluconate 324 mg daily. If constipation, decrease to every other day dosing.      Please call or [employer] with questions or concerns.     Sincerely,  Swati Minor, DO    Component      Latest Ref Rng & Units 5/5/2022   Sodium      133 - 144 mmol/L 134   Potassium      3.4 - 5.3 mmol/L 4.5   Chloride      94 - 109 mmol/L 105   Carbon Dioxide      20 - 32 mmol/L 23   Anion Gap      3 - 14 mmol/L 6   Urea Nitrogen      7 - 30 mg/dL 33 (H)   Creatinine      0.52 - 1.04 mg/dL 1.69 (H)   Calcium      8.5 - 10.1 mg/dL 9.4   Glucose      70 - 99 mg/dL 142 (H)   GFR Estimate      >60 mL/min/1.73m2 32 (L)   25 OH Vit D2      ug/L <5   25 OH Vit D3      ug/L 34   25 OH Vit D total      20 - 75 ug/L <39   Iron      35 - 180 ug/dL 50   Iron Binding Cap      240 - 430 ug/dL 400   Iron Saturation Index      15 - 46 % 13 (L)   Ferritin      8 - 252 ng/mL 26       Lucy Purdy LPN

## 2022-05-18 NOTE — TELEPHONE ENCOUNTER
Pt returned call.  Read back message below.  Pt verbalized understanding and agreed with Dr. Minor's recommendations.  Encouraged pt to also log in to her MyChart and review Dr. Minor's message.    Assisted with scheduling a 2wk Lab appt.    Encouraged to call or MyChart if any further questions or concerns.    Lucy Purdy LPN

## 2022-05-20 ENCOUNTER — VIRTUAL VISIT (OUTPATIENT)
Dept: PHARMACY | Facility: CLINIC | Age: 72
End: 2022-05-20
Payer: COMMERCIAL

## 2022-05-20 DIAGNOSIS — J30.2 SEASONAL ALLERGIC RHINITIS, UNSPECIFIED TRIGGER: ICD-10-CM

## 2022-05-20 DIAGNOSIS — I10 HYPERTENSION GOAL BP (BLOOD PRESSURE) < 130/80: ICD-10-CM

## 2022-05-20 DIAGNOSIS — R80.9 TYPE 2 DIABETES MELLITUS WITH MICROALBUMINURIA, WITHOUT LONG-TERM CURRENT USE OF INSULIN (H): Primary | ICD-10-CM

## 2022-05-20 DIAGNOSIS — Z78.9 TAKES DIETARY SUPPLEMENTS: ICD-10-CM

## 2022-05-20 DIAGNOSIS — E89.0 POSTOPERATIVE HYPOTHYROIDISM: ICD-10-CM

## 2022-05-20 DIAGNOSIS — K51.00 ULCERATIVE PANCOLITIS WITHOUT COMPLICATION (H): ICD-10-CM

## 2022-05-20 DIAGNOSIS — E11.29 TYPE 2 DIABETES MELLITUS WITH MICROALBUMINURIA, WITHOUT LONG-TERM CURRENT USE OF INSULIN (H): Primary | ICD-10-CM

## 2022-05-20 PROCEDURE — 99605 MTMS BY PHARM NP 15 MIN: CPT | Performed by: PHARMACIST

## 2022-05-20 RX ORDER — MESALAMINE 0.38 G/1
0.38 CAPSULE, EXTENDED RELEASE ORAL 4 TIMES DAILY
COMMUNITY
Start: 2022-05-04 | End: 2022-08-04

## 2022-05-20 RX ORDER — DIPHENHYDRAMINE HCL 25 MG
25 TABLET ORAL EVERY 6 HOURS PRN
COMMUNITY

## 2022-05-20 NOTE — PATIENT INSTRUCTIONS
"Recommendations from today's MTM visit:                                                    MTM (medication therapy management) is a service provided by a clinical pharmacist designed to help you get the most of out of your medicines.   Today we reviewed what your medicines are for, how to know if they are working, that your medicines are safe and how to make your medicine regimen as easy as possible.      1. Discontinue metformin due to GFR near 30 mL/min    Follow-up: Return in about 4 weeks (around 6/17/2022) for Follow up, with me, using a phone visit.    It was great speaking with you today.  I value your experience and would be very thankful for your time in providing feedback in our clinic survey. In the next few days, you may receive an email or text message from [employer] with a link to a survey related to your  clinical pharmacist.\"     To schedule another MTM appointment, please call the clinic directly or you may call the MTM scheduling line at [phone] or toll-free at [phone].     My Clinical Pharmacist's contact information:                                                      Please feel free to contact me with any questions or concerns you have.      Bunny Santa, Pharm. D., BCACP  Medication Therapy Management Pharmacist  Direct Voicemail: [phone]     "

## 2022-05-20 NOTE — PROGRESS NOTES
Medication Therapy Management (MTM) Encounter    ASSESSMENT:                            Medication Adherence/Access: No issues identified      Type 2 Diabetes: Patient is meeting A1c goal of < 7%. Self monitoring of blood glucose is at goal of fasting  mg/dL. Metformin should be discontinued as her GFr is very close to being under 30 mL/min and her blood sugars are controlled.    Hypertension: Average blood pressures meeting goal of <130/80 mmHg.    Hypothyroidism: Stable.     UC: Stable.      Allergies: Stable.      Supplement: Stable.       PLAN:                            1. Discontinue metformin due to GFR near 30 mL/min    Follow-up: Return in about 4 weeks (around 2022) for Follow up, with me, using a phone visit.    SUBJECTIVE/OBJECTIVE:                          Keerthi Montoya is a 72 year old female called for an initial visit. She was referred to me from Bunny Meyers MD.      Reason for visit: diabetes management.    Allergies/ADRs: Reviewed in chart  Past Medical History: Reviewed in chart  Tobacco: She reports that she has never smoked. She has never used smokeless tobacco.  Alcohol: none      Medication Adherence/Access: no issues reported    Type 2 Diabetes:  Currently taking Jardiance 10 mg daily, glipizide 10 mg daily, and metformin 500 mg once a day (not refilled by Dr. Meyers). Patient is not experiencing side effects.  Blood sugar monitorin time(s) daily. Ranges (patient reported): Fasting- 80-90's  Symptoms of low blood sugar? none, Frequency of lows- once every 2-3 weeks  Symptoms of high blood sugar? none  Eye exam: up to date  Foot exam: up to date  Aspirin: No  Statin: Yes: atorvastatin 40 mg   ACEi/ARB: Yes: losartan.   Urine Albumin:   Lab Results   Component Value Date    UMALCR 1,274.19 (H) 2018      Lab Results   Component Value Date    A1C 7.0 2021    A1C 6.6 2021    A1C 6.6 10/28/2020    A1C 7.0 2020    A1C 7.4 2020    A1C 7.9  01/28/2019    A1C 7.6 06/25/2018         Hypertension: Current medications include atenolol 50 mg daily, hydralazine 50 mg three times a day, losartan 100 mg daily, and torsemide 10 mg daily.  Patient does self-monitor blood pressure. Home BP monitoring in range of 120's systolic over 50-60's diastolic. This morning is 155/57 mmHg but this is higher than normal.  Patient reports no current medication side effects.  BP Readings from Last 3 Encounters:   02/18/22 (!) 172/69   01/27/22 133/62   12/17/21 (!) 154/70     Hypothyroidism: Patient is taking levothyroxine 112 mcg daily. Patient is having the following symptoms: none.   TSH   Date Value Ref Range Status   09/01/2021 3.37 0.40 - 4.00 mU/L Final   10/28/2020 0.10 (L) 0.40 - 4.00 mU/L Final     UC: Currently taking mesalamine 0.375 g and takes 4 capsules per day. Reports she is doing well on this medication.     Allergies: Currently taking benadryl 25 mg as needed for allergies. Reports she cant tolerate 2nd generation antihistamines.     Supplement: Currently taking vitamin d 2000 units daily, and vitamin B12 1000 mcg three times a week.       25 OH Vitamin D2   Date Value Ref Range Status   05/05/2022 <5 ug/L Final     25 OH Vitamin D3   Date Value Ref Range Status   05/05/2022 34 ug/L Final     25 OH Vit D Total   Date Value Ref Range Status   05/05/2022 <39 20 - 75 ug/L Final     Comment:     Season, race, dietary intake, and treatment affect the concentration of 25-hydroxy-Vitamin D. Values may decrease during winter months and increase during summer months. Values 20-29 ug/L may indicate Vitamin D insufficiency and values <20 ug/L may indicate Vitamin D deficiency.       Today's Vitals: LMP  (LMP Unknown)   ----------------      I spent 16 minutes with this patient today. All changes were made via collaborative practice agreement with Bunny Meyers MD. A copy of the visit note was provided to the patient's provider(s).    The patient was sent via  Frank a summary of these recommendations.     Bunny Santa, Pharm. D., BCACP  Medication Therapy Management Pharmacist  Direct Voicemail: [phone]      Telemedicine Visit Details  Type of service:  Telephone visit  Start Time: 8:37 am  End Time: 8:53 am  Originating Location (patient location): [place]  Distant Location (provider location):  [facility]     Medication Therapy Recommendations  Type 2 diabetes mellitus with microalbuminuria, without long-term current use of insulin (H)    Current Medication: metFORMIN (GLUCOPHAGE) 500 MG tablet   Rationale: Unsafe medication for the patient - Adverse medication event - Safety   Recommendation: Discontinue Medication   Status: Accepted per CPA

## 2022-06-03 ENCOUNTER — LAB (OUTPATIENT)
Dept: LAB | Facility: CLINIC | Age: 72
End: 2022-06-03
Payer: MEDICARE

## 2022-06-03 DIAGNOSIS — N18.30 STAGE 3 CHRONIC KIDNEY DISEASE, UNSPECIFIED WHETHER STAGE 3A OR 3B CKD (H): ICD-10-CM

## 2022-06-03 LAB
ANION GAP SERPL CALCULATED.3IONS-SCNC: 9 MMOL/L (ref 3–14)
BUN SERPL-MCNC: 29 MG/DL (ref 7–30)
CALCIUM SERPL-MCNC: 9 MG/DL (ref 8.5–10.1)
CHLORIDE BLD-SCNC: 103 MMOL/L (ref 94–109)
CO2 SERPL-SCNC: 22 MMOL/L (ref 20–32)
CREAT SERPL-MCNC: 1.67 MG/DL (ref 0.52–1.04)
GFR SERPL CREATININE-BSD FRML MDRD: 33 ML/MIN/1.73M2
GLUCOSE BLD-MCNC: 152 MG/DL (ref 70–99)
POTASSIUM BLD-SCNC: 4.3 MMOL/L (ref 3.4–5.3)
SODIUM SERPL-SCNC: 134 MMOL/L (ref 133–144)

## 2022-06-03 PROCEDURE — 36415 COLL VENOUS BLD VENIPUNCTURE: CPT

## 2022-06-03 PROCEDURE — 80048 BASIC METABOLIC PNL TOTAL CA: CPT

## 2022-06-16 DIAGNOSIS — I50.30 HEART FAILURE WITH PRESERVED EJECTION FRACTION, NYHA CLASS I (H): ICD-10-CM

## 2022-06-16 DIAGNOSIS — E11.29 TYPE 2 DIABETES MELLITUS WITH MICROALBUMINURIA, WITHOUT LONG-TERM CURRENT USE OF INSULIN (H): ICD-10-CM

## 2022-06-16 DIAGNOSIS — R80.9 TYPE 2 DIABETES MELLITUS WITH MICROALBUMINURIA, WITHOUT LONG-TERM CURRENT USE OF INSULIN (H): ICD-10-CM

## 2022-06-16 DIAGNOSIS — E78.5 HYPERLIPIDEMIA LDL GOAL <100: ICD-10-CM

## 2022-06-17 ENCOUNTER — VIRTUAL VISIT (OUTPATIENT)
Dept: PHARMACY | Facility: CLINIC | Age: 72
End: 2022-06-17
Payer: COMMERCIAL

## 2022-06-17 DIAGNOSIS — R80.9 TYPE 2 DIABETES MELLITUS WITH MICROALBUMINURIA, WITHOUT LONG-TERM CURRENT USE OF INSULIN (H): Primary | ICD-10-CM

## 2022-06-17 DIAGNOSIS — I10 HYPERTENSION GOAL BP (BLOOD PRESSURE) < 130/80: ICD-10-CM

## 2022-06-17 DIAGNOSIS — E11.29 TYPE 2 DIABETES MELLITUS WITH MICROALBUMINURIA, WITHOUT LONG-TERM CURRENT USE OF INSULIN (H): Primary | ICD-10-CM

## 2022-06-17 PROCEDURE — 99606 MTMS BY PHARM EST 15 MIN: CPT | Performed by: PHARMACIST

## 2022-06-17 NOTE — PROGRESS NOTES
Medication Therapy Management (MTM) Encounter    ASSESSMENT:                            Medication Adherence/Access: No issues identified    Type 2 Diabetes: Patient is meeting A1c goal of < 7%. Self monitoring of blood glucose is at goal of fasting  mg/dL. Could potentially increase Jardiance and decrease glipizide. Patient to discuss this with cardiologist next week. Due for A1c    Hypertension: Patient is meeting blood pressure goal of < 130/80mmHg on average.      PLAN:                            1. Continue medications as prescribed.  2. Get A1c drawn at next visit to clinic.     Follow-up: Return in about 13 weeks (around 2022) for Follow up, with me, using a phone visit.    SUBJECTIVE/OBJECTIVE:                          Keerthi Montoya is a 72 year old female called for a follow-up visit.  Today's visit is a follow-up MTM visit from 22     Reason for visit: diabetes follow-up.    Allergies/ADRs: Reviewed in chart  Past Medical History: Reviewed in chart  Tobacco: She reports that she has never smoked. She has never used smokeless tobacco.      Medication Adherence/Access: no issues reported    Type 2 Diabetes:  Currently taking Jardiance 10 mg daily, and glipizide 10 mg daily. Patient is not experiencing side effects.  Blood sugar monitorin time(s) daily. Ranges (patient reported): Fasting- 103 yesterday, 99, a couple around 130. This morning was 200 because she ate super late last night which is not normal for her.   Symptoms of low blood sugar? none  Symptoms of high blood sugar? none  Eye exam: up to date  Foot exam: up to date  Aspirin: No  Exercise: Trying to get herself to use her treadmill everyday.   Statin: Yes: atorvastatin 40 mg   ACEi/ARB: Yes: losartan.   Urine Albumin:     Lab Results   Component Value Date    UMALCR 1,274.19 (H) 2018      Lab Results   Component Value Date    A1C 7.0 2021    A1C 6.6 2021    A1C 6.6 10/28/2020    A1C 7.0 2020    A1C 7.4  01/13/2020    A1C 7.9 01/28/2019    A1C 7.6 06/25/2018       Hypertension: Current medications include atenolol 50 mg daily, hydralazine 50 mg three times a day, losartan 100 mg daily, and torsemide 10 mg daily.  Patient does self-monitor blood pressure. Home BP monitoring in range of 120's systolic over 70-80's diastolic. Yesterday was 117/61 mmHg, today was 137/67 mmHg.  Patient reports no current medication side effects.    BP Readings from Last 3 Encounters:   02/18/22 (!) 172/69   01/27/22 133/62   12/17/21 (!) 154/70       Today's Vitals: LMP  (LMP Unknown)   ----------------      I spent 9 minutes with this patient today. All changes were made via collaborative practice agreement with Bunny Meyers MD. A copy of the visit note was provided to the patient's provider(s).    The patient was sent via [employer] a summary of these recommendations.     Bunny Santa, Pharm. D., BCACP  Medication Therapy Management Pharmacist  Direct Voicemail: [phone]      Telemedicine Visit Details  Type of service:  Telephone visit  Start Time: 8:30 am  End Time: 8:39 AM  Originating Location (patient location): Home  Distant Location (provider location):  [facility]     Medication Therapy Recommendations  No medication therapy recommendations to display

## 2022-06-17 NOTE — PATIENT INSTRUCTIONS
"Recommendations from today's MTM visit:                                                       1. Continue medications as prescribed.  2. Get A1c drawn at next visit to clinic.    Follow-up: Return in about 13 weeks (around 9/16/2022) for Follow up, with me, using a phone visit.    It was great speaking with you today.  I value your experience and would be very thankful for your time in providing feedback in our clinic survey. In the next few days, you may receive an email or text message from [employer] with a link to a survey related to your  clinical pharmacist.\"     To schedule another MTM appointment, please call the clinic directly or you may call the MTM scheduling line at [phone] or toll-free at [phone].     My Clinical Pharmacist's contact information:                                                      Please feel free to contact me with any questions or concerns you have.      Bunny Santa, Pharm. D., [facility]  Medication Therapy Management Pharmacist  Direct Voicemail: [phone]     "

## 2022-06-21 NOTE — TELEPHONE ENCOUNTER
JARDIANCE 10MG TABLETS      Last Written Prescription Date:  2/18/22  Last Fill Quantity: 30,   # refills: 3  Last Office Visit : David Almendarez MD  Cardiovascular Disease  2/18/2022  [facility] Office visit:  8/19/22    Routing refill request to provider for review/approval because:  Drug not on cardiology refill protocol     Overdue/abnormal labs  Lab Test 12/16/21  0955   A1C 7.0*     Lab Test 06/03/22  0719 08/09/21  1717 04/28/21  1515   GFRESTIMATED 33*   < > 46*   GFRESTBLACK  --   --  53*

## 2022-06-24 ENCOUNTER — OFFICE VISIT (OUTPATIENT)
Dept: CARDIOLOGY | Facility: CLINIC | Age: 72
End: 2022-06-24
Attending: NURSE PRACTITIONER
Payer: MEDICARE

## 2022-06-24 ENCOUNTER — LAB (OUTPATIENT)
Dept: LAB | Facility: CLINIC | Age: 72
End: 2022-06-24
Payer: MEDICARE

## 2022-06-24 VITALS
BODY MASS INDEX: 24.91 KG/M2 | HEIGHT: 63 IN | HEART RATE: 65 BPM | SYSTOLIC BLOOD PRESSURE: 136 MMHG | WEIGHT: 140.6 LBS | OXYGEN SATURATION: 97 % | DIASTOLIC BLOOD PRESSURE: 78 MMHG

## 2022-06-24 DIAGNOSIS — N18.30 STAGE 3 CHRONIC KIDNEY DISEASE, UNSPECIFIED WHETHER STAGE 3A OR 3B CKD (H): ICD-10-CM

## 2022-06-24 DIAGNOSIS — R80.9 TYPE 2 DIABETES MELLITUS WITH MICROALBUMINURIA, WITHOUT LONG-TERM CURRENT USE OF INSULIN (H): ICD-10-CM

## 2022-06-24 DIAGNOSIS — E11.29 TYPE 2 DIABETES MELLITUS WITH MICROALBUMINURIA, WITHOUT LONG-TERM CURRENT USE OF INSULIN (H): ICD-10-CM

## 2022-06-24 DIAGNOSIS — I50.30 HEART FAILURE WITH PRESERVED EJECTION FRACTION, NYHA CLASS I (H): ICD-10-CM

## 2022-06-24 DIAGNOSIS — I27.20 PULMONARY HYPERTENSION (H): Primary | ICD-10-CM

## 2022-06-24 DIAGNOSIS — I35.0 NONRHEUMATIC AORTIC VALVE STENOSIS: ICD-10-CM

## 2022-06-24 LAB
ANION GAP SERPL CALCULATED.3IONS-SCNC: 8 MMOL/L (ref 3–14)
BUN SERPL-MCNC: 47 MG/DL (ref 7–30)
CALCIUM SERPL-MCNC: 9.7 MG/DL (ref 8.5–10.1)
CHLORIDE BLD-SCNC: 101 MMOL/L (ref 94–109)
CO2 SERPL-SCNC: 25 MMOL/L (ref 20–32)
CREAT SERPL-MCNC: 1.85 MG/DL (ref 0.52–1.04)
GFR SERPL CREATININE-BSD FRML MDRD: 29 ML/MIN/1.73M2
GLUCOSE BLD-MCNC: 163 MG/DL (ref 70–99)
HBA1C MFR BLD: 8.1 % (ref 0–5.6)
POTASSIUM BLD-SCNC: 4.3 MMOL/L (ref 3.4–5.3)
SODIUM SERPL-SCNC: 134 MMOL/L (ref 133–144)

## 2022-06-24 PROCEDURE — G0463 HOSPITAL OUTPT CLINIC VISIT: HCPCS

## 2022-06-24 PROCEDURE — 83036 HEMOGLOBIN GLYCOSYLATED A1C: CPT | Performed by: PATHOLOGY

## 2022-06-24 PROCEDURE — 80048 BASIC METABOLIC PNL TOTAL CA: CPT | Performed by: PATHOLOGY

## 2022-06-24 PROCEDURE — 99213 OFFICE O/P EST LOW 20 MIN: CPT | Performed by: NURSE PRACTITIONER

## 2022-06-24 PROCEDURE — 36415 COLL VENOUS BLD VENIPUNCTURE: CPT | Performed by: PATHOLOGY

## 2022-06-24 RX ORDER — FERROUS GLUCONATE 324(38)MG
324 TABLET ORAL
Status: ON HOLD | COMMUNITY
End: 2023-01-24

## 2022-06-24 RX ORDER — TORSEMIDE 10 MG/1
TABLET ORAL
Qty: 10 TABLET | Refills: 0 | Status: SHIPPED | OUTPATIENT
Start: 2022-06-24 | End: 2022-08-19

## 2022-06-24 ASSESSMENT — PAIN SCALES - GENERAL: PAINLEVEL: NO PAIN (0)

## 2022-06-24 NOTE — PATIENT INSTRUCTIONS
Take your medicines every day, as directed    Changes made today:  Stop the daily 20 mg Torsemide   Take 10 mg Torsemide only if you need to ( swelling in the legs and /or weight gain 2 lbs of more overnight - let [facility] clinic know if you need it more than more than 2 times a week)   Monitor Your Weight and Symptoms    Contact us if you:    Gain 2 pounds in one day or 5 pounds in one week  Feel more short of breath  Notice more leg swelling  Feel lightheadeded   Change your lifestyle    Limit Salt or Sodium:  2000 mg  Limit Fluids:  2000 mL or approximately 64 ounces  Eat a Heart Healthy Diet  Low in saturated fats  Stay Active:  Aim to move at least 150 minutes every  week         To Contact us    During Business Hours:  [phone], option # 1      After hours, weekends or holidays:   [phone], Option #4  Ask to speak to the On-Call Cardiologist. Inform them you are a CORE/heart failure patient at the [facility].     Use [employer] allows you to communicate directly with your heart team through secure messaging.  [employer] can be accessed any time on your phone, computer, or tablet.  If you need assistance, we'd be happy to help!         Keep your Heart Appointments:    [facility] 6/30/22 ( [place])    Dr. Almendarez with echo first on 8/19/22 @ 8:30 am [facility]     [facility] October - [facility]

## 2022-06-24 NOTE — PROGRESS NOTES
Georgia is a 72 year old female with a  medical history is significant for longstanding hypertension since she was in her teens, diabetes, dyslipidemia, and ulcerative colitis.    She is having a hard time with seasonal allergies.    She has junk food once in a while. Patient states she has no SOB with or without activity. Weights 140 lbs at home.   No swelling in LE,watches grandkids ages 5 and 3 daily.  She has noted her BS to be stable- she is off the metformin. She feels well overall and denies chest pain, palpitations, lightheadedness, dizziness. Her BP's at home systolic under 120's. She took her pills like an hour ago. She has a treadmill and has been using it every day. Doesn't feel she can typically hydrate herself well daily.     Current meds:  Atenolol 50 mg daily  Torsemide 20 mg   Hydralazine 50 mg TID  Losartan 100 mg daily  Jardiance 10 mg daily      ROS:   Constitutional: No fever, chills, or sweats.  ENT: No visual disturbance, ear ache, epistaxis, sore throat.   Allergies/Immunologic: Negative  Respiratory: No cough, hemoptysis.   Cardiovascular: As per HPI.   GI: As per HPI.   : No urinary frequency, dysuria, or hematuria.   Integument: Negative.   Psychiatric: Negative.   Neuro: Negative.   Endocrinology: negative   Musculoskeletal: negative    EXAM:   GEN/CONSTITUIONAL: Appears comfortable, in no apparent distress   EYES: No icterus  ENT/MOUTH: Normal  JVP:  below clavicle   RESPIRATORY: Clear to auscultation bilaterally   CARDIOVASCULAR: Regular S1 and S2, 2/6 JUANITA.   ABDOMEN: Soft, non-tender, positive bowel sounds   NEUROLOGIC: Grossly non-focal   PSYCHIATRIC: Normal affect  EXT: no LE edema. Normal pedal pulses.  Skin: No petechiae, purpura or rash       ECHO 8/25  Interpretation Summary   Global and regional left ventricular function is normal with an EF of 60-65%.  Paradoxical septal motion consistent with right ventricular pressure and  volume overload is present.  Moderate to severe  right ventricular dilation is present.  Global right ventricular function is moderately to severely reduced.  Calculated aortic valve area in severe AS range. Consider additional  evaluation if indicated.  Moderate to severe tricuspid insufficiency is present.  Dilation of the inferior vena cava is present with abnormal respiratory  variation in diameter.  Trivial pericardial effusion is present.    .  Assessment and Plan:  Georgia is well today, despite her lack of any functional limitation, the ongoing concern about her mildly reduced RV function is still present.  She appears slightly hypovolemic/euvolemic. She is making a better effort to stay hydrated. Stop Torsemide  Recheck BMP     1.  Severe pulmonary hypertension, normal LV function with mild dilation, functional class I-II.The etiology is likely group 2, and related to underlying diastolic dysfunction from longstanding hypertension and diabetes.    2.  Hypertension, with better recent control:  amlodipine stopped on 8/14 ( swelling).  Instructed to keep home blood pressure log.  3.  Mild-moderate aortic stenosis, possible bicuspid aortic valve:   4. Diabetes: PCP following  5.  Dyslipidemia, on Lipitor  6.  CKD stage 3  7. Ulcerative colitis      Plan:  Stop the  Daily 20 mgTorsemide ( will order 10 mg tabs - PRN and she asked she notify [facility] if she needs to take it more than 2 days in a week)   BMP in 7 days   CORE in October        Carolyn ADAME NP-ÓSCAR

## 2022-06-24 NOTE — NURSING NOTE
Chief Complaint   Patient presents with     Follow Up     CORE return, 73 yo female with HFpEF presents for follow up visit with labs prior.     Vitals were taken and medications reconciled.    Judd Porter, EMT  7:20 AM\

## 2022-06-24 NOTE — NURSING NOTE
Labs: Patient was given results of the laboratory testing obtained today. Patient was instructed to return for the next laboratory testing in 1 week; BMP on 6/30/22 at [place] . Patient demonstrated understanding of this information and agreed to call with further questions or concerns.     Return Appointment :FU with [facility] in October at [facility] Patient given instructions regarding scheduling next clinic visit. Patient demonstrated understanding of this information and agreed to call with further questions or concerns.    Medication Change: Stop taking daily Torsemide 20 mg. Start taking Torsemide 10 mg PRN weight gain/leg swelling. Please let us know if you are taking PRN Torsemide 2 times per week or more.  Patient was educated regarding prescribed medication change, including discussion of the indication, administration, side effects, and when to report to MD or RN. Patient demonstrated understanding of this information and agreed to call with further questions or concerns.    Aura Burnett RN

## 2022-06-30 ENCOUNTER — TELEPHONE (OUTPATIENT)
Dept: CARDIOLOGY | Facility: CLINIC | Age: 72
End: 2022-06-30

## 2022-06-30 ENCOUNTER — LAB (OUTPATIENT)
Dept: LAB | Facility: CLINIC | Age: 72
End: 2022-06-30
Payer: MEDICARE

## 2022-06-30 DIAGNOSIS — I27.20 PULMONARY HYPERTENSION (H): ICD-10-CM

## 2022-06-30 LAB
ANION GAP SERPL CALCULATED.3IONS-SCNC: 6 MMOL/L (ref 3–14)
BUN SERPL-MCNC: 32 MG/DL (ref 7–30)
CALCIUM SERPL-MCNC: 9.2 MG/DL (ref 8.5–10.1)
CHLORIDE BLD-SCNC: 106 MMOL/L (ref 94–109)
CO2 SERPL-SCNC: 23 MMOL/L (ref 20–32)
CREAT SERPL-MCNC: 1.54 MG/DL (ref 0.52–1.04)
GFR SERPL CREATININE-BSD FRML MDRD: 36 ML/MIN/1.73M2
GLUCOSE BLD-MCNC: 136 MG/DL (ref 70–99)
POTASSIUM BLD-SCNC: 5 MMOL/L (ref 3.4–5.3)
SODIUM SERPL-SCNC: 135 MMOL/L (ref 133–144)

## 2022-06-30 PROCEDURE — 80048 BASIC METABOLIC PNL TOTAL CA: CPT

## 2022-06-30 PROCEDURE — 36415 COLL VENOUS BLD VENIPUNCTURE: CPT

## 2022-06-30 NOTE — TELEPHONE ENCOUNTER
[facility] Call Center    Phone Message    May a detailed message be left on voicemail: yes     Reason for Call: Other: pt calling back after message from Lorena.  Please call pt back     Action Taken: Message routed to:  [facility] cardio    Travel Screening: Not Applicable

## 2022-06-30 NOTE — TELEPHONE ENCOUNTER
I called Georgia with lab results. Left message asking for a call back to discuss.    Lorena Ambriz RN

## 2022-06-30 NOTE — TELEPHONE ENCOUNTER
I called Georgia to discuss lab results.     She does not take any potassium supplements. She thinks she may be taking in extra potassium in her diet and will cut back on that.    Georgia has not needed to take any doses of torsemide. Her weight is staying the same, around 140 lbs.     She had no concerns at this time.    Lorena Ambriz RN

## 2022-07-21 NOTE — PROGRESS NOTES
HPI:    Ms. Montoya comes into establish care today. Overall doing well. She continues to exercise on a treadmill w/o problems. She does not smoke nor abuse EtOH. Her  had early dementia (at age 64) and passed away. She has five sons. Otherwise, no additional HEENT, cardiopulmonary, abdominal, , GYN, neurological, systemic, psychiatric, lymphatic, endocrine, vascular complaints.       Past Medical History:   Diagnosis Date     Diabetes mellitus (H)      Diverticulosis of colon (without mention of hemorrhage) 10/1/2012     Hypertension      Pulmonary hypertension (H)      Ulcerative (chronic) enterocolitis (H)      Past Surgical History:   Procedure Laterality Date     CHOLECYSTECTOMY  3/1987     COLON SURGERY  01/2001    Left colon resection; diverticulitis     COLONOSCOPY N/A 4/24/2017    Procedure: COMBINED COLONOSCOPY, SINGLE OR MULTIPLE BIOPSY/POLYPECTOMY BY BIOPSY;;  Surgeon: Radha Salguero MD;  Location: [facility] OR     COLONOSCOPY WITH CO2 INSUFFLATION N/A 4/24/2017    Procedure: COLONOSCOPY WITH CO2 INSUFFLATION;  Colonoscopy Dx rectal bleeding, BMI 25.86, Pharm [facility] .[phone], ;  Surgeon: Radha Salguero MD;  Location: [facility] OR     CV RIGHT HEART CATH MEASUREMENTS RECORDED N/A 3/16/2020    Procedure: CV RIGHT HEART CATH;  Surgeon: Nader Muñoz MD;  Location:  HEART CARDIAC CATH LAB     GYN SURGERY  9/1983    tubal ligation     HERNIA REPAIR  12/2006    recurrent incisional hernia     THROAT SURGERY  12/1974    thyroidectomy     PE:    Vitals noted, gen, nad, cooperative, alert, neck supple nl rom, lungs with good air movement, RRR with extra beats, loud murmur, abdomen, no acute findings. Grossly normal neurological exam. She has a surgical scar neck/thyroid    EKG: Atrial fibrillation at 73 with anterior (V1-V3) T wave changes (old from 3/16/2020).         A/P:    1. GI follow up with Dr. James 8/3/2022 for Ulcerative Colitis. Colonoscopy 12/12/2017. Seen Ms. Robbins,  GI 1/27/2022. She remains on Mesalamine.   2. Follow up with Dr. Almendarez, 8/19/2022 with resting echo on the same day. She has a follow up [facility] appt. On 10/14/2022. Last visit 6/24/2022 on Atenolol, Torsemide, Hydralazine, Losartan. She has pulmonary HTN and systemic HTN. Moderate aortic stenosis (possible bicuspid aortic valve).   3. MTM appt. With Bunny Santa 9/16/2022. Last visit 6/17/2022 for DM2  4. Nephrology appt. With Dr. Minor 5/7/2022. Creatinine 1.54 on 6/30/2022. Last visit 4/26/2022  5. DM2 on Jardiance. 8.1% on 6/24/2022. On Glipizide and Jardiance   6. Increased Lipids on Atorvastatin. Lipids 10/28/2020 HDL 38, TG's 108 and LDL 33  7. TSH 3.37 on 9/1/2021 on thyroid replacement. S/p thyroidectomy for multinodular goiter   8. Mammogram 9/1/2021; ordered future today   9. Dermatology. She does not feel she needs to see Dermatology for a skin cancer screening.   10. Immunizations; COVID Pfizer x 3. Check with insurance regarding Shingrix. Tdap 7/26/2013. Pneumococcal 23 done 7/30/2018 and Prevnar 13 done 6/22/2015  11. New Atrial Fibrillation. She states that last year she had to discontinue ASA because of GI bleeding (from Ulcerative Colitis). Will discuss with Dr. Almendarez, Cardiology today. Dr. Almendarez states she will need anticoagulation for stroke prevention from afib; however, not to start this  Until her GI bleeding risk is assessed by GI. Will send a note to Dr. James, GI. If she can't be anticoagulated because of GI bleeding she may benefit from the L atrial appendage device.          40 minutes spent on the date of the encounter doing chart review, history and exam, documentation and further activities as noted above exclusive of procedures and other billable interpretations

## 2022-07-22 ENCOUNTER — LAB (OUTPATIENT)
Dept: LAB | Facility: CLINIC | Age: 72
End: 2022-07-22
Payer: MEDICARE

## 2022-07-22 ENCOUNTER — OFFICE VISIT (OUTPATIENT)
Dept: INTERNAL MEDICINE | Facility: CLINIC | Age: 72
End: 2022-07-22
Payer: MEDICARE

## 2022-07-22 VITALS
RESPIRATION RATE: 16 BRPM | BODY MASS INDEX: 25.87 KG/M2 | HEART RATE: 84 BPM | OXYGEN SATURATION: 97 % | SYSTOLIC BLOOD PRESSURE: 130 MMHG | WEIGHT: 146 LBS | HEIGHT: 63 IN | DIASTOLIC BLOOD PRESSURE: 69 MMHG

## 2022-07-22 DIAGNOSIS — Z12.31 ENCOUNTER FOR SCREENING MAMMOGRAM FOR MALIGNANT NEOPLASM OF BREAST: ICD-10-CM

## 2022-07-22 DIAGNOSIS — R73.09 INCREASED GLUCOSE LEVEL: ICD-10-CM

## 2022-07-22 DIAGNOSIS — R00.2 PALPITATIONS: ICD-10-CM

## 2022-07-22 DIAGNOSIS — R73.09 INCREASED GLUCOSE LEVEL: Primary | ICD-10-CM

## 2022-07-22 LAB
ALBUMIN SERPL-MCNC: 4 G/DL (ref 3.4–5)
ALP SERPL-CCNC: 151 U/L (ref 40–150)
ALT SERPL W P-5'-P-CCNC: 16 U/L (ref 0–50)
ANION GAP SERPL CALCULATED.3IONS-SCNC: 13 MMOL/L (ref 3–14)
AST SERPL W P-5'-P-CCNC: 30 U/L (ref 0–45)
BASOPHILS # BLD AUTO: 0.1 10E3/UL (ref 0–0.2)
BASOPHILS NFR BLD AUTO: 1 %
BILIRUB SERPL-MCNC: 0.8 MG/DL (ref 0.2–1.3)
BUN SERPL-MCNC: 47 MG/DL (ref 7–30)
CALCIUM SERPL-MCNC: 9.2 MG/DL (ref 8.5–10.1)
CHLORIDE BLD-SCNC: 100 MMOL/L (ref 94–109)
CO2 SERPL-SCNC: 22 MMOL/L (ref 20–32)
CREAT SERPL-MCNC: 1.93 MG/DL (ref 0.52–1.04)
EOSINOPHIL # BLD AUTO: 0.3 10E3/UL (ref 0–0.7)
EOSINOPHIL NFR BLD AUTO: 4 %
ERYTHROCYTE [DISTWIDTH] IN BLOOD BY AUTOMATED COUNT: 14.4 % (ref 10–15)
GFR SERPL CREATININE-BSD FRML MDRD: 27 ML/MIN/1.73M2
GLUCOSE BLD-MCNC: 142 MG/DL (ref 70–99)
HCT VFR BLD AUTO: 42.9 % (ref 35–47)
HGB BLD-MCNC: 13.7 G/DL (ref 11.7–15.7)
IMM GRANULOCYTES # BLD: 0 10E3/UL
IMM GRANULOCYTES NFR BLD: 0 %
LYMPHOCYTES # BLD AUTO: 2.2 10E3/UL (ref 0.8–5.3)
LYMPHOCYTES NFR BLD AUTO: 25 %
MAGNESIUM SERPL-MCNC: 2.4 MG/DL (ref 1.6–2.3)
MCH RBC QN AUTO: 31.7 PG (ref 26.5–33)
MCHC RBC AUTO-ENTMCNC: 31.9 G/DL (ref 31.5–36.5)
MCV RBC AUTO: 99 FL (ref 78–100)
MONOCYTES # BLD AUTO: 1.3 10E3/UL (ref 0–1.3)
MONOCYTES NFR BLD AUTO: 14 %
NEUTROPHILS # BLD AUTO: 4.9 10E3/UL (ref 1.6–8.3)
NEUTROPHILS NFR BLD AUTO: 56 %
NRBC # BLD AUTO: 0 10E3/UL
NRBC BLD AUTO-RTO: 0 /100
PLATELET # BLD AUTO: 273 10E3/UL (ref 150–450)
POTASSIUM BLD-SCNC: 4.1 MMOL/L (ref 3.4–5.3)
PROT SERPL-MCNC: 8.8 G/DL (ref 6.8–8.8)
RBC # BLD AUTO: 4.32 10E6/UL (ref 3.8–5.2)
SODIUM SERPL-SCNC: 135 MMOL/L (ref 133–144)
TSH SERPL DL<=0.005 MIU/L-ACNC: 0.82 MU/L (ref 0.4–4)
WBC # BLD AUTO: 8.8 10E3/UL (ref 4–11)

## 2022-07-22 PROCEDURE — 93000 ELECTROCARDIOGRAM COMPLETE: CPT | Performed by: INTERNAL MEDICINE

## 2022-07-22 PROCEDURE — 83735 ASSAY OF MAGNESIUM: CPT | Performed by: PATHOLOGY

## 2022-07-22 PROCEDURE — 99215 OFFICE O/P EST HI 40 MIN: CPT | Performed by: INTERNAL MEDICINE

## 2022-07-22 PROCEDURE — 80050 GENERAL HEALTH PANEL: CPT | Performed by: PATHOLOGY

## 2022-07-22 PROCEDURE — 36415 COLL VENOUS BLD VENIPUNCTURE: CPT | Performed by: PATHOLOGY

## 2022-07-22 NOTE — NURSING NOTE
Keerthi Montoya is a 72 year old female patient that presents today in clinic for the following:    Chief Complaint   Patient presents with     Establish Care     Recheck Medication     The patient's allergies and medications were reviewed as noted. A set of vitals were recorded as noted without incident. The patient does not have any other questions for the provider.    Rosa Ward, EMT at 6:51 AM on 7/22/2022

## 2022-07-24 LAB
ATRIAL RATE - MUSE: 277 BPM
DIASTOLIC BLOOD PRESSURE - MUSE: NORMAL MMHG
INTERPRETATION ECG - MUSE: NORMAL
P AXIS - MUSE: NORMAL DEGREES
PR INTERVAL - MUSE: NORMAL MS
QRS DURATION - MUSE: 82 MS
QT - MUSE: 446 MS
QTC - MUSE: 491 MS
R AXIS - MUSE: 97 DEGREES
SYSTOLIC BLOOD PRESSURE - MUSE: NORMAL MMHG
T AXIS - MUSE: -51 DEGREES
VENTRICULAR RATE- MUSE: 73 BPM

## 2022-08-02 DIAGNOSIS — K52.9 COLITIS: Primary | ICD-10-CM

## 2022-08-03 ENCOUNTER — VIRTUAL VISIT (OUTPATIENT)
Dept: GASTROENTEROLOGY | Facility: CLINIC | Age: 72
End: 2022-08-03
Payer: MEDICARE

## 2022-08-03 VITALS — BODY MASS INDEX: 24.8 KG/M2 | WEIGHT: 140 LBS

## 2022-08-03 DIAGNOSIS — K51.311 ULCERATIVE RECTOSIGMOIDITIS WITH RECTAL BLEEDING (H): Primary | ICD-10-CM

## 2022-08-03 PROCEDURE — 99215 OFFICE O/P EST HI 40 MIN: CPT | Mod: 95 | Performed by: INTERNAL MEDICINE

## 2022-08-03 ASSESSMENT — PAIN SCALES - GENERAL: PAINLEVEL: NO PAIN (0)

## 2022-08-03 NOTE — LETTER
8/3/2022         RE: Keerthi Montoya  [address] [place] [id number]        Dear Colleague,    Thank you for referring your patient, Keerthi Montoya, to the [facility] [place]. Please see a copy of my visit note below.    IBD CLINIC VISIT     CC/REFERRING MD:  Nathan Dhillon  REASON FOR FOLLOW UP: Pan UC    IBD HISTORY  Age at diagnosis:   Extent of disease: pancolitis (prior proctosigmoiditis classification, confirmed panUC 2017)  Current UC medications: Apriso 1.5 g daily  Prior UC surgeries: None  Prior IBD Medications: None    DRUG MONITORING  TPMT enzyme activity: --    6-TGN/6-MMPN levels: --    Biologic concentration:--    DISEASE ASSESSMENT  Labs:  Lab Results   Component Value Date    ALBUMIN 3.3 02/10/2020     Recent Labs   Lab Test 20  0711 09/10/18  0928 17  0836   CRP <2.9 <2.9 <2.9   SED  --  36* 19     Endoscopic assessment: colonoscopy 17 normal ileum, patchy mild erythema and mucosa edema in rectum and sigmoid colon, markedly improved compared to 2017 outside colonoscopy report.  Mild patchy mucosal granularity noted throughout the remainder of the colon.  Rectal polyp 4 mm sessile removed.  Diverticulosis in transverse and descending colon.  Internal and external hemorrhoids noted.  Copious brown opaque semiliquid stool and medication material throughout colon interfering with visualization.  Barrera score 0.  Only biopsies obtained were from the rectal polyp, noted to be an inflammatory polyp.  No evidence of dysplasia.  No other biopsies obtained.  Enterography: --  Fecal calprotectin: --   C diff: --  Barrera score:   Stool freq: 0 (baseline stools frequency)  Rectal bleedin (None)  PGA: 0 (normal)  Endoscopy: Not done    Remission: <3   Mild disease: 3-5  Moderate disease: 6-10  Severe disease: >10    IBDQ Score Date IBDQ - Total Score  (Higher score better)   5/15/2017 51     ASSESSMENT/PLAN  Ms. Montoya is a  71 year old with past medical history to include hypertension, DM2, diabetic nephropathy, multinodular goiter s/p complete thyroidectomy ~40 yrs ago complicated by postoperative hypothyroidism, diverticulosis complicated by diverticulitis with prior limited left hemicolectomy 2002, open cholecystectomy in 2000, s/p ex-lap 30 yrs ago for presumed ectopic pregnancy (apparent diverticulitis, nonsurgical management on discovery), s/p tubal ligation 1983 complicated postsurgical VAH s/p mesh repair 2007 here for follow-up for UC pancolitis follow up.    1.  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  Last endoscopic assessment was in 2017, noting Barrera 0.      -- Continue Apriso 1.5 g daily  -- Checking UA to AIN from mesalamine.   -- Patient will need colonoscopy in 2023.   -- RTC in 1 year after colonoscopy.    2. CKD stage 3:   Likely from combination of DM and HTN. Mesalamine can also cause AIN but recent Cr has been stable.     -- Continue to follow up with nephrology.     3. IBD healthcare maintenance based on patients current medication:  5-ASA (Apriso)      Vaccinations:  Recommend yearly flu shot, pneumonia vaccines (Prevnar 13 then 8 weeks later Pneumovax 23 then 5 years later Pneumovax 23), tetanus every 10 years.    One time confirmation of immunity or serologies:  -- Hepatitis A (serologies or immunizations): --  -- Hepatitis B (serologies or immunizations): --  -- Zoster vaccination (>59 yo, discuss if over 50): --  -- MMR:--  -- HPV (all aged 18-26): --  -- Meningococcal meningitis (all patients at risk for meningitis): --     Bone mineral density screening   -- Recommend all patients supplement with calcium and vitamin D    Cancer Screening:  Colon cancer screening:  Given pancolitis colonoscopy every 2-3 years recommended after 8 years of disease.  Dysplasia screening is recommended [phone].    Cervical cancer screening: N/A    Skin cancer screening: Since patient is not immunocompromised, this is per  patient's dermatologist recommendations.    Depression Screening:  -- Over the last month, have you felt down, depressed, or hopeless? No  -- Over the last month, have you felt little interest or pleasure doing things? No    Misc:  -- Avoid tobacco use  -- Avoid NSAIDs as there is potentially a 25% chance of causing an IBD flare      RTC 6 months     Thank you for this consultation.  It was a pleasure to participate in the care of this patient; please contact us with any further questions.          HPI:   Ms. Montoya is a 71 year old with past medical history to include hypertension, DM2, diabetic nephropathy, multinodular goiter s/p complete thyroidectomy ~40 yrs ago complicated by postoperative hypothyroidism, diverticulosis complicated by diverticulitis with prior limited left hemicolectomy 2002, open cholecystectomy in 2000, s/p ex-lap 30 yrs ago for presumed ectopic pregnancy (apparent diverticulitis, nonsurgical management on discovery), s/p tubal ligation 1983 complicated postsurgical VAH s/p mesh repair 2007 here for follow-up for UC pancolitis follow up.         Interval history 8/3/2022:    Patient is doing well and is at baseline bowel pattern with 1-2 soft formed stools per day.  No urgency or nighttime stools. No blood in the stool. Appetite is good with stable weight around 140lbs. Taking mesalamine 1.5gm daily, vitamin D and oral iron supplement. CBC and LFTs were unremarkable. Her Cr is 1.9 (recent baseline 1.6-1.8). She is being seen by nephrology.       ROS:  Complete 10 System ROS performed. All are negative except as documented below, in the HPI, or in patient questionnaire from today's visit.    No fevers or chills  No weight loss  No blurry vision, double vision or change in vision  No sore throat  No lymphadenopathy  No headache, paraesthesias, or weakness in a limb  No shortness of breath or wheezing  No chest pain or pressure  No arthralgias or myalgias  No rashes or skin changes  No  odynophagia or dysphagia  No BRBPR, hematochezia, melena  No dysuria, frequency or urgency  No hot/cold intolerance or polyria  No anxiety or depression    Extra intestinal manifestations of IBD:  No uveitis/episcleritis  No aphthous ulcers   No arthritis   No erythema nodosum/pyoderma gangrenosum.     PERTINENT PAST MEDICAL HISTORY:  Past Medical History:   Diagnosis Date     Diabetes mellitus (H)      Diverticulosis of colon (without mention of hemorrhage) 10/1/2012     Hypertension      Pulmonary hypertension (H)      Ulcerative (chronic) enterocolitis (H)        PREVIOUS SURGERIES:  Past Surgical History:   Procedure Laterality Date     CHOLECYSTECTOMY  3/1987     COLON SURGERY  01/2001    Left colon resection; diverticulitis     COLONOSCOPY N/A 4/24/2017    Procedure: COMBINED COLONOSCOPY, SINGLE OR MULTIPLE BIOPSY/POLYPECTOMY BY BIOPSY;;  Surgeon: Radha Salguero MD;  Location: [facility] OR     COLONOSCOPY WITH CO2 INSUFFLATION N/A 4/24/2017    Procedure: COLONOSCOPY WITH CO2 INSUFFLATION;  Colonoscopy Dx rectal bleeding, BMI 25.86, Pharm [facility] .[phone], ;  Surgeon: Radha Salguero MD;  Location: [facility] OR     CV RIGHT HEART CATH MEASUREMENTS RECORDED N/A 3/16/2020    Procedure: CV RIGHT HEART CATH;  Surgeon: Nader Muñoz MD;  Location:  HEART CARDIAC CATH LAB     GYN SURGERY  9/1983    tubal ligation     HERNIA REPAIR  12/2006    recurrent incisional hernia     THROAT SURGERY  12/1974    thyroidectomy     ALLERGIES:     Allergies   Allergen Reactions     Actos [Pioglitazone]      Fluid retention     Amlodipine      Leg swelling, hand swelling     Animal Dander      Doxycycline Hives     Dust Mites      Grass      Keflex [Cephalexin Hcl] Hives     Metoprolol      Swelling of lower legs and fatigue     Other [Seasonal Allergies]      pollen     Ranitidine      rash     Synthroid [Levothroid] Swelling     Allergic to brand name     Tetracycline Hives     Tylenol Hives     Ziac  [Bisoprolol-Hydrochlorothiazide]        PERTINENT MEDICATIONS:    Current Outpatient Medications:      atenolol (TENORMIN) 50 MG tablet, Take 1 tablet (50 mg) by mouth daily, Disp: 90 tablet, Rfl: 3     atorvastatin (LIPITOR) 40 MG tablet, Take 1 tablet (40 mg) by mouth daily, Disp: 90 tablet, Rfl: 3     blood glucose (ACCU-CHEK FELIPE PLUS) test strip, 200 strips by In Vitro route 2 times daily Use to test blood sugar 2 times daily or as directed., Disp: 200 strip, Rfl: 4     Cholecalciferol (VITAMIN D) 2000 units CAPS, Take by mouth daily, Disp: , Rfl:      Cyanocobalamin (B-12) 1000 MCG TABS, Take 1,000 mcg by mouth three times a week, Disp: , Rfl:      diphenhydrAMINE (BENADRYL) 25 MG tablet, Take 25 mg by mouth every 6 hours as needed for itching or allergies, Disp: , Rfl:      empagliflozin (JARDIANCE) 10 MG TABS tablet, Take 1 tablet (10 mg) by mouth daily, Disp: 90 tablet, Rfl: 3     ferrous gluconate (FERGON) 324 (38 Fe) MG tablet, Take 324 mg by mouth daily (with breakfast), Disp: , Rfl:      glipiZIDE (GLUCOTROL XL) 10 MG 24 hr tablet, Take 1 tablet (10 mg) by mouth daily, Disp: 90 tablet, Rfl: 3     hydrALAZINE (APRESOLINE) 50 MG tablet, TAKE 1 TABLET(50 MG) BY MOUTH THREE TIMES DAILY, Disp: 270 tablet, Rfl: 3     levothyroxine (SYNTHROID/LEVOTHROID) 112 MCG tablet, Take 1 tablet (112 mcg) by mouth daily, Disp: 90 tablet, Rfl: 4     losartan (COZAAR) 100 MG tablet, TAKE 1 TABLET(100 MG) BY MOUTH DAILY, Disp: 90 tablet, Rfl: 3     mesalamine (APRISO ER) 0.375 g 24 hr capsule, 0.375 g 4 times daily, Disp: , Rfl:      torsemide (DEMADEX) 10 MG tablet, Take as needed for swelling and/or weight gain over 2 lbs overnight and inform [facility], Disp: 10 tablet, Rfl: 0  No current facility-administered medications for this visit.    Facility-Administered Medications Ordered in Other Visits:      sodium chloride (PF) 0.9% PF flush 10 mL, 10 mL, Intravenous, Once, Yg Bermudez MD    SOCIAL HISTORY:  Social History      Socioeconomic History     Marital status:      Spouse name: Raymundo; dementia;  2016     Number of children: 3     Years of education: Not on file     Highest education level: Not on file   Occupational History     Employer: [employer]   Tobacco Use     Smoking status: Never Smoker     Smokeless tobacco: Never Used   Substance and Sexual Activity     Alcohol use: Yes     Alcohol/week: 2.0 - 4.0 standard drinks     Types: 1 - 2 Cans of beer, 1 - 2 Shots of liquor per week     Comment: occasional cocktail or beer     Drug use: No     Sexual activity: Not Currently   Other Topics Concern     Parent/sibling w/ CABG, MI or angioplasty before 65F 55M? 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      Social Determinants of Health     Financial Resource Strain: Not on file   Food Insecurity: Not on file   Transportation Needs: Not on file   Physical Activity: Not on file   Stress: Not on file   Social Connections: Not on file   Intimate Partner Violence: Not on file   Housing Stability: Not on file       FAMILY HISTORY:  Family History   Problem Relation Age of Onset     Cerebrovascular Disease Mother      Cancer Father         bladder     Diverticulitis Brother      Diverticulitis Brother      Kidney Disease No family hx of      Crohn's Disease No family hx of      Ulcerative Colitis No family hx of      Stomach Cancer No family hx of      GERD No family hx of      Celiac Disease No family hx of        Past/family/social history reviewed and no changes    PHYSICAL EXAMINATION:  Constitutional: aaox3, cooperative, pleasant, not dyspneic/diaphoretic, no acute  distress  Vitals reviewed: Wt 63.5 kg (140 lb)   LMP  (LMP Unknown)   BMI 24.80 kg/m    Wt:   Wt Readings from Last 2 Encounters:   08/03/22 63.5 kg (140 lb)   07/22/22 66.2 kg (146 lb)      Eyes: Sclera anicteric/injected  Ears/nose/mouth/throat: Not examined.   Neck:  Not examined.   CV: No edema  Respiratory: Unlabored breathing  Lymph:  Not examined.   Abd:  Not examined.   Skin: no jaundice  Psych: Normal affect  MSK: Normal gait      PERTINENT STUDIES:  Most recent CBC:  Recent Labs   Lab Test 07/22/22  0745 04/22/22  0721 09/01/21  0755   WBC 8.8  --  9.0   HGB 13.7 11.5* 11.9   HCT 42.9  --  37.8     --  261     Most recent hepatic panel:  Recent Labs   Lab Test 07/22/22  0745 10/28/20  1629   ALT 16 18   AST 30 19     Most recent creatinine:  Recent Labs   Lab Test 07/22/22  0745 06/30/22  0711   CR 1.93* 1.54*               Sincerely,    Preeti James MD

## 2022-08-03 NOTE — PROGRESS NOTES
Georgia is a 72 year old who is being evaluated via a billable video visit.      How would you like to obtain your AVS? MyChart  If the video visit is dropped, the invitation should be resent by: Text to cell phone: [phone]  Will anyone else be joining your video visit? No       Ena Yanez    Video-Visit Details    Video Start Time: 8:40    Type of service:  Video Visit    Video End Time: 9:20    Originating Location (pt. 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  Mild patchy mucosal granularity noted throughout the remainder of the colon.  Rectal polyp 4 mm sessile removed.  Diverticulosis in transverse and descending colon.  Internal and external hemorrhoids noted.  Copious brown opaque semiliquid stool and medication material throughout colon interfering with visualization.  Barrera score 0.  Only biopsies obtained were from the rectal polyp, noted to be an inflammatory polyp.  No evidence of dysplasia.  No other biopsies obtained.  Enterography: --  Fecal calprotectin: --   C diff: --  Barrera  score:   Stool freq: 0 (baseline stools frequency)  Rectal bleedin (None)  PGA: 0 (normal)  Endoscopy: Not done    Remission: <3   Mild disease: 3-5  Moderate disease: 6-10  Severe disease: >10    IBDQ Score Date IBDQ - Total Score  (Higher score better)   5/15/2017 51     ASSESSMENT/PLAN  Ms. Montoya is a 71 year old with past medical history to include hypertension, DM2, diabetic nephropathy, multinodular goiter s/p complete thyroidectomy ~40 yrs ago complicated by postoperative hypothyroidism, diverticulosis complicated by diverticulitis with prior limited left hemicolectomy , open cholecystectomy in , s/p ex-lap 30 yrs ago for presumed ectopic pregnancy (apparent diverticulitis, nonsurgical management on discovery), s/p tubal ligation  complicated postsurgical VAH s/p mesh repair  here for follow-up for UC pancolitis follow up.    1.  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  Last endoscopic assessment was in , noting Barrera 0.      -- Continue Apriso 1.5 g daily  -- Checking UA to AIN from mesalamine.   -- Patient will need colonoscopy in .   -- RTC in 1 year after colonoscopy.    2. CKD stage 3:   Likely from combination of DM and HTN. Mesalamine can also cause AIN but recent Cr has been stable.     -- Continue to follow up with nephrology.     3. IBD healthcare maintenance based on patients current medication:  5-ASA (Apriso)      Vaccinations:  Recommend yearly flu shot, pneumonia vaccines (Prevnar 13 then 8 weeks later Pneumovax 23 then 5 years later Pneumovax 23), tetanus every 10 years.    One time confirmation of immunity or serologies:  -- Hepatitis A (serologies or immunizations): --  -- Hepatitis B (serologies or immunizations): --  -- Zoster vaccination (>59 yo, discuss if over 50): --  -- MMR:--  -- HPV (all aged 18-26): --  -- Meningococcal meningitis (all patients at risk for meningitis): --     Bone mineral density screening   -- Recommend all patients supplement with  calcium and vitamin D    Cancer Screening:  Colon cancer screening:  Given pancolitis colonoscopy every 2-3 years recommended after 8 years of disease.  Dysplasia screening is recommended [id number].    Cervical cancer screening: N/A    Skin cancer screening: Since patient is not immunocompromised, this is per patient's dermatologist recommendations.    Depression Screening:  -- Over the last month, have you felt down, depressed, or hopeless? No  -- Over the last month, have you felt little interest or pleasure doing things? No    Misc:  -- Avoid tobacco use  -- Avoid NSAIDs as there is potentially a 25% chance of causing an IBD flare      RTC 6 months     Thank you for this consultation.  It was a pleasure to participate in the care of this patient; please contact us with any further questions.          HPI:   Ms. Montoya is a 71 year old with past medical history to include hypertension, DM2, diabetic nephropathy, multinodular goiter s/p complete thyroidectomy ~40 yrs ago complicated by postoperative hypothyroidism, diverticulosis complicated by diverticulitis with prior limited left hemicolectomy 2002, open cholecystectomy in 2000, s/p ex-lap 30 yrs ago for presumed ectopic pregnancy (apparent diverticulitis, nonsurgical management on discovery), s/p tubal ligation 1983 complicated postsurgical VAH s/p mesh repair 2007 here for follow-up for UC pancolitis follow up.         Interval history 8/3/2022:    Patient is doing well and is at baseline bowel pattern with 1-2 soft formed stools per day.  No urgency or nighttime stools. No blood in the stool. Appetite is good with stable weight around 140lbs. Taking mesalamine 1.5gm daily, vitamin D and oral iron supplement. CBC and LFTs were unremarkable. Her Cr is 1.9 (recent baseline 1.6-1.8). She is being seen by nephrology.       ROS:  Complete 10 System ROS performed. All are negative except as documented below, in the HPI, or in patient questionnaire from today's  visit.    No fevers or chills  No weight loss  No blurry vision, double vision or change in vision  No sore throat  No lymphadenopathy  No headache, paraesthesias, or weakness in a limb  No shortness of breath or wheezing  No chest pain or pressure  No arthralgias or myalgias  No rashes or skin changes  No odynophagia or dysphagia  No BRBPR, hematochezia, melena  No dysuria, frequency or urgency  No hot/cold intolerance or polyria  No anxiety or depression    Extra intestinal manifestations of IBD:  No uveitis/episcleritis  No aphthous ulcers   No arthritis   No erythema nodosum/pyoderma gangrenosum.     PERTINENT PAST MEDICAL HISTORY:  Past Medical History:   Diagnosis Date     Diabetes mellitus (H)      Diverticulosis of colon (without mention of hemorrhage) 10/1/2012     Hypertension      Pulmonary hypertension (H)      Ulcerative (chronic) enterocolitis (H)        PREVIOUS SURGERIES:  Past Surgical History:   Procedure Laterality Date     CHOLECYSTECTOMY  3/1987     COLON SURGERY  01/2001    Left colon resection; diverticulitis     COLONOSCOPY N/A 4/24/2017    Procedure: COMBINED COLONOSCOPY, SINGLE OR MULTIPLE BIOPSY/POLYPECTOMY BY BIOPSY;;  Surgeon: Radha Salguero MD;  Location: [facility] OR     COLONOSCOPY WITH CO2 INSUFFLATION N/A 4/24/2017    Procedure: COLONOSCOPY WITH CO2 INSUFFLATION;  Colonoscopy Dx rectal bleeding, BMI 25.86, Pharm [facility] .[phone], ;  Surgeon: Radha Salguero MD;  Location: [facility] OR     CV RIGHT HEART CATH MEASUREMENTS RECORDED N/A 3/16/2020    Procedure: CV RIGHT HEART CATH;  Surgeon: Nader Muñoz MD;  Location:  HEART CARDIAC CATH LAB     GYN SURGERY  9/1983    tubal ligation     HERNIA REPAIR  12/2006    recurrent incisional hernia     THROAT SURGERY  12/1974    thyroidectomy     ALLERGIES:     Allergies   Allergen Reactions     Actos [Pioglitazone]      Fluid retention     Amlodipine      Leg swelling, hand swelling     Animal Dander      Doxycycline  Hives     Dust Mites      Grass      Keflex [Cephalexin Hcl] Hives     Metoprolol      Swelling of lower legs and fatigue     Other [Seasonal Allergies]      pollen     Ranitidine      rash     Synthroid [Levothroid] Swelling     Allergic to brand name     Tetracycline Hives     Tylenol Hives     Ziac [Bisoprolol-Hydrochlorothiazide]        PERTINENT MEDICATIONS:    Current Outpatient Medications:      atenolol (TENORMIN) 50 MG tablet, Take 1 tablet (50 mg) by mouth daily, Disp: 90 tablet, Rfl: 3     atorvastatin (LIPITOR) 40 MG tablet, Take 1 tablet (40 mg) by mouth daily, Disp: 90 tablet, Rfl: 3     blood glucose (ACCU-CHEK FELIPE PLUS) test strip, 200 strips by In Vitro route 2 times daily Use to test blood sugar 2 times daily or as directed., Disp: 200 strip, Rfl: 4     Cholecalciferol (VITAMIN D) 2000 units CAPS, Take by mouth daily, Disp: , Rfl:      Cyanocobalamin (B-12) 1000 MCG TABS, Take 1,000 mcg by mouth three times a week, Disp: , Rfl:      diphenhydrAMINE (BENADRYL) 25 MG tablet, Take 25 mg by mouth every 6 hours as needed for itching or allergies, Disp: , Rfl:      empagliflozin (JARDIANCE) 10 MG TABS tablet, Take 1 tablet (10 mg) by mouth daily, Disp: 90 tablet, Rfl: 3     ferrous gluconate (FERGON) 324 (38 Fe) MG tablet, Take 324 mg by mouth daily (with breakfast), Disp: , Rfl:      glipiZIDE (GLUCOTROL XL) 10 MG 24 hr tablet, Take 1 tablet (10 mg) by mouth daily, Disp: 90 tablet, Rfl: 3     hydrALAZINE (APRESOLINE) 50 MG tablet, TAKE 1 TABLET(50 MG) BY MOUTH THREE TIMES DAILY, Disp: 270 tablet, Rfl: 3     levothyroxine (SYNTHROID/LEVOTHROID) 112 MCG tablet, Take 1 tablet (112 mcg) by mouth daily, Disp: 90 tablet, Rfl: 4     losartan (COZAAR) 100 MG tablet, TAKE 1 TABLET(100 MG) BY MOUTH DAILY, Disp: 90 tablet, Rfl: 3     mesalamine (APRISO ER) 0.375 g 24 hr capsule, 0.375 g 4 times daily, Disp: , Rfl:      torsemide (DEMADEX) 10 MG tablet, Take as needed for swelling and/or weight gain over 2 lbs  overnight and inform CORE clinic, Disp: 10 tablet, Rfl: 0  No current facility-administered medications for this visit.    Facility-Administered Medications Ordered in Other Visits:      sodium chloride (PF) 0.9% PF flush 10 mL, 10 mL, Intravenous, Once, Yg Bermudez MD    SOCIAL HISTORY:  Social History     Socioeconomic History     Marital status:      Spouse name: Raymundo; dementia;  2016     Number of children: 3     Years of education: Not on file     Highest education level: Not on file   Occupational History     Employer: [employer]   Tobacco Use     Smoking status: Never Smoker     Smokeless tobacco: Never Used   Substance and Sexual Activity     Alcohol use: Yes     Alcohol/week: 2.0 - 4.0 standard drinks     Types: 1 - 2 Cans of beer, 1 - 2 Shots of liquor per week     Comment: occasional cocktail or beer     Drug use: No     Sexual activity: Not Currently   Other Topics Concern     Parent/sibling w/ CABG, MI or angioplasty before 65F 55M? 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      Social Determinants of Health     Financial Resource Strain: Not on file   Food Insecurity: Not on file   Transportation Needs: Not on file   Physical Activity: Not on file   Stress: Not on file   Social Connections: Not on file   Intimate Partner Violence: Not on file   Housing Stability: Not on file       FAMILY HISTORY:  Family History   Problem Relation Age of Onset     Cerebrovascular Disease Mother      Cancer Father         bladder     Diverticulitis Brother      Diverticulitis Brother      Kidney Disease No family hx of      Crohn's Disease No family hx of       Ulcerative Colitis No family hx of      Stomach Cancer No family hx of      GERD No family hx of      Celiac Disease No family hx of        Past/family/social history reviewed and no changes    PHYSICAL EXAMINATION:  Constitutional: aaox3, cooperative, pleasant, not dyspneic/diaphoretic, no acute distress  Vitals reviewed: Wt 63.5 kg (140 lb)   LMP  (LMP Unknown)   BMI 24.80 kg/m    Wt:   Wt Readings from Last 2 Encounters:   08/03/22 63.5 kg (140 lb)   07/22/22 66.2 kg (146 lb)      Eyes: Sclera anicteric/injected  Ears/nose/mouth/throat: Not examined.   Neck:  Not examined.   CV: No edema  Respiratory: Unlabored breathing  Lymph:  Not examined.   Abd:  Not examined.   Skin: no jaundice  Psych: Normal affect  MSK: Normal gait      PERTINENT STUDIES:  Most recent CBC:  Recent Labs   Lab Test 07/22/22  0745 04/22/22  0721 09/01/21  0755   WBC 8.8  --  9.0   HGB 13.7 11.5* 11.9   HCT 42.9  --  37.8     --  261     Most recent hepatic panel:  Recent Labs   Lab Test 07/22/22  0745 10/28/20  1629   ALT 16 18   AST 30 19     Most recent creatinine:  Recent Labs   Lab Test 07/22/22  0745 06/30/22  0711   CR 1.93* 1.54*

## 2022-08-04 NOTE — TELEPHONE ENCOUNTER
mesalamine (APRISO ER) 0.375 g 24 hr capsule  Last Written Prescription Date:  unknown  Last Fill Quantity: unknown,   # refills: unknown  Last Office Visit : 8/3/22  Future Office visit: 8/2/23    Routing refill request to provider for review/approval because: historical

## 2022-08-09 RX ORDER — MESALAMINE 0.38 G/1
CAPSULE, EXTENDED RELEASE ORAL
Qty: 360 CAPSULE | Refills: 3 | Status: SHIPPED | OUTPATIENT
Start: 2022-08-09 | End: 2023-12-06

## 2022-08-19 ENCOUNTER — OFFICE VISIT (OUTPATIENT)
Dept: CARDIOLOGY | Facility: CLINIC | Age: 72
End: 2022-08-19
Payer: MEDICARE

## 2022-08-19 ENCOUNTER — ANCILLARY PROCEDURE (OUTPATIENT)
Dept: CARDIOLOGY | Facility: CLINIC | Age: 72
End: 2022-08-19
Attending: INTERNAL MEDICINE
Payer: MEDICARE

## 2022-08-19 VITALS
SYSTOLIC BLOOD PRESSURE: 156 MMHG | DIASTOLIC BLOOD PRESSURE: 76 MMHG | BODY MASS INDEX: 26.04 KG/M2 | OXYGEN SATURATION: 100 % | HEART RATE: 75 BPM | WEIGHT: 147 LBS

## 2022-08-19 DIAGNOSIS — I27.20 PULMONARY HYPERTENSION (H): ICD-10-CM

## 2022-08-19 DIAGNOSIS — I10 HYPERTENSION GOAL BP (BLOOD PRESSURE) < 130/80: ICD-10-CM

## 2022-08-19 DIAGNOSIS — I50.30 HEART FAILURE WITH PRESERVED EJECTION FRACTION, NYHA CLASS I (H): ICD-10-CM

## 2022-08-19 DIAGNOSIS — I48.19 PERSISTENT ATRIAL FIBRILLATION (H): Primary | ICD-10-CM

## 2022-08-19 LAB — LVEF ECHO: NORMAL

## 2022-08-19 PROCEDURE — 93000 ELECTROCARDIOGRAM COMPLETE: CPT | Performed by: INTERNAL MEDICINE

## 2022-08-19 PROCEDURE — 93306 TTE W/DOPPLER COMPLETE: CPT | Performed by: INTERNAL MEDICINE

## 2022-08-19 PROCEDURE — 99215 OFFICE O/P EST HI 40 MIN: CPT | Performed by: INTERNAL MEDICINE

## 2022-08-19 RX ORDER — TORSEMIDE 10 MG/1
10 TABLET ORAL DAILY
Qty: 90 TABLET | Refills: 3
Start: 2022-08-19 | End: 2022-09-23

## 2022-08-19 RX ORDER — HYDRALAZINE HYDROCHLORIDE 50 MG/1
75 TABLET, FILM COATED ORAL 3 TIMES DAILY
Qty: 270 TABLET | Refills: 3
Start: 2022-08-19 | End: 2022-12-17

## 2022-08-19 NOTE — PATIENT INSTRUCTIONS
Start xarelto 15 mg every day  Change torsemide to 10 mg every day   Increase hydralazine to 75 mg (1.5 pills) three times daily  Lab in 7-10 days

## 2022-08-19 NOTE — PROGRESS NOTES
[facility] Cardiology Consultation:    Assessment and Plan:     1.    Chronic HFpEF, group 2 severe pulmonary hypertension related to diastolic dysfunction, moderately reduced RV function, functional class I-II.    Follows in core clinic.  Resume torsemide 10 daily.  Check BMP in 1 week.  Core follow-up in 1 month  Continue Jardiance  2.  Hypertension, suboptimal control.  Increase hydralazine to 75 mg 3 times daily  3.  Moderate paradoxical low flow aortic stenosis, possible bicuspid aortic valve: Continue periodic monitoring  4. Diabetes   5.  Dyslipidemia, subclinical CAD with moderate CAC on CT: on Lipitor.  6.  CKD stage 4  7. Ulcerative colitis  8.  Persistent atrial fibrillation, rate controlled, asymptomatic, normal LV function, high [id number]-FEAm score:  Continue atenolol.  Given lack of symptoms and normal LV function, rate control strategy is reasonable.  Anticoagulation is indicated to decrease stroke risk.  Bleeding risk higher than usual due to underlying ulcerative colitis, however shared decision making with her GI specialist to try anticoagulation.  Eliquis is safest option given her advanced CKD however it is not covered under her plan.  Discussed options of Coumadin and Xarelto, she prefers Xarelto.  Start Xarelto at reduced dose of 15 mg daily.  If there is any significant GI bleed, will discuss left atrial appendage closure with watchman device.    Follow-up in 6 months     A total of 40 minutes were spent on the day of the visit for chart review, care coordination, face-to-face consultation with the patient, and documentation.    David Almendarez MD    Cardiac Imaging and Prevention  [facility]  Pager: [phone]    HPI: HFpEF, pulmonary hypertension follow-up.  At her recent PCP visit, she was incidentally noted to be an new onset rate controlled atrial fibrillation.  She was asymptomatic from this.  Her PCP has been in touch with me regarding this  and had ordered an echocardiogram.  I have also been in communication with her GI specialist to discuss safety of anticoagulation, and she communicated to me that anticoagulation is reasonable.  I reviewed her echocardiogram that was done today.  It shows normal LV function, paradoxical septum, moderate pulmonary hypertension with RVSP of 57 mmHg, moderate RV dilation with moderate dysfunction, CVP of 15, moderate paradoxical low flow aortic stenosis.  Compared to prior study, no significant change other than RV function appears slightly worse.  She has been doing well, and has no functional limitation.  Denies any chest pain or pressure, shortness of breath/dyspnea, orthopnea, pnd, palpitations, syncope/presyncope or edema. Blood pressure at home is checked regularly and runs in the 130s and often in the 150s.  She continues to follow-up in core clinic.  At her last visit torsemide was changed to as needed, and since then she is not been taking it.  Since then, her weight is up 6 to 7 pounds.  Renal function has fluctuated a bit, last check showed slightly higher creatinine.  I reviewed her EKG that was done in clinic today.  It shows atrial fibrillation at 74 bpm, with no other concerning changes and no change from recent ECG.      EXAM:  BP (!) 156/76 (BP Location: Left arm, Patient Position: Sitting, Cuff Size: Adult Regular)   Pulse 75   Wt 66.7 kg (147 lb)   LMP  (LMP Unknown)   SpO2 100%   BMI 26.04 kg/m    GEN/CONSTITUIONAL: Appears comfortable, in no apparent distress   EYES: No icterus  ENT/MOUTH: Normal  JVP:  Elevated to mid neck  RESPIRATORY: Clear to auscultation bilaterally   CARDIOVASCULAR: irregular S1 and S2, 2/6 JUANITA.   ABDOMEN: Soft, non-tender, positive bowel sounds   NEUROLOGIC: Grossly non-focal   PSYCHIATRIC: Normal affect  EXT: No LE edema. Normal pedal pulses.  Skin: No petechiae, purpura or rash    PAST MEDICAL HISTORY:  Past Medical History:   Diagnosis Date     Diabetes mellitus (H)       Diverticulosis of colon (without mention of hemorrhage) 10/1/2012     Hypertension      Pulmonary hypertension (H)      Ulcerative (chronic) enterocolitis (H)        CURRENT MEDICATIONS:  Current Outpatient Medications   Medication     atenolol (TENORMIN) 50 MG tablet     atorvastatin (LIPITOR) 40 MG tablet     blood glucose (ACCU-CHEK FELIPE PLUS) test strip     Cholecalciferol (VITAMIN D) 2000 units CAPS     Cyanocobalamin (B-12) 1000 MCG TABS     diphenhydrAMINE (BENADRYL) 25 MG tablet     empagliflozin (JARDIANCE) 10 MG TABS tablet     ferrous gluconate (FERGON) 324 (38 Fe) MG tablet     glipiZIDE (GLUCOTROL XL) 10 MG 24 hr tablet     hydrALAZINE (APRESOLINE) 50 MG tablet     levothyroxine (SYNTHROID/LEVOTHROID) 112 MCG tablet     losartan (COZAAR) 100 MG tablet     mesalamine (APRISO ER) 0.375 g 24 hr capsule     rivaroxaban ANTICOAGULANT (XARELTO) 15 MG TABS tablet     torsemide (DEMADEX) 10 MG tablet     No current facility-administered medications for this visit.     Facility-Administered Medications Ordered in Other Visits   Medication     sodium chloride (PF) 0.9% PF flush 10 mL       PAST SURGICAL HISTORY:  Past Surgical History:   Procedure Laterality Date     CHOLECYSTECTOMY  3/1987     COLON SURGERY  01/2001    Left colon resection; diverticulitis     COLONOSCOPY N/A 4/24/2017    Procedure: COMBINED COLONOSCOPY, SINGLE OR MULTIPLE BIOPSY/POLYPECTOMY BY BIOPSY;;  Surgeon: Radha Salguero MD;  Location: [facility] OR     COLONOSCOPY WITH CO2 INSUFFLATION N/A 4/24/2017    Procedure: COLONOSCOPY WITH CO2 INSUFFLATION;  Colonoscopy Dx rectal bleeding, BMI 25.86, Pharm [facility] .[phone], ;  Surgeon: Radha Salguero MD;  Location: [facility] OR     CV RIGHT HEART CATH MEASUREMENTS RECORDED N/A 3/16/2020    Procedure: CV RIGHT HEART CATH;  Surgeon: Nader Muñoz MD;  Location:  HEART CARDIAC CATH LAB     GYN SURGERY  9/1983    tubal ligation     HERNIA REPAIR  12/2006    recurrent  incisional hernia     THROAT SURGERY  1974    thyroidectomy       ALLERGIES     Allergies   Allergen Reactions     Actos [Pioglitazone]      Fluid retention     Amlodipine      Leg swelling, hand swelling     Animal Dander      Doxycycline Hives     Dust Mites      Grass      Keflex [Cephalexin Hcl] Hives     Metoprolol      Swelling of lower legs and fatigue     Other [Seasonal Allergies]      pollen     Ranitidine      rash     Synthroid [Levothroid] Swelling     Allergic to brand name     Tetracycline Hives     Tylenol Hives     Ziac [Bisoprolol-Hydrochlorothiazide]        FAMILY HISTORY:  Family History   Problem Relation Age of Onset     Cerebrovascular Disease Mother      Cancer Father         bladder     Diverticulitis Brother      Diverticulitis Brother      Kidney Disease No family hx of      Crohn's Disease No family hx of      Ulcerative Colitis No family hx of      Stomach Cancer No family hx of      GERD No family hx of      Celiac Disease No family hx of        SOCIAL HISTORY:  Social History     Socioeconomic History     Marital status:      Spouse name: Raymundo; dementia;  2016     Number of children: 3     Years of education: Not on file     Highest education level: Not on file   Occupational History     Employer: [employer]   Tobacco Use     Smoking status: Never Smoker     Smokeless tobacco: Never Used   Substance and Sexual Activity     Alcohol use: Yes     Alcohol/week: 2.0 - 4.0 standard drinks     Types: 1 - 2 Cans of beer, 1 - 2 Shots of liquor per week     Comment: occasional cocktail or beer     Drug use: No     Sexual activity: Not Currently   Other Topics Concern     Parent/sibling w/ CABG, MI or angioplasty before 65F 55M? Not Asked      Service No     Blood Transfusions No     Caffeine Concern No     Occupational Exposure No     Hobby Hazards No     Sleep Concern No     Stress Concern No     Weight Concern No     Special Diet No     Back Care No     Exercise  Yes     Comment: off and on     Bike Helmet No     Seat Belt Yes     Self-Exams No   Social History Narrative    Laid off her job 8/14     Social Determinants of Health     Financial Resource Strain: Not on file   Food Insecurity: Not on file   Transportation Needs: Not on file   Physical Activity: Not on file   Stress: Not on file   Social Connections: Not on file   Intimate Partner Violence: Not on file   Housing Stability: Not on file       ROS:   Constitutional: No fever, chills, or sweats. No weight gain/loss   ENT: No visual disturbance, ear ache, epistaxis, sore throat  Allergies/Immunologic: Negative.   Respiratory: No cough, hemoptysia  Cardiovascular: As per HPI  GI: No nausea, vomiting, hematemesis, melena, or hematochezia  : No urinary frequency, dysuria, or hematuria  Integument: Negative  Psychiatric: Negative  Neuro: Negative  Endocrinology: Negative   Musculoskeletal: Negative    ADDITIONAL COMMENTS:     I reviewed the patient's medications:     I reviewed the patient's pertinent clinical laboratory studies:     I reviewed the patient's pertinent imaging studies:   I reviewed the patient's ECG:

## 2022-08-26 ENCOUNTER — OFFICE VISIT (OUTPATIENT)
Dept: INTERNAL MEDICINE | Facility: CLINIC | Age: 72
End: 2022-08-26

## 2022-08-26 ENCOUNTER — LAB (OUTPATIENT)
Dept: LAB | Facility: CLINIC | Age: 72
End: 2022-08-26
Payer: MEDICARE

## 2022-08-26 VITALS
SYSTOLIC BLOOD PRESSURE: 134 MMHG | HEART RATE: 77 BPM | BODY MASS INDEX: 25.66 KG/M2 | HEIGHT: 63 IN | OXYGEN SATURATION: 94 % | DIASTOLIC BLOOD PRESSURE: 77 MMHG | RESPIRATION RATE: 20 BRPM | WEIGHT: 144.8 LBS | TEMPERATURE: 98.3 F

## 2022-08-26 DIAGNOSIS — J20.9 ACUTE BRONCHITIS, UNSPECIFIED ORGANISM: Primary | ICD-10-CM

## 2022-08-26 DIAGNOSIS — R09.81 SINUS CONGESTION: ICD-10-CM

## 2022-08-26 DIAGNOSIS — I48.19 PERSISTENT ATRIAL FIBRILLATION (H): ICD-10-CM

## 2022-08-26 LAB
ANION GAP SERPL CALCULATED.3IONS-SCNC: 5 MMOL/L (ref 3–14)
BUN SERPL-MCNC: 47 MG/DL (ref 7–30)
CALCIUM SERPL-MCNC: 9.4 MG/DL (ref 8.5–10.1)
CHLORIDE BLD-SCNC: 108 MMOL/L (ref 94–109)
CO2 SERPL-SCNC: 23 MMOL/L (ref 20–32)
CREAT SERPL-MCNC: 1.68 MG/DL (ref 0.52–1.04)
GFR SERPL CREATININE-BSD FRML MDRD: 32 ML/MIN/1.73M2
GLUCOSE BLD-MCNC: 151 MG/DL (ref 70–99)
POTASSIUM BLD-SCNC: 4 MMOL/L (ref 3.4–5.3)
SODIUM SERPL-SCNC: 136 MMOL/L (ref 133–144)

## 2022-08-26 PROCEDURE — 80048 BASIC METABOLIC PNL TOTAL CA: CPT

## 2022-08-26 PROCEDURE — 99213 OFFICE O/P EST LOW 20 MIN: CPT | Performed by: INTERNAL MEDICINE

## 2022-08-26 PROCEDURE — 36415 COLL VENOUS BLD VENIPUNCTURE: CPT

## 2022-08-26 RX ORDER — AZITHROMYCIN 250 MG/1
TABLET, FILM COATED ORAL
Qty: 6 TABLET | Refills: 0 | Status: SHIPPED | OUTPATIENT
Start: 2022-08-26 | End: 2022-08-31

## 2022-08-26 RX ORDER — FLUTICASONE PROPIONATE 50 MCG
1 SPRAY, SUSPENSION (ML) NASAL DAILY
Qty: 16 G | Refills: 3 | Status: SHIPPED | OUTPATIENT
Start: 2022-08-26 | End: 2023-03-03

## 2022-08-26 RX ORDER — PREDNISONE 10 MG/1
10 TABLET ORAL DAILY
Qty: 5 TABLET | Refills: 0 | Status: SHIPPED | OUTPATIENT
Start: 2022-08-26 | End: 2022-08-31

## 2022-08-26 NOTE — PATIENT INSTRUCTIONS
Try some Flonase to help your sinuses drain.  When spraying into the nose, aim towards the ears for the best effect.         Follow-up as needed if not improving in a week or new/worsening symptoms develop.

## 2022-08-26 NOTE — NURSING NOTE
Chief Complaint   Patient presents with     Cough     Possible bronchitis per patient. Has had for 4 days. Deep cough, raw per patient, some productive coughs.        Farhan Herndon, EMT at 2:10 PM on 8/26/2022.

## 2022-08-26 NOTE — PROGRESS NOTES
"  Assessment & Plan     Acute bronchitis, unspecified organism    Georgia presents with 4 days of cough and sinus congestion.  She states she gets this every year trigger by allergies, and she is taking Benadryl with some benefit.  She reports her grandchildren also have a cough and were COVID negative- I offered COVID testing to Georgia but she declined.  No fever, O2 sats okay, and lung clear on exam, so low concern of pneumonia.  Symptoms likely due to bronchitis- discussed that bronchitis is typically viral and antibiotics are needed, but Georgia says she often needs to return for antibiotics when symptoms don't improve.  We therefore planned to do a 5 day course of lower dose prednisone (which has been helpful for her in the past), and then I provided a prescription for Zpak to have on hand to start only if symptoms don't improve after the prednisone.  She has been prescribed albuterol inhaler in previous bronchitis episodes, but didn't feel this was needed since she is not having dyspnea or wheezing.  Follow-up as needed if not improving in a week or new/worsening symptoms develop.       - predniSONE (DELTASONE) 10 MG tablet; Take 1 tablet (10 mg) by mouth daily for 5 days  - azithromycin (ZITHROMAX) 250 MG tablet; Take 2 tablets (500 mg) by mouth daily for 1 day, THEN 1 tablet (250 mg) daily for 4 days. Take only if not improving after a few days of the prednisone.    Sinus congestion    Having some benefit from Benadryl, she states she doesn't tolerate other allergy meds.  Suggested adding in Flonase.     - fluticasone (FLONASE) 50 MCG/ACT nasal spray; Spray 1 spray into both nostrils daily       BMI:   Estimated body mass index is 25.65 kg/m  as calculated from the following:    Height as of this encounter: 1.6 m (5' 3\").    Weight as of this encounter: 65.7 kg (144 lb 12.8 oz).       Isaias Tatum MD  [facility] INTERNAL MEDICINE [facility]   Georgia is a 72 year old, " "presenting for the following health issues:  Cough (Possible bronchitis per patient. Has had for 4 days. Deep cough, raw per patient, some productive coughs. )      [facility] presents with 4 days of deep, occasionally productive cough.  No shortness of breath or wheezing.  Feels fatigued.  Having sinus drainage and congestion.  No ear pain.  Starts with sore throat, which has improved but still dry though she can swallow okay.  No fevers/chills.  No GI symptoms.  She says she gets this once per year prompted by ragweed allergies, taking allergies meds (Benadryl) which helps somewhat.  She is not using any nasal sprays.    Grandchildren have had bad colds but they were negative for COVID.      She has not had any COVID testing.  She's had three doses of COVID vaccine.          Review of Systems   Constitutional, HEENT, pulmonary systems are negative, except as otherwise noted.      Objective    /77 (BP Location: Right arm, Patient Position: Sitting, Cuff Size: Adult Regular)   Pulse 77   Temp 98.3  F (36.8  C) (Oral)   Resp 20   Ht 1.6 m (5' 3\")   Wt 65.7 kg (144 lb 12.8 oz)   LMP  (LMP Unknown)   SpO2 94%   BMI 25.65 kg/m    Body mass index is 25.65 kg/m .  Physical Exam     GENERAL: alert and no distress, occasional coughing  EYES: Eyes grossly normal to inspection, PERRL and conjunctivae and sclerae normal  HENT: ear canals and TMs normal, nose and mouth without ulcers or lesions, oropharynx mildly red but no exudates  NECK: no adenopathy, no asymmetry, masses, or scars  RESP: lungs clear to auscultation - no rales, rhonchi or wheezes  CV: regular rate and rhythm, normal S1 S2, no S3 or S4, no murmur, click or rub                .  ..   "

## 2022-09-16 ENCOUNTER — VIRTUAL VISIT (OUTPATIENT)
Dept: PHARMACY | Facility: CLINIC | Age: 72
End: 2022-09-16
Payer: COMMERCIAL

## 2022-09-16 DIAGNOSIS — I10 HYPERTENSION GOAL BP (BLOOD PRESSURE) < 130/80: ICD-10-CM

## 2022-09-16 DIAGNOSIS — R80.9 TYPE 2 DIABETES MELLITUS WITH MICROALBUMINURIA, WITHOUT LONG-TERM CURRENT USE OF INSULIN (H): Primary | ICD-10-CM

## 2022-09-16 DIAGNOSIS — I48.91 ATRIAL FIBRILLATION, UNSPECIFIED TYPE (H): ICD-10-CM

## 2022-09-16 DIAGNOSIS — E11.29 TYPE 2 DIABETES MELLITUS WITH MICROALBUMINURIA, WITHOUT LONG-TERM CURRENT USE OF INSULIN (H): Primary | ICD-10-CM

## 2022-09-16 DIAGNOSIS — I50.30 HEART FAILURE WITH PRESERVED EJECTION FRACTION, NYHA CLASS I (H): Primary | ICD-10-CM

## 2022-09-16 PROCEDURE — 99606 MTMS BY PHARM EST 15 MIN: CPT | Performed by: PHARMACIST

## 2022-09-16 RX ORDER — GLIPIZIDE 10 MG/1
20 TABLET, FILM COATED, EXTENDED RELEASE ORAL DAILY
Qty: 180 TABLET | Refills: 3 | Status: SHIPPED | OUTPATIENT
Start: 2022-09-16 | End: 2023-09-26

## 2022-09-16 RX ORDER — GLIPIZIDE 5 MG/1
5 TABLET, FILM COATED, EXTENDED RELEASE ORAL DAILY
Qty: 14 TABLET | Refills: 1 | Status: SHIPPED | OUTPATIENT
Start: 2022-09-16 | End: 2022-10-14

## 2022-09-16 NOTE — PROGRESS NOTES
Medication Therapy Management (MTM) Encounter    ASSESSMENT:                            Medication Adherence/Access: No issues identified    Type 2 Diabetes: Patient is not meeting A1c goal of < 7%. Self monitoring of blood glucose is not at goal of fasting  mg/dL. Recommend patient start a GLP-1. She preferred an oral GLP-1 over injectable, but also requested we try glipizide 20 mg first as she used to be on this medication.     Hypertension: Patient is meeting blood pressure goal of < 130/80mmHg.    Afib: Stable. Appropriate renal dosing    PLAN:                            1. Increase glipizide to 15 mg once daily with a meal, then if blood sugars are still above 130 mg/dL in the morning increase to 20 mg once daily.     Follow-up: Return in about 1 month (around 10/16/2022) for Follow up, with me, in person.    SUBJECTIVE/OBJECTIVE:                          Keerthi Montyoa is a 72 year old female called for a follow-up visit.  Today's visit is a follow-up MTM visit from 22     Reason for visit: Diabetes follow-up.    Allergies/ADRs: Reviewed in chart  Past Medical History: Reviewed in chart  Tobacco: She reports that she has never smoked. She has never used smokeless tobacco.      Medication Adherence/Access: no issues reported    Type 2 Diabetes:  Currently taking Jardiance 10 mg daily, and glipizide 10 mg daily. She reports she previously was on 20 mg of glipizide and would like to resume this. Patient is not experiencing side effects.  Blood sugar monitorin time(s) daily. Ranges (patient reported): Fasting- 170-180 mg/dL recently. Reports she has been eating more lately.    Symptoms of low blood sugar? none  Symptoms of high blood sugar? none  Eye exam: up to date  Foot exam: up to date  Aspirin: No  Exercise: Trying to get herself to use her treadmill everyday.   Statin: Yes: atorvastatin 40 mg   ACEi/ARB: Yes: losartan.   Urine Albumin:     Lab Results   Component Value Date    UMALCR 1274.19  (H) 09/11/2018      Lab Results   Component Value Date    A1C 8.1 06/24/2022    A1C 7.0 12/16/2021    A1C 6.6 09/01/2021    A1C 6.6 10/28/2020    A1C 7.0 05/12/2020    A1C 7.4 01/13/2020    A1C 7.9 01/28/2019    A1C 7.6 06/25/2018         Hypertension: Current medications include atenolol 50 mg daily, hydralazine 75 mg three times a day, losartan 100 mg daily, and torsemide 10 mg daily.  Patient does self-monitor blood pressure. Home BP monitoring in range of 120's systolic over 70-80's diastolic. Yesterday was 117/76 mmHg. This morning was 128/81 mmHg.  Patient reports no current medication side effects.     BP Readings from Last 3 Encounters:   08/26/22 134/77   08/19/22 (!) 156/76   07/22/22 130/69       AFib: Currently taking Xarelto 15 mg once daily with dinner. No issues with bleeding.     Today's Vitals: LMP  (LMP Unknown)   ----------------      I spent 11 minutes with this patient today. All changes were made via collaborative practice agreement with Bunny Meyers MD. A copy of the visit note was provided to the patient's provider(s).    The patient was sent via [employer] a summary of these recommendations.     Bunny Santa, Pharm. D., [facility]  Medication Therapy Management Pharmacist  Direct Voicemail: [phone]      Telemedicine Visit Details  Type of service:  Telephone visit  Start Time: 8:30 am  End Time: 8:41 am  Originating Location (patient location): Home  Distant Location (provider location):  [facility]     Medication Therapy Recommendations  Type 2 diabetes mellitus with microalbuminuria, without long-term current use of insulin (H)    Current Medication: glipiZIDE (GLUCOTROL XL) 10 MG 24 hr tablet (Discontinued)   Rationale: Dose too low - Dosage too low - Effectiveness   Recommendation: Increase Dose   Status: Accepted per CPA

## 2022-09-16 NOTE — PATIENT INSTRUCTIONS
"Recommendations from today's MTM visit:                                                       1. Increase glipizide to 15 mg once daily with a meal, then if blood sugars are still above 130 mg/dL in the morning increase to 20 mg once daily.     Follow-up: No follow-ups on file.    It was great speaking with you today.  I value your experience and would be very thankful for your time in providing feedback in our clinic survey. In the next few days, you may receive an email or text message from [employer] with a link to a survey related to your  clinical pharmacist.\"     To schedule another MTM appointment, please call the clinic directly or you may call the MTM scheduling line at [phone] or toll-free at [phone].     My Clinical Pharmacist's contact information:                                                      Please feel free to contact me with any questions or concerns you have.      Bunny Santa, Pharm. D., [facility]  Medication Therapy Management Pharmacist  Direct Voicemail: [phone]     "

## 2022-09-23 ENCOUNTER — OFFICE VISIT (OUTPATIENT)
Dept: CARDIOLOGY | Facility: CLINIC | Age: 72
End: 2022-09-23
Attending: HOSPITALIST
Payer: MEDICARE

## 2022-09-23 ENCOUNTER — LAB (OUTPATIENT)
Dept: LAB | Facility: CLINIC | Age: 72
End: 2022-09-23
Payer: MEDICARE

## 2022-09-23 VITALS
OXYGEN SATURATION: 98 % | HEIGHT: 63 IN | SYSTOLIC BLOOD PRESSURE: 113 MMHG | BODY MASS INDEX: 26.29 KG/M2 | DIASTOLIC BLOOD PRESSURE: 74 MMHG | WEIGHT: 148.4 LBS | HEART RATE: 69 BPM

## 2022-09-23 DIAGNOSIS — I50.30 HEART FAILURE WITH PRESERVED EJECTION FRACTION, NYHA CLASS I (H): ICD-10-CM

## 2022-09-23 DIAGNOSIS — I10 HYPERTENSION GOAL BP (BLOOD PRESSURE) < 130/80: ICD-10-CM

## 2022-09-23 DIAGNOSIS — I48.19 PERSISTENT ATRIAL FIBRILLATION (H): ICD-10-CM

## 2022-09-23 DIAGNOSIS — I27.20 PULMONARY HYPERTENSION (H): ICD-10-CM

## 2022-09-23 DIAGNOSIS — E11.29 TYPE 2 DIABETES MELLITUS WITH MICROALBUMINURIA, WITHOUT LONG-TERM CURRENT USE OF INSULIN (H): ICD-10-CM

## 2022-09-23 DIAGNOSIS — R80.9 TYPE 2 DIABETES MELLITUS WITH MICROALBUMINURIA, WITHOUT LONG-TERM CURRENT USE OF INSULIN (H): ICD-10-CM

## 2022-09-23 DIAGNOSIS — I50.30 HEART FAILURE WITH PRESERVED EJECTION FRACTION, NYHA CLASS I (H): Primary | ICD-10-CM

## 2022-09-23 LAB
ANION GAP SERPL CALCULATED.3IONS-SCNC: 13 MMOL/L (ref 7–15)
BUN SERPL-MCNC: 50.5 MG/DL (ref 8–23)
CALCIUM SERPL-MCNC: 9.8 MG/DL (ref 8.8–10.2)
CHLORIDE SERPL-SCNC: 99 MMOL/L (ref 98–107)
CREAT SERPL-MCNC: 2.14 MG/DL (ref 0.51–0.95)
DEPRECATED HCO3 PLAS-SCNC: 24 MMOL/L (ref 22–29)
GFR SERPL CREATININE-BSD FRML MDRD: 24 ML/MIN/1.73M2
GLUCOSE SERPL-MCNC: 170 MG/DL (ref 70–99)
POTASSIUM SERPL-SCNC: 4.5 MMOL/L (ref 3.4–5.3)
SODIUM SERPL-SCNC: 136 MMOL/L (ref 136–145)

## 2022-09-23 PROCEDURE — 99213 OFFICE O/P EST LOW 20 MIN: CPT | Performed by: NURSE PRACTITIONER

## 2022-09-23 PROCEDURE — 36415 COLL VENOUS BLD VENIPUNCTURE: CPT | Performed by: PATHOLOGY

## 2022-09-23 PROCEDURE — G0463 HOSPITAL OUTPT CLINIC VISIT: HCPCS

## 2022-09-23 PROCEDURE — 80048 BASIC METABOLIC PNL TOTAL CA: CPT | Performed by: PATHOLOGY

## 2022-09-23 RX ORDER — TORSEMIDE 10 MG/1
10 TABLET ORAL PRN
Qty: 90 TABLET | Refills: 3 | Status: SHIPPED | OUTPATIENT
Start: 2022-09-23 | End: 2023-05-17

## 2022-09-23 ASSESSMENT — PAIN SCALES - GENERAL: PAINLEVEL: NO PAIN (0)

## 2022-09-23 NOTE — LETTER
9/23/2022      RE: Keerthi Montoya  [address] [place] [id number]       Dear Colleague,    Thank you for the opportunity to participate in the care of your patient, Keerthi Montoya, at the [facility] [place] at [facility]. Please see a copy of my visit note below.      Georgia is a 72 year old female with a  medical history is significant for longstanding hypertension since she was in her teens, diabetes, dyslipidemia, and ulcerative colitis.    She is having a hard time with seasonal allergies.    She has junk food once in a while. Patient states she has no SOB with or without activity. Weights 139-143 lbs at home.   No swelling in LE,watches grandkids ages 5 and 3 daily.  She has noted her BS to be stable- she is off the metformin. She feels well overall and denies chest pain, palpitations, lightheadedness, dizziness. Her BP's at home systolic under 120's. She took her pills like an hour ago. She has a treadmill and has been using it every day. Doesn't feel she can typically hydrate herself well daily.     Current meds:  Atenolol 50 mg daily  [id number] mg   Hydralazine 50 mg TID  Losartan 100 mg daily  Jardiance 10 mg daily      ROS:   Constitutional: No fever, chills, or sweats.  ENT: No visual disturbance, ear ache, epistaxis, sore throat.   Allergies/Immunologic: Negative  Respiratory: No cough, hemoptysis.   Cardiovascular: As per HPI.   GI: As per HPI.   : No urinary frequency, dysuria, or hematuria.   Integument: Negative.   Psychiatric: Negative.   Neuro: Negative.   Endocrinology: negative   Musculoskeletal: negative    EXAM:   GEN/CONSTITUIONAL: Appears comfortable, in no apparent distress   EYES: No icterus  ENT/MOUTH: Normal  JVP:  below clavicle   RESPIRATORY: Clear to auscultation bilaterally   CARDIOVASCULAR: Regular S1 and S2, 2/6 JUANITA.   ABDOMEN: Soft, non-tender, positive bowel sounds   NEUROLOGIC: Grossly  non-focal   PSYCHIATRIC: Normal affect  EXT: no LE edema. Normal pedal pulses.  Skin: No petechiae, purpura or rash       ECHO 8/25  Interpretation Summary   Global and regional left ventricular function is normal with an EF of 60-65%.  Paradoxical septal motion consistent with right ventricular pressure and  volume overload is present.  Moderate to severe right ventricular dilation is present.  Global right ventricular function is moderately to severely reduced.  Calculated aortic valve area in severe AS range. Consider additional  evaluation if indicated.  Moderate to severe tricuspid insufficiency is present.  Dilation of the inferior vena cava is present with abnormal respiratory  variation in diameter.  Trivial pericardial effusion is present.    .  Assessment and Plan:  Georgia is well today, despite her lack of any functional limitation, the ongoing concern about her mildly reduced RV function is still present.  She appears slightly hypovolemic/euvolemic. She is making a better effort to stay hydrated. Stop Torsemide  Recheck BMP - if the creatinine is still elevated we may need to stop Losartan and increase the Hydralazine.      1.  Severe pulmonary hypertension, normal LV function with mild dilation, functional class I-II.The etiology is likely group 2, and related to underlying diastolic dysfunction from longstanding hypertension and diabetes.    2.  Hypertension, with better recent control:  amlodipine stopped on 8/14 ( swelling).  Instructed to keep home blood pressure log.  3.  Mild-moderate aortic stenosis, possible bicuspid aortic valve:   4. Diabetes: PCP following  5.  Dyslipidemia, on Lipitor  6.  CKD stage 3  7. Ulcerative colitis      Plan:  Stop Torsemide - change to PRN.- with weight gain as discussed in clinic  BMP on Tuesday  CORE in November        Carolyn ADAME NP-C                    Please do not hesitate to contact me if you have any questions/concerns.     Sincerely,     Carolyn GAONA  WENDI Bonilla CNP

## 2022-09-23 NOTE — LETTER
9/23/2022       RE: Keerthi Montoya  [address] [place] [id number]     Dear Colleague,    Thank you for referring your patient, Keerthi Montoya, to the [facility] [place] at [facility]. Please see a copy of my visit note below.      Georgia is a 72 year old female with a  medical history is significant for longstanding hypertension since she was in her teens, diabetes, dyslipidemia, and ulcerative colitis.    She is having a hard time with seasonal allergies.    She has junk food once in a while. Patient states she has no SOB with or without activity. Weights 139-143 lbs at home.   No swelling in LE,watches grandkids ages 5 and 3 daily.  She has noted her BS to be stable- she is off the metformin. She feels well overall and denies chest pain, palpitations, lightheadedness, dizziness. Her BP's at home systolic under 120's. She took her pills like an hour ago. She has a treadmill and has been using it every day. Doesn't feel she can typically hydrate herself well daily.     Current meds:  Atenolol 50 mg daily  [id number] mg   Hydralazine 50 mg TID  Losartan 100 mg daily  Jardiance 10 mg daily      ROS:   Constitutional: No fever, chills, or sweats.  ENT: No visual disturbance, ear ache, epistaxis, sore throat.   Allergies/Immunologic: Negative  Respiratory: No cough, hemoptysis.   Cardiovascular: As per HPI.   GI: As per HPI.   : No urinary frequency, dysuria, or hematuria.   Integument: Negative.   Psychiatric: Negative.   Neuro: Negative.   Endocrinology: negative   Musculoskeletal: negative    EXAM:   GEN/CONSTITUIONAL: Appears comfortable, in no apparent distress   EYES: No icterus  ENT/MOUTH: Normal  JVP:  below clavicle   RESPIRATORY: Clear to auscultation bilaterally   CARDIOVASCULAR: Regular S1 and S2, 2/6 JUANITA.   ABDOMEN: Soft, non-tender, positive bowel sounds   NEUROLOGIC: Grossly non-focal   PSYCHIATRIC: Normal  affect  EXT: no LE edema. Normal pedal pulses.  Skin: No petechiae, purpura or rash       ECHO 8/25  Interpretation Summary   Global and regional left ventricular function is normal with an EF of 60-65%.  Paradoxical septal motion consistent with right ventricular pressure and  volume overload is present.  Moderate to severe right ventricular dilation is present.  Global right ventricular function is moderately to severely reduced.  Calculated aortic valve area in severe AS range. Consider additional  evaluation if indicated.  Moderate to severe tricuspid insufficiency is present.  Dilation of the inferior vena cava is present with abnormal respiratory  variation in diameter.  Trivial pericardial effusion is present.    .  Assessment and Plan:  Georgia is well today, despite her lack of any functional limitation, the ongoing concern about her mildly reduced RV function is still present.  She appears slightly hypovolemic/euvolemic. She is making a better effort to stay hydrated. Stop Torsemide  Recheck BMP - if the creatinine is still elevated we may need to stop Losartan and increase the Hydralazine.      1.  Severe pulmonary hypertension, normal LV function with mild dilation, functional class I-II.The etiology is likely group 2, and related to underlying diastolic dysfunction from longstanding hypertension and diabetes.    2.  Hypertension, with better recent control:  amlodipine stopped on 8/14 ( swelling).  Instructed to keep home blood pressure log.  3.  Mild-moderate aortic stenosis, possible bicuspid aortic valve:   4. Diabetes: PCP following  5.  Dyslipidemia, on Lipitor  6.  CKD stage 3  7. Ulcerative colitis      Plan:  Stop Torsemide - change to PRN.- with weight gain as discussed in clinic  BMP on Tuesday  CORE in November        Carolyn LLAMAS

## 2022-09-23 NOTE — NURSING NOTE
Labs: Patient was given results of the laboratory testing obtained today. Patient was instructed to return for the next laboratory testing next week. Patient demonstrated understanding of this information and agreed to call with further questions or concerns.     Med Reconcile: Reviewed and verified all current medications with the patient. The updated medication list was printed and given to the patient.    Return Appointment: Patient given instructions regarding scheduling next clinic visit. Patient demonstrated understanding of this information and agreed to call with further questions or concerns. CORE in 2 months.    Medication Change: Patient was educated regarding prescribed medication change, including discussion of the indication, administration, side effects, and when to report to MD or RN. Patient demonstrated understanding of this information and agreed to call with further questions or concerns. Stop torsemide - only take as needed.    Patient stated she understood all health information given and agreed to call with further questions or concerns.    Lorena Ambriz, VERONIQUE

## 2022-09-23 NOTE — NURSING NOTE
Chief Complaint   Patient presents with     Follow Up     CORE return, 73 yo female with HFpEF presents for follow up visit with labs prior.     Vitals were taken and medications reconciled.    Judd Porter, EMT  7:34 AM

## 2022-09-23 NOTE — PROGRESS NOTES
Georgia is a 72 year old female with a  medical history is significant for longstanding hypertension since she was in her teens, diabetes, dyslipidemia, and ulcerative colitis.    She is having a hard time with seasonal allergies.    She has junk food once in a while. Patient states she has no SOB with or without activity. Weights 139-143 lbs at home.   No swelling in LE,watches grandkids ages 5 and 3 daily.  She has noted her BS to be stable- she is off the metformin. She feels well overall and denies chest pain, palpitations, lightheadedness, dizziness. Her BP's at home systolic under 120's. She took her pills like an hour ago. She has a treadmill and has been using it every day. Doesn't feel she can typically hydrate herself well daily.     Current meds:  Atenolol 50 mg daily  Pdyatxbzb31 mg   Hydralazine 50 mg TID  Losartan 100 mg daily  Jardiance 10 mg daily      ROS:   Constitutional: No fever, chills, or sweats.  ENT: No visual disturbance, ear ache, epistaxis, sore throat.   Allergies/Immunologic: Negative  Respiratory: No cough, hemoptysis.   Cardiovascular: As per HPI.   GI: As per HPI.   : No urinary frequency, dysuria, or hematuria.   Integument: Negative.   Psychiatric: Negative.   Neuro: Negative.   Endocrinology: negative   Musculoskeletal: negative    EXAM:   GEN/CONSTITUIONAL: Appears comfortable, in no apparent distress   EYES: No icterus  ENT/MOUTH: Normal  JVP:  below clavicle   RESPIRATORY: Clear to auscultation bilaterally   CARDIOVASCULAR: Regular S1 and S2, 2/6 JUANITA.   ABDOMEN: Soft, non-tender, positive bowel sounds   NEUROLOGIC: Grossly non-focal   PSYCHIATRIC: Normal affect  EXT: no LE edema. Normal pedal pulses.  Skin: No petechiae, purpura or rash       ECHO 8/25  Interpretation Summary   Global and regional left ventricular function is normal with an EF of 60-65%.  Paradoxical septal motion consistent with right ventricular pressure and  volume overload is present.  Moderate to  severe right ventricular dilation is present.  Global right ventricular function is moderately to severely reduced.  Calculated aortic valve area in severe AS range. Consider additional  evaluation if indicated.  Moderate to severe tricuspid insufficiency is present.  Dilation of the inferior vena cava is present with abnormal respiratory  variation in diameter.  Trivial pericardial effusion is present.    .  Assessment and Plan:  Georgia is well today, despite her lack of any functional limitation, the ongoing concern about her mildly reduced RV function is still present.  She appears slightly hypovolemic/euvolemic. She is making a better effort to stay hydrated. Stop Torsemide  Recheck BMP - if the creatinine is still elevated we may need to stop Losartan and increase the Hydralazine.      1.  Severe pulmonary hypertension, normal LV function with mild dilation, functional class I-II.The etiology is likely group 2, and related to underlying diastolic dysfunction from longstanding hypertension and diabetes.    2.  Hypertension, with better recent control:  amlodipine stopped on 8/14 ( swelling).  Instructed to keep home blood pressure log.  3.  Mild-moderate aortic stenosis, possible bicuspid aortic valve:   4. Diabetes: PCP following  5.  Dyslipidemia, on Lipitor  6.  CKD stage 3  7. Ulcerative colitis      Plan:  Stop Torsemide - change to PRN.- with weight gain as discussed in clinic  BMP on Tuesday  CORE in November        Carolyn LLAMAS

## 2022-09-23 NOTE — PATIENT INSTRUCTIONS
Take your medicines every day, as directed    Changes made today:  Stop torsemide - only take 10 mg as needed for weight gain of more than 2 pounds over night   Let us know if you are needing to take torsemide   Monitor Your Weight and Symptoms    Contact us if you:    Gain 2 pounds in one day or 5 pounds in one week  Feel more short of breath  Notice more leg swelling  Feel lightheadeded   Change your lifestyle    Limit Salt or Sodium:  2000 mg  Limit Fluids:  2000 mL or approximately 64 ounces  Eat a Heart Healthy Diet  Low in saturated fats  Stay Active:  Aim to move at least 150 minutes every  week         To Contact us    During Business Hours:  [phone], option # 1      After hours, weekends or holidays:   [phone], Option #4  Ask to speak to the On-Call Cardiologist. Inform them you are a CORE/heart failure patient at the [place].     Use [employer] allows you to communicate directly with your heart team through secure messaging.  Notch can be accessed any time on your phone, computer, or tablet.  If you need assistance, we'd be happy to help!         Keep your Heart Appointments:    Labs next week - Tuesday    CORE in November

## 2022-09-27 ENCOUNTER — LAB (OUTPATIENT)
Dept: LAB | Facility: CLINIC | Age: 72
End: 2022-09-27
Payer: MEDICARE

## 2022-09-27 DIAGNOSIS — I50.30 HEART FAILURE WITH PRESERVED EJECTION FRACTION, NYHA CLASS I (H): ICD-10-CM

## 2022-09-27 LAB
ANION GAP SERPL CALCULATED.3IONS-SCNC: 10 MMOL/L (ref 3–14)
BUN SERPL-MCNC: 27 MG/DL (ref 7–30)
CALCIUM SERPL-MCNC: 9.4 MG/DL (ref 8.5–10.1)
CHLORIDE BLD-SCNC: 103 MMOL/L (ref 94–109)
CO2 SERPL-SCNC: 20 MMOL/L (ref 20–32)
CREAT SERPL-MCNC: 1.35 MG/DL (ref 0.52–1.04)
GFR SERPL CREATININE-BSD FRML MDRD: 42 ML/MIN/1.73M2
GLUCOSE BLD-MCNC: 197 MG/DL (ref 70–99)
POTASSIUM BLD-SCNC: 4.5 MMOL/L (ref 3.4–5.3)
SODIUM SERPL-SCNC: 133 MMOL/L (ref 133–144)

## 2022-09-27 PROCEDURE — 36415 COLL VENOUS BLD VENIPUNCTURE: CPT

## 2022-09-27 PROCEDURE — 80048 BASIC METABOLIC PNL TOTAL CA: CPT

## 2022-10-07 ENCOUNTER — ANCILLARY PROCEDURE (OUTPATIENT)
Dept: MAMMOGRAPHY | Facility: CLINIC | Age: 72
End: 2022-10-07
Attending: INTERNAL MEDICINE
Payer: MEDICARE

## 2022-10-07 DIAGNOSIS — Z12.31 ENCOUNTER FOR SCREENING MAMMOGRAM FOR MALIGNANT NEOPLASM OF BREAST: ICD-10-CM

## 2022-10-07 PROCEDURE — 77063 BREAST TOMOSYNTHESIS BI: CPT | Mod: GC

## 2022-10-07 PROCEDURE — 77067 SCR MAMMO BI INCL CAD: CPT | Mod: GC

## 2022-10-10 ENCOUNTER — HEALTH MAINTENANCE LETTER (OUTPATIENT)
Age: 72
End: 2022-10-10

## 2022-10-13 NOTE — PROGRESS NOTES
HPI:    Last visit with us 7/22/2022 and additional details in that note.  Overall doing well. She specifically denies any new HEENT, cardiopulmonary, abdominal, , GYN, neurological, systemic, psychiatric, lymphatic, endocrine, vascular complaints.     Past Medical History:   Diagnosis Date     Diabetes mellitus (H)      Diverticulosis of colon (without mention of hemorrhage) 10/1/2012     Hypertension      Pulmonary hypertension (H)      Ulcerative (chronic) enterocolitis (H)      Past Surgical History:   Procedure Laterality Date     CHOLECYSTECTOMY  3/1987     COLON SURGERY  01/2001    Left colon resection; diverticulitis     COLONOSCOPY N/A 4/24/2017    Procedure: COMBINED COLONOSCOPY, SINGLE OR MULTIPLE BIOPSY/POLYPECTOMY BY BIOPSY;;  Surgeon: Radha Salguero MD;  Location: [facility] OR     COLONOSCOPY WITH CO2 INSUFFLATION N/A 4/24/2017    Procedure: COLONOSCOPY WITH CO2 INSUFFLATION;  Colonoscopy Dx rectal bleeding, BMI 25.86, Pharm [employer] .[phone], MH;  Surgeon: Radha Salguero MD;  Location: [facility] OR     CV RIGHT HEART CATH MEASUREMENTS RECORDED N/A 3/16/2020    Procedure: CV RIGHT HEART CATH;  Surgeon: Nader Muñoz MD;  Location:  HEART CARDIAC CATH LAB     GYN SURGERY  9/1983    tubal ligation     HERNIA REPAIR  12/2006    recurrent incisional hernia     THROAT SURGERY  12/1974    thyroidectomy     PE:    Vitals noted, gen, nad, cooperative, alert, neck supple nl rom, lungs with good air movement, RRR to irregular, Systolic murmur present, abdomen, no acute findings. No B LE swelling.       A/P:    1. GI follow up with Dr. James 8/2/2023 for Ulcerative Colitis. Colonoscopy 12/12/2017. Seen MsAudrey Zepedamaricel, GI 1/27/2022. She remains on Mesalamine.   2. Follow up with Dr. Almendarez, 8/19/2022 with resting echo on the same day (8/19/2022). She was seen MsAudrey Chad 9/23/2022 in the  [facility] clinic. She has 11/25/2022 with Ms. Bonilla and next with Dr. Almendarez 2/24/2023.   On Atenolol, Torsemide,  Hydralazine, Losartan. She has pulmonary HTN and systemic HTN. Mild aortic stenosis. She is now on Xarelto for atrial fibrillation.   3. MTM appt. With Bunny Santa 10/14/2022 today for DM2  4. Next Nephrology appt. With Dr. Minor 5/2/2023. Creatinine 1.35 on 9/27/2022. Last visit 4/26/2022  5. DM2 on Jardiance. 8.1% on 6/24/2022. On Glipizide and Jardiance   6. Increased Lipids on Atorvastatin. Lipids 10/28/2020 HDL 38, TG's 108 and LDL 33  7. TSH 0.82 on 7/22/2022 on thyroid replacement. S/p thyroidectomy for multinodular goiter   8. Mammogram 10/7/2022  9. Dermatology. She does not feel she needs to see Dermatology for a skin cancer screening.   10. Immunizations; COVID Pfizer x 3. Check with insurance regarding Shingrix. Tdap 7/26/2013. Pneumococcal 23 done 7/30/2018 and Prevnar 13 done 6/22/2015. Influenza vaccine this year. Bi-valent COVID vaccine today 10/14/2022          30 minutes spent on the date of the encounter doing chart review, history and exam, documentation and further activities as noted above exclusive of procedures and other billable interpretations

## 2022-10-14 ENCOUNTER — OFFICE VISIT (OUTPATIENT)
Dept: PHARMACY | Facility: CLINIC | Age: 72
End: 2022-10-14
Payer: COMMERCIAL

## 2022-10-14 ENCOUNTER — OFFICE VISIT (OUTPATIENT)
Dept: INTERNAL MEDICINE | Facility: CLINIC | Age: 72
End: 2022-10-14
Payer: MEDICARE

## 2022-10-14 VITALS
WEIGHT: 150.8 LBS | DIASTOLIC BLOOD PRESSURE: 74 MMHG | SYSTOLIC BLOOD PRESSURE: 138 MMHG | OXYGEN SATURATION: 97 % | BODY MASS INDEX: 26.72 KG/M2 | HEART RATE: 86 BPM | HEIGHT: 63 IN

## 2022-10-14 DIAGNOSIS — Z23 NEED FOR VACCINATION: Primary | ICD-10-CM

## 2022-10-14 DIAGNOSIS — E11.29 TYPE 2 DIABETES MELLITUS WITH MICROALBUMINURIA, WITHOUT LONG-TERM CURRENT USE OF INSULIN (H): ICD-10-CM

## 2022-10-14 DIAGNOSIS — R80.9 TYPE 2 DIABETES MELLITUS WITH MICROALBUMINURIA, WITHOUT LONG-TERM CURRENT USE OF INSULIN (H): ICD-10-CM

## 2022-10-14 PROCEDURE — 91312 COVID-19,PF,PFIZER BOOSTER BIVALENT: CPT | Performed by: INTERNAL MEDICINE

## 2022-10-14 PROCEDURE — 90662 IIV NO PRSV INCREASED AG IM: CPT | Performed by: INTERNAL MEDICINE

## 2022-10-14 PROCEDURE — 99214 OFFICE O/P EST MOD 30 MIN: CPT | Mod: 25 | Performed by: INTERNAL MEDICINE

## 2022-10-14 PROCEDURE — G0008 ADMIN INFLUENZA VIRUS VAC: HCPCS | Performed by: INTERNAL MEDICINE

## 2022-10-14 PROCEDURE — 0124A COVID-19,PF,PFIZER BOOSTER BIVALENT: CPT | Performed by: INTERNAL MEDICINE

## 2022-10-14 PROCEDURE — 99606 MTMS BY PHARM EST 15 MIN: CPT | Performed by: PHARMACIST

## 2022-10-14 RX ORDER — GLIPIZIDE 5 MG/1
5 TABLET, FILM COATED, EXTENDED RELEASE ORAL DAILY
Qty: 90 TABLET | Refills: 3 | Status: SHIPPED | OUTPATIENT
Start: 2022-10-14 | End: 2023-04-07

## 2022-10-14 NOTE — NURSING NOTE
Keerthi Montoya is a 72 year old female that presents in clinic today for the following:     Chief Complaint   Patient presents with     Follow Up     Pt here for three month follow up        The patient's allergies and medications were reviewed. The patient's vitals were obtained without incident. The patient does not have any other questions for the provider.     Jennifer Krishna, AMOL at 8:23 AM on 10/14/2022.  Primary Care Clinic: [phone]

## 2022-10-14 NOTE — PROGRESS NOTES
Medication Therapy Management (MTM) Encounter    ASSESSMENT:                            Medication Adherence/Access: No issues identified    Type 2 Diabetes: Patient is not meeting A1c goal of < 7%. Self monitoring of blood glucose is not at goal of fasting  mg/dL but is close and some are in range. Will continue to have her work ondiet and exercise and get A1c in November.    PLAN:                            1. Continue to improve on diet and exercise.  2. Get A1c drawn in November.    Follow-up: Return in about 1 month (around 2022) for Follow up, with me.    SUBJECTIVE/OBJECTIVE:                          Keerthi Montoya is a 72 year old female coming in for a follow-up visit.  Today's visit is a follow-up MTM visit from      Reason for visit: diabetes follow-up .    Allergies/ADRs: Reviewed in chart  Past Medical History: Reviewed in chart  Tobacco: She reports that she has never smoked. She has never used smokeless tobacco.  Alcohol: none      Medication Adherence/Access: no issues reported    Type 2 Diabetes:  Currently taking Jardiance 10 mg daily, and glipizide 10 mg daily. She reports she previously was on 20 mg of glipizide and would like to resume this. Patient is not experiencing side effects.  Blood sugar monitorin time(s) daily. Ranges (patient reported): Fasting- 170-180 mg/dL recently. Reports she has been eating more lately.    Symptoms of low blood sugar? none  Symptoms of high blood sugar? none  Eye exam: up to date  Foot exam: up to date  Aspirin: No  Exercise: Trying to get herself to use her treadmill everyday.   Statin: Yes: atorvastatin 40 mg   ACEi/ARB: Yes: losartan.   Urine Albumin:     Lab Results   Component Value Date    UMALCR 1,274.19 (H) 2018      Lab Results   Component Value Date    A1C 8.1 2022    A1C 7.0 2021    A1C 6.6 2021    A1C 6.6 10/28/2020    A1C 7.0 2020    A1C 7.4 2020    A1C 7.9 2019    A1C 7.6 2018          Today's Vitals: LMP  (LMP Unknown)   ----------------      I spent 15 minutes with this patient today. All changes were made via collaborative practice agreement with Bunny Meyers MD. A copy of the visit note was provided to the patient's provider(s).    The patient was sent via [employer] a summary of these recommendations.     Bunny aSnta, Pharm. D., [facility]  Medication Therapy Management Pharmacist  Direct Voicemail: [phone]       Medication Therapy Recommendations  No medication therapy recommendations to display

## 2022-10-14 NOTE — PATIENT INSTRUCTIONS
"Recommendations from today's MTM visit:                                                       1. Continue to improve on diet and exercise.  2. Get A1c drawn in November.    Follow-up: Return in about 1 month (around 11/14/2022) for Follow up, with me.    It was great speaking with you today.  I value your experience and would be very thankful for your time in providing feedback in our clinic survey. In the next few days, you may receive an email or text message from [employer] with a link to a survey related to your  clinical pharmacist.\"     To schedule another MTM appointment, please call the clinic directly or you may call the MTM scheduling line at [phone] or toll-free at [phone].     My Clinical Pharmacist's contact information:                                                      Please feel free to contact me with any questions or concerns you have.      Bunny Santa, Pharm. D., BCACP  Medication Therapy Management Pharmacist  Direct Voicemail: [phone]     "

## 2022-10-29 DIAGNOSIS — I10 HYPERTENSION GOAL BP (BLOOD PRESSURE) < 140/90: ICD-10-CM

## 2022-10-29 RX ORDER — LOSARTAN POTASSIUM 100 MG/1
100 TABLET ORAL DAILY
Qty: 90 TABLET | Refills: 3 | Status: SHIPPED | OUTPATIENT
Start: 2022-10-29 | End: 2023-11-07

## 2022-11-11 DIAGNOSIS — I50.30 HEART FAILURE WITH PRESERVED EJECTION FRACTION, NYHA CLASS I (H): Primary | ICD-10-CM

## 2022-12-21 ENCOUNTER — TELEPHONE (OUTPATIENT)
Dept: GASTROENTEROLOGY | Facility: CLINIC | Age: 72
End: 2022-12-21

## 2022-12-21 DIAGNOSIS — K57.30 DIVERTICULOSIS OF LARGE INTESTINE: Primary | ICD-10-CM

## 2022-12-21 DIAGNOSIS — K51.00 ULCERATIVE PANCOLITIS WITHOUT COMPLICATION (H): ICD-10-CM

## 2022-12-21 NOTE — TELEPHONE ENCOUNTER
Called patient for more information about her symptoms related to suspected diverticulitis. Her left-side is very sore. She is having a loose stool with a small amount of blood just once a day. She does experience urgency when she needs to have a BM. Passing gas. Patient initiated a liquid diet and is requesting Cipro to treat.    She states she can tell the difference between diverticulitis and a UC flare because with her UC flares she has pain on the right-side of her abdomen, not her left.    Will discuss symptoms with Dr. James to develop a plan.

## 2022-12-21 NOTE — TELEPHONE ENCOUNTER
[facility]    Phone Message    May a detailed message be left on voicemail: yes     Reason for Call: Other: Patient expressed that she has been dealing with diverticulitis and is hoping a new medication can be prescribed to help with symptoms. She is requesting a call back from the team to discuss further, thank you!     Action Taken: Message routed to:  [facility]    Travel Screening: Not Applicable

## 2022-12-22 RX ORDER — CIPROFLOXACIN 500 MG/1
500 TABLET, FILM COATED ORAL 2 TIMES DAILY
Qty: 14 TABLET | Refills: 0 | Status: SHIPPED | OUTPATIENT
Start: 2022-12-22 | End: 2022-12-29

## 2022-12-22 NOTE — TELEPHONE ENCOUNTER
[facility]    Phone Message    May a detailed message be left on voicemail: yes     Reason for Call: Other: Patient recently called back and is hoping someone from the care team can call her back with an update from yesterday's conversation. Please call her back at [phone] to discuss when available, thank you!     Action Taken: Message routed to:  [facility]    Travel Screening: Not Applicable

## 2022-12-22 NOTE — TELEPHONE ENCOUNTER
"Discussed symptoms with Dr. James. She would like the patient to complete a CT to assess for diverticulitis.     Called patient to relay the plan. She is refusing the CT at this time. She states \"I always get antibiotics for these symptoms and it always clears up\". Explained to the patient the risk of using antibiotics unless absolutely necessary, such as developing C. Diff. Patient states she has never had C. Diff and she doesn't expect to get it now. She has always tolerated Cipro. She understands the risk and continues to refuse the CT. Per Dr. James, okay to proceed with Cipro 500mg BID for 7 days. Order placed and sent to patient's pharmacy.  "

## 2022-12-27 ENCOUNTER — LAB (OUTPATIENT)
Dept: LAB | Facility: CLINIC | Age: 72
End: 2022-12-27
Payer: MEDICARE

## 2022-12-27 DIAGNOSIS — I50.30 HEART FAILURE WITH PRESERVED EJECTION FRACTION, NYHA CLASS I (H): ICD-10-CM

## 2022-12-27 LAB
ANION GAP SERPL CALCULATED.3IONS-SCNC: 9 MMOL/L (ref 3–14)
BUN SERPL-MCNC: 37 MG/DL (ref 7–30)
CALCIUM SERPL-MCNC: 8.9 MG/DL (ref 8.5–10.1)
CHLORIDE BLD-SCNC: 102 MMOL/L (ref 94–109)
CO2 SERPL-SCNC: 20 MMOL/L (ref 20–32)
CREAT SERPL-MCNC: 1.91 MG/DL (ref 0.52–1.04)
GFR SERPL CREATININE-BSD FRML MDRD: 28 ML/MIN/1.73M2
GLUCOSE BLD-MCNC: 232 MG/DL (ref 70–99)
POTASSIUM BLD-SCNC: 4.6 MMOL/L (ref 3.4–5.3)
SODIUM SERPL-SCNC: 131 MMOL/L (ref 133–144)

## 2022-12-27 PROCEDURE — 80048 BASIC METABOLIC PNL TOTAL CA: CPT

## 2022-12-27 PROCEDURE — 36415 COLL VENOUS BLD VENIPUNCTURE: CPT

## 2022-12-28 DIAGNOSIS — I27.20 PULMONARY HYPERTENSION (H): ICD-10-CM

## 2022-12-28 RX ORDER — TORSEMIDE 10 MG/1
10 TABLET ORAL PRN
Qty: 90 TABLET | Refills: 3 | OUTPATIENT
Start: 2022-12-28

## 2022-12-30 ENCOUNTER — ANCILLARY PROCEDURE (OUTPATIENT)
Dept: CT IMAGING | Facility: CLINIC | Age: 72
End: 2022-12-30
Attending: INTERNAL MEDICINE
Payer: MEDICARE

## 2022-12-30 ENCOUNTER — OFFICE VISIT (OUTPATIENT)
Dept: CARDIOLOGY | Facility: CLINIC | Age: 72
End: 2022-12-30
Attending: NURSE PRACTITIONER
Payer: MEDICARE

## 2022-12-30 VITALS
WEIGHT: 145.1 LBS | OXYGEN SATURATION: 100 % | SYSTOLIC BLOOD PRESSURE: 113 MMHG | HEIGHT: 63 IN | HEART RATE: 74 BPM | DIASTOLIC BLOOD PRESSURE: 68 MMHG | BODY MASS INDEX: 25.71 KG/M2

## 2022-12-30 DIAGNOSIS — I50.30 HEART FAILURE WITH PRESERVED EJECTION FRACTION, NYHA CLASS I (H): Primary | ICD-10-CM

## 2022-12-30 DIAGNOSIS — I48.19 PERSISTENT ATRIAL FIBRILLATION (H): ICD-10-CM

## 2022-12-30 DIAGNOSIS — K51.00 ULCERATIVE PANCOLITIS WITHOUT COMPLICATION (H): ICD-10-CM

## 2022-12-30 DIAGNOSIS — E11.29 TYPE 2 DIABETES MELLITUS WITH MICROALBUMINURIA, WITHOUT LONG-TERM CURRENT USE OF INSULIN (H): ICD-10-CM

## 2022-12-30 DIAGNOSIS — K57.30 DIVERTICULOSIS OF LARGE INTESTINE: ICD-10-CM

## 2022-12-30 DIAGNOSIS — N18.30 STAGE 3 CHRONIC KIDNEY DISEASE, UNSPECIFIED WHETHER STAGE 3A OR 3B CKD (H): ICD-10-CM

## 2022-12-30 DIAGNOSIS — R80.9 TYPE 2 DIABETES MELLITUS WITH MICROALBUMINURIA, WITHOUT LONG-TERM CURRENT USE OF INSULIN (H): ICD-10-CM

## 2022-12-30 DIAGNOSIS — I27.20 PULMONARY HYPERTENSION (H): ICD-10-CM

## 2022-12-30 DIAGNOSIS — I10 HYPERTENSION GOAL BP (BLOOD PRESSURE) < 130/80: ICD-10-CM

## 2022-12-30 LAB
CREAT BLD-MCNC: 1.8 MG/DL (ref 0.5–1)
GFR SERPL CREATININE-BSD FRML MDRD: 30 ML/MIN/1.73M2

## 2022-12-30 PROCEDURE — G0463 HOSPITAL OUTPT CLINIC VISIT: HCPCS | Performed by: NURSE PRACTITIONER

## 2022-12-30 PROCEDURE — 82565 ASSAY OF CREATININE: CPT | Performed by: PATHOLOGY

## 2022-12-30 PROCEDURE — G0463 HOSPITAL OUTPT CLINIC VISIT: HCPCS

## 2022-12-30 PROCEDURE — 99213 OFFICE O/P EST LOW 20 MIN: CPT | Performed by: NURSE PRACTITIONER

## 2022-12-30 PROCEDURE — 74177 CT ABD & PELVIS W/CONTRAST: CPT | Mod: MG | Performed by: RADIOLOGY

## 2022-12-30 PROCEDURE — G1010 CDSM STANSON: HCPCS | Mod: GC | Performed by: RADIOLOGY

## 2022-12-30 RX ORDER — IOPAMIDOL 755 MG/ML
81 INJECTION, SOLUTION INTRAVASCULAR ONCE
Status: COMPLETED | OUTPATIENT
Start: 2022-12-30 | End: 2022-12-30

## 2022-12-30 RX ADMIN — IOPAMIDOL 81 ML: 755 INJECTION, SOLUTION INTRAVASCULAR at 09:19

## 2022-12-30 ASSESSMENT — PAIN SCALES - GENERAL: PAINLEVEL: NO PAIN (0)

## 2022-12-30 NOTE — NURSING NOTE
Chief Complaint   Patient presents with     Follow Up     : [facility] return, 73 yo female with HFpEF presents for follow up visit with labs prior.     Vitals were taken and medications reconciled.    Judd Porter, EMT  7:57 AM

## 2022-12-30 NOTE — LETTER
"12/30/2022      RE: Keerthi Montoya  [address] [place] [id number]       Dear Colleague,    Thank you for the opportunity to participate in the care of your patient, Keerthi Montoya, at the [facility] [place] [facility]. Please see a copy of my visit note below.      Georgia is a 72 year old female with a  medical history is significant for longstanding hypertension since she was in her teens, diabetes, dyslipidemia, and ulcerative colitis.    She has been \"battleing\" diverticulitis. She was on antibiotics. She has been busy with taking care of her brother.   Patient states she has no SOB with or without activity. Weights 140 lbs at home.   No swelling in LE,watches grandkids ages 5 and 3 daily.  She has noted her BS to be stable- she is off the metformin. She feels well overall and denies chest pain, palpitations, lightheadedness, dizziness. Her BP's at home systolic under 120's. Doesn't feel she can typically hydrate herself well daily. She hasn't really taken the PRN Torsemide - especially- UC and diverticulitis     Current meds:  Atenolol 50 mg daily  [id number] mg- PRN   Hydralazine 50 mg TID  Losartan 100 mg daily  Jardiance 10 mg daily      ROS:   Constitutional: No fever, chills, or sweats.  ENT: No visual disturbance, ear ache, epistaxis, sore throat.   Allergies/Immunologic: Negative  Respiratory: No cough, hemoptysis.   Cardiovascular: As per HPI.   GI: As per HPI.   : No urinary frequency, dysuria, or hematuria.   Integument: Negative.   Psychiatric: Negative.   Neuro: Negative.   Endocrinology: negative   Musculoskeletal: negative    EXAM:   GEN/CONSTITUIONAL: Appears comfortable, in no apparent distress   EYES: No icterus  ENT/MOUTH: Normal  JVP:  below clavicle   RESPIRATORY: Clear to auscultation bilaterally   CARDIOVASCULAR: Regular S1 and S2, 2/6 JUANITA.   ABDOMEN: Soft, non-tender, positive bowel sounds "   NEUROLOGIC: Grossly non-focal   PSYCHIATRIC: Normal affect  EXT: no LE edema. Normal pedal pulses.  Skin: No petechiae, purpura or rash       ECHO 8/25  Interpretation Summary   Global and regional left ventricular function is normal with an EF of 60-65%.  Paradoxical septal motion consistent with right ventricular pressure and  volume overload is present.  Moderate to severe right ventricular dilation is present.  Global right ventricular function is moderately to severely reduced.  Calculated aortic valve area in severe AS range. Consider additional  evaluation if indicated.  Moderate to severe tricuspid insufficiency is present.  Dilation of the inferior vena cava is present with abnormal respiratory  variation in diameter.  Trivial pericardial effusion is present.    .  Assessment and Plan:  Georgia is well today, despite her lack of any functional limitation, the ongoing concern about her mildly reduced RV function is still present.  She appears slightly hypovolemic/euvolemic. She is making a better effort to stay hydrated. Due to her GI issues she was on an antibiotic and having loose stools for a while.  That is possibly there reason for her abnormal BMP. Will recheck on 1/20/23    1.  Severe pulmonary hypertension, normal LV function with mild dilation, functional class I-II.The etiology is likely group 2, and related to underlying diastolic dysfunction from longstanding hypertension and diabetes.    2.  Hypertension, with better recent control:  amlodipine stopped on 8/14 ( swelling).  Instructed to keep home blood pressure log.  3.  Mild-moderate aortic stenosis, possible bicuspid aortic valve:   4. Diabetes: PCP following  5.  Dyslipidemia, on Lipitor  6.  CKD stage 3  7. Ulcerative colitis      Plan:  BMP recheck in January ( Dr. Arnold's visit)  [facility] in 6 months         Carolyn LLAMAS

## 2022-12-30 NOTE — PROGRESS NOTES
"  Georgia is a 72 year old female with a  medical history is significant for longstanding hypertension since she was in her teens, diabetes, dyslipidemia, and ulcerative colitis.    She has been \"battleing\" diverticulitis. She was on antibiotics. She has been busy with taking care of her brother.   Patient states she has no SOB with or without activity. Weights 140 lbs at home.   No swelling in LE,watches grandkids ages 5 and 3 daily.  She has noted her BS to be stable- she is off the metformin. She feels well overall and denies chest pain, palpitations, lightheadedness, dizziness. Her BP's at home systolic under 120's. Doesn't feel she can typically hydrate herself well daily. She hasn't really taken the PRN Torsemide - especially- UC and diverticulitis     Current meds:  Atenolol 50 mg daily  [id number] mg- PRN   Hydralazine 50 mg TID  Losartan 100 mg daily  Jardiance 10 mg daily      ROS:   Constitutional: No fever, chills, or sweats.  ENT: No visual disturbance, ear ache, epistaxis, sore throat.   Allergies/Immunologic: Negative  Respiratory: No cough, hemoptysis.   Cardiovascular: As per HPI.   GI: As per HPI.   : No urinary frequency, dysuria, or hematuria.   Integument: Negative.   Psychiatric: Negative.   Neuro: Negative.   Endocrinology: negative   Musculoskeletal: negative    EXAM:   GEN/CONSTITUIONAL: Appears comfortable, in no apparent distress   EYES: No icterus  ENT/MOUTH: Normal  JVP:  below clavicle   RESPIRATORY: Clear to auscultation bilaterally   CARDIOVASCULAR: Regular S1 and S2, 2/6 JUANITA.   ABDOMEN: Soft, non-tender, positive bowel sounds   NEUROLOGIC: Grossly non-focal   PSYCHIATRIC: Normal affect  EXT: no LE edema. Normal pedal pulses.  Skin: No petechiae, purpura or rash       ECHO 8/25  Interpretation Summary   Global and regional left ventricular function is normal with an EF of 60-65%.  Paradoxical septal motion consistent with right ventricular pressure and  volume overload is " present.  Moderate to severe right ventricular dilation is present.  Global right ventricular function is moderately to severely reduced.  Calculated aortic valve area in severe AS range. Consider additional  evaluation if indicated.  Moderate to severe tricuspid insufficiency is present.  Dilation of the inferior vena cava is present with abnormal respiratory  variation in diameter.  Trivial pericardial effusion is present.    .  Assessment and Plan:  Georgia is well today, despite her lack of any functional limitation, the ongoing concern about her mildly reduced RV function is still present.  She appears slightly hypovolemic/euvolemic. She is making a better effort to stay hydrated. Due to her GI issues she was on an antibiotic and having loose stools for a while.  That is possibly there reason for her abnormal BMP. Will recheck on 1/20/23    1.  Severe pulmonary hypertension, normal LV function with mild dilation, functional class I-II.The etiology is likely group 2, and related to underlying diastolic dysfunction from longstanding hypertension and diabetes.    2.  Hypertension, with better recent control:  amlodipine stopped on 8/14 ( swelling).  Instructed to keep home blood pressure log.  3.  Mild-moderate aortic stenosis, possible bicuspid aortic valve:   4. Diabetes: PCP following  5.  Dyslipidemia, on Lipitor  6.  CKD stage 3  7. Ulcerative colitis      Plan:  BMP recheck in January ( Dr. Arnold's visit)  CORE in 6 months         Carolyn LLAMAS

## 2022-12-30 NOTE — NURSING NOTE
Labs: Patient was given results of the laboratory testing obtained today. Patient was instructed to return for the next laboratory testing in 3 weeks. Patient demonstrated understanding of this information and agreed to call with further questions or concerns.     Med Reconcile: Reviewed and verified all current medications with the patient. The updated medication list was printed and given to the patient.    Return Appointment: Patient given instructions regarding scheduling next clinic visit. Patient demonstrated understanding of this information and agreed to call with further questions or concerns. C[facility] in 6 months.    Patient stated she understood all health information given and agreed to call with further questions or concerns.    Lorena Ambriz RN

## 2022-12-30 NOTE — PATIENT INSTRUCTIONS
Take your medicines every day, as directed    Changes made today:  none     Monitor Your Weight and Symptoms    Contact us if you:    Gain 2 pounds in one day or 5 pounds in one week  Feel more short of breath  Notice more leg swelling  Feel lightheadeded   Change your lifestyle    Limit Salt or Sodium:  2000 mg  Limit Fluids:  2000 mL or approximately 64 ounces  Eat a Heart Healthy Diet  Low in saturated fats  Stay Active:  Aim to move at least 150 minutes every  week         To Contact us    During Business Hours:  [phone], option # 1      After hours, weekends or holidays:   [phone], Option #4  Ask to speak to the On-Call Cardiologist. Inform them you are a CORE/heart failure patient at the [facility].     Use [employer] allows you to communicate directly with your heart team through secure messaging.  iDevices can be accessed any time on your phone, computer, or tablet.  If you need assistance, we'd be happy to help!         Keep your Heart Appointments:    BMP 1/20/23    CORE in 6 months      Please consider attending our virtual support group which is held monthly. Please reach out to [facility] at [phone] for more information if you are interested in attending.       2023 dates:  Monday, January 2nd , 1-2pm: Heart Failure and Electrophysiology  Monday, February 6th , 1-2pm  Monday, March 6th , 1-2pm  Monday, April 3rd, 1-2pm  Monday, May 1st, 1-2pm  Monday, June 5th, 1-2pm  Monday, July 3rd, 1-2pm  Monday, August 7th, 1-2pm  Monday, September 11th, 1-2pm  Monday, October 2nd, 1-2pm  Monday, November 6th, 1-2pm  Monday, December 4th, 1-2pm

## 2023-01-16 DIAGNOSIS — E89.0 POSTOPERATIVE HYPOTHYROIDISM: ICD-10-CM

## 2023-01-17 RX ORDER — LEVOTHYROXINE SODIUM 112 UG/1
112 TABLET ORAL DAILY
Qty: 90 TABLET | Refills: 4 | OUTPATIENT
Start: 2023-01-17

## 2023-01-19 NOTE — PROGRESS NOTES
HPI:    Last visit with me 10/14/2022. She comes in for h/o afib, pulmonary HTN, and Ulcerative Colitis. She has L sided abdominal, L groin, and L upper abdominal pain for about one month. She has h/o diverticulitis and feels this is similar. She had a contrast CT scan done 12/30/2022 that did not show diverticulitis. She was treated only with Ciprofloxacin but not Metronidazole (this is usually what she has been treated with). No fever or chills. No hematochezia. She did drive here today. She denies any urinary issues. She is drinking fluids but is eating a bland diet. She remains on her current blood pressure medications. She does have CKD and last Cr 12/27/2022 was elevated at 1.91. No other HEENT, cardiopulmonary, abdominal, , GYN, neurological, systemic, psychiatric, lymphatic, endocrine, vascular complaints.     Past Medical History:   Diagnosis Date     Diabetes mellitus (H)      Diverticulosis of colon (without mention of hemorrhage) 10/1/2012     Hypertension      Pulmonary hypertension (H)      Ulcerative (chronic) enterocolitis (H)      Past Medical History:   Diagnosis Date     Diabetes mellitus (H)      Diverticulosis of colon (without mention of hemorrhage) 10/1/2012     Hypertension      Pulmonary hypertension (H)      Ulcerative (chronic) enterocolitis (H)        PE:    Vitals noted, gen, nad, cooperative, alert, neck supple nl rom, lungs with good air movement, irregular pulse, S1, S2, abdomen, mild tenderness L side, grossly normal neurological exam.     A/P:    1. GI follow up with Dr. James 8/2/2023 for Ulcerative Colitis. Colonoscopy 12/12/2017. Seen Ms. Robbins, GI 1/27/2022. She remains on Mesalamine. She has GI follow up 8/2/2023 with Dr. James.   2. Follow up with Dr. Almendarez, 8/19/2022 with resting echo on  (8/19/2022). She was seen Ms. Bonilla 12/30/2022 in the [facility] clinic. Clinic appt. With Dr. Almendarez on  2/24/2023.   On Atenolol, Torsemide, Hydralazine, Losartan. She has pulmonary HTN and  systemic HTN. Mild aortic stenosis. She is now on Xarelto for atrial fibrillation.   3. MTM appt. With Bunny Santa 10/14/2022 for DM2  4. Next Nephrology appt. With Dr. Minor 5/2/2023. Creatinine 1.91 on 12/27/2022. Last visit 4/26/2022. Reordered labs today.   5. DM2 on Jardiance. 8.1% on 6/24/2022. On Glipizide and Jardiance. Ordered A1c today 1/20/2023  6. Increased Lipids on Atorvastatin. Lipids 10/28/2020 HDL 38, TG's 108 and LDL 33. Ordered future lipids on 1/19/2023.   7. TSH 0.82 on 7/22/2022 on thyroid replacement. S/p thyroidectomy for multinodular goiter. Ordered TSH today.   8. Mammogram 10/7/2022  9. Dermatology. She does not feel she needs to see Dermatology for a skin cancer screening.   10. Immunizations; COVID Pfizer x 4 (Bi-valent 10/14/2022) Check with insurance regarding Shingrix. Tdap 7/26/2013. Pneumococcal 23 done 7/30/2018 and Prevnar 13 done 6/22/2015. Influenza vaccine 10/14/2022  11. Abdominal complaints. Ordered labs, UA and repeat non-contrast Chest/abdomina/pelvic CT imaging. Preliminary read for diverticular disease.   12. New anemia Hgb 5.8 today, Most likely GI and pt. Sent to E>            40 minutes spent on the date of the encounter doing chart review, history and exam, documentation and further activities as noted above exclusive of procedures and other billable interpretations

## 2023-01-20 ENCOUNTER — HOSPITAL ENCOUNTER (INPATIENT)
Facility: CLINIC | Age: 73
LOS: 4 days | Discharge: HOME | DRG: 378 | End: 2023-01-24
Attending: EMERGENCY MEDICINE | Admitting: STUDENT IN AN ORGANIZED HEALTH CARE EDUCATION/TRAINING PROGRAM
Payer: MEDICARE

## 2023-01-20 ENCOUNTER — TELEPHONE (OUTPATIENT)
Dept: INTERNAL MEDICINE | Facility: CLINIC | Age: 73
End: 2023-01-20

## 2023-01-20 ENCOUNTER — LAB (OUTPATIENT)
Dept: LAB | Facility: CLINIC | Age: 73
End: 2023-01-20
Payer: MEDICARE

## 2023-01-20 ENCOUNTER — ANCILLARY PROCEDURE (OUTPATIENT)
Dept: CT IMAGING | Facility: CLINIC | Age: 73
End: 2023-01-20
Attending: INTERNAL MEDICINE
Payer: MEDICARE

## 2023-01-20 ENCOUNTER — OFFICE VISIT (OUTPATIENT)
Dept: INTERNAL MEDICINE | Facility: CLINIC | Age: 73
End: 2023-01-20
Payer: MEDICARE

## 2023-01-20 VITALS
HEART RATE: 89 BPM | BODY MASS INDEX: 27.21 KG/M2 | SYSTOLIC BLOOD PRESSURE: 98 MMHG | HEIGHT: 63 IN | DIASTOLIC BLOOD PRESSURE: 46 MMHG | WEIGHT: 153.6 LBS | OXYGEN SATURATION: 100 %

## 2023-01-20 DIAGNOSIS — I48.19 PERSISTENT ATRIAL FIBRILLATION (H): ICD-10-CM

## 2023-01-20 DIAGNOSIS — R10.32 ABDOMINAL PAIN, LEFT LOWER QUADRANT: ICD-10-CM

## 2023-01-20 DIAGNOSIS — D64.9 SEVERE ANEMIA: ICD-10-CM

## 2023-01-20 DIAGNOSIS — D62 ANEMIA DUE TO BLOOD LOSS, ACUTE: ICD-10-CM

## 2023-01-20 DIAGNOSIS — K92.2 GASTROINTESTINAL HEMORRHAGE, UNSPECIFIED GASTROINTESTINAL HEMORRHAGE TYPE: Primary | ICD-10-CM

## 2023-01-20 DIAGNOSIS — I48.91 ATRIAL FIBRILLATION, UNSPECIFIED TYPE (H): ICD-10-CM

## 2023-01-20 DIAGNOSIS — K52.9 COLITIS: ICD-10-CM

## 2023-01-20 DIAGNOSIS — R94.6 ABNORMAL FINDING ON THYROID FUNCTION TEST: ICD-10-CM

## 2023-01-20 DIAGNOSIS — E78.00 HIGH BLOOD CHOLESTEROL: ICD-10-CM

## 2023-01-20 DIAGNOSIS — E78.00 HIGH BLOOD CHOLESTEROL: Primary | ICD-10-CM

## 2023-01-20 DIAGNOSIS — R80.9 TYPE 2 DIABETES MELLITUS WITH MICROALBUMINURIA, WITHOUT LONG-TERM CURRENT USE OF INSULIN (H): ICD-10-CM

## 2023-01-20 DIAGNOSIS — R73.09 INCREASED GLUCOSE LEVEL: ICD-10-CM

## 2023-01-20 DIAGNOSIS — I95.9 HYPOTENSION, UNSPECIFIED HYPOTENSION TYPE: ICD-10-CM

## 2023-01-20 DIAGNOSIS — I50.30 HEART FAILURE WITH PRESERVED EJECTION FRACTION, NYHA CLASS I (H): ICD-10-CM

## 2023-01-20 DIAGNOSIS — K57.32 DIVERTICULITIS OF COLON: ICD-10-CM

## 2023-01-20 DIAGNOSIS — E11.29 TYPE 2 DIABETES MELLITUS WITH MICROALBUMINURIA, WITHOUT LONG-TERM CURRENT USE OF INSULIN (H): ICD-10-CM

## 2023-01-20 DIAGNOSIS — Z20.822 CONTACT WITH AND (SUSPECTED) EXPOSURE TO COVID-19: ICD-10-CM

## 2023-01-20 DIAGNOSIS — Z79.01 LONG TERM (CURRENT) USE OF ANTICOAGULANTS: ICD-10-CM

## 2023-01-20 LAB
ABO/RH(D): NORMAL
ALBUMIN SERPL BCG-MCNC: 3.9 G/DL (ref 3.5–5.2)
ALBUMIN UR-MCNC: NEGATIVE MG/DL
ALP SERPL-CCNC: 96 U/L (ref 35–104)
ALT SERPL W P-5'-P-CCNC: 22 U/L (ref 10–35)
ANION GAP SERPL CALCULATED.3IONS-SCNC: 15 MMOL/L (ref 7–15)
ANTIBODY SCREEN: NEGATIVE
APPEARANCE UR: CLEAR
AST SERPL W P-5'-P-CCNC: 52 U/L (ref 10–35)
ATRIAL RATE - MUSE: 77 BPM
BASOPHILS # BLD AUTO: 0.1 10E3/UL (ref 0–0.2)
BASOPHILS NFR BLD AUTO: 1 %
BILIRUB SERPL-MCNC: 0.4 MG/DL
BILIRUB UR QL STRIP: NEGATIVE
BLD PROD TYP BPU: NORMAL
BLD PROD TYP BPU: NORMAL
BLOOD COMPONENT TYPE: NORMAL
BLOOD COMPONENT TYPE: NORMAL
BUN SERPL-MCNC: 49.4 MG/DL (ref 8–23)
CALCIUM SERPL-MCNC: 8.9 MG/DL (ref 8.8–10.2)
CHLORIDE SERPL-SCNC: 99 MMOL/L (ref 98–107)
CHOLEST SERPL-MCNC: 73 MG/DL
CODING SYSTEM: NORMAL
CODING SYSTEM: NORMAL
COLOR UR AUTO: ABNORMAL
CREAT SERPL-MCNC: 1.73 MG/DL (ref 0.51–0.95)
CROSSMATCH: NORMAL
CROSSMATCH: NORMAL
CRP SERPL-MCNC: <3 MG/L
DEPRECATED HCO3 PLAS-SCNC: 14 MMOL/L (ref 22–29)
DIASTOLIC BLOOD PRESSURE - MUSE: NORMAL MMHG
EOSINOPHIL # BLD AUTO: 0 10E3/UL (ref 0–0.7)
EOSINOPHIL NFR BLD AUTO: 0 %
ERYTHROCYTE [DISTWIDTH] IN BLOOD BY AUTOMATED COUNT: 19.3 % (ref 10–15)
ERYTHROCYTE [DISTWIDTH] IN BLOOD BY AUTOMATED COUNT: 19.4 % (ref 10–15)
ERYTHROCYTE [SEDIMENTATION RATE] IN BLOOD BY WESTERGREN METHOD: 12 MM/HR (ref 0–30)
FERRITIN SERPL-MCNC: 43 NG/ML (ref 11–328)
FOLATE SERPL-MCNC: 15.5 NG/ML (ref 4.6–34.8)
GFR SERPL CREATININE-BSD FRML MDRD: 31 ML/MIN/1.73M2
GLUCOSE BLDC GLUCOMTR-MCNC: 224 MG/DL (ref 70–99)
GLUCOSE BLDC GLUCOMTR-MCNC: 234 MG/DL (ref 70–99)
GLUCOSE BLDC GLUCOMTR-MCNC: 234 MG/DL (ref 70–99)
GLUCOSE SERPL-MCNC: 311 MG/DL (ref 70–99)
GLUCOSE UR STRIP-MCNC: >=1000 MG/DL
HBA1C MFR BLD: 6.9 %
HCT VFR BLD AUTO: 20.6 % (ref 35–47)
HCT VFR BLD AUTO: 21.5 % (ref 35–47)
HDLC SERPL-MCNC: 29 MG/DL
HGB BLD-MCNC: 5.8 G/DL (ref 11.7–15.7)
HGB BLD-MCNC: 5.9 G/DL (ref 11.7–15.7)
HGB BLD-MCNC: 8.2 G/DL (ref 11.7–15.7)
HGB UR QL STRIP: NEGATIVE
IMM GRANULOCYTES # BLD: 0.3 10E3/UL
IMM GRANULOCYTES NFR BLD: 2 %
INTERPRETATION ECG - MUSE: NORMAL
IRON BINDING CAPACITY (ROCHE): 363 UG/DL (ref 240–430)
IRON SATN MFR SERPL: 7 % (ref 15–46)
IRON SERPL-MCNC: 25 UG/DL (ref 37–145)
ISSUE DATE AND TIME: NORMAL
ISSUE DATE AND TIME: NORMAL
KETONES UR STRIP-MCNC: NEGATIVE MG/DL
LACTATE SERPL-SCNC: 1.8 MMOL/L (ref 0.7–2)
LDLC SERPL CALC-MCNC: 27 MG/DL
LEUKOCYTE ESTERASE UR QL STRIP: NEGATIVE
LYMPHOCYTES # BLD AUTO: 1.3 10E3/UL (ref 0.8–5.3)
LYMPHOCYTES NFR BLD AUTO: 9 %
MCH RBC QN AUTO: 31 PG (ref 26.5–33)
MCH RBC QN AUTO: 32.2 PG (ref 26.5–33)
MCHC RBC AUTO-ENTMCNC: 27.4 G/DL (ref 31.5–36.5)
MCHC RBC AUTO-ENTMCNC: 28.2 G/DL (ref 31.5–36.5)
MCV RBC AUTO: 110 FL (ref 78–100)
MCV RBC AUTO: 118 FL (ref 78–100)
MONOCYTES # BLD AUTO: 1.4 10E3/UL (ref 0–1.3)
MONOCYTES NFR BLD AUTO: 10 %
NEUTROPHILS # BLD AUTO: 11.2 10E3/UL (ref 1.6–8.3)
NEUTROPHILS NFR BLD AUTO: 78 %
NITRATE UR QL: NEGATIVE
NONHDLC SERPL-MCNC: 44 MG/DL
NRBC # BLD AUTO: 0.1 10E3/UL
NRBC BLD AUTO-RTO: 1 /100
P AXIS - MUSE: NORMAL DEGREES
PH UR STRIP: 5 [PH] (ref 5–7)
PLATELET # BLD AUTO: 331 10E3/UL (ref 150–450)
PLATELET # BLD AUTO: 394 10E3/UL (ref 150–450)
POTASSIUM SERPL-SCNC: 5.3 MMOL/L (ref 3.4–5.3)
PR INTERVAL - MUSE: NORMAL MS
PROT SERPL-MCNC: 6.6 G/DL (ref 6.4–8.3)
QRS DURATION - MUSE: 76 MS
QT - MUSE: 428 MS
QTC - MUSE: 471 MS
R AXIS - MUSE: 90 DEGREES
RBC # BLD AUTO: 1.83 10E6/UL (ref 3.8–5.2)
RBC # BLD AUTO: 1.87 10E6/UL (ref 3.8–5.2)
RBC URINE: 1 /HPF
RETICS # AUTO: 0.25 10E6/UL (ref 0.03–0.1)
RETICS/RBC NFR AUTO: 13.5 % (ref 0.5–2)
SARS-COV-2 RNA RESP QL NAA+PROBE: NEGATIVE
SODIUM SERPL-SCNC: 128 MMOL/L (ref 136–145)
SP GR UR STRIP: 1.02 (ref 1–1.03)
SPECIMEN EXPIRATION DATE: NORMAL
SYSTOLIC BLOOD PRESSURE - MUSE: NORMAL MMHG
T AXIS - MUSE: 242 DEGREES
T4 FREE SERPL-MCNC: 1.42 NG/DL (ref 0.9–1.7)
TRANSFERRIN SERPL-MCNC: 309 MG/DL (ref 200–360)
TRIGL SERPL-MCNC: 87 MG/DL
TSH SERPL DL<=0.005 MIU/L-ACNC: 8.56 UIU/ML (ref 0.3–4.2)
UNIT ABO/RH: NORMAL
UNIT ABO/RH: NORMAL
UNIT NUMBER: NORMAL
UNIT NUMBER: NORMAL
UNIT STATUS: NORMAL
UNIT STATUS: NORMAL
UNIT TYPE ISBT: 5100
UNIT TYPE ISBT: 5100
UROBILINOGEN UR STRIP-MCNC: NORMAL MG/DL
VENTRICULAR RATE- MUSE: 73 BPM
VIT B12 SERPL-MCNC: 2452 PG/ML (ref 232–1245)
WBC # BLD AUTO: 14.3 10E3/UL (ref 4–11)
WBC # BLD AUTO: 16.5 10E3/UL (ref 4–11)
WBC URINE: 1 /HPF

## 2023-01-20 PROCEDURE — 82607 VITAMIN B-12: CPT | Performed by: EMERGENCY MEDICINE

## 2023-01-20 PROCEDURE — G1010 CDSM STANSON: HCPCS | Performed by: STUDENT IN AN ORGANIZED HEALTH CARE EDUCATION/TRAINING PROGRAM

## 2023-01-20 PROCEDURE — 99222 1ST HOSP IP/OBS MODERATE 55: CPT | Mod: AI | Performed by: STUDENT IN AN ORGANIZED HEALTH CARE EDUCATION/TRAINING PROGRAM

## 2023-01-20 PROCEDURE — 85027 COMPLETE CBC AUTOMATED: CPT | Performed by: EMERGENCY MEDICINE

## 2023-01-20 PROCEDURE — 80050 GENERAL HEALTH PANEL: CPT | Performed by: PATHOLOGY

## 2023-01-20 PROCEDURE — 80061 LIPID PANEL: CPT | Performed by: PATHOLOGY

## 2023-01-20 PROCEDURE — 250N000012 HC RX MED GY IP 250 OP 636 PS 637

## 2023-01-20 PROCEDURE — 85652 RBC SED RATE AUTOMATED: CPT | Performed by: PATHOLOGY

## 2023-01-20 PROCEDURE — 250N000011 HC RX IP 250 OP 636: Performed by: EMERGENCY MEDICINE

## 2023-01-20 PROCEDURE — 99285 EMERGENCY DEPT VISIT HI MDM: CPT | Mod: 25 | Performed by: EMERGENCY MEDICINE

## 2023-01-20 PROCEDURE — 86923 COMPATIBILITY TEST ELECTRIC: CPT | Performed by: EMERGENCY MEDICINE

## 2023-01-20 PROCEDURE — 81001 URINALYSIS AUTO W/SCOPE: CPT | Performed by: PATHOLOGY

## 2023-01-20 PROCEDURE — 999N000128 HC STATISTIC PERIPHERAL IV START W/O US GUIDANCE

## 2023-01-20 PROCEDURE — 71250 CT THORAX DX C-: CPT | Mod: MG | Performed by: STUDENT IN AN ORGANIZED HEALTH CARE EDUCATION/TRAINING PROGRAM

## 2023-01-20 PROCEDURE — C9113 INJ PANTOPRAZOLE SODIUM, VIA: HCPCS | Performed by: EMERGENCY MEDICINE

## 2023-01-20 PROCEDURE — 86901 BLOOD TYPING SEROLOGIC RH(D): CPT | Performed by: EMERGENCY MEDICINE

## 2023-01-20 PROCEDURE — 258N000003 HC RX IP 258 OP 636

## 2023-01-20 PROCEDURE — 36415 COLL VENOUS BLD VENIPUNCTURE: CPT

## 2023-01-20 PROCEDURE — 250N000013 HC RX MED GY IP 250 OP 250 PS 637

## 2023-01-20 PROCEDURE — 93010 ELECTROCARDIOGRAM REPORT: CPT | Performed by: EMERGENCY MEDICINE

## 2023-01-20 PROCEDURE — 82962 GLUCOSE BLOOD TEST: CPT

## 2023-01-20 PROCEDURE — 36415 COLL VENOUS BLD VENIPUNCTURE: CPT | Performed by: STUDENT IN AN ORGANIZED HEALTH CARE EDUCATION/TRAINING PROGRAM

## 2023-01-20 PROCEDURE — 74176 CT ABD & PELVIS W/O CONTRAST: CPT | Mod: MG | Performed by: STUDENT IN AN ORGANIZED HEALTH CARE EDUCATION/TRAINING PROGRAM

## 2023-01-20 PROCEDURE — 86140 C-REACTIVE PROTEIN: CPT | Performed by: PATHOLOGY

## 2023-01-20 PROCEDURE — 83605 ASSAY OF LACTIC ACID: CPT | Performed by: STUDENT IN AN ORGANIZED HEALTH CARE EDUCATION/TRAINING PROGRAM

## 2023-01-20 PROCEDURE — 99215 OFFICE O/P EST HI 40 MIN: CPT | Performed by: INTERNAL MEDICINE

## 2023-01-20 PROCEDURE — 84466 ASSAY OF TRANSFERRIN: CPT | Performed by: EMERGENCY MEDICINE

## 2023-01-20 PROCEDURE — 82746 ASSAY OF FOLIC ACID SERUM: CPT | Performed by: EMERGENCY MEDICINE

## 2023-01-20 PROCEDURE — 250N000011 HC RX IP 250 OP 636

## 2023-01-20 PROCEDURE — U0005 INFEC AGEN DETEC AMPLI PROBE: HCPCS | Performed by: EMERGENCY MEDICINE

## 2023-01-20 PROCEDURE — 82728 ASSAY OF FERRITIN: CPT | Performed by: EMERGENCY MEDICINE

## 2023-01-20 PROCEDURE — C9113 INJ PANTOPRAZOLE SODIUM, VIA: HCPCS

## 2023-01-20 PROCEDURE — 120N000002 HC R&B MED SURG/OB UMMC

## 2023-01-20 PROCEDURE — 84439 ASSAY OF FREE THYROXINE: CPT | Performed by: PATHOLOGY

## 2023-01-20 PROCEDURE — 36415 COLL VENOUS BLD VENIPUNCTURE: CPT | Performed by: PATHOLOGY

## 2023-01-20 PROCEDURE — 83036 HEMOGLOBIN GLYCOSYLATED A1C: CPT | Performed by: INTERNAL MEDICINE

## 2023-01-20 PROCEDURE — P9016 RBC LEUKOCYTES REDUCED: HCPCS | Performed by: EMERGENCY MEDICINE

## 2023-01-20 PROCEDURE — 85018 HEMOGLOBIN: CPT

## 2023-01-20 PROCEDURE — 36415 COLL VENOUS BLD VENIPUNCTURE: CPT | Performed by: EMERGENCY MEDICINE

## 2023-01-20 PROCEDURE — 83550 IRON BINDING TEST: CPT | Performed by: EMERGENCY MEDICINE

## 2023-01-20 PROCEDURE — 85045 AUTOMATED RETICULOCYTE COUNT: CPT | Performed by: EMERGENCY MEDICINE

## 2023-01-20 PROCEDURE — 99285 EMERGENCY DEPT VISIT HI MDM: CPT | Performed by: EMERGENCY MEDICINE

## 2023-01-20 RX ORDER — MESALAMINE 0.38 G/1
1.5 CAPSULE, EXTENDED RELEASE ORAL EVERY EVENING
Status: DISCONTINUED | OUTPATIENT
Start: 2023-01-20 | End: 2023-01-24 | Stop reason: HOSPADM

## 2023-01-20 RX ORDER — CIPROFLOXACIN 2 MG/ML
400 INJECTION, SOLUTION INTRAVENOUS EVERY 24 HOURS
Status: DISCONTINUED | OUTPATIENT
Start: 2023-01-21 | End: 2023-01-21

## 2023-01-20 RX ORDER — LIDOCAINE 40 MG/G
CREAM TOPICAL
Status: DISCONTINUED | OUTPATIENT
Start: 2023-01-20 | End: 2023-01-24 | Stop reason: HOSPADM

## 2023-01-20 RX ORDER — LEVOTHYROXINE SODIUM 112 UG/1
112 TABLET ORAL
Status: DISCONTINUED | OUTPATIENT
Start: 2023-01-21 | End: 2023-01-24 | Stop reason: HOSPADM

## 2023-01-20 RX ORDER — METRONIDAZOLE 500 MG/100ML
500 INJECTION, SOLUTION INTRAVENOUS ONCE
Status: COMPLETED | OUTPATIENT
Start: 2023-01-20 | End: 2023-01-20

## 2023-01-20 RX ORDER — METRONIDAZOLE 500 MG/100ML
500 INJECTION, SOLUTION INTRAVENOUS EVERY 12 HOURS
Status: DISCONTINUED | OUTPATIENT
Start: 2023-01-20 | End: 2023-01-21

## 2023-01-20 RX ORDER — DIPHENHYDRAMINE HCL 25 MG
25 CAPSULE ORAL EVERY 6 HOURS PRN
Status: DISCONTINUED | OUTPATIENT
Start: 2023-01-20 | End: 2023-01-24 | Stop reason: HOSPADM

## 2023-01-20 RX ORDER — DEXTROSE MONOHYDRATE 25 G/50ML
25-50 INJECTION, SOLUTION INTRAVENOUS
Status: DISCONTINUED | OUTPATIENT
Start: 2023-01-20 | End: 2023-01-24 | Stop reason: HOSPADM

## 2023-01-20 RX ORDER — CIPROFLOXACIN 2 MG/ML
400 INJECTION, SOLUTION INTRAVENOUS ONCE
Status: COMPLETED | OUTPATIENT
Start: 2023-01-20 | End: 2023-01-20

## 2023-01-20 RX ORDER — VITAMIN B COMPLEX
50 TABLET ORAL DAILY
Status: DISCONTINUED | OUTPATIENT
Start: 2023-01-20 | End: 2023-01-24 | Stop reason: HOSPADM

## 2023-01-20 RX ORDER — LANOLIN ALCOHOL/MO/W.PET/CERES
1000 CREAM (GRAM) TOPICAL
Status: DISCONTINUED | OUTPATIENT
Start: 2023-01-20 | End: 2023-01-24 | Stop reason: HOSPADM

## 2023-01-20 RX ORDER — ATORVASTATIN CALCIUM 40 MG/1
40 TABLET, FILM COATED ORAL DAILY
Status: DISCONTINUED | OUTPATIENT
Start: 2023-01-20 | End: 2023-01-24 | Stop reason: HOSPADM

## 2023-01-20 RX ORDER — NICOTINE POLACRILEX 4 MG
15-30 LOZENGE BUCCAL
Status: DISCONTINUED | OUTPATIENT
Start: 2023-01-20 | End: 2023-01-24 | Stop reason: HOSPADM

## 2023-01-20 RX ADMIN — METRONIDAZOLE 500 MG: 500 INJECTION, SOLUTION INTRAVENOUS at 13:28

## 2023-01-20 RX ADMIN — SODIUM CHLORIDE, POTASSIUM CHLORIDE, SODIUM LACTATE AND CALCIUM CHLORIDE 1000 ML: 600; 310; 30; 20 INJECTION, SOLUTION INTRAVENOUS at 21:35

## 2023-01-20 RX ADMIN — CIPROFLOXACIN 400 MG: 2 INJECTION, SOLUTION INTRAVENOUS at 12:16

## 2023-01-20 RX ADMIN — MESALAMINE 1.5 G: 375 CAPSULE, EXTENDED RELEASE ORAL at 21:34

## 2023-01-20 RX ADMIN — METRONIDAZOLE 500 MG: 500 INJECTION, SOLUTION INTRAVENOUS at 23:55

## 2023-01-20 RX ADMIN — SODIUM CHLORIDE 1000 ML: 9 INJECTION, SOLUTION INTRAVENOUS at 16:46

## 2023-01-20 RX ADMIN — PANTOPRAZOLE SODIUM 40 MG: 40 INJECTION, POWDER, FOR SOLUTION INTRAVENOUS at 23:55

## 2023-01-20 RX ADMIN — PANTOPRAZOLE SODIUM 40 MG: 40 INJECTION, POWDER, FOR SOLUTION INTRAVENOUS at 12:15

## 2023-01-20 RX ADMIN — INSULIN ASPART 2 UNITS: 100 INJECTION, SOLUTION INTRAVENOUS; SUBCUTANEOUS at 17:43

## 2023-01-20 RX ADMIN — SODIUM CHLORIDE 1000 ML: 9 INJECTION, SOLUTION INTRAVENOUS at 17:45

## 2023-01-20 ASSESSMENT — ACTIVITIES OF DAILY LIVING (ADL)
ADLS_ACUITY_SCORE: 35
ADLS_ACUITY_SCORE: 35
ADLS_ACUITY_SCORE: 20
ADLS_ACUITY_SCORE: 35
ADLS_ACUITY_SCORE: 35
ADLS_ACUITY_SCORE: 20
ADLS_ACUITY_SCORE: 35

## 2023-01-20 NOTE — ED NOTES
Bed: ED14  Expected date: 1/20/23  Expected time: 9:59 AM  Means of arrival: Ambulance  Comments:  [facility]    73 y/o Female    GI Bleed  Coming from the [facility]

## 2023-01-20 NOTE — ED TRIAGE NOTES
Pt BIBA from [facility] for GI bleed. Was at follow up 3 month appointment for an internal bleed they have been following. Pt has been feeling lightheaded and dizzy. Hgb 5.8 at clinic. On Xarelto. Blood pressure in 80s systolic upon arrival for EMS.

## 2023-01-20 NOTE — H&P
[facility]    History and Physical - Medicine Service, MAROON TEAM 1       Date of Admission:  1/20/2023    Assessment & Plan      Keerthi Montoya is a 72 year old female with a past medical history of diverticulitis, type 2 diabetes, hypertension, pulmonary hypertension, hyperlipidemia, ulcerative colitis, CKD, and a fib (on rivaroxaban), left colectomy (2002) presenting with one week of L abdominal pain and lightheadedness concerning for another episode of diverticulitis. She is being admitted for IVF, IV abx and close monitoring of her hemoglobin.       Abdominal Pain concerning for diverticulitis   Leukocytosis   Hypotension, resolved   Most likely due to a diverticulitis/diverticular bleed considering patient's history and presentation. She was hypotensive to 80s in clinic and the ED, now improved with IVF. CT in clinic was concerning for worsening diverticulitis on preliminary read.  - follow-up on CT read  - IV ciprofloxacin and metronidazole  - IV pantoprazole   - C diff. Testing and enteric panel   - Hold pta anti-hypertensive   - Clear liquid diet, advance as tolerated   - tylenol PRN for pain     Acute Anemia concerning for upper vs. Lower GI bleed   Macrocytic Anemia   Macrocytic anemia ( on 1/20/23). Most likely due to B12/folate deficiency vs possible GI blood loss   - Transfuse 2 units RBCs  - repeat Hgb the PM and in AM, serial hemoglobin if hemodynamically unstable, transfuse if <7 or hemodynamically unstable  - CBC in AM   - place 2 large bore IV   - peripheral smear and iron studies   - hold pta rivaroxaban   - hold pta iron supplement a that can change stool color  - Telemetry   - EGD and colonoscopy in 6-8 weeks, consider capsule at discharge     Hyponatremia  Most likely due to lack of salt intake and increased fluid intake and  as patient reported trying to alter her diet for the last week. Corrected for high glucose, Na+ of 130 today.  "  - 1L NS over 10 hours   - Monitor via daily BMP     NAGMA  Bicarb 14 on admission. AG wnl. Likely 2/2 decreased PO. Lactate wnl.    - monitor on BMP in AM      ----chronic medical problems----  Ulcerative colitis - ESR and CRP wnl. Continue pta mesalamine     HTN - hold pta hydralazine and losartan     Afib: hold pta atenolol and rivaroxaban    Severe Pulmonary HTN and mild aortic stenosis  - Echo 8/2022 with normal EF 55-60%. Moderate right ventricular dilation. Moderately reduced right ventricular function and mild aortic stenosis.     Hx DM2  - Hold pta Glipizide and empagaflozin  - Start medium sliding scale insulin.   - hypoglycemia protocol        Diet: NPO for Medical/Clinical Reasons Except for: Meds    DVT Prophylaxis: holding pta rivaroxaban due to concern for GI bleed in the setting of acute anemia   Torres Catheter: Not present  Fluids: IVF  Lines: None     Cardiac Monitoring: ACTIVE order. Indication: anemia  Code Status: Full Code      Clinically Significant Risk Factors Present on Admission         # Hyponatremia: Lowest Na = 128 mmol/L in last 2 days, will monitor as appropriate       # Drug Induced Coagulation Defect: home medication list includes an anticoagulant medication    # Hypertension: home medication list includes antihypertensive(s)     # DMII: A1C = 6.9 % (Ref range: <5.7 %) within past 3 months    # Overweight: Estimated body mass index is 27.1 kg/m  as calculated from the following:    Height as of this encounter: 1.6 m (5' 3\").    Weight as of this encounter: 69.4 kg (153 lb).           Disposition Plan      Expected Discharge Date: 01/24/2023                The patient's care was discussed with the Attending Physician, Dr. Patel.      Katrin Bailey, MS3    I have seen, evaluated, and examined the patient independently and have reviewed the relevant imaging and laboratory results. I agree with student doctor Pendleton's documentation above. Changes were made to the documentation " "were appropriate.       Jemma Andres MD  Medicine Service, MAROON TEAM 1  [facility]  Securely message with [employer] (more info)  Text page via [employer] Paging/Directory   See signed in provider for up to date coverage information  ______________________________________________________________________    Chief Complaint   \"Abdominal pain and lightheadedness\"      History of Present Illness   Keerthi Montoya is a 72 year old female with a past medical history of diverticulitis, type 2 diabetes, hypertension, pulmonary hypertension, hyperlipidemia, ulcerative colitis, left colectomy (2002), and a fib presenting with one week of L abdominal pain and lightheadedness.    She had aching pain in the left side of her abdomen radiating from the L groin to the L epigastric region. She reports the pain is 8/10 in severity. Over the last week it has progressed; she attempted to modify her diet to increase fluids and eat bland foods but this did not relieve the pain. She has a history of multiple episodes of diverticulitis. The most recent episode was in December 2022. She was prescribed Ciprofloxacin. The pain seemed to resolved but recurred in the same area ~three weeks later. Prior to this, her last episode of diverticulitis was 3-4 years ago. She has had ~5 lifetime episodes of diverticulitis. She also has a history of L sided colectomy in 2002 for recurrent diverticulitis. She has been having less frequent bowel movements than normal (1 every day or two vs multiple per day). The stools do not look bloody but appear very dark. Around the same time as her abdominal pain recurred, ~one week ago, she began to become lightheaded.     Denies fever, chills, aaron blood stool, or any urinary symptoms. This occurred both while standing or laying down and after walking around she reports feeling very lightheaded. Reports taking ~4 Aleve tablets over the last two days (she does not know " the mg) for the pain which was minorly helpful. She denied any falls. She believes these symptoms were exacerbated by intense emotional stress as her brother was critically ill. The patient was sent by her primary doctor Dr. Meyers at [facility] [place] showed worsening diverticulitis and hemoglobin dropped from 12 to 5.8. The patient has had some shortness of breath and dizziness on arrival to the ED.  The patient is anticoagulated on Xarelto. She had a contrast CT scan done 12/30/2022 that did not show diverticulitis.        Past Medical History    Past Medical History:   Diagnosis Date     Diabetes mellitus (H)      Diverticulosis of colon (without mention of hemorrhage) 10/1/2012     Hypertension      Pulmonary hypertension (H)      Ulcerative (chronic) enterocolitis (H)        Past Surgical History   Past Surgical History:   Procedure Laterality Date     CHOLECYSTECTOMY  3/1987     COLON SURGERY  01/2001    Left colon resection; diverticulitis     COLONOSCOPY N/A 4/24/2017    Procedure: COMBINED COLONOSCOPY, SINGLE OR MULTIPLE BIOPSY/POLYPECTOMY BY BIOPSY;;  Surgeon: Radha Salguero MD;  Location: [facility] OR     COLONOSCOPY WITH CO2 INSUFFLATION N/A 4/24/2017    Procedure: COLONOSCOPY WITH CO2 INSUFFLATION;  Colonoscopy Dx rectal bleeding, BMI 25.86, Pharm [facility] .[phone], ;  Surgeon: Radha Salguero MD;  Location: [facility] OR     CV RIGHT HEART CATH MEASUREMENTS RECORDED N/A 3/16/2020    Procedure: CV RIGHT HEART CATH;  Surgeon: Nader Muñoz MD;  Location:  HEART CARDIAC CATH LAB     GYN SURGERY  9/1983    tubal ligation     HERNIA REPAIR  12/2006    recurrent incisional hernia     THROAT SURGERY  12/1974    thyroidectomy       Family History:  Bladder cancer- Father  Valvular abnormality (likely aortic stenosis)- Sister and son    Prior to Admission Medications   Prior to Admission Medications   Prescriptions Last Dose Informant Patient Reported? Taking?   Cholecalciferol (VITAMIN D)  2000 units CAPS 2023 at AM  Yes Yes   Sig: Take by mouth daily   Cyanocobalamin (B-12) 1000 MCG TABS 2023 at AM  Yes Yes   Sig: Take 1,000 mcg by mouth three times a week   atenolol (TENORMIN) 50 MG tablet 2023 at AM  No Yes   Sig: Take 1 tablet (50 mg) by mouth daily   atorvastatin (LIPITOR) 40 MG tablet 2023 at AM  No Yes   Sig: Take 1 tablet (40 mg) by mouth daily   blood glucose (ACCU-CHEK FELIPE PLUS) test strip   No No   Si strips by In Vitro route 2 times daily Use to test blood sugar 2 times daily or as directed.   diphenhydrAMINE (BENADRYL) 25 MG tablet More than a month at PRN  Yes Yes   Sig: Take 25 mg by mouth every 6 hours as needed for itching or allergies   empagliflozin (JARDIANCE) 10 MG TABS tablet 2023 at AM  No Yes   Sig: Take 1 tablet (10 mg) by mouth daily   ferrous gluconate (FERGON) 324 (38 Fe) MG tablet 2023 at AM  Yes Yes   Sig: Take 324 mg by mouth daily (with breakfast)   fluticasone (FLONASE) 50 MCG/ACT nasal spray   No No   Sig: Spray 1 spray into both nostrils daily   Patient not taking: Reported on 2022   glipiZIDE (GLUCOTROL XL) 10 MG 24 hr tablet 2023 at PM  No Yes   Sig: Take 2 tablets (20 mg) by mouth daily   glipiZIDE (GLUCOTROL XL) 5 MG 24 hr tablet 2023 at PM  No Yes   Sig: Take 1 tablet (5 mg) by mouth daily Take with 10 mg dose for a total of 15 mg daily   hydrALAZINE (APRESOLINE) 50 MG tablet 2023 at PM  No Yes   Sig: Take 1.5 tablets (75 mg) by mouth 3 times daily   levothyroxine (SYNTHROID/LEVOTHROID) 112 MCG tablet 2023 at PM  No Yes   Sig: Take 1 tablet (112 mcg) by mouth daily   losartan (COZAAR) 100 MG tablet 2023 at AM  No Yes   Sig: Take 1 tablet (100 mg) by mouth daily   mesalamine (APRISO ER) 0.375 g 24 hr capsule 2023 at PM  No Yes   Sig: TAKE 4 CAPSULES(1.5 GRAMS) BY MOUTH DAILY   rivaroxaban ANTICOAGULANT (XARELTO) 15 MG TABS tablet 2023 at PM  No Yes   Sig: Take 1 tablet (15 mg) by mouth  daily (with dinner)   torsemide (DEMADEX) 10 MG tablet Past Month at PRN  No Yes   Sig: Take 1 tablet (10 mg) by mouth as needed (when cardiology clinic advises)      Facility-Administered Medications: None           Physical Exam   Vital Signs: Temp: 97.4  F (36.3  C) Temp src: Oral BP: 101/54 Pulse: 67   Resp: 18 SpO2: 99 % O2 Device: None (Room air)    Weight: 153 lbs 0 oz    General: Patient seen lying in bed   HEENT: mucous membranes pale and dry    Neck: no jugular venous distension  Cardiac: Regular rate. Irregular rythym. Grade 3 systolic murmur. Normal S1, S2.   Pulm: No stridor, wheezes, rales, rhonchi  Abd: Soft, tenderness along the L side of the abdomen without rebound or guarding, nondistended.  No masses palpated.  Skin: Warm and dry to the touch.  Extremities: No lower extremity edema  Neuro: Cranial nerves grossly intact, alert    Data     Most Recent 3 CBC's:Recent Labs   Lab Test 01/20/23  1159 01/20/23  0740 07/22/22  0745   WBC 14.3* 16.5* 8.8   HGB 5.9* 5.8* 13.7   * 110* 99    394 273     Most Recent 3 BMP's:Recent Labs   Lab Test 01/20/23  0740 12/30/22  0906 12/27/22  1618 09/27/22  1647   *  --  131* 133   POTASSIUM 5.3  --  4.6 4.5   CHLORIDE 99  --  102 103   CO2 14*  --  20 20   BUN 49.4*  --  37* 27   CR 1.73* 1.8* 1.91* 1.35*   ANIONGAP 15  --  9 10   ABEL 8.9  --  8.9 9.4   *  --  232* 197*     Most Recent 3 Hemoglobins:Recent Labs   Lab Test 01/20/23  1159 01/20/23  0740 07/22/22  0745   HGB 5.9* 5.8* 13.7

## 2023-01-20 NOTE — ED PROVIDER NOTES
History     Chief Complaint   Patient presents with     Abnormal Labs     Dizziness     HPI  Keerthi Montoya is a 72 year old female with a past medical history of diverticulitis, diabetes, hypertension, pulmonary hypertension, hyperlipidemia, ulcerative colitis who presents to the emergency department with a chief complaint of abnormal labs.  The patient was sent by her primary doctor Dr. Meyers at the [facility] [place] revealed CT showing worsening diverticulitis and hemoglobin dropped from 12-5.8.  The patient has been having left-sided abdominal pain similar to prior episodes of diverticulitis.  She was last treated for diverticulitis in December (with a fluoroquinolone, no Flagyl per report).  Patient's blood pressure noted to be somewhat soft, 80s-90s over 50s on arrival to the emergency department, no other vital sign abnormalities.  The patient has had some shortness of breath and dizziness.  The patient is anticoagulated on Xarelto.    I have reviewed the Medications, Allergies, Past Medical and Surgical History, and Social History in the [employer] system.    Past Medical History:   Diagnosis Date     Diabetes mellitus (H)      Diverticulosis of colon (without mention of hemorrhage) 10/1/2012     Hypertension      Pulmonary hypertension (H)      Ulcerative (chronic) enterocolitis (H)      Past Surgical History:   Procedure Laterality Date     CHOLECYSTECTOMY  3/1987     COLON SURGERY  01/2001    Left colon resection; diverticulitis     COLONOSCOPY N/A 4/24/2017    Procedure: COMBINED COLONOSCOPY, SINGLE OR MULTIPLE BIOPSY/POLYPECTOMY BY BIOPSY;;  Surgeon: Radha Salguero MD;  Location: [facility] OR     COLONOSCOPY WITH CO2 INSUFFLATION N/A 4/24/2017    Procedure: COLONOSCOPY WITH CO2 INSUFFLATION;  Colonoscopy Dx rectal bleeding, BMI 25.86, Pharm [facility] .[phone], ;  Surgeon: Radha Salguero MD;  Location: [facility] OR     CV RIGHT HEART CATH MEASUREMENTS RECORDED N/A 3/16/2020    Procedure: CV RIGHT  HEART CATH;  Surgeon: Nader Muñoz MD;  Location:  HEART CARDIAC CATH LAB     GYN SURGERY  9/1983    tubal ligation     HERNIA REPAIR  12/2006    recurrent incisional hernia     THROAT SURGERY  12/1974    thyroidectomy     No current facility-administered medications for this encounter.     Current Outpatient Medications   Medication     atenolol (TENORMIN) 50 MG tablet     atorvastatin (LIPITOR) 40 MG tablet     blood glucose (ACCU-CHEK FELIPE PLUS) test strip     Cholecalciferol (VITAMIN D) 2000 units CAPS     Cyanocobalamin (B-12) 1000 MCG TABS     diphenhydrAMINE (BENADRYL) 25 MG tablet     empagliflozin (JARDIANCE) 10 MG TABS tablet     ferrous gluconate (FERGON) 324 (38 Fe) MG tablet     fluticasone (FLONASE) 50 MCG/ACT nasal spray     glipiZIDE (GLUCOTROL XL) 10 MG 24 hr tablet     glipiZIDE (GLUCOTROL XL) 5 MG 24 hr tablet     hydrALAZINE (APRESOLINE) 50 MG tablet     levothyroxine (SYNTHROID/LEVOTHROID) 112 MCG tablet     losartan (COZAAR) 100 MG tablet     mesalamine (APRISO ER) 0.375 g 24 hr capsule     rivaroxaban ANTICOAGULANT (XARELTO) 15 MG TABS tablet     torsemide (DEMADEX) 10 MG tablet     Facility-Administered Medications Ordered in Other Encounters   Medication     sodium chloride (PF) 0.9% PF flush 10 mL     Allergies   Allergen Reactions     Actos [Pioglitazone]      Fluid retention     Amlodipine      Leg swelling, hand swelling     Animal Dander      Doxycycline Hives     Dust Mites      Grass      Keflex [Cephalexin Hcl] Hives     Metoprolol      Swelling of lower legs and fatigue     Other [Seasonal Allergies]      pollen     Ranitidine      rash     Synthroid [Levothroid] Swelling     Allergic to brand name     Tetracycline Hives     Tylenol Hives     Ziac [Bisoprolol-Hydrochlorothiazide]      Past medical history, past surgical history, medications, and allergies were reviewed with the patient. Additional pertinent items: None    Social History     Socioeconomic History  "    Marital status:      Spouse name: Raymundo; dementia;  2016     Number of children: 3     Years of education: Not on file     Highest education level: Not on file   Occupational History     Employer: [employer]   Tobacco Use     Smoking status: Never     Smokeless tobacco: Never   Substance and Sexual Activity     Alcohol use: Not Currently     Alcohol/week: 2.0 - 4.0 standard drinks     Types: 1 - 2 Cans of beer, 1 - 2 Shots of liquor per week     Comment: occasional cocktail or beer     Drug use: No     Sexual activity: Not Currently   Other Topics Concern     Parent/sibling w/ CABG, MI or angioplasty before 65F 55M? 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 Additional pertinent items: None    Review of Systems  A medically appropriate review of systems was performed with pertinent positives and negatives noted in the HPI, and all other systems negative.    Physical Exam   BP: 99/53  Pulse: 77  Temp: 97.4  F (36.3  C)  Resp: 18  Height: 160 cm (5' 3\")  Weight: 69.4 kg (153 lb)  SpO2: 97 %      General: Well nourished, well developed, NAD  HEENT: EOMI, anicteric. NCAT, MMM  Neck: no jugular venous distension, supple, nl ROM  Cardiac: Regular rate and rhythm. No murmurs, rubs, or gallops. Normal S1, S2.  Intact " peripheral pulses  Pulm: CTAB, no stridor, wheezes, rales, rhonchi  Abd: Soft, TTP along left side of abdomen without rebound or guarding, nondistended.  No masses palpated.    Skin: Warm and dry to the touch.  No rash  Extremities: No LE edema, no cyanosis, w/w/p  Neuro: A&Ox3, no gross focal deficits    ED Course        Procedures            EKG Interpretation:      Interpreted by Lisy Garcia MD  Time reviewed:1054   Symptoms at time of EKG: abnormal lab, dizziness, SOB  Rhythm: atrial fibrillation  Rate: normal, 73 bpm  Axis: NORMAL  Ectopy: none  Conduction: normal  ST Segments/ T Waves: T inversions anteriolateral and inferior No ST-T wave changes  Q Waves: none  Comparison to prior: Compared to EKG dated 7/22/2022, there are no no acute changes    Clinical Impression: abnormal EKG, unchanged compared to prior            ED Course Selections: Critical Care Addendum    My initial assessment, based on my review of prehospital provider report, review of vital signs and interpretation of labs and imaging , established that Keerthi Montoya has severe hemorrhage, anemia, and hypotension, which requires immediate intervention, and therefore she is critically ill.     After the initial assessment, the care team initiated multiple lab tests and consulted with GI and initiated blood transfusion to provide stabilization care. Due to the critical nature of this patient, I reassessed nursing observations, vital signs, physical exam, mental status and neurologic status multiple times prior to her disposition.     Time also spent performing documentation, reviewing test results, discussion with consultants and coordination of care.     Critical care time (excluding teaching time and procedures): 35 minutes.           Labs Ordered and Resulted from Time of ED Arrival to Time of ED Departure - No data to display         Results for orders placed or performed in visit on 01/20/23 (from the past 24 hour(s))   Hemoglobin  A1c   Result Value Ref Range    Hemoglobin A1C 6.9 (H) <5.7 %   Lipid panel reflex to direct LDL Fasting   Result Value Ref Range    Cholesterol 73 <200 mg/dL    Triglycerides 87 <150 mg/dL    Direct Measure HDL 29 (L) >=50 mg/dL    LDL Cholesterol Calculated 27 <=100 mg/dL    Non HDL Cholesterol 44 <130 mg/dL    Narrative    Cholesterol  Desirable:  <200 mg/dL    Triglycerides  Normal:  Less than 150 mg/dL  Borderline High:  150-199 mg/dL  High:  200-499 mg/dL  Very High:  Greater than or equal to 500 mg/dL    Direct Measure HDL  Female:  Greater than or equal to 50 mg/dL   Male:  Greater than or equal to 40 mg/dL    LDL Cholesterol  Desirable:  <100mg/dL  Above Desirable:  100-129 mg/dL   Borderline High:  130-159 mg/dL   High:  160-189 mg/dL   Very High:  >= 190 mg/dL    Non HDL Cholesterol  Desirable:  130 mg/dL  Above Desirable:  130-159 mg/dL  Borderline High:  160-189 mg/dL  High:  190-219 mg/dL  Very High:  Greater than or equal to 220 mg/dL   CBC with platelets   Result Value Ref Range    WBC Count 16.5 (H) 4.0 - 11.0 10e3/uL    RBC Count 1.87 (L) 3.80 - 5.20 10e6/uL    Hemoglobin 5.8 (LL) 11.7 - 15.7 g/dL    Hematocrit 20.6 (L) 35.0 - 47.0 %     (H) 78 - 100 fL    MCH 31.0 26.5 - 33.0 pg    MCHC 28.2 (L) 31.5 - 36.5 g/dL    RDW 19.4 (H) 10.0 - 15.0 %    Platelet Count 394 150 - 450 10e3/uL   Comprehensive metabolic panel   Result Value Ref Range    Sodium 128 (L) 136 - 145 mmol/L    Potassium 5.3 3.4 - 5.3 mmol/L    Chloride 99 98 - 107 mmol/L    Carbon Dioxide (CO2) 14 (L) 22 - 29 mmol/L    Anion Gap 15 7 - 15 mmol/L    Urea Nitrogen 49.4 (H) 8.0 - 23.0 mg/dL    Creatinine 1.73 (H) 0.51 - 0.95 mg/dL    Calcium 8.9 8.8 - 10.2 mg/dL    Glucose 311 (H) 70 - 99 mg/dL    Alkaline Phosphatase 96 35 - 104 U/L    AST 52 (H) 10 - 35 U/L    ALT 22 10 - 35 U/L    Protein Total 6.6 6.4 - 8.3 g/dL    Albumin 3.9 3.5 - 5.2 g/dL    Bilirubin Total 0.4 <=1.2 mg/dL    GFR Estimate 31 (L) >60 mL/min/1.73m2   ESR:  Erythrocyte sedimentation rate   Result Value Ref Range    Erythrocyte Sedimentation Rate 12 0 - 30 mm/hr   CRP, inflammation   Result Value Ref Range    CRP Inflammation <3.00 <5.00 mg/L   TSH with free T4 reflex   Result Value Ref Range    TSH 8.56 (H) 0.30 - 4.20 uIU/mL   T4 free   Result Value Ref Range    Free T4 1.42 0.90 - 1.70 ng/dL       Labs, vital signs, and imaging studies were reviewed by me.    Medications   pantoprazole (PROTONIX) IV push injection 40 mg (has no administration in time range)   ciprofloxacin (CIPRO) infusion 400 mg (has no administration in time range)   metroNIDAZOLE (FLAGYL) infusion 500 mg (has no administration in time range)       Assessments & Plan (with Medical Decision Making)   Keerthi Montoya is a 72 year old female who presents to the emergency department with low hemoglobin in the setting of ongoing GI bleeding and diverticulitis. Brisk upper GI bleed less likely but would be on the differential. Other infectious causes such as c diff colitis, may also be present. Patient is afebrile on arrival to the emergency department. Labs and CT were done prior to arrival. Antibiotics were ordered in the ER.     1028 Pt d/w GI. Agree with IV protonix. They also recommend stool studies. They would not plan to do endoscopy on the patient in the setting of acute diverticulitis, so do not think they need to see the patient as a consult at this time.     2 U PRBCs ordered, blood consent was signed    EKG is unchanged compared to prior     Medical Decision Making  The patient presented with a problem that is a chronic illness severe exacerbation, progression, or side effect of treatment and an acute health issue posing potential threat to life or bodily function.    The patient's evaluation involved:  review of 3+ prior external note(s) (see separate area of note for details)  ordering and review of 3+ test(s) (see separate area of note for details)  review of 3+ test result(s) ordered  prior to this encounter (see separate area of note for details)  independent interpretation of testing performed by another health professional (CT)  discussion of management or test interpretation with another health professional (GI, pt's PCP, internal medicine)    The patient's management involved drug therapy requiring intensive monitoring and a decision regarding hospitalization.    Anemia studies including B12, folate, iron studies, and peripheral smear ordered.     I have reviewed the nursing notes.    I have reviewed the findings, diagnosis, plan and need for follow up with the patient.    Patient to be admitted to internal medicine service for further management. Plan was discussed with patient who understands and agrees with plan    New Prescriptions    No medications on file       Final diagnoses:   Diverticulitis of colon   Severe anemia     Lisy Garcia MD  1/20/2023   [facility] EMERGENCY DEPARTMENT     Lisy Garcia MD  01/20/23 [id number]       Lisy Garcia MD  01/20/23 2872

## 2023-01-20 NOTE — NURSING NOTE
Keerthi Montoya is a 72 year old female patient that presents today in clinic for the following:    Chief Complaint   Patient presents with     Follow Up     Gastrointestinal Problem     Pt would like to discuss diverticulitis     The patient's allergies and medications were reviewed as noted. A set of vitals were recorded as noted without incident. The patient does not have any other questions for the provider.    AMOL Samaniego at 7:00 AM on 1/20/2023

## 2023-01-20 NOTE — TELEPHONE ENCOUNTER
Lab called with Critical Value for pt. Hgb 5.8. Reported to Dr. Meyers.     JOSEP FARRELL RN on 1/20/2023 at 8:53 AM

## 2023-01-21 LAB
ANION GAP SERPL CALCULATED.3IONS-SCNC: 12 MMOL/L (ref 7–15)
BUN SERPL-MCNC: 38.5 MG/DL (ref 8–23)
CALCIUM SERPL-MCNC: 8.4 MG/DL (ref 8.8–10.2)
CHLORIDE SERPL-SCNC: 110 MMOL/L (ref 98–107)
CREAT SERPL-MCNC: 1.54 MG/DL (ref 0.51–0.95)
DEPRECATED HCO3 PLAS-SCNC: 14 MMOL/L (ref 22–29)
ERYTHROCYTE [DISTWIDTH] IN BLOOD BY AUTOMATED COUNT: 18.8 % (ref 10–15)
GFR SERPL CREATININE-BSD FRML MDRD: 36 ML/MIN/1.73M2
GLUCOSE BLDC GLUCOMTR-MCNC: 121 MG/DL (ref 70–99)
GLUCOSE BLDC GLUCOMTR-MCNC: 156 MG/DL (ref 70–99)
GLUCOSE BLDC GLUCOMTR-MCNC: 195 MG/DL (ref 70–99)
GLUCOSE BLDC GLUCOMTR-MCNC: 201 MG/DL (ref 70–99)
GLUCOSE BLDC GLUCOMTR-MCNC: 206 MG/DL (ref 70–99)
GLUCOSE BLDC GLUCOMTR-MCNC: 223 MG/DL (ref 70–99)
GLUCOSE SERPL-MCNC: 117 MG/DL (ref 70–99)
HCT VFR BLD AUTO: 26.6 % (ref 35–47)
HGB BLD-MCNC: 7.8 G/DL (ref 11.7–15.7)
MCH RBC QN AUTO: 31.1 PG (ref 26.5–33)
MCHC RBC AUTO-ENTMCNC: 29.3 G/DL (ref 31.5–36.5)
MCV RBC AUTO: 106 FL (ref 78–100)
PLATELET # BLD AUTO: 262 10E3/UL (ref 150–450)
POTASSIUM SERPL-SCNC: 4.5 MMOL/L (ref 3.4–5.3)
RBC # BLD AUTO: 2.51 10E6/UL (ref 3.8–5.2)
SODIUM SERPL-SCNC: 136 MMOL/L (ref 136–145)
WBC # BLD AUTO: 9.4 10E3/UL (ref 4–11)

## 2023-01-21 PROCEDURE — 85027 COMPLETE CBC AUTOMATED: CPT

## 2023-01-21 PROCEDURE — 99232 SBSQ HOSP IP/OBS MODERATE 35: CPT | Mod: GC | Performed by: STUDENT IN AN ORGANIZED HEALTH CARE EDUCATION/TRAINING PROGRAM

## 2023-01-21 PROCEDURE — 250N000011 HC RX IP 250 OP 636

## 2023-01-21 PROCEDURE — C9113 INJ PANTOPRAZOLE SODIUM, VIA: HCPCS

## 2023-01-21 PROCEDURE — 36415 COLL VENOUS BLD VENIPUNCTURE: CPT

## 2023-01-21 PROCEDURE — 250N000013 HC RX MED GY IP 250 OP 250 PS 637

## 2023-01-21 PROCEDURE — 80048 BASIC METABOLIC PNL TOTAL CA: CPT

## 2023-01-21 PROCEDURE — 120N000002 HC R&B MED SURG/OB UMMC

## 2023-01-21 RX ORDER — PANTOPRAZOLE SODIUM 40 MG/1
40 TABLET, DELAYED RELEASE ORAL
Status: DISCONTINUED | OUTPATIENT
Start: 2023-01-21 | End: 2023-01-22

## 2023-01-21 RX ORDER — METRONIDAZOLE 500 MG/1
500 TABLET ORAL 3 TIMES DAILY
Status: DISCONTINUED | OUTPATIENT
Start: 2023-01-21 | End: 2023-01-22

## 2023-01-21 RX ORDER — CIPROFLOXACIN 250 MG/1
500 TABLET, FILM COATED ORAL EVERY 12 HOURS SCHEDULED
Status: DISCONTINUED | OUTPATIENT
Start: 2023-01-21 | End: 2023-01-22

## 2023-01-21 RX ORDER — CIPROFLOXACIN 500 MG/1
500 TABLET, FILM COATED ORAL EVERY 12 HOURS
Qty: 10 TABLET | Refills: 0 | Status: CANCELLED | OUTPATIENT
Start: 2023-01-21 | End: 2023-01-26

## 2023-01-21 RX ORDER — CIPROFLOXACIN 250 MG/1
500 TABLET, FILM COATED ORAL EVERY 12 HOURS SCHEDULED
Status: DISCONTINUED | OUTPATIENT
Start: 2023-01-21 | End: 2023-01-21

## 2023-01-21 RX ORDER — PANTOPRAZOLE SODIUM 40 MG/1
40 TABLET, DELAYED RELEASE ORAL
Qty: 60 TABLET | Refills: 3 | Status: CANCELLED | OUTPATIENT
Start: 2023-01-21 | End: 2023-02-20

## 2023-01-21 RX ORDER — METRONIDAZOLE 500 MG/1
500 TABLET ORAL 3 TIMES DAILY
Status: DISCONTINUED | OUTPATIENT
Start: 2023-01-21 | End: 2023-01-21

## 2023-01-21 RX ORDER — METRONIDAZOLE 500 MG/1
500 TABLET ORAL 3 TIMES DAILY
Qty: 15 TABLET | Refills: 0 | Status: CANCELLED | OUTPATIENT
Start: 2023-01-21 | End: 2023-01-26

## 2023-01-21 RX ADMIN — Medication 50 MCG: at 08:51

## 2023-01-21 RX ADMIN — METRONIDAZOLE 500 MG: 500 TABLET ORAL at 20:44

## 2023-01-21 RX ADMIN — CIPROFLOXACIN 400 MG: 2 INJECTION, SOLUTION INTRAVENOUS at 13:12

## 2023-01-21 RX ADMIN — ATORVASTATIN CALCIUM 40 MG: 40 TABLET, FILM COATED ORAL at 08:51

## 2023-01-21 RX ADMIN — METRONIDAZOLE 500 MG: 500 INJECTION, SOLUTION INTRAVENOUS at 12:03

## 2023-01-21 RX ADMIN — MESALAMINE 1.5 G: 375 CAPSULE, EXTENDED RELEASE ORAL at 20:46

## 2023-01-21 RX ADMIN — LEVOTHYROXINE SODIUM 112 MCG: 0.11 TABLET ORAL at 08:52

## 2023-01-21 RX ADMIN — PANTOPRAZOLE SODIUM 40 MG: 40 INJECTION, POWDER, FOR SOLUTION INTRAVENOUS at 12:03

## 2023-01-21 RX ADMIN — PANTOPRAZOLE SODIUM 40 MG: 40 TABLET, DELAYED RELEASE ORAL at 17:46

## 2023-01-21 RX ADMIN — CIPROFLOXACIN 500 MG: 250 TABLET, COATED ORAL at 20:45

## 2023-01-21 ASSESSMENT — ACTIVITIES OF DAILY LIVING (ADL)
ADLS_ACUITY_SCORE: 22
ADLS_ACUITY_SCORE: 20
ADLS_ACUITY_SCORE: 22

## 2023-01-21 NOTE — PLAN OF CARE
Goal Outcome Evaluation:         Admission/Transfer from: ED  2 RN skin assessment completed. YES  Significant findings include: NA  WOC Nurse Consult Ordered?   NO

## 2023-01-21 NOTE — PLAN OF CARE
Goal Outcome Evaluation:      Plan of Care Reviewed With: patient    Overall Patient Progress: improvingOverall Patient Progress: improving    Outcome Evaluation: Pt admitted for suspected GIB, hgb 5.9 in ED. Pt finished 2nd unit of blood and post hgb up to 8.2. Stool still to be collected to r/o C diff. Pt triggered sepsis in ED, LA 1.8. L PIV infusing NS bolus, LR bolus ordered to replace NS bolus. Pt was hypotensive on admission (110's/70's), BP improved to 130/61. Pt on IV cipro and flagyl for diverticulitis. Tolerating CLD well. Discharge dependnent on decision for inpt vs OP scopes (colonoscopy/endoscopy).

## 2023-01-21 NOTE — PROGRESS NOTES
Patient alert and oriented x4. Pt transferred to 5A, updated new nurse. Given second unit of blood. 's and HR 70's on RA. Clear Liquid Diet. Denies pain or nausea.

## 2023-01-21 NOTE — PLAN OF CARE
Goal Outcome Evaluation:       Plan of care reviewed with: patient  Overall Patient Progress: improving    Outcome Evaluation: Pt A/Ox4, VSS on RA. Up ad ventura. IV abx administered. Pain improving in LLQ of abdomen. No stool sample collected as no BM overnight. LR at 100ml/hr infusing. Intaking adeqaute fluids. Planning on having outpatient scopes, continue with POC.

## 2023-01-21 NOTE — PROGRESS NOTES
[facility]    Progress Note - Medicine Service, [facility] TEAM 1       Date of Admission:  1/20/2023    Assessment & Plan   Keerthi Montoya is a 72 year old female with a past medical history of diverticulitis, type 2 diabetes, hypertension, pulmonary hypertension, hyperlipidemia, ulcerative colitis, CKD, and a fib (on rivaroxaban), left colectomy (2002) presenting with one week of L abdominal pain and lightheadedness concerning for another episode of diverticulitis. She is being admitted for IVF, IV abx and close monitoring of her hemoglobin    Today     No bleeding at present, no bowel movement since admission  Stable hemoglobin after transfusion  Adjust sliding scale to high dose   Continued antibiotics  discontinue telemetry        Abdominal Pain concerning for diverticulitis   Leukocytosis   Hypotension, resolved     Most likely due to a diverticulitis/diverticular bleed considering patient's history and presentation. She was hypotensive to 80s in clinic and the ED, now improved with IVF. CT in clinic was concerning for worsening diverticulitis on preliminary read. Prelim read of CT showing uncomplicated diverticulitis  - follow-up on CT read  - IV ciprofloxacin and metronidazole, plan to switch to oral given excellent bioavailability  - IV pantoprazole switch to oral once stable  - Hold pta anti-hypertensive   - advance diet as tolerated   - tylenol PRN for pain      Acute Anemia concerning for upper vs. Lower GI bleed   Macrocytic Anemia   Macrocytic anemia ( on 1/20/23). Most likely due to B12/folate deficiency vs possible GI blood loss   - s/p 2 units RBCs  - post transfusion Hgb - 7.8 g/dl  - Monitor CBC  - peripheral smear and iron studies   - hold pta rivaroxaban given Hgb drop  - hold pta iron supplement a that can change stool color  - discontinue telemetry   - EGD and colonoscopy in 6-8 weeks per GI ,could  consider capsule      Hyponatremia  Most  "likely due to lack of salt intake and increased fluid intake and  as patient reported trying to alter her diet for the last week. Corrected for high glucose, Na+ of 130 today.   - 1L NS over 10 hours   - Monitor via daily BMP      NAGMA  Bicarb 14 on admission. AG wnl. Likely 2/2 decreased PO. Lactate wnl.    - monitor on BMP in AM      ----chronic medical problems----  Ulcerative colitis - ESR and CRP wnl. Continue pta mesalamine      HTN - hold pta hydralazine and losartan      Afib: hold pta atenolol and rivaroxaban     Severe Pulmonary HTN and mild aortic stenosis  - Echo 8/2022 with normal EF 55-60%. Moderate right ventricular dilation. Moderately reduced right ventricular function and mild aortic stenosis.      Hx DM2  - Hold pta Glipizide and empagaflozin  - Increase to high dose sliding scale  - hypoglycemia protocol        Diet: Advance Diet as Tolerated: Clear Liquid Diet    DVT Prophylaxis: Pneumatic compression   Torres Catheter: Not present  Fluids: Off fluid at present  Lines: None     Cardiac Monitoring: None  Code Status: No CPR- Do NOT Intubate      Clinically Significant Risk Factors         # Hyponatremia: Lowest Na = 128 mmol/L in last 2 days, will monitor as appropriate  # Hypocalcemia: Lowest Ca = 8.4 mg/dL in last 2 days, will monitor and replace as appropriate              # DMII: A1C = 6.9 % (Ref range: <5.7 %) within past 3 months, PRESENT ON ADMISSION  # Overweight: Estimated body mass index is 27.14 kg/m  as calculated from the following:    Height as of this encounter: 1.6 m (5' 3\").    Weight as of this encounter: 69.5 kg (153 lb 3.5 oz)., PRESENT ON ADMISSION         Disposition Plan     Expected Discharge Date: 01/24/2023                The patient's care was discussed with the Attending Physician, Dr. Ovalle.    Emely Duarte MD  Medicine Service, [facility] TEAM [address] [place] [address]  Securely message with [employer] [facility] (more info)  Text page via [employer] " Paging/Directory   See signed in provider for up to date coverage information  ______________________________________________________________________    Interval History     Nursing notes reviewed.  No acute events overnight.  She indicates that she does not have any bleeding at present, he did not have any bowel movement since admission.  No fever.  Still has vague left-sided abdominal pain.  No vomiting.  Overall he feels that he is doing better.  She walked around the room this morning.  Discussed overall plan of care with her.      Physical Exam   Vital Signs: Temp: 97.8  F (36.6  C) Temp src: Oral BP: 106/49 Pulse: 68   Resp: 16 SpO2: 98 % O2 Device: None (Room air)    Weight: 153 lbs 3.52 oz    Gen: A&Ox3, NAD  HEENT: ncat, perrl, eomi, sclera anicteric  CV: RRR, S1 and S2 well heard, grade 3 systolic murmur on second intercostal space, not radiating to the neck.  Lungs: Good air entry bilaterally, no added sounds  Abd:  not distended.mild tenderness on Lt side, no rebound tenderness.  Skin:No rash   MS: No peripheral edema   Neuro: non focal, conscious and oriented. No deficit  noted      Medical Decision Making             Data

## 2023-01-22 LAB
ANION GAP SERPL CALCULATED.3IONS-SCNC: 12 MMOL/L (ref 7–15)
BUN SERPL-MCNC: 28.9 MG/DL (ref 8–23)
CALCIUM SERPL-MCNC: 8.5 MG/DL (ref 8.8–10.2)
CHLORIDE SERPL-SCNC: 111 MMOL/L (ref 98–107)
CREAT SERPL-MCNC: 1.45 MG/DL (ref 0.51–0.95)
DEPRECATED HCO3 PLAS-SCNC: 14 MMOL/L (ref 22–29)
ERYTHROCYTE [DISTWIDTH] IN BLOOD BY AUTOMATED COUNT: 18.1 % (ref 10–15)
GFR SERPL CREATININE-BSD FRML MDRD: 39 ML/MIN/1.73M2
GLUCOSE BLDC GLUCOMTR-MCNC: 190 MG/DL (ref 70–99)
GLUCOSE BLDC GLUCOMTR-MCNC: 236 MG/DL (ref 70–99)
GLUCOSE BLDC GLUCOMTR-MCNC: 247 MG/DL (ref 70–99)
GLUCOSE BLDC GLUCOMTR-MCNC: 304 MG/DL (ref 70–99)
GLUCOSE SERPL-MCNC: 140 MG/DL (ref 70–99)
HCT VFR BLD AUTO: 25.8 % (ref 35–47)
HGB BLD-MCNC: 7.4 G/DL (ref 11.7–15.7)
HGB BLD-MCNC: 8 G/DL (ref 11.7–15.7)
MCH RBC QN AUTO: 30.6 PG (ref 26.5–33)
MCHC RBC AUTO-ENTMCNC: 28.7 G/DL (ref 31.5–36.5)
MCV RBC AUTO: 107 FL (ref 78–100)
PLATELET # BLD AUTO: 254 10E3/UL (ref 150–450)
POTASSIUM SERPL-SCNC: 4.5 MMOL/L (ref 3.4–5.3)
RADIOLOGIST FLAGS: ABNORMAL
RBC # BLD AUTO: 2.42 10E6/UL (ref 3.8–5.2)
SODIUM SERPL-SCNC: 137 MMOL/L (ref 136–145)
WBC # BLD AUTO: 7.9 10E3/UL (ref 4–11)

## 2023-01-22 PROCEDURE — 250N000013 HC RX MED GY IP 250 OP 250 PS 637

## 2023-01-22 PROCEDURE — 120N000002 HC R&B MED SURG/OB UMMC

## 2023-01-22 PROCEDURE — 36415 COLL VENOUS BLD VENIPUNCTURE: CPT

## 2023-01-22 PROCEDURE — 80048 BASIC METABOLIC PNL TOTAL CA: CPT

## 2023-01-22 PROCEDURE — 99222 1ST HOSP IP/OBS MODERATE 55: CPT | Mod: GC | Performed by: INTERNAL MEDICINE

## 2023-01-22 PROCEDURE — 85018 HEMOGLOBIN: CPT

## 2023-01-22 PROCEDURE — 85027 COMPLETE CBC AUTOMATED: CPT

## 2023-01-22 PROCEDURE — 250N000011 HC RX IP 250 OP 636

## 2023-01-22 PROCEDURE — C9113 INJ PANTOPRAZOLE SODIUM, VIA: HCPCS

## 2023-01-22 PROCEDURE — 250N000013 HC RX MED GY IP 250 OP 250 PS 637: Performed by: STUDENT IN AN ORGANIZED HEALTH CARE EDUCATION/TRAINING PROGRAM

## 2023-01-22 PROCEDURE — 99232 SBSQ HOSP IP/OBS MODERATE 35: CPT | Mod: GC | Performed by: STUDENT IN AN ORGANIZED HEALTH CARE EDUCATION/TRAINING PROGRAM

## 2023-01-22 RX ORDER — SENNOSIDES 8.6 MG
8.6 TABLET ORAL 2 TIMES DAILY
Status: DISCONTINUED | OUTPATIENT
Start: 2023-01-22 | End: 2023-01-24 | Stop reason: HOSPADM

## 2023-01-22 RX ORDER — METRONIDAZOLE 500 MG/100ML
500 INJECTION, SOLUTION INTRAVENOUS EVERY 8 HOURS
Status: DISCONTINUED | OUTPATIENT
Start: 2023-01-22 | End: 2023-01-24 | Stop reason: HOSPADM

## 2023-01-22 RX ORDER — POLYETHYLENE GLYCOL 3350 17 G/17G
17 POWDER, FOR SOLUTION ORAL 2 TIMES DAILY
Status: DISCONTINUED | OUTPATIENT
Start: 2023-01-22 | End: 2023-01-24 | Stop reason: HOSPADM

## 2023-01-22 RX ORDER — CIPROFLOXACIN 2 MG/ML
400 INJECTION, SOLUTION INTRAVENOUS EVERY 12 HOURS
Status: DISCONTINUED | OUTPATIENT
Start: 2023-01-22 | End: 2023-01-24 | Stop reason: HOSPADM

## 2023-01-22 RX ADMIN — SENNOSIDES 8.6 MG: 8.6 TABLET, COATED ORAL at 20:09

## 2023-01-22 RX ADMIN — Medication 50 MCG: at 08:15

## 2023-01-22 RX ADMIN — POLYETHYLENE GLYCOL 3350 17 G: 17 POWDER, FOR SOLUTION ORAL at 10:32

## 2023-01-22 RX ADMIN — PANTOPRAZOLE SODIUM 40 MG: 40 INJECTION, POWDER, FOR SOLUTION INTRAVENOUS at 20:06

## 2023-01-22 RX ADMIN — PANTOPRAZOLE SODIUM 40 MG: 40 INJECTION, POWDER, FOR SOLUTION INTRAVENOUS at 11:56

## 2023-01-22 RX ADMIN — POLYETHYLENE GLYCOL 3350 17 G: 17 POWDER, FOR SOLUTION ORAL at 20:05

## 2023-01-22 RX ADMIN — METRONIDAZOLE 500 MG: 500 INJECTION, SOLUTION INTRAVENOUS at 13:15

## 2023-01-22 RX ADMIN — ATORVASTATIN CALCIUM 40 MG: 40 TABLET, FILM COATED ORAL at 08:15

## 2023-01-22 RX ADMIN — LEVOTHYROXINE SODIUM 112 MCG: 0.11 TABLET ORAL at 08:15

## 2023-01-22 RX ADMIN — PANTOPRAZOLE SODIUM 40 MG: 40 TABLET, DELAYED RELEASE ORAL at 08:15

## 2023-01-22 RX ADMIN — CIPROFLOXACIN 500 MG: 250 TABLET, COATED ORAL at 08:15

## 2023-01-22 RX ADMIN — MESALAMINE 1.5 G: 375 CAPSULE, EXTENDED RELEASE ORAL at 20:06

## 2023-01-22 RX ADMIN — CIPROFLOXACIN 400 MG: 2 INJECTION, SOLUTION INTRAVENOUS at 20:10

## 2023-01-22 RX ADMIN — SENNOSIDES 8.6 MG: 8.6 TABLET, COATED ORAL at 10:32

## 2023-01-22 RX ADMIN — METRONIDAZOLE 500 MG: 500 INJECTION, SOLUTION INTRAVENOUS at 22:20

## 2023-01-22 RX ADMIN — METRONIDAZOLE 500 MG: 500 TABLET ORAL at 08:15

## 2023-01-22 ASSESSMENT — ACTIVITIES OF DAILY LIVING (ADL)
ADLS_ACUITY_SCORE: 22

## 2023-01-22 NOTE — PROGRESS NOTES
[facility]    Progress Note - Medicine Service, [facility] TEAM 1       Date of Admission:  1/20/2023    Assessment & Plan   Keerthi Montoya is a 72 year old female with a past medical history of diverticulitis, type 2 diabetes, hypertension, pulmonary hypertension, hyperlipidemia, ulcerative colitis, CKD, and a fib (on rivaroxaban), left colectomy (2002) presenting with one week of L abdominal pain, dark stools and lightheadedness found to have diverticulitis and melana. She is on ciprofloxacin and flagyl. GI consulted for EGD tomorrow. She is hemodynamically stable, and well appearing.     Today:   - continue ciprofloxacin and metronidazole (day 3/7)  - Endoscopy with GI tomorrow, NPO at [facility]  - continue holding rivaroxaban      Melana   Acute Anemia likely 2/2 above   Hypotension, resolved  Patient reported one episode of melana prior to admission. None on 1/20, Reported melana PM of 1/21 and 1/22 AM (visualized by primary team). Hgb was 5.9 on admission, improved with 2u pRBC. She was hypotensive to 80s in clinic and the ED, improved with pRBC and IVF. Macrocytic anemia also noted on admission. Iron studies unremarkable (normal transferrin, ferritin, TIBC, Folate). B12 elevated.   - IV pantoprazole   - Monitor hgb on CBC  - hold pta rivaroxaban  - hold pta iron supplement as that can change stool color  - Hold pta anti-hypertensive   - peripheral smear pending (eval of macrocytosis)    Abdominal Pain 2/2 diverticulitis, improving   Leukocytosis, resolved     Presented with abd pain X 1 week. CT in clinic was concerning for worsening diverticulitis on preliminary read. Prelim read of CT on admission showing uncomplicated diverticulitis.   - oral ciprofloxacin and metronidazole (day 3/7) -> IV given plan for EGD tomorrow   - advance diet as tolerated -> NPO @ [facility] for EGD   - tylenol PRN for pain     NAGMA, stable   Bicarb 14 on admission. AG wnl. Likely 2/2 decreased PO.  "Lactate wnl. Likely due to multiple liters of normal saline in combination with her CKD.   - monitor on BMP    -------resolved medical problems----  Hyponatremia on admission, likely 2/2 hyperglycemia. Resolved with IVF.       ----chronic medical problems----  Ulcerative colitis - ESR and CRP wnl. Continue pta mesalamine      HTN - hold pta hydralazine and losartan      Afib: hold pta atenolol and rivaroxaban     Severe Pulmonary HTN and mild aortic stenosis  - Echo 8/2022 with normal EF 55-60%. Moderate right ventricular dilation. Moderately reduced right ventricular function and mild aortic stenosis.      Hx DM2  - Hold pta Glipizide and empagaflozin  - HSSI  - hypoglycemia protocol      Diet: Diet  Advance Diet as Tolerated: Regular Diet Adult    DVT Prophylaxis: holding pta rivaroxaban due to melana   Torres Catheter: Not present  Fluids: none  Lines: None     Cardiac Monitoring: None  Code Status: No CPR- Do NOT Intubate      Clinically Significant Risk Factors         # Hyponatremia: Lowest Na = 128 mmol/L in last 2 days, will monitor as appropriate  # Hypocalcemia: Lowest Ca = 8.4 mg/dL in last 2 days, will monitor and replace as appropriate              # DMII: A1C = 6.9 % (Ref range: <5.7 %) within past 3 months, PRESENT ON ADMISSION  # Overweight: Estimated body mass index is 27.14 kg/m  as calculated from the following:    Height as of this encounter: 1.6 m (5' 3\").    Weight as of this encounter: 69.5 kg (153 lb 3.5 oz)., PRESENT ON ADMISSION         Disposition Plan   Expected Discharge Date: 01/24/2023                The patient's care was discussed with the Attending Physician, Dr. Yamile Bailey, MS3    I have seen, evaluated, and examined the patient independently and have reviewed the relevant imaging and laboratory results. I agree with student doctor Katrin's documentation above. Changes were made to the documentation were appropriate.     Jemma Andres MD   Medicine Service, " "[facility] TEAM 1  [facility]  Securely message with [employer] (more info)  Text page via [employer] Paging/Directory   See signed in provider for up to date coverage information  ______________________________________________________________________    Interval History   No acute overnight events. Nursing noted bowel movement that was \"black with red streaks.\" She had a second bowel movement this morning that was distinctly melena. Ms. Montoya is feeling well. Her L abdominal pain has decreased to 2-3/10. No more LUQ abdominal pain. Mostly in Her LLQ. She no longer feels lightheaded while ambulating around the unit. Eating has been going well, no associated nausea or vomiting.     Physical Exam   Vital Signs: Temp: 97.7  F (36.5  C) Temp src: Oral BP: 110/53 Pulse: 67   Resp: 16 SpO2: 98 % O2 Device: None (Room air)    Weight: 153 lbs 3.52 oz    General: Lying in bed, no acute distress   Cardiac: Regular rate. Grade 3/6. systolic murmur. Normal S1, S2.   Pulm: breathing comfortably on room air,  Lungs are clear to auscultation bilaterally no wheezes, rales, rhonchi  Abd: Soft, mild tenderness along the L side of the abdomen without rebound or guarding, nondistended.   Skin: Warm and dry to the touch.  Extremities: No lower extremity edema  Neuro: AOX3, grossly nonfocal, moving all extremities      Data     Most Recent 3 CBC's:Recent Labs   Lab Test 01/22/23  0613 01/21/23  0614 01/20/23 2011 01/20/23  1159   WBC 7.9 9.4  --  14.3*   HGB 7.4* 7.8* 8.2* 5.9*   * 106*  --  118*    262  --  331     Most Recent 3 BMP's:Recent Labs   Lab Test 01/22/23  0613 01/22/23  0142 01/21/23  2249 01/21/23  0823 01/21/23  0614 01/20/23  1501 01/20/23  0740     --   --   --  136  --  128*   POTASSIUM 4.5  --   --   --  4.5  --  5.3   CHLORIDE 111*  --   --   --  110*  --  99   CO2 14*  --   --   --  14*  --  14*   BUN 28.9*  --   --   --  38.5*  --  49.4*   CR 1.45*  --   --   -- "  1.54*  --  1.73*   ANIONGAP 12  --   --   --  12  --  15   ABEL 8.5*  --   --   --  8.4*  --  8.9   * 190* 195*   < > 117*   < > 311*    < > = values in this interval not displayed.     Most Recent 3 Hemoglobins:Recent Labs   Lab Test 01/22/23  0613 01/21/23  0614 01/20/23 2011   HGB 7.4* 7.8* 8.2*

## 2023-01-22 NOTE — PLAN OF CARE
Reason for admission: L abdominal pain and lightheadedness concerning for another episode of diverticulitis.    VS: VSS  Pain/Nausea: c/o abdominal pain refused pain medications, denies nausea  Neuro: alert and oriented x4  Cardiac: WNL denies chest pain  Resp: RA LSC  GI/: continenent of bowel and bladder, BM during this shift- black/formed MD notified- NPO at midnight for endoscopy tomorrow with GI  Skin: intact  Diet: regular    Activity: independent  LDA: PIV SL  Labs: reviewed  hbg 7.4  Plan: IV antibiotics, monitor hgb, NPO at midnight for Endoscopy with GI

## 2023-01-22 NOTE — CONSULTS
"    GASTROENTEROLOGY CONSULTATION    Date of Admission:  1/20/2023       ASSESSMENT AND RECOMMENDATIONS:   72 year old female with type 2 diabetes, hypertension, pulmonary hypertension, hyperlipidemia, ulcerative colitis in remission on mesalamine, CKD, and a fib (on rivaroxaban), left colectomy (2002) presenting with one week of L abdominal pain, and lightheadedness found to have diverticulitis on CT. GI consulted for anemia.     # Acute diverticulitis (per read on H&P, no formal read)  Presented with one week of abdominal pain. Has history of recurrent diverticulitis. Will need colonoscopy 6 week post diverticulitis for possible colon cancer as trigger point.     # Acute anemia, macrocytic  Hgb 5.9 on admission, baseline was 13 about 6 months ago. Is taking iron supplement. B12 high, normal folate. Her dark stool is mostly from iron supplement given form as melena would be loose stool. However, is warranted for work-up of ongoing anemia. Will obtain EGD/colonoscopy as outpatient given no acute overt bleeding.     RECOMMENDATIONS  - EGD and colonoscopy in 6-8 weeks  - transfuse as needed    Gastroenterology follow up recommendations: EGD/colonoscopy in 6 weeks.    Thank you for involving us in this patient's care. Please do not hesitate to contact the GI service with any questions or concerns.     Patient care plan discussed with Dr. James, GI staff physician.    Jr Apodaca MD  GI fellow  Page [phone]          Chief Complaint:   We were asked to evaluate this patient with \"73 y/o with a hx of diverticulitis, ulcerative colitis, CKD, and a fib (on rivaroxaban), left colectomy (2002) here for diverticulitis and melana. ?EGD. presented with hgb of 5.9, HDS. hgb now stable ~7, still having melana\"  History is obtained from the patient and the medical record.          History of Present Illness:   72 year old female with type 2 diabetes, hypertension, pulmonary hypertension, hyperlipidemia, ulcerative " colitis in remission on mesalamine, CKD, and a fib (on rivaroxaban), left colectomy (2002) presenting with one week of L abdominal pain, and lightheadedness found to have diverticulitis on CT. GI consulted for anemia.     Patient was admitted 1/20/23 with progressive abdominal pain for a week. CT per note (no formal report) showed acute diverticulitis. Pain improved, no fever, and now tolerates diet. Recent had a pain in 12/2022, but CT was negative for acute diverticulitis. Prior episode was 3-4 years ago.    Also reported dark stool for 4 months, formed stool 1-2 times a day. Has been taking iron supplement for 1 year. No hematochezia. No vomiting blood. No abdominal pain prior to this episode.    Last xarelto dose was the day prior to admission.     Regarding Ulcerative pancolitis, is following with Dr. James, last seen 8/2022.  Age at diagnosis: [age]  Extent of disease: pancolitis (prior proctosigmoiditis classification, confirmed panUC 12/2017)  Current UC medications: Apriso 1.5 g daily  Prior UC surgeries: None  Prior IBD Medications: None    NSAID use: denies    Prior endoscopy:   Last colonoscopy 12/2017            Past Medical History:   Reviewed and edited as appropriate  Past Medical History:   Diagnosis Date     Diabetes mellitus (H)      Diverticulosis of colon (without mention of hemorrhage) 10/1/2012     Hypertension      Pulmonary hypertension (H)      Ulcerative (chronic) enterocolitis (H)             Past Surgical History:   Reviewed and edited as appropriate   Past Surgical History:   Procedure Laterality Date     CHOLECYSTECTOMY  3/1987     COLON SURGERY  01/2001    Left colon resection; diverticulitis     COLONOSCOPY N/A 4/24/2017    Procedure: COMBINED COLONOSCOPY, SINGLE OR MULTIPLE BIOPSY/POLYPECTOMY BY BIOPSY;;  Surgeon: Radha Salguero MD;  Location: [facility] OR     COLONOSCOPY WITH CO2 INSUFFLATION N/A 4/24/2017    Procedure: COLONOSCOPY WITH CO2 INSUFFLATION;  Colonoscopy Dx rectal  bleeding, BMI 25.86, Pharm [facility] .[phone], ;  Surgeon: Radha Salguero MD;  Location: [facility] OR     CV RIGHT HEART CATH MEASUREMENTS RECORDED N/A 3/16/2020    Procedure: CV RIGHT HEART CATH;  Surgeon: Nader Muñoz MD;  Location:  HEART CARDIAC CATH LAB     GYN SURGERY  1983    tubal ligation     HERNIA REPAIR  2006    recurrent incisional hernia     THROAT SURGERY  1974    thyroidectomy            Social History:   Reviewed and edited as appropriate  Social History     Socioeconomic History     Marital status:      Spouse name: Raymundo; dementia;       Number of children: 3     Years of education: Not on file     Highest education level: Not on file   Occupational History     Employer: [employer]   Tobacco Use     Smoking status: Never     Smokeless tobacco: Never   Substance and Sexual Activity     Alcohol use: Not Currently     Alcohol/week: 2.0 - 4.0 standard drinks     Types: 1 - 2 Cans of beer, 1 - 2 Shots of liquor per week     Comment: occasional cocktail or beer     Drug use: No     Sexual activity: Not Currently   Other Topics Concern     Parent/sibling w/ CABG, MI or angioplasty before 65F 55M? 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      Social Determinants of Health     Financial Resource Strain: Not on file   Food Insecurity: Not on file   Transportation Needs: Not on file   Physical Activity: Not on file   Stress: Not on file   Social Connections: Not on file   Intimate Partner Violence: Not on file   Housing Stability: Not on file            Family History:   Reviewed and edited as appropriate  No known  history of gastrointestinal/liver disease or  gastrointestinal malignancies       Allergies:   Reviewed and edited as appropriate     Allergies   Allergen Reactions     Actos [Pioglitazone]      Fluid retention     Amlodipine      Leg swelling, hand swelling     Animal Dander      Doxycycline Hives     Dust Mites      Grass      Keflex [Cephalexin Hcl] Hives     Metoprolol      Swelling of lower legs and fatigue     Other [Seasonal Allergies]      pollen     Ranitidine      rash     Synthroid [Levothroid] Swelling     Allergic to brand name     Tetracycline Hives     Tylenol Hives     Ziac [Bisoprolol-Hydrochlorothiazide]             Medications:     Medications Prior to Admission   Medication Sig Dispense Refill Last Dose     atenolol (TENORMIN) 50 MG tablet Take 1 tablet (50 mg) by mouth daily 90 tablet 3 1/19/2023 at AM     atorvastatin (LIPITOR) 40 MG tablet Take 1 tablet (40 mg) by mouth daily 90 tablet 3 1/19/2023 at AM     Cholecalciferol (VITAMIN D) 2000 units CAPS Take by mouth daily   1/19/2023 at AM     Cyanocobalamin (B-12) 1000 MCG TABS Take 1,000 mcg by mouth three times a week   1/19/2023 at AM     diphenhydrAMINE (BENADRYL) 25 MG tablet Take 25 mg by mouth every 6 hours as needed for itching or allergies   More than a month at PRN     empagliflozin (JARDIANCE) 10 MG TABS tablet Take 1 tablet (10 mg) by mouth daily 90 tablet 3 1/19/2023 at AM     ferrous gluconate (FERGON) 324 (38 Fe) MG tablet Take 324 mg by mouth daily (with breakfast)   1/19/2023 at AM     glipiZIDE (GLUCOTROL XL) 10 MG 24 hr tablet Take 2 tablets (20 mg) by mouth daily 180 tablet 3 1/19/2023 at PM     glipiZIDE (GLUCOTROL XL) 5 MG 24 hr tablet Take 1 tablet (5 mg) by mouth daily Take with 10 mg dose for a total of 15 mg daily 90 tablet 3 1/19/2023 at PM     hydrALAZINE (APRESOLINE) 50 MG tablet Take 1.5 tablets (75 mg) by mouth 3 times daily 270 tablet 3 1/19/2023 at PM     levothyroxine (SYNTHROID/LEVOTHROID) 112 MCG tablet Take  "1 tablet (112 mcg) by mouth daily 90 tablet 4 1/19/2023 at PM     losartan (COZAAR) 100 MG tablet Take 1 tablet (100 mg) by mouth daily 90 tablet 3 1/19/2023 at AM     mesalamine (APRISO ER) 0.375 g 24 hr capsule TAKE 4 CAPSULES(1.5 GRAMS) BY MOUTH DAILY 360 capsule 3 1/19/2023 at PM     rivaroxaban ANTICOAGULANT (XARELTO) 15 MG TABS tablet Take 1 tablet (15 mg) by mouth daily (with dinner) 90 tablet 3 1/19/2023 at PM     torsemide (DEMADEX) 10 MG tablet Take 1 tablet (10 mg) by mouth as needed (when cardiology clinic advises) 90 tablet 3 Past Month at PRN     blood glucose (ACCU-CHEK FELIPE PLUS) test strip 200 strips by In Vitro route 2 times daily Use to test blood sugar 2 times daily or as directed. 200 strip 4      fluticasone (FLONASE) 50 MCG/ACT nasal spray Spray 1 spray into both nostrils daily (Patient not taking: Reported on 12/30/2022) 16 g 3              Review of Systems:     A complete review of systems was performed and is negative except as noted in the HPI           Physical Exam:   /53 (BP Location: Right arm)   Pulse 67   Temp 97.7  F (36.5  C) (Oral)   Resp 16   Ht 1.6 m (5' 3\")   Wt 69.5 kg (153 lb 3.5 oz)   LMP  (LMP Unknown)   SpO2 98%   BMI 27.14 kg/m    Wt:   Wt Readings from Last 2 Encounters:   01/21/23 69.5 kg (153 lb 3.5 oz)   01/20/23 69.7 kg (153 lb 9.6 oz)      Constitutional: no acute distress  HEENT: Sclera anicteric  CV: No edema  Respiratory: Unlabored breathing  Abdomen: Non-distended, soft, nontender, no peritoneal signs  Skin: warm, perfused  Neuro: AAO x 4         Data:   Labs and imaging below were independently reviewed and interpreted  "

## 2023-01-22 NOTE — PLAN OF CARE
Goal Outcome Evaluation:      Plan of Care Reviewed With: patient    Overall Patient Progress: no changeOverall Patient Progress: no change    Outcome Evaluation: VSS on RA. Up independently. Advanced to FLD, tolerating well. Pt had a small BM. IV abx discontinued, switched to PO abx for tomorrow. Plan to discharge tomorrow.

## 2023-01-22 NOTE — PLAN OF CARE
Goal Outcome Evaluation:      Plan of Care Reviewed With: patient    Overall Patient Progress: improvingOverall Patient Progress: improving    Outcome Evaluation: A&Ox4. VSS on RA, Independent. R PIV and L PIV dressings CDI and saline locked. Advanced to FLD, tolerating well. Continent of bladder and bowel. LBM 1/21/23; small, formed and black with red streaks. Switching from IV abx to PO. Pt reports pain of 5 in left abdomen; declined medication. Plan is to discharge today.

## 2023-01-23 ENCOUNTER — ANESTHESIA EVENT (OUTPATIENT)
Dept: GASTROENTEROLOGY | Facility: CLINIC | Age: 73
DRG: 378 | End: 2023-01-23
Payer: MEDICARE

## 2023-01-23 ENCOUNTER — ANESTHESIA (OUTPATIENT)
Dept: GASTROENTEROLOGY | Facility: CLINIC | Age: 73
DRG: 378 | End: 2023-01-23
Payer: MEDICARE

## 2023-01-23 LAB
ANION GAP SERPL CALCULATED.3IONS-SCNC: 12 MMOL/L (ref 7–15)
BUN SERPL-MCNC: 23.6 MG/DL (ref 8–23)
CALCIUM SERPL-MCNC: 8.2 MG/DL (ref 8.8–10.2)
CHLORIDE SERPL-SCNC: 110 MMOL/L (ref 98–107)
CREAT SERPL-MCNC: 1.52 MG/DL (ref 0.51–0.95)
DEPRECATED HCO3 PLAS-SCNC: 15 MMOL/L (ref 22–29)
ERYTHROCYTE [DISTWIDTH] IN BLOOD BY AUTOMATED COUNT: 17.4 % (ref 10–15)
FLEXIBLE SIGMOIDOSCOPY: NORMAL
GFR SERPL CREATININE-BSD FRML MDRD: 36 ML/MIN/1.73M2
GLUCOSE BLDC GLUCOMTR-MCNC: 188 MG/DL (ref 70–99)
GLUCOSE BLDC GLUCOMTR-MCNC: 193 MG/DL (ref 70–99)
GLUCOSE BLDC GLUCOMTR-MCNC: 208 MG/DL (ref 70–99)
GLUCOSE BLDC GLUCOMTR-MCNC: 268 MG/DL (ref 70–99)
GLUCOSE BLDC GLUCOMTR-MCNC: 298 MG/DL (ref 70–99)
GLUCOSE SERPL-MCNC: 214 MG/DL (ref 70–99)
HCT VFR BLD AUTO: 25.6 % (ref 35–47)
HGB BLD-MCNC: 7.2 G/DL (ref 11.7–15.7)
MAGNESIUM SERPL-MCNC: 2.5 MG/DL (ref 1.7–2.3)
MCH RBC QN AUTO: 30 PG (ref 26.5–33)
MCHC RBC AUTO-ENTMCNC: 28.1 G/DL (ref 31.5–36.5)
MCV RBC AUTO: 107 FL (ref 78–100)
PATH REPORT.COMMENTS IMP SPEC: NORMAL
PATH REPORT.COMMENTS IMP SPEC: NORMAL
PATH REPORT.FINAL DX SPEC: NORMAL
PATH REPORT.MICROSCOPIC SPEC OTHER STN: NORMAL
PATH REPORT.MICROSCOPIC SPEC OTHER STN: NORMAL
PATH REPORT.RELEVANT HX SPEC: NORMAL
PLATELET # BLD AUTO: 251 10E3/UL (ref 150–450)
POTASSIUM SERPL-SCNC: 5 MMOL/L (ref 3.4–5.3)
RBC # BLD AUTO: 2.4 10E6/UL (ref 3.8–5.2)
SODIUM SERPL-SCNC: 137 MMOL/L (ref 136–145)
UPPER GI ENDOSCOPY: NORMAL
WBC # BLD AUTO: 8.5 10E3/UL (ref 4–11)

## 2023-01-23 PROCEDURE — 250N000011 HC RX IP 250 OP 636: Performed by: STUDENT IN AN ORGANIZED HEALTH CARE EDUCATION/TRAINING PROGRAM

## 2023-01-23 PROCEDURE — 250N000009 HC RX 250: Performed by: STUDENT IN AN ORGANIZED HEALTH CARE EDUCATION/TRAINING PROGRAM

## 2023-01-23 PROCEDURE — 250N000012 HC RX MED GY IP 250 OP 636 PS 637

## 2023-01-23 PROCEDURE — 88342 IMHCHEM/IMCYTCHM 1ST ANTB: CPT | Mod: TC | Performed by: INTERNAL MEDICINE

## 2023-01-23 PROCEDURE — 250N000011 HC RX IP 250 OP 636

## 2023-01-23 PROCEDURE — 120N000002 HC R&B MED SURG/OB UMMC

## 2023-01-23 PROCEDURE — 43239 EGD BIOPSY SINGLE/MULTIPLE: CPT | Performed by: INTERNAL MEDICINE

## 2023-01-23 PROCEDURE — 85060 BLOOD SMEAR INTERPRETATION: CPT | Performed by: PATHOLOGY

## 2023-01-23 PROCEDURE — 250N000013 HC RX MED GY IP 250 OP 250 PS 637

## 2023-01-23 PROCEDURE — 88342 IMHCHEM/IMCYTCHM 1ST ANTB: CPT | Mod: 26 | Performed by: STUDENT IN AN ORGANIZED HEALTH CARE EDUCATION/TRAINING PROGRAM

## 2023-01-23 PROCEDURE — C9113 INJ PANTOPRAZOLE SODIUM, VIA: HCPCS

## 2023-01-23 PROCEDURE — 0DJD8ZZ INSPECTION OF LOWER INTESTINAL TRACT, VIA NATURAL OR ARTIFICIAL OPENING ENDOSCOPIC: ICD-10-PCS | Performed by: INTERNAL MEDICINE

## 2023-01-23 PROCEDURE — 45330 DIAGNOSTIC SIGMOIDOSCOPY: CPT | Performed by: INTERNAL MEDICINE

## 2023-01-23 PROCEDURE — 83735 ASSAY OF MAGNESIUM: CPT

## 2023-01-23 PROCEDURE — 250N000013 HC RX MED GY IP 250 OP 250 PS 637: Performed by: STUDENT IN AN ORGANIZED HEALTH CARE EDUCATION/TRAINING PROGRAM

## 2023-01-23 PROCEDURE — 99232 SBSQ HOSP IP/OBS MODERATE 35: CPT | Mod: GC | Performed by: STUDENT IN AN ORGANIZED HEALTH CARE EDUCATION/TRAINING PROGRAM

## 2023-01-23 PROCEDURE — 36415 COLL VENOUS BLD VENIPUNCTURE: CPT

## 2023-01-23 PROCEDURE — 88305 TISSUE EXAM BY PATHOLOGIST: CPT | Mod: 26 | Performed by: STUDENT IN AN ORGANIZED HEALTH CARE EDUCATION/TRAINING PROGRAM

## 2023-01-23 PROCEDURE — 258N000003 HC RX IP 258 OP 636: Performed by: STUDENT IN AN ORGANIZED HEALTH CARE EDUCATION/TRAINING PROGRAM

## 2023-01-23 PROCEDURE — 0DB68ZX EXCISION OF STOMACH, VIA NATURAL OR ARTIFICIAL OPENING ENDOSCOPIC, DIAGNOSTIC: ICD-10-PCS | Performed by: INTERNAL MEDICINE

## 2023-01-23 PROCEDURE — 85014 HEMATOCRIT: CPT

## 2023-01-23 PROCEDURE — 370N000017 HC ANESTHESIA TECHNICAL FEE, PER MIN: Performed by: INTERNAL MEDICINE

## 2023-01-23 PROCEDURE — 80048 BASIC METABOLIC PNL TOTAL CA: CPT

## 2023-01-23 RX ORDER — ONDANSETRON 2 MG/ML
4 INJECTION INTRAMUSCULAR; INTRAVENOUS EVERY 30 MIN PRN
Status: CANCELLED | OUTPATIENT
Start: 2023-01-23

## 2023-01-23 RX ORDER — FENTANYL CITRATE 50 UG/ML
25 INJECTION, SOLUTION INTRAMUSCULAR; INTRAVENOUS EVERY 5 MIN PRN
Status: CANCELLED | OUTPATIENT
Start: 2023-01-23

## 2023-01-23 RX ORDER — ONDANSETRON 2 MG/ML
INJECTION INTRAMUSCULAR; INTRAVENOUS PRN
Status: DISCONTINUED | OUTPATIENT
Start: 2023-01-23 | End: 2023-01-23

## 2023-01-23 RX ORDER — HYDROMORPHONE HCL IN WATER/PF 6 MG/30 ML
0.2 PATIENT CONTROLLED ANALGESIA SYRINGE INTRAVENOUS EVERY 5 MIN PRN
Status: CANCELLED | OUTPATIENT
Start: 2023-01-23

## 2023-01-23 RX ORDER — PROPOFOL 10 MG/ML
INJECTION, EMULSION INTRAVENOUS CONTINUOUS PRN
Status: DISCONTINUED | OUTPATIENT
Start: 2023-01-23 | End: 2023-01-23

## 2023-01-23 RX ORDER — LIDOCAINE HYDROCHLORIDE 20 MG/ML
INJECTION, SOLUTION INFILTRATION; PERINEURAL PRN
Status: DISCONTINUED | OUTPATIENT
Start: 2023-01-23 | End: 2023-01-23

## 2023-01-23 RX ORDER — SODIUM CHLORIDE 9 MG/ML
INJECTION, SOLUTION INTRAVENOUS CONTINUOUS PRN
Status: DISCONTINUED | OUTPATIENT
Start: 2023-01-23 | End: 2023-01-23

## 2023-01-23 RX ORDER — LABETALOL HYDROCHLORIDE 5 MG/ML
10 INJECTION, SOLUTION INTRAVENOUS
Status: CANCELLED | OUTPATIENT
Start: 2023-01-23

## 2023-01-23 RX ORDER — ALBUTEROL SULFATE 0.83 MG/ML
2.5 SOLUTION RESPIRATORY (INHALATION) EVERY 4 HOURS PRN
Status: CANCELLED | OUTPATIENT
Start: 2023-01-23

## 2023-01-23 RX ORDER — HALOPERIDOL 5 MG/ML
1 INJECTION INTRAMUSCULAR
Status: CANCELLED | OUTPATIENT
Start: 2023-01-23

## 2023-01-23 RX ORDER — ONDANSETRON 4 MG/1
4 TABLET, ORALLY DISINTEGRATING ORAL EVERY 30 MIN PRN
Status: CANCELLED | OUTPATIENT
Start: 2023-01-23

## 2023-01-23 RX ORDER — HYDRALAZINE HYDROCHLORIDE 20 MG/ML
2.5-5 INJECTION INTRAMUSCULAR; INTRAVENOUS EVERY 10 MIN PRN
Status: CANCELLED | OUTPATIENT
Start: 2023-01-23

## 2023-01-23 RX ORDER — HYDROMORPHONE HCL IN WATER/PF 6 MG/30 ML
0.4 PATIENT CONTROLLED ANALGESIA SYRINGE INTRAVENOUS EVERY 5 MIN PRN
Status: CANCELLED | OUTPATIENT
Start: 2023-01-23

## 2023-01-23 RX ORDER — PROPOFOL 10 MG/ML
INJECTION, EMULSION INTRAVENOUS PRN
Status: DISCONTINUED | OUTPATIENT
Start: 2023-01-23 | End: 2023-01-23

## 2023-01-23 RX ORDER — SODIUM CHLORIDE, SODIUM LACTATE, POTASSIUM CHLORIDE, CALCIUM CHLORIDE 600; 310; 30; 20 MG/100ML; MG/100ML; MG/100ML; MG/100ML
INJECTION, SOLUTION INTRAVENOUS CONTINUOUS
Status: CANCELLED | OUTPATIENT
Start: 2023-01-23

## 2023-01-23 RX ORDER — FENTANYL CITRATE 50 UG/ML
50 INJECTION, SOLUTION INTRAMUSCULAR; INTRAVENOUS EVERY 5 MIN PRN
Status: CANCELLED | OUTPATIENT
Start: 2023-01-23

## 2023-01-23 RX ADMIN — SENNOSIDES 8.6 MG: 8.6 TABLET, COATED ORAL at 09:12

## 2023-01-23 RX ADMIN — SODIUM CHLORIDE: 9 INJECTION, SOLUTION INTRAVENOUS at 13:04

## 2023-01-23 RX ADMIN — Medication 50 MCG: at 09:12

## 2023-01-23 RX ADMIN — PROPOFOL 60 MG: 10 INJECTION, EMULSION INTRAVENOUS at 13:12

## 2023-01-23 RX ADMIN — LEVOTHYROXINE SODIUM 112 MCG: 0.11 TABLET ORAL at 09:12

## 2023-01-23 RX ADMIN — METRONIDAZOLE 500 MG: 500 INJECTION, SOLUTION INTRAVENOUS at 13:53

## 2023-01-23 RX ADMIN — CIPROFLOXACIN 400 MG: 2 INJECTION, SOLUTION INTRAVENOUS at 20:52

## 2023-01-23 RX ADMIN — ONDANSETRON 4 MG: 2 INJECTION INTRAMUSCULAR; INTRAVENOUS at 13:13

## 2023-01-23 RX ADMIN — PANTOPRAZOLE SODIUM 40 MG: 40 INJECTION, POWDER, FOR SOLUTION INTRAVENOUS at 09:12

## 2023-01-23 RX ADMIN — PROPOFOL 150 MCG/KG/MIN: 10 INJECTION, EMULSION INTRAVENOUS at 13:08

## 2023-01-23 RX ADMIN — PANTOPRAZOLE SODIUM 40 MG: 40 INJECTION, POWDER, FOR SOLUTION INTRAVENOUS at 20:51

## 2023-01-23 RX ADMIN — POLYETHYLENE GLYCOL 3350 17 G: 17 POWDER, FOR SOLUTION ORAL at 09:12

## 2023-01-23 RX ADMIN — PROPOFOL 40 MG: 10 INJECTION, EMULSION INTRAVENOUS at 13:08

## 2023-01-23 RX ADMIN — METRONIDAZOLE 500 MG: 500 INJECTION, SOLUTION INTRAVENOUS at 22:32

## 2023-01-23 RX ADMIN — MESALAMINE 1.5 G: 375 CAPSULE, EXTENDED RELEASE ORAL at 20:56

## 2023-01-23 RX ADMIN — INSULIN GLARGINE 5 UNITS: 100 INJECTION, SOLUTION SUBCUTANEOUS at 22:32

## 2023-01-23 RX ADMIN — ATORVASTATIN CALCIUM 40 MG: 40 TABLET, FILM COATED ORAL at 09:12

## 2023-01-23 RX ADMIN — LIDOCAINE HYDROCHLORIDE 40 MG: 20 INJECTION, SOLUTION INFILTRATION; PERINEURAL at 13:08

## 2023-01-23 RX ADMIN — METRONIDAZOLE 500 MG: 500 INJECTION, SOLUTION INTRAVENOUS at 06:07

## 2023-01-23 RX ADMIN — POLYETHYLENE GLYCOL 3350 17 G: 17 POWDER, FOR SOLUTION ORAL at 20:51

## 2023-01-23 RX ADMIN — TOPICAL ANESTHETIC 1 SPRAY: 200 SPRAY DENTAL; PERIODONTAL at 13:05

## 2023-01-23 RX ADMIN — CIPROFLOXACIN 400 MG: 2 INJECTION, SOLUTION INTRAVENOUS at 09:10

## 2023-01-23 RX ADMIN — SENNOSIDES 8.6 MG: 8.6 TABLET, COATED ORAL at 20:51

## 2023-01-23 ASSESSMENT — ACTIVITIES OF DAILY LIVING (ADL)
ADLS_ACUITY_SCORE: 22
ADLS_ACUITY_SCORE: 20
ADLS_ACUITY_SCORE: 22

## 2023-01-23 ASSESSMENT — ENCOUNTER SYMPTOMS: DYSRHYTHMIAS: 1

## 2023-01-23 NOTE — PROGRESS NOTES
BRIEF GI NOTE    Patient seen and plans discussed this AM with the patient and Dr. Deal, staff on Luminal service. Patient admitted with abdominal pain and presumed diverticulitis based on non-contrast CT, on antibiotics. Also noted to have acute anemia to 5.8 since July (13.7) in the setting of anticoagulation. Now status post transfusion x2 with relatively stable hemoglboin over the weekend. Continues to have black, solid stools in the setting of iron supplementation.     Plans are to proceed with EGD today to evaluate cause of acute anemia. Will also plan to add on flex sig to evaluate for disease activity in the setting of her known UC, diverticulitis, or other cause of lower GI cause for her anemia.    PLAN:  - EGD and flex sig today under MAC (given pulmonary hypertension/elevated R heart pressures)  - ordered pre-procedure enema at 11 and 11:30    Mike Wolfe MD  GI Fellow

## 2023-01-23 NOTE — PROGRESS NOTES
Pt came back from Endoscopy awake, oriented X4 upon arrival. Per report, biopsies were taken. VSS on RA, afebrile, denied any pain. Pt had few black stools this shift. Hgb 7.2 this am, down from 8 last evening. Medicine team was paged to place diet order per pt's request.

## 2023-01-23 NOTE — PROGRESS NOTES
Second attempt at tap water enema. Same result as the first attempt. Pt couldn't hold much in. Had small BM from last enema.

## 2023-01-23 NOTE — PROGRESS NOTES
[facility]    Progress Note - Medicine Service, MARO[facility] TEAM 1       Date of Admission:  1/20/2023    Assessment & Plan   Keerthi Montoya is a 72 year old female with a past medical history of diverticulitis, type 2 diabetes, hypertension, pulmonary hypertension, hyperlipidemia, ulcerative colitis, CKD, and a fib (on rivaroxaban PTA), left colectomy (2001) presenting with one week of L abdominal pain, dark stools and lightheadedness found to have diverticulitis and melana. She is on ciprofloxacin and flagyl. EGD and flex sigmoidoscopy today. She is hemodynamically stable, and well appearing.    Today:   - continue ciprofloxacin and metronidazole (day 4/7)  -  EGD and flex sigmoidoscopy today   - continue holding rivaroxaban   - Add 5 units glargine at bedtime     Melana   Acute Blood Loss Anemia likely 2/2 above, stable   Hypotension, resolved  Patient reported one episode of melana prior to admission. None on 1/20, Reported melana PM of 1/21 and 1/22 AM (visualized by primary team). Hgb was 5.9 on admission, improved with 2u pRBC. She was hypotensive to 80s in clinic and the ED, improved with pRBC and IVF. Macrocytic anemia also noted on admission pending peripheral smear analysis.    - EGD and flex sig today  - IV pantoprazole   - Monitor hgb on CBC  - hold pta rivaroxaban  - hold pta iron supplement   - Hold pta anti-hypertensive   - peripheral smear pending (eval of macrocytosis)     Abdominal Pain 2/2 diverticulitis, improving   Leukocytosis, resolved     Presented with abd pain X 1 week. CT (1/20) showed colonic diverticulosis with mild pericolonic streakiness along the distal descending/proximal sigmoid colon as well as mild ascites, and a mild R pleural effusion.  - IV ciprofloxacin and metronidazole (day 4/7)  - advance diet as tolerated following EGD/ flex sig  - tylenol PRN for pain      NAGMA, stable   Bicarb 14 on admission, 15 today. AG and lactate wnl.  "Likely due to multiple liters of normal saline on admission in combination with her CKD.   - continue monitoring via BMP     -Continue to monitor   -------resolved medical problems----  Hyponatremia on admission, likely 2/2 hyperglycemia. Resolved with IVF.       ----chronic medical problems----  Ulcerative colitis - ESR and CRP wnl. Continue pta mesalamine      HTN - hold pta hydralazine and losartan      Afib- hold pta atenolol and rivaroxaban  - Suggest switching to apixaban after discharge given GI bleeding risk      Severe Pulmonary HTN and mild aortic stenosis  - Echo 8/2022 with normal EF 55-60%. Moderate right ventricular dilation. Moderately reduced right ventricular function and mild aortic stenosis.     CKD   - creatinine at baseline     Hx DM2  - Hold pta Glipizide and empagaflozin  - Add 5 units glargine at bedtime for improved glycemic control  - HSSI  - hypoglycemia protocol     Follow ups needed     -Mild Ascites and Mild right pleural effusion on imaging  - Choice of anticoagulant after discharge ( Rivaroxaban Vs Apixaban)  -Few sub-6-mm pulm nodules on CT     Diet: Diet  NPO per Anesthesia Guidelines for Procedure/Surgery Except for: Meds    DVT Prophylaxis: holding PTA rivaroxaban (melena)  Torres Catheter: Not present  Fluids: none  Lines: None     Cardiac Monitoring: None  Code Status: No CPR- Do NOT Intubate      Clinically Significant Risk Factors                       # DMII: A1C = 6.9 % (Ref range: <5.7 %) within past 3 months, PRESENT ON ADMISSION  # Overweight: Estimated body mass index is 27.14 kg/m  as calculated from the following:    Height as of this encounter: 1.6 m (5' 3\").    Weight as of this encounter: 69.5 kg (153 lb 3.5 oz)., PRESENT ON ADMISSION         Disposition Plan      Expected Discharge Date: 01/24/2023                The patient's care was discussed with the Attending Physician, Dr. Ovalle.  Katrin Bailey, MS3  Medicine Service, [facility] TEAM [address] [place] [address] " [facility]  Securely message with [employer] (more info)  Text page via [facility] Paging/Directory   See signed in provider for up to date coverage information       Resident/Fellow Attestation   I, Emely Duarte MD, was present with the medical/BLAISE student who participated in the service and in the documentation of the note.  I have verified the history and personally performed the physical exam and medical decision making.  I agree with the assessment and plan of care as documented in the note.        Emely Duarte MD  PGY1  Date of Service (when I saw the patient): 01/23/23  ______________________________________________________________________    Interval History   No acute overnight events. Ms. Montoya is feeling well and that she is improving. Pain is well-controlled. She is looking forward to getting answers with the EGD.     Physical Exam   Vital Signs: Temp: 97.7  F (36.5  C) Temp src: Oral BP: 123/48 Pulse: 89   Resp: 18 SpO2: 95 % O2 Device: None (Room air)    Weight: 153 lbs 3.52 oz     General: Lying in bed, no acute distress   Cardiac: Irregular rate. Grade 3/6. systolic murmur. Normal S1, S2  Pulm: Breathing comfortably on room air,  Lungs are clear to auscultation bilaterally no wheezes, rales, rhonchi  Abd: Soft, mild tenderness along the L side of the abdomen (primarily LLQ) without rebound or guarding; nondistended.   Skin: Warm and dry to the touch.  Extremities: No lower extremity edema  Neuro: AOX3, grossly nonfocal, moving all extremities equally    Medical Decision Making           Data   Imaging results reviewed over the past 24 hrs:     CT 1/20/23:  IMPRESSION:  Compared to prior CT abdomen pelvis 12/30/2022, the  current scan shows:     1. Colonic diverticulosis with mild pericolonic streakiness along the  distal descending/proximal sigmoid colon, uncomplicated acute  diverticulitis cannot be excluded. No intra-abdominal collection or  pneumoperitoneum.  2. New  mild ascites and mild right pleural effusion. Mild nonspecific  mesenteric streakiness including streakiness along the aidee hepatis.  Consider correlation for more likely volume overload or focal  pancreatitis.  3. Few sub-6 mm pulmonary nodules. Please consider correlation with  prior imaging for stability and are follow-up CT chest in 12 months if  patient is at high risk for lung cancer.  4. Additional chronic/incidental findings as described above, similar  to prior CT from 12/20/2022 including pericardial effusion.        Most Recent 3 CBC's:Recent Labs   Lab Test 01/23/23  0609 01/22/23  1854 01/22/23  0613 01/21/23  0614   WBC 8.5  --  7.9 9.4   HGB 7.2* 8.0* 7.4* 7.8*   *  --  107* 106*     --  254 262     Most Recent 3 BMP's:Recent Labs   Lab Test 01/23/23  0711 01/23/23  0609 01/23/23  0201 01/22/23  1153 01/22/23  0613 01/21/23  0823 01/21/23  0614   NA  --  137  --   --  137  --  136   POTASSIUM  --  5.0  --   --  4.5  --  4.5   CHLORIDE  --  110*  --   --  111*  --  110*   CO2  --  15*  --   --  14*  --  14*   BUN  --  23.6*  --   --  28.9*  --  38.5*   CR  --  1.52*  --   --  1.45*  --  1.54*   ANIONGAP  --  12  --   --  12  --  12   ABEL  --  8.2*  --   --  8.5*  --  8.4*   * 214* 188*   < > 140*   < > 117*    < > = values in this interval not displayed.

## 2023-01-23 NOTE — PLAN OF CARE
Goal Outcome Evaluation:      Plan of Care Reviewed With: patient    Overall Patient Progress: improvingOverall Patient Progress: improving    Outcome Evaluation: A&Ox4. VSS on RA, independent. R PIV dressing CDI, infusing abx/ NaCl TKO. NPO since midnight for procedure. Contient of bladder and bowel; LBM 1/22/23, black and formed. Pt reported abdominal pain as 3 today; declined medication, but stated that walking around helps. Plan for today is to complete an endoscopy.

## 2023-01-23 NOTE — ANESTHESIA PREPROCEDURE EVALUATION
Anesthesia Pre-Procedure Evaluation    Patient: Keerthi Montoya   MRN: [id number] : 1950        Procedure : Procedure(s):  ESOPHAGOGASTRODUODENOSCOPY (EGD)  Sigmoidoscopy flexible          Past Medical History:   Diagnosis Date     Diabetes mellitus (H)      Diverticulosis of colon (without mention of hemorrhage) 10/1/2012     Hypertension      Pulmonary hypertension (H)      Ulcerative (chronic) enterocolitis (H)       Past Surgical History:   Procedure Laterality Date     CHOLECYSTECTOMY  3/1987     COLON SURGERY  2001    Left colon resection; diverticulitis     COLONOSCOPY N/A 2017    Procedure: COMBINED COLONOSCOPY, SINGLE OR MULTIPLE BIOPSY/POLYPECTOMY BY BIOPSY;;  Surgeon: Radha Salguero MD;  Location: [facility] OR     COLONOSCOPY WITH CO2 INSUFFLATION N/A 2017    Procedure: COLONOSCOPY WITH CO2 INSUFFLATION;  Colonoscopy Dx rectal bleeding, BMI 25.86, Pharm [employer] .[phone], ;  Surgeon: Radha Salguero MD;  Location: [facility] OR     CV RIGHT HEART CATH MEASUREMENTS RECORDED N/A 3/16/2020    Procedure: CV RIGHT HEART CATH;  Surgeon: Nader Muñoz MD;  [facility]     GYN SURGERY  1983    tubal ligation     HERNIA REPAIR  2006    recurrent incisional hernia     THROAT SURGERY  1974    thyroidectomy      Allergies   Allergen Reactions     Actos [Pioglitazone]      Fluid retention     Amlodipine      Leg swelling, hand swelling     Animal Dander      Doxycycline Hives     Dust Mites      Grass      Keflex [Cephalexin Hcl] Hives     Metoprolol      Swelling of lower legs and fatigue     Other [Seasonal Allergies]      pollen     Ranitidine      rash     Synthroid [Levothroid] Swelling     Allergic to brand name     Tetracycline Hives     Tylenol Hives     Ziac [Bisoprolol-Hydrochlorothiazide]       Social History     Tobacco Use     Smoking status: Never     Smokeless tobacco: Never   Substance Use Topics     Alcohol use: Not Currently      Alcohol/week: 2.0 - 4.0 standard drinks     Types: 1 - 2 Cans of beer, 1 - 2 Shots of liquor per week     Comment: occasional cocktail or beer      Wt Readings from Last 1 Encounters:   01/22/23 69.5 kg (153 lb 3.5 oz)        Anesthesia Evaluation   Pt has had prior anesthetic.         ROS/MED HX  ENT/Pulmonary:  - neg pulmonary ROS     Neurologic:  - neg neurologic ROS     Cardiovascular:     (+) hypertension-----Taking blood thinners Instructions Given to patient: held this admission. dysrhythmias, a-fib, valvular problems/murmurs type: AS mild. pulmonary hypertension, Previous cardiac testing   Echo: Date: 8/2022 Results:  Interpretation Summary     Global and regional left ventricular function is normal with an EF of 55-60%.  Paradoxical septal motion consistent with right ventricular pressure and  volume overload is present.  Moderate right ventricular dilation is present.  Global right ventricular function is moderately reduced.  Mild aortic stenosis is present.  Right ventricular systolic pressure is 57mmHg above the right atrial pressure.  IVC diameter >2.1 cm collapsing <50% with sniff suggests a high RA pressure  estimated at 15 mmHg or greater.  Trivial pericardial effusion is present.  PA pressure lower when compared to previous study.  Stress Test: Date: Results:    ECG Reviewed: Date: Results:    Cath: Date: Results:      METS/Exercise Tolerance:     Hematologic:     (+) anemia, history of blood transfusion,     Musculoskeletal:  - neg musculoskeletal ROS     GI/Hepatic: Comment:    Possible GI bleed         (+) Inflammatory bowel disease,     Renal/Genitourinary:     (+) renal disease, type: CRI,     Endo:     (+) type II DM, Not using insulin, thyroid problem, hypothyroidism,     Psychiatric/Substance Use:  - neg psychiatric ROS     Infectious Disease:  - neg infectious disease ROS     Malignancy:       Other:               OUTSIDE LABS:  CBC:   Lab Results   Component Value Date    WBC 8.5  01/23/2023    WBC 7.9 01/22/2023    HGB 7.2 (L) 01/23/2023    HGB 8.0 (L) 01/22/2023    HCT 25.6 (L) 01/23/2023    HCT 25.8 (L) 01/22/2023     01/23/2023     01/22/2023     BMP:   Lab Results   Component Value Date     01/23/2023     01/22/2023    POTASSIUM 5.0 01/23/2023    POTASSIUM 4.5 01/22/2023    CHLORIDE 110 (H) 01/23/2023    CHLORIDE 111 (H) 01/22/2023    CO2 15 (L) 01/23/2023    CO2 14 (L) 01/22/2023    BUN 23.6 (H) 01/23/2023    BUN 28.9 (H) 01/22/2023    CR 1.52 (H) 01/23/2023    CR 1.45 (H) 01/22/2023     (H) 01/23/2023     (H) 01/23/2023     COAGS: No results found for: PTT, INR, FIBR  POC:   Lab Results   Component Value Date     (H) 04/24/2017     HEPATIC:   Lab Results   Component Value Date    ALBUMIN 3.9 01/20/2023    PROTTOTAL 6.6 01/20/2023    ALT 22 01/20/2023    AST 52 (H) 01/20/2023    ALKPHOS 96 01/20/2023    BILITOTAL 0.4 01/20/2023     OTHER:   Lab Results   Component Value Date    LACT 1.8 01/20/2023    A1C 6.9 (H) 01/20/2023    ABEL 8.2 (L) 01/23/2023    PHOS 3.8 04/22/2022    MAG 2.5 (H) 01/23/2023    TSH 8.56 (H) 01/20/2023    T4 1.42 01/20/2023    CRP <3.00 01/20/2023    SED 12 01/20/2023       Anesthesia Plan    ASA Status:  3   NPO Status:  NPO Appropriate    Anesthesia Type: MAC.     - Reason for MAC: straight local not clinically adequate   Induction: Intravenous.   Maintenance: TIVA.        Consents    Anesthesia Plan(s) and associated risks, benefits, and realistic alternatives discussed. Questions answered and patient/representative(s) expressed understanding.    - Discussed:     - Discussed with:  Patient      - Patient is DNR/DNI Status: Yes             Suspend during perioperative period? Yes.             Agree to: chemical resuscitation, chest compression/defibrillation, Other (intubation).        Postoperative Care    Pain management: IV analgesics.   PONV prophylaxis: Background Propofol Infusion     Comments:           H&P  reviewed: Unable to attach H&P to encounter due to EHR limitations. H&P Update: appropriate H&P reviewed, patient examined. No interval changes since H&P (within 30 days).         Tonia Zabala MD

## 2023-01-23 NOTE — PROGRESS NOTES
First tap water enema given. Pt did not tolerate well. Only allow roughly 200 mL of fluid to go into rectum then wanted this writer to stop. Pt was not able to hold more.

## 2023-01-23 NOTE — OR NURSING
Procedure: EGD with biopsies, Flex sig no interventions  Sedation:monitored anesthesia care  Specimens: x 1 jar, sent to lab.   O2: per CRNA  Tolerated procedure: well  Pt to recovery area in stable condition accompanied by RN.   Other:  Report called to 5A.    Brunilda Hernandez RN

## 2023-01-23 NOTE — ANESTHESIA CARE TRANSFER NOTE
Patient: Keerthi Montoya    Procedure: Procedure(s):  ESOPHAGOGASTRODUODENOSCOPY, WITH BIOPSY  Sigmoidoscopy flexible       Diagnosis: Anemia [D64.9]  Diagnosis Additional Information: No value filed.    Anesthesia Type:   MAC     Note:    Oropharynx: oropharynx clear of all foreign objects and spontaneously breathing  Level of Consciousness: awake  Oxygen Supplementation: room air    Independent Airway: airway patency satisfactory and stable    Vital Signs Stable: post-procedure vital signs reviewed and stable  Report to RN Given: handoff report given  Patient transferred to: PACU    Handoff Report: Identifed the Patient, Identified the Reponsible Provider, Reviewed the pertinent medical history, Discussed the surgical course, Reviewed Intra-OP anesthesia mangement and issues during anesthesia, Set expectations for post-procedure period and Allowed opportunity for questions and acknowledgement of understanding      Vitals:  Vitals Value Taken Time   /74    Temp     Pulse 86    Resp 14    SpO2 98%        Electronically Signed By: WENDI Hylton CRNA  January 23, 2023  1:39 PM

## 2023-01-23 NOTE — ANESTHESIA POSTPROCEDURE EVALUATION
Patient: Keerthi Montoya    Procedure: Procedure(s):  ESOPHAGOGASTRODUODENOSCOPY, WITH BIOPSY  Sigmoidoscopy flexible       Anesthesia Type:  MAC    Note:  Disposition: Inpatient   Postop Pain Control: Uneventful            Sign Out: Well controlled pain   PONV: No   Neuro/Psych: Uneventful            Sign Out: Acceptable/Baseline neuro status   Airway/Respiratory: Uneventful            Sign Out: Acceptable/Baseline resp. status   CV/Hemodynamics: Uneventful            Sign Out: Acceptable CV status; No obvious hypovolemia; No obvious fluid overload   Other NRE: NONE   DID A NON-ROUTINE EVENT OCCUR? No           Electronically Signed By: Tonia Zabala MD  January 23, 2023  1:42 PM

## 2023-01-23 NOTE — H&P
Pre-Endoscopy History and Physical     Keerthi Montoya MRN# [id number]   YOB: 1950 Age: 72 year old     Date of Procedure: 1/20/2023  Primary care provider: Bunny Meyers  Type of Endoscopy: Colonoscopy with possible biopsy, possible polypectomy, Esophagogastroduodenoscopy with possible biopsy, possible dilation, possible foreign body removal and Flexible Sigmoidoscopy with possible biopsy, possible polypectomy, possible colonscopy  Reason for Procedure: Anemia, abdominal pain  Type of Anesthesia Anticipated: Per anesthesia     HPI:    [place] is a 72 year old female who will be undergoing the above procedure.      A history and physical has been performed. The patient's medications and allergies have been reviewed. The risks and benefits of the procedure and the sedation options and risks were discussed with the patient.  All questions were answered and informed consent was obtained.      She denies a personal or family history of anesthesia complications or bleeding disorders.     Patient Active Problem List   Diagnosis     Diverticulosis of large intestine     Vitamin D deficiency     Preventive measure     Hyperlipidemia LDL goal <100     Aspirin not indicated     Abdominal pain, left lower quadrant     Rectal bleeding     Colitis     Postoperative hypothyroidism     Hypertension goal BP (blood pressure) < 130/80     Type 2 diabetes mellitus with microalbuminuria, without long-term current use of insulin (H)     Cervical cancer screening     Ulcerative pancolitis without complication (H)     Vitamin B12 deficiency (non anemic)     Proteinuria, unspecified type     CKD (chronic kidney disease) stage 3, GFR 30-59 ml/min (H)     Aortic stenosis     Pulmonary hypertension (H)     Diastolic dysfunction     Heart failure with preserved ejection fraction, NYHA class I (H)     Anemia, unspecified type     Elevated serum immunoglobulin free light chains     Posterior vitreous detachment, left      Anemia due to blood loss, acute     Gastrointestinal hemorrhage, unspecified gastrointestinal hemorrhage type        Past Medical History:   Diagnosis Date     Diabetes mellitus (H)      Diverticulosis of colon (without mention of hemorrhage) 10/1/2012     Hypertension      Pulmonary hypertension (H)      Ulcerative (chronic) enterocolitis (H)         Past Surgical History:   Procedure Laterality Date     CHOLECYSTECTOMY  3/1987     COLON SURGERY  01/2001    Left colon resection; diverticulitis     COLONOSCOPY N/A 4/24/2017    Procedure: COMBINED COLONOSCOPY, SINGLE OR MULTIPLE BIOPSY/POLYPECTOMY BY BIOPSY;;  Surgeon: Radha Salguero MD;  Location: [facility] OR     COLONOSCOPY WITH CO2 INSUFFLATION N/A 4/24/2017    Procedure: COLONOSCOPY WITH CO2 INSUFFLATION;  Colonoscopy Dx rectal bleeding, BMI 25.86, Pharm [facility] .[phone], ;  Surgeon: Radha Salguero MD;  Location: [facility] OR     CV RIGHT HEART CATH MEASUREMENTS RECORDED N/A 3/16/2020    Procedure: CV RIGHT HEART CATH;  Surgeon: Nader Muñoz MD;  Location:  HEART CARDIAC CATH LAB     GYN SURGERY  9/1983    tubal ligation     HERNIA REPAIR  12/2006    recurrent incisional hernia     THROAT SURGERY  12/1974    thyroidectomy       Social History     Tobacco Use     Smoking status: Never     Smokeless tobacco: Never   Substance Use Topics     Alcohol use: Not Currently     Alcohol/week: 2.0 - 4.0 standard drinks     Types: 1 - 2 Cans of beer, 1 - 2 Shots of liquor per week     Comment: occasional cocktail or beer       Family History   Problem Relation Age of Onset     Cerebrovascular Disease Mother      Cancer Father         bladder     Diverticulitis Brother      Diverticulitis Brother      Kidney Disease No family hx of      Crohn's Disease No family hx of      Ulcerative Colitis No family hx of      Stomach Cancer No family hx of      GERD No family hx of      Celiac Disease No family hx of        Prior to Admission medications     Medication Sig Start Date End Date Taking? Authorizing Provider   atenolol (TENORMIN) 50 MG tablet Take 1 tablet (50 mg) by mouth daily 12/17/22  Yes Bunny Meyers MD   atorvastatin (LIPITOR) 40 MG tablet Take 1 tablet (40 mg) by mouth daily 12/16/21  Yes Nathan Dhillon MD   Cholecalciferol (VITAMIN D) 2000 units CAPS Take by mouth daily   Yes Reported, Patient   Cyanocobalamin (B-12) 1000 MCG TABS Take 1,000 mcg by mouth three times a week 1/30/19  Yes Nathan Dhillon MD   diphenhydrAMINE (BENADRYL) 25 MG tablet Take 25 mg by mouth every 6 hours as needed for itching or allergies   Yes Reported, Patient   empagliflozin (JARDIANCE) 10 MG TABS tablet Take 1 tablet (10 mg) by mouth daily 6/21/22  Yes David Almendarez MD   ferrous gluconate (FERGON) 324 (38 Fe) MG tablet Take 324 mg by mouth daily (with breakfast)   Yes Reported, Patient   glipiZIDE (GLUCOTROL XL) 10 MG 24 hr tablet Take 2 tablets (20 mg) by mouth daily 9/16/22  Yes Bunny Meyers MD   glipiZIDE (GLUCOTROL XL) 5 MG 24 hr tablet Take 1 tablet (5 mg) by mouth daily Take with 10 mg dose for a total of 15 mg daily 10/14/22  Yes Bunny Meyers MD   hydrALAZINE (APRESOLINE) 50 MG tablet Take 1.5 tablets (75 mg) by mouth 3 times daily 12/17/22  Yes Bunny Meyers MD   levothyroxine (SYNTHROID/LEVOTHROID) 112 MCG tablet Take 1 tablet (112 mcg) by mouth daily 12/16/21  Yes Nathan Dhillon MD   losartan (COZAAR) 100 MG tablet Take 1 tablet (100 mg) by mouth daily 10/29/22  Yes Bunny Meyers MD   mesalamine (APRISO ER) 0.375 g 24 hr capsule TAKE 4 CAPSULES(1.5 GRAMS) BY MOUTH DAILY 8/9/22  Yes Jerry Robbins PA-C   rivaroxaban ANTICOAGULANT (XARELTO) 15 MG TABS tablet Take 1 tablet (15 mg) by mouth daily (with dinner) 8/19/22  Yes David Almendarez MD   torsemide (DEMADEX) 10 MG tablet Take 1 tablet (10 mg) by mouth as needed (when cardiology clinic advises) 9/23/22  Yes Carolyn Bonilla APRN CNP   blood glucose  "(ACCU-CHEK FELIPE PLUS) test strip 200 strips by In Vitro route 2 times daily Use to test blood sugar 2 times daily or as directed. 5/7/20   Nathan Dhillon MD   fluticasone (FLONASE) 50 MCG/ACT nasal spray Spray 1 spray into both nostrils daily  Patient not taking: Reported on 12/30/2022 8/26/22   Isaias Tatum MD       Allergies   Allergen Reactions     Actos [Pioglitazone]      Fluid retention     Amlodipine      Leg swelling, hand swelling     Animal Dander      Doxycycline Hives     Dust Mites      Grass      Keflex [Cephalexin Hcl] Hives     Metoprolol      Swelling of lower legs and fatigue     Other [Seasonal Allergies]      pollen     Ranitidine      rash     Synthroid [Levothroid] Swelling     Allergic to brand name     Tetracycline Hives     Tylenol Hives     Ziac [Bisoprolol-Hydrochlorothiazide]         REVIEW OF SYSTEMS:   5 point ROS negative except as noted above in HPI, including Gen., Resp., CV, GI &  system review.    PHYSICAL EXAM:   /74   Pulse 97   Temp 97.6  F (36.4  C) (Oral)   Resp 18   Ht 1.6 m (5' 3\")   Wt 69.5 kg (153 lb 3.5 oz)   LMP  (LMP Unknown)   SpO2 100%   BMI 27.14 kg/m   Estimated body mass index is 27.14 kg/m  as calculated from the following:    Height as of this encounter: 1.6 m (5' 3\").    Weight as of this encounter: 69.5 kg (153 lb 3.5 oz).   GENERAL APPEARANCE: alert, and oriented  MENTAL STATUS: alert  AIRWAY EXAM: Mallampatti Class III (visualization of the soft palate and base of uvula)  RESP: lungs clear to auscultation - no rales, rhonchi or wheezes  CV: regular rates and rhythm  DIAGNOSTICS:    Not indicated    IMPRESSION   ASA Class 3 - Severe systemic disease, but not incapacitating    PLAN:   Plan for Colonoscopy with possible biopsy, possible polypectomy, Esophagogastroduodenoscopy with possible biopsy, possible dilation, possible foreign body removal and Flexible Sigmoidoscopy with possible biopsy, possible polypectomy, possible colonscopy. We " discussed the risks, benefits and alternatives and the patient wished to proceed.    The above has been forwarded to the consulting provider.      Signed Electronically by: Ricki Deal MD  January 23, 2023

## 2023-01-24 VITALS
RESPIRATION RATE: 16 BRPM | HEART RATE: 77 BPM | WEIGHT: 151 LBS | DIASTOLIC BLOOD PRESSURE: 56 MMHG | HEIGHT: 63 IN | BODY MASS INDEX: 26.75 KG/M2 | OXYGEN SATURATION: 98 % | TEMPERATURE: 98.1 F | SYSTOLIC BLOOD PRESSURE: 106 MMHG

## 2023-01-24 LAB
GLUCOSE BLDC GLUCOMTR-MCNC: 180 MG/DL (ref 70–99)
GLUCOSE BLDC GLUCOMTR-MCNC: 361 MG/DL (ref 70–99)
HGB BLD-MCNC: 7.8 G/DL (ref 11.7–15.7)
HOLD SPECIMEN: NORMAL

## 2023-01-24 PROCEDURE — 85018 HEMOGLOBIN: CPT

## 2023-01-24 PROCEDURE — 250N000011 HC RX IP 250 OP 636

## 2023-01-24 PROCEDURE — 36415 COLL VENOUS BLD VENIPUNCTURE: CPT

## 2023-01-24 PROCEDURE — C9113 INJ PANTOPRAZOLE SODIUM, VIA: HCPCS

## 2023-01-24 PROCEDURE — 250N000013 HC RX MED GY IP 250 OP 250 PS 637

## 2023-01-24 PROCEDURE — 250N000013 HC RX MED GY IP 250 OP 250 PS 637: Performed by: STUDENT IN AN ORGANIZED HEALTH CARE EDUCATION/TRAINING PROGRAM

## 2023-01-24 PROCEDURE — 99238 HOSP IP/OBS DSCHRG MGMT 30/<: CPT | Mod: GC | Performed by: INTERNAL MEDICINE

## 2023-01-24 RX ORDER — SENNOSIDES 8.6 MG
1 TABLET ORAL 2 TIMES DAILY PRN
Qty: 30 TABLET | Refills: 1 | Status: SHIPPED | OUTPATIENT
Start: 2023-01-24 | End: 2023-02-23

## 2023-01-24 RX ORDER — CIPROFLOXACIN 500 MG/1
500 TABLET, FILM COATED ORAL 2 TIMES DAILY
Qty: 5 TABLET | Refills: 0 | Status: SHIPPED | OUTPATIENT
Start: 2023-01-24 | End: 2023-01-27

## 2023-01-24 RX ORDER — PANTOPRAZOLE SODIUM 40 MG/1
40 TABLET, DELAYED RELEASE ORAL 2 TIMES DAILY
Qty: 60 TABLET | Refills: 1 | Status: SHIPPED | OUTPATIENT
Start: 2023-01-24 | End: 2023-04-07

## 2023-01-24 RX ORDER — POLYETHYLENE GLYCOL 3350 17 G/17G
17 POWDER, FOR SOLUTION ORAL 2 TIMES DAILY
Qty: 60 PACKET | Refills: 0 | Status: SHIPPED | OUTPATIENT
Start: 2023-01-24 | End: 2023-02-23

## 2023-01-24 RX ORDER — METRONIDAZOLE 500 MG/1
500 TABLET ORAL 3 TIMES DAILY
Qty: 5 TABLET | Refills: 0 | Status: SHIPPED | OUTPATIENT
Start: 2023-01-24 | End: 2023-01-26

## 2023-01-24 RX ADMIN — METRONIDAZOLE 500 MG: 500 INJECTION, SOLUTION INTRAVENOUS at 05:36

## 2023-01-24 RX ADMIN — CIPROFLOXACIN 400 MG: 2 INJECTION, SOLUTION INTRAVENOUS at 08:02

## 2023-01-24 RX ADMIN — PANTOPRAZOLE SODIUM 40 MG: 40 INJECTION, POWDER, FOR SOLUTION INTRAVENOUS at 08:03

## 2023-01-24 RX ADMIN — POLYETHYLENE GLYCOL 3350 17 G: 17 POWDER, FOR SOLUTION ORAL at 08:03

## 2023-01-24 RX ADMIN — SENNOSIDES 8.6 MG: 8.6 TABLET, COATED ORAL at 08:03

## 2023-01-24 RX ADMIN — ATORVASTATIN CALCIUM 40 MG: 40 TABLET, FILM COATED ORAL at 08:03

## 2023-01-24 RX ADMIN — LEVOTHYROXINE SODIUM 112 MCG: 0.11 TABLET ORAL at 06:30

## 2023-01-24 RX ADMIN — Medication 50 MCG: at 08:03

## 2023-01-24 ASSESSMENT — ACTIVITIES OF DAILY LIVING (ADL)
ADLS_ACUITY_SCORE: 20

## 2023-01-24 NOTE — PROGRESS NOTES
[id number]    No acute events this shift. VS on RA. Up ad ventura. Complains of mild Abd discomfort. Denies nausea. Insulin given per provider orders, B. Diet switched to regular, tolerated. No BM since procedure.

## 2023-01-24 NOTE — PLAN OF CARE
Goal Outcome Evaluation:      Plan of Care Reviewed With: patient    Overall Patient Progress: improvingOverall Patient Progress: improving    Outcome Evaluation: VSS on RA. Up independently. Denies pain. PIV removed. All belongings packed and sent w/ pt. Meds picked up from discharge pharmacy. Discharged home at 1300.

## 2023-01-24 NOTE — PLAN OF CARE
"  Problem: Plan of Care - These are the overarching goals to be used throughout the patient stay.    Goal: Plan of Care Review  Description: The Plan of Care Review/Shift note should be completed every shift.  The Outcome Evaluation is a brief statement about your assessment that the patient is improving, declining, or no change.  This information will be displayed automatically on your shift note.  Outcome: Progressing  Goal: Patient-Specific Goal (Individualized)  Description: You can add care plan individualizations to a care plan. Examples of Individualization might be:  \"Parent requests to be called daily at 9am for status\", \"I have a hard time hearing out of my right ear\", or \"Do not touch me to wake me up as it startles me\".  Outcome: Progressing  Goal: Absence of Hospital-Acquired Illness or Injury  Outcome: Progressing  Intervention: Identify and Manage Fall Risk  Recent Flowsheet Documentation  Taken 1/23/2023 1950 by Sheela Grijalva, RN  Safety Promotion/Fall Prevention:   assistive device/personal items within reach   fall prevention program maintained   nonskid shoes/slippers when out of bed   room near nurse's station   room organization consistent  Intervention: Prevent Skin Injury  Recent Flowsheet Documentation  Taken 1/23/2023 1950 by Sheela Grijalva, RN  Body Position: position changed independently  Goal: Optimal Comfort and Wellbeing  Outcome: Progressing  Goal: Readiness for Transition of Care  Outcome: Progressing     Problem: Infection  Goal: Absence of Infection Signs and Symptoms  Outcome: Progressing   Goal Outcome Evaluation:                        "

## 2023-01-24 NOTE — PROGRESS NOTES
Gastroenterology Inpatient Sign Off Note    Assessment:  72 year old female with type 2 diabetes, hypertension, pulmonary hypertension, hyperlipidemia, ulcerative colitis in remission on mesalamine, CKD, and a fib (on rivaroxaban), left partial colectomy for recurrent diverticulitis (2002),  admitted with abdominal pain and presumed uncomplicated diverticulitis based on non-contrast CT, currently on antibiotics. Also noted to have acute anemia to 5.8 since July (13.7) in the setting of anticoagulation with Xarelto, without overt GIB symptoms (dark stools in the setting of iron supplementation). Now status post transfusion x2 on admission with relatively stable hemoglboin over the weekend.     Underwent EGD/flex sig on 1/23/22. EGD with small gastric/duodenal ulcerations, non-bleeding, likely would not explain degree of anemia. Biopsies obtained and pending to r/o H Pylori. Flex sig without evidence of active UC or other explanation for bleeding.     This AM, hemoglobin is stable to increased, and primary team is planning discharge today.     Recommendations:  - OK to discharge from GI standpoint  - Recommend she hold iron supplement for 2-3 weeks to see if black stools resolve.  - Will arrange for outpatient colonoscopy in the next few months for surveillance as previously planned with Dr. James  - If ongoing black stools (despite holding as above), she should discuss capsule endoscopy for additional evaluation if colonoscopy is unrevealing  - We will follow up path results  - Has follow up arranged with Dr. James in August as previously planned    Inpatient GI consults service will sign off. No further recommendations at this time. If primary team has addition questions, please page consult fellow listed in [facility].    Current GI Consult Staff: Dr. Deal

## 2023-01-24 NOTE — PROGRESS NOTES
Patient rested in intervals this shift. She is alert and oriented x4, respirations regular and easy, speech clear and appropriate. Up ad ventura in room. No black/tarry stools overnight. Oriented patient to her plan of care, denies any questions/concerns at present.

## 2023-01-24 NOTE — DISCHARGE SUMMARY
[facility]  Discharge Summary - Medicine & Pediatrics       Date of Admission:  1/20/2023  Date of Discharge:  1/24/22  Discharging Provider: Dr. Jp Barreto  Discharge Service: Medicine Service, BLASIE TEAM 1    Discharge Diagnoses   Melana   Gastric and duodenal ulcer   Acute blood loss anemia  Diverticulitis    Pulmonary nodules  Mild Ascites       ----chronic medical problems----  Ulcerative colitis   HTN    Afib  Severe Pulmonary HTN and mild aortic stenosis    CKD3      Follow-ups Needed After Discharge   Follow-up Appointments     Follow Up ([facility])      - Follow up with your GI doctor in in 3-4 weeks for a colonoscopy.   - Follow up with your GI doctor or your about small fluid seen in your   stomach and your primary doctor about the nodules seen in your lung    Appointments on [place] and/or [facility] (with [facility] or [facility]   provider or service). Call [phone] if you haven't heard regarding   these appointments within 7 days of discharge.             Unresulted Labs Ordered in the Past 30 Days of this Admission     Date and Time Order Name Status Description    1/23/2023  1:22 PM Surgical Pathology Exam In process         Discharge Disposition   Discharged to home  Condition at discharge: Stable      Hospital Course   Keerthi Montoya is a 72 year old female with a past medical history of diverticulitis, type 2 diabetes, hypertension, pulmonary hypertension, hyperlipidemia, ulcerative colitis, CKD, and a fib (on rivaroxaban PTA), left colectomy (2001) admitted with one week of L abdominal pain, dark stools, and lightheadedness. The following problems were addressed inpatient.        Melana   Acute Blood Loss Anemia likely 2/2 above  Hypotension  On admission, patient reported one episode of dark stools prior to admission. Hgb on admission as 5.9 from 13.7 requiring 2u pRBC. She was also noted to be hypotensive to the 80s in clinic as well as in the  "ED, improved with IVF and pRBC.  She was noted to have melanotic stools inpatient requiring EGD with GI on 1/23. Findings were notable for non bleeding gastric and duodenal ulcers and erosive gastropathy with no stigmata of bleeding. She was treated with IV PPI and transitioned to PO PPI at discharge. Her rivaroxaban was held during admission  - continue PO PPI BID   - hold rivaroxaban until GI f/u   - hold iron supplements until GI f/u   - Patient will f/u with her GI provider in 3-4 weeks   - Discuss capsul endoscopy if black stools continue    Diverticulitis  [place] presented with 1 week of abdominal pain. CT showed \"Colonic diverticulosis with mild pericolonic streakiness along the  distal descending/proximal sigmoid colon, uncomplicated acute  diverticulitis cannot be excluded\". She was treated with ciprofloxacin and flagyl with plan for total of 7 days of antibiotics.  CT also showed mild ascites that patient can f/u with her GI provider about.   - f/u with GI regarding outpatient colonoscopy     Pulmonary nodules - CT abd/pelvis on admission showed \"Few sub-6 mm pulmonary nodules.   - follow-up CT chest in 12 months       Consultations This Hospital Stay   GI LUMINAL ADULT IP CONSULT  NURSING TO CONSULT FOR VASCULAR ACCESS CARE IP CONSULT  GI LUMINAL ADULT IP CONSULT  GI LUMINAL ADULT IP CONSULT    Code Status   Prior       The patient was discussed with MD Ashia Arteaga[address] [address]  Phone: [phone]  ______________________________________________________________________    Physical Exam   Vital Signs: Temp: 98.1  F (36.7  C) Temp src: Oral BP: 106/56 Pulse: 77   Resp: 16 SpO2: 98 % O2 Device: None (Room air)    Weight: 151 lbs 0 oz  General Appearance: Well appearing, nontoxic   Respiratory: Breathing comfortably on room air, CTAB  Cardiovascular: RRR, extremities perfused, no lower extremity edema   GI: +BS, " mild LLQ abdominal pain, soft   Neuro: grossly nonfocal       Primary Care Physician   Bunny Meyers    Discharge Orders      Primary Care - Care Coordination Referral      Adult GI  Referral - Procedure Only      Activity    Your activity upon discharge: activity as tolerated     When to contact your care team    Call [phone] if you start having worsening blood in your stools or black stools.     Follow Up ([facility])    - Follow up with your GI doctor in in 3-4 weeks for a colonoscopy.   - Follow up with your GI doctor or your about small fluid seen in your stomach and your primary doctor about the nodules seen in your lung    Appointments on [place] and/or [facility] (with [facility] or [facility] provider or service). Call [phone] if you haven't heard regarding these appointments within 7 days of discharge.     Reason for your hospital stay    You were in the hospital for diverticulitis and dark stools. You were treated with antibiotics and you underwent a scope which found ulcers. .   - continue ciprofloxacin and metronidazole for another 2 days.   - continue taking the proton pump inhibitor twice a day until you see the GI doctor  - okay to hold to hold your rivroxaban until you see the GI doctor then talk about when you should restart it   - you can resume your blood pressure medicine at discharge  - Do not take your iron supplements until you see the GI doctor     Diet    Follow this diet upon discharge: regular diet       Significant Results and Procedures   Most Recent 3 CBC's:Recent Labs   Lab Test 01/24/23  0800 01/23/23  0609 01/22/23  1854 01/22/23  0613 01/21/23  0614   WBC  --  8.5  --  7.9 9.4   HGB 7.8* 7.2* 8.0* 7.4* 7.8*   MCV  --  107*  --  107* 106*   PLT  --  251  --  254 262     Most Recent 3 BMP's:Recent Labs   Lab Test 01/24/23  1158 01/24/23  0521 01/23/23  2049 01/23/23  0711 01/23/23  0609 01/22/23  1153 01/22/23  0613 01/21/23  0823 01/21/23  0614   NA  --   --   --   --  137   --  137  --  136   POTASSIUM  --   --   --   --  5.0  --  4.5  --  4.5   CHLORIDE  --   --   --   --  110*  --  111*  --  110*   CO2  --   --   --   --  15*  --  14*  --  14*   BUN  --   --   --   --  23.6*  --  28.9*  --  38.5*   CR  --   --   --   --  1.52*  --  1.45*  --  1.54*   ANIONGAP  --   --   --   --  12  --  12  --  12   ABEL  --   --   --   --  8.2*  --  8.5*  --  8.4*   * 180* 298*   < > 214*   < > 140*   < > 117*    < > = values in this interval not displayed.     Most Recent 2 LFT's:Recent Labs   Lab Test 01/20/23  0740 07/22/22  0745   AST 52* 30   ALT 22 16   ALKPHOS 96 151*   BILITOTAL 0.4 0.8     Most Recent 3 INR's:No lab results found.  Most Recent INR's and Anticoagulation Dosing History:  Anticoagulation Dose History    There is no flowsheet data to display.       Most Recent 3 Creatinines:Recent Labs   Lab Test 01/23/23  0609 01/22/23  0613 01/21/23  0614   CR 1.52* 1.45* 1.54*     Most Recent 3 Hemoglobins:Recent Labs   Lab Test 01/24/23  0800 01/23/23  0609 01/22/23  1854   HGB 7.8* 7.2* 8.0*     Most Recent 3 Troponin's:No lab results found.  Most Recent 3 BNP's:Recent Labs   Lab Test 08/20/21  0740 03/16/20  0711   NTBNP 9,278* 2,679*     Most Recent D-dimer:No lab results found.  Most Recent Cholesterol Panel:Recent Labs   Lab Test 01/20/23  0740   CHOL 73   LDL 27   HDL 29*   TRIG 87     7-Day Micro Results     Collected Updated Procedure Result Status      01/20/2023 1222 01/20/2023 1345 Asymptomatic COVID-19 Virus (Coronavirus) by PCR Nasopharyngeal [[id number]]    Swab from Nasopharyngeal    Final result Component Value   SARS CoV2 PCR Negative   NEGATIVE: SARS-CoV-2 (COVID-19) RNA not detected, presumed negative.                Most Recent TSH and T4:Recent Labs   Lab Test 01/20/23  0740   TSH 8.56*   T4 1.42     Most Recent Hemoglobin A1c:Recent Labs   Lab Test 01/20/23  0740   A1C 6.9*     Most Recent 6 glucoses:Recent Labs   Lab Test 01/24/23  1158 01/24/23  0521  01/23/23  2049 01/23/23  1649 01/23/23  1158 01/23/23  0711   * 180* 298* 193* 268* 208*     Most Recent Urinalysis:Recent Labs   Lab Test 01/20/23  1626 09/11/18  1651 01/31/17  1452   COLOR Light Yellow   < > Yellow   APPEARANCE Clear   < > Clear   URINEGLC >=1000*   < > Negative   URINEBILI Negative   < > Negative   URINEKETONE Negative   < > Negative   SG 1.016   < > <=1.005   UBLD Negative   < > Moderate*   URINEPH 5.0   < > 6.0   PROTEIN Negative   < > Negative   UROBILINOGEN  --   --  0.2   NITRITE Negative   < > Negative   LEUKEST Negative   < > Moderate*   RBCU 1   < > 2-5*   WBCU 1   < > 10-25*    < > = values in this interval not displayed.     Most Recent ABG:No lab results found.  Most Recent ESR & CRP:Recent Labs   Lab Test 01/20/23  0740 03/16/20  0711   SED 12  --    CRP  --  <2.9   CRPI <3.00  --      Most Recent Anemia Panel:Recent Labs   Lab Test 01/24/23  0800 01/23/23  0609 01/20/23 2011 01/20/23  1159   WBC  --  8.5   < > 14.3*   HGB 7.8* 7.2*   < > 5.9*   HCT  --  25.6*   < > 21.5*   MCV  --  107*   < > 118*   PLT  --  251   < > 331   IRON  --   --   --  25*   IRONSAT  --   --   --  7*   RETICABSCT  --   --   --  0.247*   RETP  --   --   --  13.5*   FEB  --   --   --  363   ASIA  --   --   --  43   B12  --   --   --  2,452*   FOLIC  --   --   --  15.5    < > = values in this interval not displayed.     Most Recent CPK:No lab results found.,   Results for orders placed or performed in visit on 01/20/23   CT Chest Abdomen Pelvis w/o Contrast     Value    Radiologist flags Diverticulitis (Urgent)    Narrative    EXAMINATION: CT CHEST ABDOMEN PELVIS W/O CONTRAST, 1/20/2023 8:21 AM    TECHNIQUE:  Helical CT images from the thoracic inlet through the  symphysis pubis were obtained  without contrast.     COMPARISON: CT 12/30/2022     HISTORY: h/o diverticulitis now with worse L sided groin, abdominal,  and lower L chest pain. No contrast; High blood cholesterol; Abdominal  pain, left lower  quadrant    FINDINGS:    Chest:    Lungs: Nonspecific mosaic attenuation. Few sub-6 mm nodules including  3 mm nodule in the right middle lobe (series 5, image 121). 5 mm  subpleural nodule within the left lower lobe superior segment (5/80).  Small amount right pleural effusion. No pneumothorax.    Airways: Clear.    Vessels: Main pulmonary artery is enlarged (3.7 cm). Aorta is normal  in caliber with moderate atherosclerotic calcification. Mild coronary  artery calcifications.    Heart: Heart size is enlarged with small cyst    Lymph nodes: Few prominent mediastinal lymph node including 9 mm  subcarinal lymph node, 7 mm precarinal lymph node, 5 mm pretracheal  lymph node; limited evaluation of hilum due to noncontrast technique.    Thyroid: Status post thyroidectomy.    Esophagus: Unremarkable.    Abdomen and pelvis:    Limited evaluation with noncontrast technique    Liver: No focal mass. No intrahepatic biliary ductal dilation.  Mild  dysmorphic liver which may be seen with chronic liver disease.    Biliary System: Gallbladder is surgically absent. No extrahepatic  biliary ductal dilation.    Pancreas: No mass or pancreatic ductal dilation. No peripancreatic  collection. Increased nonspecific streakiness along the aidee hepatis  and pancreatic head. No additional peripancreatic streakiness.    Adrenal glands: No mass or nodules    Spleen: Normal.    Kidneys: No suspicious mass, obstructing calculus or hydronephrosis.    Gastrointestinal tract : Appendix not definitively identified though  no focal inflammatory changes in the right lower quadrant to suggest  appendicitis.. Normal caliber small bowel.  Colonic diverticulosis.  Mild increased streakiness along sigmoid/distal descending colon  including pericolonic fat stranding and edematous changes (2/ 430)    Mesentery/peritoneum/retroperitoneum: Some amount intra-abdominal  ascites. Nonspecific mesenteric streakiness. No free air.    Lymph nodes: No significant  lymphadenopathy.    Vasculature: Limited evaluation. Moderate to severe atherosclerotic  calcification of abdominal aorta with no aneurysmal dilation.      Pelvis: Urinary bladder is normal. Uterus and adnexa within normal  limits.    Osseous structures: No aggressive or acute osseous lesion.  Multilevel  degenerative changes of the spine,       Soft tissues: Unchanged asymmetric atrophy of the right rectus muscle.  Surgical changes along the abdominal wall. Mild anasarca.      Impression    IMPRESSION:  Compared to prior CT abdomen pelvis 12/30/2022, the  current scan shows:    1. Colonic diverticulosis with mild pericolonic streakiness along the  distal descending/proximal sigmoid colon, uncomplicated acute  diverticulitis cannot be excluded. No intra-abdominal collection or  pneumoperitoneum.  2. New mild ascites and mild right pleural effusion. Mild nonspecific  mesenteric streakiness including streakiness along the aidee hepatis.  Consider correlation for more likely volume overload or focal  pancreatitis.  3. Few sub-6 mm pulmonary nodules. Please consider correlation with  prior imaging for stability and are follow-up CT chest in 12 months if  patient is at high risk for lung cancer.  4. Additional chronic/incidental findings as described above, similar  to prior CT from 12/20/2022 including pericardial effusion.    [Urgent Result: Diverticulitis]    Finding was identified on 1/20/2023 9:01 AM.     Dr Meyers was contacted by Dr. Locke at 1/20/2023 9:02 AM and  verbalized understanding of the critical finding.     I have personally reviewed the examination and initial interpretation  and I agree with the findings.    POOJA REN MD         SYSTEM ID:  [id number]       Discharge Medications   Discharge Medication List as of 1/24/2023 12:10 PM      START taking these medications    Details   ciprofloxacin (CIPRO) 500 MG tablet Take 1 tablet (500 mg) by mouth 2 times daily for 5 doses, Disp-5 tablet, R-0,  E-Prescribe      metroNIDAZOLE (FLAGYL) 500 MG tablet Take 1 tablet (500 mg) by mouth 3 times daily for 5 doses, Disp-5 tablet, R-0, E-Prescribe      pantoprazole (PROTONIX) 40 MG EC tablet Take 1 tablet (40 mg) by mouth 2 times daily, Disp-60 tablet, R-1, E-Prescribe      polyethylene glycol (MIRALAX) 17 g packet Take 17 g by mouth 2 times daily for 30 days, Disp-60 packet, R-0, E-Prescribe      sennosides (SENOKOT) 8.6 MG tablet Take 1 tablet by mouth 2 times daily as needed for constipation, Disp-30 tablet, R-1, E-Prescribe         CONTINUE these medications which have NOT CHANGED    Details   atenolol (TENORMIN) 50 MG tablet Take 1 tablet (50 mg) by mouth daily, Disp-90 tablet, R-3, E-Prescribe      atorvastatin (LIPITOR) 40 MG tablet Take 1 tablet (40 mg) by mouth daily, Disp-90 tablet, R-3, E-Prescribe      blood glucose (ACCU-CHEK FELIPE PLUS) test strip 200 strips by In Vitro route 2 times daily Use to test blood sugar 2 times daily or as directed., Disp-200 strip,R-4, E-Prescribe      Cholecalciferol (VITAMIN D) 2000 units CAPS Take by mouth daily, Historical      Cyanocobalamin (B-12) 1000 MCG TABS Take 1,000 mcg by mouth three times a week, Historical      diphenhydrAMINE (BENADRYL) 25 MG tablet Take 25 mg by mouth every 6 hours as needed for itching or allergies, Historical      empagliflozin (JARDIANCE) 10 MG TABS tablet Take 1 tablet (10 mg) by mouth daily, Disp-90 tablet, R-3, E-Prescribe      fluticasone (FLONASE) 50 MCG/ACT nasal spray Spray 1 spray into both nostrils daily, Disp-16 g, R-3, E-Prescribe      !! glipiZIDE (GLUCOTROL XL) 10 MG 24 hr tablet Take 2 tablets (20 mg) by mouth daily, Disp-180 tablet, R-3, E-Prescribe      !! glipiZIDE (GLUCOTROL XL) 5 MG 24 hr tablet Take 1 tablet (5 mg) by mouth daily Take with 10 mg dose for a total of 15 mg daily, Disp-90 tablet, R-3, E-Prescribe      hydrALAZINE (APRESOLINE) 50 MG tablet Take 1.5 tablets (75 mg) by mouth 3 times daily, Disp-270 tablet,  R-3, E-Prescribe      levothyroxine (SYNTHROID/LEVOTHROID) 112 MCG tablet Take 1 tablet (112 mcg) by mouth daily, Disp-90 tablet, R-4, E-Prescribe      losartan (COZAAR) 100 MG tablet Take 1 tablet (100 mg) by mouth daily, Disp-90 tablet, R-3, E-Prescribe      mesalamine (APRISO ER) 0.375 g 24 hr capsule TAKE 4 CAPSULES(1.5 GRAMS) BY MOUTH DAILY, Disp-360 capsule, R-3, E-Prescribe      torsemide (DEMADEX) 10 MG tablet Take 1 tablet (10 mg) by mouth as needed (when cardiology clinic advises), Disp-90 tablet, R-3, E-Prescribe       !! - Potential duplicate medications found. Please discuss with provider.      STOP taking these medications       ferrous gluconate (FERGON) 324 (38 Fe) MG tablet Comments:   Reason for Stopping:         rivaroxaban ANTICOAGULANT (XARELTO) 15 MG TABS tablet Comments:   Reason for Stopping:             Allergies   Allergies   Allergen Reactions     Actos [Pioglitazone]      Fluid retention     Amlodipine      Leg swelling, hand swelling     Animal Dander      Doxycycline Hives     Dust Mites      Grass      Keflex [Cephalexin Hcl] Hives     Metoprolol      Swelling of lower legs and fatigue     Other [Seasonal Allergies]      pollen     Ranitidine      rash     Synthroid [Levothroid] Swelling     Allergic to brand name     Tetracycline Hives     Tylenol Hives     Ziac [Bisoprolol-Hydrochlorothiazide]

## 2023-01-24 NOTE — PROGRESS NOTES
Care Management Discharge Note    Discharge Date: 01/24/2023       Discharge Disposition: Home    Discharge Services:  None    Discharge DME: None    Discharge Transportation:  Son will transport    Private pay costs discussed: Not applicable    PAS Confirmation Code:  N/A  Patient/family educated on Medicare website which has current facility and service quality ratings:  N/A    Education Provided on the Discharge Plan:  Yes  Persons Notified of Discharge Plans: pt, treatment team, nursing staff,   Patient/Family in Agreement with the Plan:  Yes    Handoff Referral Completed: Yes    Additional Information:  @1159AM SHLOMO called pt by bedside phone to confirm discharge plans.  SHLOMO informed by pt that pts son will be assisting with transport. No other discharge needs identified.     SHLOMO completed and faxed IMM document.     DARIN Fischer, [facility]  [address]   Office: [phone]   Pager: [phone]  [email or web address]

## 2023-01-25 ENCOUNTER — RESULT FOLLOW UP (OUTPATIENT)
Dept: GASTROENTEROLOGY | Facility: CLINIC | Age: 73
End: 2023-01-25
Payer: MEDICARE

## 2023-01-25 LAB
PATH REPORT.COMMENTS IMP SPEC: NORMAL
PATH REPORT.COMMENTS IMP SPEC: NORMAL
PATH REPORT.FINAL DX SPEC: NORMAL
PATH REPORT.GROSS SPEC: NORMAL
PATH REPORT.MICROSCOPIC SPEC OTHER STN: NORMAL
PATH REPORT.RELEVANT HX SPEC: NORMAL
PHOTO IMAGE: NORMAL

## 2023-01-25 NOTE — PROGRESS NOTES
Ms. Montoya,     I am writing to let you know the results of the biopsy samples taken during your recent endoscopy exam:    There was no evidence of Helicobacter Pylori infection on stomach biopsies. These sometimes cause ulcers in stomach. Nothing else needs to be done for now. Monitor your blood counts and if any recurrence of black stools, further work up can be pursued.     Final Diagnosis   A. STOMACH, BIOPSY:   -Gastric antral and body mucosa with erosions and chronic inactive gastritis   -No H. pylori-like organisms identified on immunohistochemical stain   -Negative for intestinal metaplasia and dysplasia       If you have any questions, please don't hesitate to contact the Gastroenterology Clinic team at [phone].     Sincerely,    Ricki Deal MD, MPH   of Medicine  Division of Gastroenterology, Hepatology, and Nutrition  [facility]

## 2023-01-26 ENCOUNTER — PATIENT OUTREACH (OUTPATIENT)
Dept: CARE COORDINATION | Facility: CLINIC | Age: 73
End: 2023-01-26
Payer: MEDICARE

## 2023-01-26 DIAGNOSIS — E89.0 POSTOPERATIVE HYPOTHYROIDISM: ICD-10-CM

## 2023-01-26 RX ORDER — LEVOTHYROXINE SODIUM 112 UG/1
112 TABLET ORAL DAILY
Qty: 90 TABLET | Refills: 4 | Status: SHIPPED | OUTPATIENT
Start: 2023-01-26 | End: 2024-02-06

## 2023-01-26 NOTE — PROGRESS NOTES
[facility] Care Resource Center Contact  [facility]/Voicemail     Clinical Data: Transitional Care Management Outreach     Outreach attempted x 2.  Left message on patient's voicemail, providing [facility]'s 24/7 scheduling and nurse triage phone number [phone] ([phone]) for questions/concerns and/or to schedule an appt with an [facility] provider, if they do not have a PCP.      Plan:  [facility] will do no further outreaches at this time.       Lisy Aguilar RN  Connected Care Resource Center, [facility]    *Connected Care Resource Team does NOT follow patient ongoing. Referrals are identified based on internal discharge reports and the outreach is to ensure patient has an understanding of their discharge instructions.

## 2023-01-27 ENCOUNTER — TELEPHONE (OUTPATIENT)
Dept: GASTROENTEROLOGY | Facility: CLINIC | Age: 73
End: 2023-01-27
Payer: MEDICARE

## 2023-01-27 ENCOUNTER — TELEPHONE (OUTPATIENT)
Dept: INTERNAL MEDICINE | Facility: CLINIC | Age: 73
End: 2023-01-27
Payer: MEDICARE

## 2023-01-27 ENCOUNTER — TELEPHONE (OUTPATIENT)
Dept: INTERNAL MEDICINE | Facility: CLINIC | Age: 73
End: 2023-01-27

## 2023-01-27 DIAGNOSIS — R91.8 PULMONARY NODULES: Primary | ICD-10-CM

## 2023-01-27 NOTE — TELEPHONE ENCOUNTER
Screening Questions    BlueKIND OF PREP RedLOCATION [review exclusion criteria] GreenSEDATION TYPE    N Have you had a positive covid test in the last 2 weeks?   If yes, what date? Schedule out 14 days from symptoms  Y and your able to make your own medical decisions?  Y Are you active on mychart?   MEDICARE AETNA What insurance do you carry?    COLBY SR Ordering/Referring Provider:     26.7 BMI [BMI OVER 40-EXTENDED PREP]  BMI OVER 40 NEED PAC EVALUATION FOR [facility]    Respiratory Screening  [If yes to any of the following HOSPITAL setting only]     N      Do you use daily home oxygen?   N      Do you have mod to severe Obstructive Sleep Apnea?  Y      Do you have Pulmonary Hypertension? NEED PAC APPT AT [facility]    N      Do you have UNCONTROLLED asthma?      N Have you had a heart or lung transplant?       N Are you currently on dialysis? [If yes, G-PREP & HOSPITAL setting only]   Y  Do you have chronic kidney disease? [If yes, G-PREP]  Y Do you have a diagnosis of diabetes?[If yes, G-PREP]    N Have you had a stroke or Transient ischemic attack (TIA - aka  mini stroke ) within 6 months? (If yes, please review exclusion criteria)  N  In the past 6 months, have you had any heart related issues including cardiomyopathy or heart attack?   N  If yes, did it require cardiac stenting or other implantable device?       N Do you have any implantable devices in your body (pacemaker, defib, LVAD)? (If yes, please review exclusion criteria)    N Do you take nitroglycerin?   N If yes, how often?  (If yes, HOSPITAL setting ONLY)  N Are you currently taking any blood thinners?           [IF YES, INFORM PATIENT TO FOLLOW UP W/ ORDERING PROVIDER FOR BRIDGING INSTRUCTIONS]     N  Do you take Phentermine?   Yes-> Hold for 7 days before procedure.  Please consult your prescribing provider if you have questions about holding this  medication.         [FEMALES] are you currently pregnant?       If yes, how many weeks? [Greater than 12 weeks, OR NEEDED]    N Any prescription pain medications taken daily like narcotics on a routine schedule? [EXTENDED PREP AND MAC]  (ex narcotics: tramadol, oxycodone, roxicodone, oxyxontin,  and percocet)?    N  Any chemical dependencies such as alcohol, street drugs, or methadone? [If yes, MAC]    N Do you need help transferring? (If NO, please HOSPITAL setting  only)     N  On a regular basis do you go 3-5 days without a bowel movement?[ If yes, EXTENDED PREP.]     Preferred LOCAL Pharmacy for Pre Prescription:      [id number] - [place], [place] - [address] AT [address]                        Scheduling Details    [place] Caller:   (Please ask for phone number if not scheduled by patient)    Type of Procedure Scheduled: Lower Endoscopy [Colonoscopy]      [facility]-If you answer yes to questions #8, #20, #21 Which Colonoscopy Prep was Sent:   [facility] PATIENTS & [facility]S PATIENTS NEEDS EXTENDED PREP     Date of Procedure:    Surgeon:   [facility] Location: PULMONARY HYPERTENTION    CS Sedation Type:     Conscious Sedation- Needs  for 6 hours after the procedure  MAC/General-Needs  for 24 hours after procedure    Y Informed patient they will need an adult          Cannot take any type of public or medical transportation alone    Y Confirmed Nurse will call to complete assessment     Y PAC APPT      Pre-Procedure Covid test to be completed [[facility] PCR Testing Required]    DOUBLE CHECK BMI AND PURA  NEEDS ADULT -CANNOT TAKE PUBILC OR MEDICAL TRANSPOTATION  COVID TEST FOR [facility] 3-4 DAYS  COIVD TEST FOR [facility] CAN BE HOME     Additional comments: PT WANTED ME TO SEND MESSAGE TO CLARA BECAUSE SHE IS HER GI DOCTOR AND SAID [facility] WANTED TO DO IT BUT PT NEEDS HOSPITAL SETTING SENT MESSAGE

## 2023-01-27 NOTE — TELEPHONE ENCOUNTER
[facility] Call Center    Phone Message    May a detailed message be left on voicemail: yes     Reason for Call: Medication Refill Request    Has the patient contacted the pharmacy for the refill? Yes   Name of medication being requested: levothyroxine (SYNTHROID/LEVOTHROID) 112 MCG tablet  Provider who prescribed the medication: PCP  Pharmacy: [id number] - [place], [place] - [address] [facility] AT [facility] [address] [place] (Ph: [phone]  Date medication is needed: ASAP      Action Taken: Message routed to:  [facility] PCP    Travel Screening: Not Applicable

## 2023-01-27 NOTE — TELEPHONE ENCOUNTER
Bertha Miner Noelle, RN; P Endoscopy Scheduling Pool  Good Afternoon,     Georgia called in to schedule her colonoscopy. She has to be seen in hospital setting due to pulmonary hypertension and mentioned that Dr James is her GI doctor and that Dr James told her that she wanted to do her colonoscopy. I don't see that Dr James has any time at [facility]. Is there another doctor that she would like her to see? She needs to be seen in the next 3 months. She did see Say on 1/20 and I did mention that and I did mention that he recommended her coming back in 3 months, but the patient said she seen a lot of doctors and that Dr James came in and told her she wanted to do it so I told her I would make sure and ask.     Please advise     Thank you   Bertha

## 2023-01-27 NOTE — TELEPHONE ENCOUNTER
Placed CT chest order for one year 1/2024 for lung nodules    Dear Ms. Montoya;    The results of your CT scan are attached and you have some pulmonary nodules and I recommend we repeat in about one year. I placed an order for this and you can call [phone] to schedule this test.    CAROLINE Meyers MD

## 2023-02-02 ENCOUNTER — LAB (OUTPATIENT)
Dept: LAB | Facility: CLINIC | Age: 73
End: 2023-02-02
Payer: MEDICARE

## 2023-02-02 ENCOUNTER — OFFICE VISIT (OUTPATIENT)
Dept: INTERNAL MEDICINE | Facility: CLINIC | Age: 73
End: 2023-02-02
Payer: MEDICARE

## 2023-02-02 VITALS
HEART RATE: 77 BPM | SYSTOLIC BLOOD PRESSURE: 111 MMHG | HEIGHT: 63 IN | TEMPERATURE: 98 F | WEIGHT: 143.8 LBS | OXYGEN SATURATION: 97 % | DIASTOLIC BLOOD PRESSURE: 63 MMHG | BODY MASS INDEX: 25.48 KG/M2

## 2023-02-02 DIAGNOSIS — N18.30 STAGE 3 CHRONIC KIDNEY DISEASE, UNSPECIFIED WHETHER STAGE 3A OR 3B CKD (H): ICD-10-CM

## 2023-02-02 DIAGNOSIS — D62 ANEMIA DUE TO BLOOD LOSS, ACUTE: ICD-10-CM

## 2023-02-02 DIAGNOSIS — D62 ANEMIA DUE TO BLOOD LOSS, ACUTE: Primary | ICD-10-CM

## 2023-02-02 DIAGNOSIS — R18.8 OTHER ASCITES: ICD-10-CM

## 2023-02-02 LAB
ALBUMIN SERPL BCG-MCNC: 4.1 G/DL (ref 3.5–5.2)
ALP SERPL-CCNC: 106 U/L (ref 35–104)
ALT SERPL W P-5'-P-CCNC: 11 U/L (ref 10–35)
ANION GAP SERPL CALCULATED.3IONS-SCNC: 11 MMOL/L (ref 7–15)
AST SERPL W P-5'-P-CCNC: 44 U/L (ref 10–35)
BILIRUB DIRECT SERPL-MCNC: <0.2 MG/DL (ref 0–0.3)
BILIRUB SERPL-MCNC: 0.4 MG/DL
BUN SERPL-MCNC: 23 MG/DL (ref 8–23)
CALCIUM SERPL-MCNC: 9.5 MG/DL (ref 8.8–10.2)
CHLORIDE SERPL-SCNC: 98 MMOL/L (ref 98–107)
CREAT SERPL-MCNC: 1.52 MG/DL (ref 0.51–0.95)
DEPRECATED HCO3 PLAS-SCNC: 26 MMOL/L (ref 22–29)
ERYTHROCYTE [DISTWIDTH] IN BLOOD BY AUTOMATED COUNT: 17.2 % (ref 10–15)
GFR SERPL CREATININE-BSD FRML MDRD: 36 ML/MIN/1.73M2
GLUCOSE SERPL-MCNC: 212 MG/DL (ref 70–99)
HCT VFR BLD AUTO: 32.1 % (ref 35–47)
HGB BLD-MCNC: 9.5 G/DL (ref 11.7–15.7)
MCH RBC QN AUTO: 27.9 PG (ref 26.5–33)
MCHC RBC AUTO-ENTMCNC: 29.6 G/DL (ref 31.5–36.5)
MCV RBC AUTO: 94 FL (ref 78–100)
PLATELET # BLD AUTO: 338 10E3/UL (ref 150–450)
POTASSIUM SERPL-SCNC: 4.7 MMOL/L (ref 3.4–5.3)
PROT SERPL-MCNC: 7.7 G/DL (ref 6.4–8.3)
RBC # BLD AUTO: 3.41 10E6/UL (ref 3.8–5.2)
SODIUM SERPL-SCNC: 135 MMOL/L (ref 136–145)
WBC # BLD AUTO: 6.9 10E3/UL (ref 4–11)

## 2023-02-02 PROCEDURE — 82248 BILIRUBIN DIRECT: CPT | Performed by: PATHOLOGY

## 2023-02-02 PROCEDURE — 36415 COLL VENOUS BLD VENIPUNCTURE: CPT | Performed by: PATHOLOGY

## 2023-02-02 PROCEDURE — 85027 COMPLETE CBC AUTOMATED: CPT | Performed by: PATHOLOGY

## 2023-02-02 PROCEDURE — 99214 OFFICE O/P EST MOD 30 MIN: CPT | Mod: GC | Performed by: STUDENT IN AN ORGANIZED HEALTH CARE EDUCATION/TRAINING PROGRAM

## 2023-02-02 PROCEDURE — 80053 COMPREHEN METABOLIC PANEL: CPT | Performed by: PATHOLOGY

## 2023-02-02 NOTE — TELEPHONE ENCOUNTER
Prescription approved per [facility] for 6 months of refills since last appointment, which was 6/24/2019.    96

## 2023-02-02 NOTE — NURSING NOTE
Keerthi Montoya is a 72 year old female that presents in clinic today for the following:     Chief Complaint   Patient presents with     Hospital F/U       The patient's allergies and medications were reviewed. The patient's vitals were obtained without incident. The patient does not have any other questions for the provider.     ROMI Gzt at 8:13 AM on 2/2/2023.  Primary Care Clinic: [phone]

## 2023-02-02 NOTE — PROGRESS NOTES
"CC: hospital follow up      HPI:  72F with PMH of diverticulitis, DMII, pulm UTN, ulcerative colitis, CKD, afib was hospitalized 1/20-1/24 at [facility] for GI bleed resulting in acute blood loss anemia requiring transfusion. Had flex sig 1/23 - poor prep, no blood visualized. Has not scheduled colonoscopy yet, is supposed to get repeat as an outpatient given AC held until then.    Since discharge no blood in stool, melena, abd pain, f/c, diarrhea, n/v. Has been eating normally. No complaints. Not feeling fatigued or dizzy.     BG have been running 100-200 in AMs. Says she is working with pharmacy on her medications for diabetes.    Afib - no palpitations. Off xarelto since discharge. No dizziness, chest pain. BP has been running in 110s at home.    PMH: I have personally reviewed the patient's medical history, medications, and allergies.      /63 (BP Location: Right arm, Patient Position: Sitting, Cuff Size: Adult Regular)   Pulse 77   Temp 98  F (36.7  C) (Axillary)   Ht 1.6 m (5' 3\")   Wt 65.2 kg (143 lb 12.8 oz)   LMP  (LMP Unknown)   SpO2 97%   BMI 25.47 kg/m      Physical Examination:    General:  Alert, NAD  HEENT: NC/AT.   Lungs:  CTAB.   C/V:  Regular rate, irregular rhythm, systolic murmur  Abdomen:  Soft, ND, NT, no guarding  Neuro: Alert and conversant, face symmetric. No gross neurologic deficits.  Skin:   warm, dry, no rashes on exposed skin surfaces  Extremities:  No peripheral edema  Psych:  Alert and oriented. Appropriate affect.      A&P:  Acute blood loss anemia  Hb was down to 5's on admission, at discharge was 7.8. will recheck today. No further e/o ongoing bleeding.  -xarelto held GI f/up colonoscopy - they have not called her back about this so will re-send colonoscopy referral  -on po PPI BID  -check CBC today    Afib  Still in afib but rate controlled on atenolol. xarelto on hold per above. Has had GIB in the past, unsure if she will be able to safely resume AC even with high " [facility].    Mild Diverticulitis  Got course of 7d cipro/flagyl inpatient. Asymptomatic, doing well, eating and stool normally.    Pulmonary nodules  -needs f/up CT chest 1 year (1/2024). Pt is aware of this. Already ordered by Dr Meyers.    DMII  -on glipizide, jardiance  -last a1c 1/20 was 6.9  -no changes to medications today    Mild ascites on CT scan  Never heavy ETOH use. This was noted in setting of GI bleed and diverticulitis per above.  -hepatic panel    Loretta Ospina MD  IM Resident PGY-3    This patient was discussed and seen with Dr Hdz

## 2023-02-10 NOTE — TELEPHONE ENCOUNTER
Christopher approved adding patient on for 3/6, but she is not available and needs a Thursday or Friday appointment when she does not have her grandchildren. Staff message to Allison to see if Jacob can do a Thursday or Friday.

## 2023-02-15 NOTE — TELEPHONE ENCOUNTER
----- Message -----   From: Allison Yang, VERONIQUE   Sent: 2/13/2023  11:17 AM CST   To: Brenda Lin   Subject: RE: NEEDS TO BE SEEN AT [facility]                       Yes, she is on-service that whole week of March 6th. Of note - Dr. James told ME she was okay with Danish doing her scope. So if the patient is open to another provider, that is an option. Maybe Dr. James forgot she told the patient she wanted to do her scope personally. Not sure!     Allison   ----- Message -----   From: Brenda Dejesus   Sent: 2/10/2023  11:46 AM CST   To: Allison Lin RN   Subject: RE: NEEDS TO BE SEEN AT [facility]                       I got approval from Christopher, but patient is not available on 3/6. She needs a Thursday or Friday when she does not have her grandchildren. Would 3/9 or 3/10 be available for Dr. James? I can check with Christopher again.     Thanks,   Rob THOMAS   Endoscopy Scheduling Team   ----- Message -----   From: Allison Yang, VERONIQUE   Sent: 2/9/2023   4:16 PM CST   To: Bertha Miner, Endoscopy Scheduling Pool   Subject: RE: NEEDS TO BE SEEN AT [facility]                       Yes, you can schedule with Dr. James at the [facility] while she is on service in March.     March 6th anytime after 9am, please!

## 2023-02-15 NOTE — TELEPHONE ENCOUNTER
- SCHEDULING DETAILS -  yes Hospital Setting Required? If yes, what is the exclusion?: pulmonary hypertension   Shmidt  Surgeon    03/10/23  Date of Procedure  Lower Endoscopy [Colonoscopy]  Type of Procedure Scheduled  [facility]-If you answer yes to questions #8, #20, #21Which Colonoscopy Prep was Sent?     Moderate Sedation Type     Y - 03/03/23 @ 9:00 am PAC / Pre-op Required

## 2023-02-18 ENCOUNTER — HEALTH MAINTENANCE LETTER (OUTPATIENT)
Age: 73
End: 2023-02-18

## 2023-02-23 NOTE — PROGRESS NOTES
[facility] Cardiology Consultation:    Assessment and Plan:     1.    Chronic HFpEF, group 2 severe pulmonary hypertension related to diastolic dysfunction, moderately reduced RV function, functional class I-II.    Follows in core clinic. Torsemide 10 daily prn, not using currently  Continue Jardiance  2.  Hypertension, controlled on current regimen.  3.  Moderate paradoxical low flow aortic stenosis, possible bicuspid aortic valve: Continue periodic monitoring  4. Diabetes   5.  Dyslipidemia, subclinical CAD with moderate CAC on CT: on Lipitor.  6.  CKD stage 4  7. Ulcerative colitis  8.  Persistent atrial fibrillation, rate controlled, asymptomatic, normal LV function, high [id number] score:  Continue atenolol.  Given lack of symptoms and normal LV function, rate control strategy is reasonable.  Anticoagulation is indicated to decrease stroke risk, however it has been stopped due to recent significant GI bleed leading to hospitalization and transfusion.  We discussed left atrial appendage closure with Watchman device, she is agreeable.    We will order prewatchman CT heart, refer to watchman clinic.  Typically anticoagulation is still needed for at least 6 weeks, I have sent a message to her GI physician to discuss anticoagulation strategy.     Follow-up in 6 months     Chay Gaines MD  Cardiology Fellow    Plan of care discussed with Dr. Almendarez who agrees with the above assessment and plan.    [facility] Cardiovascular Division    I have seen and examined the patient, reviewed labs and tests. I have discussed my findings and treatment recommendations with the house staff and/or Cardiology fellow and agree with their assessment and plan as outlined in the note.    A total of 40 minutes were spent on the day of the visit for chart review, care coordination, face-to-face consultation with the patient, and documentation.    David Almendarez MD    Cardiac Imaging and  [facility]  Pager: [phone]      HPI: HFpEF, pulmonary hypertension, Afib follow-up.      She was recently admitted from 1/20-24 with melena. Hb was noted to be 5.9 and she underwent EGD with GI on 1/23 which showed non bleeding gastric and duodenal ulcers and erosive gastropathy with no stigmata of bleeding. She was treated with IV PPI and transitioned to PO PPI at discharge. Her rivaroxaban was held during admission and plan was to readdress it during her outpatient GI appointment. She currently has a colonoscopy scheduled for 3/10.  No further GI bleeding since discharge.   Doing well from symptom stand point.  Patient denies chest pain/pressure/discomfort, palpitations, dyspnea on exertion, orthopnea, pedal edema.    Last cholesterol panel (1/2023) showed LDL 27, Total Cholestrol 73, HDL 29.  Last HbA1C (1/2023) was 6.9%.    Current cardiac meds:  Hydralazine 75mg TID  Atenolol 50mg daily  Losartan 100mg daily  Torsemide 10mg PRN  Empagliflozin 10mg daily  Atorvastatin 40mg daily      EXAM:  /62 (BP Location: Left arm, Patient Position: Sitting, Cuff Size: Adult Small)   Pulse 87   Wt 64.2 kg (141 lb 8 oz)   LMP  (LMP Unknown)   SpO2 98%   BMI 25.07 kg/m    GEN/CONSTITUIONAL: Appears comfortable, in no apparent distress   EYES: No icterus  ENT/MOUTH: Normal  JVP: Not elevated  RESPIRATORY: Clear to auscultation bilaterally   CARDIOVASCULAR: irregular S1 and S2, 2/6 JUANITA.   ABDOMEN: Soft, non-tender, positive bowel sounds   NEUROLOGIC: Grossly non-focal   PSYCHIATRIC: Normal affect  EXT: No LE edema. Normal pedal pulses.  Skin: No petechiae, purpura or rash    PAST MEDICAL HISTORY:  Past Medical History:   Diagnosis Date     Diabetes mellitus (H)      Diverticulosis of colon (without mention of hemorrhage) 10/1/2012     Hypertension      Pulmonary hypertension (H)      Ulcerative (chronic) enterocolitis (H)        CURRENT MEDICATIONS:  Current Outpatient Medications    Medication     atenolol (TENORMIN) 50 MG tablet     atorvastatin (LIPITOR) 40 MG tablet     blood glucose (ACCU-CHEK FELIPE PLUS) test strip     Cholecalciferol (VITAMIN D) 2000 units CAPS     Cyanocobalamin (B-12) 1000 MCG TABS     diphenhydrAMINE (BENADRYL) 25 MG tablet     empagliflozin (JARDIANCE) 10 MG TABS tablet     fluticasone (FLONASE) 50 MCG/ACT nasal spray     glipiZIDE (GLUCOTROL XL) 10 MG 24 hr tablet     glipiZIDE (GLUCOTROL XL) 5 MG 24 hr tablet     hydrALAZINE (APRESOLINE) 50 MG tablet     levothyroxine (SYNTHROID/LEVOTHROID) 112 MCG tablet     losartan (COZAAR) 100 MG tablet     mesalamine (APRISO ER) 0.375 g 24 hr capsule     pantoprazole (PROTONIX) 40 MG EC tablet     polyethylene glycol (MIRALAX) 17 g packet     sennosides (SENOKOT) 8.6 MG tablet     torsemide (DEMADEX) 10 MG tablet     No current facility-administered medications for this visit.     Facility-Administered Medications Ordered in Other Visits   Medication     sodium chloride (PF) 0.9% PF flush 10 mL       PAST SURGICAL HISTORY:  Past Surgical History:   Procedure Laterality Date     CHOLECYSTECTOMY  3/1987     COLON SURGERY  01/2001    Left colon resection; diverticulitis     COLONOSCOPY N/A 4/24/2017    Procedure: COMBINED COLONOSCOPY, SINGLE OR MULTIPLE BIOPSY/POLYPECTOMY BY BIOPSY;;  Surgeon: Radha Salguero MD;  Location: [facility] OR     COLONOSCOPY WITH CO2 INSUFFLATION N/A 4/24/2017    Procedure: COLONOSCOPY WITH CO2 INSUFFLATION;  Colonoscopy Dx rectal bleeding, BMI 25.86, Pharm [facility] .[phone], ;  Surgeon: Radha Salguero MD;  Location: [facility] OR     CV RIGHT HEART CATH MEASUREMENTS RECORDED N/A 3/16/2020    Procedure: CV RIGHT HEART CATH;  Surgeon: Nader Muñoz MD;  Location:  HEART CARDIAC CATH LAB     ESOPHAGOSCOPY, GASTROSCOPY, DUODENOSCOPY (EGD), COMBINED N/A 1/23/2023    Procedure: ESOPHAGOGASTRODUODENOSCOPY, WITH BIOPSY;  Surgeon: Ricki Deal MD;  Location:  GI     GYN SURGERY   1983    tubal ligation     HERNIA REPAIR  2006    recurrent incisional hernia     SIGMOIDOSCOPY FLEXIBLE N/A 2023    Procedure: Sigmoidoscopy flexible;  Surgeon: Ricki Deal MD;  Location:  GI     THROAT SURGERY  1974    thyroidectomy       ALLERGIES     Allergies   Allergen Reactions     Actos [Pioglitazone]      Fluid retention     Amlodipine      Leg swelling, hand swelling     Animal Dander      Doxycycline Hives     Dust Mites      Grass      Keflex [Cephalexin Hcl] Hives     Metoprolol      Swelling of lower legs and fatigue     Other [Seasonal Allergies]      pollen     Ranitidine      rash     Synthroid [Levothroid] Swelling     Allergic to brand name     Tetracycline Hives     Tylenol Hives     Ziac [Bisoprolol-Hydrochlorothiazide]        FAMILY HISTORY:  Family History   Problem Relation Age of Onset     Cerebrovascular Disease Mother      Cancer Father         bladder     Diverticulitis Brother      Diverticulitis Brother      Kidney Disease No family hx of      Crohn's Disease No family hx of      Ulcerative Colitis No family hx of      Stomach Cancer No family hx of      GERD No family hx of      Celiac Disease No family hx of        SOCIAL HISTORY:  Social History     Socioeconomic History     Marital status:      Spouse name: Raymundo; dementia;       Number of children: 3     Years of education: Not on file     Highest education level: Not on file   Occupational History     Employer: [employer]   Tobacco Use     Smoking status: Never     Smokeless tobacco: Never   Substance and Sexual Activity     Alcohol use: Not Currently     Alcohol/week: 2.0 - 4.0 standard drinks     Types: 1 - 2 Cans of beer, 1 - 2 Shots of liquor per week     Comment: occasional cocktail or beer     Drug use: No     Sexual activity: Not Currently   Other Topics Concern     Parent/sibling w/ CABG, MI or angioplasty before 65F 55M? Not Asked      Service No     Blood Transfusions No      Caffeine Concern No     Occupational Exposure No     Hobby Hazards No     Sleep Concern No     Stress Concern No     Weight Concern No     Special Diet No     Back Care No     Exercise Yes     Comment: off and on     Bike Helmet No     Seat Belt Yes     Self-Exams No   Social History Narrative    Laid off her job 8/14     Social Determinants of Health     Financial Resource Strain: Not on file   Food Insecurity: Not on file   Transportation Needs: Not on file   Physical Activity: Not on file   Stress: Not on file   Social Connections: Not on file   Intimate Partner Violence: Not on file   Housing Stability: Not on file       ROS:   Constitutional: No fever, chills, or sweats. No weight gain/loss   ENT: No visual disturbance, ear ache, epistaxis, sore throat  Allergies/Immunologic: Negative.   Respiratory: No cough, hemoptysia  Cardiovascular: As per HPI  GI: No nausea, vomiting, hematemesis, melena, or hematochezia  : No urinary frequency, dysuria, or hematuria  Integument: Negative  Psychiatric: Negative  Neuro: Negative  Endocrinology: Negative   Musculoskeletal: Negative    ADDITIONAL COMMENTS:     I reviewed the patient's medications:     I reviewed the patient's pertinent clinical laboratory studies:     I reviewed the patient's pertinent imaging studies:   I reviewed the patient's ECG:

## 2023-02-24 ENCOUNTER — OFFICE VISIT (OUTPATIENT)
Dept: CARDIOLOGY | Facility: CLINIC | Age: 73
End: 2023-02-24
Payer: MEDICARE

## 2023-02-24 ENCOUNTER — TELEPHONE (OUTPATIENT)
Dept: CARDIOLOGY | Facility: CLINIC | Age: 73
End: 2023-02-24

## 2023-02-24 ENCOUNTER — TELEPHONE (OUTPATIENT)
Dept: GASTROENTEROLOGY | Facility: CLINIC | Age: 73
End: 2023-02-24

## 2023-02-24 VITALS
SYSTOLIC BLOOD PRESSURE: 126 MMHG | BODY MASS INDEX: 25.07 KG/M2 | OXYGEN SATURATION: 98 % | HEART RATE: 87 BPM | DIASTOLIC BLOOD PRESSURE: 62 MMHG | WEIGHT: 141.5 LBS

## 2023-02-24 DIAGNOSIS — K92.1 GASTROINTESTINAL HEMORRHAGE WITH MELENA: ICD-10-CM

## 2023-02-24 DIAGNOSIS — R80.9 TYPE 2 DIABETES MELLITUS WITH MICROALBUMINURIA, WITHOUT LONG-TERM CURRENT USE OF INSULIN (H): ICD-10-CM

## 2023-02-24 DIAGNOSIS — E78.5 HYPERLIPIDEMIA LDL GOAL <100: ICD-10-CM

## 2023-02-24 DIAGNOSIS — I50.30 HEART FAILURE WITH PRESERVED EJECTION FRACTION, NYHA CLASS I (H): ICD-10-CM

## 2023-02-24 DIAGNOSIS — I48.19 PERSISTENT ATRIAL FIBRILLATION (H): Primary | ICD-10-CM

## 2023-02-24 DIAGNOSIS — E11.29 TYPE 2 DIABETES MELLITUS WITH MICROALBUMINURIA, WITHOUT LONG-TERM CURRENT USE OF INSULIN (H): ICD-10-CM

## 2023-02-24 PROCEDURE — 99215 OFFICE O/P EST HI 40 MIN: CPT | Mod: GC | Performed by: INTERNAL MEDICINE

## 2023-02-24 RX ORDER — ATORVASTATIN CALCIUM 40 MG/1
40 TABLET, FILM COATED ORAL DAILY
Qty: 90 TABLET | Refills: 3 | Status: SHIPPED | OUTPATIENT
Start: 2023-02-24 | End: 2023-07-14

## 2023-02-24 NOTE — PROGRESS NOTES
Keerthi Montoya's goals for this visit include:     She requests these members of her care team be copied on today's visit information: PCP    PCP: Bunny Meyers    Referring Provider:  No referring provider defined for this encounter.    /62 (BP Location: Left arm, Patient Position: Sitting, Cuff Size: Adult Small)   Pulse 87   Wt 64.2 kg (141 lb 8 oz)   LMP  (LMP Unknown)   SpO2 98%   BMI 25.07 kg/m      Do you need any medication refills at today's visit? No.    Kian Bowman, EMT  Clinic Support  [facility]    [phone]    Employed by [facility] [employer]

## 2023-02-24 NOTE — PATIENT INSTRUCTIONS
Take your medicines every day, as directed    Changes made today:  No med changes      Cardiology Care Coordinators:      Remedios Santoro, RN  Emily Bernal, RN     Yeimi Cesar, RN    Cardiology Rooming staff:  AMOL Barnard    Phone  [phone]      Fax [phone]    To Contact us    During Business Hours:  [phone]     If you are needing refills please contact your pharmacy.     For urgent after hour care please call the [facility] at [phone] or the [facility] at [phone] and ask to speak to the cardiologist on call.         HOW TO CHECK YOUR BLOOD PRESSURE AT HOME:    Avoid eating, smoking, and exercising for at least 30 minutes before taking a reading.    Be sure you have taken your BP medication at least 2-3 hours before you check it.     Sit quietly for 10 minutes before a reading.     Sit in a chair with your feet flat on the floor. Rest your  arm on a table so that the arm cuff is at the same level as your heart.    Remain still during the reading.  Record your blood pressure and pulse in a log and bring to your next appointment.     Use [employer] allows you to communicate directly with your heart team through secure messaging.  [facility] can be accessed any time on your phone, computer, or tablet.  If you need assistance, we'd be happy to help!         Keep your Heart Appointments:    Schedule [facility] at [facility]  [place] [facility] [place], they will call you.

## 2023-02-24 NOTE — TELEPHONE ENCOUNTER
Per staff message Dr James prefers to do procedures with MAC. MAC is not available on 3/10. Called and left a message for patient to return call and confirm that CS is okay.    If patient wants MAC, may have to coordinate a different day as Dr James does not have regular blocks at [facility].    Allison Yang RN   Endoscopy Scheduling Pool  I discussed this with Dr. James. Her preference would be to move both procedures to a MAC slot if you are able to accommodate. Otherwise, she is agreeable to doing them under conscious sedation if the patient is okay with it too. The patient had MAC with her last EGD.     Let us know if the schedule allows changing the procedures to a MAC slot.     Thank you,   Allison Yang RN GI Care Coordinator   Phone: [phone]           Previous Messages       ----- Message -----   From: Usama Zaidi   Sent: 2/24/2023   2:27 PM CST   To: Allison Yang, VERONIQUE Cooper,     I can add an EGD to the Colonoscopy on 3/10 but we do not have any MAC time available that day.     How would you like to proceed?     Thank you,     Usama   Endo Procedure    [phone] option 2       ----- Message -----   From: Allison Yang RN   Sent: 2/24/2023   1:22 PM CST   To: Endoscopy Scheduling Pool     Hello,     Could we please add-on an EGD on 3/10 during this patient's colonoscopy with Dr. James? If you need another order, let me know.     Thank you,   Allison Yang RN GI Care Coordinator   Phone: [phone]     ----- Message -----   From: Preeti James MD   Sent: 2/24/2023  12:10 PM CST   To: Bunny Meyers MD, Allison Yang, RN, *     Kenroy Rod,     Thanks for reaching out. It's hard to say what her risk for recurrent bleed is. Looks like she had a few small ulcers in the stomach and duodenum and was prescribed PPI. I can add on an EGD to her colonoscopy to check for healing. If the ulcers are gone, then perhaps Xarelto can be safely  resumed.     Allison -- can you please add on an EGD for 3/10? Can we get MAC?     -E         ----- Message -----   From: David Almendarez MD   Sent: 2/22/2023   9:11 AM CST   To: Bunny Meyers MD, MD Kenroy Hilliard,     Given Georgia's recent significant GI bleed, I think risk of recurrent bleeding if Xarelto is to be resumed will be high.  Do you agree?  In that scenario, I think she will be a good candidate to consider Watchman left atrial appendage closure device.  I am seeing her in clinic this Friday and will discuss it then.  We will wait till she has her scheduled colonoscopy done.  Once colonoscopy is done, please let me know if you think she can tolerate 6 weeks of anticoagulation after the Watchman procedure.     Viola Almendarez

## 2023-02-24 NOTE — TELEPHONE ENCOUNTER
Called and left  to call me to help schedule an appointment with Dr. Grimes on 4/6/23. Left contact information.

## 2023-02-24 NOTE — PROGRESS NOTES
Blood pressure at home is usually 120/60.   Uses treadmill around 3 times a week around 15 minutes each time.

## 2023-02-27 ENCOUNTER — TELEPHONE (OUTPATIENT)
Dept: GASTROENTEROLOGY | Facility: CLINIC | Age: 73
End: 2023-02-27

## 2023-02-27 ENCOUNTER — TELEPHONE (OUTPATIENT)
Dept: GASTROENTEROLOGY | Facility: CLINIC | Age: 73
End: 2023-02-27
Payer: MEDICARE

## 2023-02-27 DIAGNOSIS — D62 ANEMIA DUE TO BLOOD LOSS, ACUTE: Primary | ICD-10-CM

## 2023-02-27 DIAGNOSIS — K51.00 ULCERATIVE PANCOLITIS WITHOUT COMPLICATION (H): ICD-10-CM

## 2023-02-27 RX ORDER — BISACODYL 5 MG
TABLET, DELAYED RELEASE (ENTERIC COATED) ORAL
Qty: 4 TABLET | Refills: 0 | Status: SHIPPED | OUTPATIENT
Start: 2023-02-27 | End: 2023-04-07

## 2023-02-27 NOTE — TELEPHONE ENCOUNTER
Spoke with patient, informed patient that EGD has been added to Colonoscopy. Patient is agreeable with CS sedation. Staff message sent to Allison PIPER and Dr James. Updated instructions sent via [employer].

## 2023-02-27 NOTE — TELEPHONE ENCOUNTER
Pre assessment questions completed for upcoming Colonoscopy/Upper endoscopy (EGD) procedure scheduled on 3/10/23    COVID policy reviewed.     Pre-op exam? N/A    Reviewed procedural arrival time 0800, procedure time 0900 and facility location [facility]; [address], [place] [address]    Designated  policy reviewed. Instructed to have someone stay 6 hours post procedure.     Anticoagulation/blood thinners? No    Electronic implanted devices? No    Diabetic? Yes - Patient to hold oral diabetic medications day of procedure    Procedure indication: Anemia due to blood loss, acute    Bowel prep recommendation: Standard Golytely     Reviewed procedure prep instructions.     Prep instructions sent via [facility].  Bowel prep script sent to     [id number] - [place], [place] - [address] [facility].     Patient verbalized understanding and had no questions or concerns at this time.    Betty Slater RN  Endoscopy Procedure Pre Assessment RN

## 2023-02-27 NOTE — TELEPHONE ENCOUNTER
----- Message from Preeti James MD sent at 2/27/2023  3:59 PM CST -----  Regarding: FW: EGD Order  Do you mind placing the order please?    ----- Message -----  From: Betty Slater RN  Sent: 2/27/2023   3:38 PM CST  To: Preeti James MD  Subject: EGD Order                                        Hi there,    I was reviewing pt chart to go over instructions for pt's upcoming colonoscopy and EGD on 3/10/23. I do not see a order for an EGD.     Can you please place order.     Thank you.    Betty Slater RN on 2/27/2023 at 3:38 PM

## 2023-03-03 ENCOUNTER — PRE VISIT (OUTPATIENT)
Dept: SURGERY | Facility: CLINIC | Age: 73
End: 2023-03-03

## 2023-03-03 ENCOUNTER — TELEPHONE (OUTPATIENT)
Dept: SURGERY | Facility: CLINIC | Age: 73
End: 2023-03-03

## 2023-03-03 ENCOUNTER — VIRTUAL VISIT (OUTPATIENT)
Dept: SURGERY | Facility: CLINIC | Age: 73
End: 2023-03-03
Payer: MEDICARE

## 2023-03-03 DIAGNOSIS — D50.0 ANEMIA DUE TO BLOOD LOSS: ICD-10-CM

## 2023-03-03 DIAGNOSIS — K51.00 ULCERATIVE PANCOLITIS (H): ICD-10-CM

## 2023-03-03 DIAGNOSIS — Z01.818 PREOP EXAMINATION: Primary | ICD-10-CM

## 2023-03-03 PROCEDURE — 99205 OFFICE O/P NEW HI 60 MIN: CPT | Mod: VID | Performed by: CLINICAL NURSE SPECIALIST

## 2023-03-03 ASSESSMENT — ENCOUNTER SYMPTOMS: DYSRHYTHMIAS: 1

## 2023-03-03 ASSESSMENT — LIFESTYLE VARIABLES: TOBACCO_USE: 0

## 2023-03-03 NOTE — TELEPHONE ENCOUNTER
Jaya Georgia of her lab appointment on Tuesday, March 7th, 4:30 pm at [facility].  Georgia expressed understanding.  Betty Rasmussen RN

## 2023-03-03 NOTE — H&P
Pre-Operative H & P     CC:  Preoperative exam to assess for increased cardiopulmonary risk while undergoing surgery and anesthesia.    Date of Encounter: 3/3/2023  Primary Care Physician:  Bunny Meyers     Reason for visit:   Encounter Diagnoses   Name Primary?     Preop examination Yes     Anemia due to blood loss      Ulcerative pancolitis (H)        HPI  Keerthi Montoya is a 73 year old female who presents for pre-operative H & P in preparation for  Procedure Information     Case: [id number] Date/Time: 03/10/23 0900    Procedures:       COLONOSCOPY (Anus) - GI bleed in hospital, xarelto on hold until scope. did have mild diverticulitis in hospital      Esophagoscopy, gastroscopy, duodenoscopy (EGD), combined (Esophagus)    Anesthesia type: Moderate Sedation    Diagnosis: Anemia due to blood loss, acute [D62]    Pre-op diagnosis: Anemia due to blood loss, acute [D62]    Location:  [facility] /  GI    Providers: Preeti James MD        History is obtained from the patient and chart review    Patient with history of ulcerative pancolitis and diverticulitis, followed by GI. Most recently she was admitted on 1/24/23 with a one week history of left side abdominal pain, dark stools, and lightheadedness. Hgb on admission was 5.9 and she was given 2 units of PRBC. EGD with GI had shown non bleeding gastric and duodenal ulcers, and erosive gastropathy with no stigmata of bleeding. She was treated with IV PPI and transitioned to PO PPI at discharge. She was also treated with ciprofloxacin and flagyl. Her rivaroxaban was held during admission and has continued on hold until after above GI procedures.     Today she reports that she is feeling well, and denies signs of GI bleeding. Denies dizziness, weakness, lightheadedness. She confirms that she feels strong enough to undergo bowel prep and above planned procedures.      Her history is otherwise complex with HLD, hypertension, chronic HFpEF, group 2 severe pulmonary  "hypertension, persistent atrial fibrillation, DM2, diabetic nephropathy, multinodular goiter s/p complete thyroidectomy ~40 yrs ago complicated by postoperative hypothyroidism, diverticulosis complicated by diverticulitis with prior limited left hemicolectomy 2002, open cholecystectomy in 2000, s/p ex-lap 30 yrs ago for presumed ectopic pregnancy (apparent diverticulitis, nonsurgical management on discovery), s/p tubal ligation 1983 complicated postsurgical VAH s/p mesh repair 2007, and CKD.    For her cardiac issues, she is followed by Cardiology, last seen on 2/24/23. From notes:  \"Assessment and Plan:      1.    Chronic HFpEF, group 2 severe pulmonary hypertension related to diastolic dysfunction, moderately reduced RV function, functional class I-II.    Follows in core clinic. Torsemide 10 daily prn, not using currently  Continue Jardiance  2.  Hypertension, controlled on current regimen.  3.  Moderate paradoxical low flow aortic stenosis, possible bicuspid aortic valve: Continue periodic monitoring  4. Diabetes   5.  Dyslipidemia, subclinical CAD with moderate CAC on CT: on Lipitor.  6.  CKD stage 4  7. Ulcerative colitis  8.  Persistent atrial fibrillation, rate controlled, asymptomatic, normal LV function, high [id number] score:  Continue atenolol.  Given lack of symptoms and normal LV function, rate control strategy is reasonable.  Anticoagulation is indicated to decrease stroke risk, however it has been stopped due to recent significant GI bleed leading to hospitalization and transfusion.  We discussed left atrial appendage closure with Watchman device, she is agreeable.    We will order prewatchman CT heart, refer to watchman clinic.  Typically anticoagulation is still needed for at least 6 weeks, I have sent a message to her GI physician to discuss anticoagulation strategy.\"        Hx of abnormal bleeding or anti-platelet use: Denies     Menstrual history: No LMP recorded (lmp unknown). Patient is " postmenopausal.     Past Medical History  Past Medical History:   Diagnosis Date     Chronic kidney disease      Diabetes mellitus, type 2 (H)      Diverticulosis of colon (without mention of hemorrhage) 10/01/2012     GI bleed      History of blood transfusion      HLD (hyperlipidemia)      Hypertension      Hypothyroidism      MARIA D (iron deficiency anemia)      Persistent atrial fibrillation (H)      Pulmonary hypertension (H)      Ulcerative (chronic) enterocolitis (H)        Past Surgical History  Past Surgical History:   Procedure Laterality Date     CHOLECYSTECTOMY  3/1987     COLON SURGERY  01/2001    Left colon resection; diverticulitis     COLONOSCOPY N/A 4/24/2017    Procedure: COMBINED COLONOSCOPY, SINGLE OR MULTIPLE BIOPSY/POLYPECTOMY BY BIOPSY;;  Surgeon: Radha Salguero MD;  Location: [facility] OR     COLONOSCOPY WITH CO2 INSUFFLATION N/A 4/24/2017    Procedure: COLONOSCOPY WITH CO2 INSUFFLATION;  Colonoscopy Dx rectal bleeding, BMI 25.86, Pharm [facility] .[phone], ;  Surgeon: Radha Salguero MD;  Location: [facility] OR     CV RIGHT HEART CATH MEASUREMENTS RECORDED N/A 3/16/2020    Procedure: CV RIGHT HEART CATH;  Surgeon: Nader Muñoz MD;  Location:  HEART CARDIAC CATH LAB     ESOPHAGOSCOPY, GASTROSCOPY, DUODENOSCOPY (EGD), COMBINED N/A 1/23/2023    Procedure: ESOPHAGOGASTRODUODENOSCOPY, WITH BIOPSY;  Surgeon: Ricki Deal MD;  Location:  GI     GYN SURGERY  9/1983    tubal ligation     HERNIA REPAIR  12/2006    recurrent incisional hernia     SIGMOIDOSCOPY FLEXIBLE N/A 1/23/2023    Procedure: Sigmoidoscopy flexible;  Surgeon: Ricki Deal MD;  Location:  GI     THROAT SURGERY  12/1974    thyroidectomy       Prior to Admission Medications  Current Outpatient Medications   Medication Sig Dispense Refill     atenolol (TENORMIN) 50 MG tablet Take 1 tablet (50 mg) by mouth daily (Patient taking differently: Take 50 mg by mouth every morning) 90 tablet 3     atorvastatin  (LIPITOR) 40 MG tablet Take 1 tablet (40 mg) by mouth daily (Patient taking differently: Take 40 mg by mouth every morning) 90 tablet 3     Cholecalciferol (VITAMIN D) 2000 units CAPS Take by mouth daily (with lunch)       Cyanocobalamin (B-12) 1000 MCG TABS Take 1,000 mcg by mouth three times a week       diphenhydrAMINE (BENADRYL) 25 MG tablet Take 25 mg by mouth every 6 hours as needed for itching or allergies       empagliflozin (JARDIANCE) 10 MG TABS tablet Take 1 tablet (10 mg) by mouth daily (Patient taking differently: Take 10 mg by mouth every morning) 90 tablet 3     glipiZIDE (GLUCOTROL XL) 10 MG 24 hr tablet Take 2 tablets (20 mg) by mouth daily (Patient taking differently: Take 10 mg by mouth every morning) 180 tablet 3     glipiZIDE (GLUCOTROL XL) 5 MG 24 hr tablet Take 1 tablet (5 mg) by mouth daily Take with 10 mg dose for a total of 15 mg daily (Patient taking differently: Take 5 mg by mouth every evening) 90 tablet 3     hydrALAZINE (APRESOLINE) 50 MG tablet Take 1.5 tablets (75 mg) by mouth 3 times daily 270 tablet 3     levothyroxine (SYNTHROID/LEVOTHROID) 112 MCG tablet Take 1 tablet (112 mcg) by mouth daily (Patient taking differently: Take 112 mcg by mouth every morning) 90 tablet 4     losartan (COZAAR) 100 MG tablet Take 1 tablet (100 mg) by mouth daily (Patient taking differently: Take 100 mg by mouth every morning) 90 tablet 3     mesalamine (APRISO ER) 0.375 g 24 hr capsule TAKE 4 CAPSULES(1.5 GRAMS) BY MOUTH DAILY (Patient taking differently: 1.5 g daily (with lunch)) 360 capsule 3     pantoprazole (PROTONIX) 40 MG EC tablet Take 1 tablet (40 mg) by mouth 2 times daily (Patient taking differently: Take 40 mg by mouth daily (with lunch)) 60 tablet 1     torsemide (DEMADEX) 10 MG tablet Take 1 tablet (10 mg) by mouth as needed (when cardiology clinic advises) 90 tablet 3     bisacodyl (DULCOLAX) 5 MG EC tablet Take 2 tablets at 3 pm the day before your procedure. If your procedure is  before 11 am, take 2 additional tablets at 11 pm. If your procedure is after 11 am, take 2 additional tablets at 6 am. For additional instructions refer to your colonoscopy prep instructions. 4 tablet 0     blood glucose (ACCU-CHEK FELIPE PLUS) test strip 200 strips by In Vitro route 2 times daily Use to test blood sugar 2 times daily or as directed. 200 strip 4     polyethylene glycol (GOLYTELY) 236 g suspension The night before the exam at 6 pm drink an 8-ounce glass every 15 minutes until the jug is half empty. If you arrive before 11 AM: Drink the other half of the Golytely jug at 11 PM night before procedure. If you arrive after 11 AM: Drink the other half of the Golytely jug at 6 AM day of procedure. For additional instructions refer to your colonoscopy prep instructions. 4000 mL 0       Allergies  Allergies   Allergen Reactions     Actos [Pioglitazone]      Fluid retention     Amlodipine      Leg swelling, hand swelling     Animal Dander      Doxycycline Hives     Dust Mites      Grass      Keflex [Cephalexin Hcl] Hives     Metoprolol      Swelling of lower legs and fatigue     Other [Seasonal Allergies]      pollen     Ranitidine      rash     Synthroid [Levothroid] Swelling     Allergic to brand name     Tetracycline Hives     Tylenol Hives     Ziac [Bisoprolol-Hydrochlorothiazide]        Social History  Social History     Socioeconomic History     Marital status:      Spouse name: Raymundo; dementia;  2016     Number of children: 3     Years of education: Not on file     Highest education level: Not on file   Occupational History     Employer: [employer]   Tobacco Use     Smoking status: Never     Smokeless tobacco: Never   Substance and Sexual Activity     Alcohol use: Not Currently     Alcohol/week: 2.0 - 4.0 standard drinks     Types: 1 - 2 Cans of beer, 1 - 2 Shots of liquor per week     Comment: occasional cocktail or beer     Drug use: No     Sexual activity: Not Currently   Other  Topics Concern     Parent/sibling w/ CABG, MI or angioplasty before 65F 55M? 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 8/14     Social Determinants of Health     Financial Resource Strain: Not on file   Food Insecurity: Not on file   Transportation Needs: Not on file   Physical Activity: Not on file   Stress: Not on file   Social Connections: Not on file   Intimate Partner Violence: Not on file   Housing Stability: Not on file       Family History  Family History   Problem Relation Age of Onset     Cerebrovascular Disease Mother      Cancer Father         bladder     Diverticulitis Brother      Diverticulitis Brother      Kidney Disease No family hx of      Crohn's Disease No family hx of      Ulcerative Colitis No family hx of      Stomach Cancer No family hx of      GERD No family hx of      Celiac Disease No family hx of      Anesthesia Reaction No family hx of        Review of Systems  The complete review of systems is negative other than noted in the HPI or here.   Anesthesia Evaluation   Pt has had prior anesthetic. Type: General and MAC.    No history of anesthetic complications       ROS/MED HX  ENT/Pulmonary:     (+) PURA risk factors, hypertension,  (-) tobacco use and recent URI   Neurologic:    (-) no CVA and no TIA   Cardiovascular:     (+) Dyslipidemia hypertension-range: 120-100s/60s/ ----dysrhythmias, a-fib, valvular problems/murmurs type: AS mild. pulmonary hypertension, Previous cardiac testing   Echo: Date: Results:    Stress Test: Date: Results:    ECG Reviewed: Date: 1/20/23 Results:  A fib, rate 73, possible RVH, ST and T wave abnormality  Cath: Date: Results:    (-) taking anticoagulants/antiplatelets and PROCTOR   METS/Exercise Tolerance: >4 METS Comment: Able to walk distance and takes care of grandchildren full time. Denies exertional symptoms   Hematologic: Comments: Recent admission for acute anemia with transfusions, was taken off Xarelto    (+) anemia, history of blood transfusion, no previous transfusion reaction,  (-) history of blood clots   Musculoskeletal:  - neg musculoskeletal ROS     GI/Hepatic: Comment: Ulcerative pancolitis  Diverticulitis    (+) GERD, Asymptomatic on medication, Inflammatory bowel disease, bowel prep,     Renal/Genitourinary:     (+) renal disease, type: CRI, Pt does not require dialysis,     Endo:     (+) type II DM, Last HgA1c: 6.9, date: 1/20/23, Not using insulin, - not using insulin pump. Normal glucose range: BS higher since admission 180-230, thyroid problem, hypothyroidism,     Psychiatric/Substance Use:    (-) psychiatric history   Infectious Disease:  - neg infectious disease ROS     Malignancy:  - neg malignancy ROS     Other:  - neg other ROS          Virtual visit -  No vitals were obtained    Physical Exam  Constitutional: Awake, alert, no apparent distress, and appears stated age.  HENT: Normocephalic  Respiratory: non labored breathing; no cough   Neurologic: Oriented to name, place and time.   Neuropsychiatric: Calm, cooperative. Normal affect.      Prior Labs/Diagnostic Studies   All labs and imaging personally reviewed   Lab Results   Component Value Date    WBC 6.9 02/02/2023    WBC 7.3 10/28/2020     Lab Results   Component Value Date    RBC 3.41 02/02/2023    RBC 3.99 10/28/2020     Lab Results   Component Value Date    HGB 9.5 02/02/2023    HGB 11.8 02/08/2021     Lab Results   Component Value Date    HCT 32.1 02/02/2023    HCT 38.2 10/28/2020     Lab Results   Component Value Date    MCV 94 02/02/2023    MCV 96 10/28/2020     Lab Results   Component Value Date    MCH 27.9 02/02/2023    MCH 30.8 10/28/2020     Lab  Results   Component Value Date    MCHC 29.6 02/02/2023    MCHC 32.2 10/28/2020     Lab Results   Component Value Date    RDW 17.2 02/02/2023    RDW 13.9 10/28/2020     Lab Results   Component Value Date     02/02/2023     10/28/2020     Last Comprehensive Metabolic Panel:  Sodium   Date Value Ref Range Status   02/02/2023 135 (L) 136 - 145 mmol/L Final   04/28/2021 134 133 - 144 mmol/L Final     Potassium   Date Value Ref Range Status   02/02/2023 4.7 3.4 - 5.3 mmol/L Final   12/27/2022 4.6 3.4 - 5.3 mmol/L Final   04/28/2021 4.5 3.4 - 5.3 mmol/L Final     Chloride   Date Value Ref Range Status   02/02/2023 98 98 - 107 mmol/L Final   12/27/2022 102 94 - 109 mmol/L Final   04/28/2021 100 94 - 109 mmol/L Final     Carbon Dioxide   Date Value Ref Range Status   04/28/2021 26 20 - 32 mmol/L Final     Carbon Dioxide (CO2)   Date Value Ref Range Status   02/02/2023 26 22 - 29 mmol/L Final   12/27/2022 20 20 - 32 mmol/L Final     Anion Gap   Date Value Ref Range Status   02/02/2023 11 7 - 15 mmol/L Final   12/27/2022 9 3 - 14 mmol/L Final   04/28/2021 8 3 - 14 mmol/L Final     Glucose   Date Value Ref Range Status   02/02/2023 212 (H) 70 - 99 mg/dL Final   12/27/2022 232 (H) 70 - 99 mg/dL Final   04/28/2021 57 (L) 70 - 99 mg/dL Final     GLUCOSE BY METER POCT   Date Value Ref Range Status   01/24/2023 361 (H) 70 - 99 mg/dL Final     Urea Nitrogen   Date Value Ref Range Status   02/02/2023 23.0 8.0 - 23.0 mg/dL Final   12/27/2022 37 (H) 7 - 30 mg/dL Final   04/28/2021 23 7 - 30 mg/dL Final     Creatinine   Date Value Ref Range Status   02/02/2023 1.52 (H) 0.51 - 0.95 mg/dL Final   04/28/2021 1.21 (H) 0.52 - 1.04 mg/dL Final     GFR Estimate   Date Value Ref Range Status   02/02/2023 36 (L) >60 mL/min/1.73m2 Final     Comment:     eGFR calculated using 2021 CKD-EPI equation.   04/28/2021 46 (L) >60 mL/min/[1.73_m2] Final     Comment:     Non  GFR Calc  Starting 12/18/2018, serum creatinine based  estimated GFR (eGFR) will be   calculated using the Chronic Kidney Disease Epidemiology Collaboration   (CKD-EPI) equation.       GFR, ESTIMATED POCT   Date Value Ref Range Status   2022 30 (L) >60 mL/min/1.73m2 Final     Calcium   Date Value Ref Range Status   2023 9.5 8.8 - 10.2 mg/dL Final   2021 9.8 8.5 - 10.1 mg/dL Final     Bilirubin Total   Date Value Ref Range Status   2023 0.4 <=1.2 mg/dL Final   10/28/2020 0.6 0.2 - 1.3 mg/dL Final     Alkaline Phosphatase   Date Value Ref Range Status   2023 106 (H) 35 - 104 U/L Final   10/28/2020 104 40 - 150 U/L Final     ALT   Date Value Ref Range Status   2023 11 10 - 35 U/L Final   10/28/2020 18 0 - 50 U/L Final     AST   Date Value Ref Range Status   2023 44 (H) 10 - 35 U/L Final   10/28/2020 19 0 - 45 U/L Final   23 Ferritin 43  A1C 6.9  TSH 8.56  T4 Free 1.42    EK23 Atrial fibrillation, rate 73, possible RVH, ST and T wave abnormality    22 Echocardiogram   Interpretation Summary     Global and regional left ventricular function is normal with an EF of 55-60%.  Paradoxical septal motion consistent with right ventricular pressure and  volume overload is present.  Moderate right ventricular dilation is present.  Global right ventricular function is moderately reduced.  Mild aortic stenosis is present.  Right ventricular systolic pressure is 57mmHg above the right atrial pressure.  IVC diameter >2.1 cm collapsing <50% with sniff suggests a high RA pressure  estimated at 15 mmHg or greater.  Trivial pericardial effusion is present.  PA pressure lower when compared to previous study.     Angiogram    Right sided filling pressures are moderately elevated.    Severely elevated pulmonary artery hypertension.    Left sided filling pressures are mildly elevated.    Normal cardiac output level.    Hemodynamic data has been modified in Epic per physician review.     Hypoxemic response to nipride challenge  therefore nipride study aborted.     1/20/23 CT CAP   Mild coronary calcifications    IMPRESSION:  Compared to prior CT abdomen pelvis 12/30/2022, the  current scan shows:     1. Colonic diverticulosis with mild pericolonic streakiness along the  distal descending/proximal sigmoid colon, uncomplicated acute  diverticulitis cannot be excluded. No intra-abdominal collection or  pneumoperitoneum.  2. New mild ascites and mild right pleural effusion. Mild nonspecific  mesenteric streakiness including streakiness along the aidee hepatis.  Consider correlation for more likely volume overload or focal  pancreatitis.  3. Few sub-6 mm pulmonary nodules. Please consider correlation with  prior imaging for stability and are follow-up CT chest in 12 months if  patient is at high risk for lung cancer.  4. Additional chronic/incidental findings as described above, similar  to prior CT from 12/20/2022 including pericardial effusion.    CT abd/pelvis 12/30/22     1. Colonic diverticulosis without evidence of diverticulitis.     2. Moderate colonic stool burden.     3. Relative left hepatic lobe hypertrophy, subtle nodular contour and  fissural widening indicates cirrhotic morphology.     The patient's records and results personally reviewed by this provider.     Outside records reviewed from: Care Everywhere      Assessment      Keerthi Montoya is a 73 year old female seen as a PAC referral for risk assessment and optimization for anesthesia.    Plan/Recommendations  Pt will be optimized for the proposed procedure.  See below for details on the assessment, risk, and preoperative recommendations    NEUROLOGY  - No history of TIA, CVA or seizure    -Post Op delirium risk factors:  No risk identified    ENT  - No current airway concerns.  Will need to be reassessed day of surgery.  Mallampati: Unable to assess  TM: Unable to assess    CARDIAC  HLD. Will take atorvastatin on day of procedure. HYPERTENSION. Will take atenolol,  "hydralazine and losartan on day of procedure. Chronic HFpEF, group 2 severe pulmonary hypertension ( 57 mmHg) related to diastolic dysfunction, moderately reduced RV function, functional class I-II, mild aortic stenosis, persistent atrial fibrillation, rate controlled, asymptomatic, normal LV function, high [id number] score, followed by Cardiology, last seen 2/24/23. Per notes: \"Continue atenolol.  Given lack of symptoms and normal LV function, rate control strategy is reasonable.  Anticoagulation is indicated to decrease stroke risk, however it has been stopped due to recent significant GI bleed leading to hospitalization and transfusion. We discussed left atrial appendage closure with Watchman device, she is agreeable.\" Plan for future.     Reports good activity tolerance. Able to walk distance, do grocery shopping, and takes care of her grandchildren full time   - METS (Metabolic Equivalents)>4    RCRI: 0.9% risk of serious cardiac events    PULMONARY  Denies asthma, cough or shortness of breath  Low risk for PURA  - Tobacco History      History   Smoking Status     Never   Smokeless Tobacco     Never       GI: Denies GERD. Protonix taken midday  Ulcerative pancolitis and diverticulitis followed by GI. Will take mesalamine on day of procedure.   Denies current GI bleeding  PONV Medium Risk  Total Score: 2           1 AN PONV: Pt is Female    1 AN PONV: Patient is not a current smoker        /RENAL  - Baseline Creatinine  1.52  CKD    ENDOCRINE    - BMI: Estimated body mass index is 25.07 kg/m  as calculated from the following:    Height as of 2/2/23: 1.6 m (5' 3\").    Weight as of 2/24/23: 64.2 kg (141 lb 8 oz).  Overweight (BMI 25.0-29.9)  DIABETES MELLITUS II. Last A1c 6.9. Reports higher BS since her admission (180-230). She has a plan to reach out to her provider to see if she needs med adjustment. Will hold oral agents on day of procedure.   Hypothyroidism. Will take Synthroid on day of " procedure.    HEME  VTE Low Risk 0.26%            Total Score: 0      Denies personal or family history of blood clots  Admission in 1/2023 for acute blood loss anemia with transfusions given. Was taken off anticoagulation as above. Last Hgb: 9.5. Ferritin 43. Will have Hgb updated prior to above procedure.    Final plan will be decided by the assigned anesthesia provider on the date of service.      The patient is optimized for their procedure. No further diagnostic testing indicated.  AVS with information on meds given by nursing staff.   Confirmed with patient that she has received instructions from GI team regarding date, arrival time, eating and drinking and need for .     Please refer to the physical examination documented by the anesthesiologist in the anesthesia record on the day of surgery.    3/8/23 ADDENDUM  Updated hemoglobin on 3/7/23 was 9.1    Video-Visit Details    Type of service:  Video Visit    Provider received verbal consent for a Video Visit from the patient? Yes   Video Start Time 9:02am   Video End Time: 9:19am     Originating Location (pt. Location): Home    Distant Location (provider location):  Off-site  Mode of Communication:  Video Conference via [email or web address]  On the day of service:     Prep time: 20 minutes  Visit time: 17 minutes  Documentation time: 25 minutes  ------------------------------------------  Total time: 62 minutes      WENDI Siu CNS  Preoperative Assessment Center  [facility]  [facility]  Phone: [phone]  Fax: [phone]

## 2023-03-03 NOTE — OR NURSING
Was asked by [employer] to send medication instructions for the upcoming GI procedure on 3-10-23 to Keerthi Montoya. Written instructions sent via My Chart.

## 2023-03-03 NOTE — PROGRESS NOTES
Georgia is a 73 year old who is being evaluated via a billable video visit.      How would you like to obtain your AVS? Frank Daly   Georgia is a 73 year old; presenting for the following health issues:  Pre-Op Exam (/)      HPI         Review of Systems     Physical Exam         KATARZYNA Mcguire LPN

## 2023-03-03 NOTE — PATIENT INSTRUCTIONS
Name:  Keerthi Montoya   MRN:  [id number]   :  1950   Today's Date:  3/3/2023     GI Lab procedures:    A representative from the GI Lab will contact you regarding date, arrival time, location, and give you further instructions.      You were seen today in the PAC Clinic.   (Pre-operative Anesthesia Assessment Center)  [facility]  [address] [place] [address]  [phone]   phone [phone]    You had a pre-operative assessment done.    Anesthesia recommendations for medications:    Hold Aspirin for 2 days before procedure.  Hold Multivitamins for 7 days before procedure.   Hold Herbal medications and Supplements for 7 days before procedure.  Hold Ibuprofen for 2 days before procedure.   Hold Naproxen for 2 days before procedure.      Hold Empagliflozin (Jardiance) for 3 days prior to procedure. Take the last Jardiance on 3-6-23, and hold until procedure.      Please hold the following medications the day of procedure:  Glipizide      Please take these medications the day of procedure:  Atenolol (Tenormin)  Atorvastatin (Lipitor)  Hydralazine (Apresoline)  Levothyroxine (Synthroid)  Losartan (Cozaar)  Mesalamine (Apriso ER)  Pantoprazole (Protonix)      For questions or appointments, call:  [facility]: [phone], option 2.  Monday through Friday, 8 a.m. to 4:30 p.m.  (If it is after hours, please reach out to the clinic or provider that scheduled your appointment)

## 2023-03-07 ENCOUNTER — LAB (OUTPATIENT)
Dept: LAB | Facility: CLINIC | Age: 73
End: 2023-03-07
Payer: MEDICARE

## 2023-03-07 DIAGNOSIS — K51.311 ULCERATIVE RECTOSIGMOIDITIS WITH RECTAL BLEEDING (H): ICD-10-CM

## 2023-03-07 DIAGNOSIS — Z01.818 PREOP EXAMINATION: ICD-10-CM

## 2023-03-07 DIAGNOSIS — D50.0 ANEMIA DUE TO BLOOD LOSS: ICD-10-CM

## 2023-03-07 LAB
ALBUMIN UR-MCNC: NEGATIVE MG/DL
APPEARANCE UR: ABNORMAL
BACTERIA #/AREA URNS HPF: ABNORMAL /HPF
BILIRUB UR QL STRIP: NEGATIVE
COLOR UR AUTO: ABNORMAL
GLUCOSE UR STRIP-MCNC: >1000 MG/DL
HGB BLD-MCNC: 9.1 G/DL (ref 11.7–15.7)
HGB UR QL STRIP: NEGATIVE
KETONES UR STRIP-MCNC: NEGATIVE MG/DL
LEUKOCYTE ESTERASE UR QL STRIP: ABNORMAL
NITRATE UR QL: NEGATIVE
PH UR STRIP: 5.5 [PH] (ref 5–7)
RBC #/AREA URNS AUTO: ABNORMAL /HPF
SP GR UR STRIP: 1.02 (ref 1–1.03)
UROBILINOGEN UR STRIP-MCNC: NORMAL MG/DL
WBC #/AREA URNS AUTO: ABNORMAL /HPF

## 2023-03-07 PROCEDURE — 85018 HEMOGLOBIN: CPT

## 2023-03-07 PROCEDURE — 36415 COLL VENOUS BLD VENIPUNCTURE: CPT

## 2023-03-07 PROCEDURE — 81001 URINALYSIS AUTO W/SCOPE: CPT

## 2023-03-10 ENCOUNTER — MYC MEDICAL ADVICE (OUTPATIENT)
Dept: GASTROENTEROLOGY | Facility: CLINIC | Age: 73
End: 2023-03-10

## 2023-03-10 ENCOUNTER — HOSPITAL ENCOUNTER (OUTPATIENT)
Facility: CLINIC | Age: 73
Discharge: HOME | End: 2023-03-10
Attending: INTERNAL MEDICINE | Admitting: INTERNAL MEDICINE
Payer: MEDICARE

## 2023-03-10 VITALS
OXYGEN SATURATION: 99 % | RESPIRATION RATE: 16 BRPM | SYSTOLIC BLOOD PRESSURE: 136 MMHG | HEART RATE: 75 BPM | DIASTOLIC BLOOD PRESSURE: 83 MMHG

## 2023-03-10 DIAGNOSIS — K31.89 PORTAL HYPERTENSIVE GASTROPATHY (H): Primary | ICD-10-CM

## 2023-03-10 DIAGNOSIS — K76.6 PORTAL HYPERTENSIVE GASTROPATHY (H): Primary | ICD-10-CM

## 2023-03-10 LAB
COLONOSCOPY: NORMAL
GLUCOSE BLDC GLUCOMTR-MCNC: 121 MG/DL (ref 70–99)
UPPER GI ENDOSCOPY: NORMAL

## 2023-03-10 PROCEDURE — 99153 MOD SED SAME PHYS/QHP EA: CPT | Performed by: INTERNAL MEDICINE

## 2023-03-10 PROCEDURE — 43235 EGD DIAGNOSTIC BRUSH WASH: CPT | Performed by: INTERNAL MEDICINE

## 2023-03-10 PROCEDURE — 99153 MOD SED SAME PHYS/QHP EA: CPT

## 2023-03-10 PROCEDURE — G0500 MOD SEDAT ENDO SERVICE >5YRS: HCPCS | Performed by: INTERNAL MEDICINE

## 2023-03-10 PROCEDURE — 250N000009 HC RX 250: Performed by: INTERNAL MEDICINE

## 2023-03-10 PROCEDURE — 45378 DIAGNOSTIC COLONOSCOPY: CPT | Performed by: INTERNAL MEDICINE

## 2023-03-10 PROCEDURE — 82962 GLUCOSE BLOOD TEST: CPT

## 2023-03-10 PROCEDURE — G0500 MOD SEDAT ENDO SERVICE >5YRS: HCPCS

## 2023-03-10 PROCEDURE — 250N000011 HC RX IP 250 OP 636: Performed by: INTERNAL MEDICINE

## 2023-03-10 RX ORDER — FENTANYL CITRATE 50 UG/ML
INJECTION, SOLUTION INTRAMUSCULAR; INTRAVENOUS PRN
Status: DISCONTINUED | OUTPATIENT
Start: 2023-03-10 | End: 2023-03-10 | Stop reason: HOSPADM

## 2023-03-10 ASSESSMENT — ACTIVITIES OF DAILY LIVING (ADL)
ADLS_ACUITY_SCORE: 35
ADLS_ACUITY_SCORE: 35

## 2023-03-10 NOTE — OR NURSING
EGD and colonoscopy completed and pt tolerated well with conscious sedation and on 2L nc oxygen.

## 2023-03-10 NOTE — H&P
Keerthi Montoya  [id number]  female  73 year old      Reason for procedure/surgery: ulcerative pancolitis, recent GI hemorrhage    Patient Active Problem List   Diagnosis     Diverticulosis of large intestine     Vitamin D deficiency     Preventive measure     Hyperlipidemia LDL goal <100     Aspirin not indicated     Abdominal pain, left lower quadrant     Rectal bleeding     Colitis     Postoperative hypothyroidism     Hypertension goal BP (blood pressure) < 130/80     Type 2 diabetes mellitus with microalbuminuria, without long-term current use of insulin (H)     Cervical cancer screening     Ulcerative pancolitis without complication (H)     Vitamin B12 deficiency (non anemic)     Proteinuria, unspecified type     CKD (chronic kidney disease) stage 3, GFR 30-59 ml/min (H)     Aortic stenosis     Pulmonary hypertension (H)     Diastolic dysfunction     Heart failure with preserved ejection fraction, NYHA class I (H)     Anemia, unspecified type     Elevated serum immunoglobulin free light chains     Posterior vitreous detachment, left     Anemia due to blood loss, acute     Gastrointestinal hemorrhage, unspecified gastrointestinal hemorrhage type       Past Surgical History:    Past Surgical History:   Procedure Laterality Date     CHOLECYSTECTOMY  3/1987     COLON SURGERY  01/2001    Left colon resection; diverticulitis     COLONOSCOPY N/A 4/24/2017    Procedure: COMBINED COLONOSCOPY, SINGLE OR MULTIPLE BIOPSY/POLYPECTOMY BY BIOPSY;;  Surgeon: Radha Salguero MD;  Location: [facility] OR     COLONOSCOPY WITH CO2 INSUFFLATION N/A 4/24/2017    Procedure: COLONOSCOPY WITH CO2 INSUFFLATION;  Colonoscopy Dx rectal bleeding, BMI 25.86, Pharm [employer] .[phone], ;  Surgeon: Radha Salguero MD;  Location: [facility] OR     CV RIGHT HEART CATH MEASUREMENTS RECORDED N/A 3/16/2020    Procedure: CV RIGHT HEART CATH;  Surgeon: Nader Muñoz MD;  Location:  HEART CARDIAC CATH LAB     ESOPHAGOSCOPY,  GASTROSCOPY, DUODENOSCOPY (EGD), COMBINED N/A 1/23/2023    Procedure: ESOPHAGOGASTRODUODENOSCOPY, WITH BIOPSY;  Surgeon: Ricki Deal MD;  Location:  GI     GYN SURGERY  9/1983    tubal ligation     HERNIA REPAIR  12/2006    recurrent incisional hernia     SIGMOIDOSCOPY FLEXIBLE N/A 1/23/2023    Procedure: Sigmoidoscopy flexible;  Surgeon: Ricki Deal MD;  Location:  GI     THROAT SURGERY  12/1974    thyroidectomy       Past Medical History:   Past Medical History:   Diagnosis Date     Chronic kidney disease      Diabetes mellitus, type 2 (H)      Diverticulosis of colon (without mention of hemorrhage) 10/01/2012     GI bleed      History of blood transfusion      HLD (hyperlipidemia)      Hypertension      Hypothyroidism      MARIA D (iron deficiency anemia)      Persistent atrial fibrillation (H)      Pulmonary hypertension (H)      Ulcerative (chronic) enterocolitis (H)        Social History:   Social History     Tobacco Use     Smoking status: Never     Smokeless tobacco: Never   Substance Use Topics     Alcohol use: Not Currently     Alcohol/week: 2.0 - 4.0 standard drinks     Types: 1 - 2 Cans of beer, 1 - 2 Shots of liquor per week     Comment: occasional cocktail or beer       Family History:   Family History   Problem Relation Age of Onset     Cerebrovascular Disease Mother      Cancer Father         bladder     Diverticulitis Brother      Diverticulitis Brother      Kidney Disease No family hx of      Crohn's Disease No family hx of      Ulcerative Colitis No family hx of      Stomach Cancer No family hx of      GERD No family hx of      Celiac Disease No family hx of      Anesthesia Reaction No family hx of        Allergies:   Allergies   Allergen Reactions     Actos [Pioglitazone]      Fluid retention     Amlodipine      Leg swelling, hand swelling     Animal Dander      Doxycycline Hives     Dust Mites      Grass      Keflex [Cephalexin Hcl] Hives     Metoprolol      Swelling of lower legs and  fatigue     Other [Seasonal Allergies]      pollen     Ranitidine      rash     Synthroid [Levothroid] Swelling     Allergic to brand name     Tetracycline Hives     Tylenol Hives     Ziac [Bisoprolol-Hydrochlorothiazide]        Active Medications:   No current outpatient medications on file.       Systemic Review:   CONSTITUTIONAL: NEGATIVE for fever, chills, change in weight  ENT/MOUTH: NEGATIVE for ear, mouth and throat problems  RESP: NEGATIVE for significant cough or SOB  CV: NEGATIVE for chest pain, palpitations or peripheral edema    Physical Examination:   Vital Signs: /62   Pulse 80   Resp 26   LMP  (LMP Unknown)   SpO2 100%   GENERAL: healthy, alert and no distress  NECK: no adenopathy, no asymmetry, masses, or scars  RESP: lungs clear to auscultation - no rales, rhonchi or wheezes  CV: regular rate and rhythm, normal S1 S2, no S3 or S4, no murmur, click or rub, no peripheral edema and peripheral pulses strong  ABDOMEN: soft, nontender, no hepatosplenomegaly, no masses and bowel sounds normal  MS: no gross musculoskeletal defects noted, no edema    Plan: Appropriate to proceed as scheduled.      Preeti James MD  3/10/2023    PCP:  Bunny Meyers

## 2023-03-22 ENCOUNTER — TELEPHONE (OUTPATIENT)
Dept: CARDIOLOGY | Facility: CLINIC | Age: 73
End: 2023-03-22
Payer: MEDICARE

## 2023-03-22 NOTE — TELEPHONE ENCOUNTER
Notified that the CT scanner is not functioning and the patient is scheduled for tomorrow.   Left message on mobile number and message sent in [employer].    Remedios Santoro RN  Cardiology Care Coordinator  [facility]  Phone: [phone]

## 2023-03-30 ENCOUNTER — HOSPITAL ENCOUNTER (OUTPATIENT)
Dept: CT IMAGING | Facility: CLINIC | Age: 73
Discharge: HOME | End: 2023-03-30
Attending: INTERNAL MEDICINE | Admitting: INTERNAL MEDICINE
Payer: MEDICARE

## 2023-03-30 DIAGNOSIS — K92.1 GASTROINTESTINAL HEMORRHAGE WITH MELENA: ICD-10-CM

## 2023-03-30 DIAGNOSIS — I48.19 PERSISTENT ATRIAL FIBRILLATION (H): ICD-10-CM

## 2023-03-30 PROCEDURE — 75572 CT HRT W/3D IMAGE: CPT | Mod: 26 | Performed by: INTERNAL MEDICINE

## 2023-03-30 PROCEDURE — G1010 CDSM STANSON: HCPCS

## 2023-03-30 PROCEDURE — G1010 CDSM STANSON: HCPCS | Mod: GC | Performed by: INTERNAL MEDICINE

## 2023-03-30 PROCEDURE — 250N000011 HC RX IP 250 OP 636: Performed by: INTERNAL MEDICINE

## 2023-03-30 RX ORDER — IOPAMIDOL 755 MG/ML
70 INJECTION, SOLUTION INTRAVASCULAR ONCE
Status: COMPLETED | OUTPATIENT
Start: 2023-03-30 | End: 2023-03-30

## 2023-03-30 RX ADMIN — IOPAMIDOL 70 ML: 755 INJECTION, SOLUTION INTRAVENOUS at 12:14

## 2023-04-05 DIAGNOSIS — N18.31 STAGE 3A CHRONIC KIDNEY DISEASE (H): Primary | ICD-10-CM

## 2023-04-06 ENCOUNTER — OFFICE VISIT (OUTPATIENT)
Dept: CARDIOLOGY | Facility: CLINIC | Age: 73
End: 2023-04-06
Attending: INTERNAL MEDICINE
Payer: MEDICARE

## 2023-04-06 VITALS
WEIGHT: 143.1 LBS | DIASTOLIC BLOOD PRESSURE: 79 MMHG | SYSTOLIC BLOOD PRESSURE: 128 MMHG | OXYGEN SATURATION: 98 % | HEART RATE: 100 BPM | BODY MASS INDEX: 25.35 KG/M2

## 2023-04-06 DIAGNOSIS — I48.11 LONGSTANDING PERSISTENT ATRIAL FIBRILLATION (H): Primary | ICD-10-CM

## 2023-04-06 PROCEDURE — G0463 HOSPITAL OUTPT CLINIC VISIT: HCPCS | Performed by: INTERNAL MEDICINE

## 2023-04-06 PROCEDURE — 99215 OFFICE O/P EST HI 40 MIN: CPT | Mod: GC | Performed by: INTERNAL MEDICINE

## 2023-04-06 ASSESSMENT — PAIN SCALES - GENERAL: PAINLEVEL: NO PAIN (0)

## 2023-04-06 NOTE — PATIENT INSTRUCTIONS
You were seen today in the Cardiovascular Clinic at the [facility].      Cardiology Providers you saw during your visit:  Dr. Grimes    Recommendations:    I will upload images to [employer] for their opinion, if there is a device that would safely close your left atrial appendage.  If there is a device we will schedule a Watchman procedure approximately one month from now.          Nursing questions:  VERONIQUE Chavarria;  [phone]  For emergencies call 911.    It was a pleasure to see you in clinic.  If you have any questions at all, please feel free to give me a call.      Aura Childers RN  Structural Heart Care Coordinator   TAVR and MitraClip Programs  [facility]  Office: [phone]    Clinics and Surgery Center  [address] [place] [address]  Cardiology Clinic [address]  [place] [address]

## 2023-04-06 NOTE — NURSING NOTE
Chief Complaint   Patient presents with     New Patient      74 yo FM, referred to the valve clinic by Dr. Almendarez for evaluation for possible Watchman procedure.     Vitals were taken and medications reconciled.    [place] Franklin, EMT  10:34 AM

## 2023-04-06 NOTE — PROGRESS NOTES
"    [facility] HEART [facility]  CARDIOVASCULAR DIVISION    INITIAL CONSULTATION          PERTINENT CLINICAL HISTORY:     Keerthi Montoya is a very pleasant 73 year old female with atrial fibrillation referred to our clinic for evaluation and consideration of left atrial appendage closure.  She has complicated PMHx notable for HLD, HTN, DM2, ulcerative pancolitis, CKD3, pulm HTN and afib previously on Xarelto.  She had recent admission with severe GI bleed in Jan 2023 with Hgb on admission to 5.9.  Her Xarelto was held, and she was treated with PRBC transfusions and underwent EGD that showed gastric and duodenal ulcers as well as erosive gastropathy.  She was discharged on PPIs and her anticoagulation has continued to be held.  She underwent repeat EGD on follow up with GI that showed healing of her ulcers and with signs of portal hypertensive gastropathy.  Colonoscopy at same visit stated that her ulcerative colitis was inactive.  Per GI it was okay to resume AC.  Based on her CHADS-VASc score (4), systemic anticoagulation is recommended for stroke prophylaxis, however, due to recent life threatening GI bleed, she is not an ideal candidate for long-term AC and so has been referred for RY closure evaluation.  She has cardiac CT that showed her RY depth is \"borderline for watchman device placement.\"    Today she states she is feeling well.  No further episodes of bleeding that she has noted.  No dark stools or bright red blood per rectum.  She remains off of anticoagulation.  No chest pain or sob.  No recent fevers / chills.  No LE edema, no orthopnea/PND.  No light-headedness/dizziness.  Rare palpitations.    ROS: + as per HPI, otherwise negative.     PAST MEDICAL HISTORY:     Past Medical History:   Diagnosis Date     Chronic kidney disease      Diabetes mellitus, type 2 (H)      Diverticulosis of colon (without mention of hemorrhage) 10/01/2012     GI bleed      History of blood transfusion      HLD (hyperlipidemia)  "     Hypertension      Hypothyroidism      MARIA D (iron deficiency anemia)      Persistent atrial fibrillation (H)      Pulmonary hypertension (H)      Ulcerative (chronic) enterocolitis (H)         PAST SURGICAL HISTORY:     Past Surgical History:   Procedure Laterality Date     CHOLECYSTECTOMY  3/1987     COLON SURGERY  01/2001    Left colon resection; diverticulitis     COLONOSCOPY N/A 4/24/2017    Procedure: COMBINED COLONOSCOPY, SINGLE OR MULTIPLE BIOPSY/POLYPECTOMY BY BIOPSY;;  Surgeon: Radha Salguero MD;  Location: [facility]     COLONOSCOPY N/A 3/10/2023    Procedure: COLONOSCOPY;  Surgeon: Preeti James MD;  Location: [facility] GI     COLONOSCOPY WITH CO2 INSUFFLATION N/A 4/24/2017    Procedure: COLONOSCOPY WITH CO2 INSUFFLATION;  Colonoscopy Dx rectal bleeding, BMI 25.86, Pharm [employer] .[phone], ;  Surgeon: Radha Salguero MD;  Location: [facility] OR     CV RIGHT HEART CATH MEASUREMENTS RECORDED N/A 3/16/2020    Procedure: CV RIGHT HEART CATH;  Surgeon: Nader Muñoz MD;  Location:  HEART CARDIAC CATH LAB     ESOPHAGOSCOPY, GASTROSCOPY, DUODENOSCOPY (EGD), COMBINED N/A 1/23/2023    Procedure: ESOPHAGOGASTRODUODENOSCOPY, WITH BIOPSY;  Surgeon: Ricki Deal MD;  Location:  GI     ESOPHAGOSCOPY, GASTROSCOPY, DUODENOSCOPY (EGD), COMBINED N/A 3/10/2023    Procedure: Esophagoscopy, gastroscopy, duodenoscopy (EGD), combined;  Surgeon: Preeti James MD;  Location:  GI     GYN SURGERY  9/1983    tubal ligation     HERNIA REPAIR  12/2006    recurrent incisional hernia     SIGMOIDOSCOPY FLEXIBLE N/A 1/23/2023    Procedure: Sigmoidoscopy flexible;  Surgeon: Ricki Deal MD;  Location:  GI     THROAT SURGERY  12/1974    thyroidectomy        CURRENT MEDICATIONS:     Current Outpatient Medications   Medication Sig Dispense Refill     atenolol (TENORMIN) 50 MG tablet Take 1 tablet (50 mg) by mouth daily (Patient taking differently: Take 50 mg by mouth every morning) 90 tablet 3      atorvastatin (LIPITOR) 40 MG tablet Take 1 tablet (40 mg) by mouth daily (Patient taking differently: Take 40 mg by mouth every morning) 90 tablet 3     bisacodyl (DULCOLAX) 5 MG EC tablet Take 2 tablets at 3 pm the day before your procedure. If your procedure is before 11 am, take 2 additional tablets at 11 pm. If your procedure is after 11 am, take 2 additional tablets at 6 am. For additional instructions refer to your colonoscopy prep instructions. 4 tablet 0     blood glucose (ACCU-CHEK FELIPE PLUS) test strip 200 strips by In Vitro route 2 times daily Use to test blood sugar 2 times daily or as directed. 200 strip 4     Cholecalciferol (VITAMIN D) 2000 units CAPS Take by mouth daily (with lunch)       Cyanocobalamin (B-12) 1000 MCG TABS Take 1,000 mcg by mouth three times a week       diphenhydrAMINE (BENADRYL) 25 MG tablet Take 25 mg by mouth every 6 hours as needed for itching or allergies       empagliflozin (JARDIANCE) 10 MG TABS tablet Take 1 tablet (10 mg) by mouth daily (Patient taking differently: Take 10 mg by mouth every morning) 90 tablet 3     glipiZIDE (GLUCOTROL XL) 10 MG 24 hr tablet Take 2 tablets (20 mg) by mouth daily (Patient taking differently: Take 10 mg by mouth every morning) 180 tablet 3     glipiZIDE (GLUCOTROL XL) 5 MG 24 hr tablet Take 1 tablet (5 mg) by mouth daily Take with 10 mg dose for a total of 15 mg daily (Patient taking differently: Take 5 mg by mouth every evening) 90 tablet 3     hydrALAZINE (APRESOLINE) 50 MG tablet Take 1.5 tablets (75 mg) by mouth 3 times daily 270 tablet 3     levothyroxine (SYNTHROID/LEVOTHROID) 112 MCG tablet Take 1 tablet (112 mcg) by mouth daily (Patient taking differently: Take 112 mcg by mouth every morning) 90 tablet 4     losartan (COZAAR) 100 MG tablet Take 1 tablet (100 mg) by mouth daily (Patient taking differently: Take 100 mg by mouth every morning) 90 tablet 3     mesalamine (APRISO ER) 0.375 g 24 hr capsule TAKE 4 CAPSULES(1.5 GRAMS) BY  MOUTH DAILY (Patient taking differently: 1.5 g daily (with lunch)) 360 capsule 3     pantoprazole (PROTONIX) 40 MG EC tablet Take 1 tablet (40 mg) by mouth 2 times daily (Patient taking differently: Take 40 mg by mouth daily (with lunch)) 60 tablet 1     polyethylene glycol (GOLYTELY) 236 g suspension The night before the exam at 6 pm drink an 8-ounce glass every 15 minutes until the jug is half empty. If you arrive before 11 AM: Drink the other half of the Golytely jug at 11 PM night before procedure. If you arrive after 11 AM: Drink the other half of the Golytely jug at 6 AM day of procedure. For additional instructions refer to your colonoscopy prep instructions. 4000 mL 0     torsemide (DEMADEX) 10 MG tablet Take 1 tablet (10 mg) by mouth as needed (when cardiology clinic advises) 90 tablet 3        ALLERGIES:     Allergies   Allergen Reactions     Actos [Pioglitazone]      Fluid retention     Amlodipine      Leg swelling, hand swelling     Animal Dander      Doxycycline Hives     Dust Mites      Grass      Keflex [Cephalexin Hcl] Hives     Metoprolol      Swelling of lower legs and fatigue     Other [Seasonal Allergies]      pollen     Ranitidine      rash     Synthroid [Levothroid] Swelling     Allergic to brand name     Tetracycline Hives     Tylenol Hives     Ziac [Bisoprolol-Hydrochlorothiazide]         FAMILY HISTORY:     Family History   Problem Relation Age of Onset     Cerebrovascular Disease Mother      Cancer Father         bladder     Diverticulitis Brother      Diverticulitis Brother      Kidney Disease No family hx of      Crohn's Disease No family hx of      Ulcerative Colitis No family hx of      Stomach Cancer No family hx of      GERD No family hx of      Celiac Disease No family hx of      Anesthesia Reaction No family hx of         SOCIAL HISTORY:     Social History     Socioeconomic History     Marital status:      Spouse name: Raymundo; dementia;       Number of children: 3    Occupational History     Employer: [employer]   Tobacco Use     Smoking status: Never     Smokeless tobacco: Never   Substance and Sexual Activity     Alcohol use: Not Currently     Alcohol/week: 2.0 - 4.0 standard drinks of alcohol     Types: 1 - 2 Cans of beer, 1 - 2 Shots of liquor per week     Comment: occasional cocktail or beer     Drug use: No     Sexual activity: Not Currently   Other Topics Concern      Service No     Blood Transfusions No     Caffeine Concern No     Occupational Exposure No     Hobby Hazards No     Sleep Concern No     Stress Concern No     Weight Concern No     Special Diet No     Back Care No     Exercise Yes     Comment: off and on     Bike Helmet No     Seat Belt Yes     Self-Exams No   Social History Narrative    Laid off her job 8/14           PHYSICAL EXAMINATION:     /79 (BP Location: Right arm, Patient Position: Chair, Cuff Size: Adult Regular)   Pulse 100   Wt 64.9 kg (143 lb 1.6 oz)   LMP  (LMP Unknown)   SpO2 98%   BMI 25.35 kg/m      GENERAL: Elderly female, in No acute distress.  HEENT: EOMI. Sclerae white, not injected. Nares clear. Pharynx without erythema or exudate.   Neck: No adenopathy. No thyromegaly. Symmetrical.   Heart: Irregularly irregular, No murmur.   Lungs: Clear to auscultation. No ronchi, wheezes, rales.   Abdomen: Soft, nontender, nondistended.  Extremities: No clubbing, cyanosis, or edema.   Neurologic: Alert and oriented to person/place/time, normal speech and affect. No focal deficits.  Skin: No petechiae, purpura or rash on visualized skin     LABORATORY DATA:     LIPID RESULTS:  Lab Results   Component Value Date    CHOL 73 01/20/2023    CHOL 93 10/28/2020    HDL 29 (L) 01/20/2023    HDL 38 (L) 10/28/2020    LDL 27 01/20/2023    LDL 33 10/28/2020    TRIG 87 01/20/2023    TRIG 108 10/28/2020    CHOLHDLRATIO 4.3 05/18/2015       LIVER ENZYME RESULTS:  Lab Results   Component Value Date    AST 44 (H) 02/02/2023    AST 19  10/28/2020    ALT 11 02/02/2023    ALT 18 10/28/2020       CBC RESULTS:  Lab Results   Component Value Date    WBC 6.9 02/02/2023    WBC 7.3 10/28/2020    RBC 3.41 (L) 02/02/2023    RBC 3.99 10/28/2020    HGB 9.1 (L) 03/07/2023    HGB 11.8 02/08/2021    HCT 32.1 (L) 02/02/2023    HCT 38.2 10/28/2020    MCV 94 02/02/2023    MCV 96 10/28/2020    MCH 27.9 02/02/2023    MCH 30.8 10/28/2020    MCHC 29.6 (L) 02/02/2023    MCHC 32.2 10/28/2020    RDW 17.2 (H) 02/02/2023    RDW 13.9 10/28/2020     02/02/2023     10/28/2020       BMP RESULTS:  Lab Results   Component Value Date     (L) 02/02/2023     04/28/2021    POTASSIUM 4.7 02/02/2023    POTASSIUM 4.6 12/27/2022    POTASSIUM 4.5 04/28/2021    CHLORIDE 98 02/02/2023    CHLORIDE 102 12/27/2022    CHLORIDE 100 04/28/2021    CO2 26 02/02/2023    CO2 20 12/27/2022    CO2 26 04/28/2021    ANIONGAP 11 02/02/2023    ANIONGAP 9 12/27/2022    ANIONGAP 8 04/28/2021     (H) 03/10/2023     (H) 12/27/2022    GLC 57 (L) 04/28/2021    BUN 23.0 02/02/2023    BUN 37 (H) 12/27/2022    BUN 23 04/28/2021    CR 1.52 (H) 02/02/2023    CR 1.21 (H) 04/28/2021    GFRESTIMATED 36 (L) 02/02/2023    GFRESTIMATED 30 (L) 12/30/2022    GFRESTIMATED 46 (L) 04/28/2021    GFRESTBLACK 53 (L) 04/28/2021    ABEL 9.5 02/02/2023    ABEL 9.8 04/28/2021        A1C RESULTS:  Lab Results   Component Value Date    A1C 6.9 (H) 01/20/2023    A1C 6.6 (H) 10/28/2020       INR RESULTS:  No results found for: INR       PROCEDURES & FURTHER ASSESSMENTS:     ECG on 1/20/2023: atrial fibrillation, possible RVH, non-specific ST-T wave changes    Echocardiogram on 8/19/2022:  Global and regional left ventricular function is normal with an EF of 55-60%.  Paradoxical septal motion consistent with right ventricular pressure and  volume overload is present.  Moderate right ventricular dilation is present.  Global right ventricular function is moderately reduced.  Mild aortic stenosis is  present.  Right ventricular systolic pressure is 57mmHg above the right atrial pressure.  IVC diameter >2.1 cm collapsing <50% with sniff suggests a high RA pressure  estimated at 15 mmHg or greater.  Trivial pericardial effusion is present.  PA pressure lower when compared to previous study.      [id number] Score: 4 (4.8% annual stroke risk)  HASBLED Score: 3 (age, history of major bleed, medication; 5.8% risk)       CLINICAL IMPRESSION:     Keerthi Montoya is a 73 year old female with atrial fibrillation who is a good candidate for left atrial appendage closure. Long term anticoagulation for atrial fibrillation is not a good option secondary to her GI bleeding issues.  The patient is likely suitable for short-term anticoagulation given healing of her ulcers on recent EGD and per GI is okay to resume anticoagulation and is expected to tolerate a brief period of anticoagulation of at least 45 days following the procedure.   The risks and benefits of left atrial closure were discussed and the patient agrees to proceed with further evaluation and closure of their left atrial appendage if possible.      Plan Summary:  1) Will update images to [employer] to determine if her anatomy is suitable for Left atrial appendage closure with the WATCHMAN device.  Will reach out to patient if so.  2) Will plan on 45 days of systemic AC with DOAC (I.e. Xarelto) post procedure.  3) Patient will plan on doing her cataract surgery prior to Watchman insertion (schedule 4 weeks post cataract surgery), discussed with patient okay to resume anticoagulation in anticipation of Watchman procedure.    Patient was evaluated in clinic with Dr. Grimes of interventional cardiology.    Michael López MD  Interventional Cardiology Fellow  [facility]      Patient seen and examined with Cardiovascular fellow and agree with the assessment and plan described above.     Bobby Grimes M.D.  Interventional Cardiology  [place]  [facility]           CC  Patient Care Team:  Bunny Meyers MD as PCP - General (Internal Medicine)  Preeti James MD as MD (Gastroenterology)  Christal Barker, VERONIQUE as Specialty Care Coordinator (Cardiology)  Stacy Stokes, RN as Specialty Care Coordinator (Cardiology)  Swati Minor DO as Assigned Nephrology Provider  Carolyn Bonilla APRN CNP as Assigned Heart and Vascular Provider  Lorena Ambriz, RN as Specialty Care Coordinator (Cardiology)  Jerry Robbins PA-C as Physician Assistant (Gastroenterology)  Zuleika Glover, RN as Specialty Care Coordinator (Cardiology)  Bunny Meyers MD as Assigned PCP  Preeti James MD as Assigned Gastroenterology Provider  Bunny Santa [facility] as Assigned MTM Pharmacist  Trey, Allison Reed, RN as Specialty Care Coordinator (Gastroenterology)  Gianfranco Melendez MD as Intern (Student in organized health care education/training program)

## 2023-04-06 NOTE — LETTER
"4/6/2023      RE: Keerthi Montoya  [address] [place] [id number]       Dear Colleague,    Thank you for the opportunity to participate in the care of your patient, Keerthi Montoya, at the [facility] HEART [facility]INIC [place] [facility]. Please see a copy of my visit note below.        [facility] HEART CARE  CARDIOVASCULAR DIVISION    INITIAL CONSULTATION          PERTINENT CLINICAL HISTORY:     Keerthi Montoya is a very pleasant 73 year old female with atrial fibrillation referred to our clinic for evaluation and consideration of left atrial appendage closure.  She has complicated PMHx notable for HLD, HTN, DM2, ulcerative pancolitis, CKD3, pulm HTN and afib previously on Xarelto.  She had recent admission with severe GI bleed in Jan 2023 with Hgb on admission to 5.9.  Her Xarelto was held, and she was treated with PRBC transfusions and underwent EGD that showed gastric and duodenal ulcers as well as erosive gastropathy.  She was discharged on PPIs and her anticoagulation has continued to be held.  She underwent repeat EGD on follow up with GI that showed healing of her ulcers and with signs of portal hypertensive gastropathy.  Colonoscopy at same visit stated that her ulcerative colitis was inactive.  Per GI it was okay to resume AC.  Based on her CHADS-VASc score (4), systemic anticoagulation is recommended for stroke prophylaxis, however, due to recent life threatening GI bleed, she is not an ideal candidate for long-term AC and so has been referred for RY closure evaluation.  She has cardiac CT that showed her RY depth is \"borderline for watchman device placement.\"    Today she states she is feeling well.  No further episodes of bleeding that she has noted.  No dark stools or bright red blood per rectum.  She remains off of anticoagulation.  No chest pain or sob.  No recent fevers / chills.  No LE edema, no orthopnea/PND.  No " light-headedness/dizziness.  Rare palpitations.    ROS: + as per HPI, otherwise negative.     PAST MEDICAL HISTORY:     Past Medical History:   Diagnosis Date    Chronic kidney disease     Diabetes mellitus, type 2 (H)     Diverticulosis of colon (without mention of hemorrhage) 10/01/2012    GI bleed     History of blood transfusion     HLD (hyperlipidemia)     Hypertension     Hypothyroidism     MARIA D (iron deficiency anemia)     Persistent atrial fibrillation (H)     Pulmonary hypertension (H)     Ulcerative (chronic) enterocolitis (H)         PAST SURGICAL HISTORY:     Past Surgical History:   Procedure Laterality Date    CHOLECYSTECTOMY  3/1987    COLON SURGERY  01/2001    Left colon resection; diverticulitis    COLONOSCOPY N/A 4/24/2017    Procedure: COMBINED COLONOSCOPY, SINGLE OR MULTIPLE BIOPSY/POLYPECTOMY BY BIOPSY;;  Surgeon: Radha Salguero MD;  Location: [facility] OR    COLONOSCOPY N/A 3/10/2023    Procedure: COLONOSCOPY;  Surgeon: Preeti James MD;  Location:  GI    COLONOSCOPY WITH CO2 INSUFFLATION N/A 4/24/2017    Procedure: COLONOSCOPY WITH CO2 INSUFFLATION;  Colonoscopy Dx rectal bleeding, BMI 25.86, Pharm [facility] .[phone], ;  Surgeon: Radha Salguero MD;  Location: [facility] OR    CV RIGHT HEART CATH MEASUREMENTS RECORDED N/A 3/16/2020    Procedure: CV RIGHT HEART CATH;  Surgeon: Nader Muñoz MD;  Location:  HEART CARDIAC CATH LAB    ESOPHAGOSCOPY, GASTROSCOPY, DUODENOSCOPY (EGD), COMBINED N/A 1/23/2023    Procedure: ESOPHAGOGASTRODUODENOSCOPY, WITH BIOPSY;  Surgeon: Ricki Deal MD;  Location:  GI    ESOPHAGOSCOPY, GASTROSCOPY, DUODENOSCOPY (EGD), COMBINED N/A 3/10/2023    Procedure: Esophagoscopy, gastroscopy, duodenoscopy (EGD), combined;  Surgeon: Preeti James MD;  Location:  GI    GYN SURGERY  9/1983    tubal ligation    HERNIA REPAIR  12/2006    recurrent incisional hernia    SIGMOIDOSCOPY FLEXIBLE N/A 1/23/2023    Procedure: Sigmoidoscopy flexible;   Surgeon: Ricki Deal MD;  Location:  GI    THROAT SURGERY  12/1974    thyroidectomy        CURRENT MEDICATIONS:     Current Outpatient Medications   Medication Sig Dispense Refill    atenolol (TENORMIN) 50 MG tablet Take 1 tablet (50 mg) by mouth daily (Patient taking differently: Take 50 mg by mouth every morning) 90 tablet 3    atorvastatin (LIPITOR) 40 MG tablet Take 1 tablet (40 mg) by mouth daily (Patient taking differently: Take 40 mg by mouth every morning) 90 tablet 3    bisacodyl (DULCOLAX) 5 MG EC tablet Take 2 tablets at 3 pm the day before your procedure. If your procedure is before 11 am, take 2 additional tablets at 11 pm. If your procedure is after 11 am, take 2 additional tablets at 6 am. For additional instructions refer to your colonoscopy prep instructions. 4 tablet 0    blood glucose (ACCU-CHEK FELIPE PLUS) test strip 200 strips by In Vitro route 2 times daily Use to test blood sugar 2 times daily or as directed. 200 strip 4    Cholecalciferol (VITAMIN D) 2000 units CAPS Take by mouth daily (with lunch)      Cyanocobalamin (B-12) 1000 MCG TABS Take 1,000 mcg by mouth three times a week      diphenhydrAMINE (BENADRYL) 25 MG tablet Take 25 mg by mouth every 6 hours as needed for itching or allergies      empagliflozin (JARDIANCE) 10 MG TABS tablet Take 1 tablet (10 mg) by mouth daily (Patient taking differently: Take 10 mg by mouth every morning) 90 tablet 3    glipiZIDE (GLUCOTROL XL) 10 MG 24 hr tablet Take 2 tablets (20 mg) by mouth daily (Patient taking differently: Take 10 mg by mouth every morning) 180 tablet 3    glipiZIDE (GLUCOTROL XL) 5 MG 24 hr tablet Take 1 tablet (5 mg) by mouth daily Take with 10 mg dose for a total of 15 mg daily (Patient taking differently: Take 5 mg by mouth every evening) 90 tablet 3    hydrALAZINE (APRESOLINE) 50 MG tablet Take 1.5 tablets (75 mg) by mouth 3 times daily 270 tablet 3    levothyroxine (SYNTHROID/LEVOTHROID) 112 MCG tablet Take 1 tablet (112  mcg) by mouth daily (Patient taking differently: Take 112 mcg by mouth every morning) 90 tablet 4    losartan (COZAAR) 100 MG tablet Take 1 tablet (100 mg) by mouth daily (Patient taking differently: Take 100 mg by mouth every morning) 90 tablet 3    mesalamine (APRISO ER) 0.375 g 24 hr capsule TAKE 4 CAPSULES(1.5 GRAMS) BY MOUTH DAILY (Patient taking differently: 1.5 g daily (with lunch)) 360 capsule 3    pantoprazole (PROTONIX) 40 MG EC tablet Take 1 tablet (40 mg) by mouth 2 times daily (Patient taking differently: Take 40 mg by mouth daily (with lunch)) 60 tablet 1    polyethylene glycol (GOLYTELY) 236 g suspension The night before the exam at 6 pm drink an 8-ounce glass every 15 minutes until the jug is half empty. If you arrive before 11 AM: Drink the other half of the Golytely jug at 11 PM night before procedure. If you arrive after 11 AM: Drink the other half of the Golytely jug at 6 AM day of procedure. For additional instructions refer to your colonoscopy prep instructions. 4000 mL 0    torsemide (DEMADEX) 10 MG tablet Take 1 tablet (10 mg) by mouth as needed (when cardiology clinic advises) 90 tablet 3        ALLERGIES:     Allergies   Allergen Reactions    Actos [Pioglitazone]      Fluid retention    Amlodipine      Leg swelling, hand swelling    Animal Dander     Doxycycline Hives    Dust Mites     Grass     Keflex [Cephalexin Hcl] Hives    Metoprolol      Swelling of lower legs and fatigue    Other [Seasonal Allergies]      pollen    Ranitidine      rash    Synthroid [Levothroid] Swelling     Allergic to brand name    Tetracycline Hives    Tylenol Hives    Ziac [Bisoprolol-Hydrochlorothiazide]         FAMILY HISTORY:     Family History   Problem Relation Age of Onset    Cerebrovascular Disease Mother     Cancer Father         bladder    Diverticulitis Brother     Diverticulitis Brother     Kidney Disease No family hx of     Crohn's Disease No family hx of     Ulcerative Colitis No family hx of      Stomach Cancer No family hx of     GERD No family hx of     Celiac Disease No family hx of     Anesthesia Reaction No family hx of         SOCIAL HISTORY:     Social History     Socioeconomic History    Marital status:      Spouse name: Raymundo; dementia;  2016    Number of children: 3   Occupational History     Employer: [employer]   Tobacco Use    Smoking status: Never    Smokeless tobacco: Never   Substance and Sexual Activity    Alcohol use: Not Currently     Alcohol/week: 2.0 - 4.0 standard drinks of alcohol     Types: 1 - 2 Cans of beer, 1 - 2 Shots of liquor per week     Comment: occasional cocktail or beer    Drug use: No    Sexual activity: Not Currently   Other Topics Concern     Service No    Blood Transfusions No    Caffeine Concern No    Occupational Exposure No    Hobby Hazards No    Sleep Concern No    Stress Concern No    Weight Concern No    Special Diet No    Back Care No    Exercise Yes     Comment: off and on    Bike Helmet No    Seat Belt Yes    Self-Exams No   Social History Narrative    Laid off her job            PHYSICAL EXAMINATION:     /79 (BP Location: Right arm, Patient Position: Chair, Cuff Size: Adult Regular)   Pulse 100   Wt 64.9 kg (143 lb 1.6 oz)   LMP  (LMP Unknown)   SpO2 98%   BMI 25.35 kg/m      GENERAL: Elderly female, in No acute distress.  HEENT: EOMI. Sclerae white, not injected. Nares clear. Pharynx without erythema or exudate.   Neck: No adenopathy. No thyromegaly. Symmetrical.   Heart: Irregularly irregular, No murmur.   Lungs: Clear to auscultation. No ronchi, wheezes, rales.   Abdomen: Soft, nontender, nondistended.  Extremities: No clubbing, cyanosis, or edema.   Neurologic: Alert and oriented to person/place/time, normal speech and affect. No focal deficits.  Skin: No petechiae, purpura or rash on visualized skin     LABORATORY DATA:     LIPID RESULTS:  Lab Results   Component Value Date    CHOL 73 2023    CHOL 93  10/28/2020    HDL 29 (L) 01/20/2023    HDL 38 (L) 10/28/2020    LDL 27 01/20/2023    LDL 33 10/28/2020    TRIG 87 01/20/2023    TRIG 108 10/28/2020    CHOLHDLRATIO 4.3 05/18/2015       LIVER ENZYME RESULTS:  Lab Results   Component Value Date    AST 44 (H) 02/02/2023    AST 19 10/28/2020    ALT 11 02/02/2023    ALT 18 10/28/2020       CBC RESULTS:  Lab Results   Component Value Date    WBC 6.9 02/02/2023    WBC 7.3 10/28/2020    RBC 3.41 (L) 02/02/2023    RBC 3.99 10/28/2020    HGB 9.1 (L) 03/07/2023    HGB 11.8 02/08/2021    HCT 32.1 (L) 02/02/2023    HCT 38.2 10/28/2020    MCV 94 02/02/2023    MCV 96 10/28/2020    MCH 27.9 02/02/2023    MCH 30.8 10/28/2020    MCHC 29.6 (L) 02/02/2023    MCHC 32.2 10/28/2020    RDW 17.2 (H) 02/02/2023    RDW 13.9 10/28/2020     02/02/2023     10/28/2020       BMP RESULTS:  Lab Results   Component Value Date     (L) 02/02/2023     04/28/2021    POTASSIUM 4.7 02/02/2023    POTASSIUM 4.6 12/27/2022    POTASSIUM 4.5 04/28/2021    CHLORIDE 98 02/02/2023    CHLORIDE 102 12/27/2022    CHLORIDE 100 04/28/2021    CO2 26 02/02/2023    CO2 20 12/27/2022    CO2 26 04/28/2021    ANIONGAP 11 02/02/2023    ANIONGAP 9 12/27/2022    ANIONGAP 8 04/28/2021     (H) 03/10/2023     (H) 12/27/2022    GLC 57 (L) 04/28/2021    BUN 23.0 02/02/2023    BUN 37 (H) 12/27/2022    BUN 23 04/28/2021    CR 1.52 (H) 02/02/2023    CR 1.21 (H) 04/28/2021    GFRESTIMATED 36 (L) 02/02/2023    GFRESTIMATED 30 (L) 12/30/2022    GFRESTIMATED 46 (L) 04/28/2021    GFRESTBLACK 53 (L) 04/28/2021    ABEL 9.5 02/02/2023    ABEL 9.8 04/28/2021        A1C RESULTS:  Lab Results   Component Value Date    A1C 6.9 (H) 01/20/2023    A1C 6.6 (H) 10/28/2020       INR RESULTS:  No results found for: INR       PROCEDURES & FURTHER ASSESSMENTS:     ECG on 1/20/2023: atrial fibrillation, possible RVH, non-specific ST-T wave changes    Echocardiogram on 8/19/2022:  Global and regional left ventricular  function is normal with an EF of 55-60%.  Paradoxical septal motion consistent with right ventricular pressure and  volume overload is present.  Moderate right ventricular dilation is present.  Global right ventricular function is moderately reduced.  Mild aortic stenosis is present.  Right ventricular systolic pressure is 57mmHg above the right atrial pressure.  IVC diameter >2.1 cm collapsing <50% with sniff suggests a high RA pressure  estimated at 15 mmHg or greater.  Trivial pericardial effusion is present.  PA pressure lower when compared to previous study.      [id number] Score: 4 (4.8% annual stroke risk)  HASBLED Score: 3 (age, history of major bleed, medication; 5.8% risk)       CLINICAL IMPRESSION:     Keerthi Montoya is a 73 year old female with atrial fibrillation who is a good candidate for left atrial appendage closure. Long term anticoagulation for atrial fibrillation is not a good option secondary to her GI bleeding issues.  The patient is likely suitable for short-term anticoagulation given healing of her ulcers on recent EGD and per GI is okay to resume anticoagulation and is expected to tolerate a brief period of anticoagulation of at least 45 days following the procedure.   The risks and benefits of left atrial closure were discussed and the patient agrees to proceed with further evaluation and closure of their left atrial appendage if possible.      Plan Summary:  1) Will update images to [employer] to determine if her anatomy is suitable for Left atrial appendage closure with the WATCHMAN device.  Will reach out to patient if so.  2) Will plan on 45 days of systemic AC with DOAC (I.e. Xarelto) post procedure.  3) Patient will plan on doing her cataract surgery prior to Watchman insertion (schedule 4 weeks post cataract surgery), discussed with patient okay to resume anticoagulation in anticipation of Watchman procedure.    Patient was evaluated in clinic with Dr. Grimes of  interventional cardiology.    Michael López MD  Interventional Cardiology Fellow  [facility]      Patient seen and examined with Cardiovascular fellow and agree with the assessment and plan described above.     Bobby Grimes M.D.  Interventional Cardiology  [facility]           CC  Patient Care Team:  Bunny Meyers MD as PCP - General (Internal Medicine)  Preeti James MD as MD (Gastroenterology)  Christal Barker, VERONIQUE as Specialty Care Coordinator (Cardiology)  Stacy Stokes, RN as Specialty Care Coordinator (Cardiology)  Swati Minor DO as Assigned Nephrology Provider  Carolyn Bonilla APRN CNP as Assigned Heart and Vascular Provider  Lorena Ambriz, RN as Specialty Care Coordinator (Cardiology)  Jerry Robbins PA-C as Physician Assistant (Gastroenterology)  Zuleika Glover, RN as Specialty Care Coordinator (Cardiology)  Bunny Meyers MD as Assigned PCP  Pretei James MD as Assigned Gastroenterology Provider  Bunny Santa [facility] as Assigned [facility] Pharmacist  Allison Yang, RN as Specialty Care Coordinator (Gastroenterology)  Gianfranco Melendez MD as Intern (Student in organized health care education/training program)      Please do not hesitate to contact me if you have any questions/concerns.     Sincerely,     Bobby Grimes MD

## 2023-04-07 ENCOUNTER — OFFICE VISIT (OUTPATIENT)
Dept: PHARMACY | Facility: CLINIC | Age: 73
End: 2023-04-07
Payer: COMMERCIAL

## 2023-04-07 ENCOUNTER — LAB (OUTPATIENT)
Dept: LAB | Facility: CLINIC | Age: 73
End: 2023-04-07
Payer: MEDICARE

## 2023-04-07 ENCOUNTER — OFFICE VISIT (OUTPATIENT)
Dept: INTERNAL MEDICINE | Facility: CLINIC | Age: 73
End: 2023-04-07
Payer: MEDICARE

## 2023-04-07 VITALS
HEART RATE: 80 BPM | OXYGEN SATURATION: 97 % | WEIGHT: 140.2 LBS | DIASTOLIC BLOOD PRESSURE: 69 MMHG | SYSTOLIC BLOOD PRESSURE: 143 MMHG | BODY MASS INDEX: 24.84 KG/M2

## 2023-04-07 DIAGNOSIS — J30.2 SEASONAL ALLERGIC RHINITIS, UNSPECIFIED TRIGGER: ICD-10-CM

## 2023-04-07 DIAGNOSIS — R80.9 TYPE 2 DIABETES MELLITUS WITH MICROALBUMINURIA, WITHOUT LONG-TERM CURRENT USE OF INSULIN (H): ICD-10-CM

## 2023-04-07 DIAGNOSIS — R94.6 THYROID FUNCTION TEST ABNORMAL: Primary | ICD-10-CM

## 2023-04-07 DIAGNOSIS — E89.0 POSTOPERATIVE HYPOTHYROIDISM: ICD-10-CM

## 2023-04-07 DIAGNOSIS — D50.9 IRON DEFICIENCY ANEMIA, UNSPECIFIED IRON DEFICIENCY ANEMIA TYPE: ICD-10-CM

## 2023-04-07 DIAGNOSIS — R94.6 THYROID FUNCTION TEST ABNORMAL: ICD-10-CM

## 2023-04-07 DIAGNOSIS — K92.2 GASTROINTESTINAL HEMORRHAGE, UNSPECIFIED GASTROINTESTINAL HEMORRHAGE TYPE: ICD-10-CM

## 2023-04-07 DIAGNOSIS — I10 HYPERTENSION GOAL BP (BLOOD PRESSURE) < 130/80: Primary | ICD-10-CM

## 2023-04-07 DIAGNOSIS — D64.9 ANEMIA, UNSPECIFIED TYPE: ICD-10-CM

## 2023-04-07 DIAGNOSIS — E11.29 TYPE 2 DIABETES MELLITUS WITH MICROALBUMINURIA, WITHOUT LONG-TERM CURRENT USE OF INSULIN (H): ICD-10-CM

## 2023-04-07 DIAGNOSIS — R60.9 EDEMA, UNSPECIFIED TYPE: ICD-10-CM

## 2023-04-07 DIAGNOSIS — Z78.9 TAKES DIETARY SUPPLEMENTS: ICD-10-CM

## 2023-04-07 DIAGNOSIS — K51.00 ULCERATIVE PANCOLITIS WITHOUT COMPLICATION (H): ICD-10-CM

## 2023-04-07 LAB
BASOPHILS # BLD AUTO: 0.1 10E3/UL (ref 0–0.2)
BASOPHILS NFR BLD AUTO: 1 %
EOSINOPHIL # BLD AUTO: 0.2 10E3/UL (ref 0–0.7)
EOSINOPHIL NFR BLD AUTO: 2 %
ERYTHROCYTE [DISTWIDTH] IN BLOOD BY AUTOMATED COUNT: 20.6 % (ref 10–15)
HCT VFR BLD AUTO: 33.6 % (ref 35–47)
HGB BLD-MCNC: 9.9 G/DL (ref 11.7–15.7)
IMM GRANULOCYTES # BLD: 0 10E3/UL
IMM GRANULOCYTES NFR BLD: 0 %
LYMPHOCYTES # BLD AUTO: 1.8 10E3/UL (ref 0.8–5.3)
LYMPHOCYTES NFR BLD AUTO: 20 %
MCH RBC QN AUTO: 23.3 PG (ref 26.5–33)
MCHC RBC AUTO-ENTMCNC: 29.5 G/DL (ref 31.5–36.5)
MCV RBC AUTO: 79 FL (ref 78–100)
MONOCYTES # BLD AUTO: 1.3 10E3/UL (ref 0–1.3)
MONOCYTES NFR BLD AUTO: 15 %
NEUTROPHILS # BLD AUTO: 5.4 10E3/UL (ref 1.6–8.3)
NEUTROPHILS NFR BLD AUTO: 62 %
NRBC # BLD AUTO: 0 10E3/UL
NRBC BLD AUTO-RTO: 0 /100
PLATELET # BLD AUTO: 376 10E3/UL (ref 150–450)
RBC # BLD AUTO: 4.24 10E6/UL (ref 3.8–5.2)
TSH SERPL DL<=0.005 MIU/L-ACNC: 3.86 UIU/ML (ref 0.3–4.2)
WBC # BLD AUTO: 8.8 10E3/UL (ref 4–11)

## 2023-04-07 PROCEDURE — 83540 ASSAY OF IRON: CPT | Performed by: PATHOLOGY

## 2023-04-07 PROCEDURE — 82728 ASSAY OF FERRITIN: CPT | Performed by: INTERNAL MEDICINE

## 2023-04-07 PROCEDURE — 99207 PR NO CHARGE LOS: CPT | Performed by: PHARMACIST

## 2023-04-07 PROCEDURE — 83550 IRON BINDING TEST: CPT | Performed by: PATHOLOGY

## 2023-04-07 PROCEDURE — 84443 ASSAY THYROID STIM HORMONE: CPT | Performed by: PATHOLOGY

## 2023-04-07 PROCEDURE — 85025 COMPLETE CBC W/AUTO DIFF WBC: CPT | Performed by: PATHOLOGY

## 2023-04-07 PROCEDURE — 36415 COLL VENOUS BLD VENIPUNCTURE: CPT | Performed by: PATHOLOGY

## 2023-04-07 PROCEDURE — 99214 OFFICE O/P EST MOD 30 MIN: CPT | Performed by: INTERNAL MEDICINE

## 2023-04-07 RX ORDER — PANTOPRAZOLE SODIUM 40 MG/1
40 TABLET, DELAYED RELEASE ORAL 2 TIMES DAILY
Qty: 180 TABLET | Refills: 3 | Status: SHIPPED | OUTPATIENT
Start: 2023-04-07 | End: 2023-08-02

## 2023-04-07 NOTE — NURSING NOTE
Keerthi Montoya is a 73 year old female patient that presents today in clinic for the following:    Chief Complaint   Patient presents with     Follow Up     The patient's allergies and medications were reviewed as noted. A set of vitals were recorded as noted without incident: BP (!) 143/69 (BP Location: Right arm, Patient Position: Sitting, Cuff Size: Adult Regular)   Pulse 80   Wt 63.6 kg (140 lb 3.2 oz)   LMP  (LMP Unknown)   SpO2 97%   BMI 24.84 kg/m  . The patient does not have any other questions for the provider.    Milagros Harris, EMT at 9:53 AM on 4/7/2023

## 2023-04-07 NOTE — PROGRESS NOTES
Medication Therapy Management (MTM) Encounter    ASSESSMENT:                            Medication Adherence/Access: No issues identified    Type 2 Diabetes: Self monitoring of blood glucose is not at goal of fasting  mg/dL. She would benefit from increasing Jardiance to 25 mg daily as GFR is >30 mL/min. If GFR reduces to less than 30 mL/min at any point will reduce back to 10 mg daily. Will consider adding Rybelsus (as she wishes to avoid injections) at next visit. She may increase glipizide from 15 mg daily to 20 mg daily if blood glucose still elevated above 130 mg/dL consistently after increasing Jardiance.  Will plan for close monitoring of GFR    Hypertension: blood pressure is at goal of <130/80 mmHg.      Hypothyroidism: T4 in desired range    Edema: Stable.     UC: Stable.     GI bleed: Stable.      Allergies: Stable.      Supplement: Stable.     PLAN:                            1. Increase Jardiance to 25 mg once daily.  2. May increase Glipizide to 20 mg once daily if Jardiance dose not bring your fasting blood sugar below 130 mg/dL  3. Will discuss adding Rybelsus at next visit.    Follow-up: Return in about 5 weeks (around 2023) for Follow up, with me, in person.    SUBJECTIVE/OBJECTIVE:                          Keerthi Montoya is a 73 year old female coming in for a follow-up visit.  Today's visit is a follow-up MTM visit from 10/14/22   Reason for visit: MTM follow-up.    Allergies/ADRs: Reviewed in chart  Past Medical History: Reviewed in chart  Tobacco: She reports that she has never smoked. She has never used smokeless tobacco.  Alcohol: none      Medication Adherence/Access: no issues reported    Type 2 Diabetes:  Currently taking Jardiance 10 mg daily, and glipizide 15 mg daily (never increased to 20 mg) Patient is not experiencing side effects.  Blood sugar monitorin time(s) daily. Ranges (patient reported): Fasting- this morning was 171 mg/dL and some days it is over 200 mg/dL.  After she ate it went up to 400 mg/dL.  Symptoms of low blood sugar? none, Frequency of lows- once every 2-3 weeks  Symptoms of high blood sugar? none  Eye exam: up to date  Foot exam: up to date  Aspirin: No  Statin: Yes: atorvastatin 40 mg   ACEi/ARB: Yes: losartan.   Urine Albumin:   Lab Results   Component Value Date    UMALCR 1,274.19 (H) 09/11/2018      Lab Results   Component Value Date    A1C 6.9 01/20/2023    A1C 8.1 06/24/2022    A1C 7.0 12/16/2021    A1C 6.6 09/01/2021    A1C 6.6 10/28/2020    A1C 7.0 05/12/2020    A1C 7.4 01/13/2020    A1C 7.9 01/28/2019    A1C 7.6 06/25/2018     Recent Labs   Lab Test 01/20/23  0740 10/28/20  1629 01/23/17  1433 05/18/15  0914   CHOL 73 93   < > 231*   HDL 29* 38*   < > 54   LDL 27 33   < > 159*   TRIG 87 108   < > 92   CHOLHDLRATIO  --   --   --  4.3    < > = values in this interval not displayed.       Hypertension: Current medications include atenolol 50 mg daily, hydralazine 50 mg three times a day, losartan 100 mg daily, and torsemide 10 mg daily.  Patient does self-monitor blood pressure. Home BP monitoring in range of 120's systolic over 50-60's diastolic. Patient reports no current medication side effects.  BP Readings from Last 3 Encounters:   04/07/23 (!) 143/69   04/06/23 128/79   03/10/23 136/83       Pulse Readings from Last 3 Encounters:   04/07/23 80   04/06/23 100   03/10/23 75       Hypothyroidism: Patient is taking levothyroxine 112 mcg daily. Patient is having the following symptoms: none.   TSH   Date Value Ref Range Status   01/20/2023 8.56 (H) 0.30 - 4.20 uIU/mL Final   07/22/2022 0.82 0.40 - 4.00 mU/L Final   10/28/2020 0.10 (L) 0.40 - 4.00 mU/L Final     T4 Free   Date Value Ref Range Status   10/28/2020 1.82 (H) 0.76 - 1.46 ng/dL Final     Free T4   Date Value Ref Range Status   01/20/2023 1.42 0.90 - 1.70 ng/dL Final     Edema: Currently taking torsemide 10 mg daily as needed. Doesn't use often.     UC: Currently taking mesalamine 0.375 g and  takes 4 capsules per day. Reports she is doing well on this medication.     GI bleed: Currently taking pantoprazole 40 mg twice a day. No concerns or questions at this time.      Allergies: Currently taking benadryl 25 mg as needed for allergies. Reports she cant tolerate 2nd generation antihistamines. Hasnt used much in the winter and will use more in the spring. Doesn't take often and tolerates well.       Supplement: Currently taking vitamin d 2000 units daily, and vitamin B12 1000 mcg three times a week. No concerns or questions at this time.     Today's Vitals: LMP  (LMP Unknown)   ----------------  Post Discharge Medication Reconciliation Status: discharge medications reconciled and changed, per note/orders. - hospitalization was a colonoscopy procedure.     I spent 30 minutes with this patient today. All changes were made via collaborative practice agreement with Bunny Meyers MD. A copy of the visit note was provided to the patient's provider(s).    A summary of these recommendations was sent via [employer].    Bunny Santa, Pharm. D., BCACP  Medication Therapy Management Pharmacist  Direct Voicemail: [phone]     Medication Therapy Recommendations  Type 2 diabetes mellitus with microalbuminuria, without long-term current use of insulin (H)    Current Medication: empagliflozin (JARDIANCE) 10 MG TABS tablet (Discontinued)   Rationale: Dose too low - Dosage too low - Effectiveness   Recommendation: Increase Dose   Status: Accepted per CPA

## 2023-04-07 NOTE — PROGRESS NOTES
HPI:    Overall doing well. She is off anticoagulation for afib. She was in the hospital for low Hgb from GI bleeding after starting Xarelto. Otherwise, no additional HEENT,. Cardiopulmonary, abdominal, , GYN, neurological, systemic, psychiatric, lymphatic, endocrine, vascular disease.     Past Medical History:   Diagnosis Date     Chronic kidney disease      Diabetes mellitus, type 2 (H)      Diverticulosis of colon (without mention of hemorrhage) 10/01/2012     GI bleed      History of blood transfusion      HLD (hyperlipidemia)      Hypertension      Hypothyroidism      MARIA D (iron deficiency anemia)      Persistent atrial fibrillation (H)      Pulmonary hypertension (H)      Ulcerative (chronic) enterocolitis (H)      Past Surgical History:   Procedure Laterality Date     CHOLECYSTECTOMY  3/1987     COLON SURGERY  01/2001    Left colon resection; diverticulitis     COLONOSCOPY N/A 4/24/2017    Procedure: COMBINED COLONOSCOPY, SINGLE OR MULTIPLE BIOPSY/POLYPECTOMY BY BIOPSY;;  Surgeon: Radha Salguero MD;  Location: [facility] OR     COLONOSCOPY N/A 3/10/2023    Procedure: COLONOSCOPY;  Surgeon: Preeti James MD;  Location:  GI     COLONOSCOPY WITH CO2 INSUFFLATION N/A 4/24/2017    Procedure: COLONOSCOPY WITH CO2 INSUFFLATION;  Colonoscopy Dx rectal bleeding, BMI 25.86, Pharm [facility] .[phone], ;  Surgeon: Radha Salguero MD;  Location: [facility] OR     CV RIGHT HEART CATH MEASUREMENTS RECORDED N/A 3/16/2020    Procedure: CV RIGHT HEART CATH;  Surgeon: Nader Muñoz MD;  Location:  HEART CARDIAC CATH LAB     ESOPHAGOSCOPY, GASTROSCOPY, DUODENOSCOPY (EGD), COMBINED N/A 1/23/2023    Procedure: ESOPHAGOGASTRODUODENOSCOPY, WITH BIOPSY;  Surgeon: Ricki Deal MD;  Location:  GI     ESOPHAGOSCOPY, GASTROSCOPY, DUODENOSCOPY (EGD), COMBINED N/A 3/10/2023    Procedure: Esophagoscopy, gastroscopy, duodenoscopy (EGD), combined;  Surgeon: Preeti James MD;  Location:  GI     GYN SURGERY   9/1983    tubal ligation     HERNIA REPAIR  12/2006    recurrent incisional hernia     SIGMOIDOSCOPY FLEXIBLE N/A 1/23/2023    Procedure: Sigmoidoscopy flexible;  Surgeon: Ricki Deal MD;  Location:  GI     THROAT SURGERY  12/1974    thyroidectomy     PE:    Vitals noted, gen, nad, cooperative, alert, neck supple nl rom, lungs with good air movement, irregular rhythm, S1, S2, abdomen, no acute findings. Grossly normal neurological exam.     A/P:    1. GI follow up with Dr. James 8/2/2023 for Ulcerative Colitis. Colonoscopy 12/12/2017. Seen Ms. Robbins, GI 1/27/2022. She remains on Mesalamine.   2. Follow up with Dr. Almendarez, 8/19/2022 with resting echo on  (8/19/2022). She was seen Ms. Bonilla 12/30/2022 in the [facility] (next 6/30/2023) Clinic appt. She was seen by Dr. Almendarez on  2/24/2023 (next 9/1/2023).   On Atenolol, Torsemide, Hydralazine, Losartan. She has pulmonary HTN and systemic HTN. Mild aortic stenosis. She is off  Xarelto for atrial fibrillation because of GI bleeding.  She was seen yesterday 4/6/2023 [facility] to consider L atrial appendage closure device. Hgb stable today at 9.9  3. DM2; MTM appt. With Bunny Santa today 4/7/2023 for DM2. She is on Jardiance and  Glipizide; future A1c (may not be that reliable given her GI bleeding and transfusion history).   4. Next Nephrology appt. With Dr. Minor 5/2/2023. Creatinine 1.52 on 2/2/2023.   5. GI bleeding; see EGD and Colonoscopy 3/10/2023; Hgb 9.1 on 3/7/2023  6. Increased Lipids on Atorvastatin. Lipids 1/20/2023 HDL 29 , TG's 87 and LDL 27.     7. TSH 8.56 on 1/20/2023 on thyroid replacement. S/p thyroidectomy for multinodular goiter. TSH today 4/7/2023 and normal at 3.86/   8. Mammogram 10/7/2022  9. Dermatology. She does not feel she needs to see Dermatology for a skin cancer screening.   10. Immunizations; COVID Pfizer x 4 (Bi-valent 10/14/2022) Check with insurance regarding Shingrix. Tdap 7/26/2013. Pneumococcal 23 done  7/30/2018 and Prevnar 13 done 6/22/2015. Influenza vaccine 10/14/2022              30 minutes spent on the date of the encounter doing chart review, history and exam, documentation and further activities as noted above exclusive of procedures and other billable interpretations

## 2023-04-07 NOTE — PATIENT INSTRUCTIONS
"Recommendations from today's MTM visit:                                                    MTM (medication therapy management) is a service provided by a clinical pharmacist designed to help you get the most of out of your medicines.   Today we reviewed what your medicines are for, how to know if they are working, that your medicines are safe and how to make your medicine regimen as easy as possible.      1. Increase Jardiance to 25 mg once daily.  2. Increase Glipizide to 20 mg once daily if Jardiance dose not bring your fasting blood sugar below 130 mg/dL  3. We will discuss starting Rybelsus at next visit.     Follow-up: Return in about 5 weeks (around 5/12/2023) for Follow up, with me, in person.    It was great speaking with you today.  I value your experience and would be very thankful for your time in providing feedback in our clinic survey. In the next few days, you may receive an email or text message from [facility] with a link to a survey related to your  clinical pharmacist.\"     To schedule another MTM appointment, please call the clinic directly or you may call the MTM scheduling line at [phone] or toll-free at [phone].     My Clinical Pharmacist's contact information:                                                      Please feel free to contact me with any questions or concerns you have.      Bunny Santa, Pharm. D., [facility]  Medication Therapy Management Pharmacist  Direct Voicemail: [phone]       "

## 2023-04-07 NOTE — Clinical Note
Increased Jardiance. Will plan to add Rybelsus at next visit if needed.  Bunny Santa, Pharm. D., [facility] Med[facility] Management Pharmacist Direct Voicemail: [phone]  Dr Mela Thao

## 2023-04-08 ENCOUNTER — TELEPHONE (OUTPATIENT)
Dept: INTERNAL MEDICINE | Facility: CLINIC | Age: 73
End: 2023-04-08

## 2023-04-08 DIAGNOSIS — D50.9 IRON DEFICIENCY ANEMIA, UNSPECIFIED IRON DEFICIENCY ANEMIA TYPE: Primary | ICD-10-CM

## 2023-04-08 LAB
FERRITIN SERPL-MCNC: 17 NG/ML (ref 11–328)
IRON BINDING CAPACITY (ROCHE): 426 UG/DL (ref 240–430)
IRON SATN MFR SERPL: 5 % (ref 15–46)
IRON SERPL-MCNC: 21 UG/DL (ref 37–145)

## 2023-04-08 NOTE — TELEPHONE ENCOUNTER
Ordered iron tests this encounter      Dear MsAudrey Montoya;    Your hemoglobin is low but stable. I added on some iron laboratory tests today and will follow up on this. I recommend you keep your 7/14/2023 follow up appointment with me as well.    CAROLINE Meyers MD

## 2023-04-15 ENCOUNTER — TELEPHONE (OUTPATIENT)
Dept: INTERNAL MEDICINE | Facility: CLINIC | Age: 73
End: 2023-04-15

## 2023-04-15 DIAGNOSIS — D50.9 IRON DEFICIENCY ANEMIA, UNSPECIFIED IRON DEFICIENCY ANEMIA TYPE: Primary | ICD-10-CM

## 2023-04-15 NOTE — TELEPHONE ENCOUNTER
Placed Hematology referral this encounter    Dear Ms. Montoya;    I have reviewed your laboratory tests and you still have low iron that may be contributing to your anemia. I recommend you visit with the Hematology Providers to evaluate this further. I placed a referral for this today and you can call [phone] to schedule this appointment.    CAROLINE Meyers MD

## 2023-04-17 ENCOUNTER — PATIENT OUTREACH (OUTPATIENT)
Dept: ONCOLOGY | Facility: CLINIC | Age: 73
End: 2023-04-17
Payer: MEDICARE

## 2023-04-20 NOTE — TELEPHONE ENCOUNTER
RECORDS STATUS - ALL OTHER DIAGNOSIS      RECORDS RECEIVED FROM: EPic   DATE RECEIVED:    NOTES STATUS DETAILS   OFFICE NOTE from referring provider Epic 05/17/23: Dr. Bunny Meyers   DISCHARGE SUMMARY from hospital [facility] 01/20/23: [facility]   MEDICATION LIST [facility]    LABS     PATHOLOGY REPORTS Report in EPIC 01/20/23: [id number]   ANYTHING RELATED TO DIAGNOSIS Epic Most recent 05/25/23

## 2023-04-21 ENCOUNTER — CARE COORDINATION (OUTPATIENT)
Dept: CARDIOLOGY | Facility: CLINIC | Age: 73
End: 2023-04-21
Payer: MEDICARE

## 2023-04-24 ENCOUNTER — CARE COORDINATION (OUTPATIENT)
Dept: CARDIOLOGY | Facility: CLINIC | Age: 73
End: 2023-04-24
Payer: MEDICARE

## 2023-04-24 DIAGNOSIS — I48.19 PERSISTENT ATRIAL FIBRILLATION (H): Primary | ICD-10-CM

## 2023-04-24 RX ORDER — POTASSIUM CHLORIDE 1500 MG/1
20 TABLET, EXTENDED RELEASE ORAL
Status: CANCELLED | OUTPATIENT
Start: 2023-04-24

## 2023-04-24 RX ORDER — POTASSIUM CHLORIDE 1500 MG/1
40 TABLET, EXTENDED RELEASE ORAL
Status: CANCELLED | OUTPATIENT
Start: 2023-04-24

## 2023-04-24 RX ORDER — LIDOCAINE 40 MG/G
CREAM TOPICAL
Status: CANCELLED | OUTPATIENT
Start: 2023-04-24

## 2023-04-24 RX ORDER — SODIUM CHLORIDE 9 MG/ML
1000 INJECTION, SOLUTION INTRAVENOUS CONTINUOUS
Status: CANCELLED | OUTPATIENT
Start: 2023-04-24

## 2023-04-24 RX ORDER — CLINDAMYCIN PHOSPHATE 900 MG/50ML
900 INJECTION, SOLUTION INTRAVENOUS
Status: CANCELLED | OUTPATIENT
Start: 2023-04-24

## 2023-04-24 NOTE — PROGRESS NOTES
Date: 4/24/2023    Time of Call: 4:04 PM     Diagnosis:  Afib     [ TORB ] Ordering provider: Bobby Grimes MD  Order:   CASE REQUEST WATCHMAN  LAURA   ABO/TS, CBC, BMP, INR  Pre-Watchman procedure orders     Order received by: Mirta Childers RN       Follow-up/additional notes: CT angio heart images reviewed, and [place]'s anatomy is suitable for an LAAO.  She was called and given this information. Procedure date will be May 11.  ----------------------------------------------------------------------------------------------------------------------------------------------------  Watchman Procedure:  Thursday, May 11, 3rd case    Check in to the hospital, admitting area on the main floor at 10:00 a.m.    [facility] [address] [place] [address] parking is available in front of the hospital, 24 hours a day  -  Stop at the Information Desk and the admissions desk in the lobby.      * Please check in at 10:00 a.m.  You will be directed to the Family Surgery Waiting Room, on the 3rd Floor. If you need assistance in walking, please inform people at the time of check in.  * After leaving the registration check-in area, there will be TWO visitors allowed to the pre-op area.  * When patients leave the pre-op area for their procedure, the visitors have the option to stay and wait in the family waiting area or leave the hospital.   * After the procedure is finished, the MD will call the visitor, or see them face-to-face, and update them on the procedure.     * When the patient arrives to the unit where they will stay until discharge, up to four visitors are allowed to come visit at that time.    -------------------------------------------------------------------------  Nothing to eat after midnight; water, apple juice or 7up/Sprite is OK up to two hours prior to your scheduled procedure.  You may take your medications in the morning with a sip of  water.      *Please use the special cleansing wipes, received at your pre-op History and Physical, the night before and the morning of your procedure.  Please focus the use of these wipes from neck to groin, avoiding the face and genital area.      If you did NOT receive these special cleansing wipes at your pre-op History and Physical, then:   * Please use a soap such as hibicleanse or chlorhexadine.  This can be purchased, over the counter, at a pharmacy.  If this is not available, please use an antibacterial soap.  * Take a shower, with antibacterial soap, the night before the procedure, and the morning of the procedure.  Focus the use of this soap from neck to groin, avoiding the face and genital area.    Medications Instructions:  -- PLEASE TAKE: Aspirin 325 mg, by mouth the night before the procedure and the morning of the procedure.  --PLEASE HOLD:  Jardiance/empagliflozin 72 hours prior to your procedure.  Last dose of Jardiance/empagliflozin is May 7.  START HOLDING Jardiance/empagliflozin Monday, May 8.    -- OK to take morning of procedure, or, night before the procedure, it that is how it's prescribed:  Atenolol   Atorvastatin   Levothyroxine   Mesalamine  Pantoprazole      -- HOLD these medications the morning of the procedure:  vitamin D   B-12  glipiZIDE   Losartan   hydrALAZINE   Torsemide       If any questions please contact:  Aura Childers RN  Structural Heart Care Coordinator  [facility]  Office: [phone]  Pager: [phone]

## 2023-04-27 ENCOUNTER — LAB (OUTPATIENT)
Dept: LAB | Facility: CLINIC | Age: 73
End: 2023-04-27
Payer: MEDICARE

## 2023-04-27 DIAGNOSIS — R73.09 INCREASED GLUCOSE LEVEL: ICD-10-CM

## 2023-04-27 DIAGNOSIS — D50.9 IRON DEFICIENCY ANEMIA, UNSPECIFIED IRON DEFICIENCY ANEMIA TYPE: ICD-10-CM

## 2023-04-27 DIAGNOSIS — N18.31 STAGE 3A CHRONIC KIDNEY DISEASE (H): ICD-10-CM

## 2023-04-27 DIAGNOSIS — E78.00 HIGH BLOOD CHOLESTEROL: ICD-10-CM

## 2023-04-27 DIAGNOSIS — N25.81 SECONDARY RENAL HYPERPARATHYROIDISM (H): ICD-10-CM

## 2023-04-27 DIAGNOSIS — R10.32 ABDOMINAL PAIN, LEFT LOWER QUADRANT: ICD-10-CM

## 2023-04-27 LAB
ALBUMIN SERPL-MCNC: 3.7 G/DL (ref 3.4–5)
ANION GAP SERPL CALCULATED.3IONS-SCNC: 7 MMOL/L (ref 3–14)
BUN SERPL-MCNC: 28 MG/DL (ref 7–30)
CALCIUM SERPL-MCNC: 8.7 MG/DL (ref 8.5–10.1)
CHLORIDE BLD-SCNC: 109 MMOL/L (ref 94–109)
CO2 SERPL-SCNC: 21 MMOL/L (ref 20–32)
CREAT SERPL-MCNC: 1.66 MG/DL (ref 0.52–1.04)
FOLATE SERPL-MCNC: 19 NG/ML (ref 4.6–34.8)
GFR SERPL CREATININE-BSD FRML MDRD: 33 ML/MIN/1.73M2
GLUCOSE BLD-MCNC: 277 MG/DL (ref 70–99)
HBA1C MFR BLD: 11.6 % (ref 0–5.6)
HGB BLD-MCNC: 8.8 G/DL (ref 11.7–15.7)
PHOSPHATE SERPL-MCNC: 3.7 MG/DL (ref 2.5–4.5)
POTASSIUM BLD-SCNC: 4.3 MMOL/L (ref 3.4–5.3)
SODIUM SERPL-SCNC: 137 MMOL/L (ref 133–144)

## 2023-04-27 PROCEDURE — 82306 VITAMIN D 25 HYDROXY: CPT

## 2023-04-27 PROCEDURE — 83970 ASSAY OF PARATHORMONE: CPT

## 2023-04-27 PROCEDURE — 82746 ASSAY OF FOLIC ACID SERUM: CPT

## 2023-04-27 PROCEDURE — 82607 VITAMIN B-12: CPT

## 2023-04-27 PROCEDURE — 36415 COLL VENOUS BLD VENIPUNCTURE: CPT

## 2023-04-27 PROCEDURE — 80069 RENAL FUNCTION PANEL: CPT

## 2023-04-27 PROCEDURE — 85018 HEMOGLOBIN: CPT

## 2023-04-27 PROCEDURE — 83036 HEMOGLOBIN GLYCOSYLATED A1C: CPT

## 2023-04-27 PROCEDURE — 84156 ASSAY OF PROTEIN URINE: CPT

## 2023-04-28 LAB
ALBUMIN MFR UR ELPH: 11.1 MG/DL (ref 1–14)
CREAT UR-MCNC: 49.6 MG/DL
PROT/CREAT 24H UR: 0.22 MG/MG CR (ref 0–0.2)
PTH-INTACT SERPL-MCNC: 24 PG/ML (ref 15–65)
VIT B12 SERPL-MCNC: 1354 PG/ML (ref 232–1245)

## 2023-05-02 ENCOUNTER — VIRTUAL VISIT (OUTPATIENT)
Dept: NEPHROLOGY | Facility: CLINIC | Age: 73
End: 2023-05-02
Payer: MEDICARE

## 2023-05-02 DIAGNOSIS — N25.81 SECONDARY RENAL HYPERPARATHYROIDISM (H): ICD-10-CM

## 2023-05-02 DIAGNOSIS — E11.29 TYPE 2 DIABETES MELLITUS WITH MICROALBUMINURIA, WITHOUT LONG-TERM CURRENT USE OF INSULIN (H): ICD-10-CM

## 2023-05-02 DIAGNOSIS — D64.9 ANEMIA, UNSPECIFIED TYPE: ICD-10-CM

## 2023-05-02 DIAGNOSIS — I10 HYPERTENSION GOAL BP (BLOOD PRESSURE) < 130/80: ICD-10-CM

## 2023-05-02 DIAGNOSIS — E87.20 METABOLIC ACIDOSIS: Primary | ICD-10-CM

## 2023-05-02 DIAGNOSIS — N18.31 STAGE 3A CHRONIC KIDNEY DISEASE (H): ICD-10-CM

## 2023-05-02 DIAGNOSIS — R80.9 TYPE 2 DIABETES MELLITUS WITH MICROALBUMINURIA, WITHOUT LONG-TERM CURRENT USE OF INSULIN (H): ICD-10-CM

## 2023-05-02 DIAGNOSIS — D50.9 IRON DEFICIENCY ANEMIA, UNSPECIFIED IRON DEFICIENCY ANEMIA TYPE: ICD-10-CM

## 2023-05-02 PROCEDURE — 99214 OFFICE O/P EST MOD 30 MIN: CPT | Mod: VID | Performed by: INTERNAL MEDICINE

## 2023-05-02 RX ORDER — HEPARIN SODIUM (PORCINE) LOCK FLUSH IV SOLN 100 UNIT/ML 100 UNIT/ML
5 SOLUTION INTRAVENOUS
Status: CANCELLED | OUTPATIENT
Start: 2023-06-15

## 2023-05-02 RX ORDER — METHYLPREDNISOLONE SODIUM SUCCINATE 125 MG/2ML
125 INJECTION, POWDER, LYOPHILIZED, FOR SOLUTION INTRAMUSCULAR; INTRAVENOUS
Status: CANCELLED
Start: 2023-06-15

## 2023-05-02 RX ORDER — EPINEPHRINE 1 MG/ML
0.3 INJECTION, SOLUTION INTRAMUSCULAR; SUBCUTANEOUS EVERY 5 MIN PRN
Status: CANCELLED | OUTPATIENT
Start: 2023-06-15

## 2023-05-02 RX ORDER — ALBUTEROL SULFATE 0.83 MG/ML
2.5 SOLUTION RESPIRATORY (INHALATION)
Status: CANCELLED | OUTPATIENT
Start: 2023-06-15

## 2023-05-02 RX ORDER — HEPARIN SODIUM,PORCINE 10 UNIT/ML
5 VIAL (ML) INTRAVENOUS
Status: CANCELLED | OUTPATIENT
Start: 2023-06-15

## 2023-05-02 RX ORDER — SODIUM BICARBONATE 650 MG/1
650 TABLET ORAL DAILY
Qty: 90 TABLET | Refills: 3 | Status: SHIPPED | OUTPATIENT
Start: 2023-05-02 | End: 2024-05-06

## 2023-05-02 RX ORDER — ALBUTEROL SULFATE 90 UG/1
1-2 AEROSOL, METERED RESPIRATORY (INHALATION)
Status: CANCELLED
Start: 2023-06-15

## 2023-05-02 RX ORDER — DIPHENHYDRAMINE HYDROCHLORIDE 50 MG/ML
50 INJECTION INTRAMUSCULAR; INTRAVENOUS
Status: CANCELLED
Start: 2023-06-15

## 2023-05-02 ASSESSMENT — PAIN SCALES - GENERAL: PAINLEVEL: NO PAIN (0)

## 2023-05-02 NOTE — PATIENT INSTRUCTIONS
Recommend IV iron infusion - injectafer 750 mg IV X 2 doses  Start sodium bicarbonate 650 mg daily.   Labs and follow-up in 6 months

## 2023-05-02 NOTE — NURSING NOTE
Is the patient currently in the state of [place]? YES    Visit mode:VIDEO    If the visit is dropped, the patient can be reconnected by: VIDEO VISIT: Text to cell phone: [phone]    Will anyone else be joining the visit? NO      How would you like to obtain your AVS? MyChart    Are changes needed to the allergy or medication list? NO    Reason for visit: Video Visit

## 2023-05-02 NOTE — PROGRESS NOTES
Virtual Visit Details    Type of service:  Video Visit     Originating Location (pt. Location): Home    Distant Location (provider location):  Off-site  Platform used for Video Visit: Lexie       05/02/23   HPI: Keerthi Montoya is a 73 year old female who presents for follow-up of proteinuria.     History taken from previous notes:  Ms. Montoya's hx is significant for DM dx at least 10 years ago - very poor control for years (documented A1C levels 9.4-11.7% from 2011 to 2017); no known retinopathy per patient. She has longstanding hypertension dx when she was 18 years old. Additional hx includes ulcerative colitis and hypothyroidism (following thyroidectomy at age 24). Her ulcerative colitis was dx ~2017 but she feels she likely had trouble with this disease for years. She reports having loose stool for a long time prior to receiving therapy for her UC; she feels the blood in the stool may have contaminated previous urine samples potentially.                  02/10/20 :  Creatinine has been 0.9-1.13 in the past year; up slightly today at 1.3. Last UPCR 0.42 g/g in May. On losartan 100 mg daily. Bronchitis - started on levaquin on Saturday. No swelling, no NSAIDs. Blood pressure at times has been down to 118 - she reports blood pressures less than 130 typically but at times above. She has lost weight intentionally - 158 lbs last year and now 146 lbs. She does not smoke. She has not been eating/drinking today.      08/03/20:  In the setting of COVID-19 pandemic, this visit was changed to a telephone visit. Patient did not have video capability.  Because of murmur appreciated on exam at our previous visit, I sent her for a TTE which showed aortic stenosis as well as diastolic dysfunction. She was seen by cardiology in March and continues to follow with them at this time. She is felt to have severe pulmonary hypertension, CHF, along with aortic stenosis. She is currently taking lasix 20 mg BID. She remains on losartan  "100 mg daily. She reports feeling \"great\". She does have some swelling at times - Dr. Almendarez restarted norvas. BP has been 124/58; typically 120s. Breathing has been stable. Stable UC sxs.    02/09/21:  video visit. Cr 1.18-1.55 in the past year; 1.15 last week. Feeling well. No issues with ulcerative colitis. BP at home has not been registering recently. She is seeing cardiology next week but again virtual. No swelling concerns. Currently taking lasix 40 mg AM and 20 mg PM. No NSAIDs.     04/26/22: video visit. Creatinine is up at this time. She reports feeling good. Babysitting full time. She reports that she cut be slightly dry at this time. Weights have been right around 140. No swelling. Breathing is stable. Blood pressure at home three times a week - has been 120s/60s. She is taking torsemide 20 mg AM and 10 mg afternoon. She has been active. UC is well controlled. She has been on Jardiance for the past month.     05/02/23: video visit. Torsemide has since been changed to just as needed. Not needed very often. UPCR is the lowest it has been at 0.22 g/g. Baseline seems to be 1.4-1.7 at this time. A1C very high last week above 11%. UC lately has been good. She has loose stools at times. Did not tolerate oral iron previously. BP in the 114 range.     atenolol (TENORMIN) 50 MG tablet, Take 1 tablet (50 mg) by mouth daily  atorvastatin (LIPITOR) 40 MG tablet, Take 1 tablet (40 mg) by mouth daily  blood glucose (ACCU-CHEK FELIPE PLUS) test strip, 200 strips by In Vitro route 2 times daily Use to test blood sugar 2 times daily or as directed.  Cholecalciferol (VITAMIN D) 2000 units CAPS, Take by mouth daily (with lunch)  Cyanocobalamin (B-12) 1000 MCG TABS, Take 1,000 mcg by mouth three times a week  diphenhydrAMINE (BENADRYL) 25 MG tablet, Take 25 mg by mouth every 6 hours as needed for itching or allergies  empagliflozin (JARDIANCE) 25 MG TABS tablet, Take 1 tablet (25 mg) by mouth daily  glipiZIDE (GLUCOTROL XL) " 10 MG 24 hr tablet, Take 2 tablets (20 mg) by mouth daily (Patient taking differently: Take 15 mg by mouth every morning)  hydrALAZINE (APRESOLINE) 50 MG tablet, Take 1.5 tablets (75 mg) by mouth 3 times daily  levothyroxine (SYNTHROID/LEVOTHROID) 112 MCG tablet, Take 1 tablet (112 mcg) by mouth daily (Patient taking differently: Take 112 mcg by mouth every morning)  losartan (COZAAR) 100 MG tablet, Take 1 tablet (100 mg) by mouth daily (Patient taking differently: Take 100 mg by mouth every morning)  mesalamine (APRISO ER) 0.375 g 24 hr capsule, TAKE 4 CAPSULES(1.5 GRAMS) BY MOUTH DAILY (Patient taking differently: 1.5 g daily (with lunch))  pantoprazole (PROTONIX) 40 MG EC tablet, Take 1 tablet (40 mg) by mouth 2 times daily  torsemide (DEMADEX) 10 MG tablet, Take 1 tablet (10 mg) by mouth as needed (when cardiology clinic advises)    No current facility-administered medications on file prior to visit.      Exam:GENERAL APPEARANCE: alert and no distress    Results:   No visits with results within 1 Day(s) from this visit.   Latest known visit with results is:   Lab on 04/27/2023   Component Date Value Ref Range Status     Hemoglobin A1C 04/27/2023 11.6 (H)  0.0 - 5.6 % Final     Hemoglobin 04/27/2023 8.8 (L)  11.7 - 15.7 g/dL Final     Parathyroid Hormone Intact 04/27/2023 24  15 - 65 pg/mL Final     Total Protein Urine mg/dL 04/27/2023 11.1  1.0 - 14.0 mg/dL Final    The reference ranges have not been established in urine protein. The results should be integrated into the clinical context for interpretation.     Total Protein UR MG/MG CR 04/27/2023 0.22 (H)  0.00 - 0.20 mg/mg Cr Final     Creatinine Urine mg/dL 04/27/2023 49.6  mg/dL Final    The reference ranges have not been established in urine creatinine. The results should be integrated into the clinical context for interpretation.     Sodium 04/27/2023 137  133 - 144 mmol/L Final     Potassium 04/27/2023 4.3  3.4 - 5.3 mmol/L Final     Chloride 04/27/2023  109  94 - 109 mmol/L Final     Carbon Dioxide (CO2) 04/27/2023 21  20 - 32 mmol/L Final     Anion Gap 04/27/2023 7  3 - 14 mmol/L Final     Urea Nitrogen 04/27/2023 28  7 - 30 mg/dL Final     Creatinine 04/27/2023 1.66 (H)  0.52 - 1.04 mg/dL Final     Calcium 04/27/2023 8.7  8.5 - 10.1 mg/dL Final     Glucose 04/27/2023 277 (H)  70 - 99 mg/dL Final     Albumin 04/27/2023 3.7  3.4 - 5.0 g/dL Final     Phosphorus 04/27/2023 3.7  2.5 - 4.5 mg/dL Final     GFR Estimate 04/27/2023 33 (L)  >60 mL/min/1.73m2 Final    eGFR calculated using 2021 CKD-EPI equation.     Vitamin B12 04/27/2023 1,354 (H)  232 - 1,245 pg/mL Final     Folic Acid 04/27/2023 19.0  4.6 - 34.8 ng/mL Final     Lab results were reviewed and interpreted.       Assessment/Plan:   1. CKD Stage 3: risks for CKD include poorly controlled diabetes as well as longstanding hypertension. Mesalamine has potential implications on the kidney but with her function stable, this seems less likely to be causing kidney injury previously. She does have proteinuria without hematuria which does fit with diabetic nephropathy. She is already on losartan 100 mg daily. Educated on benefits of optimal weight, avoidance of NSAIDs, blood pressure well controlled and also diabetes control as the important things to keep the kidney function stable. Myeloma testing normal previously. I felt she was previously prerenal but now away from torsemide and pleased to see some improvement in her kidney function. NOw on SGLT2 inhibitor.   - Stressed the importance of good diabets control     2. Hypertension: goal of <130/80 - no changes made today.      3. Ulcerative colitis: she reports good control with mesalamine. Very rarely mesalamine causes an acute interstitial picture but there are reports of this occurring as low as 0.3% of the time. We will plan to follow. On discussion with her, she feels that she may have been chronically volume depleted prior to getting her treatment for  ulcerative colitis.     4. Hypothyroidism: well controlled on last check in March 2023.      5. DM:  Very poor control documented in our system from 2011 to 2017 with A1C levels between 9.4 and 11.7% at this time. It is possible that it was uncontrolled even further back as A1C levels are not available at this time dating back further than 2011. Encouraged ongoing optimization of diabetes to help slow progression of kidney disease.      6. CHF/pulmonary hypertension/aortic stenosis: followed by Dr. Almendarez - torsemide just as needed now.     7. Anemia: Will arrange iV iron given the severity of her iron deficiency and inability to tolerate oral iron previously. Orders placed today.     8. SEcondary hyperparathyroidism: previously with high PTH but normal most recently; vitamin D level pending.        Patient Instructions   1. Recommend IV iron infusion - injectafer 750 mg IV X 2 doses  2. Start sodium bicarbonate 650 mg daily.   3. Labs and follow-up in 6 months       [id number] AM video visit via [facility] - offsite.   Swati Minor,

## 2023-05-07 LAB
ABO/RH(D): NORMAL
ANTIBODY SCREEN: NEGATIVE
DEPRECATED CALCIDIOL+CALCIFEROL SERPL-MC: <40 UG/L (ref 20–75)
SPECIMEN EXPIRATION DATE: NORMAL
VITAMIN D2 SERPL-MCNC: <5 UG/L
VITAMIN D3 SERPL-MCNC: 35 UG/L

## 2023-05-08 ENCOUNTER — OFFICE VISIT (OUTPATIENT)
Dept: CARDIOLOGY | Facility: CLINIC | Age: 73
DRG: 274 | End: 2023-05-08
Attending: NURSE PRACTITIONER
Payer: MEDICARE

## 2023-05-08 ENCOUNTER — LAB (OUTPATIENT)
Dept: LAB | Facility: CLINIC | Age: 73
End: 2023-05-08
Payer: MEDICARE

## 2023-05-08 VITALS
DIASTOLIC BLOOD PRESSURE: 80 MMHG | WEIGHT: 145.4 LBS | SYSTOLIC BLOOD PRESSURE: 130 MMHG | BODY MASS INDEX: 25.76 KG/M2 | HEART RATE: 67 BPM | OXYGEN SATURATION: 98 % | HEIGHT: 63 IN

## 2023-05-08 DIAGNOSIS — K92.1 GASTROINTESTINAL HEMORRHAGE WITH MELENA: ICD-10-CM

## 2023-05-08 DIAGNOSIS — I48.19 PERSISTENT ATRIAL FIBRILLATION (H): ICD-10-CM

## 2023-05-08 DIAGNOSIS — Z01.818 PRE-OP EXAM: Primary | ICD-10-CM

## 2023-05-08 LAB
ANION GAP SERPL CALCULATED.3IONS-SCNC: 11 MMOL/L (ref 7–15)
BUN SERPL-MCNC: 19.3 MG/DL (ref 8–23)
CALCIUM SERPL-MCNC: 9.8 MG/DL (ref 8.8–10.2)
CHLORIDE SERPL-SCNC: 102 MMOL/L (ref 98–107)
CREAT SERPL-MCNC: 1.51 MG/DL (ref 0.51–0.95)
DEPRECATED HCO3 PLAS-SCNC: 23 MMOL/L (ref 22–29)
ERYTHROCYTE [DISTWIDTH] IN BLOOD BY AUTOMATED COUNT: 22.3 % (ref 10–15)
GFR SERPL CREATININE-BSD FRML MDRD: 37 ML/MIN/1.73M2
GLUCOSE SERPL-MCNC: 195 MG/DL (ref 70–99)
HCT VFR BLD AUTO: 30.8 % (ref 35–47)
HGB BLD-MCNC: 9.2 G/DL (ref 11.7–15.7)
INR PPP: 1.13 (ref 0.85–1.15)
MCH RBC QN AUTO: 22.9 PG (ref 26.5–33)
MCHC RBC AUTO-ENTMCNC: 29.9 G/DL (ref 31.5–36.5)
MCV RBC AUTO: 77 FL (ref 78–100)
PLATELET # BLD AUTO: 327 10E3/UL (ref 150–450)
POTASSIUM SERPL-SCNC: 5 MMOL/L (ref 3.4–5.3)
RBC # BLD AUTO: 4.02 10E6/UL (ref 3.8–5.2)
SODIUM SERPL-SCNC: 136 MMOL/L (ref 136–145)
WBC # BLD AUTO: 7.3 10E3/UL (ref 4–11)

## 2023-05-08 PROCEDURE — 85610 PROTHROMBIN TIME: CPT | Performed by: PATHOLOGY

## 2023-05-08 PROCEDURE — G0463 HOSPITAL OUTPT CLINIC VISIT: HCPCS | Performed by: NURSE PRACTITIONER

## 2023-05-08 PROCEDURE — 99214 OFFICE O/P EST MOD 30 MIN: CPT | Performed by: NURSE PRACTITIONER

## 2023-05-08 PROCEDURE — 93010 ELECTROCARDIOGRAM REPORT: CPT | Performed by: INTERNAL MEDICINE

## 2023-05-08 PROCEDURE — 80048 BASIC METABOLIC PNL TOTAL CA: CPT | Performed by: PATHOLOGY

## 2023-05-08 PROCEDURE — 93005 ELECTROCARDIOGRAM TRACING: CPT

## 2023-05-08 PROCEDURE — 86901 BLOOD TYPING SEROLOGIC RH(D): CPT | Performed by: INTERNAL MEDICINE

## 2023-05-08 PROCEDURE — 36415 COLL VENOUS BLD VENIPUNCTURE: CPT | Performed by: PATHOLOGY

## 2023-05-08 PROCEDURE — 85027 COMPLETE CBC AUTOMATED: CPT | Performed by: PATHOLOGY

## 2023-05-08 RX ORDER — KETOROLAC TROMETHAMINE 5 MG/ML
SOLUTION OPHTHALMIC
Status: ON HOLD | COMMUNITY
Start: 2023-04-20 | End: 2023-09-19

## 2023-05-08 RX ORDER — PREDNISOLONE ACETATE 10 MG/ML
SUSPENSION/ DROPS OPHTHALMIC
Status: ON HOLD | COMMUNITY
Start: 2023-04-20 | End: 2023-09-19

## 2023-05-08 RX ORDER — OFLOXACIN 3 MG/ML
SOLUTION/ DROPS OPHTHALMIC
Status: ON HOLD | COMMUNITY
Start: 2023-04-20 | End: 2023-09-19

## 2023-05-08 ASSESSMENT — PAIN SCALES - GENERAL: PAINLEVEL: NO PAIN (0)

## 2023-05-08 NOTE — PROGRESS NOTES
[facility]  H&P    Referring Provider: Dr. Grimes  Primary Cardiologist: Dr. Almendarez     History of Present Illness  Keerthi Montoya is a 73 year old female who presents for pre-operative H&P in preparation for Watchman left atrial appendage closure on 5/11/23 at [facility].     Ms. Montoya has a history of persistent atrial fibrillation with CHADS-VASc score 4 (HTN, DM, age, female) and HASBLED of 5 (HTN, CKD, age, prior bleed, meds) with intolerance to anticoagulation due to history of GI bleeding. She was admitted January 2023 for acute GI bleed with Hgb on admission of 5.9.  Her Xarelto was held, and she was treated with PRBC and underwent EGD that showed gastric and duodenal ulcers as well as erosive gastropathy.  She was discharged on PPIs and anticoagulation was held.  Repeat EGD showed healing ulcers with signs of portal hypertensive gastropathy. Per GI it would be okay to resume short term AC. She was seen by Dr. Almendarez in cardiology clinic. Given her intolerance to anticoagulation she was referred to structural clinic where she was deemed an appropriate candidate for Watchman LAAO. She was counseled for the above procedure.     Her additional medical history if notable for HLD, HTN, DM2, ulcerative pancolitis, CKD3, pulmonary HTN, HFpEF, moderate paradoxical low flow low gradient aortic stenosis.     Feeling fairly well. No recent GI bleeding now off of anticoagulation. No recent infectious symptoms. No cardiac symptoms.     History of blood transfusions/reactions: Yes - no reaction  History of abnormal bleeding/anti-platelet use: Yes GI bleeding   Steroid use in the last year: No  Personal or family history with difficulty with anesthesia: No   Infection precautions: No   Difficulty w/bedrest: No       Current Medications:  Current Outpatient Medications   Medication Sig Dispense Refill     atenolol (TENORMIN) 50 MG tablet Take 1 tablet (50 mg) by mouth daily  90 tablet 3     atorvastatin (LIPITOR) 40 MG tablet Take 1 tablet (40 mg) by mouth daily 90 tablet 3     blood glucose (ACCU-CHEK FELIPE PLUS) test strip 200 strips by In Vitro route 2 times daily Use to test blood sugar 2 times daily or as directed. 200 strip 4     Cholecalciferol (VITAMIN D) 2000 units CAPS Take by mouth daily (with lunch)       Cyanocobalamin (B-12) 1000 MCG TABS Take 1,000 mcg by mouth three times a week       diphenhydrAMINE (BENADRYL) 25 MG tablet Take 25 mg by mouth every 6 hours as needed for itching or allergies       empagliflozin (JARDIANCE) 25 MG TABS tablet Take 1 tablet (25 mg) by mouth daily 90 tablet 3     glipiZIDE (GLUCOTROL XL) 10 MG 24 hr tablet Take 2 tablets (20 mg) by mouth daily (Patient taking differently: Take 15 mg by mouth every morning) 180 tablet 3     hydrALAZINE (APRESOLINE) 50 MG tablet Take 1.5 tablets (75 mg) by mouth 3 times daily 270 tablet 3     levothyroxine (SYNTHROID/LEVOTHROID) 112 MCG tablet Take 1 tablet (112 mcg) by mouth daily (Patient taking differently: Take 112 mcg by mouth every morning) 90 tablet 4     losartan (COZAAR) 100 MG tablet Take 1 tablet (100 mg) by mouth daily (Patient taking differently: Take 100 mg by mouth every morning) 90 tablet 3     mesalamine (APRISO ER) 0.375 g 24 hr capsule TAKE 4 CAPSULES(1.5 GRAMS) BY MOUTH DAILY (Patient taking differently: 1.5 g daily (with lunch)) 360 capsule 3     pantoprazole (PROTONIX) 40 MG EC tablet Take 1 tablet (40 mg) by mouth 2 times daily 180 tablet 3     sodium bicarbonate 650 MG tablet Take 1 tablet (650 mg) by mouth daily 90 tablet 3     torsemide (DEMADEX) 10 MG tablet Take 1 tablet (10 mg) by mouth as needed (when cardiology clinic advises) 90 tablet 3       Allergies:     Allergies   Allergen Reactions     Actos [Pioglitazone]      Fluid retention     Amlodipine      Leg swelling, hand swelling     Animal Dander      Doxycycline Hives     Dust Mites      Grass      Keflex [Cephalexin Hcl]  Hives     Metoprolol      Swelling of lower legs and fatigue     Other [Seasonal Allergies]      pollen     Ranitidine      rash     Synthroid [Levothyroxine Sodium] Swelling     Allergic to brand name     Tetracycline Hives     Tylenol Hives     Ziac [Bisoprolol-Hydrochlorothiazide]        Past Medical History:  Past Medical History:   Diagnosis Date     Chronic kidney disease      Diabetes mellitus, type 2 (H)      Diverticulosis of colon (without mention of hemorrhage) 10/01/2012     GI bleed      History of blood transfusion      HLD (hyperlipidemia)      Hypertension      Hypothyroidism      MARIA D (iron deficiency anemia)      Persistent atrial fibrillation (H)      Pulmonary hypertension (H)      Ulcerative (chronic) enterocolitis (H)        Past Surgical History:  Past Surgical History:   Procedure Laterality Date     CHOLECYSTECTOMY  3/1987     COLON SURGERY  01/2001    Left colon resection; diverticulitis     COLONOSCOPY N/A 4/24/2017    Procedure: COMBINED COLONOSCOPY, SINGLE OR MULTIPLE BIOPSY/POLYPECTOMY BY BIOPSY;;  Surgeon: Radha Salguero MD;  Location: [facility] OR     COLONOSCOPY N/A 3/10/2023    Procedure: COLONOSCOPY;  Surgeon: Preeti James MD;  Location: [facility]     COLONOSCOPY WITH CO2 INSUFFLATION N/A 4/24/2017    Procedure: COLONOSCOPY WITH CO2 INSUFFLATION;  Colonoscopy Dx rectal bleeding, BMI 25.86, Pharm [facility] .[phone], ;  Surgeon: Radha Salguero MD;  Location: [facility] OR     CV RIGHT HEART CATH MEASUREMENTS RECORDED N/A 3/16/2020    Procedure: CV RIGHT HEART CATH;  Surgeon: Nader Muñoz MD;  Location: [facility]     ESOPHAGOSCOPY, GASTROSCOPY, DUODENOSCOPY (EGD), COMBINED N/A 1/23/2023    Procedure: ESOPHAGOGASTRODUODENOSCOPY, WITH BIOPSY;  Surgeon: Ricki Deal MD;  Location: [facility]     ESOPHAGOSCOPY, GASTROSCOPY, DUODENOSCOPY (EGD), COMBINED N/A 3/10/2023    Procedure: Esophagoscopy, gastroscopy, duodenoscopy (EGD), combined;  Surgeon: Jacob  MD Preeti;  Location:  GI     GYN SURGERY  1983    tubal ligation     HERNIA REPAIR  2006    recurrent incisional hernia     SIGMOIDOSCOPY FLEXIBLE N/A 2023    Procedure: Sigmoidoscopy flexible;  Surgeon: Ricki Deal MD;  Location:  GI     THROAT SURGERY  1974    thyroidectomy       Family History:  Family History   Problem Relation Age of Onset     Cerebrovascular Disease Mother      Cancer Father         bladder     Diverticulitis Brother      Diverticulitis Brother      Kidney Disease No family hx of      Crohn's Disease No family hx of      Ulcerative Colitis No family hx of      Stomach Cancer No family hx of      GERD No family hx of      Celiac Disease No family hx of      Anesthesia Reaction No family hx of        Social History:  Social History     Socioeconomic History     Marital status:      Spouse name: Raymundo; dementia;       Number of children: 3   Occupational History     Employer: [employer]   Tobacco Use     Smoking status: Never     Smokeless tobacco: Never   Substance and Sexual Activity     Alcohol use: Not Currently     Alcohol/week: 2.0 - 4.0 standard drinks of alcohol     Types: 1 - 2 Cans of beer, 1 - 2 Shots of liquor per week     Comment: occasional cocktail or beer     Drug use: No     Sexual activity: Not Currently   Other Topics Concern      Service No     Blood Transfusions No     Caffeine Concern No     Occupational Exposure No     Hobby Hazards No     Sleep Concern No     Stress Concern No     Weight Concern No     Special Diet No     Back Care No     Exercise Yes     Comment: off and on     Bike Helmet No     Seat Belt Yes     Self-Exams No   Social History Narrative    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 Able to achieve METS > 4 without cardiac symptoms.     Constitutional: No fever, chills, or sweats. No weight gain/loss   ENT: No visual disturbance, ear ache, epistaxis,  "sore throat  Allergies/Immunologic: Negative.   Respiratory: No cough, hemoptysia  Cardiovascular: As per HPI  GI: No nausea, vomiting, hematemesis, melena, or hematochezia  : No urinary frequency, dysuria, or hematuria  Integument: Negative  Psychiatric: Negative  Neuro: Negative  Endocrinology: Negative   Musculoskeletal: Negative      Physical Exam:  Vitals: /80 (BP Location: Right arm, Patient Position: Supine, Cuff Size: Adult Regular)   Pulse 67   Ht 1.6 m (5' 3\")   Wt 66 kg (145 lb 6.4 oz)   LMP  (LMP Unknown)   SpO2 98%   BMI 25.76 kg/m     General: NAD  HEENT:  Dentition intact.    Neck: No jugular venous distension.   Heart: Irregularly irregular   Lungs: CTA.    Abdomen: Soft, nontender, nondistended.   Extremities: No clubbing, cyanosis, or edema.  The pulses are +4/4 at the radial, brachial, femoral, popliteal, DP, and PT sites bilaterally.  No bruits are noted.  Neurologic: Alert and oriented to person/place/time, normal speech, gait and affect  Skin: No petechiae, purpura or rash.    Diagnostic Studies:    ECG:  Personally reviewed and interpreted by me.  A-fib HR 82     CT heart 3/2023:    IMPRESSION:  1.  The left atrial appendage landing zone measurements are detailed  below. The depth appears borderline for watchman device placement.  2.  The left atrial appendage is free of thrombus. .  3.  Please review Radiology report for incidental non-cardiac  findings.      FINDINGS:   1.  The left atrial appendage has a  chicken wing morphology with a  sharp bend.  2.  The left atrial appendage depth is: 20.4 mm.  3.  The left atrial appendage landing zone average diameter is 19.8 mm  (modified coronal diameter 20.6    mm and modified sagittal diameter  17.1    mm.  4.  The left atrial appendage landing zone perimeter is  63.8 mm.  5.  Normal pulmonary venous anatomy with all pulmonary veins draining  into the left atrium.  6.  Mild filling defect at the tip of the left atrial appendage " on  first-pass perfusion that resolves on delayed imaging, consistent with  slow flow artifact rather than thrombus.  7.  Coronary images are non-diagnostic for stenosis/atherosclerosis  due to cardiac motion artifact.  There are moderate diffuse coronary  artery calcifications.  8.  There is mild aortic valve calcification.   9.  The visualized aortic root, ascending aorta and descending  thoracic aorta are normal.  10.  The main pulmonary artery is moderately enlarged measuring 40.5  mm x 39.4 mm.   11.  Moderate mitral annular calcification.  12.  Please review the separate Radiology report from the original  study site and date for incidental noncardiac findings.      ECHO 8/2022:  ______________________________________________________________________________  Interpretation Summary     Global and regional left ventricular function is normal with an EF of 55-60%.  Paradoxical septal motion consistent with right ventricular pressure and  volume overload is present.  Moderate right ventricular dilation is present.  Global right ventricular function is moderately reduced.  Mild aortic stenosis is present.  Right ventricular systolic pressure is 57mmHg above the right atrial pressure.  IVC diameter >2.1 cm collapsing <50% with sniff suggests a high RA pressure  estimated at 15 mmHg or greater.  Trivial pericardial effusion is present.  PA pressure lower when compared to previous study.  ______________________________________________________________________________  Left Ventricle  Global and regional left ventricular function is normal with an EF of 55-60%.  Left ventricular wall thickness is normal. Left ventricular size is normal.  Diastolic function not assessed due to atrial fibrillation. Paradoxical septal  motion consistent with right ventricular pressure and volume overload is  present.     Right Ventricle  Moderate right ventricular dilation is present. Global right ventricular  function is moderately  reduced.     Atria  The left atrium appears normal. Severe right atrial enlargement is present.     Mitral Valve  Mild to moderate mitral annular calcification is present. The mean mitral  valve gradient is 2.7 mmHg. The peak mitral valve gradient is 10.0 mmHg.     Aortic Valve  Mild aortic valve calcification is present. Trace aortic insufficiency is  present. Mild aortic stenosis is present. The mean AoV pressure gradient is  12.8 mmHg. The calculated aortic valve are is 1.3 cm^2.     Tricuspid Valve  Mild to moderate tricuspid insufficiency is present. Right ventricular  systolic pressure is 57mmHg above the right atrial pressure.     Pulmonic Valve  The pulmonic valve is normal. Mild pulmonic insufficiency is present.     Vessels  The thoracic aorta is normal. The pulmonary artery and bifurcation cannot be  assessed. IVC diameter >2.1 cm collapsing <50% with sniff suggests a high RA  pressure estimated at 15 mmHg or greater.     Pericardium  Trivial pericardial effusion is present.     Compared to Previous Study  PA pressure lower when compared to previous study.  ______________________________________________________________________________  MMode/2D Measurements & Calculations  IVSd: 0.85 cm     LVIDd: 4.6 cm  LVIDs: 2.9 cm  LVPWd: 0.80 cm  FS: 37.1 %  LV mass(C)d: 121.8 grams  LV mass(C)dI: 73.3 grams/m2  Ao root diam: 3.4 cm  asc Aorta Diam: 3.6 cm  LVOT diam: 2.2 cm  LVOT area: 3.9 cm2  RWT: 0.35     Doppler Measurements & Calculations  MV max PG: 10.0 mmHg  MV mean P.7 mmHg  MV V2 VTI: 24.8 cm  MVA(VTI): 2.9 cm2  Ao V2 max: 239.1 cm/sec  Ao max P.9 mmHg  Ao V2 mean: 170.0 cm/sec  Ao mean P.8 mmHg  Ao V2 VTI: 55.2 cm  IVON(I,D): 1.3 cm2  IVON(V,D): 1.3 cm2  LV V1 max P.7 mmHg  LV V1 max: 82.4 cm/sec  LV V1 VTI: 18.5 cm  SV(LVOT): 72.2 ml  SI(LVOT): 43.5 ml/m2  TR max michael: 359.0 cm/sec  TR max P.6 mmHg  AV Michael Ratio (DI): 0.34  IVON Index (cm2/m2): 0.79    Laboratory Studies:  Personally  reviewed and interpreted by me.    LIPID RESULTS:  Lab Results   Component Value Date    CHOL 73 01/20/2023    CHOL 93 10/28/2020    HDL 29 (L) 01/20/2023    HDL 38 (L) 10/28/2020    LDL 27 01/20/2023    LDL 33 10/28/2020    TRIG 87 01/20/2023    TRIG 108 10/28/2020    CHOLHDLRATIO 4.3 05/18/2015       LIVER ENZYME RESULTS:  Lab Results   Component Value Date    AST 44 (H) 02/02/2023    AST 19 10/28/2020    ALT 11 02/02/2023    ALT 18 10/28/2020       CBC RESULTS:  Lab Results   Component Value Date    WBC 8.8 04/07/2023    WBC 7.3 10/28/2020    RBC 4.24 04/07/2023    RBC 3.99 10/28/2020    HGB 8.8 (L) 04/27/2023    HGB 11.8 02/08/2021    HCT 33.6 (L) 04/07/2023    HCT 38.2 10/28/2020    MCV 79 04/07/2023    MCV 96 10/28/2020    MCH 23.3 (L) 04/07/2023    MCH 30.8 10/28/2020    MCHC 29.5 (L) 04/07/2023    MCHC 32.2 10/28/2020    RDW 20.6 (H) 04/07/2023    RDW 13.9 10/28/2020     04/07/2023     10/28/2020       BMP RESULTS:  Lab Results   Component Value Date     04/27/2023     04/28/2021    POTASSIUM 4.3 04/27/2023    POTASSIUM 4.5 04/28/2021    CHLORIDE 109 04/27/2023    CHLORIDE 100 04/28/2021    CO2 21 04/27/2023    CO2 26 04/28/2021    ANIONGAP 7 04/27/2023    ANIONGAP 8 04/28/2021     (H) 04/27/2023    GLC 57 (L) 04/28/2021    BUN 28 04/27/2023    BUN 23 04/28/2021    CR 1.66 (H) 04/27/2023    CR 1.21 (H) 04/28/2021    GFRESTIMATED 33 (L) 04/27/2023    GFRESTIMATED 30 (L) 12/30/2022    GFRESTIMATED 46 (L) 04/28/2021    GFRESTBLACK 53 (L) 04/28/2021    ABEL 8.7 04/27/2023    ABEL 9.8 04/28/2021        A1C RESULTS:  Lab Results   Component Value Date    A1C 11.6 (H) 04/27/2023    A1C 6.6 (H) 10/28/2020     Assessment and Plan   Keerthi Montoya is a 73 year old female who presents for pre-operative H&P in preparation for Watchman left atrial appendage closure on 5/11/23 at [facility].    She has the following specific operative considerations:      - RCRI  score 2 corresponding with a 2.1% perioperative risk of MACE      - PURA # of risks 2/8      - VTE risk = 1. If equal to or > 4, pharmacologic prophylaxis is indicated      - RIsk of PONV score = 2. If > 2, anti-emetic intervention recommended    1. Persistent atrial fibrillation:  2. Intolerance to anticoagulation due to recurrent GI bleeds:  History of persistent atrial fibrillation with CHADS-VASc score 4 (HTN, DM, age, female) and HASBLED of 5 (HTN, CKD, age, prior bleed, meds) with intolerance to anticoagulation due to history of GI bleeding January 2023 requiring PRBC. EGD/colonoscopy showed gastric and duodenal ulcers as well as erosive gastropathy, UC inactive. She was discharged on PPIs and anticoagulation was held.  Repeat EGD showed healing ulcers with signs of portal hypertensive gastropathy. Per GI it would be okay to resume short term AC. She was seen by Dr. Almendarez in cardiology clinic. Given her intolerance to anticoagulation she was referred to structural clinic where she was deemed an appropriate candidate for Watchman LAAO. She was counseled for the above procedure. CT TAVR shows borderline depth for Watchman though will attempt. Post procedure antithrombotic regimen to be discussed after procedure. Per GI, DOAC would be preferred for the first 45 days over DAPT.     mg night before and morning of procedure   All questions answered  Type and screen orders complete  Supplies for scrubbing provided  No known contrast dye allergy  No trouble with anesthesia in the past  Labs today WNL, no s/s of infection    2. Chronic diastolic heart failure, NYHA class II, Stage B:  3. Pulmonary hypertension with moderate RV dysfunction:  4. Moderate paradoxical low flow low gradient aortic stenosis:  Currently no significant heart failure symptoms, appears euvolemic on exam.  - Hold Jardiance 3 days pre procedure  - Hold losartan and torsemide day of procedure     5. HTN  6. HLD  - Continue atenolol and  atorvastatin day of procedure     7. Type II DM:   Poorly controlled, recent Hgb A1C 11.6. Maintained on glipizide and Jardiance. Hold Jardiance 3 days pre procedure, hold Glipizide day of procedure. Further diabetes management per PCP.     8. CKD stage III: Stable today with creatinine 1.51 and GFR of 37. Continue to monitor.       Medication Recommendations:  Acei/ARB: Hold losartan morning of procedure  Diuretic: hold torsemide morning of procedure   Antiplatelet: Take  night before and morning of procedure   Coumadin/NOAC: N/A  BB: Continue perioperatively without interruption  Antidiabetics: Hold Jardiance 3 days pre procedure and glipizide morning of  Supplements: Hold morning of procedure    Patient is optimized and is acceptable candidate for the proposed procedure.  No further diagnostic evaluation is needed. Pre-procedure instructions provided in written format.     WENDI Marr, CNP  Structural Heart Care Nurse Practitioner  [facility]  Clinic: [phone]  Pager: [phone]

## 2023-05-08 NOTE — NURSING NOTE
Chief Complaint   Patient presents with     Follow Up     Return LAAO- H&P     Vitals were taken, medications reconciled, and EKG was performed.    AMOL Brandt  2:04 PM

## 2023-05-08 NOTE — LETTER
5/8/2023      RE: Keerthi Montoya  [address] [place] [id number]       Dear Colleague,    Thank you for the opportunity to participate in the care of your patient, Keerthi Montoya, at the [facility] [place] at [facility]. Please see a copy of my visit note below.        STRUCTURAL HEART CLINIC  H&P    Referring Provider: Dr. Grimes  Primary Cardiologist: Dr. Almendarez     History of Present Illness  Keerthi Montoya is a 73 year old female who presents for pre-operative H&P in preparation for Watchman left atrial appendage closure on 5/11/23 at [facility].     Ms. Montoya has a history of persistent atrial fibrillation with CHADS-VASc score 4 (HTN, DM, age, female) and HASBLED of 5 (HTN, CKD, age, prior bleed, meds) with intolerance to anticoagulation due to history of GI bleeding. She was admitted January 2023 for acute GI bleed with Hgb on admission of 5.9.  Her Xarelto was held, and she was treated with PRBC and underwent EGD that showed gastric and duodenal ulcers as well as erosive gastropathy.  She was discharged on PPIs and anticoagulation was held.  Repeat EGD showed healing ulcers with signs of portal hypertensive gastropathy. Per GI it would be okay to resume short term AC. She was seen by Dr. Almendarez in cardiology clinic. Given her intolerance to anticoagulation she was referred to structural clinic where she was deemed an appropriate candidate for Watchman LAAO. She was counseled for the above procedure.     Her additional medical history if notable for HLD, HTN, DM2, ulcerative pancolitis, CKD3, pulmonary HTN, HFpEF, moderate paradoxical low flow low gradient aortic stenosis.     Feeling fairly well. No recent GI bleeding now off of anticoagulation. No recent infectious symptoms. No cardiac symptoms.     History of blood transfusions/reactions: Yes - no reaction  History of abnormal  bleeding/anti-platelet use: Yes GI bleeding   Steroid use in the last year: No  Personal or family history with difficulty with anesthesia: No   Infection precautions: No   Difficulty w/bedrest: No       Current Medications:  Current Outpatient Medications   Medication Sig Dispense Refill    atenolol (TENORMIN) 50 MG tablet Take 1 tablet (50 mg) by mouth daily 90 tablet 3    atorvastatin (LIPITOR) 40 MG tablet Take 1 tablet (40 mg) by mouth daily 90 tablet 3    blood glucose (ACCU-CHEK FELIPE PLUS) test strip 200 strips by In Vitro route 2 times daily Use to test blood sugar 2 times daily or as directed. 200 strip 4    Cholecalciferol (VITAMIN D) 2000 units CAPS Take by mouth daily (with lunch)      Cyanocobalamin (B-12) 1000 MCG TABS Take 1,000 mcg by mouth three times a week      diphenhydrAMINE (BENADRYL) 25 MG tablet Take 25 mg by mouth every 6 hours as needed for itching or allergies      empagliflozin (JARDIANCE) 25 MG TABS tablet Take 1 tablet (25 mg) by mouth daily 90 tablet 3    glipiZIDE (GLUCOTROL XL) 10 MG 24 hr tablet Take 2 tablets (20 mg) by mouth daily (Patient taking differently: Take 15 mg by mouth every morning) 180 tablet 3    hydrALAZINE (APRESOLINE) 50 MG tablet Take 1.5 tablets (75 mg) by mouth 3 times daily 270 tablet 3    levothyroxine (SYNTHROID/LEVOTHROID) 112 MCG tablet Take 1 tablet (112 mcg) by mouth daily (Patient taking differently: Take 112 mcg by mouth every morning) 90 tablet 4    losartan (COZAAR) 100 MG tablet Take 1 tablet (100 mg) by mouth daily (Patient taking differently: Take 100 mg by mouth every morning) 90 tablet 3    mesalamine (APRISO ER) 0.375 g 24 hr capsule TAKE 4 CAPSULES(1.5 GRAMS) BY MOUTH DAILY (Patient taking differently: 1.5 g daily (with lunch)) 360 capsule 3    pantoprazole (PROTONIX) 40 MG EC tablet Take 1 tablet (40 mg) by mouth 2 times daily 180 tablet 3    sodium bicarbonate 650 MG tablet Take 1 tablet (650 mg) by mouth daily 90 tablet 3    torsemide  (DEMADEX) 10 MG tablet Take 1 tablet (10 mg) by mouth as needed (when cardiology clinic advises) 90 tablet 3       Allergies:     Allergies   Allergen Reactions    Actos [Pioglitazone]      Fluid retention    Amlodipine      Leg swelling, hand swelling    Animal Dander     Doxycycline Hives    Dust Mites     Grass     Keflex [Cephalexin Hcl] Hives    Metoprolol      Swelling of lower legs and fatigue    Other [Seasonal Allergies]      pollen    Ranitidine      rash    Synthroid [Levothyroxine Sodium] Swelling     Allergic to brand name    Tetracycline Hives    Tylenol Hives    Ziac [Bisoprolol-Hydrochlorothiazide]        Past Medical History:  Past Medical History:   Diagnosis Date    Chronic kidney disease     Diabetes mellitus, type 2 (H)     Diverticulosis of colon (without mention of hemorrhage) 10/01/2012    GI bleed     History of blood transfusion     HLD (hyperlipidemia)     Hypertension     Hypothyroidism     MARIA D (iron deficiency anemia)     Persistent atrial fibrillation (H)     Pulmonary hypertension (H)     Ulcerative (chronic) enterocolitis (H)        Past Surgical History:  Past Surgical History:   Procedure Laterality Date    CHOLECYSTECTOMY  3/1987    COLON SURGERY  01/2001    Left colon resection; diverticulitis    COLONOSCOPY N/A 4/24/2017    Procedure: COMBINED COLONOSCOPY, SINGLE OR MULTIPLE BIOPSY/POLYPECTOMY BY BIOPSY;;  Surgeon: Radha Salguero MD;  Location: [facility] OR    COLONOSCOPY N/A 3/10/2023    Procedure: COLONOSCOPY;  Surgeon: Preeti James MD;  Location: [facility]    COLONOSCOPY WITH CO2 INSUFFLATION N/A 4/24/2017    Procedure: COLONOSCOPY WITH CO2 INSUFFLATION;  Colonoscopy Dx rectal bleeding, BMI 25.86, Pharm [facility] .[phone], ;  Surgeon: Radha Salguero MD;  Location: [facility] OR    CV RIGHT HEART CATH MEASUREMENTS RECORDED N/A 3/16/2020    Procedure: CV RIGHT HEART CATH;  Surgeon: Nader Muñoz MD;  [facility]    ESOPHAGOSCOPY,  GASTROSCOPY, DUODENOSCOPY (EGD), COMBINED N/A 2023    Procedure: ESOPHAGOGASTRODUODENOSCOPY, WITH BIOPSY;  Surgeon: Ricki Deal MD;  Location:  GI    ESOPHAGOSCOPY, GASTROSCOPY, DUODENOSCOPY (EGD), COMBINED N/A 3/10/2023    Procedure: Esophagoscopy, gastroscopy, duodenoscopy (EGD), combined;  Surgeon: Preeti James MD;  Location:  GI    GYN SURGERY  1983    tubal ligation    HERNIA REPAIR  2006    recurrent incisional hernia    SIGMOIDOSCOPY FLEXIBLE N/A 2023    Procedure: Sigmoidoscopy flexible;  Surgeon: Ricki Deal MD;  Location:  GI    THROAT SURGERY  1974    thyroidectomy       Family History:  Family History   Problem Relation Age of Onset    Cerebrovascular Disease Mother     Cancer Father         bladder    Diverticulitis Brother     Diverticulitis Brother     Kidney Disease No family hx of     Crohn's Disease No family hx of     Ulcerative Colitis No family hx of     Stomach Cancer No family hx of     GERD No family hx of     Celiac Disease No family hx of     Anesthesia Reaction No family hx of        Social History:  Social History     Socioeconomic History    Marital status:      Spouse name: Raymundo; dementia;  2016    Number of children: 3   Occupational History     Employer: [employer]   Tobacco Use    Smoking status: Never    Smokeless tobacco: Never   Substance and Sexual Activity    Alcohol use: Not Currently     Alcohol/week: 2.0 - 4.0 standard drinks of alcohol     Types: 1 - 2 Cans of beer, 1 - 2 Shots of liquor per week     Comment: occasional cocktail or beer    Drug use: No    Sexual activity: Not Currently   Other Topics Concern     Service No    Blood Transfusions No    Caffeine Concern No    Occupational Exposure No    Hobby Hazards No    Sleep Concern No    Stress Concern No    Weight Concern No    Special Diet No    Back Care No    Exercise Yes     Comment: off and on    Bike Helmet No    Seat Belt Yes    Self-Exams No  "  Social History Narrative    Laid off her job 8/14       Review of Systems:    Functional status: Independent in ADL's. Able to achieve METS > 4 without cardiac symptoms.     Constitutional: No fever, chills, or sweats. No weight gain/loss   ENT: No visual disturbance, ear ache, epistaxis, sore throat  Allergies/Immunologic: Negative.   Respiratory: No cough, hemoptysia  Cardiovascular: As per HPI  GI: No nausea, vomiting, hematemesis, melena, or hematochezia  : No urinary frequency, dysuria, or hematuria  Integument: Negative  Psychiatric: Negative  Neuro: Negative  Endocrinology: Negative   Musculoskeletal: Negative      Physical Exam:  Vitals: /80 (BP Location: Right arm, Patient Position: Supine, Cuff Size: Adult Regular)   Pulse 67   Ht 1.6 m (5' 3\")   Wt 66 kg (145 lb 6.4 oz)   LMP  (LMP Unknown)   SpO2 98%   BMI 25.76 kg/m     General: NAD  HEENT:  Dentition intact.    Neck: No jugular venous distension.   Heart: Irregularly irregular   Lungs: CTA.    Abdomen: Soft, nontender, nondistended.   Extremities: No clubbing, cyanosis, or edema.  The pulses are +4/4 at the radial, brachial, femoral, popliteal, DP, and PT sites bilaterally.  No bruits are noted.  Neurologic: Alert and oriented to person/place/time, normal speech, gait and affect  Skin: No petechiae, purpura or rash.    Diagnostic Studies:    ECG:  Personally reviewed and interpreted by me.  A-fib HR 82     CT heart 3/2023:    IMPRESSION:  1.  The left atrial appendage landing zone measurements are detailed  below. The depth appears borderline for watchman device placement.  2.  The left atrial appendage is free of thrombus. .  3.  Please review Radiology report for incidental non-cardiac  findings.      FINDINGS:   1.  The left atrial appendage has a  chicken wing morphology with a  sharp bend.  2.  The left atrial appendage depth is: 20.4 mm.  3.  The left atrial appendage landing zone average diameter is 19.8 mm  (modified coronal " diameter 20.6    mm and modified sagittal diameter  17.1    mm.  4.  The left atrial appendage landing zone perimeter is  63.8 mm.  5.  Normal pulmonary venous anatomy with all pulmonary veins draining  into the left atrium.  6.  Mild filling defect at the tip of the left atrial appendage on  first-pass perfusion that resolves on delayed imaging, consistent with  slow flow artifact rather than thrombus.  7.  Coronary images are non-diagnostic for stenosis/atherosclerosis  due to cardiac motion artifact.  There are moderate diffuse coronary  artery calcifications.  8.  There is mild aortic valve calcification.   9.  The visualized aortic root, ascending aorta and descending  thoracic aorta are normal.  10.  The main pulmonary artery is moderately enlarged measuring 40.5  mm x 39.4 mm.   11.  Moderate mitral annular calcification.  12.  Please review the separate Radiology report from the original  study site and date for incidental noncardiac findings.      ECHO 8/2022:  ______________________________________________________________________________  Interpretation Summary     Global and regional left ventricular function is normal with an EF of 55-60%.  Paradoxical septal motion consistent with right ventricular pressure and  volume overload is present.  Moderate right ventricular dilation is present.  Global right ventricular function is moderately reduced.  Mild aortic stenosis is present.  Right ventricular systolic pressure is 57mmHg above the right atrial pressure.  IVC diameter >2.1 cm collapsing <50% with sniff suggests a high RA pressure  estimated at 15 mmHg or greater.  Trivial pericardial effusion is present.  PA pressure lower when compared to previous study.  ______________________________________________________________________________  Left Ventricle  Global and regional left ventricular function is normal with an EF of 55-60%.  Left ventricular wall thickness is normal. Left ventricular size is  normal.  Diastolic function not assessed due to atrial fibrillation. Paradoxical septal  motion consistent with right ventricular pressure and volume overload is  present.     Right Ventricle  Moderate right ventricular dilation is present. Global right ventricular  function is moderately reduced.     Atria  The left atrium appears normal. Severe right atrial enlargement is present.     Mitral Valve  Mild to moderate mitral annular calcification is present. The mean mitral  valve gradient is 2.7 mmHg. The peak mitral valve gradient is 10.0 mmHg.     Aortic Valve  Mild aortic valve calcification is present. Trace aortic insufficiency is  present. Mild aortic stenosis is present. The mean AoV pressure gradient is  12.8 mmHg. The calculated aortic valve are is 1.3 cm^2.     Tricuspid Valve  Mild to moderate tricuspid insufficiency is present. Right ventricular  systolic pressure is 57mmHg above the right atrial pressure.     Pulmonic Valve  The pulmonic valve is normal. Mild pulmonic insufficiency is present.     Vessels  The thoracic aorta is normal. The pulmonary artery and bifurcation cannot be  assessed. IVC diameter >2.1 cm collapsing <50% with sniff suggests a high RA  pressure estimated at 15 mmHg or greater.     Pericardium  Trivial pericardial effusion is present.     Compared to Previous Study  PA pressure lower when compared to previous study.  ______________________________________________________________________________  MMode/2D Measurements & Calculations  IVSd: 0.85 cm     LVIDd: 4.6 cm  LVIDs: 2.9 cm  LVPWd: 0.80 cm  FS: 37.1 %  LV mass(C)d: 121.8 grams  LV mass(C)dI: 73.3 grams/m2  Ao root diam: 3.4 cm  asc Aorta Diam: 3.6 cm  LVOT diam: 2.2 cm  LVOT area: 3.9 cm2  RWT: 0.35     Doppler Measurements & Calculations  MV max PG: 10.0 mmHg  MV mean P.7 mmHg  MV V2 VTI: 24.8 cm  MVA(VTI): 2.9 cm2  Ao V2 max: 239.1 cm/sec  Ao max P.9 mmHg  Ao V2 mean: 170.0 cm/sec  Ao mean P.8 mmHg  Ao V2 VTI:  55.2 cm  IVON(I,D): 1.3 cm2  IVON(V,D): 1.3 cm2  LV V1 max P.7 mmHg  LV V1 max: 82.4 cm/sec  LV V1 VTI: 18.5 cm  SV(LVOT): 72.2 ml  SI(LVOT): 43.5 ml/m2  TR max michael: 359.0 cm/sec  TR max P.6 mmHg  AV Michael Ratio (DI): 0.34  IVON Index (cm2/m2): 0.79    Laboratory Studies:  Personally reviewed and interpreted by me.    LIPID RESULTS:  Lab Results   Component Value Date    CHOL 73 2023    CHOL 93 10/28/2020    HDL 29 (L) 2023    HDL 38 (L) 10/28/2020    LDL 27 2023    LDL 33 10/28/2020    TRIG 87 2023    TRIG 108 10/28/2020    CHOLHDLRATIO 4.3 2015       LIVER ENZYME RESULTS:  Lab Results   Component Value Date    AST 44 (H) 2023    AST 19 10/28/2020    ALT 11 2023    ALT 18 10/28/2020       CBC RESULTS:  Lab Results   Component Value Date    WBC 8.8 2023    WBC 7.3 10/28/2020    RBC 4.24 2023    RBC 3.99 10/28/2020    HGB 8.8 (L) 2023    HGB 11.8 2021    HCT 33.6 (L) 2023    HCT 38.2 10/28/2020    MCV 79 2023    MCV 96 10/28/2020    MCH 23.3 (L) 2023    MCH 30.8 10/28/2020    MCHC 29.5 (L) 2023    MCHC 32.2 10/28/2020    RDW 20.6 (H) 2023    RDW 13.9 10/28/2020     2023     10/28/2020       BMP RESULTS:  Lab Results   Component Value Date     2023     2021    POTASSIUM 4.3 2023    POTASSIUM 4.5 2021    CHLORIDE 109 2023    CHLORIDE 100 2021    CO2 21 2023    CO2 26 2021    ANIONGAP 7 2023    ANIONGAP 8 2021     (H) 2023    GLC 57 (L) 2021    BUN 28 2023    BUN 23 2021    CR 1.66 (H) 2023    CR 1.21 (H) 2021    GFRESTIMATED 33 (L) 2023    GFRESTIMATED 30 (L) 2022    GFRESTIMATED 46 (L) 2021    GFRESTBLACK 53 (L) 2021    ABEL 8.7 2023    ABEL 9.8 2021        A1C RESULTS:  Lab Results   Component Value Date    A1C 11.6 (H) 2023    A1C 6.6 (H)  10/28/2020     Assessment and Plan   Keerthi Montoya is a 73 year old female who presents for pre-operative H&P in preparation for Watchman left atrial appendage closure on 5/11/23 at [facility].    She has the following specific operative considerations:      - RCRI score 2 corresponding with a 2.1% perioperative risk of MACE      - PURA # of risks 2/8      - VTE risk = 1. If equal to or > 4, pharmacologic prophylaxis is indicated      - RIsk of PONV score = 2. If > 2, anti-emetic intervention recommended    1. Persistent atrial fibrillation:  2. Intolerance to anticoagulation due to recurrent GI bleeds:  History of persistent atrial fibrillation with CHADS-VASc score 4 (HTN, DM, age, female) and HASBLED of 5 (HTN, CKD, age, prior bleed, meds) with intolerance to anticoagulation due to history of GI bleeding January 2023 requiring PRBC. EGD/colonoscopy showed gastric and duodenal ulcers as well as erosive gastropathy, UC inactive. She was discharged on PPIs and anticoagulation was held.  Repeat EGD showed healing ulcers with signs of portal hypertensive gastropathy. Per GI it would be okay to resume short term AC. She was seen by Dr. Almendarez in cardiology clinic. Given her intolerance to anticoagulation she was referred to structural clinic where she was deemed an appropriate candidate for Watchman LAAO. She was counseled for the above procedure. CT TAVR shows borderline depth for Watchman though will attempt. Post procedure antithrombotic regimen to be discussed after procedure. Per GI, DOAC would be preferred for the first 45 days over DAPT.     mg night before and morning of procedure   All questions answered  Type and screen orders complete  Supplies for scrubbing provided  No known contrast dye allergy  No trouble with anesthesia in the past  Labs today WNL, no s/s of infection    2. Chronic diastolic heart failure, NYHA class II, Stage B:  3. Pulmonary hypertension with  moderate RV dysfunction:  4. Moderate paradoxical low flow low gradient aortic stenosis:  Currently no significant heart failure symptoms, appears euvolemic on exam.  - Hold Jardiance 3 days pre procedure  - Hold losartan and torsemide day of procedure     5. HTN  6. HLD  - Continue atenolol and atorvastatin day of procedure     7. Type II DM:   Poorly controlled, recent Hgb A1C 11.6. Maintained on glipizide and Jardiance. Hold Jardiance 3 days pre procedure, hold Glipizide day of procedure. Further diabetes management per PCP.     8. CKD stage III: Stable today with creatinine 1.51 and GFR of 37. Continue to monitor.       Medication Recommendations:  Acei/ARB: Hold losartan morning of procedure  Diuretic: hold torsemide morning of procedure   Antiplatelet: Take  night before and morning of procedure   Coumadin/NOAC: N/A  BB: Continue perioperatively without interruption  Antidiabetics: Hold Jardiance 3 days pre procedure and glipizide morning of  Supplements: Hold morning of procedure    Patient is optimized and is acceptable candidate for the proposed procedure.  No further diagnostic evaluation is needed. Pre-procedure instructions provided in written format.     WENDI Marr, CNP  Structural Heart Care Nurse Practitioner  [facility]  Clinic: [phone]  Pager: [phone]

## 2023-05-08 NOTE — PATIENT INSTRUCTIONS
Balbir Procedure:  Thursday, May 11, 3rd case     Check in to the hospital, admitting area on the main floor at 10:00 a.m.     [facility] [address]  [address] [place]  [address]     -   parking is available in front of the hospital, 24 hours a day  -  Stop at the Information Desk and the admissions desk in the lobby.     * Please check in at 10:00 a.m.  You will be directed to the Family Surgery Waiting Room, on the 3rd Floor. If you need assistance in walking, please inform people at the time of check in.  * After leaving the registration check-in area, there will be TWO visitors allowed to the pre-op area.  * When patients leave the pre-op area for their procedure, the visitors have the option to stay and wait in the family waiting area or leave the hospital.   * After the procedure is finished, the MD will call the visitor, or see them face-to-face, and update them on the procedure.     * When the patient arrives to the unit where they will stay until discharge, up to four visitors are allowed to come visit at that time.     -------------------------------------------------------------------------  Nothing to eat after midnight; water, apple juice or 7up/Sprite is OK up to two hours prior to your scheduled procedure.  You may take your medications in the morning with a sip of water.        *Please use the special cleansing wipes, received at your pre-op History and Physical, the night before and the morning of your procedure.  Please focus the use of these wipes from neck to groin, avoiding the face and genital area.        If you did NOT receive these special cleansing wipes at your pre-op History and Physical, then:   * Please use a soap such as hibicleanse or chlorhexadine.  This can be purchased, over the counter, at a pharmacy.  If this is not available, please use an antibacterial soap.  * Take a shower, with antibacterial soap, the night before the  procedure, and the morning of the procedure.  Focus the use of this soap from neck to groin, avoiding the face and genital area.     Medications Instructions:  -- PLEASE TAKE: Aspirin 325 mg, by mouth the night before the procedure and the morning of the procedure.  --PLEASE HOLD:  Jardiance/empagliflozin 72 hours prior to your procedure.  Last dose of Jardiance/empagliflozin is May 7.  START HOLDING Jardiance/empagliflozin Monday, May 8.     -- OK to take morning of procedure, or, night before the procedure, it that is how it's prescribed:  Atenolol   Atorvastatin   Levothyroxine   Mesalamine  Pantoprazole       -- HOLD these medications the morning of the procedure:  vitamin D   B-12  glipiZIDE   Losartan   hydrALAZINE   Torsemide         If any questions please contact:  Aura Childers RN  Structural Heart Care Coordinator  [facility]  Office: [phone]  Pager: [phone]

## 2023-05-09 LAB
ATRIAL RATE - MUSE: 312 BPM
DIASTOLIC BLOOD PRESSURE - MUSE: NORMAL MMHG
INTERPRETATION ECG - MUSE: NORMAL
P AXIS - MUSE: NORMAL DEGREES
PR INTERVAL - MUSE: NORMAL MS
QRS DURATION - MUSE: 72 MS
QT - MUSE: 404 MS
QTC - MUSE: 472 MS
R AXIS - MUSE: 110 DEGREES
SYSTOLIC BLOOD PRESSURE - MUSE: NORMAL MMHG
T AXIS - MUSE: -76 DEGREES
VENTRICULAR RATE- MUSE: 82 BPM

## 2023-05-11 ENCOUNTER — ANESTHESIA EVENT (OUTPATIENT)
Dept: CARDIOLOGY | Facility: CLINIC | Age: 73
DRG: 274 | End: 2023-05-11
Payer: MEDICARE

## 2023-05-11 ENCOUNTER — TELEPHONE (OUTPATIENT)
Dept: INTERNAL MEDICINE | Facility: CLINIC | Age: 73
End: 2023-05-11

## 2023-05-11 ENCOUNTER — ANESTHESIA (OUTPATIENT)
Dept: CARDIOLOGY | Facility: CLINIC | Age: 73
DRG: 274 | End: 2023-05-11
Payer: MEDICARE

## 2023-05-11 ENCOUNTER — HOSPITAL ENCOUNTER (INPATIENT)
Facility: CLINIC | Age: 73
LOS: 1 days | Discharge: HOME | DRG: 274 | End: 2023-05-12
Attending: INTERNAL MEDICINE | Admitting: INTERNAL MEDICINE
Payer: MEDICARE

## 2023-05-11 ENCOUNTER — APPOINTMENT (OUTPATIENT)
Dept: CARDIOLOGY | Facility: CLINIC | Age: 73
DRG: 274 | End: 2023-05-11
Attending: NURSE PRACTITIONER
Payer: MEDICARE

## 2023-05-11 ENCOUNTER — APPOINTMENT (OUTPATIENT)
Dept: CARDIOLOGY | Facility: CLINIC | Age: 73
DRG: 274 | End: 2023-05-11
Attending: INTERNAL MEDICINE
Payer: MEDICARE

## 2023-05-11 DIAGNOSIS — I48.19 PERSISTENT ATRIAL FIBRILLATION (H): ICD-10-CM

## 2023-05-11 DIAGNOSIS — I31.39 PERICARDIAL EFFUSION: Primary | ICD-10-CM

## 2023-05-11 DIAGNOSIS — Z95.818 PRESENCE OF WATCHMAN LEFT ATRIAL APPENDAGE CLOSURE DEVICE: ICD-10-CM

## 2023-05-11 LAB
ACT BLD: 182 SECONDS (ref 74–150)
ACT BLD: 216 SECONDS (ref 74–150)
ACT BLD: 263 SECONDS (ref 74–150)
BASE EXCESS BLDV CALC-SCNC: -4.3 MMOL/L (ref -8.1–1.9)
CA-I BLD-MCNC: 4.7 MG/DL (ref 4.4–5.2)
GLUCOSE BLD-MCNC: 119 MG/DL (ref 70–99)
GLUCOSE BLDC GLUCOMTR-MCNC: 108 MG/DL (ref 70–99)
GLUCOSE BLDC GLUCOMTR-MCNC: 127 MG/DL (ref 70–99)
HCO3 BLDV-SCNC: 22 MMOL/L (ref 21–28)
HGB BLD-MCNC: 8.8 G/DL (ref 11.7–15.7)
LACTATE BLD-SCNC: 1.4 MMOL/L
LVEF ECHO: NORMAL
O2/TOTAL GAS SETTING VFR VENT: 21 %
PCO2 BLDV: 42 MM HG (ref 40–50)
PH BLDV: 7.32 [PH] (ref 7.32–7.43)
PO2 BLDV: <30 MM HG (ref 25–47)
POTASSIUM BLD-SCNC: 4.5 MMOL/L (ref 3.5–5)
POTASSIUM SERPL-SCNC: 5.6 MMOL/L (ref 3.4–5.3)
SARS-COV-2 RNA RESP QL NAA+PROBE: NEGATIVE
SODIUM BLD-SCNC: 132 MMOL/L (ref 133–144)

## 2023-05-11 PROCEDURE — C1894 INTRO/SHEATH, NON-LASER: HCPCS | Performed by: INTERNAL MEDICINE

## 2023-05-11 PROCEDURE — C1887 CATHETER, GUIDING: HCPCS | Performed by: INTERNAL MEDICINE

## 2023-05-11 PROCEDURE — 214N000001 HC R&B CCU UMMC

## 2023-05-11 PROCEDURE — 87635 SARS-COV-2 COVID-19 AMP PRB: CPT | Performed by: NURSE PRACTITIONER

## 2023-05-11 PROCEDURE — 250N000009 HC RX 250: Performed by: INTERNAL MEDICINE

## 2023-05-11 PROCEDURE — 33340 PERQ CLSR TCAT L ATR APNDGE: CPT | Performed by: INTERNAL MEDICINE

## 2023-05-11 PROCEDURE — 84132 ASSAY OF SERUM POTASSIUM: CPT

## 2023-05-11 PROCEDURE — 250N000011 HC RX IP 250 OP 636: Performed by: INTERNAL MEDICINE

## 2023-05-11 PROCEDURE — 93308 TTE F-UP OR LMTD: CPT | Mod: 26 | Performed by: INTERNAL MEDICINE

## 2023-05-11 PROCEDURE — 36415 COLL VENOUS BLD VENIPUNCTURE: CPT | Performed by: INTERNAL MEDICINE

## 2023-05-11 PROCEDURE — 370N000017 HC ANESTHESIA TECHNICAL FEE, PER MIN: Performed by: INTERNAL MEDICINE

## 2023-05-11 PROCEDURE — 33340 PERQ CLSR TCAT L ATR APNDGE: CPT | Mod: Q0 | Performed by: INTERNAL MEDICINE

## 2023-05-11 PROCEDURE — 258N000003 HC RX IP 258 OP 636: Performed by: NURSE ANESTHETIST, CERTIFIED REGISTERED

## 2023-05-11 PROCEDURE — 250N000011 HC RX IP 250 OP 636: Performed by: NURSE PRACTITIONER

## 2023-05-11 PROCEDURE — 93005 ELECTROCARDIOGRAM TRACING: CPT

## 2023-05-11 PROCEDURE — 84132 ASSAY OF SERUM POTASSIUM: CPT | Performed by: INTERNAL MEDICINE

## 2023-05-11 PROCEDURE — 272N000001 HC OR GENERAL SUPPLY STERILE: Performed by: INTERNAL MEDICINE

## 2023-05-11 PROCEDURE — 93308 TTE F-UP OR LMTD: CPT

## 2023-05-11 PROCEDURE — 99222 1ST HOSP IP/OBS MODERATE 55: CPT | Mod: 25 | Performed by: NURSE PRACTITIONER

## 2023-05-11 PROCEDURE — 250N000009 HC RX 250: Performed by: NURSE ANESTHETIST, CERTIFIED REGISTERED

## 2023-05-11 PROCEDURE — 250N000013 HC RX MED GY IP 250 OP 250 PS 637: Performed by: NURSE PRACTITIONER

## 2023-05-11 PROCEDURE — 85347 COAGULATION TIME ACTIVATED: CPT

## 2023-05-11 PROCEDURE — 93355 ECHO TRANSESOPHAGEAL (TEE): CPT | Performed by: STUDENT IN AN ORGANIZED HEALTH CARE EDUCATION/TRAINING PROGRAM

## 2023-05-11 PROCEDURE — 82962 GLUCOSE BLOOD TEST: CPT

## 2023-05-11 PROCEDURE — 93355 ECHO TRANSESOPHAGEAL (TEE): CPT

## 2023-05-11 PROCEDURE — 250N000011 HC RX IP 250 OP 636: Performed by: NURSE ANESTHETIST, CERTIFIED REGISTERED

## 2023-05-11 PROCEDURE — C1769 GUIDE WIRE: HCPCS | Performed by: INTERNAL MEDICINE

## 2023-05-11 PROCEDURE — C1760 CLOSURE DEV, VASC: HCPCS | Performed by: INTERNAL MEDICINE

## 2023-05-11 PROCEDURE — 02L73DK OCCLUSION OF LEFT ATRIAL APPENDAGE WITH INTRALUMINAL DEVICE, PERCUTANEOUS APPROACH: ICD-10-PCS | Performed by: INTERNAL MEDICINE

## 2023-05-11 DEVICE — OCCLUDER CV WATCHMAN FLX OD24 MM M635WU50240: Type: IMPLANTABLE DEVICE | Status: FUNCTIONAL

## 2023-05-11 RX ORDER — LIDOCAINE 40 MG/G
CREAM TOPICAL
Status: DISCONTINUED | OUTPATIENT
Start: 2023-05-11 | End: 2023-05-11 | Stop reason: HOSPADM

## 2023-05-11 RX ORDER — DEXTROSE MONOHYDRATE 25 G/50ML
25-50 INJECTION, SOLUTION INTRAVENOUS
Status: DISCONTINUED | OUTPATIENT
Start: 2023-05-11 | End: 2023-05-12 | Stop reason: HOSPADM

## 2023-05-11 RX ORDER — IOPAMIDOL 755 MG/ML
INJECTION, SOLUTION INTRAVASCULAR
Status: DISCONTINUED | OUTPATIENT
Start: 2023-05-11 | End: 2023-05-11 | Stop reason: HOSPADM

## 2023-05-11 RX ORDER — FENTANYL CITRATE 50 UG/ML
50 INJECTION, SOLUTION INTRAMUSCULAR; INTRAVENOUS EVERY 5 MIN PRN
Status: DISCONTINUED | OUTPATIENT
Start: 2023-05-11 | End: 2023-05-11 | Stop reason: HOSPADM

## 2023-05-11 RX ORDER — POTASSIUM CHLORIDE 750 MG/1
20 TABLET, EXTENDED RELEASE ORAL
Status: DISCONTINUED | OUTPATIENT
Start: 2023-05-11 | End: 2023-05-11 | Stop reason: HOSPADM

## 2023-05-11 RX ORDER — ONDANSETRON 2 MG/ML
INJECTION INTRAMUSCULAR; INTRAVENOUS PRN
Status: DISCONTINUED | OUTPATIENT
Start: 2023-05-11 | End: 2023-05-11

## 2023-05-11 RX ORDER — ONDANSETRON 4 MG/1
4 TABLET, ORALLY DISINTEGRATING ORAL EVERY 6 HOURS PRN
Status: DISCONTINUED | OUTPATIENT
Start: 2023-05-11 | End: 2023-05-12 | Stop reason: HOSPADM

## 2023-05-11 RX ORDER — ASPIRIN 325 MG
325 TABLET, DELAYED RELEASE (ENTERIC COATED) ORAL ONCE
Status: COMPLETED | OUTPATIENT
Start: 2023-05-11 | End: 2023-05-11

## 2023-05-11 RX ORDER — LOSARTAN POTASSIUM 100 MG/1
100 TABLET ORAL EVERY MORNING
Status: DISCONTINUED | OUTPATIENT
Start: 2023-05-12 | End: 2023-05-12 | Stop reason: HOSPADM

## 2023-05-11 RX ORDER — SODIUM CHLORIDE, SODIUM LACTATE, POTASSIUM CHLORIDE, CALCIUM CHLORIDE 600; 310; 30; 20 MG/100ML; MG/100ML; MG/100ML; MG/100ML
INJECTION, SOLUTION INTRAVENOUS CONTINUOUS PRN
Status: DISCONTINUED | OUTPATIENT
Start: 2023-05-11 | End: 2023-05-11

## 2023-05-11 RX ORDER — LIDOCAINE HYDROCHLORIDE 20 MG/ML
INJECTION, SOLUTION INFILTRATION; PERINEURAL PRN
Status: DISCONTINUED | OUTPATIENT
Start: 2023-05-11 | End: 2023-05-11

## 2023-05-11 RX ORDER — PANTOPRAZOLE SODIUM 40 MG/1
40 TABLET, DELAYED RELEASE ORAL 2 TIMES DAILY
Status: DISCONTINUED | OUTPATIENT
Start: 2023-05-11 | End: 2023-05-12 | Stop reason: HOSPADM

## 2023-05-11 RX ORDER — FENTANYL CITRATE 50 UG/ML
INJECTION, SOLUTION INTRAMUSCULAR; INTRAVENOUS PRN
Status: DISCONTINUED | OUTPATIENT
Start: 2023-05-11 | End: 2023-05-11

## 2023-05-11 RX ORDER — ONDANSETRON 2 MG/ML
4 INJECTION INTRAMUSCULAR; INTRAVENOUS EVERY 6 HOURS PRN
Status: DISCONTINUED | OUTPATIENT
Start: 2023-05-11 | End: 2023-05-12 | Stop reason: HOSPADM

## 2023-05-11 RX ORDER — PROPOFOL 10 MG/ML
INJECTION, EMULSION INTRAVENOUS PRN
Status: DISCONTINUED | OUTPATIENT
Start: 2023-05-11 | End: 2023-05-11

## 2023-05-11 RX ORDER — ATORVASTATIN CALCIUM 40 MG/1
40 TABLET, FILM COATED ORAL DAILY
Status: DISCONTINUED | OUTPATIENT
Start: 2023-05-12 | End: 2023-05-12 | Stop reason: HOSPADM

## 2023-05-11 RX ORDER — SODIUM CHLORIDE 9 MG/ML
INJECTION, SOLUTION INTRAVENOUS CONTINUOUS
Status: ACTIVE | OUTPATIENT
Start: 2023-05-11 | End: 2023-05-11

## 2023-05-11 RX ORDER — ONDANSETRON 2 MG/ML
4 INJECTION INTRAMUSCULAR; INTRAVENOUS EVERY 30 MIN PRN
Status: DISCONTINUED | OUTPATIENT
Start: 2023-05-11 | End: 2023-05-11 | Stop reason: HOSPADM

## 2023-05-11 RX ORDER — NICOTINE POLACRILEX 4 MG
15-30 LOZENGE BUCCAL
Status: DISCONTINUED | OUTPATIENT
Start: 2023-05-11 | End: 2023-05-12 | Stop reason: HOSPADM

## 2023-05-11 RX ORDER — CLINDAMYCIN PHOSPHATE 900 MG/50ML
900 INJECTION, SOLUTION INTRAVENOUS
Status: COMPLETED | OUTPATIENT
Start: 2023-05-11 | End: 2023-05-11

## 2023-05-11 RX ORDER — SODIUM CHLORIDE, SODIUM LACTATE, POTASSIUM CHLORIDE, CALCIUM CHLORIDE 600; 310; 30; 20 MG/100ML; MG/100ML; MG/100ML; MG/100ML
INJECTION, SOLUTION INTRAVENOUS CONTINUOUS
Status: DISCONTINUED | OUTPATIENT
Start: 2023-05-11 | End: 2023-05-11 | Stop reason: HOSPADM

## 2023-05-11 RX ORDER — ONDANSETRON 4 MG/1
4 TABLET, ORALLY DISINTEGRATING ORAL EVERY 30 MIN PRN
Status: DISCONTINUED | OUTPATIENT
Start: 2023-05-11 | End: 2023-05-11 | Stop reason: HOSPADM

## 2023-05-11 RX ORDER — PROTAMINE SULFATE 10 MG/ML
INJECTION, SOLUTION INTRAVENOUS PRN
Status: DISCONTINUED | OUTPATIENT
Start: 2023-05-11 | End: 2023-05-11

## 2023-05-11 RX ORDER — ATENOLOL 50 MG/1
50 TABLET ORAL DAILY
Status: DISCONTINUED | OUTPATIENT
Start: 2023-05-12 | End: 2023-05-12 | Stop reason: HOSPADM

## 2023-05-11 RX ORDER — ALBUTEROL SULFATE 0.83 MG/ML
2.5 SOLUTION RESPIRATORY (INHALATION) EVERY 4 HOURS PRN
Status: DISCONTINUED | OUTPATIENT
Start: 2023-05-11 | End: 2023-05-11 | Stop reason: HOSPADM

## 2023-05-11 RX ORDER — HYDROMORPHONE HCL IN WATER/PF 6 MG/30 ML
0.2 PATIENT CONTROLLED ANALGESIA SYRINGE INTRAVENOUS EVERY 5 MIN PRN
Status: DISCONTINUED | OUTPATIENT
Start: 2023-05-11 | End: 2023-05-11 | Stop reason: HOSPADM

## 2023-05-11 RX ORDER — HEPARIN SODIUM 1000 [USP'U]/ML
INJECTION, SOLUTION INTRAVENOUS; SUBCUTANEOUS PRN
Status: DISCONTINUED | OUTPATIENT
Start: 2023-05-11 | End: 2023-05-11

## 2023-05-11 RX ORDER — FENTANYL CITRATE 50 UG/ML
25 INJECTION, SOLUTION INTRAMUSCULAR; INTRAVENOUS EVERY 5 MIN PRN
Status: DISCONTINUED | OUTPATIENT
Start: 2023-05-11 | End: 2023-05-11 | Stop reason: HOSPADM

## 2023-05-11 RX ORDER — SODIUM CHLORIDE 9 MG/ML
1000 INJECTION, SOLUTION INTRAVENOUS CONTINUOUS
Status: DISCONTINUED | OUTPATIENT
Start: 2023-05-11 | End: 2023-05-11 | Stop reason: HOSPADM

## 2023-05-11 RX ORDER — ASPIRIN 325 MG
325 TABLET, DELAYED RELEASE (ENTERIC COATED) ORAL DAILY
Status: ON HOLD | COMMUNITY
End: 2023-05-11

## 2023-05-11 RX ORDER — POTASSIUM CHLORIDE 750 MG/1
40 TABLET, EXTENDED RELEASE ORAL
Status: DISCONTINUED | OUTPATIENT
Start: 2023-05-11 | End: 2023-05-11 | Stop reason: HOSPADM

## 2023-05-11 RX ADMIN — PROPOFOL 60 MG: 10 INJECTION, EMULSION INTRAVENOUS at 12:38

## 2023-05-11 RX ADMIN — ONDANSETRON 4 MG: 2 INJECTION INTRAMUSCULAR; INTRAVENOUS at 16:02

## 2023-05-11 RX ADMIN — HEPARIN SODIUM 7000 UNITS: 1000 INJECTION INTRAVENOUS; SUBCUTANEOUS at 13:36

## 2023-05-11 RX ADMIN — ROCURONIUM BROMIDE 50 MG: 50 INJECTION, SOLUTION INTRAVENOUS at 12:38

## 2023-05-11 RX ADMIN — FENTANYL CITRATE 25 MCG: 50 INJECTION, SOLUTION INTRAMUSCULAR; INTRAVENOUS at 12:35

## 2023-05-11 RX ADMIN — SODIUM CHLORIDE, POTASSIUM CHLORIDE, SODIUM LACTATE AND CALCIUM CHLORIDE: 600; 310; 30; 20 INJECTION, SOLUTION INTRAVENOUS at 12:10

## 2023-05-11 RX ADMIN — HEPARIN SODIUM 7000 UNITS: 1000 INJECTION INTRAVENOUS; SUBCUTANEOUS at 13:24

## 2023-05-11 RX ADMIN — PROPOFOL 50 MG: 10 INJECTION, EMULSION INTRAVENOUS at 12:37

## 2023-05-11 RX ADMIN — ONDANSETRON 4 MG: 2 INJECTION INTRAMUSCULAR; INTRAVENOUS at 13:53

## 2023-05-11 RX ADMIN — HEPARIN SODIUM 9000 UNITS: 1000 INJECTION INTRAVENOUS; SUBCUTANEOUS at 13:13

## 2023-05-11 RX ADMIN — ROCURONIUM BROMIDE 20 MG: 50 INJECTION, SOLUTION INTRAVENOUS at 13:22

## 2023-05-11 RX ADMIN — LIDOCAINE HYDROCHLORIDE 80 MG: 20 INJECTION, SOLUTION INFILTRATION; PERINEURAL at 12:35

## 2023-05-11 RX ADMIN — PANTOPRAZOLE SODIUM 40 MG: 40 TABLET, DELAYED RELEASE ORAL at 21:23

## 2023-05-11 RX ADMIN — FENTANYL CITRATE 25 MCG: 50 INJECTION, SOLUTION INTRAMUSCULAR; INTRAVENOUS at 12:28

## 2023-05-11 RX ADMIN — PROTAMINE SULFATE 80 MG: 10 INJECTION, SOLUTION INTRAVENOUS at 13:54

## 2023-05-11 RX ADMIN — SUGAMMADEX 200 MG: 100 INJECTION, SOLUTION INTRAVENOUS at 14:00

## 2023-05-11 RX ADMIN — HYDRALAZINE HYDROCHLORIDE 75 MG: 50 TABLET, FILM COATED ORAL at 21:23

## 2023-05-11 RX ADMIN — CLINDAMYCIN PHOSPHATE 900 MG: 900 INJECTION, SOLUTION INTRAVENOUS at 12:09

## 2023-05-11 ASSESSMENT — ACTIVITIES OF DAILY LIVING (ADL)
ADLS_ACUITY_SCORE: 20

## 2023-05-11 ASSESSMENT — ENCOUNTER SYMPTOMS: DYSRHYTHMIAS: 1

## 2023-05-11 NOTE — ANESTHESIA PROCEDURE NOTES
Arterial Line Procedure Note    Pre-Procedure   Staff -        Anesthesiologist:  Holden Rivas MD       Performed By: anesthesiologist       Location: OR       Pre-Anesthestic Checklist: patient identified, IV checked, risks and benefits discussed, informed consent, monitors and equipment checked, pre-op evaluation and at physician/surgeon's request  Timeout:       Correct Patient: Yes        Correct Procedure: Yes        Correct Site: Yes        Correct Position: Yes   Line Placement:   This line was placed Pre Induction starting at 5/11/2023 12:31 PM and ending at 5/11/2023 12:34 PM  Procedure   Procedure: arterial line       Laterality: right       Insertion Site: radial.  Sterile Prep        Standard elements of sterile barrier followed       Skin prep: Chloraprep  Insertion/Injection        Technique: ultrasound guided, Seldinger Technique and Kan's test completed        1. Ultrasound was used to evaluate the access site.       2. Artery evaluated via ultrasound for patency/adequacy.       3. Using real-time ultrasound the needle/catheter was observed entering the artery/vein.       5. The visualized structures were anatomically normal.       6. There were no apparent abnormal pathologic findings.       Catheter Type/Size: 20 G, 1.75 in/4.5 cm quick cath (integral wire)  Narrative        Tegaderm dressing used.       Complications: None apparent,        Arterial waveform: Yes        IBP within 10% of NIBP: Yes

## 2023-05-11 NOTE — PROGRESS NOTES
Patient's post procedure TTE shows small anterior pericardial effusion which was not present of LAURA post watchman. Plan to keep patient overnight for observation. Will hold Xarelto this evening. Plan for CV fellow to perform bedside echo this evening to ensure stable effusion. Will perform formal limited echo and labs tomorrow a.m., if stable patient can discharge home.     WENDI Marr, CNP  Structural Heart Nurse Practitioner  [facility]  Clinic: [phone]  Pager: [phone]

## 2023-05-11 NOTE — Clinical Note
left atrial appendage Cine(s)  injected and visualized utilizing power injector system.Rate (mL/sec) 10 Total Volume (mL) 20

## 2023-05-11 NOTE — PROGRESS NOTES
D/I/A: Pt roomed on 3C in bay 34.  Arrived via litter and accompanied by RN On/Off: On monitor.  VSSA.  Rhythm upon arrival A-fib on monitor.  Denies pain or sob.  Reviewed activity restrictions and when to notify RN, ie-changes to breathing or increased chest pressure or chest pain.  CCL access: RFV, site C/D/I.   P: Continue to monitor status.  Discharge to home once meeting criteria.

## 2023-05-11 NOTE — ANESTHESIA PREPROCEDURE EVALUATION
Anesthesia Pre-Procedure Evaluation    Patient: Keerthi Montoya   MRN: [id number] : 1950        Procedure : Procedure(s):  RY closure          Past Medical History:   Diagnosis Date     Chronic kidney disease      Diabetes mellitus, type 2 (H)      Diverticulosis of colon (without mention of hemorrhage) 10/01/2012     GI bleed      History of blood transfusion      HLD (hyperlipidemia)      Hypertension      Hypothyroidism      MARIA D (iron deficiency anemia)      Persistent atrial fibrillation (H)      Pulmonary hypertension (H)      Ulcerative (chronic) enterocolitis (H)       Past Surgical History:   Procedure Laterality Date     CHOLECYSTECTOMY  3/1987     COLON SURGERY  2001    Left colon resection; diverticulitis     COLONOSCOPY N/A 2017    Procedure: COMBINED COLONOSCOPY, SINGLE OR MULTIPLE BIOPSY/POLYPECTOMY BY BIOPSY;;  Surgeon: Radha Salguero MD;  Location: [facility]     COLONOSCOPY N/A 3/10/2023    Procedure: COLONOSCOPY;  Surgeon: Preeti James MD;  Location: [facility]     COLONOSCOPY WITH CO2 INSUFFLATION N/A 2017    Procedure: COLONOSCOPY WITH CO2 INSUFFLATION;  Colonoscopy Dx rectal bleeding, BMI 25.86, Pharm [facility] .[phone], ;  Surgeon: Radha Salguero MD;  Location: [facility] OR     CV RIGHT HEART CATH MEASUREMENTS RECORDED N/A 3/16/2020    Procedure: CV RIGHT HEART CATH;  Surgeon: Nader Muñoz MD;  Location:  HEART CARDIAC CATH LAB     ESOPHAGOSCOPY, GASTROSCOPY, DUODENOSCOPY (EGD), COMBINED N/A 2023    Procedure: ESOPHAGOGASTRODUODENOSCOPY, WITH BIOPSY;  Surgeon: Ricki Deal MD;  Location:  GI     ESOPHAGOSCOPY, GASTROSCOPY, DUODENOSCOPY (EGD), COMBINED N/A 3/10/2023    Procedure: Esophagoscopy, gastroscopy, duodenoscopy (EGD), combined;  Surgeon: Preeti James MD;  Location:  GI     GYN SURGERY  1983    tubal ligation     HERNIA REPAIR  2006    recurrent incisional hernia     SIGMOIDOSCOPY FLEXIBLE N/A 2023    Procedure:  Sigmoidoscopy flexible;  Surgeon: Ricki Deal MD;  Location:  GI     THROAT SURGERY  12/1974    thyroidectomy      Allergies   Allergen Reactions     Actos [Pioglitazone]      Fluid retention     Amlodipine      Leg swelling, hand swelling     Animal Dander      Doxycycline Hives     Dust Mites      Grass      Keflex [Cephalexin Hcl] Hives     Metoprolol      Swelling of lower legs and fatigue     Other [Seasonal Allergies]      pollen     Ranitidine      rash     Synthroid [Levothyroxine Sodium] Swelling     Allergic to brand name     Tetracycline Hives     Tylenol Hives     Ziac [Bisoprolol-Hydrochlorothiazide]       Social History     Tobacco Use     Smoking status: Never     Smokeless tobacco: Never   Vaping Use     Vaping status: Not on file   Substance Use Topics     Alcohol use: Not Currently     Alcohol/week: 2.0 - 4.0 standard drinks of alcohol     Types: 1 - 2 Cans of beer, 1 - 2 Shots of liquor per week     Comment: occasional cocktail or beer      Wt Readings from Last 1 Encounters:   05/11/23 62.2 kg (137 lb 2 oz)        Anesthesia Evaluation   Pt has had prior anesthetic. Type: MAC and General.    No history of anesthetic complications       ROS/MED HX  ENT/Pulmonary:  - neg pulmonary ROS     Neurologic:  - neg neurologic ROS     Cardiovascular: Comment: Severe pulmonary hypertension with moderate RV dysfunction and TR    (+) hypertension-----Taking blood thinners Pt has received instructions: dysrhythmias, a-fib, valvular problems/murmurs type: AS TR. pulmonary hypertension, Previous cardiac testing   Echo: Date: Results:  Interpretation Summary     Global and regional left ventricular function is normal with an EF of 55-60%.  Paradoxical septal motion consistent with right ventricular pressure and  volume overload is present.  Moderate right ventricular dilation is present.  Global right ventricular function is moderately reduced.  Mild aortic stenosis is present.  Right ventricular systolic  pressure is 57mmHg above the right atrial pressure.  IVC diameter >2.1 cm collapsing <50% with sniff suggests a high RA pressure  estimated at 15 mmHg or greater.  Trivial pericardial effusion is present.  PA pressure lower when compared to previous study.  Stress Test: Date: Results:    ECG Reviewed: Date: Results:    Cath: Date: Results:   (-) murmur   METS/Exercise Tolerance: 3 - Able to walk 1-2 blocks without stopping    Hematologic:     (+) anemia, history of blood transfusion, no previous transfusion reaction, Known PRBC Anitbodies:No     Musculoskeletal:  - neg musculoskeletal ROS     GI/Hepatic: Comment: Hx of PUD    (+) Inflammatory bowel disease,     Renal/Genitourinary:     (+) renal disease, type: CRI, Pt does not require dialysis,     Endo:     (+) type II DM, Not using insulin, thyroid problem, hypothyroidism,     Psychiatric/Substance Use:  - neg psychiatric ROS     Infectious Disease:  - neg infectious disease ROS     Malignancy:  - neg malignancy ROS     Other:            Physical Exam    Airway        Mallampati: I   TM distance: > 3 FB   Neck ROM: full   Mouth opening: > 3 cm    Respiratory Devices and Support         Dental       (+) Modest Abnormalities - crowns, retainers, 1 or 2 missing teeth      Cardiovascular          Rhythm and rate: irregular and normal (-) no murmur    Pulmonary           breath sounds clear to auscultation           OUTSIDE LABS:  CBC:   Lab Results   Component Value Date    WBC 7.3 05/08/2023    WBC 8.8 04/07/2023    HGB 8.8 (L) 05/11/2023    HGB 9.2 (L) 05/08/2023    HCT 30.8 (L) 05/08/2023    HCT 33.6 (L) 04/07/2023     05/08/2023     04/07/2023     BMP:   Lab Results   Component Value Date     (L) 05/11/2023     05/08/2023    POTASSIUM 4.5 05/11/2023    POTASSIUM 5.6 (H) 05/11/2023    CHLORIDE 102 05/08/2023    CHLORIDE 109 04/27/2023    CO2 23 05/08/2023    CO2 21 04/27/2023    BUN 19.3 05/08/2023    BUN 28 04/27/2023    CR 1.51 (H)  05/08/2023    CR 1.66 (H) 04/27/2023     (H) 05/11/2023     (H) 05/11/2023     COAGS:   Lab Results   Component Value Date    INR 1.13 05/08/2023     POC:   Lab Results   Component Value Date     (H) 04/24/2017     HEPATIC:   Lab Results   Component Value Date    ALBUMIN 3.7 04/27/2023    PROTTOTAL 7.7 02/02/2023    ALT 11 02/02/2023    AST 44 (H) 02/02/2023    ALKPHOS 106 (H) 02/02/2023    BILITOTAL 0.4 02/02/2023     OTHER:   Lab Results   Component Value Date    LACT 1.4 05/11/2023    A1C 11.6 (H) 04/27/2023    ABEL 9.8 05/08/2023    PHOS 3.7 04/27/2023    MAG 2.5 (H) 01/23/2023    TSH 3.86 04/07/2023    T4 1.42 01/20/2023    CRP <2.9 03/16/2020    SED 12 01/20/2023       Anesthesia Plan    ASA Status:  3   NPO Status:  NPO Appropriate    Anesthesia Type: General.     - Airway: ETT   Induction: Intravenous.   Maintenance: Inhalation.   Techniques and Equipment:     - Lines/Monitors: BIS, Arterial Line     - Blood: T&S     Consents    Anesthesia Plan(s) and associated risks, benefits, and realistic alternatives discussed. Questions answered and patient/representative(s) expressed understanding.    - Discussed:     - Discussed with:  Patient      - Extended Intubation/Ventilatory Support Discussed: No.      - Patient is DNR/DNI Status: No    Use of blood products discussed: Yes.     - Discussed with: Patient.     - Consented: consented to blood products            Reason for refusal: other.     Postoperative Care    Pain management: IV analgesics.   PONV prophylaxis: Ondansetron (or other 5HT-3)     Comments:    Other Comments: Patient seen and examined.  Risks, benefits and alternatives to GETA discussed.  Questions answered and patient wishes to proceed                Holden Rivas MD

## 2023-05-11 NOTE — ANESTHESIA CARE TRANSFER NOTE
Patient: Keerthi Montoya    Procedure: Procedure(s):  Left Atrial Appendage Closure       Diagnosis: cardiac arrhythmia  Diagnosis Additional Information: No value filed.    Anesthesia Type:   No value filed.     Note:    Oropharynx: oropharynx clear of all foreign objects  Level of Consciousness: awake  Oxygen Supplementation: face mask  Level of Supplemental Oxygen (L/min / FiO2): 6  Independent Airway: airway patency satisfactory and stable  Dentition: dentition unchanged  Vital Signs Stable: post-procedure vital signs reviewed and stable  Report to RN Given: handoff report given  Patient transferred to: PACU  Comments: Pt remains stable, monitors on alarms in place, report to PACU RN, no complications  Handoff Report: Identifed the Patient, Identified the Reponsible Provider, Reviewed the pertinent medical history, Discussed the surgical course, Reviewed Intra-OP anesthesia mangement and issues during anesthesia, Set expectations for post-procedure period and Allowed opportunity for questions and acknowledgement of understanding      Vitals:  Vitals Value Taken Time   BP     Temp     Pulse 78 05/11/23 1425   Resp     SpO2 100 % 05/11/23 1424   Vitals shown include unvalidated device data.    Electronically Signed By: WENDI Sarkar CRNA  May 11, 2023  2:26 PM

## 2023-05-11 NOTE — DISCHARGE SUMMARY
[address] [address]  p: [phone]    Discharge Summary: Cardiology Service    Keerthi Montoya MRN# [id number]   YOB: 1950 Age: 73 year old       Admission Date: 5/11/2023  Discharge Date: 05/12/23      Discharge Diagnoses:  1. Permanent atrial fibrillation  2. History of GI bleeding secondary to gastric ulcers  3. Ulcerative colitis (inactive)  4. HTN  5. HLD  6. DM  7. CKD  8. HFpEF  9. Moderate PH  10. 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 LAURA  2. GA     Consults:  None     Imaging with results:  LAURA:   PRE-PROCEDURAL SURVEY:  1. The LV function was 55-60% and the RV function was moderately-severely  reduced.  2. There was no evidence of shunting at the level of the interatrial septum.  3. The maximum pre-deployment left atrial appendage orifice width was 19.9 mm.  4. There was no intracardiac thrombus.  5. There was no pericardial effusion.     POST-PROCEDURAL SURVEY:  1. The 24 mm Watchman FLX device was well-seated in the left atrial appendage.  There was adequate compression of the device. There was no evidence of delroy-  device leak.  2. There was no pericardial effusion.  3. There was a small left-right shunt at the site of the transseptal puncture.  4. The biventricular function was unchanged.  ______________________________________________________________________________  Procedure  Transesophageal Echocardiogram with color and spectral Doppler performed. 3D  image acquisition, reconstruction, and real-time interpretation was performed.  Procedure location [facility]. Informed consent for  Transesophegeal echo obtained. LAURA Probe #[id number] then 66 due to significant  Doppler artifact was used during the procedure. Sedation, endotracheal  intubation, and mechanical ventilation were initiated prior to the LAURA and  were monitored by anesthesia. The  Transducer was inserted without difficulty .  Complications None. The patient's rhythm is normal sinus. Good quality two-  dimensional was performed and interpreted. Adequate quality color and spectral  Doppler were performed and interpreted.     Left Ventricle  Global and regional left ventricular function is normal with an EF of 55-60%.  Left ventricular size is normal.     Right Ventricle  Moderate right ventricular dilation is present. Global right ventricular  function is moderately to severely reduced.     Atria  Moderate to severe biatrial enlargement is present.     Mitral Valve  The mitral valve is normal. Trace mitral insufficiency is present.     Aortic Valve  The aortic valve is tricuspid. Mild aortic valve sclerosis is present. Trace  aortic insufficiency is present.     Tricuspid Valve  Mild to moderate tricuspid insufficiency is present. Pulmonary artery systolic  pressure cannot be assessed.     Pulmonic Valve  Mild pulmonic insufficiency is present.     Vessels  The aorta root is normal. The thoracic aorta is normal. The LUPV Doppler shows  normal velocity waveform . The right upper pulmonary vein cannot be assessed.  The right lower pulmonary vein cannot be assessed. The left lower pulmonary  vein cannot be assessed.     Pericardium  No pericardial effusion is present.     Compared to Previous Study  This study was compared with the study from 08/19/2022 . There has been  interval LAAO closure device placement. No significant changes in the  biventricular function.     TTE:   Small anterior pericardial Effusion     Brief HPI:  Keerthi Montoya is a very pleasant 73 year old female with a history of persistent atrial fibrillation with CHADS-VASc score 4 (HTN, DM, age, female) and HASBLED of 5 (HTN, CKD, age, prior bleed, meds) with intolerance to anticoagulation due to history of GI bleeding who presents for Watchman left atrial appendage closure device. Additional medical history notable for HLD,  HTN, DM2, ulcerative pancolitis, CKD3, pulmonary HTN, HFpEF, moderate paradoxical low flow low gradient aortic stenosis.      Patient underwent successful left atrial appendage closure with a 24mm WATCHMAN FLX device with 17% - 27% compression on May 11, 2023. Her post procedure LAURA showed well seated RY occluder without leak or evidence of pericardial effusion. Right femoral venotomy closed with suture closure device. Patient seen in the PACU post-procedure. Alert and oriented without cardiovascular complaints. Right femoral puncture site flat and dry without hematoma. Vital signs stable.    Hospital Course by Diagnosis:    # Paroxysmal a-fib w/contraindication to anticoagulation due to GI bleeding  # Healed gastric ulcers/Ulcerative colitis (inactive)  Now s/p successful implantation of 24mm Watchman FLX. Post-procedure LAURA showed no evidence of delroy-device leak and no pericardial effusion. Patient seen in PACU. VSS, neuros intact, groin sites soft without hematoma. Post procedure TTE with small anterior effusion, Patient kept overnight for observation. Repeat limited TTE shows stable small pericardial effusion.   - Xarelto 15 mg daily monotherapy x 45 days; will start after repeat echocardiogram on Monday per Dr. Grimes   - SBE prophylaxis x 6 months   - Follow-up structural BLAISE 1 week and 45 days post procedure  - 45 days following Watchman device implantation, patient will return for LAURA to evaluate endothelialization of the device. If adequate seal is assessed (<5mm), anticoagulation will be discontinued and dual antiplatelet therapy with clopidogrel and aspirin will be instituted for a total of 6 months from implantation date, with aspirin then continuing lifelong.       # HFpEF  # Moderate PH  # Moderate paradoxical low flow low gradient aortic stenosis  Currently no significant heart failure symptoms, appears euvolemic on exam.  - Resume PTA Jardiance, losartan  - Take PRN Torsemide x 3 days      #  HTN  # HLD   - Resume atenolol and atorvastatin     # CKD  - Stable creatinine 1.51 and GFR of 37 pre procedure. Continue to monitor     # Diabetes type II   - Poorly controlled, recent Hgb A1C 11.6. Resume glipizide and Jardiance. Further diabetes management per PCP.      Condition on discharge  Temp:  [98  F (36.7  C)] 98  F (36.7  C)  Pulse:  [82] 82  Resp:  [18] 18  BP: (127)/(76) 127/76  SpO2:  [100 %] 100 %     General: Alert, interactive, NAD  Eyes: sclera anicteric, EOMI  Neck: JVP mid neck , carotid 2+ bilaterally  Cardiovascular: regular rate and rhythm, normal S1 and S2, no murmurs, gallops, or rubs  Resp: clear to auscultation bilaterally, no rales, wheezes, or rhonchi  GI: Soft, nontender, nondistended. +BS.  No HSM or masses, no rebound or guarding.  Extremities: No edema, no cyanosis or clubbing, dorsalis pedis and posterior tibialis pulses 2+ bilaterally  Skin: Warm and dry, no jaundice or rash  Neuro: CN 2-12 intact, moves all extremities equally  Psych: Alert & oriented x 3    Medication Changes:  Start Xarelto 15 mg daily  Stop aspirin     Discharge medications:   Current Discharge Medication List      START taking these medications    Details   rivaroxaban ANTICOAGULANT (XARELTO) 15 MG TABS tablet Take 1 tablet (15 mg) by mouth daily  Qty: 90 tablet, Refills: 0    Associated Diagnoses: Persistent atrial fibrillation (H); Presence of Watchman left atrial appendage closure device         CONTINUE these medications which have NOT CHANGED    Details   atenolol (TENORMIN) 50 MG tablet Take 1 tablet (50 mg) by mouth daily  Qty: 90 tablet, Refills: 3    Associated Diagnoses: Hypertension goal BP (blood pressure) < 130/80      atorvastatin (LIPITOR) 40 MG tablet Take 1 tablet (40 mg) by mouth daily  Qty: 90 tablet, Refills: 3    Associated Diagnoses: Hyperlipidemia LDL goal <100      Cholecalciferol (VITAMIN D) 2000 units CAPS Take by mouth daily (with lunch)      Cyanocobalamin (B-12) 1000 MCG TABS Take  1,000 mcg by mouth three times a week      diphenhydrAMINE (BENADRYL) 25 MG tablet Take 25 mg by mouth every 6 hours as needed for itching or allergies      glipiZIDE (GLUCOTROL XL) 10 MG 24 hr tablet Take 2 tablets (20 mg) by mouth daily  Qty: 180 tablet, Refills: 3    Associated Diagnoses: Type 2 diabetes mellitus with microalbuminuria, without long-term current use of insulin (H)      hydrALAZINE (APRESOLINE) 50 MG tablet Take 1.5 tablets (75 mg) by mouth 3 times daily  Qty: 270 tablet, Refills: 3    Associated Diagnoses: Hypertension goal BP (blood pressure) < 130/80      levothyroxine (SYNTHROID/LEVOTHROID) 112 MCG tablet Take 1 tablet (112 mcg) by mouth daily  Qty: 90 tablet, Refills: 4    Associated Diagnoses: Postoperative hypothyroidism      losartan (COZAAR) 100 MG tablet Take 1 tablet (100 mg) by mouth daily  Qty: 90 tablet, Refills: 3    Associated Diagnoses: Hypertension goal BP (blood pressure) < 140/90      mesalamine (APRISO ER) 0.375 g 24 hr capsule TAKE 4 CAPSULES(1.5 GRAMS) BY MOUTH DAILY  Qty: 360 capsule, Refills: 3    Associated Diagnoses: Colitis      pantoprazole (PROTONIX) 40 MG EC tablet Take 1 tablet (40 mg) by mouth 2 times daily  Qty: 180 tablet, Refills: 3    Associated Diagnoses: Gastrointestinal hemorrhage, unspecified gastrointestinal hemorrhage type      sodium bicarbonate 650 MG tablet Take 1 tablet (650 mg) by mouth daily  Qty: 90 tablet, Refills: 3    Associated Diagnoses: Metabolic acidosis      torsemide (DEMADEX) 10 MG tablet Take 1 tablet (10 mg) by mouth as needed (when cardiology clinic advises)  Qty: 90 tablet, Refills: 3    Associated Diagnoses: Pulmonary hypertension (H)      blood glucose (ACCU-CHEK FELIPE PLUS) test strip 200 strips by In Vitro route 2 times daily Use to test blood sugar 2 times daily or as directed.  Qty: 200 strip, Refills: 4      empagliflozin (JARDIANCE) 25 MG TABS tablet Take 1 tablet (25 mg) by mouth daily  Qty: 90 tablet, Refills: 3    Associated  Diagnoses: Type 2 diabetes mellitus with microalbuminuria, without long-term current use of insulin (H)      ketorolac (ACULAR) 0.5 % ophthalmic solution INSTILL 1 DROP IN AFFECTED EYE THREE TIMES DAILY AS DIRECTED. START 3 DAYS BEFORE SURGERY AND USE UNTIL GONE      ofloxacin (OCUFLOX) 0.3 % ophthalmic solution INSTILL 1 DROP IN AFFECTED EYE THREE TIMES DAILY AS DIRECTED. START 3 DAYS BEFORE SURGERY AND USE UNTIL GONE      prednisoLONE acetate (PRED FORTE) 1 % ophthalmic suspension SHAKE LIQUID AND INSTILL 1 DROP IN AFFECTED EYE THREE TIMES DAILY AS DIRECTED. START 3 DAYS BEFORE SURGERY AND. CONTINUE UNTIL ALL TAKEN         STOP taking these medications       aspirin (ASA) 325 MG EC tablet Comments:   Reason for Stopping:               Labs or imaging requiring follow-up after discharge:  CT and labs 45 days post procedure       Follow-up:  1 week and 45 days structural clinic     Code status:  Full     Kimberley Hood, WENDI CNP on 5/12/2023 at 9:59 AM  [facility]      Physician Attestation   I have reviewed and discussed with the advanced practice provider their history, physical and plan for Keerthi Montoya. I did not participate in a shared visit by interviewing or examining the patient and this should be billed as an advanced practice provider only visit.    Bobby Grimes MD  Interventional Cardiology

## 2023-05-11 NOTE — Clinical Note
Procedure done under supervision of Anesthesia. Vital signs, charting, monitoring, Ins/Outs, and sedation provided by Anesthesia. See flow sheet for more information.

## 2023-05-11 NOTE — DISCHARGE INSTRUCTIONS
Summary:  Yesterday you underwent successful Watchman left atrial appendage closure. After the procedure you developed a small pericardial effusion (fluid collection around the heart). This is stable.     Plan:   Restart Xarelto 15 mg daily on Monday AFTER you have a repeat echocardiogram. You will be contacted regarding getting these appointments scheduled.     Take your as needed Torsemide DAILY for 3 days.   Defer non urgent dental procedure for 6 months post watchman, if you need an urgent dental procedure please call for oral antibiotics to be taken 30-60 minutes prior to procedure  No lifting > 10 lbs for 1 week  Monitor groin sites, call or seek medical attention if you develop severe pain, bruising, swelling or drainage  Follow-up with structural NP or primary care provider in 1-2 weeks to check groin site  Follow-up with structural NP in 45 days with imaging and labs prior - if well seated watchman without leak or thrombus, we will stop anticoagulation and start Plavix and aspirin x 4.5 months, at the 6 month edith Plavix will be discontinued and ASA continued lifelong     WENDI Marr, CNP  Structural Heart Nurse Practitioner  [facility]  Clinic: [phone]    Going Home after a Left Atrial Appendage Closure (LAAC)  ______________________________________________      After you go home:  Have an adult stay with you for 24 hours.  Drink plenty of fluids.  You may eat your normal diet, unless your doctor tells you otherwise.  For 24 hours:  Relax and take it easy.  Do NOT smoke.  Do NOT make any important or legal decisions.  Do NOT drive or operate machines at home or at work.  Do NOT drink alcohol.  Remove the Band-Aid after 24 hours. If there is minor oozing, apply another Band-aid and remove it after 12 hours.  Do NOT take a bath, or use a hot tub or pool for 5 days. You may shower after 24 hours.    Care of groin site  It is normal to have a small bruise or lump at the  site.  Do not scrub the site.  For the first 2 days: Do not stoop or squat. When you cough, sneeze or move your bowels, hold your hand over the puncture site and press gently.  Do not lift more than 10 pounds for one week.  Do not use lotion or powder near the puncture site for 5 days.    If you start bleeding from the site in your groin, lie down flat and press firmly  on the site. Call your doctor as soon as you can.    Call 911 right away if you have bleeding that is heavy or does not stop.    Call your doctor if:  You have a large or growing hard lump around the site.  The site is red, swollen, hot or tender.  Blood or fluid is draining from the site.  You have chills or a fever greater than 101 F (38 C).  Your leg or arm feels numb or cool.  You have hives, a rash or unusual itching.      [facility] [place]:  [phone] (7 days a week)

## 2023-05-11 NOTE — Clinical Note
[facility]   Tatum Perry MD    Patient Name: Janene Castillo  Patient YOB: 1960   Date of Service: 23    CC: Follow-up Allergic Contact Dermatitis    HPI: Janene Castillo is a 62 y.o. female here today for follow-up of ACD, psoriasis, psoriatic arthritis and atopic dermatitis last seen 11/15/2022.  Previous treatments include dupixent, cosentyx, stelara and skirizi.  Overall, the above problems are stable Treatment plan was not followed as directed.  We discussed starting rinvoq after last appointment as it would address both her AD and psoriatic arthritis.  We stopped dupixent due to muscle cramps and joint pain.  She has not started rinvoq and would like to consider restarting dupixent.    Past Medical History:   Diagnosis Date    Allergic contact dermatitis     balsam of peru, bacitracin, fragrance, methyldibromo glutaronite    Anxiety     Chronic pain     HTN (hypertension)     Hyperlipidemia     Plaque psoriasis     Prurigo nodularis     Psoriatic arthritis     Stage 3b chronic kidney disease      Past Surgical History:   Procedure Laterality Date    ANGIOGRAM, CORONARY, WITH LEFT HEART CATHETERIZATION N/A 2022    Procedure: Angiogram, Coronary, with Left Heart Cath;  Surgeon: Jason Ratliff MD;  [facility] C[facility] LAB;  Service: Cardiology;  Laterality: N/A;     SECTION       Review of patient's allergies indicates:   Allergen Reactions    Bacitracin Dermatitis    Codeine sulfate     Codeine Rash     Rash     Methyldibromoglutaronitrile Dermatitis    Permethrin Rash     Rash     Sulfa (sulfonamide antibiotics) Other (See Comments) and Rash     unknown       Current Outpatient Medications:     amLODIPine (NORVASC) 2.5 MG tablet, Take 1 tablet by mouth once daily, Disp: 90 tablet, Rfl: 0    benazepriL (LOTENSIN) 20 MG tablet, Take 1 tablet (20 mg total) by mouth once daily., Disp: 90 tablet, Rfl: 0    buPROPion (WELLBUTRIN) 100 MG tablet, Take 1 tablet (100 mg  Pre closure device inserted. total) by mouth 2 (two) times daily., Disp: 180 tablet, Rfl: 1    calcium carbonate (OS-CAIN) 600 mg calcium (1,500 mg) Tab, Take 600 mg by mouth once daily., Disp: , Rfl:     DUPIXENT SYRINGE 300 mg/2 mL Syrg, Inject 300 mg into the skin every 14 (fourteen) days., Disp: , Rfl:     ezetimibe (ZETIA) 10 mg tablet, TAKE 1 TABLET BY MOUTH ONCE DAILY IN THE EVENING, Disp: 90 tablet, Rfl: 0    famotidine (PEPCID) 20 MG tablet, Take 1 tablet by mouth twice daily, Disp: 180 tablet, Rfl: 0    hydrOXYzine pamoate (VISTARIL) 25 MG Cap, TAKE 1 CAPSULE BY MOUTH EVERY 8 HOURS AS NEEDED, Disp: 60 capsule, Rfl: 0    mupirocin (BACTROBAN) 2 % ointment, APPLY  OINTMENT TOPICALLY THREE TIMES DAILY (Patient not taking: Reported on 11/3/2022), Disp: 90 g, Rfl: 1    oxyCODONE-acetaminophen (PERCOCET)  mg per tablet, Take 1 tablet by mouth every 4 (four) hours as needed for Pain., Disp: 2 tablet, Rfl: 0    predniSONE (DELTASONE) 10 MG tablet, Take 6 tab po daily x1 day, 4 tab po daily x2 days, 3 tabs po daily x3 days, 2 tabs po daily x4 days, 1 tab po daily x5 days, Disp: 36 tablet, Rfl: 0    tiZANidine (ZANAFLEX) 4 MG tablet, Take 1 tablet by mouth twice daily, Disp: 90 tablet, Rfl: 0    valACYclovir (VALTREX) 1000 MG tablet, TAKE 2 TABLETS BY MOUTH TWICE DAILY FOR COLD SYMPTOMS, Disp: 4 tablet, Rfl: 0    varenicline (CHANTIX) 1 mg Tab, Take 1 tablet (1 mg total) by mouth 2 (two) times daily., Disp: 60 tablet, Rfl: 1    zinc gluconate 50 mg tablet, Take 50 mg by mouth once daily., Disp: , Rfl:     ROS: A focused review of systems was obtained and negative.     Exam: A focused skin exam was performed. All areas examined were normal except as mentioned in the assessment and plan below.  General Appearance of the patient is well developed and well nourished.  Orientation: alert and oriented x 3.  Mood and affect: pleasant.    Assessment:   The primary encounter diagnosis was Intrinsic eczema. Diagnoses of Allergic contact dermatitis  due to other agents, Psoriasis, Psoriatic arthritis, and Prurigo nodularis were also pertinent to this visit.    Plan:      Allergic Contact Dermatitis (L23.89)  - Well demarcated, geometric eczematous patches  Status: stable    Plan: Counseling.  I counseled the patient regarding the following:  Skin care: Patient should use hypoallergenic products such as unscented soaps. Eliminate exposure to all new  cosmetics, fragrances, hair products, nail products shampoos, scented soaps, plants, metals and sunscreens.  Expectations: Allergic contact dermatitis can persist for several weeks before it fully resolves. Sometimes, patch  testing is necessary if reactions persist or if the patient is in contact with several potential allergens.  Contact office if: Allergic contact dermatitis worsens or fails to improve despite several weeks of treatment.    - continue to follow CAMP list    Atopic Dermatitis (L20.89)  - eczematous plaques and papules located on the legs with lichenification    Status: Inadequately controlled     Plan: Counseling.  I counseled the patient regarding the following:  Skin care: Patient should bathe using lukewarm water with a mild cleanser and moisturize immediately after.  Emollients should be applied at least 2-3 times daily. Avoid scented detergents or fabric softeners. Keep  fingernails short. Avoid excessive hand washing.  Expectations: The patient is aware that eczema is chronic in nature and can improve with moisturizers and topical  steroids and worsen with stress, scented soaps, detergents, scratching, dry skin, changes in weather and skin  infections.  Contact office if: Eczema worsens or fails to improve despite several weeks of treatment; patient develops skin  infections (such as: yellow honey colored crusts or cold sores).    - will restart dupixent  - will still consider rinvoq in the future if needed     Prurigo Nodularis  - erythematous nodules with central erosions located on the  legs     Plan: Counseling  I counseled the patient regarding the following:  Skin care: Recommend trimming nails short, anti-itch lotions such as Sarna, topical steroids and antihistamines.  Expectations: Prurigo Nodularis is a self-inflicted lesion that results from picking or rubbing the same spot of skin over and over again. If the itch-scratch cycle is broken, the lesions will resolve.  Contact office if: Prurigo Nodularis worsens, or if itching is accompanied by constitutional symptoms.    - will obtain culture to r/o secondary infection    Psoriatic Arthritis  - Psoriasiform plaques with micaceous scale  Status: Inadequately controlled     Plan: Counseling.  I counseled the patient regarding the following:  Instructions: Systemic medications are the treatment of choice for psoriatic arthritis. Treatment options include  anti-TNF therapy and methotrexate.  Expectations: Psoriatic arthritis is a type of inflammatory arthritis which occurs in conjunction with psoriasis.  Approximately 5% of psoriasis patients develop psoriatic arthritis. Psoriatic arthritis is chronic in nature with  periods of remissions and flares. Flares can be triggered by stress, infections (group A strep), certain medications  and alcohol. Psoriatic arthritis requires systemic medication for adequate treatment.  Contact office if: Your psoriatic arthritis worsens, or fails to improve despite several months of treatment.    - I recommend she follow up with Dr. Eugene to dicussed her psoriatic arthritis as dupixent will not address her joint disease like rinvoq would which was our previous plan.       Follow up if symptoms worsen or fail to improve.    Tatum Perry MD

## 2023-05-11 NOTE — ANESTHESIA POSTPROCEDURE EVALUATION
Patient: Keerthi Montoya    Procedure: Procedure(s):  Left Atrial Appendage Closure       Anesthesia Type:  No value filed.    Note:  Disposition: Outpatient   Postop Pain Control: Uneventful            Sign Out: Well controlled pain   PONV: No   Neuro/Psych: Uneventful            Sign Out: Acceptable/Baseline neuro status   Airway/Respiratory: Uneventful            Sign Out: Acceptable/Baseline resp. status   CV/Hemodynamics: Uneventful            Sign Out: Acceptable CV status; No obvious hypovolemia; No obvious fluid overload   Other NRE: NONE   DID A NON-ROUTINE EVENT OCCUR? No           Last vitals:  Vitals Value Taken Time   /82 05/11/23 1515   Temp 36  C (96.8  F) 05/11/23 1420   Pulse 71 05/11/23 1517   Resp 14 05/11/23 1500   SpO2 99 % 05/11/23 1517   Vitals shown include unvalidated device data.    Electronically Signed By: Carlitos Mckeon MD  May 11, 2023  3:19 PM

## 2023-05-11 NOTE — H&P
[facility]      CSI History and Physicial  Keerthi Montoya MRN: [id number]  1950  Date of Admission:5/11/2023  Primary care provider: Bunny Meyers         Chief Complaint:   Persistent atrial fibrillation          History of Present Illness:   Keerthi Montoya is a very pleasant 73 year old female with a history of persistent atrial fibrillation with CHADS-VASc score 4 (HTN, DM, age, female) and HASBLED of 5 (HTN, CKD, age, prior bleed, meds) with intolerance to anticoagulation due to history of GI bleeding who presents for Watchman left atrial appendage closure device. Additional medical history notable for HLD, HTN, DM2, ulcerative pancolitis, CKD3, pulmonary HTN, HFpEF, moderate paradoxical low flow low gradient aortic stenosis.     Patient underwent successful left atrial appendage closure with a 24mm WATCHMAN FLX device with 17% - 27% compression on May 11, 2023. Her post procedure LAURA showed well seated RY occluder without leak or evidence of pericardial effusion. Right femoral venotomy closed with suture closure device. Patient seen in the PACU post-procedure. Alert and oriented without cardiovascular complaints. Right femoral puncture site flat and dry without hematoma. Vital signs stable.         Review of Systems:    10 point review of systems negative except for stated above in HPI.          Past Medical History:   Medical History reviewed.   Past Medical History:   Diagnosis Date     Chronic kidney disease      Diabetes mellitus, type 2 (H)      Diverticulosis of colon (without mention of hemorrhage) 10/01/2012     GI bleed      History of blood transfusion      HLD (hyperlipidemia)      Hypertension      Hypothyroidism      MARIA D (iron deficiency anemia)      Persistent atrial fibrillation (H)      Pulmonary hypertension (H)      Ulcerative (chronic) enterocolitis (H)              Past Surgical History:   Surgical History reviewed.   Past Surgical History:   Procedure Laterality  Date     CHOLECYSTECTOMY  3/1987     COLON SURGERY  01/2001    Left colon resection; diverticulitis     COLONOSCOPY N/A 4/24/2017    Procedure: COMBINED COLONOSCOPY, SINGLE OR MULTIPLE BIOPSY/POLYPECTOMY BY BIOPSY;;  Surgeon: Radha Salguero MD;  Location: [facility] OR     COLONOSCOPY N/A 3/10/2023    Procedure: COLONOSCOPY;  Surgeon: Preeti James MD;  Location: [facility]     COLONOSCOPY WITH CO2 INSUFFLATION N/A 4/24/2017    Procedure: COLONOSCOPY WITH CO2 INSUFFLATION;  Colonoscopy Dx rectal bleeding, BMI 25.86, Pharm [facility] .[phone], ;  Surgeon: Radha Salguero MD;  Location: [facility] OR     CV RIGHT HEART CATH MEASUREMENTS RECORDED N/A 3/16/2020    Procedure: CV RIGHT HEART CATH;  Surgeon: Nader Muñoz MD;  Location:  HEART CARDIAC CATH LAB     ESOPHAGOSCOPY, GASTROSCOPY, DUODENOSCOPY (EGD), COMBINED N/A 1/23/2023    Procedure: ESOPHAGOGASTRODUODENOSCOPY, WITH BIOPSY;  Surgeon: Ricki Deal MD;  Location:  GI     ESOPHAGOSCOPY, GASTROSCOPY, DUODENOSCOPY (EGD), COMBINED N/A 3/10/2023    Procedure: Esophagoscopy, gastroscopy, duodenoscopy (EGD), combined;  Surgeon: Preeti James MD;  Location:  GI     GYN SURGERY  9/1983    tubal ligation     HERNIA REPAIR  12/2006    recurrent incisional hernia     SIGMOIDOSCOPY FLEXIBLE N/A 1/23/2023    Procedure: Sigmoidoscopy flexible;  Surgeon: Ricki Deal MD;  Location:  GI     THROAT SURGERY  12/1974    thyroidectomy             Social History:   Social History reviewed.  Social History     Tobacco Use     Smoking status: Never     Smokeless tobacco: Never   Vaping Use     Vaping status: Not on file   Substance Use Topics     Alcohol use: Not Currently     Alcohol/week: 2.0 - 4.0 standard drinks of alcohol     Types: 1 - 2 Cans of beer, 1 - 2 Shots of liquor per week     Comment: occasional cocktail or beer             Family History:   Family History reviewed.   Family History   Problem Relation Age of Onset     Cerebrovascular  Disease Mother      Cancer Father         bladder     Diverticulitis Brother      Diverticulitis Brother      Kidney Disease No family hx of      Crohn's Disease No family hx of      Ulcerative Colitis No family hx of      Stomach Cancer No family hx of      GERD No family hx of      Celiac Disease No family hx of      Anesthesia Reaction No family hx of              Allergies:     Allergies   Allergen Reactions     Actos [Pioglitazone]      Fluid retention     Amlodipine      Leg swelling, hand swelling     Animal Dander      Doxycycline Hives     Dust Mites      Grass      Keflex [Cephalexin Hcl] Hives     Metoprolol      Swelling of lower legs and fatigue     Other [Seasonal Allergies]      pollen     Ranitidine      rash     Synthroid [Levothyroxine Sodium] Swelling     Allergic to brand name     Tetracycline Hives     Tylenol Hives     Ziac [Bisoprolol-Hydrochlorothiazide]              Medications:   Medications Reviewed.   Current Facility-Administered Medications   Medication     EPINEPHrine (ADRENALINE) 5 mg in  mL infusion (PERIPHERAL)     HOLD all oral hypoglycemics glipiZIDE (GLUCOTROL), glyBURIDE (DIABETA/MICRONASE/GLYNASE), glimepiride (AMARYL), gliclazide, rosiglitazone (AVANDIA), pioglitazone (ACTOS),  sitagliptin (JANUVIA), saxagliptin (ONGLYZA) and linagliptin (TRAJENTA) metformin (GLUCOPHAGE, GLUMETZA, FORTAMET, RIOMET) and metformin containing medications: alogliptin/metformin (KAZANO), glipizide/metformin (METAGLIP), glyburide/metformin (GLUCOVANCE), rosiglitazone/metformin (AVANDAMET), dapagliflozin/metformin (XIGDUO XR), sitagliptin/metformin (JANUMET, JANUMET XR), linagliptin/metformin (JENTADUETO), repaglinide/metformin (PRANDIMET), saxagliptin/metformin (KOMBIGLYZE XR), canagliflozin/metformin (INVOKAMET), and pioglitazone/metformin (ACTOPLUS MET, ACTOPLUS MET XR) the morning of the procedure.     lidocaine (LMX4) cream     lidocaine 1 % 0.1-1 mL     lidocaine 1 %     norepinephrine  "(LEVOPHED) 4 mg/250 mL NS infusion ADULT (PERIPHERAL)     Patient is NOT allergic to contrast dye     potassium chloride ER (KLOR-CON M) CR tablet 20 mEq     potassium chloride ER (KLOR-CON M) CR tablet 40 mEq     sodium chloride (PF) 0.9% PF flush 3 mL     sodium chloride (PF) 0.9% PF flush 3 mL     sodium chloride (PF) 0.9% PF flush 3 mL     sodium chloride 0.9% infusion     Facility-Administered Medications Ordered in Other Encounters   Medication     fentaNYL (PF) (SUBLIMAZE) injection     lactated ringers infusion     lidocaine 2% injection (MDV)     propofol (DIPRIVAN) injection 10 mg/mL vial     rocuronium injection             Physical Exam:   Vitals were reviewed.  Blood pressure 127/76, pulse 82, temperature 98  F (36.7  C), temperature source Oral, resp. rate 18, height 1.575 m (5' 2\"), weight 62.2 kg (137 lb 2 oz), SpO2 100 %, not currently breastfeeding.    General: AAOx3, NAD  Skin: Not jaundiced, no rash, no ecchymoses  HEENT: MMM, PERRLA, EOM intact  CV: RRR, normal S1S2, no murmur, clicks, rubs  Resp: Clear to auscultation bilaterally, no wheezes, rhonchi  Abd: Soft, non-tender, BS+, no masses appreciated  Extremities: warm and well perfused, palpable pulses, no edema  Neuro: No lateralizing symptoms or focal neurologic deficits        Labs:   Routine Labs:  No results found for: TROPI, TROPONIN, TROPR, TROPN  CMP  Recent Labs   Lab 05/11/23  1209 05/11/23  1143 05/11/23  0920 05/08/23  1451   *  --   --  136   POTASSIUM 4.5  --  5.6* 5.0   CHLORIDE  --   --   --  102   CO2  --   --   --  23   ANIONGAP  --   --   --  11   * 127*  --  195*   BUN  --   --   --  19.3   CR  --   --   --  1.51*   GFRESTIMATED  --   --   --  37*   ABEL  --   --   --  9.8     CBC  Recent Labs   Lab 05/11/23  1209 05/08/23  1451   WBC  --  7.3   RBC  --  4.02   HGB 8.8* 9.2*   HCT  --  30.8*   MCV  --  77*   MCH  --  22.9*   MCHC  --  29.9*   RDW  --  22.3*   PLT  --  327     INR  Recent Labs   Lab " 05/08/23  1451   INR 1.13           Diagnostics:        Imaging:   LAURA 5/11/23:  PRE-PROCEDURAL SURVEY:  1. The LV function was 55-60% and the RV function was moderately-severely  reduced.  2. There was no evidence of shunting at the level of the interatrial septum.  3. The maximum pre-deployment left atrial appendage orifice width was 19.9 mm.  4. There was no intracardiac thrombus.  5. There was no pericardial effusion.     POST-PROCEDURAL SURVEY:  1. The 24 mm Watchman FLX device was well-seated in the left atrial appendage.  There was adequate compression of the device. There was no evidence of delroy-  device leak.  2. There was no pericardial effusion.  3. There was a small left-right shunt at the site of the transseptal puncture.  4. The biventricular function was unchanged.  ______________________________________________________________________________  Procedure  Transesophageal Echocardiogram with color and spectral Doppler performed. 3D  image acquisition, reconstruction, and real-time interpretation was performed.  Procedure location Heart Catherization Laboratory. Informed consent for  Transesophegeal echo obtained. LAURA Probe #[id number] then 66 due to significant  Doppler artifact was used during the procedure. Sedation, endotracheal  intubation, and mechanical ventilation were initiated prior to the LAURA and  were monitored by anesthesia. The Transducer was inserted without difficulty .  Complications None. The patient's rhythm is normal sinus. Good quality two-  dimensional was performed and interpreted. Adequate quality color and spectral  Doppler were performed and interpreted.     Left Ventricle  Global and regional left ventricular function is normal with an EF of 55-60%.  Left ventricular size is normal.     Right Ventricle  Moderate right ventricular dilation is present. Global right ventricular  function is moderately to severely reduced.     Atria  Moderate to severe biatrial enlargement is present.      Mitral Valve  The mitral valve is normal. Trace mitral insufficiency is present.     Aortic Valve  The aortic valve is tricuspid. Mild aortic valve sclerosis is present. Trace  aortic insufficiency is present.     Tricuspid Valve  Mild to moderate tricuspid insufficiency is present. Pulmonary artery systolic  pressure cannot be assessed.     Pulmonic Valve  Mild pulmonic insufficiency is present.     Vessels  The aorta root is normal. The thoracic aorta is normal. The LUPV Doppler shows  normal velocity waveform . The right upper pulmonary vein cannot be assessed.  The right lower pulmonary vein cannot be assessed. The left lower pulmonary  vein cannot be assessed.     Pericardium  No pericardial effusion is present.     Compared to Previous Study  This study was compared with the study from 08/19/2022 . There has been  interval LAAO closure device placement. No significant changes in the  biventricular function.    Assessment and Plan:     Keerthi Montoya is a very pleasant 73 year old who presents for Watchman LAAO.     # Paroxysmal a-fib w/contraindication to anticoagulation due to GI bleeding  # Healed gastric ulcers/Ulcerative colitis (inactive)  Now s/p successful implantation of 24mm Watchman FLX. Post-procedure LAURA showed no evidence of delroy-device leak and no pericardial effusion. Patient seen in PACU. VSS, neuros intact, groin sites soft without hematoma.   - Xarelto 15 mg daily monotherapy x 45 days  - SBE prophylaxis x 6 months   - Echo this afternoon, if no pericardial effusion patient may discharge home  - Follow-up structural BLAISE 1 week and 45 days post procedure  - 45 days following Watchman device implantation, patient will return for LAURA to evaluate endothelialization of the device. If adequate seal is assessed (<5mm), anticoagulation will be discontinued and dual antiplatelet therapy with clopidogrel and aspirin will be instituted for a total of 6 months from implantation date, with aspirin  "then continuing lifelong.      # HFpEF  # Moderate PH  # Moderate paradoxical low flow low gradient aortic stenosis  Currently no significant heart failure symptoms, appears euvolemic on exam.  - Resume PTA Jardiance, losartan and torsemide     # HTN  # HLD   - Resume atenolol and atorvastatin    # CKD  - Stable creatinine 1.51 and GFR of 37 pre procedure. Continue to monitor    # Diabetes type II   - Poorly controlled, recent Hgb A1C 11.6. Resume glipizide and Jardiance. Further diabetes management per PCP.        FEN: Diabetic/Cardiac diet  Prophylaxis:  DVT: Cont pta Xarelto. PPI: PTA protonix   Disposition: Obs admission to [facility].   Code Status: FULL CODE    WENDI Marr, NP-C  [facility] Cardiology Team  [phone]    Clinically Significant Risk Factors Present on Admission       # Hyperkalemia: Highest K = 5.6 mmol/L in last 2 days, will monitor as appropriate          # Drug Induced Platelet Defect: home medication list includes an antiplatelet medication   # DMII: A1C = 11.6 % (Ref range: 0.0 - 5.6 %) within past 6 months  # Overweight: Estimated body mass index is 25.08 kg/m  as calculated from the following:    Height as of this encounter: 1.575 m (5' 2\").    Weight as of this encounter: 62.2 kg (137 lb 2 oz).    Cardiovascular: Cardiac Arrhythmia: Atrial fibrillation: Chronic    GI Hemorrhage: Gastritis with bleeding, unspecified     Nephrology: Stage 3b (GFR 30-44)    A total of 30 minutes spent face to face with patient and > 50% of the time spent counseling and coordinating care.         Physician Attestation   I have reviewed and discussed with the advanced practice provider their history, physical and plan for Keerthi Montoya. I did not participate in a shared visit by interviewing or examining the patient and this should be billed as an advanced practice provider only visit.    Bobby Grimes MD  Interventional Cardiology      "

## 2023-05-11 NOTE — Clinical Note
0 : 15. 45 : 20. 90 : 14. 135 : 13. 0 : 43. 45 : 33. 90 : 35. 135 : 22. 0 : 19.2 . 45 : 18.6 . 90 : 20.9 . 135 : 17.5 . RY type used: BroccoliPosition: Passed. [place]: Passed. Size: Accepted. Seal: Complete. Jet: 0.

## 2023-05-12 ENCOUNTER — APPOINTMENT (OUTPATIENT)
Dept: CARDIOLOGY | Facility: CLINIC | Age: 73
DRG: 274 | End: 2023-05-12
Attending: NURSE PRACTITIONER
Payer: MEDICARE

## 2023-05-12 VITALS
HEART RATE: 85 BPM | SYSTOLIC BLOOD PRESSURE: 107 MMHG | DIASTOLIC BLOOD PRESSURE: 53 MMHG | BODY MASS INDEX: 25.18 KG/M2 | TEMPERATURE: 98.2 F | HEIGHT: 63 IN | RESPIRATION RATE: 18 BRPM | WEIGHT: 142.1 LBS | OXYGEN SATURATION: 94 %

## 2023-05-12 LAB
ANION GAP SERPL CALCULATED.3IONS-SCNC: 12 MMOL/L (ref 7–15)
BUN SERPL-MCNC: 20.2 MG/DL (ref 8–23)
CALCIUM SERPL-MCNC: 9 MG/DL (ref 8.8–10.2)
CHLORIDE SERPL-SCNC: 103 MMOL/L (ref 98–107)
CREAT SERPL-MCNC: 1.55 MG/DL (ref 0.51–0.95)
DEPRECATED HCO3 PLAS-SCNC: 19 MMOL/L (ref 22–29)
ERYTHROCYTE [DISTWIDTH] IN BLOOD BY AUTOMATED COUNT: 22.5 % (ref 10–15)
GFR SERPL CREATININE-BSD FRML MDRD: 35 ML/MIN/1.73M2
GLUCOSE SERPL-MCNC: 193 MG/DL (ref 70–99)
HCT VFR BLD AUTO: 27.2 % (ref 35–47)
HGB BLD-MCNC: 7.8 G/DL (ref 11.7–15.7)
LVEF ECHO: NORMAL
MCH RBC QN AUTO: 22.2 PG (ref 26.5–33)
MCHC RBC AUTO-ENTMCNC: 28.7 G/DL (ref 31.5–36.5)
MCV RBC AUTO: 78 FL (ref 78–100)
PLATELET # BLD AUTO: 269 10E3/UL (ref 150–450)
POTASSIUM SERPL-SCNC: 4.6 MMOL/L (ref 3.4–5.3)
RBC # BLD AUTO: 3.51 10E6/UL (ref 3.8–5.2)
SODIUM SERPL-SCNC: 134 MMOL/L (ref 136–145)
WBC # BLD AUTO: 7.4 10E3/UL (ref 4–11)

## 2023-05-12 PROCEDURE — 93321 DOPPLER ECHO F-UP/LMTD STD: CPT

## 2023-05-12 PROCEDURE — 99238 HOSP IP/OBS DSCHRG MGMT 30/<: CPT | Mod: 25

## 2023-05-12 PROCEDURE — 93321 DOPPLER ECHO F-UP/LMTD STD: CPT | Mod: 26 | Performed by: STUDENT IN AN ORGANIZED HEALTH CARE EDUCATION/TRAINING PROGRAM

## 2023-05-12 PROCEDURE — 250N000013 HC RX MED GY IP 250 OP 250 PS 637: Performed by: NURSE PRACTITIONER

## 2023-05-12 PROCEDURE — 93308 TTE F-UP OR LMTD: CPT | Mod: 26 | Performed by: STUDENT IN AN ORGANIZED HEALTH CARE EDUCATION/TRAINING PROGRAM

## 2023-05-12 PROCEDURE — 93325 DOPPLER ECHO COLOR FLOW MAPG: CPT

## 2023-05-12 PROCEDURE — 93325 DOPPLER ECHO COLOR FLOW MAPG: CPT | Mod: 26 | Performed by: STUDENT IN AN ORGANIZED HEALTH CARE EDUCATION/TRAINING PROGRAM

## 2023-05-12 PROCEDURE — 36415 COLL VENOUS BLD VENIPUNCTURE: CPT | Performed by: NURSE PRACTITIONER

## 2023-05-12 PROCEDURE — 85027 COMPLETE CBC AUTOMATED: CPT | Performed by: NURSE PRACTITIONER

## 2023-05-12 PROCEDURE — 80048 BASIC METABOLIC PNL TOTAL CA: CPT | Performed by: NURSE PRACTITIONER

## 2023-05-12 RX ADMIN — HYDRALAZINE HYDROCHLORIDE 75 MG: 50 TABLET, FILM COATED ORAL at 08:38

## 2023-05-12 RX ADMIN — GLIPIZIDE 15 MG: 5 TABLET, EXTENDED RELEASE ORAL at 08:37

## 2023-05-12 RX ADMIN — PANTOPRAZOLE SODIUM 40 MG: 40 TABLET, DELAYED RELEASE ORAL at 08:37

## 2023-05-12 RX ADMIN — LOSARTAN POTASSIUM 100 MG: 100 TABLET, FILM COATED ORAL at 08:38

## 2023-05-12 RX ADMIN — EMPAGLIFLOZIN 25 MG: 25 TABLET, FILM COATED ORAL at 08:37

## 2023-05-12 RX ADMIN — ATENOLOL 50 MG: 50 TABLET ORAL at 08:38

## 2023-05-12 RX ADMIN — ATORVASTATIN CALCIUM 40 MG: 40 TABLET, FILM COATED ORAL at 08:38

## 2023-05-12 ASSESSMENT — ACTIVITIES OF DAILY LIVING (ADL)
ADLS_ACUITY_SCORE: 20

## 2023-05-12 NOTE — PROGRESS NOTES
Bedside echo showed stable anterior pericardial effusion. In afib with rates of 100s-120s and blood pressure 100/70. Pt remained asymptomatic.  Pt and son updated.    Chay Gaines MD  Cardiology Fellow

## 2023-05-12 NOTE — PROGRESS NOTES
CSI at bedside performing ECHO. ECHO unchanged per CSI fellow. Report called to 6C Emma PIPER. HR remains 100-120 a fib. Other VSS. RFV site WNL. Dressing C/D/I, site soft, nontender. Pt tolerating PO intake and ambulation. Discharge instructions reviewed and sent with patient. Pt transferred to 6C.

## 2023-05-12 NOTE — PROGRESS NOTES
Transfer  Transferred from:3c  Via:bed  Reason for transfer:Pt appropriate for 6C- monitoring overnight  Family: Aware of transfer  Belongings: Sent with pt, no valuables. Shoes and clothes at bedside  Chart: Sent with pt  Medications: no meds  Report called from: Sheela REYNOLDS

## 2023-05-12 NOTE — PROGRESS NOTES
D/she was awake with vitals, rt groin unchanged, and right radial pressure dressing on. She says pressure dressing is to stay on until the am, cold extremitis per her usual  A/she slept in between vitals, she continues to be alert and oriented when awake  P/monitor for changes

## 2023-05-12 NOTE — PROGRESS NOTES
DISCHARGE   Discharged to: Home  Via: Automobile, son picking up  Accompanied by: Family  Discharge Instructions: diet, activity, medications, follow up appointments, when to call the MD, and what to watchout for (i.e. s/s of infection, increasing SOB, palpitations, chest pain,)  Prescriptions: To be filled by [facility] discharge pharmacy per pt's request; medication list reviewed & sent with pt  Follow Up Appointments: arranged; information given  Belongings: All sent with pt  IV: out  Telemetry: off  Pt exhibits understanding of above discharge instructions; all questions answered.  Discharge Paperwork: given to patient    Pt ambulated to [facility] in stable condition with RN.

## 2023-05-12 NOTE — PROGRESS NOTES
D: S/P Presence of Watchman left atrial appendage closure device  (primary encounter diagnosis) c/b post op small pericardial effusion.  Persistent atrial fibrillation (H) PMH: thyroidectomy, DM2, Gi Bleed, Colitis, Diverticulitis, Aortic stenosis, Low vitamin D, anemia, pulmonary htn.    I: Monitored vitals and assessed pt status.   Changed: Admitted to inpatient  Running:SL PIV    A: A0x4. VSS, on RA.fib . Afebrile. Pain denied. WBL. Dressing to old A line. R groin site closed with perclose, quick clot and tegaderm applied. Pedal pulses weak and unchanged.  Ambulated with SBA. 2 person skin check completed with Sheela PIPER. Mepilex on coccyx intact from preop.    I/O this shift:  In: 1080 [P.O.:1080]  Out: -     Temp:  [96.8  F (36  C)-98.1  F (36.7  C)] 98.1  F (36.7  C)  Pulse:  [] 112  Resp:  [12-19] 18  BP: (101-147)/() 127/89  MAP:  [107 mmHg-116 mmHg] 107 mmHg  Arterial Line BP: (151-159)/(77-90) 151/77  SpO2:  [92 %-100 %] 95 %      P: Continue to monitor Pt status and report changes to treatment team. Repeat ECHO in am and possible discharge.

## 2023-05-12 NOTE — PROGRESS NOTES
Patient HR a fib 100-120, occasionally 130 over the last hour. A fib is patient's baseline. Other VSS. Pt asymptomatic. Denies palpitations or SOB. CSI notified. Awaiting call back.

## 2023-05-14 LAB
ATRIAL RATE - MUSE: NORMAL BPM
DIASTOLIC BLOOD PRESSURE - MUSE: NORMAL MMHG
INTERPRETATION ECG - MUSE: NORMAL
P AXIS - MUSE: NORMAL DEGREES
PR INTERVAL - MUSE: NORMAL MS
QRS DURATION - MUSE: 74 MS
QT - MUSE: 402 MS
QTC - MUSE: 478 MS
R AXIS - MUSE: 112 DEGREES
SYSTOLIC BLOOD PRESSURE - MUSE: NORMAL MMHG
T AXIS - MUSE: 245 DEGREES
VENTRICULAR RATE- MUSE: 85 BPM

## 2023-05-15 ENCOUNTER — CARE COORDINATION (OUTPATIENT)
Dept: CARDIOLOGY | Facility: CLINIC | Age: 73
End: 2023-05-15

## 2023-05-15 ENCOUNTER — PATIENT OUTREACH (OUTPATIENT)
Dept: CARE COORDINATION | Facility: CLINIC | Age: 73
End: 2023-05-15

## 2023-05-15 ENCOUNTER — TELEPHONE (OUTPATIENT)
Dept: CARDIOLOGY | Facility: CLINIC | Age: 73
End: 2023-05-15

## 2023-05-15 ENCOUNTER — ANCILLARY PROCEDURE (OUTPATIENT)
Dept: CARDIOLOGY | Facility: CLINIC | Age: 73
End: 2023-05-15
Attending: NURSE PRACTITIONER
Payer: MEDICARE

## 2023-05-15 ENCOUNTER — LAB (OUTPATIENT)
Dept: LAB | Facility: CLINIC | Age: 73
End: 2023-05-15
Payer: MEDICARE

## 2023-05-15 VITALS
BODY MASS INDEX: 25.45 KG/M2 | OXYGEN SATURATION: 96 % | HEART RATE: 76 BPM | WEIGHT: 143.6 LBS | SYSTOLIC BLOOD PRESSURE: 100 MMHG | DIASTOLIC BLOOD PRESSURE: 64 MMHG | HEIGHT: 63 IN

## 2023-05-15 DIAGNOSIS — I50.32 CHRONIC DIASTOLIC HEART FAILURE (H): ICD-10-CM

## 2023-05-15 DIAGNOSIS — N18.9 ACUTE RENAL FAILURE SUPERIMPOSED ON CHRONIC KIDNEY DISEASE, UNSPECIFIED CKD STAGE, UNSPECIFIED ACUTE RENAL FAILURE TYPE: ICD-10-CM

## 2023-05-15 DIAGNOSIS — N17.9 ACUTE RENAL FAILURE SUPERIMPOSED ON CHRONIC KIDNEY DISEASE, UNSPECIFIED CKD STAGE, UNSPECIFIED ACUTE RENAL FAILURE TYPE: ICD-10-CM

## 2023-05-15 DIAGNOSIS — I48.19 PERSISTENT ATRIAL FIBRILLATION (H): ICD-10-CM

## 2023-05-15 DIAGNOSIS — I31.39 PERICARDIAL EFFUSION: ICD-10-CM

## 2023-05-15 DIAGNOSIS — Z95.818 PRESENCE OF WATCHMAN LEFT ATRIAL APPENDAGE CLOSURE DEVICE: Primary | ICD-10-CM

## 2023-05-15 DIAGNOSIS — Z95.818 PRESENCE OF WATCHMAN LEFT ATRIAL APPENDAGE CLOSURE DEVICE: ICD-10-CM

## 2023-05-15 LAB
ANION GAP SERPL CALCULATED.3IONS-SCNC: 14 MMOL/L (ref 7–15)
BUN SERPL-MCNC: 30.9 MG/DL (ref 8–23)
CALCIUM SERPL-MCNC: 9.7 MG/DL (ref 8.8–10.2)
CHLORIDE SERPL-SCNC: 92 MMOL/L (ref 98–107)
CREAT SERPL-MCNC: 2.35 MG/DL (ref 0.51–0.95)
DEPRECATED HCO3 PLAS-SCNC: 25 MMOL/L (ref 22–29)
ERYTHROCYTE [DISTWIDTH] IN BLOOD BY AUTOMATED COUNT: 22.4 % (ref 10–15)
GFR SERPL CREATININE-BSD FRML MDRD: 21 ML/MIN/1.73M2
GLUCOSE SERPL-MCNC: 261 MG/DL (ref 70–99)
HCT VFR BLD AUTO: 29.9 % (ref 35–47)
HGB BLD-MCNC: 8.8 G/DL (ref 11.7–15.7)
LVEF ECHO: NORMAL
MCH RBC QN AUTO: 21.8 PG (ref 26.5–33)
MCHC RBC AUTO-ENTMCNC: 29.4 G/DL (ref 31.5–36.5)
MCV RBC AUTO: 74 FL (ref 78–100)
NT-PROBNP SERPL-MCNC: ABNORMAL PG/ML (ref 0–900)
PLATELET # BLD AUTO: 355 10E3/UL (ref 150–450)
POTASSIUM SERPL-SCNC: 4 MMOL/L (ref 3.4–5.3)
RBC # BLD AUTO: 4.03 10E6/UL (ref 3.8–5.2)
SODIUM SERPL-SCNC: 131 MMOL/L (ref 136–145)
WBC # BLD AUTO: 10 10E3/UL (ref 4–11)

## 2023-05-15 PROCEDURE — 93308 TTE F-UP OR LMTD: CPT | Performed by: INTERNAL MEDICINE

## 2023-05-15 PROCEDURE — 93325 DOPPLER ECHO COLOR FLOW MAPG: CPT | Performed by: INTERNAL MEDICINE

## 2023-05-15 PROCEDURE — 85027 COMPLETE CBC AUTOMATED: CPT | Performed by: PATHOLOGY

## 2023-05-15 PROCEDURE — 36415 COLL VENOUS BLD VENIPUNCTURE: CPT | Performed by: PATHOLOGY

## 2023-05-15 PROCEDURE — 83880 ASSAY OF NATRIURETIC PEPTIDE: CPT | Performed by: PATHOLOGY

## 2023-05-15 PROCEDURE — 80048 BASIC METABOLIC PNL TOTAL CA: CPT | Performed by: PATHOLOGY

## 2023-05-15 PROCEDURE — G0463 HOSPITAL OUTPT CLINIC VISIT: HCPCS | Performed by: NURSE PRACTITIONER

## 2023-05-15 PROCEDURE — 93321 DOPPLER ECHO F-UP/LMTD STD: CPT | Performed by: INTERNAL MEDICINE

## 2023-05-15 PROCEDURE — 99214 OFFICE O/P EST MOD 30 MIN: CPT | Mod: 25 | Performed by: NURSE PRACTITIONER

## 2023-05-15 ASSESSMENT — PAIN SCALES - GENERAL: PAINLEVEL: NO PAIN (0)

## 2023-05-15 NOTE — PROGRESS NOTES
[facility] HEART CARE  CARDIOVASCULAR DIVISION    STRUCTURAL CLINIC RETURN VISIT    PRIMARY CARDIOLOGIST: Dr. Almendarez       PERTINENT CLINICAL HISTORY:     Keerthi Montoya is a very pleasant 73 year old female who presents for 1 week Watchman follow-up. She has a history of HLD, HTN, DM2, ulcerative pancolitis, CKD3, pulmonary HTN, HFpEF, moderate paradoxical low flow low gradient aortic stenosis, persistent atrial fibrillation with CHADS-VASc score 4 (HTN, DM, age, female) and HASBLED of 5 (HTN, CKD, age, prior bleed, meds) with intolerance to anticoagulation due to history of GI bleeding who underwent successful left atrial appendage closure with a 24mm WATCHMAN FLX device with 17% - 27% compression on May 11, 2023. Her intra procedure LAURA showed well seated RY occluder without leak or evidence of pericardial effusion. Post procedure limited TTE showed small anterior pericardial effusion. She was kept overnight for observation and limited TTE the next morning showed stable effusion. Her antithrombotic therapy was held pending repeat echo today (5/15/23).     [place]ia has been feeling well. She has a dry cough which she attributes to throat irritation from intubation. She otherwise denies fever, chills, chest pain, shortness of breath, orthopnea, PND, leg swelling, weight gain, lightheadedness, palpitations or syncope. Her groin site is healing well. She continues to hold AC therapy. She had a few drops of blood on her toilet paper after having a BM yesterday. She was wondering if it was related to UTI because she also had some pain/stinging with urination at the time; however, she has not had any further pain with urination or blood. She thinks it was related to straining to have a BM.      PAST MEDICAL HISTORY:     Past Medical History:   Diagnosis Date     Chronic kidney disease      Diabetes mellitus, type 2 (H)      Diverticulosis of colon (without mention of hemorrhage) 10/01/2012     GI bleed       History of blood transfusion      HLD (hyperlipidemia)      Hypertension      Hypothyroidism      MARIA D (iron deficiency anemia)      Persistent atrial fibrillation (H)      Pulmonary hypertension (H)      Ulcerative (chronic) enterocolitis (H)         PAST SURGICAL HISTORY:     Past Surgical History:   Procedure Laterality Date     CHOLECYSTECTOMY  3/1987     COLON SURGERY  01/2001    Left colon resection; diverticulitis     COLONOSCOPY N/A 4/24/2017    Procedure: COMBINED COLONOSCOPY, SINGLE OR MULTIPLE BIOPSY/POLYPECTOMY BY BIOPSY;;  Surgeon: Radha Salguero MD;  Location: [facility] OR     COLONOSCOPY N/A 3/10/2023    Procedure: COLONOSCOPY;  Surgeon: Preeti James MD;  Location: [facility]     COLONOSCOPY WITH CO2 INSUFFLATION N/A 4/24/2017    Procedure: COLONOSCOPY WITH CO2 INSUFFLATION;  Colonoscopy Dx rectal bleeding, BMI 25.86, Pharm [facility] .[phone], ;  Surgeon: Radha Salguero MD;  Location: [facility] OR     CV LEFT ATRIAL APPENDAGE CLOSURE N/A 5/11/2023    Procedure: Left Atrial Appendage Closure;  Surgeon: Bobby Grimes MD;  Location:  HEART CARDIAC CATH LAB     CV RIGHT HEART CATH MEASUREMENTS RECORDED N/A 3/16/2020    Procedure: CV RIGHT HEART CATH;  Surgeon: Nader Muñoz MD;  Location:  HEART CARDIAC CATH LAB     ESOPHAGOSCOPY, GASTROSCOPY, DUODENOSCOPY (EGD), COMBINED N/A 1/23/2023    Procedure: ESOPHAGOGASTRODUODENOSCOPY, WITH BIOPSY;  Surgeon: Rikci Deal MD;  Location:  GI     ESOPHAGOSCOPY, GASTROSCOPY, DUODENOSCOPY (EGD), COMBINED N/A 3/10/2023    Procedure: Esophagoscopy, gastroscopy, duodenoscopy (EGD), combined;  Surgeon: Preeti James MD;  Location:  GI     GYN SURGERY  9/1983    tubal ligation     HERNIA REPAIR  12/2006    recurrent incisional hernia     SIGMOIDOSCOPY FLEXIBLE N/A 1/23/2023    Procedure: Sigmoidoscopy flexible;  Surgeon: Ricki Deal MD;  Location:  GI     THROAT SURGERY  12/1974    thyroidectomy        CURRENT MEDICATIONS:      Current Outpatient Medications   Medication Sig Dispense Refill     atenolol (TENORMIN) 50 MG tablet Take 1 tablet (50 mg) by mouth daily 90 tablet 3     atorvastatin (LIPITOR) 40 MG tablet Take 1 tablet (40 mg) by mouth daily 90 tablet 3     blood glucose (ACCU-CHEK FELIPE PLUS) test strip 200 strips by In Vitro route 2 times daily Use to test blood sugar 2 times daily or as directed. 200 strip 4     Cholecalciferol (VITAMIN D) 2000 units CAPS Take by mouth daily (with lunch)       Cyanocobalamin (B-12) 1000 MCG TABS Take 1,000 mcg by mouth three times a week       diphenhydrAMINE (BENADRYL) 25 MG tablet Take 25 mg by mouth every 6 hours as needed for itching or allergies       empagliflozin (JARDIANCE) 25 MG TABS tablet Take 1 tablet (25 mg) by mouth daily 90 tablet 3     glipiZIDE (GLUCOTROL XL) 10 MG 24 hr tablet Take 2 tablets (20 mg) by mouth daily (Patient taking differently: Take 15 mg by mouth every morning) 180 tablet 3     hydrALAZINE (APRESOLINE) 50 MG tablet Take 1.5 tablets (75 mg) by mouth 3 times daily 270 tablet 3     ketorolac (ACULAR) 0.5 % ophthalmic solution INSTILL 1 DROP IN AFFECTED EYE THREE TIMES DAILY AS DIRECTED. START 3 DAYS BEFORE SURGERY AND USE UNTIL GONE       levothyroxine (SYNTHROID/LEVOTHROID) 112 MCG tablet Take 1 tablet (112 mcg) by mouth daily (Patient taking differently: Take 112 mcg by mouth every morning) 90 tablet 4     losartan (COZAAR) 100 MG tablet Take 1 tablet (100 mg) by mouth daily (Patient taking differently: Take 100 mg by mouth every morning) 90 tablet 3     mesalamine (APRISO ER) 0.375 g 24 hr capsule TAKE 4 CAPSULES(1.5 GRAMS) BY MOUTH DAILY (Patient taking differently: 1.5 g daily (with lunch)) 360 capsule 3     ofloxacin (OCUFLOX) 0.3 % ophthalmic solution INSTILL 1 DROP IN AFFECTED EYE THREE TIMES DAILY AS DIRECTED. START 3 DAYS BEFORE SURGERY AND USE UNTIL GONE       pantoprazole (PROTONIX) 40 MG EC tablet Take 1 tablet (40 mg) by mouth 2 times daily 180  tablet 3     prednisoLONE acetate (PRED FORTE) 1 % ophthalmic suspension SHAKE LIQUID AND INSTILL 1 DROP IN AFFECTED EYE THREE TIMES DAILY AS DIRECTED. START 3 DAYS BEFORE SURGERY AND. CONTINUE UNTIL ALL TAKEN       rivaroxaban ANTICOAGULANT (XARELTO) 15 MG TABS tablet Take 1 tablet (15 mg) by mouth daily DO NOT START UNTIL AFTER ECHO ON Monday 5/15 90 tablet 0     sodium bicarbonate 650 MG tablet Take 1 tablet (650 mg) by mouth daily 90 tablet 3     torsemide (DEMADEX) 10 MG tablet Take 1 tablet (10 mg) by mouth as needed (when cardiology clinic advises) 90 tablet 3        ALLERGIES:     Allergies   Allergen Reactions     Actos [Pioglitazone]      Fluid retention     Amlodipine      Leg swelling, hand swelling     Animal Dander      Doxycycline Hives     Dust Mites      Grass      Keflex [Cephalexin Hcl] Hives     Metoprolol      Swelling of lower legs and fatigue     Other [Seasonal Allergies]      pollen     Ranitidine      rash     Synthroid [Levothyroxine Sodium] Swelling     Allergic to brand name     Tetracycline Hives     Tylenol Hives     Ziac [Bisoprolol-Hydrochlorothiazide]         FAMILY HISTORY:     Family History   Problem Relation Age of Onset     Cerebrovascular Disease Mother      Cancer Father         bladder     Diverticulitis Brother      Diverticulitis Brother      Kidney Disease No family hx of      Crohn's Disease No family hx of      Ulcerative Colitis No family hx of      Stomach Cancer No family hx of      GERD No family hx of      Celiac Disease No family hx of      Anesthesia Reaction No family hx of         SOCIAL HISTORY:     Social History     Socioeconomic History     Marital status:      Spouse name: Raymundo; dementia;       Number of children: 3   Occupational History     Employer: [employer]   Tobacco Use     Smoking status: Never     Smokeless tobacco: Never   Substance and Sexual Activity     Alcohol use: Not Currently     Alcohol/week: 2.0 - 4.0 standard  "drinks of alcohol     Types: 1 - 2 Cans of beer, 1 - 2 Shots of liquor per week     Comment: occasional cocktail or beer     Drug use: No     Sexual activity: Not Currently   Other Topics Concern      Service No     Blood Transfusions No     Caffeine Concern No     Occupational Exposure No     Hobby Hazards No     Sleep Concern No     Stress Concern No     Weight Concern No     Special Diet No     Back Care No     Exercise Yes     Comment: off and on     Bike Helmet No     Seat Belt Yes     Self-Exams No   Social History Narrative    Laid off her job 8/14        REVIEW OF SYSTEMS:     Constitutional: No fevers or chills  Skin: No new rash or itching  Eyes: No acute change in vision  Ears/Nose/Throat: No purulent rhinorrhea, new hearing loss, or new vertigo  Respiratory: No cough or hemoptysis  Cardiovascular: See HPI  Gastrointestinal: No change in appetite, vomiting, hematemesis or diarrhea  Genitourinary: No dysuria or hematuria  Musculoskeletal: No new back pain, neck pain or muscle pain  Neurologic: No new headaches, focal weakness or behavior changes  Psychiatric: No hallucinations, excessive alcohol consumption or illegal drug usage  Hematologic/Lymphatic/Immunologic: No bleeding, chills, fever, night sweats or weight loss  Endocrine: No new cold intolerance, heat intolerance, polyphagia, polydipsia or polyuria      PHYSICAL EXAMINATION:     /64 (BP Location: Left arm, Patient Position: Sitting, Cuff Size: Adult Regular)   Pulse 76   Ht 1.6 m (5' 2.99\")   Wt 65.1 kg (143 lb 9.6 oz)   LMP  (LMP Unknown)   SpO2 96%   BMI 25.44 kg/m      GENERAL: No acute distress.  HEENT: EOMI. Sclerae white, not injected. Nares clear. Pharynx without erythema or exudate.   Neck: No adenopathy. No thyromegaly. No jugular venous distension.   Heart: Regular rate and rhythm. No murmur.   Lungs: Clear to auscultation. No ronchi, wheezes, rales.   Abdomen: Soft, nontender, nondistended. Bowel sounds " present.  Extremities: No clubbing, cyanosis, or edema.   Neurologic: Alert and oriented to person/place/time, normal speech and affect. No focal deficits.  Skin: No petechiae, purpura or rash.     LABORATORY DATA:     LIPID RESULTS:  Lab Results   Component Value Date    CHOL 73 01/20/2023    CHOL 93 10/28/2020    HDL 29 (L) 01/20/2023    HDL 38 (L) 10/28/2020    LDL 27 01/20/2023    LDL 33 10/28/2020    TRIG 87 01/20/2023    TRIG 108 10/28/2020    CHOLHDLRATIO 4.3 05/18/2015       LIVER ENZYME RESULTS:  Lab Results   Component Value Date    AST 44 (H) 02/02/2023    AST 19 10/28/2020    ALT 11 02/02/2023    ALT 18 10/28/2020       CBC RESULTS:  Lab Results   Component Value Date    WBC 10.0 05/15/2023    WBC 7.3 10/28/2020    RBC 4.03 05/15/2023    RBC 3.99 10/28/2020    HGB 8.8 (L) 05/15/2023    HGB 11.8 02/08/2021    HCT 29.9 (L) 05/15/2023    HCT 38.2 10/28/2020    MCV 74 (L) 05/15/2023    MCV 96 10/28/2020    MCH 21.8 (L) 05/15/2023    MCH 30.8 10/28/2020    MCHC 29.4 (L) 05/15/2023    MCHC 32.2 10/28/2020    RDW 22.4 (H) 05/15/2023    RDW 13.9 10/28/2020     05/15/2023     10/28/2020       BMP RESULTS:  Lab Results   Component Value Date     (L) 05/15/2023     04/28/2021    POTASSIUM 4.0 05/15/2023    POTASSIUM 4.5 05/11/2023    POTASSIUM 4.3 04/27/2023    POTASSIUM 4.5 04/28/2021    CHLORIDE 92 (L) 05/15/2023    CHLORIDE 109 04/27/2023    CHLORIDE 100 04/28/2021    CO2 25 05/15/2023    CO2 21 04/27/2023    CO2 26 04/28/2021    ANIONGAP 14 05/15/2023    ANIONGAP 7 04/27/2023    ANIONGAP 8 04/28/2021     (H) 05/15/2023     (H) 05/11/2023     (H) 05/11/2023     (H) 04/27/2023    GLC 57 (L) 04/28/2021    BUN 30.9 (H) 05/15/2023    BUN 28 04/27/2023    BUN 23 04/28/2021    CR 2.35 (H) 05/15/2023    CR 1.21 (H) 04/28/2021    GFRESTIMATED 21 (L) 05/15/2023    GFRESTIMATED 30 (L) 12/30/2022    GFRESTIMATED 46 (L) 04/28/2021    GFRESTBLACK 53 (L) 04/28/2021    ABEL  9.7 05/15/2023    ABEL 9.8 04/28/2021        A1C RESULTS:  Lab Results   Component Value Date    A1C 11.6 (H) 04/27/2023    A1C 6.6 (H) 10/28/2020       INR RESULTS:  Lab Results   Component Value Date    INR 1.13 05/08/2023          PROCEDURES & FURTHER ASSESSMENTS:     LAURA 5/11/23:   PRE-PROCEDURAL SURVEY:  1. The LV function was 55-60% and the RV function was moderately-severely  reduced.  2. There was no evidence of shunting at the level of the interatrial septum.  3. The maximum pre-deployment left atrial appendage orifice width was 19.9 mm.  4. There was no intracardiac thrombus.  5. There was no pericardial effusion.     POST-PROCEDURAL SURVEY:  1. The 24 mm Watchman FLX device was well-seated in the left atrial appendage.  There was adequate compression of the device. There was no evidence of delroy-  device leak.  2. There was no pericardial effusion.  3. There was a small left-right shunt at the site of the transseptal puncture.  4. The biventricular function was unchanged.  ______________________________________________________________________________  Procedure  Transesophageal Echocardiogram with color and spectral Doppler performed. 3D  image acquisition, reconstruction, and real-time interpretation was performed.  Procedure location Heart Catherization Laboratory. Informed consent for  Transesophegeal echo obtained. LAURA Probe #[id number] then 66 due to significant  Doppler artifact was used during the procedure. Sedation, endotracheal  intubation, and mechanical ventilation were initiated prior to the LAURA and  were monitored by anesthesia. The Transducer was inserted without difficulty .  Complications None. The patient's rhythm is normal sinus. Good quality two-  dimensional was performed and interpreted. Adequate quality color and spectral  Doppler were performed and interpreted.     Left Ventricle  Global and regional left ventricular function is normal with an EF of 55-60%.  Left ventricular size is  normal.     Right Ventricle  Moderate right ventricular dilation is present. Global right ventricular  function is moderately to severely reduced.     Atria  Moderate to severe biatrial enlargement is present.     Mitral Valve  The mitral valve is normal. Trace mitral insufficiency is present.     Aortic Valve  The aortic valve is tricuspid. Mild aortic valve sclerosis is present. Trace  aortic insufficiency is present.     Tricuspid Valve  Mild to moderate tricuspid insufficiency is present. Pulmonary artery systolic  pressure cannot be assessed.     Pulmonic Valve  Mild pulmonic insufficiency is present.     Vessels  The aorta root is normal. The thoracic aorta is normal. The LUPV Doppler shows  normal velocity waveform . The right upper pulmonary vein cannot be assessed.  The right lower pulmonary vein cannot be assessed. The left lower pulmonary  vein cannot be assessed.     Pericardium  No pericardial effusion is present.     Compared to Previous Study  This study was compared with the study from 08/19/2022 . There has been  interval LAAO closure device placement. No significant changes in the  biventricular function.     TTE 5/11/23:   Small anterior pericardial Effusion     TTE 5/12/23:  ______________________________________________________________________________  Interpretation Summary  Small unchanged pericardial effusion is present without evidence of  pericardial tamponade.     Left ventricular function is normal.The ejection fraction is 55-60%.  Paradoxical septal motion consistent with right ventricular pressure and  volume overload is present.  Moderate to severe right ventricular dilation is present. Global right  ventricular function is moderately to severely reduced.  IVC diameter >2.1 cm collapsing <50% with sniff suggests a high RA pressure  estimated at 15 mmHg or greater.     This study was compared with the study from 5/11/23. There has been no change.    TTE 5/15/23:  Interpretation  Summary  Left ventricular size, wall motion and function are normal. The ejection  fraction is 60-65%. Flattened septum is consistent with right ventricular  pressure and volume overload.  Moderate to severe right ventricular dilation is present.  Global right ventricular function is moderately to severely reduced.  Severe aortic valve calcification is present.  Moderate tricuspid insufficiency is present.  Dilation of the inferior vena cava is present with abnormal respiratory  variation in diameter. Trivial, primarily anterior pericardial effusion is  present.     Compared to prior study the pericardial effusion is the same or slightly  improved. LV function appears slightly better.  ______________________________________________________________________________  Left Ventricle  Left ventricular size, wall motion and function are normal. The ejection  fraction is 60-65%. Flattened septum is consistent with right ventricular  pressure and volume overload.     Right Ventricle  Moderate to severe right ventricular dilation is present. Global right  ventricular function is moderately to severely reduced.     Atria  The left atrium appears normal. Severe right atrial enlargement is present.     Mitral Valve  Moderate mitral annular calcification is present. Trace mitral insufficiency  is present.     Aortic Valve  Severe aortic valve calcification is present.     Tricuspid Valve  The tricuspid valve is normal. Moderate tricuspid insufficiency is present.  The right ventricular systolic pressure is approximated at 49.1 mmHg plus the  right atrial pressure.     Pulmonic Valve  The pulmonic valve cannot be assessed.     Vessels  Dilation of the inferior vena cava is present with abnormal respiratory  variation in diameter. IVC diameter >2.1 cm collapsing <50% with sniff  suggests a high RA pressure estimated at 15 mmHg or greater.     Pericardium  Trivial, primarily anterior pericardial effusion is present.     Compared to  Previous Study  No significant changes noted.  ______________________________________________________________________________  Doppler Measurements & Calculations  Ao V2 max: 215.0 cm/sec  Ao max P.5 mmHg  Ao V2 mean: 137.7 cm/sec  Ao mean P.0 mmHg  Ao V2 VTI: 41.0 cm  TR max marcia: 350.2 cm/sec  TR max P.1 mmHg     CLINICAL IMPRESSION:     Keerthi Montoya is a very pleasant 73 year old female who presents for 1 week Watchman follow-up.     # Paroxysmal a-fib w/contraindication to anticoagulation due to GI bleeding  # Healed gastric ulcers/Ulcerative colitis (inactive)  Now s/p successful implantation of 24mm Watchman FLX. Intra-procedure LAURA showed no evidence of delroy-device leak and no pericardial effusion. Post procedure TTE with small anterior effusion. Patient kept overnight for observation. Repeat limited TTE 23 shows stable small pericardial effusion. Repeat limited TTE today shows trivial anterior effusion, maybe slightly improved.   - Start Xarelto 15 mg daily monotherapy x 45 days; repeat limited echo 1 week   - SBE prophylaxis x 6 months   - 45 days following Watchman device implantation, patient will return for CT heart to evaluate endothelialization of the device. If adequate seal is assessed (<5mm), anticoagulation will be discontinued and dual antiplatelet therapy with clopidogrel and aspirin will be instituted for a total of 6 months from implantation date, with aspirin then continuing lifelong.       # HFpEF  # Moderate PH  # Moderate paradoxical low flow low gradient aortic stenosis  Patient uses torsemide 10 mg PRN for leg swelling. Currently no significant heart failure symptoms and appears euvolemic on exam; however, ECHO demonstrates RV pressure and volume overload w/ RA > 15 mmHg. Patient directed to increase torsemide to 10 mg daily following Watchman; however, now with KWASI on CKD.   - Resume PTA Jardiance, losartan  - Holding torsemide, repeat BMP in 1 week     # HTN  #  HLD   - Continue atenolol and atorvastatin     # KWASI on CKD  # UTI symptoms   - Stable creatinine 1.55 and GFR of 37 pre procedure. Repeat today shows jump in creatinine to 2.35 and decrease in GFR to 21.  -  Patient advised to see PCP for evaluation of UTI and to hold torsemide with repeat BMP in 1 week.      # Acute on chronic anemia:  Baseline Hgb 8.8, dropped to 7.8 post procedure. Stable today at 8.8.   - Heme consult scheduled for further eval and management     # Diabetes type II   - Poorly controlled, recent Hgb A1C 11.6. Continue glipizide and Jardiance. Further diabetes management per PCP.      RTC: 45 days with CT, labs and ekg prior     Dominique WENDI Saavedra, CNP  [facility]       CC  Patient Care Team:  Bunny Meyers MD as PCP - General (Internal Medicine)  Preeti James MD as MD (Gastroenterology)  Christal Barker, RN as Specialty Care Coordinator (Cardiology)  Stacy Stokes, RN as Specialty Care Coordinator (Cardiology)  Swati Minor DO as Assigned Nephrology Provider  Carolyn Bonilla APRN CNP as Assigned Heart and Vascular Provider  Lorena Ambriz, RN as Specialty Care Coordinator (Cardiology)  Jerry Robbins PA-C as Physician Assistant (Gastroenterology)  Zuleika Glover, RN as Specialty Care Coordinator (Cardiology)  Bunny Meyers MD as Assigned PCP  Preeti James MD as Assigned Gastroenterology Provider  Bunny Santa [facility] as Assigned MTM Pharmacist  Allison Yang, RN as Specialty Care Coordinator (Gastroenterology)  Gianfranco Melendez MD as Intern (Student in organized health care education/training program)  Jethro Moore MD as MD (Hematology & Oncology)  Jonny Harper, RN as Specialty Care Coordinator

## 2023-05-15 NOTE — TELEPHONE ENCOUNTER
Health Call Center    Phone Message    May a detailed message be left on voicemail: yes     Reason for Call: Other: [place] saw Dominique this morning and suspected she has a bladder infection. Patient calling back, state she does have a bladder infection and would like antibiotics sent to [id number] - [place], [place] - [address] AT [facility] [address] [place]      Action Taken: Message routed to:  [facility] ([facility] Cardio    Travel Screening: Not Applicable                                                                    Thank you!  [facility] Center

## 2023-05-15 NOTE — PROGRESS NOTES
[facility]    Background: Transitional Care Management program identified per system criteria and reviewed by [facility] team for possible outreach.    Assessment: Upon chart review, [facility] Team member will not proceed with patient outreach related to this episode of Transitional Care Management program due to reason below:    Patient has a follow up appointment with an appropriate provider today for hospital discharge    Plan: Transitional Care Management episode addressed appropriately per reason noted above.      Princess Molina  Community Health Worker  [facility]  [phone]      *[facility] Care Resource Team does NOT follow patient ongoing. Referrals are identified based on internal discharge reports and the outreach is to ensure patient has an understanding of their discharge instructions.

## 2023-05-15 NOTE — PROGRESS NOTES
Patient is hold her Torsemide starting today. Repeat ECHO and labs in 1 wk. Start Xarelto today her ECHO from today was stable.    I instructed Georgia to see her PCP or go into urgent care today due to decreased kidney function. Georgia stated that she will call Dr. Ortiz office.    Today you had a Laparoscopy, left linear salpingostomy    Please call if you have any  -Fever greater than 101.  -Foul smelling discharge  -Bleeding heavier than heavy period    Other instructions  -No driving while on Percocet  -Nothing in vagina for 4 weeks-no tampons or intercourse  -Begin exercise slowly in 2 weeks. -Keep incisions clean and dry. -You can take top dressings off in 2-3 days        Ying Cortes MD  [address] [place] [address], [facility] [address]  [phone]        Make an appointment with me in 2-3 weeks. [facility]    There are potential side effects of anesthesia or sedation you may experience for the first 24 hours. These side effects include:    Confusion or Memory loss, Dizziness, or Delayed Reaction Times   [x]A responsible person should be with you for the next 24 hours. Do not operate any vehicles (automobiles, bicycles, motorcycles) or power tools or machinery for 24 hours. Do not sign any legal documents or make any legal decisions for 24 hours. Do not drink alcohol for 24 hours or while taking narcotic pain medication. Nausea    [x]Start with light diet and progress to your normal diet as you feel like eating. However, if you experience nausea or repeated episodes of vomiting which persist beyond 12-24 hours, notify your physician. Once nausea has passed, remember to keep drinking fluids. Difficulty Passing Urine  [x]Drink extra amounts of fluid today. Notify your physician if you have not urinated within 8 hours after your procedure or you feel uncomfortable. Irritated Throat from a Breathing Tube  [x]Drink extra amounts of fluid today. Lozenges may help. Muscle Aches  [x]You may experience some generalized body aches as your muscles recover from medications used to relax them during surgery. These will gradually subside. MEDICATION INSTRUCTIONS:  [x]Prescription(S) x  Percocet    sent with you.   Use as directed. When taking pain medications, you may experience the side effect of dizziness or drowsiness. Do not drink alcohol or drive when taking these medications. []Prescription(S) x          Called to Pharmacy Name and location:    [x]Give the list of your medications to your primary care physician on your next visit. Keep your med list updated and carry it with in case of emergencies. [x] Narcotic pain medications can cause the side effect of significant constipation. You may want to add a stool softener to your postoperative medication schedule or speak to your surgeon on how best to manage this side effect. NARCOTIC SAFETY:  Your pain medicine is only for you to take. Safely store your medicines. Store pills up high and out of reach of children and pets. Ensure safety caps are snapped tightly  Keep track of how many pills you have left    Unused medication can be disposed of by taking them to a drop-off box or take-back program that is authorized by the [facility]. Access to a site near you can be found on the [facility] [employer] website ([address] [facility]. [facility]). If you have a CPAP machine, it is very important that you use it daily during all periods of sleep and daytime rest during your recovery at home. Surgery and Anesthesia place a significant amount of stress on your body. Using your CPAP will help keep you safe and lessen the negative effects of that stress. FOLLOW-UP RECOVERY CARE:  [x]Call the office of Dr Montse Gerber  for follow-up appointment and problems    Watch for these possible complications, symptoms, or side effects of anesthesia. Call physician if they or any other problems occur:  Signs of INFECTION   > Fever over 101°     > Redness, swelling, hardness or warmth at the operative site   >Foul smelling or cloudy drainage at the operative site   Unrelieved PAIN  Unrelieved NAUSEA  Blood soaked dressing.   (Some oozing may be normal)  Inability to urinate      Numb, pale, blue, cold or tingling extremity      Physician:  Mateusz    The above instructions were reviewed with patient/significant other. The following additional patient specific information was reviewed with the patient/significant other:  [x]Procedure/physician specific instructions  []Medication information sheet(S) including potential side effects  []Neenas egress test  []Pain Ball management  []FAQ Catheter associated blood stream infections  [x]FAQ Surgical Site Infections  []Other-    I have read and understand the instructions given to me: ____________________________________________   (Patient/S.O. Signature)            Date/time 2/16/2023 8:50 AM         PACU:  [phone]   M-F 700 AM - 7 PM      SAME DAY SERVICES:  [phone] M-F 7AM-6PM        If you smoke STOP. We care about your health!

## 2023-05-15 NOTE — NURSING NOTE
Chief Complaint   Patient presents with     Follow Up       Vitals were taken, medications reconciled.    Vielka Mckeon, EMT   7:56 AM

## 2023-05-15 NOTE — LETTER
5/15/2023      RE: Keerthi Montoya  [address] [place] [id number]       Dear Colleague,    Thank you for the opportunity to participate in the care of your patient, Keerthi Montoya, at the [facility] [place] [facility]. Please see a copy of my visit note below.        STRUCTURAL HEART CARE  CARDIOVASCULAR DIVISION    STRUCTURAL CLINIC RETURN VISIT    PRIMARY CARDIOLOGIST: Dr. Almendarez       PERTINENT CLINICAL HISTORY:     Keerthi Montoya is a very pleasant 73 year old female who presents for 1 week Watchman follow-up. She has a history of HLD, HTN, DM2, ulcerative pancolitis, CKD3, pulmonary HTN, HFpEF, moderate paradoxical low flow low gradient aortic stenosis, persistent atrial fibrillation with CHADS-VASc score 4 (HTN, DM, age, female) and HASBLED of 5 (HTN, CKD, age, prior bleed, meds) with intolerance to anticoagulation due to history of GI bleeding who underwent successful left atrial appendage closure with a 24mm WATCHMAN FLX device with 17% - 27% compression on May 11, 2023. Her intra procedure LAURA showed well seated RY occluder without leak or evidence of pericardial effusion. Post procedure limited TTE showed small anterior pericardial effusion. She was kept overnight for observation and limited TTE the next morning showed stable effusion. Her antithrombotic therapy was held pending repeat echo today (5/15/23).     Georgia has been feeling well. She has a dry cough which she attributes to throat irritation from intubation. She otherwise denies fever, chills, chest pain, shortness of breath, orthopnea, PND, leg swelling, weight gain, lightheadedness, palpitations or syncope. Her groin site is healing well. She continues to hold AC therapy. She had a few drops of blood on her toilet paper after having a BM yesterday. She was wondering if it was related to UTI because she also had some pain/stinging with urination at the  time; however, she has not had any further pain with urination or blood. She thinks it was related to straining to have a BM.      PAST MEDICAL HISTORY:     Past Medical History:   Diagnosis Date    Chronic kidney disease     Diabetes mellitus, type 2 (H)     Diverticulosis of colon (without mention of hemorrhage) 10/01/2012    GI bleed     History of blood transfusion     HLD (hyperlipidemia)     Hypertension     Hypothyroidism     MARIA D (iron deficiency anemia)     Persistent atrial fibrillation (H)     Pulmonary hypertension (H)     Ulcerative (chronic) enterocolitis (H)         PAST SURGICAL HISTORY:     Past Surgical History:   Procedure Laterality Date    CHOLECYSTECTOMY  3/1987    COLON SURGERY  01/2001    Left colon resection; diverticulitis    COLONOSCOPY N/A 4/24/2017    Procedure: COMBINED COLONOSCOPY, SINGLE OR MULTIPLE BIOPSY/POLYPECTOMY BY BIOPSY;;  Surgeon: Radha Salguero MD;  Location: [facility] OR    COLONOSCOPY N/A 3/10/2023    Procedure: COLONOSCOPY;  Surgeon: Preeti James MD;  Location: [facility]    COLONOSCOPY WITH CO2 INSUFFLATION N/A 4/24/2017    Procedure: COLONOSCOPY WITH CO2 INSUFFLATION;  Colonoscopy Dx rectal bleeding, BMI 25.86, Pharm [facility] .[phone], ;  Surgeon: Radha Salguero MD;  Location: [facility] OR    CV LEFT ATRIAL APPENDAGE CLOSURE N/A 5/11/2023    Procedure: Left Atrial Appendage Closure;  Surgeon: Bobby Grimes MD;  Location:  HEART CARDIAC CATH LAB    CV RIGHT HEART CATH MEASUREMENTS RECORDED N/A 3/16/2020    Procedure: CV RIGHT HEART CATH;  Surgeon: Nader Muñoz MD;  Location:  HEART CARDIAC CATH LAB    ESOPHAGOSCOPY, GASTROSCOPY, DUODENOSCOPY (EGD), COMBINED N/A 1/23/2023    Procedure: ESOPHAGOGASTRODUODENOSCOPY, WITH BIOPSY;  Surgeon: Ricki Deal MD;  Location: [facility]    ESOPHAGOSCOPY, GASTROSCOPY, DUODENOSCOPY (EGD), COMBINED N/A 3/10/2023    Procedure: Esophagoscopy, gastroscopy, duodenoscopy (EGD), combined;  Surgeon: Jacob  MD Preeti;  Location:  GI    GYN SURGERY  9/1983    tubal ligation    HERNIA REPAIR  12/2006    recurrent incisional hernia    SIGMOIDOSCOPY FLEXIBLE N/A 1/23/2023    Procedure: Sigmoidoscopy flexible;  Surgeon: Ricki Deal MD;  Location:  GI    THROAT SURGERY  12/1974    thyroidectomy        CURRENT MEDICATIONS:     Current Outpatient Medications   Medication Sig Dispense Refill    atenolol (TENORMIN) 50 MG tablet Take 1 tablet (50 mg) by mouth daily 90 tablet 3    atorvastatin (LIPITOR) 40 MG tablet Take 1 tablet (40 mg) by mouth daily 90 tablet 3    blood glucose (ACCU-CHEK FELIPE PLUS) test strip 200 strips by In Vitro route 2 times daily Use to test blood sugar 2 times daily or as directed. 200 strip 4    Cholecalciferol (VITAMIN D) 2000 units CAPS Take by mouth daily (with lunch)      Cyanocobalamin (B-12) 1000 MCG TABS Take 1,000 mcg by mouth three times a week      diphenhydrAMINE (BENADRYL) 25 MG tablet Take 25 mg by mouth every 6 hours as needed for itching or allergies      empagliflozin (JARDIANCE) 25 MG TABS tablet Take 1 tablet (25 mg) by mouth daily 90 tablet 3    glipiZIDE (GLUCOTROL XL) 10 MG 24 hr tablet Take 2 tablets (20 mg) by mouth daily (Patient taking differently: Take 15 mg by mouth every morning) 180 tablet 3    hydrALAZINE (APRESOLINE) 50 MG tablet Take 1.5 tablets (75 mg) by mouth 3 times daily 270 tablet 3    ketorolac (ACULAR) 0.5 % ophthalmic solution INSTILL 1 DROP IN AFFECTED EYE THREE TIMES DAILY AS DIRECTED. START 3 DAYS BEFORE SURGERY AND USE UNTIL GONE      levothyroxine (SYNTHROID/LEVOTHROID) 112 MCG tablet Take 1 tablet (112 mcg) by mouth daily (Patient taking differently: Take 112 mcg by mouth every morning) 90 tablet 4    losartan (COZAAR) 100 MG tablet Take 1 tablet (100 mg) by mouth daily (Patient taking differently: Take 100 mg by mouth every morning) 90 tablet 3    mesalamine (APRISO ER) 0.375 g 24 hr capsule TAKE 4 CAPSULES(1.5 GRAMS) BY MOUTH DAILY (Patient  taking differently: 1.5 g daily (with lunch)) 360 capsule 3    ofloxacin (OCUFLOX) 0.3 % ophthalmic solution INSTILL 1 DROP IN AFFECTED EYE THREE TIMES DAILY AS DIRECTED. START 3 DAYS BEFORE SURGERY AND USE UNTIL GONE      pantoprazole (PROTONIX) 40 MG EC tablet Take 1 tablet (40 mg) by mouth 2 times daily 180 tablet 3    prednisoLONE acetate (PRED FORTE) 1 % ophthalmic suspension SHAKE LIQUID AND INSTILL 1 DROP IN AFFECTED EYE THREE TIMES DAILY AS DIRECTED. START 3 DAYS BEFORE SURGERY AND. CONTINUE UNTIL ALL TAKEN      rivaroxaban ANTICOAGULANT (XARELTO) 15 MG TABS tablet Take 1 tablet (15 mg) by mouth daily DO NOT START UNTIL AFTER ECHO ON Monday 5/15 90 tablet 0    sodium bicarbonate 650 MG tablet Take 1 tablet (650 mg) by mouth daily 90 tablet 3    torsemide (DEMADEX) 10 MG tablet Take 1 tablet (10 mg) by mouth as needed (when cardiology clinic advises) 90 tablet 3        ALLERGIES:     Allergies   Allergen Reactions    Actos [Pioglitazone]      Fluid retention    Amlodipine      Leg swelling, hand swelling    Animal Dander     Doxycycline Hives    Dust Mites     Grass     Keflex [Cephalexin Hcl] Hives    Metoprolol      Swelling of lower legs and fatigue    Other [Seasonal Allergies]      pollen    Ranitidine      rash    Synthroid [Levothyroxine Sodium] Swelling     Allergic to brand name    Tetracycline Hives    Tylenol Hives    Ziac [Bisoprolol-Hydrochlorothiazide]         FAMILY HISTORY:     Family History   Problem Relation Age of Onset    Cerebrovascular Disease Mother     Cancer Father         bladder    Diverticulitis Brother     Diverticulitis Brother     Kidney Disease No family hx of     Crohn's Disease No family hx of     Ulcerative Colitis No family hx of     Stomach Cancer No family hx of     GERD No family hx of     Celiac Disease No family hx of     Anesthesia Reaction No family hx of         SOCIAL HISTORY:     Social History     Socioeconomic History    Marital status:      Spouse name:  "Fonseca; dementia;  2016    Number of children: 3   Occupational History     Employer: [employer]   Tobacco Use    Smoking status: Never    Smokeless tobacco: Never   Substance and Sexual Activity    Alcohol use: Not Currently     Alcohol/week: 2.0 - 4.0 standard drinks of alcohol     Types: 1 - 2 Cans of beer, 1 - 2 Shots of liquor per week     Comment: occasional cocktail or beer    Drug use: No    Sexual activity: Not Currently   Other Topics Concern     Service No    Blood Transfusions No    Caffeine Concern No    Occupational Exposure No    Hobby Hazards No    Sleep Concern No    Stress Concern No    Weight Concern No    Special Diet No    Back Care No    Exercise Yes     Comment: off and on    Bike Helmet No    Seat Belt Yes    Self-Exams No   Social History Narrative    Laid off her job         REVIEW OF SYSTEMS:     Constitutional: No fevers or chills  Skin: No new rash or itching  Eyes: No acute change in vision  Ears/Nose/Throat: No purulent rhinorrhea, new hearing loss, or new vertigo  Respiratory: No cough or hemoptysis  Cardiovascular: See HPI  Gastrointestinal: No change in appetite, vomiting, hematemesis or diarrhea  Genitourinary: No dysuria or hematuria  Musculoskeletal: No new back pain, neck pain or muscle pain  Neurologic: No new headaches, focal weakness or behavior changes  Psychiatric: No hallucinations, excessive alcohol consumption or illegal drug usage  Hematologic/Lymphatic/Immunologic: No bleeding, chills, fever, night sweats or weight loss  Endocrine: No new cold intolerance, heat intolerance, polyphagia, polydipsia or polyuria      PHYSICAL EXAMINATION:     /64 (BP Location: Left arm, Patient Position: Sitting, Cuff Size: Adult Regular)   Pulse 76   Ht 1.6 m (5' 2.99\")   Wt 65.1 kg (143 lb 9.6 oz)   LMP  (LMP Unknown)   SpO2 96%   BMI 25.44 kg/m      GENERAL: No acute distress.  HEENT: EOMI. Sclerae white, not injected. Nares clear. Pharynx without " erythema or exudate.   Neck: No adenopathy. No thyromegaly. No jugular venous distension.   Heart: Regular rate and rhythm. No murmur.   Lungs: Clear to auscultation. No ronchi, wheezes, rales.   Abdomen: Soft, nontender, nondistended. Bowel sounds present.  Extremities: No clubbing, cyanosis, or edema.   Neurologic: Alert and oriented to person/place/time, normal speech and affect. No focal deficits.  Skin: No petechiae, purpura or rash.     LABORATORY DATA:     LIPID RESULTS:  Lab Results   Component Value Date    CHOL 73 01/20/2023    CHOL 93 10/28/2020    HDL 29 (L) 01/20/2023    HDL 38 (L) 10/28/2020    LDL 27 01/20/2023    LDL 33 10/28/2020    TRIG 87 01/20/2023    TRIG 108 10/28/2020    CHOLHDLRATIO 4.3 05/18/2015       LIVER ENZYME RESULTS:  Lab Results   Component Value Date    AST 44 (H) 02/02/2023    AST 19 10/28/2020    ALT 11 02/02/2023    ALT 18 10/28/2020       CBC RESULTS:  Lab Results   Component Value Date    WBC 10.0 05/15/2023    WBC 7.3 10/28/2020    RBC 4.03 05/15/2023    RBC 3.99 10/28/2020    HGB 8.8 (L) 05/15/2023    HGB 11.8 02/08/2021    HCT 29.9 (L) 05/15/2023    HCT 38.2 10/28/2020    MCV 74 (L) 05/15/2023    MCV 96 10/28/2020    MCH 21.8 (L) 05/15/2023    MCH 30.8 10/28/2020    MCHC 29.4 (L) 05/15/2023    MCHC 32.2 10/28/2020    RDW 22.4 (H) 05/15/2023    RDW 13.9 10/28/2020     05/15/2023     10/28/2020       BMP RESULTS:  Lab Results   Component Value Date     (L) 05/15/2023     04/28/2021    POTASSIUM 4.0 05/15/2023    POTASSIUM 4.5 05/11/2023    POTASSIUM 4.3 04/27/2023    POTASSIUM 4.5 04/28/2021    CHLORIDE 92 (L) 05/15/2023    CHLORIDE 109 04/27/2023    CHLORIDE 100 04/28/2021    CO2 25 05/15/2023    CO2 21 04/27/2023    CO2 26 04/28/2021    ANIONGAP 14 05/15/2023    ANIONGAP 7 04/27/2023    ANIONGAP 8 04/28/2021     (H) 05/15/2023     (H) 05/11/2023     (H) 05/11/2023     (H) 04/27/2023    GLC 57 (L) 04/28/2021    BUN 30.9  (H) 05/15/2023    BUN 28 04/27/2023    BUN 23 04/28/2021    CR 2.35 (H) 05/15/2023    CR 1.21 (H) 04/28/2021    GFRESTIMATED 21 (L) 05/15/2023    GFRESTIMATED 30 (L) 12/30/2022    GFRESTIMATED 46 (L) 04/28/2021    GFRESTBLACK 53 (L) 04/28/2021    ABEL 9.7 05/15/2023    ABEL 9.8 04/28/2021        A1C RESULTS:  Lab Results   Component Value Date    A1C 11.6 (H) 04/27/2023    A1C 6.6 (H) 10/28/2020       INR RESULTS:  Lab Results   Component Value Date    INR 1.13 05/08/2023          PROCEDURES & FURTHER ASSESSMENTS:     LAURA 5/11/23:   PRE-PROCEDURAL SURVEY:  1. The LV function was 55-60% and the RV function was moderately-severely  reduced.  2. There was no evidence of shunting at the level of the interatrial septum.  3. The maximum pre-deployment left atrial appendage orifice width was 19.9 mm.  4. There was no intracardiac thrombus.  5. There was no pericardial effusion.     POST-PROCEDURAL SURVEY:  1. The 24 mm Watchman FLX device was well-seated in the left atrial appendage.  There was adequate compression of the device. There was no evidence of delroy-  device leak.  2. There was no pericardial effusion.  3. There was a small left-right shunt at the site of the transseptal puncture.  4. The biventricular function was unchanged.  ______________________________________________________________________________  Procedure  Transesophageal Echocardiogram with color and spectral Doppler performed. 3D  image acquisition, reconstruction, and real-time interpretation was performed.  Procedure location Heart Catherization Laboratory. Informed consent for  Transesophegeal echo obtained. LAURA Probe #[id number] then 66 due to significant  Doppler artifact was used during the procedure. Sedation, endotracheal  intubation, and mechanical ventilation were initiated prior to the LAURA and  were monitored by anesthesia. The Transducer was inserted without difficulty .  Complications None. The patient's rhythm is normal sinus. Good quality  two-  dimensional was performed and interpreted. Adequate quality color and spectral  Doppler were performed and interpreted.     Left Ventricle  Global and regional left ventricular function is normal with an EF of 55-60%.  Left ventricular size is normal.     Right Ventricle  Moderate right ventricular dilation is present. Global right ventricular  function is moderately to severely reduced.     Atria  Moderate to severe biatrial enlargement is present.     Mitral Valve  The mitral valve is normal. Trace mitral insufficiency is present.     Aortic Valve  The aortic valve is tricuspid. Mild aortic valve sclerosis is present. Trace  aortic insufficiency is present.     Tricuspid Valve  Mild to moderate tricuspid insufficiency is present. Pulmonary artery systolic  pressure cannot be assessed.     Pulmonic Valve  Mild pulmonic insufficiency is present.     Vessels  The aorta root is normal. The thoracic aorta is normal. The LUPV Doppler shows  normal velocity waveform . The right upper pulmonary vein cannot be assessed.  The right lower pulmonary vein cannot be assessed. The left lower pulmonary  vein cannot be assessed.     Pericardium  No pericardial effusion is present.     Compared to Previous Study  This study was compared with the study from 08/19/2022 . There has been  interval LAAO closure device placement. No significant changes in the  biventricular function.     TTE 5/11/23:   Small anterior pericardial Effusion     TTE 5/12/23:  ______________________________________________________________________________  Interpretation Summary  Small unchanged pericardial effusion is present without evidence of  pericardial tamponade.     Left ventricular function is normal.The ejection fraction is 55-60%.  Paradoxical septal motion consistent with right ventricular pressure and  volume overload is present.  Moderate to severe right ventricular dilation is present. Global right  ventricular function is moderately to  severely reduced.  IVC diameter >2.1 cm collapsing <50% with sniff suggests a high RA pressure  estimated at 15 mmHg or greater.     This study was compared with the study from 5/11/23. There has been no change.    TTE 5/15/23:  Interpretation Summary  Left ventricular size, wall motion and function are normal. The ejection  fraction is 60-65%. Flattened septum is consistent with right ventricular  pressure and volume overload.  Moderate to severe right ventricular dilation is present.  Global right ventricular function is moderately to severely reduced.  Severe aortic valve calcification is present.  Moderate tricuspid insufficiency is present.  Dilation of the inferior vena cava is present with abnormal respiratory  variation in diameter. Trivial, primarily anterior pericardial effusion is  present.     Compared to prior study the pericardial effusion is the same or slightly  improved. LV function appears slightly better.  ______________________________________________________________________________  Left Ventricle  Left ventricular size, wall motion and function are normal. The ejection  fraction is 60-65%. Flattened septum is consistent with right ventricular  pressure and volume overload.     Right Ventricle  Moderate to severe right ventricular dilation is present. Global right  ventricular function is moderately to severely reduced.     Atria  The left atrium appears normal. Severe right atrial enlargement is present.     Mitral Valve  Moderate mitral annular calcification is present. Trace mitral insufficiency  is present.     Aortic Valve  Severe aortic valve calcification is present.     Tricuspid Valve  The tricuspid valve is normal. Moderate tricuspid insufficiency is present.  The right ventricular systolic pressure is approximated at 49.1 mmHg plus the  right atrial pressure.     Pulmonic Valve  The pulmonic valve cannot be assessed.     Vessels  Dilation of the inferior vena cava is present with  abnormal respiratory  variation in diameter. IVC diameter >2.1 cm collapsing <50% with sniff  suggests a high RA pressure estimated at 15 mmHg or greater.     Pericardium  Trivial, primarily anterior pericardial effusion is present.     Compared to Previous Study  No significant changes noted.  ______________________________________________________________________________  Doppler Measurements & Calculations  Ao V2 max: 215.0 cm/sec  Ao max P.5 mmHg  Ao V2 mean: 137.7 cm/sec  Ao mean P.0 mmHg  Ao V2 VTI: 41.0 cm  TR max marcia: 350.2 cm/sec  TR max P.1 mmHg     CLINICAL IMPRESSION:     Keerthi Montoya is a very pleasant 73 year old female who presents for 1 week Watchman follow-up.     # Paroxysmal a-fib w/contraindication to anticoagulation due to GI bleeding  # Healed gastric ulcers/Ulcerative colitis (inactive)  Now s/p successful implantation of 24mm Watchman FLX. Intra-procedure LAURA showed no evidence of delroy-device leak and no pericardial effusion. Post procedure TTE with small anterior effusion. Patient kept overnight for observation. Repeat limited TTE 23 shows stable small pericardial effusion. Repeat limited TTE today shows trivial anterior effusion, maybe slightly improved.   - Start Xarelto 15 mg daily monotherapy x 45 days; repeat limited echo 1 week   - SBE prophylaxis x 6 months   - 45 days following Watchman device implantation, patient will return for CT heart to evaluate endothelialization of the device. If adequate seal is assessed (<5mm), anticoagulation will be discontinued and dual antiplatelet therapy with clopidogrel and aspirin will be instituted for a total of 6 months from implantation date, with aspirin then continuing lifelong.       # HFpEF  # Moderate PH  # Moderate paradoxical low flow low gradient aortic stenosis  Patient uses torsemide 10 mg PRN for leg swelling. Currently no significant heart failure symptoms and appears euvolemic on exam; however, ECHO  demonstrates RV pressure and volume overload w/ RA > 15 mmHg. Patient directed to increase torsemide to 10 mg daily following Watchman; however, now with KWASI on CKD.   - Resume PTA Jardiance, losartan  - Holding torsemide, repeat BMP in 1 week     # HTN  # HLD   - Continue atenolol and atorvastatin     # KWASI on CKD  # UTI symptoms   - Stable creatinine 1.55 and GFR of 37 pre procedure. Repeat today shows jump in creatinine to 2.35 and decrease in GFR to 21.  -  Patient advised to see PCP for evaluation of UTI and to hold torsemide with repeat BMP in 1 week.      # Acute on chronic anemia:  Baseline Hgb 8.8, dropped to 7.8 post procedure. Stable today at 8.8.   - Heme consult scheduled for further eval and management     # Diabetes type II   - Poorly controlled, recent Hgb A1C 11.6. Continue glipizide and Jardiance. Further diabetes management per PCP.      RTC: 45 days with CT, labs and ekg prior     Dominique WENDI Saavedra, CNP  [facility] Structural Heart Care       CC  Patient Care Team:  Bunny Meyers MD as PCP - General (Internal Medicine)  Preeti James MD as MD (Gastroenterology)  Christal Barker, VERONIQUE as Specialty Care Coordinator (Cardiology)  Stacy Stokes, RN as Specialty Care Coordinator (Cardiology)  Swati Minor DO as Assigned Nephrology Provider  Carolyn Bonilla APRN CNP as Assigned Heart and Vascular Provider  Lorena Ambriz, RN as Specialty Care Coordinator (Cardiology)  Jerry Robbins PA-C as Physician Assistant (Gastroenterology)  Zuleika Glover, RN as Specialty Care Coordinator (Cardiology)  Bunny Meyers MD as Assigned PCP  Preeti James MD as Assigned Gastroenterology Provider  Bunny [facility] as Assigned MTM Pharmacist  Allison Yang, RN as Specialty Care Coordinator (Gastroenterology)  Gianfranco Melendez MD as Intern (Student in organized health care education/training program)  Jethro Moore MD as MD (Hematology & Oncology)  Jonny Harper, RN  as Specialty Care Coordinator

## 2023-05-15 NOTE — PROGRESS NOTES
Voicemail left encouraging Georgia to head into urgent care today. She is scheduled to Dr. Mills on 5/17. We would like her to been seen sooner due to risk of systemic infection with new watchman implant.

## 2023-05-15 NOTE — PATIENT INSTRUCTIONS
You were seen today in the Structural Heart Clinic at the [facility].    Cardiology provider you saw during your visit: Dominique Saavedra NP    Instructions:   Will contact you regarding starting Xarelto after I talk with the echo MD  Have labs drawn today.   Delay any nonurgent dental procedures for the first 6 month post Watchman.  If you need a dental procedure, you will need to take antibiotics prior - please call us.  Your kidney function is worse today on labs - hold torsemide and see Dr. Meyers for evaluation of possible UTI                            Follow-up in Valve Clinic in 45 days with CT, labs and ECG prior     Prevention of Infective (Bacterial) Endocarditis:  You are at increased risk for developing adverse outcomes from infective endocarditis (IE), also known as bacterial endocarditis (BE) because of the new device in your heart. The guidelines for prevention of IE are to give patients antibiotics prior to any dental procedures that involve manipulation of gingival tissue or the periapical region of teeth, or perforation of the oral mucosa:      It is recommended to take Amoxicillin 2 gm by mouth as a single dose 30 to 60 minutes before procedure.     OR if allergic to Penicillin or Ampicillin:     Cephalexin 2 gm by mouth, or  Clindamycin 600 mg by mouth, or  Azithromycin or Clarithromycin 500 mg PO       Questions and scheduling:   First call: Structural Heart  Celia Perdomo [phone]    General scheduling line: [phone].   First press #1 for the [facility] and then press #3 for Medical Questions to reach the Cardiology triage nurse.     On Call Cardiologist for after hours or on weekends: [phone], press option #4 and ask to speak to the on-call Cardiologist.

## 2023-05-16 ENCOUNTER — OFFICE VISIT (OUTPATIENT)
Dept: URGENT CARE | Facility: URGENT CARE | Age: 73
End: 2023-05-16
Payer: MEDICARE

## 2023-05-16 ENCOUNTER — TELEPHONE (OUTPATIENT)
Dept: CARDIOLOGY | Facility: CLINIC | Age: 73
End: 2023-05-16

## 2023-05-16 VITALS
SYSTOLIC BLOOD PRESSURE: 95 MMHG | DIASTOLIC BLOOD PRESSURE: 51 MMHG | WEIGHT: 141.5 LBS | TEMPERATURE: 98.2 F | BODY MASS INDEX: 25.07 KG/M2 | RESPIRATION RATE: 18 BRPM | OXYGEN SATURATION: 100 % | HEART RATE: 81 BPM

## 2023-05-16 DIAGNOSIS — N39.0 ACUTE URINARY TRACT INFECTION: Primary | ICD-10-CM

## 2023-05-16 LAB
ALBUMIN UR-MCNC: NEGATIVE MG/DL
APPEARANCE UR: ABNORMAL
BILIRUB UR QL STRIP: NEGATIVE
CLUE CELLS: ABNORMAL
COLOR UR AUTO: YELLOW
GLUCOSE UR STRIP-MCNC: >=1000 MG/DL
HGB UR QL STRIP: NEGATIVE
KETONES UR STRIP-MCNC: NEGATIVE MG/DL
LEUKOCYTE ESTERASE UR QL STRIP: ABNORMAL
NITRATE UR QL: NEGATIVE
PH UR STRIP: 6 [PH] (ref 5–7)
RBC #/AREA URNS AUTO: ABNORMAL /HPF
SP GR UR STRIP: <=1.005 (ref 1–1.03)
SQUAMOUS #/AREA URNS AUTO: ABNORMAL /LPF
TRICHOMONAS, WET PREP: ABNORMAL
UROBILINOGEN UR STRIP-ACNC: 1 E.U./DL
WBC #/AREA URNS AUTO: ABNORMAL /HPF
WBC'S/HIGH POWER FIELD, WET PREP: ABNORMAL
YEAST, WET PREP: ABNORMAL

## 2023-05-16 PROCEDURE — 99214 OFFICE O/P EST MOD 30 MIN: CPT

## 2023-05-16 PROCEDURE — 87210 SMEAR WET MOUNT SALINE/INK: CPT

## 2023-05-16 PROCEDURE — 81001 URINALYSIS AUTO W/SCOPE: CPT

## 2023-05-16 RX ORDER — NITROFURANTOIN 25; 75 MG/1; MG/1
100 CAPSULE ORAL 2 TIMES DAILY
Qty: 10 CAPSULE | Refills: 0 | Status: SHIPPED | OUTPATIENT
Start: 2023-05-16 | End: 2023-05-21

## 2023-05-16 NOTE — PATIENT INSTRUCTIONS
Diagnosis: UTI (urinary tract infection )    Plan:   Lab results today were positive for urinary tract infection.   Urine culture will becompleted and you  will only receive a phone call if antibiotic treatment needs to be adjusted  Start antibiotics today, take full course   Monitor GI distress  Consider a probiotic, kefir, or yogurt with live active cultures to replace good bacteria in the gastrointestinal tract.  Increase fluids   Can try Aso or phridum to help reduce pain   Pyridium will turn your urine orange and may stain your clothing.  Other   Avoid items that can irritate the bladder: caffeine, alcohol, spicy food, nicotine, carbonated drinks, and artificial sweeteners  Empty bladder frequently even if small amounts, helps to remove bacteria        Monitor for:   New Fevers  Symptoms are not getting better   Pain in the belly (abdomen) area below the bellybutton,  Low back back pain, on the sides, below the ribs- flanks   Nausea or vomiting  Unable to control bladder   Feeling confused, lightheaded or dizzy         UTI urinary tract infection, bladder infection   A bladder infection, also called cystitis, or urinary tract infection   Is where the inner lining of the bladder becomes inflamed (red and swollen) and the urine is full of bacteria.   It happens when bacteria travel up the urethra and into the bladder, usually from stool contact   Women are more likely to have bladder infections than men because their urethra is shorter.   The short urethra makes it easier for bacteria from the anus or genital area to reach the bladder.   This can happen during such activities as wiping or sexual intercourse. Most infections of the urinary tract are caused this way.   Bladder infections often occur in young women who have just become sexually active and have sexual intercourse often.   Symptoms: a frequent and urgent need to urinate, a burning, stinging, or pressure sensation during urination   a crampy pain or  discomfort in the lower abdomen just above the pubic bone or sometimes in the lower back   a need to urinate more often in the night, cloudy urine that smells bad, blood in the urine, leaking of urine, fever and occasionally chills.   Prevent  To help prevent a bladder infection from recurring:  -urinate often during the day, and empty your bladder completely each time.   In addition women should follow these guidelines:   - Keep the vaginal area clean.   - Wipe from front to back after a bowel movement.   - Be sure to wash the genital area each time you bathe or shower.   - However, use soap only on the outside of your vagina.   - The chemicals in soap may cause additional irritation.   - Urinate after intercourse. Never combine anal and vaginal intercourse.   - Wear cotton underwear, which allows better air circulation than nylon.    - Avoid tight clothes in the genital area, such as control-top pantyhose and tight jeans.   - Do not wear a wet bathing suit for long periods of time.   menopause}  If you have stopped having your periods because of menopause and are not taking estrogen, you may need to use a vaginal cream. Sometimes this cream helps prevent bladder infections after menopause.

## 2023-05-16 NOTE — PROGRESS NOTES
URGENT CARE  Assessment & Plan   Assessment:   Keerthi Montoya is a 73 year old female who's clinical presentation today is consistent with:   1. Acute urinary tract infection  - UA Macroscopic with reflex to Microscopic and Culture  - Wet prep - Clinic Collect  - nitroFURantoin macrocrystal-monohydrate (MACROBID) 100 MG capsule;    No alarm signs or symptoms present   Differential Diagnoses for this patient's CC include   Overactive bladder, urethritis, interstitial cystitis, urethral irritation,    Pyelonephritis, nephrolithiasis  Pelvic organ (uterine/bladder) prolapse, Foreign body in bladder,   Atypical etiology of cystitis: fungal, viral   Bacterial vaginosis, candidiasis, Vulvovaginitis, atrophic vaginitis,   contact vs allergic vaginitis  STD: gonorrhea, chlamydia, trichomoniasis; PID   pregnancy, Vaginitis (inflammatory, irritative), cervicitis, lichen planus,   Malignancy (vaginal, ovarian, cervical)    Plan:  Will treat patient's acute uncomplicated cystitis, with antibiotics at this time, culture pending and will call if a different antibiotic is needed   Educated patient that for symptomatic relief she my use Azo (Phenazopyridine) as needed, side effects of medications reviewed.   additionally encouraged patient to increase fluid intake, practice good hygiene (wiping front to back, voiding after sexual intercourse), and avoidance of deodorant sprays.   Additionally we discussed if symptoms do not improve after starting today's treatment (or if symptoms worsen) to follow up in 5-7 days.    Patient  is agreeable to treatment plan and state they will follow-up if symptoms do not improve and/or if symptoms worsen (see patient's AVS 'monitor for' section for specific patient instructions given and discussed regarding what to watch for and when to follow up)    Medications ordered are listed above, please see AVS for patient's specific and personalized discharge instructions given     WENDI Daley CNP  [facility]      ______________________________________________________________________        Subjective  Subjective     HPI: Keerthi Montoya  is a 73 year old  female who presents today for evaluation the following concerns:   The patient presents today complaining of dysuria and burning for 3 days which started on 5/13/23   patient pelvic pain, and sensation of incomplete bladder emptying}    Patient endorses having a past history of one previous urinary tract infection  Patient denies back pain/flank pain, fever, chills, or any abnormal vaginal discharge/odor or bleeding. Patient also notes some blood when she wipes        Allergies   Allergen Reactions     Actos [Pioglitazone]      Fluid retention     Amlodipine      Leg swelling, hand swelling     Animal Dander      Doxycycline Hives     Dust Mites      Grass      Keflex [Cephalexin Hcl] Hives     Metoprolol      Swelling of lower legs and fatigue     Other [Seasonal Allergies]      pollen     Ranitidine      rash     Synthroid [Levothyroxine Sodium] Swelling     Allergic to brand name     Tetracycline Hives     Tylenol Hives     Ziac [Bisoprolol-Hydrochlorothiazide]      Patient Active Problem List   Diagnosis     Diverticulosis of large intestine     Vitamin D deficiency     Preventive measure     Hyperlipidemia LDL goal <100     Aspirin not indicated     Abdominal pain, left lower quadrant     Rectal bleeding     Colitis     Postoperative hypothyroidism     Hypertension goal BP (blood pressure) < 130/80     Type 2 diabetes mellitus with microalbuminuria, without long-term current use of insulin (H)     Cervical cancer screening     Ulcerative pancolitis without complication (H)     Vitamin B12 deficiency (non anemic)     Proteinuria, unspecified type     CKD (chronic kidney disease) stage 3, GFR 30-59 ml/min (H)     Aortic stenosis     Pulmonary hypertension (H)     Diastolic dysfunction     Heart failure with preserved  ejection fraction, NYHA class I (H)     Anemia, unspecified type     Elevated serum immunoglobulin free light chains     Posterior vitreous detachment, left     Anemia due to blood loss, acute     Gastrointestinal hemorrhage, unspecified gastrointestinal hemorrhage type     Anemia, iron deficiency     Persistent atrial fibrillation (H)       Review of Systems:  Pertinent review of systems as reflected in HPI, otherwise negative.     Objective  Objective    Physical Exam:  Vitals:    05/16/23 1427   BP: 95/51   BP Location: Left arm   Patient Position: Sitting   Cuff Size: Adult Regular   Pulse: 81   Resp: 18   Temp: 98.2  F (36.8  C)   TempSrc: Oral   SpO2: 100%   Weight: 64.2 kg (141 lb 8 oz)      General: Alert and oriented, no acute distress, afebrile, normotensive  Psy/mental status: Nonanxious, cooperative  SKIN: Intact, no open areas  ABDOMEN:  soft, non-tender, non-distended    No flank pain or CVA tenderness to palpation bilaterally  Pelvic/ :   Deferred per patient       LABS:   Results for orders placed or performed in visit on 05/16/23   Wet prep - Clinic Collect     Status: Abnormal    Specimen: Vagina; Swab   Result Value Ref Range    Trichomonas Absent Absent    Yeast Absent Absent    Clue Cells Absent Absent    WBCs/high power field 4+ (A) None           I explained my diagnostic considerations and recommendations to the patient, who voiced understanding and agreement with the treatment plan.   All questions were answered.   We discussed potential side effects, risks and benefits of any prescribed or recommended therapies, as well as expectations for response to treatments.  Please see AVS for any patient instructions & handouts given.   Patient was advised to contact the Nurse Care Line, their Primary Care provider, Urgent Care, or the Emergency Department if there are new or worsening symptoms, or call 911 for  emergencies.        ______________________________________________________________________          Patient Instructions   Diagnosis: UTI (urinary tract infection )    Plan:   1. Lab results today were positive for urinary tract infection.     Urine culture will becompleted and you  will only receive a phone call if antibiotic treatment needs to be adjusted  2. Start antibiotics today, take full course     Monitor GI distress    Consider a probiotic, kefir, or yogurt with live active cultures to replace good bacteria in the gastrointestinal tract.  3. Increase fluids   4. Can try Aso or phridum to help reduce pain     Pyridium will turn your urine orange and may stain your clothing.  5. Other     Avoid items that can irritate the bladder: caffeine, alcohol, spicy food, nicotine, carbonated drinks, and artificial sweeteners    Empty bladder frequently even if small amounts, helps to remove bacteria        Monitor for:     New Fevers    Symptoms are not getting better     Pain in the belly (abdomen) area below the bellybutton,    Low back back pain, on the sides, below the ribs- flanks     Nausea or vomiting    Unable to control bladder     Feeling confused, lightheaded or dizzy         UTI urinary tract infection, bladder infection   A bladder infection, also called cystitis, or urinary tract infection   Is where the inner lining of the bladder becomes inflamed (red and swollen) and the urine is full of bacteria.   It happens when bacteria travel up the urethra and into the bladder, usually from stool contact   Women are more likely to have bladder infections than men because their urethra is shorter.   The short urethra makes it easier for bacteria from the anus or genital area to reach the bladder.   This can happen during such activities as wiping or sexual intercourse. Most infections of the urinary tract are caused this way.   Bladder infections often occur in young women who have just become sexually active and  have sexual intercourse often.   Symptoms: a frequent and urgent need to urinate, a burning, stinging, or pressure sensation during urination   a crampy pain or discomfort in the lower abdomen just above the pubic bone or sometimes in the lower back   a need to urinate more often in the night, cloudy urine that smells bad, blood in the urine, leaking of urine, fever and occasionally chills.   Prevent  To help prevent a bladder infection from recurring:  -urinate often during the day, and empty your bladder completely each time.   In addition women should follow these guidelines:   - Keep the vaginal area clean.   - Wipe from front to back after a bowel movement.   - Be sure to wash the genital area each time you bathe or shower.   - However, use soap only on the outside of your vagina.   - The chemicals in soap may cause additional irritation.   - Urinate after intercourse. Never combine anal and vaginal intercourse.   - Wear cotton underwear, which allows better air circulation than nylon.    - Avoid tight clothes in the genital area, such as control-top pantyhose and tight jeans.   - Do not wear a wet bathing suit for long periods of time.   menopause}  If you have stopped having your periods because of menopause and are not taking estrogen, you may need to use a vaginal cream. Sometimes this cream helps prevent bladder infections after menopause.

## 2023-05-16 NOTE — TELEPHONE ENCOUNTER
[facility] Call Center    Phone Message    May a detailed message be left on voicemail: yes     Reason for Call: Other:     Py is requesting a call back to schedule urgent appointment due to possible infection.  See notes from 5/15/23    Action Taken: Other: cardio    Travel Screening: Not Applicable     Thank you!  [facility]

## 2023-05-17 ENCOUNTER — OFFICE VISIT (OUTPATIENT)
Dept: INTERNAL MEDICINE | Facility: CLINIC | Age: 73
End: 2023-05-17
Payer: MEDICARE

## 2023-05-17 VITALS
BODY MASS INDEX: 25.34 KG/M2 | SYSTOLIC BLOOD PRESSURE: 109 MMHG | WEIGHT: 143 LBS | OXYGEN SATURATION: 99 % | HEIGHT: 63 IN | HEART RATE: 80 BPM | DIASTOLIC BLOOD PRESSURE: 66 MMHG

## 2023-05-17 DIAGNOSIS — D64.9 ANEMIA, UNSPECIFIED TYPE: ICD-10-CM

## 2023-05-17 DIAGNOSIS — R73.09 INCREASED GLUCOSE LEVEL: Primary | ICD-10-CM

## 2023-05-17 PROCEDURE — 99214 OFFICE O/P EST MOD 30 MIN: CPT | Mod: GC | Performed by: INTERNAL MEDICINE

## 2023-05-17 NOTE — NURSING NOTE
"Keerthi Montoya is a 73 year old female patient that presents today in clinic for the following:    Chief Complaint   Patient presents with     UTI     Pt has vaginal/UTI concerns. Pt visited urgent care last night and received antibiotics, she was told to follow up with primary care.     The patient's allergies and medications were reviewed as noted. A set of vitals were recorded as noted without incident: /66 (BP Location: Right arm, Patient Position: Sitting, Cuff Size: Adult Regular)   Pulse 80   Ht 1.6 m (5' 2.99\")   Wt 64.9 kg (143 lb)   LMP  (LMP Unknown)   SpO2 99%   BMI 25.34 kg/m  . The patient does not have any other questions for the provider.    Sohan Rios, EMT 2:04 PM on 5/17/2023   "

## 2023-05-17 NOTE — PROGRESS NOTES
"CC: UTI concerns      HPI:  73F with PMH of DMII, UC, afib, CHF presents for follow up of UTI. She went to urgent care yesterday and was diaganosed with a UTI and given rx of macrobid. No f/c, no new back pain (has some back pain she says from her watchman procedure). Already feeling like her symptoms are improved. No urine culture taken it appears, 5-10 wbcs in urine.    On xarelto after watchman. To see cardiology soon. Just had labs drawn, hb stable, will have rececked next week. No bleding she is aware of, no dizziness/syncope, CP, palpitations. Feeling well.     Has restart glipizide and jardiance since meeting with [facility]. Needs to follow up with him as well. Tolerating these meds ok.    PMH: I have personally reviewed the patient's medical history, medications, and allergies.      /66 (BP Location: Right arm, Patient Position: Sitting, Cuff Size: Adult Regular)   Pulse 80   Ht 1.6 m (5' 2.99\")   Wt 64.9 kg (143 lb)   LMP  (LMP Unknown)   SpO2 99%   BMI 25.34 kg/m    Physical Examination:    General:  Alert, NAD  HEENT: NC/AT.   Lungs:  CTAB.   C/V:  RRR with systolic murmur  Abdomen:  soft, ND, NT. No CVAT.  Neuro: Alert and conversant, face symmetric. No gross neurologic deficits.  Skin:   warm, dry, no rashes on exposed skin surfaces  Extremities:  No peripheral edema  Psych:  Alert and oriented. Appropriate affect.        A&P:  KWASI on CKD  Cr previously around 1.5-1.6, was up to 2.3 on recheck 2 days ago. torsemide held by cardiology.  -recheck BMP next week    Afib, inability to tolerate AC d/t GI bleed  S/p Watchman  H/o blood loss anemia  -on atenolol  -following with cardiology, to have 45d of xarelto and repeat LAURA in 45 days  -check CBC in one week to ensure Hb stable on xarelto    Possible UTI  Symptomatic though UA not too impressive at  yesterday. Urine cx ordered but not drawn. Finishing 5d course of macrobid, symptoms improving.    DMII, uncontrolled  -last a1c 11.6 on " 4/27/23  -glipizide and jardiance restarted after last check  -recheck a1c in 2 months with follow up with [facility]    RTC: 7/14 with a1c. CBC and BMP next week.    Loretta Ospina MD  IM Resident PGY-3    This patient was discussed with Dr. Meyers.    Staff note;     I personally reviewed Ms. Montoya's recent medical history (she has a L atrial appendage device placed for persistent atrial fibrillation in the setting of GI bleeding).  Interviewed her and conducted a physical examination. I agree with Dr. Ospina' above assessment and plan    CAROLINE Meyers MD

## 2023-05-25 ENCOUNTER — LAB (OUTPATIENT)
Dept: LAB | Facility: CLINIC | Age: 73
End: 2023-05-25
Payer: MEDICARE

## 2023-05-25 ENCOUNTER — ANCILLARY PROCEDURE (OUTPATIENT)
Dept: CARDIOLOGY | Facility: CLINIC | Age: 73
End: 2023-05-25
Attending: NURSE PRACTITIONER
Payer: MEDICARE

## 2023-05-25 DIAGNOSIS — Z95.818 PRESENCE OF WATCHMAN LEFT ATRIAL APPENDAGE CLOSURE DEVICE: ICD-10-CM

## 2023-05-25 DIAGNOSIS — I31.39 PERICARDIAL EFFUSION: ICD-10-CM

## 2023-05-25 DIAGNOSIS — N17.9 ACUTE RENAL FAILURE SUPERIMPOSED ON CHRONIC KIDNEY DISEASE, UNSPECIFIED CKD STAGE, UNSPECIFIED ACUTE RENAL FAILURE TYPE: ICD-10-CM

## 2023-05-25 DIAGNOSIS — N18.9 ACUTE RENAL FAILURE SUPERIMPOSED ON CHRONIC KIDNEY DISEASE, UNSPECIFIED CKD STAGE, UNSPECIFIED ACUTE RENAL FAILURE TYPE: ICD-10-CM

## 2023-05-25 DIAGNOSIS — N39.0 ACUTE URINARY TRACT INFECTION: ICD-10-CM

## 2023-05-25 DIAGNOSIS — I48.19 PERSISTENT ATRIAL FIBRILLATION (H): ICD-10-CM

## 2023-05-25 DIAGNOSIS — D64.9 ANEMIA, UNSPECIFIED TYPE: ICD-10-CM

## 2023-05-25 DIAGNOSIS — R73.09 INCREASED GLUCOSE LEVEL: ICD-10-CM

## 2023-05-25 DIAGNOSIS — I50.32 CHRONIC DIASTOLIC HEART FAILURE (H): ICD-10-CM

## 2023-05-25 LAB
ANION GAP SERPL CALCULATED.3IONS-SCNC: 12 MMOL/L (ref 7–15)
BUN SERPL-MCNC: 21.5 MG/DL (ref 8–23)
CALCIUM SERPL-MCNC: 9.8 MG/DL (ref 8.8–10.2)
CHLORIDE SERPL-SCNC: 101 MMOL/L (ref 98–107)
CREAT SERPL-MCNC: 1.54 MG/DL (ref 0.51–0.95)
DEPRECATED HCO3 PLAS-SCNC: 22 MMOL/L (ref 22–29)
ERYTHROCYTE [DISTWIDTH] IN BLOOD BY AUTOMATED COUNT: 23.6 % (ref 10–15)
GFR SERPL CREATININE-BSD FRML MDRD: 36 ML/MIN/1.73M2
GLUCOSE SERPL-MCNC: 200 MG/DL (ref 70–99)
HCT VFR BLD AUTO: 30.5 % (ref 35–47)
HGB BLD-MCNC: 8.9 G/DL (ref 11.7–15.7)
LVEF ECHO: NORMAL
MCH RBC QN AUTO: 22 PG (ref 26.5–33)
MCHC RBC AUTO-ENTMCNC: 29.2 G/DL (ref 31.5–36.5)
MCV RBC AUTO: 76 FL (ref 78–100)
PLATELET # BLD AUTO: 368 10E3/UL (ref 150–450)
POTASSIUM SERPL-SCNC: 5 MMOL/L (ref 3.4–5.3)
RBC # BLD AUTO: 4.04 10E6/UL (ref 3.8–5.2)
SODIUM SERPL-SCNC: 135 MMOL/L (ref 136–145)
WBC # BLD AUTO: 9.3 10E3/UL (ref 4–11)

## 2023-05-25 PROCEDURE — 93321 DOPPLER ECHO F-UP/LMTD STD: CPT | Performed by: INTERNAL MEDICINE

## 2023-05-25 PROCEDURE — 93308 TTE F-UP OR LMTD: CPT | Performed by: INTERNAL MEDICINE

## 2023-05-25 PROCEDURE — 36415 COLL VENOUS BLD VENIPUNCTURE: CPT | Performed by: PATHOLOGY

## 2023-05-25 PROCEDURE — 85027 COMPLETE CBC AUTOMATED: CPT | Performed by: PATHOLOGY

## 2023-05-25 PROCEDURE — 87086 URINE CULTURE/COLONY COUNT: CPT

## 2023-05-25 PROCEDURE — 80048 BASIC METABOLIC PNL TOTAL CA: CPT | Performed by: PATHOLOGY

## 2023-05-25 PROCEDURE — 93325 DOPPLER ECHO COLOR FLOW MAPG: CPT | Performed by: INTERNAL MEDICINE

## 2023-05-26 LAB — BACTERIA UR CULT: NORMAL

## 2023-06-15 ENCOUNTER — PRE VISIT (OUTPATIENT)
Dept: ONCOLOGY | Facility: CLINIC | Age: 73
End: 2023-06-15

## 2023-06-15 ENCOUNTER — ONCOLOGY VISIT (OUTPATIENT)
Dept: ONCOLOGY | Facility: CLINIC | Age: 73
End: 2023-06-15
Attending: INTERNAL MEDICINE
Payer: MEDICARE

## 2023-06-15 VITALS
WEIGHT: 143 LBS | HEIGHT: 63 IN | OXYGEN SATURATION: 100 % | BODY MASS INDEX: 25.34 KG/M2 | DIASTOLIC BLOOD PRESSURE: 68 MMHG | SYSTOLIC BLOOD PRESSURE: 111 MMHG | HEART RATE: 103 BPM

## 2023-06-15 DIAGNOSIS — K92.2 GASTROINTESTINAL HEMORRHAGE, UNSPECIFIED GASTROINTESTINAL HEMORRHAGE TYPE: ICD-10-CM

## 2023-06-15 DIAGNOSIS — K51.00 ULCERATIVE PANCOLITIS WITHOUT COMPLICATION (H): ICD-10-CM

## 2023-06-15 DIAGNOSIS — D50.9 IRON DEFICIENCY ANEMIA, UNSPECIFIED IRON DEFICIENCY ANEMIA TYPE: ICD-10-CM

## 2023-06-15 DIAGNOSIS — R80.9 TYPE 2 DIABETES MELLITUS WITH MICROALBUMINURIA, WITHOUT LONG-TERM CURRENT USE OF INSULIN (H): ICD-10-CM

## 2023-06-15 DIAGNOSIS — E53.8 VITAMIN B12 DEFICIENCY (NON ANEMIC): ICD-10-CM

## 2023-06-15 DIAGNOSIS — R30.0 DYSURIA: Primary | ICD-10-CM

## 2023-06-15 DIAGNOSIS — E11.29 TYPE 2 DIABETES MELLITUS WITH MICROALBUMINURIA, WITHOUT LONG-TERM CURRENT USE OF INSULIN (H): ICD-10-CM

## 2023-06-15 LAB
ALBUMIN UR-MCNC: NEGATIVE MG/DL
APPEARANCE UR: CLEAR
BACTERIA #/AREA URNS HPF: ABNORMAL /HPF
BILIRUB UR QL STRIP: NEGATIVE
COLOR UR AUTO: ABNORMAL
GLUCOSE UR STRIP-MCNC: >1000 MG/DL
HGB UR QL STRIP: NEGATIVE
KETONES UR STRIP-MCNC: NEGATIVE MG/DL
LEUKOCYTE ESTERASE UR QL STRIP: ABNORMAL
NITRATE UR QL: POSITIVE
PH UR STRIP: 7 [PH] (ref 5–7)
RBC #/AREA URNS AUTO: ABNORMAL /HPF
SP GR UR STRIP: 1.02 (ref 1–1.03)
UROBILINOGEN UR STRIP-MCNC: NORMAL MG/DL
WBC #/AREA URNS AUTO: ABNORMAL /HPF
WBC CLUMPS #/AREA URNS HPF: PRESENT /HPF

## 2023-06-15 PROCEDURE — 81001 URINALYSIS AUTO W/SCOPE: CPT | Performed by: INTERNAL MEDICINE

## 2023-06-15 PROCEDURE — 87186 SC STD MICRODIL/AGAR DIL: CPT | Performed by: INTERNAL MEDICINE

## 2023-06-15 PROCEDURE — 99215 OFFICE O/P EST HI 40 MIN: CPT | Performed by: INTERNAL MEDICINE

## 2023-06-15 PROCEDURE — 82570 ASSAY OF URINE CREATININE: CPT | Performed by: INTERNAL MEDICINE

## 2023-06-15 PROCEDURE — 82043 UR ALBUMIN QUANTITATIVE: CPT | Performed by: INTERNAL MEDICINE

## 2023-06-15 PROCEDURE — 87086 URINE CULTURE/COLONY COUNT: CPT | Performed by: INTERNAL MEDICINE

## 2023-06-15 RX ORDER — ALBUTEROL SULFATE 0.83 MG/ML
2.5 SOLUTION RESPIRATORY (INHALATION)
Status: CANCELLED | OUTPATIENT
Start: 2023-06-15

## 2023-06-15 RX ORDER — HEPARIN SODIUM,PORCINE 10 UNIT/ML
5-20 VIAL (ML) INTRAVENOUS DAILY PRN
Status: CANCELLED | OUTPATIENT
Start: 2023-06-15

## 2023-06-15 RX ORDER — MEPERIDINE HYDROCHLORIDE 25 MG/ML
25 INJECTION INTRAMUSCULAR; INTRAVENOUS; SUBCUTANEOUS EVERY 30 MIN PRN
Status: CANCELLED | OUTPATIENT
Start: 2023-06-15

## 2023-06-15 RX ORDER — DIPHENHYDRAMINE HYDROCHLORIDE 50 MG/ML
50 INJECTION INTRAMUSCULAR; INTRAVENOUS
Status: CANCELLED
Start: 2023-06-15

## 2023-06-15 RX ORDER — HEPARIN SODIUM (PORCINE) LOCK FLUSH IV SOLN 100 UNIT/ML 100 UNIT/ML
5 SOLUTION INTRAVENOUS
Status: CANCELLED | OUTPATIENT
Start: 2023-06-15

## 2023-06-15 RX ORDER — METHYLPREDNISOLONE SODIUM SUCCINATE 125 MG/2ML
125 INJECTION, POWDER, LYOPHILIZED, FOR SOLUTION INTRAMUSCULAR; INTRAVENOUS
Status: CANCELLED
Start: 2023-06-15

## 2023-06-15 RX ORDER — DISINFECTANT WIPES 0.21 1/701
500 CLOTH EXTRACORPOREAL 2 TIMES DAILY
Qty: 14 TABLET | Refills: 0 | Status: SHIPPED | OUTPATIENT
Start: 2023-06-15 | End: 2023-06-27

## 2023-06-15 RX ORDER — EPINEPHRINE 1 MG/ML
0.3 INJECTION, SOLUTION INTRAMUSCULAR; SUBCUTANEOUS EVERY 5 MIN PRN
Status: CANCELLED | OUTPATIENT
Start: 2023-06-15

## 2023-06-15 RX ORDER — ALBUTEROL SULFATE 90 UG/1
1-2 AEROSOL, METERED RESPIRATORY (INHALATION)
Status: CANCELLED
Start: 2023-06-15

## 2023-06-15 ASSESSMENT — PAIN SCALES - GENERAL: PAINLEVEL: NO PAIN (0)

## 2023-06-15 NOTE — Clinical Note
6/15/2023         RE: Keerthi Montoya  [address] [place] [id number]        Dear Colleague,    Thank you for referring your patient, Keerthi Montoya, to the [facility]. Please see a copy of my visit note below.    [facility]    NEW PATIENT VISIT NOTE    PATIENT NAME: Keerthi Montoya MRN # [id number]  DATE OF VISIT: Rufina 15, 2023 YOB: 1950    REFERRING PROVIDER: Bunny Meyers MD  [address] [place] [address] [address] [place] [address]    CANCER TYPE:  STAGE:      TREATMENT SUMMARY:    CURRENT INTERVENTIONS:     HISTORY OF PRESENT ILLNESS     Georgia is alone at this visit. She notes that she has low hemoglobin. She had Watchman procedure on May 11th. Blood thinners make her bleed as she has diverticulitis and ulcerative colitis.     She has atrial fibrillation and was started on anticoagulation with rivaroxaban. She had marked GI bleeding. She is hopeful that after the Watchman procedure she would not need anticoagulation. She has tried oral iron but it does not work for her.     She had colonoscopy and upper GI endoscopy along with CT scan and MRI around [place] of last year.     She has dysuria.     She denies chest pain or shortness of breath. The smoke in air is bothering her otherwise she is feeling quite well.     She has hypertension, diabetes mellitus type II. She is working with pharmacy at [facility] for diabetes mellitus type II management.      PAST MEDICAL HISTORY     Past Medical History:   Diagnosis Date     Chronic kidney disease      Diabetes mellitus, type 2 (H)      Diverticulosis of colon (without mention of hemorrhage) 10/01/2012     GI bleed      History of blood transfusion      HLD (hyperlipidemia)      Hypertension      Hypothyroidism      MARIA D (iron deficiency anemia)      Persistent atrial fibrillation (H)      Pulmonary hypertension (H)      Ulcerative (chronic) enterocolitis (H)            CURRENT OUTPATIENT MEDICATIONS     Current Outpatient Medications   Medication Sig     atenolol (TENORMIN) 50 MG tablet Take 1 tablet (50 mg) by mouth daily     atorvastatin (LIPITOR) 40 MG tablet Take 1 tablet (40 mg) by mouth daily     blood glucose (ACCU-CHEK FELIPE PLUS) test strip 200 strips by In Vitro route 2 times daily Use to test blood sugar 2 times daily or as directed.     Cholecalciferol (VITAMIN D) 2000 units CAPS Take by mouth daily (with lunch)     Cyanocobalamin (B-12) 1000 MCG TABS Take 1,000 mcg by mouth three times a week     diphenhydrAMINE (BENADRYL) 25 MG tablet Take 25 mg by mouth every 6 hours as needed for itching or allergies     empagliflozin (JARDIANCE) 25 MG TABS tablet Take 1 tablet (25 mg) by mouth daily     glipiZIDE (GLUCOTROL XL) 10 MG 24 hr tablet Take 2 tablets (20 mg) by mouth daily (Patient taking differently: Take 15 mg by mouth every morning)     hydrALAZINE (APRESOLINE) 50 MG tablet Take 1.5 tablets (75 mg) by mouth 3 times daily     ketorolac (ACULAR) 0.5 % ophthalmic solution INSTILL 1 DROP IN AFFECTED EYE THREE TIMES DAILY AS DIRECTED. START 3 DAYS BEFORE SURGERY AND USE UNTIL GONE     levothyroxine (SYNTHROID/LEVOTHROID) 112 MCG tablet Take 1 tablet (112 mcg) by mouth daily (Patient taking differently: Take 112 mcg by mouth every morning)     losartan (COZAAR) 100 MG tablet Take 1 tablet (100 mg) by mouth daily (Patient taking differently: Take 100 mg by mouth every morning)     mesalamine (APRISO ER) 0.375 g 24 hr capsule TAKE 4 CAPSULES(1.5 GRAMS) BY MOUTH DAILY (Patient taking differently: 1.5 g daily (with lunch))     ofloxacin (OCUFLOX) 0.3 % ophthalmic solution INSTILL 1 DROP IN AFFECTED EYE THREE TIMES DAILY AS DIRECTED. START 3 DAYS BEFORE SURGERY AND USE UNTIL GONE     pantoprazole (PROTONIX) 40 MG EC tablet Take 1 tablet (40 mg) by mouth 2 times daily     prednisoLONE acetate (PRED FORTE) 1 % ophthalmic suspension SHAKE LIQUID AND INSTILL 1 DROP IN  "AFFECTED EYE THREE TIMES DAILY AS DIRECTED. START 3 DAYS BEFORE SURGERY AND. CONTINUE UNTIL ALL TAKEN     rivaroxaban ANTICOAGULANT (XARELTO) 15 MG TABS tablet Take 1 tablet (15 mg) by mouth daily DO NOT START UNTIL AFTER ECHO ON Monday 5/15     sodium bicarbonate 650 MG tablet Take 1 tablet (650 mg) by mouth daily     No current facility-administered medications for this visit.        ALLERGIES      Allergies   Allergen Reactions     Actos [Pioglitazone]      Fluid retention     Amlodipine      Leg swelling, hand swelling     Animal Dander      Doxycycline Hives     Dust Mites      Grass      Keflex [Cephalexin Hcl] Hives     Metoprolol      Swelling of lower legs and fatigue     Other [Seasonal Allergies]      pollen     Ranitidine      rash     Synthroid [Levothyroxine Sodium] Swelling     Allergic to brand name     Tetracycline Hives     Tylenol Hives     Ziac [Bisoprolol-Hydrochlorothiazide]         SOCIAL HISTORY   She is  and lives alone. 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 She used to be .     She does not smoke and is never smoker. She drinks rarely and denies recreational drug use.      FAMILY HISTORY   Heart problems   Grandfather had bad heart;  of heart failure   Mother had aortic aneurysm  Bladder cancer - worked with [employer]  All 6 siblings have diabetes mellitus type II   Diverticulitis in 2 of her brothers     REVIEW OF SYSTEMS   As above in the HPI, o/w complete 12-point ROS was negative.     PHYSICAL EXAM   /68 (BP Location: Right arm, Patient Position: Chair, Cuff Size: Adult Regular)   Pulse 103   Ht 1.6 m (5' 2.99\")   Wt 64.9 kg (143 lb)   LMP  (LMP Unknown)   SpO2 100%   BMI 25.34 kg/m     Wt Readings from Last 3 Encounters:   23 64.9 kg (143 lb)   23 64.2 kg (141 lb 8 oz)   05/15/23 65.1 kg (143 lb 9.6 oz)     GEN: NAD  HEENT: PERRL, EOMI, no icterus, injection or pallor. Oropharynx is clear.  NECK: no cervical or " supraclavicular lymphadenopathy  LUNGS: clear bilaterally  CV: regular, no murmurs, rubs, or gallops  ABDOMEN: soft, non-tender, non-distended, normal bowel sounds, no hepatosplenomegaly by percussion or palpation  EXT: warm, well perfused, no edema  NEURO: alert  SKIN: no rashes     LABORATORY AND IMAGING STUDIES     Recent Labs   Lab Test 05/25/23  1106 05/15/23  0855 05/12/23  0623 05/11/23  1431 05/11/23  1209 05/11/23  1143 05/11/23  0920 05/08/23  1451 04/27/23  0715   * 131* 134*  --  132*  --   --  136 137   POTASSIUM 5.0 4.0 4.6  --  4.5  --  5.6* 5.0 4.3   CHLORIDE 101 92* 103  --   --   --   --  102 109   CO2 22 25 19*  --   --   --   --  23 21   ANIONGAP 12 14 12  --   --   --   --  11 7   BUN 21.5 30.9* 20.2  --   --   --   --  19.3 28   CR 1.54* 2.35* 1.55*  --   --   --   --  1.51* 1.66*   * 261* 193* 108* 119*   < >  --  195* 277*   ABEL 9.8 9.7 9.0  --   --   --   --  9.8 8.7    < > = values in this interval not displayed.     Recent Labs   Lab Test 04/27/23  0715 01/23/23  0609 07/22/22  0745 04/22/22  0721 08/09/21  1717 02/08/21  1712 08/03/20  0744   MAG  --  2.5* 2.4*  --   --   --   --    PHOS 3.7  --   --  3.8 3.4 3.5 3.5     Recent Labs   Lab Test 05/25/23  1106 05/15/23  0855 05/12/23  0623 05/11/23  1209 05/08/23  1451 04/27/23  0715 04/07/23  1110 01/20/23 2011 01/20/23  1159 01/20/23  0740 07/22/22  0745 04/22/22  0721 09/01/21  0755 02/08/21  1712 10/28/20  1629   WBC 9.3 10.0 7.4  --  7.3  --  8.8   < > 14.3*   < > 8.8  --  9.0   < > 7.3   HGB 8.9* 8.8* 7.8* 8.8* 9.2*   < > 9.9*   < > 5.9*   < > 13.7   < > 11.9   < > 12.3    355 269  --  327  --  376   < > 331   < > 273  --  261   < > 277   MCV 76* 74* 78  --  77*  --  79   < > 118*   < > 99  --  100   < > 96   NEUTROPHIL  --   --   --   --   --   --  62  --  78  --  56  --  60  --  52.5    < > = values in this interval not displayed.     Recent Labs   Lab Test 04/27/23  0715 02/02/23  0919 01/20/23  0740  07/22/22  0745   BILITOTAL  --  0.4 0.4 0.8   ALKPHOS  --  106* 96 151*   ALT  --  11 22 16   AST  --  44* 52* 30   ALBUMIN 3.7 4.1 3.9 4.0     TSH   Date Value Ref Range Status   04/07/2023 3.86 0.30 - 4.20 uIU/mL Final   01/20/2023 8.56 (H) 0.30 - 4.20 uIU/mL Final   07/22/2022 0.82 0.40 - 4.00 mU/L Final   09/01/2021 3.37 0.40 - 4.00 mU/L Final   10/28/2020 0.10 (L) 0.40 - 4.00 mU/L Final   01/13/2020 0.76 0.40 - 4.00 mU/L Final   06/25/2018 0.73 0.40 - 4.00 mU/L Final      Recent Labs   Lab Test 05/25/23  1106 05/15/23  0855 05/12/23  0623 05/11/23  1209 05/08/23  1451 04/27/23  0715 01/20/23 2011 01/20/23  1159 05/14/19  1412 01/28/19  0807 09/10/18  0928 06/25/18  0758 08/04/17  0836 05/15/17  0912   B12  --   --   --   --   --  1,354*  --  2,452*  --  4,282*  --  224  --  368   FOLIC  --   --   --   --   --  19.0  --  15.5  --   --   --   --   --  13.8   HGB 8.9* 8.8* 7.8* 8.8* 9.2* 8.8*   < > 5.9*   < >  --    < > 14.1   < > 12.3   RETICABSCT  --   --   --   --   --   --   --  0.247*  --   --   --   --   --   --    RETP  --   --   --   --   --   --   --  13.5*  --   --   --   --   --   --     < > = values in this interval not displayed.     Recent Labs   Lab Test 04/07/23  1110 01/20/23  1159 05/05/22  0722 09/01/21  0755 10/28/20  1629 08/03/20  0744 05/15/17  0912   ASIA 17 43 26 60 52 60 33   IRON 21* 25* 50  --   --  37 67    363 400  --   --  343 335   IRONSAT 5* 7* 13*  --   --  11* 20       ASSESSMENT        DISCUSSION          PLAN           Jethro Theodore ,  Division of Hematology, Oncology & Transplantation  [facility].         Again, thank you for allowing me to participate in the care of your patient.        Sincerely,        Jethro Moore MD

## 2023-06-15 NOTE — NURSING NOTE
"Oncology Rooming Note    Rufina 15, 2023 2:31 PM   Keerthi Montoya is a 73 year old female who presents for:    Chief Complaint   Patient presents with     Oncology Clinic Visit     Anemia     Initial Vitals: /68 (BP Location: Right arm, Patient Position: Chair, Cuff Size: Adult Regular)   Pulse 103   Ht 1.6 m (5' 2.99\")   Wt 64.9 kg (143 lb)   LMP  (LMP Unknown)   SpO2 100%   BMI 25.34 kg/m   Estimated body mass index is 25.34 kg/m  as calculated from the following:    Height as of this encounter: 1.6 m (5' 2.99\").    Weight as of this encounter: 64.9 kg (143 lb). Body surface area is 1.7 meters squared.  No Pain (0) Comment: Data Unavailable   No LMP recorded (lmp unknown). Patient is postmenopausal.  Allergies reviewed: Yes  Medications reviewed: Yes    Medications: Medication refills not needed today.  Pharmacy name entered into [employer]: [id number] - [place], [place] - [address] [facility]    Clinical concerns: New Patient     Dr. Moore was notified.      Toma Dudley CMA              "

## 2023-06-15 NOTE — PROGRESS NOTES
[facility]INIC    NEW PATIENT VISIT NOTE    PATIENT NAME: Keerthi Montoya MRN # [id number]  DATE OF VISIT: Rufina 15, 2023 YOB: 1950    REFERRING PROVIDER: Bunny Meyers MD  [address] [place] [id number]          HISTORY OF PRESENT ILLNESS     Georgia is alone at this visit. She notes that she has low hemoglobin. She had Watchman procedure on May 11th. Blood thinners make her bleed as she has diverticulitis and ulcerative colitis.     She has atrial fibrillation and was started on anticoagulation with rivaroxaban. She had marked GI bleeding. She is hopeful that after the Watchman procedure she would not need anticoagulation. She has tried oral iron but it does not work for her.     She had colonoscopy and upper GI endoscopy along with CT scan and MRI around [place] of last year.     She has dysuria.     She denies chest pain or shortness of breath. The smoke in air is bothering her otherwise she is feeling quite well.     She has hypertension, diabetes mellitus type II. She is working with pharmacy at [facility] for diabetes mellitus type II management.      PAST MEDICAL HISTORY     Past Medical History:   Diagnosis Date    Chronic kidney disease     Diabetes mellitus, type 2 (H)     Diverticulosis of colon (without mention of hemorrhage) 10/01/2012    GI bleed     History of blood transfusion     HLD (hyperlipidemia)     Hypertension     Hypothyroidism     MARIA D (iron deficiency anemia)     Persistent atrial fibrillation (H)     Pulmonary hypertension (H)     Ulcerative (chronic) enterocolitis (H)           CURRENT OUTPATIENT MEDICATIONS     Current Outpatient Medications   Medication Sig    atenolol (TENORMIN) 50 MG tablet Take 1 tablet (50 mg) by mouth daily    atorvastatin (LIPITOR) 40 MG tablet Take 1 tablet (40 mg) by mouth daily    blood glucose (ACCU-CHEK FELIPE PLUS) test strip 200 strips by In Vitro route 2 times daily Use to test blood  sugar 2 times daily or as directed.    Cholecalciferol (VITAMIN D) 2000 units CAPS Take by mouth daily (with lunch)    Cyanocobalamin (B-12) 1000 MCG TABS Take 1,000 mcg by mouth three times a week    diphenhydrAMINE (BENADRYL) 25 MG tablet Take 25 mg by mouth every 6 hours as needed for itching or allergies    empagliflozin (JARDIANCE) 25 MG TABS tablet Take 1 tablet (25 mg) by mouth daily    glipiZIDE (GLUCOTROL XL) 10 MG 24 hr tablet Take 2 tablets (20 mg) by mouth daily (Patient taking differently: Take 15 mg by mouth every morning)    hydrALAZINE (APRESOLINE) 50 MG tablet Take 1.5 tablets (75 mg) by mouth 3 times daily    ketorolac (ACULAR) 0.5 % ophthalmic solution INSTILL 1 DROP IN AFFECTED EYE THREE TIMES DAILY AS DIRECTED. START 3 DAYS BEFORE SURGERY AND USE UNTIL GONE    levothyroxine (SYNTHROID/LEVOTHROID) 112 MCG tablet Take 1 tablet (112 mcg) by mouth daily (Patient taking differently: Take 112 mcg by mouth every morning)    losartan (COZAAR) 100 MG tablet Take 1 tablet (100 mg) by mouth daily (Patient taking differently: Take 100 mg by mouth every morning)    mesalamine (APRISO ER) 0.375 g 24 hr capsule TAKE 4 CAPSULES(1.5 GRAMS) BY MOUTH DAILY (Patient taking differently: 1.5 g daily (with lunch))    ofloxacin (OCUFLOX) 0.3 % ophthalmic solution INSTILL 1 DROP IN AFFECTED EYE THREE TIMES DAILY AS DIRECTED. START 3 DAYS BEFORE SURGERY AND USE UNTIL GONE    pantoprazole (PROTONIX) 40 MG EC tablet Take 1 tablet (40 mg) by mouth 2 times daily    prednisoLONE acetate (PRED FORTE) 1 % ophthalmic suspension SHAKE LIQUID AND INSTILL 1 DROP IN AFFECTED EYE THREE TIMES DAILY AS DIRECTED. START 3 DAYS BEFORE SURGERY AND. CONTINUE UNTIL ALL TAKEN    rivaroxaban ANTICOAGULANT (XARELTO) 15 MG TABS tablet Take 1 tablet (15 mg) by mouth daily DO NOT START UNTIL AFTER ECHO ON Monday 5/15    sodium bicarbonate 650 MG tablet Take 1 tablet (650 mg) by mouth daily     No current facility-administered medications for this  "visit.        ALLERGIES      Allergies   Allergen Reactions    Actos [Pioglitazone]      Fluid retention    Amlodipine      Leg swelling, hand swelling    Animal Dander     Doxycycline Hives    Dust Mites     Grass     Keflex [Cephalexin Hcl] Hives    Metoprolol      Swelling of lower legs and fatigue    Other [Seasonal Allergies]      pollen    Ranitidine      rash    Synthroid [Levothyroxine Sodium] Swelling     Allergic to brand name    Tetracycline Hives    Tylenol Hives    Ziac [Bisoprolol-Hydrochlorothiazide]         SOCIAL HISTORY   She is  and lives alone. 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 She used to be .     She does not smoke and is never smoker. She drinks rarely and denies recreational drug use.      FAMILY HISTORY   Heart problems   Grandfather had bad heart;  of heart failure   Mother had aortic aneurysm  Bladder cancer - worked with [employer]  All 6 siblings have diabetes mellitus type II   Diverticulitis in 2 of her brothers     REVIEW OF SYSTEMS   As above in the HPI, o/w complete 12-point ROS was negative.     PHYSICAL EXAM   /68 (BP Location: Right arm, Patient Position: Chair, Cuff Size: Adult Regular)   Pulse 103   Ht 1.6 m (5' 2.99\")   Wt 64.9 kg (143 lb)   LMP  (LMP Unknown)   SpO2 100%   BMI 25.34 kg/m     Wt Readings from Last 3 Encounters:   23 64.9 kg (143 lb)   23 64.2 kg (141 lb 8 oz)   05/15/23 65.1 kg (143 lb 9.6 oz)     GEN: NAD  HEENT: PERRL, EOMI, no icterus, injection or pallor. Oropharynx is clear.  NECK: no cervical or supraclavicular lymphadenopathy  LUNGS: clear bilaterally  CV: regular, no murmurs, rubs, or gallops  ABDOMEN: soft, non-tender, non-distended, normal bowel sounds, no hepatosplenomegaly by percussion or palpation  EXT: warm, well perfused, no edema  NEURO: alert  SKIN: no rashes     LABORATORY AND IMAGING STUDIES     Recent Labs   Lab Test 23  1106 05/15/23  0855 23  0623 23  1431 " 05/11/23  1209 05/11/23  1143 05/11/23  0920 05/08/23  1451 04/27/23  0715   * 131* 134*  --  132*  --   --  136 137   POTASSIUM 5.0 4.0 4.6  --  4.5  --  5.6* 5.0 4.3   CHLORIDE 101 92* 103  --   --   --   --  102 109   CO2 22 25 19*  --   --   --   --  23 21   ANIONGAP 12 14 12  --   --   --   --  11 7   BUN 21.5 30.9* 20.2  --   --   --   --  19.3 28   CR 1.54* 2.35* 1.55*  --   --   --   --  1.51* 1.66*   * 261* 193* 108* 119*   < >  --  195* 277*   ABEL 9.8 9.7 9.0  --   --   --   --  9.8 8.7    < > = values in this interval not displayed.     Recent Labs   Lab Test 04/27/23  0715 01/23/23  0609 07/22/22  0745 04/22/22  0721 08/09/21 1717 02/08/21 1712 08/03/20  0744   MAG  --  2.5* 2.4*  --   --   --   --    PHOS 3.7  --   --  3.8 3.4 3.5 3.5     Recent Labs   Lab Test 05/25/23  1106 05/15/23  0855 05/12/23  0623 05/11/23  1209 05/08/23  1451 04/27/23  0715 04/07/23  1110 01/20/23 2011 01/20/23  1159 01/20/23  0740 07/22/22  0745 04/22/22  0721 09/01/21  0755 02/08/21  1712 10/28/20  1629   WBC 9.3 10.0 7.4  --  7.3  --  8.8   < > 14.3*   < > 8.8  --  9.0   < > 7.3   HGB 8.9* 8.8* 7.8* 8.8* 9.2*   < > 9.9*   < > 5.9*   < > 13.7   < > 11.9   < > 12.3    355 269  --  327  --  376   < > 331   < > 273  --  261   < > 277   MCV 76* 74* 78  --  77*  --  79   < > 118*   < > 99  --  100   < > 96   NEUTROPHIL  --   --   --   --   --   --  62  --  78  --  56  --  60  --  52.5    < > = values in this interval not displayed.     Recent Labs   Lab Test 04/27/23  0715 02/02/23  0919 01/20/23  0740 07/22/22  0745   BILITOTAL  --  0.4 0.4 0.8   ALKPHOS  --  106* 96 151*   ALT  --  11 22 16   AST  --  44* 52* 30   ALBUMIN 3.7 4.1 3.9 4.0     TSH   Date Value Ref Range Status   04/07/2023 3.86 0.30 - 4.20 uIU/mL Final   01/20/2023 8.56 (H) 0.30 - 4.20 uIU/mL Final   07/22/2022 0.82 0.40 - 4.00 mU/L Final   09/01/2021 3.37 0.40 - 4.00 mU/L Final   10/28/2020 0.10 (L) 0.40 - 4.00 mU/L Final   01/13/2020 0.76  0.40 - 4.00 mU/L Final   06/25/2018 0.73 0.40 - 4.00 mU/L Final      Recent Labs   Lab Test 05/25/23  1106 05/15/23  0855 05/12/23  0623 05/11/23  1209 05/08/23  1451 04/27/23  0715 01/20/23 2011 01/20/23  1159 05/14/19  1412 01/28/19  0807 09/10/18  0928 06/25/18  0758 08/04/17  0836 05/15/17  0912   B12  --   --   --   --   --  1,354*  --  2,452*  --  4,282*  --  224  --  368   FOLIC  --   --   --   --   --  19.0  --  15.5  --   --   --   --   --  13.8   HGB 8.9* 8.8* 7.8* 8.8* 9.2* 8.8*   < > 5.9*   < >  --    < > 14.1   < > 12.3   RETICABSCT  --   --   --   --   --   --   --  0.247*  --   --   --   --   --   --    RETP  --   --   --   --   --   --   --  13.5*  --   --   --   --   --   --     < > = values in this interval not displayed.     Recent Labs   Lab Test 04/07/23  1110 01/20/23  1159 05/05/22  0722 09/01/21  0755 10/28/20  1629 08/03/20  0744 05/15/17  0912   ASIA 17 43 26 60 52 60 33   IRON 21* 25* 50  --   --  37 67    363 400  --   --  343 335   IRONSAT 5* 7* 13*  --   --  11* 20       ASSESSMENT    Iron deficiency anemia  Hypertension, diabetes mellitus type II, chronic kidney disease stage IV, atrial fibrillation, pulm hypertension        DISCUSSION   I reviewed the role of red cells in the body - primarily in oxygen transport. Deficiency of red cells is referred as anemia. Anemia is typically categorized based on the size of the red cells as microcytic, normocytic and macrocytic anemia. He seems to have small sized red cells - microcytic. The differential for microcytic anemia is relatively limited to iron deficiency, thalassemias, lead poisoning, Zinc and copper deficiencies, alcohol and certain drugs.      Georgia has iron deficiency anemia likely secondary to GI blood loss. Her most recent iron panel reveals iron of 21 ug/dl and TIBC of 426 ug/dl with 5% saturation - very characteristic for iron deficiency. She has been trying oral iron supplementation for a long time and this has not  helped as yet. I do not feel that she absorbs iron well and she would benefit from parenteral therapy.     I reviewed alternatives to parenteral formulations including oral iron therapy and PRBC's. Since she is completely asymptomatic at this time, it would be reasonable to consider parenteral formulations rather than PRBC. I reviewed the risks involved with parenteral iron - primarily allergic reactions. I would recommend iron sucrose 300 mg IV on three separate occasions, two to three days apart or ferric carboxymaltose 750 mg twice a week apart based on insurance coverage. I will follow in 6 months or so with labs to assess response to iron therapy.         PLAN     Recommend IV iron therapy with iron sucrose 300 mg every other day x 3 or ferric carboxy maltose 750 mg intravenous weekly x 2  Follow up in 3 months with labs prior to clinic visit to assess response to therapy    45 minutes spent on the date of the encounter doing chart review, history and exam, documentation and further activities as noted above       Jethro Moore  Adj ,  Division of Hematology, Oncology & Transplantation  [facility].

## 2023-06-16 ENCOUNTER — OFFICE VISIT (OUTPATIENT)
Dept: PHARMACY | Facility: CLINIC | Age: 73
End: 2023-06-16
Payer: COMMERCIAL

## 2023-06-16 ENCOUNTER — PATIENT OUTREACH (OUTPATIENT)
Dept: ONCOLOGY | Facility: CLINIC | Age: 73
End: 2023-06-16
Payer: MEDICARE

## 2023-06-16 VITALS
SYSTOLIC BLOOD PRESSURE: 104 MMHG | HEART RATE: 91 BPM | WEIGHT: 143.6 LBS | BODY MASS INDEX: 25.45 KG/M2 | DIASTOLIC BLOOD PRESSURE: 69 MMHG

## 2023-06-16 DIAGNOSIS — E11.29 TYPE 2 DIABETES MELLITUS WITH MICROALBUMINURIA, WITHOUT LONG-TERM CURRENT USE OF INSULIN (H): Primary | ICD-10-CM

## 2023-06-16 DIAGNOSIS — R80.9 TYPE 2 DIABETES MELLITUS WITH MICROALBUMINURIA, WITHOUT LONG-TERM CURRENT USE OF INSULIN (H): Primary | ICD-10-CM

## 2023-06-16 LAB
BACTERIA UR CULT: ABNORMAL
CREAT UR-MCNC: 33.6 MG/DL
MICROALBUMIN UR-MCNC: 36.9 MG/L
MICROALBUMIN/CREAT UR: 109.82 MG/G CR (ref 0–25)

## 2023-06-16 PROCEDURE — 99207 PR NO CHARGE LOS: CPT | Performed by: PHARMACIST

## 2023-06-16 NOTE — PROGRESS NOTES
Medication Therapy Management (MTM) Encounter    ASSESSMENT:                            Medication Adherence/Access: No issues identified    Type 2 Diabetes: Self monitoring of blood glucose is not at goal of fasting  mg/dL. Patient is indicated for insulin add on therapy. However, per patient's wishes, we will optimize glipizide drug therapy and assess glucose control before doing so. Patient is allergic to pioglitazone so thiazolidinedione add on therapy is contraindicated. Patient will put more effort into diet and exercise.     Hypertension: Stable.      PLAN:                            1. Continue Jardiance 25 mg once daily.  2. Increase glipizide to 20 mg a day.   3. Get yearly Micro albumin urine lab   4. Sent diabetes educator referral for nutrition counseling.    Follow-up: Follow up in clinic in 4 weeks (23).     SUBJECTIVE/OBJECTIVE:                          Keerthi Montoya is a 73 year old female coming in for a follow-up visit.  Today's visit is a follow-up MTM visit from 2023.     Reason for visit: MTM diabetes.    Allergies/ADRs: Reviewed in chart  Past Medical History: Reviewed in chart  Tobacco: She reports that she has never smoked. She has never used smokeless tobacco.  Alcohol: none      Medication Adherence/Access: Patient pays approximately $100 for Jardiance 90 days and has 65k yearly income.     Type 2 Diabetes:  Currently taking Jardiance 25 mg daily, and glipizide 15 mg daily (never increased to 20 mg). Patient reports that she is tolerating Jardiance well. Patient reports that once she is cleared to exercise she plans to get back on track with physical activity. For diet, she doesn't drink juice, soda, have snacks. She would like to utilize dietician services.     Blood sugar monitorin time(s) daily. Ranges (patient reported): Fasting this morning was 200 mg/dL and some nights it is over ~200 mg/dL.   Symptoms of low blood sugar? She rarely has low blood sugars below 80  mg/dl.  Symptoms of high blood sugar? None.   Eye exam: Upcoming cataract surgery appt.   Foot exam: up to date  Aspirin: No  Statin: Yes: atorvastatin 40 mg   ACEi/ARB: Yes: losartan.   Urine Albumin:   Lab Results   Component Value Date    UMALCR 109.82 (H) 06/15/2023      Lab Results   Component Value Date    A1C 11.6 04/27/2023    A1C 6.9 01/20/2023    A1C 8.1 06/24/2022    A1C 7.0 12/16/2021    A1C 6.6 09/01/2021    A1C 6.6 10/28/2020    A1C 7.0 05/12/2020    A1C 7.4 01/13/2020    A1C 7.9 01/28/2019    A1C 7.6 06/25/2018       Hypertension:   atenolol 50 mg daily  hydralazine 50 mg three times a day  losartan 100 mg daily    Patient reports 111-116 mmHg systolic home BP readings.  Patient reports no current medication side effects. On 5/11/23 she was admitted for a watchman implantation for left atrial appendage closure due to atrial fibrillation. She was told to discontinue aspirin.           BP Readings from Last 3 Encounters:   06/16/23 104/69   06/15/23 111/68   05/17/23 109/66       Pulse Readings from Last 3 Encounters:   06/16/23 91   06/15/23 103   05/17/23 80       Today's Vitals: /69 (BP Location: Right arm)   Pulse 91   Wt 143 lb 9.6 oz (65.1 kg)   LMP  (LMP Unknown)   BMI 25.45 kg/m    ----------------      I spent 30 minutes with this patient today. All changes were made via collaborative practice agreement with Bunny Meyers MD. A copy of the visit note was provided to the patient's provider(s).    A summary of these recommendations was sent via [employer].      Jojo Myers, P4 Pharmacy Intern    Preceptor Cosignature: I have reviewed and verified the student's documentation.    Bunny Santa, Pharm. D., BCACP  Medication Therapy Management Pharmacist  Direct Voicemail: [phone]         Medication Therapy Recommendations  Type 2 diabetes mellitus with microalbuminuria, without long-term current use of insulin (H)    Current Medication: glipiZIDE (GLUCOTROL XL) 10 MG 24 hr tablet    Rationale: Dose too low - Dosage too low - Effectiveness   Recommendation: Increase Dose   Status: Accepted per CPA

## 2023-06-16 NOTE — PROGRESS NOTES
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 Moore at the [place] location - as well as to review his treatment recommendations, and to discuss her UA results from yesterday.      Background:                                                      Patient had a consult appointment with Dr. Moore yesterday for iron deficiency anemia.  During the visit, patient had also mentioned dysuria symptoms, and a UA was completed.    Refer to office visit notes from Dr. Moore dated 6/15/23 for further details regarding patient's medical history and background.    Assessment:                                                      Initial  Current living arrangement: I live alone  Informal Support system: Family  Bed or wheelchair confined: No  Mobility Status: Independent  Transportation means: Regular car  Medication adherence problem (GOAL): No  Knowledgeable about how to use meds: Yes  Medication side effects suspected: No  Referrals Placed: None  Advanced Care Plans/Directives on file: No  Advanced Care Plan/Directive Status: Declined Further Information  Patient Reported Pain?: No  Pain Score: No Pain (0)    Reviewed patient's UA results with Dr. Moore this morning.  Per Dr. Moore, appears patient's UA shows infection.  He has sent in a prescription for Cipro to patient's pharmacy.  Urine culture is currently in process.    Regarding patient's iron deficiency anemia, Dr. Moore has recommended the following treatment plan:    1. Iron infusions (either Venofer or Injectafer, whichever is covered by patient's insurance).    2. To see Francisca Mustafa CNP, in 3 months with repeat labs prior to visit.      Plan / Recommendations:                                                       1. Scheduling team will be contacting patient to set  up appointments for her iron infusions, and follow-up visit with our NP Francisca.    2. We will follow patient's urine culture results (will likely be finalized next week) to see if any changes need to be made to patient's antibiotic.  Since Dr. Moore will be out of the office for the next few weeks, writer will plan to review these results with covering provider when they become available.  In the meantime, patient will start on the Cipro, and writer encouraged her to drink plenty of fluids.    3. Writer will send patient handouts on the iron infusions so that she has some more information about this.  Will also provide her with contact information for our clinic.      Patient verbalized understanding, agrees with plan and recommendations, and denies any questions or concerns at this time.    Fracisco Hurtado, RN, BSN, OCN  RN Care Coordinator - Oncology  [facility]

## 2023-06-19 NOTE — PATIENT INSTRUCTIONS
"Recommendations from today's MTM visit:                                                         1. Continue Jardiance 25 mg once daily.  2. Increase glipizide to 20 mg a day.   3. Get yearly Micro albumin urine lab   4. Sent diabetes educator referral for nutrition counseling.    Follow-up: Follow up in clinic in 4 weeks (7/14/23).     It was great speaking with you today.  I value your experience and would be very thankful for your time in providing feedback in our clinic survey. In the next few days, you may receive an email or text message from [employer] with a link to a survey related to your  clinical pharmacist.\"     To schedule another MTM appointment, please call the clinic directly or you may call the MTM scheduling line at [phone] or toll-free at [phone].     My Clinical Pharmacist's contact information:                                                      Please feel free to contact me with any questions or concerns you have.      Bunny Santa, Pharm. D., BCACP  Medication Therapy Management Pharmacist     "

## 2023-06-21 ENCOUNTER — TELEPHONE (OUTPATIENT)
Dept: CARDIOLOGY | Facility: CLINIC | Age: 73
End: 2023-06-21

## 2023-06-21 NOTE — TELEPHONE ENCOUNTER
Health Call Center    Phone Message    May a detailed message be left on voicemail: yes     Reason for Call: Other:    Pema from imaging called and waited to r/s tomorrow 6/22 appt. I'm unable to r/s appt due to being in gold waiting lab. Please r/s patient appt to 8/10 per Pema request.      Action Taken: Other: Cardiology     Travel Screening: Not Applicable     Thank you!  [facility]

## 2023-06-22 ENCOUNTER — PATIENT OUTREACH (OUTPATIENT)
Dept: ONCOLOGY | Facility: CLINIC | Age: 73
End: 2023-06-22
Payer: MEDICARE

## 2023-06-22 NOTE — PROGRESS NOTES
[facility]: Cancer Care Note    Chart review was completed to follow-up on patient's final urine culture results this week.  Patient was started on Cipro last week by Dr. Moore.  Noted final urine culture results and sensitivities show the following findings:    Specimen Information: Urine, Clean Catch   Collected:  6/15/2023  3:19 PM     Status: Final result       Culture >100,000 CFU/mL Escherichia coli (Abnormal)          Susceptibility   Escherichia coli     DAVY     Ampicillin <=2 ug/mL Susceptible     Ampicillin/ Sulbactam <=2 ug/mL Susceptible     Cefazolin <=4 ug/mL Susceptible 1     Cefepime <=1 ug/mL Susceptible     Cefoxitin <=4 ug/mL Susceptible     Ceftazidime <=1 ug/mL Susceptible     Ceftriaxone <=1 ug/mL Susceptible     Ciprofloxacin <=0.25 ug/mL Susceptible     Gentamicin <=1 ug/mL Susceptible     Levofloxacin <=0.12 ug/mL Susceptible     Nitrofurantoin <=16 ug/mL Susceptible     Piperacillin/Tazobactam <=4 ug/mL Susceptible     Tobramycin <=1 ug/mL Susceptible     Trimethoprim/Sulfamethoxazole <=1/19 ug/mL Susceptible      Note routed to Dr. Moore and Francisca Mustafa CNP, with request to review and advise if any medication changes are indicated at this time based on patient's final urine culture results.    Fracisco Hurtado, RN, BSN, OCN  RN Care Coordinator - Oncology  [facility]

## 2023-06-23 ENCOUNTER — CARE COORDINATION (OUTPATIENT)
Dept: CARDIOLOGY | Facility: CLINIC | Age: 73
End: 2023-06-23

## 2023-06-23 ENCOUNTER — LAB (OUTPATIENT)
Dept: LAB | Facility: CLINIC | Age: 73
End: 2023-06-23
Payer: MEDICARE

## 2023-06-23 ENCOUNTER — OFFICE VISIT (OUTPATIENT)
Dept: INTERNAL MEDICINE | Facility: CLINIC | Age: 73
End: 2023-06-23
Payer: MEDICARE

## 2023-06-23 VITALS
HEART RATE: 70 BPM | WEIGHT: 146.8 LBS | OXYGEN SATURATION: 98 % | BODY MASS INDEX: 26.01 KG/M2 | DIASTOLIC BLOOD PRESSURE: 68 MMHG | SYSTOLIC BLOOD PRESSURE: 110 MMHG

## 2023-06-23 DIAGNOSIS — I10 BENIGN ESSENTIAL HYPERTENSION: ICD-10-CM

## 2023-06-23 DIAGNOSIS — I10 BENIGN ESSENTIAL HYPERTENSION: Primary | ICD-10-CM

## 2023-06-23 LAB
ALBUMIN SERPL BCG-MCNC: 4.3 G/DL (ref 3.5–5.2)
ALP SERPL-CCNC: 114 U/L (ref 35–104)
ALT SERPL W P-5'-P-CCNC: 5 U/L (ref 0–50)
ANION GAP SERPL CALCULATED.3IONS-SCNC: 13 MMOL/L (ref 7–15)
AST SERPL W P-5'-P-CCNC: 32 U/L (ref 0–45)
ATRIAL RATE - MUSE: 94 BPM
BASOPHILS # BLD AUTO: 0.1 10E3/UL (ref 0–0.2)
BASOPHILS NFR BLD AUTO: 1 %
BILIRUB SERPL-MCNC: 0.7 MG/DL
BUN SERPL-MCNC: 32.3 MG/DL (ref 8–23)
CALCIUM SERPL-MCNC: 9.7 MG/DL (ref 8.8–10.2)
CHLORIDE SERPL-SCNC: 100 MMOL/L (ref 98–107)
CREAT SERPL-MCNC: 1.81 MG/DL (ref 0.51–0.95)
DEPRECATED HCO3 PLAS-SCNC: 20 MMOL/L (ref 22–29)
DIASTOLIC BLOOD PRESSURE - MUSE: NORMAL MMHG
EOSINOPHIL # BLD AUTO: 0.1 10E3/UL (ref 0–0.7)
EOSINOPHIL NFR BLD AUTO: 1 %
ERYTHROCYTE [DISTWIDTH] IN BLOOD BY AUTOMATED COUNT: 23.3 % (ref 10–15)
GFR SERPL CREATININE-BSD FRML MDRD: 29 ML/MIN/1.73M2
GLUCOSE SERPL-MCNC: 244 MG/DL (ref 70–99)
HCT VFR BLD AUTO: 30.5 % (ref 35–47)
HGB BLD-MCNC: 8.9 G/DL (ref 11.7–15.7)
IMM GRANULOCYTES # BLD: 0 10E3/UL
IMM GRANULOCYTES NFR BLD: 1 %
INTERPRETATION ECG - MUSE: NORMAL
LYMPHOCYTES # BLD AUTO: 1.1 10E3/UL (ref 0.8–5.3)
LYMPHOCYTES NFR BLD AUTO: 14 %
MCH RBC QN AUTO: 21.7 PG (ref 26.5–33)
MCHC RBC AUTO-ENTMCNC: 29.2 G/DL (ref 31.5–36.5)
MCV RBC AUTO: 74 FL (ref 78–100)
MONOCYTES # BLD AUTO: 1.2 10E3/UL (ref 0–1.3)
MONOCYTES NFR BLD AUTO: 15 %
NEUTROPHILS # BLD AUTO: 5.2 10E3/UL (ref 1.6–8.3)
NEUTROPHILS NFR BLD AUTO: 68 %
NRBC # BLD AUTO: 0 10E3/UL
NRBC BLD AUTO-RTO: 0 /100
P AXIS - MUSE: NORMAL DEGREES
PLATELET # BLD AUTO: 345 10E3/UL (ref 150–450)
POTASSIUM SERPL-SCNC: 5.2 MMOL/L (ref 3.4–5.3)
PR INTERVAL - MUSE: NORMAL MS
PROT SERPL-MCNC: 7.8 G/DL (ref 6.4–8.3)
QRS DURATION - MUSE: 74 MS
QT - MUSE: 394 MS
QTC - MUSE: 468 MS
R AXIS - MUSE: 108 DEGREES
RBC # BLD AUTO: 4.11 10E6/UL (ref 3.8–5.2)
SODIUM SERPL-SCNC: 133 MMOL/L (ref 136–145)
SYSTOLIC BLOOD PRESSURE - MUSE: NORMAL MMHG
T AXIS - MUSE: 238 DEGREES
VENTRICULAR RATE- MUSE: 85 BPM
WBC # BLD AUTO: 7.7 10E3/UL (ref 4–11)

## 2023-06-23 PROCEDURE — 80053 COMPREHEN METABOLIC PANEL: CPT | Performed by: INTERNAL MEDICINE

## 2023-06-23 PROCEDURE — 99000 SPECIMEN HANDLING OFFICE-LAB: CPT | Performed by: PATHOLOGY

## 2023-06-23 PROCEDURE — 93000 ELECTROCARDIOGRAM COMPLETE: CPT | Performed by: INTERNAL MEDICINE

## 2023-06-23 PROCEDURE — 85025 COMPLETE CBC W/AUTO DIFF WBC: CPT | Performed by: PATHOLOGY

## 2023-06-23 PROCEDURE — 99214 OFFICE O/P EST MOD 30 MIN: CPT | Mod: 25 | Performed by: INTERNAL MEDICINE

## 2023-06-23 PROCEDURE — 36415 COLL VENOUS BLD VENIPUNCTURE: CPT | Performed by: PATHOLOGY

## 2023-06-23 NOTE — PROGRESS NOTES
Left voicemail letting her know that we will need to reschedule her CT angiogram heart and follow up visit with Dominique prior to the 8/10 date as requested by her so that it falls within the guidelines set by CMS for the watchman procedure.    Contact information left.

## 2023-06-23 NOTE — NURSING NOTE
Keerthi Montoya is a 73 year old female patient that presents today in clinic for the following:    Chief Complaint   Patient presents with     Pre-Op Exam     The patient's allergies and medications were reviewed as noted. A set of vitals were recorded as noted without incident: /68 (BP Location: Right arm, Patient Position: Sitting, Cuff Size: Adult Regular)   Pulse 70   Wt 66.6 kg (146 lb 12.8 oz)   LMP  (LMP Unknown)   SpO2 98%   BMI 26.01 kg/m  . The patient does not have any other questions for the provider.    Milagros Harris, EMT at 10:43 AM on 6/23/2023

## 2023-06-23 NOTE — PROGRESS NOTES
HPI:    Ms. Montoya comes in for preoperative evaluation for sequential  B cataract surgery. She has decreased vision for several months. She does not smoke nor abuse EtOH. She denies any anesthesia issues. No GI bleeding complaints.  She denies any cardiopulmonary complaints. No other HEENT, cardiopulmonary, abdominal, , GYN, neurological, systemic, psychiatric, lymphatic, endocrine, vascular complaints.       Past Medical History:   Diagnosis Date     Chronic kidney disease      Diabetes mellitus, type 2 (H)      Diverticulosis of colon (without mention of hemorrhage) 10/01/2012     GI bleed      History of blood transfusion      HLD (hyperlipidemia)      Hypertension      Hypothyroidism      MARIA D (iron deficiency anemia)      Persistent atrial fibrillation (H)      Pulmonary hypertension (H)      Ulcerative (chronic) enterocolitis (H)      Past Surgical History:   Procedure Laterality Date     CHOLECYSTECTOMY  3/1987     COLON SURGERY  01/2001    Left colon resection; diverticulitis     COLONOSCOPY N/A 4/24/2017    Procedure: COMBINED COLONOSCOPY, SINGLE OR MULTIPLE BIOPSY/POLYPECTOMY BY BIOPSY;;  Surgeon: Radha Salguero MD;  Location: [facility] OR     COLONOSCOPY N/A 3/10/2023    Procedure: COLONOSCOPY;  Surgeon: Preeti James MD;  Location: [facility]     COLONOSCOPY WITH CO2 INSUFFLATION N/A 4/24/2017    Procedure: COLONOSCOPY WITH CO2 INSUFFLATION;  Colonoscopy Dx rectal bleeding, BMI 25.86, Pharm [facility] .[phone], ;  Surgeon: Radha Salguero MD;  Location: [facility] OR     CV LEFT ATRIAL APPENDAGE CLOSURE N/A 5/11/2023    Procedure: Left Atrial Appendage Closure;  Surgeon: Bobby Grimes MD;  [facility]     CV RIGHT HEART CATH MEASUREMENTS RECORDED N/A 3/16/2020    Procedure: CV RIGHT HEART CATH;  Surgeon: Nader Muñoz MD;  Location:  HEART CARDIAC CATH LAB     ESOPHAGOSCOPY, GASTROSCOPY, DUODENOSCOPY (EGD), COMBINED N/A 1/23/2023    Procedure:  ESOPHAGOGASTRODUODENOSCOPY, WITH BIOPSY;  Surgeon: Ricki Deal MD;  Location:  GI     ESOPHAGOSCOPY, GASTROSCOPY, DUODENOSCOPY (EGD), COMBINED N/A 3/10/2023    Procedure: Esophagoscopy, gastroscopy, duodenoscopy (EGD), combined;  Surgeon: Preeti James MD;  Location:  GI     GYN SURGERY  9/1983    tubal ligation     HERNIA REPAIR  12/2006    recurrent incisional hernia     SIGMOIDOSCOPY FLEXIBLE N/A 1/23/2023    Procedure: Sigmoidoscopy flexible;  Surgeon: Ricki Deal MD;  Location:  GI     THROAT SURGERY  12/1974    thyroidectomy     PE:    Vitals noted, gen, nad, cooperative, alert, neck supple, horizontal neck surgical scar, lungs with good air movement, RRR, S1, S2, murmur present, abdomen, no acute findings. Grossly normal neurological exam. No B LE swelling.     Procedure  Limited Echocardiogram with portions of two-dimensional, color and spectral  Doppler performed.  ______________________________________________________________________________  Interpretation Summary  Global and regional left ventricular function is normal with an EF of 60-65%.  Flattened septum is consistent with right ventricular pressure and volume  overload.  Moderate right ventricular dilation with moderately reduced global right  ventricular function.  Severe tricuspid insufficiency.  Estimated pulmonary artery systolic pressure is 69 mmHg.  Dilation of the inferior vena cava is present with abnormal respiratory  variation in diameter.  Small circumferential pericardial effusion without any hemodynamic  significance.     This study was compared with the study from 5/15/23. N significant change in  ventricular function or pericardial effusion. TR appears worse on today's  study, but that may be related to technique.  ______________________________________________________________________________  Left Ventricle  Global and regional left ventricular function is normal with an EF of 60-65%.  LV cavity is small. Flattened  septum is consistent with right ventricular  pressure and volume overload.     Right Ventricle  Moderate right ventricular dilation is present. Global right ventricular  function is moderately reduced.     Atria  Severe right atrial enlargement is present.     Tricuspid Valve  Severe tricuspid insufficiency is present. Estimated pulmonary artery systolic  pressure is 54 mmHg plus right atrial pressure. Moderate pulmonary  hypertension is present.     Vessels  Dilation of the inferior vena cava is present with abnormal respiratory  variation in diameter.     Pericardium  Small circumferential pericardial effusion is present without any hemodynamic  significance.     Compared to Previous Study  This study was compared with the study from 5/15/23 .      Results for orders placed or performed in visit on 06/23/23   CBC with platelets and differential     Status: Abnormal   Result Value Ref Range    WBC Count 7.7 4.0 - 11.0 10e3/uL    RBC Count 4.11 3.80 - 5.20 10e6/uL    Hemoglobin 8.9 (L) 11.7 - 15.7 g/dL    Hematocrit 30.5 (L) 35.0 - 47.0 %    MCV 74 (L) 78 - 100 fL    MCH 21.7 (L) 26.5 - 33.0 pg    MCHC 29.2 (L) 31.5 - 36.5 g/dL    RDW 23.3 (H) 10.0 - 15.0 %    Platelet Count 345 150 - 450 10e3/uL    % Neutrophils 68 %    % Lymphocytes 14 %    % Monocytes 15 %    % Eosinophils 1 %    % Basophils 1 %    % Immature Granulocytes 1 %    NRBCs per 100 WBC 0 <1 /100    Absolute Neutrophils 5.2 1.6 - 8.3 10e3/uL    Absolute Lymphocytes 1.1 0.8 - 5.3 10e3/uL    Absolute Monocytes 1.2 0.0 - 1.3 10e3/uL    Absolute Eosinophils 0.1 0.0 - 0.7 10e3/uL    Absolute Basophils 0.1 0.0 - 0.2 10e3/uL    Absolute Immature Granulocytes 0.0 <=0.4 10e3/uL    Absolute NRBCs 0.0 10e3/uL   CBC with platelets and differential     Status: Abnormal    Narrative    The following orders were created for panel order CBC with platelets and differential.  Procedure                               Abnormality         Status                     ---------                                -----------         ------                     CBC with platelets and d...[[id number]]  Abnormal            Final result                 Please view results for these tests on the individual orders.   Results for orders placed or performed in visit on 06/23/23   EKG 12-lead complete w/read - Clinics     Status: None   Result Value Ref Range    Systolic Blood Pressure  mmHg    Diastolic Blood Pressure  mmHg    Ventricular Rate 85 BPM    Atrial Rate 94 BPM    CA Interval  ms    QRS Duration 74 ms     ms    QTc 468 ms    P Axis  degrees    R AXIS 108 degrees    T Axis 238 degrees    Interpretation ECG       Atrial fibrillation with premature ventricular or aberrantly conducted complexes  Possible Right ventricular hypertrophy  Nonspecific T wave abnormality  Abnormal ECG  When compared with ECG of 11-MAY-2023 08:22,  No significant change was found  Unconfirmed report - this is a computer generated report only - please see the [facility] center for final interpretation  Reconfirmed by - [facility], PHYSICIAN ([id number]),  JAYDON CRAIG ([id number]) on 6/23/2023 1:06:02 PM       Results for orders placed or performed in visit on 06/23/23   Comprehensive metabolic panel     Status: Abnormal   Result Value Ref Range    Sodium 133 (L) 136 - 145 mmol/L    Potassium 5.2 3.4 - 5.3 mmol/L    Chloride 100 98 - 107 mmol/L    Carbon Dioxide (CO2) 20 (L) 22 - 29 mmol/L    Anion Gap 13 7 - 15 mmol/L    Urea Nitrogen 32.3 (H) 8.0 - 23.0 mg/dL    Creatinine 1.81 (H) 0.51 - 0.95 mg/dL    Calcium 9.7 8.8 - 10.2 mg/dL    Glucose 244 (H) 70 - 99 mg/dL    Alkaline Phosphatase 114 (H) 35 - 104 U/L    AST 32 0 - 45 U/L    ALT 5 0 - 50 U/L    Protein Total 7.8 6.4 - 8.3 g/dL    Albumin 4.3 3.5 - 5.2 g/dL    Bilirubin Total 0.7 <=1.2 mg/dL    GFR Estimate 29 (L) >60 mL/min/1.73m2   CBC with platelets and differential     Status: Abnormal   Result Value Ref Range    WBC Count 7.7 4.0 - 11.0 10e3/uL    RBC  Count 4.11 3.80 - 5.20 10e6/uL    Hemoglobin 8.9 (L) 11.7 - 15.7 g/dL    Hematocrit 30.5 (L) 35.0 - 47.0 %    MCV 74 (L) 78 - 100 fL    MCH 21.7 (L) 26.5 - 33.0 pg    MCHC 29.2 (L) 31.5 - 36.5 g/dL    RDW 23.3 (H) 10.0 - 15.0 %    Platelet Count 345 150 - 450 10e3/uL    % Neutrophils 68 %    % Lymphocytes 14 %    % Monocytes 15 %    % Eosinophils 1 %    % Basophils 1 %    % Immature Granulocytes 1 %    NRBCs per 100 WBC 0 <1 /100    Absolute Neutrophils 5.2 1.6 - 8.3 10e3/uL    Absolute Lymphocytes 1.1 0.8 - 5.3 10e3/uL    Absolute Monocytes 1.2 0.0 - 1.3 10e3/uL    Absolute Eosinophils 0.1 0.0 - 0.7 10e3/uL    Absolute Basophils 0.1 0.0 - 0.2 10e3/uL    Absolute Immature Granulocytes 0.0 <=0.4 10e3/uL    Absolute NRBCs 0.0 10e3/uL   CBC with platelets and differential     Status: Abnormal    Narrative    The following orders were created for panel order CBC with platelets and differential.  Procedure                               Abnormality         Status                     ---------                               -----------         ------                     CBC with platelets and d...[[id number]]  Abnormal            Final result                 Please view results for these tests on the individual orders.   Results for orders placed or performed in visit on 06/23/23   EKG 12-lead complete w/read - Clinics     Status: None   Result Value Ref Range    Systolic Blood Pressure  mmHg    Diastolic Blood Pressure  mmHg    Ventricular Rate 85 BPM    Atrial Rate 94 BPM    KY Interval  ms    QRS Duration 74 ms     ms    QTc 468 ms    P Axis  degrees    R AXIS 108 degrees    T Axis 238 degrees    Interpretation ECG       Atrial fibrillation with premature ventricular or aberrantly conducted complexes  Possible Right ventricular hypertrophy  Nonspecific T wave abnormality  Abnormal ECG  When compared with ECG of 11-MAY-2023 08:22,  No significant change was found  Unconfirmed report - this is a computer generated  report only - please see the primary care center for final interpretation  Reconfirmed by - [facility], PHYSICIAN ([id number]),  JAYDON CRAIG ([id number]) on 6/23/2023 1:06:02 PM       A/P:    Overall low risk for the planned sequential B cataract surgery procedures.     1. Preoperative COVID. No current symptoms.   2. DM2; on Jardiance and Glipizide. Her Blood Glucose values should be followed carefully delroy-operatively. She can hold her Diabetes medications the Morning of surgeries.   3. HTN;  On Losartan, Atenolol, and Hydralazine. She can hold her Losartan the morning of surgeries.   4. Inflammatory bowel disease on Mesalamine  5. Recent Watchman device placement (5/15/2023)  For atrial fibrillation on Xarelto and she can remain on this medication. Last seen in Cardiology Clinic by Ms. Gutierreznn 5/15/2023.   6. On sodium bicarbonate for CKD (last Creatinine 1.61 today 6/23/2023)   7. Recent UTI with pan-sensitive E. Coli (6/15/2023) treated with Ciprofloxacin (currently still on this medication). No current symptoms.   8. Anemia; See Dr. Moore's Hematology note from 6/15/2023.       Total time spent today with this patient including chart review, exam time with patient and documentation : 30 minutes.  This time was exclusive of the EKG interpretation

## 2023-06-26 ENCOUNTER — TELEPHONE (OUTPATIENT)
Dept: CARDIOLOGY | Facility: CLINIC | Age: 73
End: 2023-06-26

## 2023-06-26 DIAGNOSIS — I50.32 CHRONIC DIASTOLIC HEART FAILURE (H): Primary | ICD-10-CM

## 2023-06-26 NOTE — TELEPHONE ENCOUNTER
[facility] Call Center    Phone Message    May a detailed message be left on voicemail: no     Reason for Call: Medication Question or concern regarding medication   Prescription Clarification  Name of Medication: blood thinners  Prescribing Provider: Erickson   Pharmacy: N/A   What on the order needs clarification? Pt stated she was supposed to be on blood thinners post surgery 5/11 for 30 days.  Pt stated she is experiencing bloating and is concerned about whether or not she should still be taking.  Pt requested to speak with Dominique to discuss.           Action Taken: Message routed to:  Other: cardio    Travel Screening: Not Applicable

## 2023-06-26 NOTE — PROGRESS NOTES
[facility]: Cancer Care Note    Received the following response from Francisca Mustafa CNP, in regards to patient's final urine culture results from last week:    Cipro will be adequate treatment for this infection per culture and sensitivities.     Thanks- Francisca     Patient was notified via [email or web address] result note message.  Encouraged patient to reach out with any questions or concerns, or if her symptoms are not improving or worsening.    Fracisco Hurtado, RN, BSN, OCN  RN Care Coordinator - Oncology  [facility]

## 2023-06-26 NOTE — TELEPHONE ENCOUNTER
Patient is reporting abdominal bloating however was recently treated for UTI. Discussed the abdominal bloating more than likely could be from the UTI and antibiotic use. Denieas any other new S/S.  Patient would essentially like to be off the blood thinner. Discussed MD would be updated on this. Patient does have a CORE follow-up on Friday.     Zuleika Glover RN on 6/26/2023 at 3:16 PM

## 2023-06-27 ENCOUNTER — TELEPHONE (OUTPATIENT)
Dept: ONCOLOGY | Facility: CLINIC | Age: 73
End: 2023-06-27
Payer: MEDICARE

## 2023-06-27 DIAGNOSIS — K92.2 GASTROINTESTINAL HEMORRHAGE, UNSPECIFIED GASTROINTESTINAL HEMORRHAGE TYPE: ICD-10-CM

## 2023-06-27 DIAGNOSIS — R30.0 DYSURIA: ICD-10-CM

## 2023-06-27 DIAGNOSIS — K51.00 ULCERATIVE PANCOLITIS WITHOUT COMPLICATION (H): ICD-10-CM

## 2023-06-27 DIAGNOSIS — D50.9 IRON DEFICIENCY ANEMIA, UNSPECIFIED IRON DEFICIENCY ANEMIA TYPE: ICD-10-CM

## 2023-06-27 DIAGNOSIS — E53.8 VITAMIN B12 DEFICIENCY (NON ANEMIC): ICD-10-CM

## 2023-06-27 RX ORDER — DISINFECTANT WIPES 0.21 1/701
1 CLOTH EXTRACORPOREAL 2 TIMES DAILY
Qty: 10 TABLET | Refills: 0 | Status: SHIPPED | OUTPATIENT
Start: 2023-06-27 | End: 2023-07-02

## 2023-06-27 NOTE — CONFIDENTIAL NOTE
Writer called pt to inform her of recommendations from Francisca Mustafa BLAISE.    Ramsey, Francisca FARRAR, APRN CNP  You; Fracisco Hurtado, RN 5 minutes ago (2:45 PM)     SG  Please call pt and let her know I extended the cipro x 5 days ( culture showed this should be effective). Please push fluids. Also separate metformin from the cipro by 2 hours.   Thanks- Francisca       Pt verbalized understanding.

## 2023-06-27 NOTE — TELEPHONE ENCOUNTER
While scheduling the patient for her testing in cardiology, the patient requested a refill on her Cipro. She states that the bladder infection isn't completely gone.     A message will be sent to the original ordering team.     Kayleigh Stevens  [facility] Electrophysiology   [phone]

## 2023-06-27 NOTE — TELEPHONE ENCOUNTER
"Writer called pt to assess based on symptomatic mychart.    Pt states that she was originally prescribed Cipro by Dr. Moore for a UTI. She completed the 1 week course 5 days ago and states that her symptoms have not completely resolved. She notes that she is still experiencing burning upon urination and urinary frequency. She states \"overall I feel better than before, but I can tell it is not completely gone yet\".    Pt is requesting additional Cipro to help resolve the UTI.    Will route to provider for recommendations.  "

## 2023-06-29 DIAGNOSIS — R80.9 TYPE 2 DIABETES MELLITUS WITH MICROALBUMINURIA, WITHOUT LONG-TERM CURRENT USE OF INSULIN (H): ICD-10-CM

## 2023-06-29 DIAGNOSIS — E11.29 TYPE 2 DIABETES MELLITUS WITH MICROALBUMINURIA, WITHOUT LONG-TERM CURRENT USE OF INSULIN (H): ICD-10-CM

## 2023-06-29 DIAGNOSIS — I50.30 HEART FAILURE WITH PRESERVED EJECTION FRACTION, NYHA CLASS I (H): ICD-10-CM

## 2023-06-29 DIAGNOSIS — E78.5 HYPERLIPIDEMIA LDL GOAL <100: ICD-10-CM

## 2023-06-30 ENCOUNTER — ANCILLARY PROCEDURE (OUTPATIENT)
Dept: CT IMAGING | Facility: CLINIC | Age: 73
End: 2023-06-30
Attending: INTERNAL MEDICINE
Payer: MEDICARE

## 2023-06-30 ENCOUNTER — LAB (OUTPATIENT)
Dept: LAB | Facility: CLINIC | Age: 73
End: 2023-06-30
Payer: MEDICARE

## 2023-06-30 ENCOUNTER — OFFICE VISIT (OUTPATIENT)
Dept: CARDIOLOGY | Facility: CLINIC | Age: 73
End: 2023-06-30
Attending: NURSE PRACTITIONER
Payer: MEDICARE

## 2023-06-30 VITALS
HEART RATE: 89 BPM | DIASTOLIC BLOOD PRESSURE: 79 MMHG | WEIGHT: 150.4 LBS | SYSTOLIC BLOOD PRESSURE: 115 MMHG | BODY MASS INDEX: 26.65 KG/M2 | OXYGEN SATURATION: 100 %

## 2023-06-30 DIAGNOSIS — R80.9 TYPE 2 DIABETES MELLITUS WITH MICROALBUMINURIA, WITHOUT LONG-TERM CURRENT USE OF INSULIN (H): ICD-10-CM

## 2023-06-30 DIAGNOSIS — I27.20 PULMONARY HYPERTENSION (H): ICD-10-CM

## 2023-06-30 DIAGNOSIS — I48.19 PERSISTENT ATRIAL FIBRILLATION (H): ICD-10-CM

## 2023-06-30 DIAGNOSIS — Z98.890 S/P LEFT ATRIAL APPENDAGE LIGATION: ICD-10-CM

## 2023-06-30 DIAGNOSIS — E11.29 TYPE 2 DIABETES MELLITUS WITH MICROALBUMINURIA, WITHOUT LONG-TERM CURRENT USE OF INSULIN (H): ICD-10-CM

## 2023-06-30 DIAGNOSIS — Z95.818 PRESENCE OF WATCHMAN LEFT ATRIAL APPENDAGE CLOSURE DEVICE: ICD-10-CM

## 2023-06-30 DIAGNOSIS — R91.8 PULMONARY NODULES: ICD-10-CM

## 2023-06-30 DIAGNOSIS — N18.30 STAGE 3 CHRONIC KIDNEY DISEASE, UNSPECIFIED WHETHER STAGE 3A OR 3B CKD (H): ICD-10-CM

## 2023-06-30 DIAGNOSIS — I50.30 HEART FAILURE WITH PRESERVED EJECTION FRACTION, NYHA CLASS I (H): Primary | ICD-10-CM

## 2023-06-30 DIAGNOSIS — I50.32 CHRONIC DIASTOLIC HEART FAILURE (H): ICD-10-CM

## 2023-06-30 LAB
ANION GAP SERPL CALCULATED.3IONS-SCNC: 15 MMOL/L (ref 7–15)
ATRIAL RATE - MUSE: 84 BPM
BUN SERPL-MCNC: 30.5 MG/DL (ref 8–23)
CALCIUM SERPL-MCNC: 9.4 MG/DL (ref 8.8–10.2)
CHLORIDE SERPL-SCNC: 100 MMOL/L (ref 98–107)
CREAT SERPL-MCNC: 1.99 MG/DL (ref 0.51–0.95)
DEPRECATED HCO3 PLAS-SCNC: 18 MMOL/L (ref 22–29)
DIASTOLIC BLOOD PRESSURE - MUSE: NORMAL MMHG
ERYTHROCYTE [DISTWIDTH] IN BLOOD BY AUTOMATED COUNT: 22.9 % (ref 10–15)
GFR SERPL CREATININE-BSD FRML MDRD: 26 ML/MIN/1.73M2
GLUCOSE SERPL-MCNC: 211 MG/DL (ref 70–99)
HCT VFR BLD AUTO: 29.7 % (ref 35–47)
HGB BLD-MCNC: 8.5 G/DL (ref 11.7–15.7)
INTERPRETATION ECG - MUSE: NORMAL
MCH RBC QN AUTO: 21.6 PG (ref 26.5–33)
MCHC RBC AUTO-ENTMCNC: 28.6 G/DL (ref 31.5–36.5)
MCV RBC AUTO: 75 FL (ref 78–100)
P AXIS - MUSE: NORMAL DEGREES
PLATELET # BLD AUTO: 350 10E3/UL (ref 150–450)
POTASSIUM SERPL-SCNC: 4.8 MMOL/L (ref 3.4–5.3)
PR INTERVAL - MUSE: NORMAL MS
QRS DURATION - MUSE: 78 MS
QT - MUSE: 382 MS
QTC - MUSE: 451 MS
R AXIS - MUSE: 110 DEGREES
RBC # BLD AUTO: 3.94 10E6/UL (ref 3.8–5.2)
SODIUM SERPL-SCNC: 133 MMOL/L (ref 136–145)
SYSTOLIC BLOOD PRESSURE - MUSE: NORMAL MMHG
T AXIS - MUSE: 259 DEGREES
VENTRICULAR RATE- MUSE: 84 BPM
WBC # BLD AUTO: 8.1 10E3/UL (ref 4–11)

## 2023-06-30 PROCEDURE — 99214 OFFICE O/P EST MOD 30 MIN: CPT | Performed by: NURSE PRACTITIONER

## 2023-06-30 PROCEDURE — 80048 BASIC METABOLIC PNL TOTAL CA: CPT | Performed by: PATHOLOGY

## 2023-06-30 PROCEDURE — 93010 ELECTROCARDIOGRAM REPORT: CPT | Performed by: INTERNAL MEDICINE

## 2023-06-30 PROCEDURE — G1010 CDSM STANSON: HCPCS | Mod: GC | Performed by: RADIOLOGY

## 2023-06-30 PROCEDURE — 71250 CT THORAX DX C-: CPT | Mod: MF | Performed by: RADIOLOGY

## 2023-06-30 PROCEDURE — 85027 COMPLETE CBC AUTOMATED: CPT | Performed by: PATHOLOGY

## 2023-06-30 PROCEDURE — 36415 COLL VENOUS BLD VENIPUNCTURE: CPT | Performed by: PATHOLOGY

## 2023-06-30 PROCEDURE — 93005 ELECTROCARDIOGRAM TRACING: CPT

## 2023-06-30 PROCEDURE — G0463 HOSPITAL OUTPT CLINIC VISIT: HCPCS | Performed by: NURSE PRACTITIONER

## 2023-06-30 ASSESSMENT — PAIN SCALES - GENERAL: PAINLEVEL: NO PAIN (0)

## 2023-06-30 NOTE — PROGRESS NOTES
Georgia is a 73 year old female with a  medical history is significant for longstanding hypertension since she was in her teens, diabetes, dyslipidemia, and ulcerative colitis.    She has a UTI-  She is on antibiotics. She has been busy with taking care of her brother- he is now in a nursing home.   Patient states she has no SOB with or without activity. Weights 140-143 lbs at home.   Some swelling in the LE,watches grandkids ages 5 and 3 daily.  She has noted her BS to be stable- she is off the metformin. She feels well overall and denies chest pain, palpitations, lightheadedness, dizziness. Her BP's at home systolic under 120's. Doesn't feel she can typically hydrate herself well daily. She hasn't really taken the PRN Torsemide for a while.     Current meds:  Atenolol 50 mg daily  [id number] mg- PRN   Hydralazine 7 mg TID  Losartan 100 mg daily  Jardiance 25 mg daily      ROS:   Constitutional: No fever, chills, or sweats.  ENT: No visual disturbance, ear ache, epistaxis, sore throat.   Allergies/Immunologic: Negative  Respiratory: No cough, hemoptysis.   Cardiovascular: As per HPI.   GI: As per HPI.   : No urinary frequency, dysuria, or hematuria.   Integument: Negative.   Psychiatric: Negative.   Neuro: Negative.   Endocrinology: negative   Musculoskeletal: negative    EXAM:   GEN/CONSTITUIONAL: Appears comfortable, in no apparent distress   EYES: No icterus  ENT/MOUTH: Normal  JVP:  8 cm at 90 degrees  RESPIRATORY: Clear to auscultation bilaterally   CARDIOVASCULAR: Regular S1 and S2, 2/6 JUANITA.   ABDOMEN: Soft, non-tender, positive bowel sounds   NEUROLOGIC: Grossly non-focal   PSYCHIATRIC: Normal affect  EXT: no LE edema. Normal pedal pulses.  Skin: No petechiae, purpura or rash       ECHO 8/25  Interpretation Summary   Global and regional left ventricular function is normal with an EF of 60-65%.  Paradoxical septal motion consistent with right ventricular pressure and  volume overload is  present.  Moderate to severe right ventricular dilation is present.  Global right ventricular function is moderately to severely reduced.  Calculated aortic valve area in severe AS range. Consider additional  evaluation if indicated.  Moderate to severe tricuspid insufficiency is present.  Dilation of the inferior vena cava is present with abnormal respiratory  variation in diameter.  Trivial pericardial effusion is present.    .  Assessment and Plan:  Georgia is well today, despite her lack of any functional limitation, the ongoing concern about her mildly reduced RV function is still present.  She appears slightly hypervolemic.  She is making a better effort to stay hydrated. She has 3 more days of antibiotics. She has a visit with Dominique NP post TAVR.  Cataract surgery done last week.    1.  Severe pulmonary hypertension, normal LV function with mild dilation, functional class I-II.The etiology is likely group 2, and related to underlying diastolic dysfunction from longstanding hypertension and diabetes.    2.  Hypertension, with better recent control:  amlodipine stopped on 8/14 ( swelling).  Instructed to keep home blood pressure log.  3.  Mild-moderate aortic stenosis, possible bicuspid aortic valve:   4. Diabetes: PCP following  5.  Dyslipidemia, on Lipitor  6.  CKD stage 3  7. Ulcerative colitis      Plan:  10 mg Torsemide today and again 10 mg Torsemide on Sunday   CORE in 6 months         Carolyn ADAME NP-C

## 2023-06-30 NOTE — NURSING NOTE
Labs: Patient was given results of the laboratory testing obtained today. Patient demonstrated understanding of this information and agreed to call with further questions or concerns.     Med Reconcile: Reviewed and verified all current medications with the patient. The updated medication list was printed and given to the patient.    Return Appointment: Patient given instructions regarding scheduling next clinic visit. Patient demonstrated understanding of this information and agreed to call with further questions or concerns. CORE in 6 months.    Medication Change: Patient was educated regarding prescribed medication change, including discussion of the indication, administration, side effects, and when to report to MD or RN. Patient demonstrated understanding of this information and agreed to call with further questions or concerns. torsemide 10 mg today  no torsemide tomorrow  torsemide 10 mg on Sunday    Patient stated she understood all health information given and agreed to call with further questions or concerns.    Lorena Ambriz, VERONIQUE

## 2023-06-30 NOTE — NURSING NOTE
Chief Complaint   Patient presents with     Follow Up     45 day S/P LAAO     Vitals were taken, medications reconciled, and EKG was performed.    Washington Franklin, EMT  7:33 AM

## 2023-06-30 NOTE — LETTER
6/30/2023      RE: Keerthi Montoya  [address] [place] [id number]       Dear Colleague,    Thank you for the opportunity to participate in the care of your patient, Keerthi Montoya, at the [facility] [place] [facility]. Please see a copy of my visit note below.      Georgia is a 73 year old female with a  medical history is significant for longstanding hypertension since she was in her teens, diabetes, dyslipidemia, and ulcerative colitis.    She has a UTI-  She is on antibiotics. She has been busy with taking care of her brother- he is now in a nursing home.   Patient states she has no SOB with or without activity. Weights 140-143 lbs at home.   Some swelling in the LE,watches grandkids ages 5 and 3 daily.  She has noted her BS to be stable- she is off the metformin. She feels well overall and denies chest pain, palpitations, lightheadedness, dizziness. Her BP's at home systolic under 120's. Doesn't feel she can typically hydrate herself well daily. She hasn't really taken the PRN Torsemide for a while.     Current meds:  Atenolol 50 mg daily  Jaulfzynv23 mg- PRN   Hydralazine 7 mg TID  Losartan 100 mg daily  Jardiance 25 mg daily      ROS:   Constitutional: No fever, chills, or sweats.  ENT: No visual disturbance, ear ache, epistaxis, sore throat.   Allergies/Immunologic: Negative  Respiratory: No cough, hemoptysis.   Cardiovascular: As per HPI.   GI: As per HPI.   : No urinary frequency, dysuria, or hematuria.   Integument: Negative.   Psychiatric: Negative.   Neuro: Negative.   Endocrinology: negative   Musculoskeletal: negative    EXAM:   GEN/CONSTITUIONAL: Appears comfortable, in no apparent distress   EYES: No icterus  ENT/MOUTH: Normal  JVP:  8 cm at 90 degrees  RESPIRATORY: Clear to auscultation bilaterally   CARDIOVASCULAR: Regular S1 and S2, 2/6 JUANITA.   ABDOMEN: Soft, non-tender, positive bowel sounds   NEUROLOGIC:  Grossly non-focal   PSYCHIATRIC: Normal affect  EXT: no LE edema. Normal pedal pulses.  Skin: No petechiae, purpura or rash       ECHO 8/25  Interpretation Summary   Global and regional left ventricular function is normal with an EF of 60-65%.  Paradoxical septal motion consistent with right ventricular pressure and  volume overload is present.  Moderate to severe right ventricular dilation is present.  Global right ventricular function is moderately to severely reduced.  Calculated aortic valve area in severe AS range. Consider additional  evaluation if indicated.  Moderate to severe tricuspid insufficiency is present.  Dilation of the inferior vena cava is present with abnormal respiratory  variation in diameter.  Trivial pericardial effusion is present.    .  Assessment and Plan:  Georgia is well today, despite her lack of any functional limitation, the ongoing concern about her mildly reduced RV function is still present.  She appears slightly hypervolemic.  She is making a better effort to stay hydrated. She has 3 more days of antibiotics. She has a visit with Dominique NP post TAVR.  Cataract surgery done last week.    1.  Severe pulmonary hypertension, normal LV function with mild dilation, functional class I-II.The etiology is likely group 2, and related to underlying diastolic dysfunction from longstanding hypertension and diabetes.    2.  Hypertension, with better recent control:  amlodipine stopped on 8/14 ( swelling).  Instructed to keep home blood pressure log.  3.  Mild-moderate aortic stenosis, possible bicuspid aortic valve:   4. Diabetes: PCP following  5.  Dyslipidemia, on Lipitor  6.  CKD stage 3  7. Ulcerative colitis      Plan:  10 mg Torsemide today and again 10 mg Torsemide on Sunday   CORE in 6 months         Carolyn LLAMAS

## 2023-06-30 NOTE — PATIENT INSTRUCTIONS
Take your medicines every day, as directed    Changes made today:  torsemide 10 mg today  no torsemide tomorrow  torsemide 10 mg on Sunday   Monitor Your Weight and Symptoms    Contact us if you:    Gain 2 pounds in one day or 5 pounds in one week  Feel more short of breath  Notice more leg swelling  Feel lightheadeded   Change your lifestyle    Limit Salt or Sodium:  2000 mg  Limit Fluids:  2000 mL or approximately 64 ounces  Eat a Heart Healthy Diet  Low in saturated fats  Stay Active:  Aim to move at least 150 minutes every  week         To Contact us    During Business Hours:  [phone], option # 1      After hours, weekends or holidays:   [phone], Option #4  Ask to speak to the On-Call Cardiologist. Inform them you are a CORE/heart failure patient at the [place].     Use [facility] allows you to communicate directly with your heart team through secure messaging.  [facility] can be accessed any time on your phone, computer, or tablet.  If you need assistance, we'd be happy to help!         Keep your Heart Appointments:    CORE in 6 months     Please consider attending our virtual support group which is held monthly. Please reach out to [place] at [phone] for more information if you are interested in attending.       2023 dates:    Monday, July 3rd, 1-2pm - Managing Heart Failure During Summer (Summer Tips) with Vielka Dale NP  Monday, August 7th, 1-2pm  Monday, September 11th, 1-2pm  Monday, October 2nd, 1-2pm  Monday, November 6th, 1-2pm  Monday, December 4th, 1-2pm

## 2023-07-01 ENCOUNTER — TELEPHONE (OUTPATIENT)
Dept: INTERNAL MEDICINE | Facility: CLINIC | Age: 73
End: 2023-07-01

## 2023-07-01 DIAGNOSIS — R91.8 PULMONARY NODULES: Primary | ICD-10-CM

## 2023-07-01 NOTE — TELEPHONE ENCOUNTER
Placed CT chest order for 6 months    CAROLINE meyers      Dear Ms. Montoya;    The results of your CT scan are attached and we can discuss at your next visit with me on 7/14/2023. Most likely repeat your scan in about 6 months and I placed an order today    CAROLINE Meyers MD

## 2023-07-02 RX ORDER — EMPAGLIFLOZIN 10 MG/1
TABLET, FILM COATED ORAL
Qty: 90 TABLET | Refills: 3 | OUTPATIENT
Start: 2023-07-02

## 2023-07-02 NOTE — TELEPHONE ENCOUNTER
empagliflozin (JARDIANCE) 10 MG TABS tablet  End date 4/7/23    empagliflozin (JARDIANCE) 25 MG TABS tablet current order  4/7/23.

## 2023-07-05 NOTE — PROGRESS NOTES
Virtual Visit Details    Type of service:  Video Visit converted to telephone visit due to technical difficulties  Video Start Time: 12:15 PM  Video End Time: 12:25    Originating Location (pt. Location): Home    Distant Location (provider location):  On-site  Platform used for Video Visit: [facility] HEART CARE  CARDIOVASCULAR DIVISION    STRUCTURAL CLINIC RETURN VISIT    PRIMARY CARDIOLOGIST: Dr. Almendarez       PERTINENT CLINICAL HISTORY:     Keerthi Montoya is a very pleasant 73 year old female who presents for 45 day Watchman follow-up. She has a history of HLD, HTN, DM2, ulcerative pancolitis, CKD3, pulmonary HTN, HFpEF, moderate paradoxical low flow low gradient aortic stenosis, persistent atrial fibrillation with CHADS-VASc score 4 (HTN, DM, age, female) and HASBLED of 5 (HTN, CKD, age, prior bleed, meds) with intolerance to anticoagulation due to history of GI bleeding who underwent successful left atrial appendage closure with a 24mm WATCHMAN FLX device with 17% - 27% compression on May 11, 2023. Her intra procedure LAURA showed well seated RY occluder without leak or evidence of pericardial effusion. Post procedure limited TTE showed small anterior pericardial effusion. She was kept overnight for observation and limited TTE the next morning showed stable effusion. Her Xarelto was started (5/15/23) after limited TTE showed decreased size of effusion.      [place] has been feeling well. She is tolerating Xarelto without bleeding concerns. Otherwise no CP, SOB, orthopnea, PND, leg swelling, She otherwise denies fever, chills, chest pain, shortness of breath, orthopnea, PND, leg swelling, weight gain, lightheadedness, palpitations or syncope. Her groin site is healing well. No bloody stool/bloody urine. Had a UTI that cleared up with antibiotics.      PAST MEDICAL HISTORY:     Past Medical History:   Diagnosis Date     Chronic kidney disease      Diabetes mellitus, type 2 (H)      Diverticulosis  of colon (without mention of hemorrhage) 10/01/2012     GI bleed      History of blood transfusion      HLD (hyperlipidemia)      Hypertension      Hypothyroidism      MARIA D (iron deficiency anemia)      Persistent atrial fibrillation (H)      Pulmonary hypertension (H)      Ulcerative (chronic) enterocolitis (H)         PAST SURGICAL HISTORY:     Past Surgical History:   Procedure Laterality Date     CHOLECYSTECTOMY  3/1987     COLON SURGERY  01/2001    Left colon resection; diverticulitis     COLONOSCOPY N/A 4/24/2017    Procedure: COMBINED COLONOSCOPY, SINGLE OR MULTIPLE BIOPSY/POLYPECTOMY BY BIOPSY;;  Surgeon: Radha Salguero MD;  Location: [facility] OR     COLONOSCOPY N/A 3/10/2023    Procedure: COLONOSCOPY;  Surgeon: Preeti James MD;  Location: [facility] GI     COLONOSCOPY WITH CO2 INSUFFLATION N/A 4/24/2017    Procedure: COLONOSCOPY WITH CO2 INSUFFLATION;  Colonoscopy Dx rectal bleeding, BMI 25.86, Pharm [facility] .[phone], ;  Surgeon: Radha Salguero MD;  Location: [facility] OR     CV LEFT ATRIAL APPENDAGE CLOSURE N/A 5/11/2023    Procedure: Left Atrial Appendage Closure;  Surgeon: Bobby Grimes MD;  Location:  HEART CARDIAC CATH LAB     CV RIGHT HEART CATH MEASUREMENTS RECORDED N/A 3/16/2020    Procedure: CV RIGHT HEART CATH;  Surgeon: Nader Muñoz MD;  Location:  HEART CARDIAC CATH LAB     ESOPHAGOSCOPY, GASTROSCOPY, DUODENOSCOPY (EGD), COMBINED N/A 1/23/2023    Procedure: ESOPHAGOGASTRODUODENOSCOPY, WITH BIOPSY;  Surgeon: Ricki Deal MD;  Location:  GI     ESOPHAGOSCOPY, GASTROSCOPY, DUODENOSCOPY (EGD), COMBINED N/A 3/10/2023    Procedure: Esophagoscopy, gastroscopy, duodenoscopy (EGD), combined;  Surgeon: Preeti James MD;  Location:  GI     GYN SURGERY  9/1983    tubal ligation     HERNIA REPAIR  12/2006    recurrent incisional hernia     SIGMOIDOSCOPY FLEXIBLE N/A 1/23/2023    Procedure: Sigmoidoscopy flexible;  Surgeon: Ricki Deal MD;  Location:  GI      THROAT SURGERY  12/1974    thyroidectomy        CURRENT MEDICATIONS:     Current Outpatient Medications   Medication Sig Dispense Refill     atenolol (TENORMIN) 50 MG tablet Take 1 tablet (50 mg) by mouth daily 90 tablet 3     atorvastatin (LIPITOR) 40 MG tablet Take 1 tablet (40 mg) by mouth daily 90 tablet 3     blood glucose (ACCU-CHEK FELIPE PLUS) test strip 200 strips by In Vitro route 2 times daily Use to test blood sugar 2 times daily or as directed. 200 strip 4     Cholecalciferol (VITAMIN D) 2000 units CAPS Take by mouth daily (with lunch)       Cyanocobalamin (B-12) 1000 MCG TABS Take 1,000 mcg by mouth three times a week       diphenhydrAMINE (BENADRYL) 25 MG tablet Take 25 mg by mouth every 6 hours as needed for itching or allergies       empagliflozin (JARDIANCE) 25 MG TABS tablet Take 1 tablet (25 mg) by mouth daily 90 tablet 3     glipiZIDE (GLUCOTROL XL) 10 MG 24 hr tablet Take 2 tablets (20 mg) by mouth daily (Patient taking differently: Take 20 mg by mouth every morning) 180 tablet 3     hydrALAZINE (APRESOLINE) 50 MG tablet Take 1.5 tablets (75 mg) by mouth 3 times daily 270 tablet 3     ketorolac (ACULAR) 0.5 % ophthalmic solution INSTILL 1 DROP IN AFFECTED EYE THREE TIMES DAILY AS DIRECTED. START 3 DAYS BEFORE SURGERY AND USE UNTIL GONE       levothyroxine (SYNTHROID/LEVOTHROID) 112 MCG tablet Take 1 tablet (112 mcg) by mouth daily (Patient taking differently: Take 112 mcg by mouth every morning) 90 tablet 4     losartan (COZAAR) 100 MG tablet Take 1 tablet (100 mg) by mouth daily (Patient taking differently: Take 100 mg by mouth every morning) 90 tablet 3     mesalamine (APRISO ER) 0.375 g 24 hr capsule TAKE 4 CAPSULES(1.5 GRAMS) BY MOUTH DAILY (Patient taking differently: 1.5 g daily (with lunch)) 360 capsule 3     ofloxacin (OCUFLOX) 0.3 % ophthalmic solution INSTILL 1 DROP IN AFFECTED EYE THREE TIMES DAILY AS DIRECTED. START 3 DAYS BEFORE SURGERY AND USE UNTIL GONE       pantoprazole  (PROTONIX) 40 MG EC tablet Take 1 tablet (40 mg) by mouth 2 times daily 180 tablet 3     prednisoLONE acetate (PRED FORTE) 1 % ophthalmic suspension SHAKE LIQUID AND INSTILL 1 DROP IN AFFECTED EYE THREE TIMES DAILY AS DIRECTED. START 3 DAYS BEFORE SURGERY AND. CONTINUE UNTIL ALL TAKEN       rivaroxaban ANTICOAGULANT (XARELTO) 15 MG TABS tablet Take 1 tablet (15 mg) by mouth daily DO NOT START UNTIL AFTER ECHO ON Monday 5/15 90 tablet 0     sodium bicarbonate 650 MG tablet Take 1 tablet (650 mg) by mouth daily 90 tablet 3        ALLERGIES:     Allergies   Allergen Reactions     Actos [Pioglitazone]      Fluid retention     Amlodipine      Leg swelling, hand swelling     Animal Dander      Doxycycline Hives     Dust Mites      Grass      Keflex [Cephalexin Hcl] Hives     Metoprolol      Swelling of lower legs and fatigue     Other [Seasonal Allergies]      pollen     Ranitidine      rash     Synthroid [Levothyroxine Sodium] Swelling     Allergic to brand name     Tetracycline Hives     Tylenol Hives     Ziac [Bisoprolol-Hydrochlorothiazide]         FAMILY HISTORY:     Family History   Problem Relation Age of Onset     Cerebrovascular Disease Mother      Cancer Father         bladder     Diverticulitis Brother      Diverticulitis Brother      Kidney Disease No family hx of      Crohn's Disease No family hx of      Ulcerative Colitis No family hx of      Stomach Cancer No family hx of      GERD No family hx of      Celiac Disease No family hx of      Anesthesia Reaction No family hx of         SOCIAL HISTORY:     Social History     Socioeconomic History     Marital status:      Spouse name: Raymundo; dementia;  2016     Number of children: 3   Occupational History     Employer: [employer]   Tobacco Use     Smoking status: Never     Smokeless tobacco: Never   Substance and Sexual Activity     Alcohol use: Not Currently     Alcohol/week: 2.0 - 4.0 standard drinks of alcohol     Types: 1 - 2 Cans of beer, 1  - 2 Shots of liquor per week     Comment: occasional cocktail or beer     Drug use: No     Sexual activity: Not Currently   Other Topics Concern      Service No     Blood Transfusions No     Caffeine Concern No     Occupational Exposure No     Hobby Hazards No     Sleep Concern No     Stress Concern No     Weight Concern No     Special Diet No     Back Care No     Exercise Yes     Comment: off and on     Bike Helmet No     Seat Belt Yes     Self-Exams No   Social History Narrative    Laid off her job 8/14        REVIEW OF SYSTEMS:     Constitutional: No fevers or chills  Skin: No new rash or itching  Eyes: No acute change in vision  Ears/Nose/Throat: No purulent rhinorrhea, new hearing loss, or new vertigo  Respiratory: No cough or hemoptysis  Cardiovascular: See HPI  Gastrointestinal: No change in appetite, vomiting, hematemesis or diarrhea  Genitourinary: No dysuria or hematuria  Musculoskeletal: No new back pain, neck pain or muscle pain  Neurologic: No new headaches, focal weakness or behavior changes  Psychiatric: No hallucinations, excessive alcohol consumption or illegal drug usage  Hematologic/Lymphatic/Immunologic: No bleeding, chills, fever, night sweats or weight loss  Endocrine: No new cold intolerance, heat intolerance, polyphagia, polydipsia or polyuria      PHYSICAL EXAMINATION:     Wt 64.9 kg (143 lb)   LMP  (LMP Unknown)   BMI 25.34 kg/m      GENERAL: No acute distress.  HEENT: EOMI. Sclerae white, not injected. Nares clear. Pharynx without erythema or exudate.   Heart: no JVD  Lungs: Non labored   Extremities: No clubbing, cyanosis, or edema.   Neurologic: Alert and oriented to person/place/time, normal speech and affect. No focal deficits.  Skin: No petechiae, purpura or rash.     LABORATORY DATA:     LIPID RESULTS:  Lab Results   Component Value Date    CHOL 73 01/20/2023    CHOL 93 10/28/2020    HDL 29 (L) 01/20/2023    HDL 38 (L) 10/28/2020    LDL 27 01/20/2023    LDL 33 10/28/2020     TRIG 87 01/20/2023    TRIG 108 10/28/2020    CHOLHDLRATIO 4.3 05/18/2015       LIVER ENZYME RESULTS:  Lab Results   Component Value Date    AST 32 06/23/2023    AST 19 10/28/2020    ALT 5 06/23/2023    ALT 18 10/28/2020       CBC RESULTS:  Lab Results   Component Value Date    WBC 8.1 06/30/2023    WBC 7.3 10/28/2020    RBC 3.94 06/30/2023    RBC 3.99 10/28/2020    HGB 8.5 (L) 06/30/2023    HGB 11.8 02/08/2021    HCT 29.7 (L) 06/30/2023    HCT 38.2 10/28/2020    MCV 75 (L) 06/30/2023    MCV 96 10/28/2020    MCH 21.6 (L) 06/30/2023    MCH 30.8 10/28/2020    MCHC 28.6 (L) 06/30/2023    MCHC 32.2 10/28/2020    RDW 22.9 (H) 06/30/2023    RDW 13.9 10/28/2020     06/30/2023     10/28/2020       BMP RESULTS:  Lab Results   Component Value Date     (L) 06/30/2023     04/28/2021    POTASSIUM 4.8 06/30/2023    POTASSIUM 4.5 05/11/2023    POTASSIUM 4.3 04/27/2023    POTASSIUM 4.5 04/28/2021    CHLORIDE 100 06/30/2023    CHLORIDE 109 04/27/2023    CHLORIDE 100 04/28/2021    CO2 18 (L) 06/30/2023    CO2 21 04/27/2023    CO2 26 04/28/2021    ANIONGAP 15 06/30/2023    ANIONGAP 7 04/27/2023    ANIONGAP 8 04/28/2021     (H) 06/30/2023     (H) 05/11/2023     (H) 05/11/2023     (H) 04/27/2023    GLC 57 (L) 04/28/2021    BUN 30.5 (H) 06/30/2023    BUN 28 04/27/2023    BUN 23 04/28/2021    CR 1.99 (H) 06/30/2023    CR 1.21 (H) 04/28/2021    GFRESTIMATED 26 (L) 06/30/2023    GFRESTIMATED 30 (L) 12/30/2022    GFRESTIMATED 46 (L) 04/28/2021    GFRESTBLACK 53 (L) 04/28/2021    ABEL 9.4 06/30/2023    ABEL 9.8 04/28/2021        A1C RESULTS:  Lab Results   Component Value Date    A1C 11.6 (H) 04/27/2023    A1C 6.6 (H) 10/28/2020       INR RESULTS:  Lab Results   Component Value Date    INR 1.13 05/08/2023          PROCEDURES & FURTHER ASSESSMENTS:     Cardiac CT 45 days post LAAO: Pending       LAURA 5/11/23:   PRE-PROCEDURAL SURVEY:  1. The LV function was 55-60% and the RV function was  moderately-severely  reduced.  2. There was no evidence of shunting at the level of the interatrial septum.  3. The maximum pre-deployment left atrial appendage orifice width was 19.9 mm.  4. There was no intracardiac thrombus.  5. There was no pericardial effusion.     POST-PROCEDURAL SURVEY:  1. The 24 mm Watchman FLX device was well-seated in the left atrial appendage.  There was adequate compression of the device. There was no evidence of delroy-  device leak.  2. There was no pericardial effusion.  3. There was a small left-right shunt at the site of the transseptal puncture.  4. The biventricular function was unchanged.  ______________________________________________________________________________  Procedure  Transesophageal Echocardiogram with color and spectral Doppler performed. 3D  image acquisition, reconstruction, and real-time interpretation was performed.  Procedure location Heart Catherization Laboratory. Informed consent for  Transesophegeal echo obtained. LAURA Probe #[id number] then 66 due to significant  Doppler artifact was used during the procedure. Sedation, endotracheal  intubation, and mechanical ventilation were initiated prior to the LAURA and  were monitored by anesthesia. The Transducer was inserted without difficulty .  Complications None. The patient's rhythm is normal sinus. Good quality two-  dimensional was performed and interpreted. Adequate quality color and spectral  Doppler were performed and interpreted.     Left Ventricle  Global and regional left ventricular function is normal with an EF of 55-60%.  Left ventricular size is normal.     Right Ventricle  Moderate right ventricular dilation is present. Global right ventricular  function is moderately to severely reduced.     Atria  Moderate to severe biatrial enlargement is present.     Mitral Valve  The mitral valve is normal. Trace mitral insufficiency is present.     Aortic Valve  The aortic valve is tricuspid. Mild aortic valve sclerosis  is present. Trace  aortic insufficiency is present.     Tricuspid Valve  Mild to moderate tricuspid insufficiency is present. Pulmonary artery systolic  pressure cannot be assessed.     Pulmonic Valve  Mild pulmonic insufficiency is present.     Vessels  The aorta root is normal. The thoracic aorta is normal. The LUPV Doppler shows  normal velocity waveform . The right upper pulmonary vein cannot be assessed.  The right lower pulmonary vein cannot be assessed. The left lower pulmonary  vein cannot be assessed.     Pericardium  No pericardial effusion is present.     Compared to Previous Study  This study was compared with the study from 08/19/2022 . There has been  interval LAAO closure device placement. No significant changes in the  biventricular function.     TTE 5/11/23:   Small anterior pericardial Effusion     TTE 5/12/23:  ______________________________________________________________________________  Interpretation Summary  Small unchanged pericardial effusion is present without evidence of  pericardial tamponade.     Left ventricular function is normal.The ejection fraction is 55-60%.  Paradoxical septal motion consistent with right ventricular pressure and  volume overload is present.  Moderate to severe right ventricular dilation is present. Global right  ventricular function is moderately to severely reduced.  IVC diameter >2.1 cm collapsing <50% with sniff suggests a high RA pressure  estimated at 15 mmHg or greater.     This study was compared with the study from 5/11/23. There has been no change.    TTE 5/15/23:  Interpretation Summary  Left ventricular size, wall motion and function are normal. The ejection  fraction is 60-65%. Flattened septum is consistent with right ventricular  pressure and volume overload.  Moderate to severe right ventricular dilation is present.  Global right ventricular function is moderately to severely reduced.  Severe aortic valve calcification is present.  Moderate  tricuspid insufficiency is present.  Dilation of the inferior vena cava is present with abnormal respiratory  variation in diameter. Trivial, primarily anterior pericardial effusion is  present.     Compared to prior study the pericardial effusion is the same or slightly  improved. LV function appears slightly better.  ______________________________________________________________________________  Left Ventricle  Left ventricular size, wall motion and function are normal. The ejection  fraction is 60-65%. Flattened septum is consistent with right ventricular  pressure and volume overload.     Right Ventricle  Moderate to severe right ventricular dilation is present. Global right  ventricular function is moderately to severely reduced.     Atria  The left atrium appears normal. Severe right atrial enlargement is present.     Mitral Valve  Moderate mitral annular calcification is present. Trace mitral insufficiency  is present.     Aortic Valve  Severe aortic valve calcification is present.     Tricuspid Valve  The tricuspid valve is normal. Moderate tricuspid insufficiency is present.  The right ventricular systolic pressure is approximated at 49.1 mmHg plus the  right atrial pressure.     Pulmonic Valve  The pulmonic valve cannot be assessed.     Vessels  Dilation of the inferior vena cava is present with abnormal respiratory  variation in diameter. IVC diameter >2.1 cm collapsing <50% with sniff  suggests a high RA pressure estimated at 15 mmHg or greater.     Pericardium  Trivial, primarily anterior pericardial effusion is present.     Compared to Previous Study  No significant changes noted.  ______________________________________________________________________________  Doppler Measurements & Calculations  Ao V2 max: 215.0 cm/sec  Ao max P.5 mmHg  Ao V2 mean: 137.7 cm/sec  Ao mean P.0 mmHg  Ao V2 VTI: 41.0 cm  TR max marcia: 350.2 cm/sec  TR max P.1 mmHg     CLINICAL IMPRESSION:     Keerthi Montoya  is a very pleasant 73 year old female who presents for 45 day Watchman follow-up.     # Paroxysmal a-fib w/contraindication to anticoagulation due to GI bleeding  # Healed gastric ulcers/Ulcerative colitis (inactive)  Underwent successful implantation of 24mm Watchman FLX. Intra-procedure LAURA showed no evidence of delroy-device leak and no pericardial effusion. Post procedure TTE with small anterior effusion. Patient kept overnight for observation. Repeat limited TTE 5/12/23 shows stable small pericardial effusion. Xarelto started 5/15/23, no completed 6 week course. Reports feeling well, no CV concerns, no bleeding concerns.   - Cardiac CT pending  - Stop Xarelto, start Plavix 75 mg daily x 4.5 months  - Continue aspirin 81 mg daily lifelong  - Dual antiplatelet therapy with clopidogrel and aspirin will be continued for a total of 6 months from implantation date, with aspirin then continuing lifelong  - SBE prophylaxis x 6 months      # HFpEF  # Moderate PH  # Moderate paradoxical low flow low gradient aortic stenosis  Patient uses torsemide 10 mg PRN for leg swelling. Attempted to increased to scheduled dose of 10 mg daily due to elevated LVEDP and ECHO showing RV pressure and volume overload w/ RA > 15 mmHg; however, patient developed KWASI. Torsemide back to 10 mg PRN with improvement in KWASI.   -Continue Jardiance, losartan  -Continue torsemide 10 mg PRN     # HTN  # HLD   - Continue atenolol and atorvastatin     # Stage III CKD   - Last BMP stable with creatinine 1.99, GFR 26, CTM      # MARIA D:  Hgb 8.5, receiving Iron infusions.  - Follow-up with Heme     # Diabetes type II   - Poorly controlled, recent Hgb A1C 11.6. Continue glipizide and Jardiance. Further diabetes management per pharm at [facility].      RTC: Follow-up with structural clinic 4 months     WENDI Marr, CNP  [facility] Heart Care       CC  Patient Care Team:  Bunny Meyers MD as PCP - General (Internal Medicine)  Preeti James MD as  MD (Gastroenterology)  Swati Minor DO as Assigned Nephrology Provider  Jerry Robbins PA-C as Physician Assistant (Gastroenterology)  Bunny Meyers MD as Assigned PCP  Preeti James MD as Assigned Gastroenterology Provider  Bunny Santa [facility] as Assigned MTM Pharmacist  Allison Yang, RN as Specialty Care Coordinator (Gastroenterology)  Gianfranco Melendez MD as Intern (Student in organized health care education/training program)  Jethro Moore MD as MD (Hematology & Oncology)  Fracisco Hurtado, RN as Specialty Care Coordinator (Hematology & Oncology)  David Almendarez MD as Assigned Heart and Vascular Provider  Vanessa Schneider, RN as Diabetes Educator (Diabetes Education)

## 2023-07-06 ENCOUNTER — VIRTUAL VISIT (OUTPATIENT)
Dept: CARDIOLOGY | Facility: CLINIC | Age: 73
End: 2023-07-06
Attending: NURSE PRACTITIONER
Payer: MEDICARE

## 2023-07-06 ENCOUNTER — TELEPHONE (OUTPATIENT)
Dept: INTERNAL MEDICINE | Facility: CLINIC | Age: 73
End: 2023-07-06

## 2023-07-06 VITALS — WEIGHT: 143 LBS | BODY MASS INDEX: 25.34 KG/M2

## 2023-07-06 DIAGNOSIS — I48.19 PERSISTENT ATRIAL FIBRILLATION (H): ICD-10-CM

## 2023-07-06 DIAGNOSIS — Z98.890 S/P LEFT ATRIAL APPENDAGE LIGATION: ICD-10-CM

## 2023-07-06 DIAGNOSIS — Z95.818 PRESENCE OF WATCHMAN LEFT ATRIAL APPENDAGE CLOSURE DEVICE: Primary | ICD-10-CM

## 2023-07-06 PROCEDURE — 99214 OFFICE O/P EST MOD 30 MIN: CPT | Mod: 95 | Performed by: NURSE PRACTITIONER

## 2023-07-06 RX ORDER — CLOPIDOGREL BISULFATE 75 MG/1
75 TABLET ORAL DAILY
Qty: 90 TABLET | Refills: 1 | Status: SHIPPED | OUTPATIENT
Start: 2023-07-06 | End: 2023-07-28 | Stop reason: SINTOL

## 2023-07-06 RX ORDER — ASPIRIN 81 MG/1
81 TABLET ORAL DAILY
Qty: 90 TABLET | Refills: 3 | COMMUNITY
Start: 2023-07-06

## 2023-07-06 ASSESSMENT — PAIN SCALES - GENERAL: PAINLEVEL: NO PAIN (0)

## 2023-07-06 NOTE — NURSING NOTE
Is the patient currently in the state of [place]? YES    Visit mode:VIDEO    If the visit is dropped, the patient can be reconnected by: VIDEO VISIT: Text to cell phone: [phone]    Will anyone else be joining the visit? NO      How would you like to obtain your AVS? MyChart    Are changes needed to the allergy or medication list? NO     Patient declined individual allergy and medication review by support staff because they deny any changes and state that all information remains accurate since last reviewed/verified.       Reason for visit: RECHECK (No other vitals to report today/)    Lala Britt, ANJELICA/CMA

## 2023-07-06 NOTE — PATIENT INSTRUCTIONS
You were seen today in the Structural Heart Clinic at the [facility].    Cardiology provider you saw during your visit: Dominique Saavedra NP    Instructions:   Discontinue Xarelto  Start ASA 81 mg daily lifelong and Plavix 75 mg daily x 4.5 months   Delay any nonurgent dental procedures for the first 6 month post Watchman.  If you need a dental procedure, you will need to take antibiotics prior - please call us.       Have cardiac CT performed next week - will contact you with results                   Follow-up in Valve Clinic in 6 months     Prevention of Infective (Bacterial) Endocarditis:  You are at increased risk for developing adverse outcomes from infective endocarditis (IE), also known as bacterial endocarditis (BE) because of the new device in your heart. The guidelines for prevention of IE are to give patients antibiotics prior to any dental procedures that involve manipulation of gingival tissue or the periapical region of teeth, or perforation of the oral mucosa:      It is recommended to take Amoxicillin 2 gm by mouth as a single dose 30 to 60 minutes before procedure.     OR if allergic to Penicillin or Ampicillin:     Cephalexin 2 gm by mouth, or  Clindamycin 600 mg by mouth, or  Azithromycin or Clarithromycin 500 mg PO       Questions and scheduling:   First call: Structural Heart  Celia Conor [phone]    General scheduling line: [phone].   First press #1 for the University and then press #3 for Medical Questions to reach the Cardiology triage nurse.     On Call Cardiologist for after hours or on weekends: [phone], press option #4 and ask to speak to the on-call Cardiologist.

## 2023-07-06 NOTE — LETTER
7/6/2023      RE: Keerthi Montoya  [address] [place] [id number]       Dear Colleague,    Thank you for the opportunity to participate in the care of your patient, Keerthi Montoya, at the [facility] [place] [facility]. Please see a copy of my visit note below.          [facility]ARE  CARDIOVASCULAR DIVISION    STRUCTURAL CLINIC RETURN VISIT    PRIMARY CARDIOLOGIST: Dr. Almendarez       PERTINENT CLINICAL HISTORY:     Keerthi Montoya is a very pleasant 73 year old female who presents for 45 day Watchman follow-up. She has a history of HLD, HTN, DM2, ulcerative pancolitis, CKD3, pulmonary HTN, HFpEF, moderate paradoxical low flow low gradient aortic stenosis, persistent atrial fibrillation with CHADS-VASc score 4 (HTN, DM, age, female) and HASBLED of 5 (HTN, CKD, age, prior bleed, meds) with intolerance to anticoagulation due to history of GI bleeding who underwent successful left atrial appendage closure with a 24mm WATCHMAN FLX device with 17% - 27% compression on May 11, 2023. Her intra procedure LAURA showed well seated RY occluder without leak or evidence of pericardial effusion. Post procedure limited TTE showed small anterior pericardial effusion. She was kept overnight for observation and limited TTE the next morning showed stable effusion. Her Xarelto was started (5/15/23) after limited TTE showed decreased size of effusion.      [place] has been feeling well. She is tolerating Xarelto without bleeding concerns. Otherwise no CP, SOB, orthopnea, PND, leg swelling, She otherwise denies fever, chills, chest pain, shortness of breath, orthopnea, PND, leg swelling, weight gain, lightheadedness, palpitations or syncope. Her groin site is healing well. No bloody stool/bloody urine. Had a UTI that cleared up with antibiotics.      PAST MEDICAL HISTORY:     Past Medical History:   Diagnosis Date    Chronic kidney disease      Diabetes mellitus, type 2 (H)     Diverticulosis of colon (without mention of hemorrhage) 10/01/2012    GI bleed     History of blood transfusion     HLD (hyperlipidemia)     Hypertension     Hypothyroidism     MARIA D (iron deficiency anemia)     Persistent atrial fibrillation (H)     Pulmonary hypertension (H)     Ulcerative (chronic) enterocolitis (H)         PAST SURGICAL HISTORY:     Past Surgical History:   Procedure Laterality Date    CHOLECYSTECTOMY  3/1987    COLON SURGERY  01/2001    Left colon resection; diverticulitis    COLONOSCOPY N/A 4/24/2017    Procedure: COMBINED COLONOSCOPY, SINGLE OR MULTIPLE BIOPSY/POLYPECTOMY BY BIOPSY;;  Surgeon: Radha Salguero MD;  Location: [facility] OR    COLONOSCOPY N/A 3/10/2023    Procedure: COLONOSCOPY;  Surgeon: Preeti James MD;  Location:  GI    COLONOSCOPY WITH CO2 INSUFFLATION N/A 4/24/2017    Procedure: COLONOSCOPY WITH CO2 INSUFFLATION;  Colonoscopy Dx rectal bleeding, BMI 25.86, Pharm [facility] .[phone], ;  Surgeon: Radha Salguero MD;  Location: [facility] OR    CV LEFT ATRIAL APPENDAGE CLOSURE N/A 5/11/2023    Procedure: Left Atrial Appendage Closure;  Surgeon: Bobby Grimes MD;  Location:  HEART CARDIAC CATH LAB    CV RIGHT HEART CATH MEASUREMENTS RECORDED N/A 3/16/2020    Procedure: CV RIGHT HEART CATH;  Surgeon: Nader Muñoz MD;  Location:  HEART CARDIAC CATH LAB    ESOPHAGOSCOPY, GASTROSCOPY, DUODENOSCOPY (EGD), COMBINED N/A 1/23/2023    Procedure: ESOPHAGOGASTRODUODENOSCOPY, WITH BIOPSY;  Surgeon: Ricki Deal MD;  Location:  GI    ESOPHAGOSCOPY, GASTROSCOPY, DUODENOSCOPY (EGD), COMBINED N/A 3/10/2023    Procedure: Esophagoscopy, gastroscopy, duodenoscopy (EGD), combined;  Surgeon: Preeti James MD;  Location:  GI    GYN SURGERY  9/1983    tubal ligation    HERNIA REPAIR  12/2006    recurrent incisional hernia    SIGMOIDOSCOPY FLEXIBLE N/A 1/23/2023    Procedure: Sigmoidoscopy flexible;  Surgeon: Ricki Deal  MD;  Location:  GI    THROAT SURGERY  12/1974    thyroidectomy        CURRENT MEDICATIONS:     Current Outpatient Medications   Medication Sig Dispense Refill    atenolol (TENORMIN) 50 MG tablet Take 1 tablet (50 mg) by mouth daily 90 tablet 3    atorvastatin (LIPITOR) 40 MG tablet Take 1 tablet (40 mg) by mouth daily 90 tablet 3    blood glucose (ACCU-CHEK FELIPE PLUS) test strip 200 strips by In Vitro route 2 times daily Use to test blood sugar 2 times daily or as directed. 200 strip 4    Cholecalciferol (VITAMIN D) 2000 units CAPS Take by mouth daily (with lunch)      Cyanocobalamin (B-12) 1000 MCG TABS Take 1,000 mcg by mouth three times a week      diphenhydrAMINE (BENADRYL) 25 MG tablet Take 25 mg by mouth every 6 hours as needed for itching or allergies      empagliflozin (JARDIANCE) 25 MG TABS tablet Take 1 tablet (25 mg) by mouth daily 90 tablet 3    glipiZIDE (GLUCOTROL XL) 10 MG 24 hr tablet Take 2 tablets (20 mg) by mouth daily (Patient taking differently: Take 20 mg by mouth every morning) 180 tablet 3    hydrALAZINE (APRESOLINE) 50 MG tablet Take 1.5 tablets (75 mg) by mouth 3 times daily 270 tablet 3    ketorolac (ACULAR) 0.5 % ophthalmic solution INSTILL 1 DROP IN AFFECTED EYE THREE TIMES DAILY AS DIRECTED. START 3 DAYS BEFORE SURGERY AND USE UNTIL GONE      levothyroxine (SYNTHROID/LEVOTHROID) 112 MCG tablet Take 1 tablet (112 mcg) by mouth daily (Patient taking differently: Take 112 mcg by mouth every morning) 90 tablet 4    losartan (COZAAR) 100 MG tablet Take 1 tablet (100 mg) by mouth daily (Patient taking differently: Take 100 mg by mouth every morning) 90 tablet 3    mesalamine (APRISO ER) 0.375 g 24 hr capsule TAKE 4 CAPSULES(1.5 GRAMS) BY MOUTH DAILY (Patient taking differently: 1.5 g daily (with lunch)) 360 capsule 3    ofloxacin (OCUFLOX) 0.3 % ophthalmic solution INSTILL 1 DROP IN AFFECTED EYE THREE TIMES DAILY AS DIRECTED. START 3 DAYS BEFORE SURGERY AND USE UNTIL GONE      pantoprazole  (PROTONIX) 40 MG EC tablet Take 1 tablet (40 mg) by mouth 2 times daily 180 tablet 3    prednisoLONE acetate (PRED FORTE) 1 % ophthalmic suspension SHAKE LIQUID AND INSTILL 1 DROP IN AFFECTED EYE THREE TIMES DAILY AS DIRECTED. START 3 DAYS BEFORE SURGERY AND. CONTINUE UNTIL ALL TAKEN      rivaroxaban ANTICOAGULANT (XARELTO) 15 MG TABS tablet Take 1 tablet (15 mg) by mouth daily DO NOT START UNTIL AFTER ECHO ON Monday 5/15 90 tablet 0    sodium bicarbonate 650 MG tablet Take 1 tablet (650 mg) by mouth daily 90 tablet 3        ALLERGIES:     Allergies   Allergen Reactions    Actos [Pioglitazone]      Fluid retention    Amlodipine      Leg swelling, hand swelling    Animal Dander     Doxycycline Hives    Dust Mites     Grass     Keflex [Cephalexin Hcl] Hives    Metoprolol      Swelling of lower legs and fatigue    Other [Seasonal Allergies]      pollen    Ranitidine      rash    Synthroid [Levothyroxine Sodium] Swelling     Allergic to brand name    Tetracycline Hives    Tylenol Hives    Ziac [Bisoprolol-Hydrochlorothiazide]         FAMILY HISTORY:     Family History   Problem Relation Age of Onset    Cerebrovascular Disease Mother     Cancer Father         bladder    Diverticulitis Brother     Diverticulitis Brother     Kidney Disease No family hx of     Crohn's Disease No family hx of     Ulcerative Colitis No family hx of     Stomach Cancer No family hx of     GERD No family hx of     Celiac Disease No family hx of     Anesthesia Reaction No family hx of         SOCIAL HISTORY:     Social History     Socioeconomic History    Marital status:      Spouse name: Raymundo; dementia;  2016    Number of children: 3   Occupational History     Employer: [employer]   Tobacco Use    Smoking status: Never    Smokeless tobacco: Never   Substance and Sexual Activity    Alcohol use: Not Currently     Alcohol/week: 2.0 - 4.0 standard drinks of alcohol     Types: 1 - 2 Cans of beer, 1 - 2 Shots of liquor per week      Comment: occasional cocktail or beer    Drug use: No    Sexual activity: Not Currently   Other Topics Concern     Service No    Blood Transfusions No    Caffeine Concern No    Occupational Exposure No    Hobby Hazards No    Sleep Concern No    Stress Concern No    Weight Concern No    Special Diet No    Back Care No    Exercise Yes     Comment: off and on    Bike Helmet No    Seat Belt Yes    Self-Exams No   Social History Narrative    Laid off her job 8/14        REVIEW OF SYSTEMS:     Constitutional: No fevers or chills  Skin: No new rash or itching  Eyes: No acute change in vision  Ears/Nose/Throat: No purulent rhinorrhea, new hearing loss, or new vertigo  Respiratory: No cough or hemoptysis  Cardiovascular: See HPI  Gastrointestinal: No change in appetite, vomiting, hematemesis or diarrhea  Genitourinary: No dysuria or hematuria  Musculoskeletal: No new back pain, neck pain or muscle pain  Neurologic: No new headaches, focal weakness or behavior changes  Psychiatric: No hallucinations, excessive alcohol consumption or illegal drug usage  Hematologic/Lymphatic/Immunologic: No bleeding, chills, fever, night sweats or weight loss  Endocrine: No new cold intolerance, heat intolerance, polyphagia, polydipsia or polyuria      PHYSICAL EXAMINATION:     Wt 64.9 kg (143 lb)   LMP  (LMP Unknown)   BMI 25.34 kg/m      GENERAL: No acute distress.  HEENT: EOMI. Sclerae white, not injected. Nares clear. Pharynx without erythema or exudate.   Heart: no JVD  Lungs: Non labored   Extremities: No clubbing, cyanosis, or edema.   Neurologic: Alert and oriented to person/place/time, normal speech and affect. No focal deficits.  Skin: No petechiae, purpura or rash.     LABORATORY DATA:     LIPID RESULTS:  Lab Results   Component Value Date    CHOL 73 01/20/2023    CHOL 93 10/28/2020    HDL 29 (L) 01/20/2023    HDL 38 (L) 10/28/2020    LDL 27 01/20/2023    LDL 33 10/28/2020    TRIG 87 01/20/2023    TRIG 108 10/28/2020     CHOLHDLRATIO 4.3 05/18/2015       LIVER ENZYME RESULTS:  Lab Results   Component Value Date    AST 32 06/23/2023    AST 19 10/28/2020    ALT 5 06/23/2023    ALT 18 10/28/2020       CBC RESULTS:  Lab Results   Component Value Date    WBC 8.1 06/30/2023    WBC 7.3 10/28/2020    RBC 3.94 06/30/2023    RBC 3.99 10/28/2020    HGB 8.5 (L) 06/30/2023    HGB 11.8 02/08/2021    HCT 29.7 (L) 06/30/2023    HCT 38.2 10/28/2020    MCV 75 (L) 06/30/2023    MCV 96 10/28/2020    MCH 21.6 (L) 06/30/2023    MCH 30.8 10/28/2020    MCHC 28.6 (L) 06/30/2023    MCHC 32.2 10/28/2020    RDW 22.9 (H) 06/30/2023    RDW 13.9 10/28/2020     06/30/2023     10/28/2020       BMP RESULTS:  Lab Results   Component Value Date     (L) 06/30/2023     04/28/2021    POTASSIUM 4.8 06/30/2023    POTASSIUM 4.5 05/11/2023    POTASSIUM 4.3 04/27/2023    POTASSIUM 4.5 04/28/2021    CHLORIDE 100 06/30/2023    CHLORIDE 109 04/27/2023    CHLORIDE 100 04/28/2021    CO2 18 (L) 06/30/2023    CO2 21 04/27/2023    CO2 26 04/28/2021    ANIONGAP 15 06/30/2023    ANIONGAP 7 04/27/2023    ANIONGAP 8 04/28/2021     (H) 06/30/2023     (H) 05/11/2023     (H) 05/11/2023     (H) 04/27/2023    GLC 57 (L) 04/28/2021    BUN 30.5 (H) 06/30/2023    BUN 28 04/27/2023    BUN 23 04/28/2021    CR 1.99 (H) 06/30/2023    CR 1.21 (H) 04/28/2021    GFRESTIMATED 26 (L) 06/30/2023    GFRESTIMATED 30 (L) 12/30/2022    GFRESTIMATED 46 (L) 04/28/2021    GFRESTBLACK 53 (L) 04/28/2021    ABEL 9.4 06/30/2023    ABEL 9.8 04/28/2021        A1C RESULTS:  Lab Results   Component Value Date    A1C 11.6 (H) 04/27/2023    A1C 6.6 (H) 10/28/2020       INR RESULTS:  Lab Results   Component Value Date    INR 1.13 05/08/2023          PROCEDURES & FURTHER ASSESSMENTS:     Cardiac CT 45 days post LAAO: Pending       LAURA 5/11/23:   PRE-PROCEDURAL SURVEY:  1. The LV function was 55-60% and the RV function was moderately-severely  reduced.  2. There was no  evidence of shunting at the level of the interatrial septum.  3. The maximum pre-deployment left atrial appendage orifice width was 19.9 mm.  4. There was no intracardiac thrombus.  5. There was no pericardial effusion.     POST-PROCEDURAL SURVEY:  1. The 24 mm Watchman FLX device was well-seated in the left atrial appendage.  There was adequate compression of the device. There was no evidence of delroy-  device leak.  2. There was no pericardial effusion.  3. There was a small left-right shunt at the site of the transseptal puncture.  4. The biventricular function was unchanged.  ______________________________________________________________________________  Procedure  Transesophageal Echocardiogram with color and spectral Doppler performed. 3D  image acquisition, reconstruction, and real-time interpretation was performed.  Procedure location Heart Catherization Laboratory. Informed consent for  Transesophegeal echo obtained. LAURA Probe #[id number] then 66 due to significant  Doppler artifact was used during the procedure. Sedation, endotracheal  intubation, and mechanical ventilation were initiated prior to the LAURA and  were monitored by anesthesia. The Transducer was inserted without difficulty .  Complications None. The patient's rhythm is normal sinus. Good quality two-  dimensional was performed and interpreted. Adequate quality color and spectral  Doppler were performed and interpreted.     Left Ventricle  Global and regional left ventricular function is normal with an EF of 55-60%.  Left ventricular size is normal.     Right Ventricle  Moderate right ventricular dilation is present. Global right ventricular  function is moderately to severely reduced.     Atria  Moderate to severe biatrial enlargement is present.     Mitral Valve  The mitral valve is normal. Trace mitral insufficiency is present.     Aortic Valve  The aortic valve is tricuspid. Mild aortic valve sclerosis is present. Trace  aortic insufficiency is  present.     Tricuspid Valve  Mild to moderate tricuspid insufficiency is present. Pulmonary artery systolic  pressure cannot be assessed.     Pulmonic Valve  Mild pulmonic insufficiency is present.     Vessels  The aorta root is normal. The thoracic aorta is normal. The LUPV Doppler shows  normal velocity waveform . The right upper pulmonary vein cannot be assessed.  The right lower pulmonary vein cannot be assessed. The left lower pulmonary  vein cannot be assessed.     Pericardium  No pericardial effusion is present.     Compared to Previous Study  This study was compared with the study from 08/19/2022 . There has been  interval LAAO closure device placement. No significant changes in the  biventricular function.     TTE 5/11/23:   Small anterior pericardial Effusion     TTE 5/12/23:  ______________________________________________________________________________  Interpretation Summary  Small unchanged pericardial effusion is present without evidence of  pericardial tamponade.     Left ventricular function is normal.The ejection fraction is 55-60%.  Paradoxical septal motion consistent with right ventricular pressure and  volume overload is present.  Moderate to severe right ventricular dilation is present. Global right  ventricular function is moderately to severely reduced.  IVC diameter >2.1 cm collapsing <50% with sniff suggests a high RA pressure  estimated at 15 mmHg or greater.     This study was compared with the study from 5/11/23. There has been no change.    TTE 5/15/23:  Interpretation Summary  Left ventricular size, wall motion and function are normal. The ejection  fraction is 60-65%. Flattened septum is consistent with right ventricular  pressure and volume overload.  Moderate to severe right ventricular dilation is present.  Global right ventricular function is moderately to severely reduced.  Severe aortic valve calcification is present.  Moderate tricuspid insufficiency is present.  Dilation of  the inferior vena cava is present with abnormal respiratory  variation in diameter. Trivial, primarily anterior pericardial effusion is  present.     Compared to prior study the pericardial effusion is the same or slightly  improved. LV function appears slightly better.  ______________________________________________________________________________  Left Ventricle  Left ventricular size, wall motion and function are normal. The ejection  fraction is 60-65%. Flattened septum is consistent with right ventricular  pressure and volume overload.     Right Ventricle  Moderate to severe right ventricular dilation is present. Global right  ventricular function is moderately to severely reduced.     Atria  The left atrium appears normal. Severe right atrial enlargement is present.     Mitral Valve  Moderate mitral annular calcification is present. Trace mitral insufficiency  is present.     Aortic Valve  Severe aortic valve calcification is present.     Tricuspid Valve  The tricuspid valve is normal. Moderate tricuspid insufficiency is present.  The right ventricular systolic pressure is approximated at 49.1 mmHg plus the  right atrial pressure.     Pulmonic Valve  The pulmonic valve cannot be assessed.     Vessels  Dilation of the inferior vena cava is present with abnormal respiratory  variation in diameter. IVC diameter >2.1 cm collapsing <50% with sniff  suggests a high RA pressure estimated at 15 mmHg or greater.     Pericardium  Trivial, primarily anterior pericardial effusion is present.     Compared to Previous Study  No significant changes noted.  ______________________________________________________________________________  Doppler Measurements & Calculations  Ao V2 max: 215.0 cm/sec  Ao max P.5 mmHg  Ao V2 mean: 137.7 cm/sec  Ao mean P.0 mmHg  Ao V2 VTI: 41.0 cm  TR max marcia: 350.2 cm/sec  TR max P.1 mmHg     CLINICAL IMPRESSION:     Keerthi Montoya is a very pleasant 73 year old female who presents  for 45 day Watchman follow-up.     # Paroxysmal a-fib w/contraindication to anticoagulation due to GI bleeding  # Healed gastric ulcers/Ulcerative colitis (inactive)  Underwent successful implantation of 24mm Watchman FLX. Intra-procedure LAURA showed no evidence of delroy-device leak and no pericardial effusion. Post procedure TTE with small anterior effusion. Patient kept overnight for observation. Repeat limited TTE 5/12/23 shows stable small pericardial effusion. Xarelto started 5/15/23, no completed 6 week course. Reports feeling well, no CV concerns, no bleeding concerns.   - Cardiac CT pending  - Stop Xarelto, start Plavix 75 mg daily x 4.5 months  - Continue aspirin 81 mg daily lifelong  - Dual antiplatelet therapy with clopidogrel and aspirin will be continued for a total of 6 months from implantation date, with aspirin then continuing lifelong  - SBE prophylaxis x 6 months      # HFpEF  # Moderate PH  # Moderate paradoxical low flow low gradient aortic stenosis  Patient uses torsemide 10 mg PRN for leg swelling. Attempted to increased to scheduled dose of 10 mg daily due to elevated LVEDP and ECHO showing RV pressure and volume overload w/ RA > 15 mmHg; however, patient developed KWASI. Torsemide back to 10 mg PRN with improvement in KWASI.   -Continue Jardiance, losartan  -Continue torsemide 10 mg PRN     # HTN  # HLD   - Continue atenolol and atorvastatin     # Stage III CKD   - Last BMP stable with creatinine 1.99, GFR 26, CTM      # MARIA D:  Hgb 8.5, receiving Iron infusions.  - Follow-up with Heme     # Diabetes type II   - Poorly controlled, recent Hgb A1C 11.6. Continue glipizide and Jardiance. Further diabetes management per pharm at [facility].      RTC: Follow-up with structural clinic 4 months     WENDI Marr, CNP  [facility]       CC  Patient Care Team:  Bunny Meyers MD as PCP - General (Internal Medicine)        Please do not hesitate to contact me if you have any  questions/concerns.     Sincerely,     Dominique Saavedra, NP

## 2023-07-06 NOTE — TELEPHONE ENCOUNTER
----- Message from David Almendarez MD sent at 7/6/2023 10:35 AM CDT -----  Kenroy Munguia,    Thanks for reaching out, she is a really challenging patient.  I have been perplexed and worried about her for many years now.  Her echo has looked horrible for a while, though amazingly and luckily she feels okay.  Yes, the poor RV function is related to her pulmonary hypertension, which is felt to be related to her advanced diastolic dysfunction.  She has been unable to tolerate any real diuresis so far due to worsening kidney function, and I have been hesitant to push it further as clinically she has felt okay.  She does follow-up very closely in our core clinic, where her diuretics are adjusted.  I do not have any other good ideas.  I will also reach out to Dr. Marcus to rediscuss her pulmonary hypertension, he saw her 3 years ago.    Prab  ----- Message -----  From: Bunny Meyers MD  Sent: 7/1/2023  10:22 AM CDT  To: David Almendarez MD    Dear Viola;    I have been following Ms. Montoya. Clinically actually doing fine. She sees you next 9/1/2023. As you know she had her Watchman procedure 5/11/2023. Some other cardiac issues: LAURA on 5/11/2023 with moderately to severe decrease in RVF (from? Pulmonary HTN?). Recent Chest CT 6/30/2023 with some increase in B pleural effusions and a little ascites (I think from decrease RVF). Also she is off torsemide because of increased Cr (last 1.99 on 6/30/2023). I see her next on 7/14/2023. I'll let you chew on this and let me know what you think.    Thanks, Ezra SÁNCHEZ

## 2023-07-13 NOTE — PROGRESS NOTES
History of Present Illness:  Ms. Montoya is a 72 yo. Female with PMhx significant for T2DM, CKD, a fib, HTN, HFpEF, pulmonary HTN, HLD, hypothyroidism and iron deficiency anemia who presents today for follow up. Last seen for pre-op 6/23/23 for bilateral cataract surgery. Denies easy bleeding or bruising on DAPT for recent Watchman device placement. She plans to follow with Bunny Santa later today for T2DM management, states her sugars have been better since increasing dose of glipizide. Requesting refills for atorvastatin and senna which she takes prn for constipation. Otherwise doing well. Baby sits her two grandchildren weekly. Denies CP, SOB, F/C.     A detailed Review of Systems was performed, verified and is negative except as documented in the HPI.  All health questionnaires were reviewed, verified and relevant information documented above.    Past Medical History:  Past Medical History:   Diagnosis Date     Chronic kidney disease      Diabetes mellitus, type 2 (H)      Diverticulosis of colon (without mention of hemorrhage) 10/01/2012     GI bleed      History of blood transfusion      HLD (hyperlipidemia)      Hypertension      Hypothyroidism      MARIA D (iron deficiency anemia)      Persistent atrial fibrillation (H)      Pulmonary hypertension (H)      Ulcerative (chronic) enterocolitis (H)      Past Surgical History:  Past Surgical History:   Procedure Laterality Date     CHOLECYSTECTOMY  3/1987     COLON SURGERY  01/2001    Left colon resection; diverticulitis     COLONOSCOPY N/A 4/24/2017    Procedure: COMBINED COLONOSCOPY, SINGLE OR MULTIPLE BIOPSY/POLYPECTOMY BY BIOPSY;;  Surgeon: Radha Salguero MD;  Location: [facility]     COLONOSCOPY N/A 3/10/2023    Procedure: COLONOSCOPY;  Surgeon: Preeti James MD;  Location: [facility]     COLONOSCOPY WITH CO2 INSUFFLATION N/A 4/24/2017    Procedure: COLONOSCOPY WITH CO2 INSUFFLATION;  Colonoscopy Dx rectal bleeding, BMI 25.86, Pharm [facility] .[phone], ;   Surgeon: Radha Salguero MD;  Location: [facility] OR     CV LEFT ATRIAL APPENDAGE CLOSURE N/A 5/11/2023    Procedure: Left Atrial Appendage Closure;  Surgeon: Bobby Grimes MD;  Location:  [facility] CATH LAB     CV RIGHT HEART CATH MEASUREMENTS RECORDED N/A 3/16/2020    Procedure: CV RIGHT HEART CATH;  Surgeon: Nader Muñoz MD;  Location:  HE[facility] CARD[facility] CATH LAB     ESOPHAGOSCOPY, GASTROSCOPY, DUODENOSCOPY (EGD), COMBINED N/A 1/23/2023    Procedure: ESOPHAGOGASTRODUODENOSCOPY, WITH BIOPSY;  Surgeon: Ricki Deal MD;  Location:  GI     ESOPHAGOSCOPY, GASTROSCOPY, DUODENOSCOPY (EGD), COMBINED N/A 3/10/2023    Procedure: Esophagoscopy, gastroscopy, duodenoscopy (EGD), combined;  Surgeon: Preeti James MD;  Location:  GI     GYN SURGERY  9/1983    tubal ligation     HERNIA REPAIR  12/2006    recurrent incisional hernia     SIGMOIDOSCOPY FLEXIBLE N/A 1/23/2023    Procedure: Sigmoidoscopy flexible;  Surgeon: Ricki Deal MD;  Location:  GI     THROAT SURGERY  12/1974    thyroidectomy     Active Meds:  Current Outpatient Medications   Medication     aspirin 81 MG EC tablet     atenolol (TENORMIN) 50 MG tablet     atorvastatin (LIPITOR) 40 MG tablet     blood glucose (ACCU-CHEK FELIPE PLUS) test strip     Cholecalciferol (VITAMIN D) 2000 units CAPS     clopidogrel (PLAVIX) 75 MG tablet     Cyanocobalamin (B-12) 1000 MCG TABS     diphenhydrAMINE (BENADRYL) 25 MG tablet     empagliflozin (JARDIANCE) 25 MG TABS tablet     glipiZIDE (GLUCOTROL XL) 10 MG 24 hr tablet     hydrALAZINE (APRESOLINE) 50 MG tablet     ketorolac (ACULAR) 0.5 % ophthalmic solution     levothyroxine (SYNTHROID/LEVOTHROID) 112 MCG tablet     losartan (COZAAR) 100 MG tablet     mesalamine (APRISO ER) 0.375 g 24 hr capsule     ofloxacin (OCUFLOX) 0.3 % ophthalmic solution     pantoprazole (PROTONIX) 40 MG EC tablet     prednisoLONE acetate (PRED FORTE) 1 % ophthalmic suspension     senna (SENOKOT) 8.6 MG tablet      sodium bicarbonate 650 MG tablet     No current facility-administered medications for this visit.      Allergies:  Actos [pioglitazone], Amlodipine, Animal dander, Doxycycline, Dust mites, Grass, Keflex [cephalexin hcl], Metoprolol, Other [seasonal allergies], Ranitidine, Synthroid [levothyroxine sodium], Tetracycline, Tylenol, and Ziac [bisoprolol-hydrochlorothiazide]    Family History:  family history includes Cancer in her father; Cerebrovascular Disease in her mother; Diverticulitis in her brother and brother.    Social History:  Social History     Tobacco Use     Smoking status: Never     Smokeless tobacco: Never   Substance Use Topics     Alcohol use: Not Currently     Alcohol/week: 2.0 - 4.0 standard drinks of alcohol     Types: 1 - 2 Cans of beer, 1 - 2 Shots of liquor per week     Comment: occasional cocktail or beer     Drug use: No     Physical Exam:  Vitals: BP 96/60 (BP Location: Right arm, Patient Position: Sitting, Cuff Size: Adult Regular)   Pulse 90   Wt 68 kg (149 lb 14.4 oz)   LMP  (LMP Unknown)   SpO2 94%   BMI 26.56 kg/m    Constitutional: Alert, oriented, pleasant, no acute distress  Head: Normocephalic, atraumatic  Cardiovascular: irregular rate consistent with atrial fibrillation, no murmurs, rubs or gallops, peripheral pulses full/symmetric  Respiratory: Good air movement bilaterally, lungs clear, no wheezes/rales/rhonchi  Musculoskeletal: No edema, normal muscle tone, normal gait  Neurologic: Alert and oriented, cranial nerves 2-12 intact, grossly non-focal  Skin: No rashes/lesions  Psychiatric: normal mentation, affect and mood    Diagnostics:  Labs reviewed in [facility]     Assessment and Plan:  # Atrial fibrillation  # HFpEF: follows with Cardiology, last visit with Ms. Saavedra 7/6/23. She is s/p Watchman device on 5/12/23. Discontinued Xarelto. Started DAPT with ASA and Plavix for 4.5 months then continuing only with ASA lifelong. Next appt. With Dr. Almendarez 9/1/23.   - DAPT (asa and  plavix) until 11/2023  - SBE ppx for 6 months   - torsemide for HFpEF prn     # Ulcerative colitis  # GERD: with hx of healed gastric ulcers, part of the reason she proceeded with Watchman device due to intolerance of anticoagulation. Appt. With Dr. James on 8/2/23. Recent colonoscopy 3/10/23 indicating remission of UC.   - continue mesalamine   - pantoprazole daily for reflux/hx of ulcers    # T2DM: see last visit with Bunny Santa on 6/16/23. Discussed adding insulin, Ms. Montoya wanted to try optimizing current meds first, and icreased glipizide. A1c 11.6 on 4/27/23. Appt. With Bunny Santa today.  - jardiance, glipizide     # Anemia: Dr. Moore, hematology note from 6/15/23. Tried oral iron in the past which does not work for her. Recommended iron infusions. CBC 6/30/23 with hgb 8.5. Baseline ~7-9. Ferritin wnr and iron low (21) on 4/7/23. Folate wnr and B12 elevated (1/354) on 4/27/23.   - iron infusions scheduled 7/17/23 and 7/24/23    # CKD: Cr 1.99, GFR 26 on 6/30/23. Baseline Cr ~1.5. Currently taking sodium bicarbonate. Follows with Dr. Minor, nephrology, and see last note 5/2/23.  - sodium bicarbonate      # Hypertension: stable, slightly hypotensive today at 96/60 but denies any dizziness, orthostatic hypotension, near-syncopal events. Advised that if she develop any sxs associated with low pressures, to contact clinic to optimize current antihypertensive medications.   - losartan, atenolol (rate control for a fib), hydralazine     # Hyperlipidemia: lipid panel wnr 1/20/23, Cholesterol 73 TG 87, LDL 27.   - atorvastatin 40 mg, refill provided today     # Hypothyroidism: stable, last TSH 3.86 on 4/7/23.  - levothyroxine 112 mcg     # Pulmonary nodules: last chest CT 6/30/23 with multiple sub-4 mm solid PNs similar to prior. Likely repeat in 6 months   - chest CT w/o contrast in 1/2024    # Cataracts: had uneventful L cataract surgery 3 weeks ago, planning on R cataract surgery in 2 weeks.    Health  Maintenance:  - Colonoscopy: 3/10/23 with repeat recommended in 1-3 yrs. for surveillance   - Mammogram: 10/7/22, negative  - DEXA: 7/13/18 with most negative T score - 0.1  - Dermatology: she is not interested in a referral at this point  - Immunizations: COVID-19 monovalent x3 and bivalent x1 (10/14/22), Prevnar 13 on 6/22/15, Pneumococcal 23 on 7/30/18, Tdap 7/26/13 and recommend she get booster at pharmacy.     NATALIA Ureña-S2    Staff note;     I personally reviewed Ms. Montoya's past medical history. I interviewed her and conducted a physical examination. I agree with Ms. Montenegro's above assessment and plan    CAROLINE Meyers MD    30 minutes spent on the date of the encounter doing chart review, history and exam, documentation and further activities as noted above exclusive of procedures and other billable interpretations

## 2023-07-14 ENCOUNTER — OFFICE VISIT (OUTPATIENT)
Dept: INTERNAL MEDICINE | Facility: CLINIC | Age: 73
End: 2023-07-14
Payer: MEDICARE

## 2023-07-14 ENCOUNTER — OFFICE VISIT (OUTPATIENT)
Dept: PHARMACY | Facility: CLINIC | Age: 73
End: 2023-07-14
Payer: COMMERCIAL

## 2023-07-14 VITALS
BODY MASS INDEX: 26.56 KG/M2 | DIASTOLIC BLOOD PRESSURE: 60 MMHG | OXYGEN SATURATION: 94 % | SYSTOLIC BLOOD PRESSURE: 96 MMHG | HEART RATE: 90 BPM | WEIGHT: 149.9 LBS

## 2023-07-14 DIAGNOSIS — E11.29 TYPE 2 DIABETES MELLITUS WITH MICROALBUMINURIA, WITHOUT LONG-TERM CURRENT USE OF INSULIN (H): Primary | ICD-10-CM

## 2023-07-14 DIAGNOSIS — R80.9 TYPE 2 DIABETES MELLITUS WITH MICROALBUMINURIA, WITHOUT LONG-TERM CURRENT USE OF INSULIN (H): Primary | ICD-10-CM

## 2023-07-14 DIAGNOSIS — K59.00 CONSTIPATION, UNSPECIFIED CONSTIPATION TYPE: Primary | ICD-10-CM

## 2023-07-14 DIAGNOSIS — E78.5 HYPERLIPIDEMIA LDL GOAL <100: ICD-10-CM

## 2023-07-14 PROCEDURE — 99214 OFFICE O/P EST MOD 30 MIN: CPT | Performed by: INTERNAL MEDICINE

## 2023-07-14 PROCEDURE — 99207 PR NO CHARGE LOS: CPT | Performed by: PHARMACIST

## 2023-07-14 RX ORDER — ATORVASTATIN CALCIUM 40 MG/1
40 TABLET, FILM COATED ORAL DAILY
Qty: 90 TABLET | Refills: 3 | Status: ON HOLD | OUTPATIENT
Start: 2023-07-14 | End: 2024-07-15

## 2023-07-14 RX ORDER — SENNOSIDES A AND B 8.6 MG/1
1 TABLET, FILM COATED ORAL DAILY PRN
Qty: 30 TABLET | Refills: 3 | Status: SHIPPED | OUTPATIENT
Start: 2023-07-14

## 2023-07-14 NOTE — PROGRESS NOTES
Medication Therapy Management (MTM) Encounter    ASSESSMENT:                            Medication Adherence/Access: No issues identified    Type 2 Diabetes: Patient is not meeting A1c goal of < 7%. Self monitoring of blood glucose is mostly at goal of fasting  mg/dL but not at goal of post prandial <180 mg/dL. Will order A1c and if still not at goal institute insulin as a GLP-1 would likely be too costly.       PLAN:                            1. Continue to work on diet and exercise.  2. Get A1c drawn at next visit    Will start insulin at next visit if A1c not at goal.     Follow-up: Return in about 5 weeks (around 2023) for Follow up, with me, in person.      SUBJECTIVE/OBJECTIVE:                          Keerthi Montoya is a 73 year old female coming in for a follow-up visit from 23.       Reason for visit: diabetes follow-up.    Allergies/ADRs: Reviewed in chart  Past Medical History: Reviewed in chart  Tobacco: She reports that she has never smoked. She has never used smokeless tobacco.  Alcohol: none      Medication Adherence/Access: no issues reported    Type 2 Diabetes:  Currently taking Jardiance 25 mg daily, and glipizide 20 mg daily. Patient reports that she is tolerating Jardiance well. Patient reports that she has been cleared to exercise and she has been doing more walking now. For diet, she doesn't drink juice, soda, have snacks. She would like to utilize dietician services and has scheduled this for next month.      Blood sugar monitorin time(s) daily. Ranges (patient reported): Reports she has been having some readings between  mg/dL gets higher readings when she eats to late at night and will get into the 200 mg/dL range. When she tests 2 hours after a meal it was around 200 mg/dL.   Symptoms of low blood sugar? none   Symptoms of high blood sugar? None.   Eye exam: Upcoming cataract surgery appt.   Foot exam: up to date  Aspirin: No  Statin: Yes: atorvastatin 40  mg   ACEi/ARB: Yes: losartan.   Urine Albumin:   Lab Results   Component Value Date    UMALCR 109.82 (H) 06/15/2023      Lab Results   Component Value Date    A1C 11.6 04/27/2023    A1C 6.9 01/20/2023    A1C 8.1 06/24/2022    A1C 7.0 12/16/2021    A1C 6.6 09/01/2021    A1C 6.6 10/28/2020    A1C 7.0 05/12/2020    A1C 7.4 01/13/2020    A1C 7.9 01/28/2019    A1C 7.6 06/25/2018             Today's Vitals: LMP  (LMP Unknown)   ----------------      I spent 30 minutes with this patient today. All changes were made via collaborative practice agreement with Bunny Meyers MD. A copy of the visit note was provided to the patient's provider(s).    A summary of these recommendations was given to the patient.    Bunny Santa, Pharm. D., BCACP  Medication Therapy Management Pharmacist     Medication Therapy Recommendations  No medication therapy recommendations to display

## 2023-07-14 NOTE — PATIENT INSTRUCTIONS
"Recommendations from today's MTM visit:                                                    MTM (medication therapy management) is a service provided by a clinical pharmacist designed to help you get the most of out of your medicines.   Today we reviewed what your medicines are for, how to know if they are working, that your medicines are safe and how to make your medicine regimen as easy as possible.      Continue current plan. Will get A1c drawn at next visit.     Follow-up: Return in about 5 weeks (around 8/18/2023) for Follow up, with me, in person.    It was great speaking with you today.  I value your experience and would be very thankful for your time in providing feedback in our clinic survey. In the next few days, you may receive an email or text message from [employer] with a link to a survey related to your  clinical pharmacist.\"     To schedule another MTM appointment, please call the clinic directly or you may call the MTM scheduling line at [phone] or toll-free at [phone].     My Clinical Pharmacist's contact information:                                                      Please feel free to contact me with any questions or concerns you have.      Bunny Santa, Pharm. D., [facility]  Medication Therapy Management Pharmacist     "

## 2023-07-14 NOTE — NURSING NOTE
Keerthi Montoya is a 73 year old female patient that presents today in clinic for the following:    Chief Complaint   Patient presents with     Follow Up     3 month follow up      The patient's allergies and medications were reviewed as noted. A set of vitals were recorded as noted without incident: BP 96/60 (BP Location: Right arm, Patient Position: Sitting, Cuff Size: Adult Regular)   Pulse 90   Wt 68 kg (149 lb 14.4 oz)   LMP  (LMP Unknown)   SpO2 94%   BMI 26.56 kg/m  . The patient does not have any other questions for the provider.    Milagros Harris, EMT at 9:47 AM on 7/14/2023

## 2023-07-17 ENCOUNTER — INFUSION THERAPY VISIT (OUTPATIENT)
Dept: INFUSION THERAPY | Facility: CLINIC | Age: 73
End: 2023-07-17
Attending: INTERNAL MEDICINE
Payer: MEDICARE

## 2023-07-17 VITALS
WEIGHT: 149.1 LBS | TEMPERATURE: 97.7 F | SYSTOLIC BLOOD PRESSURE: 112 MMHG | DIASTOLIC BLOOD PRESSURE: 78 MMHG | BODY MASS INDEX: 26.42 KG/M2 | RESPIRATION RATE: 18 BRPM | OXYGEN SATURATION: 98 % | HEART RATE: 76 BPM

## 2023-07-17 DIAGNOSIS — E53.8 VITAMIN B12 DEFICIENCY (NON ANEMIC): ICD-10-CM

## 2023-07-17 DIAGNOSIS — N18.30 CKD (CHRONIC KIDNEY DISEASE) STAGE 3, GFR 30-59 ML/MIN (H): ICD-10-CM

## 2023-07-17 DIAGNOSIS — K51.00 ULCERATIVE PANCOLITIS WITHOUT COMPLICATION (H): ICD-10-CM

## 2023-07-17 DIAGNOSIS — K92.2 GASTROINTESTINAL HEMORRHAGE, UNSPECIFIED GASTROINTESTINAL HEMORRHAGE TYPE: ICD-10-CM

## 2023-07-17 DIAGNOSIS — N18.30 STAGE 3 CHRONIC KIDNEY DISEASE, UNSPECIFIED WHETHER STAGE 3A OR 3B CKD (H): ICD-10-CM

## 2023-07-17 DIAGNOSIS — D50.9 ANEMIA, IRON DEFICIENCY: ICD-10-CM

## 2023-07-17 DIAGNOSIS — D50.9 IRON DEFICIENCY ANEMIA, UNSPECIFIED IRON DEFICIENCY ANEMIA TYPE: Primary | ICD-10-CM

## 2023-07-17 PROCEDURE — 99207 PR NO CHARGE LOS: CPT

## 2023-07-17 PROCEDURE — 96365 THER/PROPH/DIAG IV INF INIT: CPT | Performed by: NURSE PRACTITIONER

## 2023-07-17 PROCEDURE — 96366 THER/PROPH/DIAG IV INF ADDON: CPT | Performed by: NURSE PRACTITIONER

## 2023-07-17 RX ORDER — DIPHENHYDRAMINE HYDROCHLORIDE 50 MG/ML
50 INJECTION INTRAMUSCULAR; INTRAVENOUS
Status: CANCELLED
Start: 2023-07-19

## 2023-07-17 RX ORDER — ALBUTEROL SULFATE 90 UG/1
1-2 AEROSOL, METERED RESPIRATORY (INHALATION)
Status: CANCELLED
Start: 2023-07-19

## 2023-07-17 RX ORDER — ALBUTEROL SULFATE 0.83 MG/ML
2.5 SOLUTION RESPIRATORY (INHALATION)
Status: CANCELLED | OUTPATIENT
Start: 2023-07-19

## 2023-07-17 RX ORDER — HEPARIN SODIUM (PORCINE) LOCK FLUSH IV SOLN 100 UNIT/ML 100 UNIT/ML
5 SOLUTION INTRAVENOUS
Status: CANCELLED | OUTPATIENT
Start: 2023-07-19

## 2023-07-17 RX ORDER — MEPERIDINE HYDROCHLORIDE 25 MG/ML
25 INJECTION INTRAMUSCULAR; INTRAVENOUS; SUBCUTANEOUS EVERY 30 MIN PRN
Status: CANCELLED | OUTPATIENT
Start: 2023-07-19

## 2023-07-17 RX ORDER — EPINEPHRINE 1 MG/ML
0.3 INJECTION, SOLUTION INTRAMUSCULAR; SUBCUTANEOUS EVERY 5 MIN PRN
Status: CANCELLED | OUTPATIENT
Start: 2023-07-19

## 2023-07-17 RX ORDER — HEPARIN SODIUM,PORCINE 10 UNIT/ML
5-20 VIAL (ML) INTRAVENOUS DAILY PRN
Status: CANCELLED | OUTPATIENT
Start: 2023-07-19

## 2023-07-17 RX ORDER — METHYLPREDNISOLONE SODIUM SUCCINATE 125 MG/2ML
125 INJECTION, POWDER, LYOPHILIZED, FOR SOLUTION INTRAMUSCULAR; INTRAVENOUS
Status: CANCELLED
Start: 2023-07-19

## 2023-07-17 RX ADMIN — Medication 250 ML: at 11:36

## 2023-07-17 NOTE — PROGRESS NOTES
Infusion Nursing Note:  Keerthi FARRAR Jan presents today for NEW Venofer 1/3.    Patient seen by provider today: No   present during visit today: Not Applicable.    Note: Patient new to [facility] infusion-- oriented to infusion room. Written education given and discussed with patient and no questions/concerns prior to initiating infusion.       Intravenous Access:  Peripheral IV placed.    Treatment Conditions:  Not Applicable.      Post Infusion Assessment:  Patient tolerated infusion without incident.  Blood return noted pre and post infusion.  Site patent and intact, free from redness, edema or discomfort.  No evidence of extravasations.  Access discontinued per protocol.       Discharge Plan:   AVS to patient via [employer].  Patient will return 8/17/23 for next appointment (message sent to schedulers to help patient schedule 3rd infusion).   Patient discharged in stable condition accompanied by: self.  Departure Mode: Ambulatory.      Taryn Hair RN

## 2023-07-26 ENCOUNTER — TELEPHONE (OUTPATIENT)
Dept: CARDIOLOGY | Facility: CLINIC | Age: 73
End: 2023-07-26
Payer: MEDICARE

## 2023-07-26 ENCOUNTER — NURSE TRIAGE (OUTPATIENT)
Dept: NURSING | Facility: CLINIC | Age: 73
End: 2023-07-26
Payer: MEDICARE

## 2023-07-27 NOTE — TELEPHONE ENCOUNTER
Telephone Encounter Note:    While on call, Ms. Montoya reached out to me concerning a possible reaction to her plavix. She reports diarrhea and hand swelling while on plavix. She stopped it a few days ago with resolution of her symptoms. Overall she is feeling well today.     She is on DAPT post Watchman. There shouldn't be much risk to temporary interruption of DAPT and she continues on monotherapy aspirin. I told her she can continue to hold it until her structural team has a chance to evaluate alternative options tomorrow. Will send a message to Shadia Saavedra to let her know.     Phillip De MD  Cardiology Fellow

## 2023-07-27 NOTE — TELEPHONE ENCOUNTER
Triage Call:    Patient calling with medication question.  She reports that she stopped taking Plavix on 7/24/2023 due to a suspected allergic reaction. She started taking Plavix 2 weeks ago after Watchman placement.  She reports severe urgent diarrhea that started on 7/17/2023 and resolved once she stopped taking Plavix. She also notes that her legs and hands became swollen, they are improving.    According to the protocol, patient should discuss with cardiologist.  Care advice given. Patient verbalizes understanding and agrees with plan of care. Transferred to answering service to page on-call cardiologist at 1925.    Maritza Hsieh RN  07/26/23 7:26 PM  [facility] Nurse Advisor     Reason for Disposition   [1] Caller has URGENT medicine question about med that PCP or specialist prescribed AND [2] triager unable to answer question    Additional Information   Negative: [1] Intentional drug overdose AND [2] suicidal thoughts or ideas   Negative: Drug overdose and triager unable to answer question   Negative: Caller requesting a renewal or refill of a medicine patient is currently taking   Negative: Caller requesting information unrelated to medicine   Negative: Caller requesting information about COVID-19 Vaccine   Negative: Caller requesting information about Emergency Contraception   Negative: Caller requesting information about Combined Birth Control Pills   Negative: Caller requesting information about Progestin Birth Control Pills   Negative: Caller requesting information about Post-Op pain or medicines   Negative: Caller requesting a prescription antibiotic (such as Penicillin) for Strep throat and has a positive culture result   Negative: Caller requesting a prescription anti-viral med (such as Tamiflu) and has influenza (flu) symptoms   Negative: Immunization reaction suspected   Negative: Rash while taking a medicine or within 3 days of stopping it   Negative: [1] Asthma and [2] having symptoms  of asthma (cough, wheezing, etc.)   Negative: [1] Symptom of illness (e.g., headache, abdominal pain, earache, vomiting) AND [2] more than mild   Negative: Breastfeeding questions about mother's medicines and diet   Negative: MORE THAN A DOUBLE DOSE of a prescription or over-the-counter (OTC) drug   Negative: [1] DOUBLE DOSE (an extra dose or lesser amount) of prescription drug AND [2] any symptoms (e.g., dizziness, nausea, pain, sleepiness)   Negative: [1] DOUBLE DOSE (an extra dose or lesser amount) of over-the-counter (OTC) drug AND [2] any symptoms (e.g., dizziness, nausea, pain, sleepiness)   Negative: Took another person's prescription drug   Negative: [1] Pharmacy calling with prescription question AND [2] triager unable to answer question   Negative: [1] Prescription not at pharmacy AND [2] was prescribed by PCP recently (Exception: triager has access to EMR and prescription is recorded there. Go to Home Care and confirm for pharmacy.)   Negative: [1] DOUBLE DOSE (an extra dose or lesser amount) of prescription drug AND [2] NO symptoms (Exception: a double dose of antibiotics)   Negative: Diabetes drug error or overdose (e.g., took wrong type of insulin or took extra dose)    Protocols used: Medication Question Call-A-

## 2023-07-28 DIAGNOSIS — Z95.818 PRESENCE OF WATCHMAN LEFT ATRIAL APPENDAGE CLOSURE DEVICE: Primary | ICD-10-CM

## 2023-07-28 NOTE — PROGRESS NOTES
Date: 7/28/2023    Time of Call: 11:39 AM     Diagnosis:  presence of watchman device     [ TORB ] Ordering provider: Dominique Saavedra  Order: Stop Plavix start Brilinta 60 mg BID.     Order received by: Tati Gimenez RN     Follow-up/additional notes: Plavix was held symptoms of diahrea and hand swelling resolved. Pt instructed to start Brilinta 60 mg BID.    All questions answered and patient stated understanding.

## 2023-08-02 ENCOUNTER — VIRTUAL VISIT (OUTPATIENT)
Dept: GASTROENTEROLOGY | Facility: CLINIC | Age: 73
End: 2023-08-02
Payer: MEDICARE

## 2023-08-02 VITALS — BODY MASS INDEX: 25.69 KG/M2 | WEIGHT: 145 LBS

## 2023-08-02 DIAGNOSIS — K92.2 GASTROINTESTINAL HEMORRHAGE, UNSPECIFIED GASTROINTESTINAL HEMORRHAGE TYPE: ICD-10-CM

## 2023-08-02 DIAGNOSIS — K51.00 ULCERATIVE PANCOLITIS WITHOUT COMPLICATION (H): Primary | ICD-10-CM

## 2023-08-02 PROCEDURE — 99214 OFFICE O/P EST MOD 30 MIN: CPT | Mod: VID | Performed by: INTERNAL MEDICINE

## 2023-08-02 RX ORDER — PANTOPRAZOLE SODIUM 40 MG/1
40 TABLET, DELAYED RELEASE ORAL DAILY
Qty: 90 TABLET | Refills: 3 | Status: SHIPPED | OUTPATIENT
Start: 2023-08-02

## 2023-08-02 ASSESSMENT — PAIN SCALES - GENERAL: PAINLEVEL: NO PAIN (0)

## 2023-08-02 NOTE — NURSING NOTE
Is the patient currently in the state of [place]? YES    Visit mode:VIDEO    If the visit is dropped, the patient can be reconnected by: VIDEO VISIT: Send to e-mail at: [email or web address]    Will anyone else be joining the visit? NO      How would you like to obtain your AVS? MyChart    Are changes needed to the allergy or medication list? NO    Patient denies any changes since echeck-in regarding medication and allergies and states all information entered during echeck-in remains accurate.     Reason for visit: RECHECK     John Ferguson

## 2023-08-02 NOTE — LETTER
8/2/2023         RE: Keerthi Montoya  [address] [place] [id number]        Dear Colleague,    Thank you for referring your patient, Keerthi Montoya, to the [facility] [place]. Please see a copy of my visit note below.    IBD CLINIC VISIT     CC/REFERRING MD:  Nathan Dhillon  REASON FOR FOLLOW UP: Pan UC    IBD HISTORY  Age at diagnosis: [age]  Extent of disease: pancolitis (prior proctosigmoiditis classification, confirmed panUC 12/2017)  Current UC medications: Apriso 1.5 g daily  Prior UC surgeries: None  Prior IBD Medications: None    DISEASE ASSESSMENT  Labs:  Lab Results   Component Value Date    ALBUMIN 3.3 02/10/2020     Recent Labs   Lab Test 01/20/23  0740 03/16/20  0711 09/10/18  0928   CRP  --  <2.9 <2.9   SED 12  --  36*     Endoscopic assessment:   EGD 2023:  Impression:            - Gastric erosions and duodenal ulcer seen in January                          have healed.                          - Gastroesophageal flap valve classified as Hill Grade                          III (minimal fold, loose to endoscope, hiatal hernia                          likely).                          - 4 cm hiatal hernia.                          - Portal hypertensive gastropathy.                          - Normal duodenal bulb, first portion of the duodenum                          and second portion of the duodenum.     CSCOPE 2023:    Impression:            - Inactive (Barrera Score 0) ulcerative colitis, in                          remission since the last examination.                          - Perianal skin tags found on perianal exam.                          - The examined portion of the ileum was normal.                          - Diverticulosis in the sigmoid colon and in the                          ascending colon.     colonoscopy 12/12/17 normal ileum, patchy mild erythema and mucosa edema in rectum and sigmoid colon, markedly improved compared to  2017 outside colonoscopy report.  Mild patchy mucosal granularity noted throughout the remainder of the colon.  Rectal polyp 4 mm sessile removed.  Diverticulosis in transverse and descending colon.  Internal and external hemorrhoids noted.  Copious brown opaque semiliquid stool and medication material throughout colon interfering with visualization.  Barrera score 0.  Only biopsies obtained were from the rectal polyp, noted to be an inflammatory polyp.  No evidence of dysplasia.  No other biopsies obtained.  Enterography: --  Fecal calprotectin: --   C diff: --  Barrera score:   Stool freq: 0 (baseline stools frequency)  Rectal bleedin (None)  PGA: 0 (normal)  Endoscopy: Not done    Remission: <3   Mild disease: 3-5  Moderate disease: 6-10  Severe disease: >10    IBDQ Score Date IBDQ - Total Score  (Higher score better)   2023   9:00 AM 58   5/15/2017   7:36 AM 51     ASSESSMENT/PLAN  Ms. Montoya is a 73 year old with past medical history to include hypertension, DM2, diabetic nephropathy, multinodular goiter s/p complete thyroidectomy ~40 yrs ago complicated by postoperative hypothyroidism, diverticulosis complicated by diverticulitis with prior limited left hemicolectomy , open cholecystectomy in , s/p ex-lap 30 yrs ago for presumed ectopic pregnancy (apparent diverticulitis, nonsurgical management on discovery), s/p tubal ligation  complicated postsurgical [facility] s/p mesh repair  here for follow-up for UC pancolitis follow up.    1.  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  Last endoscopic assessment was in 3/2023, noting Barrera 0.    -- Continue Apriso 1.5 g daily    2. CKD stage 3:   Likely from combination of DM and HTN. Mesalamine can also cause AIN but recent Cr has been stable.   -- Continue to follow up with nephrology.     Cancer Screening:  Colon cancer screening:  Given pancolitis colonoscopy every 2-3 years recommended after 8 years of disease.  Dysplasia screening is recommended  2025.    Misc:  -- Avoid tobacco use  -- Avoid NSAIDs as there is potentially a 25% chance of causing an IBD flare    RTC 1 year     30 minutes spent on the date of the encounter doing chart review, history and exam, documentation and further activities as noted above.  It was a pleasure to participate in the care of this patient; please contact us with any further questions.      HPI:   Ms. Montoya is a 71 year old with past medical history to include hypertension, DM2, diabetic nephropathy, multinodular goiter s/p complete thyroidectomy ~40 yrs ago complicated by postoperative hypothyroidism, diverticulosis complicated by diverticulitis with prior limited left hemicolectomy 2002, open cholecystectomy in 2000, s/p ex-lap 30 yrs ago for presumed ectopic pregnancy (apparent diverticulitis, nonsurgical management on discovery), s/p tubal ligation 1983 complicated postsurgical [facility] s/p mesh repair 2007 here for follow-up for UC pancolitis follow up.       Interval history 8/3/2022:  Patient is doing well and is at baseline bowel pattern with 1-2 soft formed stools per day.  No urgency or nighttime stools. No blood in the stool. Appetite is good with stable weight around 140lbs. Taking mesalamine 1.5gm daily, vitamin D and oral iron supplement. CBC and LFTs were unremarkable. Her Cr is 1.9 (recent baseline 1.6-1.8). She is being seen by nephrology.     Interval history 8/2023:  Had a Watchman procedure and on Brilinta. No recurrent GI bleeding. Continues on protonix BID.     Currently: 2 BMs per day. No rectal bleeding, urgency.  Taking mesalamine 1.5gm daily,   Having IV iron.       ROS:  Complete 10 System ROS performed. All are negative except as documented below, in the HPI, or in patient questionnaire from today's visit.    No fevers or chills  No weight loss  No blurry vision, double vision or change in vision  No sore throat  No lymphadenopathy  No headache, paraesthesias, or weakness in a limb  No shortness of  breath or wheezing  No chest pain or pressure  No arthralgias or myalgias  No rashes or skin changes  No odynophagia or dysphagia  No BRBPR, hematochezia, melena  No dysuria, frequency or urgency  No hot/cold intolerance or polyria  No anxiety or depression    Extra intestinal manifestations of IBD:  No uveitis/episcleritis  No aphthous ulcers   No arthritis   No erythema nodosum/pyoderma gangrenosum.     PERTINENT PAST MEDICAL HISTORY:  Past Medical History:   Diagnosis Date    Chronic kidney disease     Diabetes mellitus, type 2 (H)     Diverticulosis of colon (without mention of hemorrhage) 10/01/2012    GI bleed     History of blood transfusion     HLD (hyperlipidemia)     Hypertension     Hypothyroidism     MARIA D (iron deficiency anemia)     Persistent atrial fibrillation (H)     Pulmonary hypertension (H)     Ulcerative (chronic) enterocolitis (H)        PREVIOUS SURGERIES:  Past Surgical History:   Procedure Laterality Date    CHOLECYSTECTOMY  3/1987    COLON SURGERY  01/2001    Left colon resection; diverticulitis    COLONOSCOPY N/A 4/24/2017    Procedure: COMBINED COLONOSCOPY, SINGLE OR MULTIPLE BIOPSY/POLYPECTOMY BY BIOPSY;;  Surgeon: Radha Salguero MD;  Location: [facility] OR    COLONOSCOPY N/A 3/10/2023    Procedure: COLONOSCOPY;  Surgeon: Preeti James MD;  Location: [facility] GI    COLONOSCOPY WITH CO2 INSUFFLATION N/A 4/24/2017    Procedure: COLONOSCOPY WITH CO2 INSUFFLATION;  Colonoscopy Dx rectal bleeding, BMI 25.86, Pharm [facility] .[phone], ;  Surgeon: Radha Salguero MD;  Location: [facility] OR    CV LEFT ATRIAL APPENDAGE CLOSURE N/A 5/11/2023    Procedure: Left Atrial Appendage Closure;  Surgeon: Bobby Grimes MD;  Location:  HEART CARDIAC CATH LAB    CV RIGHT HEART CATH MEASUREMENTS RECORDED N/A 3/16/2020    Procedure: CV RIGHT HEART CATH;  Surgeon: Nader Muñoz MD;  Location:  HEART CARDIAC CATH LAB    ESOPHAGOSCOPY, GASTROSCOPY, DUODENOSCOPY (EGD), COMBINED N/A  1/23/2023    Procedure: ESOPHAGOGASTRODUODENOSCOPY, WITH BIOPSY;  Surgeon: Ricki Deal MD;  Location:  GI    ESOPHAGOSCOPY, GASTROSCOPY, DUODENOSCOPY (EGD), COMBINED N/A 3/10/2023    Procedure: Esophagoscopy, gastroscopy, duodenoscopy (EGD), combined;  Surgeon: Preeti James MD;  Location:  GI    GYN SURGERY  9/1983    tubal ligation    HERNIA REPAIR  12/2006    recurrent incisional hernia    SIGMOIDOSCOPY FLEXIBLE N/A 1/23/2023    Procedure: Sigmoidoscopy flexible;  Surgeon: Ricki Deal MD;  Location:  GI    THROAT SURGERY  12/1974    thyroidectomy     ALLERGIES:     Allergies   Allergen Reactions    Actos [Pioglitazone]      Fluid retention    Amlodipine      Leg swelling, hand swelling    Animal Dander     Doxycycline Hives    Dust Mites     Grass     Keflex [Cephalexin Hcl] Hives    Metoprolol      Swelling of lower legs and fatigue    Other [Seasonal Allergies]      pollen    Ranitidine      rash    Synthroid [Levothyroxine Sodium] Swelling     Allergic to brand name    Tetracycline Hives    Tylenol Hives    Ziac [Bisoprolol-Hydrochlorothiazide]        PERTINENT MEDICATIONS:    Current Outpatient Medications:     aspirin 81 MG EC tablet, Take 1 tablet (81 mg) by mouth daily, Disp: 90 tablet, Rfl: 3    atenolol (TENORMIN) 50 MG tablet, Take 1 tablet (50 mg) by mouth daily, Disp: 90 tablet, Rfl: 3    atorvastatin (LIPITOR) 40 MG tablet, Take 1 tablet (40 mg) by mouth daily, Disp: 90 tablet, Rfl: 3    blood glucose (ACCU-CHEK FELIPE PLUS) test strip, 200 strips by In Vitro route 2 times daily Use to test blood sugar 2 times daily or as directed., Disp: 200 strip, Rfl: 4    Cholecalciferol (VITAMIN D) 2000 units CAPS, Take by mouth daily (with lunch), Disp: , Rfl:     Cyanocobalamin (B-12) 1000 MCG TABS, Take 1,000 mcg by mouth three times a week, Disp: , Rfl:     diphenhydrAMINE (BENADRYL) 25 MG tablet, Take 25 mg by mouth every 6 hours as needed for itching or allergies, Disp: , Rfl:      empagliflozin (JARDIANCE) 25 MG TABS tablet, Take 1 tablet (25 mg) by mouth daily, Disp: 90 tablet, Rfl: 3    glipiZIDE (GLUCOTROL XL) 10 MG 24 hr tablet, Take 2 tablets (20 mg) by mouth daily (Patient taking differently: Take 20 mg by mouth every morning), Disp: 180 tablet, Rfl: 3    hydrALAZINE (APRESOLINE) 50 MG tablet, Take 1.5 tablets (75 mg) by mouth 3 times daily, Disp: 270 tablet, Rfl: 3    ketorolac (ACULAR) 0.5 % ophthalmic solution, INSTILL 1 DROP IN AFFECTED EYE THREE TIMES DAILY AS DIRECTED. START 3 DAYS BEFORE SURGERY AND USE UNTIL GONE, Disp: , Rfl:     levothyroxine (SYNTHROID/LEVOTHROID) 112 MCG tablet, Take 1 tablet (112 mcg) by mouth daily (Patient taking differently: Take 112 mcg by mouth every morning), Disp: 90 tablet, Rfl: 4    losartan (COZAAR) 100 MG tablet, Take 1 tablet (100 mg) by mouth daily (Patient taking differently: Take 100 mg by mouth every morning), Disp: 90 tablet, Rfl: 3    mesalamine (APRISO ER) 0.375 g 24 hr capsule, TAKE 4 CAPSULES(1.5 GRAMS) BY MOUTH DAILY (Patient taking differently: 1.5 g daily (with lunch)), Disp: 360 capsule, Rfl: 3    ofloxacin (OCUFLOX) 0.3 % ophthalmic solution, INSTILL 1 DROP IN AFFECTED EYE THREE TIMES DAILY AS DIRECTED. START 3 DAYS BEFORE SURGERY AND USE UNTIL GONE, Disp: , Rfl:     pantoprazole (PROTONIX) 40 MG EC tablet, Take 1 tablet (40 mg) by mouth 2 times daily, Disp: 180 tablet, Rfl: 3    prednisoLONE acetate (PRED FORTE) 1 % ophthalmic suspension, SHAKE LIQUID AND INSTILL 1 DROP IN AFFECTED EYE THREE TIMES DAILY AS DIRECTED. START 3 DAYS BEFORE SURGERY AND. CONTINUE UNTIL ALL TAKEN, Disp: , Rfl:     senna (SENOKOT) 8.6 MG tablet, Take 1 tablet by mouth daily as needed for constipation, Disp: 30 tablet, Rfl: 3    sodium bicarbonate 650 MG tablet, Take 1 tablet (650 mg) by mouth daily, Disp: 90 tablet, Rfl: 3    ticagrelor (BRILINTA) 60 MG tablet, Take 1 tablet (60 mg) by mouth 2 times daily, Disp: 60 tablet, Rfl: 6    SOCIAL  HISTORY:  Social History     Socioeconomic History    Marital status:      Spouse name: Raymundo; dementia;  2016    Number of children: 3    Years of education: Not on file    Highest education level: Not on file   Occupational History     Employer: [employer]   Tobacco Use    Smoking status: Never    Smokeless tobacco: Never   Vaping Use    Vaping Use: Never used   Substance and Sexual Activity    Alcohol use: Not Currently     Alcohol/week: 2.0 - 4.0 standard drinks of alcohol     Types: 1 - 2 Cans of beer, 1 - 2 Shots of liquor per week     Comment: occasional cocktail or beer    Drug use: No    Sexual activity: Not Currently   Other Topics Concern    Parent/sibling w/ CABG, MI or angioplasty before 65F 55M? Not Asked     Service No    Blood Transfusions No    Caffeine Concern No    Occupational Exposure No    Hobby Hazards No    Sleep Concern No    Stress Concern No    Weight Concern No    Special Diet No    Back Care No    Exercise Yes     Comment: off and on    Bike Helmet No    Seat Belt Yes    Self-Exams No   Social History Narrative    Laid off her job      Social Determinants of Health     Financial Resource Strain: Not on file   Food Insecurity: Not on file   Transportation Needs: Not on file   Physical Activity: Not on file   Stress: Not on file   Social Connections: Not on file   Intimate Partner Violence: Not on file   Housing Stability: Not on file       FAMILY HISTORY:  Family History   Problem Relation Age of Onset    Cerebrovascular Disease Mother     Cancer Father         bladder    Diverticulitis Brother     Diverticulitis Brother     Kidney Disease No family hx of     Crohn's Disease No family hx of     Ulcerative Colitis No family hx of     Stomach Cancer No family hx of     GERD No family hx of     Celiac Disease No family hx of     Anesthesia Reaction No family hx of        Past/family/social history reviewed and no changes    PHYSICAL  EXAMINATION:  Constitutional: aaox3, cooperative, pleasant, not dyspneic/diaphoretic, no acute distress  Vitals reviewed: Wt 65.8 kg (145 lb)   LMP  (LMP Unknown)   BMI 25.69 kg/m    Wt:   Wt Readings from Last 2 Encounters:   08/02/23 65.8 kg (145 lb)   07/17/23 67.6 kg (149 lb 1.6 oz)      Eyes: Sclera anicteric/injected  Ears/nose/mouth/throat: Not examined.   Neck:  Not examined.   CV: No edema  Respiratory: Unlabored breathing  Lymph:  Not examined.   Abd:  Not examined.   Skin: no jaundice  Psych: Normal affect  MSK: Normal gait      PERTINENT STUDIES:  Most recent CBC:  Recent Labs   Lab Test 06/30/23  0709 06/23/23  1129   WBC 8.1 7.7   HGB 8.5* 8.9*   HCT 29.7* 30.5*    345     Most recent hepatic panel:  Recent Labs   Lab Test 06/23/23  1129 02/02/23  0919   ALT 5 11   AST 32 44*     Most recent creatinine:  Recent Labs   Lab Test 06/30/23  0709 06/23/23  1129   CR 1.99* 1.81*             Again, thank you for allowing me to participate in the care of your patient.      Sincerely,    Preeti James MD

## 2023-08-02 NOTE — PROGRESS NOTES
Virtual Visit Details    Type of service:  Video Visit   Video Start Time: 9:33 AM  Video End Time: 9:50 AM    Originating Location (pt. 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                          - Gastroesophageal flap valve classified as Hill Grade                          III (minimal fold, loose to endoscope, hiatal hernia                          likely).                          - 4 cm hiatal hernia.                          - Portal hypertensive gastropathy.                          - Normal duodenal bulb, first portion of the duodenum                          and second portion of the duodenum.     CSCOPE 2023:    Impression:            - Inactive (Barrera Score 0) ulcerative colitis, in                          remission since the last examination.                          - Perianal skin tags found on perianal exam.                          - The examined portion of the ileum was normal.                          - Diverticulosis in the sigmoid colon and in the                          ascending colon.     colonoscopy 12/12/17 normal ileum, patchy mild erythema and mucosa edema in rectum and sigmoid colon, markedly improved compared to 4/20/2017 outside  colonoscopy report.  Mild patchy mucosal granularity noted throughout the remainder of the colon.  Rectal polyp 4 mm sessile removed.  Diverticulosis in transverse and descending colon.  Internal and external hemorrhoids noted.  Copious brown opaque semiliquid stool and medication material throughout colon interfering with visualization.  Barrera score 0.  Only biopsies obtained were from the rectal polyp, noted to be an inflammatory polyp.  No evidence of dysplasia.  No other biopsies obtained.  Enterography: --  Fecal calprotectin: --   C diff: --  Barrera score:   Stool freq: 0 (baseline stools frequency)  Rectal bleedin (None)  PGA: 0 (normal)  Endoscopy: Not done    Remission: <3   Mild disease: 3-5  Moderate disease: 6-10  Severe disease: >10    IBDQ Score Date IBDQ - Total Score  (Higher score better)   2023   9:00 AM 58   5/15/2017   7:36 AM 51     ASSESSMENT/PLAN  Ms. Montoya is a 73 year old with past medical history to include hypertension, DM2, diabetic nephropathy, multinodular goiter s/p complete thyroidectomy ~40 yrs ago complicated by postoperative hypothyroidism, diverticulosis complicated by diverticulitis with prior limited left hemicolectomy , open cholecystectomy in , s/p ex-lap 30 yrs ago for presumed ectopic pregnancy (apparent diverticulitis, nonsurgical management on discovery), s/p tubal ligation  complicated postsurgical VAH s/p mesh repair  here for follow-up for UC pancolitis follow up.    1.  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  Last endoscopic assessment was in 3/2023, noting Barrera 0.    -- Continue Apriso 1.5 g daily    2. CKD stage 3:   Likely from combination of DM and HTN. Mesalamine can also cause AIN but recent Cr has been stable.   -- Continue to follow up with nephrology.     Cancer Screening:  Colon cancer screening:  Given pancolitis colonoscopy every 2-3 years recommended after 8 years of disease.  Dysplasia screening is recommended .    Misc:  --  Avoid tobacco use  -- Avoid NSAIDs as there is potentially a 25% chance of causing an IBD flare    RTC 1 year     30 minutes spent on the date of the encounter doing chart review, history and exam, documentation and further activities as noted above.  It was a pleasure to participate in the care of this patient; please contact us with any further questions.      HPI:   Ms. Montoya is a 71 year old with past medical history to include hypertension, DM2, diabetic nephropathy, multinodular goiter s/p complete thyroidectomy ~40 yrs ago complicated by postoperative hypothyroidism, diverticulosis complicated by diverticulitis with prior limited left hemicolectomy 2002, open cholecystectomy in 2000, s/p ex-lap 30 yrs ago for presumed ectopic pregnancy (apparent diverticulitis, nonsurgical management on discovery), s/p tubal ligation 1983 complicated postsurgical VAH s/p mesh repair 2007 here for follow-up for UC pancolitis follow up.       Interval history 8/3/2022:  Patient is doing well and is at baseline bowel pattern with 1-2 soft formed stools per day.  No urgency or nighttime stools. No blood in the stool. Appetite is good with stable weight around 140lbs. Taking mesalamine 1.5gm daily, vitamin D and oral iron supplement. CBC and LFTs were unremarkable. Her Cr is 1.9 (recent baseline 1.6-1.8). She is being seen by nephrology.     Interval history 8/2023:  Had a Watchman procedure and on Brilinta. No recurrent GI bleeding. Continues on protonix BID.     Currently: 2 BMs per day. No rectal bleeding, urgency.  Taking mesalamine 1.5gm daily,   Having IV iron.       ROS:  Complete 10 System ROS performed. All are negative except as documented below, in the HPI, or in patient questionnaire from today's visit.    No fevers or chills  No weight loss  No blurry vision, double vision or change in vision  No sore throat  No lymphadenopathy  No headache, paraesthesias, or weakness in a limb  No shortness of breath or wheezing  No  chest pain or pressure  No arthralgias or myalgias  No rashes or skin changes  No odynophagia or dysphagia  No BRBPR, hematochezia, melena  No dysuria, frequency or urgency  No hot/cold intolerance or polyria  No anxiety or depression    Extra intestinal manifestations of IBD:  No uveitis/episcleritis  No aphthous ulcers   No arthritis   No erythema nodosum/pyoderma gangrenosum.     PERTINENT PAST MEDICAL HISTORY:  Past Medical History:   Diagnosis Date    Chronic kidney disease     Diabetes mellitus, type 2 (H)     Diverticulosis of colon (without mention of hemorrhage) 10/01/2012    GI bleed     History of blood transfusion     HLD (hyperlipidemia)     Hypertension     Hypothyroidism     MARIA D (iron deficiency anemia)     Persistent atrial fibrillation (H)     Pulmonary hypertension (H)     Ulcerative (chronic) enterocolitis (H)        PREVIOUS SURGERIES:  Past Surgical History:   Procedure Laterality Date    CHOLECYSTECTOMY  3/1987    COLON SURGERY  01/2001    Left colon resection; diverticulitis    COLONOSCOPY N/A 4/24/2017    Procedure: COMBINED COLONOSCOPY, SINGLE OR MULTIPLE BIOPSY/POLYPECTOMY BY BIOPSY;;  Surgeon: Radha Salguero MD;  Location: [facility]    COLONOSCOPY N/A 3/10/2023    Procedure: COLONOSCOPY;  Surgeon: Preeti James MD;  Location: [facility]    COLONOSCOPY WITH CO2 INSUFFLATION N/A 4/24/2017    Procedure: COLONOSCOPY WITH CO2 INSUFFLATION;  Colonoscopy Dx rectal bleeding, BMI 25.86, Pharm [facility] .[phone], ;  Surgeon: Radha Salguero MD;  Location: [facility] OR    CV LEFT ATRIAL APPENDAGE CLOSURE N/A 5/11/2023    Procedure: Left Atrial Appendage Closure;  Surgeon: Bobby Grimes MD;  Location:  [facility]    CV RIGHT HEART CATH MEASUREMENTS RECORDED N/A 3/16/2020    Procedure: CV RIGHT HEART CATH;  Surgeon: Nader Muñoz MD;  Location:  [facility]ATH [facility]    ESOPHAGOSCOPY, GASTROSCOPY, DUODENOSCOPY (EGD), COMBINED N/A 1/23/2023    Procedure:  ESOPHAGOGASTRODUODENOSCOPY, WITH BIOPSY;  Surgeon: Ricki Deal MD;  Location:  GI    ESOPHAGOSCOPY, GASTROSCOPY, DUODENOSCOPY (EGD), COMBINED N/A 3/10/2023    Procedure: Esophagoscopy, gastroscopy, duodenoscopy (EGD), combined;  Surgeon: Preeti James MD;  Location:  GI    GYN SURGERY  9/1983    tubal ligation    HERNIA REPAIR  12/2006    recurrent incisional hernia    SIGMOIDOSCOPY FLEXIBLE N/A 1/23/2023    Procedure: Sigmoidoscopy flexible;  Surgeon: Ricki Deal MD;  Location:  GI    THROAT SURGERY  12/1974    thyroidectomy     ALLERGIES:     Allergies   Allergen Reactions    Actos [Pioglitazone]      Fluid retention    Amlodipine      Leg swelling, hand swelling    Animal Dander     Doxycycline Hives    Dust Mites     Grass     Keflex [Cephalexin Hcl] Hives    Metoprolol      Swelling of lower legs and fatigue    Other [Seasonal Allergies]      pollen    Ranitidine      rash    Synthroid [Levothyroxine Sodium] Swelling     Allergic to brand name    Tetracycline Hives    Tylenol Hives    Ziac [Bisoprolol-Hydrochlorothiazide]        PERTINENT MEDICATIONS:    Current Outpatient Medications:     aspirin 81 MG EC tablet, Take 1 tablet (81 mg) by mouth daily, Disp: 90 tablet, Rfl: 3    atenolol (TENORMIN) 50 MG tablet, Take 1 tablet (50 mg) by mouth daily, Disp: 90 tablet, Rfl: 3    atorvastatin (LIPITOR) 40 MG tablet, Take 1 tablet (40 mg) by mouth daily, Disp: 90 tablet, Rfl: 3    blood glucose (ACCU-CHEK FELIPE PLUS) test strip, 200 strips by In Vitro route 2 times daily Use to test blood sugar 2 times daily or as directed., Disp: 200 strip, Rfl: 4    Cholecalciferol (VITAMIN D) 2000 units CAPS, Take by mouth daily (with lunch), Disp: , Rfl:     Cyanocobalamin (B-12) 1000 MCG TABS, Take 1,000 mcg by mouth three times a week, Disp: , Rfl:     diphenhydrAMINE (BENADRYL) 25 MG tablet, Take 25 mg by mouth every 6 hours as needed for itching or allergies, Disp: , Rfl:     empagliflozin (JARDIANCE) 25 MG  TABS tablet, Take 1 tablet (25 mg) by mouth daily, Disp: 90 tablet, Rfl: 3    glipiZIDE (GLUCOTROL XL) 10 MG 24 hr tablet, Take 2 tablets (20 mg) by mouth daily (Patient taking differently: Take 20 mg by mouth every morning), Disp: 180 tablet, Rfl: 3    hydrALAZINE (APRESOLINE) 50 MG tablet, Take 1.5 tablets (75 mg) by mouth 3 times daily, Disp: 270 tablet, Rfl: 3    ketorolac (ACULAR) 0.5 % ophthalmic solution, INSTILL 1 DROP IN AFFECTED EYE THREE TIMES DAILY AS DIRECTED. START 3 DAYS BEFORE SURGERY AND USE UNTIL GONE, Disp: , Rfl:     levothyroxine (SYNTHROID/LEVOTHROID) 112 MCG tablet, Take 1 tablet (112 mcg) by mouth daily (Patient taking differently: Take 112 mcg by mouth every morning), Disp: 90 tablet, Rfl: 4    losartan (COZAAR) 100 MG tablet, Take 1 tablet (100 mg) by mouth daily (Patient taking differently: Take 100 mg by mouth every morning), Disp: 90 tablet, Rfl: 3    mesalamine (APRISO ER) 0.375 g 24 hr capsule, TAKE 4 CAPSULES(1.5 GRAMS) BY MOUTH DAILY (Patient taking differently: 1.5 g daily (with lunch)), Disp: 360 capsule, Rfl: 3    ofloxacin (OCUFLOX) 0.3 % ophthalmic solution, INSTILL 1 DROP IN AFFECTED EYE THREE TIMES DAILY AS DIRECTED. START 3 DAYS BEFORE SURGERY AND USE UNTIL GONE, Disp: , Rfl:     pantoprazole (PROTONIX) 40 MG EC tablet, Take 1 tablet (40 mg) by mouth 2 times daily, Disp: 180 tablet, Rfl: 3    prednisoLONE acetate (PRED FORTE) 1 % ophthalmic suspension, SHAKE LIQUID AND INSTILL 1 DROP IN AFFECTED EYE THREE TIMES DAILY AS DIRECTED. START 3 DAYS BEFORE SURGERY AND. CONTINUE UNTIL ALL TAKEN, Disp: , Rfl:     senna (SENOKOT) 8.6 MG tablet, Take 1 tablet by mouth daily as needed for constipation, Disp: 30 tablet, Rfl: 3    sodium bicarbonate 650 MG tablet, Take 1 tablet (650 mg) by mouth daily, Disp: 90 tablet, Rfl: 3    ticagrelor (BRILINTA) 60 MG tablet, Take 1 tablet (60 mg) by mouth 2 times daily, Disp: 60 tablet, Rfl: 6    SOCIAL HISTORY:  Social History     Socioeconomic  History    Marital status:      Spouse name: Raymundo; dementia;  2016    Number of children: 3    Years of education: Not on file    Highest education level: Not on file   Occupational History     Employer: [employer]   Tobacco Use    Smoking status: Never    Smokeless tobacco: Never   Vaping Use    Vaping Use: Never used   Substance and Sexual Activity    Alcohol use: Not Currently     Alcohol/week: 2.0 - 4.0 standard drinks of alcohol     Types: 1 - 2 Cans of beer, 1 - 2 Shots of liquor per week     Comment: occasional cocktail or beer    Drug use: No    Sexual activity: Not Currently   Other Topics Concern    Parent/sibling w/ CABG, MI or angioplasty before 65F 55M? Not Asked     Service No    Blood Transfusions No    Caffeine Concern No    Occupational Exposure No    Hobby Hazards No    Sleep Concern No    Stress Concern No    Weight Concern No    Special Diet No    Back Care No    Exercise Yes     Comment: off and on    Bike Helmet No    Seat Belt Yes    Self-Exams No   Social History Narrative    Laid off her job      Social Determinants of Health     Financial Resource Strain: Not on file   Food Insecurity: Not on file   Transportation Needs: Not on file   Physical Activity: Not on file   Stress: Not on file   Social Connections: Not on file   Intimate Partner Violence: Not on file   Housing Stability: Not on file       FAMILY HISTORY:  Family History   Problem Relation Age of Onset    Cerebrovascular Disease Mother     Cancer Father         bladder    Diverticulitis Brother     Diverticulitis Brother     Kidney Disease No family hx of     Crohn's Disease No family hx of     Ulcerative Colitis No family hx of     Stomach Cancer No family hx of     GERD No family hx of     Celiac Disease No family hx of     Anesthesia Reaction No family hx of        Past/family/social history reviewed and no changes    PHYSICAL EXAMINATION:  Constitutional: aaox3, cooperative, pleasant, not  dyspneic/diaphoretic, no acute distress  Vitals reviewed: Wt 65.8 kg (145 lb)   LMP  (LMP Unknown)   BMI 25.69 kg/m    Wt:   Wt Readings from Last 2 Encounters:   08/02/23 65.8 kg (145 lb)   07/17/23 67.6 kg (149 lb 1.6 oz)      Eyes: Sclera anicteric/injected  Ears/nose/mouth/throat: Not examined.   Neck:  Not examined.   CV: No edema  Respiratory: Unlabored breathing  Lymph:  Not examined.   Abd:  Not examined.   Skin: no jaundice  Psych: Normal affect  MSK: Normal gait      PERTINENT STUDIES:  Most recent CBC:  Recent Labs   Lab Test 06/30/23  0709 06/23/23  1129   WBC 8.1 7.7   HGB 8.5* 8.9*   HCT 29.7* 30.5*    345     Most recent hepatic panel:  Recent Labs   Lab Test 06/23/23  1129 02/02/23  0919   ALT 5 11   AST 32 44*     Most recent creatinine:  Recent Labs   Lab Test 06/30/23  0709 06/23/23  1129   CR 1.99* 1.81*

## 2023-08-03 ENCOUNTER — INFUSION THERAPY VISIT (OUTPATIENT)
Dept: INFUSION THERAPY | Facility: CLINIC | Age: 73
End: 2023-08-03
Attending: INTERNAL MEDICINE
Payer: MEDICARE

## 2023-08-03 VITALS
DIASTOLIC BLOOD PRESSURE: 72 MMHG | TEMPERATURE: 97.1 F | SYSTOLIC BLOOD PRESSURE: 110 MMHG | RESPIRATION RATE: 16 BRPM | WEIGHT: 148.6 LBS | BODY MASS INDEX: 26.33 KG/M2 | HEART RATE: 94 BPM | OXYGEN SATURATION: 97 %

## 2023-08-03 DIAGNOSIS — D50.9 IRON DEFICIENCY ANEMIA, UNSPECIFIED IRON DEFICIENCY ANEMIA TYPE: ICD-10-CM

## 2023-08-03 DIAGNOSIS — E53.8 VITAMIN B12 DEFICIENCY (NON ANEMIC): ICD-10-CM

## 2023-08-03 DIAGNOSIS — K51.00 ULCERATIVE PANCOLITIS WITHOUT COMPLICATION (H): ICD-10-CM

## 2023-08-03 DIAGNOSIS — K92.2 GASTROINTESTINAL HEMORRHAGE, UNSPECIFIED GASTROINTESTINAL HEMORRHAGE TYPE: ICD-10-CM

## 2023-08-03 DIAGNOSIS — N18.30 STAGE 3 CHRONIC KIDNEY DISEASE, UNSPECIFIED WHETHER STAGE 3A OR 3B CKD (H): Primary | ICD-10-CM

## 2023-08-03 PROCEDURE — 96365 THER/PROPH/DIAG IV INF INIT: CPT | Performed by: NURSE PRACTITIONER

## 2023-08-03 PROCEDURE — 96366 THER/PROPH/DIAG IV INF ADDON: CPT | Performed by: NURSE PRACTITIONER

## 2023-08-03 RX ORDER — MEPERIDINE HYDROCHLORIDE 25 MG/ML
25 INJECTION INTRAMUSCULAR; INTRAVENOUS; SUBCUTANEOUS EVERY 30 MIN PRN
Status: CANCELLED | OUTPATIENT
Start: 2023-08-04

## 2023-08-03 RX ORDER — ALBUTEROL SULFATE 0.83 MG/ML
2.5 SOLUTION RESPIRATORY (INHALATION)
Status: CANCELLED | OUTPATIENT
Start: 2023-08-04

## 2023-08-03 RX ORDER — METHYLPREDNISOLONE SODIUM SUCCINATE 125 MG/2ML
125 INJECTION, POWDER, LYOPHILIZED, FOR SOLUTION INTRAMUSCULAR; INTRAVENOUS
Status: CANCELLED
Start: 2023-08-04

## 2023-08-03 RX ORDER — HEPARIN SODIUM,PORCINE 10 UNIT/ML
5-20 VIAL (ML) INTRAVENOUS DAILY PRN
Status: CANCELLED | OUTPATIENT
Start: 2023-08-04

## 2023-08-03 RX ORDER — EPINEPHRINE 1 MG/ML
0.3 INJECTION, SOLUTION INTRAMUSCULAR; SUBCUTANEOUS EVERY 5 MIN PRN
Status: CANCELLED | OUTPATIENT
Start: 2023-08-04

## 2023-08-03 RX ORDER — DIPHENHYDRAMINE HYDROCHLORIDE 50 MG/ML
50 INJECTION INTRAMUSCULAR; INTRAVENOUS
Status: CANCELLED
Start: 2023-08-04

## 2023-08-03 RX ORDER — HEPARIN SODIUM (PORCINE) LOCK FLUSH IV SOLN 100 UNIT/ML 100 UNIT/ML
5 SOLUTION INTRAVENOUS
Status: CANCELLED | OUTPATIENT
Start: 2023-08-04

## 2023-08-03 RX ORDER — ALBUTEROL SULFATE 90 UG/1
1-2 AEROSOL, METERED RESPIRATORY (INHALATION)
Status: CANCELLED
Start: 2023-08-04

## 2023-08-03 RX ADMIN — Medication 250 ML: at 11:33

## 2023-08-03 NOTE — PROGRESS NOTES
Infusion Nursing Note:  Keerthi Montoya presents today for Venofer dose 2 of 3.    Patient seen by provider today: No   present during visit today: Not Applicable.    Note: N/A.    Intravenous Access:  Peripheral IV placed.    Treatment Conditions:  Not Applicable.      Post Infusion Assessment:  Patient tolerated infusion without incident.  Blood return noted pre and post infusion.  Site patent and intact, free from redness, edema or discomfort.  No evidence of extravasations.  Access discontinued per protocol.       Discharge Plan:   AVS to patient via MYCHART.  Patient will return 8/17/23 for next appointment.   Patient discharged in stable condition accompanied by: self.  Departure Mode: Ambulatory.      Prerna Drake RN

## 2023-08-07 ENCOUNTER — OFFICE VISIT (OUTPATIENT)
Dept: URGENT CARE | Facility: URGENT CARE | Age: 73
End: 2023-08-07
Payer: MEDICARE

## 2023-08-07 VITALS
TEMPERATURE: 98.3 F | OXYGEN SATURATION: 95 % | BODY MASS INDEX: 26.4 KG/M2 | HEART RATE: 101 BPM | WEIGHT: 149 LBS | SYSTOLIC BLOOD PRESSURE: 98 MMHG | RESPIRATION RATE: 14 BRPM | DIASTOLIC BLOOD PRESSURE: 64 MMHG

## 2023-08-07 DIAGNOSIS — R05.1 ACUTE COUGH: Primary | ICD-10-CM

## 2023-08-07 PROCEDURE — 99213 OFFICE O/P EST LOW 20 MIN: CPT | Performed by: FAMILY MEDICINE

## 2023-08-07 RX ORDER — CIPROFLOXACIN 500 MG/1
500 TABLET, FILM COATED ORAL DAILY
Qty: 10 TABLET | Refills: 0 | Status: SHIPPED | OUTPATIENT
Start: 2023-08-07 | End: 2023-08-17

## 2023-08-07 NOTE — PROGRESS NOTES
Assessment:    ICD-10-CM    1. Acute cough  R05.1 ciprofloxacin (CIPRO) 500 MG tablet          Tejinder Jarrell MD ....................  8/7/2023   5:34 PM            CC: productive cough and sinus pressure    Patient presents with:  Sinus Problem  Cough      HPI:  Keerthi Montoya c/o productive cough and sinus pressure for 1 week.  Denies f/c, but the cough is worsening.  She has had this multiple times in the past.    PMHx, current meds and allergies reviewed.    Current meds:  Accu-Chek Aaliyah Plus  aspirin  atenolol  atorvastatin  B-12 Tabs  diphenhydrAMINE  empagliflozin Tabs  glipiZIDE  hydrALAZINE  ketorolac  levothyroxine  losartan  mesalamine  ofloxacin  pantoprazole  prednisoLONE acetate  senna  sodium bicarbonate  ticagrelor  vitamin D Caps    O:    Vitals:    08/07/23 1726   BP: 98/64   Pulse: 101   Resp: 14   Temp: 98.3  F (36.8  C)   SpO2: 95%   Weight: 67.6 kg (149 lb)     Gen:  A&O x 3, NAD  HEENT:  hung tm clear, op clear, no erythema or exudate  CV:  RRR, no m/r/g  Lungs:  CTAB, no wheezes/rales/rhonchi    Lab  No results found for any visits on 08/07/23.  Diagnosis and treatment options were discussed.

## 2023-08-17 ENCOUNTER — INFUSION THERAPY VISIT (OUTPATIENT)
Dept: INFUSION THERAPY | Facility: CLINIC | Age: 73
End: 2023-08-17
Attending: INTERNAL MEDICINE
Payer: MEDICARE

## 2023-08-17 VITALS
OXYGEN SATURATION: 98 % | RESPIRATION RATE: 18 BRPM | DIASTOLIC BLOOD PRESSURE: 79 MMHG | HEART RATE: 97 BPM | WEIGHT: 152.7 LBS | BODY MASS INDEX: 27.06 KG/M2 | TEMPERATURE: 97.9 F | SYSTOLIC BLOOD PRESSURE: 126 MMHG

## 2023-08-17 DIAGNOSIS — N18.30 STAGE 3 CHRONIC KIDNEY DISEASE, UNSPECIFIED WHETHER STAGE 3A OR 3B CKD (H): ICD-10-CM

## 2023-08-17 DIAGNOSIS — D50.9 IRON DEFICIENCY ANEMIA, UNSPECIFIED IRON DEFICIENCY ANEMIA TYPE: ICD-10-CM

## 2023-08-17 DIAGNOSIS — K92.2 GASTROINTESTINAL HEMORRHAGE, UNSPECIFIED GASTROINTESTINAL HEMORRHAGE TYPE: ICD-10-CM

## 2023-08-17 DIAGNOSIS — K51.00 ULCERATIVE PANCOLITIS WITHOUT COMPLICATION (H): Primary | ICD-10-CM

## 2023-08-17 DIAGNOSIS — E53.8 VITAMIN B12 DEFICIENCY (NON ANEMIC): ICD-10-CM

## 2023-08-17 PROCEDURE — 96365 THER/PROPH/DIAG IV INF INIT: CPT | Performed by: INTERNAL MEDICINE

## 2023-08-17 PROCEDURE — 96366 THER/PROPH/DIAG IV INF ADDON: CPT | Performed by: INTERNAL MEDICINE

## 2023-08-17 RX ORDER — HEPARIN SODIUM,PORCINE 10 UNIT/ML
5-20 VIAL (ML) INTRAVENOUS DAILY PRN
Status: CANCELLED | OUTPATIENT
Start: 2023-08-17

## 2023-08-17 RX ORDER — ALBUTEROL SULFATE 0.83 MG/ML
2.5 SOLUTION RESPIRATORY (INHALATION)
Status: CANCELLED | OUTPATIENT
Start: 2023-08-17

## 2023-08-17 RX ORDER — ALBUTEROL SULFATE 90 UG/1
1-2 AEROSOL, METERED RESPIRATORY (INHALATION)
Status: CANCELLED
Start: 2023-08-17

## 2023-08-17 RX ORDER — DIPHENHYDRAMINE HYDROCHLORIDE 50 MG/ML
50 INJECTION INTRAMUSCULAR; INTRAVENOUS
Status: CANCELLED
Start: 2023-08-17

## 2023-08-17 RX ORDER — MEPERIDINE HYDROCHLORIDE 25 MG/ML
25 INJECTION INTRAMUSCULAR; INTRAVENOUS; SUBCUTANEOUS EVERY 30 MIN PRN
Status: CANCELLED | OUTPATIENT
Start: 2023-08-17

## 2023-08-17 RX ORDER — EPINEPHRINE 1 MG/ML
0.3 INJECTION, SOLUTION INTRAMUSCULAR; SUBCUTANEOUS EVERY 5 MIN PRN
Status: CANCELLED | OUTPATIENT
Start: 2023-08-17

## 2023-08-17 RX ORDER — HEPARIN SODIUM (PORCINE) LOCK FLUSH IV SOLN 100 UNIT/ML 100 UNIT/ML
5 SOLUTION INTRAVENOUS
Status: CANCELLED | OUTPATIENT
Start: 2023-08-17

## 2023-08-17 RX ORDER — METHYLPREDNISOLONE SODIUM SUCCINATE 125 MG/2ML
125 INJECTION, POWDER, LYOPHILIZED, FOR SOLUTION INTRAMUSCULAR; INTRAVENOUS
Status: CANCELLED
Start: 2023-08-17

## 2023-08-17 RX ADMIN — Medication 250 ML: at 12:25

## 2023-08-17 NOTE — PROGRESS NOTES
Infusion Nursing Note:  Keerthi Montoya presents today for Venofer 3/3.    Patient seen by provider today: No   present during visit today: Not Applicable.    Note: Patient expressed no new medical concerns. Tolerating iron well with no major side effects.      Intravenous Access:  Peripheral IV placed.    Treatment Conditions:  Not Applicable.      Post Infusion Assessment:  Patient tolerated infusion without incident.  Blood return noted pre and post infusion.  Site patent and intact, free from redness, edema or discomfort.  No evidence of extravasations.  Access discontinued per protocol.       Discharge Plan:   AVS to patient via MYCHART.  Patient will return in 2 weeks for labs for next appointment.   Patient discharged in stable condition accompanied by: self.  Departure Mode: Ambulatory.      Taryn Hair RN

## 2023-08-22 ENCOUNTER — OFFICE VISIT (OUTPATIENT)
Dept: URGENT CARE | Facility: URGENT CARE | Age: 73
End: 2023-08-22
Payer: MEDICARE

## 2023-08-22 ENCOUNTER — DOCUMENTATION ONLY (OUTPATIENT)
Dept: INFUSION THERAPY | Facility: CLINIC | Age: 73
End: 2023-08-22

## 2023-08-22 VITALS
BODY MASS INDEX: 27.11 KG/M2 | TEMPERATURE: 99.6 F | DIASTOLIC BLOOD PRESSURE: 68 MMHG | SYSTOLIC BLOOD PRESSURE: 116 MMHG | OXYGEN SATURATION: 100 % | HEART RATE: 91 BPM | RESPIRATION RATE: 15 BRPM | WEIGHT: 153 LBS

## 2023-08-22 DIAGNOSIS — B02.9 HERPES ZOSTER WITHOUT COMPLICATION: Primary | ICD-10-CM

## 2023-08-22 PROCEDURE — 99213 OFFICE O/P EST LOW 20 MIN: CPT

## 2023-08-22 RX ORDER — VALACYCLOVIR HYDROCHLORIDE 1 G/1
1000 TABLET, FILM COATED ORAL DAILY
Qty: 7 TABLET | Refills: 0 | Status: ON HOLD | OUTPATIENT
Start: 2023-08-22 | End: 2023-09-19

## 2023-08-22 NOTE — PROGRESS NOTES
ASSESSMENT:  (B02.9) Herpes zoster without complication  (primary encounter diagnosis)  Plan: valACYclovir (VALTREX) 1000 mg tablet    PLAN:  Shingles patient instructions discussed and provided.  Informed the patient to take valacyclovir as prescribed and finish the full course even if symptoms improve.  We also discussed using cool compresses to the area for any pain or itching.  Instructed the patient to return to clinic with any new or worsening symptoms.  Patient acknowledged her understanding of the above plan.    The use of Dragon/PowerMic dictation services may have been used to construct the content in this note; any grammatical or spelling errors are non-intentional. Please contact the author of this note directly if you are in need of any clarification.      WENDI Basilio CNP      SUBJECTIVE:  Keerthi Montoya is a 73 year old female who presents to the clinic today for a rash.  Onset of rash was 5 days ago.  Rash is still present.  Location of the rash: abdomen, neck, and scalp.  Symptoms are moderate and rash seems to be worsening.  Associated symptoms include: blisters and itching.  Therapies tried to improve the rash: Bacitracin and Neosporin.  Recent new medication? No  Previous history of a similar rash? No  Recent exposure history: none known    ROS:  Negative except noted above.    OBJECTIVE:  EXAM:  VITALS: /68   Pulse 91   Temp 99.6  F (37.6  C)   Resp 15   Wt 69.4 kg (153 lb)   LMP  (LMP Unknown)   SpO2 100%   BMI 27.11 kg/m    GENERAL APPEARANCE: healthy, alert and no distress  SKIN: Rash description:    Location: abdomen, neck, and scalp    Distribution: localized over dermatomes C3, C4 and left sided T11  Lesion grouping: dermatomal  Lesion type: vesicular  Color: red  Nail exam: normal- no clubbing or nail changes  Hair exam: normal

## 2023-08-22 NOTE — PROGRESS NOTES
Patient arrived to this clinic at about 1600 without an appointment stating she had an iron infusion here last week and since the day after she noticed a rash on her trunk/abdomen, shoulder and head.  She is asking that we look at her rash and advise.      This RN let patient know that we would not treat her here as we do the infusion only and that she could have called triage or gone to urgent care for future reference.  She verbalizes understanding and asks writer to look at her rash.    This RN talked with patient.  She is in no distress, she denied shortness of breath or difficulty breathing.  She states she drove herself here.  She is alert and oriented times three.  The rash is on her abdomen, shoulder and head as she stated. (Looks like maybe shingles?) This RN stated she is not a provider and advised patient to go directly to urgent care.  She knows where it is in [place] and agreed to go directly there.

## 2023-08-22 NOTE — PATIENT INSTRUCTIONS
Take valacyclovir as prescribed and finish the full course even if symptoms improve.  Use cool compresses to the areas as well for any pain or itching.

## 2023-09-08 DIAGNOSIS — E53.8 VITAMIN B12 DEFICIENCY (NON ANEMIC): Primary | ICD-10-CM

## 2023-09-08 DIAGNOSIS — D50.9 IRON DEFICIENCY ANEMIA, UNSPECIFIED IRON DEFICIENCY ANEMIA TYPE: ICD-10-CM

## 2023-09-08 DIAGNOSIS — N18.30 STAGE 3 CHRONIC KIDNEY DISEASE, UNSPECIFIED WHETHER STAGE 3A OR 3B CKD (H): ICD-10-CM

## 2023-09-17 ENCOUNTER — APPOINTMENT (OUTPATIENT)
Dept: GENERAL RADIOLOGY | Facility: CLINIC | Age: 73
DRG: 291 | End: 2023-09-17
Attending: STUDENT IN AN ORGANIZED HEALTH CARE EDUCATION/TRAINING PROGRAM
Payer: MEDICARE

## 2023-09-17 ENCOUNTER — HOSPITAL ENCOUNTER (INPATIENT)
Facility: CLINIC | Age: 73
LOS: 4 days | Discharge: HOME | DRG: 291 | End: 2023-09-21
Attending: STUDENT IN AN ORGANIZED HEALTH CARE EDUCATION/TRAINING PROGRAM | Admitting: INTERNAL MEDICINE
Payer: MEDICARE

## 2023-09-17 ENCOUNTER — OFFICE VISIT (OUTPATIENT)
Dept: URGENT CARE | Facility: URGENT CARE | Age: 73
End: 2023-09-17
Payer: MEDICARE

## 2023-09-17 VITALS
DIASTOLIC BLOOD PRESSURE: 65 MMHG | RESPIRATION RATE: 16 BRPM | OXYGEN SATURATION: 97 % | SYSTOLIC BLOOD PRESSURE: 109 MMHG | BODY MASS INDEX: 29.2 KG/M2 | WEIGHT: 164.8 LBS | TEMPERATURE: 97.9 F | HEART RATE: 98 BPM

## 2023-09-17 DIAGNOSIS — I50.30 HEART FAILURE WITH PRESERVED EJECTION FRACTION, NYHA CLASS I (H): Primary | ICD-10-CM

## 2023-09-17 DIAGNOSIS — I27.20 SEVERE PULMONARY HYPERTENSION (H): ICD-10-CM

## 2023-09-17 DIAGNOSIS — B02.8 HERPES ZOSTER WITH OTHER COMPLICATION: ICD-10-CM

## 2023-09-17 DIAGNOSIS — I50.33 ACUTE ON CHRONIC DIASTOLIC CONGESTIVE HEART FAILURE (H): ICD-10-CM

## 2023-09-17 DIAGNOSIS — Z79.01 ANTICOAGULATED: ICD-10-CM

## 2023-09-17 DIAGNOSIS — I10 HYPERTENSION GOAL BP (BLOOD PRESSURE) < 130/80: ICD-10-CM

## 2023-09-17 DIAGNOSIS — E11.65 TYPE 2 DIABETES MELLITUS WITH HYPERGLYCEMIA, UNSPECIFIED WHETHER LONG TERM INSULIN USE (H): ICD-10-CM

## 2023-09-17 DIAGNOSIS — R14.0 ABDOMINAL DISTENSION: Primary | ICD-10-CM

## 2023-09-17 DIAGNOSIS — R63.5 WEIGHT GAIN: ICD-10-CM

## 2023-09-17 DIAGNOSIS — I48.91 ATRIAL FIBRILLATION, UNSPECIFIED TYPE (H): ICD-10-CM

## 2023-09-17 DIAGNOSIS — I50.30 DIASTOLIC HEART FAILURE, UNSPECIFIED HF CHRONICITY (H): ICD-10-CM

## 2023-09-17 LAB
ALBUMIN SERPL BCG-MCNC: 3.9 G/DL (ref 3.5–5.2)
ALBUMIN UR-MCNC: 30 MG/DL
ALP SERPL-CCNC: 120 U/L (ref 35–104)
ALT SERPL W P-5'-P-CCNC: <5 U/L (ref 0–50)
ANION GAP SERPL CALCULATED.3IONS-SCNC: 14 MMOL/L (ref 7–15)
APPEARANCE UR: CLEAR
AST SERPL W P-5'-P-CCNC: 44 U/L (ref 0–45)
ATRIAL RATE - MUSE: 107 BPM
BASOPHILS # BLD AUTO: 0.1 10E3/UL (ref 0–0.2)
BASOPHILS NFR BLD AUTO: 1 %
BILIRUB SERPL-MCNC: 1.1 MG/DL
BILIRUB UR QL STRIP: NEGATIVE
BUN SERPL-MCNC: 24.6 MG/DL (ref 8–23)
CALCIUM SERPL-MCNC: 9.2 MG/DL (ref 8.8–10.2)
CHLORIDE SERPL-SCNC: 108 MMOL/L (ref 98–107)
COLOR UR AUTO: YELLOW
CREAT SERPL-MCNC: 1.7 MG/DL (ref 0.51–0.95)
DEPRECATED HCO3 PLAS-SCNC: 19 MMOL/L (ref 22–29)
DIASTOLIC BLOOD PRESSURE - MUSE: NORMAL MMHG
EGFRCR SERPLBLD CKD-EPI 2021: 32 ML/MIN/1.73M2
EOSINOPHIL # BLD AUTO: 0.1 10E3/UL (ref 0–0.7)
EOSINOPHIL NFR BLD AUTO: 1 %
ERYTHROCYTE [DISTWIDTH] IN BLOOD BY AUTOMATED COUNT: ABNORMAL %
GLUCOSE BLDC GLUCOMTR-MCNC: 108 MG/DL (ref 70–99)
GLUCOSE SERPL-MCNC: 121 MG/DL (ref 70–99)
GLUCOSE UR STRIP-MCNC: >=1000 MG/DL
HCT VFR BLD AUTO: 40.6 % (ref 35–47)
HGB BLD-MCNC: 12.4 G/DL (ref 11.7–15.7)
HGB UR QL STRIP: NEGATIVE
IMM GRANULOCYTES # BLD: 0 10E3/UL
IMM GRANULOCYTES NFR BLD: 1 %
INTERPRETATION ECG - MUSE: NORMAL
KETONES UR STRIP-MCNC: NEGATIVE MG/DL
LEUKOCYTE ESTERASE UR QL STRIP: NEGATIVE
LIPASE SERPL-CCNC: 19 U/L (ref 13–60)
LYMPHOCYTES # BLD AUTO: 0.8 10E3/UL (ref 0.8–5.3)
LYMPHOCYTES NFR BLD AUTO: 9 %
MAGNESIUM SERPL-MCNC: 2.4 MG/DL (ref 1.7–2.3)
MCH RBC QN AUTO: 28.2 PG (ref 26.5–33)
MCHC RBC AUTO-ENTMCNC: 30.5 G/DL (ref 31.5–36.5)
MCV RBC AUTO: 92 FL (ref 78–100)
MONOCYTES # BLD AUTO: 1 10E3/UL (ref 0–1.3)
MONOCYTES NFR BLD AUTO: 11 %
NEUTROPHILS # BLD AUTO: 6.8 10E3/UL (ref 1.6–8.3)
NEUTROPHILS NFR BLD AUTO: 77 %
NITRATE UR QL: NEGATIVE
NRBC # BLD AUTO: 0 10E3/UL
NRBC BLD AUTO-RTO: 0 /100
NT-PROBNP SERPL-MCNC: ABNORMAL PG/ML (ref 0–900)
P AXIS - MUSE: NORMAL DEGREES
PH UR STRIP: 5.5 [PH] (ref 5–7)
PLATELET # BLD AUTO: 312 10E3/UL (ref 150–450)
POTASSIUM BLD-SCNC: 4.3 MMOL/L (ref 3.4–5.3)
POTASSIUM SERPL-SCNC: 5.7 MMOL/L (ref 3.4–5.3)
PR INTERVAL - MUSE: NORMAL MS
PROT SERPL-MCNC: 7.5 G/DL (ref 6.4–8.3)
QRS DURATION - MUSE: 68 MS
QT - MUSE: 326 MS
QTC - MUSE: 398 MS
R AXIS - MUSE: 123 DEGREES
RBC # BLD AUTO: 4.4 10E6/UL (ref 3.8–5.2)
RBC URINE: 1 /HPF
SODIUM SERPL-SCNC: 141 MMOL/L (ref 136–145)
SP GR UR STRIP: 1.02 (ref 1–1.03)
SQUAMOUS EPITHELIAL: 1 /HPF
SYSTOLIC BLOOD PRESSURE - MUSE: NORMAL MMHG
T AXIS - MUSE: 233 DEGREES
TROPONIN T SERPL HS-MCNC: 28 NG/L
UROBILINOGEN UR STRIP-MCNC: NORMAL MG/DL
VENTRICULAR RATE- MUSE: 90 BPM
WBC # BLD AUTO: 8.8 10E3/UL (ref 4–11)
WBC URINE: 3 /HPF

## 2023-09-17 PROCEDURE — 93005 ELECTROCARDIOGRAM TRACING: CPT | Mod: 59 | Performed by: STUDENT IN AN ORGANIZED HEALTH CARE EDUCATION/TRAINING PROGRAM

## 2023-09-17 PROCEDURE — 36415 COLL VENOUS BLD VENIPUNCTURE: CPT | Performed by: STUDENT IN AN ORGANIZED HEALTH CARE EDUCATION/TRAINING PROGRAM

## 2023-09-17 PROCEDURE — 83735 ASSAY OF MAGNESIUM: CPT | Performed by: STUDENT IN AN ORGANIZED HEALTH CARE EDUCATION/TRAINING PROGRAM

## 2023-09-17 PROCEDURE — 93010 ELECTROCARDIOGRAM REPORT: CPT | Mod: 59 | Performed by: STUDENT IN AN ORGANIZED HEALTH CARE EDUCATION/TRAINING PROGRAM

## 2023-09-17 PROCEDURE — 250N000011 HC RX IP 250 OP 636: Mod: JZ | Performed by: STUDENT IN AN ORGANIZED HEALTH CARE EDUCATION/TRAINING PROGRAM

## 2023-09-17 PROCEDURE — 80053 COMPREHEN METABOLIC PANEL: CPT | Performed by: STUDENT IN AN ORGANIZED HEALTH CARE EDUCATION/TRAINING PROGRAM

## 2023-09-17 PROCEDURE — 99214 OFFICE O/P EST MOD 30 MIN: CPT | Performed by: PHYSICIAN ASSISTANT

## 2023-09-17 PROCEDURE — 120N000003 HC R&B IMCU UMMC

## 2023-09-17 PROCEDURE — 83036 HEMOGLOBIN GLYCOSYLATED A1C: CPT | Performed by: INTERNAL MEDICINE

## 2023-09-17 PROCEDURE — 81001 URINALYSIS AUTO W/SCOPE: CPT | Performed by: STUDENT IN AN ORGANIZED HEALTH CARE EDUCATION/TRAINING PROGRAM

## 2023-09-17 PROCEDURE — 84484 ASSAY OF TROPONIN QUANT: CPT | Performed by: STUDENT IN AN ORGANIZED HEALTH CARE EDUCATION/TRAINING PROGRAM

## 2023-09-17 PROCEDURE — 83880 ASSAY OF NATRIURETIC PEPTIDE: CPT | Performed by: STUDENT IN AN ORGANIZED HEALTH CARE EDUCATION/TRAINING PROGRAM

## 2023-09-17 PROCEDURE — 71046 X-RAY EXAM CHEST 2 VIEWS: CPT

## 2023-09-17 PROCEDURE — 85025 COMPLETE CBC W/AUTO DIFF WBC: CPT | Performed by: STUDENT IN AN ORGANIZED HEALTH CARE EDUCATION/TRAINING PROGRAM

## 2023-09-17 PROCEDURE — 36415 COLL VENOUS BLD VENIPUNCTURE: CPT | Performed by: INTERNAL MEDICINE

## 2023-09-17 PROCEDURE — 83690 ASSAY OF LIPASE: CPT | Performed by: STUDENT IN AN ORGANIZED HEALTH CARE EDUCATION/TRAINING PROGRAM

## 2023-09-17 PROCEDURE — 84132 ASSAY OF SERUM POTASSIUM: CPT | Performed by: INTERNAL MEDICINE

## 2023-09-17 PROCEDURE — 99223 1ST HOSP IP/OBS HIGH 75: CPT | Mod: AI | Performed by: INTERNAL MEDICINE

## 2023-09-17 PROCEDURE — 93308 TTE F-UP OR LMTD: CPT | Performed by: STUDENT IN AN ORGANIZED HEALTH CARE EDUCATION/TRAINING PROGRAM

## 2023-09-17 PROCEDURE — 250N000013 HC RX MED GY IP 250 OP 250 PS 637: Performed by: INTERNAL MEDICINE

## 2023-09-17 PROCEDURE — 99285 EMERGENCY DEPT VISIT HI MDM: CPT | Mod: 25 | Performed by: STUDENT IN AN ORGANIZED HEALTH CARE EDUCATION/TRAINING PROGRAM

## 2023-09-17 PROCEDURE — 999N000127 HC STATISTIC PERIPHERAL IV START W US GUIDANCE

## 2023-09-17 PROCEDURE — 82962 GLUCOSE BLOOD TEST: CPT

## 2023-09-17 PROCEDURE — 71046 X-RAY EXAM CHEST 2 VIEWS: CPT | Mod: 26 | Performed by: RADIOLOGY

## 2023-09-17 PROCEDURE — 93308 TTE F-UP OR LMTD: CPT | Mod: 26 | Performed by: STUDENT IN AN ORGANIZED HEALTH CARE EDUCATION/TRAINING PROGRAM

## 2023-09-17 RX ORDER — LEVOTHYROXINE SODIUM 112 UG/1
112 TABLET ORAL EVERY MORNING
Status: DISCONTINUED | OUTPATIENT
Start: 2023-09-18 | End: 2023-09-21 | Stop reason: HOSPADM

## 2023-09-17 RX ORDER — PANTOPRAZOLE SODIUM 40 MG/1
40 TABLET, DELAYED RELEASE ORAL DAILY
Status: DISCONTINUED | OUTPATIENT
Start: 2023-09-18 | End: 2023-09-21 | Stop reason: HOSPADM

## 2023-09-17 RX ORDER — DEXTROSE MONOHYDRATE 25 G/50ML
25-50 INJECTION, SOLUTION INTRAVENOUS
Status: DISCONTINUED | OUTPATIENT
Start: 2023-09-17 | End: 2023-09-20

## 2023-09-17 RX ORDER — ATORVASTATIN CALCIUM 40 MG/1
40 TABLET, FILM COATED ORAL DAILY
Status: DISCONTINUED | OUTPATIENT
Start: 2023-09-18 | End: 2023-09-21 | Stop reason: HOSPADM

## 2023-09-17 RX ORDER — SODIUM BICARBONATE 650 MG/1
650 TABLET ORAL DAILY
Status: DISCONTINUED | OUTPATIENT
Start: 2023-09-18 | End: 2023-09-21 | Stop reason: HOSPADM

## 2023-09-17 RX ORDER — ASPIRIN 81 MG/1
81 TABLET ORAL DAILY
Status: DISCONTINUED | OUTPATIENT
Start: 2023-09-18 | End: 2023-09-21 | Stop reason: HOSPADM

## 2023-09-17 RX ORDER — MESALAMINE 0.38 G/1
1.5 CAPSULE, EXTENDED RELEASE ORAL
Status: DISCONTINUED | OUTPATIENT
Start: 2023-09-18 | End: 2023-09-21 | Stop reason: HOSPADM

## 2023-09-17 RX ORDER — FUROSEMIDE 10 MG/ML
40 INJECTION INTRAMUSCULAR; INTRAVENOUS ONCE
Status: COMPLETED | OUTPATIENT
Start: 2023-09-17 | End: 2023-09-17

## 2023-09-17 RX ORDER — GLIPIZIDE 10 MG/1
20 TABLET, FILM COATED, EXTENDED RELEASE ORAL EVERY MORNING
Status: DISCONTINUED | OUTPATIENT
Start: 2023-09-18 | End: 2023-09-21 | Stop reason: HOSPADM

## 2023-09-17 RX ORDER — NICOTINE POLACRILEX 4 MG
15-30 LOZENGE BUCCAL
Status: DISCONTINUED | OUTPATIENT
Start: 2023-09-17 | End: 2023-09-20

## 2023-09-17 RX ORDER — LOSARTAN POTASSIUM 100 MG/1
100 TABLET ORAL EVERY MORNING
Status: DISCONTINUED | OUTPATIENT
Start: 2023-09-18 | End: 2023-09-21 | Stop reason: HOSPADM

## 2023-09-17 RX ORDER — LIDOCAINE 40 MG/G
CREAM TOPICAL
Status: DISCONTINUED | OUTPATIENT
Start: 2023-09-17 | End: 2023-09-21 | Stop reason: HOSPADM

## 2023-09-17 RX ADMIN — TICAGRELOR 60 MG: 60 TABLET ORAL at 22:48

## 2023-09-17 RX ADMIN — FUROSEMIDE 40 MG: 10 INJECTION, SOLUTION INTRAVENOUS at 19:32

## 2023-09-17 RX ADMIN — HYDRALAZINE HYDROCHLORIDE 75 MG: 50 TABLET, FILM COATED ORAL at 22:48

## 2023-09-17 ASSESSMENT — ACTIVITIES OF DAILY LIVING (ADL)
ADLS_ACUITY_SCORE: 35

## 2023-09-17 ASSESSMENT — PAIN SCALES - GENERAL: PAINLEVEL: NO PAIN (0)

## 2023-09-17 NOTE — H&P
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 Plan for diuresis.     #Acute decompensated HFpEF  #Moderate PH  #Moderate Paradoxical Low Flow Low Gradient Aortic Stenosis   Presents with ~ 2 week history of  increased dyspnea on exertion, abdominal distension, peripheral edema, and weight gain.  BNP 18,607, creatinine 1.70, CXR with pulmonary edema and small bilateral effusions.   Volume status: hypervolemic. Home regimen: torsemide 10 mg PRN.   - Furosemide IV 40 mg   - Echocardiogram, pending   - PTA empagliflozin 25 mg and losartan 100 mg daily     #Paroxysmal a-fib w/contraindication to anticoagulation due to GI bleeding  #Healed gastric ulcers/Ulcerative colitis (inactive)  Underwent successful implantation of 24mm Watchman FLX. Intra-procedure LAURA showed no evidence of delroy-device leak and no pericardial effusion. Post procedure TTE with small anterior effusion. Patient kept overnight for observation. Repeat limited TTE 23 shows stable small pericardial effusion. Xarelto started 5/15/23, completed 6 week course.   - DAPT: ASA and clopidogrel for total of 6 months from implantation date, with aspirin then continuing lifelong    #HTN   -PTA losartan 100 mg  -PTA  atenolol, hold    #HLD  - PTA atorvastatin 40 mg     #Stage III CKD   - Creatinine 1.70, stable     #MARIA D  MARIA D likely secondary to GI blood loss. Her most recent iron panel reveals iron of 21 ug/dl and TIBC of 426 ug/dl with 5% saturation, characteristic for iron deficiency.    Follows with hematology  -Hgb 8.8 on admission, stable. 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  Last endoscopic assessment was in 3/2023, noting Barrera 0.    - PTA mesalamine 1.5 g daily     CHIEF COMPLAINT: dyspnea, edema     HPI:   Keerthi Montoya is a 73-year-old female with history of HLD, HTN, DM2, ulcerative pancolitis, CKD3, pulmonary HTN, HFpEF, moderate paradoxical low flow low gradient aortic stenosis, persistent atrial fibrillation with CHADS-VASc score 4 (HTN, DM, age, female) and HASBLED of 5 (HTN, CKD, age, prior bleed, meds) with intolerance to anticoagulation due to history of GI bleeding who underwent successful left atrial appendage closure with a 24mm WATCHMAN FLX device with 17% - 27% compression on 05/2023 who   presents to the ED for evaluation of edema. States she has gained around 15 pounds within the last couple of weeks, has had bilateral lower extremity swelling, and increased abdominal distension.  She has had increased dyspnea with exertion as well as orthopnea.     Of note, she was recently treated for shingles with valacyclovir.     ROS:   12-pt ROS otherwise negative except as noted above    PMH:  Past Medical History:   Diagnosis Date    Chronic kidney disease     Diabetes mellitus, type 2 (H)     Diverticulosis of colon (without mention of hemorrhage) 10/01/2012    GI bleed     History of blood transfusion     HLD (hyperlipidemia)     Hypertension     Hypothyroidism     MARIA D (iron deficiency anemia)     Persistent atrial fibrillation (H)     Pulmonary hypertension (H)     Ulcerative (chronic)  enterocolitis (H)      PSH:  Past Surgical History:   Procedure Laterality Date    CHOLECYSTECTOMY  3/1987    COLON SURGERY  01/2001    Left colon resection; diverticulitis    COLONOSCOPY N/A 4/24/2017    Procedure: COMBINED COLONOSCOPY, SINGLE OR MULTIPLE BIOPSY/POLYPECTOMY BY BIOPSY;;  Surgeon: Radha Salguero MD;  Location: [facility] OR    COLONOSCOPY N/A 3/10/2023    Procedure: COLONOSCOPY;  Surgeon: Preeti James MD;  Location: [facility]    COLONOSCOPY WITH CO2 INSUFFLATION N/A 4/24/2017    Procedure: COLONOSCOPY WITH CO2 INSUFFLATION;  Colonoscopy Dx rectal bleeding, BMI 25.86, Pharm [facility] .[phone], ;  Surgeon: Radha Salguero MD;  Location: [facility] OR    CV LEFT ATRIAL APPENDAGE CLOSURE N/A 5/11/2023    Procedure: Left Atrial Appendage Closure;  Surgeon: Bobby Grimes MD;  Location:  HEART CARDIAC CATH LAB    CV RIGHT HEART CATH MEASUREMENTS RECORDED N/A 3/16/2020    Procedure: CV RIGHT HEART CATH;  Surgeon: Nader Muñoz MD;  Location:  HEART CARDIAC CATH LAB    ESOPHAGOSCOPY, GASTROSCOPY, DUODENOSCOPY (EGD), COMBINED N/A 1/23/2023    Procedure: ESOPHAGOGASTRODUODENOSCOPY, WITH BIOPSY;  Surgeon: Ricki Deal MD;  Location:  GI    ESOPHAGOSCOPY, GASTROSCOPY, DUODENOSCOPY (EGD), COMBINED N/A 3/10/2023    Procedure: Esophagoscopy, gastroscopy, duodenoscopy (EGD), combined;  Surgeon: Preeti James MD;  Location:  GI    GYN SURGERY  9/1983    tubal ligation    HERNIA REPAIR  12/2006    recurrent incisional hernia    SIGMOIDOSCOPY FLEXIBLE N/A 1/23/2023    Procedure: Sigmoidoscopy flexible;  Surgeon: Ricki Deal MD;  Location:  GI    THROAT SURGERY  12/1974    thyroidectomy       MEDICATIONS:  Prior to Admission Medications   Prescriptions Last Dose Informant Patient Reported? Taking?   Cholecalciferol (VITAMIN D) 2000 units CAPS   Yes No   Sig: Take by mouth daily (with lunch)   Cyanocobalamin (B-12) 1000 MCG TABS   Yes No   Sig: Take 1,000 mcg by mouth three  times a week   aspirin 81 MG EC tablet   Yes No   Sig: Take 1 tablet (81 mg) by mouth daily   atenolol (TENORMIN) 50 MG tablet   No No   Sig: Take 1 tablet (50 mg) by mouth daily   atorvastatin (LIPITOR) 40 MG tablet   No No   Sig: Take 1 tablet (40 mg) by mouth daily   blood glucose (ACCU-CHEK FELIPE PLUS) test strip   No No   Si strips by In Vitro route 2 times daily Use to test blood sugar 2 times daily or as directed.   diphenhydrAMINE (BENADRYL) 25 MG tablet   Yes No   Sig: Take 25 mg by mouth every 6 hours as needed for itching or allergies   empagliflozin (JARDIANCE) 25 MG TABS tablet   No No   Sig: Take 1 tablet (25 mg) by mouth daily   glipiZIDE (GLUCOTROL XL) 10 MG 24 hr tablet   No No   Sig: Take 2 tablets (20 mg) by mouth daily   Patient taking differently: Take 20 mg by mouth every morning   hydrALAZINE (APRESOLINE) 50 MG tablet   No No   Sig: Take 1.5 tablets (75 mg) by mouth 3 times daily   ketorolac (ACULAR) 0.5 % ophthalmic solution   Yes No   Sig: INSTILL 1 DROP IN AFFECTED EYE THREE TIMES DAILY AS DIRECTED. START 3 DAYS BEFORE SURGERY AND USE UNTIL GONE   levothyroxine (SYNTHROID/LEVOTHROID) 112 MCG tablet   No No   Sig: Take 1 tablet (112 mcg) by mouth daily   Patient taking differently: Take 112 mcg by mouth every morning   losartan (COZAAR) 100 MG tablet   No No   Sig: Take 1 tablet (100 mg) by mouth daily   Patient taking differently: Take 100 mg by mouth every morning   mesalamine (APRISO ER) 0.375 g 24 hr capsule   No No   Sig: TAKE 4 CAPSULES(1.5 GRAMS) BY MOUTH DAILY   Patient taking differently: 1.5 g daily (with lunch)   ofloxacin (OCUFLOX) 0.3 % ophthalmic solution   Yes No   Sig: INSTILL 1 DROP IN AFFECTED EYE THREE TIMES DAILY AS DIRECTED. START 3 DAYS BEFORE SURGERY AND USE UNTIL GONE   pantoprazole (PROTONIX) 40 MG EC tablet   No No   Sig: Take 1 tablet (40 mg) by mouth daily   prednisoLONE acetate (PRED FORTE) 1 % ophthalmic suspension   Yes No   Sig: SHAKE LIQUID AND INSTILL  1 DROP IN AFFECTED EYE THREE TIMES DAILY AS DIRECTED. START 3 DAYS BEFORE SURGERY AND. CONTINUE UNTIL ALL TAKEN   senna (SENOKOT) 8.6 MG tablet   No No   Sig: Take 1 tablet by mouth daily as needed for constipation   sodium bicarbonate 650 MG tablet   No No   Sig: Take 1 tablet (650 mg) by mouth daily   ticagrelor (BRILINTA) 60 MG tablet   No No   Sig: Take 1 tablet (60 mg) by mouth 2 times daily   valACYclovir (VALTREX) 1000 mg tablet   No No   Sig: Take 1 tablet (1,000 mg) by mouth daily for 7 days      Facility-Administered Medications: None        ALLERGIES:     Allergies   Allergen Reactions    Actos [Pioglitazone]      Fluid retention    Amlodipine      Leg swelling, hand swelling    Animal Dander     Doxycycline Hives    Dust Mites     Grass     Keflex [Cephalexin Hcl] Hives    Metoprolol      Swelling of lower legs and fatigue    Other [Seasonal Allergies]      pollen    Ranitidine      rash    Synthroid [Levothyroxine Sodium] Swelling     Allergic to brand name    Tetracycline Hives    Tylenol Hives    Ziac [Bisoprolol-Hydrochlorothiazide]        FAMILY HISTORY:  Family History   Problem Relation Age of Onset    Cerebrovascular Disease Mother     Cancer Father         bladder    Diverticulitis Brother     Diverticulitis Brother     Kidney Disease No family hx of     Crohn's Disease No family hx of     Ulcerative Colitis No family hx of     Stomach Cancer No family hx of     GERD No family hx of     Celiac Disease No family hx of     Anesthesia Reaction No family hx of        SOCIAL HISTORY:  Social History     Socioeconomic History    Marital status:      Spouse name: Raymundo; dementia;      Number of children: 3    Years of education: Not on file    Highest education level: Not on file   Occupational History     Employer: [employer]   Tobacco Use    Smoking status: Never     Passive exposure: Never    Smokeless tobacco: Never   Vaping Use    Vaping Use: Never used   Substance and  "Sexual Activity    Alcohol use: Not Currently     Alcohol/week: 2.0 - 4.0 standard drinks of alcohol     Types: 1 - 2 Cans of beer, 1 - 2 Shots of liquor per week     Comment: occasional cocktail or beer    Drug use: No    Sexual activity: Not Currently   Other Topics Concern    Parent/sibling w/ CABG, MI or angioplasty before 65F 55M? Not Asked     Service No    Blood Transfusions No    Caffeine Concern No    Occupational Exposure No    Hobby Hazards No    Sleep Concern No    Stress Concern No    Weight Concern No    Special Diet No    Back Care No    Exercise Yes     Comment: off and on    Bike Helmet No    Seat Belt Yes    Self-Exams No   Social History Narrative    Laid off her job 8/14     Social Determinants of Health     Financial Resource Strain: Not on file   Food Insecurity: Not on file   Transportation Needs: Not on file   Physical Activity: Not on file   Stress: Not on file   Social Connections: Not on file   Intimate Partner Violence: Not on file   Housing Stability: Not on file       PHYSICAL EXAM:  Blood pressure 119/72, pulse 84, temperature 96.8  F (36  C), temperature source Oral, resp. rate 16, height 1.6 m (5' 3\"), SpO2 100 %, not currently breastfeeding.  GENERAL: No acute distress  HEENT: Mucous membranes moist and without lesions.  NECK: Supple and without lymphadenopathy. JVP 10 cm.   CV: irregular rhythm, normal rate, no murmur appreciated   RESPIRATORY: Normal breath sounds, no wheezes or crackles noted  GI: Soft, mild distension. Blisters on left side of abdomen, dry   EXTREMITIES: No peripheral edema. 2+ bilateral pedal pulses.   NEUROLOGIC: Alert and orientated x 3. CN II-XII grossly intact. No focal deficits.   MUSCULOSKELETAL: No joint swelling or tenderness.   SKIN: No jaundice. No acute rashes or lesions.     LABS:  Reviewed in [facility]     EKG:      Imaging:  CXR (09/27/2023)   Mild perihilar streaky opacities may represent edema  versus atelectasis. Bilateral small pleural " effusions.    I very much appreciated the opportunity to see and assess Keerthi Montoya in the hospital emergency department with cards 1 resident team.  Patient is a 73-year-old woman with multiple comorbidities who presents with shortness of breath and substantial weight gain.  She has been treated in the emergency department until a cardiology bed becomes available.  The note above summarizes my findings and current recommendations.  Please do not hesitate to contact my office if you have any questions or concerns.      Bunny Urias MD  Cardiac Arrhythmia Service  [facility]  [phone]

## 2023-09-17 NOTE — ED TRIAGE NOTES
Pt ambulatory to triage with c/o shingles. Pt states she's been having an abdominal rash for the past month. Now with new BLE swelling, and 15 lbs weight gain.      Triage Assessment       Row Name 09/17/23 2049       Triage Assessment (Adult)    Airway WDL WDL       Respiratory WDL    Respiratory WDL WDL       Skin Circulation/Temperature WDL    Skin Circulation/Temperature WDL X  Abdominal rash       Cardiac WDL    Cardiac WDL WDL       Peripheral/Neurovascular WDL    Peripheral Neurovascular WDL X  BLE swelling       Cognitive/Neuro/Behavioral WDL    Cognitive/Neuro/Behavioral WDL WDL

## 2023-09-17 NOTE — ED PROVIDER NOTES
"    [place] EMERGENCY DEPARTMENT ([facility])    9/17/23       ED PROVIDER NOTE    History     Chief Complaint   Patient presents with    Shingles     HPI  Keerthi Montoya is a 73 year old female with past medical history of HLD, HTN, DM2, ulcerative pancolitis, CKD III, pulmonary HTN, HFpEF, moderate paradoxical low flow gradient aortic stenosis, persistent atrial fibrillation with CHADS-VASc score 4, intolerance to anticoagulation due to history of GI bleeding who underwent successful left atrial appendage closure with a 24mm WATCHMAN FLX device with 17% - 27% compression on (5/2023) presenting to the emergency department for evaluation of edema. Patient accompanied by son Rosalba. Patient states that she was seen at the end of August and treated with valacyclovir for a rash. Patient still has rash on abdomen. Patient was instructed to come in for evaluation after an urgent care visit earlier today. Due to limitation on labs and imaging, she was sent here. Patient states that she is retaining liquid. She gained around 15 pounds within the last couple of weeks. Her son noted facial edema last week. She developed bilateral lower extremity edema. Patient reports that she also noticed abdominal enlargement/edema. Patient was not placed on steroids. She states that she is on medications for hypertension. She follows with the [place] cardiology department. Patient has felt more out of breath with this added weight. The shortness of breath somewhat worsens when lying down. Patient normally props herself up with pillows to sleep at night though hasn't needed extra support. She states that the \"shingles rash\" she has is not painful. She describes itchiness at the site of the rash. Patient also notes edema in both hands, worse in the right hand. Patient states that she is not on any diuretics.     Past Medical History  Past Medical History:   Diagnosis Date    Chronic kidney disease     Diabetes mellitus, type 2 " (H)     Diverticulosis of colon (without mention of hemorrhage) 10/01/2012    GI bleed     History of blood transfusion     HLD (hyperlipidemia)     Hypertension     Hypothyroidism     MARIA D (iron deficiency anemia)     Persistent atrial fibrillation (H)     Pulmonary hypertension (H)     Ulcerative (chronic) enterocolitis (H)      Past Surgical History:   Procedure Laterality Date    CHOLECYSTECTOMY  3/1987    COLON SURGERY  01/2001    Left colon resection; diverticulitis    COLONOSCOPY N/A 4/24/2017    Procedure: COMBINED COLONOSCOPY, SINGLE OR MULTIPLE BIOPSY/POLYPECTOMY BY BIOPSY;;  Surgeon: Radha Salguero MD;  Location: [facility] OR    COLONOSCOPY N/A 3/10/2023    Procedure: COLONOSCOPY;  Surgeon: Preeti James MD;  Location:  GI    COLONOSCOPY WITH CO2 INSUFFLATION N/A 4/24/2017    Procedure: COLONOSCOPY WITH CO2 INSUFFLATION;  Colonoscopy Dx rectal bleeding, BMI 25.86, Pharm [facility] .[phone], ;  Surgeon: Radha Salguero MD;  Location: [facility] OR    CV LEFT ATRIAL APPENDAGE CLOSURE N/A 5/11/2023    Procedure: Left Atrial Appendage Closure;  Surgeon: Bobby Grimes MD;  Location:  HEART CARDIAC CATH LAB    CV RIGHT HEART CATH MEASUREMENTS RECORDED N/A 3/16/2020    Procedure: CV RIGHT HEART CATH;  Surgeon: Nader Muñoz MD;  Location:  HEART CARDIAC CATH LAB    ESOPHAGOSCOPY, GASTROSCOPY, DUODENOSCOPY (EGD), COMBINED N/A 1/23/2023    Procedure: ESOPHAGOGASTRODUODENOSCOPY, WITH BIOPSY;  Surgeon: Ricki Deal MD;  Location:  GI    ESOPHAGOSCOPY, GASTROSCOPY, DUODENOSCOPY (EGD), COMBINED N/A 3/10/2023    Procedure: Esophagoscopy, gastroscopy, duodenoscopy (EGD), combined;  Surgeon: Preeti James MD;  Location:  GI    GYN SURGERY  9/1983    tubal ligation    HERNIA REPAIR  12/2006    recurrent incisional hernia    SIGMOIDOSCOPY FLEXIBLE N/A 1/23/2023    Procedure: Sigmoidoscopy flexible;  Surgeon: Ricki Deal MD;  Location:  GI    THROAT SURGERY  12/1974     thyroidectomy     aspirin 81 MG EC tablet  atenolol (TENORMIN) 50 MG tablet  atorvastatin (LIPITOR) 40 MG tablet  blood glucose (ACCU-CHEK FELIPE PLUS) test strip  Cholecalciferol (VITAMIN D) 2000 units CAPS  Cyanocobalamin (B-12) 1000 MCG TABS  diphenhydrAMINE (BENADRYL) 25 MG tablet  empagliflozin (JARDIANCE) 25 MG TABS tablet  glipiZIDE (GLUCOTROL XL) 10 MG 24 hr tablet  hydrALAZINE (APRESOLINE) 50 MG tablet  ketorolac (ACULAR) 0.5 % ophthalmic solution  levothyroxine (SYNTHROID/LEVOTHROID) 112 MCG tablet  losartan (COZAAR) 100 MG tablet  mesalamine (APRISO ER) 0.375 g 24 hr capsule  ofloxacin (OCUFLOX) 0.3 % ophthalmic solution  pantoprazole (PROTONIX) 40 MG EC tablet  prednisoLONE acetate (PRED FORTE) 1 % ophthalmic suspension  senna (SENOKOT) 8.6 MG tablet  sodium bicarbonate 650 MG tablet  ticagrelor (BRILINTA) 60 MG tablet  valACYclovir (VALTREX) 1000 mg tablet      Allergies   Allergen Reactions    Actos [Pioglitazone]      Fluid retention    Amlodipine      Leg swelling, hand swelling    Animal Dander     Doxycycline Hives    Dust Mites     Grass     Keflex [Cephalexin Hcl] Hives    Metoprolol      Swelling of lower legs and fatigue    Other [Seasonal Allergies]      pollen    Ranitidine      rash    Synthroid [Levothyroxine Sodium] Swelling     Allergic to brand name    Tetracycline Hives    Tylenol Hives    Ziac [Bisoprolol-Hydrochlorothiazide]      Family History  Family History   Problem Relation Age of Onset    Cerebrovascular Disease Mother     Cancer Father         bladder    Diverticulitis Brother     Diverticulitis Brother     Kidney Disease No family hx of     Crohn's Disease No family hx of     Ulcerative Colitis No family hx of     Stomach Cancer No family hx of     GERD No family hx of     Celiac Disease No family hx of     Anesthesia Reaction No family hx of      Social History   Social History     Tobacco Use    Smoking status: Never     Passive exposure: Never    Smokeless tobacco: Never  "  Vaping Use    Vaping Use: Never used   Substance Use Topics    Alcohol use: Not Currently     Alcohol/week: 2.0 - 4.0 standard drinks of alcohol     Types: 1 - 2 Cans of beer, 1 - 2 Shots of liquor per week     Comment: occasional cocktail or beer    Drug use: No      Past medical history, past surgical history, medications, allergies, family history, and social history were reviewed with the patient. No additional pertinent items.      A medically appropriate review of systems was performed with pertinent positives and negatives noted in the HPI, and all other systems negative.    Physical Exam     BP: 121/72  Pulse: 85  Temp: 98.5  F (36.9  C)  Resp: 15  Height: 160 cm (5' 3\")  SpO2: 97 %    Physical Exam  Vital Signs Reviewed  Gen: Well nourished, well developed, resting comfortably, no acute distress  HEENT: NC/AT, PERRL, EOMI, MMM  Neck: Supple, FROM  CV: Irregular rhythm but regular rate  Lungs/Chest: Normal Effort, CTAB  Abd: Non-distended, non-tender.  Edema.  MSK/Back: FROM, no visible deformity 1+ pitting edema bilaterally.  Neuro: A&Ox3, GCS 15, CN II-XII unremarkable  Skin: Warm, Dry, Intact, no visible lesions    ED Course, Procedures, & Data   Patient seen and evaluated in ED bed, labs x-ray EKG bedside ultrasound ordered  Twelve-lead EKG is low voltage showing atrial fibrillation with no clear JAYMIE, regular rate  X-ray with mild edema  Bedside echocardiogram as below  Labs with BNP elevated beyond the last reference level.  Troponin was not run with initial blood work for some reason so was added on and a second troponin ordered.  No ongoing anginal symptoms  Discussed with cardiology 2 and cardiology 1 attendings.  We will admit to cardiology 1 for supervised diuresis echocardiogram and further cardiac assessment given this dramatic departure from her baseline clinical status.     Procedures  Results for orders placed during the hospital encounter of 09/17/23    POC US ECHO " LIMITED    Impression  Limited Bedside Cardiac Ultrasound, performed and interpreted by me.  Indication: Edema.  Parasternal long axis, parasternal short axis, apical 4 chamber, and subcostal views were acquired.  Image quality was satisfactory.    Findings:  Abnormal -grossly preserved left ventricular ejection fraction.  Severely dilated right ventricle with septal bowing.  No visualized tamponade physiology or significant pericardial effusion.  Large nonvariable IVC.    IMPRESSION: Abnormal limited cardiac ultrasound showing severely dilated right ventricle with septal bowing suggestive of significant right heart strain.  No visualized tamponade physiology left heart failure.  Large IVC suggestive of volume overload.            EKG Interpretation:      Interpreted by Carlitos Mcmanus MD  Time reviewed: 1430  Symptoms at time of EKG: Edema   Rhythm: atrial fibrillation - controlled  Rate: Normal  Axis: Right Axis Deviation  Ectopy: none  Conduction: normal  ST Segments/ T Waves: No acute ischemic changes  Q Waves: none  Comparison to prior: Unchanged    Clinical Impression: Afib, no JAYMIE      ECHO 8/25  Interpretation Summary:  - Global and regional left ventricular function is normal with an EF of 60-65%.  - Paradoxical septal motion consistent with right ventricular pressure and volume overload is present.  - Moderate to severe right ventricular dilation is present.  - Global right ventricular function is moderately to severely reduced.  - Calculated aortic valve area in severe AS range. Consider additional evaluation if indicated.  - Moderate to severe tricuspid insufficiency is present.  - Dilation of the inferior vena cava is present with abnormal respiratory variation in diameter.  - Trivial pericardial effusion is present.            Results for orders placed or performed during the hospital encounter of 09/17/23   XR Chest 2 Views     Status: None    Narrative    EXAM: XR CHEST 2 VIEWS  9/17/2023 2:58 PM      HISTORY:  Dyspnea, edema       COMPARISON:  CT chest, 6/30/2023    FINDINGS:   PA and lateral views of the chest. Similar appearance of the left  atrial appendage occlusion device.    Trachea is midline. Cardiomediastinal silhouette is within normal  limits. Indistinct pulmonary vasculature. Mild perihilar streaky  opacities. No focal airspace opacity. Bilateral small pleural  effusions. No appreciable pneumothorax.    No acute osseous abnormality. Visualized upper abdomen is  unremarkable.        Impression    IMPRESSION: Mild perihilar streaky opacities may represent edema  versus atelectasis. Bilateral small pleural effusions.    I have personally reviewed the examination and initial interpretation  and I agree with the findings.    WILLAM MACIAS MD         SYSTEM ID:  [id number]   POC US ECHO LIMITED     Status: None    Impression    Limited Bedside Cardiac Ultrasound, performed and interpreted by me.   Indication: Edema.  Parasternal long axis, parasternal short axis, apical 4 chamber, and subcostal views were acquired.   Image quality was satisfactory.    Findings:    Abnormal -grossly preserved left ventricular ejection fraction.  Severely dilated right ventricle with septal bowing.  No visualized tamponade physiology or significant pericardial effusion.  Large nonvariable IVC.    IMPRESSION: Abnormal limited cardiac ultrasound showing severely dilated right ventricle with septal bowing suggestive of significant right heart strain.  No visualized tamponade physiology left heart failure.  Large IVC suggestive of volume overload.        Comprehensive metabolic panel     Status: Abnormal   Result Value Ref Range    Sodium 141 136 - 145 mmol/L    Potassium 5.7 (H) 3.4 - 5.3 mmol/L    Chloride 108 (H) 98 - 107 mmol/L    Carbon Dioxide (CO2) 19 (L) 22 - 29 mmol/L    Anion Gap 14 7 - 15 mmol/L    Urea Nitrogen 24.6 (H) 8.0 - 23.0 mg/dL    Creatinine 1.70 (H) 0.51 - 0.95 mg/dL    Calcium 9.2 8.8 - 10.2 mg/dL     Glucose 121 (H) 70 - 99 mg/dL    Alkaline Phosphatase 120 (H) 35 - 104 U/L    AST 44 0 - 45 U/L    ALT <5 0 - 50 U/L    Protein Total 7.5 6.4 - 8.3 g/dL    Albumin 3.9 3.5 - 5.2 g/dL    Bilirubin Total 1.1 <=1.2 mg/dL    GFR Estimate 32 (L) >60 mL/min/1.73m2   Lipase     Status: Normal   Result Value Ref Range    Lipase 19 13 - 60 U/L   Nt probnp inpatient (BNP)     Status: Abnormal   Result Value Ref Range    N terminal Pro BNP Inpatient 18,607 (H) 0 - 900 pg/mL   Magnesium     Status: Abnormal   Result Value Ref Range    Magnesium 2.4 (H) 1.7 - 2.3 mg/dL   UA with Microscopic reflex to Culture     Status: Abnormal    Specimen: Urine, NOS   Result Value Ref Range    Color Urine Yellow Colorless, Straw, Light Yellow, Yellow    Appearance Urine Clear Clear    Glucose Urine >=1000 (A) Negative mg/dL    Bilirubin Urine Negative Negative    Ketones Urine Negative Negative mg/dL    Specific Gravity Urine 1.025 1.003 - 1.035    Blood Urine Negative Negative    pH Urine 5.5 5.0 - 7.0    Protein Albumin Urine 30 (A) Negative mg/dL    Urobilinogen Urine Normal Normal, 2.0 mg/dL    Nitrite Urine Negative Negative    Leukocyte Esterase Urine Negative Negative    RBC Urine 1 <=2 /HPF    WBC Urine 3 <=5 /HPF    Squamous Epithelials Urine 1 <=1 /HPF    Narrative    Urine Culture not indicated   CBC with platelets and differential     Status: Abnormal   Result Value Ref Range    WBC Count 8.8 4.0 - 11.0 10e3/uL    RBC Count 4.40 3.80 - 5.20 10e6/uL    Hemoglobin 12.4 11.7 - 15.7 g/dL    Hematocrit 40.6 35.0 - 47.0 %    MCV 92 78 - 100 fL    MCH 28.2 26.5 - 33.0 pg    MCHC 30.5 (L) 31.5 - 36.5 g/dL    RDW      Platelet Count 312 150 - 450 10e3/uL    % Neutrophils 77 %    % Lymphocytes 9 %    % Monocytes 11 %    % Eosinophils 1 %    % Basophils 1 %    % Immature Granulocytes 1 %    NRBCs per 100 WBC 0 <1 /100    Absolute Neutrophils 6.8 1.6 - 8.3 10e3/uL    Absolute Lymphocytes 0.8 0.8 - 5.3 10e3/uL    Absolute Monocytes 1.0 0.0 -  1.3 10e3/uL    Absolute Eosinophils 0.1 0.0 - 0.7 10e3/uL    Absolute Basophils 0.1 0.0 - 0.2 10e3/uL    Absolute Immature Granulocytes 0.0 <=0.4 10e3/uL    Absolute NRBCs 0.0 10e3/uL   EKG 12-lead, tracing only     Status: None   Result Value Ref Range    Systolic Blood Pressure  mmHg    Diastolic Blood Pressure  mmHg    Ventricular Rate 90 BPM    Atrial Rate 107 BPM    CA Interval  ms    QRS Duration 68 ms     ms    QTc 398 ms    P Axis  degrees    R AXIS 123 degrees    T Axis 233 degrees    Interpretation ECG       Atrial fibrillation  Right axis deviation  Possible Right ventricular hypertrophy  Nonspecific T wave abnormality  Abnormal ECG  Unconfirmed report - interpretation of this ECG is computer generated - see medical record for final interpretation  Confirmed by - EMERGENCY ROOM, PHYSICIAN ([id number]),  DOROTHAE RG ([id number]) on 9/17/2023 4:07:43 PM     CBC with platelets differential     Status: Abnormal    Narrative    The following orders were created for panel order CBC with platelets differential.  Procedure                               Abnormality         Status                     ---------                               -----------         ------                     CBC with platelets and d...[[id number]]  Abnormal            Final result                 Please view results for these tests on the individual orders.     Medications   furosemide (LASIX) injection 40 mg (has no administration in time range)     Labs Ordered and Resulted from Time of ED Arrival to Time of ED Departure   COMPREHENSIVE METABOLIC PANEL - Abnormal       Result Value    Sodium 141      Potassium 5.7 (*)     Chloride 108 (*)     Carbon Dioxide (CO2) 19 (*)     Anion Gap 14      Urea Nitrogen 24.6 (*)     Creatinine 1.70 (*)     Calcium 9.2      Glucose 121 (*)     Alkaline Phosphatase 120 (*)     AST 44      ALT <5      Protein Total 7.5      Albumin 3.9      Bilirubin Total 1.1      GFR Estimate 32 (*)    NT PROBNP  INPATIENT - Abnormal    N terminal Pro BNP Inpatient 18,607 (*)    MAGNESIUM - Abnormal    Magnesium 2.4 (*)    ROUTINE UA WITH MICROSCOPIC REFLEX TO CULTURE - Abnormal    Color Urine Yellow      Appearance Urine Clear      Glucose Urine >=1000 (*)     Bilirubin Urine Negative      Ketones Urine Negative      Specific Gravity Urine 1.025      Blood Urine Negative      pH Urine 5.5      Protein Albumin Urine 30 (*)     Urobilinogen Urine Normal      Nitrite Urine Negative      Leukocyte Esterase Urine Negative      RBC Urine 1      WBC Urine 3      Squamous Epithelials Urine 1     CBC WITH PLATELETS AND DIFFERENTIAL - Abnormal    WBC Count 8.8      RBC Count 4.40      Hemoglobin 12.4      Hematocrit 40.6      MCV 92      MCH 28.2      MCHC 30.5 (*)     RDW        Platelet Count 312      % Neutrophils 77      % Lymphocytes 9      % Monocytes 11      % Eosinophils 1      % Basophils 1      % Immature Granulocytes 1      NRBCs per 100 WBC 0      Absolute Neutrophils 6.8      Absolute Lymphocytes 0.8      Absolute Monocytes 1.0      Absolute Eosinophils 0.1      Absolute Basophils 0.1      Absolute Immature Granulocytes 0.0      Absolute NRBCs 0.0     LIPASE - Normal    Lipase 19     TROPONIN T, HIGH SENSITIVITY   TROPONIN T, HIGH SENSITIVITY     XR Chest 2 Views   Final Result   IMPRESSION: Mild perihilar streaky opacities may represent edema   versus atelectasis. Bilateral small pleural effusions.      I have personally reviewed the examination and initial interpretation   and I agree with the findings.      WILLAM MACIAS MD            SYSTEM ID:  [id number]      POC US ECHO LIMITED   Final Result   Limited Bedside Cardiac Ultrasound, performed and interpreted by me.    Indication: Edema.   Parasternal long axis, parasternal short axis, apical 4 chamber, and subcostal views were acquired.    Image quality was satisfactory.      Findings:     Abnormal -grossly preserved left ventricular ejection fraction.  Severely  dilated right ventricle with septal bowing.  No visualized tamponade physiology or significant pericardial effusion.  Large nonvariable IVC.      IMPRESSION: Abnormal limited cardiac ultrasound showing severely dilated right ventricle with septal bowing suggestive of significant right heart strain.  No visualized tamponade physiology left heart failure.  Large IVC suggestive of volume overload.             Echo Complete    (Results Pending)          Critical care was not performed.     Medical Decision Making  The patient's presentation was of high complexity (an acute health issue posing potential threat to life or bodily function).    The patient's evaluation involved:  ordering and/or review of 3+ test(s) in this encounter (see separate area of note for details)  discussion of management or test interpretation with another health professional (see separate area of note for details)    The patient's management necessitated high risk (a decision regarding hospitalization).    Assessment & Plan    Keerthi Montoya is a 73 year old female with past medical history of HLD, HTN, DM2, ulcerative pancolitis, CKD III, pulmonary HTN, HFpEF, moderate paradoxical low flow gradient aortic stenosis, persistent atrial fibrillation with CHADS-VASc score 4, intolerance to anticoagulation due to history of GI bleeding who underwent successful left atrial appendage closure with a 24mm WATCHMAN FLX device with 17% - 27% compression on (5/2023) presenting to the emergency department for evaluation of edema.     On arrival patient had reassuring vital signs.  Her EKG did not suggest rapid ventricular response or acute ischemia.  Her chest x-ray did show some slight edema and small pleural effusions.  Bedside echocardiogram was notable for significant right heart strain, no tamponade physiology.  She does appear to be volume overloaded emptying and systemic circulation due to her pulmonary hypertension and right ventricular  dysfunction.  Discussed diuresis with the cardiology two staff on-call who deferred inpatient versus home diuresis to my clinical discretion but felt that should she need admission to general cardiologist would be appropriate.  Given the patient's aortic stenosis pulmonary hypertension right ventricular dysfunction and that she is not on any diuretics at home and is this acute exacerbation to be more appropriate to admit her for diuresis formal echocardiogram and further assessment.  Accepted for admission to the general cardiology service by staff.  Cardiology fellow made aware of the admission.  Patient agrees with the plan.      New Prescriptions    No medications on file       Final diagnoses:   Acute on chronic diastolic congestive heart failure (H)   Severe pulmonary hypertension (H)     I, Terri Heard, am serving as a trained medical scribe to document services personally performed by Carlitos Mcmanus MD based on the provider's statements to me on September 17, 2023.  This document has been checked and approved by the attending provider.    Carlitos PITT MD, was physically present and have reviewed and verified the accuracy of this note documented by Terri Heard, medical scribe.      Carlitos Mcmanus Jr., MD   [facility] EMERGENCY DEPARTMENT  9/17/2023     Carlitos Mcmanus MD  09/17/23 [id number]

## 2023-09-17 NOTE — PROGRESS NOTES
"Chief Complaint   Patient presents with    Shingles     Patient reports shingles on abdomen, shoulder and leg for one month. Patient states she was already placed on medication for shingles; states that they are improving.     Swelling     Patient reports abdominal bloating and water retention \"from the shingles.\" Also concerned for swelling in hands. Patient states she is normally around 133-144 lbs; currently 164 lbs.    Bleeding/Bruising     Patient has concerns for bruising arms; unsure if related to blood thinner.             ASSESSMENT:    ICD-10-CM    1. Abdominal distension  R14.0       2. Weight gain  R63.5       3. Herpes zoster with other complication  B02.8       4. Type 2 diabetes mellitus with hyperglycemia, unspecified whether long term insulin use (H)  E11.65       5. Diastolic heart failure, unspecified HF chronicity (H)  I50.30       6. Anticoagulated  Z79.01       7. Atrial fibrillation, unspecified type (H)  I48.91           PLAN: 73-year-old female with history of multiple core morbidities presents with abdominal distention and 15 pound weight gain over the past 2 months.  Limited on labs and imaging.  To ER for further evaluation and treatment.  Treated for herpes zoster in August and it is still present although she states it is vastly improved.  Differential includes but is not limited to heart failure, liver failure, kidney failure, complications from herpes zoster, cancer.    Pearl Leija PA-C         SUBJECTIVE:   73 year old female who presents with abdominal distention that she is really noted over the past several days.  She states she has had a 15 pound weight gain in the past 2 months.  Now notes that she feels swelling and tightness and her arms and legs.  On blood thinner, has lots of bruising on her arms.  Seen in August and treated for shingles with valacyclovir.  She states this is improving.  History of diabetes, hypertension, aortic stenosis, pulmonary hypertension, heart " failure, persistent A-fib, chronic kidney disease.               Allergies   Allergen Reactions    Actos [Pioglitazone]      Fluid retention    Amlodipine      Leg swelling, hand swelling    Animal Dander     Doxycycline Hives    Dust Mites     Grass     Keflex [Cephalexin Hcl] Hives    Metoprolol      Swelling of lower legs and fatigue    Other [Seasonal Allergies]      pollen    Ranitidine      rash    Synthroid [Levothyroxine Sodium] Swelling     Allergic to brand name    Tetracycline Hives    Tylenol Hives    Ziac [Bisoprolol-Hydrochlorothiazide]        Past Medical History:   Diagnosis Date    Chronic kidney disease     Diabetes mellitus, type 2 (H)     Diverticulosis of colon (without mention of hemorrhage) 10/01/2012    GI bleed     History of blood transfusion     HLD (hyperlipidemia)     Hypertension     Hypothyroidism     MARIA D (iron deficiency anemia)     Persistent atrial fibrillation (H)     Pulmonary hypertension (H)     Ulcerative (chronic) enterocolitis (H)        aspirin 81 MG EC tablet, Take 1 tablet (81 mg) by mouth daily  atenolol (TENORMIN) 50 MG tablet, Take 1 tablet (50 mg) by mouth daily  atorvastatin (LIPITOR) 40 MG tablet, Take 1 tablet (40 mg) by mouth daily  blood glucose (ACCU-CHEK FELIPE PLUS) test strip, 200 strips by In Vitro route 2 times daily Use to test blood sugar 2 times daily or as directed.  Cholecalciferol (VITAMIN D) 2000 units CAPS, Take by mouth daily (with lunch)  Cyanocobalamin (B-12) 1000 MCG TABS, Take 1,000 mcg by mouth three times a week  diphenhydrAMINE (BENADRYL) 25 MG tablet, Take 25 mg by mouth every 6 hours as needed for itching or allergies  empagliflozin (JARDIANCE) 25 MG TABS tablet, Take 1 tablet (25 mg) by mouth daily  glipiZIDE (GLUCOTROL XL) 10 MG 24 hr tablet, Take 2 tablets (20 mg) by mouth daily (Patient taking differently: Take 20 mg by mouth every morning)  hydrALAZINE (APRESOLINE) 50 MG tablet, Take 1.5 tablets (75 mg) by mouth 3 times daily  ketorolac  (ACULAR) 0.5 % ophthalmic solution, INSTILL 1 DROP IN AFFECTED EYE THREE TIMES DAILY AS DIRECTED. START 3 DAYS BEFORE SURGERY AND USE UNTIL GONE  levothyroxine (SYNTHROID/LEVOTHROID) 112 MCG tablet, Take 1 tablet (112 mcg) by mouth daily (Patient taking differently: Take 112 mcg by mouth every morning)  losartan (COZAAR) 100 MG tablet, Take 1 tablet (100 mg) by mouth daily (Patient taking differently: Take 100 mg by mouth every morning)  mesalamine (APRISO ER) 0.375 g 24 hr capsule, TAKE 4 CAPSULES(1.5 GRAMS) BY MOUTH DAILY (Patient taking differently: 1.5 g daily (with lunch))  ofloxacin (OCUFLOX) 0.3 % ophthalmic solution, INSTILL 1 DROP IN AFFECTED EYE THREE TIMES DAILY AS DIRECTED. START 3 DAYS BEFORE SURGERY AND USE UNTIL GONE  pantoprazole (PROTONIX) 40 MG EC tablet, Take 1 tablet (40 mg) by mouth daily  prednisoLONE acetate (PRED FORTE) 1 % ophthalmic suspension, SHAKE LIQUID AND INSTILL 1 DROP IN AFFECTED EYE THREE TIMES DAILY AS DIRECTED. START 3 DAYS BEFORE SURGERY AND. CONTINUE UNTIL ALL TAKEN  senna (SENOKOT) 8.6 MG tablet, Take 1 tablet by mouth daily as needed for constipation  sodium bicarbonate 650 MG tablet, Take 1 tablet (650 mg) by mouth daily  ticagrelor (BRILINTA) 60 MG tablet, Take 1 tablet (60 mg) by mouth 2 times daily  valACYclovir (VALTREX) 1000 mg tablet, Take 1 tablet (1,000 mg) by mouth daily for 7 days    No current facility-administered medications on file prior to visit.      Social History     Tobacco Use    Smoking status: Never    Smokeless tobacco: Never   Vaping Use    Vaping Use: Never used   Substance Use Topics    Alcohol use: Not Currently     Alcohol/week: 2.0 - 4.0 standard drinks of alcohol     Types: 1 - 2 Cans of beer, 1 - 2 Shots of liquor per week     Comment: occasional cocktail or beer    Drug use: No       ROS:  General: negative for fever  SKIN: + as above  GI: As above    Physcial Exam:    /65 (BP Location: Left arm, Patient Position: Sitting, Cuff Size:  Adult Regular)   Pulse 98   Temp 97.9  F (36.6  C) (Tympanic)   Resp 16   Wt 74.8 kg (164 lb 12.8 oz)   LMP  (LMP Unknown)   SpO2 97%   BMI 29.20 kg/m      GENERAL: alert, no acute distress  EYES: conjunctival clear  RESP: Regular breathing rate  NEURO: awake .  SKIN: Left abdomen is with scattered red papules, many with excoriation, some pustules  Abdomen is with moderate to severe distention.  Normal active bowel sounds.  Legs are very taut to palpation  Pearl Leija PA-C

## 2023-09-18 ENCOUNTER — APPOINTMENT (OUTPATIENT)
Dept: CARDIOLOGY | Facility: CLINIC | Age: 73
DRG: 291 | End: 2023-09-18
Attending: STUDENT IN AN ORGANIZED HEALTH CARE EDUCATION/TRAINING PROGRAM
Payer: MEDICARE

## 2023-09-18 LAB
ANION GAP SERPL CALCULATED.3IONS-SCNC: 11 MMOL/L (ref 7–15)
BUN SERPL-MCNC: 27.3 MG/DL (ref 8–23)
CALCIUM SERPL-MCNC: 9.4 MG/DL (ref 8.8–10.2)
CHLORIDE SERPL-SCNC: 110 MMOL/L (ref 98–107)
CREAT SERPL-MCNC: 1.86 MG/DL (ref 0.51–0.95)
DEPRECATED HCO3 PLAS-SCNC: 19 MMOL/L (ref 22–29)
EGFRCR SERPLBLD CKD-EPI 2021: 28 ML/MIN/1.73M2
ERYTHROCYTE [DISTWIDTH] IN BLOOD BY AUTOMATED COUNT: ABNORMAL %
GLUCOSE BLDC GLUCOMTR-MCNC: 122 MG/DL (ref 70–99)
GLUCOSE BLDC GLUCOMTR-MCNC: 129 MG/DL (ref 70–99)
GLUCOSE BLDC GLUCOMTR-MCNC: 264 MG/DL (ref 70–99)
GLUCOSE BLDC GLUCOMTR-MCNC: 278 MG/DL (ref 70–99)
GLUCOSE BLDC GLUCOMTR-MCNC: 280 MG/DL (ref 70–99)
GLUCOSE SERPL-MCNC: 147 MG/DL (ref 70–99)
HBA1C MFR BLD: 8.7 %
HCT VFR BLD AUTO: 41.7 % (ref 35–47)
HGB BLD-MCNC: 12.5 G/DL (ref 11.7–15.7)
LVEF ECHO: NORMAL
MAGNESIUM SERPL-MCNC: 2.4 MG/DL (ref 1.7–2.3)
MAGNESIUM SERPL-MCNC: 2.4 MG/DL (ref 1.7–2.3)
MCH RBC QN AUTO: 27.9 PG (ref 26.5–33)
MCHC RBC AUTO-ENTMCNC: 30 G/DL (ref 31.5–36.5)
MCV RBC AUTO: 93 FL (ref 78–100)
PLATELET # BLD AUTO: 295 10E3/UL (ref 150–450)
POTASSIUM SERPL-SCNC: 4.5 MMOL/L (ref 3.4–5.3)
RBC # BLD AUTO: 4.48 10E6/UL (ref 3.8–5.2)
SODIUM SERPL-SCNC: 140 MMOL/L (ref 136–145)
WBC # BLD AUTO: 7.3 10E3/UL (ref 4–11)

## 2023-09-18 PROCEDURE — 93306 TTE W/DOPPLER COMPLETE: CPT | Mod: 26 | Performed by: INTERNAL MEDICINE

## 2023-09-18 PROCEDURE — 93306 TTE W/DOPPLER COMPLETE: CPT

## 2023-09-18 PROCEDURE — 99231 SBSQ HOSP IP/OBS SF/LOW 25: CPT | Mod: 25 | Performed by: CASE MANAGER/CARE COORDINATOR

## 2023-09-18 PROCEDURE — 250N000011 HC RX IP 250 OP 636: Mod: JZ

## 2023-09-18 PROCEDURE — 83735 ASSAY OF MAGNESIUM: CPT

## 2023-09-18 PROCEDURE — 85027 COMPLETE CBC AUTOMATED: CPT

## 2023-09-18 PROCEDURE — 36415 COLL VENOUS BLD VENIPUNCTURE: CPT

## 2023-09-18 PROCEDURE — 82374 ASSAY BLOOD CARBON DIOXIDE: CPT | Performed by: INTERNAL MEDICINE

## 2023-09-18 PROCEDURE — 82962 GLUCOSE BLOOD TEST: CPT

## 2023-09-18 PROCEDURE — 36415 COLL VENOUS BLD VENIPUNCTURE: CPT | Performed by: INTERNAL MEDICINE

## 2023-09-18 PROCEDURE — 83735 ASSAY OF MAGNESIUM: CPT | Performed by: INTERNAL MEDICINE

## 2023-09-18 PROCEDURE — 250N000013 HC RX MED GY IP 250 OP 250 PS 637: Performed by: INTERNAL MEDICINE

## 2023-09-18 PROCEDURE — 250N000011 HC RX IP 250 OP 636: Mod: JZ | Performed by: INTERNAL MEDICINE

## 2023-09-18 PROCEDURE — 120N000003 HC R&B IMCU UMMC

## 2023-09-18 RX ORDER — FUROSEMIDE 10 MG/ML
40 INJECTION INTRAMUSCULAR; INTRAVENOUS ONCE
Status: COMPLETED | OUTPATIENT
Start: 2023-09-18 | End: 2023-09-18

## 2023-09-18 RX ADMIN — LOSARTAN POTASSIUM 100 MG: 100 TABLET, FILM COATED ORAL at 08:19

## 2023-09-18 RX ADMIN — FUROSEMIDE 40 MG: 10 INJECTION, SOLUTION INTRAVENOUS at 08:32

## 2023-09-18 RX ADMIN — FUROSEMIDE 40 MG: 10 INJECTION, SOLUTION INTRAVENOUS at 08:16

## 2023-09-18 RX ADMIN — TICAGRELOR 60 MG: 60 TABLET ORAL at 20:27

## 2023-09-18 RX ADMIN — ATORVASTATIN CALCIUM 40 MG: 40 TABLET, FILM COATED ORAL at 08:16

## 2023-09-18 RX ADMIN — SODIUM BICARBONATE 650 MG TABLET 650 MG: at 08:19

## 2023-09-18 RX ADMIN — LEVOTHYROXINE SODIUM 112 MCG: 0.11 TABLET ORAL at 08:19

## 2023-09-18 RX ADMIN — ASPIRIN 81 MG: 81 TABLET ORAL at 08:16

## 2023-09-18 RX ADMIN — HYDRALAZINE HYDROCHLORIDE 75 MG: 50 TABLET, FILM COATED ORAL at 20:27

## 2023-09-18 RX ADMIN — PANTOPRAZOLE SODIUM 40 MG: 40 TABLET, DELAYED RELEASE ORAL at 08:16

## 2023-09-18 RX ADMIN — GLIPIZIDE 20 MG: 10 TABLET, FILM COATED, EXTENDED RELEASE ORAL at 08:19

## 2023-09-18 RX ADMIN — EMPAGLIFLOZIN 25 MG: 25 TABLET, FILM COATED ORAL at 08:27

## 2023-09-18 RX ADMIN — MESALAMINE 1.5 G: 375 CAPSULE, EXTENDED RELEASE ORAL at 14:55

## 2023-09-18 RX ADMIN — HYDRALAZINE HYDROCHLORIDE 75 MG: 50 TABLET, FILM COATED ORAL at 08:19

## 2023-09-18 RX ADMIN — TICAGRELOR 60 MG: 60 TABLET ORAL at 08:16

## 2023-09-18 RX ADMIN — HYDRALAZINE HYDROCHLORIDE 75 MG: 50 TABLET, FILM COATED ORAL at 13:04

## 2023-09-18 ASSESSMENT — ACTIVITIES OF DAILY LIVING (ADL)
ADLS_ACUITY_SCORE: 35

## 2023-09-18 NOTE — PROGRESS NOTES
[facility]   Cardiology   Progress Note     ASSESSMENT/PLAN:  Keerthi Montoya is a 73-year-old female with history of HLD, HTN, DM2, ulcerative pancolitis, CKD3, pulmonary HTN, HFpEF (EF 60-65%), moderate paradoxical low flow low gradient aortic stenosis, persistent atrial fibrillation with (CHADS-VASc 4) with intolerance to anticoagulation due to history of GI bleeding s/p RY closure with 24mm WATCHMAN FLX device (05/2023) who presents with acute decompensated heart failure with preserved ejection fraction.     # Acute on Chronic Diastolic Heart Failure  # Moderate Pulmonary HTN  # Moderate Paradoxical Low Flow Low Gradient Aortic Stenosis    # Persistent rate-controlled A-fib s/p Watchman implant  # HTN  # HLD  Patient presented to ED with a 2 week history of increased PROCTOR, abdominal distention, peripheral edema, orthopnea, and ~15lb weight gain over last 6-8 weeks. Work up in ED notable for BNP 18,607, creatinine 1.70, CXR with pulmonary edema and small bilateral effusions.   - Volume Status: Hypervolemic (home regimen Torsemide 10mg PRN that patient states she does not take); Lasix 40mg IV given x1 overnight in ED with good UOP  - Additional Lasix 80mg IV this AM  -  lb; weight on admission 165 lb   - TTE pending; POCUS in ED showing large IVC suggestive of volume overload   - Continue PTA ASA 81mg daily  - Continue PTA Lipitor 40mg daily   - Continue PTA Hydralazine 75mg TID  - Continue PTA Losartan 100mg daily  - Continue PTA Brilinta 60mg BID (x6 months post Watchman, did not tolerate Plavix)  - Continue PTA Empagliflozin 25mg daily  - Hold PTA atenolol 50mg daily  - Strict I/Os; Daily Weights  - Telemetry  - K/Mag replacement protocols    # CKD3  - Cr on admission 1.7  - Daily BMP    # T2DM  Hgb A1c 8.7  - PTA glipizide and empagliflozin     # Pan Ulcerative colitis (in remission)  # Hx GI bleed on anticoagulation  Patient was admitted from 1/20/23-1/24/23 with  "melena. Hb was noted to be 5.9 and she underwent EGD with GI which showed non bleeding gastric and duodenal ulcers and erosive gastropathy.  - Continue PTA mesalamine 1.5 g daily     # Iron Deficiency Anemia  Likely secondary to GI blood loss. Her most recent iron panel reveals iron of 21 ug/dl and TIBC of 426 ug/dl with 5% saturation, characteristic for iron deficiency.    - Follows with hematology  - Hgb 8.8 on admission, stable. Received iron transfusions x 3 - third dose 08/17/2023    # hx Shingles  Patient has rash on L abdomen. Reports lesions have scabbed and are very pruritic. She recently completed a course of valacyclovir 1000mg x 7 days  - Maintain airborne precautions    FEN: 2L fluid restriction; cardiac diet  Code status: Full Code  Prophylaxis: PCDs  Isolation: Airborne - shingles  Disposition: 3-5 days pending diuresis    Patient seen and discussed with Dr. Urias, who agrees with above plan.    Gina Jones, MS, PA-C  [facility] Cardiology Team  Pager [phone]    I very much appreciated the opportunity to see and assess Keerthi Montoya in the hospital ED with Gina Jones PAC.  I agree with the note above which summarizes my findings and current recommendations.  Please do not hesitate to contact my office if you have any questions or concerns.      Bunny Urias MD  Cardiac Arrhythmia Service  [facility]  [phone]     Interval History:  Patient reports feeling bloated and like her \"stomach could burst\". VSS, no other complaints.    Physical Exam:  Temp:  [96.8  F (36  C)-98.5  F (36.9  C)] 96.8  F (36  C)  Pulse:  [] 84  Resp:  [15-16] 16  BP: (109-121)/(65-80) 119/72  SpO2:  [93 %-100 %] 100 %    I/O: No intake or output data in the 24 hours ending 09/18/23 0709    Wt:   Wt Readings from Last 5 Encounters:   09/17/23 74.8 kg (164 lb 12.8 oz)   08/22/23 69.4 kg (153 lb)   08/17/23 69.3 kg (152 lb 11.2 oz)   08/07/23 67.6 kg (149 lb)   08/03/23 67.4 kg (148 lb 9.6 oz) "     General: NAD  CV: RRR. No murmur appreciated. No rubs or gallops. Peripheral radial pulse intact.  Resp: No increased work of breathing or use of accessory muscles, breathing comfortably on room air.  Lung sounds diminished throughout with crackles in bilateral bases.  Abdomen:  Normal active bowel sounds.  Abdomen is soft. No distension, non-tender to palpation.    Extremities: Warm. 2+ bilateral pre-tibial edema. No cyanosis or clubbing.  Skin:  Warm and dry. No erythema or diaphoresis. Herpes zoster rash on L abdomen  Neuro: Alert and oriented x3.      Medications:   aspirin  81 mg Oral Daily    atorvastatin  40 mg Oral Daily    empagliflozin  25 mg Oral Daily    furosemide  40 mg Intravenous Once    glipiZIDE  20 mg Oral QAM    hydrALAZINE  75 mg Oral TID    levothyroxine  112 mcg Oral QAM    losartan  100 mg Oral QAM    mesalamine  1.5 g Oral Daily with lunch    pantoprazole  40 mg Oral Daily    sodium bicarbonate  650 mg Oral Daily    sodium chloride (PF)  3 mL Intracatheter Q8H    ticagrelor  60 mg Oral BID      - MEDICATION INSTRUCTIONS -         Labs:   CMP  Recent Labs   Lab 09/18/23  0616 09/17/23  2253 09/17/23  2030 09/17/23  1622   NA  --   --   --  141   POTASSIUM  --   --  4.3 5.7*   CHLORIDE  --   --   --  108*   CO2  --   --   --  19*   ANIONGAP  --   --   --  14   * 108*  --  121*   BUN  --   --   --  24.6*   CR  --   --   --  1.70*   GFRESTIMATED  --   --   --  32*   ABEL  --   --   --  9.2   MAG  --   --   --  2.4*   PROTTOTAL  --   --   --  7.5   ALBUMIN  --   --   --  3.9   BILITOTAL  --   --   --  1.1   ALKPHOS  --   --   --  120*   AST  --   --   --  44   ALT  --   --   --  <5     CBC  Recent Labs   Lab 09/17/23  1622   WBC 8.8   RBC 4.40   HGB 12.4   HCT 40.6   MCV 92   MCH 28.2   MCHC 30.5*        INRNo lab results found in last 7 days.  Arterial Blood GasNo lab results found in last 7 days.    Diagnostics:  ECG 9/17/23:        Recent Results (from the past 24 hour(s))    POC US ECHO LIMITED    Impression    Limited Bedside Cardiac Ultrasound, performed and interpreted by me.   Indication: Edema.  Parasternal long axis, parasternal short axis, apical 4 chamber, and subcostal views were acquired.   Image quality was satisfactory.    Findings:    Abnormal -grossly preserved left ventricular ejection fraction.  Severely dilated right ventricle with septal bowing.  No visualized tamponade physiology or significant pericardial effusion.  Large nonvariable IVC.    IMPRESSION: Abnormal limited cardiac ultrasound showing severely dilated right ventricle with septal bowing suggestive of significant right heart strain.  No visualized tamponade physiology left heart failure.  Large IVC suggestive of volume overload.        XR Chest 2 Views    Narrative    EXAM: XR CHEST 2 VIEWS  9/17/2023 2:58 PM     HISTORY:  Dyspnea, edema       COMPARISON:  CT chest, 6/30/2023    FINDINGS:   PA and lateral views of the chest. Similar appearance of the left  atrial appendage occlusion device.    Trachea is midline. Cardiomediastinal silhouette is within normal  limits. Indistinct pulmonary vasculature. Mild perihilar streaky  opacities. No focal airspace opacity. Bilateral small pleural  effusions. No appreciable pneumothorax.    No acute osseous abnormality. Visualized upper abdomen is  unremarkable.        Impression    IMPRESSION: Mild perihilar streaky opacities may represent edema  versus atelectasis. Bilateral small pleural effusions.    I have personally reviewed the examination and initial interpretation  and I agree with the findings.    WILLAM MACIAS MD         SYSTEM ID:  [id number]       Medical Decision Making       60 MINUTES SPENT BY ME on the date of service doing chart review, history, exam, documentation & further activities per the note.

## 2023-09-18 NOTE — PROVIDER NOTIFICATION
Cards 1 notified by phone for K+ 5.7 with result note states slightly hemolyzed, Cards 1 wants to recheck it before any intervention.

## 2023-09-18 NOTE — PLAN OF CARE
"Goal Outcome Evaluation:    /80 (BP Location: Right arm, Patient Position: Sitting, Cuff Size: Adult Regular)   Pulse 88   Temp 96.8  F (36  C) (Oral)   Resp 16   Ht 1.6 m (5' 3\")   LMP  (LMP Unknown)   SpO2 96%   BMI 29.19 kg/m       [id number]    Presented for abdominal distention and weight gain. Lasix 40 mg iv push once. Independent to bedside commode. Vs stable, afebrile and on room air. Shingles on left side abdomen scabbing, she is done with her treatment per pt report. Airborne isol, on tele monitoring. Cardiac diet, 2LFR. Cards 1 primary team.   "

## 2023-09-19 LAB
ANION GAP SERPL CALCULATED.3IONS-SCNC: 13 MMOL/L (ref 7–15)
BUN SERPL-MCNC: 29.3 MG/DL (ref 8–23)
CALCIUM SERPL-MCNC: 9.5 MG/DL (ref 8.8–10.2)
CHLORIDE SERPL-SCNC: 106 MMOL/L (ref 98–107)
CREAT SERPL-MCNC: 1.99 MG/DL (ref 0.51–0.95)
DEPRECATED HCO3 PLAS-SCNC: 21 MMOL/L (ref 22–29)
EGFRCR SERPLBLD CKD-EPI 2021: 26 ML/MIN/1.73M2
ERYTHROCYTE [DISTWIDTH] IN BLOOD BY AUTOMATED COUNT: ABNORMAL %
GLUCOSE BLDC GLUCOMTR-MCNC: 179 MG/DL (ref 70–99)
GLUCOSE BLDC GLUCOMTR-MCNC: 215 MG/DL (ref 70–99)
GLUCOSE BLDC GLUCOMTR-MCNC: 255 MG/DL (ref 70–99)
GLUCOSE SERPL-MCNC: 206 MG/DL (ref 70–99)
HCT VFR BLD AUTO: 37.2 % (ref 35–47)
HGB BLD-MCNC: 11.2 G/DL (ref 11.7–15.7)
MAGNESIUM SERPL-MCNC: 2.2 MG/DL (ref 1.7–2.3)
MCH RBC QN AUTO: 27.9 PG (ref 26.5–33)
MCHC RBC AUTO-ENTMCNC: 30.1 G/DL (ref 31.5–36.5)
MCV RBC AUTO: 93 FL (ref 78–100)
PLATELET # BLD AUTO: 269 10E3/UL (ref 150–450)
POTASSIUM SERPL-SCNC: 3.8 MMOL/L (ref 3.4–5.3)
RBC # BLD AUTO: 4.01 10E6/UL (ref 3.8–5.2)
SODIUM SERPL-SCNC: 140 MMOL/L (ref 136–145)
WBC # BLD AUTO: 7.1 10E3/UL (ref 4–11)

## 2023-09-19 PROCEDURE — 250N000012 HC RX MED GY IP 250 OP 636 PS 637: Performed by: NURSE PRACTITIONER

## 2023-09-19 PROCEDURE — 85027 COMPLETE CBC AUTOMATED: CPT

## 2023-09-19 PROCEDURE — 82962 GLUCOSE BLOOD TEST: CPT

## 2023-09-19 PROCEDURE — 80048 BASIC METABOLIC PNL TOTAL CA: CPT

## 2023-09-19 PROCEDURE — 120N000003 HC R&B IMCU UMMC

## 2023-09-19 PROCEDURE — 83735 ASSAY OF MAGNESIUM: CPT

## 2023-09-19 PROCEDURE — 250N000011 HC RX IP 250 OP 636: Mod: JZ | Performed by: NURSE PRACTITIONER

## 2023-09-19 PROCEDURE — 36415 COLL VENOUS BLD VENIPUNCTURE: CPT

## 2023-09-19 PROCEDURE — 250N000013 HC RX MED GY IP 250 OP 250 PS 637: Performed by: INTERNAL MEDICINE

## 2023-09-19 RX ORDER — POTASSIUM CHLORIDE 750 MG/1
10 TABLET, EXTENDED RELEASE ORAL ONCE
Status: COMPLETED | OUTPATIENT
Start: 2023-09-19 | End: 2023-09-19

## 2023-09-19 RX ORDER — CLOPIDOGREL BISULFATE 75 MG/1
75 TABLET ORAL DAILY
Status: ON HOLD | COMMUNITY
End: 2023-09-20

## 2023-09-19 RX ORDER — DEXTROSE MONOHYDRATE 25 G/50ML
25-50 INJECTION, SOLUTION INTRAVENOUS
Status: DISCONTINUED | OUTPATIENT
Start: 2023-09-19 | End: 2023-09-21 | Stop reason: HOSPADM

## 2023-09-19 RX ORDER — FUROSEMIDE 10 MG/ML
40 INJECTION INTRAMUSCULAR; INTRAVENOUS DAILY
Status: DISCONTINUED | OUTPATIENT
Start: 2023-09-19 | End: 2023-09-19

## 2023-09-19 RX ORDER — NICOTINE POLACRILEX 4 MG
15-30 LOZENGE BUCCAL
Status: DISCONTINUED | OUTPATIENT
Start: 2023-09-19 | End: 2023-09-21 | Stop reason: HOSPADM

## 2023-09-19 RX ADMIN — INSULIN ASPART 3 UNITS: 100 INJECTION, SOLUTION INTRAVENOUS; SUBCUTANEOUS at 12:37

## 2023-09-19 RX ADMIN — TICAGRELOR 60 MG: 60 TABLET ORAL at 09:27

## 2023-09-19 RX ADMIN — PANTOPRAZOLE SODIUM 40 MG: 40 TABLET, DELAYED RELEASE ORAL at 09:26

## 2023-09-19 RX ADMIN — ATORVASTATIN CALCIUM 40 MG: 40 TABLET, FILM COATED ORAL at 09:26

## 2023-09-19 RX ADMIN — POTASSIUM CHLORIDE 10 MEQ: 750 TABLET, EXTENDED RELEASE ORAL at 11:30

## 2023-09-19 RX ADMIN — SODIUM BICARBONATE 650 MG TABLET 650 MG: at 09:26

## 2023-09-19 RX ADMIN — LOSARTAN POTASSIUM 100 MG: 100 TABLET, FILM COATED ORAL at 09:26

## 2023-09-19 RX ADMIN — MESALAMINE 1.5 G: 375 CAPSULE, EXTENDED RELEASE ORAL at 11:30

## 2023-09-19 RX ADMIN — GLIPIZIDE 20 MG: 10 TABLET, FILM COATED, EXTENDED RELEASE ORAL at 09:26

## 2023-09-19 RX ADMIN — HYDRALAZINE HYDROCHLORIDE 75 MG: 50 TABLET, FILM COATED ORAL at 19:31

## 2023-09-19 RX ADMIN — EMPAGLIFLOZIN 25 MG: 25 TABLET, FILM COATED ORAL at 09:26

## 2023-09-19 RX ADMIN — ASPIRIN 81 MG: 81 TABLET ORAL at 09:26

## 2023-09-19 RX ADMIN — INSULIN ASPART 1 UNITS: 100 INJECTION, SOLUTION INTRAVENOUS; SUBCUTANEOUS at 17:30

## 2023-09-19 RX ADMIN — HYDRALAZINE HYDROCHLORIDE 75 MG: 50 TABLET, FILM COATED ORAL at 14:07

## 2023-09-19 RX ADMIN — TICAGRELOR 60 MG: 60 TABLET ORAL at 19:31

## 2023-09-19 RX ADMIN — LEVOTHYROXINE SODIUM 112 MCG: 0.11 TABLET ORAL at 09:26

## 2023-09-19 RX ADMIN — FUROSEMIDE 40 MG: 10 INJECTION, SOLUTION INTRAVENOUS at 09:27

## 2023-09-19 ASSESSMENT — ACTIVITIES OF DAILY LIVING (ADL)
FALL_HISTORY_WITHIN_LAST_SIX_MONTHS: NO
ADLS_ACUITY_SCORE: 20
ADLS_ACUITY_SCORE: 35
ADLS_ACUITY_SCORE: 35
WALKING_OR_CLIMBING_STAIRS_DIFFICULTY: NO
ADLS_ACUITY_SCORE: 35
DRESSING/BATHING_DIFFICULTY: NO
VISION_MANAGEMENT: READING
ADLS_ACUITY_SCORE: 35
TOILETING_ISSUES: NO
ADLS_ACUITY_SCORE: 20
ADLS_ACUITY_SCORE: 20
DIFFICULTY_EATING/SWALLOWING: NO
ADLS_ACUITY_SCORE: 35
CHANGE_IN_FUNCTIONAL_STATUS_SINCE_ONSET_OF_CURRENT_ILLNESS/INJURY: NO
ADLS_ACUITY_SCORE: 35
WEAR_GLASSES_OR_BLIND: YES
CONCENTRATING,_REMEMBERING_OR_MAKING_DECISIONS_DIFFICULTY: NO
DOING_ERRANDS_INDEPENDENTLY_DIFFICULTY: NO

## 2023-09-19 NOTE — PROGRESS NOTES
[facility]   Cardiology   Progress Note     ASSESSMENT/PLAN:  Keerthi Montoya is a 73 year old year old female with PMH of HLD, HTN, DM2, ulcerative pancolitis, CKD3, pulmonary HTN, HFpEF (EF 60-65%), moderate paradoxical low flow low gradient aortic stenosis, persistent atrial fibrillation with (CHADS-VASc 4) with intolerance to anticoagulation due to history of GI bleeding s/p RY closure with 24mm WATCHMAN FLX device (05/2023) who was admitted 9/17 with heart failure exacerbation     # Acute on Chronic Diastolic Heart Failure  # Moderate Pulmonary HTN  # Moderate Paradoxical Low Flow Low Gradient Aortic Stenosis    # Persistent rate-controlled A-fib s/p Watchman   # HTN  # HLD  Patient presented to ED with a 2 week history of increased PROCTOR, abdominal distention, peripheral edema, orthopnea, and ~15lb weight gain over last 6-8 weeks. Work up in ED notable for BNP 18,607, creatinine 1.70, CXR with pulmonary edema and small bilateral effusions.     - Echo 9/18 EF 55-60%, PASP 59 mmHg, RA pressure 15 mmHg  - Volume Status: Euvolemic, transition back to Torsemide 10 mg daily  - Strict I/O, goal net even-500mL/daily  -  lb; weight on admission 165 lb; given trend BUN/CR, weight gain unlikely true fluid   - DAPT: (given recent Watchman): PTA ASA 81mg daily, Brilinta 60 mg BID x 6 months)  - Statin: Continue PTA Lipitor 40mg daily   - BP control: Decrease PTA Hydralazine to 50 mg TID (from 75mg TID), add Imdur 30 mg daily for more preload support, Losartan 100 mg daily  - SGLT2i: Continue PTA Empagliflozin 25mg daily  - Hold PTA atenolol 50mg daily; likely discontinue at time of discharge  - Daily BMP, Mg, replace lytes per protocol      # CKD3  Cr on admission 1.7. Baseline fluctuates, appears to be around 1.5-1.9  - Diuresis as listed above  - Daily BMP     # T2DM  Hgb A1c 8.7. BGM stable during admission, 120-140  - PTA glipizide and empagliflozin      # Ulcerative colitis (in  remission)  # Hx GI bleed on anticoagulation  Patient was admitted from 1/20/23-1/24/23 with melena. Hb was noted to be 5.9 and she underwent EGD with GI which showed non bleeding gastric and duodenal ulcers and erosive gastropathy.  - Continue PTA mesalamine 1.5 g daily      # Iron Deficiency Anemia  Likely secondary to GI blood loss. Her most recent iron panel reveals iron of 21 ug/dl and TIBC of 426 ug/dl with 5% saturation, characteristic for iron deficiency.  Hgb 12.4 on admission.   - Follows with hematology  - Received iron transfusions x 3 - third dose 08/17/2023     # Shingles  Patient has rash on L abdomen. Reports lesions have scabbed and are very pruritic. She recently completed a course of valacyclovir 1000mg x 7 days  - Maintain airborne precautions     FEN: 2g Sodium, 2L fluid restriction  Code status: Full Code  Prophylaxis: PCDs  Isolation: Airborne - shingles  Disposition: likely discharge home tomorrow    Patient seen and discussed with Dr. Weir, who agrees with above plan.    Lamar ADAME, CNP  [facility] Cardiology Team  Pager [phone]/ASCOM [phone]    Interval History:  - Patient s/p 40 mg IV Lasix, net negative 350 mL, + 1 unmeasured UOP, no weight yet today, labs pending  - Pt reports feeling well today, denies any SOB, lightheadedness, dizziness    Physical Exam:  Pulse:  [68-95] 81  BP: ()/(54-94) 106/61  SpO2:  [95 %-99 %] 97 %    I/O:  Intake/Output Summary (Last 24 hours) at 9/19/2023 0754  Last data filed at 9/18/2023 2100  Gross per 24 hour   Intake 750 ml   Output 1100 ml   Net -350 ml        Wt:   Wt Readings from Last 5 Encounters:   09/17/23 74.8 kg (164 lb 12.8 oz)   08/22/23 69.4 kg (153 lb)   08/17/23 69.3 kg (152 lb 11.2 oz)   08/07/23 67.6 kg (149 lb)   08/03/23 67.4 kg (148 lb 9.6 oz)     (Physical exam performed by MD only)  General: NAD  HEENT:  PERRLA, EOMI.   Neck: no JVD  CV: irregularly irregular rhythm, rate controlled. No murmur appreciated. No rubs or gallops.  Peripheral radial pulse intact.  Resp: No increased work of breathing or use of accessory muscles, breathing comfortably on room air.  Lung sounds clear throughout/bilaterally  Abdomen:  Normal active bowel sounds.  Abdomen is soft. No distension, non-tender to palpation.    Extremities: Warm. Capillary refill less than 3 sec. 2/4 radial pulses bilaterally.  2/4 pedal pulses bilaterally. No pre-tibial edema. No cyanosis or clubbing.  Skin:  Warm and dry. No erythema, rashes, ulceration or diaphoresis.  Neuro: Alert and oriented x3.      Medications:   aspirin  81 mg Oral Daily    atorvastatin  40 mg Oral Daily    empagliflozin  25 mg Oral Daily    furosemide  40 mg Intravenous Daily    glipiZIDE  20 mg Oral QAM    hydrALAZINE  75 mg Oral TID    levothyroxine  112 mcg Oral QAM    losartan  100 mg Oral QAM    mesalamine  1.5 g Oral Daily with lunch    pantoprazole  40 mg Oral Daily    sodium bicarbonate  650 mg Oral Daily    sodium chloride (PF)  3 mL Intracatheter Q8H    ticagrelor  60 mg Oral BID      - MEDICATION INSTRUCTIONS -         Labs:   [facility]  Recent Labs   Lab 09/18/23  2032 09/18/23  1809 09/18/23  1454 09/18/23  0940 09/18/23  0809 09/18/23  0705 09/17/23  2253 09/17/23  2030 09/17/23  1622   NA  --   --   --   --   --  140  --   --  141   POTASSIUM  --   --   --   --   --  4.5  --  4.3 5.7*   CHLORIDE  --   --   --   --   --  110*  --   --  108*   CO2  --   --   --   --   --  19*  --   --  19*   ANIONGAP  --   --   --   --   --  11  --   --  14   * 280* 278* 122*  --  147*   < >  --  121*   BUN  --   --   --   --   --  27.3*  --   --  24.6*   CR  --   --   --   --   --  1.86*  --   --  1.70*   GFRESTIMATED  --   --   --   --   --  28*  --   --  32*   ABEL  --   --   --   --   --  9.4  --   --  9.2   MAG  --   --   --   --  2.4* 2.4*  --   --  2.4*   PROTTOTAL  --   --   --   --   --   --   --   --  7.5   ALBUMIN  --   --   --   --   --   --   --   --  3.9   BILITOTAL  --   --   --   --   --   --   --    --  1.1   ALKPHOS  --   --   --   --   --   --   --   --  120*   AST  --   --   --   --   --   --   --   --  44   ALT  --   --   --   --   --   --   --   --  <5    < > = values in this interval not displayed.     CBC  Recent Labs   Lab 23  0809 23  1622   WBC 7.3 8.8   RBC 4.48 4.40   HGB 12.5 12.4   HCT 41.7 40.6   MCV 93 92   MCH 27.9 28.2   MCHC 30.0* 30.5*    312     INRNo lab results found in last 7 days.  Arterial Blood GasNo lab results found in last 7 days.  Troponin T High Sensitivity No results found for: TROPI, TROPONIN, TROPR, TROPN    Diagnostics:  ECG 23: Afib, HR 90      Recent Results (from the past 24 hour(s))   Echo Complete   Result Value    LVEF  55-60%    Narrative    [id number]  [id number]  [id number]  [id number]^JOSE^ANDRA     [facility],[place]  Echocardiography Laboratory  [address] [place] [id number]     Name: XIOMY LORD  MRN: [id number]  : 1950  Study Date: 2023 10:09 AM  Age: 73 yrs  Gender: Female  Patient Location: [facility]  Reason For Study: CHF  Ordering Physician: ANDRA GARCIA  Performed By: Mike Bahena     BSA: 1.8 m2  Height: 63 in  Weight: 164 lb  HR: 85  BP: 111/80 mmHg  ______________________________________________________________________________  Procedure  Complete Portable Echo Adult. TDS - body habitus.  ______________________________________________________________________________  Interpretation Summary  Global and regional left ventricular function is normal with an EF of 55-60%.  Flattened septum is consistent with right ventricular pressure and volume  overload.  Global right ventricular function is moderately reduced. Severe right  ventricular dilation is present.  Pulmonary hypertension is present. Pulmonary artery systolic pressure is 59  mmHg (RVSP 44mmHg + RAP 15mmHg).  Severe tricuspid insufficiency is present.  IVC diameter >2.1 cm collapsing <50% with sniff suggests a high RA  pressure  estimated at 15 mmHg or greater.  No pericardial effusion is present.  This study was compared with the study from 5/25/2023 .  No significant changes noted.  ______________________________________________________________________________  Left Ventricle  Global and regional left ventricular function is normal with an EF of 55-60%.  Left ventricular wall thickness is normal. Left ventricular size is normal.  Diastolic function not assessed due to atrial fibrillation. Flattened septum  is consistent with right ventricular pressure and volume overload.     Right Ventricle  Severe right ventricular dilation is present. Global right ventricular  function is moderately reduced.     Atria  Mild left atrial enlargement is present. Moderate to severe right atrial  enlargement is present.     Mitral Valve  Mild mitral annular calcification is present. Trace mitral insufficiency is  present.     Aortic Valve  Trileaflet aortic sclerosis without stenosis. Mild aortic valve calcification  is present.     Tricuspid Valve  Mild tricuspid valve prolapse. Severe tricuspid insufficiency is present.  Right ventricular systolic pressure is 44mmHg above the right atrial pressure.  Pulmonary hypertension is present.     Pulmonic Valve  The pulmonic valve is normal. On Doppler interrogation, there is no  significant stenosis or regurgitation. Moderate pulmonic insufficiency is  present.     Vessels  Ascending aorta 3.4 cm. IVC diameter >2.1 cm collapsing <50% with sniff  suggests a high RA pressure estimated at 15 mmHg or greater. Hepatic vein flow  consistent with severe tricuspid insufficiency is present.     Pericardium  No pericardial effusion is present.     Compared to Previous Study  This study was compared with the study from 5/25/2023 . No significant changes  noted.  ______________________________________________________________________________  MMode/2D Measurements & Calculations     IVSd: 1.4 cm  LVIDd: 2.9  cm  LVIDs: 2.0 cm  LVPWd: 1.1 cm  FS: 30.7 %  LV mass(C)d: 108.4 grams  LV mass(C)dI: 61.0 grams/m2  Ao root diam: 3.4 cm  LVOT diam: 1.9 cm  LVOT area: 3.0 cm2  Ao root diam index Ht(cm/m): 2.1  Ao root diam index BSA (cm/m2): 1.9  LA Volume (BP): 66.2 ml  LA Volume Index (BP): 37.2 ml/m2     RV Base: 5.8 cm  RWT: 0.80  TAPSE: 1.4 cm     Doppler Measurements & Calculations  MV E max michael: 109.5 cm/sec  MV A max michael: 28.8 cm/sec  MV E/A: 3.8  MV max P.5 mmHg  MV mean P.4 mmHg  MV V2 VTI: 25.8 cm  MVA(VTI): 1.1 cm2  MV dec slope: 670.8 cm/sec2  MV dec time: 0.16 sec  Ao V2 max: 228.9 cm/sec  Ao max P.0 mmHg  Ao V2 mean: 155.3 cm/sec  Ao mean PG: 10.7 mmHg  Ao V2 VTI: 46.3 cm  IVON(I,D): 0.60 cm2  IVON(V,D): 0.56 cm2  LV V1 max P.77 mmHg  LV V1 max: 43.6 cm/sec  LV V1 VTI: 9.3 cm  SV(LVOT): 27.6 ml  SI(LVOT): 15.5 ml/m2  PA acc time: 0.08 sec  TR max michael: 332.4 cm/sec  TR max P.2 mmHg  AV Michael Ratio (DI): 0.19  IVON Index (cm2/m2): 0.34  E/E' av.7     Lateral E/e': 12.4  Medial E/e': 17.1  RV S Michael: 6.9 cm/sec     ______________________________________________________________________________  Report approved by: Brandi Escobar 2023 01:21 PM             MD Billing only; BLAISE did not perform any exam or see patient at bedside due to airborne precautions/shingles    This is a shared progress note with Teresa Siu NP     I saw and evaluated patient with Nurse Practitioner. I examined patient with Nurse Practitioner. I discussed the results with patient and Nurse Practitioner. I discussed our plan with patient and Nurse Practitioner. I agree with Nurse Practitioner's note and I edited the Nurse Practitioner's note to make it a more comprehensive document.    Moo Weir MD, PhD  Professor of Medicine  Division of Cardiology

## 2023-09-19 NOTE — PROGRESS NOTES
"BP (!) 116/93   Pulse 81   Temp 97.8  F (36.6  C) (Oral)   Resp 18   Ht 1.6 m (5' 3\")   Wt 72.2 kg (159 lb 2.8 oz)   LMP  (LMP Unknown)   SpO2 92%   BMI 28.20 kg/m      Shift: [id number]  Reason for Admission: HF exacerbation   VS/Tele: VSS on RA. Chronic A. fib  Neuros: A/Ox4, able to make needs known  Respiratory: Sats >90% on RA. Denies SOB  GI/: No BMs reported this shift. Voiding in commode  Nutrition: Low fat diet with 2L FR. Strict I/O. Carb Counts  Drains/Lines: L. PIV SL  Activity: Pt up ad ventura in room. Daily weights.   Pain/Nausea: Denies both  Skin: Rash on L. Abdomen from shingles   Labs: BG ACHS. K+ replaced, pending AM recheck  New this shift: IV Lasix given x1  Plan of care: Will continue to monitor and follow POC.     "

## 2023-09-19 NOTE — PHARMACY-ADMISSION MEDICATION HISTORY
Pharmacy Intern Admission Medication History    Admission medication history is complete. The information provided in this note is only as accurate as the sources available at the time of the update.    Medication reconciliation/reorder completed by provider prior to medication history? Yes    Information Source(s): Patient and CareEverywhere/SureScripts via phone    Pertinent Information:   Per patient:  She took all of her medications on Sunday prior to coming to the hospital  She takes her cyanocobalamin tablets on Sundays, Mondays, and Wednesdays  She has three eyedrops from her cataract surgery and has finished using them. They have been removed from PTA med list.  Deleted: None    Changes made to PTA medication list:  Added: None  Deleted:  Eye drops x3  Ketorolac ophthalmic solution  Ofloxacin 0.3% ophthalmic suspension  Prednisolone ophthalmic suspension  Per patient, the eye drops were from her cataract surgery and has finished using them. They have been removed from PTA med list.  Valacyclovir   1 week course of therapy that has already been completed.  Changed: None    Allergies reviewed with patient and updates made in EHR: yes    Medication History Completed By: Martha Diaz 9/19/2023 6:19 PM    - Addendum 9/20 by Ady Chopra, NikiaD: Clopidogrel mistakenly on the list. Per chart review, was intended to stop 7/2023 and transitioned to ticagrelor. Removed clopidogrel.    PTA Med List   Medication Sig Note Last Dose    aspirin 81 MG EC tablet Take 1 tablet (81 mg) by mouth daily  9/17/2023 at Unknown    atenolol (TENORMIN) 50 MG tablet Take 1 tablet (50 mg) by mouth daily  9/17/2023 at Unknown    atorvastatin (LIPITOR) 40 MG tablet Take 1 tablet (40 mg) by mouth daily  9/17/2023 at Unknown    blood glucose (ACCU-CHEK FELIPE PLUS) test strip 200 strips by In Vitro route 2 times daily Use to test blood sugar 2 times daily or as directed.  Unknown at Unknown    Cholecalciferol (VITAMIN D) 2000 units CAPS Take  by mouth daily (with lunch)  9/17/2023 at Unknown    Cyanocobalamin (B-12) 1000 MCG TABS Take 1,000 mcg by mouth three times a week 9/19/2023: Per patient, on Sundays, Mondays, and Wednesdays 9/17/2023 at Unknown    diphenhydrAMINE (BENADRYL) 25 MG tablet Take 25 mg by mouth every 6 hours as needed for itching or allergies  Past Week at Unknown    empagliflozin (JARDIANCE) 25 MG TABS tablet Take 1 tablet (25 mg) by mouth daily  9/17/2023 at Unknown    glipiZIDE (GLUCOTROL XL) 10 MG 24 hr tablet Take 2 tablets (20 mg) by mouth daily  9/17/2023 at Unknown    hydrALAZINE (APRESOLINE) 50 MG tablet Take 1.5 tablets (75 mg) by mouth 3 times daily  9/17/2023 at Unknown    levothyroxine (SYNTHROID/LEVOTHROID) 112 MCG tablet Take 1 tablet (112 mcg) by mouth daily  9/17/2023 at Unknown    losartan (COZAAR) 100 MG tablet Take 1 tablet (100 mg) by mouth daily  9/17/2023 at Unknown    mesalamine (APRISO ER) 0.375 g 24 hr capsule TAKE 4 CAPSULES(1.5 GRAMS) BY MOUTH DAILY (Patient taking differently: 1.5 g daily (with lunch))  9/17/2023 at Unknown    pantoprazole (PROTONIX) 40 MG EC tablet Take 1 tablet (40 mg) by mouth daily  9/17/2023 at Unknown    senna (SENOKOT) 8.6 MG tablet Take 1 tablet by mouth daily as needed for constipation  Past Week at Unknown    sodium bicarbonate 650 MG tablet Take 1 tablet (650 mg) by mouth daily  9/17/2023 at Unknown    ticagrelor (BRILINTA) 60 MG tablet Take 1 tablet (60 mg) by mouth 2 times daily  9/17/2023 at Unknown

## 2023-09-19 NOTE — DISCHARGE SUMMARY
[address] [address]  p: [phone]    Discharge Summary: Cardiology Service    Keerthi Montoya MRN# [id number]   YOB: 1950 Age: 73 year old       Admission Date: 09/17/2023  Discharge Date: 09/21/2023    Discharge Diagnoses:  # Acute on Chronic Diastolic Heart Failure  # Moderate Pulmonary HTN  # Moderate Paradoxical Low Flow Low Gradient Aortic Stenosis    # Persistent rate-controlled A-fib s/p Watchman   # HTN  # HLD  # CKD3  # T2DM  # Ulcerative colitis (in remission)  # Hx GI bleed on anticoagulation  # Iron Deficiency Anemia  # Shingles    Brief HPI:  Keerthi Montoya is a 73 year old year old female with PMH of HLD, HTN, DM2, ulcerative pancolitis, CKD3, pulmonary HTN, HFpEF (EF 60-65%), moderate paradoxical low flow low gradient aortic stenosis, persistent atrial fibrillation with (CHADS-VASc 4) with intolerance to anticoagulation due to history of GI bleeding s/p RY closure with 24mm WATCHMAN FLX device (05/2023) who was admitted 9/17 with heart failure exacerbation     Hospital Course by Diagnosis:   Acute on Chronic Diastolic Heart Failure  # Moderate Pulmonary HTN  # Moderate Paradoxical Low Flow Low Gradient Aortic Stenosis    # Persistent rate-controlled A-fib s/p Watchman   # HTN  # HLD  Patient presented to ED with a 2 week history of increased PROCTOR, abdominal distention, peripheral edema, orthopnea, and ~15lb weight gain over last 6-8 weeks. Work up in ED notable for BNP 18,607, creatinine 1.70, CXR with pulmonary edema and small bilateral effusions.      - Echo 5/2023 EF 55-60%, PASP 59 mmHg, RA pressure 15 mmHg  - Volume Status: Euvolemic, transition back to Torsemide 20 mg daily  -  lb; weight on admission 165 lb; given trend BUN/CR, weight gain unlikely true fluid   - Weight on day of discharge 146 lbs (66.5 kg). Educated pt on the importance of daily home weight tracking  - DAPT: (given recent  Balbir): PTA ASA 81mg daily, Brilinta 60 mg BID x 6 months)  - Statin: Continue PTA Lipitor 40mg daily   - BP control: Decrease PTA Hydralazine to 50 mg TID (from 75mg TID), add Imdur 30 mg daily for more preload support, Losartan 100 mg daily  - SGLT2i: Continue PTA Empagliflozin 25mg daily  - Stop PTA atenolol 50mg daily. No issues with rate control while hospitalized. Given softer BP and initiation of several new medications, plan to start low dose Toprol in CORE OP setting.  - CORE in 2 weeks     # CKD3  Cr on admission 1.7. Baseline fluctuates, appears to be around 1.5-1.9  - Diuresis as listed above  - Repeat BMP in 1 week     # T2DM  Hgb A1c 8.7.   - PTA glipizide and empagliflozin      # Ulcerative colitis (in remission)  # Hx GI bleed on anticoagulation  Patient was admitted from 1/20/23-1/24/23 with melena. Hb was noted to be 5.9 and she underwent EGD with GI which showed non bleeding gastric and duodenal ulcers and erosive gastropathy.  - Continue PTA mesalamine 1.5 g daily      # Iron Deficiency Anemia  Likely secondary to GI blood loss. Her most recent iron panel reveals iron of 21 ug/dl and TIBC of 426 ug/dl with 5% saturation, characteristic for iron deficiency.  Hgb 12.4 on admission.   - Follows with hematology  - Received iron transfusions x 3 - third dose 08/17/2023     # Shingles  Patient has rash on L abdomen. Reports lesions have scabbed and are very pruritic. She recently completed a course of valacyclovir 1000mg x 7 days  - Maintain airborne precautions    Medication Changes:  MODIFY Torsemide 20 mg daily   START Imdur 30 mg daily  DECREASE hydralazine 50 mg TID (decreased from 75 mg TID)  DISCONTINUE Atenolol     Discharge medications:   Current Discharge Medication List        START taking these medications    Details   isosorbide mononitrate (IMDUR) 30 MG 24 hr tablet Take 1 tablet (30 mg) by mouth daily  Qty: 90 tablet, Refills: 3    Associated Diagnoses: Acute on chronic diastolic  congestive heart failure (H)      torsemide (DEMADEX) 20 MG tablet Take 1 tablet (20 mg) by mouth daily  Qty: 90 tablet, Refills: 3    Associated Diagnoses: Acute on chronic diastolic congestive heart failure (H)           CONTINUE these medications which have CHANGED    Details   hydrALAZINE (APRESOLINE) 50 MG tablet Take 1 tablet (50 mg) by mouth 3 times daily  Qty: 270 tablet, Refills: 3    Associated Diagnoses: Hypertension goal BP (blood pressure) < 130/80           CONTINUE these medications which have NOT CHANGED    Details   aspirin 81 MG EC tablet Take 1 tablet (81 mg) by mouth daily  Qty: 90 tablet, Refills: 3      atorvastatin (LIPITOR) 40 MG tablet Take 1 tablet (40 mg) by mouth daily  Qty: 90 tablet, Refills: 3    Associated Diagnoses: Hyperlipidemia LDL goal <100      blood glucose (ACCU-CHEK FELIPE PLUS) test strip 200 strips by In Vitro route 2 times daily Use to test blood sugar 2 times daily or as directed.  Qty: 200 strip, Refills: 4      Cholecalciferol (VITAMIN D) 2000 units CAPS Take by mouth daily (with lunch)      Cyanocobalamin (B-12) 1000 MCG TABS Take 1,000 mcg by mouth three times a week      diphenhydrAMINE (BENADRYL) 25 MG tablet Take 25 mg by mouth every 6 hours as needed for itching or allergies      empagliflozin (JARDIANCE) 25 MG TABS tablet Take 1 tablet (25 mg) by mouth daily  Qty: 90 tablet, Refills: 3    Associated Diagnoses: Type 2 diabetes mellitus with microalbuminuria, without long-term current use of insulin (H)      glipiZIDE (GLUCOTROL XL) 10 MG 24 hr tablet Take 2 tablets (20 mg) by mouth daily  Qty: 180 tablet, Refills: 3    Associated Diagnoses: Type 2 diabetes mellitus with microalbuminuria, without long-term current use of insulin (H)      levothyroxine (SYNTHROID/LEVOTHROID) 112 MCG tablet Take 1 tablet (112 mcg) by mouth daily  Qty: 90 tablet, Refills: 4    Associated Diagnoses: Postoperative hypothyroidism      losartan (COZAAR) 100 MG tablet Take 1 tablet (100 mg)  by mouth daily  Qty: 90 tablet, Refills: 3    Associated Diagnoses: Hypertension goal BP (blood pressure) < 140/90      mesalamine (APRISO ER) 0.375 g 24 hr capsule TAKE 4 CAPSULES(1.5 GRAMS) BY MOUTH DAILY  Qty: 360 capsule, Refills: 3    Associated Diagnoses: Colitis      pantoprazole (PROTONIX) 40 MG EC tablet Take 1 tablet (40 mg) by mouth daily  Qty: 90 tablet, Refills: 3    Associated Diagnoses: Gastrointestinal hemorrhage, unspecified gastrointestinal hemorrhage type      senna (SENOKOT) 8.6 MG tablet Take 1 tablet by mouth daily as needed for constipation  Qty: 30 tablet, Refills: 3    Associated Diagnoses: Constipation, unspecified constipation type      sodium bicarbonate 650 MG tablet Take 1 tablet (650 mg) by mouth daily  Qty: 90 tablet, Refills: 3    Associated Diagnoses: Metabolic acidosis      ticagrelor (BRILINTA) 60 MG tablet Take 1 tablet (60 mg) by mouth 2 times daily  Qty: 60 tablet, Refills: 6    Associated Diagnoses: Presence of Watchman left atrial appendage closure device           STOP taking these medications       atenolol (TENORMIN) 50 MG tablet Comments:   Reason for Stopping:               Follow-up:  With CORE in 2 weeks  With PCP in 7-10 days    Labs or imaging requiring follow-up after discharge:  BMP in 1 week    Code status:  Full    Condition on discharge  Temp:  [97.5  F (36.4  C)-97.7  F (36.5  C)] 97.7  F (36.5  C)  Pulse:  [82-89] 86  Resp:  [16-25] 23  BP: ()/(49-84) 106/60  SpO2:  [94 %-95 %] 95 %  General: Alert, interactive, NAD  Eyes: sclera anicteric, EOMI  Neck: JVP est 7cm, carotid 2+ bilaterally  Cardiovascular: regular rate and rhythm, normal S1 and S2, no murmurs, gallops, or rubs  Resp: clear to auscultation bilaterally, no rales, wheezes, or rhonchi  GI: Soft, nontender, nondistended. +BS.  No HSM or masses, no rebound or guarding.  Extremities: 1+ pitting B/L LE edema, no cyanosis or clubbing, dorsalis pedis and posterior tibialis pulses 2+  bilaterally  Skin: Warm and dry, no jaundice or rash  Neuro: CN 2-12 intact, moves all extremities equally  Psych: Alert & oriented x 3    Imaging with results:  Echocardiogram 9/18/2023:  Interpretation Summary  Global and regional left ventricular function is normal with an EF of 55-60%.  Flattened septum is consistent with right ventricular pressure and volume  overload.  Global right ventricular function is moderately reduced. Severe right  ventricular dilation is present.  Pulmonary hypertension is present. Pulmonary artery systolic pressure is 59  mmHg (RVSP 44mmHg + RAP 15mmHg).  Severe tricuspid insufficiency is present.  IVC diameter >2.1 cm collapsing <50% with sniff suggests a high RA pressure  estimated at 15 mmHg or greater.  No pericardial effusion is present.  This study was compared with the study from 5/25/2023 .  No significant changes noted.    Other imaging studies:  EKG 12 Lead 9/17/2023: Atrial Fibrillation, HR 90      Patient Care Team:  Bunny Meyers MD as PCP - General (Internal Medicine)  Preeti James MD as MD (Gastroenterology)  Swati Minor DO as Assigned Nephrology Provider  Jerry Robbins PA-C as Physician Assistant (Gastroenterology)  Bunny Meyers MD as Assigned PCP  Bunny Santa [facility] as Assigned MTM Pharmacist  Allison Yang, RN as Specialty Care Coordinator (Gastroenterology)  Gianfranco Melendez MD as Intern (Student in organized health care education/training program)  Jethro Moore MD as MD (Hematology & Oncology)  Fracisco Hurtado, RN as Specialty Care Coordinator (Hematology & Oncology)  Vanessa Schneider, VERONIQUE as Diabetes Educator (Diabetes Education)  Preeti James MD as Assigned Surgical Provider  Carolyn Bonilla APRN CNP as Assigned Heart and Vascular Provider  Jethro Moore MD as Assigned Cancer Care Provider    WENDI DOUGHERTY CNP  [facility] Cardiology  09/21/23  Patient discussed with staff cardiologist, Dr. Urias, who  agrees with the above documentation and plan. Documentation represents joint decision making.   Time Spent on this Encounter   I, NUNU HERNANDES, WENDI MILLS, personally saw the patient today and spent greater than 30 minutes discharging this patient.   I very much appreciated the opportunity to see and assess Keerthi Montoya in the hospital with CV BLAISE Elan Hernandes.  This morning patient indicated that she was feeling much better and that her peripheral edema had largely resolved.  She had no new cardiac symptoms.  We did discuss her going home today with continued medical management and she was agreeable.  I agree with the note above which summarizes my findings and current recommendations.  Please do not hesitate to contact my office if you have any questions or concerns.  Time spent approximately 30 minutes.    Bunny Urias MD  Cardiac Arrhythmia Service  [facility]  [phone]

## 2023-09-20 LAB
ANION GAP SERPL CALCULATED.3IONS-SCNC: 15 MMOL/L (ref 7–15)
BUN SERPL-MCNC: 29.3 MG/DL (ref 8–23)
CALCIUM SERPL-MCNC: 9.1 MG/DL (ref 8.8–10.2)
CHLORIDE SERPL-SCNC: 104 MMOL/L (ref 98–107)
CREAT SERPL-MCNC: 1.89 MG/DL (ref 0.51–0.95)
DEPRECATED HCO3 PLAS-SCNC: 22 MMOL/L (ref 22–29)
EGFRCR SERPLBLD CKD-EPI 2021: 28 ML/MIN/1.73M2
ERYTHROCYTE [DISTWIDTH] IN BLOOD BY AUTOMATED COUNT: ABNORMAL %
GLUCOSE BLDC GLUCOMTR-MCNC: 134 MG/DL (ref 70–99)
GLUCOSE BLDC GLUCOMTR-MCNC: 149 MG/DL (ref 70–99)
GLUCOSE BLDC GLUCOMTR-MCNC: 195 MG/DL (ref 70–99)
GLUCOSE BLDC GLUCOMTR-MCNC: 239 MG/DL (ref 70–99)
GLUCOSE SERPL-MCNC: 169 MG/DL (ref 70–99)
HCT VFR BLD AUTO: 35.2 % (ref 35–47)
HGB BLD-MCNC: 10.9 G/DL (ref 11.7–15.7)
MAGNESIUM SERPL-MCNC: 2.1 MG/DL (ref 1.7–2.3)
MCH RBC QN AUTO: 27.9 PG (ref 26.5–33)
MCHC RBC AUTO-ENTMCNC: 31 G/DL (ref 31.5–36.5)
MCV RBC AUTO: 90 FL (ref 78–100)
PLATELET # BLD AUTO: 257 10E3/UL (ref 150–450)
POTASSIUM SERPL-SCNC: 3.7 MMOL/L (ref 3.4–5.3)
RBC # BLD AUTO: 3.91 10E6/UL (ref 3.8–5.2)
SODIUM SERPL-SCNC: 141 MMOL/L (ref 136–145)
WBC # BLD AUTO: 7.3 10E3/UL (ref 4–11)

## 2023-09-20 PROCEDURE — 250N000011 HC RX IP 250 OP 636: Mod: JZ | Performed by: STUDENT IN AN ORGANIZED HEALTH CARE EDUCATION/TRAINING PROGRAM

## 2023-09-20 PROCEDURE — 250N000013 HC RX MED GY IP 250 OP 250 PS 637: Performed by: INTERNAL MEDICINE

## 2023-09-20 PROCEDURE — 120N000003 HC R&B IMCU UMMC

## 2023-09-20 PROCEDURE — 85027 COMPLETE CBC AUTOMATED: CPT

## 2023-09-20 PROCEDURE — 83735 ASSAY OF MAGNESIUM: CPT

## 2023-09-20 PROCEDURE — 80048 BASIC METABOLIC PNL TOTAL CA: CPT

## 2023-09-20 PROCEDURE — 99232 SBSQ HOSP IP/OBS MODERATE 35: CPT | Mod: GC | Performed by: INTERNAL MEDICINE

## 2023-09-20 PROCEDURE — 36415 COLL VENOUS BLD VENIPUNCTURE: CPT

## 2023-09-20 RX ORDER — POTASSIUM CHLORIDE 750 MG/1
10 TABLET, EXTENDED RELEASE ORAL ONCE
Status: COMPLETED | OUTPATIENT
Start: 2023-09-20 | End: 2023-09-20

## 2023-09-20 RX ORDER — FUROSEMIDE 10 MG/ML
40 INJECTION INTRAMUSCULAR; INTRAVENOUS ONCE
Status: COMPLETED | OUTPATIENT
Start: 2023-09-20 | End: 2023-09-20

## 2023-09-20 RX ADMIN — PANTOPRAZOLE SODIUM 40 MG: 40 TABLET, DELAYED RELEASE ORAL at 08:09

## 2023-09-20 RX ADMIN — TICAGRELOR 60 MG: 60 TABLET ORAL at 21:07

## 2023-09-20 RX ADMIN — INSULIN ASPART 1 UNITS: 100 INJECTION, SOLUTION INTRAVENOUS; SUBCUTANEOUS at 18:10

## 2023-09-20 RX ADMIN — LOSARTAN POTASSIUM 100 MG: 100 TABLET, FILM COATED ORAL at 08:08

## 2023-09-20 RX ADMIN — TICAGRELOR 60 MG: 60 TABLET ORAL at 10:11

## 2023-09-20 RX ADMIN — MESALAMINE 1.5 G: 375 CAPSULE, EXTENDED RELEASE ORAL at 11:42

## 2023-09-20 RX ADMIN — FUROSEMIDE 40 MG: 10 INJECTION, SOLUTION INTRAVENOUS at 16:20

## 2023-09-20 RX ADMIN — HYDRALAZINE HYDROCHLORIDE 75 MG: 50 TABLET, FILM COATED ORAL at 08:08

## 2023-09-20 RX ADMIN — INSULIN ASPART 2 UNITS: 100 INJECTION, SOLUTION INTRAVENOUS; SUBCUTANEOUS at 11:45

## 2023-09-20 RX ADMIN — POTASSIUM CHLORIDE 10 MEQ: 750 TABLET, EXTENDED RELEASE ORAL at 08:09

## 2023-09-20 RX ADMIN — SODIUM BICARBONATE 650 MG TABLET 650 MG: at 08:09

## 2023-09-20 RX ADMIN — EMPAGLIFLOZIN 25 MG: 25 TABLET, FILM COATED ORAL at 08:09

## 2023-09-20 RX ADMIN — GLIPIZIDE 20 MG: 10 TABLET, FILM COATED, EXTENDED RELEASE ORAL at 08:09

## 2023-09-20 RX ADMIN — HYDRALAZINE HYDROCHLORIDE 75 MG: 50 TABLET, FILM COATED ORAL at 21:07

## 2023-09-20 RX ADMIN — ASPIRIN 81 MG: 81 TABLET ORAL at 08:09

## 2023-09-20 RX ADMIN — LEVOTHYROXINE SODIUM 112 MCG: 0.11 TABLET ORAL at 08:09

## 2023-09-20 RX ADMIN — ATORVASTATIN CALCIUM 40 MG: 40 TABLET, FILM COATED ORAL at 08:09

## 2023-09-20 ASSESSMENT — ACTIVITIES OF DAILY LIVING (ADL)
ADLS_ACUITY_SCORE: 20

## 2023-09-20 NOTE — PROGRESS NOTES
[facility]   Cardiology   Progress Note     ASSESSMENT/PLAN:  Keerthi Montoya is a 73 year old year old female with PMH of HLD, HTN, DM2, ulcerative pancolitis, CKD3, pulmonary HTN, HFpEF (EF 60-65%), moderate paradoxical low flow low gradient aortic stenosis, persistent atrial fibrillation with (CHADS-VASc 4) with intolerance to anticoagulation due to history of GI bleeding s/p RY closure with 24mm WATCHMAN FLX device (05/2023) who was admitted 9/17 with heart failure exacerbation.     # Acute on Chronic Diastolic Heart Failure  # Moderate Pulmonary HTN  # Moderate Paradoxical Low Flow Low Gradient Aortic Stenosis    # Persistent rate-controlled A-fib s/p Watchman   # HTN  # HLD  Patient presented to ED with a 2 week history of increased PROCTOR, abdominal distention, peripheral edema, orthopnea, and ~15lb weight gain over last 6-8 weeks. Work up in ED notable for BNP 18,607, creatinine 1.70, CXR with pulmonary edema and small bilateral effusions.     - Echo 9/18 EF 55-60%, PASP 59 mmHg, RA pressure 15 mmHg  - Volume Status: Still mildly hypervolemic today, will give lasix 40 mg IV once  - Strict I/O, goal net even-500mL/daily  -  lb; weight on admission 165 lb; given trend BUN/CR, weight gain unlikely true fluid   - DAPT: (given recent Watchman): PTA ASA 81mg daily, Brilinta 60 mg BID x 6 months)  - Statin: Continue PTA Lipitor 40mg daily   - BP control: Decrease PTA Hydralazine to 50 mg TID (from 75mg TID), add Imdur 30 mg daily for more preload support, Losartan 100 mg daily  - SGLT2i: Continue PTA Empagliflozin 25mg daily  - Hold PTA atenolol 50mg daily; likely discontinue at time of discharge  - Daily BMP, Mg, replace lytes per protocol      # CKD3  Cr on admission 1.7. Baseline fluctuates, appears to be around 1.5-1.9  - Diuresis as listed above  - Daily BMP     # T2DM  Hgb A1c 8.7. BGM stable during admission, 120-140  - PTA glipizide and empagliflozin      # Ulcerative  colitis (in remission)  # Hx GI bleed on anticoagulation  Patient was admitted from 1/20/23-1/24/23 with melena. Hb was noted to be 5.9 and she underwent EGD with GI which showed non bleeding gastric and duodenal ulcers and erosive gastropathy.  - Continue PTA mesalamine 1.5 g daily      # Iron Deficiency Anemia  Likely secondary to GI blood loss. Her most recent iron panel reveals iron of 21 ug/dl and TIBC of 426 ug/dl with 5% saturation, characteristic for iron deficiency.  Hgb 12.4 on admission.   - Follows with hematology  - Received iron transfusions x 3 - third dose 08/17/2023     # Shingles  Patient has rash on L abdomen. Reports lesions have scabbed and are very pruritic. She recently completed a course of valacyclovir 1000mg x 7 days  - Maintain airborne precautions     FEN: 2g Sodium, 2L fluid restriction  Code status: Full Code  Prophylaxis: PCDs  Isolation: Airborne - shingles  Disposition: likely discharge home tomorrow    Patient seen and discussed with Dr. Weir, who agrees with above plan.    Lamar ADAME, CNP  [facility] Cardiology Team  Pager [phone]    Interval History:  - Patient net negative 2 L yesterday  - Pt reports feeling well today, denies any SOB, lightheadedness, dizziness    Physical Exam:  Temp:  [97.4  F (36.3  C)-98.4  F (36.9  C)] 97.5  F (36.4  C)  Pulse:  [79-92] 89  Resp:  [16-25] 25  BP: ()/(49-81) 92/57  SpO2:  [93 %-97 %] 94 %     Wt:   Wt Readings from Last 5 Encounters:   09/20/23 69.3 kg (152 lb 11.2 oz)   09/17/23 74.8 kg (164 lb 12.8 oz)   08/22/23 69.4 kg (153 lb)   08/17/23 69.3 kg (152 lb 11.2 oz)   08/07/23 67.6 kg (149 lb)     (Physical exam performed by MD only)  General: NAD  HEENT:  PERRLA, EOMI.   Neck: no JVD  CV: irregularly irregular rhythm, rate controlled. No murmur appreciated. No rubs or gallops. Peripheral radial pulse intact.  Resp: No increased work of breathing or use of accessory muscles, breathing comfortably on room air.  Lung sounds  clear throughout/bilaterally  Abdomen:  Normal active bowel sounds.  Abdomen is soft. No distension, non-tender to palpation.    Extremities: Warm. Capillary refill less than 3 sec. 2/4 radial pulses bilaterally.  2/4 pedal pulses bilaterally. No pre-tibial edema. No cyanosis or clubbing.  Skin:  Warm and dry. No erythema, rashes, ulceration or diaphoresis.  Neuro: Alert and oriented x3.      Medications:   aspirin  81 mg Oral Daily    atorvastatin  40 mg Oral Daily    empagliflozin  25 mg Oral Daily    furosemide  40 mg Intravenous Once    glipiZIDE  20 mg Oral QAM    hydrALAZINE  75 mg Oral TID    insulin aspart  1-7 Units Subcutaneous TID AC    insulin aspart  1-5 Units Subcutaneous At Bedtime    levothyroxine  112 mcg Oral QAM    losartan  100 mg Oral QAM    mesalamine  1.5 g Oral Daily with lunch    pantoprazole  40 mg Oral Daily    sodium bicarbonate  650 mg Oral Daily    sodium chloride (PF)  3 mL Intracatheter Q8H    ticagrelor  60 mg Oral BID      - MEDICATION INSTRUCTIONS -         Labs:   [facility]  Recent Labs   Lab 09/20/23  1144 09/20/23  0805 09/20/23  0515 09/19/23  2224 09/19/23  1236 09/19/23  0839 09/18/23  0940 09/18/23  0809 09/18/23  0705 09/17/23  2253 09/17/23  2030 09/17/23  1622   NA  --   --  141  --   --  140  --   --  140  --   --  141   POTASSIUM  --   --  3.7  --   --  3.8  --   --  4.5  --  4.3 5.7*   CHLORIDE  --   --  104  --   --  106  --   --  110*  --   --  108*   CO2  --   --  22  --   --  21*  --   --  19*  --   --  19*   ANIONGAP  --   --  15  --   --  13  --   --  11  --   --  14   * 134* 169* 215*   < > 206*   < >  --  147*   < >  --  121*   BUN  --   --  29.3*  --   --  29.3*  --   --  27.3*  --   --  24.6*   CR  --   --  1.89*  --   --  1.99*  --   --  1.86*  --   --  1.70*   GFRESTIMATED  --   --  28*  --   --  26*  --   --  28*  --   --  32*   ABEL  --   --  9.1  --   --  9.5  --   --  9.4  --   --  9.2   MAG  --   --  2.1  --   --  2.2  --  2.4* 2.4*  --   --  2.4*    PROTTOTAL  --   --   --   --   --   --   --   --   --   --   --  7.5   ALBUMIN  --   --   --   --   --   --   --   --   --   --   --  3.9   BILITOTAL  --   --   --   --   --   --   --   --   --   --   --  1.1   ALKPHOS  --   --   --   --   --   --   --   --   --   --   --  120*   AST  --   --   --   --   --   --   --   --   --   --   --  44   ALT  --   --   --   --   --   --   --   --   --   --   --  <5    < > = values in this interval not displayed.     CBC  Recent Labs   Lab 09/20/23  0515 09/19/23  0839 09/18/23  0809 09/17/23  1622   WBC 7.3 7.1 7.3 8.8   RBC 3.91 4.01 4.48 4.40   HGB 10.9* 11.2* 12.5 12.4   HCT 35.2 37.2 41.7 40.6   MCV 90 93 93 92   MCH 27.9 27.9 27.9 28.2   MCHC 31.0* 30.1* 30.0* 30.5*    269 295 312     INRNo lab results found in last 7 days.  Arterial Blood GasNo lab results found in last 7 days.  Troponin T High Sensitivity No results found for: TROPI, TROPONIN, TROPR, TROPN    Diagnostics:  ECG 9/17/23: Afib, HR 90      Plan discussed with Dr. Weir      I saw and evaluated patient with CV fellow. I examined patient with CV fellow. I discussed the results with patient and CV fellow. I discussed our plan with patient and CV fellow.  I agree with CV fellow's note and I edited the CV fellow's note to make it a more comprehensive document.    40 min was spent directly with patient and CV fellow.    Moo Weir MD, PhD  Professor of Medicine  Division of Cardiology

## 2023-09-20 NOTE — PLAN OF CARE
Keerthi Montoya is a 73 year old year old female with PMH of HLD, HTN, DM2, ulcerative pancolitis, CKD3, pulmonary HTN, HFpEF (EF 60-65%), moderate paradoxical low flow low gradient aortic stenosis, persistent atrial fibrillation with (CHADS-VASc 4) with intolerance to anticoagulation due to history of GI bleeding s/p RY closure with 24mm WATCHMAN FLX device (05/2023) who was admitted 9/17 with heart failure exacerbation       Neuro: A/Ox4. Calls appropriately. Pleasant and cooperative.   Cardiac/Tele: Afib 80s   Respiratory: Room air. Tolerating well  GI/: no BM during shift, voiding well.   Diet/Appetite: 2 gram Na+ diet, 2LFR  Skin: active shingles covering generalized abdomen, shoulders( back of the neck ) back, and scalp. Majority are crusted.   LDAs: LPIV   Activity: IND   Pain: denies     P: Continue to monitor and notify team with changes.

## 2023-09-20 NOTE — PLAN OF CARE
Pt is A/O x 4.   Independent.   VSS, RA. Denies pain.   Tele Afib CVR.   Lung sounds diminished but clear throughout. Denies SOB.   Skin: Shingles spots scabbed and itchy per pt report. Scabs noted on abdomen, back, shoulder and neck.   BS active x4. Adequate urine output via ambulation to BR.  BG checks ACHS. 2gm Na diet, tolerating well. 2L FR.   Plans for discharge home likely tomorrow pending results of further diuresis this evening.   Patient currently resting in bed with call light in reach.

## 2023-09-20 NOTE — PLAN OF CARE
Goal Outcome Evaluation:      Plan of Care Reviewed With: patient    Overall Patient Progress: improvingOverall Patient Progress: improving     D:Pt admitted with CHF exacerbation. Pt hasPMH of HLD, HTN, DM2, ulcerative pancolitis, CKD3, pulmonary HTN, HFpEF (EF 60-65%), moderate paradoxical low flow low gradient aortic stenosis, persistent atrial fibrillation with (CHADS-VASc 4) with intolerance to anticoagulation due to history of GI bleeding s/p RY closure with 24mm WATCHMAN FLX device (05/2023 . Pt currently has shingles  A/I: Pt arrived via WC alert and oriented. Pt in afib rates in the 80's. Pt's other VSS.Pt requested to shower upon arrival. Pt noted to have red rash over abdomen and on back and shoulders ,reported to be shingles. Reddened spots open and with some weeping. Pt c/o itching. Pt noted to be edematous in LE and in abdomen . Abdomen firm. Pt's FSBG 179 upon arrival to floor, treated with sliding scale insulin. Pt eating and drinking.Pt getting up to br independently. Pt denies any c/o pain. Pt's lungs diminished sating well on RA. Pt receiving diuretics.  Pt's coccyx reddened but blanchable.  P:Continue diuretics until at EDW. Continue current POC and isolation for shingles.

## 2023-09-21 VITALS
TEMPERATURE: 97.3 F | SYSTOLIC BLOOD PRESSURE: 82 MMHG | RESPIRATION RATE: 18 BRPM | HEIGHT: 63 IN | DIASTOLIC BLOOD PRESSURE: 48 MMHG | HEART RATE: 93 BPM | BODY MASS INDEX: 25.98 KG/M2 | WEIGHT: 146.61 LBS | OXYGEN SATURATION: 96 %

## 2023-09-21 LAB
ANION GAP SERPL CALCULATED.3IONS-SCNC: 10 MMOL/L (ref 7–15)
BUN SERPL-MCNC: 28.9 MG/DL (ref 8–23)
CALCIUM SERPL-MCNC: 9.6 MG/DL (ref 8.8–10.2)
CHLORIDE SERPL-SCNC: 103 MMOL/L (ref 98–107)
CREAT SERPL-MCNC: 1.74 MG/DL (ref 0.51–0.95)
DEPRECATED HCO3 PLAS-SCNC: 26 MMOL/L (ref 22–29)
EGFRCR SERPLBLD CKD-EPI 2021: 31 ML/MIN/1.73M2
ERYTHROCYTE [DISTWIDTH] IN BLOOD BY AUTOMATED COUNT: ABNORMAL %
GLUCOSE BLDC GLUCOMTR-MCNC: 201 MG/DL (ref 70–99)
GLUCOSE BLDC GLUCOMTR-MCNC: 84 MG/DL (ref 70–99)
GLUCOSE SERPL-MCNC: 102 MG/DL (ref 70–99)
HCT VFR BLD AUTO: 34 % (ref 35–47)
HGB BLD-MCNC: 10.7 G/DL (ref 11.7–15.7)
MAGNESIUM SERPL-MCNC: 1.9 MG/DL (ref 1.7–2.3)
MCH RBC QN AUTO: 28.6 PG (ref 26.5–33)
MCHC RBC AUTO-ENTMCNC: 31.5 G/DL (ref 31.5–36.5)
MCV RBC AUTO: 91 FL (ref 78–100)
PLATELET # BLD AUTO: 252 10E3/UL (ref 150–450)
POTASSIUM SERPL-SCNC: 3.4 MMOL/L (ref 3.4–5.3)
RBC # BLD AUTO: 3.74 10E6/UL (ref 3.8–5.2)
SODIUM SERPL-SCNC: 139 MMOL/L (ref 136–145)
WBC # BLD AUTO: 7.1 10E3/UL (ref 4–11)

## 2023-09-21 PROCEDURE — 36415 COLL VENOUS BLD VENIPUNCTURE: CPT

## 2023-09-21 PROCEDURE — 99238 HOSP IP/OBS DSCHRG MGMT 30/<: CPT | Mod: FS

## 2023-09-21 PROCEDURE — 83735 ASSAY OF MAGNESIUM: CPT | Performed by: INTERNAL MEDICINE

## 2023-09-21 PROCEDURE — 250N000013 HC RX MED GY IP 250 OP 250 PS 637: Performed by: CASE MANAGER/CARE COORDINATOR

## 2023-09-21 PROCEDURE — 84132 ASSAY OF SERUM POTASSIUM: CPT

## 2023-09-21 PROCEDURE — 250N000011 HC RX IP 250 OP 636: Performed by: INTERNAL MEDICINE

## 2023-09-21 PROCEDURE — 85027 COMPLETE CBC AUTOMATED: CPT

## 2023-09-21 PROCEDURE — 250N000013 HC RX MED GY IP 250 OP 250 PS 637: Performed by: INTERNAL MEDICINE

## 2023-09-21 RX ORDER — HYDRALAZINE HYDROCHLORIDE 50 MG/1
50 TABLET, FILM COATED ORAL 3 TIMES DAILY
Status: DISCONTINUED | OUTPATIENT
Start: 2023-09-21 | End: 2023-09-21 | Stop reason: HOSPADM

## 2023-09-21 RX ORDER — POTASSIUM CHLORIDE 750 MG/1
20 TABLET, EXTENDED RELEASE ORAL ONCE
Status: COMPLETED | OUTPATIENT
Start: 2023-09-21 | End: 2023-09-21

## 2023-09-21 RX ORDER — MAGNESIUM SULFATE HEPTAHYDRATE 40 MG/ML
2 INJECTION, SOLUTION INTRAVENOUS ONCE
Status: COMPLETED | OUTPATIENT
Start: 2023-09-21 | End: 2023-09-21

## 2023-09-21 RX ORDER — TORSEMIDE 20 MG/1
40 TABLET ORAL DAILY
Status: DISCONTINUED | OUTPATIENT
Start: 2023-09-21 | End: 2023-09-21

## 2023-09-21 RX ORDER — HYDRALAZINE HYDROCHLORIDE 50 MG/1
50 TABLET, FILM COATED ORAL 3 TIMES DAILY
Qty: 270 TABLET | Refills: 3 | Status: SHIPPED | OUTPATIENT
Start: 2023-09-21 | End: 2023-10-27

## 2023-09-21 RX ORDER — TORSEMIDE 20 MG/1
20 TABLET ORAL DAILY
Qty: 90 TABLET | Refills: 3 | Status: SHIPPED | OUTPATIENT
Start: 2023-09-22 | End: 2023-10-27

## 2023-09-21 RX ORDER — ISOSORBIDE MONONITRATE 30 MG/1
30 TABLET, EXTENDED RELEASE ORAL DAILY
Status: DISCONTINUED | OUTPATIENT
Start: 2023-09-21 | End: 2023-09-21 | Stop reason: HOSPADM

## 2023-09-21 RX ORDER — ISOSORBIDE MONONITRATE 30 MG/1
30 TABLET, EXTENDED RELEASE ORAL DAILY
Qty: 90 TABLET | Refills: 3 | Status: SHIPPED | OUTPATIENT
Start: 2023-09-22 | End: 2024-07-12

## 2023-09-21 RX ORDER — TORSEMIDE 20 MG/1
20 TABLET ORAL DAILY
Status: DISCONTINUED | OUTPATIENT
Start: 2023-09-21 | End: 2023-09-21 | Stop reason: HOSPADM

## 2023-09-21 RX ADMIN — ATORVASTATIN CALCIUM 40 MG: 40 TABLET, FILM COATED ORAL at 08:47

## 2023-09-21 RX ADMIN — LOSARTAN POTASSIUM 100 MG: 100 TABLET, FILM COATED ORAL at 08:47

## 2023-09-21 RX ADMIN — GLIPIZIDE 20 MG: 10 TABLET, FILM COATED, EXTENDED RELEASE ORAL at 08:48

## 2023-09-21 RX ADMIN — POTASSIUM CHLORIDE 20 MEQ: 750 TABLET, EXTENDED RELEASE ORAL at 08:48

## 2023-09-21 RX ADMIN — SODIUM BICARBONATE 650 MG TABLET 650 MG: at 08:47

## 2023-09-21 RX ADMIN — MAGNESIUM SULFATE IN WATER 2 G: 40 INJECTION, SOLUTION INTRAVENOUS at 08:59

## 2023-09-21 RX ADMIN — ISOSORBIDE MONONITRATE 30 MG: 30 TABLET, EXTENDED RELEASE ORAL at 08:58

## 2023-09-21 RX ADMIN — TICAGRELOR 60 MG: 60 TABLET ORAL at 12:16

## 2023-09-21 RX ADMIN — ASPIRIN 81 MG: 81 TABLET ORAL at 08:47

## 2023-09-21 RX ADMIN — EMPAGLIFLOZIN 25 MG: 25 TABLET, FILM COATED ORAL at 08:48

## 2023-09-21 RX ADMIN — HYDRALAZINE HYDROCHLORIDE 50 MG: 50 TABLET, FILM COATED ORAL at 08:47

## 2023-09-21 RX ADMIN — PANTOPRAZOLE SODIUM 40 MG: 40 TABLET, DELAYED RELEASE ORAL at 08:47

## 2023-09-21 RX ADMIN — MESALAMINE 1.5 G: 375 CAPSULE, EXTENDED RELEASE ORAL at 12:17

## 2023-09-21 RX ADMIN — LEVOTHYROXINE SODIUM 112 MCG: 0.11 TABLET ORAL at 08:48

## 2023-09-21 RX ADMIN — TORSEMIDE 20 MG: 20 TABLET ORAL at 08:58

## 2023-09-21 RX ADMIN — INSULIN ASPART 2 UNITS: 100 INJECTION, SOLUTION INTRAVENOUS; SUBCUTANEOUS at 12:19

## 2023-09-21 ASSESSMENT — ACTIVITIES OF DAILY LIVING (ADL)
ADLS_ACUITY_SCORE: 20

## 2023-09-21 NOTE — PROGRESS NOTES
DISCHARGE                         No discharge date for patient encounter.  ----------------------------------------------------------------------------  Discharged to: Home @ 15:15 w/family  Via: private transportation  Accompanied by: Family  Discharge Instructions: Reviewed with Pt: *diet, *activity, medications, follow up appointments, when to call the MD, aftercare instructions.  Prescriptions: To be filled by  Pt's pharmacy; medication list reviewed & sent with pt  Follow Up Appointments: arranged; information given  Belongings: All sent with pt  IV: d/c'd  Telemetry: d/c'd  Pt exhibits understanding of above discharge instructions; all questions answered.    Discharge Paperwork: Signed, copied, and sent home with patient.     Jefe Dimas RN on 9/21/2023 at 2:38 PM

## 2023-09-21 NOTE — PROGRESS NOTES
Care Management Follow Up    Length of Stay (days): 4    Expected Discharge Date: 09/21/2023     Concerns to be Addressed:  Important Message from Medicare (IMM)      Patient plan of care discussed at interdisciplinary rounds: Yes    Anticipated Discharge Disposition: Home     Anticipated Discharge Services: n/a    Anticipated Discharge DME:  n/a    Patient/family educated on Medicare website which has current facility and service quality ratings:  n/a    Education Provided on the Discharge Plan: Yes    Patient/Family in Agreement with the Plan: Yes    Referrals Placed by CM/SW: n/a    Private pay costs discussed: Not applicable    Additional Information: SW provide pt with IMM form to allow her the opportunity to appeal her discharge. Pt declined a copy of the form for her records as she reported she is glad to be going home. Pt signed the form, and SW faxed it to health information management.    SALLY Borges  [facility]   Pager: [phone]

## 2023-09-23 ENCOUNTER — PATIENT OUTREACH (OUTPATIENT)
Dept: CARE COORDINATION | Facility: CLINIC | Age: 73
End: 2023-09-23
Payer: MEDICARE

## 2023-09-23 NOTE — PROGRESS NOTES
[facility] Care Resource Center Contact  [facility]/Voicemail     Clinical Data: Post-Discharge Outreach     Outreach attempted x 2.  Left message on patient's voicemail, providing [facility]'s 24/7 scheduling and nurse triage phone number [phone] ([phone]) for questions/concerns and/or to schedule an appt with an [facility] provider, if they do not have a PCP.      Plan:  [facility] will do no further outreaches at this time.       Kimberley Portillo MA  [facility] [place], [facility]    *Connected Care Resource Team does NOT follow patient ongoing. Referrals are identified based on internal discharge reports and the outreach is to ensure patient has an understanding of their discharge instructions.

## 2023-09-26 DIAGNOSIS — R80.9 TYPE 2 DIABETES MELLITUS WITH MICROALBUMINURIA, WITHOUT LONG-TERM CURRENT USE OF INSULIN (H): ICD-10-CM

## 2023-09-26 DIAGNOSIS — E11.29 TYPE 2 DIABETES MELLITUS WITH MICROALBUMINURIA, WITHOUT LONG-TERM CURRENT USE OF INSULIN (H): ICD-10-CM

## 2023-09-26 RX ORDER — GLIPIZIDE 10 MG/1
20 TABLET, FILM COATED, EXTENDED RELEASE ORAL DAILY
Qty: 180 TABLET | Refills: 0 | Status: SHIPPED | OUTPATIENT
Start: 2023-09-26 | End: 2023-09-29

## 2023-09-26 NOTE — TELEPHONE ENCOUNTER
glipiZIDE (GLUCOTROL XL) 10 MG 24 hr tablet   Last Written Prescription Date:   9/16/2022  Last Fill Quantity: 180,   # refills: 3  Last Office Visit :  7/14/2023  Future Office visit:  10/20/2023    Routing refill request to provider for review/approval because:  Abnormal Creatinine  Refer to clinic for review   Recent Labs   Lab Test 09/21/23  0546   CR 1.74*     Rhonda Ambriz RN  Central Triage Red Flags/Med Refills    Sulfonylurea Agents [id number]/26/2023 12:43 PM   Protocol Details Patient has a recent creatinine (normal) within the past 12 mos.

## 2023-09-28 DIAGNOSIS — I50.33 ACUTE ON CHRONIC DIASTOLIC CONGESTIVE HEART FAILURE (H): Primary | ICD-10-CM

## 2023-09-29 ENCOUNTER — OFFICE VISIT (OUTPATIENT)
Dept: CARDIOLOGY | Facility: CLINIC | Age: 73
End: 2023-09-29
Attending: STUDENT IN AN ORGANIZED HEALTH CARE EDUCATION/TRAINING PROGRAM
Payer: MEDICARE

## 2023-09-29 ENCOUNTER — LAB (OUTPATIENT)
Dept: LAB | Facility: CLINIC | Age: 73
End: 2023-09-29
Payer: MEDICARE

## 2023-09-29 ENCOUNTER — OFFICE VISIT (OUTPATIENT)
Dept: PHARMACY | Facility: CLINIC | Age: 73
End: 2023-09-29
Payer: COMMERCIAL

## 2023-09-29 VITALS
BODY MASS INDEX: 23.9 KG/M2 | SYSTOLIC BLOOD PRESSURE: 131 MMHG | HEIGHT: 63 IN | OXYGEN SATURATION: 97 % | DIASTOLIC BLOOD PRESSURE: 67 MMHG | WEIGHT: 134.9 LBS | HEART RATE: 91 BPM

## 2023-09-29 VITALS — WEIGHT: 135 LBS | BODY MASS INDEX: 23.91 KG/M2

## 2023-09-29 DIAGNOSIS — I50.33 ACUTE ON CHRONIC DIASTOLIC CONGESTIVE HEART FAILURE (H): Primary | ICD-10-CM

## 2023-09-29 DIAGNOSIS — I35.0 NONRHEUMATIC AORTIC VALVE STENOSIS: ICD-10-CM

## 2023-09-29 DIAGNOSIS — E78.5 HYPERLIPIDEMIA LDL GOAL <100: ICD-10-CM

## 2023-09-29 DIAGNOSIS — I50.33 ACUTE ON CHRONIC DIASTOLIC CONGESTIVE HEART FAILURE (H): ICD-10-CM

## 2023-09-29 DIAGNOSIS — R80.9 TYPE 2 DIABETES MELLITUS WITH MICROALBUMINURIA, WITHOUT LONG-TERM CURRENT USE OF INSULIN (H): Primary | ICD-10-CM

## 2023-09-29 DIAGNOSIS — I10 HYPERTENSION GOAL BP (BLOOD PRESSURE) < 130/80: ICD-10-CM

## 2023-09-29 DIAGNOSIS — E53.8 VITAMIN B12 DEFICIENCY (NON ANEMIC): ICD-10-CM

## 2023-09-29 DIAGNOSIS — D50.9 IRON DEFICIENCY ANEMIA, UNSPECIFIED IRON DEFICIENCY ANEMIA TYPE: ICD-10-CM

## 2023-09-29 DIAGNOSIS — N18.30 STAGE 3 CHRONIC KIDNEY DISEASE, UNSPECIFIED WHETHER STAGE 3A OR 3B CKD (H): ICD-10-CM

## 2023-09-29 DIAGNOSIS — E11.29 TYPE 2 DIABETES MELLITUS WITH MICROALBUMINURIA, WITHOUT LONG-TERM CURRENT USE OF INSULIN (H): Primary | ICD-10-CM

## 2023-09-29 LAB
ALBUMIN SERPL BCG-MCNC: 4.4 G/DL (ref 3.5–5.2)
ALP SERPL-CCNC: 189 U/L (ref 35–104)
ALT SERPL W P-5'-P-CCNC: 10 U/L (ref 0–50)
ANION GAP SERPL CALCULATED.3IONS-SCNC: 13 MMOL/L (ref 7–15)
AST SERPL W P-5'-P-CCNC: 63 U/L (ref 0–45)
BASOPHILS # BLD AUTO: 0.1 10E3/UL (ref 0–0.2)
BASOPHILS NFR BLD AUTO: 1 %
BILIRUB SERPL-MCNC: 0.9 MG/DL
BUN SERPL-MCNC: 30 MG/DL (ref 8–23)
CALCIUM SERPL-MCNC: 10 MG/DL (ref 8.8–10.2)
CHLORIDE SERPL-SCNC: 101 MMOL/L (ref 98–107)
CREAT SERPL-MCNC: 1.5 MG/DL (ref 0.51–0.95)
DEPRECATED HCO3 PLAS-SCNC: 26 MMOL/L (ref 22–29)
EGFRCR SERPLBLD CKD-EPI 2021: 37 ML/MIN/1.73M2
EOSINOPHIL # BLD AUTO: 0.2 10E3/UL (ref 0–0.7)
EOSINOPHIL NFR BLD AUTO: 3 %
ERYTHROCYTE [DISTWIDTH] IN BLOOD BY AUTOMATED COUNT: 29.9 % (ref 10–15)
FERRITIN SERPL-MCNC: 76 NG/ML (ref 11–328)
GLUCOSE SERPL-MCNC: 111 MG/DL (ref 70–99)
HCT VFR BLD AUTO: 32.8 % (ref 35–47)
HGB BLD-MCNC: 10.1 G/DL (ref 11.7–15.7)
IMM GRANULOCYTES # BLD: 0 10E3/UL
IMM GRANULOCYTES NFR BLD: 0 %
IRON BINDING CAPACITY (ROCHE): 340 UG/DL (ref 240–430)
IRON SATN MFR SERPL: 17 % (ref 15–46)
IRON SERPL-MCNC: 57 UG/DL (ref 37–145)
LDH SERPL L TO P-CCNC: 209 U/L (ref 0–250)
LYMPHOCYTES # BLD AUTO: 1.1 10E3/UL (ref 0.8–5.3)
LYMPHOCYTES NFR BLD AUTO: 13 %
MCH RBC QN AUTO: 28.2 PG (ref 26.5–33)
MCHC RBC AUTO-ENTMCNC: 30.8 G/DL (ref 31.5–36.5)
MCV RBC AUTO: 92 FL (ref 78–100)
MONOCYTES # BLD AUTO: 1 10E3/UL (ref 0–1.3)
MONOCYTES NFR BLD AUTO: 12 %
NEUTROPHILS # BLD AUTO: 5.6 10E3/UL (ref 1.6–8.3)
NEUTROPHILS NFR BLD AUTO: 71 %
NRBC # BLD AUTO: 0 10E3/UL
NRBC BLD AUTO-RTO: 0 /100
PLATELET # BLD AUTO: 279 10E3/UL (ref 150–450)
POTASSIUM SERPL-SCNC: 3.5 MMOL/L (ref 3.4–5.3)
PROT SERPL-MCNC: 8.1 G/DL (ref 6.4–8.3)
RBC # BLD AUTO: 3.58 10E6/UL (ref 3.8–5.2)
SODIUM SERPL-SCNC: 140 MMOL/L (ref 135–145)
VIT B12 SERPL-MCNC: 763 PG/ML (ref 232–1245)
WBC # BLD AUTO: 8 10E3/UL (ref 4–11)

## 2023-09-29 PROCEDURE — 82668 ASSAY OF ERYTHROPOIETIN: CPT | Mod: 90 | Performed by: PATHOLOGY

## 2023-09-29 PROCEDURE — 99000 SPECIMEN HANDLING OFFICE-LAB: CPT | Performed by: PATHOLOGY

## 2023-09-29 PROCEDURE — 80053 COMPREHEN METABOLIC PANEL: CPT | Performed by: PATHOLOGY

## 2023-09-29 PROCEDURE — 36415 COLL VENOUS BLD VENIPUNCTURE: CPT | Performed by: PATHOLOGY

## 2023-09-29 PROCEDURE — 83550 IRON BINDING TEST: CPT | Performed by: PATHOLOGY

## 2023-09-29 PROCEDURE — 82607 VITAMIN B-12: CPT | Performed by: INTERNAL MEDICINE

## 2023-09-29 PROCEDURE — 99207 PR NO CHARGE LOS: CPT | Performed by: PHARMACIST

## 2023-09-29 PROCEDURE — 83540 ASSAY OF IRON: CPT | Performed by: PATHOLOGY

## 2023-09-29 PROCEDURE — 85025 COMPLETE CBC W/AUTO DIFF WBC: CPT | Performed by: PATHOLOGY

## 2023-09-29 PROCEDURE — 84238 ASSAY NONENDOCRINE RECEPTOR: CPT | Mod: 90 | Performed by: PATHOLOGY

## 2023-09-29 PROCEDURE — 82728 ASSAY OF FERRITIN: CPT | Performed by: PATHOLOGY

## 2023-09-29 PROCEDURE — G0463 HOSPITAL OUTPT CLINIC VISIT: HCPCS | Performed by: STUDENT IN AN ORGANIZED HEALTH CARE EDUCATION/TRAINING PROGRAM

## 2023-09-29 PROCEDURE — 83615 LACTATE (LD) (LDH) ENZYME: CPT | Performed by: PATHOLOGY

## 2023-09-29 PROCEDURE — 99215 OFFICE O/P EST HI 40 MIN: CPT | Performed by: STUDENT IN AN ORGANIZED HEALTH CARE EDUCATION/TRAINING PROGRAM

## 2023-09-29 RX ORDER — GLIPIZIDE 10 MG/1
10 TABLET ORAL
Qty: 30 TABLET | Refills: 3 | Status: SHIPPED | OUTPATIENT
Start: 2023-09-29 | End: 2024-01-16

## 2023-09-29 RX ORDER — GLIPIZIDE 5 MG/1
5 TABLET ORAL
Qty: 30 TABLET | Refills: 3 | Status: SHIPPED | OUTPATIENT
Start: 2023-09-29 | End: 2024-03-15

## 2023-09-29 ASSESSMENT — PAIN SCALES - GENERAL: PAINLEVEL: NO PAIN (0)

## 2023-09-29 NOTE — PROGRESS NOTES
Medication Therapy Management (MTM) Encounter    ASSESSMENT:                            Medication Adherence/Access: No issues identified    Diabetes:   Patient is not meeting A1c goal of < 7%. Her blood glucose does seem to be much lower than previously. Since she has been having some blood glucose lows over night. Will decrease glipizide to eliminate hypoglycemia and evaluate at next A1c.     Hypertension/CHF:   Stable.      PLAN:                            Decrease glipizide to 10 mg with breakfast and 5 mg with dinner    Follow-up: Return in about 3 months (around 2023) for Follow up, with me.      SUBJECTIVE/OBJECTIVE:                          Keerthi Montoya is a 73 year old female coming in for a transitions of care visit. She was discharged from [facility] on  for Shingles.      Reason for visit: LUC.     Allergies/ADRs: Reviewed in chart  Past Medical History: Reviewed in chart  Tobacco: She reports that she has never smoked. She has never been exposed to tobacco smoke. She has never used smokeless tobacco.  Alcohol: none    Medication Adherence/Access: no issues reported    Diabetes   Jardiance 25 mg daily  Glipizide XL 20 mg daily.      Blood sugar monitorin time(s) daily. Ranges (patient reported): Today was 127 and yesterday was below 100 mg/dL. The other night it went down to 55 mg/dL - reports she has a low a couple times per week. Reports at night her blood glucose are normally in the 100-200 range. Usually in the 100's blood glucose/dL  Symptoms of low blood sugar? none   Symptoms of high blood sugar? None.   Eye exam: Upcoming cataract surgery appt.   Foot exam: up to date  Aspirin: No  Statin: Yes: atorvastatin 40 mg   ACEi/ARB: Yes: losartan       Eye exam in the last 12 months? No    Foot exam: due  Urine Albumin:   Lab Results   Component Value Date    UMALCR 109.82 (H) 06/15/2023      Lab Results   Component Value Date    A1C 8.7 (H) 2023       Hypertension/CHF   Isosorbide 30 mg  daily  Losartan 100 mg daily  Empagliflozin 25 mg daily  Hydralazine 50 mg three times a day  Torsemide 20 mg daily  Brilinta 60 mg twice a day     Patient reports no current medication side effects.  Patient self-monitors blood pressure.  Home BP monitoring runs lower per her report but doesn't have specific numbers..    BP Readings from Last 3 Encounters:   09/29/23 131/67   09/21/23 (!) 82/48   09/17/23 109/65     Pulse Readings from Last 3 Encounters:   09/29/23 91   09/21/23 93   09/17/23 98         Today's Vitals: Wt 135 lb (61.2 kg)   LMP  (LMP Unknown)   BMI 23.91 kg/m    ----------------  Post Discharge Medication Reconciliation Status: discharge medications reconciled and changed, per note/orders.    I spent 30 minutes with this patient today. All changes were made via collaborative practice agreement with Bunny Meyers MD. A copy of the visit note was provided to the patient's provider(s).    A summary of these recommendations was sent via [employer].    Bunny Santa, Pharm. D., BCACP  Medication Therapy Management Pharmacist        Medication Therapy Recommendations  Type 2 diabetes mellitus with microalbuminuria, without long-term current use of insulin (H)    Current Medication: glipiZIDE (GLUCOTROL) 10 MG tablet   Rationale: Dose too high - Dosage too high - Safety   Recommendation: Decrease Dose   Status: Accepted per CPA

## 2023-09-29 NOTE — NURSING NOTE
Chief Complaint   Patient presents with    New Patient     NEW HF: 73 year old female presents with HFpEF for hospital follow up and to establish care with labs prior       Vitals were taken, medications reconciled.    Jeremy Malik, Facilitator   2:38 PM

## 2023-09-29 NOTE — NURSING NOTE
Diet: Patient instructed regarding a heart healthy diet, including discussion of reduced fat and sodium intake. Patient demonstrated understanding of this information and agreed to call with further questions or concerns.    Labs: Patient was given results of the laboratory testing obtained today. Patient was instructed to return for the next laboratory testing in 2 months . Patient demonstrated understanding of this information and agreed to call with further questions or concerns.     Return Appointment:  -Follow-up with Dr. Lemon in 2 months with labs prior.   -Okay to reschedule CORE appt to a more convenient day/time.    Patient given instructions regarding scheduling next clinic visit. Patient demonstrated understanding of this information and agreed to call with further questions or concerns.    Patient stated she understood all health information given and agreed to call with further questions or concerns.     Aura Burnett RN

## 2023-09-29 NOTE — PROGRESS NOTES
Advanced Heart Failure/Transplant Clinic Note    HPI  Dear colleagues,     I had the pleasure of seeing Ms. Keerthi Montoya in the Cardiology clinic.  As you know, Ms. Keerthi Mnotoya is a pleasant 73 year old female with a past medical history of chronic HFpEF, HLD, HTN, DM2, ulcerative pancolitis, CKD3, pulmonary HTN, moderate paradoxical low flow low gradient aortic stenosis, persistent atrial fibrillation with (CHADS-VASc 4) with intolerance to anticoagulation due to history of GI bleeding s/p RY closure and Watchman (05/2023) who was admitted in Sept '23 heart failure exacerbation and presents as new referral for HFpEF.    Patient reports she developed shingles several weeks ago and with this, developed painful, itchy rash and started having SOB and abdominal bloating after this. She was admitted, and diuresed for almost 15 lbs weight gain.    Patient reports overall feeling significantly improved since her discharge. She reports still having an itchy rash from her shingles, but all have basically scabbed over at this point. Patient denies dyspnea with any of her ADLs or usual chores. She denies SOB, presyncope, syncope, orthopnea, PND, chest pain, palpitations, abdominal pain, nausea, emesis, LE edema or weight gain. Patient reports compliance with her medications, weighing herself daily, and watching her salt and fluid intake.    ROS:  A complete 12-point ROS was negative except as above.    PAST MEDICAL HISTORY:  Patient Active Problem List   Diagnosis    Diverticulosis of large intestine    Vitamin D deficiency    Preventive measure    Hyperlipidemia LDL goal <100    Aspirin not indicated    Abdominal pain, left lower quadrant    Rectal bleeding    Colitis    Postoperative hypothyroidism    Hypertension goal BP (blood pressure) < 130/80    Type 2 diabetes mellitus with microalbuminuria, without long-term current use of insulin (H)    Cervical cancer screening    Ulcerative pancolitis without complication  (H)    Vitamin B12 deficiency (non anemic)    Proteinuria, unspecified type    CKD (chronic kidney disease) stage 3, GFR 30-59 ml/min (H)    Aortic stenosis    Pulmonary hypertension (H)    Diastolic dysfunction    Heart failure with preserved ejection fraction, NYHA class I (H)    Anemia, unspecified type    Elevated serum immunoglobulin free light chains    Posterior vitreous detachment, left    Anemia due to blood loss, acute    Gastrointestinal hemorrhage, unspecified gastrointestinal hemorrhage type    Anemia, iron deficiency    Persistent atrial fibrillation (H)    Acute on chronic diastolic congestive heart failure (H)    Severe pulmonary hypertension (H)        FAMILY HISTORY:  Family History   Problem Relation Age of Onset    Cerebrovascular Disease Mother     Cancer Father         bladder    Diverticulitis Brother     Diverticulitis Brother     Kidney Disease No family hx of     Crohn's Disease No family hx of     Ulcerative Colitis No family hx of     Stomach Cancer No family hx of     GERD No family hx of     Celiac Disease No family hx of     Anesthesia Reaction No family hx of        SOCIAL HISTORY:  Social History     Tobacco Use    Smoking status: Never     Passive exposure: Never    Smokeless tobacco: Never   Vaping Use    Vaping Use: Never used   Substance Use Topics    Alcohol use: Not Currently     Alcohol/week: 2.0 - 4.0 standard drinks of alcohol     Types: 1 - 2 Cans of beer, 1 - 2 Shots of liquor per week     Comment: occasional cocktail or beer    Drug use: No        ALLERGIES:  Allergies   Allergen Reactions    Actos [Pioglitazone]      Fluid retention    Amlodipine      Leg swelling, hand swelling    Animal Dander     Doxycycline Hives    Dust Mites     Grass     Keflex [Cephalexin Hcl] Hives    Metoprolol      Swelling of lower legs and fatigue    Other [Seasonal Allergies]      pollen    Ranitidine      rash    Synthroid [Levothyroxine Sodium] Swelling     Allergic to brand name     Tetracycline Hives    Tylenol Hives    Ziac [Bisoprolol-Hydrochlorothiazide]        CURRENT MEDICATIONS:  Current Outpatient Medications   Medication Sig Dispense Refill    aspirin 81 MG EC tablet Take 1 tablet (81 mg) by mouth daily 90 tablet 3    atorvastatin (LIPITOR) 40 MG tablet Take 1 tablet (40 mg) by mouth daily 90 tablet 3    blood glucose (ACCU-CHEK FELIPE PLUS) test strip 200 strips by In Vitro route 2 times daily Use to test blood sugar 2 times daily or as directed. 200 strip 4    Cholecalciferol (VITAMIN D) 2000 units CAPS Take by mouth daily (with lunch)      Cyanocobalamin (B-12) 1000 MCG TABS Take 1,000 mcg by mouth three times a week      diphenhydrAMINE (BENADRYL) 25 MG tablet Take 25 mg by mouth every 6 hours as needed for itching or allergies      empagliflozin (JARDIANCE) 25 MG TABS tablet Take 1 tablet (25 mg) by mouth daily 90 tablet 3    glipiZIDE (GLUCOTROL) 10 MG tablet Take 1 tablet (10 mg) by mouth daily (with breakfast) 30 tablet 3    glipiZIDE (GLUCOTROL) 5 MG tablet Take 1 tablet (5 mg) by mouth daily (with dinner) 30 tablet 3    hydrALAZINE (APRESOLINE) 50 MG tablet Take 1 tablet (50 mg) by mouth 3 times daily 270 tablet 3    isosorbide mononitrate (IMDUR) 30 MG 24 hr tablet Take 1 tablet (30 mg) by mouth daily 90 tablet 3    levothyroxine (SYNTHROID/LEVOTHROID) 112 MCG tablet Take 1 tablet (112 mcg) by mouth daily 90 tablet 4    losartan (COZAAR) 100 MG tablet Take 1 tablet (100 mg) by mouth daily 90 tablet 3    mesalamine (APRISO ER) 0.375 g 24 hr capsule TAKE 4 CAPSULES(1.5 GRAMS) BY MOUTH DAILY (Patient taking differently: 1.5 g daily (with lunch)) 360 capsule 3    pantoprazole (PROTONIX) 40 MG EC tablet Take 1 tablet (40 mg) by mouth daily 90 tablet 3    senna (SENOKOT) 8.6 MG tablet Take 1 tablet by mouth daily as needed for constipation 30 tablet 3    sodium bicarbonate 650 MG tablet Take 1 tablet (650 mg) by mouth daily 90 tablet 3    ticagrelor (BRILINTA) 60 MG tablet Take 1  "tablet (60 mg) by mouth 2 times daily 60 tablet 6    torsemide (DEMADEX) 20 MG tablet Take 1 tablet (20 mg) by mouth daily 90 tablet 3       EXAM:  /67 (BP Location: Right arm, Patient Position: Chair, Cuff Size: Adult Regular)   Pulse 91   Ht 1.594 m (5' 2.76\")   Wt 61.2 kg (134 lb 14.4 oz)   LMP  (LMP Unknown)   SpO2 97%   BMI 24.08 kg/m      General: appears comfortable, alert and interactive, in no acute distress  Head: normocephalic, atraumatic  Eyes: anicteric sclera, EOMI  Mouth: MMM  Neck: supple, no cervical adenopathy  CV: regular rate and rhythm, no murmur, gallop, rub, estimated JVP ~7 cm  Resp: clear, no rales or wheezing  GI: soft, nontender, nondistended  Extremities: warm, no peripheral edema, 2+ bilateral radial pulses  Neurological: alert and oriented, no focal deficits  Psych: normal mood and affect  Derm: healing shingles rash along abdomen and legs    Weight  Wt Readings from Last 10 Encounters:   09/29/23 61.2 kg (134 lb 14.4 oz)   09/29/23 61.2 kg (135 lb)   09/21/23 66.5 kg (146 lb 9.7 oz)   09/17/23 74.8 kg (164 lb 12.8 oz)   08/22/23 69.4 kg (153 lb)   08/17/23 69.3 kg (152 lb 11.2 oz)   08/07/23 67.6 kg (149 lb)   08/03/23 67.4 kg (148 lb 9.6 oz)   08/02/23 65.8 kg (145 lb)   07/17/23 67.6 kg (149 lb 1.6 oz)       I personally reviewed recent labs and data as below and discussed the results with the patient in clinic today.  Labs:  CBC RESULTS:  Lab Results   Component Value Date    WBC 8.0 09/29/2023    WBC 7.3 10/28/2020    RBC 3.58 (L) 09/29/2023    RBC 3.99 10/28/2020    HGB 10.1 (L) 09/29/2023    HGB 11.8 02/08/2021    HCT 32.8 (L) 09/29/2023    HCT 38.2 10/28/2020    MCV 92 09/29/2023    MCV 96 10/28/2020    MCH 28.2 09/29/2023    MCH 30.8 10/28/2020    MCHC 30.8 (L) 09/29/2023    MCHC 32.2 10/28/2020    RDW 29.9 (H) 09/29/2023    RDW 13.9 10/28/2020     09/29/2023     10/28/2020       CMP RESULTS:  Lab Results   Component Value Date     09/29/2023    "  04/28/2021    POTASSIUM 3.5 09/29/2023    POTASSIUM 4.3 09/17/2023    POTASSIUM 4.5 04/28/2021    CHLORIDE 101 09/29/2023    CHLORIDE 109 04/27/2023    CHLORIDE 100 04/28/2021    CO2 26 09/29/2023    CO2 21 04/27/2023    CO2 26 04/28/2021    ANIONGAP 13 09/29/2023    ANIONGAP 7 04/27/2023    ANIONGAP 8 04/28/2021     (H) 09/29/2023     (H) 09/21/2023     (H) 04/27/2023    GLC 57 (L) 04/28/2021    BUN 30.0 (H) 09/29/2023    BUN 28 04/27/2023    BUN 23 04/28/2021    CR 1.50 (H) 09/29/2023    CR 1.21 (H) 04/28/2021    GFRESTIMATED 37 (L) 09/29/2023    GFRESTIMATED 30 (L) 12/30/2022    GFRESTIMATED 46 (L) 04/28/2021    GFRESTBLACK 53 (L) 04/28/2021    ABEL 10.0 09/29/2023    ABEL 9.8 04/28/2021    BILITOTAL 0.9 09/29/2023    BILITOTAL 0.6 10/28/2020    ALBUMIN 4.4 09/29/2023    ALBUMIN 3.7 04/27/2023    ALBUMIN 4.1 02/08/2021    ALKPHOS 189 (H) 09/29/2023    ALKPHOS 104 10/28/2020    ALT 10 09/29/2023    ALT 18 10/28/2020    AST 63 (H) 09/29/2023    AST 19 10/28/2020        Recent Labs   Lab Test 01/20/23  0740 10/28/20  1629   CHOL 73 93   HDL 29* 38*   LDL 27 33   TRIG 87 108        Testing/Procedures:  I personally visualized and interpreted:  Echocardiogram 5/12/23  Interpretation Summary  Small unchanged pericardial effusion is present without evidence of  pericardial tamponade.  Left ventricular function is normal.The ejection fraction is 55-60%.  Paradoxical septal motion consistent with right ventricular pressure and  volume overload is present.  Moderate to severe right ventricular dilation is present. Global right  ventricular function is moderately to severely reduced.  IVC diameter >2.1 cm collapsing <50% with sniff suggests a high RA pressure estimated at 15 mmHg or greater.  This study was compared with the study from 5/11/23. There has been no change.    TTE 5/25/23  Interpretation Summary  Global and regional left ventricular function is normal with an EF of 60-65%.  Flattened  septum is consistent with right ventricular pressure and volume overload.  Moderate right ventricular dilation with moderately reduced global right  ventricular function.  Severe tricuspid insufficiency.  Estimated pulmonary artery systolic pressure is 69 mmHg.  Dilation of the inferior vena cava is present with abnormal respiratory  variation in diameter.  Small circumferential pericardial effusion without any hemodynamic  significance.  This study was compared with the study from 5/15/23. N significant change in ventricular function or pericardial effusion. TR appears worse on today's study, but that may be related to technique.    TTE 9/18/23  Interpretation Summary  Global and regional left ventricular function is normal with an EF of 55-60%.  Flattened septum is consistent with right ventricular pressure and volume overload.  Global right ventricular function is moderately reduced. Severe right  ventricular dilation is present.  Pulmonary hypertension is present. Pulmonary artery systolic pressure is 59 mmHg (RVSP 44mmHg + RAP 15mmHg).  Severe tricuspid insufficiency is present.  IVC diameter >2.1 cm collapsing <50% with sniff suggests a high RA pressure estimated at 15 mmHg or greater.  No pericardial effusion is present.  This study was compared with the study from 5/25/2023 .  No significant changes noted.    Outside results of note:  Outside records from [facility] admission were obtained, and relevant results/notes have been incorporated into HPI.    Assessment and Plan:     In summary, 73 year old female with a past medical history of chronic HFpEF, HLD, HTN, DM2, ulcerative pancolitis, CKD3, pulmonary HTN, moderate paradoxical low flow low gradient aortic stenosis, persistent atrial fibrillation with (CHADS-VASc 4) with intolerance to anticoagulation due to history of GI bleeding s/p RY closure and Watchman (05/2023) who was admitted in Sept '23 heart failure exacerbation and presents as new referral for  HFpEF.    # Acute on Chronic Diastolic Heart Failure  # Moderate Pulmonary HTN  # Moderate Paradoxical Low Flow Low Gradient Aortic Stenosis    # Persistent rate-controlled A-fib s/p Watchman   # HTN  # HLD  Recent admission for increased PROCTOR, abdominal distention, peripheral edema, orthopnea, and ~15lb weight gain. Work up notable for BNP 18,607, creatinine 1.70, CXR with pulmonary edema and small bilateral effusions. Echo 5/2023 EF 55-60%, PASP 59 mmHg, RA pressure 15 mmHg. Dry weight estimated ~146 lbs.  - Volume Status: Euvolemic, continue Torsemide 20 mg daily  - DAPT (given recent Watchman) continue ASA 81mg daily, Brilinta 60 mg BID x 6 months through 11/2023)  - Continue Lipitor 40 mg daily   - Continue Hydralazine 50 mg TID, Imdur 30 mg daily, Losartan 100 mg daily  - Continue PTA Empagliflozin 25 mg daily  - Rate controlled without BB currently  - Will continue to monitor TTE yearly for now     # CKD Stage 3  Baseline appears to be around 1.5-1.9  - Will continue to monitor     # DM Type II  Hgb A1c 8.7.   - Managed by PCP  - Continue glipizide and empagliflozin     To Do:  - No change to medications today  - Follow up with CORE in two weeks  - Follow up with me in 2 months    The patient states understanding and is agreeable with plan.   Feel free to contact myself regarding questions or concerns. It was a pleasure to see this patient today.    A total of 60 minutes was spent on the day of the visit, which includes preparation for the visit (reviewing previous medical records, laboratories and investigations), in conjunction with the actual clinic visit with the patient, which includes obtaining a history and physical exam, creating and reviewing the care plan, patient education (and family if present), counseling, documenting clinical information in the electronic health record and care coordination.     Saba Lemon MD   of Medicine, [facility]  Advanced Heart Failure  and Transplant Cardiology     CC  Bunny Meyers

## 2023-09-29 NOTE — PATIENT INSTRUCTIONS
"Cardiology Providers you saw during your visit:  Dr. Lemon     Medication changes:  -No Changes     Follow up:  -Follow-up with Dr. Lemon in 2 months with labs prior.   -Okay to reschedule CORE appt to a more convenient day/time.       Please call if you have :  1. Weight gain of more than 2 pounds in a day or 5 pounds in a week  2. Increased shortness of breath, swelling or bloating  3. Dizziness, lightheadedness   4. Any questions or concerns.       Follow the American Heart Association Diet and Lifestyle recommendations:  Limit saturated fat, trans fat, sodium, red meat, sweets and sugar-sweetened beverages. If you choose to eat red meat, compare labels and select the leanest cuts available.  Aim for at least 150 minutes of moderate physical activity or 75 minutes of vigorous physical activity - or an equal combination of both - each week.      During business hours: [phone], press option # 1 to schedule or leave a message for your care team      After hours, weekends or holidays: On Call Cardiologist- [phone]   option #4 and ask to speak to the on-call Cardiologist. Inform them you are a CORE/heart failure patient at the [facility].      Heart Failure Support Group  Monday, , 1-2pm  Monday, , 1-2pm  Monday, , 1-2pm        Aura PIPER   Cardiology Care Coordinator - Heart Failure/ [facility] Clinic  [facility]   Questions and [phone]   First press #1 for the [facility] and then press #4 for \"To send a message to your care team\"    "

## 2023-09-29 NOTE — LETTER
9/29/2023      RE: Keerthi Montoya  [address] [place] [id number]       Dear Colleague,    Thank you for the opportunity to participate in the care of your patient, Keerthi Montoya, at the [facility] [place] [facility]. Please see a copy of my visit note below.    Advanced Heart Failure/Transplant Clinic Note    HPI  Dear colleagues,     I had the pleasure of seeing Ms. Keerthi Montoya in the Cardiology clinic.  As you know, Ms. Keerthi Montoya is a pleasant 73 year old female with a past medical history of chronic HFpEF, HLD, HTN, DM2, ulcerative pancolitis, CKD3, pulmonary HTN, moderate paradoxical low flow low gradient aortic stenosis, persistent atrial fibrillation with (CHADS-VASc 4) with intolerance to anticoagulation due to history of GI bleeding s/p RY closure and Watchman (05/2023) who was admitted in Sept '23 heart failure exacerbation and presents as new referral for HFpEF.    Patient reports she developed shingles several weeks ago and with this, developed painful, itchy rash and started having SOB and abdominal bloating after this. She was admitted, and diuresed for almost 15 lbs weight gain.    Patient reports overall feeling significantly improved since her discharge. She reports still having an itchy rash from her shingles, but all have basically scabbed over at this point. Patient denies dyspnea with any of her ADLs or usual chores. She denies SOB, presyncope, syncope, orthopnea, PND, chest pain, palpitations, abdominal pain, nausea, emesis, LE edema or weight gain. Patient reports compliance with her medications, weighing herself daily, and watching her salt and fluid intake.    ROS:  A complete 12-point ROS was negative except as above.    PAST MEDICAL HISTORY:  Patient Active Problem List   Diagnosis    Diverticulosis of large intestine    Vitamin D deficiency    Preventive measure    Hyperlipidemia LDL  goal <100    Aspirin not indicated    Abdominal pain, left lower quadrant    Rectal bleeding    Colitis    Postoperative hypothyroidism    Hypertension goal BP (blood pressure) < 130/80    Type 2 diabetes mellitus with microalbuminuria, without long-term current use of insulin (H)    Cervical cancer screening    Ulcerative pancolitis without complication (H)    Vitamin B12 deficiency (non anemic)    Proteinuria, unspecified type    CKD (chronic kidney disease) stage 3, GFR 30-59 ml/min (H)    Aortic stenosis    Pulmonary hypertension (H)    Diastolic dysfunction    Heart failure with preserved ejection fraction, NYHA class I (H)    Anemia, unspecified type    Elevated serum immunoglobulin free light chains    Posterior vitreous detachment, left    Anemia due to blood loss, acute    Gastrointestinal hemorrhage, unspecified gastrointestinal hemorrhage type    Anemia, iron deficiency    Persistent atrial fibrillation (H)    Acute on chronic diastolic congestive heart failure (H)    Severe pulmonary hypertension (H)        FAMILY HISTORY:  Family History   Problem Relation Age of Onset    Cerebrovascular Disease Mother     Cancer Father         bladder    Diverticulitis Brother     Diverticulitis Brother     Kidney Disease No family hx of     Crohn's Disease No family hx of     Ulcerative Colitis No family hx of     Stomach Cancer No family hx of     GERD No family hx of     Celiac Disease No family hx of     Anesthesia Reaction No family hx of        SOCIAL HISTORY:  Social History     Tobacco Use    Smoking status: Never     Passive exposure: Never    Smokeless tobacco: Never   Vaping Use    Vaping Use: Never used   Substance Use Topics    Alcohol use: Not Currently     Alcohol/week: 2.0 - 4.0 standard drinks of alcohol     Types: 1 - 2 Cans of beer, 1 - 2 Shots of liquor per week     Comment: occasional cocktail or beer    Drug use: No        ALLERGIES:  Allergies   Allergen Reactions    Actos [Pioglitazone]       Fluid retention    Amlodipine      Leg swelling, hand swelling    Animal Dander     Doxycycline Hives    Dust Mites     Grass     Keflex [Cephalexin Hcl] Hives    Metoprolol      Swelling of lower legs and fatigue    Other [Seasonal Allergies]      pollen    Ranitidine      rash    Synthroid [Levothyroxine Sodium] Swelling     Allergic to brand name    Tetracycline Hives    Tylenol Hives    Ziac [Bisoprolol-Hydrochlorothiazide]        CURRENT MEDICATIONS:  Current Outpatient Medications   Medication Sig Dispense Refill    aspirin 81 MG EC tablet Take 1 tablet (81 mg) by mouth daily 90 tablet 3    atorvastatin (LIPITOR) 40 MG tablet Take 1 tablet (40 mg) by mouth daily 90 tablet 3    blood glucose (ACCU-CHEK FELIPE PLUS) test strip 200 strips by In Vitro route 2 times daily Use to test blood sugar 2 times daily or as directed. 200 strip 4    Cholecalciferol (VITAMIN D) 2000 units CAPS Take by mouth daily (with lunch)      Cyanocobalamin (B-12) 1000 MCG TABS Take 1,000 mcg by mouth three times a week      diphenhydrAMINE (BENADRYL) 25 MG tablet Take 25 mg by mouth every 6 hours as needed for itching or allergies      empagliflozin (JARDIANCE) 25 MG TABS tablet Take 1 tablet (25 mg) by mouth daily 90 tablet 3    glipiZIDE (GLUCOTROL) 10 MG tablet Take 1 tablet (10 mg) by mouth daily (with breakfast) 30 tablet 3    glipiZIDE (GLUCOTROL) 5 MG tablet Take 1 tablet (5 mg) by mouth daily (with dinner) 30 tablet 3    hydrALAZINE (APRESOLINE) 50 MG tablet Take 1 tablet (50 mg) by mouth 3 times daily 270 tablet 3    isosorbide mononitrate (IMDUR) 30 MG 24 hr tablet Take 1 tablet (30 mg) by mouth daily 90 tablet 3    levothyroxine (SYNTHROID/LEVOTHROID) 112 MCG tablet Take 1 tablet (112 mcg) by mouth daily 90 tablet 4    losartan (COZAAR) 100 MG tablet Take 1 tablet (100 mg) by mouth daily 90 tablet 3    mesalamine (APRISO ER) 0.375 g 24 hr capsule TAKE 4 CAPSULES(1.5 GRAMS) BY MOUTH DAILY (Patient taking differently: 1.5 g  "daily (with lunch)) 360 capsule 3    pantoprazole (PROTONIX) 40 MG EC tablet Take 1 tablet (40 mg) by mouth daily 90 tablet 3    senna (SENOKOT) 8.6 MG tablet Take 1 tablet by mouth daily as needed for constipation 30 tablet 3    sodium bicarbonate 650 MG tablet Take 1 tablet (650 mg) by mouth daily 90 tablet 3    ticagrelor (BRILINTA) 60 MG tablet Take 1 tablet (60 mg) by mouth 2 times daily 60 tablet 6    torsemide (DEMADEX) 20 MG tablet Take 1 tablet (20 mg) by mouth daily 90 tablet 3       EXAM:  /67 (BP Location: Right arm, Patient Position: Chair, Cuff Size: Adult Regular)   Pulse 91   Ht 1.594 m (5' 2.76\")   Wt 61.2 kg (134 lb 14.4 oz)   LMP  (LMP Unknown)   SpO2 97%   BMI 24.08 kg/m      General: appears comfortable, alert and interactive, in no acute distress  Head: normocephalic, atraumatic  Eyes: anicteric sclera, EOMI  Mouth: MMM  Neck: supple, no cervical adenopathy  CV: regular rate and rhythm, no murmur, gallop, rub, estimated JVP ~7 cm  Resp: clear, no rales or wheezing  GI: soft, nontender, nondistended  Extremities: warm, no peripheral edema, 2+ bilateral radial pulses  Neurological: alert and oriented, no focal deficits  Psych: normal mood and affect  Derm: healing shingles rash along abdomen and legs    Weight  Wt Readings from Last 10 Encounters:   09/29/23 61.2 kg (134 lb 14.4 oz)   09/29/23 61.2 kg (135 lb)   09/21/23 66.5 kg (146 lb 9.7 oz)   09/17/23 74.8 kg (164 lb 12.8 oz)   08/22/23 69.4 kg (153 lb)   08/17/23 69.3 kg (152 lb 11.2 oz)   08/07/23 67.6 kg (149 lb)   08/03/23 67.4 kg (148 lb 9.6 oz)   08/02/23 65.8 kg (145 lb)   07/17/23 67.6 kg (149 lb 1.6 oz)       I personally reviewed recent labs and data as below and discussed the results with the patient in clinic today.  Labs:  CBC RESULTS:  Lab Results   Component Value Date    WBC 8.0 09/29/2023    WBC 7.3 10/28/2020    RBC 3.58 (L) 09/29/2023    RBC 3.99 10/28/2020    HGB 10.1 (L) 09/29/2023    HGB 11.8 02/08/2021    " HCT 32.8 (L) 09/29/2023    HCT 38.2 10/28/2020    MCV 92 09/29/2023    MCV 96 10/28/2020    MCH 28.2 09/29/2023    MCH 30.8 10/28/2020    MCHC 30.8 (L) 09/29/2023    MCHC 32.2 10/28/2020    RDW 29.9 (H) 09/29/2023    RDW 13.9 10/28/2020     09/29/2023     10/28/2020       CMP RESULTS:  Lab Results   Component Value Date     09/29/2023     04/28/2021    POTASSIUM 3.5 09/29/2023    POTASSIUM 4.3 09/17/2023    POTASSIUM 4.5 04/28/2021    CHLORIDE 101 09/29/2023    CHLORIDE 109 04/27/2023    CHLORIDE 100 04/28/2021    CO2 26 09/29/2023    CO2 21 04/27/2023    CO2 26 04/28/2021    ANIONGAP 13 09/29/2023    ANIONGAP 7 04/27/2023    ANIONGAP 8 04/28/2021     (H) 09/29/2023     (H) 09/21/2023     (H) 04/27/2023    GLC 57 (L) 04/28/2021    BUN 30.0 (H) 09/29/2023    BUN 28 04/27/2023    BUN 23 04/28/2021    CR 1.50 (H) 09/29/2023    CR 1.21 (H) 04/28/2021    GFRESTIMATED 37 (L) 09/29/2023    GFRESTIMATED 30 (L) 12/30/2022    GFRESTIMATED 46 (L) 04/28/2021    GFRESTBLACK 53 (L) 04/28/2021    ABEL 10.0 09/29/2023    ABEL 9.8 04/28/2021    BILITOTAL 0.9 09/29/2023    BILITOTAL 0.6 10/28/2020    ALBUMIN 4.4 09/29/2023    ALBUMIN 3.7 04/27/2023    ALBUMIN 4.1 02/08/2021    ALKPHOS 189 (H) 09/29/2023    ALKPHOS 104 10/28/2020    ALT 10 09/29/2023    ALT 18 10/28/2020    AST 63 (H) 09/29/2023    AST 19 10/28/2020        Recent Labs   Lab Test 01/20/23  0740 10/28/20  1629   CHOL 73 93   HDL 29* 38*   LDL 27 33   TRIG 87 108        Testing/Procedures:  I personally visualized and interpreted:  Echocardiogram 5/12/23  Interpretation Summary  Small unchanged pericardial effusion is present without evidence of  pericardial tamponade.  Left ventricular function is normal.The ejection fraction is 55-60%.  Paradoxical septal motion consistent with right ventricular pressure and  volume overload is present.  Moderate to severe right ventricular dilation is present. Global right  ventricular  function is moderately to severely reduced.  IVC diameter >2.1 cm collapsing <50% with sniff suggests a high RA pressure estimated at 15 mmHg or greater.  This study was compared with the study from 5/11/23. There has been no change.    TTE 5/25/23  Interpretation Summary  Global and regional left ventricular function is normal with an EF of 60-65%.  Flattened septum is consistent with right ventricular pressure and volume overload.  Moderate right ventricular dilation with moderately reduced global right  ventricular function.  Severe tricuspid insufficiency.  Estimated pulmonary artery systolic pressure is 69 mmHg.  Dilation of the inferior vena cava is present with abnormal respiratory  variation in diameter.  Small circumferential pericardial effusion without any hemodynamic  significance.  This study was compared with the study from 5/15/23. N significant change in ventricular function or pericardial effusion. TR appears worse on today's study, but that may be related to technique.    TTE 9/18/23  Interpretation Summary  Global and regional left ventricular function is normal with an EF of 55-60%.  Flattened septum is consistent with right ventricular pressure and volume overload.  Global right ventricular function is moderately reduced. Severe right  ventricular dilation is present.  Pulmonary hypertension is present. Pulmonary artery systolic pressure is 59 mmHg (RVSP 44mmHg + RAP 15mmHg).  Severe tricuspid insufficiency is present.  IVC diameter >2.1 cm collapsing <50% with sniff suggests a high RA pressure estimated at 15 mmHg or greater.  No pericardial effusion is present.  This study was compared with the study from 5/25/2023 .  No significant changes noted.    Outside results of note:  Outside records from [facility] admission were obtained, and relevant results/notes have been incorporated into HPI.    Assessment and Plan:     In summary, 73 year old female with a past medical history of chronic HFpEF, HLD,  HTN, DM2, ulcerative pancolitis, CKD3, pulmonary HTN, moderate paradoxical low flow low gradient aortic stenosis, persistent atrial fibrillation with (CHADS-VASc 4) with intolerance to anticoagulation due to history of GI bleeding s/p RY closure and Watchman (05/2023) who was admitted in Sept '23 heart failure exacerbation and presents as new referral for HFpEF.    # Acute on Chronic Diastolic Heart Failure  # Moderate Pulmonary HTN  # Moderate Paradoxical Low Flow Low Gradient Aortic Stenosis    # Persistent rate-controlled A-fib s/p Watchman   # HTN  # HLD  Recent admission for increased PROCTOR, abdominal distention, peripheral edema, orthopnea, and ~15lb weight gain. Work up notable for BNP 18,607, creatinine 1.70, CXR with pulmonary edema and small bilateral effusions. Echo 5/2023 EF 55-60%, PASP 59 mmHg, RA pressure 15 mmHg. Dry weight estimated ~146 lbs.  - Volume Status: Euvolemic, continue Torsemide 20 mg daily  - DAPT (given recent Watchman) continue ASA 81mg daily, Brilinta 60 mg BID x 6 months through 11/2023)  - Continue Lipitor 40 mg daily   - Continue Hydralazine 50 mg TID, Imdur 30 mg daily, Losartan 100 mg daily  - Continue PTA Empagliflozin 25 mg daily  - Rate controlled without BB currently  - Will continue to monitor TTE yearly for now     # CKD Stage 3  Baseline appears to be around 1.5-1.9  - Will continue to monitor     # DM Type II  Hgb A1c 8.7.   - Managed by PCP  - Continue glipizide and empagliflozin     To Do:  - No change to medications today  - Follow up with [facility] in two weeks  - Follow up with me in 2 months    The patient states understanding and is agreeable with plan.   Feel free to contact myself regarding questions or concerns. It was a pleasure to see this patient today.    A total of 60 minutes was spent on the day of the visit, which includes preparation for the visit (reviewing previous medical records, laboratories and investigations), in conjunction with the actual clinic visit  with the patient, which includes obtaining a history and physical exam, creating and reviewing the care plan, patient education (and family if present), counseling, documenting clinical information in the electronic health record and care coordination.           Please do not hesitate to contact me if you have any questions/concerns.     Sincerely,     Saba Lemon MD

## 2023-09-30 LAB
EPO SERPL-ACNC: 77 MU/ML
STFR SERPL-MCNC: 4.4 MG/L

## 2023-10-02 NOTE — PATIENT INSTRUCTIONS
"Recommendations from today's MTM visit:                                                         Decrease glipizide to 10 mg with breakfast and 5 mg with dinner    Follow-up: Return in about 3 months (around 12/29/2023) for Follow up, with me.    It was great speaking with you today.  I value your experience and would be very thankful for your time in providing feedback in our clinic survey. In the next few days, you may receive an email or text message from [facility] dough with a link to a survey related to your  clinical pharmacist.\"     To schedule another MTM appointment, please call the clinic directly or you may call the MTM scheduling line at [phone] or toll-free at [phone].     My Clinical Pharmacist's contact information:                                                      Please feel free to contact me with any questions or concerns you have.      Bunny Santa, Pharm. D., [facility]  Medication Therapy Management Pharmacist     "

## 2023-10-06 ENCOUNTER — HOSPITAL ENCOUNTER (OUTPATIENT)
Dept: CT IMAGING | Facility: CLINIC | Age: 73
Discharge: HOME | End: 2023-10-06
Attending: NURSE PRACTITIONER | Admitting: NURSE PRACTITIONER
Payer: MEDICARE

## 2023-10-06 DIAGNOSIS — Z95.818 PRESENCE OF WATCHMAN LEFT ATRIAL APPENDAGE CLOSURE DEVICE: ICD-10-CM

## 2023-10-06 DIAGNOSIS — N18.31 STAGE 3A CHRONIC KIDNEY DISEASE (H): Primary | ICD-10-CM

## 2023-10-06 DIAGNOSIS — I48.19 PERSISTENT ATRIAL FIBRILLATION (H): ICD-10-CM

## 2023-10-06 PROCEDURE — 250N000011 HC RX IP 250 OP 636: Performed by: INTERNAL MEDICINE

## 2023-10-06 PROCEDURE — 75572 CT HRT W/3D IMAGE: CPT | Mod: MG

## 2023-10-06 PROCEDURE — G1010 CDSM STANSON: HCPCS | Mod: GC | Performed by: INTERNAL MEDICINE

## 2023-10-06 PROCEDURE — 75572 CT HRT W/3D IMAGE: CPT | Mod: 26 | Performed by: INTERNAL MEDICINE

## 2023-10-06 RX ORDER — IOPAMIDOL 755 MG/ML
110 INJECTION, SOLUTION INTRAVASCULAR ONCE
Status: COMPLETED | OUTPATIENT
Start: 2023-10-06 | End: 2023-10-06

## 2023-10-06 RX ADMIN — IOPAMIDOL 110 ML: 755 INJECTION, SOLUTION INTRAVENOUS at 11:57

## 2023-10-12 NOTE — PROGRESS NOTES
Hematology Follow Up Visit: October 16, 2023    Oncologist: Dr Jethro Moore  PCP: Bunny Meyers    Diagnosis: Iron deficiency anemia  Keerthi Montoya is a 74 yo female with PMH of  hypertension, diabetes mellitus type II who has low hemoglobin. History of Watchman procedure on 5/11/2023. Blood thinners make her bleed as she has diverticulitis and ulcerative colitis.   Also known to have atrial fibrillation and was started on anticoagulation with rivaroxaban. She had marked GI bleeding. She is hopeful that after the Watchman procedure she would not need anticoagulation.   - has tried oral iron but it does not work for her.   - 12/2023 last colonoscopy and upper GI endoscopy along with CT scan and MRI      Interval History:  Ms. Montoya comes to clinic for review of her anemia. Pt states she has been feeling well after van shingles in August. She reports she has had to change diet to no added salt diet due to fluid retention from her shingles and has slowly lost 4-5 labs over last 6 weeks.she feels she is eating healthy and has no SOB and fluid has been resolving. Bowels are without dark stools- had history of tarry stools and hospitalization with previous oral iron use but IV iron infusions went well. Energy level is good - takes care of 2 grandkids 3 days weekly.    Rest of comprehensive and complete ROS is reviewed and is negative.   Past Medical History:   Diagnosis Date    Chronic kidney disease     Diabetes mellitus, type 2 (H)     Diverticulosis of colon (without mention of hemorrhage) 10/01/2012    GI bleed     History of blood transfusion     HLD (hyperlipidemia)     Hypertension     Hypothyroidism     MARIA D (iron deficiency anemia)     Persistent atrial fibrillation (H)     Pulmonary hypertension (H)     Ulcerative (chronic) enterocolitis (H)      Current Outpatient Medications   Medication    aspirin 81 MG EC tablet    atorvastatin (LIPITOR) 40 MG tablet    blood glucose (ACCU-CHEK FELIPE PLUS) test  strip    Cholecalciferol (VITAMIN D) 2000 units CAPS    Cyanocobalamin (B-12) 1000 MCG TABS    diphenhydrAMINE (BENADRYL) 25 MG tablet    empagliflozin (JARDIANCE) 25 MG TABS tablet    gabapentin (NEURONTIN) 100 MG capsule    glipiZIDE (GLUCOTROL) 10 MG tablet    glipiZIDE (GLUCOTROL) 5 MG tablet    hydrALAZINE (APRESOLINE) 50 MG tablet    isosorbide mononitrate (IMDUR) 30 MG 24 hr tablet    levothyroxine (SYNTHROID/LEVOTHROID) 112 MCG tablet    losartan (COZAAR) 100 MG tablet    mesalamine (APRISO ER) 0.375 g 24 hr capsule    pantoprazole (PROTONIX) 40 MG EC tablet    senna (SENOKOT) 8.6 MG tablet    sodium bicarbonate 650 MG tablet    ticagrelor (BRILINTA) 60 MG tablet    torsemide (DEMADEX) 20 MG tablet     No current facility-administered medications for this visit.     Allergies   Allergen Reactions    Actos [Pioglitazone]      Fluid retention    Amlodipine      Leg swelling, hand swelling    Animal Dander     Doxycycline Hives    Dust Mites     Grass     Keflex [Cephalexin Hcl] Hives    Metoprolol      Swelling of lower legs and fatigue    Other [Seasonal Allergies]      pollen    Ranitidine      rash    Synthroid [Levothyroxine Sodium] Swelling     Allergic to brand name    Tetracycline Hives    Tylenol Hives    Ziac [Bisoprolol-Hydrochlorothiazide]        Physical Exam:/67 (BP Location: Right arm)   Pulse 77   Temp 98.3  F (36.8  C) (Oral)   Resp 16   Wt 56 kg (123 lb 8 oz)   LMP  (LMP Unknown)   SpO2 100%   BMI 22.05 kg/m     ECOG PS- 0  Constitutional: Alert, appears with good energy and in no distress.   ENT: Eyes bright, no cold symptoms  Respiratory: Breathing easy. No cough.  GI: Abdomen is soft, non-tender, BS normal. No masses or organomegaly  MS: Muscle tone normal, extremities normal with no edema at present   Skin: No suspicious lesions or rashes noted in visible areas.  Neuro: Sensory grossly WNL, gait normal.   Psych: Mentation appears normal and affect normal/bright and  kentrell    Laboratory/Imaging Results:   Results for orders placed or performed in visit on 10/16/23   Comprehensive metabolic panel     Status: Abnormal   Result Value Ref Range    Sodium 132 (L) 135 - 145 mmol/L    Potassium 4.9 3.4 - 5.3 mmol/L    Carbon Dioxide (CO2) 22 22 - 29 mmol/L    Anion Gap 14 7 - 15 mmol/L    Urea Nitrogen 45.3 (H) 8.0 - 23.0 mg/dL    Creatinine 1.88 (H) 0.51 - 0.95 mg/dL    GFR Estimate 28 (L) >60 mL/min/1.73m2    Calcium 9.7 8.8 - 10.2 mg/dL    Chloride 96 (L) 98 - 107 mmol/L    Glucose 261 (H) 70 - 99 mg/dL    Alkaline Phosphatase 175 (H) 35 - 104 U/L    AST 52 (H) 0 - 45 U/L    ALT 8 0 - 50 U/L    Protein Total 9.0 (H) 6.4 - 8.3 g/dL    Albumin 4.8 3.5 - 5.2 g/dL    Bilirubin Total 0.6 <=1.2 mg/dL   Lactate Dehydrogenase     Status: Normal   Result Value Ref Range    Lactate Dehydrogenase 238 0 - 250 U/L   Ferritin     Status: Normal   Result Value Ref Range    Ferritin 50 11 - 328 ng/mL   Iron & Iron Binding Capacity     Status: Abnormal   Result Value Ref Range    Iron 39 37 - 145 ug/dL    Iron Binding Capacity 406 240 - 430 ug/dL    Iron Sat Index 10 (L) 15 - 46 %   CBC with platelets and differential     Status: Abnormal   Result Value Ref Range    WBC Count 10.2 4.0 - 11.0 10e3/uL    RBC Count 3.58 (L) 3.80 - 5.20 10e6/uL    Hemoglobin 11.0 (L) 11.7 - 15.7 g/dL    Hematocrit 33.8 (L) 35.0 - 47.0 %    MCV 94 78 - 100 fL    MCH 30.7 26.5 - 33.0 pg    MCHC 32.5 31.5 - 36.5 g/dL    RDW 23.0 (H) 10.0 - 15.0 %    Platelet Count 426 150 - 450 10e3/uL    % Neutrophils 68 %    % Lymphocytes 14 %    % Monocytes 13 %    Mids % (Monos, Eos, Basos)      % Eosinophils 3 %    % Basophils 1 %    % Immature Granulocytes 1 %    NRBCs per 100 WBC 0 <1 /100    Absolute Neutrophils 6.9 1.6 - 8.3 10e3/uL    Absolute Lymphocytes 1.4 0.8 - 5.3 10e3/uL    Absolute Monocytes 1.4 (H) 0.0 - 1.3 10e3/uL    Mids Abs (Monos, Eos, Basos)      Absolute Eosinophils 0.3 0.0 - 0.7 10e3/uL    Absolute Basophils 0.1  0.0 - 0.2 10e3/uL    Absolute Immature Granulocytes 0.1 <=0.4 10e3/uL    Absolute NRBCs 0.0 10e3/uL   CBC with Platelets & Differential     Status: Abnormal    Narrative    The following orders were created for panel order CBC with Platelets & Differential.  Procedure                               Abnormality         Status                     ---------                               -----------         ------                     CBC with platelets and d...[[id number]]  Abnormal            Final result                 Please view results for these tests on the individual orders.     Assessment and Plan:   Iron deficiency anemia- Hgb =11.0 and is well within her normal with normal iron levels at this time and no symptoms of iron loss and good energy. No need for iron support at this time.   She does have heart and kidney disease and is somewhat delicate and would be good to follow her closely with 2 month recheck of CBC, ferritin and TIBC a couple days before visit.   - she will continue to replace Vit B12 orally 3 x weekly.   - We discussed that anytime she is feeling like her energy is affected or if at any time she has dark stools or obvious blood, she will need lab check of iron levels.   Pt agreed with plan.   The total time of this encounter amounted to  20 minutes. This time included time spent with the patient, prep work, ordering tests, and performing post visit documentation.  Francisca Mustafa,Cnp

## 2023-10-13 ENCOUNTER — APPOINTMENT (OUTPATIENT)
Dept: CT IMAGING | Facility: CLINIC | Age: 73
End: 2023-10-13
Attending: EMERGENCY MEDICINE
Payer: MEDICARE

## 2023-10-13 ENCOUNTER — HOSPITAL ENCOUNTER (EMERGENCY)
Facility: CLINIC | Age: 73
Discharge: HOME | End: 2023-10-14
Attending: EMERGENCY MEDICINE | Admitting: EMERGENCY MEDICINE
Payer: MEDICARE

## 2023-10-13 DIAGNOSIS — B02.23 NEUROPATHY DUE TO HERPES ZOSTER: ICD-10-CM

## 2023-10-13 DIAGNOSIS — R10.84 GENERALIZED ABDOMINAL PAIN: ICD-10-CM

## 2023-10-13 LAB
ALBUMIN UR-MCNC: NEGATIVE MG/DL
ANION GAP SERPL CALCULATED.3IONS-SCNC: 17 MMOL/L (ref 7–15)
APPEARANCE UR: CLEAR
BASO+EOS+MONOS # BLD AUTO: ABNORMAL 10*3/UL
BASO+EOS+MONOS NFR BLD AUTO: ABNORMAL %
BASOPHILS # BLD AUTO: 0.1 10E3/UL (ref 0–0.2)
BASOPHILS NFR BLD AUTO: 1 %
BILIRUB UR QL STRIP: NEGATIVE
BUN SERPL-MCNC: 37.4 MG/DL (ref 8–23)
CALCIUM SERPL-MCNC: 9.6 MG/DL (ref 8.8–10.2)
CHLORIDE SERPL-SCNC: 94 MMOL/L (ref 98–107)
COLOR UR AUTO: ABNORMAL
CREAT SERPL-MCNC: 1.62 MG/DL (ref 0.51–0.95)
DEPRECATED HCO3 PLAS-SCNC: 19 MMOL/L (ref 22–29)
EGFRCR SERPLBLD CKD-EPI 2021: 34 ML/MIN/1.73M2
EOSINOPHIL # BLD AUTO: 0.4 10E3/UL (ref 0–0.7)
EOSINOPHIL NFR BLD AUTO: 4 %
ERYTHROCYTE [DISTWIDTH] IN BLOOD BY AUTOMATED COUNT: 24.6 % (ref 10–15)
GLUCOSE SERPL-MCNC: 143 MG/DL (ref 70–99)
GLUCOSE UR STRIP-MCNC: 500 MG/DL
HCT VFR BLD AUTO: 31.4 % (ref 35–47)
HGB BLD-MCNC: 9.9 G/DL (ref 11.7–15.7)
HGB UR QL STRIP: ABNORMAL
IMM GRANULOCYTES # BLD: 0.1 10E3/UL
IMM GRANULOCYTES NFR BLD: 1 %
KETONES UR STRIP-MCNC: NEGATIVE MG/DL
LEUKOCYTE ESTERASE UR QL STRIP: NEGATIVE
LIPASE SERPL-CCNC: 77 U/L (ref 13–60)
LYMPHOCYTES # BLD AUTO: 1.3 10E3/UL (ref 0.8–5.3)
LYMPHOCYTES NFR BLD AUTO: 16 %
MCH RBC QN AUTO: 29.7 PG (ref 26.5–33)
MCHC RBC AUTO-ENTMCNC: 31.5 G/DL (ref 31.5–36.5)
MCV RBC AUTO: 94 FL (ref 78–100)
MONOCYTES # BLD AUTO: 1.4 10E3/UL (ref 0–1.3)
MONOCYTES NFR BLD AUTO: 17 %
NEUTROPHILS # BLD AUTO: 5 10E3/UL (ref 1.6–8.3)
NEUTROPHILS NFR BLD AUTO: 61 %
NITRATE UR QL: NEGATIVE
NRBC # BLD AUTO: 0 10E3/UL
NRBC BLD AUTO-RTO: 0 /100
PH UR STRIP: 5.5 [PH] (ref 5–7)
PLATELET # BLD AUTO: 378 10E3/UL (ref 150–450)
POTASSIUM SERPL-SCNC: 5.1 MMOL/L (ref 3.4–5.3)
RBC # BLD AUTO: 3.33 10E6/UL (ref 3.8–5.2)
RBC URINE: <1 /HPF
SODIUM SERPL-SCNC: 130 MMOL/L (ref 135–145)
SP GR UR STRIP: 1 (ref 1–1.03)
SQUAMOUS EPITHELIAL: <1 /HPF
UROBILINOGEN UR STRIP-MCNC: NORMAL MG/DL
WBC # BLD AUTO: 8.1 10E3/UL (ref 4–11)
WBC URINE: 1 /HPF

## 2023-10-13 PROCEDURE — 83690 ASSAY OF LIPASE: CPT | Performed by: EMERGENCY MEDICINE

## 2023-10-13 PROCEDURE — G1010 CDSM STANSON: HCPCS | Performed by: RADIOLOGY

## 2023-10-13 PROCEDURE — 80048 BASIC METABOLIC PNL TOTAL CA: CPT | Performed by: EMERGENCY MEDICINE

## 2023-10-13 PROCEDURE — 81001 URINALYSIS AUTO W/SCOPE: CPT | Performed by: EMERGENCY MEDICINE

## 2023-10-13 PROCEDURE — 99284 EMERGENCY DEPT VISIT MOD MDM: CPT | Performed by: EMERGENCY MEDICINE

## 2023-10-13 PROCEDURE — 99284 EMERGENCY DEPT VISIT MOD MDM: CPT | Mod: 25 | Performed by: EMERGENCY MEDICINE

## 2023-10-13 PROCEDURE — 74176 CT ABD & PELVIS W/O CONTRAST: CPT | Mod: 26 | Performed by: RADIOLOGY

## 2023-10-13 PROCEDURE — G1010 CDSM STANSON: HCPCS

## 2023-10-13 PROCEDURE — 36415 COLL VENOUS BLD VENIPUNCTURE: CPT | Performed by: EMERGENCY MEDICINE

## 2023-10-13 PROCEDURE — 85025 COMPLETE CBC W/AUTO DIFF WBC: CPT | Performed by: EMERGENCY MEDICINE

## 2023-10-13 ASSESSMENT — ACTIVITIES OF DAILY LIVING (ADL): ADLS_ACUITY_SCORE: 35

## 2023-10-14 VITALS
OXYGEN SATURATION: 98 % | DIASTOLIC BLOOD PRESSURE: 73 MMHG | RESPIRATION RATE: 15 BRPM | TEMPERATURE: 97.6 F | SYSTOLIC BLOOD PRESSURE: 132 MMHG | HEART RATE: 101 BPM

## 2023-10-14 LAB — LACTATE SERPL-SCNC: 0.8 MMOL/L (ref 0.7–2)

## 2023-10-14 PROCEDURE — 36415 COLL VENOUS BLD VENIPUNCTURE: CPT | Performed by: EMERGENCY MEDICINE

## 2023-10-14 PROCEDURE — 83605 ASSAY OF LACTIC ACID: CPT | Performed by: EMERGENCY MEDICINE

## 2023-10-14 RX ORDER — GABAPENTIN 100 MG/1
100 CAPSULE ORAL 3 TIMES DAILY
Qty: 90 CAPSULE | Refills: 0 | Status: SHIPPED | OUTPATIENT
Start: 2023-10-14 | End: 2023-11-07

## 2023-10-14 ASSESSMENT — ACTIVITIES OF DAILY LIVING (ADL): ADLS_ACUITY_SCORE: 35

## 2023-10-14 NOTE — ED TRIAGE NOTES
"Presents to the ED with complaints of a new \"lump\" to the upper abdomen. Patient also reports some tenderness of the left side of the abdomen near where her previous shingles rash was. Patient also reports some \"nearly black\" stool.      Triage Assessment (Adult)       Row Name 10/13/23 [id number]          Triage Assessment    Airway WDL WDL        Respiratory WDL    Respiratory WDL WDL        Skin Circulation/Temperature WDL    Skin Circulation/Temperature WDL WDL        Cardiac WDL    Cardiac WDL WDL        Peripheral/Neurovascular WDL    Peripheral Neurovascular WDL WDL        Cognitive/Neuro/Behavioral WDL    Cognitive/Neuro/Behavioral WDL WDL                     "

## 2023-10-14 NOTE — DISCHARGE INSTRUCTIONS
No findings on the CT scan or your labs to explain pain.  You do not have diverticulitis.  This could be related to your recent shingles infection.  No hernia was seen.  Your hemoglobin is stable

## 2023-10-16 ENCOUNTER — LAB (OUTPATIENT)
Dept: LAB | Facility: CLINIC | Age: 73
End: 2023-10-16
Payer: MEDICARE

## 2023-10-16 ENCOUNTER — ONCOLOGY VISIT (OUTPATIENT)
Dept: ONCOLOGY | Facility: CLINIC | Age: 73
End: 2023-10-16
Attending: INTERNAL MEDICINE
Payer: MEDICARE

## 2023-10-16 VITALS
SYSTOLIC BLOOD PRESSURE: 105 MMHG | RESPIRATION RATE: 16 BRPM | HEART RATE: 77 BPM | DIASTOLIC BLOOD PRESSURE: 67 MMHG | BODY MASS INDEX: 22.05 KG/M2 | OXYGEN SATURATION: 100 % | WEIGHT: 123.5 LBS | TEMPERATURE: 98.3 F

## 2023-10-16 DIAGNOSIS — E53.8 VITAMIN B12 DEFICIENCY (NON ANEMIC): ICD-10-CM

## 2023-10-16 DIAGNOSIS — D50.9 IRON DEFICIENCY ANEMIA, UNSPECIFIED IRON DEFICIENCY ANEMIA TYPE: ICD-10-CM

## 2023-10-16 DIAGNOSIS — I48.19 PERSISTENT ATRIAL FIBRILLATION (H): ICD-10-CM

## 2023-10-16 DIAGNOSIS — D50.9 IRON DEFICIENCY ANEMIA, UNSPECIFIED IRON DEFICIENCY ANEMIA TYPE: Primary | ICD-10-CM

## 2023-10-16 DIAGNOSIS — N18.30 STAGE 3 CHRONIC KIDNEY DISEASE, UNSPECIFIED WHETHER STAGE 3A OR 3B CKD (H): ICD-10-CM

## 2023-10-16 LAB
ALBUMIN SERPL BCG-MCNC: 4.8 G/DL (ref 3.5–5.2)
ALP SERPL-CCNC: 175 U/L (ref 35–104)
ALT SERPL W P-5'-P-CCNC: 8 U/L (ref 0–50)
ANION GAP SERPL CALCULATED.3IONS-SCNC: 14 MMOL/L (ref 7–15)
AST SERPL W P-5'-P-CCNC: 52 U/L (ref 0–45)
BASO+EOS+MONOS # BLD AUTO: ABNORMAL 10*3/UL
BASO+EOS+MONOS NFR BLD AUTO: ABNORMAL %
BASOPHILS # BLD AUTO: 0.1 10E3/UL (ref 0–0.2)
BASOPHILS NFR BLD AUTO: 1 %
BILIRUB SERPL-MCNC: 0.6 MG/DL
BUN SERPL-MCNC: 45.3 MG/DL (ref 8–23)
CALCIUM SERPL-MCNC: 9.7 MG/DL (ref 8.8–10.2)
CHLORIDE SERPL-SCNC: 96 MMOL/L (ref 98–107)
CREAT SERPL-MCNC: 1.88 MG/DL (ref 0.51–0.95)
DEPRECATED HCO3 PLAS-SCNC: 22 MMOL/L (ref 22–29)
EGFRCR SERPLBLD CKD-EPI 2021: 28 ML/MIN/1.73M2
EOSINOPHIL # BLD AUTO: 0.3 10E3/UL (ref 0–0.7)
EOSINOPHIL NFR BLD AUTO: 3 %
ERYTHROCYTE [DISTWIDTH] IN BLOOD BY AUTOMATED COUNT: 23 % (ref 10–15)
FERRITIN SERPL-MCNC: 50 NG/ML (ref 11–328)
GLUCOSE SERPL-MCNC: 261 MG/DL (ref 70–99)
HCT VFR BLD AUTO: 33.8 % (ref 35–47)
HGB BLD-MCNC: 11 G/DL (ref 11.7–15.7)
IMM GRANULOCYTES # BLD: 0.1 10E3/UL
IMM GRANULOCYTES NFR BLD: 1 %
IRON BINDING CAPACITY (ROCHE): 406 UG/DL (ref 240–430)
IRON SATN MFR SERPL: 10 % (ref 15–46)
IRON SERPL-MCNC: 39 UG/DL (ref 37–145)
LDH SERPL L TO P-CCNC: 238 U/L (ref 0–250)
LYMPHOCYTES # BLD AUTO: 1.4 10E3/UL (ref 0.8–5.3)
LYMPHOCYTES NFR BLD AUTO: 14 %
MCH RBC QN AUTO: 30.7 PG (ref 26.5–33)
MCHC RBC AUTO-ENTMCNC: 32.5 G/DL (ref 31.5–36.5)
MCV RBC AUTO: 94 FL (ref 78–100)
MONOCYTES # BLD AUTO: 1.4 10E3/UL (ref 0–1.3)
MONOCYTES NFR BLD AUTO: 13 %
NEUTROPHILS # BLD AUTO: 6.9 10E3/UL (ref 1.6–8.3)
NEUTROPHILS NFR BLD AUTO: 68 %
NRBC # BLD AUTO: 0 10E3/UL
NRBC BLD AUTO-RTO: 0 /100
PLATELET # BLD AUTO: 426 10E3/UL (ref 150–450)
POTASSIUM SERPL-SCNC: 4.9 MMOL/L (ref 3.4–5.3)
PROT SERPL-MCNC: 9 G/DL (ref 6.4–8.3)
RBC # BLD AUTO: 3.58 10E6/UL (ref 3.8–5.2)
SODIUM SERPL-SCNC: 132 MMOL/L (ref 135–145)
VIT B12 SERPL-MCNC: 1095 PG/ML (ref 232–1245)
WBC # BLD AUTO: 10.2 10E3/UL (ref 4–11)

## 2023-10-16 PROCEDURE — 83540 ASSAY OF IRON: CPT

## 2023-10-16 PROCEDURE — 85025 COMPLETE CBC W/AUTO DIFF WBC: CPT

## 2023-10-16 PROCEDURE — 99213 OFFICE O/P EST LOW 20 MIN: CPT | Mod: 25 | Performed by: NURSE PRACTITIONER

## 2023-10-16 PROCEDURE — 82607 VITAMIN B-12: CPT

## 2023-10-16 PROCEDURE — 84238 ASSAY NONENDOCRINE RECEPTOR: CPT | Mod: 90

## 2023-10-16 PROCEDURE — 82668 ASSAY OF ERYTHROPOIETIN: CPT | Mod: 90

## 2023-10-16 PROCEDURE — 90662 IIV NO PRSV INCREASED AG IM: CPT | Performed by: NURSE PRACTITIONER

## 2023-10-16 PROCEDURE — 90480 ADMN SARSCOV2 VAC 1/ONLY CMP: CPT | Performed by: NURSE PRACTITIONER

## 2023-10-16 PROCEDURE — 80053 COMPREHEN METABOLIC PANEL: CPT

## 2023-10-16 PROCEDURE — 36415 COLL VENOUS BLD VENIPUNCTURE: CPT

## 2023-10-16 PROCEDURE — G0008 ADMIN INFLUENZA VIRUS VAC: HCPCS | Performed by: NURSE PRACTITIONER

## 2023-10-16 PROCEDURE — 91320 SARSCV2 VAC 30MCG TRS-SUC IM: CPT | Performed by: NURSE PRACTITIONER

## 2023-10-16 PROCEDURE — 83550 IRON BINDING TEST: CPT

## 2023-10-16 PROCEDURE — 82728 ASSAY OF FERRITIN: CPT

## 2023-10-16 PROCEDURE — 99000 SPECIMEN HANDLING OFFICE-LAB: CPT

## 2023-10-16 PROCEDURE — 83615 LACTATE (LD) (LDH) ENZYME: CPT

## 2023-10-16 ASSESSMENT — PAIN SCALES - GENERAL: PAINLEVEL: NO PAIN (0)

## 2023-10-16 NOTE — NURSING NOTE
"Oncology Rooming Note    October 16, 2023 3:44 PM   Keerthi Montoya is a 73 year old female who presents for:    Chief Complaint   Patient presents with    Oncology Clinic Visit     3 month follow up     Initial Vitals: /67 (BP Location: Right arm)   Pulse 77   Temp 98.3  F (36.8  C) (Oral)   Resp 16   Wt 56 kg (123 lb 8 oz)   LMP  (LMP Unknown)   SpO2 100%   BMI 22.05 kg/m   Estimated body mass index is 22.05 kg/m  as calculated from the following:    Height as of 9/29/23: 1.594 m (5' 2.76\").    Weight as of this encounter: 56 kg (123 lb 8 oz). Body surface area is 1.57 meters squared.  No Pain (0) Comment: Data Unavailable   No LMP recorded (lmp unknown). Patient is postmenopausal.  Allergies reviewed: Yes  Medications reviewed: Yes    Medications: Medication refills not needed today.  Pharmacy name entered into [employer]: [id number] - [place], [place] - [address] [facility]    Clinical concerns: gradual weight loss       Bibiana Goddard LPN              "

## 2023-10-16 NOTE — LETTER
10/16/2023         RE: Keerthi Montoya  [address] [place] [id number]        Dear Colleague,    Thank you for referring your patient, Keerthi Montoya, to the [facility]. Please see a copy of my visit note below.    Hematology Follow Up Visit: October 16, 2023    Oncologist: Dr Jethro Moore  PCP: Bunny Meyers    Diagnosis: Iron deficiency anemia  Keerthi Montoya is a 74 yo female with PMH of  hypertension, diabetes mellitus type II who has low hemoglobin. History of Watchman procedure on 5/11/2023. Blood thinners make her bleed as she has diverticulitis and ulcerative colitis.   Also known to have atrial fibrillation and was started on anticoagulation with rivaroxaban. She had marked GI bleeding. She is hopeful that after the Watchman procedure she would not need anticoagulation.   - has tried oral iron but it does not work for her.   - 12/2023 last colonoscopy and upper GI endoscopy along with CT scan and MRI      Interval History:  Ms. Montoya comes to clinic for review of her anemia. Pt states she has been feeling well after van shingles in August. She reports she has had to change diet to no added salt diet due to fluid retention from her shingles and has slowly lost 4-5 labs over last 6 weeks.she feels she is eating healthy and has no SOB and fluid has been resolving. Bowels are without dark stools- had history of tarry stools and hospitalization with previous oral iron use but IV iron infusions went well. Energy level is good - takes care of 2 grandkids 3 days weekly.    Rest of comprehensive and complete ROS is reviewed and is negative.   Past Medical History:   Diagnosis Date     Chronic kidney disease      Diabetes mellitus, type 2 (H)      Diverticulosis of colon (without mention of hemorrhage) 10/01/2012     GI bleed      History of blood transfusion      HLD (hyperlipidemia)      Hypertension      Hypothyroidism      MARIA D (iron  deficiency anemia)      Persistent atrial fibrillation (H)      Pulmonary hypertension (H)      Ulcerative (chronic) enterocolitis (H)      Current Outpatient Medications   Medication     aspirin 81 MG EC tablet     atorvastatin (LIPITOR) 40 MG tablet     blood glucose (ACCU-CHEK FELIPE PLUS) test strip     Cholecalciferol (VITAMIN D) 2000 units CAPS     Cyanocobalamin (B-12) 1000 MCG TABS     diphenhydrAMINE (BENADRYL) 25 MG tablet     empagliflozin (JARDIANCE) 25 MG TABS tablet     gabapentin (NEURONTIN) 100 MG capsule     glipiZIDE (GLUCOTROL) 10 MG tablet     glipiZIDE (GLUCOTROL) 5 MG tablet     hydrALAZINE (APRESOLINE) 50 MG tablet     isosorbide mononitrate (IMDUR) 30 MG 24 hr tablet     levothyroxine (SYNTHROID/LEVOTHROID) 112 MCG tablet     losartan (COZAAR) 100 MG tablet     mesalamine (APRISO ER) 0.375 g 24 hr capsule     pantoprazole (PROTONIX) 40 MG EC tablet     senna (SENOKOT) 8.6 MG tablet     sodium bicarbonate 650 MG tablet     ticagrelor (BRILINTA) 60 MG tablet     torsemide (DEMADEX) 20 MG tablet     No current facility-administered medications for this visit.     Allergies   Allergen Reactions     Actos [Pioglitazone]      Fluid retention     Amlodipine      Leg swelling, hand swelling     Animal Dander      Doxycycline Hives     Dust Mites      Grass      Keflex [Cephalexin Hcl] Hives     Metoprolol      Swelling of lower legs and fatigue     Other [Seasonal Allergies]      pollen     Ranitidine      rash     Synthroid [Levothyroxine Sodium] Swelling     Allergic to brand name     Tetracycline Hives     Tylenol Hives     Ziac [Bisoprolol-Hydrochlorothiazide]        Physical Exam:/67 (BP Location: Right arm)   Pulse 77   Temp 98.3  F (36.8  C) (Oral)   Resp 16   Wt 56 kg (123 lb 8 oz)   LMP  (LMP Unknown)   SpO2 100%   BMI 22.05 kg/m     ECOG PS- 0  Constitutional: Alert, appears with good energy and in no distress.   ENT: Eyes bright, no cold symptoms  Respiratory: Breathing easy.  No cough.  GI: Abdomen is soft, non-tender, BS normal. No masses or organomegaly  MS: Muscle tone normal, extremities normal with no edema at present   Skin: No suspicious lesions or rashes noted in visible areas.  Neuro: Sensory grossly WNL, gait normal.   Psych: Mentation appears normal and affect normal/bright and smiling    Laboratory/Imaging Results:   Results for orders placed or performed in visit on 10/16/23   Comprehensive metabolic panel     Status: Abnormal   Result Value Ref Range    Sodium 132 (L) 135 - 145 mmol/L    Potassium 4.9 3.4 - 5.3 mmol/L    Carbon Dioxide (CO2) 22 22 - 29 mmol/L    Anion Gap 14 7 - 15 mmol/L    Urea Nitrogen 45.3 (H) 8.0 - 23.0 mg/dL    Creatinine 1.88 (H) 0.51 - 0.95 mg/dL    GFR Estimate 28 (L) >60 mL/min/1.73m2    Calcium 9.7 8.8 - 10.2 mg/dL    Chloride 96 (L) 98 - 107 mmol/L    Glucose 261 (H) 70 - 99 mg/dL    Alkaline Phosphatase 175 (H) 35 - 104 U/L    AST 52 (H) 0 - 45 U/L    ALT 8 0 - 50 U/L    Protein Total 9.0 (H) 6.4 - 8.3 g/dL    Albumin 4.8 3.5 - 5.2 g/dL    Bilirubin Total 0.6 <=1.2 mg/dL   Lactate Dehydrogenase     Status: Normal   Result Value Ref Range    Lactate Dehydrogenase 238 0 - 250 U/L   Ferritin     Status: Normal   Result Value Ref Range    Ferritin 50 11 - 328 ng/mL   Iron & Iron Binding Capacity     Status: Abnormal   Result Value Ref Range    Iron 39 37 - 145 ug/dL    Iron Binding Capacity 406 240 - 430 ug/dL    Iron Sat Index 10 (L) 15 - 46 %   CBC with platelets and differential     Status: Abnormal   Result Value Ref Range    WBC Count 10.2 4.0 - 11.0 10e3/uL    RBC Count 3.58 (L) 3.80 - 5.20 10e6/uL    Hemoglobin 11.0 (L) 11.7 - 15.7 g/dL    Hematocrit 33.8 (L) 35.0 - 47.0 %    MCV 94 78 - 100 fL    MCH 30.7 26.5 - 33.0 pg    MCHC 32.5 31.5 - 36.5 g/dL    RDW 23.0 (H) 10.0 - 15.0 %    Platelet Count 426 150 - 450 10e3/uL    % Neutrophils 68 %    % Lymphocytes 14 %    % Monocytes 13 %    Mids % (Monos, Eos, Basos)      % Eosinophils 3 %    %  Basophils 1 %    % Immature Granulocytes 1 %    NRBCs per 100 WBC 0 <1 /100    Absolute Neutrophils 6.9 1.6 - 8.3 10e3/uL    Absolute Lymphocytes 1.4 0.8 - 5.3 10e3/uL    Absolute Monocytes 1.4 (H) 0.0 - 1.3 10e3/uL    Mids Abs (Monos, Eos, Basos)      Absolute Eosinophils 0.3 0.0 - 0.7 10e3/uL    Absolute Basophils 0.1 0.0 - 0.2 10e3/uL    Absolute Immature Granulocytes 0.1 <=0.4 10e3/uL    Absolute NRBCs 0.0 10e3/uL   CBC with Platelets & Differential     Status: Abnormal    Narrative    The following orders were created for panel order CBC with Platelets & Differential.  Procedure                               Abnormality         Status                     ---------                               -----------         ------                     CBC with platelets and d...[[id number]]  Abnormal            Final result                 Please view results for these tests on the individual orders.     Assessment and Plan:   Iron deficiency anemia- Hgb =11.0 and is well within her normal with normal iron levels at this time and no symptoms of iron loss and good energy. No need for iron support at this time.   She does have heart and kidney disease and is somewhat delicate and would be good to follow her closely with 2 month recheck of CBC, ferritin and TIBC a couple days before visit.   - she will continue to replace Vit B12 orally 3 x weekly.   - We discussed that anytime she is feeling like her energy is affected or if at any time she has dark stools or obvious blood, she will need lab check of iron levels.   Pt agreed with plan.   The total time of this encounter amounted to  20 minutes. This time included time spent with the patient, prep work, ordering tests, and performing post visit documentation.  Francisca Mustafa Cnp      Again, thank you for allowing me to participate in the care of your patient.        Sincerely,        Francisca Mustafa, NP, APRN CNP

## 2023-10-17 DIAGNOSIS — I50.33 ACUTE ON CHRONIC DIASTOLIC CONGESTIVE HEART FAILURE (H): Primary | ICD-10-CM

## 2023-10-18 LAB — EPO SERPL-ACNC: 27 MU/ML

## 2023-10-19 LAB — STFR SERPL-MCNC: 5.3 MG/L

## 2023-10-19 NOTE — PROGRESS NOTES
HPI:    Ms. Montoya comes in for follow up today for several issues. She was seen in ED 10/13/2023 for abdominal pain. She had a CT scan 10/13/2023 w/o acute findings. She had L sided shingles neck back, abdomen, and L leg. Overall she is doing well and her skin lesions are resolving. Very little post herpetic pain. She is still driving. She takes care of her grand children. She denies any dizziness, no palpitations. No chest pain or SOB. She remains on her same medications. Her BP is a little low today but she feels fine. She is followed quite carefully in the cardiology clinic. Otherwise, no additional HEENT, cardiopulmonary, abdominal, , GYN, neurological, systemic, psychiatric, lymphatic, endocrine, vascular complaints.     Past Medical History:   Diagnosis Date    Chronic kidney disease     Diabetes mellitus, type 2 (H)     Diverticulosis of colon (without mention of hemorrhage) 10/01/2012    GI bleed     History of blood transfusion     HLD (hyperlipidemia)     Hypertension     Hypothyroidism     MARIA D (iron deficiency anemia)     Persistent atrial fibrillation (H)     Pulmonary hypertension (H)     Ulcerative (chronic) enterocolitis (H)      Past Surgical History:   Procedure Laterality Date    CHOLECYSTECTOMY  3/1987    COLON SURGERY  01/2001    Left colon resection; diverticulitis    COLONOSCOPY N/A 4/24/2017    Procedure: COMBINED COLONOSCOPY, SINGLE OR MULTIPLE BIOPSY/POLYPECTOMY BY BIOPSY;;  Surgeon: Radha Salguero MD;  Location: [facility] OR    COLONOSCOPY N/A 3/10/2023    Procedure: COLONOSCOPY;  Surgeon: Preeti James MD;  Location: [facility]    COLONOSCOPY WITH CO2 INSUFFLATION N/A 4/24/2017    Procedure: COLONOSCOPY WITH CO2 INSUFFLATION;  Colonoscopy Dx rectal bleeding, BMI 25.86, Pharm [facility] .[phone], ;  Surgeon: Radha Salguero MD;  Location: [facility] OR    CV LEFT ATRIAL APPENDAGE CLOSURE N/A 5/11/2023    Procedure: Left Atrial Appendage Closure;  Surgeon: Bobby Grimes MD;   Location:  HEART CARDIAC CATH LAB    CV RIGHT HEART CATH MEASUREMENTS RECORDED N/A 3/16/2020    Procedure: CV RIGHT HEART CATH;  Surgeon: Nader Muñoz MD;  Location:  HEART CARDIAC CATH LAB    ESOPHAGOSCOPY, GASTROSCOPY, DUODENOSCOPY (EGD), COMBINED N/A 1/23/2023    Procedure: ESOPHAGOGASTRODUODENOSCOPY, WITH BIOPSY;  Surgeon: Ricki Deal MD;  Location:  GI    ESOPHAGOSCOPY, GASTROSCOPY, DUODENOSCOPY (EGD), COMBINED N/A 3/10/2023    Procedure: Esophagoscopy, gastroscopy, duodenoscopy (EGD), combined;  Surgeon: Preeti James MD;  Location:  GI    GYN SURGERY  9/1983    tubal ligation    HERNIA REPAIR  12/2006    recurrent incisional hernia    SIGMOIDOSCOPY FLEXIBLE N/A 1/23/2023    Procedure: Sigmoidoscopy flexible;  Surgeon: Ricki Deal MD;  Location:  GI    THROAT SURGERY  12/1974    thyroidectomy     PE:    Vitals noted, gen, nad, cooperative, alert, neck supple nl rom, lungs with good air movement, irregular pulse, murmur present abdomen, no acute findings. Grossly normal neurological exam. She has resolving skin lesions neck, and L abdominal wall. None or minimal skin lesions L LE.       A/P:    1. GI follow up with Dr. James 8/2/2023 for Ulcerative Colitis. Colonoscopy 12/12/2017. Seen Ms. Robbins, GI 1/27/2022. She remains on Mesalamine. GI appt. With Ms. Lim 8/2/2024  2. Follow up with Dr. Almendarez next 12/1/2023. She was seen Ms. Lemon 9/29/2023  in the [facility] clinic (next 10/27/2023) Clinic appt. She is on hydralazine (50 mg TID), lostartan (100 mg), torsemide 20 mg, Isosorbide 30 mg. Clinically she feels fine on current medications with her somewhat lower BP. She states some weight loss from having Shingles. She has gained some weight back. She is also on salt and fluid restricted diet. She is aware she can contact us and cut back on her BP medication if she becomes symptomatic. As noted her Hgb is stable and higher.   3. DM2; MTM appt. With Bunny Santa 9/29/2023 for  DM2. She is on Jardiance and  Glipizide. Next visit 12/29/2023.   4. Next Nephrology appt. With Dr. Minor 11/7/2023 Last Creatinine 1.88 10/16/2023.   5. GI bleeding; see EGD and Colonoscopy 3/10/2023; Hgb 11.0 on 10/16/2023. She is on protonix   6. Increased Lipids on Atorvastatin. Lipids 1/20/2023 HDL 29 , TG's 87 and LDL 27.     7. TSH 8.56 on 1/20/2023 on thyroid replacement. S/p thyroidectomy for multinodular goiter. TSH today 4/7/2023 and normal at 3.86  8. Mammogram 10/7/2022  9. Dermatology. She does not feel she needs to see Dermatology for a skin cancer screening.   10. Immunizations; COVID Pfizer x 5 (Bi-valent 10/14/2022) Check with insurance regarding Shingrix. Tdap 7/26/2013. Pneumococcal 23 done 7/30/2018 and Prevnar 13 done 6/22/2015. Influenza vaccine as done 10/16/2023  11. Seen Ms. Mustafa 10/16/2023 for anemia. Hgb 11.0 on 10/16/2023.   12. Fibrosis scan scheduled for 3/1/2024.   13. Atrial fibrillation; she had a LAE device and follow up with Ms. Saavedra 11/20/2023. She is on Brilinta   14. Shingles from iron infusion?; currently resolving          40 minutes spent on the date of the encounter doing chart review, history and exam, documentation and further activities as noted above exclusive of procedures and other billable interpretations

## 2023-10-20 ENCOUNTER — OFFICE VISIT (OUTPATIENT)
Dept: INTERNAL MEDICINE | Facility: CLINIC | Age: 73
End: 2023-10-20
Payer: MEDICARE

## 2023-10-20 ENCOUNTER — PATIENT OUTREACH (OUTPATIENT)
Dept: CARE COORDINATION | Facility: CLINIC | Age: 73
End: 2023-10-20

## 2023-10-20 VITALS
OXYGEN SATURATION: 97 % | HEIGHT: 63 IN | BODY MASS INDEX: 22.39 KG/M2 | HEART RATE: 84 BPM | DIASTOLIC BLOOD PRESSURE: 49 MMHG | SYSTOLIC BLOOD PRESSURE: 93 MMHG | WEIGHT: 126.4 LBS

## 2023-10-20 DIAGNOSIS — D64.9 ANEMIA, UNSPECIFIED TYPE: Primary | ICD-10-CM

## 2023-10-20 PROCEDURE — 99215 OFFICE O/P EST HI 40 MIN: CPT | Performed by: INTERNAL MEDICINE

## 2023-10-20 NOTE — PROGRESS NOTES
Georgia is a 73 year old that presents in clinic today for the following:     Chief Complaint   Patient presents with    Follow Up     Pt here for follow up. Pt had shingles recently           10/20/2023     8:04 AM   Additional Questions   Roomed by Jennifer Krishna       Screenings from encounters over the past 10 days    No data recorded       Abilio Kirshna at 8:07 AM on 10/20/2023

## 2023-10-27 ENCOUNTER — TELEPHONE (OUTPATIENT)
Dept: DERMATOLOGY | Facility: CLINIC | Age: 73
End: 2023-10-27

## 2023-10-27 ENCOUNTER — LAB (OUTPATIENT)
Dept: LAB | Facility: CLINIC | Age: 73
End: 2023-10-27
Attending: NURSE PRACTITIONER
Payer: MEDICARE

## 2023-10-27 ENCOUNTER — MYC MEDICAL ADVICE (OUTPATIENT)
Dept: CARDIOLOGY | Facility: CLINIC | Age: 73
End: 2023-10-27

## 2023-10-27 ENCOUNTER — OFFICE VISIT (OUTPATIENT)
Dept: CARDIOLOGY | Facility: CLINIC | Age: 73
End: 2023-10-27
Attending: NURSE PRACTITIONER
Payer: MEDICARE

## 2023-10-27 VITALS
WEIGHT: 127.8 LBS | BODY MASS INDEX: 22.81 KG/M2 | DIASTOLIC BLOOD PRESSURE: 61 MMHG | SYSTOLIC BLOOD PRESSURE: 110 MMHG | HEART RATE: 94 BPM | OXYGEN SATURATION: 98 %

## 2023-10-27 DIAGNOSIS — I50.30 HEART FAILURE WITH PRESERVED EJECTION FRACTION, NYHA CLASS I (H): ICD-10-CM

## 2023-10-27 DIAGNOSIS — I50.33 ACUTE ON CHRONIC DIASTOLIC CONGESTIVE HEART FAILURE (H): ICD-10-CM

## 2023-10-27 DIAGNOSIS — L65.9 HAIR LOSS: Primary | ICD-10-CM

## 2023-10-27 DIAGNOSIS — I10 HYPERTENSION GOAL BP (BLOOD PRESSURE) < 130/80: ICD-10-CM

## 2023-10-27 DIAGNOSIS — I50.30 HEART FAILURE WITH PRESERVED EJECTION FRACTION, NYHA CLASS I (H): Primary | ICD-10-CM

## 2023-10-27 LAB
ANION GAP SERPL CALCULATED.3IONS-SCNC: 13 MMOL/L (ref 7–15)
BUN SERPL-MCNC: 45.7 MG/DL (ref 8–23)
CALCIUM SERPL-MCNC: 9.7 MG/DL (ref 8.8–10.2)
CHLORIDE SERPL-SCNC: 98 MMOL/L (ref 98–107)
CREAT SERPL-MCNC: 1.72 MG/DL (ref 0.51–0.95)
DEPRECATED HCO3 PLAS-SCNC: 23 MMOL/L (ref 22–29)
EGFRCR SERPLBLD CKD-EPI 2021: 31 ML/MIN/1.73M2
GLUCOSE SERPL-MCNC: 188 MG/DL (ref 70–99)
POTASSIUM SERPL-SCNC: 4.1 MMOL/L (ref 3.4–5.3)
SODIUM SERPL-SCNC: 134 MMOL/L (ref 135–145)

## 2023-10-27 PROCEDURE — G0463 HOSPITAL OUTPT CLINIC VISIT: HCPCS | Performed by: NURSE PRACTITIONER

## 2023-10-27 PROCEDURE — 99215 OFFICE O/P EST HI 40 MIN: CPT | Performed by: NURSE PRACTITIONER

## 2023-10-27 PROCEDURE — 80048 BASIC METABOLIC PNL TOTAL CA: CPT | Performed by: PATHOLOGY

## 2023-10-27 PROCEDURE — 36415 COLL VENOUS BLD VENIPUNCTURE: CPT | Performed by: PATHOLOGY

## 2023-10-27 RX ORDER — HYDRALAZINE HYDROCHLORIDE 25 MG/1
25 TABLET, FILM COATED ORAL 3 TIMES DAILY
Qty: 270 TABLET | Refills: 3 | Status: SHIPPED | OUTPATIENT
Start: 2023-10-27 | End: 2024-05-10

## 2023-10-27 RX ORDER — ATENOLOL 50 MG/1
50 TABLET ORAL DAILY
Qty: 90 TABLET | Refills: 3 | Status: SHIPPED | OUTPATIENT
Start: 2023-10-27 | End: 2024-04-05

## 2023-10-27 RX ORDER — TORSEMIDE 10 MG/1
10 TABLET ORAL DAILY
Qty: 90 TABLET | Refills: 1 | Status: SHIPPED | OUTPATIENT
Start: 2023-10-27 | End: 2024-04-11

## 2023-10-27 ASSESSMENT — PAIN SCALES - GENERAL: PAINLEVEL: NO PAIN (0)

## 2023-10-27 NOTE — LETTER
10/27/2023      RE: Keerthi Montoya  [address] [place] [phone]       Dear Colleague,    Thank you for the opportunity to participate in the care of your patient, Keerthi Montoya, at the [facility] [place] [facility]. Please see a copy of my visit note below.      Georgia is a 73 year old female with a  medical history is significant for longstanding hypertension since she was in her teens, diabetes, dyslipidemia, and ulcerative colitis.    Patient reports she developed shingles several weeks ago and with this, developed painful, itchy rash and started having SOB and abdominal bloating after this. She was admitted, and diuresed for almost 15 lbs weight gain.     Patient reports overall feeling significantly improved since her discharge. She reports still having an itchy rash from her shingles, but all have basically scabbed over at this point. Patient denies dyspnea with any of her ADLs or usual chores. She denies SOB, presyncope, syncope, orthopnea, PND, chest pain, palpitations, abdominal pain, nausea, emesis, LE edema or weight gain. Patient reports compliance with her medications, weighing herself daily, and watching her salt and fluid intake. Weight at home 123 lbs.     Current meds:    Torsemide 20 mg-  Hydralazine 50 mg TID  Losartan 100 mg daily  Imdur 30 mg daily  Jardiance 25 mg daily      ROS:   Constitutional: No fever, chills, or sweats.  ENT: No visual disturbance, ear ache, epistaxis, sore throat.   Allergies/Immunologic: Negative  Respiratory: No cough, hemoptysis.   Cardiovascular: As per HPI.   GI: As per HPI.   : No urinary frequency, dysuria, or hematuria.   Integument: Negative.   Psychiatric: Negative.   Neuro: Negative.   Endocrinology: negative   Musculoskeletal: negative    EXAM:   GEN/CONSTITUIONAL: Appears comfortable, in no apparent distress   EYES: No icterus  ENT/MOUTH: Normal  JVP:  not seen  at 90 degrees  RESPIRATORY: Clear to auscultation bilaterally   CARDIOVASCULAR: Regular S1 and S2, 2/6 JUANITA.   ABDOMEN: Soft, non-tender, positive bowel sounds   NEUROLOGIC: Grossly non-focal   PSYCHIATRIC: Normal affect  EXT: no LE edema. Normal pedal pulses.  Skin: No petechiae, purpura or rash       ECHO 8/25  Interpretation Summary   Global and regional left ventricular function is normal with an EF of 60-65%.  Paradoxical septal motion consistent with right ventricular pressure and  volume overload is present.  Moderate to severe right ventricular dilation is present.  Global right ventricular function is moderately to severely reduced.  Calculated aortic valve area in severe AS range. Consider additional  evaluation if indicated.  Moderate to severe tricuspid insufficiency is present.  Dilation of the inferior vena cava is present with abnormal respiratory  variation in diameter.  Trivial pericardial effusion is present.    .  Assessment and Plan:  In summary, 73 year old female with a past medical history of chronic HFpEF, HLD, HTN, DM2, ulcerative pancolitis, CKD3, pulmonary HTN, moderate paradoxical low flow low gradient aortic stenosis, persistent atrial fibrillation with (CHADS-VASc 4) with intolerance to anticoagulation due to history of GI bleeding s/p RY closure and Watchman (05/2023) who was admitted in Sept '23 heart failure exacerbation and presents as HFpEF.    She has had a significant amount of hair loss she would like to see a dermatologist.  The hair loss was seen prior to the shingles she states.  She looks hypovolemic today. I have asked that she decreases the Torsemide.  I have also reached out to Dr. Almendarez to see what beta blocker we should restart. She has tolerated Atenolol for years which was stopped at the last hospitalization. Severe allergy with metoprolol.      # Acute on Chronic Diastolic Heart Failure  # Moderate Pulmonary HTN  # Moderate Paradoxical Low Flow Low Gradient Aortic  Stenosis    # Persistent rate-controlled A-fib s/p Watchman   # HTN  # HLD  Recent admission for increased PROCTOR, abdominal distention, peripheral edema, orthopnea, and ~15lb weight gain. Work up notable for BNP 18,607, creatinine 1.70, CXR with pulmonary edema and small bilateral effusions. Echo 5/2023 EF 55-60%, PASP 59 mmHg, RA pressure 15 mmHg. Dry weight estimated ~146 lbs.  - Volume Status: hypovolemic, decrease  Torsemide 10 mg daily  - DAPT (given recent Watchman) continue ASA 81mg daily, Brilinta 60 mg BID x 6 months through 11/2023)  - Continue Lipitor 40 mg daily   - Continue Hydralazine 50 mg TID, Imdur 30 mg daily, Losartan 100 mg daily  - Continue PTA Empagliflozin 25 mg daily  - Start BB - will check with cardiologist  - Rate controlled without BB currently  - Will continue to monitor TTE yearly for now     # CKD Stage 3  Baseline appears to be around 1.5-1.9  - Will continue to monitor     # DM Type II  Hgb A1c 8.7.   - Managed by PCP  - Continue glipizide and empagliflozin       Plan:  Decrease the Torsemide to 10 mg daily  Start BB -will check with primary cardiologist ( metoprolol allergy )   CORE in 3 weeks         Carolyn ADAME NP-C    Addendum: start the Atenolol 50 mg daily and decrease hydralazine 25 mg TID        WENDI Aaron CNP

## 2023-10-27 NOTE — TELEPHONE ENCOUNTER
[employer] message and voicemail sent to Georgia:     Carolyn Rosario was able to connect with Dr. Almendarez and they agree that they would like to make the following medication changes for you:    - Please restart taking atenolol 50mg daily.  I will send a new script to you pharmacy  - Please decrease your hydralazine to 25mg three times daily.    Please let me know if you have any questions.     Date: 10/27/2023  Time of Call: 12:50 PM  Diagnosis:  HF  VORB - Ordering provider: Carolyn Bonilla  Order: restart atenolol 50mg daily. Decrease hydralazine to 25mg three times daily.  Order received by: Christal Neal RN

## 2023-10-27 NOTE — TELEPHONE ENCOUNTER
This encounter is being sent to inform the clinic that this patient has a referral from Carolyn Bonilla for the diagnoses of Hair loss [L65.9] and has requested that this patient be seen within 1-2 weeks .  Based on the availability of our provider(s), we are unable to accommodate this request.    Were all sites offered this patient?  Yes    Does scheduling algorithm request to schedule next available?  no

## 2023-10-27 NOTE — NURSING NOTE
Chief Complaint   Patient presents with    Follow Up     Return CORE     Vitals were taken and medications reconciled.    Washington Franklin, EMT  7:10 AM

## 2023-10-27 NOTE — PATIENT INSTRUCTIONS
Take your medicines every day, as directed    Changes made today:  Please decrease your torsemide to 10mg daily.  Considering restarting the beta blocker medication.  Carolyn sent a message to Dr. Almendarez, we will get back to you regarding possibly restarting the Atenolol.   We entered a Dermatology referral for you.     Monitor Your Weight and Symptoms    Contact us if you:    Gain 2 pounds in one day or 5 pounds in one week  Feel more short of breath  Notice more leg swelling  Feel lightheadeded   Change your lifestyle    Limit Salt or Sodium:  2000 mg  Limit Fluids:  2000 mL or approximately 64 ounces  Eat a Heart Healthy Diet  Low in saturated fats  Stay Active:  Aim to move at least 150 minutes every  week         To Contact us    During Business Hours:  [phone], option # 1      After hours, weekends or holidays:   [phone], Option #4  Ask to speak to the On-Call Cardiologist. Inform them you are a CORE/heart failure patient at the [facility].     Use [employer] allows you to communicate directly with your heart team through secure messaging.  [employer] can be accessed any time on your phone, computer, or tablet.  If you need assistance, we'd be happy to help!         Keep your Heart Appointments:    Please follow-up with Carolyn/[facility] on November 17th    Please also keep you other Cardiology appointments as scheduled.     Please consider attending our virtual support group which is held monthly. Please reach out to [place] at [phone] for more information if you are interested in attending.       2023 dates:      Monday, November 6th, 1-2pm  Monday, December 4th, 1-2pm

## 2023-10-27 NOTE — PROGRESS NOTES
[place]ia is a 73 year old female with a  medical history is significant for longstanding hypertension since she was in her teens, diabetes, dyslipidemia, and ulcerative colitis.    Patient reports she developed shingles several weeks ago and with this, developed painful, itchy rash and started having SOB and abdominal bloating after this. She was admitted, and diuresed for almost 15 lbs weight gain.     Patient reports overall feeling significantly improved since her discharge. She reports still having an itchy rash from her shingles, but all have basically scabbed over at this point. Patient denies dyspnea with any of her ADLs or usual chores. She denies SOB, presyncope, syncope, orthopnea, PND, chest pain, palpitations, abdominal pain, nausea, emesis, LE edema or weight gain. Patient reports compliance with her medications, weighing herself daily, and watching her salt and fluid intake. Weight at home 123 lbs.     Current meds:    Torsemide 20 mg-  Hydralazine 50 mg TID  Losartan 100 mg daily  Imdur 30 mg daily  Jardiance 25 mg daily      ROS:   Constitutional: No fever, chills, or sweats.  ENT: No visual disturbance, ear ache, epistaxis, sore throat.   Allergies/Immunologic: Negative  Respiratory: No cough, hemoptysis.   Cardiovascular: As per HPI.   GI: As per HPI.   : No urinary frequency, dysuria, or hematuria.   Integument: Negative.   Psychiatric: Negative.   Neuro: Negative.   Endocrinology: negative   Musculoskeletal: negative    EXAM:   GEN/CONSTITUIONAL: Appears comfortable, in no apparent distress   EYES: No icterus  ENT/MOUTH: Normal  JVP:  not seen at 90 degrees  RESPIRATORY: Clear to auscultation bilaterally   CARDIOVASCULAR: Regular S1 and S2, 2/6 JUANITA.   ABDOMEN: Soft, non-tender, positive bowel sounds   NEUROLOGIC: Grossly non-focal   PSYCHIATRIC: Normal affect  EXT: no LE edema. Normal pedal pulses.  Skin: No petechiae, purpura or rash       ECHO 8/25  Interpretation Summary   Global and  regional left ventricular function is normal with an EF of 60-65%.  Paradoxical septal motion consistent with right ventricular pressure and  volume overload is present.  Moderate to severe right ventricular dilation is present.  Global right ventricular function is moderately to severely reduced.  Calculated aortic valve area in severe AS range. Consider additional  evaluation if indicated.  Moderate to severe tricuspid insufficiency is present.  Dilation of the inferior vena cava is present with abnormal respiratory  variation in diameter.  Trivial pericardial effusion is present.    .  Assessment and Plan:  In summary, 73 year old female with a past medical history of chronic HFpEF, HLD, HTN, DM2, ulcerative pancolitis, CKD3, pulmonary HTN, moderate paradoxical low flow low gradient aortic stenosis, persistent atrial fibrillation with (CHADS-VASc 4) with intolerance to anticoagulation due to history of GI bleeding s/p RY closure and Watchman (05/2023) who was admitted in Sept '23 heart failure exacerbation and presents as HFpEF.    She has had a significant amount of hair loss she would like to see a dermatologist.  The hair loss was seen prior to the shingles she states.  She looks hypovolemic today. I have asked that she decreases the Torsemide.  I have also reached out to Dr. Almendarez to see what beta blocker we should restart. She has tolerated Atenolol for years which was stopped at the last hospitalization. Severe allergy with metoprolol.      # Acute on Chronic Diastolic Heart Failure  # Moderate Pulmonary HTN  # Moderate Paradoxical Low Flow Low Gradient Aortic Stenosis    # Persistent rate-controlled A-fib s/p Watchman   # HTN  # HLD  Recent admission for increased PROCTOR, abdominal distention, peripheral edema, orthopnea, and ~15lb weight gain. Work up notable for BNP 18,607, creatinine 1.70, CXR with pulmonary edema and small bilateral effusions. Echo 5/2023 EF 55-60%, PASP 59 mmHg, RA pressure 15 mmHg.  Dry weight estimated ~146 lbs.  - Volume Status: hypovolemic, decrease  Torsemide 10 mg daily  - DAPT (given recent Watchman) continue ASA 81mg daily, Brilinta 60 mg BID x 6 months through 11/2023)  - Continue Lipitor 40 mg daily   - Continue Hydralazine 50 mg TID, Imdur 30 mg daily, Losartan 100 mg daily  - Continue PTA Empagliflozin 25 mg daily  - Start BB - will check with cardiologist  - Rate controlled without BB currently  - Will continue to monitor TTE yearly for now     # CKD Stage 3  Baseline appears to be around 1.5-1.9  - Will continue to monitor     # DM Type II  Hgb A1c 8.7.   - Managed by PCP  - Continue glipizide and empagliflozin       Plan:  Decrease the Torsemide to 10 mg daily  Start BB -will check with primary cardiologist ( metoprolol allergy )   CORE in 3 weeks         Carolyn ADAME NP-C    Addendum: start the Atenolol 50 mg daily and decrease hydralazine 25 mg TID

## 2023-11-03 ENCOUNTER — LAB (OUTPATIENT)
Dept: LAB | Facility: CLINIC | Age: 73
End: 2023-11-03
Payer: MEDICARE

## 2023-11-03 DIAGNOSIS — N18.31 STAGE 3A CHRONIC KIDNEY DISEASE (H): ICD-10-CM

## 2023-11-03 DIAGNOSIS — E11.29 TYPE 2 DIABETES MELLITUS WITH MICROALBUMINURIA, WITHOUT LONG-TERM CURRENT USE OF INSULIN (H): ICD-10-CM

## 2023-11-03 DIAGNOSIS — R80.9 TYPE 2 DIABETES MELLITUS WITH MICROALBUMINURIA, WITHOUT LONG-TERM CURRENT USE OF INSULIN (H): ICD-10-CM

## 2023-11-03 LAB
ALBUMIN MFR UR ELPH: 9.9 MG/DL
ALBUMIN SERPL BCG-MCNC: 4.5 G/DL (ref 3.5–5.2)
ANION GAP SERPL CALCULATED.3IONS-SCNC: 12 MMOL/L (ref 7–15)
BUN SERPL-MCNC: 61.4 MG/DL (ref 8–23)
CALCIUM SERPL-MCNC: 9.8 MG/DL (ref 8.8–10.2)
CHLORIDE SERPL-SCNC: 100 MMOL/L (ref 98–107)
CREAT SERPL-MCNC: 2.13 MG/DL (ref 0.51–0.95)
CREAT UR-MCNC: 31.5 MG/DL
DEPRECATED HCO3 PLAS-SCNC: 21 MMOL/L (ref 22–29)
EGFRCR SERPLBLD CKD-EPI 2021: 24 ML/MIN/1.73M2
GLUCOSE SERPL-MCNC: 169 MG/DL (ref 70–99)
HBA1C MFR BLD: 9.6 % (ref 0–5.6)
HGB BLD-MCNC: 11.1 G/DL (ref 11.7–15.7)
PHOSPHATE SERPL-MCNC: 5.5 MG/DL (ref 2.5–4.5)
POTASSIUM SERPL-SCNC: 4.8 MMOL/L (ref 3.4–5.3)
PROT/CREAT 24H UR: 0.31 MG/MG CR (ref 0–0.2)
PTH-INTACT SERPL-MCNC: 75 PG/ML (ref 15–65)
SODIUM SERPL-SCNC: 133 MMOL/L (ref 135–145)

## 2023-11-03 PROCEDURE — 83970 ASSAY OF PARATHORMONE: CPT

## 2023-11-03 PROCEDURE — 80069 RENAL FUNCTION PANEL: CPT

## 2023-11-03 PROCEDURE — 36415 COLL VENOUS BLD VENIPUNCTURE: CPT

## 2023-11-03 PROCEDURE — 84156 ASSAY OF PROTEIN URINE: CPT

## 2023-11-03 PROCEDURE — 83036 HEMOGLOBIN GLYCOSYLATED A1C: CPT

## 2023-11-03 PROCEDURE — 85018 HEMOGLOBIN: CPT

## 2023-11-06 DIAGNOSIS — I10 HYPERTENSION GOAL BP (BLOOD PRESSURE) < 140/90: ICD-10-CM

## 2023-11-07 ENCOUNTER — VIRTUAL VISIT (OUTPATIENT)
Dept: NEPHROLOGY | Facility: CLINIC | Age: 73
End: 2023-11-07
Payer: MEDICARE

## 2023-11-07 VITALS — BODY MASS INDEX: 22.5 KG/M2 | WEIGHT: 127 LBS | HEIGHT: 63 IN

## 2023-11-07 DIAGNOSIS — E87.20 METABOLIC ACIDOSIS: ICD-10-CM

## 2023-11-07 DIAGNOSIS — N18.31 STAGE 3A CHRONIC KIDNEY DISEASE (H): Primary | ICD-10-CM

## 2023-11-07 DIAGNOSIS — R80.9 TYPE 2 DIABETES MELLITUS WITH MICROALBUMINURIA, WITHOUT LONG-TERM CURRENT USE OF INSULIN (H): ICD-10-CM

## 2023-11-07 DIAGNOSIS — D64.9 ANEMIA, UNSPECIFIED TYPE: ICD-10-CM

## 2023-11-07 DIAGNOSIS — N25.81 SECONDARY RENAL HYPERPARATHYROIDISM (H): ICD-10-CM

## 2023-11-07 DIAGNOSIS — E11.29 TYPE 2 DIABETES MELLITUS WITH MICROALBUMINURIA, WITHOUT LONG-TERM CURRENT USE OF INSULIN (H): ICD-10-CM

## 2023-11-07 PROCEDURE — 99214 OFFICE O/P EST MOD 30 MIN: CPT | Mod: VID | Performed by: INTERNAL MEDICINE

## 2023-11-07 ASSESSMENT — PAIN SCALES - GENERAL: PAINLEVEL: NO PAIN (0)

## 2023-11-07 NOTE — PATIENT INSTRUCTIONS
Recommend trial of changing the torsemide to 10 mg as needed.   Monitor blood pressure for a week and send in those reading via [facility].   Increase sodium bicarbonate to 650 mg twice a day.   Follow-up in 6 months - in person

## 2023-11-07 NOTE — PROGRESS NOTES
Virtual Visit Details    Type of service:  Video Visit   Originating Location (pt. Location): Home    Distant Location (provider location):  Off-site  Platform used for Video Visit: Lexie    11/07/23   HPI: Keerthi Montoya is a 73 year old female who presents for follow-up of proteinuria.     History taken from previous notes:  Ms. Montoya's hx is significant for DM dx at least 10 years ago - very poor control for years (documented A1C levels 9.4-11.7% from 2011 to 2017); no known retinopathy per patient. She has longstanding hypertension dx when she was 18 years old. Additional hx includes ulcerative colitis and hypothyroidism (following thyroidectomy at age 24). Her ulcerative colitis was dx ~2017 but she feels she likely had trouble with this disease for years. She reports having loose stool for a long time prior to receiving therapy for her UC; she feels the blood in the stool may have contaminated previous urine samples potentially.                  02/10/20 :  Creatinine has been 0.9-1.13 in the past year; up slightly today at 1.3. Last UPCR 0.42 g/g in May. On losartan 100 mg daily. Bronchitis - started on levaquin on Saturday. No swelling, no NSAIDs. Blood pressure at times has been down to 118 - she reports blood pressures less than 130 typically but at times above. She has lost weight intentionally - 158 lbs last year and now 146 lbs. She does not smoke. She has not been eating/drinking today.      08/03/20:  In the setting of COVID-19 pandemic, this visit was changed to a telephone visit. Patient did not have video capability.  Because of murmur appreciated on exam at our previous visit, I sent her for a TTE which showed aortic stenosis as well as diastolic dysfunction. She was seen by cardiology in March and continues to follow with them at this time. She is felt to have severe pulmonary hypertension, CHF, along with aortic stenosis. She is currently taking lasix 20 mg BID. She remains on losartan 100  "mg daily. She reports feeling \"great\". She does have some swelling at times - [facility] BP has been 124/58; typically 120s. Breathing has been stable. Stable UC sxs.    02/09/21:  video visit. Cr 1.18-1.55 in the past year; 1.15 last week. Feeling well. No issues with ulcerative colitis. BP at home has not been registering recently. She is seeing cardiology next week but again virtual. No swelling concerns. Currently taking lasix 40 mg AM and 20 mg PM. No NSAIDs.     04/26/22: video visit. Creatinine is up at this time. She reports feeling good. Babysitting full time. She reports that she cut be slightly dry at this time. Weights have been right around 140. No swelling. Breathing is stable. Blood pressure at home three times a week - has been 120s/60s. She is taking torsemide 20 mg AM and 10 mg afternoon. She has been active. UC is well controlled. She has been on Jardiance for the past month.     05/02/23: video visit. Torsemide has since been changed to just as needed. Not needed very often. UPCR is the lowest it has been at 0.22 g/g. Baseline seems to be 1.4-1.7 at this time. A1C very high last week above 11%. UC lately has been good. She has loose stools at times. Did not tolerate oral iron previously. BP in the 114 range.     11/07/23: video visit. Doing fine at this time. She had shingles but doing much better - head to toe afffected - hospitalized for volume overload. Eating and drinking well. On torsemide 10 mg daily. ON losartan 100 mg and Jardiance. She denies difficulty with dehydration. No swelling now for the past 4-5 weeks. BP lately has been good - 103/69. She doesn't have other readings right now. She reports BP will vary - not high for a long time. About 1-2 times a week she will see the BP less than 110. No loose stools.     aspirin 81 MG EC tablet, Take 1 tablet (81 mg) by mouth daily  atenolol (TENORMIN) 50 MG tablet, Take 1 tablet (50 mg) by mouth daily  atorvastatin " (LIPITOR) 40 MG tablet, Take 1 tablet (40 mg) by mouth daily  blood glucose (ACCU-CHEK FELIPE PLUS) test strip, 200 strips by In Vitro route 2 times daily Use to test blood sugar 2 times daily or as directed.  Cholecalciferol (VITAMIN D) 2000 units CAPS, Take by mouth daily (with lunch)  Cyanocobalamin (B-12) 1000 MCG TABS, Take 1,000 mcg by mouth three times a week  diphenhydrAMINE (BENADRYL) 25 MG tablet, Take 25 mg by mouth every 6 hours as needed for itching or allergies  empagliflozin (JARDIANCE) 25 MG TABS tablet, Take 1 tablet (25 mg) by mouth daily  glipiZIDE (GLUCOTROL) 10 MG tablet, Take 1 tablet (10 mg) by mouth daily (with breakfast)  glipiZIDE (GLUCOTROL) 5 MG tablet, Take 1 tablet (5 mg) by mouth daily (with dinner)  hydrALAZINE (APRESOLINE) 25 MG tablet, Take 1 tablet (25 mg) by mouth 3 times daily  isosorbide mononitrate (IMDUR) 30 MG 24 hr tablet, Take 1 tablet (30 mg) by mouth daily  levothyroxine (SYNTHROID/LEVOTHROID) 112 MCG tablet, Take 1 tablet (112 mcg) by mouth daily  losartan (COZAAR) 100 MG tablet, Take 1 tablet (100 mg) by mouth daily  mesalamine (APRISO ER) 0.375 g 24 hr capsule, TAKE 4 CAPSULES(1.5 GRAMS) BY MOUTH DAILY (Patient taking differently: 1.5 g daily (with lunch))  pantoprazole (PROTONIX) 40 MG EC tablet, Take 1 tablet (40 mg) by mouth daily  Semaglutide (RYBELSUS) 3 MG tablet, Take 3 mg by mouth daily For 30 days then increase to 7 mg  Semaglutide (RYBELSUS) 7 MG tablet, Take 1 tablet (7 mg) by mouth daily  senna (SENOKOT) 8.6 MG tablet, Take 1 tablet by mouth daily as needed for constipation  sodium bicarbonate 650 MG tablet, Take 1 tablet (650 mg) by mouth daily  ticagrelor (BRILINTA) 60 MG tablet, Take 1 tablet (60 mg) by mouth 2 times daily  torsemide (DEMADEX) 10 MG tablet, Take 1 tablet (10 mg) by mouth daily    No current facility-administered medications on file prior to visit.      Exam:GENERAL APPEARANCE: alert and no distress    Results:   No visits with results  within 1 Day(s) from this visit.   Latest known visit with results is:   Lab on 11/03/2023   Component Date Value Ref Range Status    Hemoglobin A1C 11/03/2023 9.6 (H)  0.0 - 5.6 % Final    Hemoglobin 11/03/2023 11.1 (L)  11.7 - 15.7 g/dL Final    Parathyroid Hormone Intact 11/03/2023 75 (H)  15 - 65 pg/mL Final    Total Protein Urine mg/dL 11/03/2023 9.9    mg/dL Final    The reference ranges have not been established in urine protein. The results should be integrated into the clinical context for interpretation.    Total Protein Urine mg/mg Creat 11/03/2023 0.31 (H)  0.00 - 0.20 mg/mg Cr Final    Creatinine Urine mg/dL 11/03/2023 31.5  mg/dL Final    The reference ranges have not been established in urine creatinine. The results should be integrated into the clinical context for interpretation.    Sodium 11/03/2023 133 (L)  135 - 145 mmol/L Final    Reference intervals for this test were updated on 09/26/2023 to more accurately reflect our healthy population. There may be differences in the flagging of prior results with similar values performed with this method. Interpretation of those prior results can be made in the context of the updated reference intervals.     Potassium 11/03/2023 4.8  3.4 - 5.3 mmol/L Final    Chloride 11/03/2023 100  98 - 107 mmol/L Final    Carbon Dioxide (CO2) 11/03/2023 21 (L)  22 - 29 mmol/L Final    Anion Gap 11/03/2023 12  7 - 15 mmol/L Final    Glucose 11/03/2023 169 (H)  70 - 99 mg/dL Final    Urea Nitrogen 11/03/2023 61.4 (H)  8.0 - 23.0 mg/dL Final    Creatinine 11/03/2023 2.13 (H)  0.51 - 0.95 mg/dL Final    GFR Estimate 11/03/2023 24 (L)  >60 mL/min/1.73m2 Final    Calcium 11/03/2023 9.8  8.8 - 10.2 mg/dL Final    Albumin 11/03/2023 4.5  3.5 - 5.2 g/dL Final    Phosphorus 11/03/2023 5.5 (H)  2.5 - 4.5 mg/dL Final        Lab results were reviewed and interpreted.       Assessment/Plan:   1. CKD Stage 3: risks for CKD include poorly controlled diabetes as well as longstanding  "hypertension. Mesalamine has potential implications on the kidney but with her function stable, this seems less likely to be causing kidney injury previously. She does have proteinuria without hematuria which does fit with diabetic nephropathy. She is already on losartan 100 mg daily. Educated on benefits of optimal weight, avoidance of NSAIDs, blood pressure well controlled and also diabetes control as the important things to keep the kidney function stable. Myeloma testing normal previously. NOw on SGLT2 inhibitor.   - Stressed the importance of good diabetes control  - Ongoing variability seen with her kidney function which may be hemodynamic in the setting of diuretics. I asked her to attempt to minimize her diuretics again at this time. She was stable on \"as needed\" torsemide prior to shingles so my hope is that we will be able to get back to this potentially. Educated her on the importance of taking diuretic should she see weight increase or signs of volume overload.      2. Hypertension: goal of <130/80 - question whether she may be low at times given some readings closer to 100. ASked her to monitor pressures at home for the next week and update so we can assure keeping mostly between 110 and 130.      3. Ulcerative colitis: she reports good control with mesalamine. Very rarely mesalamine causes an acute interstitial picture but there are reports of this occurring as low as 0.3% of the time. We will plan to follow. On discussion with her, she feels that she may have been chronically volume depleted prior to getting her treatment for ulcerative colitis.     4. Hypothyroidism: well controlled on last check in March 2023.      5. DM:  Very poor control documented in our system from 2011 to 2017 with A1C levels between 9.4 and 11.7% at this time. It is possible that it was uncontrolled even further back as A1C levels are not available at this time dating back further than 2011. Encouraged ongoing optimization of " diabetes to help slow progression of kidney disease.      6. CHF/pulmonary hypertension/aortic stenosis: followed by Dr. Almendarez - adjusting torsemide slightly at this time. She has follow-up with Dr. Almendarez in the coming weeks so that will be a good time to reevaluate her diuretic needs and make adjustments as needed.     7. Anemia: hemoglobin 11.1 - iron saturation 10% on this recent check. This result was not discussed at her visit but I will reach out via [employer] to see if she would tolerate some oral iron replacement.     8. SEcondary hyperparathyroidism: vitamin D level 40 in April. PTH 75 now. Calcium is nromal. Phos is just slightly elevated at 5.5.     9 Metabolic acidosis: goal bicarbonate is 22-26 - slightly low - will increase bicarbonate to BID dosing.        Patient Instructions   Recommend trial of changing the torsemide to 10 mg as needed.   Monitor blood pressure for a week and send in those reading via [facility].   Increase sodium bicarbonate to 650 mg twice a day.   Follow-up in 6 months - in person      [id number] AM video visit via [employer] - offsite  Swati Minor DO

## 2023-11-07 NOTE — NURSING NOTE
Is the patient currently in the state of [place]? YES    Visit mode:VIDEO    If the visit is dropped, the patient can be reconnected by: VIDEO VISIT: Text to cell phone:   Telephone Information:   Mobile [phone]       Will anyone else be joining the visit? NO  (If patient encounters technical issues they should call [phone]    How would you like to obtain your AVS? MyChart    Are changes needed to the allergy or medication list? No    Reason for visit: ISAI GUIDRY

## 2023-11-08 NOTE — PROGRESS NOTES
[facility] HEART CARE  CARDIOVASCULAR DIVISION    STRUCTURAL CLINIC RETURN VISIT    PRIMARY CARDIOLOGIST: Dr. Almendarez       PERTINENT CLINICAL HISTORY:     Keerthi Montoya is a very pleasant 73 year old female who presents for 6 month Watchman follow-up. She has a history of HLD, HTN, DM2, ulcerative pancolitis, CKD3, pulmonary HTN, RV failure, HFpEF, mild to moderate aortic stenosis, persistent atrial fibrillation with CHADS-VASc score 4 (HTN, DM, age, female) and HASBLED of 5 (HTN, CKD, age, prior bleed, meds) with intolerance to anticoagulation due to history of GI bleeding who underwent successful left atrial appendage closure with a 24mm WATCHMAN FLX device with 17% - 27% compression on May 11, 2023. Her intra procedure LAURA showed well seated RY occluder without leak or evidence of pericardial effusion. Post procedure limited TTE showed small anterior pericardial effusion. She was kept overnight for observation and limited TTE the next morning showed stable effusion. Her Xarelto was started (5/15/23) after limited TTE showed decreased size of effusion. She was seen at 45 days, AC discontinued and DAPT instituted.     She was hospitalized in September with Shingles and acute diastolic heart failure. She was diuresed about 15-20 lbs to her baseline weight of 143 lbs. She has since been doing well. Her weight continued to drop which she attributes to not being able to eat due to abdominal pain/swelling from shingles. Weight has been stable at 122-125 lbs for the past 2 weeks. Her torsemide was recently decreased to 10 mg daily. She has not had any CP, SOB, orthopnea, PND, leg swelling. She was experiencing heart racing after atenolol was discontinued, this has since been resumed and palpitations have subsided. No lightheadedness or syncope. Overall doing well.      PAST MEDICAL HISTORY:     Past Medical History:   Diagnosis Date    Chronic kidney disease     Diabetes mellitus, type 2 (H)      Diverticulosis of colon (without mention of hemorrhage) 10/01/2012    GI bleed     History of blood transfusion     HLD (hyperlipidemia)     Hypertension     Hypothyroidism     MARIA D (iron deficiency anemia)     Persistent atrial fibrillation (H)     Pulmonary hypertension (H)     Ulcerative (chronic) enterocolitis (H)         PAST SURGICAL HISTORY:     Past Surgical History:   Procedure Laterality Date    CHOLECYSTECTOMY  3/1987    COLON SURGERY  01/2001    Left colon resection; diverticulitis    COLONOSCOPY N/A 4/24/2017    Procedure: COMBINED COLONOSCOPY, SINGLE OR MULTIPLE BIOPSY/POLYPECTOMY BY BIOPSY;;  Surgeon: Radha Salguero MD;  Location: [facility] OR    COLONOSCOPY N/A 3/10/2023    Procedure: COLONOSCOPY;  Surgeon: Preeti James MD;  Location:  GI    COLONOSCOPY WITH CO2 INSUFFLATION N/A 4/24/2017    Procedure: COLONOSCOPY WITH CO2 INSUFFLATION;  Colonoscopy Dx rectal bleeding, BMI 25.86, Pharm [facility] .[phone], ;  Surgeon: Radha Salguero MD;  Location: [facility] OR    CV LEFT ATRIAL APPENDAGE CLOSURE N/A 5/11/2023    Procedure: Left Atrial Appendage Closure;  Surgeon: Bobby Grimes MD;  Location:  HEART CARDIAC CATH LAB    CV RIGHT HEART CATH MEASUREMENTS RECORDED N/A 3/16/2020    Procedure: CV RIGHT HEART CATH;  Surgeon: Nader Muñoz MD;  Location:  HEART CARDIAC CATH LAB    ESOPHAGOSCOPY, GASTROSCOPY, DUODENOSCOPY (EGD), COMBINED N/A 1/23/2023    Procedure: ESOPHAGOGASTRODUODENOSCOPY, WITH BIOPSY;  Surgeon: Ricki Deal MD;  Location:  GI    ESOPHAGOSCOPY, GASTROSCOPY, DUODENOSCOPY (EGD), COMBINED N/A 3/10/2023    Procedure: Esophagoscopy, gastroscopy, duodenoscopy (EGD), combined;  Surgeon: Preeti James MD;  Location:  GI    GYN SURGERY  9/1983    tubal ligation    HERNIA REPAIR  12/2006    recurrent incisional hernia    SIGMOIDOSCOPY FLEXIBLE N/A 1/23/2023    Procedure: Sigmoidoscopy flexible;  Surgeon: Ricki Deal MD;  Location:  GI    THROAT  SURGERY  12/1974    thyroidectomy        CURRENT MEDICATIONS:     Current Outpatient Medications   Medication Sig Dispense Refill    aspirin 81 MG EC tablet Take 1 tablet (81 mg) by mouth daily 90 tablet 3    atenolol (TENORMIN) 50 MG tablet Take 1 tablet (50 mg) by mouth daily 90 tablet 3    atorvastatin (LIPITOR) 40 MG tablet Take 1 tablet (40 mg) by mouth daily 90 tablet 3    blood glucose (ACCU-CHEK FELIPE PLUS) test strip 200 strips by In Vitro route 2 times daily Use to test blood sugar 2 times daily or as directed. 200 strip 4    Cholecalciferol (VITAMIN D) 2000 units CAPS Take by mouth daily (with lunch)      Cyanocobalamin (B-12) 1000 MCG TABS Take 1,000 mcg by mouth three times a week      diphenhydrAMINE (BENADRYL) 25 MG tablet Take 25 mg by mouth every 6 hours as needed for itching or allergies      empagliflozin (JARDIANCE) 25 MG TABS tablet Take 1 tablet (25 mg) by mouth daily 90 tablet 3    glipiZIDE (GLUCOTROL) 10 MG tablet Take 1 tablet (10 mg) by mouth daily (with breakfast) 30 tablet 3    glipiZIDE (GLUCOTROL) 5 MG tablet Take 1 tablet (5 mg) by mouth daily (with dinner) 30 tablet 3    hydrALAZINE (APRESOLINE) 25 MG tablet Take 1 tablet (25 mg) by mouth 3 times daily 270 tablet 3    isosorbide mononitrate (IMDUR) 30 MG 24 hr tablet Take 1 tablet (30 mg) by mouth daily 90 tablet 3    levothyroxine (SYNTHROID/LEVOTHROID) 112 MCG tablet Take 1 tablet (112 mcg) by mouth daily 90 tablet 4    losartan (COZAAR) 100 MG tablet Take 1 tablet (100 mg) by mouth daily 90 tablet 0    mesalamine (APRISO ER) 0.375 g 24 hr capsule TAKE 4 CAPSULES(1.5 GRAMS) BY MOUTH DAILY (Patient taking differently: 1.5 g daily (with lunch)) 360 capsule 3    pantoprazole (PROTONIX) 40 MG EC tablet Take 1 tablet (40 mg) by mouth daily 90 tablet 3    senna (SENOKOT) 8.6 MG tablet Take 1 tablet by mouth daily as needed for constipation 30 tablet 3    sodium bicarbonate 650 MG tablet Take 1 tablet (650 mg) by mouth daily 90 tablet 3     torsemide (DEMADEX) 10 MG tablet Take 1 tablet (10 mg) by mouth daily 90 tablet 1    Semaglutide (RYBELSUS) 3 MG tablet Take 3 mg by mouth daily For 30 days then increase to 7 mg (Patient not taking: Reported on 11/10/2023) 30 tablet 0    Semaglutide (RYBELSUS) 7 MG tablet Take 1 tablet (7 mg) by mouth daily (Patient not taking: Reported on 11/10/2023) 90 tablet 1        ALLERGIES:     Allergies   Allergen Reactions    Actos [Pioglitazone]      Fluid retention    Amlodipine      Leg swelling, hand swelling    Animal Dander     Doxycycline Hives    Dust Mites     Grass     Keflex [Cephalexin Hcl] Hives    Metoprolol      Swelling of lower legs and fatigue    Other [Seasonal Allergies]      pollen    Ranitidine      rash    Synthroid [Levothyroxine Sodium] Swelling     Allergic to brand name    Tetracycline Hives    Tylenol Hives    Ziac [Bisoprolol-Hydrochlorothiazide]         FAMILY HISTORY:     Family History   Problem Relation Age of Onset    Cerebrovascular Disease Mother     Cancer Father         bladder    Diverticulitis Brother     Diverticulitis Brother     Kidney Disease No family hx of     Crohn's Disease No family hx of     Ulcerative Colitis No family hx of     Stomach Cancer No family hx of     GERD No family hx of     Celiac Disease No family hx of     Anesthesia Reaction No family hx of         SOCIAL HISTORY:     Social History     Socioeconomic History    Marital status:      Spouse name: Raymundo; dementia;  2016    Number of children: 3   Occupational History     Employer: [employer]   Tobacco Use    Smoking status: Never    Smokeless tobacco: Never   Substance and Sexual Activity    Alcohol use: Not Currently     Alcohol/week: 2.0 - 4.0 standard drinks of alcohol     Types: 1 - 2 Cans of beer, 1 - 2 Shots of liquor per week     Comment: occasional cocktail or beer    Drug use: No    Sexual activity: Not Currently   Other Topics Concern     Service No    Blood  Transfusions No    Caffeine Concern No    Occupational Exposure No    Hobby Hazards No    Sleep Concern No    Stress Concern No    Weight Concern No    Special Diet No    Back Care No    Exercise Yes     Comment: off and on    Bike Helmet No    Seat Belt Yes    Self-Exams No   Social History Narrative    Laid off her job 8/14        REVIEW OF SYSTEMS:     Constitutional: No fevers or chills  Skin: No new rash or itching  Eyes: No acute change in vision  Ears/Nose/Throat: No purulent rhinorrhea, new hearing loss, or new vertigo  Respiratory: No cough or hemoptysis  Cardiovascular: See HPI  Gastrointestinal: No change in appetite, vomiting, hematemesis or diarrhea  Genitourinary: No dysuria or hematuria  Musculoskeletal: No new back pain, neck pain or muscle pain  Neurologic: No new headaches, focal weakness or behavior changes  Psychiatric: No hallucinations, excessive alcohol consumption or illegal drug usage  Hematologic/Lymphatic/Immunologic: No bleeding, chills, fever, night sweats or weight loss  Endocrine: No new cold intolerance, heat intolerance, polyphagia, polydipsia or polyuria      PHYSICAL EXAMINATION:     LMP  (LMP Unknown)     GENERAL: No acute distress.  HEENT: EOMI. Sclerae white, not injected. Nares clear. Pharynx without erythema or exudate.   Heart: no JVD  Lungs: Non labored   Extremities: No clubbing, cyanosis, or edema.   Neurologic: Alert and oriented to person/place/time, normal speech and affect. No focal deficits.  Skin: No petechiae, purpura or rash.     LABORATORY DATA:     LIPID RESULTS:  Lab Results   Component Value Date    CHOL 73 01/20/2023    CHOL 93 10/28/2020    HDL 29 (L) 01/20/2023    HDL 38 (L) 10/28/2020    LDL 27 01/20/2023    LDL 33 10/28/2020    TRIG 87 01/20/2023    TRIG 108 10/28/2020    CHOLHDLRATIO 4.3 05/18/2015       LIVER ENZYME RESULTS:  Lab Results   Component Value Date    AST 52 (H) 10/16/2023    AST 19 10/28/2020    ALT 8 10/16/2023    ALT 18 10/28/2020        CBC RESULTS:  Lab Results   Component Value Date    WBC 10.2 10/16/2023    WBC 7.3 10/28/2020    RBC 3.58 (L) 10/16/2023    RBC 3.99 10/28/2020    HGB 11.1 (L) 11/03/2023    HGB 11.8 02/08/2021    HCT 33.8 (L) 10/16/2023    HCT 38.2 10/28/2020    MCV 94 10/16/2023    MCV 96 10/28/2020    MCH 30.7 10/16/2023    MCH 30.8 10/28/2020    MCHC 32.5 10/16/2023    MCHC 32.2 10/28/2020    RDW 23.0 (H) 10/16/2023    RDW 13.9 10/28/2020     10/16/2023     10/28/2020       BMP RESULTS:  Lab Results   Component Value Date     (L) 11/03/2023     04/28/2021    POTASSIUM 4.8 11/03/2023    POTASSIUM 4.3 09/17/2023    POTASSIUM 4.5 04/28/2021    CHLORIDE 100 11/03/2023    CHLORIDE 109 04/27/2023    CHLORIDE 100 04/28/2021    CO2 21 (L) 11/03/2023    CO2 21 04/27/2023    CO2 26 04/28/2021    ANIONGAP 12 11/03/2023    ANIONGAP 7 04/27/2023    ANIONGAP 8 04/28/2021     (H) 11/03/2023     (H) 09/21/2023     (H) 04/27/2023    GLC 57 (L) 04/28/2021    BUN 61.4 (H) 11/03/2023    BUN 28 04/27/2023    BUN 23 04/28/2021    CR 2.13 (H) 11/03/2023    CR 1.21 (H) 04/28/2021    GFRESTIMATED 24 (L) 11/03/2023    GFRESTIMATED 30 (L) 12/30/2022    GFRESTIMATED 46 (L) 04/28/2021    GFRESTBLACK 53 (L) 04/28/2021    ABEL 9.8 11/03/2023    ABEL 9.8 04/28/2021        A1C RESULTS:  Lab Results   Component Value Date    A1C 9.6 (H) 11/03/2023    A1C 6.6 (H) 10/28/2020       INR RESULTS:  Lab Results   Component Value Date    INR 1.13 05/08/2023          PROCEDURES & FURTHER ASSESSMENTS:     Cardiac CT 45 days post LAAO: Pending       LAURA 5/11/23:   PRE-PROCEDURAL SURVEY:  1. The LV function was 55-60% and the RV function was moderately-severely  reduced.  2. There was no evidence of shunting at the level of the interatrial septum.  3. The maximum pre-deployment left atrial appendage orifice width was 19.9 mm.  4. There was no intracardiac thrombus.  5. There was no pericardial effusion.     POST-PROCEDURAL  SURVEY:  1. The 24 mm Watchman FLX device was well-seated in the left atrial appendage.  There was adequate compression of the device. There was no evidence of delroy-  device leak.  2. There was no pericardial effusion.  3. There was a small left-right shunt at the site of the transseptal puncture.  4. The biventricular function was unchanged.  ______________________________________________________________________________  Procedure  Transesophageal Echocardiogram with color and spectral Doppler performed. 3D  image acquisition, reconstruction, and real-time interpretation was performed.  Procedure location Heart Catherization Laboratory. Informed consent for  Transesophegeal echo obtained. LAURA Probe #[id number] then 66 due to significant  Doppler artifact was used during the procedure. Sedation, endotracheal  intubation, and mechanical ventilation were initiated prior to the LAURA and  were monitored by anesthesia. The Transducer was inserted without difficulty .  Complications None. The patient's rhythm is normal sinus. Good quality two-  dimensional was performed and interpreted. Adequate quality color and spectral  Doppler were performed and interpreted.     Left Ventricle  Global and regional left ventricular function is normal with an EF of 55-60%.  Left ventricular size is normal.     Right Ventricle  Moderate right ventricular dilation is present. Global right ventricular  function is moderately to severely reduced.     Atria  Moderate to severe biatrial enlargement is present.     Mitral Valve  The mitral valve is normal. Trace mitral insufficiency is present.     Aortic Valve  The aortic valve is tricuspid. Mild aortic valve sclerosis is present. Trace  aortic insufficiency is present.     Tricuspid Valve  Mild to moderate tricuspid insufficiency is present. Pulmonary artery systolic  pressure cannot be assessed.     Pulmonic Valve  Mild pulmonic insufficiency is present.     Vessels  The aorta root is normal. The  thoracic aorta is normal. The LUPV Doppler shows  normal velocity waveform . The right upper pulmonary vein cannot be assessed.  The right lower pulmonary vein cannot be assessed. The left lower pulmonary  vein cannot be assessed.     Pericardium  No pericardial effusion is present.     Compared to Previous Study  This study was compared with the study from 08/19/2022 . There has been  interval LAAO closure device placement. No significant changes in the  biventricular function.     TTE 5/11/23:   Small anterior pericardial Effusion     TTE 5/12/23:  ______________________________________________________________________________  Interpretation Summary  Small unchanged pericardial effusion is present without evidence of  pericardial tamponade.     Left ventricular function is normal.The ejection fraction is 55-60%.  Paradoxical septal motion consistent with right ventricular pressure and  volume overload is present.  Moderate to severe right ventricular dilation is present. Global right  ventricular function is moderately to severely reduced.  IVC diameter >2.1 cm collapsing <50% with sniff suggests a high RA pressure  estimated at 15 mmHg or greater.     This study was compared with the study from 5/11/23. There has been no change.    TTE 5/15/23:  Interpretation Summary  Left ventricular size, wall motion and function are normal. The ejection  fraction is 60-65%. Flattened septum is consistent with right ventricular  pressure and volume overload.  Moderate to severe right ventricular dilation is present.  Global right ventricular function is moderately to severely reduced.  Severe aortic valve calcification is present.  Moderate tricuspid insufficiency is present.  Dilation of the inferior vena cava is present with abnormal respiratory  variation in diameter. Trivial, primarily anterior pericardial effusion is  present.     Compared to prior study the pericardial effusion is the same or slightly  improved. LV  function appears slightly better.  ______________________________________________________________________________  Left Ventricle  Left ventricular size, wall motion and function are normal. The ejection  fraction is 60-65%. Flattened septum is consistent with right ventricular  pressure and volume overload.     Right Ventricle  Moderate to severe right ventricular dilation is present. Global right  ventricular function is moderately to severely reduced.     Atria  The left atrium appears normal. Severe right atrial enlargement is present.     Mitral Valve  Moderate mitral annular calcification is present. Trace mitral insufficiency  is present.     Aortic Valve  Severe aortic valve calcification is present.     Tricuspid Valve  The tricuspid valve is normal. Moderate tricuspid insufficiency is present.  The right ventricular systolic pressure is approximated at 49.1 mmHg plus the  right atrial pressure.     Pulmonic Valve  The pulmonic valve cannot be assessed.     Vessels  Dilation of the inferior vena cava is present with abnormal respiratory  variation in diameter. IVC diameter >2.1 cm collapsing <50% with sniff  suggests a high RA pressure estimated at 15 mmHg or greater.     Pericardium  Trivial, primarily anterior pericardial effusion is present.     Compared to Previous Study  No significant changes noted.  ______________________________________________________________________________  Doppler Measurements & Calculations  Ao V2 max: 215.0 cm/sec  Ao max P.5 mmHg  Ao V2 mean: 137.7 cm/sec  Ao mean P.0 mmHg  Ao V2 VTI: 41.0 cm  TR max marcia: 350.2 cm/sec  TR max P.1 mmHg     CLINICAL IMPRESSION:     Keerthi Montoya is a very pleasant 73 year old female who presents for 6 month Watchman follow-up.     # Paroxysmal a-fib w/contraindication to anticoagulation due to GI bleeding  # Healed gastric ulcers/Ulcerative colitis (inactive)  Underwent successful implantation of 24mm Watchman FLX.  Intra-procedure LAURA showed no evidence of delroy-device leak and no pericardial effusion. Post procedure TTE with small anterior effusion. Patient kept overnight for observation. Repeat limited TTE 5/12/23 shows stable small pericardial effusion. Xarelto started 5/15/23 x 6 weeks, then converted to DAPT with ASA and Brilinta. CT showed well seated LAAO without leak or thrombus.   - Stop Brilinta now that 6 months post LAAO  - Continue aspirin 81 mg daily lifelong  - Follow-up 6 months structural clinic      # HFpEF  # Moderate PH  # Mild to moderate aortic stenosis:  Recently admitted with HFpEF, diuresed to 143 lbs, weight has since dropped to 125 at home, in part due to low caloric intake due to shingles. Torsemide recently decreased to 10 mg daily. Appears euvolemic on exam.   -Continue Jardiance, losartan  -Continue torsemide 10 mg daily, ongoing management per C[facility]     # HTN  # HLD   - Continue atenolol, Imdur and atorvastatin     # Stage III CKD   - Last BMP slight increase in creat/ongoing management pre CORE and nephro     # MARIA D:  Hgb improving to 11.1.  - Follow-up with Heme     # Diabetes type II   - Improving Hgb A1C 9.6. Continue glipizide and Jardiance. Further diabetes management per pharm at [facility].      RTC: Follow-up with structural clinic 6 months     WENDI Marr, CNP  [facility] Structural Heart Care       CC  Patient Care Team:  Bunny Meyers MD as PCP - General (Internal Medicine)  Preeti James MD as MD (Gastroenterology)  Swati Minor DO as Assigned Nephrology Provider  Jerry Robbins PA-C as Physician Assistant (Gastroenterology)  Bunny Meyers MD as Assigned PCP  Bunny Santa RPH as Assigned MTM Pharmacist  Allison Yang, RN as Specialty Care Coordinator (Gastroenterology)  Gianfranco Melendez MD as Intern (Student in organized health care education/training program)  Jethro Moore MD as MD (Hematology & Oncology)  Fracisco Hurtado, RN as Specialty Care  Coordinator (Hematology & Oncology)  Vanessa Schneider, RN as Diabetes Educator (Diabetes Education)  Preeti James MD as Assigned Surgical Provider  Carolyn Bonilla APRN CNP as Assigned Heart and Vascular Provider  Saba Lemon MD as MD (Advanced Heart Failure and Transplant Cardiology)  Sheela Galeas, VERONIQUE as Specialty Care Coordinator (Cardiology)  Francisca Mustafa APRN CNP as Assigned Cancer Care Provider  Geena Ornelas MD as MD (Dermatology)  Lorena Ambriz, RN as Specialty Care Coordinator (Cardiology)

## 2023-11-09 RX ORDER — LOSARTAN POTASSIUM 100 MG/1
100 TABLET ORAL DAILY
Qty: 90 TABLET | Refills: 0 | Status: SHIPPED | OUTPATIENT
Start: 2023-11-09 | End: 2024-02-07

## 2023-11-09 NOTE — TELEPHONE ENCOUNTER
losartan (COZAAR) 100 MG tablet 90 tablet 3 10/29/2022  Last Office Visit : 10/20/2023   Future Office visit:  11/10/2023     Routing refill request to provider for review/approval because:  Cr abnormal. Rx PENDED, appt with provider tomorrow.  Creatinine   Date Value Ref Range Status   11/03/2023 2.13 (H) 0.51 - 0.95 mg/dL Final   04/28/2021 1.21 (H) 0.52 - 1.04 mg/dL Final

## 2023-11-10 ENCOUNTER — OFFICE VISIT (OUTPATIENT)
Dept: CARDIOLOGY | Facility: CLINIC | Age: 73
End: 2023-11-10
Attending: NURSE PRACTITIONER
Payer: MEDICARE

## 2023-11-10 VITALS
WEIGHT: 131.9 LBS | OXYGEN SATURATION: 99 % | HEART RATE: 75 BPM | BODY MASS INDEX: 23.54 KG/M2 | DIASTOLIC BLOOD PRESSURE: 73 MMHG | SYSTOLIC BLOOD PRESSURE: 127 MMHG

## 2023-11-10 DIAGNOSIS — I50.30 HEART FAILURE WITH PRESERVED EJECTION FRACTION, NYHA CLASS I (H): ICD-10-CM

## 2023-11-10 DIAGNOSIS — Z95.818 PRESENCE OF WATCHMAN LEFT ATRIAL APPENDAGE CLOSURE DEVICE: Primary | ICD-10-CM

## 2023-11-10 PROCEDURE — 99214 OFFICE O/P EST MOD 30 MIN: CPT | Performed by: NURSE PRACTITIONER

## 2023-11-10 PROCEDURE — G0463 HOSPITAL OUTPT CLINIC VISIT: HCPCS | Performed by: NURSE PRACTITIONER

## 2023-11-10 ASSESSMENT — PAIN SCALES - GENERAL: PAINLEVEL: NO PAIN (0)

## 2023-11-10 NOTE — NURSING NOTE
Chief Complaint   Patient presents with    Follow Up     Return TAVR       Vitals were taken, medications reconciled.    Stacy López CMA  10:03 AM

## 2023-11-10 NOTE — PATIENT INSTRUCTIONS
You were seen today in the Structural Heart Clinic at the [facility] ER.    Cardiology provider you saw during your visit: Dominique Saavedra NP    Instructions:   Discontinue Brilinta now that you are 6 months post LAAO  Continue aspirin 81 mg daily lifelong   Continue current heart failure medications              Follow-up in Valve Clinic in 6 months     Prevention of Infective (Bacterial) Endocarditis:  You are at increased risk for developing adverse outcomes from infective endocarditis (IE), also known as bacterial endocarditis (BE) because of the new device in your heart. The guidelines for prevention of IE are to give patients antibiotics prior to any dental procedures that involve manipulation of gingival tissue or the periapical region of teeth, or perforation of the oral mucosa:      It is recommended to take Amoxicillin 2 gm by mouth as a single dose 30 to 60 minutes before procedure.     OR if allergic to Penicillin or Ampicillin:     Cephalexin 2 gm by mouth, or  Clindamycin 600 mg by mouth, or  Azithromycin or Clarithromycin 500 mg PO       Questions and scheduling:   First call: Structural Heart  Celia Perdomo [phone]    General scheduling line: [phone].   First press #1 for the zappit and then press #3 for Medical Questions to reach the Cardiology triage nurse.     On Call Cardiologist for after hours or on weekends: [phone], press option #4 and ask to speak to the on-call Cardiologist.

## 2023-11-10 NOTE — LETTER
11/10/2023      RE: Keerthi Montoya  [address] [place] [id number]       Dear Colleague,    Thank you for the opportunity to participate in the care of your patient, Keerthi Montoya, at the [facility]. Please see a copy of my visit note below.        STRUCTURAL HEART CARE  CARDIOVASCULAR DIVISION    STRUCTURAL CLINIC RETURN VISIT    PRIMARY CARDIOLOGIST: Dr. Almendarez       PERTINENT CLINICAL HISTORY:     Keerthi Montoya is a very pleasant 73 year old female who presents for 6 month Watchman follow-up. She has a history of HLD, HTN, DM2, ulcerative pancolitis, CKD3, pulmonary HTN, RV failure, HFpEF, mild to moderate aortic stenosis, persistent atrial fibrillation with CHADS-VASc score 4 (HTN, DM, age, female) and HASBLED of 5 (HTN, CKD, age, prior bleed, meds) with intolerance to anticoagulation due to history of GI bleeding who underwent successful left atrial appendage closure with a 24mm WATCHMAN FLX device with 17% - 27% compression on May 11, 2023. Her intra procedure LAURA showed well seated RY occluder without leak or evidence of pericardial effusion. Post procedure limited TTE showed small anterior pericardial effusion. She was kept overnight for observation and limited TTE the next morning showed stable effusion. Her Xarelto was started (5/15/23) after limited TTE showed decreased size of effusion. She was seen at 45 days, AC discontinued and DAPT instituted.     She was hospitalized in September with Shingles and acute diastolic heart failure. She was diuresed about 15-20 lbs to her baseline weight of 143 lbs. She has since been doing well. Her weight continued to drop which she attributes to not being able to eat due to abdominal pain/swelling from shingles. Weight has been stable at 122-125 lbs for the past 2 weeks. Her torsemide was recently decreased to 10 mg daily. She has not had any CP, SOB,  orthopnea, PND, leg swelling. She was experiencing heart racing after atenolol was discontinued, this has since been resumed and palpitations have subsided. No lightheadedness or syncope. Overall doing well.      PAST MEDICAL HISTORY:     Past Medical History:   Diagnosis Date    Chronic kidney disease     Diabetes mellitus, type 2 (H)     Diverticulosis of colon (without mention of hemorrhage) 10/01/2012    GI bleed     History of blood transfusion     HLD (hyperlipidemia)     Hypertension     Hypothyroidism     MARIA D (iron deficiency anemia)     Persistent atrial fibrillation (H)     Pulmonary hypertension (H)     Ulcerative (chronic) enterocolitis (H)         PAST SURGICAL HISTORY:     Past Surgical History:   Procedure Laterality Date    CHOLECYSTECTOMY  3/1987    COLON SURGERY  01/2001    Left colon resection; diverticulitis    COLONOSCOPY N/A 4/24/2017    Procedure: COMBINED COLONOSCOPY, SINGLE OR MULTIPLE BIOPSY/POLYPECTOMY BY BIOPSY;;  Surgeon: Radha Salguero MD;  Location: [facility] OR    COLONOSCOPY N/A 3/10/2023    Procedure: COLONOSCOPY;  Surgeon: Preeti James MD;  Location: [facility]    COLONOSCOPY WITH CO2 INSUFFLATION N/A 4/24/2017    Procedure: COLONOSCOPY WITH CO2 INSUFFLATION;  Colonoscopy Dx rectal bleeding, BMI 25.86, Pharm [facility] .[phone], ;  Surgeon: Radha Salguero MD;  Location: [facility] OR    CV LEFT ATRIAL APPENDAGE CLOSURE N/A 5/11/2023    Procedure: Left Atrial Appendage Closure;  Surgeon: Bobby Grimes MD;  Location:  HEART CARDIAC CATH LAB    CV RIGHT HEART CATH MEASUREMENTS RECORDED N/A 3/16/2020    Procedure: CV RIGHT HEART CATH;  Surgeon: Nader Muñoz MD;  Location:  HEART CARDIAC CATH LAB    ESOPHAGOSCOPY, GASTROSCOPY, DUODENOSCOPY (EGD), COMBINED N/A 1/23/2023    Procedure: ESOPHAGOGASTRODUODENOSCOPY, WITH BIOPSY;  Surgeon: Ricki Deal MD;  Location:  GI    ESOPHAGOSCOPY, GASTROSCOPY, DUODENOSCOPY (EGD), COMBINED N/A 3/10/2023    Procedure:  Esophagoscopy, gastroscopy, duodenoscopy (EGD), combined;  Surgeon: Preeti James MD;  Location:  GI    GYN SURGERY  9/1983    tubal ligation    HERNIA REPAIR  12/2006    recurrent incisional hernia    SIGMOIDOSCOPY FLEXIBLE N/A 1/23/2023    Procedure: Sigmoidoscopy flexible;  Surgeon: Ricki Deal MD;  Location:  GI    THROAT SURGERY  12/1974    thyroidectomy        CURRENT MEDICATIONS:     Current Outpatient Medications   Medication Sig Dispense Refill    aspirin 81 MG EC tablet Take 1 tablet (81 mg) by mouth daily 90 tablet 3    atenolol (TENORMIN) 50 MG tablet Take 1 tablet (50 mg) by mouth daily 90 tablet 3    atorvastatin (LIPITOR) 40 MG tablet Take 1 tablet (40 mg) by mouth daily 90 tablet 3    blood glucose (ACCU-CHEK FELIPE PLUS) test strip 200 strips by In Vitro route 2 times daily Use to test blood sugar 2 times daily or as directed. 200 strip 4    Cholecalciferol (VITAMIN D) 2000 units CAPS Take by mouth daily (with lunch)      Cyanocobalamin (B-12) 1000 MCG TABS Take 1,000 mcg by mouth three times a week      diphenhydrAMINE (BENADRYL) 25 MG tablet Take 25 mg by mouth every 6 hours as needed for itching or allergies      empagliflozin (JARDIANCE) 25 MG TABS tablet Take 1 tablet (25 mg) by mouth daily 90 tablet 3    glipiZIDE (GLUCOTROL) 10 MG tablet Take 1 tablet (10 mg) by mouth daily (with breakfast) 30 tablet 3    glipiZIDE (GLUCOTROL) 5 MG tablet Take 1 tablet (5 mg) by mouth daily (with dinner) 30 tablet 3    hydrALAZINE (APRESOLINE) 25 MG tablet Take 1 tablet (25 mg) by mouth 3 times daily 270 tablet 3    isosorbide mononitrate (IMDUR) 30 MG 24 hr tablet Take 1 tablet (30 mg) by mouth daily 90 tablet 3    levothyroxine (SYNTHROID/LEVOTHROID) 112 MCG tablet Take 1 tablet (112 mcg) by mouth daily 90 tablet 4    losartan (COZAAR) 100 MG tablet Take 1 tablet (100 mg) by mouth daily 90 tablet 0    mesalamine (APRISO ER) 0.375 g 24 hr capsule TAKE 4 CAPSULES(1.5 GRAMS) BY MOUTH DAILY (Patient  taking differently: 1.5 g daily (with lunch)) 360 capsule 3    pantoprazole (PROTONIX) 40 MG EC tablet Take 1 tablet (40 mg) by mouth daily 90 tablet 3    senna (SENOKOT) 8.6 MG tablet Take 1 tablet by mouth daily as needed for constipation 30 tablet 3    sodium bicarbonate 650 MG tablet Take 1 tablet (650 mg) by mouth daily 90 tablet 3    torsemide (DEMADEX) 10 MG tablet Take 1 tablet (10 mg) by mouth daily 90 tablet 1    Semaglutide (RYBELSUS) 3 MG tablet Take 3 mg by mouth daily For 30 days then increase to 7 mg (Patient not taking: Reported on 11/10/2023) 30 tablet 0    Semaglutide (RYBELSUS) 7 MG tablet Take 1 tablet (7 mg) by mouth daily (Patient not taking: Reported on 11/10/2023) 90 tablet 1        ALLERGIES:     Allergies   Allergen Reactions    Actos [Pioglitazone]      Fluid retention    Amlodipine      Leg swelling, hand swelling    Animal Dander     Doxycycline Hives    Dust Mites     Grass     Keflex [Cephalexin Hcl] Hives    Metoprolol      Swelling of lower legs and fatigue    Other [Seasonal Allergies]      pollen    Ranitidine      rash    Synthroid [Levothyroxine Sodium] Swelling     Allergic to brand name    Tetracycline Hives    Tylenol Hives    Ziac [Bisoprolol-Hydrochlorothiazide]         FAMILY HISTORY:     Family History   Problem Relation Age of Onset    Cerebrovascular Disease Mother     Cancer Father         bladder    Diverticulitis Brother     Diverticulitis Brother     Kidney Disease No family hx of     Crohn's Disease No family hx of     Ulcerative Colitis No family hx of     Stomach Cancer No family hx of     GERD No family hx of     Celiac Disease No family hx of     Anesthesia Reaction No family hx of         SOCIAL HISTORY:     Social History     Socioeconomic History    Marital status:      Spouse name: Raymundo; dementia;      Number of children: 3   Occupational History     Employer: [employer]   Tobacco Use    Smoking status: Never    Smokeless tobacco:  Never   Substance and Sexual Activity    Alcohol use: Not Currently     Alcohol/week: 2.0 - 4.0 standard drinks of alcohol     Types: 1 - 2 Cans of beer, 1 - 2 Shots of liquor per week     Comment: occasional cocktail or beer    Drug use: No    Sexual activity: Not Currently   Other Topics Concern     Service No    Blood Transfusions No    Caffeine Concern No    Occupational Exposure No    Hobby Hazards No    Sleep Concern No    Stress Concern No    Weight Concern No    Special Diet No    Back Care No    Exercise Yes     Comment: off and on    Bike Helmet No    Seat Belt Yes    Self-Exams No   Social History Narrative    Laid off her job 8/14        REVIEW OF SYSTEMS:     Constitutional: No fevers or chills  Skin: No new rash or itching  Eyes: No acute change in vision  Ears/Nose/Throat: No purulent rhinorrhea, new hearing loss, or new vertigo  Respiratory: No cough or hemoptysis  Cardiovascular: See HPI  Gastrointestinal: No change in appetite, vomiting, hematemesis or diarrhea  Genitourinary: No dysuria or hematuria  Musculoskeletal: No new back pain, neck pain or muscle pain  Neurologic: No new headaches, focal weakness or behavior changes  Psychiatric: No hallucinations, excessive alcohol consumption or illegal drug usage  Hematologic/Lymphatic/Immunologic: No bleeding, chills, fever, night sweats or weight loss  Endocrine: No new cold intolerance, heat intolerance, polyphagia, polydipsia or polyuria      PHYSICAL EXAMINATION:     LMP  (LMP Unknown)     GENERAL: No acute distress.  HEENT: EOMI. Sclerae white, not injected. Nares clear. Pharynx without erythema or exudate.   Heart: no JVD  Lungs: Non labored   Extremities: No clubbing, cyanosis, or edema.   Neurologic: Alert and oriented to person/place/time, normal speech and affect. No focal deficits.  Skin: No petechiae, purpura or rash.     LABORATORY DATA:     LIPID RESULTS:  Lab Results   Component Value Date    CHOL 73 01/20/2023    CHOL 93  10/28/2020    HDL 29 (L) 01/20/2023    HDL 38 (L) 10/28/2020    LDL 27 01/20/2023    LDL 33 10/28/2020    TRIG 87 01/20/2023    TRIG 108 10/28/2020    CHOLHDLRATIO 4.3 05/18/2015       LIVER ENZYME RESULTS:  Lab Results   Component Value Date    AST 52 (H) 10/16/2023    AST 19 10/28/2020    ALT 8 10/16/2023    ALT 18 10/28/2020       CBC RESULTS:  Lab Results   Component Value Date    WBC 10.2 10/16/2023    WBC 7.3 10/28/2020    RBC 3.58 (L) 10/16/2023    RBC 3.99 10/28/2020    HGB 11.1 (L) 11/03/2023    HGB 11.8 02/08/2021    HCT 33.8 (L) 10/16/2023    HCT 38.2 10/28/2020    MCV 94 10/16/2023    MCV 96 10/28/2020    MCH 30.7 10/16/2023    MCH 30.8 10/28/2020    MCHC 32.5 10/16/2023    MCHC 32.2 10/28/2020    RDW 23.0 (H) 10/16/2023    RDW 13.9 10/28/2020     10/16/2023     10/28/2020       BMP RESULTS:  Lab Results   Component Value Date     (L) 11/03/2023     04/28/2021    POTASSIUM 4.8 11/03/2023    POTASSIUM 4.3 09/17/2023    POTASSIUM 4.5 04/28/2021    CHLORIDE 100 11/03/2023    CHLORIDE 109 04/27/2023    CHLORIDE 100 04/28/2021    CO2 21 (L) 11/03/2023    CO2 21 04/27/2023    CO2 26 04/28/2021    ANIONGAP 12 11/03/2023    ANIONGAP 7 04/27/2023    ANIONGAP 8 04/28/2021     (H) 11/03/2023     (H) 09/21/2023     (H) 04/27/2023    GLC 57 (L) 04/28/2021    BUN 61.4 (H) 11/03/2023    BUN 28 04/27/2023    BUN 23 04/28/2021    CR 2.13 (H) 11/03/2023    CR 1.21 (H) 04/28/2021    GFRESTIMATED 24 (L) 11/03/2023    GFRESTIMATED 30 (L) 12/30/2022    GFRESTIMATED 46 (L) 04/28/2021    GFRESTBLACK 53 (L) 04/28/2021    ABEL 9.8 11/03/2023    ABEL 9.8 04/28/2021        A1C RESULTS:  Lab Results   Component Value Date    A1C 9.6 (H) 11/03/2023    A1C 6.6 (H) 10/28/2020       INR RESULTS:  Lab Results   Component Value Date    INR 1.13 05/08/2023          PROCEDURES & FURTHER ASSESSMENTS:     Cardiac CT 45 days post LAAO: Pending       LAURA 5/11/23:   PRE-PROCEDURAL SURVEY:  1. The LV  function was 55-60% and the RV function was moderately-severely  reduced.  2. There was no evidence of shunting at the level of the interatrial septum.  3. The maximum pre-deployment left atrial appendage orifice width was 19.9 mm.  4. There was no intracardiac thrombus.  5. There was no pericardial effusion.     POST-PROCEDURAL SURVEY:  1. The 24 mm Watchman FLX device was well-seated in the left atrial appendage.  There was adequate compression of the device. There was no evidence of delroy-  device leak.  2. There was no pericardial effusion.  3. There was a small left-right shunt at the site of the transseptal puncture.  4. The biventricular function was unchanged.  ______________________________________________________________________________  Procedure  Transesophageal Echocardiogram with color and spectral Doppler performed. 3D  image acquisition, reconstruction, and real-time interpretation was performed.  Procedure location Heart Catherization Laboratory. Informed consent for  Transesophegeal echo obtained. LAURA Probe #[id number] then 66 due to significant  Doppler artifact was used during the procedure. Sedation, endotracheal  intubation, and mechanical ventilation were initiated prior to the LAURA and  were monitored by anesthesia. The Transducer was inserted without difficulty .  Complications None. The patient's rhythm is normal sinus. Good quality two-  dimensional was performed and interpreted. Adequate quality color and spectral  Doppler were performed and interpreted.     Left Ventricle  Global and regional left ventricular function is normal with an EF of 55-60%.  Left ventricular size is normal.     Right Ventricle  Moderate right ventricular dilation is present. Global right ventricular  function is moderately to severely reduced.     Atria  Moderate to severe biatrial enlargement is present.     Mitral Valve  The mitral valve is normal. Trace mitral insufficiency is present.     Aortic Valve  The aortic  valve is tricuspid. Mild aortic valve sclerosis is present. Trace  aortic insufficiency is present.     Tricuspid Valve  Mild to moderate tricuspid insufficiency is present. Pulmonary artery systolic  pressure cannot be assessed.     Pulmonic Valve  Mild pulmonic insufficiency is present.     Vessels  The aorta root is normal. The thoracic aorta is normal. The LUPV Doppler shows  normal velocity waveform . The right upper pulmonary vein cannot be assessed.  The right lower pulmonary vein cannot be assessed. The left lower pulmonary  vein cannot be assessed.     Pericardium  No pericardial effusion is present.     Compared to Previous Study  This study was compared with the study from 08/19/2022 . There has been  interval LAAO closure device placement. No significant changes in the  biventricular function.     TTE 5/11/23:   Small anterior pericardial Effusion     TTE 5/12/23:  ______________________________________________________________________________  Interpretation Summary  Small unchanged pericardial effusion is present without evidence of  pericardial tamponade.     Left ventricular function is normal.The ejection fraction is 55-60%.  Paradoxical septal motion consistent with right ventricular pressure and  volume overload is present.  Moderate to severe right ventricular dilation is present. Global right  ventricular function is moderately to severely reduced.  IVC diameter >2.1 cm collapsing <50% with sniff suggests a high RA pressure  estimated at 15 mmHg or greater.     This study was compared with the study from 5/11/23. There has been no change.    TTE 5/15/23:  Interpretation Summary  Left ventricular size, wall motion and function are normal. The ejection  fraction is 60-65%. Flattened septum is consistent with right ventricular  pressure and volume overload.  Moderate to severe right ventricular dilation is present.  Global right ventricular function is moderately to severely reduced.  Severe aortic  valve calcification is present.  Moderate tricuspid insufficiency is present.  Dilation of the inferior vena cava is present with abnormal respiratory  variation in diameter. Trivial, primarily anterior pericardial effusion is  present.     Compared to prior study the pericardial effusion is the same or slightly  improved. LV function appears slightly better.  ______________________________________________________________________________  Left Ventricle  Left ventricular size, wall motion and function are normal. The ejection  fraction is 60-65%. Flattened septum is consistent with right ventricular  pressure and volume overload.     Right Ventricle  Moderate to severe right ventricular dilation is present. Global right  ventricular function is moderately to severely reduced.     Atria  The left atrium appears normal. Severe right atrial enlargement is present.     Mitral Valve  Moderate mitral annular calcification is present. Trace mitral insufficiency  is present.     Aortic Valve  Severe aortic valve calcification is present.     Tricuspid Valve  The tricuspid valve is normal. Moderate tricuspid insufficiency is present.  The right ventricular systolic pressure is approximated at 49.1 mmHg plus the  right atrial pressure.     Pulmonic Valve  The pulmonic valve cannot be assessed.     Vessels  Dilation of the inferior vena cava is present with abnormal respiratory  variation in diameter. IVC diameter >2.1 cm collapsing <50% with sniff  suggests a high RA pressure estimated at 15 mmHg or greater.     Pericardium  Trivial, primarily anterior pericardial effusion is present.     Compared to Previous Study  No significant changes noted.  ______________________________________________________________________________  Doppler Measurements & Calculations  Ao V2 max: 215.0 cm/sec  Ao max P.5 mmHg  Ao V2 mean: 137.7 cm/sec  Ao mean P.0 mmHg  Ao V2 VTI: 41.0 cm  TR max marcia: 350.2 cm/sec  TR max P.1 mmHg      CLINICAL IMPRESSION:     Keerthi Montoya is a very pleasant 73 year old female who presents for 6 month Watchman follow-up.     # Paroxysmal a-fib w/contraindication to anticoagulation due to GI bleeding  # Healed gastric ulcers/Ulcerative colitis (inactive)  Underwent successful implantation of 24mm Watchman FLX. Intra-procedure LAURA showed no evidence of delroy-device leak and no pericardial effusion. Post procedure TTE with small anterior effusion. Patient kept overnight for observation. Repeat limited TTE 5/12/23 shows stable small pericardial effusion. Xarelto started 5/15/23 x 6 weeks, then converted to DAPT with ASA and Brilinta. CT showed well seated LAAO without leak or thrombus.   - Stop Brilinta now that 6 months post LAAO  - Continue aspirin 81 mg daily lifelong  - Follow-up 6 months structural clinic      # HFpEF  # Moderate PH  # Mild to moderate aortic stenosis:  Recently admitted with HFpEF, diuresed to 143 lbs, weight has since dropped to 125 at home, in part due to low caloric intake due to shingles. Torsemide recently decreased to 10 mg daily. Appears euvolemic on exam.   -Continue Jardiance, losartan  -Continue torsemide 10 mg daily, ongoing management per CORE     # HTN  # HLD   - Continue atenolol, Imdur and atorvastatin     # Stage III CKD   - Last BMP slight increase in creat/ongoing management pre CORE and nephro     # MARAI D:  Hgb improving to 11.1.  - Follow-up with Heme     # Diabetes type II   - Improving Hgb A1C 9.6. Continue glipizide and Jardiance. Further diabetes management per pharm at [facility].      RTC: Follow-up with structural clinic 6 months     WENDI Marr, CNP  [facility]       CC  Patient Care Team:  Bunny Meyers MD as PCP - General (Internal Medicine)

## 2023-11-15 DIAGNOSIS — I50.30 HEART FAILURE WITH PRESERVED EJECTION FRACTION, NYHA CLASS I (H): Primary | ICD-10-CM

## 2023-11-16 ENCOUNTER — PATIENT OUTREACH (OUTPATIENT)
Dept: NEPHROLOGY | Facility: CLINIC | Age: 73
End: 2023-11-16
Payer: MEDICARE

## 2023-11-16 DIAGNOSIS — E53.8 VITAMIN B12 DEFICIENCY (NON ANEMIC): ICD-10-CM

## 2023-11-16 DIAGNOSIS — K92.2 GASTROINTESTINAL HEMORRHAGE, UNSPECIFIED GASTROINTESTINAL HEMORRHAGE TYPE: ICD-10-CM

## 2023-11-16 DIAGNOSIS — D50.9 IRON DEFICIENCY ANEMIA, UNSPECIFIED IRON DEFICIENCY ANEMIA TYPE: Primary | ICD-10-CM

## 2023-11-16 DIAGNOSIS — N18.30 STAGE 3 CHRONIC KIDNEY DISEASE, UNSPECIFIED WHETHER STAGE 3A OR 3B CKD (H): ICD-10-CM

## 2023-11-16 DIAGNOSIS — K51.00 ULCERATIVE PANCOLITIS WITHOUT COMPLICATION (H): ICD-10-CM

## 2023-11-16 NOTE — PROGRESS NOTES
Contacted [place] to review recent recommendations from Dr. Minor:     One thing I noted after our visit was that your iron saturation was low in October on Dr. Moore's lab results. Are you taking an iron supplement at this time? If not, I typically would recommend a trial of ferrous sulfate 325 mg taken every other day. This can be purchased over the counter.     Patient reports that she does not tolerate oral iron. She notes a history of colitis, diverticulosis and hx of GI bleeding. When taken previously, she stated that her stools became tarry. She had tolerated iron infusions when needed. It appears last Venofer was August 2023 (3/3 infusions).     Advised that this update would be provided to Dr. Minor.

## 2023-11-17 ENCOUNTER — OFFICE VISIT (OUTPATIENT)
Dept: CARDIOLOGY | Facility: CLINIC | Age: 73
End: 2023-11-17
Attending: NURSE PRACTITIONER
Payer: MEDICARE

## 2023-11-17 ENCOUNTER — LAB (OUTPATIENT)
Dept: LAB | Facility: CLINIC | Age: 73
End: 2023-11-17
Attending: NURSE PRACTITIONER
Payer: MEDICARE

## 2023-11-17 VITALS
SYSTOLIC BLOOD PRESSURE: 111 MMHG | WEIGHT: 132.5 LBS | BODY MASS INDEX: 23.65 KG/M2 | HEART RATE: 73 BPM | DIASTOLIC BLOOD PRESSURE: 56 MMHG | OXYGEN SATURATION: 96 %

## 2023-11-17 DIAGNOSIS — Z95.818 PRESENCE OF WATCHMAN LEFT ATRIAL APPENDAGE CLOSURE DEVICE: ICD-10-CM

## 2023-11-17 DIAGNOSIS — I50.30 HEART FAILURE WITH PRESERVED EJECTION FRACTION, NYHA CLASS I (H): ICD-10-CM

## 2023-11-17 DIAGNOSIS — I10 HYPERTENSION GOAL BP (BLOOD PRESSURE) < 130/80: ICD-10-CM

## 2023-11-17 DIAGNOSIS — I50.30 HEART FAILURE WITH PRESERVED EJECTION FRACTION, NYHA CLASS I (H): Primary | ICD-10-CM

## 2023-11-17 DIAGNOSIS — I48.19 PERSISTENT ATRIAL FIBRILLATION (H): ICD-10-CM

## 2023-11-17 DIAGNOSIS — I27.20 PULMONARY HYPERTENSION (H): ICD-10-CM

## 2023-11-17 LAB
ANION GAP SERPL CALCULATED.3IONS-SCNC: 11 MMOL/L (ref 7–15)
BUN SERPL-MCNC: 41.4 MG/DL (ref 8–23)
CALCIUM SERPL-MCNC: 9.5 MG/DL (ref 8.8–10.2)
CHLORIDE SERPL-SCNC: 100 MMOL/L (ref 98–107)
CREAT SERPL-MCNC: 2.11 MG/DL (ref 0.51–0.95)
DEPRECATED HCO3 PLAS-SCNC: 24 MMOL/L (ref 22–29)
EGFRCR SERPLBLD CKD-EPI 2021: 24 ML/MIN/1.73M2
GLUCOSE SERPL-MCNC: 162 MG/DL (ref 70–99)
POTASSIUM SERPL-SCNC: 4.6 MMOL/L (ref 3.4–5.3)
SODIUM SERPL-SCNC: 135 MMOL/L (ref 135–145)

## 2023-11-17 PROCEDURE — G0463 HOSPITAL OUTPT CLINIC VISIT: HCPCS | Performed by: NURSE PRACTITIONER

## 2023-11-17 PROCEDURE — 99215 OFFICE O/P EST HI 40 MIN: CPT | Performed by: NURSE PRACTITIONER

## 2023-11-17 PROCEDURE — 80048 BASIC METABOLIC PNL TOTAL CA: CPT | Performed by: PATHOLOGY

## 2023-11-17 PROCEDURE — 36415 COLL VENOUS BLD VENIPUNCTURE: CPT | Performed by: PATHOLOGY

## 2023-11-17 ASSESSMENT — PAIN SCALES - GENERAL: PAINLEVEL: NO PAIN (0)

## 2023-11-17 NOTE — LETTER
11/17/2023      RE: Keerthi Montoya  [address] [place] [phone]       Dear Colleague,    Thank you for the opportunity to participate in the care of your patient, Keerthi Montoya, at the [facility] [place] [facility]. Please see a copy of my visit note below.      [place]ia is a 73 year old female with a  medical history is significant for longstanding hypertension since she was in her teens, diabetes, dyslipidemia, and ulcerative colitis.    Patient reports she developed shingles several weeks ago and with this, developed painful, itchy rash and started having SOB and abdominal bloating after this. She was admitted, and diuresed for almost 15 lbs weight gain.     She is feeling well. The scars are better.  Patient denies dyspnea with any of her ADLs or usual chores. She denies SOB, presyncope, syncope, orthopnea, PND, chest pain, palpitations, abdominal pain, nausea, emesis, LE edema or weight gain. Patient reports compliance with her medications, weighing herself daily, and watching her salt and fluid intake. Weight at home 127 lbs.     Current meds:    Torsemide 10 mg-  Hydralazine 25 mg TID  Losartan 100 mg daily  Imdur 30 mg daily  Jardiance 25 mg daily      ROS:   Constitutional: No fever, chills, or sweats.  ENT: No visual disturbance, ear ache, epistaxis, sore throat.   Allergies/Immunologic: Negative  Respiratory: No cough, hemoptysis.   Cardiovascular: As per HPI.   GI: As per HPI.   : No urinary frequency, dysuria, or hematuria.   Integument: Negative.   Psychiatric: Negative.   Neuro: Negative.   Endocrinology: negative   Musculoskeletal: negative    EXAM:   GEN/CONSTITUIONAL: Appears comfortable, in no apparent distress   EYES: No icterus  ENT/MOUTH: Normal  JVP:  not seen at 90 degrees  RESPIRATORY: Clear to auscultation bilaterally   CARDIOVASCULAR: Regular S1 and S2, 2/6 JUANITA.   ABDOMEN: Soft, non-tender,  positive bowel sounds   NEUROLOGIC: Grossly non-focal   PSYCHIATRIC: Normal affect  EXT: no LE edema. Normal pedal pulses.  Skin: No petechiae, purpura or rash       ECHO 8/25  Interpretation Summary   Global and regional left ventricular function is normal with an EF of 60-65%.  Paradoxical septal motion consistent with right ventricular pressure and  volume overload is present.  Moderate to severe right ventricular dilation is present.  Global right ventricular function is moderately to severely reduced.  Calculated aortic valve area in severe AS range. Consider additional  evaluation if indicated.  Moderate to severe tricuspid insufficiency is present.  Dilation of the inferior vena cava is present with abnormal respiratory  variation in diameter.  Trivial pericardial effusion is present.    .  Assessment and Plan:  In summary, 73 year old female with a past medical history of chronic HFpEF, HLD, HTN, DM2, ulcerative pancolitis, CKD3, pulmonary HTN, moderate paradoxical low flow low gradient aortic stenosis, persistent atrial fibrillation with (CHADS-VASc 4) with intolerance to anticoagulation due to history of GI bleeding s/p RY closure and Watchman (05/2023) who was admitted in Sept '23 heart failure exacerbation and presents as HFpEF.    Weight has dropped to 125 at home, in part due to low caloric intake due to shingles. Torsemide was decreased to 10 mg daily at the last visit. Appears euvolemic on exam.         # Acute on Chronic Diastolic Heart Failure  # Moderate Pulmonary HTN  # Moderate Paradoxical Low Flow Low Gradient Aortic Stenosis    # Persistent rate-controlled A-fib s/p Watchman   # HTN  # HLD  Recent admission for increased PROCTOR, abdominal distention, peripheral edema, orthopnea, and ~15lb weight gain. Work up notable for BNP 18,607, creatinine 1.70, CXR with pulmonary edema and small bilateral effusions. Echo 5/2023 EF 55-60%, PASP 59 mmHg, RA pressure 15 mmHg. Dry weight estimated ~143 lbs.  -  Volume Status: euvolemic  Torsemide 10 mg daily  - DAPT (given recent Watchman) continue ASA 81mg daily, Brilinta 60 mg BID x 6 months through 11/2023)  - Continue Lipitor 40 mg daily   - Continue Hydralazine 25 mg TID, Imdur 30 mg daily, Losartan 100 mg daily  - Continue PTA Empagliflozin 25 mg daily  - Rate controlled - on Atenolol  - Will continue to monitor TTE yearly for now     # CKD Stage 3  Baseline appears to be around 1.5-1.9  - Will continue to monitor 2.1      # DM Type II  Hgb A1c 8.7.   - Managed by PCP  - Continue glipizide and empagliflozin       Plan:  CORE in 4 months         Carolyn ADAME NP-C                Please do not hesitate to contact me if you have any questions/concerns.     Sincerely,     WENDI Aaron CNP

## 2023-11-17 NOTE — PATIENT INSTRUCTIONS
Take your medicines every day, as directed    Changes made today:  No medication changes     Monitor Your Weight and Symptoms    Contact us if you:    Gain 2 pounds in one day or 5 pounds in one week  Feel more short of breath  Notice more leg swelling  Feel lightheadeded   Change your lifestyle    Limit Salt or Sodium:  2000 mg  Limit Fluids:  2000 mL or approximately 64 ounces  Eat a Heart Healthy Diet  Low in saturated fats  Stay Active:  Aim to move at least 150 minutes every  week         To Contact us    During Business Hours:  [phone], option # 1      After hours, weekends or holidays:   [phone], Option #4  Ask to speak to the On-Call Cardiologist. Inform them you are a CORE/heart failure patient at the [facility].     Use [employer] allows you to communicate directly with your heart team through secure messaging.  [employer] can be accessed any time on your phone, computer, or tablet.  If you need assistance, we'd be happy to help!         Keep your Heart Appointments:    CORE in 4 months    Dr. Betty Kaminski  [phone]  [place]  [phone]     Please consider attending our virtual support group which is held monthly. Please reach out to [place] at [phone] for more information if you are interested in attending.       2023 dates:      - Monday, December 4th, 1-2pm

## 2023-11-17 NOTE — NURSING NOTE
Chief Complaint   Patient presents with    Follow Up     Return CORE - 6mo s/p Watchman follow-up       Vitals were taken, medications reconciled.    Stacy López CMA  7:37 AM

## 2023-11-17 NOTE — NURSING NOTE
Labs: Patient was given results of the laboratory testing obtained today. Patient demonstrated understanding of this information and agreed to call with further questions or concerns.     Med Reconcile: Reviewed and verified all current medications with the patient. The updated medication list was printed and given to the patient.    Return Appointment: Patient given instructions regarding scheduling next clinic visit. Patient demonstrated understanding of this information and agreed to call with further questions or concerns. CORE in 4 months.    Patient stated she understood all health information given and agreed to call with further questions or concerns.    Lorena Ambriz RN

## 2023-11-21 RX ORDER — DIPHENHYDRAMINE HYDROCHLORIDE 50 MG/ML
50 INJECTION INTRAMUSCULAR; INTRAVENOUS
Status: CANCELLED
Start: 2023-11-21

## 2023-11-21 RX ORDER — ALBUTEROL SULFATE 0.83 MG/ML
2.5 SOLUTION RESPIRATORY (INHALATION)
Status: CANCELLED | OUTPATIENT
Start: 2023-11-21

## 2023-11-21 RX ORDER — HEPARIN SODIUM,PORCINE 10 UNIT/ML
5-20 VIAL (ML) INTRAVENOUS DAILY PRN
Status: CANCELLED | OUTPATIENT
Start: 2023-11-21

## 2023-11-21 RX ORDER — HEPARIN SODIUM (PORCINE) LOCK FLUSH IV SOLN 100 UNIT/ML 100 UNIT/ML
5 SOLUTION INTRAVENOUS
Status: CANCELLED | OUTPATIENT
Start: 2023-11-21

## 2023-11-21 RX ORDER — EPINEPHRINE 1 MG/ML
0.3 INJECTION, SOLUTION INTRAMUSCULAR; SUBCUTANEOUS EVERY 5 MIN PRN
Status: CANCELLED | OUTPATIENT
Start: 2023-11-21

## 2023-11-21 RX ORDER — ALBUTEROL SULFATE 90 UG/1
1-2 AEROSOL, METERED RESPIRATORY (INHALATION)
Status: CANCELLED
Start: 2023-11-21

## 2023-11-21 RX ORDER — METHYLPREDNISOLONE SODIUM SUCCINATE 125 MG/2ML
125 INJECTION, POWDER, LYOPHILIZED, FOR SOLUTION INTRAMUSCULAR; INTRAVENOUS
Status: CANCELLED
Start: 2023-11-21

## 2023-11-21 NOTE — PROGRESS NOTES
Per Dr. Minor:  I think we can give venofer 300 mg at one time now? If that is the case, I would plan 300 mg X 2 doses.  Thank you, Swati Minor, DO     Orders placed via therapy plan. Message sent to patient to arrange with scheduling team.

## 2023-11-27 DIAGNOSIS — K52.9 COLITIS: ICD-10-CM

## 2023-11-30 NOTE — TELEPHONE ENCOUNTER
mesalamine (APRISO ER) 0.375   Last Written Prescription Date:  8/9/22  Last Fill Quantity:  360  # refills: 3  Last Office Visit : 8/2/23    Future Office visit:  NONE  Routing refill request to provider for review/approval because:  90 DAY RF PENDING   ABN  CBC & CR   Creatinine   Date Value Ref Range Status   11/17/2023 2.11 (H) 0.51 - 0.95 mg/dL Final   04/28/2021 1.21 (H) 0.52 - 1.04 mg/dL Final   CBC RESULTS:   Recent Labs   Lab Test 11/03/23  1620 10/16/23  1537   WBC  --  10.2   RBC  --  3.58*   HGB 11.1* 11.0*   HCT  --  33.8*   MCV  --  94   MCH  --  30.7   MCHC  --  32.5   RDW  --  23.0*   PLT  --  426

## 2023-11-30 NOTE — PROGRESS NOTES
[facility] Cardiology Consultation:    Assessment and Plan:     1.    Chronic HFpEF, group 2 severe pulmonary hypertension related to diastolic dysfunction, moderately reduced RV function, functional class I-II.    Follows in core clinic. Torsemide 10 daily, appears compensated  Continue Jardiance  Last echo showed severe TR though she was volume overloaded at that time, repeat echo next year  2.  Hypertension, controlled on current regimen.  3.  Moderate paradoxical low flow aortic stenosis, possible bicuspid aortic valve: Annual echo  4. Diabetes, uncontrolled  5.  Dyslipidemia, subclinical CAD with moderate CAC on CT: on Lipitor.  6.  CKD stage 4  7. Ulcerative colitis  8.  Persistent atrial fibrillation, rate controlled, asymptomatic, normal LV function, high [id number] score with GI bleed s/p Watchman LAAO closure:  Continue atenolol.  Given lack of symptoms and normal LV function, rate control strategy is reasonable.  Treated with Xarelto for 45 days, DAPT for 6 months.  Postprocedure CT showed no leak.  Continue aspirin lifelong      A total of 40 minutes were spent on the day of the visit for chart review, care coordination, face-to-face consultation with the patient, and documentation.    David Almendarez MD    Cardiac Imaging and Prevention  [facility]  Pager: [phone]      HPI: HFpEF, pulmonary hypertension, Afib follow-up.    She was admitted few months ago with decompensated CHF and was diuresed.  I reviewed her echo that was done during that admission.  It showed moderate pulm hypertension with severe RV dysfunction.  Her PCP had reached out to me then, and I had discussed her with Dr. Marcus who has seen her in the past.  Dr. Marcus agreed that other than management of her underlying CHF, there was no specific PH related therapies that were indicated.  She continues to follow closely with the core clinic, and also saw Dr. Lemon with advanced  heart failure earlier this year as follow-up after her inpatient admission.  At her last visit with me I had referred her for evaluation of watchman left atrial appendage occlusion.  She underwent that procedure uneventfully earlier this year.  I reviewed her follow-up CT.  It showed successful left atrial appendage occlusion, with no device related thrombus or leak.  She was treated with Xarelto for 45 days, DAPT for 6 months.  Labs show GFR that is slightly worse, hemoglobin is stable, hemoglobin A1c is uncontrolled, and lipid profile looks excellent.  Overall she feels good and denies any cardiovascular symptoms.  She feels like she is active and is able to do all her activities without any shortness of breath.      EXAM:  /55 (BP Location: Right arm, Patient Position: Sitting, Cuff Size: Adult Regular)   Pulse 80   Wt 59.9 kg (132 lb)   LMP  (LMP Unknown)   SpO2 95%   BMI 23.56 kg/m    GEN/CONSTITUIONAL: Appears comfortable, in no apparent distress   EYES: No icterus  ENT/MOUTH: Normal  JVP: Not elevated  RESPIRATORY: Clear to auscultation bilaterally   CARDIOVASCULAR: irregular S1 and S2, 2/6 JUANITA.   ABDOMEN: Soft, non-tender, positive bowel sounds   NEUROLOGIC: Grossly non-focal   PSYCHIATRIC: Normal affect  EXT: No LE edema. Normal pedal pulses.  Skin: No petechiae, purpura or rash    PAST MEDICAL HISTORY:  Past Medical History:   Diagnosis Date    Chronic kidney disease     Diabetes mellitus, type 2 (H)     Diverticulosis of colon (without mention of hemorrhage) 10/01/2012    GI bleed     History of blood transfusion     HLD (hyperlipidemia)     Hypertension     Hypothyroidism     MARIA D (iron deficiency anemia)     Persistent atrial fibrillation (H)     Pulmonary hypertension (H)     Ulcerative (chronic) enterocolitis (H)        CURRENT MEDICATIONS:  Current Outpatient Medications   Medication    aspirin 81 MG EC tablet    atenolol (TENORMIN) 50 MG tablet    atorvastatin (LIPITOR) 40 MG tablet     blood glucose (ACCU-CHEK FELIPE PLUS) test strip    Cholecalciferol (VITAMIN D) 2000 units CAPS    Cyanocobalamin (B-12) 1000 MCG TABS    diphenhydrAMINE (BENADRYL) 25 MG tablet    empagliflozin (JARDIANCE) 25 MG TABS tablet    glipiZIDE (GLUCOTROL) 10 MG tablet    glipiZIDE (GLUCOTROL) 5 MG tablet    hydrALAZINE (APRESOLINE) 25 MG tablet    isosorbide mononitrate (IMDUR) 30 MG 24 hr tablet    levothyroxine (SYNTHROID/LEVOTHROID) 112 MCG tablet    losartan (COZAAR) 100 MG tablet    mesalamine (APRISO ER) 0.375 g 24 hr capsule    pantoprazole (PROTONIX) 40 MG EC tablet    Semaglutide (RYBELSUS) 3 MG tablet    Semaglutide (RYBELSUS) 7 MG tablet    senna (SENOKOT) 8.6 MG tablet    sodium bicarbonate 650 MG tablet    torsemide (DEMADEX) 10 MG tablet     No current facility-administered medications for this visit.       PAST SURGICAL HISTORY:  Past Surgical History:   Procedure Laterality Date    CHOLECYSTECTOMY  3/1987    COLON SURGERY  01/2001    Left colon resection; diverticulitis    COLONOSCOPY N/A 4/24/2017    Procedure: COMBINED COLONOSCOPY, SINGLE OR MULTIPLE BIOPSY/POLYPECTOMY BY BIOPSY;;  Surgeon: Radha Salguero MD;  Location: [facility] OR    COLONOSCOPY N/A 3/10/2023    Procedure: COLONOSCOPY;  Surgeon: Preeti James MD;  Location: [facility] GI    COLONOSCOPY WITH CO2 INSUFFLATION N/A 4/24/2017    Procedure: COLONOSCOPY WITH CO2 INSUFFLATION;  Colonoscopy Dx rectal bleeding, BMI 25.86, Pharm [facility] .[phone], ;  Surgeon: Radha Salguero MD;  Location: [facility] OR    CV LEFT ATRIAL APPENDAGE CLOSURE N/A 5/11/2023    Procedure: Left Atrial Appendage Closure;  Surgeon: Bobby Grimes MD;  Location: [facility] HEART CARDIAC CATH LAB    CV RIGHT HEART CATH MEASUREMENTS RECORDED N/A 3/16/2020    Procedure: CV RIGHT HEART CATH;  Surgeon: Nader Muñoz MD;  Location:  HEART CARDIAC CATH LAB    ESOPHAGOSCOPY, GASTROSCOPY, DUODENOSCOPY (EGD), COMBINED N/A 1/23/2023    Procedure:  ESOPHAGOGASTRODUODENOSCOPY, WITH BIOPSY;  Surgeon: Ricki Deal MD;  Location:  GI    ESOPHAGOSCOPY, GASTROSCOPY, DUODENOSCOPY (EGD), COMBINED N/A 3/10/2023    Procedure: Esophagoscopy, gastroscopy, duodenoscopy (EGD), combined;  Surgeon: Preeti James MD;  Location:  GI    GYN SURGERY  1983    tubal ligation    HERNIA REPAIR  2006    recurrent incisional hernia    SIGMOIDOSCOPY FLEXIBLE N/A 2023    Procedure: Sigmoidoscopy flexible;  Surgeon: Ricki Deal MD;  Location:  GI    THROAT SURGERY  1974    thyroidectomy       ALLERGIES     Allergies   Allergen Reactions    Actos [Pioglitazone]      Fluid retention    Amlodipine      Leg swelling, hand swelling    Animal Dander     Doxycycline Hives    Dust Mites     Grass     Keflex [Cephalexin Hcl] Hives    Metoprolol      Swelling of lower legs and fatigue    Other [Seasonal Allergies]      pollen    Ranitidine      rash    Synthroid [Levothyroxine Sodium] Swelling     Allergic to brand name    Tetracycline Hives    Tylenol Hives    Ziac [Bisoprolol-Hydrochlorothiazide]        FAMILY HISTORY:  Family History   Problem Relation Age of Onset    Cerebrovascular Disease Mother     Cancer Father         bladder    Diverticulitis Brother     Diverticulitis Brother     Kidney Disease No family hx of     Crohn's Disease No family hx of     Ulcerative Colitis No family hx of     Stomach Cancer No family hx of     GERD No family hx of     Celiac Disease No family hx of     Anesthesia Reaction No family hx of        SOCIAL HISTORY:  Social History     Socioeconomic History    Marital status:      Spouse name: Raymundo; dementia;      Number of children: 3    Years of education: Not on file    Highest education level: Not on file   Occupational History     Employer: [employer]   Tobacco Use    Smoking status: Never     Passive exposure: Never    Smokeless tobacco: Never   Vaping Use    Vaping Use: Never used   Substance and Sexual  Activity    Alcohol use: Not Currently     Alcohol/week: 2.0 - 4.0 standard drinks of alcohol     Types: 1 - 2 Cans of beer, 1 - 2 Shots of liquor per week     Comment: occasional cocktail or beer    Drug use: No    Sexual activity: Not Currently   Other Topics Concern    Parent/sibling w/ CABG, MI or angioplasty before 65F 55M? Not Asked     Service No    Blood Transfusions No    Caffeine Concern No    Occupational Exposure No    Hobby Hazards No    Sleep Concern No    Stress Concern No    Weight Concern No    Special Diet No    Back Care No    Exercise Yes     Comment: off and on    Bike Helmet No    Seat Belt Yes    Self-Exams No   Social History Narrative    Laid off her job 8/14     Social Determinants of Health     Financial Resource Strain: Low Risk  (10/20/2023)    Financial Resource Strain     Within the past 12 months, have you or your family members you live with been unable to get utilities (heat, electricity) when it was really needed?: No   Food Insecurity: Low Risk  (10/20/2023)    Food Insecurity     Within the past 12 months, did you worry that your food would run out before you got money to buy more?: No     Within the past 12 months, did the food you bought just not last and you didn t have money to get more?: No   Transportation Needs: Low Risk  (10/20/2023)    Transportation Needs     Within the past 12 months, has lack of transportation kept you from medical appointments, getting your medicines, non-medical meetings or appointments, work, or from getting things that you need?: No   Physical Activity: Not on file   Stress: Not on file   Social Connections: Not on file   Interpersonal Safety: Not on file   Housing Stability: Low Risk  (10/20/2023)    Housing Stability     Do you have housing? : Yes     Are you worried about losing your housing?: No       ROS:   Constitutional: No fever, chills, or sweats. No weight gain/loss   ENT: No visual disturbance, ear ache, epistaxis, sore  throat  Allergies/Immunologic: Negative.   Respiratory: No cough, hemoptysia  Cardiovascular: As per HPI  GI: No nausea, vomiting, hematemesis, melena, or hematochezia  : No urinary frequency, dysuria, or hematuria  Integument: Negative  Psychiatric: Negative  Neuro: Negative  Endocrinology: Negative   Musculoskeletal: Negative    ADDITIONAL COMMENTS:     I reviewed the patient's medications:     I reviewed the patient's pertinent clinical laboratory studies:     I reviewed the patient's pertinent imaging studies:   I reviewed the patient's ECG:

## 2023-12-01 ENCOUNTER — OFFICE VISIT (OUTPATIENT)
Dept: CARDIOLOGY | Facility: CLINIC | Age: 73
End: 2023-12-01
Payer: MEDICARE

## 2023-12-01 VITALS
HEART RATE: 80 BPM | WEIGHT: 132 LBS | OXYGEN SATURATION: 95 % | DIASTOLIC BLOOD PRESSURE: 55 MMHG | SYSTOLIC BLOOD PRESSURE: 126 MMHG | BODY MASS INDEX: 23.56 KG/M2

## 2023-12-01 DIAGNOSIS — I50.30 HEART FAILURE WITH PRESERVED EJECTION FRACTION, NYHA CLASS I (H): Primary | ICD-10-CM

## 2023-12-01 PROCEDURE — 99215 OFFICE O/P EST HI 40 MIN: CPT | Performed by: INTERNAL MEDICINE

## 2023-12-01 NOTE — NURSING NOTE
Keerthi Montoya's goals for this visit include:   Chief Complaint   Patient presents with    Follow Up       She requests these members of her care team be copied on today's visit information: yes    PCP: Bunny Meyers    Referring Provider:  No referring provider defined for this encounter.    /55 (BP Location: Right arm, Patient Position: Sitting, Cuff Size: Adult Regular)   Pulse 80   Wt 59.9 kg (132 lb)   LMP  (LMP Unknown)   SpO2 95%   BMI 23.56 kg/m      Do you need any medication refills at today's visit? no    NIC Chavarria  Adult Endocrinology  [facility]

## 2023-12-05 ENCOUNTER — TELEPHONE (OUTPATIENT)
Dept: GASTROENTEROLOGY | Facility: CLINIC | Age: 73
End: 2023-12-05
Payer: MEDICARE

## 2023-12-05 NOTE — TELEPHONE ENCOUNTER
[facility] Call Center    Phone Message    May a detailed message be left on voicemail: yes     Reason for Call: Medication Refill Request    Has the patient contacted the pharmacy for the refill? Yes   Name of medication being requested: mesalamine (APRISO ER) 0.375 g 24 hr capsule   Provider who prescribed the medication: Jerry Robbins   Pharmacy:   [id number] - [place], [place] - [address] AT [facility] [address]   Date medication is needed: ASAP       Action Taken: Message routed to:  [facility] GI    Travel Screening: Not Applicable

## 2023-12-06 RX ORDER — MESALAMINE 0.38 G/1
CAPSULE, EXTENDED RELEASE ORAL
Qty: 360 CAPSULE | Refills: 1 | Status: SHIPPED | OUTPATIENT
Start: 2023-12-06 | End: 2024-08-02

## 2023-12-06 NOTE — TELEPHONE ENCOUNTER
Patient has history of CKD Stage 3, HTN, and DM. Creatinine elevated, but stable. Apriso refilled appropriately.

## 2023-12-11 ENCOUNTER — LAB (OUTPATIENT)
Dept: LAB | Facility: CLINIC | Age: 73
End: 2023-12-11
Payer: MEDICARE

## 2023-12-11 DIAGNOSIS — D50.9 IRON DEFICIENCY ANEMIA, UNSPECIFIED IRON DEFICIENCY ANEMIA TYPE: ICD-10-CM

## 2023-12-11 DIAGNOSIS — E53.8 VITAMIN B12 DEFICIENCY (NON ANEMIC): ICD-10-CM

## 2023-12-11 DIAGNOSIS — N18.30 STAGE 3 CHRONIC KIDNEY DISEASE, UNSPECIFIED WHETHER STAGE 3A OR 3B CKD (H): ICD-10-CM

## 2023-12-11 DIAGNOSIS — I48.19 PERSISTENT ATRIAL FIBRILLATION (H): ICD-10-CM

## 2023-12-11 LAB
BASOPHILS # BLD AUTO: 0.1 10E3/UL (ref 0–0.2)
BASOPHILS NFR BLD AUTO: 1 %
EOSINOPHIL # BLD AUTO: 0.3 10E3/UL (ref 0–0.7)
EOSINOPHIL NFR BLD AUTO: 3 %
ERYTHROCYTE [DISTWIDTH] IN BLOOD BY AUTOMATED COUNT: 14.6 % (ref 10–15)
FERRITIN SERPL-MCNC: 32 NG/ML (ref 11–328)
HCT VFR BLD AUTO: 35.8 % (ref 35–47)
HGB BLD-MCNC: 10.9 G/DL (ref 11.7–15.7)
IMM GRANULOCYTES # BLD: 0 10E3/UL
IMM GRANULOCYTES NFR BLD: 0 %
IRON BINDING CAPACITY (ROCHE): 391 UG/DL (ref 240–430)
IRON SATN MFR SERPL: 10 % (ref 15–46)
IRON SERPL-MCNC: 38 UG/DL (ref 37–145)
LYMPHOCYTES # BLD AUTO: 1.7 10E3/UL (ref 0.8–5.3)
LYMPHOCYTES NFR BLD AUTO: 18 %
MCH RBC QN AUTO: 29.7 PG (ref 26.5–33)
MCHC RBC AUTO-ENTMCNC: 30.4 G/DL (ref 31.5–36.5)
MCV RBC AUTO: 98 FL (ref 78–100)
MONOCYTES # BLD AUTO: 1.4 10E3/UL (ref 0–1.3)
MONOCYTES NFR BLD AUTO: 15 %
NEUTROPHILS # BLD AUTO: 5.9 10E3/UL (ref 1.6–8.3)
NEUTROPHILS NFR BLD AUTO: 63 %
NRBC # BLD AUTO: 0 10E3/UL
NRBC BLD AUTO-RTO: 0 /100
PLATELET # BLD AUTO: 255 10E3/UL (ref 150–450)
RBC # BLD AUTO: 3.67 10E6/UL (ref 3.8–5.2)
WBC # BLD AUTO: 9.5 10E3/UL (ref 4–11)

## 2023-12-11 PROCEDURE — 85025 COMPLETE CBC W/AUTO DIFF WBC: CPT

## 2023-12-11 PROCEDURE — 82728 ASSAY OF FERRITIN: CPT

## 2023-12-11 PROCEDURE — 83540 ASSAY OF IRON: CPT

## 2023-12-11 PROCEDURE — 36415 COLL VENOUS BLD VENIPUNCTURE: CPT

## 2023-12-11 PROCEDURE — 83550 IRON BINDING TEST: CPT

## 2023-12-18 ENCOUNTER — ONCOLOGY VISIT (OUTPATIENT)
Dept: ONCOLOGY | Facility: CLINIC | Age: 73
End: 2023-12-18
Attending: NURSE PRACTITIONER
Payer: MEDICARE

## 2023-12-18 VITALS
WEIGHT: 134.8 LBS | DIASTOLIC BLOOD PRESSURE: 58 MMHG | RESPIRATION RATE: 16 BRPM | OXYGEN SATURATION: 97 % | SYSTOLIC BLOOD PRESSURE: 118 MMHG | HEART RATE: 74 BPM | BODY MASS INDEX: 24.06 KG/M2 | TEMPERATURE: 97.6 F

## 2023-12-18 DIAGNOSIS — N18.30 STAGE 3 CHRONIC KIDNEY DISEASE, UNSPECIFIED WHETHER STAGE 3A OR 3B CKD (H): ICD-10-CM

## 2023-12-18 DIAGNOSIS — E53.8 VITAMIN B12 DEFICIENCY (NON ANEMIC): ICD-10-CM

## 2023-12-18 DIAGNOSIS — K51.00 ULCERATIVE PANCOLITIS WITHOUT COMPLICATION (H): ICD-10-CM

## 2023-12-18 DIAGNOSIS — E11.29 TYPE 2 DIABETES MELLITUS WITH MICROALBUMINURIA, WITHOUT LONG-TERM CURRENT USE OF INSULIN (H): ICD-10-CM

## 2023-12-18 DIAGNOSIS — R80.9 TYPE 2 DIABETES MELLITUS WITH MICROALBUMINURIA, WITHOUT LONG-TERM CURRENT USE OF INSULIN (H): ICD-10-CM

## 2023-12-18 DIAGNOSIS — I48.19 PERSISTENT ATRIAL FIBRILLATION (H): ICD-10-CM

## 2023-12-18 DIAGNOSIS — D50.9 IRON DEFICIENCY ANEMIA, UNSPECIFIED IRON DEFICIENCY ANEMIA TYPE: Primary | ICD-10-CM

## 2023-12-18 PROCEDURE — 99215 OFFICE O/P EST HI 40 MIN: CPT | Performed by: NURSE PRACTITIONER

## 2023-12-18 ASSESSMENT — PAIN SCALES - GENERAL: PAINLEVEL: NO PAIN (0)

## 2023-12-18 NOTE — PROGRESS NOTES
Hematology Follow Up Visit: December 18, 2023    Oncologist: Dr Jethro Moore  PCP: Bunny Meyers    Diagnosis: Iron deficiency anemia  Keerthi Montoya is a 72 yo female with PMH of  hypertension, diabetes mellitus type II, diverticulitis and ulcerative colitis who presented with low hemoglobin. History of Watchman procedure on 5/11/2023 for atrial fibrillation, not on anticoagulation. Hx of bleeding with blood thinners. Poor tolerance to PO iron. .   - 3/2023 last colonoscopy and upper GI endoscopy along with CT scan and MRI      Interval History:  Ms. Montoya comes to clinic for review of her anemia. She missed her infusion appointment this morning due to an ill-grandchild. Is overall feeling well. Has noticed some worsening hair thinning and plans on shopping for a wig soon. Appetite and energy are good. Denies SOB, chest pain, and blood in stool or urine.   Rest of comprehensive and complete ROS is reviewed and is negative.   Past Medical History:   Diagnosis Date    Chronic kidney disease     Diabetes mellitus, type 2 (H)     Diverticulosis of colon (without mention of hemorrhage) 10/01/2012    GI bleed     History of blood transfusion     HLD (hyperlipidemia)     Hypertension     Hypothyroidism     MARIA D (iron deficiency anemia)     Persistent atrial fibrillation (H)     Pulmonary hypertension (H)     Ulcerative (chronic) enterocolitis (H)      Current Outpatient Medications   Medication    aspirin 81 MG EC tablet    atenolol (TENORMIN) 50 MG tablet    atorvastatin (LIPITOR) 40 MG tablet    blood glucose (ACCU-CHEK FELIPE PLUS) test strip    Cholecalciferol (VITAMIN D) 2000 units CAPS    Cyanocobalamin (B-12) 1000 MCG TABS    diphenhydrAMINE (BENADRYL) 25 MG tablet    empagliflozin (JARDIANCE) 25 MG TABS tablet    glipiZIDE (GLUCOTROL) 10 MG tablet    glipiZIDE (GLUCOTROL) 5 MG tablet    hydrALAZINE (APRESOLINE) 25 MG tablet    isosorbide mononitrate (IMDUR) 30 MG 24 hr tablet    levothyroxine  (SYNTHROID/LEVOTHROID) 112 MCG tablet    losartan (COZAAR) 100 MG tablet    mesalamine (APRISO ER) 0.375 g 24 hr capsule    pantoprazole (PROTONIX) 40 MG EC tablet    senna (SENOKOT) 8.6 MG tablet    sodium bicarbonate 650 MG tablet    torsemide (DEMADEX) 10 MG tablet    Semaglutide (RYBELSUS) 3 MG tablet    Semaglutide (RYBELSUS) 7 MG tablet     No current facility-administered medications for this visit.     Allergies   Allergen Reactions    Actos [Pioglitazone]      Fluid retention    Amlodipine      Leg swelling, hand swelling    Animal Dander     Doxycycline Hives    Dust Mites     Grass     Keflex [Cephalexin Hcl] Hives    Metoprolol      Swelling of lower legs and fatigue    Other [Seasonal Allergies]      pollen    Ranitidine      rash    Synthroid [Levothyroxine Sodium] Swelling     Allergic to brand name    Tetracycline Hives    Tylenol Hives    Ziac [Bisoprolol-Hydrochlorothiazide]        Physical Exam:/58 (BP Location: Right arm)   Pulse 74   Temp 97.6  F (36.4  C) (Oral)   Resp 16   Wt 61.1 kg (134 lb 12.8 oz)   LMP  (LMP Unknown)   SpO2 97%   BMI 24.06 kg/m     ECOG PS- 0  Physical Exam:  Constitutional: No acute distress, pleasant. Significant hair thinning noted.  ENT: PERRLA, sclera without erythema. Patent nasal passages. Appropriate dentition, no mouth sores.  Neck: Trachea midline, no adenopathy.   Resp: CTA, adequate depth and rate of respirations.   Cardiac: Irregular heart rhythm, no murmurs.   Abdomen: BS active, abdomen soft and non-tender. No masses or organomegaly.   MS:  5/5 muscle strength, adequate ROM.   Skin: No rashes, lesions, or wounds.  Neuro: A/O x 4, sensation intact.   Lymph: No palpable anterior/posterior cervical, axillary, or supraclavicular nodes.   Psych: Appropriate mentation and affect.     Laboratory/Imaging Results:   Recent Results (from the past 240 hour(s))   Ferritin    Collection Time: 12/11/23  4:27 PM   Result Value Ref Range    Ferritin 32 11 - 328  ng/mL   Iron and iron binding capacity    Collection Time: 12/11/23  4:27 PM   Result Value Ref Range    Iron 38 37 - 145 ug/dL    Iron Binding Capacity 391 240 - 430 ug/dL    Iron Sat Index 10 (L) 15 - 46 %   CBC with platelets and differential    Collection Time: 12/11/23  4:27 PM   Result Value Ref Range    WBC Count 9.5 4.0 - 11.0 10e3/uL    RBC Count 3.67 (L) 3.80 - 5.20 10e6/uL    Hemoglobin 10.9 (L) 11.7 - 15.7 g/dL    Hematocrit 35.8 35.0 - 47.0 %    MCV 98 78 - 100 fL    MCH 29.7 26.5 - 33.0 pg    MCHC 30.4 (L) 31.5 - 36.5 g/dL    RDW 14.6 10.0 - 15.0 %    Platelet Count 255 150 - 450 10e3/uL    % Neutrophils 63 %    % Lymphocytes 18 %    % Monocytes 15 %    % Eosinophils 3 %    % Basophils 1 %    % Immature Granulocytes 0 %    NRBCs per 100 WBC 0 <1 /100    Absolute Neutrophils 5.9 1.6 - 8.3 10e3/uL    Absolute Lymphocytes 1.7 0.8 - 5.3 10e3/uL    Absolute Monocytes 1.4 (H) 0.0 - 1.3 10e3/uL    Absolute Eosinophils 0.3 0.0 - 0.7 10e3/uL    Absolute Basophils 0.1 0.0 - 0.2 10e3/uL    Absolute Immature Granulocytes 0.0 <=0.4 10e3/uL    Absolute NRBCs 0.0 10e3/uL       Assessment and Plan:   Iron deficiency anemia - Hgb = 10.9, ferritin 32, iron 38, TIBC 391, and iron sat 10. Will need iron transfusion. Has received Venafor in the past with good tolerance. Plan for Venafor x 3 doses. RTC in 3 months for BLAISE visit and repeat labs (CBC, ferritin, TIBC). Continue with Vitamin B12 orally. Discussed increasing red meats into diet as able.   Atrial fibrillation  - Follows Dr. Almendarez closely with cardiology. Had Watchman implant placed in March 2023. Not on chronic anticoagulation.   DM Type II  - On jardiance 25 mg daily and glipizide 10 mg at breakfast and 5 mg at dinner. Denies issues with hypoglycemia. Reeducated symptoms to monitor for.   - Dr. Meyers is PCP.   Health Maintenance  - Sees PCP, dentist, and ophthalmology regularly.   - Upcoming mammogram.    The total time of this encounter amounted to 50  minutes. This time included time spent with the patient, prep work, ordering tests, and performing post visit documentation.    WENDI Solano CNP    I have examined the patient and agree with written evaluation. Assessment and plan presented with this provider. Francisca Mustafa,Cnp

## 2023-12-18 NOTE — NURSING NOTE
"Oncology Rooming Note    December 18, 2023 12:50 PM   Keerthi Montoya is a 73 year old female who presents for:    Chief Complaint   Patient presents with    Oncology Clinic Visit     2 month follow up     Initial Vitals: /58 (BP Location: Right arm)   Pulse 74   Temp 97.6  F (36.4  C) (Oral)   Resp 16   Wt 61.1 kg (134 lb 12.8 oz)   LMP  (LMP Unknown)   SpO2 97%   BMI 24.06 kg/m   Estimated body mass index is 24.06 kg/m  as calculated from the following:    Height as of 11/7/23: 1.594 m (5' 2.76\").    Weight as of this encounter: 61.1 kg (134 lb 12.8 oz). Body surface area is 1.64 meters squared.  No Pain (0) Comment: Data Unavailable   No LMP recorded (lmp unknown). Patient is postmenopausal.  Allergies reviewed: Yes  Medications reviewed: Yes    Medications: Medication refills not needed today.  Pharmacy name entered into [employer]: [id number] - [place], [place] - [address] AT [facility] [address]    Clinical concerns: No new concerns         Bibiana Goddard LPN              "

## 2023-12-18 NOTE — LETTER
12/18/2023         RE: Keerthi Montoya  [address] [place] [id number]        Dear Colleague,    Thank you for referring your patient, Keerthi Montoya, to the [facility]. Please see a copy of my visit note below.    Hematology Follow Up Visit: December 18, 2023    Oncologist: Dr Jethro Moore  PCP: Bunny Meyers    Diagnosis: Iron deficiency anemia  Keerthi Montoya is a 72 yo female with PMH of  hypertension, diabetes mellitus type II, diverticulitis and ulcerative colitis who presented with low hemoglobin. History of Watchman procedure on 5/11/2023 for atrial fibrillation, not on anticoagulation. Hx of bleeding with blood thinners. Poor tolerance to PO iron. .   - 3/2023 last colonoscopy and upper GI endoscopy along with CT scan and MRI      Interval History:  Ms. Montoya comes to clinic for review of her anemia. She missed her infusion appointment this morning due to an ill-grandchild. Is overall feeling well. Has noticed some worsening hair thinning and plans on shopping for a wig soon. Appetite and energy are good. Denies SOB, chest pain, and blood in stool or urine.   Rest of comprehensive and complete ROS is reviewed and is negative.   Past Medical History:   Diagnosis Date     Chronic kidney disease      Diabetes mellitus, type 2 (H)      Diverticulosis of colon (without mention of hemorrhage) 10/01/2012     GI bleed      History of blood transfusion      HLD (hyperlipidemia)      Hypertension      Hypothyroidism      MARIA D (iron deficiency anemia)      Persistent atrial fibrillation (H)      Pulmonary hypertension (H)      Ulcerative (chronic) enterocolitis (H)      Current Outpatient Medications   Medication     aspirin 81 MG EC tablet     atenolol (TENORMIN) 50 MG tablet     atorvastatin (LIPITOR) 40 MG tablet     blood glucose (ACCU-CHEK FELIPE PLUS) test strip     Cholecalciferol (VITAMIN D) 2000 units CAPS     Cyanocobalamin (B-12) 1000 MCG TABS      diphenhydrAMINE (BENADRYL) 25 MG tablet     empagliflozin (JARDIANCE) 25 MG TABS tablet     glipiZIDE (GLUCOTROL) 10 MG tablet     glipiZIDE (GLUCOTROL) 5 MG tablet     hydrALAZINE (APRESOLINE) 25 MG tablet     isosorbide mononitrate (IMDUR) 30 MG 24 hr tablet     levothyroxine (SYNTHROID/LEVOTHROID) 112 MCG tablet     losartan (COZAAR) 100 MG tablet     mesalamine (APRISO ER) 0.375 g 24 hr capsule     pantoprazole (PROTONIX) 40 MG EC tablet     senna (SENOKOT) 8.6 MG tablet     sodium bicarbonate 650 MG tablet     torsemide (DEMADEX) 10 MG tablet     Semaglutide (RYBELSUS) 3 MG tablet     Semaglutide (RYBELSUS) 7 MG tablet     No current facility-administered medications for this visit.     Allergies   Allergen Reactions     Actos [Pioglitazone]      Fluid retention     Amlodipine      Leg swelling, hand swelling     Animal Dander      Doxycycline Hives     Dust Mites      Grass      Keflex [Cephalexin Hcl] Hives     Metoprolol      Swelling of lower legs and fatigue     Other [Seasonal Allergies]      pollen     Ranitidine      rash     Synthroid [Levothyroxine Sodium] Swelling     Allergic to brand name     Tetracycline Hives     Tylenol Hives     Ziac [Bisoprolol-Hydrochlorothiazide]        Physical Exam:/58 (BP Location: Right arm)   Pulse 74   Temp 97.6  F (36.4  C) (Oral)   Resp 16   Wt 61.1 kg (134 lb 12.8 oz)   LMP  (LMP Unknown)   SpO2 97%   BMI 24.06 kg/m     ECOG PS- 0  Physical Exam:  Constitutional: No acute distress, pleasant. Significant hair thinning noted.  ENT: PERRLA, sclera without erythema. Patent nasal passages. Appropriate dentition, no mouth sores.  Neck: Trachea midline, no adenopathy.   Resp: CTA, adequate depth and rate of respirations.   Cardiac: Irregular heart rhythm, no murmurs.   Abdomen: BS active, abdomen soft and non-tender. No masses or organomegaly.   MS:  5/5 muscle strength, adequate ROM.   Skin: No rashes, lesions, or wounds.  Neuro: A/O x 4, sensation  intact.   Lymph: No palpable anterior/posterior cervical, axillary, or supraclavicular nodes.   Psych: Appropriate mentation and affect.     Laboratory/Imaging Results:   Recent Results (from the past 240 hour(s))   Ferritin    Collection Time: 12/11/23  4:27 PM   Result Value Ref Range    Ferritin 32 11 - 328 ng/mL   Iron and iron binding capacity    Collection Time: 12/11/23  4:27 PM   Result Value Ref Range    Iron 38 37 - 145 ug/dL    Iron Binding Capacity 391 240 - 430 ug/dL    Iron Sat Index 10 (L) 15 - 46 %   CBC with platelets and differential    Collection Time: 12/11/23  4:27 PM   Result Value Ref Range    WBC Count 9.5 4.0 - 11.0 10e3/uL    RBC Count 3.67 (L) 3.80 - 5.20 10e6/uL    Hemoglobin 10.9 (L) 11.7 - 15.7 g/dL    Hematocrit 35.8 35.0 - 47.0 %    MCV 98 78 - 100 fL    MCH 29.7 26.5 - 33.0 pg    MCHC 30.4 (L) 31.5 - 36.5 g/dL    RDW 14.6 10.0 - 15.0 %    Platelet Count 255 150 - 450 10e3/uL    % Neutrophils 63 %    % Lymphocytes 18 %    % Monocytes 15 %    % Eosinophils 3 %    % Basophils 1 %    % Immature Granulocytes 0 %    NRBCs per 100 WBC 0 <1 /100    Absolute Neutrophils 5.9 1.6 - 8.3 10e3/uL    Absolute Lymphocytes 1.7 0.8 - 5.3 10e3/uL    Absolute Monocytes 1.4 (H) 0.0 - 1.3 10e3/uL    Absolute Eosinophils 0.3 0.0 - 0.7 10e3/uL    Absolute Basophils 0.1 0.0 - 0.2 10e3/uL    Absolute Immature Granulocytes 0.0 <=0.4 10e3/uL    Absolute NRBCs 0.0 10e3/uL       Assessment and Plan:   Iron deficiency anemia - Hgb = 10.9, ferritin 32, iron 38, TIBC 391, and iron sat 10. Will need iron transfusion. Has received Venafor in the past with good tolerance. Plan for Venafor x 3 doses. RTC in 3 months for BLAISE visit and repeat labs (CBC, ferritin, TIBC). Continue with Vitamin B12 orally. Discussed increasing red meats into diet as able.   Atrial fibrillation  - Follows Dr. Almendarez closely with cardiology. Had Watchman implant placed in March 2023. Not on chronic anticoagulation.   DM Type II  - On jardiance  25 mg daily and glipizide 10 mg at breakfast and 5 mg at dinner. Denies issues with hypoglycemia. Reeducated symptoms to monitor for.   - Dr. Meyers is PCP.   Health Maintenance  - Sees PCP, dentist, and ophthalmology regularly.   - Upcoming mammogram.    The total time of this encounter amounted to 50 minutes. This time included time spent with the patient, prep work, ordering tests, and performing post visit documentation.    WENDI Solano CNP    I have examined the patient and agree with written evaluation. Assessment and plan presented with this provider. Francisca Mustafa Cnp       Again, thank you for allowing me to participate in the care of your patient.        Sincerely,        Francisca Mustafa NP, APRN CNP

## 2023-12-19 DIAGNOSIS — D50.9 IRON DEFICIENCY ANEMIA, UNSPECIFIED IRON DEFICIENCY ANEMIA TYPE: Primary | ICD-10-CM

## 2023-12-19 RX ORDER — HEPARIN SODIUM (PORCINE) LOCK FLUSH IV SOLN 100 UNIT/ML 100 UNIT/ML
5 SOLUTION INTRAVENOUS
Status: CANCELLED | OUTPATIENT
Start: 2023-12-19

## 2023-12-19 RX ORDER — DIPHENHYDRAMINE HYDROCHLORIDE 50 MG/ML
50 INJECTION INTRAMUSCULAR; INTRAVENOUS
Status: CANCELLED
Start: 2023-12-19

## 2023-12-19 RX ORDER — MEPERIDINE HYDROCHLORIDE 25 MG/ML
25 INJECTION INTRAMUSCULAR; INTRAVENOUS; SUBCUTANEOUS EVERY 30 MIN PRN
Status: CANCELLED | OUTPATIENT
Start: 2023-12-19

## 2023-12-19 RX ORDER — EPINEPHRINE 1 MG/ML
0.3 INJECTION, SOLUTION INTRAMUSCULAR; SUBCUTANEOUS EVERY 5 MIN PRN
Status: CANCELLED | OUTPATIENT
Start: 2023-12-19

## 2023-12-19 RX ORDER — HEPARIN SODIUM,PORCINE 10 UNIT/ML
5-20 VIAL (ML) INTRAVENOUS DAILY PRN
Status: CANCELLED | OUTPATIENT
Start: 2023-12-19

## 2023-12-19 RX ORDER — ALBUTEROL SULFATE 90 UG/1
1-2 AEROSOL, METERED RESPIRATORY (INHALATION)
Status: CANCELLED
Start: 2023-12-19

## 2023-12-19 RX ORDER — METHYLPREDNISOLONE SODIUM SUCCINATE 125 MG/2ML
125 INJECTION, POWDER, LYOPHILIZED, FOR SOLUTION INTRAMUSCULAR; INTRAVENOUS
Status: CANCELLED
Start: 2023-12-19

## 2023-12-19 RX ORDER — ALBUTEROL SULFATE 0.83 MG/ML
2.5 SOLUTION RESPIRATORY (INHALATION)
Status: CANCELLED | OUTPATIENT
Start: 2023-12-19

## 2023-12-22 ENCOUNTER — INFUSION THERAPY VISIT (OUTPATIENT)
Dept: INFUSION THERAPY | Facility: CLINIC | Age: 73
End: 2023-12-22
Attending: NURSE PRACTITIONER
Payer: MEDICARE

## 2023-12-22 VITALS
SYSTOLIC BLOOD PRESSURE: 121 MMHG | OXYGEN SATURATION: 94 % | DIASTOLIC BLOOD PRESSURE: 71 MMHG | WEIGHT: 137.2 LBS | HEART RATE: 92 BPM | TEMPERATURE: 97.8 F | BODY MASS INDEX: 24.49 KG/M2 | RESPIRATION RATE: 18 BRPM

## 2023-12-22 DIAGNOSIS — D50.9 IRON DEFICIENCY ANEMIA, UNSPECIFIED IRON DEFICIENCY ANEMIA TYPE: Primary | ICD-10-CM

## 2023-12-22 PROCEDURE — 96366 THER/PROPH/DIAG IV INF ADDON: CPT

## 2023-12-22 PROCEDURE — 258N000003 HC RX IP 258 OP 636: Performed by: NURSE PRACTITIONER

## 2023-12-22 PROCEDURE — 99207 PR NO CHARGE LOS: CPT

## 2023-12-22 PROCEDURE — 96365 THER/PROPH/DIAG IV INF INIT: CPT

## 2023-12-22 PROCEDURE — 250N000011 HC RX IP 250 OP 636: Performed by: NURSE PRACTITIONER

## 2023-12-22 RX ORDER — DIPHENHYDRAMINE HYDROCHLORIDE 50 MG/ML
50 INJECTION INTRAMUSCULAR; INTRAVENOUS
Status: CANCELLED
Start: 2023-12-24

## 2023-12-22 RX ORDER — ALBUTEROL SULFATE 90 UG/1
1-2 AEROSOL, METERED RESPIRATORY (INHALATION)
Status: CANCELLED
Start: 2023-12-24

## 2023-12-22 RX ORDER — HEPARIN SODIUM,PORCINE 10 UNIT/ML
5-20 VIAL (ML) INTRAVENOUS DAILY PRN
Status: CANCELLED | OUTPATIENT
Start: 2023-12-24

## 2023-12-22 RX ORDER — EPINEPHRINE 1 MG/ML
0.3 INJECTION, SOLUTION INTRAMUSCULAR; SUBCUTANEOUS EVERY 5 MIN PRN
Status: CANCELLED | OUTPATIENT
Start: 2023-12-24

## 2023-12-22 RX ORDER — METHYLPREDNISOLONE SODIUM SUCCINATE 125 MG/2ML
125 INJECTION, POWDER, LYOPHILIZED, FOR SOLUTION INTRAMUSCULAR; INTRAVENOUS
Status: CANCELLED
Start: 2023-12-24

## 2023-12-22 RX ORDER — ALBUTEROL SULFATE 0.83 MG/ML
2.5 SOLUTION RESPIRATORY (INHALATION)
Status: CANCELLED | OUTPATIENT
Start: 2023-12-24

## 2023-12-22 RX ORDER — HEPARIN SODIUM (PORCINE) LOCK FLUSH IV SOLN 100 UNIT/ML 100 UNIT/ML
5 SOLUTION INTRAVENOUS
Status: CANCELLED | OUTPATIENT
Start: 2023-12-24

## 2023-12-22 RX ORDER — MEPERIDINE HYDROCHLORIDE 25 MG/ML
25 INJECTION INTRAMUSCULAR; INTRAVENOUS; SUBCUTANEOUS EVERY 30 MIN PRN
Status: CANCELLED | OUTPATIENT
Start: 2023-12-24

## 2023-12-22 RX ADMIN — IRON SUCROSE 300 MG: 20 INJECTION, SOLUTION INTRAVENOUS at 11:06

## 2023-12-22 RX ADMIN — SODIUM CHLORIDE 250 ML: 9 INJECTION, SOLUTION INTRAVENOUS at 11:02

## 2023-12-22 ASSESSMENT — PAIN SCALES - GENERAL: PAINLEVEL: NO PAIN (0)

## 2023-12-22 NOTE — PROGRESS NOTES
Infusion Nursing Note:  Keerthi Montoya presents today for Venofer 1/3.    Patient seen by provider today: No   present during visit today: Not Applicable.    Note: Pt has tolerated iron in the past-voices no concerns today.      Intravenous Access:  Peripheral IV placed.    Treatment Conditions:  Not Applicable.      Post Infusion Assessment:  Patient tolerated infusion without incident.  Blood return noted pre and post infusion.  Site patent and intact, free from redness, edema or discomfort.  No evidence of extravasations.  Access discontinued per protocol.       Discharge Plan:   AVS to patient via MYCHART.  Patient will return 1/5/23 for next appointment. Future appts have been reviewed and crosschecked with appt note and plan.   Patient discharged in stable condition accompanied by: self.  Departure Mode: Ambulatory.      Jessica Hussein RN

## 2024-01-04 ENCOUNTER — DOCUMENTATION ONLY (OUTPATIENT)
Dept: CARDIOLOGY | Facility: CLINIC | Age: 74
End: 2024-01-04
Payer: MEDICARE

## 2024-01-04 DIAGNOSIS — I50.30 HEART FAILURE WITH PRESERVED EJECTION FRACTION, NYHA CLASS I (H): Primary | ICD-10-CM

## 2024-01-05 ENCOUNTER — OFFICE VISIT (OUTPATIENT)
Dept: CARDIOLOGY | Facility: CLINIC | Age: 74
End: 2024-01-05
Attending: NURSE PRACTITIONER
Payer: MEDICARE

## 2024-01-05 ENCOUNTER — LAB (OUTPATIENT)
Dept: LAB | Facility: CLINIC | Age: 74
End: 2024-01-05
Attending: NURSE PRACTITIONER
Payer: MEDICARE

## 2024-01-05 ENCOUNTER — INFUSION THERAPY VISIT (OUTPATIENT)
Dept: INFUSION THERAPY | Facility: CLINIC | Age: 74
End: 2024-01-05
Attending: NURSE PRACTITIONER
Payer: MEDICARE

## 2024-01-05 VITALS
DIASTOLIC BLOOD PRESSURE: 72 MMHG | WEIGHT: 137.4 LBS | BODY MASS INDEX: 24.53 KG/M2 | OXYGEN SATURATION: 96 % | HEART RATE: 103 BPM | SYSTOLIC BLOOD PRESSURE: 121 MMHG

## 2024-01-05 VITALS
DIASTOLIC BLOOD PRESSURE: 72 MMHG | HEART RATE: 108 BPM | TEMPERATURE: 98.1 F | RESPIRATION RATE: 16 BRPM | OXYGEN SATURATION: 93 % | WEIGHT: 135.8 LBS | SYSTOLIC BLOOD PRESSURE: 109 MMHG | BODY MASS INDEX: 24.24 KG/M2

## 2024-01-05 DIAGNOSIS — N18.30 STAGE 3 CHRONIC KIDNEY DISEASE, UNSPECIFIED WHETHER STAGE 3A OR 3B CKD (H): ICD-10-CM

## 2024-01-05 DIAGNOSIS — I50.32 CHRONIC HEART FAILURE WITH PRESERVED EJECTION FRACTION (HFPEF) (H): ICD-10-CM

## 2024-01-05 DIAGNOSIS — I27.20 PULMONARY HYPERTENSION (H): Primary | ICD-10-CM

## 2024-01-05 DIAGNOSIS — I50.30 HEART FAILURE WITH PRESERVED EJECTION FRACTION, NYHA CLASS I (H): ICD-10-CM

## 2024-01-05 DIAGNOSIS — I35.0 NONRHEUMATIC AORTIC VALVE STENOSIS: ICD-10-CM

## 2024-01-05 DIAGNOSIS — D50.9 IRON DEFICIENCY ANEMIA, UNSPECIFIED IRON DEFICIENCY ANEMIA TYPE: Primary | ICD-10-CM

## 2024-01-05 DIAGNOSIS — I48.19 PERSISTENT ATRIAL FIBRILLATION (H): ICD-10-CM

## 2024-01-05 LAB
ALBUMIN SERPL BCG-MCNC: 4.3 G/DL (ref 3.5–5.2)
ALP SERPL-CCNC: 117 U/L (ref 40–150)
ALT SERPL W P-5'-P-CCNC: <5 U/L (ref 0–50)
ANION GAP SERPL CALCULATED.3IONS-SCNC: 13 MMOL/L (ref 7–15)
AST SERPL W P-5'-P-CCNC: 21 U/L (ref 0–45)
BILIRUB SERPL-MCNC: 0.5 MG/DL
BUN SERPL-MCNC: 56.6 MG/DL (ref 8–23)
CALCIUM SERPL-MCNC: 9.5 MG/DL (ref 8.8–10.2)
CHLORIDE SERPL-SCNC: 95 MMOL/L (ref 98–107)
CREAT SERPL-MCNC: 2.17 MG/DL (ref 0.51–0.95)
DEPRECATED HCO3 PLAS-SCNC: 23 MMOL/L (ref 22–29)
EGFRCR SERPLBLD CKD-EPI 2021: 24 ML/MIN/1.73M2
GLUCOSE SERPL-MCNC: 269 MG/DL (ref 70–99)
POTASSIUM SERPL-SCNC: 4.5 MMOL/L (ref 3.4–5.3)
PROT SERPL-MCNC: 8.6 G/DL (ref 6.4–8.3)
SODIUM SERPL-SCNC: 131 MMOL/L (ref 135–145)

## 2024-01-05 PROCEDURE — 96366 THER/PROPH/DIAG IV INF ADDON: CPT

## 2024-01-05 PROCEDURE — G0463 HOSPITAL OUTPT CLINIC VISIT: HCPCS | Performed by: STUDENT IN AN ORGANIZED HEALTH CARE EDUCATION/TRAINING PROGRAM

## 2024-01-05 PROCEDURE — 99215 OFFICE O/P EST HI 40 MIN: CPT | Performed by: STUDENT IN AN ORGANIZED HEALTH CARE EDUCATION/TRAINING PROGRAM

## 2024-01-05 PROCEDURE — 258N000003 HC RX IP 258 OP 636: Performed by: NURSE PRACTITIONER

## 2024-01-05 PROCEDURE — 36415 COLL VENOUS BLD VENIPUNCTURE: CPT | Performed by: PATHOLOGY

## 2024-01-05 PROCEDURE — 80053 COMPREHEN METABOLIC PANEL: CPT | Performed by: PATHOLOGY

## 2024-01-05 PROCEDURE — 96365 THER/PROPH/DIAG IV INF INIT: CPT

## 2024-01-05 PROCEDURE — 99207 PR NO CHARGE LOS: CPT

## 2024-01-05 PROCEDURE — 250N000011 HC RX IP 250 OP 636: Performed by: NURSE PRACTITIONER

## 2024-01-05 RX ORDER — DIPHENHYDRAMINE HYDROCHLORIDE 50 MG/ML
50 INJECTION INTRAMUSCULAR; INTRAVENOUS
Status: CANCELLED
Start: 2024-01-07

## 2024-01-05 RX ORDER — EPINEPHRINE 1 MG/ML
0.3 INJECTION, SOLUTION INTRAMUSCULAR; SUBCUTANEOUS EVERY 5 MIN PRN
Status: CANCELLED | OUTPATIENT
Start: 2024-01-07

## 2024-01-05 RX ORDER — MEPERIDINE HYDROCHLORIDE 25 MG/ML
25 INJECTION INTRAMUSCULAR; INTRAVENOUS; SUBCUTANEOUS EVERY 30 MIN PRN
Status: CANCELLED | OUTPATIENT
Start: 2024-01-07

## 2024-01-05 RX ORDER — HEPARIN SODIUM (PORCINE) LOCK FLUSH IV SOLN 100 UNIT/ML 100 UNIT/ML
5 SOLUTION INTRAVENOUS
Status: CANCELLED | OUTPATIENT
Start: 2024-01-07

## 2024-01-05 RX ORDER — ALBUTEROL SULFATE 0.83 MG/ML
2.5 SOLUTION RESPIRATORY (INHALATION)
Status: CANCELLED | OUTPATIENT
Start: 2024-01-07

## 2024-01-05 RX ORDER — ALBUTEROL SULFATE 90 UG/1
1-2 AEROSOL, METERED RESPIRATORY (INHALATION)
Status: CANCELLED
Start: 2024-01-07

## 2024-01-05 RX ORDER — METHYLPREDNISOLONE SODIUM SUCCINATE 125 MG/2ML
125 INJECTION, POWDER, LYOPHILIZED, FOR SOLUTION INTRAMUSCULAR; INTRAVENOUS
Status: CANCELLED
Start: 2024-01-07

## 2024-01-05 RX ORDER — HEPARIN SODIUM,PORCINE 10 UNIT/ML
5-20 VIAL (ML) INTRAVENOUS DAILY PRN
Status: CANCELLED | OUTPATIENT
Start: 2024-01-07

## 2024-01-05 RX ADMIN — IRON SUCROSE 300 MG: 20 INJECTION, SOLUTION INTRAVENOUS at 08:55

## 2024-01-05 RX ADMIN — SODIUM CHLORIDE 250 ML: 9 INJECTION, SOLUTION INTRAVENOUS at 08:55

## 2024-01-05 ASSESSMENT — PAIN SCALES - GENERAL: PAINLEVEL: NO PAIN (0)

## 2024-01-05 NOTE — PROGRESS NOTES
Advanced Heart Failure/Transplant Clinic Note    HPI  Dear colleagues,     I had the pleasure of seeing Ms. Keerthi Montoya in the Cardiology clinic.  As you know, Ms. Keerthi Montoya is a pleasant 73 year old female with a past medical history of chronic HFpEF, HLD, HTN, DM2, ulcerative pancolitis, CKD3, pulmonary HTN, moderate paradoxical low flow low gradient aortic stenosis, persistent atrial fibrillation with (CHADS-VASc 4) with intolerance to anticoagulation due to history of GI bleeding s/p RY closure and Watchman (05/2023) who was admitted in Sept '23 heart failure exacerbation and presents as follow up for HFpEF.    Patient reports she developed shingles in Sept '23 and with this, developed painful, itchy rash and started having SOB and abdominal bloating after this. She was admitted, and diuresed for almost 15 lbs weight gain.    Patient reports overall feeling very well recently. Patient denies dyspnea with any of her ADLs or usual chores. She denies SOB at rest, presyncope, syncope, orthopnea, PND, chest pain, palpitations, abdominal pain, nausea, emesis, LE edema or weight gain. Patient reports compliance with her medications, weighing herself daily, and watching her salt and fluid intake.    ROS:  A complete 12-point ROS was negative except as above.    PAST MEDICAL HISTORY:  Patient Active Problem List   Diagnosis    Diverticulosis of large intestine    Vitamin D deficiency    Preventive measure    Hyperlipidemia LDL goal <100    Aspirin not indicated    Abdominal pain, left lower quadrant    Rectal bleeding    Colitis    Postoperative hypothyroidism    Hypertension goal BP (blood pressure) < 130/80    Type 2 diabetes mellitus with microalbuminuria, without long-term current use of insulin (H)    Cervical cancer screening    Ulcerative pancolitis without complication (H)    Vitamin B12 deficiency (non anemic)    Proteinuria, unspecified type    CKD (chronic kidney disease) stage 3, GFR 30-59 ml/min  (H)    Aortic stenosis    Pulmonary hypertension (H)    Diastolic dysfunction    Heart failure with preserved ejection fraction, NYHA class I (H)    Anemia, unspecified type    Elevated serum immunoglobulin free light chains    Posterior vitreous detachment, left    Anemia due to blood loss, acute    Gastrointestinal hemorrhage, unspecified gastrointestinal hemorrhage type    Anemia, iron deficiency    Persistent atrial fibrillation (H)    Acute on chronic diastolic congestive heart failure (H)    Severe pulmonary hypertension (H)        FAMILY HISTORY:  Family History   Problem Relation Age of Onset    Cerebrovascular Disease Mother     Cancer Father         bladder    Diverticulitis Brother     Diverticulitis Brother     Kidney Disease No family hx of     Crohn's Disease No family hx of     Ulcerative Colitis No family hx of     Stomach Cancer No family hx of     GERD No family hx of     Celiac Disease No family hx of     Anesthesia Reaction No family hx of        SOCIAL HISTORY:  Social History     Tobacco Use    Smoking status: Never     Passive exposure: Never    Smokeless tobacco: Never   Vaping Use    Vaping Use: Never used   Substance Use Topics    Alcohol use: Not Currently     Alcohol/week: 2.0 - 4.0 standard drinks of alcohol     Types: 1 - 2 Cans of beer, 1 - 2 Shots of liquor per week     Comment: occasional cocktail or beer    Drug use: No        ALLERGIES:  Allergies   Allergen Reactions    Actos [Pioglitazone]      Fluid retention    Amlodipine      Leg swelling, hand swelling    Animal Dander     Doxycycline Hives    Dust Mites     Grass     Keflex [Cephalexin Hcl] Hives    Metoprolol      Swelling of lower legs and fatigue    Other [Seasonal Allergies]      pollen    Ranitidine      rash    Synthroid [Levothyroxine Sodium] Swelling     Allergic to brand name    Tetracycline Hives    Tylenol Hives    Ziac [Bisoprolol-Hydrochlorothiazide]        CURRENT MEDICATIONS:  Current Outpatient Medications    Medication Sig Dispense Refill    aspirin 81 MG EC tablet Take 1 tablet (81 mg) by mouth daily 90 tablet 3    atenolol (TENORMIN) 50 MG tablet Take 1 tablet (50 mg) by mouth daily 90 tablet 3    atorvastatin (LIPITOR) 40 MG tablet Take 1 tablet (40 mg) by mouth daily 90 tablet 3    blood glucose (ACCU-CHEK FELIPE PLUS) test strip 200 strips by In Vitro route 2 times daily Use to test blood sugar 2 times daily or as directed. 200 strip 4    Cholecalciferol (VITAMIN D) 2000 units CAPS Take by mouth daily (with lunch)      Cyanocobalamin (B-12) 1000 MCG TABS Take 1,000 mcg by mouth three times a week      diphenhydrAMINE (BENADRYL) 25 MG tablet Take 25 mg by mouth every 6 hours as needed for itching or allergies      empagliflozin (JARDIANCE) 25 MG TABS tablet Take 1 tablet (25 mg) by mouth daily 90 tablet 3    glipiZIDE (GLUCOTROL) 10 MG tablet Take 1 tablet (10 mg) by mouth daily (with breakfast) 30 tablet 3    glipiZIDE (GLUCOTROL) 5 MG tablet Take 1 tablet (5 mg) by mouth daily (with dinner) 30 tablet 3    hydrALAZINE (APRESOLINE) 25 MG tablet Take 1 tablet (25 mg) by mouth 3 times daily 270 tablet 3    isosorbide mononitrate (IMDUR) 30 MG 24 hr tablet Take 1 tablet (30 mg) by mouth daily 90 tablet 3    levothyroxine (SYNTHROID/LEVOTHROID) 112 MCG tablet Take 1 tablet (112 mcg) by mouth daily 90 tablet 4    losartan (COZAAR) 100 MG tablet Take 1 tablet (100 mg) by mouth daily 90 tablet 0    mesalamine (APRISO ER) 0.375 g 24 hr capsule TAKE 4 CAPSULES(1.5 GRAMS) BY MOUTH DAILY 360 capsule 1    pantoprazole (PROTONIX) 40 MG EC tablet Take 1 tablet (40 mg) by mouth daily 90 tablet 3    senna (SENOKOT) 8.6 MG tablet Take 1 tablet by mouth daily as needed for constipation 30 tablet 3    sodium bicarbonate 650 MG tablet Take 1 tablet (650 mg) by mouth daily 90 tablet 3    torsemide (DEMADEX) 10 MG tablet Take 1 tablet (10 mg) by mouth daily 90 tablet 1    Semaglutide (RYBELSUS) 3 MG tablet Take 3 mg by mouth daily For  30 days then increase to 7 mg (Patient not taking: Reported on 11/10/2023) 30 tablet 0    Semaglutide (RYBELSUS) 7 MG tablet Take 1 tablet (7 mg) by mouth daily (Patient not taking: Reported on 12/22/2023) 90 tablet 1       EXAM:  /72 (BP Location: Right arm, Patient Position: Chair, Cuff Size: Adult Regular)   Pulse 103   Wt 62.3 kg (137 lb 6.4 oz)   LMP  (LMP Unknown)   SpO2 96%   BMI 24.53 kg/m      General: appears comfortable, alert and interactive, in no acute distress  Head: normocephalic, atraumatic  Eyes: anicteric sclera, EOMI  Mouth: MMM  Neck: supple, no cervical adenopathy  CV: regular rate and rhythm, no murmur, gallop, rub, estimated JVP ~7 cm  Resp: clear, no rales or wheezing  GI: soft, nontender, nondistended  Extremities: warm, no peripheral edema, 2+ bilateral radial pulses  Neurological: alert and oriented, no focal deficits  Psych: normal mood and affect  Derm: healing shingles lesions along legs    Weight  Wt Readings from Last 10 Encounters:   01/05/24 62.3 kg (137 lb 6.4 oz)   01/05/24 61.6 kg (135 lb 12.8 oz)   12/22/23 62.2 kg (137 lb 3.2 oz)   12/18/23 61.1 kg (134 lb 12.8 oz)   12/01/23 59.9 kg (132 lb)   11/17/23 60.1 kg (132 lb 8 oz)   11/10/23 59.8 kg (131 lb 14.4 oz)   11/07/23 57.6 kg (127 lb)   10/27/23 58 kg (127 lb 12.8 oz)   10/20/23 57.3 kg (126 lb 6.4 oz)       I personally reviewed recent labs and data as below and discussed the results with the patient in clinic today.  Labs:  CBC RESULTS:  Lab Results   Component Value Date    WBC 9.5 12/11/2023    WBC 7.3 10/28/2020    RBC 3.67 (L) 12/11/2023    RBC 3.99 10/28/2020    HGB 10.9 (L) 12/11/2023    HGB 11.8 02/08/2021    HCT 35.8 12/11/2023    HCT 38.2 10/28/2020    MCV 98 12/11/2023    MCV 96 10/28/2020    MCH 29.7 12/11/2023    MCH 30.8 10/28/2020    MCHC 30.4 (L) 12/11/2023    MCHC 32.2 10/28/2020    RDW 14.6 12/11/2023    RDW 13.9 10/28/2020     12/11/2023     10/28/2020       CMP RESULTS:  Lab  Results   Component Value Date     (L) 01/05/2024     04/28/2021    POTASSIUM 4.5 01/05/2024    POTASSIUM 4.3 09/17/2023    POTASSIUM 4.5 04/28/2021    CHLORIDE 95 (L) 01/05/2024    CHLORIDE 109 04/27/2023    CHLORIDE 100 04/28/2021    CO2 23 01/05/2024    CO2 21 04/27/2023    CO2 26 04/28/2021    ANIONGAP 13 01/05/2024    ANIONGAP 7 04/27/2023    ANIONGAP 8 04/28/2021     (H) 01/05/2024     (H) 09/21/2023     (H) 04/27/2023    GLC 57 (L) 04/28/2021    BUN 56.6 (H) 01/05/2024    BUN 28 04/27/2023    BUN 23 04/28/2021    CR 2.17 (H) 01/05/2024    CR 1.21 (H) 04/28/2021    GFRESTIMATED 24 (L) 01/05/2024    GFRESTIMATED 30 (L) 12/30/2022    GFRESTIMATED 46 (L) 04/28/2021    GFRESTBLACK 53 (L) 04/28/2021    ABEL 9.5 01/05/2024    ABEL 9.8 04/28/2021    BILITOTAL 0.5 01/05/2024    BILITOTAL 0.6 10/28/2020    ALBUMIN 4.3 01/05/2024    ALBUMIN 3.7 04/27/2023    ALBUMIN 4.1 02/08/2021    ALKPHOS 117 01/05/2024    ALKPHOS 104 10/28/2020    ALT <5 01/05/2024    ALT 18 10/28/2020    AST 21 01/05/2024    AST 19 10/28/2020        Recent Labs   Lab Test 01/20/23  0740 10/28/20  1629   CHOL 73 93   HDL 29* 38*   LDL 27 33   TRIG 87 108        Testing/Procedures:  I personally visualized and interpreted:  Echocardiogram 5/12/23  Interpretation Summary  Small unchanged pericardial effusion is present without evidence of  pericardial tamponade.  Left ventricular function is normal.The ejection fraction is 55-60%.  Paradoxical septal motion consistent with right ventricular pressure and  volume overload is present.  Moderate to severe right ventricular dilation is present. Global right  ventricular function is moderately to severely reduced.  IVC diameter >2.1 cm collapsing <50% with sniff suggests a high RA pressure estimated at 15 mmHg or greater.  This study was compared with the study from 5/11/23. There has been no change.    TTE 5/25/23  Interpretation Summary  Global and regional left ventricular  function is normal with an EF of 60-65%.  Flattened septum is consistent with right ventricular pressure and volume overload.  Moderate right ventricular dilation with moderately reduced global right  ventricular function.  Severe tricuspid insufficiency.  Estimated pulmonary artery systolic pressure is 69 mmHg.  Dilation of the inferior vena cava is present with abnormal respiratory  variation in diameter.  Small circumferential pericardial effusion without any hemodynamic  significance.  This study was compared with the study from 5/15/23. N significant change in ventricular function or pericardial effusion. TR appears worse on today's study, but that may be related to technique.    TTE 9/18/23  Interpretation Summary  Global and regional left ventricular function is normal with an EF of 55-60%.  Flattened septum is consistent with right ventricular pressure and volume overload.  Global right ventricular function is moderately reduced. Severe right  ventricular dilation is present.  Pulmonary hypertension is present. Pulmonary artery systolic pressure is 59 mmHg (RVSP 44mmHg + RAP 15mmHg).  Severe tricuspid insufficiency is present.  IVC diameter >2.1 cm collapsing <50% with sniff suggests a high RA pressure estimated at 15 mmHg or greater.  No pericardial effusion is present.  This study was compared with the study from 5/25/2023 .  No significant changes noted.    Cardiac CTA 10/6/23  IMPRESSION:  1.  There is a 24mm Watchman FLX closure device in the left atrial  appendage. It is well-seated without evidence of thrombus on the left  atrial side of the device.  2.  There is no contrast opacification of the left atrial appendage.  3.  Please review the separate Radiology report from the original  study site and date for incidental noncardiac findings.      FINDINGS:   1.  There is a 24mm Watchman FLX closure device in the left atrial  appendage. It is well-seated.   2.  There is no thrombus on the left atrial  side of the closure device  on early or delayed imaging.   3.  There is no contrast opacification of the left atrial appendage,  implying complete endothelialization of the occlusion device.   4.  There is biatrial enlargement.  5.  The right ventricle is dilated.   6.  The main pulmonary artery is dilated, measuring at 40.7 mm x 39.9  mm.  7.  Normal pulmonary venous anatomy with all pulmonary veins draining into the left atrium.    8.  Mild coronary artery calcifications are present.   9.  There is mild mitral annular calcification.  10.  Aortic valve calcification is present.  11.  Atherosclerotic calcification is present involving the descending  aorta. The visualized aortic root, ascending aorta and descending  thoracic aorta are normal in diameter.    Outside results of note:  Outside records from [facility] admission were obtained, and relevant results/notes have been incorporated into HPI.    Assessment and Plan:     In summary, 73 year old female with a past medical history of chronic HFpEF, HLD, HTN, DM2, ulcerative pancolitis, CKD3, pulmonary HTN, moderate paradoxical low flow low gradient aortic stenosis, persistent atrial fibrillation with (CHADS-VASc 4) with intolerance to anticoagulation due to history of GI bleeding s/p RY closure and Watchman (05/2023) who was admitted in Sept '23 heart failure exacerbation and presents as follow up for HFpEF.    # Chronic HFpEF  # Moderate Pulmonary HTN  # Moderate Paradoxical Low Flow Low Gradient Aortic Stenosis    # Persistent rate-controlled A-fib s/p Watchman   # HTN  # HLD  Recent admission for increased PROCTOR, abdominal distention, peripheral edema, orthopnea, and ~15lb weight gain. Work up notable for BNP 18,607, creatinine 1.70, CXR with pulmonary edema and small bilateral effusions. Echo 5/2023 EF 55-60%, PASP 59 mmHg, RA pressure 15 mmHg. Dry weight estimated ~146 lbs.  - Volume Status: Euvolemic, continue Torsemide 10 mg daily  - continue ASA 81mg daily  -  Continue Lipitor 40 mg daily   - Continue Hydralazine 25 mg TID, Imdur 30 mg daily, Losartan 100 mg daily  - Continue PTA Empagliflozin 25 mg daily  - Rate controlled without BB currently  - Will continue to monitor TTE yearly for now     # CKD Stage 3  Baseline appears to be around 1.5-1.9  - Will continue to monitor     # DM Type II  Hgb A1c 8.7.   - Managed by PCP  - Continue glipizide and empagliflozin     To Do:  - No change to medications today  - Follow up with [facility] in 3 months  - Follow up with Dr. Chowdhury in 6 months ([facility] clinic is easier for patient)    The patient states understanding and is agreeable with plan.   Feel free to contact myself regarding questions or concerns. It was a pleasure to see this patient today.    A total of 40 minutes was spent on the day of the visit, which includes preparation for the visit (reviewing previous medical records, laboratories and investigations), in conjunction with the actual clinic visit with the patient, which includes obtaining a history and physical exam, creating and reviewing the care plan, patient education (and family if present), counseling, documenting clinical information in the electronic health record and care coordination.     Saba Lemon MD   of Medicine, [facility]  Advanced Heart Failure and Transplant Cardiology     CC  Bunny Meyers

## 2024-01-05 NOTE — NURSING NOTE
Chief Complaint   Patient presents with    Follow Up     January 5, 2024: yulissa for visit: RTN HF: 73 year old female presents with HFpEF for 2 month follow up with labs prior     Vitals were taken and medications reconciled.    [place] Franklin, EMT  1:08 PM

## 2024-01-05 NOTE — PROGRESS NOTES
Infusion Nursing Note:  Keerthi Montoya presents today for Venofer 2/3.    Patient seen by provider today: No   present during visit today: Not Applicable.    Note: Patient expressed no new medical concerns or changes today. Reports tolerating the first infusion well.     Intravenous Access:  Peripheral IV placed.    Treatment Conditions:  Not Applicable.    Post Infusion Assessment:  Patient tolerated infusion without incident.  Site patent and intact, free from redness, edema or discomfort.  No evidence of extravasations.  Access discontinued per protocol.     Discharge Plan:   Discharge instructions reviewed with: Patient.  Patient and/or family verbalized understanding of discharge instructions and all questions answered.  Patient discharged in stable condition accompanied by: self.  Departure Mode: Ambulatory.  Future appts have been reviewed and crosschecked with appt note and plan.    Arline Olson RN

## 2024-01-05 NOTE — PATIENT INSTRUCTIONS
Cardiology Providers you saw during your visit:  Dr. Lemon     Medication changes:  1- no changes     Follow up:  1- Follow up with Dr Chowdhury as scheduled    Please call if you have:  1. Weight gain of more than 2 pounds in a day or 5 pounds in a week  2. Increased shortness of breath, swelling or bloating  3. Dizziness, lightheadedness   4. Any questions or concerns.      Heart Failure Support Group  Virtual meetings will continue in 2023 Please reach out if you would like to attend and we can get you the information you need to log in.   2024 dates for support group meetings:  Monday, January 8th, 1-2pm     Monday, February 5th , 1-2pm     Monday, March 4th , 1-2pm     Monday, April 1st, 1-2pm     Monday, May 6th, 1-2pm     Monday, June 3rd, 1-2pm     Monday, July 1st, 1-2pm     Monday, August 5th, 1-2pm     Monday, September 9th, 1-2pm     Monday, October 7th, 1-2pm     Monday, November 4th, 1-2pm     Monday, December 2nd, 1-2pm     Follow the American Heart Association Diet and Lifestyle recommendations:  Limit saturated fat, trans fat, sodium, red meat, sweets and sugar-sweetened beverages. If you choose to eat red meat, compare labels and select the leanest cuts available.  Aim for at least 150 minutes of moderate physical activity or 75 minutes of vigorous physical activity - or an equal combination of both - each week.     During business hours: [phone], press option # 1 to schedule an appointment or send a message to your care team     After hours, weekends or holidays: On Call Cardiologist- [phone]   option #4 and ask to speak to the on-call Cardiologist. Inform them you are a [facility]/heart failure patient at the [facility].     Sheela Galeas RN BSN  Cardiology Nurse Care Coordinator (Heart Failure / C.O.R.E.)

## 2024-01-05 NOTE — LETTER
1/5/2024      RE: Keerthi Montoya  [address] [place] [id number]       Dear Colleague,    Thank you for the opportunity to participate in the care of your patient, Keerthi Montoya, at the [facility] [place] at [facility]. Please see a copy of my visit note below.    Advanced Heart Failure/Transplant Clinic Note    HPI  Dear colleagues,     I had the pleasure of seeing Ms. Keerthi Montoya in the Cardiology clinic.  As you know, Ms. Keerthi Montoya is a pleasant 73 year old female with a past medical history of chronic HFpEF, HLD, HTN, DM2, ulcerative pancolitis, CKD3, pulmonary HTN, moderate paradoxical low flow low gradient aortic stenosis, persistent atrial fibrillation with (CHADS-VASc 4) with intolerance to anticoagulation due to history of GI bleeding s/p RY closure and Watchman (05/2023) who was admitted in Sept '23 heart failure exacerbation and presents as follow up for HFpEF.    Patient reports she developed shingles in Sept '23 and with this, developed painful, itchy rash and started having SOB and abdominal bloating after this. She was admitted, and diuresed for almost 15 lbs weight gain.    Patient reports overall feeling very well recently. Patient denies dyspnea with any of her ADLs or usual chores. She denies SOB at rest, presyncope, syncope, orthopnea, PND, chest pain, palpitations, abdominal pain, nausea, emesis, LE edema or weight gain. Patient reports compliance with her medications, weighing herself daily, and watching her salt and fluid intake.    ROS:  A complete 12-point ROS was negative except as above.    PAST MEDICAL HISTORY:  Patient Active Problem List   Diagnosis    Diverticulosis of large intestine    Vitamin D deficiency    Preventive measure    Hyperlipidemia LDL goal <100    Aspirin not indicated    Abdominal pain, left lower quadrant    Rectal bleeding    Colitis    Postoperative hypothyroidism     Hypertension goal BP (blood pressure) < 130/80    Type 2 diabetes mellitus with microalbuminuria, without long-term current use of insulin (H)    Cervical cancer screening    Ulcerative pancolitis without complication (H)    Vitamin B12 deficiency (non anemic)    Proteinuria, unspecified type    CKD (chronic kidney disease) stage 3, GFR 30-59 ml/min (H)    Aortic stenosis    Pulmonary hypertension (H)    Diastolic dysfunction    Heart failure with preserved ejection fraction, NYHA class I (H)    Anemia, unspecified type    Elevated serum immunoglobulin free light chains    Posterior vitreous detachment, left    Anemia due to blood loss, acute    Gastrointestinal hemorrhage, unspecified gastrointestinal hemorrhage type    Anemia, iron deficiency    Persistent atrial fibrillation (H)    Acute on chronic diastolic congestive heart failure (H)    Severe pulmonary hypertension (H)        FAMILY HISTORY:  Family History   Problem Relation Age of Onset    Cerebrovascular Disease Mother     Cancer Father         bladder    Diverticulitis Brother     Diverticulitis Brother     Kidney Disease No family hx of     Crohn's Disease No family hx of     Ulcerative Colitis No family hx of     Stomach Cancer No family hx of     GERD No family hx of     Celiac Disease No family hx of     Anesthesia Reaction No family hx of        SOCIAL HISTORY:  Social History     Tobacco Use    Smoking status: Never     Passive exposure: Never    Smokeless tobacco: Never   Vaping Use    Vaping Use: Never used   Substance Use Topics    Alcohol use: Not Currently     Alcohol/week: 2.0 - 4.0 standard drinks of alcohol     Types: 1 - 2 Cans of beer, 1 - 2 Shots of liquor per week     Comment: occasional cocktail or beer    Drug use: No        ALLERGIES:  Allergies   Allergen Reactions    Actos [Pioglitazone]      Fluid retention    Amlodipine      Leg swelling, hand swelling    Animal Dander     Doxycycline Hives    Dust Mites     Grass     Keflex  [Cephalexin Hcl] Hives    Metoprolol      Swelling of lower legs and fatigue    Other [Seasonal Allergies]      pollen    Ranitidine      rash    Synthroid [Levothyroxine Sodium] Swelling     Allergic to brand name    Tetracycline Hives    Tylenol Hives    Ziac [Bisoprolol-Hydrochlorothiazide]        CURRENT MEDICATIONS:  Current Outpatient Medications   Medication Sig Dispense Refill    aspirin 81 MG EC tablet Take 1 tablet (81 mg) by mouth daily 90 tablet 3    atenolol (TENORMIN) 50 MG tablet Take 1 tablet (50 mg) by mouth daily 90 tablet 3    atorvastatin (LIPITOR) 40 MG tablet Take 1 tablet (40 mg) by mouth daily 90 tablet 3    blood glucose (ACCU-CHEK FELIPE PLUS) test strip 200 strips by In Vitro route 2 times daily Use to test blood sugar 2 times daily or as directed. 200 strip 4    Cholecalciferol (VITAMIN D) 2000 units CAPS Take by mouth daily (with lunch)      Cyanocobalamin (B-12) 1000 MCG TABS Take 1,000 mcg by mouth three times a week      diphenhydrAMINE (BENADRYL) 25 MG tablet Take 25 mg by mouth every 6 hours as needed for itching or allergies      empagliflozin (JARDIANCE) 25 MG TABS tablet Take 1 tablet (25 mg) by mouth daily 90 tablet 3    glipiZIDE (GLUCOTROL) 10 MG tablet Take 1 tablet (10 mg) by mouth daily (with breakfast) 30 tablet 3    glipiZIDE (GLUCOTROL) 5 MG tablet Take 1 tablet (5 mg) by mouth daily (with dinner) 30 tablet 3    hydrALAZINE (APRESOLINE) 25 MG tablet Take 1 tablet (25 mg) by mouth 3 times daily 270 tablet 3    isosorbide mononitrate (IMDUR) 30 MG 24 hr tablet Take 1 tablet (30 mg) by mouth daily 90 tablet 3    levothyroxine (SYNTHROID/LEVOTHROID) 112 MCG tablet Take 1 tablet (112 mcg) by mouth daily 90 tablet 4    losartan (COZAAR) 100 MG tablet Take 1 tablet (100 mg) by mouth daily 90 tablet 0    mesalamine (APRISO ER) 0.375 g 24 hr capsule TAKE 4 CAPSULES(1.5 GRAMS) BY MOUTH DAILY 360 capsule 1    pantoprazole (PROTONIX) 40 MG EC tablet Take 1 tablet (40 mg) by mouth  daily 90 tablet 3    senna (SENOKOT) 8.6 MG tablet Take 1 tablet by mouth daily as needed for constipation 30 tablet 3    sodium bicarbonate 650 MG tablet Take 1 tablet (650 mg) by mouth daily 90 tablet 3    torsemide (DEMADEX) 10 MG tablet Take 1 tablet (10 mg) by mouth daily 90 tablet 1    Semaglutide (RYBELSUS) 3 MG tablet Take 3 mg by mouth daily For 30 days then increase to 7 mg (Patient not taking: Reported on 11/10/2023) 30 tablet 0    Semaglutide (RYBELSUS) 7 MG tablet Take 1 tablet (7 mg) by mouth daily (Patient not taking: Reported on 12/22/2023) 90 tablet 1       EXAM:  /72 (BP Location: Right arm, Patient Position: Chair, Cuff Size: Adult Regular)   Pulse 103   Wt 62.3 kg (137 lb 6.4 oz)   LMP  (LMP Unknown)   SpO2 96%   BMI 24.53 kg/m      General: appears comfortable, alert and interactive, in no acute distress  Head: normocephalic, atraumatic  Eyes: anicteric sclera, EOMI  Mouth: MMM  Neck: supple, no cervical adenopathy  CV: regular rate and rhythm, no murmur, gallop, rub, estimated JVP ~7 cm  Resp: clear, no rales or wheezing  GI: soft, nontender, nondistended  Extremities: warm, no peripheral edema, 2+ bilateral radial pulses  Neurological: alert and oriented, no focal deficits  Psych: normal mood and affect  Derm: healing shingles lesions along legs    Weight  Wt Readings from Last 10 Encounters:   01/05/24 62.3 kg (137 lb 6.4 oz)   01/05/24 61.6 kg (135 lb 12.8 oz)   12/22/23 62.2 kg (137 lb 3.2 oz)   12/18/23 61.1 kg (134 lb 12.8 oz)   12/01/23 59.9 kg (132 lb)   11/17/23 60.1 kg (132 lb 8 oz)   11/10/23 59.8 kg (131 lb 14.4 oz)   11/07/23 57.6 kg (127 lb)   10/27/23 58 kg (127 lb 12.8 oz)   10/20/23 57.3 kg (126 lb 6.4 oz)       I personally reviewed recent labs and data as below and discussed the results with the patient in clinic today.  Labs:  CBC RESULTS:  Lab Results   Component Value Date    WBC 9.5 12/11/2023    WBC 7.3 10/28/2020    RBC 3.67 (L) 12/11/2023    RBC 3.99  10/28/2020    HGB 10.9 (L) 12/11/2023    HGB 11.8 02/08/2021    HCT 35.8 12/11/2023    HCT 38.2 10/28/2020    MCV 98 12/11/2023    MCV 96 10/28/2020    MCH 29.7 12/11/2023    MCH 30.8 10/28/2020    MCHC 30.4 (L) 12/11/2023    MCHC 32.2 10/28/2020    RDW 14.6 12/11/2023    RDW 13.9 10/28/2020     12/11/2023     10/28/2020       CMP RESULTS:  Lab Results   Component Value Date     (L) 01/05/2024     04/28/2021    POTASSIUM 4.5 01/05/2024    POTASSIUM 4.3 09/17/2023    POTASSIUM 4.5 04/28/2021    CHLORIDE 95 (L) 01/05/2024    CHLORIDE 109 04/27/2023    CHLORIDE 100 04/28/2021    CO2 23 01/05/2024    CO2 21 04/27/2023    CO2 26 04/28/2021    ANIONGAP 13 01/05/2024    ANIONGAP 7 04/27/2023    ANIONGAP 8 04/28/2021     (H) 01/05/2024     (H) 09/21/2023     (H) 04/27/2023    GLC 57 (L) 04/28/2021    BUN 56.6 (H) 01/05/2024    BUN 28 04/27/2023    BUN 23 04/28/2021    CR 2.17 (H) 01/05/2024    CR 1.21 (H) 04/28/2021    GFRESTIMATED 24 (L) 01/05/2024    GFRESTIMATED 30 (L) 12/30/2022    GFRESTIMATED 46 (L) 04/28/2021    GFRESTBLACK 53 (L) 04/28/2021    ABEL 9.5 01/05/2024    ABEL 9.8 04/28/2021    BILITOTAL 0.5 01/05/2024    BILITOTAL 0.6 10/28/2020    ALBUMIN 4.3 01/05/2024    ALBUMIN 3.7 04/27/2023    ALBUMIN 4.1 02/08/2021    ALKPHOS 117 01/05/2024    ALKPHOS 104 10/28/2020    ALT <5 01/05/2024    ALT 18 10/28/2020    AST 21 01/05/2024    AST 19 10/28/2020        Recent Labs   Lab Test 01/20/23  0740 10/28/20  1629   CHOL 73 93   HDL 29* 38*   LDL 27 33   TRIG 87 108        Testing/Procedures:  I personally visualized and interpreted:  Echocardiogram 5/12/23  Interpretation Summary  Small unchanged pericardial effusion is present without evidence of  pericardial tamponade.  Left ventricular function is normal.The ejection fraction is 55-60%.  Paradoxical septal motion consistent with right ventricular pressure and  volume overload is present.  Moderate to severe right  ventricular dilation is present. Global right  ventricular function is moderately to severely reduced.  IVC diameter >2.1 cm collapsing <50% with sniff suggests a high RA pressure estimated at 15 mmHg or greater.  This study was compared with the study from 5/11/23. There has been no change.    TTE 5/25/23  Interpretation Summary  Global and regional left ventricular function is normal with an EF of 60-65%.  Flattened septum is consistent with right ventricular pressure and volume overload.  Moderate right ventricular dilation with moderately reduced global right  ventricular function.  Severe tricuspid insufficiency.  Estimated pulmonary artery systolic pressure is 69 mmHg.  Dilation of the inferior vena cava is present with abnormal respiratory  variation in diameter.  Small circumferential pericardial effusion without any hemodynamic  significance.  This study was compared with the study from 5/15/23. N significant change in ventricular function or pericardial effusion. TR appears worse on today's study, but that may be related to technique.    TTE 9/18/23  Interpretation Summary  Global and regional left ventricular function is normal with an EF of 55-60%.  Flattened septum is consistent with right ventricular pressure and volume overload.  Global right ventricular function is moderately reduced. Severe right  ventricular dilation is present.  Pulmonary hypertension is present. Pulmonary artery systolic pressure is 59 mmHg (RVSP 44mmHg + RAP 15mmHg).  Severe tricuspid insufficiency is present.  IVC diameter >2.1 cm collapsing <50% with sniff suggests a high RA pressure estimated at 15 mmHg or greater.  No pericardial effusion is present.  This study was compared with the study from 5/25/2023 .  No significant changes noted.    Cardiac CTA 10/6/23  IMPRESSION:  1.  There is a 24mm Watchman FLX closure device in the left atrial  appendage. It is well-seated without evidence of thrombus on the left  atrial side of  the device.  2.  There is no contrast opacification of the left atrial appendage.  3.  Please review the separate Radiology report from the original  study site and date for incidental noncardiac findings.      FINDINGS:   1.  There is a 24mm Watchman FLX closure device in the left atrial  appendage. It is well-seated.   2.  There is no thrombus on the left atrial side of the closure device  on early or delayed imaging.   3.  There is no contrast opacification of the left atrial appendage,  implying complete endothelialization of the occlusion device.   4.  There is biatrial enlargement.  5.  The right ventricle is dilated.   6.  The main pulmonary artery is dilated, measuring at 40.7 mm x 39.9  mm.  7.  Normal pulmonary venous anatomy with all pulmonary veins draining into the left atrium.    8.  Mild coronary artery calcifications are present.   9.  There is mild mitral annular calcification.  10.  Aortic valve calcification is present.  11.  Atherosclerotic calcification is present involving the descending  aorta. The visualized aortic root, ascending aorta and descending  thoracic aorta are normal in diameter.    Outside results of note:  Outside records from [facility] admission were obtained, and relevant results/notes have been incorporated into HPI.    Assessment and Plan:     In summary, 73 year old female with a past medical history of chronic HFpEF, HLD, HTN, DM2, ulcerative pancolitis, CKD3, pulmonary HTN, moderate paradoxical low flow low gradient aortic stenosis, persistent atrial fibrillation with (CHADS-VASc 4) with intolerance to anticoagulation due to history of GI bleeding s/p RY closure and Watchman (05/2023) who was admitted in Sept '23 heart failure exacerbation and presents as follow up for HFpEF.    # Chronic HFpEF  # Moderate Pulmonary HTN  # Moderate Paradoxical Low Flow Low Gradient Aortic Stenosis    # Persistent rate-controlled A-fib s/p Watchman   # HTN  # HLD  Recent admission for increased  PROCTOR, abdominal distention, peripheral edema, orthopnea, and ~15lb weight gain. Work up notable for BNP 18,607, creatinine 1.70, CXR with pulmonary edema and small bilateral effusions. Echo 5/2023 EF 55-60%, PASP 59 mmHg, RA pressure 15 mmHg. Dry weight estimated ~146 lbs.  - Volume Status: Euvolemic, continue Torsemide 10 mg daily  - continue ASA 81mg daily  - Continue Lipitor 40 mg daily   - Continue Hydralazine 25 mg TID, Imdur 30 mg daily, Losartan 100 mg daily  - Continue PTA Empagliflozin 25 mg daily  - Rate controlled without BB currently  - Will continue to monitor TTE yearly for now     # CKD Stage 3  Baseline appears to be around 1.5-1.9  - Will continue to monitor     # DM Type II  Hgb A1c 8.7.   - Managed by PCP  - Continue glipizide and empagliflozin     To Do:  - No change to medications today  - Follow up with [facility] in 3 months  - Follow up with Dr. Chowdhury in 6 months ([facility] is easier for patient)    The patient states understanding and is agreeable with plan.   Feel free to contact myself regarding questions or concerns. It was a pleasure to see this patient today.    A total of 40 minutes was spent on the day of the visit, which includes preparation for the visit (reviewing previous medical records, laboratories and investigations), in conjunction with the actual clinic visit with the patient, which includes obtaining a history and physical exam, creating and reviewing the care plan, patient education (and family if present), counseling, documenting clinical information in the electronic health record and care coordination.     Saba Lemon MD   of Medicine, [facility]  Advanced Heart Failure and Transplant Cardiology     Bunny Isbell

## 2024-01-15 ENCOUNTER — INFUSION THERAPY VISIT (OUTPATIENT)
Dept: INFUSION THERAPY | Facility: CLINIC | Age: 74
End: 2024-01-15
Attending: NURSE PRACTITIONER
Payer: MEDICARE

## 2024-01-15 VITALS
SYSTOLIC BLOOD PRESSURE: 116 MMHG | WEIGHT: 138.9 LBS | TEMPERATURE: 97.6 F | OXYGEN SATURATION: 95 % | RESPIRATION RATE: 18 BRPM | HEART RATE: 100 BPM | DIASTOLIC BLOOD PRESSURE: 75 MMHG | BODY MASS INDEX: 24.79 KG/M2

## 2024-01-15 DIAGNOSIS — D50.9 IRON DEFICIENCY ANEMIA, UNSPECIFIED IRON DEFICIENCY ANEMIA TYPE: Primary | ICD-10-CM

## 2024-01-15 PROCEDURE — 96365 THER/PROPH/DIAG IV INF INIT: CPT

## 2024-01-15 PROCEDURE — 96366 THER/PROPH/DIAG IV INF ADDON: CPT

## 2024-01-15 PROCEDURE — 250N000011 HC RX IP 250 OP 636: Performed by: NURSE PRACTITIONER

## 2024-01-15 PROCEDURE — 258N000003 HC RX IP 258 OP 636: Performed by: NURSE PRACTITIONER

## 2024-01-15 PROCEDURE — 99207 PR NO CHARGE LOS: CPT

## 2024-01-15 RX ORDER — ALBUTEROL SULFATE 0.83 MG/ML
2.5 SOLUTION RESPIRATORY (INHALATION)
OUTPATIENT
Start: 2024-01-15

## 2024-01-15 RX ORDER — HEPARIN SODIUM (PORCINE) LOCK FLUSH IV SOLN 100 UNIT/ML 100 UNIT/ML
5 SOLUTION INTRAVENOUS
OUTPATIENT
Start: 2024-01-15

## 2024-01-15 RX ORDER — HEPARIN SODIUM,PORCINE 10 UNIT/ML
5-20 VIAL (ML) INTRAVENOUS DAILY PRN
OUTPATIENT
Start: 2024-01-15

## 2024-01-15 RX ORDER — ALBUTEROL SULFATE 90 UG/1
1-2 AEROSOL, METERED RESPIRATORY (INHALATION)
Start: 2024-01-15

## 2024-01-15 RX ORDER — DIPHENHYDRAMINE HYDROCHLORIDE 50 MG/ML
50 INJECTION INTRAMUSCULAR; INTRAVENOUS
Start: 2024-01-15

## 2024-01-15 RX ORDER — MEPERIDINE HYDROCHLORIDE 25 MG/ML
25 INJECTION INTRAMUSCULAR; INTRAVENOUS; SUBCUTANEOUS EVERY 30 MIN PRN
OUTPATIENT
Start: 2024-01-15

## 2024-01-15 RX ORDER — METHYLPREDNISOLONE SODIUM SUCCINATE 125 MG/2ML
125 INJECTION, POWDER, LYOPHILIZED, FOR SOLUTION INTRAMUSCULAR; INTRAVENOUS
Start: 2024-01-15

## 2024-01-15 RX ORDER — EPINEPHRINE 1 MG/ML
0.3 INJECTION, SOLUTION INTRAMUSCULAR; SUBCUTANEOUS EVERY 5 MIN PRN
OUTPATIENT
Start: 2024-01-15

## 2024-01-15 RX ADMIN — IRON SUCROSE 300 MG: 20 INJECTION, SOLUTION INTRAVENOUS at 11:54

## 2024-01-15 RX ADMIN — SODIUM CHLORIDE 250 ML: 9 INJECTION, SOLUTION INTRAVENOUS at 11:48

## 2024-01-15 NOTE — PROGRESS NOTES
Infusion Nursing Note:  Keerthi Montoya presents today for Venofer 3/3.    Patient seen by provider today: No   present during visit today: Not Applicable.    Note: Patient expressed no new medical concerns. Has been tolerating iron infusions well thus far.      Intravenous Access:  Peripheral IV placed.    Treatment Conditions:  Not Applicable.      Post Infusion Assessment:  Patient tolerated infusion without incident.  Blood return noted pre and post infusion.  Site patent and intact, free from redness, edema or discomfort.  No evidence of extravasations.  Access discontinued per protocol.       Discharge Plan:   AVS to patient via MYCHART.  Patient will return 2/2 for lab appointments for recheck.  Patient discharged in stable condition accompanied by: self.  Departure Mode: Ambulatory.      Taryn Hair RN

## 2024-01-16 DIAGNOSIS — E11.29 TYPE 2 DIABETES MELLITUS WITH MICROALBUMINURIA, WITHOUT LONG-TERM CURRENT USE OF INSULIN (H): ICD-10-CM

## 2024-01-16 DIAGNOSIS — R80.9 TYPE 2 DIABETES MELLITUS WITH MICROALBUMINURIA, WITHOUT LONG-TERM CURRENT USE OF INSULIN (H): ICD-10-CM

## 2024-01-16 RX ORDER — GLIPIZIDE 10 MG/1
10 TABLET ORAL
Qty: 90 TABLET | Refills: 0 | Status: SHIPPED | OUTPATIENT
Start: 2024-01-16 | End: 2024-04-05

## 2024-01-16 NOTE — TELEPHONE ENCOUNTER
glipiZIDE (GLUCOTROL) 10 MG tablet   Last Written Prescription Date:  9/29/2023  Last Fill Quantity: 30,   # refills: 3  Last Office Visit :   10/20/2023   (No show 11/10/2023)  Future Office visit:  None    Routing refill request to provider for review/approval because:  Last order was for 120 days.   Okay to fill for 90 days with refills.  Please review and send new order.   Thank you     Rhonda Ambriz RN  Central Triage Red Flags/Med Refills

## 2024-02-02 DIAGNOSIS — I10 HYPERTENSION GOAL BP (BLOOD PRESSURE) < 140/90: ICD-10-CM

## 2024-02-02 DIAGNOSIS — E89.0 POSTOPERATIVE HYPOTHYROIDISM: ICD-10-CM

## 2024-02-06 RX ORDER — LEVOTHYROXINE SODIUM 112 UG/1
112 TABLET ORAL DAILY
Qty: 90 TABLET | Refills: 0 | Status: SHIPPED | OUTPATIENT
Start: 2024-02-06 | End: 2024-05-16

## 2024-02-06 NOTE — TELEPHONE ENCOUNTER
levothyroxine (SYNTHROID/LEVOTHROID) 112 MCG tablet 90 tablet 4 1/26/2023    losartan (COZAAR) 100 MG tablet 90 tablet 0 11/9/2023    Last Office Visit : 10/20/2023  [facility] Internal [facility] [place]     Bunny Meyers MD     Future Office visit:  0    Routing refill request to provider for review/approval because:  Losartan: Last GFR/Cr abnormal, routed for review.    TSH   Date Value Ref Range Status   04/07/2023 3.86 0.30 - 4.20 uIU/mL Final   07/22/2022 0.82 0.40 - 4.00 mU/L Final   10/28/2020 0.10 (L) 0.40 - 4.00 mU/L Final       Last Comprehensive Metabolic Panel:  Lab Results   Component Value Date     (L) 01/05/2024    POTASSIUM 4.5 01/05/2024    CHLORIDE 95 (L) 01/05/2024    CO2 23 01/05/2024    ANIONGAP 13 01/05/2024     (H) 01/05/2024    BUN 56.6 (H) 01/05/2024    CR 2.17 (H) 01/05/2024    GFRESTIMATED 24 (L) 01/05/2024    ABEL 9.5 01/05/2024     Warnings Override History for levothyroxine (SYNTHROID/LEVOTHROID) 112 MCG tablet [[id number]]    Overridden by Donna Hernandes APRN CNP on Sep 21, 2023 9:24 AM   Allergy/Contraindication   1. LEVOTHYROXINE SODIUM [Level: Ingredient Match] [Reason: Tolerated medication/side effects in past]      Overridden by Kimberley Hood APRN CNP on May 12, 2023 7:45 AM   Allergy/Contraindication   1. LEVOTHYROXINE SODIUM [Level: Ingredient Match] [Reason: Will monitor drug levels/drug effects ]      Overridden by Bunny Meyers MD on Jan 26, 2023 8:06 PM   Allergy/Contraindication   1. LEVOTHROID [Level: Ingredient Match]

## 2024-02-07 RX ORDER — LOSARTAN POTASSIUM 100 MG/1
100 TABLET ORAL DAILY
Qty: 90 TABLET | Refills: 0 | Status: SHIPPED | OUTPATIENT
Start: 2024-02-07 | End: 2024-05-10

## 2024-02-12 ENCOUNTER — PATIENT OUTREACH (OUTPATIENT)
Dept: ONCOLOGY | Facility: CLINIC | Age: 74
End: 2024-02-12
Payer: MEDICARE

## 2024-02-12 NOTE — PROGRESS NOTES
[facility]: Cancer Care Note                                    Situation/Background: Patient's chart reviewed by care coordinator related to [facility].    Assessment: Patient continues to be followed by Oncology Clinic Care Coordination, with a status of maintenance.    Plan: Patient's chart updated to align with [facility] program for ongoing patient management.    Patient's upcoming appointment(s):    Future Appointments   Date Time Provider Department Center   3/18/2024  9:45 AM Francisca Mustafa APRN [facility]   5/6/2024  7:40 AM LAB FIRST FLOOR [facility]     Fracisco Hurtado, RN, BSN, OCN  RN Care Coordinator - Oncology  [facility]

## 2024-03-01 DIAGNOSIS — K31.89 PORTAL HYPERTENSIVE GASTROPATHY (H): ICD-10-CM

## 2024-03-01 DIAGNOSIS — K76.6 PORTAL HYPERTENSIVE GASTROPATHY (H): ICD-10-CM

## 2024-03-01 PROCEDURE — 91200 LIVER ELASTOGRAPHY: CPT | Mod: 26 | Performed by: PHYSICIAN ASSISTANT

## 2024-03-15 DIAGNOSIS — R80.9 TYPE 2 DIABETES MELLITUS WITH MICROALBUMINURIA, WITHOUT LONG-TERM CURRENT USE OF INSULIN (H): ICD-10-CM

## 2024-03-15 DIAGNOSIS — E11.29 TYPE 2 DIABETES MELLITUS WITH MICROALBUMINURIA, WITHOUT LONG-TERM CURRENT USE OF INSULIN (H): ICD-10-CM

## 2024-03-15 NOTE — TELEPHONE ENCOUNTER
[facility] Call Center    Phone Message    May a detailed message be left on voicemail: yes     Reason for Call: Other: Per pt would like to know if the medication (glipiZIDE (GLUCOTROL) 10 MG tablet) is being worked on because the pharmacy has sent in a refill request about 3 weeks ago and pt is going to be out of the medication tomorrow. Please call pt back to let her know. Thank you.      Action Taken: Message routed to:  Other: PCC    Travel Screening: Not Applicable

## 2024-03-16 ENCOUNTER — HEALTH MAINTENANCE LETTER (OUTPATIENT)
Age: 74
End: 2024-03-16

## 2024-03-16 NOTE — TELEPHONE ENCOUNTER
glipiZIDE (GLUCOTROL) 5 MG tablet   Last Written Prescription Date:  1/16/2024  Last Fill Quantity: 90,   # refills: 0  Last Office Visit :  10/20/2023   (11/10/2023 No show)  Future Office visit:  None    Routing refill request to provider for review/approval because:  Second Request  Abnormal A1c  Refer to clinic for review and refills.      Recent Labs   Lab Test 11/03/23  1620   A1C 9.6*   GFR Estimate  Comprehensive metabolic panel  Collected: 01/05/24 1243   Result status: Final   Resulting lab: [facility]   Reference range: >60 mL/min/1.73m2   Value: 24 Low      Rhonda Ambriz RN  Central Triage Red Flags/Med Refills

## 2024-03-18 ENCOUNTER — ONCOLOGY VISIT (OUTPATIENT)
Dept: ONCOLOGY | Facility: CLINIC | Age: 74
End: 2024-03-18
Payer: MEDICARE

## 2024-03-18 VITALS
HEART RATE: 107 BPM | WEIGHT: 141 LBS | DIASTOLIC BLOOD PRESSURE: 84 MMHG | SYSTOLIC BLOOD PRESSURE: 143 MMHG | HEIGHT: 63 IN | BODY MASS INDEX: 24.98 KG/M2 | OXYGEN SATURATION: 94 %

## 2024-03-18 DIAGNOSIS — N18.30 STAGE 3 CHRONIC KIDNEY DISEASE, UNSPECIFIED WHETHER STAGE 3A OR 3B CKD (H): ICD-10-CM

## 2024-03-18 DIAGNOSIS — R80.9 TYPE 2 DIABETES MELLITUS WITH MICROALBUMINURIA, WITHOUT LONG-TERM CURRENT USE OF INSULIN (H): ICD-10-CM

## 2024-03-18 DIAGNOSIS — I48.19 PERSISTENT ATRIAL FIBRILLATION (H): ICD-10-CM

## 2024-03-18 DIAGNOSIS — K51.00 ULCERATIVE PANCOLITIS WITHOUT COMPLICATION (H): ICD-10-CM

## 2024-03-18 DIAGNOSIS — E11.29 TYPE 2 DIABETES MELLITUS WITH MICROALBUMINURIA, WITHOUT LONG-TERM CURRENT USE OF INSULIN (H): ICD-10-CM

## 2024-03-18 DIAGNOSIS — E53.8 VITAMIN B12 DEFICIENCY (NON ANEMIC): ICD-10-CM

## 2024-03-18 DIAGNOSIS — D50.9 IRON DEFICIENCY ANEMIA, UNSPECIFIED IRON DEFICIENCY ANEMIA TYPE: ICD-10-CM

## 2024-03-18 LAB
FERRITIN SERPL-MCNC: 121 NG/ML (ref 11–328)
HOLD SPECIMEN: NORMAL
HOLD SPECIMEN: NORMAL
IRON BINDING CAPACITY (ROCHE): 301 UG/DL (ref 240–430)
IRON SATN MFR SERPL: 20 % (ref 15–46)
IRON SERPL-MCNC: 59 UG/DL (ref 37–145)

## 2024-03-18 PROCEDURE — G0463 HOSPITAL OUTPT CLINIC VISIT: HCPCS | Performed by: NURSE PRACTITIONER

## 2024-03-18 PROCEDURE — 83540 ASSAY OF IRON: CPT | Performed by: NURSE PRACTITIONER

## 2024-03-18 PROCEDURE — 82728 ASSAY OF FERRITIN: CPT | Performed by: NURSE PRACTITIONER

## 2024-03-18 PROCEDURE — 36415 COLL VENOUS BLD VENIPUNCTURE: CPT | Performed by: NURSE PRACTITIONER

## 2024-03-18 PROCEDURE — 83550 IRON BINDING TEST: CPT | Performed by: NURSE PRACTITIONER

## 2024-03-18 PROCEDURE — 99214 OFFICE O/P EST MOD 30 MIN: CPT | Performed by: NURSE PRACTITIONER

## 2024-03-18 RX ORDER — GLIPIZIDE 5 MG/1
5 TABLET ORAL
Qty: 90 TABLET | Refills: 0 | Status: SHIPPED | OUTPATIENT
Start: 2024-03-18 | End: 2024-04-05

## 2024-03-18 ASSESSMENT — PAIN SCALES - GENERAL: PAINLEVEL: NO PAIN (0)

## 2024-03-18 NOTE — NURSING NOTE
"Oncology Rooming Note    March 18, 2024 9:46 AM   Keerthi Montoya is a 74 year old female who presents for:    Chief Complaint   Patient presents with    Oncology Clinic Visit     Follow up     Initial Vitals: BP (!) 143/84 (BP Location: Right arm, Patient Position: Chair, Cuff Size: Adult Regular)   Pulse 107   Ht 1.594 m (5' 2.76\")   Wt 64 kg (141 lb)   LMP  (LMP Unknown)   SpO2 94%   BMI 25.17 kg/m   Estimated body mass index is 25.17 kg/m  as calculated from the following:    Height as of this encounter: 1.594 m (5' 2.76\").    Weight as of this encounter: 64 kg (141 lb). Body surface area is 1.68 meters squared.  No Pain (0) Comment: Data Unavailable   No LMP recorded (lmp unknown). Patient is postmenopausal.  Allergies reviewed: Yes  Medications reviewed: Yes    Medications: Medication refills not needed today.  Pharmacy name entered into [employer] vascular solution: [id number] - [place], [place] - [address] AT [address]    Frailty Screening:   Is the patient here for a new oncology consult visit in cancer care? 2. No      Clinical concerns: NO      Toma Dudley CMA              " 0 = swallows foods/liquids without difficulty

## 2024-03-18 NOTE — PROGRESS NOTES
Hematology Follow Up Visit: March 18, 2024     Oncologist: Dr Jethro Moore  PCP: Bunny Meyers    Diagnosis: Iron deficiency anemia  Keerthi Montoya is a 75 yo female with PMH of  hypertension, diabetes mellitus type II, diverticulitis and ulcerative colitis who presented with low hemoglobin. History of Watchman procedure on 5/11/2023 for atrial fibrillation, not on anticoagulation. Hx of bleeding with blood thinners. Poor tolerance to PO iron. .   - 3/2023 last colonoscopy and upper GI endoscopy along with CT scan and MRI      Interval History:  Ms. Montoya comes to clinic for review of her anemia. States she is feeling good - babysitting and being active. Eating well with good variety including meats and other proteins. Blood sugars have been quite variable and is working with pharmacist for better control. Has good energy and denies sources of blood loss with no bleeding noted.   Rest of comprehensive and complete ROS is reviewed and is negative.   Past Medical History:   Diagnosis Date    Chronic kidney disease     Diabetes mellitus, type 2 (H)     Diverticulosis of colon (without mention of hemorrhage) 10/01/2012    GI bleed     History of blood transfusion     HLD (hyperlipidemia)     Hypertension     Hypothyroidism     MARIA D (iron deficiency anemia)     Persistent atrial fibrillation (H)     Pulmonary hypertension (H)     Ulcerative (chronic) enterocolitis (H)      Current Outpatient Medications   Medication    aspirin 81 MG EC tablet    atenolol (TENORMIN) 50 MG tablet    atorvastatin (LIPITOR) 40 MG tablet    blood glucose (ACCU-CHEK FELIPE PLUS) test strip    Cholecalciferol (VITAMIN D) 2000 units CAPS    Cyanocobalamin (B-12) 1000 MCG TABS    diphenhydrAMINE (BENADRYL) 25 MG tablet    empagliflozin (JARDIANCE) 25 MG TABS tablet    glipiZIDE (GLUCOTROL) 10 MG tablet    glipiZIDE (GLUCOTROL) 5 MG tablet    hydrALAZINE (APRESOLINE) 25 MG tablet    isosorbide mononitrate (IMDUR) 30 MG 24 hr tablet     "levothyroxine (SYNTHROID/LEVOTHROID) 112 MCG tablet    losartan (COZAAR) 100 MG tablet    mesalamine (APRISO ER) 0.375 g 24 hr capsule    pantoprazole (PROTONIX) 40 MG EC tablet    senna (SENOKOT) 8.6 MG tablet    sodium bicarbonate 650 MG tablet    torsemide (DEMADEX) 10 MG tablet     No current facility-administered medications for this visit.     Allergies   Allergen Reactions    Actos [Pioglitazone]      Fluid retention    Amlodipine      Leg swelling, hand swelling    Animal Dander     Doxycycline Hives    Dust Mites     Grass     Keflex [Cephalexin Hcl] Hives    Metoprolol      Swelling of lower legs and fatigue    Other [Seasonal Allergies]      pollen    Ranitidine      rash    Synthroid [Levothyroxine Sodium] Swelling     Allergic to brand name    Tetracycline Hives    Tylenol Hives    Ziac [Bisoprolol-Hydrochlorothiazide]        Physical Exam:BP (!) 143/84 (BP Location: Right arm, Patient Position: Chair, Cuff Size: Adult Regular)   Pulse 107   Ht 1.594 m (5' 2.76\")   Wt 64 kg (141 lb)   LMP  (LMP Unknown)   SpO2 94%   BMI 25.17 kg/m     ECOG PS- 0  Constitutional: No acute distress, pleasant. Cooperative.   ENT: PERRLA, sclera without erythema. Patent nasal passages. Appropriate dentition, no mouth sores.  Neck: Trachea midline, no adenopathy.   Resp: CTA, adequate depth and rate of respirations.   Cardiac: Irregularly irregular heart rhythm, no murmurs.   Abdomen: BS active, abdomen soft and non-tender. No masses or organomegaly.   MS:  5/5 muscle strength, adequate ROM.   Skin: No rashes, lesions, or wounds.  Neuro: A/O x 4, sensation intact. Gait normal.   Lymph: No palpable anterior/posterior cervical, axillary, or supraclavicular nodes.   Psych: Appropriate mentation and affect. Good conversation.     Laboratory/Imaging Results:   Recent Results (from the past 240 hour(s))   Ferritin    Collection Time: 03/18/24 10:25 AM   Result Value Ref Range    Ferritin 121 11 - 328 ng/mL   Iron and iron " binding capacity    Collection Time: 03/18/24 10:25 AM   Result Value Ref Range    Iron 59 37 - 145 ug/dL    Iron Binding Capacity 301 240 - 430 ug/dL    Iron Sat Index 20 15 - 46 %   Extra Serum Separator Tube (SST)    Collection Time: 03/18/24 10:25 AM   Result Value Ref Range    Hold Specimen JIC    Extra Purple Top Tube    Collection Time: 03/18/24 10:25 AM   Result Value Ref Range    Hold Specimen JIC        Assessment and Plan:   Iron deficiency anemia - ferritin 121, iron 51, TIBC 301, and iron sat 10. No need for iron transfusion today.   Previously received Venafor in December with good tolerance.   RTC in 3 months for BLAISE visit and repeat labs (hgb, ferritin, TIBC).   Continue with Vitamin B12 orally. Discussed iron sources in diet.   Atrial fibrillation  - Follows cardiologist closely. Had Watchman implant placed in March 2023. Not on chronic anticoagulation but is on ASA 81 mg daily.No cardiac symptoms today.    DM Type II  - On jardiance 25 mg daily and glipizide 10 mg at breakfast and 5 mg at dinner. Denies issues with hypoglycemia. Reeducated symptoms to monitor for.   - Dr. Meyers is PCP.   Health Maintenance  - Sees PCP, dentist, and ophthalmology regularly.   - Overdue for mammogram- reminder given.   The total time of this encounter amounted to  30 minutes. This time included face to face time spent with the patient, prep work, review and ordering tests, and performing post visit documentation.  Francisca Mustafa,Erick  .

## 2024-03-18 NOTE — LETTER
3/18/2024         RE: Keerthi Montoya  [address] [place] [id number]        Dear Colleague,    Thank you for referring your patient, Keerthi Montoya, to the [facility]. Please see a copy of my visit note below.    Hematology Follow Up Visit: March 18, 2024     Oncologist: Dr Jethro Moore  PCP: Bunny Meyers    Diagnosis: Iron deficiency anemia  Keerthi Montoya is a 75 yo female with PMH of  hypertension, diabetes mellitus type II, diverticulitis and ulcerative colitis who presented with low hemoglobin. History of Watchman procedure on 5/11/2023 for atrial fibrillation, not on anticoagulation. Hx of bleeding with blood thinners. Poor tolerance to PO iron. .   - 3/2023 last colonoscopy and upper GI endoscopy along with CT scan and MRI      Interval History:  Ms. Montoya comes to clinic for review of her anemia. States she is feeling good - babysitting and being active. Eating well with good variety including meats and other proteins. Blood sugars have been quite variable and is working with pharmacist for better control. Has good energy and denies sources of blood loss with no bleeding noted.   Rest of comprehensive and complete ROS is reviewed and is negative.   Past Medical History:   Diagnosis Date     Chronic kidney disease      Diabetes mellitus, type 2 (H)      Diverticulosis of colon (without mention of hemorrhage) 10/01/2012     GI bleed      History of blood transfusion      HLD (hyperlipidemia)      Hypertension      Hypothyroidism      MARIA D (iron deficiency anemia)      Persistent atrial fibrillation (H)      Pulmonary hypertension (H)      Ulcerative (chronic) enterocolitis (H)      Current Outpatient Medications   Medication     aspirin 81 MG EC tablet     atenolol (TENORMIN) 50 MG tablet     atorvastatin (LIPITOR) 40 MG tablet     blood glucose (ACCU-CHEK FELIPE PLUS) test strip     Cholecalciferol (VITAMIN D) 2000 units CAPS     Cyanocobalamin  "(B-12) 1000 MCG TABS     diphenhydrAMINE (BENADRYL) 25 MG tablet     empagliflozin (JARDIANCE) 25 MG TABS tablet     glipiZIDE (GLUCOTROL) 10 MG tablet     glipiZIDE (GLUCOTROL) 5 MG tablet     hydrALAZINE (APRESOLINE) 25 MG tablet     isosorbide mononitrate (IMDUR) 30 MG 24 hr tablet     levothyroxine (SYNTHROID/LEVOTHROID) 112 MCG tablet     losartan (COZAAR) 100 MG tablet     mesalamine (APRISO ER) 0.375 g 24 hr capsule     pantoprazole (PROTONIX) 40 MG EC tablet     senna (SENOKOT) 8.6 MG tablet     sodium bicarbonate 650 MG tablet     torsemide (DEMADEX) 10 MG tablet     No current facility-administered medications for this visit.     Allergies   Allergen Reactions     Actos [Pioglitazone]      Fluid retention     Amlodipine      Leg swelling, hand swelling     Animal Dander      Doxycycline Hives     Dust Mites      Grass      Keflex [Cephalexin Hcl] Hives     Metoprolol      Swelling of lower legs and fatigue     Other [Seasonal Allergies]      pollen     Ranitidine      rash     Synthroid [Levothyroxine Sodium] Swelling     Allergic to brand name     Tetracycline Hives     Tylenol Hives     Ziac [Bisoprolol-Hydrochlorothiazide]        Physical Exam:BP (!) 143/84 (BP Location: Right arm, Patient Position: Chair, Cuff Size: Adult Regular)   Pulse 107   Ht 1.594 m (5' 2.76\")   Wt 64 kg (141 lb)   LMP  (LMP Unknown)   SpO2 94%   BMI 25.17 kg/m     ECOG PS- 0  Constitutional: No acute distress, pleasant. Cooperative.   ENT: PERRLA, sclera without erythema. Patent nasal passages. Appropriate dentition, no mouth sores.  Neck: Trachea midline, no adenopathy.   Resp: CTA, adequate depth and rate of respirations.   Cardiac: Irregularly irregular heart rhythm, no murmurs.   Abdomen: BS active, abdomen soft and non-tender. No masses or organomegaly.   MS:  5/5 muscle strength, adequate ROM.   Skin: No rashes, lesions, or wounds.  Neuro: A/O x 4, sensation intact. Gait normal.   Lymph: No palpable " anterior/posterior cervical, axillary, or supraclavicular nodes.   Psych: Appropriate mentation and affect. Good conversation.     Laboratory/Imaging Results:   Recent Results (from the past 240 hour(s))   Ferritin    Collection Time: 03/18/24 10:25 AM   Result Value Ref Range    Ferritin 121 11 - 328 ng/mL   Iron and iron binding capacity    Collection Time: 03/18/24 10:25 AM   Result Value Ref Range    Iron 59 37 - 145 ug/dL    Iron Binding Capacity 301 240 - 430 ug/dL    Iron Sat Index 20 15 - 46 %   Extra Serum Separator Tube (SST)    Collection Time: 03/18/24 10:25 AM   Result Value Ref Range    Hold Specimen JIC    Extra Purple Top Tube    Collection Time: 03/18/24 10:25 AM   Result Value Ref Range    Hold Specimen JIC        Assessment and Plan:   Iron deficiency anemia - ferritin 121, iron 51, TIBC 301, and iron sat 10. No need for iron transfusion today.   Previously received Venafor in December with good tolerance.   RTC in 3 months for BLAISE visit and repeat labs (hgb, ferritin, TIBC).   Continue with Vitamin B12 orally. Discussed iron sources in diet.   Atrial fibrillation  - Follows cardiologist closely. Had Watchman implant placed in March 2023. Not on chronic anticoagulation but is on ASA 81 mg daily.No cardiac symptoms today.    DM Type II  - On jardiance 25 mg daily and glipizide 10 mg at breakfast and 5 mg at dinner. Denies issues with hypoglycemia. Reeducated symptoms to monitor for.   - Dr. Meyers is PCP.   Health Maintenance  - Sees PCP, dentist, and ophthalmology regularly.   - Overdue for mammogram- reminder given.   The total time of this encounter amounted to  30 minutes. This time included face to face time spent with the patient, prep work, review and ordering tests, and performing post visit documentation.  Francisca Mustafa Cnp  .      Again, thank you for allowing me to participate in the care of your patient.        Sincerely,        Francisca Mustafa, WAYLON, APRN CNP

## 2024-04-01 ENCOUNTER — TRANSFERRED RECORDS (OUTPATIENT)
Dept: MULTI SPECIALTY CLINIC | Facility: CLINIC | Age: 74
End: 2024-04-01

## 2024-04-01 LAB — RETINOPATHY: NORMAL

## 2024-04-04 DIAGNOSIS — I50.33 ACUTE ON CHRONIC DIASTOLIC CONGESTIVE HEART FAILURE (H): Primary | ICD-10-CM

## 2024-04-05 ENCOUNTER — OFFICE VISIT (OUTPATIENT)
Dept: PHARMACY | Facility: CLINIC | Age: 74
End: 2024-04-05
Payer: COMMERCIAL

## 2024-04-05 ENCOUNTER — LAB (OUTPATIENT)
Dept: LAB | Facility: CLINIC | Age: 74
End: 2024-04-05
Payer: MEDICARE

## 2024-04-05 VITALS
DIASTOLIC BLOOD PRESSURE: 63 MMHG | SYSTOLIC BLOOD PRESSURE: 120 MMHG | OXYGEN SATURATION: 95 % | BODY MASS INDEX: 25.53 KG/M2 | HEART RATE: 45 BPM | WEIGHT: 143 LBS

## 2024-04-05 DIAGNOSIS — R80.9 TYPE 2 DIABETES MELLITUS WITH MICROALBUMINURIA, WITHOUT LONG-TERM CURRENT USE OF INSULIN (H): Primary | ICD-10-CM

## 2024-04-05 DIAGNOSIS — I50.33 ACUTE ON CHRONIC DIASTOLIC CONGESTIVE HEART FAILURE (H): ICD-10-CM

## 2024-04-05 DIAGNOSIS — R80.9 TYPE 2 DIABETES MELLITUS WITH MICROALBUMINURIA, WITHOUT LONG-TERM CURRENT USE OF INSULIN (H): ICD-10-CM

## 2024-04-05 DIAGNOSIS — E11.29 TYPE 2 DIABETES MELLITUS WITH MICROALBUMINURIA, WITHOUT LONG-TERM CURRENT USE OF INSULIN (H): ICD-10-CM

## 2024-04-05 DIAGNOSIS — I10 HYPERTENSION GOAL BP (BLOOD PRESSURE) < 130/80: ICD-10-CM

## 2024-04-05 DIAGNOSIS — E11.29 TYPE 2 DIABETES MELLITUS WITH MICROALBUMINURIA, WITHOUT LONG-TERM CURRENT USE OF INSULIN (H): Primary | ICD-10-CM

## 2024-04-05 LAB
ANION GAP SERPL CALCULATED.3IONS-SCNC: 13 MMOL/L (ref 7–15)
BUN SERPL-MCNC: 62.3 MG/DL (ref 8–23)
CALCIUM SERPL-MCNC: 9.2 MG/DL (ref 8.8–10.2)
CHLORIDE SERPL-SCNC: 102 MMOL/L (ref 98–107)
CREAT SERPL-MCNC: 2.49 MG/DL (ref 0.51–0.95)
DEPRECATED HCO3 PLAS-SCNC: 21 MMOL/L (ref 22–29)
EGFRCR SERPLBLD CKD-EPI 2021: 20 ML/MIN/1.73M2
GLUCOSE SERPL-MCNC: 188 MG/DL (ref 70–99)
HBA1C MFR BLD: 10.5 %
POTASSIUM SERPL-SCNC: 4.7 MMOL/L (ref 3.4–5.3)
SODIUM SERPL-SCNC: 136 MMOL/L (ref 135–145)

## 2024-04-05 PROCEDURE — 99000 SPECIMEN HANDLING OFFICE-LAB: CPT | Performed by: PATHOLOGY

## 2024-04-05 PROCEDURE — 80048 BASIC METABOLIC PNL TOTAL CA: CPT | Performed by: PATHOLOGY

## 2024-04-05 PROCEDURE — 36415 COLL VENOUS BLD VENIPUNCTURE: CPT | Performed by: PATHOLOGY

## 2024-04-05 PROCEDURE — 99207 PR NO CHARGE LOS: CPT | Performed by: PHARMACIST

## 2024-04-05 PROCEDURE — 83036 HEMOGLOBIN GLYCOSYLATED A1C: CPT | Performed by: INTERNAL MEDICINE

## 2024-04-05 RX ORDER — GLIPIZIDE 10 MG/1
20 TABLET, FILM COATED, EXTENDED RELEASE ORAL DAILY
Qty: 60 TABLET | Refills: 3 | Status: SHIPPED | OUTPATIENT
Start: 2024-04-05 | End: 2024-08-16

## 2024-04-05 RX ORDER — ATENOLOL 25 MG/1
25 TABLET ORAL DAILY
Qty: 30 TABLET | Refills: 3 | Status: SHIPPED | OUTPATIENT
Start: 2024-04-05 | End: 2024-07-12

## 2024-04-05 NOTE — Clinical Note
Norma Minor,  I decreased her Jardiance today due to lowering kidney function to 10 mg.   Please let me know what questions you have for me,  Bunny Santa, Pharm. D., [facility] Medication Therapy Management Pharmacist

## 2024-04-05 NOTE — Clinical Note
Hello,  I decreased Georgia's atenolol today as her pulse was in the 40's bpm. I checked both with the automated cuff and manually.  Please let me know what questions you have for me,  Bunny Santa, Pharm. D., BCACP Medication Therapy Management Pharmacist

## 2024-04-05 NOTE — PROGRESS NOTES
Medication Therapy Management (MTM) Encounter    ASSESSMENT:                            Medication Adherence/Access: No issues identified    Diabetes:   She would benefit from insulin since her A1c is  above 10%. She is not open to insulin at this point and wants to retry the glipizide at 20 mg. Jardiance is not normally recommended for diabetes at a GFR <30 mL/min although it is used for heart failure and CKD at a dose og 10 mg daily.     Hypertension/CHF:   Patient is bradycardic and would benefit from a decrease in dose of atenolol, Will inform cardiology.       PLAN:                            Change glipizide to XL 20 mg in the morning with food.   Consider starting insulin at next visit if blood glucose is not at goal  Decrease Jardiance to 10 mg daily.  Decrease atenolol to 25 mg daily - I will let your cardiologist know.     Follow-up: Return in about 5 weeks (around 5/10/2024) for Follow up, with me.    SUBJECTIVE/OBJECTIVE:                          Keerthi Montoya is a 74 year old female coming in for a follow-up visit.       Reason for visit: CMR.    Allergies/ADRs: Reviewed in chart  Past Medical History: Reviewed in chart  Tobacco: She reports that she has never smoked. She has never been exposed to tobacco smoke. She has never used smokeless tobacco.  Alcohol: none    Medication Adherence/Access: no issues reported -  did not have time     Diabetes   Jardiance 25 mg daily  Glipizide 10 mg in the morning and 5 mg in the evening    Kidney function too low for metformin. GLP-1 would be too expensive    Reports this version of glipizide is not work as well for her.      Blood sugar monitorin time(s) daily. Ranges (patient reported): Fasting: around 200 mg/dL  Symptoms of low blood sugar? none   Symptoms of high blood sugar? None.     Aspirin: No  Statin: Yes: atorvastatin 40 mg   ACEi/ARB: Yes: losartan           Eye exam in the last 12 months? No    Foot exam: due   Urine Albumin:   Lab Results    Component Value Date    UMALCR 109.82 (H) 06/15/2023      Lab Results   Component Value Date    A1C 10.5 (H) 04/05/2024          Hypertension/CHF   Isosorbide 30 mg daily  Losartan 100 mg daily  Empagliflozin 25 mg daily  Hydralazine 25 mg three times a day  Aspirin 81 mg daily  Atenolol 50 mg daily  Torsemide 10 mg daily    Home blood pressure has been running 120's/50-70's mmHg. Double checked pulse manually and it was indeed in the 40's bpm    BP Readings from Last 3 Encounters:   04/05/24 120/63   03/18/24 (!) 143/84   01/15/24 116/75       Pulse Readings from Last 3 Encounters:   04/05/24 (!) 45   03/18/24 107   01/15/24 100           Today's Vitals: /63   Pulse (!) 45   Wt 143 lb (64.9 kg)   LMP  (LMP Unknown)   SpO2 95%   BMI 25.53 kg/m    ----------------      I spent 30 minutes with this patient today. All changes were made via collaborative practice agreement with Bunny Meyers MD. A copy of the visit note was provided to the patient's provider(s).    A summary of these recommendations was sent via [employer].    Bunny Santa, Pharm. D., [facility]  Medication Therapy Management Pharmacist           Medication Therapy Recommendations  Heart failure with preserved ejection fraction, NYHA class I (H)    Current Medication: empagliflozin (JARDIANCE) 25 MG TABS tablet (Discontinued)   Rationale: Dose too high - Dosage too high - Safety   Recommendation: Decrease Dose   Status: Accepted per CPA         Hypertension goal BP (blood pressure) < 130/80    Current Medication: atenolol (TENORMIN) 50 MG tablet (Discontinued)   Rationale: Dose too high - Dosage too high - Safety   Recommendation: Decrease Dose   Status: Accepted per CPA         Type 2 diabetes mellitus with microalbuminuria, without long-term current use of insulin (H)    Current Medication: glipiZIDE (GLUCOTROL) 10 MG tablet (Discontinued)   Rationale: Synergistic therapy - Needs additional medication therapy - Indication   Recommendation:  Start Medication - LANTUS SC   Status: Declined per Patient          Current Medication: glipiZIDE (GLUCOTROL) 10 MG tablet (Discontinued)   Rationale: Dose too low - Dosage too low - Effectiveness   Recommendation: Increase Dose   Status: Accepted per CPA

## 2024-04-06 NOTE — PATIENT INSTRUCTIONS
"Recommendations from today's MTM visit:                                                    MTM (medication therapy management) is a service provided by a clinical pharmacist designed to help you get the most of out of your medicines.   Today we reviewed what your medicines are for, how to know if they are working, that your medicines are safe and how to make your medicine regimen as easy as possible.    Change glipizide to XL 20 mg in the morning with food.   Consider starting insulin at next visit if blood glucose is not at goal  Decrease Jardiance to 10 mg daily.  Decrease atenolol to 25 mg daily - I will let your cardiologist know.     Follow-up: Return in about 5 weeks (around 5/10/2024) for Follow up, with me.    It was great speaking with you today.  I value your experience and would be very thankful for your time in providing feedback in our clinic survey. In the next few days, you may receive an email or text message from [employer] with a link to a survey related to your  clinical pharmacist.\"     To schedule another MTM appointment, please call the clinic directly or you may call the MTM scheduling line at [phone] or toll-free at [phone].     My Clinical Pharmacist's contact information:                                                      Please feel free to contact me with any questions or concerns you have.      Bunny Santa, Pharm. D., [facility]  Medication Therapy Management Pharmacist    "

## 2024-04-10 DIAGNOSIS — K74.00 FIBROSIS OF LIVER: Primary | ICD-10-CM

## 2024-04-10 NOTE — PROGRESS NOTES
-- Per Dr. James, patient should establish care with Hepatology. Referral placed appropriately. Provider will communicate this plan to the patient.  __________________________________________  Per [employer] results 3/1/24:

## 2024-04-11 ENCOUNTER — CARE COORDINATION (OUTPATIENT)
Dept: CARDIOLOGY | Facility: CLINIC | Age: 74
End: 2024-04-11
Payer: MEDICARE

## 2024-04-11 DIAGNOSIS — I50.33 ACUTE ON CHRONIC DIASTOLIC CONGESTIVE HEART FAILURE (H): ICD-10-CM

## 2024-04-11 NOTE — PROGRESS NOTES
Georgia called me back.    She told me that she was seen by the pharmacist on 4/5 and the following changes were made:  Change glipizide to XL 20 mg in the morning with food.   Consider starting insulin at next visit if blood glucose is not at goal  Decrease Jardiance to 10 mg daily.  Decrease atenolol to 25 mg daily - I will let your cardiologist know.     She told me her weight is 135-137 lbs at home. She denies swelling, bloating, shortness of breath. She denies feeling dry or dehydrated. She denies nausea, vomiting, diarrhea. She is not taking any ibuprofen.     We will see her as scheduled in [facility] tomorrow.    Lorena Ambriz RN

## 2024-04-12 ENCOUNTER — OFFICE VISIT (OUTPATIENT)
Dept: CARDIOLOGY | Facility: CLINIC | Age: 74
End: 2024-04-12
Attending: NURSE PRACTITIONER
Payer: MEDICARE

## 2024-04-12 ENCOUNTER — LAB (OUTPATIENT)
Dept: LAB | Facility: CLINIC | Age: 74
End: 2024-04-12
Payer: MEDICARE

## 2024-04-12 VITALS
BODY MASS INDEX: 25.47 KG/M2 | SYSTOLIC BLOOD PRESSURE: 150 MMHG | HEART RATE: 56 BPM | DIASTOLIC BLOOD PRESSURE: 73 MMHG | OXYGEN SATURATION: 94 % | WEIGHT: 142.7 LBS

## 2024-04-12 DIAGNOSIS — I50.30 HEART FAILURE WITH PRESERVED EJECTION FRACTION, NYHA CLASS I (H): Primary | ICD-10-CM

## 2024-04-12 DIAGNOSIS — I50.30 HEART FAILURE WITH PRESERVED EJECTION FRACTION, NYHA CLASS I (H): ICD-10-CM

## 2024-04-12 DIAGNOSIS — R80.9 TYPE 2 DIABETES MELLITUS WITH MICROALBUMINURIA, WITHOUT LONG-TERM CURRENT USE OF INSULIN (H): ICD-10-CM

## 2024-04-12 DIAGNOSIS — I27.20 PULMONARY HYPERTENSION (H): ICD-10-CM

## 2024-04-12 DIAGNOSIS — Z95.818 PRESENCE OF WATCHMAN LEFT ATRIAL APPENDAGE CLOSURE DEVICE: ICD-10-CM

## 2024-04-12 DIAGNOSIS — E83.52 HYPERCALCEMIA: ICD-10-CM

## 2024-04-12 DIAGNOSIS — I10 HYPERTENSION GOAL BP (BLOOD PRESSURE) < 130/80: ICD-10-CM

## 2024-04-12 DIAGNOSIS — E11.29 TYPE 2 DIABETES MELLITUS WITH MICROALBUMINURIA, WITHOUT LONG-TERM CURRENT USE OF INSULIN (H): ICD-10-CM

## 2024-04-12 LAB
ANION GAP SERPL CALCULATED.3IONS-SCNC: 13 MMOL/L (ref 7–15)
BUN SERPL-MCNC: 27.7 MG/DL (ref 8–23)
CALCIUM SERPL-MCNC: 10.8 MG/DL (ref 8.8–10.2)
CHLORIDE SERPL-SCNC: 103 MMOL/L (ref 98–107)
CREAT SERPL-MCNC: 1.86 MG/DL (ref 0.51–0.95)
DEPRECATED HCO3 PLAS-SCNC: 22 MMOL/L (ref 22–29)
EGFRCR SERPLBLD CKD-EPI 2021: 28 ML/MIN/1.73M2
GLUCOSE SERPL-MCNC: 139 MG/DL (ref 70–99)
POTASSIUM SERPL-SCNC: 4.4 MMOL/L (ref 3.4–5.3)
SODIUM SERPL-SCNC: 138 MMOL/L (ref 135–145)

## 2024-04-12 PROCEDURE — 36415 COLL VENOUS BLD VENIPUNCTURE: CPT | Performed by: PATHOLOGY

## 2024-04-12 PROCEDURE — 99000 SPECIMEN HANDLING OFFICE-LAB: CPT | Performed by: PATHOLOGY

## 2024-04-12 PROCEDURE — G0463 HOSPITAL OUTPT CLINIC VISIT: HCPCS | Performed by: NURSE PRACTITIONER

## 2024-04-12 PROCEDURE — 80048 BASIC METABOLIC PNL TOTAL CA: CPT | Performed by: PATHOLOGY

## 2024-04-12 PROCEDURE — 82306 VITAMIN D 25 HYDROXY: CPT | Performed by: INTERNAL MEDICINE

## 2024-04-12 PROCEDURE — 99214 OFFICE O/P EST MOD 30 MIN: CPT | Performed by: NURSE PRACTITIONER

## 2024-04-12 ASSESSMENT — PAIN SCALES - GENERAL: PAINLEVEL: NO PAIN (0)

## 2024-04-12 NOTE — PATIENT INSTRUCTIONS
Take your medicines every day, as directed    Changes made today:  none     Monitor Your Weight and Symptoms    Contact us if you:    Gain 2 pounds in one day or 5 pounds in one week  Feel more short of breath  Notice more leg swelling  Feel lightheadeded   Change your lifestyle    Limit Salt or Sodium:  2000 mg  Limit Fluids:  2000 mL or approximately 64 ounces  Eat a Heart Healthy Diet  Low in saturated fats  Stay Active:  Aim to move at least 150 minutes every  week         To Contact us    During Business Hours:  [phone], option # 1      After hours, weekends or holidays:   [phone], Option #4  Ask to speak to the On-Call Cardiologist. Inform them you are a CORE/heart failure patient at the [facility].     Use [employer] allows you to communicate directly with your heart team through secure messaging.  [employer] can be accessed any time on your phone, computer, or tablet.  If you need assistance, we'd be happy to help!         Keep your Heart Appointments:    Labs on your way out today    [facility] in July     Please consider attending our virtual support group which is held monthly. Please reach out to [facility] at [phone] for more information if you are interested in attending.     2024 dates:    Monday, May 6th, 1-2pm     Monday, June 3rd, 1-2pm     Monday, July 1st, 1-2pm     Monday, August 5th, 1-2pm     Monday, September 9th, 1-2pm     Monday, October 7th, 1-2pm     Monday, November 4th, 1-2pm     Monday, December 2nd, 1-2pm

## 2024-04-12 NOTE — PROGRESS NOTES
Georgia is a 74 year old female with a  medical history is significant for longstanding hypertension since she was in her teens, diabetes, dyslipidemia, and ulcerative colitis.    Patient reports she developed shingles several weeks ago and with this, developed painful, itchy rash and started having SOB and abdominal bloating after this. She was admitted, and diuresed for almost 15 lbs weight gain.     She is feeling well.  Patient denies dyspnea with any of her ADLs or usual chores. She denies SOB, presyncope, syncope, orthopnea, PND, chest pain, palpitations, abdominal pain, nausea, emesis, LE edema or weight gain. Patient reports compliance with her medications, weighing herself daily, and watching her salt and fluid intake. Weight at home 135 lbs. She has more dryness in the mouth.  systolic at home today. Clinic appts can be stressful.    Current meds:    Torsemide 10 mg-  Hydralazine 25 mg TID  Losartan 100 mg daily  Imdur 30 mg daily  Jardiance 10 mg daily  Atenolol 25 mg daily      ROS:   Constitutional: No fever, chills, or sweats.  ENT: No visual disturbance, ear ache, epistaxis, sore throat.   Allergies/Immunologic: Negative  Respiratory: No cough, hemoptysis.   Cardiovascular: As per HPI.   GI: As per HPI.   : No urinary frequency, dysuria, or hematuria.   Integument: Negative.   Psychiatric: Negative.   Neuro: Negative.   Endocrinology: negative   Musculoskeletal: negative    EXAM:   GEN/CONSTITUIONAL: Appears comfortable, in no apparent distress   EYES: No icterus  ENT/MOUTH: Normal  JVP:  not seen at 90 degrees  RESPIRATORY: Clear to auscultation bilaterally   CARDIOVASCULAR: Regular S1 and S2, 2/6 JUANITA.   ABDOMEN: Soft, non-tender, positive bowel sounds   NEUROLOGIC: Grossly non-focal   PSYCHIATRIC: Normal affect  EXT: no LE edema. Normal pedal pulses.  Skin: No petechiae, purpura or rash       ECHO 8/25  Interpretation Summary   Global and regional left ventricular function is normal with  an EF of 60-65%.  Paradoxical septal motion consistent with right ventricular pressure and  volume overload is present.  Moderate to severe right ventricular dilation is present.  Global right ventricular function is moderately to severely reduced.  Calculated aortic valve area in severe AS range. Consider additional  evaluation if indicated.  Moderate to severe tricuspid insufficiency is present.  Dilation of the inferior vena cava is present with abnormal respiratory  variation in diameter.  Trivial pericardial effusion is present.    .  Assessment and Plan:  In summary, 74 year old female with a past medical history of chronic HFpEF, HLD, HTN, DM2, ulcerative pancolitis, CKD3, pulmonary HTN, moderate paradoxical low flow low gradient aortic stenosis, persistent atrial fibrillation with (CHADS-VASc 4) with intolerance to anticoagulation due to history of GI bleeding s/p RY closure and Watchman (05/2023) who was admitted in Sept '23 heart failure exacerbation and presents as HFpEF.    Weight is 135 lbs at home, labs reviewed.  Jardiance was decreased recently ( last week).  Appears euvolemic today . Will recheck labs today.      # Acute on Chronic Diastolic Heart Failure  # Moderate Pulmonary HTN  # Moderate Paradoxical Low Flow Low Gradient Aortic Stenosis    # Persistent rate-controlled A-fib s/p Watchman   # HTN  # HLD  Recent admission for increased PROCTOR, abdominal distention, peripheral edema, orthopnea, and ~15lb weight gain. Work up notable for BNP 18,607, creatinine 1.70, CXR with pulmonary edema and small bilateral effusions. Echo 5/2023 EF 55-60%, PASP 59 mmHg, RA pressure 15 mmHg.   - Volume Status: euvolemic  Torsemide 10 mg daily  - DAPT (given recent Watchman) continue ASA 81mg daily, Brilinta 60 mg BID x 6 months through 11/2023)  - Continue Lipitor 40 mg daily   - Continue Hydralazine 25 mg TID, Imdur 30 mg daily, Losartan 100 mg daily, Atenolol 25 mg daily  - Continue PTA Empagliflozin 10 mg  daily  - Rate controlled - on Atenolol  - Will continue to monitor TTE yearly for now     # CKD Stage 3  Baseline appears to be around 1.5-1.9  - Will continue to monitor 2.4- last check      # DM Type II  Hgb A1c 8.7.   - Managed by PCP  - Continue glipizide and empagliflozin       Plan:  BMP - today recheck   CORE in July        Carolyn ADAME NP-C

## 2024-04-12 NOTE — LETTER
4/12/2024      RE: Keerthi Montoya  [address] [place] [id number]       Dear Colleague,    Thank you for the opportunity to participate in the care of your patient, Keerthi Montoya, at the [facility] [place] [facility]. Please see a copy of my visit note below.      Georgia is a 74 year old female with a  medical history is significant for longstanding hypertension since she was in her teens, diabetes, dyslipidemia, and ulcerative colitis.    Patient reports she developed shingles several weeks ago and with this, developed painful, itchy rash and started having SOB and abdominal bloating after this. She was admitted, and diuresed for almost 15 lbs weight gain.     She is feeling well.  Patient denies dyspnea with any of her ADLs or usual chores. She denies SOB, presyncope, syncope, orthopnea, PND, chest pain, palpitations, abdominal pain, nausea, emesis, LE edema or weight gain. Patient reports compliance with her medications, weighing herself daily, and watching her salt and fluid intake. Weight at home 135 lbs. She has more dryness in the mouth.     Current meds:    Torsemide 10 mg-  Hydralazine 25 mg TID  Losartan 100 mg daily  Imdur 30 mg daily  Jardiance 10 mg daily  Atenolol 25 mg daily      ROS:   Constitutional: No fever, chills, or sweats.  ENT: No visual disturbance, ear ache, epistaxis, sore throat.   Allergies/Immunologic: Negative  Respiratory: No cough, hemoptysis.   Cardiovascular: As per HPI.   GI: As per HPI.   : No urinary frequency, dysuria, or hematuria.   Integument: Negative.   Psychiatric: Negative.   Neuro: Negative.   Endocrinology: negative   Musculoskeletal: negative    EXAM:   GEN/CONSTITUIONAL: Appears comfortable, in no apparent distress   EYES: No icterus  ENT/MOUTH: Normal  JVP:  not seen at 90 degrees  RESPIRATORY: Clear to auscultation bilaterally   CARDIOVASCULAR: Regular S1 and S2, 2/6 JUANITA.    ABDOMEN: Soft, non-tender, positive bowel sounds   NEUROLOGIC: Grossly non-focal   PSYCHIATRIC: Normal affect  EXT: no LE edema. Normal pedal pulses.  Skin: No petechiae, purpura or rash       ECHO 8/25  Interpretation Summary   Global and regional left ventricular function is normal with an EF of 60-65%.  Paradoxical septal motion consistent with right ventricular pressure and  volume overload is present.  Moderate to severe right ventricular dilation is present.  Global right ventricular function is moderately to severely reduced.  Calculated aortic valve area in severe AS range. Consider additional  evaluation if indicated.  Moderate to severe tricuspid insufficiency is present.  Dilation of the inferior vena cava is present with abnormal respiratory  variation in diameter.  Trivial pericardial effusion is present.    .  Assessment and Plan:  In summary, 74 year old female with a past medical history of chronic HFpEF, HLD, HTN, DM2, ulcerative pancolitis, CKD3, pulmonary HTN, moderate paradoxical low flow low gradient aortic stenosis, persistent atrial fibrillation with (CHADS-VASc 4) with intolerance to anticoagulation due to history of GI bleeding s/p RY closure and Watchman (05/2023) who was admitted in Sept '23 heart failure exacerbation and presents as HFpEF.    Weight is 135 lbs at home, labs reviewed.  Jardiance was decreased recently ( last week).  Appears euvolemic today . Will recheck labs today.      # Acute on Chronic Diastolic Heart Failure  # Moderate Pulmonary HTN  # Moderate Paradoxical Low Flow Low Gradient Aortic Stenosis    # Persistent rate-controlled A-fib s/p Watchman   # HTN  # HLD  Recent admission for increased PROCTOR, abdominal distention, peripheral edema, orthopnea, and ~15lb weight gain. Work up notable for BNP 18,607, creatinine 1.70, CXR with pulmonary edema and small bilateral effusions. Echo 5/2023 EF 55-60%, PASP 59 mmHg, RA pressure 15 mmHg.   - Volume Status: euvolemic   Torsemide 10 mg daily  - DAPT (given recent Watchman) continue ASA 81mg daily, Brilinta 60 mg BID x 6 months through 11/2023)  - Continue Lipitor 40 mg daily   - Continue Hydralazine 25 mg TID, Imdur 30 mg daily, Losartan 100 mg daily, Atenolol 25 mg daily  - Continue PTA Empagliflozin 10 mg daily  - Rate controlled - on Atenolol  - Will continue to monitor TTE yearly for now     # CKD Stage 3  Baseline appears to be around 1.5-1.9  - Will continue to monitor 2.4- last check      # DM Type II  Hgb A1c 8.7.   - Managed by PCP  - Continue glipizide and empagliflozin       Plan:  BMP - today recheck   CORE in July        Carolyn LLAMAS

## 2024-04-12 NOTE — NURSING NOTE
Chief Complaint   Patient presents with    Follow Up     Return CORE, 75 yo female with HFpEF presents for follow up with labs prior.       Vitals were taken, medications reviewed.    Vielka Mckeon, EMT   9:26 AM

## 2024-04-12 NOTE — NURSING NOTE
Labs: Patient was given results of the laboratory testing obtained today. Patient was instructed to return for the next laboratory testing today. Patient demonstrated understanding of this information and agreed to call with further questions or concerns.     Med Reconcile: Reviewed and verified all current medications with the patient. The updated medication list was printed and given to the patient.    Return Appointment: Patient given instructions regarding scheduling next clinic visit. Patient demonstrated understanding of this information and agreed to call with further questions or concerns. CORE in July.    Patient stated she understood all health information given and agreed to call with further questions or concerns.    Lorena Ambriz RN

## 2024-04-14 ENCOUNTER — MYC MEDICAL ADVICE (OUTPATIENT)
Dept: NEPHROLOGY | Facility: CLINIC | Age: 74
End: 2024-04-14
Payer: MEDICARE

## 2024-04-14 DIAGNOSIS — E83.52 HYPERCALCEMIA: Primary | ICD-10-CM

## 2024-04-14 LAB — VIT D+METAB SERPL-MCNC: 41 NG/ML (ref 20–50)

## 2024-04-17 NOTE — TELEPHONE ENCOUNTER
torsemide (DEMADEX) 10 MG tablet 90 tablet 1 10/27/2023     Last Office Visit: 4/12/24  Future Office visit:  6/7/24          Diuretics (Including Combos) Protocol [id number]/11/2024 01:43 PM   Protocol Details Has GFR on file in past 12 months and most recent value is normal     Component      Latest Ref Rng 4/12/2024  10:39 AM   Sodium      135 - 145 mmol/L 138    Potassium      3.4 - 5.3 mmol/L 4.4    Chloride      98 - 107 mmol/L 103    Carbon Dioxide (CO2)      22 - 29 mmol/L 22    Anion Gap      7 - 15 mmol/L 13    Urea Nitrogen      8.0 - 23.0 mg/dL 27.7 (H)    Creatinine      0.51 - 0.95 mg/dL 1.86 (H)    GFR Estimate      >60 mL/min/1.73m2 28 (L)        Routing refill request to provider for review/approval because:  Abnormal lab    Remedios Santoro RN  P Red Flag Triage/MRT

## 2024-04-18 RX ORDER — TORSEMIDE 10 MG/1
10 TABLET ORAL DAILY
Qty: 90 TABLET | Refills: 3 | Status: SHIPPED | OUTPATIENT
Start: 2024-04-18 | End: 2024-05-06

## 2024-05-03 DIAGNOSIS — I10 HYPERTENSION GOAL BP (BLOOD PRESSURE) < 140/90: ICD-10-CM

## 2024-05-06 ENCOUNTER — LAB (OUTPATIENT)
Dept: LAB | Facility: CLINIC | Age: 74
End: 2024-05-06
Payer: MEDICARE

## 2024-05-06 ENCOUNTER — OFFICE VISIT (OUTPATIENT)
Dept: NEPHROLOGY | Facility: CLINIC | Age: 74
End: 2024-05-06
Payer: MEDICARE

## 2024-05-06 VITALS — WEIGHT: 141 LBS | BODY MASS INDEX: 25.17 KG/M2 | OXYGEN SATURATION: 95 %

## 2024-05-06 DIAGNOSIS — N25.81 SECONDARY RENAL HYPERPARATHYROIDISM (H): ICD-10-CM

## 2024-05-06 DIAGNOSIS — E83.52 HYPERCALCEMIA: ICD-10-CM

## 2024-05-06 DIAGNOSIS — I50.30 HEART FAILURE WITH PRESERVED EJECTION FRACTION, NYHA CLASS I (H): ICD-10-CM

## 2024-05-06 DIAGNOSIS — I10 HYPERTENSION GOAL BP (BLOOD PRESSURE) < 130/80: ICD-10-CM

## 2024-05-06 DIAGNOSIS — E11.29 TYPE 2 DIABETES MELLITUS WITH MICROALBUMINURIA, WITHOUT LONG-TERM CURRENT USE OF INSULIN (H): ICD-10-CM

## 2024-05-06 DIAGNOSIS — I50.33 ACUTE ON CHRONIC DIASTOLIC CONGESTIVE HEART FAILURE (H): ICD-10-CM

## 2024-05-06 DIAGNOSIS — E87.20 METABOLIC ACIDOSIS: ICD-10-CM

## 2024-05-06 DIAGNOSIS — E83.52 HYPERCALCEMIA: Primary | ICD-10-CM

## 2024-05-06 DIAGNOSIS — N18.30 STAGE 3 CHRONIC KIDNEY DISEASE, UNSPECIFIED WHETHER STAGE 3A OR 3B CKD (H): ICD-10-CM

## 2024-05-06 DIAGNOSIS — R80.9 TYPE 2 DIABETES MELLITUS WITH MICROALBUMINURIA, WITHOUT LONG-TERM CURRENT USE OF INSULIN (H): ICD-10-CM

## 2024-05-06 DIAGNOSIS — Z95.818 PRESENCE OF WATCHMAN LEFT ATRIAL APPENDAGE CLOSURE DEVICE: ICD-10-CM

## 2024-05-06 LAB
ALBUMIN SERPL BCG-MCNC: 4.7 G/DL (ref 3.5–5.2)
ANION GAP SERPL CALCULATED.3IONS-SCNC: 12 MMOL/L (ref 7–15)
BUN SERPL-MCNC: 26.2 MG/DL (ref 8–23)
CALCIUM SERPL-MCNC: 10 MG/DL (ref 8.8–10.2)
CHLORIDE SERPL-SCNC: 100 MMOL/L (ref 98–107)
CREAT SERPL-MCNC: 1.97 MG/DL (ref 0.51–0.95)
DEPRECATED HCO3 PLAS-SCNC: 25 MMOL/L (ref 22–29)
EGFRCR SERPLBLD CKD-EPI 2021: 26 ML/MIN/1.73M2
GLUCOSE SERPL-MCNC: 146 MG/DL (ref 70–99)
PHOSPHATE SERPL-MCNC: 3.6 MG/DL (ref 2.5–4.5)
POTASSIUM SERPL-SCNC: 4.2 MMOL/L (ref 3.4–5.3)
PTH-INTACT SERPL-MCNC: 113 PG/ML (ref 15–65)
SODIUM SERPL-SCNC: 137 MMOL/L (ref 135–145)

## 2024-05-06 PROCEDURE — 99215 OFFICE O/P EST HI 40 MIN: CPT | Performed by: INTERNAL MEDICINE

## 2024-05-06 PROCEDURE — 82542 COL CHROMOTOGRAPHY QUAL/QUAN: CPT | Mod: 90

## 2024-05-06 PROCEDURE — 84439 ASSAY OF FREE THYROXINE: CPT

## 2024-05-06 PROCEDURE — 80069 RENAL FUNCTION PANEL: CPT

## 2024-05-06 PROCEDURE — 99000 SPECIMEN HANDLING OFFICE-LAB: CPT

## 2024-05-06 PROCEDURE — 84443 ASSAY THYROID STIM HORMONE: CPT

## 2024-05-06 PROCEDURE — 36415 COLL VENOUS BLD VENIPUNCTURE: CPT

## 2024-05-06 PROCEDURE — 82652 VIT D 1 25-DIHYDROXY: CPT

## 2024-05-06 PROCEDURE — 83970 ASSAY OF PARATHORMONE: CPT

## 2024-05-06 RX ORDER — TORSEMIDE 10 MG/1
10 TABLET ORAL EVERY OTHER DAY
Status: SHIPPED
Start: 2024-05-06 | End: 2024-08-12

## 2024-05-06 ASSESSMENT — PAIN SCALES - GENERAL: PAINLEVEL: NO PAIN (0)

## 2024-05-06 NOTE — NURSING NOTE
Keerthi Montoya's goals for this visit include:   Chief Complaint   Patient presents with    RECHECK     6mo recheck CKD       She requests these members of her care team be copied on today's visit information: no     PCP: Bunny Meyers    Referring Provider:  No referring provider defined for this encounter.    Wt 64 kg (141 lb)   LMP  (LMP Unknown)   SpO2 95%   BMI 25.17 kg/m      Do you need any medication refills at today's visit? Unsure    Lucy Purdy LPN

## 2024-05-06 NOTE — PATIENT INSTRUCTIONS
Recheck BP before leaving today.  Stop the sodium bicarbonate   Trial changing the torsemide to 10 mg every other day.   Monitor weights at home and monitor for swelling.  Labs in 2 months  Labs in 4 months  Follow-up in 6 months.

## 2024-05-06 NOTE — PROGRESS NOTES
"05/06/24   HPI: Keerthi Montoya is a 74 year old female who presents for follow-up of proteinuria.     History taken from previous notes:  Ms. Montoya's hx is significant for DM dx at least 10 years ago - very poor control for years (documented A1C levels 9.4-11.7% from 2011 to 2017); no known retinopathy per patient. She has longstanding hypertension dx when she was 18 years old. Additional hx includes ulcerative colitis and hypothyroidism (following thyroidectomy at age 24). Her ulcerative colitis was dx ~2017 but she feels she likely had trouble with this disease for years. She reports having loose stool for a long time prior to receiving therapy for her UC; she feels the blood in the stool may have contaminated previous urine samples potentially.                  02/10/20 :  Creatinine has been 0.9-1.13 in the past year; up slightly today at 1.3. Last UPCR 0.42 g/g in May. On losartan 100 mg daily. Bronchitis - started on levaquin on Saturday. No swelling, no NSAIDs. Blood pressure at times has been down to 118 - she reports blood pressures less than 130 typically but at times above. She has lost weight intentionally - 158 lbs last year and now 146 lbs. She does not smoke. She has not been eating/drinking today.      08/03/20:  In the setting of COVID-19 pandemic, this visit was changed to a telephone visit. Patient did not have video capability.  Because of murmur appreciated on exam at our previous visit, I sent her for a TTE which showed aortic stenosis as well as diastolic dysfunction. She was seen by cardiology in March and continues to follow with them at this time. She is felt to have severe pulmonary hypertension, CHF, along with aortic stenosis. She is currently taking lasix 20 mg BID. She remains on losartan 100 mg daily. She reports feeling \"great\". She does have some swelling at times - Dr. Almendarez restarted norvasc. BP has been 124/58; typically 120s. Breathing has been stable. Stable UC " sxs.    02/09/21:  video visit. Cr 1.18-1.55 in the past year; 1.15 last week. Feeling well. No issues with ulcerative colitis. BP at home has not been registering recently. She is seeing cardiology next week but again virtual. No swelling concerns. Currently taking lasix 40 mg AM and 20 mg PM. No NSAIDs.     04/26/22: video visit. Creatinine is up at this time. She reports feeling good. Babysitting full time. She reports that she cut be slightly dry at this time. Weights have been right around 140. No swelling. Breathing is stable. Blood pressure at home three times a week - has been 120s/60s. She is taking torsemide 20 mg AM and 10 mg afternoon. She has been active. UC is well controlled. She has been on Jardiance for the past month.     05/02/23: video visit. Torsemide has since been changed to just as needed. Not needed very often. UPCR is the lowest it has been at 0.22 g/g. Baseline seems to be 1.4-1.7 at this time. A1C very high last week above 11%. UC lately has been good. She has loose stools at times. Did not tolerate oral iron previously. BP in the 114 range.     11/07/23: video visit. Doing fine at this time. She had shingles but doing much better - head to toe afffected - hospitalized for volume overload. Eating and drinking well. On torsemide 10 mg daily. ON losartan 100 mg and Jardiance. She denies difficulty with dehydration. No swelling now for the past 4-5 weeks. BP lately has been good - 103/69. She doesn't have other readings right now. She reports BP will vary - not high for a long time. About 1-2 times a week she will see the BP less than 110. No loose stools.     05/06/24: in person visit. She reports feeling well overall. She was recently changed on her diabetes meds and feels things are likely improved control. She denies any lightheadedness or dizziness but does have increased thirsty and dry mouth at times. No swelling. No sob, no orthopnea. No orthostatic sxs. She has not had sxs of volume  overload for some time and weight has been stable at 135-136 lbs. She does question whether she could be dry at times. hR at home is typically 47-60 - never less than 47. BP is typically in the 120s at home. No calcium supplements. No tums/rolaids. She has cut back on calcium intake.     Current Outpatient Medications   Medication Sig Dispense Refill    aspirin 81 MG EC tablet Take 1 tablet (81 mg) by mouth daily 90 tablet 3    atenolol (TENORMIN) 25 MG tablet Take 1 tablet (25 mg) by mouth daily 30 tablet 3    atorvastatin (LIPITOR) 40 MG tablet Take 1 tablet (40 mg) by mouth daily 90 tablet 3    blood glucose (ACCU-CHEK FELIPE PLUS) test strip 200 strips by In Vitro route 2 times daily Use to test blood sugar 2 times daily or as directed. 200 strip 4    Cyanocobalamin (B-12) 1000 MCG TABS Take 1,000 mcg by mouth three times a week      diphenhydrAMINE (BENADRYL) 25 MG tablet Take 25 mg by mouth every 6 hours as needed for itching or allergies      empagliflozin (JARDIANCE) 10 MG TABS tablet Take 1 tablet (10 mg) by mouth daily 90 tablet 1    glipiZIDE (GLUCOTROL XL) 10 MG 24 hr tablet Take 2 tablets (20 mg) by mouth daily 60 tablet 3    hydrALAZINE (APRESOLINE) 25 MG tablet Take 1 tablet (25 mg) by mouth 3 times daily 270 tablet 3    isosorbide mononitrate (IMDUR) 30 MG 24 hr tablet Take 1 tablet (30 mg) by mouth daily 90 tablet 3    levothyroxine (SYNTHROID/LEVOTHROID) 112 MCG tablet Take 1 tablet (112 mcg) by mouth daily *Thyroid labs due April 2024* 90 tablet 0    losartan (COZAAR) 100 MG tablet Take 1 tablet (100 mg) by mouth daily 90 tablet 0    mesalamine (APRISO ER) 0.375 g 24 hr capsule TAKE 4 CAPSULES(1.5 GRAMS) BY MOUTH DAILY 360 capsule 1    pantoprazole (PROTONIX) 40 MG EC tablet Take 1 tablet (40 mg) by mouth daily 90 tablet 3    senna (SENOKOT) 8.6 MG tablet Take 1 tablet by mouth daily as needed for constipation 30 tablet 3    torsemide (DEMADEX) 10 MG tablet Take 1 tablet (10 mg) by mouth every  other day       No current facility-administered medications for this visit.       Exam:GENERAL APPEARANCE: alert and no distress  CV - RRR  Ext - no edema    Results:   Lab on 05/06/2024   Component Date Value Ref Range Status    Sodium 05/06/2024 137  135 - 145 mmol/L Final    Reference intervals for this test were updated on 09/26/2023 to more accurately reflect our healthy population. There may be differences in the flagging of prior results with similar values performed with this method. Interpretation of those prior results can be made in the context of the updated reference intervals.     Potassium 05/06/2024 4.2  3.4 - 5.3 mmol/L Final    Chloride 05/06/2024 100  98 - 107 mmol/L Final    Carbon Dioxide (CO2) 05/06/2024 25  22 - 29 mmol/L Final    Anion Gap 05/06/2024 12  7 - 15 mmol/L Final    Glucose 05/06/2024 146 (H)  70 - 99 mg/dL Final    Urea Nitrogen 05/06/2024 26.2 (H)  8.0 - 23.0 mg/dL Final    Creatinine 05/06/2024 1.97 (H)  0.51 - 0.95 mg/dL Final    GFR Estimate 05/06/2024 26 (L)  >60 mL/min/1.73m2 Final    Calcium 05/06/2024 10.0  8.8 - 10.2 mg/dL Final    Albumin 05/06/2024 4.7  3.5 - 5.2 g/dL Final    Phosphorus 05/06/2024 3.6  2.5 - 4.5 mg/dL Final        Lab results were reviewed and interpreted.       Assessment/Plan:   1. CKD Stage 3: risks for CKD include poorly controlled diabetes as well as longstanding hypertension. Mesalamine has potential implications on the kidney but with her function stable, this seems less likely to be causing kidney injury previously. She does have proteinuria without hematuria which does fit with diabetic nephropathy. She is already on losartan 100 mg daily. Educated on benefits of optimal weight, avoidance of NSAIDs, blood pressure well controlled and also diabetes control as the important things to keep the kidney function stable. Myeloma testing normal previously. NOw on SGLT2 inhibitor.   - Stressed the importance of good diabetes control  - Attempted  lowering diuretics at last visit with hopes of minimizing the variability seen with her kidney disease.   - Stressed the risk of kidney progression if diabetes remains uncontrolled.      2. Hypertension: goal of <130/80 - asked her to monitor BP at home - asked her to update if seeing heart rates less than 47 or if sxs with heart rate less than 50.      3. Ulcerative colitis: she reports good control with mesalamine. Very rarely mesalamine causes an acute interstitial picture but there are reports of this occurring as low as 0.3% of the time. We will plan to follow. On discussion with her, she feels that she may have been chronically volume depleted prior to getting her treatment for ulcerative colitis.     4. Hypothyroidism: well controlled on last check in April 2023     5. DM:  Very poor control documented in our system from 2011 to 2017 with A1C levels between 9.4 and 11.7% at this time. It is possible that it was uncontrolled even further back as A1C levels are not available at this time dating back further than 2011. Encouraged ongoing optimization of diabetes to help slow progression of kidney disease. Unfortunately, despite this education, hemoglobin A1C remains very elevated at 10.5% this past month.      6. CHF/pulmonary hypertension/aortic stenosis: On torsemide 10 mg daily. ASked her to trial lowering this to 10 mg every other day given no swelling or volume related concerns and worsening kidney function.     7. Anemia: hemoglobin 10.9- iron saturation 20% on this recent check. Ideally would start an iron supplement ferrous sulfate 325 mg daily.     8. SEcondary hyperparathyroidism: PTH 75 Nov 2023 - vitamin D level 41 in April. Hypercalcemia in April  - improved to 10 today - PTH, 1,25OH vitamin D, and PTHrP all pending today as part of the hypercalcemia workup - important to correct the hypercalcemia as this can lead to variability of kidney function.     9 Metabolic acidosis: bicarbonate 25 at this  time. Currently on daily bicarbonate - will discontinue at this time.        Patient Instructions   Recheck BP before leaving today.  Stop the sodium bicarbonate   Trial changing the torsemide to 10 mg every other day.   Monitor weights at home and monitor for swelling.  Labs in 2 months  Labs in 4 months  Follow-up in 6 months.      40 minutes spent by me on the date of the encounter doing chart review, review of test results, interpretation of tests, patient visit, and documentation     Swati Minor, DO

## 2024-05-08 LAB — 1,25(OH)2D SERPL-MCNC: 29.7 PG/ML (ref 19.9–79.3)

## 2024-05-09 NOTE — PROGRESS NOTES
[facility]  CARDIOVASCULAR DIVISION    STRUCTURAL CLINIC RETURN VISIT    PRIMARY CARDIOLOGIST: Dr. Almendarez       PERTINENT CLINICAL HISTORY:     Keerthi Montoya is a very pleasant 74 year old female who presents for 1 year Watchman follow-up. She has a history of HLD, HTN, DM2, ulcerative pancolitis, CKD3, pulmonary HTN, RV failure, HFpEF, mild to moderate aortic stenosis, persistent atrial fibrillation with CHADS-VASc score 4 (HTN, DM, age, female) and HASBLED of 5 (HTN, CKD, age, prior bleed, meds) with intolerance to anticoagulation due to history of GI bleeding who underwent successful left atrial appendage closure with a 24mm WATCHMAN FLX device with 17% - 27% compression on May 11, 2023. Her intra procedure LAURA showed well seated RY occluder without leak or evidence of pericardial effusion. Post procedure limited TTE showed small anterior pericardial effusion. She was kept overnight for observation and limited TTE the next morning showed stable effusion. Her Xarelto was started (5/15/23) after limited TTE showed decreased size of effusion. She was seen at 45 days, AC discontinued and DAPT instituted.     She was hospitalized in September 2023 with shingles and acute diastolic heart failure. She was diuresed about 15-20 lbs to her baseline weight of 143 lbs. She has since been doing well. Her weight continued to drop which she attributes to not being able to eat due to abdominal pain/swelling from shingles. Weight has been stable at 122-125 lbs for the past 2 weeks. Her torsemide was recently decreased to 10 mg daily. She has not had any CP, SOB, orthopnea, PND, leg swelling. She was experiencing heart racing after atenolol was discontinued, this has since been resumed and palpitations have subsided. No lightheadedness or syncope. Overall doing well.     Interval History 5/10/24:    Feeling very well. She is active watching her two grandchildren during the week. She is exercising a few days  a week. She has no chest pain, SOB, palpitations, orthopnea, PND, leg swelling. Weight is stable 135 lbs. Home BP are variable - mostly normal in the 120s/70, occasionally up to [id number].      PAST MEDICAL HISTORY:     Past Medical History:   Diagnosis Date    Chronic kidney disease     Diabetes mellitus, type 2 (H)     Diverticulosis of colon (without mention of hemorrhage) 10/01/2012    GI bleed     History of blood transfusion     HLD (hyperlipidemia)     Hypertension     Hypothyroidism     MARIA D (iron deficiency anemia)     Persistent atrial fibrillation (H)     Pulmonary hypertension (H)     Ulcerative (chronic) enterocolitis (H)         PAST SURGICAL HISTORY:     Past Surgical History:   Procedure Laterality Date    CHOLECYSTECTOMY  3/1987    COLON SURGERY  01/2001    Left colon resection; diverticulitis    COLONOSCOPY N/A 4/24/2017    Procedure: COMBINED COLONOSCOPY, SINGLE OR MULTIPLE BIOPSY/POLYPECTOMY BY BIOPSY;;  Surgeon: Radha Salguero MD;  Location: [facility] OR    COLONOSCOPY N/A 3/10/2023    Procedure: COLONOSCOPY;  Surgeon: Preeti James MD;  Location: [facility]    COLONOSCOPY WITH CO2 INSUFFLATION N/A 4/24/2017    Procedure: COLONOSCOPY WITH CO2 INSUFFLATION;  Colonoscopy Dx rectal bleeding, BMI 25.86, Pharm [facility] .[phone], ;  Surgeon: Radha Salguero MD;  Location: [facility] OR    CV LEFT ATRIAL APPENDAGE CLOSURE N/A 5/11/2023    Procedure: Left Atrial Appendage Closure;  Surgeon: Bobby Grimes MD;  Location:  HEART CARDIAC CATH LAB    CV RIGHT HEART CATH MEASUREMENTS RECORDED N/A 3/16/2020    Procedure: CV RIGHT HEART CATH;  Surgeon: Nader Muñoz MD;  Location:  HEART CARDIAC CATH LAB    ESOPHAGOSCOPY, GASTROSCOPY, DUODENOSCOPY (EGD), COMBINED N/A 1/23/2023    Procedure: ESOPHAGOGASTRODUODENOSCOPY, WITH BIOPSY;  Surgeon: Ricki Deal MD;  [facility]: [facility]    ESOPHAGOSCOPY, GASTROSCOPY, DUODENOSCOPY (EGD), COMBINED N/A 3/10/2023    Procedure: Esophagoscopy,  gastroscopy, duodenoscopy (EGD), combined;  Surgeon: Preeti James MD;  Location:  GI    GYN SURGERY  9/1983    tubal ligation    HERNIA REPAIR  12/2006    recurrent incisional hernia    SIGMOIDOSCOPY FLEXIBLE N/A 1/23/2023    Procedure: Sigmoidoscopy flexible;  Surgeon: Ricki Deal MD;  Location:  GI    THROAT SURGERY  12/1974    thyroidectomy        CURRENT MEDICATIONS:     Current Outpatient Medications   Medication Sig Dispense Refill    aspirin 81 MG EC tablet Take 1 tablet (81 mg) by mouth daily 90 tablet 3    atenolol (TENORMIN) 25 MG tablet Take 1 tablet (25 mg) by mouth daily 30 tablet 3    atorvastatin (LIPITOR) 40 MG tablet Take 1 tablet (40 mg) by mouth daily 90 tablet 3    blood glucose (ACCU-CHEK FELIPE PLUS) test strip 200 strips by In Vitro route 2 times daily Use to test blood sugar 2 times daily or as directed. 200 strip 4    Cyanocobalamin (B-12) 1000 MCG TABS Take 1,000 mcg by mouth three times a week      diphenhydrAMINE (BENADRYL) 25 MG tablet Take 25 mg by mouth every 6 hours as needed for itching or allergies      empagliflozin (JARDIANCE) 10 MG TABS tablet Take 1 tablet (10 mg) by mouth daily 90 tablet 1    glipiZIDE (GLUCOTROL XL) 10 MG 24 hr tablet Take 2 tablets (20 mg) by mouth daily 60 tablet 3    hydrALAZINE (APRESOLINE) 25 MG tablet Take 1 tablet (25 mg) by mouth 3 times daily 270 tablet 3    isosorbide mononitrate (IMDUR) 30 MG 24 hr tablet Take 1 tablet (30 mg) by mouth daily 90 tablet 3    levothyroxine (SYNTHROID/LEVOTHROID) 112 MCG tablet Take 1 tablet (112 mcg) by mouth daily *Thyroid labs due April 2024* 90 tablet 0    losartan (COZAAR) 100 MG tablet Take 1 tablet (100 mg) by mouth daily 90 tablet 0    mesalamine (APRISO ER) 0.375 g 24 hr capsule TAKE 4 CAPSULES(1.5 GRAMS) BY MOUTH DAILY 360 capsule 1    pantoprazole (PROTONIX) 40 MG EC tablet Take 1 tablet (40 mg) by mouth daily 90 tablet 3    senna (SENOKOT) 8.6 MG tablet Take 1 tablet by mouth daily as needed for  constipation 30 tablet 3    torsemide (DEMADEX) 10 MG tablet Take 1 tablet (10 mg) by mouth every other day          ALLERGIES:     Allergies   Allergen Reactions    Actos [Pioglitazone]      Fluid retention    Amlodipine      Leg swelling, hand swelling    Animal Dander     Doxycycline Hives    Dust Mites     Grass     Keflex [Cephalexin Hcl] Hives    Metoprolol      Swelling of lower legs and fatigue    Other [Seasonal Allergies]      pollen    Ranitidine      rash    Synthroid [Levothyroxine Sodium] Swelling     Allergic to brand name    Tetracycline Hives    Tylenol Hives    Ziac [Bisoprolol-Hydrochlorothiazide]         FAMILY HISTORY:     Family History   Problem Relation Age of Onset    Cerebrovascular Disease Mother     Cancer Father         bladder    Diverticulitis Brother     Diverticulitis Brother     Kidney Disease No family hx of     Crohn's Disease No family hx of     Ulcerative Colitis No family hx of     Stomach Cancer No family hx of     GERD No family hx of     Celiac Disease No family hx of     Anesthesia Reaction No family hx of         SOCIAL HISTORY:     Social History     Socioeconomic History    Marital status:      Spouse name: Raymundo; dementia;      Number of children: 3   Occupational History     Employer: [employer]   Tobacco Use    Smoking status: Never    Smokeless tobacco: Never   Substance and Sexual Activity    Alcohol use: Not Currently     Alcohol/week: 2.0 - 4.0 standard drinks of alcohol     Types: 1 - 2 Cans of beer, 1 - 2 Shots of liquor per week     Comment: occasional cocktail or beer    Drug use: No    Sexual activity: Not Currently   Other Topics Concern     Service No    Blood Transfusions No    Caffeine Concern No    Occupational Exposure No    Hobby Hazards No    Sleep Concern No    Stress Concern No    Weight Concern No    Special Diet No    Back Care No    Exercise Yes     Comment: off and on    Bike Helmet No    Seat Belt Yes     Self-Exams No   Social History Narrative    Laid off her job 8/14        REVIEW OF SYSTEMS:     Constitutional: No fevers or chills  Skin: No new rash or itching  Eyes: No acute change in vision  Ears/Nose/Throat: No purulent rhinorrhea, new hearing loss, or new vertigo  Respiratory: No cough or hemoptysis  Cardiovascular: See HPI  Gastrointestinal: No change in appetite, vomiting, hematemesis or diarrhea  Genitourinary: No dysuria or hematuria  Musculoskeletal: No new back pain, neck pain or muscle pain  Neurologic: No new headaches, focal weakness or behavior changes  Psychiatric: No hallucinations, excessive alcohol consumption or illegal drug usage  Hematologic/Lymphatic/Immunologic: No bleeding, chills, fever, night sweats or weight loss  Endocrine: No new cold intolerance, heat intolerance, polyphagia, polydipsia or polyuria      PHYSICAL EXAMINATION:     BP (!) 153/72 (BP Location: Left arm, Patient Position: Sitting, Cuff Size: Adult Regular)   Pulse (!) 47   Wt 63.8 kg (140 lb 11.2 oz)   LMP  (LMP Unknown)   SpO2 94%   BMI 25.11 kg/m      GENERAL: No acute distress.  HEENT: EOMI. Sclerae white, not injected. Nares clear. Pharynx without erythema or exudate.   Heart: no JVD  Lungs: Non labored   Extremities: No clubbing, cyanosis, or edema.   Neurologic: Alert and oriented to person/place/time, normal speech and affect. No focal deficits.  Skin: No petechiae, purpura or rash.     LABORATORY DATA:     LIPID RESULTS:  Lab Results   Component Value Date    CHOL 73 01/20/2023    CHOL 93 10/28/2020    HDL 29 (L) 01/20/2023    HDL 38 (L) 10/28/2020    LDL 27 01/20/2023    LDL 33 10/28/2020    TRIG 87 01/20/2023    TRIG 108 10/28/2020    CHOLHDLRATIO 4.3 05/18/2015       LIVER ENZYME RESULTS:  Lab Results   Component Value Date    AST 21 01/05/2024    AST 19 10/28/2020    ALT <5 01/05/2024    ALT 18 10/28/2020       CBC RESULTS:  Lab Results   Component Value Date    WBC 9.5 12/11/2023    WBC 7.3 10/28/2020     RBC 3.67 (L) 12/11/2023    RBC 3.99 10/28/2020    HGB 10.9 (L) 12/11/2023    HGB 11.8 02/08/2021    HCT 35.8 12/11/2023    HCT 38.2 10/28/2020    MCV 98 12/11/2023    MCV 96 10/28/2020    MCH 29.7 12/11/2023    MCH 30.8 10/28/2020    MCHC 30.4 (L) 12/11/2023    MCHC 32.2 10/28/2020    RDW 14.6 12/11/2023    RDW 13.9 10/28/2020     12/11/2023     10/28/2020       BMP RESULTS:  Lab Results   Component Value Date     05/06/2024     04/28/2021    POTASSIUM 4.2 05/06/2024    POTASSIUM 4.3 09/17/2023    POTASSIUM 4.5 04/28/2021    CHLORIDE 100 05/06/2024    CHLORIDE 109 04/27/2023    CHLORIDE 100 04/28/2021    CO2 25 05/06/2024    CO2 21 04/27/2023    CO2 26 04/28/2021    ANIONGAP 12 05/06/2024    ANIONGAP 7 04/27/2023    ANIONGAP 8 04/28/2021     (H) 05/06/2024     (H) 09/21/2023     (H) 04/27/2023    GLC 57 (L) 04/28/2021    BUN 26.2 (H) 05/06/2024    BUN 28 04/27/2023    BUN 23 04/28/2021    CR 1.97 (H) 05/06/2024    CR 1.21 (H) 04/28/2021    GFRESTIMATED 26 (L) 05/06/2024    GFRESTIMATED 30 (L) 12/30/2022    GFRESTIMATED 46 (L) 04/28/2021    GFRESTBLACK 53 (L) 04/28/2021    ABEL 10.0 05/06/2024    ABEL 9.8 04/28/2021        A1C RESULTS:  Lab Results   Component Value Date    A1C 10.5 (H) 04/05/2024    A1C 6.6 (H) 10/28/2020       INR RESULTS:  Lab Results   Component Value Date    INR 1.13 05/08/2023          PROCEDURES & FURTHER ASSESSMENTS:     Cardiac CT 45 days post LAAO:   IMPRESSION:  1.  There is a 24mm Watchman FLX closure device in the left atrial  appendage. It is well-seated without evidence of thrombus on the left  atrial side of the device.  2.  There is no contrast opacification of the left atrial appendage.  3.  Please review the separate Radiology report from the original  study site and date for incidental noncardiac findings.      FINDINGS:   1.  There is a 24mm Watchman FLX closure device in the left atrial  appendage. It is well-seated.   2.  There is no  thrombus on the left atrial side of the closure device  on early or delayed imaging.   3.  There is no contrast opacification of the left atrial appendage,  implying complete endothelialization of the occlusion device.   4.  There is biatrial enlargement.  5.  The right ventricle is dilated.   6.  The main pulmonary artery is dilated, measuring at 40.7 mm x 39.9  mm.  7.  Normal pulmonary venous anatomy with all pulmonary veins draining  into the left atrium.    8.  Mild coronary artery calcifications are present.   9.  There is mild mitral annular calcification.  10.  Aortic valve calcification is present.  11.  Atherosclerotic calcification is present involving the descending  aorta. The visualized aortic root, ascending aorta and descending  thoracic aorta are normal in diameter.     Please review the separate Radiology report from the original study  site and date for incidental noncardiac findings.      I have personally reviewed the examination and initial interpretation  and I agree with the findings.       LAURA 5/11/23:   PRE-PROCEDURAL SURVEY:  1. The LV function was 55-60% and the RV function was moderately-severely  reduced.  2. There was no evidence of shunting at the level of the interatrial septum.  3. The maximum pre-deployment left atrial appendage orifice width was 19.9 mm.  4. There was no intracardiac thrombus.  5. There was no pericardial effusion.     POST-PROCEDURAL SURVEY:  1. The 24 mm Watchman FLX device was well-seated in the left atrial appendage.  There was adequate compression of the device. There was no evidence of delroy-  device leak.  2. There was no pericardial effusion.  3. There was a small left-right shunt at the site of the transseptal puncture.  4. The biventricular function was unchanged.  ______________________________________________________________________________  Procedure  Transesophageal Echocardiogram with color and spectral Doppler performed. 3D  image acquisition,  reconstruction, and real-time interpretation was performed.  Procedure location Heart Catherization Laboratory. Informed consent for  Transesophegeal echo obtained. LAURA Probe #[id number] then 66 due to significant  Doppler artifact was used during the procedure. Sedation, endotracheal  intubation, and mechanical ventilation were initiated prior to the LAURA and  were monitored by anesthesia. The Transducer was inserted without difficulty .  Complications None. The patient's rhythm is normal sinus. Good quality two-  dimensional was performed and interpreted. Adequate quality color and spectral  Doppler were performed and interpreted.     Left Ventricle  Global and regional left ventricular function is normal with an EF of 55-60%.  Left ventricular size is normal.     Right Ventricle  Moderate right ventricular dilation is present. Global right ventricular  function is moderately to severely reduced.     Atria  Moderate to severe biatrial enlargement is present.     Mitral Valve  The mitral valve is normal. Trace mitral insufficiency is present.     Aortic Valve  The aortic valve is tricuspid. Mild aortic valve sclerosis is present. Trace  aortic insufficiency is present.     Tricuspid Valve  Mild to moderate tricuspid insufficiency is present. Pulmonary artery systolic  pressure cannot be assessed.     Pulmonic Valve  Mild pulmonic insufficiency is present.     Vessels  The aorta root is normal. The thoracic aorta is normal. The LUPV Doppler shows  normal velocity waveform . The right upper pulmonary vein cannot be assessed.  The right lower pulmonary vein cannot be assessed. The left lower pulmonary  vein cannot be assessed.     Pericardium  No pericardial effusion is present.     Compared to Previous Study  This study was compared with the study from 08/19/2022 . There has been  interval LAAO closure device placement. No significant changes in the  biventricular function.     TTE 5/11/23:   Small anterior pericardial  Effusion     TTE 5/12/23:  ______________________________________________________________________________  Interpretation Summary  Small unchanged pericardial effusion is present without evidence of  pericardial tamponade.     Left ventricular function is normal.The ejection fraction is 55-60%.  Paradoxical septal motion consistent with right ventricular pressure and  volume overload is present.  Moderate to severe right ventricular dilation is present. Global right  ventricular function is moderately to severely reduced.  IVC diameter >2.1 cm collapsing <50% with sniff suggests a high RA pressure  estimated at 15 mmHg or greater.     This study was compared with the study from 5/11/23. There has been no change.    TTE 5/15/23:  Interpretation Summary  Left ventricular size, wall motion and function are normal. The ejection  fraction is 60-65%. Flattened septum is consistent with right ventricular  pressure and volume overload.  Moderate to severe right ventricular dilation is present.  Global right ventricular function is moderately to severely reduced.  Severe aortic valve calcification is present.  Moderate tricuspid insufficiency is present.  Dilation of the inferior vena cava is present with abnormal respiratory  variation in diameter. Trivial, primarily anterior pericardial effusion is  present.     Compared to prior study the pericardial effusion is the same or slightly  improved. LV function appears slightly better.  ______________________________________________________________________________  Left Ventricle  Left ventricular size, wall motion and function are normal. The ejection  fraction is 60-65%. Flattened septum is consistent with right ventricular  pressure and volume overload.     Right Ventricle  Moderate to severe right ventricular dilation is present. Global right  ventricular function is moderately to severely reduced.     Atria  The left atrium appears normal. Severe right atrial enlargement  is present.     Mitral Valve  Moderate mitral annular calcification is present. Trace mitral insufficiency  is present.     Aortic Valve  Severe aortic valve calcification is present.     Tricuspid Valve  The tricuspid valve is normal. Moderate tricuspid insufficiency is present.  The right ventricular systolic pressure is approximated at 49.1 mmHg plus the  right atrial pressure.     Pulmonic Valve  The pulmonic valve cannot be assessed.     Vessels  Dilation of the inferior vena cava is present with abnormal respiratory  variation in diameter. IVC diameter >2.1 cm collapsing <50% with sniff  suggests a high RA pressure estimated at 15 mmHg or greater.     Pericardium  Trivial, primarily anterior pericardial effusion is present.     Compared to Previous Study  No significant changes noted.  ______________________________________________________________________________  Doppler Measurements & Calculations  Ao V2 max: 215.0 cm/sec  Ao max P.5 mmHg  Ao V2 mean: 137.7 cm/sec  Ao mean P.0 mmHg  Ao V2 VTI: 41.0 cm  TR max marcia: 350.2 cm/sec  TR max P.1 mmHg     CLINICAL IMPRESSION:     Keerthi Montoya is a very pleasant 74 year old female who presents for 1 year Watchman follow-up.     # Paroxysmal a-fib w/contraindication to anticoagulation due to GI bleeding  # Healed gastric ulcers/Ulcerative colitis (inactive)  Underwent successful implantation of 24mm Watchman FLX. Intra-procedure LAURA showed no evidence of delroy-device leak and no pericardial effusion. Post procedure TTE with small anterior effusion. Patient kept overnight for observation. Repeat limited TTE 23 shows stable small pericardial effusion. Xarelto started 5/15/23 x 6 weeks, then converted to DAPT with ASA and Brilinta. CT showed well seated LAAO without leak or thrombus. Now completed 6 months of DAPT, maintained on ASA 81 mg daily.   - Continue aspirin 81 mg daily lifelong  - Continue BB for rate control   - No longer requires SBE  prophylaxis      # HFpEF  # Moderate PH  # Mild to moderate aortic stenosis:  Recently admitted with HFpEF, diuresed to 143 lbs. Weight has since stabilized to 135 lbs. Torsemide recently decreased to 10 mg every other day. Appears euvolemic on exam.   -Continue Jardiance 10, losartan 100  -Continue torsemide 10 mg every other day, ongoing management per nephro and [facility]     # HTN  # HLD   - Continue atenolol 25, Imdur 30 and atorvastatin 40  - Increase hydralazine to 50 mg TID      # Stage III CKD   - stable CKD creatinine 1.97 and GFR 26      # MARIA D:  Hgb stable 10.9  - Follow-up with Heme     # Diabetes type II   - Poorly controlled. Continue glipizide and Jardiance. Pharm considering insulin. Further diabetes management per pharm at [facility].      RTC: Continue follow with [facility], now graduated from structural clinic     EWNDI Marr, CNP  [facility]       CC  Patient Care Team:  Bunny Meyers MD as PCP - General (Internal Medicine)  Preeti James MD as MD (Gastroenterology)  Swati Minor DO as Assigned Nephrology Provider  Jerry Robbins PA-C as Physician Assistant (Gastroenterology)  Bunny Meyers MD as Assigned PCP  Bunny Santa [facility] as Assigned [facility] Pharmacist  Allison Yang, RN as Specialty Care Coordinator (Gastroenterology)  Gianfranco Melendez MD as Intern (Student in organized health care education/training program)  Jethro Moore MD as MD (Hematology & Oncology)  Fracisco Hurtado, RN as Specialty Care Coordinator (Hematology & Oncology)  Vanessa Schneider, RN as Diabetes Educator (Diabetes Education)  Preeti James MD as Assigned Surgical Provider  Carolyn Bonilla APRN CNP as Assigned Heart and Vascular Provider  Saba Lemon MD as MD (Advanced Heart Failure and Transplant Cardiology)  Sheela Galeas, VERONIQUE as Specialty Care Coordinator (Cardiology)  Francisca Mustafa APRN CNP as Assigned Cancer Care Provider  Geena Ornelas MD as MD  (Dermatology)  Lorena Ambriz, RN as Specialty Care Coordinator (Cardiology)  Davie Rapp MD as MD (Gastroenterology)

## 2024-05-10 ENCOUNTER — OFFICE VISIT (OUTPATIENT)
Dept: CARDIOLOGY | Facility: CLINIC | Age: 74
End: 2024-05-10
Attending: NURSE PRACTITIONER
Payer: MEDICARE

## 2024-05-10 ENCOUNTER — OFFICE VISIT (OUTPATIENT)
Dept: PHARMACY | Facility: CLINIC | Age: 74
End: 2024-05-10
Payer: COMMERCIAL

## 2024-05-10 VITALS
WEIGHT: 140.7 LBS | HEART RATE: 47 BPM | BODY MASS INDEX: 25.11 KG/M2 | SYSTOLIC BLOOD PRESSURE: 153 MMHG | DIASTOLIC BLOOD PRESSURE: 72 MMHG | OXYGEN SATURATION: 94 %

## 2024-05-10 VITALS — WEIGHT: 139 LBS | BODY MASS INDEX: 24.81 KG/M2

## 2024-05-10 DIAGNOSIS — I50.30 HEART FAILURE WITH PRESERVED EJECTION FRACTION, NYHA CLASS I (H): ICD-10-CM

## 2024-05-10 DIAGNOSIS — E11.29 TYPE 2 DIABETES MELLITUS WITH MICROALBUMINURIA, WITHOUT LONG-TERM CURRENT USE OF INSULIN (H): Primary | ICD-10-CM

## 2024-05-10 DIAGNOSIS — R80.9 TYPE 2 DIABETES MELLITUS WITH MICROALBUMINURIA, WITHOUT LONG-TERM CURRENT USE OF INSULIN (H): Primary | ICD-10-CM

## 2024-05-10 DIAGNOSIS — I10 HYPERTENSION GOAL BP (BLOOD PRESSURE) < 130/80: ICD-10-CM

## 2024-05-10 DIAGNOSIS — I10 HYPERTENSION GOAL BP (BLOOD PRESSURE) < 140/90: ICD-10-CM

## 2024-05-10 DIAGNOSIS — Z95.818 PRESENCE OF WATCHMAN LEFT ATRIAL APPENDAGE CLOSURE DEVICE: ICD-10-CM

## 2024-05-10 LAB
PTH RELATED PROT SERPL-SCNC: 6.1 PMOL/L
T4 FREE SERPL-MCNC: 1.61 NG/DL (ref 0.9–1.7)
TSH SERPL DL<=0.005 MIU/L-ACNC: 11.5 UIU/ML (ref 0.3–4.2)

## 2024-05-10 PROCEDURE — G0463 HOSPITAL OUTPT CLINIC VISIT: HCPCS | Performed by: NURSE PRACTITIONER

## 2024-05-10 PROCEDURE — 99214 OFFICE O/P EST MOD 30 MIN: CPT | Performed by: NURSE PRACTITIONER

## 2024-05-10 PROCEDURE — 99207 PR NO CHARGE LOS: CPT | Performed by: PHARMACIST

## 2024-05-10 RX ORDER — LOSARTAN POTASSIUM 100 MG/1
100 TABLET ORAL DAILY
Qty: 90 TABLET | Refills: 3 | Status: SHIPPED | OUTPATIENT
Start: 2024-05-10

## 2024-05-10 RX ORDER — HYDRALAZINE HYDROCHLORIDE 50 MG/1
50 TABLET, FILM COATED ORAL 3 TIMES DAILY
Qty: 180 TABLET | COMMUNITY
Start: 2024-05-10 | End: 2024-07-12

## 2024-05-10 ASSESSMENT — PAIN SCALES - GENERAL: PAINLEVEL: NO PAIN (0)

## 2024-05-10 NOTE — LETTER
5/10/2024      RE: Keerthi Montoya  [address] [place] [id number]       Dear Colleague,    Thank you for the opportunity to participate in the care of your patient, Keerthi Montoya, at the [facility]. Please see a copy of my visit note below.      STRUCTURAL HEART CARE  CARDIOVASCULAR DIVISION    STRUCTURAL CLINIC RETURN VISIT    PRIMARY CARDIOLOGIST: Dr. Almendarez       PERTINENT CLINICAL HISTORY:     Keerthi Montoya is a very pleasant 74 year old female who presents for 1 year Watchman follow-up. She has a history of HLD, HTN, DM2, ulcerative pancolitis, CKD3, pulmonary HTN, RV failure, HFpEF, mild to moderate aortic stenosis, persistent atrial fibrillation with CHADS-VASc score 4 (HTN, DM, age, female) and HASBLED of 5 (HTN, CKD, age, prior bleed, meds) with intolerance to anticoagulation due to history of GI bleeding who underwent successful left atrial appendage closure with a 24mm WATCHMAN FLX device with 17% - 27% compression on May 11, 2023. Her intra procedure LAURA showed well seated RY occluder without leak or evidence of pericardial effusion. Post procedure limited TTE showed small anterior pericardial effusion. She was kept overnight for observation and limited TTE the next morning showed stable effusion. Her Xarelto was started (5/15/23) after limited TTE showed decreased size of effusion. She was seen at 45 days, AC discontinued and DAPT instituted.     She was hospitalized in September 2023 with shingles and acute diastolic heart failure. She was diuresed about 15-20 lbs to her baseline weight of 143 lbs. She has since been doing well. Her weight continued to drop which she attributes to not being able to eat due to abdominal pain/swelling from shingles. Weight has been stable at 122-125 lbs for the past 2 weeks. Her torsemide was recently decreased to 10 mg daily. She has not had any CP, SOB,  orthopnea, PND, leg swelling. She was experiencing heart racing after atenolol was discontinued, this has since been resumed and palpitations have subsided. No lightheadedness or syncope. Overall doing well.     Interval History 5/10/24:    Feeling very well. She is active watching her two grandchildren during the week. She is exercising a few days a week. She has no chest pain, SOB, palpitations, orthopnea, PND, leg swelling. Weight is stable 135 lbs. Home BP are variable - mostly normal in the 120s/70, occasionally up to [id number].      PAST MEDICAL HISTORY:     Past Medical History:   Diagnosis Date    Chronic kidney disease     Diabetes mellitus, type 2 (H)     Diverticulosis of colon (without mention of hemorrhage) 10/01/2012    GI bleed     History of blood transfusion     HLD (hyperlipidemia)     Hypertension     Hypothyroidism     MARIA D (iron deficiency anemia)     Persistent atrial fibrillation (H)     Pulmonary hypertension (H)     Ulcerative (chronic) enterocolitis (H)         PAST SURGICAL HISTORY:     Past Surgical History:   Procedure Laterality Date    CHOLECYSTECTOMY  3/1987    COLON SURGERY  01/2001    Left colon resection; diverticulitis    COLONOSCOPY N/A 4/24/2017    Procedure: COMBINED COLONOSCOPY, SINGLE OR MULTIPLE BIOPSY/POLYPECTOMY BY BIOPSY;;  Surgeon: Radha Salguero MD;  Location: [facility] OR    COLONOSCOPY N/A 3/10/2023    Procedure: COLONOSCOPY;  Surgeon: Preeti James MD;  Location:  GI    COLONOSCOPY WITH CO2 INSUFFLATION N/A 4/24/2017    Procedure: COLONOSCOPY WITH CO2 INSUFFLATION;  Colonoscopy Dx rectal bleeding, BMI 25.86, Pharm [facility] .[phone], ;  Surgeon: Radha Salguero MD;  Location: [facility] OR    CV LEFT ATRIAL APPENDAGE CLOSURE N/A 5/11/2023    Procedure: Left Atrial Appendage Closure;  Surgeon: Bobby Grimes MD;  Location:  HEART CARDIAC CATH LAB    CV RIGHT HEART CATH MEASUREMENTS RECORDED N/A 3/16/2020    Procedure: CV RIGHT HEART CATH;  Surgeon:  Nader Muñoz MD;  Location:  HEART CARDIAC CATH LAB    ESOPHAGOSCOPY, GASTROSCOPY, DUODENOSCOPY (EGD), COMBINED N/A 1/23/2023    Procedure: ESOPHAGOGASTRODUODENOSCOPY, WITH BIOPSY;  Surgeon: Ricki Deal MD;  Location:  GI    ESOPHAGOSCOPY, GASTROSCOPY, DUODENOSCOPY (EGD), COMBINED N/A 3/10/2023    Procedure: Esophagoscopy, gastroscopy, duodenoscopy (EGD), combined;  Surgeon: Preeti James MD;  Location:  GI    GYN SURGERY  9/1983    tubal ligation    HERNIA REPAIR  12/2006    recurrent incisional hernia    SIGMOIDOSCOPY FLEXIBLE N/A 1/23/2023    Procedure: Sigmoidoscopy flexible;  Surgeon: Ricki Deal MD;  Location:  GI    THROAT SURGERY  12/1974    thyroidectomy        CURRENT MEDICATIONS:     Current Outpatient Medications   Medication Sig Dispense Refill    aspirin 81 MG EC tablet Take 1 tablet (81 mg) by mouth daily 90 tablet 3    atenolol (TENORMIN) 25 MG tablet Take 1 tablet (25 mg) by mouth daily 30 tablet 3    atorvastatin (LIPITOR) 40 MG tablet Take 1 tablet (40 mg) by mouth daily 90 tablet 3    blood glucose (ACCU-CHEK FELIPE PLUS) test strip 200 strips by In Vitro route 2 times daily Use to test blood sugar 2 times daily or as directed. 200 strip 4    Cyanocobalamin (B-12) 1000 MCG TABS Take 1,000 mcg by mouth three times a week      diphenhydrAMINE (BENADRYL) 25 MG tablet Take 25 mg by mouth every 6 hours as needed for itching or allergies      empagliflozin (JARDIANCE) 10 MG TABS tablet Take 1 tablet (10 mg) by mouth daily 90 tablet 1    glipiZIDE (GLUCOTROL XL) 10 MG 24 hr tablet Take 2 tablets (20 mg) by mouth daily 60 tablet 3    hydrALAZINE (APRESOLINE) 25 MG tablet Take 1 tablet (25 mg) by mouth 3 times daily 270 tablet 3    isosorbide mononitrate (IMDUR) 30 MG 24 hr tablet Take 1 tablet (30 mg) by mouth daily 90 tablet 3    levothyroxine (SYNTHROID/LEVOTHROID) 112 MCG tablet Take 1 tablet (112 mcg) by mouth daily *Thyroid labs due April 2024* 90 tablet 0     losartan (COZAAR) 100 MG tablet Take 1 tablet (100 mg) by mouth daily 90 tablet 0    mesalamine (APRISO ER) 0.375 g 24 hr capsule TAKE 4 CAPSULES(1.5 GRAMS) BY MOUTH DAILY 360 capsule 1    pantoprazole (PROTONIX) 40 MG EC tablet Take 1 tablet (40 mg) by mouth daily 90 tablet 3    senna (SENOKOT) 8.6 MG tablet Take 1 tablet by mouth daily as needed for constipation 30 tablet 3    torsemide (DEMADEX) 10 MG tablet Take 1 tablet (10 mg) by mouth every other day          ALLERGIES:     Allergies   Allergen Reactions    Actos [Pioglitazone]      Fluid retention    Amlodipine      Leg swelling, hand swelling    Animal Dander     Doxycycline Hives    Dust Mites     Grass     Keflex [Cephalexin Hcl] Hives    Metoprolol      Swelling of lower legs and fatigue    Other [Seasonal Allergies]      pollen    Ranitidine      rash    Synthroid [Levothyroxine Sodium] Swelling     Allergic to brand name    Tetracycline Hives    Tylenol Hives    Ziac [Bisoprolol-Hydrochlorothiazide]         FAMILY HISTORY:     Family History   Problem Relation Age of Onset    Cerebrovascular Disease Mother     Cancer Father         bladder    Diverticulitis Brother     Diverticulitis Brother     Kidney Disease No family hx of     Crohn's Disease No family hx of     Ulcerative Colitis No family hx of     Stomach Cancer No family hx of     GERD No family hx of     Celiac Disease No family hx of     Anesthesia Reaction No family hx of         SOCIAL HISTORY:     Social History     Socioeconomic History    Marital status:      Spouse name: Raymundo; dementia;      Number of children: 3   Occupational History     Employer: [employer]   Tobacco Use    Smoking status: Never    Smokeless tobacco: Never   Substance and Sexual Activity    Alcohol use: Not Currently     Alcohol/week: 2.0 - 4.0 standard drinks of alcohol     Types: 1 - 2 Cans of beer, 1 - 2 Shots of liquor per week     Comment: occasional cocktail or beer    Drug use: No     Sexual activity: Not Currently   Other Topics Concern     Service No    Blood Transfusions No    Caffeine Concern No    Occupational Exposure No    Hobby Hazards No    Sleep Concern No    Stress Concern No    Weight Concern No    Special Diet No    Back Care No    Exercise Yes     Comment: off and on    Bike Helmet No    Seat Belt Yes    Self-Exams No   Social History Narrative    Laid off her job 8/14        REVIEW OF SYSTEMS:     Constitutional: No fevers or chills  Skin: No new rash or itching  Eyes: No acute change in vision  Ears/Nose/Throat: No purulent rhinorrhea, new hearing loss, or new vertigo  Respiratory: No cough or hemoptysis  Cardiovascular: See HPI  Gastrointestinal: No change in appetite, vomiting, hematemesis or diarrhea  Genitourinary: No dysuria or hematuria  Musculoskeletal: No new back pain, neck pain or muscle pain  Neurologic: No new headaches, focal weakness or behavior changes  Psychiatric: No hallucinations, excessive alcohol consumption or illegal drug usage  Hematologic/Lymphatic/Immunologic: No bleeding, chills, fever, night sweats or weight loss  Endocrine: No new cold intolerance, heat intolerance, polyphagia, polydipsia or polyuria      PHYSICAL EXAMINATION:     BP (!) 153/72 (BP Location: Left arm, Patient Position: Sitting, Cuff Size: Adult Regular)   Pulse (!) 47   Wt 63.8 kg (140 lb 11.2 oz)   LMP  (LMP Unknown)   SpO2 94%   BMI 25.11 kg/m      GENERAL: No acute distress.  HEENT: EOMI. Sclerae white, not injected. Nares clear. Pharynx without erythema or exudate.   Heart: no JVD  Lungs: Non labored   Extremities: No clubbing, cyanosis, or edema.   Neurologic: Alert and oriented to person/place/time, normal speech and affect. No focal deficits.  Skin: No petechiae, purpura or rash.     LABORATORY DATA:     LIPID RESULTS:  Lab Results   Component Value Date    CHOL 73 01/20/2023    CHOL 93 10/28/2020    HDL 29 (L) 01/20/2023    HDL 38 (L) 10/28/2020    LDL 27  01/20/2023    LDL 33 10/28/2020    TRIG 87 01/20/2023    TRIG 108 10/28/2020    CHOLHDLRATIO 4.3 05/18/2015       LIVER ENZYME RESULTS:  Lab Results   Component Value Date    AST 21 01/05/2024    AST 19 10/28/2020    ALT <5 01/05/2024    ALT 18 10/28/2020       CBC RESULTS:  Lab Results   Component Value Date    WBC 9.5 12/11/2023    WBC 7.3 10/28/2020    RBC 3.67 (L) 12/11/2023    RBC 3.99 10/28/2020    HGB 10.9 (L) 12/11/2023    HGB 11.8 02/08/2021    HCT 35.8 12/11/2023    HCT 38.2 10/28/2020    MCV 98 12/11/2023    MCV 96 10/28/2020    MCH 29.7 12/11/2023    MCH 30.8 10/28/2020    MCHC 30.4 (L) 12/11/2023    MCHC 32.2 10/28/2020    RDW 14.6 12/11/2023    RDW 13.9 10/28/2020     12/11/2023     10/28/2020       BMP RESULTS:  Lab Results   Component Value Date     05/06/2024     04/28/2021    POTASSIUM 4.2 05/06/2024    POTASSIUM 4.3 09/17/2023    POTASSIUM 4.5 04/28/2021    CHLORIDE 100 05/06/2024    CHLORIDE 109 04/27/2023    CHLORIDE 100 04/28/2021    CO2 25 05/06/2024    CO2 21 04/27/2023    CO2 26 04/28/2021    ANIONGAP 12 05/06/2024    ANIONGAP 7 04/27/2023    ANIONGAP 8 04/28/2021     (H) 05/06/2024     (H) 09/21/2023     (H) 04/27/2023    GLC 57 (L) 04/28/2021    BUN 26.2 (H) 05/06/2024    BUN 28 04/27/2023    BUN 23 04/28/2021    CR 1.97 (H) 05/06/2024    CR 1.21 (H) 04/28/2021    GFRESTIMATED 26 (L) 05/06/2024    GFRESTIMATED 30 (L) 12/30/2022    GFRESTIMATED 46 (L) 04/28/2021    GFRESTBLACK 53 (L) 04/28/2021    ABEL 10.0 05/06/2024    ABEL 9.8 04/28/2021        A1C RESULTS:  Lab Results   Component Value Date    A1C 10.5 (H) 04/05/2024    A1C 6.6 (H) 10/28/2020       INR RESULTS:  Lab Results   Component Value Date    INR 1.13 05/08/2023          PROCEDURES & FURTHER ASSESSMENTS:     Cardiac CT 45 days post LAAO:   IMPRESSION:  1.  There is a 24mm Watchman FLX closure device in the left atrial  appendage. It is well-seated without evidence of thrombus on the  left  atrial side of the device.  2.  There is no contrast opacification of the left atrial appendage.  3.  Please review the separate Radiology report from the original  study site and date for incidental noncardiac findings.      FINDINGS:   1.  There is a 24mm Watchman FLX closure device in the left atrial  appendage. It is well-seated.   2.  There is no thrombus on the left atrial side of the closure device  on early or delayed imaging.   3.  There is no contrast opacification of the left atrial appendage,  implying complete endothelialization of the occlusion device.   4.  There is biatrial enlargement.  5.  The right ventricle is dilated.   6.  The main pulmonary artery is dilated, measuring at 40.7 mm x 39.9  mm.  7.  Normal pulmonary venous anatomy with all pulmonary veins draining  into the left atrium.    8.  Mild coronary artery calcifications are present.   9.  There is mild mitral annular calcification.  10.  Aortic valve calcification is present.  11.  Atherosclerotic calcification is present involving the descending  aorta. The visualized aortic root, ascending aorta and descending  thoracic aorta are normal in diameter.     Please review the separate Radiology report from the original study  site and date for incidental noncardiac findings.      I have personally reviewed the examination and initial interpretation  and I agree with the findings.       LAURA 5/11/23:   PRE-PROCEDURAL SURVEY:  1. The LV function was 55-60% and the RV function was moderately-severely  reduced.  2. There was no evidence of shunting at the level of the interatrial septum.  3. The maximum pre-deployment left atrial appendage orifice width was 19.9 mm.  4. There was no intracardiac thrombus.  5. There was no pericardial effusion.     POST-PROCEDURAL SURVEY:  1. The 24 mm Watchman FLX device was well-seated in the left atrial appendage.  There was adequate compression of the device. There was no evidence of delroy-  device  leak.  2. There was no pericardial effusion.  3. There was a small left-right shunt at the site of the transseptal puncture.  4. The biventricular function was unchanged.  ______________________________________________________________________________  Procedure  Transesophageal Echocardiogram with color and spectral Doppler performed. 3D  image acquisition, reconstruction, and real-time interpretation was performed.  Procedure location Heart Catherization Laboratory. Informed consent for  Transesophegeal echo obtained. LAURA Probe #[id number] then 66 due to significant  Doppler artifact was used during the procedure. Sedation, endotracheal  intubation, and mechanical ventilation were initiated prior to the LAURA and  were monitored by anesthesia. The Transducer was inserted without difficulty .  Complications None. The patient's rhythm is normal sinus. Good quality two-  dimensional was performed and interpreted. Adequate quality color and spectral  Doppler were performed and interpreted.     Left Ventricle  Global and regional left ventricular function is normal with an EF of 55-60%.  Left ventricular size is normal.     Right Ventricle  Moderate right ventricular dilation is present. Global right ventricular  function is moderately to severely reduced.     Atria  Moderate to severe biatrial enlargement is present.     Mitral Valve  The mitral valve is normal. Trace mitral insufficiency is present.     Aortic Valve  The aortic valve is tricuspid. Mild aortic valve sclerosis is present. Trace  aortic insufficiency is present.     Tricuspid Valve  Mild to moderate tricuspid insufficiency is present. Pulmonary artery systolic  pressure cannot be assessed.     Pulmonic Valve  Mild pulmonic insufficiency is present.     Vessels  The aorta root is normal. The thoracic aorta is normal. The LUPV Doppler shows  normal velocity waveform . The right upper pulmonary vein cannot be assessed.  The right lower pulmonary vein cannot be  assessed. The left lower pulmonary  vein cannot be assessed.     Pericardium  No pericardial effusion is present.     Compared to Previous Study  This study was compared with the study from 08/19/2022 . There has been  interval LAAO closure device placement. No significant changes in the  biventricular function.     TTE 5/11/23:   Small anterior pericardial Effusion     TTE 5/12/23:  ______________________________________________________________________________  Interpretation Summary  Small unchanged pericardial effusion is present without evidence of  pericardial tamponade.     Left ventricular function is normal.The ejection fraction is 55-60%.  Paradoxical septal motion consistent with right ventricular pressure and  volume overload is present.  Moderate to severe right ventricular dilation is present. Global right  ventricular function is moderately to severely reduced.  IVC diameter >2.1 cm collapsing <50% with sniff suggests a high RA pressure  estimated at 15 mmHg or greater.     This study was compared with the study from 5/11/23. There has been no change.    TTE 5/15/23:  Interpretation Summary  Left ventricular size, wall motion and function are normal. The ejection  fraction is 60-65%. Flattened septum is consistent with right ventricular  pressure and volume overload.  Moderate to severe right ventricular dilation is present.  Global right ventricular function is moderately to severely reduced.  Severe aortic valve calcification is present.  Moderate tricuspid insufficiency is present.  Dilation of the inferior vena cava is present with abnormal respiratory  variation in diameter. Trivial, primarily anterior pericardial effusion is  present.     Compared to prior study the pericardial effusion is the same or slightly  improved. LV function appears slightly better.  ______________________________________________________________________________  Left Ventricle  Left ventricular size, wall motion and  function are normal. The ejection  fraction is 60-65%. Flattened septum is consistent with right ventricular  pressure and volume overload.     Right Ventricle  Moderate to severe right ventricular dilation is present. Global right  ventricular function is moderately to severely reduced.     Atria  The left atrium appears normal. Severe right atrial enlargement is present.     Mitral Valve  Moderate mitral annular calcification is present. Trace mitral insufficiency  is present.     Aortic Valve  Severe aortic valve calcification is present.     Tricuspid Valve  The tricuspid valve is normal. Moderate tricuspid insufficiency is present.  The right ventricular systolic pressure is approximated at 49.1 mmHg plus the  right atrial pressure.     Pulmonic Valve  The pulmonic valve cannot be assessed.     Vessels  Dilation of the inferior vena cava is present with abnormal respiratory  variation in diameter. IVC diameter >2.1 cm collapsing <50% with sniff  suggests a high RA pressure estimated at 15 mmHg or greater.     Pericardium  Trivial, primarily anterior pericardial effusion is present.     Compared to Previous Study  No significant changes noted.  ______________________________________________________________________________  Doppler Measurements & Calculations  Ao V2 max: 215.0 cm/sec  Ao max P.5 mmHg  Ao V2 mean: 137.7 cm/sec  Ao mean P.0 mmHg  Ao V2 VTI: 41.0 cm  TR max marcia: 350.2 cm/sec  TR max P.1 mmHg     CLINICAL IMPRESSION:     Keerthi Montoya is a very pleasant 74 year old female who presents for 1 year Watchman follow-up.     # Paroxysmal a-fib w/contraindication to anticoagulation due to GI bleeding  # Healed gastric ulcers/Ulcerative colitis (inactive)  Underwent successful implantation of 24mm Watchman FLX. Intra-procedure LAURA showed no evidence of delroy-device leak and no pericardial effusion. Post procedure TTE with small anterior effusion. Patient kept overnight for observation. Repeat  limited TTE 5/12/23 shows stable small pericardial effusion. Xarelto started 5/15/23 x 6 weeks, then converted to DAPT with ASA and Brilinta. CT showed well seated LAAO without leak or thrombus. Now completed 6 months of DAPT, maintained on ASA 81 mg daily.   - Continue aspirin 81 mg daily lifelong  - Continue BB for rate control   - No longer requires SBE prophylaxis      # HFpEF  # Moderate PH  # Mild to moderate aortic stenosis:  Recently admitted with HFpEF, diuresed to 143 lbs. Weight has since stabilized to 135 lbs. Torsemide recently decreased to 10 mg every other day. Appears euvolemic on exam.   -Continue Jardiance 10, losartan 100  -Continue torsemide 10 mg every other day, ongoing management per nephro and [facility]     # HTN  # HLD   - Continue atenolol 25, Imdur 30 and atorvastatin 40  - Increase hydralazine to 50 mg TID      # Stage III CKD   - stable CKD creatinine 1.97 and GFR 26      # MARIA D:  Hgb stable 10.9  - Follow-up with Heme     # Diabetes type II   - Poorly controlled. Continue glipizide and Jardiance. Pharm considering insulin. Further diabetes management per pharm at [facility].      RTC: Continue follow with [facility], now graduated from structural clinic     WENDI Marr, CNP  [facility]       CC  Patient Care Team:  Bunny Meyers MD as PCP - General (Internal Medicine)

## 2024-05-10 NOTE — NURSING NOTE
Chief Complaint   Patient presents with    Follow Up     Watchman 1 year follow up     Vitals were taken and medications reconciled.    Vesna Irby CNA  10:32 AM

## 2024-05-10 NOTE — PROGRESS NOTES
Medication Therapy Management (MTM) Encounter    ASSESSMENT:                            Medication Adherence/Access: No issues identified    Diabetes:   Patient is not meeting A1c goal of < 7%.    Hypertension:   Patient is not meeting blood pressure goal of < 130/80mmHg. Plan in place with cardiology to increase hydralazine.       PLAN:                            Start Trulicity 0.75 mg once weekly for 4 weeks then increase to 1.5 mg once weekly.   If you have any low blood glucose below 80 mg/dL, start cutting glipizide in half and let me know right away.     Follow-up: Return in about 5 weeks (around 2024) for Follow up, with me.    SUBJECTIVE/OBJECTIVE:                          Keerthi Montoya is a 74 year old female coming in for a follow-up visit.       Reason for visit: diabetes follow-up.    Allergies/ADRs: Reviewed in chart  Past Medical History: Reviewed in chart  Tobacco: She reports that she has never smoked. She has never been exposed to tobacco smoke. She has never used smokeless tobacco.  Alcohol: none    Medication Adherence/Access: no issues reported    Diabetes   Jardiance 10 mg daily  Glipizide XL 20 mg daily    Kidney function too low for metformin. GLP-1 may be too expensive    Reports this version of glipizide is not work as well for her.      Blood sugar monitorin time(s) daily. Ranges (patient reported): Fasting: reports it was 103 mg/dL. previously. Yesterday before she ate was 145 mg/dL. Reports sometimes  she will be above 200mg/dL    Symptoms of low blood sugar? none   Symptoms of high blood sugar? None.     Has joined an exercise group and is exercising three times per week.     Aspirin: No  Statin: Yes: atorvastatin 40 mg   ACEi/ARB: Yes: losartan         Eye exam in the last 12 months? No    Foot exam: due  Urine Albumin:   Lab Results   Component Value Date    UMALCR 109.82 (H) 06/15/2023      Lab Results   Component Value Date    A1C 10.5 (H) 2024       Hypertension      Isosorbide 30 mg daily  Losartan 100 mg daily  Empagliflozin 10 mg daily  Hydralazine 50 mg three times a day increased today from 25 mg  Aspirin 81 mg daily  Atenolol 25 mg daily - cardiology said she needs to stay on the atenolol.   Torsemide 10 mg every other day      Patient reports no current medication side effects  Patient self monitors blood pressure.  Home BP monitoring yesterday was 124/72 mmHg. Pulses at home are between high 40's-60's bpm. .       BP Readings from Last 3 Encounters:   05/10/24 (!) 153/72   04/12/24 (!) 150/73   04/05/24 120/63     Pulse Readings from Last 3 Encounters:   05/10/24 (!) 47   04/12/24 56   04/05/24 (!) 45         Today's Vitals: Wt 139 lb (63 kg)   LMP  (LMP Unknown)   BMI 24.81 kg/m    ----------------      I spent 20 minutes with this patient today. All changes were made via collaborative practice agreement with Bunny Meyers MD. A copy of the visit note was provided to the patient's provider(s).    A summary of these recommendations was sent via [employer].    Bunny Santa, Pharm. D., BCACP  Medication Therapy Management Pharmacist           Medication Therapy Recommendations  Type 2 diabetes mellitus with microalbuminuria, without long-term current use of insulin (H)    Current Medication: empagliflozin (JARDIANCE) 10 MG TABS tablet   Rationale: Synergistic therapy - Indication   Recommendation: Start Medication - TRULICITY SC   Status: Accepted per CPA

## 2024-05-10 NOTE — PATIENT INSTRUCTIONS
You were seen today in the [facility] Clinic at the [facility].    Cardiology provider you saw during your visit: Dominique Saavedra NP    Instructions:  Continue aspirin 81 mg daily lifelong   BP is high - increase hydralazine to 50 mg three times daily   Starting writing down BP and we will call you to review log in a few weeks  Call if BP < 90/60 or if you feel lightheaded       Follow-up with Dr. Chowdhury in June   Follow-up with me as needed     Prevention of Infective (Bacterial) Endocarditis:  You are at increased risk for developing adverse outcomes from infective endocarditis (IE), also known as bacterial endocarditis (BE) because of the new device in your heart. The guidelines for prevention of IE are to give patients antibiotics prior to any dental procedures that involve manipulation of gingival tissue or the periapical region of teeth, or perforation of the oral mucosa:      It is recommended to take Amoxicillin 2 gm by mouth as a single dose 30 to 60 minutes before procedure.     OR if allergic to Penicillin or Ampicillin:     Cephalexin 2 gm by mouth, or  Clindamycin 600 mg by mouth, or  Azithromycin or Clarithromycin 500 mg PO       Questions and scheduling:   First call: Structural Heart  Celia Perdomo [phone]    General scheduling line: [phone].   First press #1 for the University and then press #3 for Medical Questions to reach the Cardiology triage nurse.     On Call Cardiologist for after hours or on weekends: [phone], press option #4 and ask to speak to the on-call Cardiologist.

## 2024-05-13 ENCOUNTER — TELEPHONE (OUTPATIENT)
Dept: NEPHROLOGY | Facility: CLINIC | Age: 74
End: 2024-05-13
Payer: MEDICARE

## 2024-05-13 DIAGNOSIS — D50.9 IRON DEFICIENCY ANEMIA, UNSPECIFIED IRON DEFICIENCY ANEMIA TYPE: Primary | ICD-10-CM

## 2024-05-13 NOTE — TELEPHONE ENCOUNTER
Attempted to contact pt.  No answer.  Message left on  to return call.  Calling to discuss Epic notification.  Pt has not read Result Note Message sent by Dr. Minor:    Lucy Purdy LPN    Vitamin D level is at goal.     One additional finding after our visit - iron saturation from last check was just slightly low. Ideally we would have you start an iron supplement: ferrous sulfate 325 mg daily for supplementation. This can cause GI upset/constipation so if this occurs, change to just every other day dosing or as tolerated.     Please call or MyChart with questions or concerns.     Sincerely,  Swati Minor DO   Written by Swati Minor, DO on 5/6/2024  3:01 PM CDT    Component      Latest Ref Rng 4/12/2024  10:39 AM   Vitamin D, Total (25-Hydroxy)      20 - 50 ng/mL 41        Component      Latest Ref Rng 3/18/2024  10:25 AM   Iron      37 - 145 ug/dL 59    Iron Binding Capacity      240 - 430 ug/dL 301    Iron Sat Index      15 - 46 % 20

## 2024-05-13 NOTE — TELEPHONE ENCOUNTER
[place] returned call. Reviewed lab results. She reports that she does not tolerate oral iron. She stated that she has completed iron infusions prior. She reported that she ended up in patient with a bleed and had to have a blood transfusion.     Advised that I would review with Dr. Minor and update her if iron infusion needed at this time. Will contact patient back with a response. Patient agreeable to plan.

## 2024-05-14 DIAGNOSIS — N18.4 CHRONIC KIDNEY DISEASE, STAGE IV (SEVERE) (H): Primary | ICD-10-CM

## 2024-05-14 RX ORDER — HEPARIN SODIUM,PORCINE 10 UNIT/ML
5-20 VIAL (ML) INTRAVENOUS DAILY PRN
OUTPATIENT
Start: 2024-05-14

## 2024-05-14 RX ORDER — ALBUTEROL SULFATE 0.83 MG/ML
2.5 SOLUTION RESPIRATORY (INHALATION)
OUTPATIENT
Start: 2024-05-14

## 2024-05-14 RX ORDER — LOSARTAN POTASSIUM 100 MG/1
100 TABLET ORAL DAILY
Qty: 90 TABLET | Refills: 0 | OUTPATIENT
Start: 2024-05-14

## 2024-05-14 RX ORDER — METHYLPREDNISOLONE SODIUM SUCCINATE 125 MG/2ML
125 INJECTION, POWDER, LYOPHILIZED, FOR SOLUTION INTRAMUSCULAR; INTRAVENOUS
Start: 2024-05-14

## 2024-05-14 RX ORDER — DIPHENHYDRAMINE HYDROCHLORIDE 50 MG/ML
50 INJECTION INTRAMUSCULAR; INTRAVENOUS
Start: 2024-05-14

## 2024-05-14 RX ORDER — EPINEPHRINE 1 MG/ML
0.3 INJECTION, SOLUTION INTRAMUSCULAR; SUBCUTANEOUS EVERY 5 MIN PRN
OUTPATIENT
Start: 2024-05-14

## 2024-05-14 RX ORDER — ALBUTEROL SULFATE 90 UG/1
1-2 AEROSOL, METERED RESPIRATORY (INHALATION)
Start: 2024-05-14

## 2024-05-14 RX ORDER — HEPARIN SODIUM (PORCINE) LOCK FLUSH IV SOLN 100 UNIT/ML 100 UNIT/ML
5 SOLUTION INTRAVENOUS
OUTPATIENT
Start: 2024-05-14

## 2024-05-14 NOTE — TELEPHONE ENCOUNTER
Message per Dr. Minor:  I think I would go with just one dose of venofer 300 mg X 1 dose given she is at 20%.     Thank you, Swati Minor, DO     Therapy plan order placed. Patient to be contacted with this information and to inform her to reach out to infusion center to schedule when ready. Scheduling number for [facility] infusion line is [phone].

## 2024-05-15 NOTE — CONFIDENTIAL NOTE
DIAGNOSIS:  Fibrosis of liver    Appt Date:  07.29.2024    NOTES STATUS DETAILS   OFFICE NOTE from referring provider Internal 04.10.2024 Preeti James MD    OFFICE NOTES from other specialists     DISCHARGE SUMMARY from hospital     MEDICATION LIST Internal    LIVER BIOSPY (IF APPLICABLE)      PATHOLOGY REPORTS      IMAGING     ENDOSCOPY (IF AVAILABLE) Internal 03.10.2024   COLONOSCOPY (IF AVAILABLE) Internal 03.10.2024    ULTRASOUND LIVER     CT OF ABDOMEN     MRI OF LIVER     FIBROSCAN, US ELASTOGRAPHY, FIBROSIS SCAN, MR ELASTOGRAPHY Internal 03.05.2024 FibroScan      LABS     HEPATIC PANEL (LIVER PANEL) Internal 02.02.2024   BASIC METABOLIC PANEL Internal 04.12.2024    COMPLETE METABOLIC PANEL Internal 12.11.2023   COMPLETE BLOOD COUNT (CBC) Internal 12.11.2023    INTERNATIONAL NORMALIZED RATIO (INR) Internal 05.08.2023   HEPATITIS C ANTIBODY Internal 03.16.2020   HEPATITIS C VIRAL LOAD/PCR     HEPATITIS C GENOTYPE     HEPATITIS B SURFACE ANTIGEN Internal 03.16.2020   HEPATITIS B SURFACE ANTIBODY Internal 03.16.2020   HEPATITIS B DNA QUANT LEVEL     HEPATITIS B CORE ANTIBODY Internal 03.16.2020

## 2024-05-15 NOTE — TELEPHONE ENCOUNTER
Called and spoke with pt.  Read back Dr. Minor's recommendations:    Message per Dr. Minor:  I think I would go with just one dose of venofer 300 mg X 1 dose given she is at 20%.     Thank you, Swati Minor, DO     Pt verbalized understanding and agreed with plan.  Pt stated she will call and schedule this appointment. [facility] Infusion line [phone].    Encouraged to MyChart or call if further questions.    Lucy Purdy LPN

## 2024-05-15 NOTE — PATIENT INSTRUCTIONS
"Recommendations from today's MTM visit:                                                    MTM (medication therapy management) is a service provided by a clinical pharmacist designed to help you get the most of out of your medicines.   Today we reviewed what your medicines are for, how to know if they are working, that your medicines are safe and how to make your medicine regimen as easy as possible.    Start Trulicity 0.75 mg once weekly for 4 weeks then increase to 1.5 mg once weekly.   If you have any low blood glucose below 80 mg/dL, start cutting glipizide in half and let me know right away.  Your    Follow-up: Return in about 5 weeks (around 6/14/2024) for Follow up, with me.    It was great speaking with you today.  I value your experience and would be very thankful for your time in providing feedback in our clinic survey. In the next few days, you may receive an email or text message from [employer] with a link to a survey related to your  clinical pharmacist.\"     To schedule another MTM appointment, please call the clinic directly or you may call the MTM scheduling line at [phone] or toll-free at [phone].     My Clinical Pharmacist's contact information:                                                      Please feel free to contact me with any questions or concerns you have.      Bunny Santa, Pharm. D., [facility]  Medication Therapy Management Pharmacist    "

## 2024-05-16 ENCOUNTER — MYC REFILL (OUTPATIENT)
Dept: INTERNAL MEDICINE | Facility: CLINIC | Age: 74
End: 2024-05-16
Payer: MEDICARE

## 2024-05-16 DIAGNOSIS — E89.0 POSTOPERATIVE HYPOTHYROIDISM: ICD-10-CM

## 2024-05-16 NOTE — TELEPHONE ENCOUNTER
Called patient- she endorses that she had called [facility] at the beginning of the month- they ended up cancelling after not hearing from us. She endorses that she has been out since before last TSH check on 5/6, so theoretically could have been under replaced. Will talk with Dr. Meyers, as we may just need to recheck. On further chart review- the wrong medication was pended on 5/3, causing refill team to refuse, further delaying this patients care.    Rashad Peña RN on 5/16/2024 at 2:55 PM

## 2024-05-16 NOTE — TELEPHONE ENCOUNTER
levothyroxine (SYNTHROID/LEVOTHROID) 112 MCG tablet        Last Written Prescription Date:  2/6/24  Last Fill Quantity: 90,   # refills: 0   Last Office Visit :  10/20/2023   (11/10/2023 No show)  Future Office visit:  None       Routing refill request to provider for review/approval because:  Abnormal TSH 11.5 / normal t4 1.61 on 5/10/24  TSH   Date Value Ref Range Status   05/06/2024 11.50 (H) 0.30 - 4.20 uIU/mL Final   07/22/2022 0.82 0.40 - 4.00 mU/L Final   10/28/2020 0.10 (L) 0.40 - 4.00 mU/L Final     T4 Free   Date Value Ref Range Status   10/28/2020 1.82 (H) 0.76 - 1.46 ng/dL Final     Free T4   Date Value Ref Range Status   05/06/2024 1.61 0.90 - 1.70 ng/dL Final

## 2024-05-17 RX ORDER — LEVOTHYROXINE SODIUM 112 UG/1
112 TABLET ORAL DAILY
Qty: 90 TABLET | Refills: 0 | Status: SHIPPED | OUTPATIENT
Start: 2024-05-17 | End: 2024-05-19

## 2024-05-19 DIAGNOSIS — E89.0 POSTOPERATIVE HYPOTHYROIDISM: ICD-10-CM

## 2024-05-19 RX ORDER — LEVOTHYROXINE SODIUM 112 UG/1
112 TABLET ORAL DAILY
Qty: 90 TABLET | Refills: 0 | Status: SHIPPED | OUTPATIENT
Start: 2024-05-19 | End: 2024-06-10

## 2024-05-20 ENCOUNTER — TELEPHONE (OUTPATIENT)
Dept: CARDIOLOGY | Facility: CLINIC | Age: 74
End: 2024-05-20
Payer: MEDICARE

## 2024-05-20 NOTE — TELEPHONE ENCOUNTER
5/20 Left Voicemail (1st Attempt)  and MyChart (1st Attempt) for the patient to call back and schedule the following:    Appointment type: Return Core  Provider: Carolyn  Return date: next available   Specialty phone number: [phone] opt 1  Additional appointment(s) needed: labs prior   Additonal Notes: n/a

## 2024-05-24 ENCOUNTER — PATIENT OUTREACH (OUTPATIENT)
Dept: NEPHROLOGY | Facility: CLINIC | Age: 74
End: 2024-05-24

## 2024-05-24 NOTE — PROGRESS NOTES
Received order for CKD Journey. GFR currently 26.     Spot[employer] message sent to patient offering Kidney Smart to patient 7/18/24. Will await patient response.     Neph Tracking Flowsheet Last Filled Values       Kidney Smart CKD Basics Referral --  Offered via [employer] on 7/18/24    Patient's Referral Dates Auto Populate Patient's Referral Dates    MTM Referral 5/12/22    Journey Referral 5/14/24

## 2024-05-30 DIAGNOSIS — I50.30 HEART FAILURE WITH PRESERVED EJECTION FRACTION, NYHA CLASS I (H): Primary | ICD-10-CM

## 2024-06-07 ENCOUNTER — LAB (OUTPATIENT)
Dept: LAB | Facility: CLINIC | Age: 74
End: 2024-06-07
Payer: MEDICARE

## 2024-06-07 ENCOUNTER — OFFICE VISIT (OUTPATIENT)
Dept: CARDIOLOGY | Facility: CLINIC | Age: 74
End: 2024-06-07
Payer: MEDICARE

## 2024-06-07 ENCOUNTER — ANCILLARY PROCEDURE (OUTPATIENT)
Dept: CARDIOLOGY | Facility: CLINIC | Age: 74
End: 2024-06-07
Attending: INTERNAL MEDICINE
Payer: MEDICARE

## 2024-06-07 VITALS
OXYGEN SATURATION: 94 % | WEIGHT: 141 LBS | BODY MASS INDEX: 24.98 KG/M2 | SYSTOLIC BLOOD PRESSURE: 146 MMHG | HEART RATE: 56 BPM | DIASTOLIC BLOOD PRESSURE: 72 MMHG | HEIGHT: 63 IN

## 2024-06-07 DIAGNOSIS — I48.19 PERSISTENT ATRIAL FIBRILLATION (H): ICD-10-CM

## 2024-06-07 DIAGNOSIS — I36.1 NONRHEUMATIC TRICUSPID VALVE REGURGITATION: ICD-10-CM

## 2024-06-07 DIAGNOSIS — I50.30 HEART FAILURE WITH PRESERVED EJECTION FRACTION, NYHA CLASS I (H): ICD-10-CM

## 2024-06-07 DIAGNOSIS — I48.11 LONGSTANDING PERSISTENT ATRIAL FIBRILLATION (H): ICD-10-CM

## 2024-06-07 DIAGNOSIS — D50.9 IRON DEFICIENCY ANEMIA, UNSPECIFIED IRON DEFICIENCY ANEMIA TYPE: ICD-10-CM

## 2024-06-07 DIAGNOSIS — I50.810 RVF (RIGHT VENTRICULAR FAILURE) (H): ICD-10-CM

## 2024-06-07 DIAGNOSIS — I50.32 CHRONIC HEART FAILURE WITH PRESERVED EJECTION FRACTION (H): Primary | ICD-10-CM

## 2024-06-07 DIAGNOSIS — I27.20 PULMONARY HYPERTENSION (H): ICD-10-CM

## 2024-06-07 DIAGNOSIS — N18.30 STAGE 3 CHRONIC KIDNEY DISEASE, UNSPECIFIED WHETHER STAGE 3A OR 3B CKD (H): ICD-10-CM

## 2024-06-07 DIAGNOSIS — E89.0 POSTOPERATIVE HYPOTHYROIDISM: ICD-10-CM

## 2024-06-07 DIAGNOSIS — I35.0 AORTIC VALVE STENOSIS, ETIOLOGY OF CARDIAC VALVE DISEASE UNSPECIFIED: ICD-10-CM

## 2024-06-07 DIAGNOSIS — E53.8 VITAMIN B12 DEFICIENCY (NON ANEMIC): ICD-10-CM

## 2024-06-07 LAB
ANION GAP SERPL CALCULATED.3IONS-SCNC: 13 MMOL/L (ref 7–15)
BUN SERPL-MCNC: 32.6 MG/DL (ref 8–23)
CALCIUM SERPL-MCNC: 9.7 MG/DL (ref 8.8–10.2)
CHLORIDE SERPL-SCNC: 105 MMOL/L (ref 98–107)
CREAT SERPL-MCNC: 1.81 MG/DL (ref 0.51–0.95)
DEPRECATED HCO3 PLAS-SCNC: 20 MMOL/L (ref 22–29)
EGFRCR SERPLBLD CKD-EPI 2021: 29 ML/MIN/1.73M2
FERRITIN SERPL-MCNC: 138 NG/ML (ref 11–328)
GLUCOSE SERPL-MCNC: 125 MG/DL (ref 70–99)
HGB BLD-MCNC: 13.8 G/DL (ref 11.7–15.7)
IRON BINDING CAPACITY (ROCHE): 228 UG/DL (ref 240–430)
IRON SATN MFR SERPL: 18 % (ref 15–46)
IRON SERPL-MCNC: 41 UG/DL (ref 37–145)
LVEF ECHO: NORMAL
MAGNESIUM SERPL-MCNC: 2 MG/DL (ref 1.7–2.3)
NT-PROBNP SERPL-MCNC: ABNORMAL PG/ML (ref 0–900)
POTASSIUM SERPL-SCNC: 4.4 MMOL/L (ref 3.4–5.3)
SODIUM SERPL-SCNC: 138 MMOL/L (ref 135–145)
T4 FREE SERPL-MCNC: 1.48 NG/DL (ref 0.9–1.7)
TSH SERPL DL<=0.005 MIU/L-ACNC: 10.9 UIU/ML (ref 0.3–4.2)

## 2024-06-07 PROCEDURE — 84443 ASSAY THYROID STIM HORMONE: CPT

## 2024-06-07 PROCEDURE — 84439 ASSAY OF FREE THYROXINE: CPT

## 2024-06-07 PROCEDURE — 83550 IRON BINDING TEST: CPT

## 2024-06-07 PROCEDURE — 85018 HEMOGLOBIN: CPT

## 2024-06-07 PROCEDURE — 83735 ASSAY OF MAGNESIUM: CPT

## 2024-06-07 PROCEDURE — 80048 BASIC METABOLIC PNL TOTAL CA: CPT

## 2024-06-07 PROCEDURE — 83540 ASSAY OF IRON: CPT

## 2024-06-07 PROCEDURE — 83880 ASSAY OF NATRIURETIC PEPTIDE: CPT

## 2024-06-07 PROCEDURE — 82728 ASSAY OF FERRITIN: CPT

## 2024-06-07 PROCEDURE — 93306 TTE W/DOPPLER COMPLETE: CPT | Performed by: INTERNAL MEDICINE

## 2024-06-07 PROCEDURE — 36415 COLL VENOUS BLD VENIPUNCTURE: CPT

## 2024-06-07 PROCEDURE — 99215 OFFICE O/P EST HI 40 MIN: CPT | Performed by: INTERNAL MEDICINE

## 2024-06-07 ASSESSMENT — PAIN SCALES - GENERAL: PAINLEVEL: NO PAIN (0)

## 2024-06-07 NOTE — NURSING NOTE
"Chief Complaint   Patient presents with    Follow Up     Return Heart Failure for Heart failure with preserved ejection fraction       Initial BP (!) 154/72 (BP Location: Right arm, Patient Position: Sitting, Cuff Size: Adult Regular)   Pulse 56   Ht 1.594 m (5' 2.76\")   Wt 64 kg (141 lb)   LMP  (LMP Unknown)   SpO2 94%   BMI 25.17 kg/m   Estimated body mass index is 25.17 kg/m  as calculated from the following:    Height as of this encounter: 1.594 m (5' 2.76\").    Weight as of this encounter: 64 kg (141 lb).  BP completed using cuff size: regular    Vielka Mckeon, EMT  "

## 2024-06-07 NOTE — PROGRESS NOTES
[facility]  Advanced Heart Failure Clinic    Patient Name: Keerthi Montoya   : 1950   Date of Visit: 2024    Primary Care Physician: Bunny Meyers MD     Referring Physician: Dr. Meyers   Reason for Visit: HFpEF    Dear Dr. Meyers,     I had the pleasure of seeing Keerthi Montoya today in the [facility] Advanced Heart Failure Clinic. As you know Keerthi Montoya is a pleasant 74 year old year old female, who presents today for further evaluation of HFpEF.    [place] has a history of chronic HFpEF, HLD, HTN, DM2, ulcerative pancolitis, CKD3, pulmonary HTN, moderate paradoxical low flow low gradient aortic stenosis, persistent atrial fibrillation with (CHADS-VASc 4) with intolerance to anticoagulation due to history of GI bleeding s/p RY closure and Watchman (2023)     She last saw Dr. Lemon 6 months ago for HFpEF    She presents today to see me in follow up. She lives alone and baby sits her two grandchildren 7 and 5 years old and is able to keep up with all that she needs to do without any cardiac limitation. She can do her laundry, cooking, cleaning without issues and stays very active. She denies chest pain, shortness of breath, PND/orthopnea, palpitations, lightheadedness, syncope, leg swelling.  Compliant with medications and notes no problems taking them.    She recently started Trulicity and has some diarrhea since and plans to follow up with PCP/pharmacy next week      Home BPs - 110s-120s/50-60s  Home HRs - 40s-50s (chronic)  Home weights - stable around 135-138 lbs    ROS:  A complete 10-point ROS was negative except as above.    PAST MEDICAL HISTORY:  Past Medical History:   Diagnosis Date    Chronic kidney disease     Diabetes mellitus, type 2 (H)     Diverticulosis of colon (without mention of hemorrhage) 10/01/2012    GI bleed     History of blood transfusion     HLD (hyperlipidemia)     Hypertension     Hypothyroidism     MARIA D (iron deficiency  anemia)     Persistent atrial fibrillation (H)     Pulmonary hypertension (H)     Ulcerative (chronic) enterocolitis (H)        PAST SURGICAL HISTORY:  Past Surgical History:   Procedure Laterality Date    CHOLECYSTECTOMY  3/1987    COLON SURGERY  01/2001    Left colon resection; diverticulitis    COLONOSCOPY N/A 4/24/2017    Procedure: COMBINED COLONOSCOPY, SINGLE OR MULTIPLE BIOPSY/POLYPECTOMY BY BIOPSY;;  Surgeon: Radha Salguero MD;  Location: [facility] OR    COLONOSCOPY N/A 3/10/2023    Procedure: COLONOSCOPY;  Surgeon: Preeti James MD;  Location:  GI    COLONOSCOPY WITH CO2 INSUFFLATION N/A 4/24/2017    Procedure: COLONOSCOPY WITH CO2 INSUFFLATION;  Colonoscopy Dx rectal bleeding, BMI 25.86, Pharm [facility] .[phone], ;  Surgeon: Radha Salguero MD;  Location: [facility] OR    CV LEFT ATRIAL APPENDAGE CLOSURE N/A 5/11/2023    Procedure: Left Atrial Appendage Closure;  Surgeon: Bobby Grimes MD;  Location:  HEART CARDIAC CATH LAB    CV RIGHT HEART CATH MEASUREMENTS RECORDED N/A 3/16/2020    Procedure: CV RIGHT HEART CATH;  Surgeon: Nader Muñoz MD;  Location:  HEART CARDIAC CATH LAB    ESOPHAGOSCOPY, GASTROSCOPY, DUODENOSCOPY (EGD), COMBINED N/A 1/23/2023    Procedure: ESOPHAGOGASTRODUODENOSCOPY, WITH BIOPSY;  Surgeon: Ricki Deal MD;  Location: [facility]    ESOPHAGOSCOPY, GASTROSCOPY, DUODENOSCOPY (EGD), COMBINED N/A 3/10/2023    Procedure: Esophagoscopy, gastroscopy, duodenoscopy (EGD), combined;  Surgeon: Preeti James MD;  Location:  GI    GYN SURGERY  9/1983    tubal ligation    HERNIA REPAIR  12/2006    recurrent incisional hernia    SIGMOIDOSCOPY FLEXIBLE N/A 1/23/2023    Procedure: Sigmoidoscopy flexible;  Surgeon: Ricki Deal MD;  Location:  GI    THROAT SURGERY  12/1974    thyroidectomy       FAMILY HISTORY:  Family History   Problem Relation Age of Onset    Cerebrovascular Disease Mother     Cancer Father         bladder    Diverticulitis Brother      Diverticulitis Brother     Kidney Disease No family hx of     Crohn's Disease No family hx of     Ulcerative Colitis No family hx of     Stomach Cancer No family hx of     GERD No family hx of     Celiac Disease No family hx of     Anesthesia Reaction No family hx of        SOCIAL HISTORY:  Social History     Socioeconomic History    Marital status:      Spouse name: Raymundo; dementia;  2016    Number of children: 3    Years of education: None    Highest education level: None   Occupational History     Employer: [employer]   Tobacco Use    Smoking status: Never     Passive exposure: Never    Smokeless tobacco: Never   Vaping Use    Vaping status: Never Used   Substance and Sexual Activity    Alcohol use: Not Currently     Alcohol/week: 2.0 - 4.0 standard drinks of alcohol     Types: 1 - 2 Cans of beer, 1 - 2 Shots of liquor per week     Comment: occasional cocktail or beer    Drug use: No    Sexual activity: Not Currently   Other Topics Concern     Service No    Blood Transfusions No    Caffeine Concern No    Occupational Exposure No    Hobby Hazards No    Sleep Concern No    Stress Concern No    Weight Concern No    Special Diet No    Back Care No    Exercise Yes     Comment: off and on    Bike Helmet No    Seat Belt Yes    Self-Exams No   Social History Narrative    Laid off her job      Social Determinants of Health     Financial Resource Strain: Low Risk  (10/20/2023)    Financial Resource Strain     Within the past 12 months, have you or your family members you live with been unable to get utilities (heat, electricity) when it was really needed?: No   Food Insecurity: Low Risk  (10/20/2023)    Food Insecurity     Within the past 12 months, did you worry that your food would run out before you got money to buy more?: No     Within the past 12 months, did the food you bought just not last and you didn t have money to get more?: No   Transportation Needs: Low Risk  (10/20/2023)     Transportation Needs     Within the past 12 months, has lack of transportation kept you from medical appointments, getting your medicines, non-medical meetings or appointments, work, or from getting things that you need?: No   Housing Stability: Low Risk  (10/20/2023)    Housing Stability     Do you have housing? : Yes     Are you worried about losing your housing?: No       MEDICATIONS:  Current Outpatient Medications   Medication Sig Dispense Refill    aspirin 81 MG EC tablet Take 1 tablet (81 mg) by mouth daily 90 tablet 3    atenolol (TENORMIN) 25 MG tablet Take 1 tablet (25 mg) by mouth daily 30 tablet 3    atorvastatin (LIPITOR) 40 MG tablet Take 1 tablet (40 mg) by mouth daily 90 tablet 3    blood glucose (ACCU-CHEK FELIPE PLUS) test strip 200 strips by In Vitro route 2 times daily Use to test blood sugar 2 times daily or as directed. 200 strip 4    Cyanocobalamin (B-12) 1000 MCG TABS Take 1,000 mcg by mouth three times a week      diphenhydrAMINE (BENADRYL) 25 MG tablet Take 25 mg by mouth every 6 hours as needed for itching or allergies      empagliflozin (JARDIANCE) 10 MG TABS tablet Take 1 tablet (10 mg) by mouth daily 90 tablet 1    glipiZIDE (GLUCOTROL XL) 10 MG 24 hr tablet Take 2 tablets (20 mg) by mouth daily 60 tablet 3    hydrALAZINE (APRESOLINE) 50 MG tablet Take 1 tablet (50 mg) by mouth 3 times daily 180 tablet     isosorbide mononitrate (IMDUR) 30 MG 24 hr tablet Take 1 tablet (30 mg) by mouth daily 90 tablet 3    levothyroxine (SYNTHROID/LEVOTHROID) 112 MCG tablet Take 1 tablet (112 mcg) by mouth daily 90 tablet 0    losartan (COZAAR) 100 MG tablet Take 1 tablet (100 mg) by mouth daily 90 tablet 3    mesalamine (APRISO ER) 0.375 g 24 hr capsule TAKE 4 CAPSULES(1.5 GRAMS) BY MOUTH DAILY 360 capsule 1    pantoprazole (PROTONIX) 40 MG EC tablet Take 1 tablet (40 mg) by mouth daily 90 tablet 3    senna (SENOKOT) 8.6 MG tablet Take 1 tablet by mouth daily as needed for constipation 30 tablet 3     "torsemide (DEMADEX) 10 MG tablet Take 1 tablet (10 mg) by mouth every other day      dulaglutide (TRULICITY) 0.75 MG/0.5ML pen Inject 0.75 mg Subcutaneous every 7 days (Patient not taking: Reported on 6/7/2024) 2 mL 0    dulaglutide (TRULICITY) 1.5 MG/0.5ML pen Inject 1.5 mg Subcutaneous every 7 days After 4 doses of 0.75 mg (Patient not taking: Reported on 6/7/2024) 2 mL 1     No current facility-administered medications for this visit.        ALLERGIES:     Allergies   Allergen Reactions    Actos [Pioglitazone]      Fluid retention    Amlodipine      Leg swelling, hand swelling    Animal Dander     Doxycycline Hives    Dust Mites     Grass     Keflex [Cephalexin Hcl] Hives    Metoprolol      Swelling of lower legs and fatigue    Other [Seasonal Allergies]      pollen    Ranitidine      rash    Synthroid [Levothyroxine Sodium] Swelling     Allergic to brand name    Tetracycline Hives    Tylenol Hives    Ziac [Bisoprolol-Hydrochlorothiazide]        PHYSICAL EXAM:  BP (!) 154/72 (BP Location: Right arm, Patient Position: Sitting, Cuff Size: Adult Regular)   Pulse 56   Ht 1.594 m (5' 2.76\")   Wt 64 kg (141 lb)   LMP  (LMP Unknown)   SpO2 94%   BMI 25.17 kg/m    Gen: alert, interactive, NAD  Neck: supple, JVP at the level of the clavicle  CV: RRR, aortic stenosis and TR murmur  Chest: CTAB, no wheezes or crackles  Abd: soft, NT, ND, +BS  Ext: no LE edema    LABS:    CMP  Last Comprehensive Metabolic Panel:  Sodium   Date Value Ref Range Status   06/07/2024 138 135 - 145 mmol/L Final     Comment:     Reference intervals for this test were updated on 09/26/2023 to more accurately reflect our healthy population. There may be differences in the flagging of prior results with similar values performed with this method. Interpretation of those prior results can be made in the context of the updated reference intervals.    04/28/2021 134 133 - 144 mmol/L Final     Potassium   Date Value Ref Range Status   06/07/2024 4.4 " 3.4 - 5.3 mmol/L Final   04/28/2021 4.5 3.4 - 5.3 mmol/L Final     Potassium Whole Blood   Date Value Ref Range Status   09/17/2023 4.3 3.4 - 5.3 mmol/L Final     Chloride   Date Value Ref Range Status   06/07/2024 105 98 - 107 mmol/L Final   04/27/2023 109 94 - 109 mmol/L Final   04/28/2021 100 94 - 109 mmol/L Final     Carbon Dioxide   Date Value Ref Range Status   04/28/2021 26 20 - 32 mmol/L Final     Carbon Dioxide (CO2)   Date Value Ref Range Status   06/07/2024 20 (L) 22 - 29 mmol/L Final   04/27/2023 21 20 - 32 mmol/L Final     Anion Gap   Date Value Ref Range Status   06/07/2024 13 7 - 15 mmol/L Final   04/27/2023 7 3 - 14 mmol/L Final   04/28/2021 8 3 - 14 mmol/L Final     Glucose   Date Value Ref Range Status   06/07/2024 125 (H) 70 - 99 mg/dL Final   04/27/2023 277 (H) 70 - 99 mg/dL Final   04/28/2021 57 (L) 70 - 99 mg/dL Final     GLUCOSE BY METER POCT   Date Value Ref Range Status   09/21/2023 201 (H) 70 - 99 mg/dL Final     Urea Nitrogen   Date Value Ref Range Status   06/07/2024 32.6 (H) 8.0 - 23.0 mg/dL Final   04/27/2023 28 7 - 30 mg/dL Final   04/28/2021 23 7 - 30 mg/dL Final     Creatinine   Date Value Ref Range Status   06/07/2024 1.81 (H) 0.51 - 0.95 mg/dL Final   04/28/2021 1.21 (H) 0.52 - 1.04 mg/dL Final     GFR Estimate   Date Value Ref Range Status   06/07/2024 29 (L) >60 mL/min/1.73m2 Final   04/28/2021 46 (L) >60 mL/min/[1.73_m2] Final     Comment:     Non  GFR Calc  Starting 12/18/2018, serum creatinine based estimated GFR (eGFR) will be   calculated using the Chronic Kidney Disease Epidemiology Collaboration   (CKD-EPI) equation.       GFR, ESTIMATED POCT   Date Value Ref Range Status   12/30/2022 30 (L) >60 mL/min/1.73m2 Final     Calcium   Date Value Ref Range Status   06/07/2024 9.7 8.8 - 10.2 mg/dL Final   04/28/2021 9.8 8.5 - 10.1 mg/dL Final     Bilirubin Total   Date Value Ref Range Status   01/05/2024 0.5 <=1.2 mg/dL Final   10/28/2020 0.6 0.2 - 1.3 mg/dL Final  "    Alkaline Phosphatase   Date Value Ref Range Status   01/05/2024 117 40 - 150 U/L Final     Comment:     Reference intervals for this test were updated on 11/14/2023 to more accurately reflect our healthy population. There may be differences in the flagging of prior results with similar values performed with this method. Interpretation of those prior results can be made in the context of the updated reference intervals.   10/28/2020 104 40 - 150 U/L Final     ALT   Date Value Ref Range Status   01/05/2024 <5 0 - 50 U/L Final     Comment:     Reference intervals for this test were updated on 6/12/2023 to more accurately reflect our healthy population. There may be differences in the flagging of prior results with similar values performed with this method. Interpretation of those prior results can be made in the context of the updated reference intervals.     10/28/2020 18 0 - 50 U/L Final     AST   Date Value Ref Range Status   01/05/2024 21 0 - 45 U/L Final     Comment:     Reference intervals for this test were updated on 6/12/2023 to more accurately reflect our healthy population. There may be differences in the flagging of prior results with similar values performed with this method. Interpretation of those prior results can be made in the context of the updated reference intervals.   10/28/2020 19 0 - 45 U/L Final       NT-proBNP         TROP  No results found for: \"TROPI\", \"TROPONIN\", \"TROPR\", \"TROPN\"    CBC  CBC RESULTS:   Recent Labs   Lab Test 06/07/24  0905 12/11/23  1627   WBC  --  9.5   RBC  --  3.67*   HGB 13.8 10.9*   HCT  --  35.8   MCV  --  98   MCH  --  29.7   MCHC  --  30.4*   RDW  --  14.6   PLT  --  255       LIPIDS  Recent Labs   Lab Test 01/20/23  0740 10/28/20  1629   CHOL 73 93   HDL 29* 38*   LDL 27 33   TRIG 87 108       TSH  TSH   Date Value Ref Range Status   06/07/2024 10.90 (H) 0.30 - 4.20 uIU/mL Final   07/22/2022 0.82 0.40 - 4.00 mU/L Final   10/28/2020 0.10 (L) 0.40 - 4.00 mU/L " Final       HBA1C  Lab Results   Component Value Date    A1C 10.5 04/05/2024    A1C 9.6 11/03/2023    A1C 8.7 09/17/2023    A1C 11.6 04/27/2023    A1C 6.9 01/20/2023    A1C 6.6 10/28/2020    A1C 7.0 05/12/2020    A1C 7.4 01/13/2020    A1C 7.9 01/28/2019    A1C 7.6 06/25/2018         CARDIAC DATA:    EKG:  September 2023: Heart rate 90 bpm, atrial fibrillation, right axis deviation, possible RVH, nonspecific ST-T wave changes    Echo:  5/25/23  Global and regional left ventricular function is normal with an EF of 60-65%.  Flattened septum is consistent with right ventricular pressure and volume overload.  Moderate right ventricular dilation with moderately reduced global right  ventricular function.  Severe tricuspid insufficiency.  Estimated pulmonary artery systolic pressure is 69 mmHg.  Dilation of the inferior vena cava is present with abnormal respiratory  variation in diameter.  Small circumferential pericardial effusion without any hemodynamic  significance.  This study was compared with the study from 5/15/23. N significant change in ventricular function or pericardial effusion. TR appears worse on today's study, but that may be related to technique.     September 2023  Global and regional left ventricular function is normal with an EF of 55-60%.  Flattened septum is consistent with right ventricular pressure and volume overload.  Global right ventricular function is moderately reduced. Severe right  ventricular dilation is present.  Pulmonary hypertension is present. Pulmonary artery systolic pressure is 59 mmHg (RVSP 44mmHg + RAP 15mmHg).  Severe tricuspid insufficiency is present.  IVC diameter >2.1 cm collapsing <50% with sniff suggests a high RA pressure estimated at 15 mmHg or greater.  No pericardial effusion is present.  This study was compared with the study from 5/25/2023 .  No significant changes noted.    Today, 6/7/2024  Aortic valve Doppler parameters are consistent with severe normal-EF  (paradoxical) low-flow low-gradient aortic stenosis (PV 2.7 m/s MG 17 mmHg DVI 0.25 IVON 0.84 cm2 SVI 32.5 mL/m2.) The aortic valve is severely calcified and leaflet motion appears restricted. Recommend structural cardiology (Valve Clinic) referral.     Global and regional left ventricular function is normal with an EF of 60-65%.  Moderate to severe right ventricular dilation is present. Global right  ventricular function is moderately reduced.  Moderate to severe tricuspid insufficiency is present.  Pulmonary hypertension is present. Pulmonary artery systolic pressure is 73  mmHg (68 mmHg + RAP 15 mmHg).     This study was compared with the study from 9/18/23: Aortic valve parameters are better assessed and are suggestive of paradoxical low-flow low-gradient aortic stenosis. PA pressure is higher. There has otherwise been no significant change.    Stress Test:  None recent    Cardiac MRI:  None available    Cardiac Catheterization:  2020  RA: 25/21/17  RV: 80/13  PA: 80/22/ 45  PCWP: 20/50/20  Cardiac output by estimated Faizan: 4.82 (2.87 index)  Cardiac output by thermodilution: 5.07 (3.02 index)  PVR by estimated Faizan: 5.2 LUKE  SVR by estimated Faizan: 1212  Mean arterial pressure 90, aortic saturation 90, PA saturation 63.3, hemoglobin 12 Right sided filling pressures are moderately elevated.Left sided filling pressures are mildly elevated. Severely elevated pulmonary artery hypertension.Normal cardiac output level.      Nuclear imaging PYP scan:  2020  1. Imaging findings are not suggestive of transthyretin cardiac  amyloidosis.  2. Cardiomegaly.  3. Severe calcifications of the mitral annulus. Moderate  calcifications of the aortic valve and coronary arteries.  4. Diffuse mosaic attenuation of the lungs, which can be seen with  chronic small vessel or small airway disease.  5. Enlarged pulmonary artery can be seen with pulmonary hypertension.    ASSESSMENT/PLAN:  In summary, Keerthi Montoya is here today with the  following -      1.  Chronic HFpEF  2.  Hypertension. Well controlled  3.  Hyperlipidemia.   4.  Pulmonary hypertension, Group II  5. Severe paradoxical (normal EF) low flow low gradient aortic stenosis   6. Persistent atrial fibrillation with (CHADS-VASc 4) with intolerance to anticoagulation due to history of GI bleeding s/p RY closure and Watchman (05/2023)    7.  CKD stage III  8.  Diabetes mellitus.       Plans:    Refer to valve clinic for Severe paradoxical (normal EF) low flow low gradient aortic stenosis     HFpEF:  Mainstay of HFpEF treatment includes maintaining normotension, euvolemia, sinus rhythm if possible, treatment of underlying risk factors including coronary artery disease, obstructive sleep apnea, obesity, hypertension, atrial arrhythmias.    SGLT2 inhibitors are currently the only medications that have proven evidence with benefit against HFpEF, and have received a class 2a recommendation.  Sacubitril/valsartan and spironolactone have also had some evidence as well, receiving class 2b recommendation.  Importantly, recommendation for beta blockers have been removed as there is some evidence for harm particularly with worsened functional capacity, thus preferential to avoid if possible unless there is a compelling alternate indication.  Similarly vasodilators such as hydralazine and nitrates associated with worsening functional capacity and are not recommended in HFpEF.    Unfortunately Ms. Montoya has multiple other coexistent conditions including hypertension and atrial fibrillation for which she is on these medications that are not the best options for HFpEF including hydralazine nitrates and beta-blockers.  She reports her pulse tends to run in the 40s and 50s on current dose of atenolol and we discussed whether we could consider stopping it but she says this has been tried in the past and she ended up becoming tachycardic in A-fib and was restarted on it.    Since she is feeling well and  denies any symptoms with adequate functional capacity and based on the above we have decided to make no changes to her medical regimen.    Follow-up in 1 year    I spent 46 minutes in care of the patient today including obtaining recent medical history, personally reviewing recent cardiac testing and/or lab results, today's examination, discussion of testing results and care recommendations with patient.       Thank you for the opportunity to participate in this patient's care. Please feel free to reach out with any questions or concerns.      Leyda Chowdhury MD, [facility]  Associate Professor of Medicine  Advanced Heart Failure, LVAD & Cardiac Transplant  Director, Cardio-Obstetrics  Cardio-Oncology  [facility]

## 2024-06-07 NOTE — PATIENT INSTRUCTIONS
Cardiology Providers you saw during your visit:  Dr. Leyda Chowdhury     Medication changes:  1- none        Follow up:  1- [place] [facility] [place]- Referral to valve clinic        Please call if you have:  1. Weight gain of more than 2 pounds in a day or 5 pounds in a week  2. Increased shortness of breath, swelling or bloating  3. Dizziness, lightheadedness   4. Any questions or concerns.      Heart Failure Support Group  Virtual meetings will continue in 2024 Please reach out if you would like to attend and we can get you the information you need to log in.     2024 dates:    Monday, May 6th, 1-2pm     Monday, June 3rd, 1-2pm     Monday, July 1st, 1-2pm     Monday, August 5th, 1-2pm     Monday, September 9th, 1-2pm     Monday, October 7th, 1-2pm     Monday, November 4th, 1-2pm     Monday, December 2nd, 1-2pm     Follow the American Heart Association Diet and Lifestyle recommendations:  Limit saturated fat, trans fat, sodium, red meat, sweets and sugar-sweetened beverages. If you choose to eat red meat, compare labels and select the leanest cuts available.  Aim for at least 150 minutes of moderate physical activity or 75 minutes of vigorous physical activity - or an equal combination of both - each week.     During business hours: [phone], press option # 1 to schedule an appointment or send a message to your care team     After hours, weekends or holidays: On Call Cardiologist- [phone]   option #4 and ask to speak to the on-call Cardiologist. Inform them you are a CORE/heart failure patient at the [facility].     VERONIQUE Sol RN Anna, RN

## 2024-06-07 NOTE — NURSING NOTE
To Do    Medication changes:  1- none        Follow up:  1- One year with Dr Chowdh[place] [facility] [place]  2- Referral to valve clinic     Sweta Rock RN

## 2024-06-14 ENCOUNTER — TELEPHONE (OUTPATIENT)
Dept: CARDIOLOGY | Facility: CLINIC | Age: 74
End: 2024-06-14
Payer: MEDICARE

## 2024-06-14 ENCOUNTER — OFFICE VISIT (OUTPATIENT)
Dept: PHARMACY | Facility: CLINIC | Age: 74
End: 2024-06-14
Payer: COMMERCIAL

## 2024-06-14 VITALS
SYSTOLIC BLOOD PRESSURE: 135 MMHG | BODY MASS INDEX: 25.35 KG/M2 | OXYGEN SATURATION: 93 % | DIASTOLIC BLOOD PRESSURE: 70 MMHG | HEART RATE: 52 BPM | WEIGHT: 142 LBS

## 2024-06-14 DIAGNOSIS — E11.29 TYPE 2 DIABETES MELLITUS WITH MICROALBUMINURIA, WITHOUT LONG-TERM CURRENT USE OF INSULIN (H): Primary | ICD-10-CM

## 2024-06-14 DIAGNOSIS — I10 HYPERTENSION GOAL BP (BLOOD PRESSURE) < 130/80: ICD-10-CM

## 2024-06-14 DIAGNOSIS — R80.9 TYPE 2 DIABETES MELLITUS WITH MICROALBUMINURIA, WITHOUT LONG-TERM CURRENT USE OF INSULIN (H): Primary | ICD-10-CM

## 2024-06-14 PROCEDURE — 99207 PR NO CHARGE LOS: CPT | Performed by: PHARMACIST

## 2024-06-14 NOTE — TELEPHONE ENCOUNTER
Left Voicemail (2nd ATTEMPT) for the patient to call back and schedule the following:     Appointment type: TAVr consult   Provider: Dereck  Return date: TBD  Specialty phone number: [phone]   Additional appointment(s) needed: n/a  Additonal Notes:  Left vm for her to call me back. Also tried calling her son christine but no way to leave VM , mailbox is full. Also tried calling  her son chhaya but was only able to leave a VM.

## 2024-06-14 NOTE — PATIENT INSTRUCTIONS
"Recommendations from today's MTM visit:                                                    MTM (medication therapy management) is a service provided by a clinical pharmacist designed to help you get the most of out of your medicines.   Today we reviewed what your medicines are for, how to know if they are working, that your medicines are safe and how to make your medicine regimen as easy as possible.    Call cardiology to reschedule with them    Resume Trulicity 0.75 mg for one more dose then increase to 1.5 mg weekly   Decrease glipizide to 10 mg daily when you increase to 1.5 mg of Trulicity.     Follow-up: Return in about 5 weeks (around 7/19/2024) for Follow up, with me.    It was great speaking with you today.  I value your experience and would be very thankful for your time in providing feedback in our clinic survey. In the next few days, you may receive an email or text message from [employer] with a link to a survey related to your  clinical pharmacist.\"     To schedule another MTM appointment, please call the clinic directly or you may call the MTM scheduling line at [phone] or toll-free at [phone].     My Clinical Pharmacist's contact information:                                                      Please feel free to contact me with any questions or concerns you have.      Bunny Santa, Pharm. D., [facility]  Medication Therapy Management Pharmacist    "

## 2024-06-14 NOTE — PROGRESS NOTES
Medication Therapy Management (MTM) Encounter    ASSESSMENT:                            Medication Adherence/Access: No issues identified    Diabetes:   Self monitoring of blood glucose is at goal of fasting  mg/dL.    Hypertension:   Patient is meeting blood pressure goal of < 140/90mmHg.      PLAN:                            Call cardiology to reschedule with them    Resume Trulicity 0.75 mg for one more dose then increase to 1.5 mg weekly   Decrease glipizide to 10 mg daily when you increase to 1.5 mg of Trulicity.     Follow-up: Return in about 5 weeks (around 2024) for Follow up, with me.    SUBJECTIVE/OBJECTIVE:                          Keerthi Montoya is a 74 year old female seen for a follow-up visit.       Reason for visit: diabetes follow-up     Allergies/ADRs: Reviewed in chart  Past Medical History: Reviewed in chart  Tobacco: She reports that she has never smoked. She has never been exposed to tobacco smoke. She has never used smokeless tobacco.  Alcohol: none    Medication Adherence/Access: no issues reported    Diabetes   Jardiance 10 mg daily  Glipizide XL 20 mg daily  Trulicity 0.75 mg once weekly - had some diarrhea on 3rd dose     Kidney function too low for metformin.      Blood sugar monitorin time(s) daily. Ranges (patient reported): Fasting: reports it was 91 mg/dL this morning and yesterday was 120 mg/dL. At night it has been around 200 mg/dL    Symptoms of low blood sugar? none   Symptoms of high blood sugar? None.     Has joined an exercise group and is exercising three times per week.     Aspirin: No  Statin: Yes: atorvastatin 40 mg   ACEi/ARB: Yes: losartan             Eye exam in the last 12 months? Yes- Date of last eye exam: April,  Location: [facility] [place]  Foot exam: up to date    Hypertension   Isosorbide 30 mg daily  Losartan 100 mg daily  Empagliflozin 10 mg daily  Hydralazine 50 mg three times a day   Aspirin 81 mg daily  Atenolol 25 mg daily  - reports cardiology is okay with her staying on this.   Torsemide 10 mg every other day      Patient reports no current medication side effects  Patient self monitors blood pressure.  Home BP monitoring 120-130's mmHg over 50-70's bpm              Today's Vitals: /70   Pulse 52   Wt 142 lb (64.4 kg)   LMP  (LMP Unknown)   SpO2 93%   BMI 25.35 kg/m    ----------------      I spent 15 minutes with this patient today. All changes were made via collaborative practice agreement with Bunny Meyers MD. A copy of the visit note was provided to the patient's provider(s).    A summary of these recommendations was given to the patient.    Bunny Santa, Pharm. D., [facility]  Medication Therapy Management Pharmacist           Medication Therapy Recommendations  Type 2 diabetes mellitus with microalbuminuria, without long-term current use of insulin (H)    Current Medication: dulaglutide (TRULICITY) 0.75 MG/0.5ML pen   Rationale: Dose too low - Dosage too low - Effectiveness   Recommendation: Increase Dose   Status: Accepted - no CPA Needed          Current Medication: glipiZIDE (GLUCOTROL XL) 10 MG 24 hr tablet   Rationale: Dose too high - Dosage too high - Safety   Recommendation: Decrease Dose   Status: Accepted - no CPA Needed

## 2024-06-17 ENCOUNTER — INFUSION THERAPY VISIT (OUTPATIENT)
Dept: INFUSION THERAPY | Facility: CLINIC | Age: 74
End: 2024-06-17
Attending: INTERNAL MEDICINE
Payer: MEDICARE

## 2024-06-17 VITALS
SYSTOLIC BLOOD PRESSURE: 162 MMHG | HEIGHT: 63 IN | HEART RATE: 58 BPM | TEMPERATURE: 97.7 F | DIASTOLIC BLOOD PRESSURE: 66 MMHG | BODY MASS INDEX: 25.52 KG/M2 | OXYGEN SATURATION: 92 % | RESPIRATION RATE: 16 BRPM | WEIGHT: 144 LBS

## 2024-06-17 DIAGNOSIS — D50.9 IRON DEFICIENCY ANEMIA, UNSPECIFIED IRON DEFICIENCY ANEMIA TYPE: Primary | ICD-10-CM

## 2024-06-17 PROCEDURE — 99207 PR NO CHARGE LOS: CPT

## 2024-06-17 PROCEDURE — 250N000011 HC RX IP 250 OP 636: Mod: JZ | Performed by: INTERNAL MEDICINE

## 2024-06-17 PROCEDURE — 96365 THER/PROPH/DIAG IV INF INIT: CPT

## 2024-06-17 PROCEDURE — 258N000003 HC RX IP 258 OP 636: Mod: JZ | Performed by: INTERNAL MEDICINE

## 2024-06-17 PROCEDURE — 96366 THER/PROPH/DIAG IV INF ADDON: CPT

## 2024-06-17 RX ORDER — DIPHENHYDRAMINE HYDROCHLORIDE 50 MG/ML
50 INJECTION INTRAMUSCULAR; INTRAVENOUS
Start: 2024-06-17

## 2024-06-17 RX ORDER — HEPARIN SODIUM (PORCINE) LOCK FLUSH IV SOLN 100 UNIT/ML 100 UNIT/ML
5 SOLUTION INTRAVENOUS
OUTPATIENT
Start: 2024-06-17

## 2024-06-17 RX ORDER — EPINEPHRINE 1 MG/ML
0.3 INJECTION, SOLUTION INTRAMUSCULAR; SUBCUTANEOUS EVERY 5 MIN PRN
OUTPATIENT
Start: 2024-06-17

## 2024-06-17 RX ORDER — METHYLPREDNISOLONE SODIUM SUCCINATE 125 MG/2ML
125 INJECTION, POWDER, LYOPHILIZED, FOR SOLUTION INTRAMUSCULAR; INTRAVENOUS
Start: 2024-06-17

## 2024-06-17 RX ORDER — HEPARIN SODIUM,PORCINE 10 UNIT/ML
5-20 VIAL (ML) INTRAVENOUS DAILY PRN
OUTPATIENT
Start: 2024-06-17

## 2024-06-17 RX ORDER — ALBUTEROL SULFATE 0.83 MG/ML
2.5 SOLUTION RESPIRATORY (INHALATION)
OUTPATIENT
Start: 2024-06-17

## 2024-06-17 RX ORDER — ALBUTEROL SULFATE 90 UG/1
1-2 AEROSOL, METERED RESPIRATORY (INHALATION)
Start: 2024-06-17

## 2024-06-17 RX ADMIN — IRON SUCROSE 300 MG: 20 INJECTION, SOLUTION INTRAVENOUS at 13:28

## 2024-06-17 RX ADMIN — SODIUM CHLORIDE 250 ML: 9 INJECTION, SOLUTION INTRAVENOUS at 13:27

## 2024-06-17 ASSESSMENT — PAIN SCALES - GENERAL: PAINLEVEL: NO PAIN (0)

## 2024-06-17 NOTE — PROGRESS NOTES
Infusion Nursing Note:  Keerthi Montoya presents today for 1/1 Venofer.    Patient seen by provider today: No   present during visit today: Not Applicable.    Note: Patient reports feeling at her baseline today. She states she has received venofer in the past without any issues.       Intravenous Access:  Peripheral IV placed.    Treatment Conditions:  Not Applicable.      Post Infusion Assessment:  Patient tolerated infusion without incident.  Blood return noted pre and post infusion.  Site patent and intact, free from redness, edema or discomfort.  No evidence of extravasations.  Access discontinued per protocol.       Discharge Plan:   Patient and/or family verbalized understanding of discharge instructions and all questions answered.  AVS to patient via [employer].  Patient will return at provider discretion for next appointment.   Patient discharged in stable condition accompanied by: self.  Departure Mode: Ambulatory.      Olivia Joyner RN   Normal vision: sees adequately in most situations; can see medication labels, newsprint

## 2024-06-20 ENCOUNTER — OFFICE VISIT (OUTPATIENT)
Dept: CARDIOLOGY | Facility: CLINIC | Age: 74
End: 2024-06-20
Attending: INTERNAL MEDICINE
Payer: MEDICARE

## 2024-06-20 ENCOUNTER — OFFICE VISIT (OUTPATIENT)
Dept: CARDIOLOGY | Facility: CLINIC | Age: 74
End: 2024-06-20
Attending: THORACIC SURGERY (CARDIOTHORACIC VASCULAR SURGERY)
Payer: MEDICARE

## 2024-06-20 VITALS
SYSTOLIC BLOOD PRESSURE: 152 MMHG | WEIGHT: 141.98 LBS | OXYGEN SATURATION: 95 % | DIASTOLIC BLOOD PRESSURE: 68 MMHG | BODY MASS INDEX: 25.34 KG/M2 | HEART RATE: 49 BPM

## 2024-06-20 VITALS
DIASTOLIC BLOOD PRESSURE: 68 MMHG | WEIGHT: 142 LBS | HEART RATE: 49 BPM | OXYGEN SATURATION: 95 % | SYSTOLIC BLOOD PRESSURE: 152 MMHG | BODY MASS INDEX: 25.35 KG/M2

## 2024-06-20 DIAGNOSIS — I35.0 SEVERE AORTIC STENOSIS: Primary | ICD-10-CM

## 2024-06-20 DIAGNOSIS — I50.810 RVF (RIGHT VENTRICULAR FAILURE) (H): ICD-10-CM

## 2024-06-20 PROCEDURE — 99215 OFFICE O/P EST HI 40 MIN: CPT | Performed by: INTERNAL MEDICINE

## 2024-06-20 PROCEDURE — 99204 OFFICE O/P NEW MOD 45 MIN: CPT | Performed by: THORACIC SURGERY (CARDIOTHORACIC VASCULAR SURGERY)

## 2024-06-20 PROCEDURE — 93005 ELECTROCARDIOGRAM TRACING: CPT

## 2024-06-20 PROCEDURE — 93010 ELECTROCARDIOGRAM REPORT: CPT | Performed by: INTERNAL MEDICINE

## 2024-06-20 PROCEDURE — G0463 HOSPITAL OUTPT CLINIC VISIT: HCPCS | Performed by: INTERNAL MEDICINE

## 2024-06-20 RX ORDER — ASPIRIN 81 MG/1
243 TABLET, CHEWABLE ORAL ONCE
Status: CANCELLED | OUTPATIENT
Start: 2024-06-20

## 2024-06-20 RX ORDER — ASPIRIN 325 MG
325 TABLET ORAL ONCE
Status: CANCELLED | OUTPATIENT
Start: 2024-06-20 | End: 2024-06-20

## 2024-06-20 RX ORDER — LIDOCAINE 40 MG/G
CREAM TOPICAL
Status: CANCELLED | OUTPATIENT
Start: 2024-06-20

## 2024-06-20 RX ORDER — SODIUM CHLORIDE 9 MG/ML
INJECTION, SOLUTION INTRAVENOUS CONTINUOUS
Status: CANCELLED | OUTPATIENT
Start: 2024-06-20

## 2024-06-20 ASSESSMENT — PAIN SCALES - GENERAL: PAINLEVEL: NO PAIN (0)

## 2024-06-20 NOTE — PROGRESS NOTES
I have seen and examined the patient with the TAVR team and reviewed the pertinent laboratory results. I agree with the assessment and plan of the clinic note above. I spent 40 minutes face-to-face and/or discussing and coordinating care.      Ton Bunn MD  Interventional Cardiology  Pager: [phone]      [facility] HEART CARE  CARDIOVASCULAR DIVISION    VALVE CLINIC CONSULTATION    PRIMARY CARDIOLOGIST: Dr. Chowdhury      PERTINENT CLINICAL HISTORY & REASON FOR CONSULTATION:     Keerthi Montoya is a very pleasant 74 year old female with low flow low gradient severe aortic valvular stenosis referred to our clinic for evaluation and consideration for potential transcatheter aortic valve replacement (TAVR). Her severe aortic stenosis is characterized with an IVON of 0.87 cm2, mean PG 16.5 mmHg and peak V of 2.7 m/sec with LVEF 60-65% and DI 0.25 by echocardiogram on 6/7/24.     Her additional past medical history is notable for chronic HFpEF, HLD, HTN, DM2, ulcerative pancolitis, CKD3, pulmonary HTN, persistent atrial fibrillation with (CHADS-VASc 4) with intolerance to anticoagulation due to history of GI bleeding s/p RY closure and Watchman (05/2023) .     Patient has been known to have aortic stenosis, followed serially by echo since 2/17/20. Most recent echo shows paradoxical low flow low gradient aortic stenosis. She says that she is doing well and denies any significant cardiac symptoms. She remains active and has no other acute complaints at this time.      PAST MEDICAL HISTORY:     Past Medical History:   Diagnosis Date    Chronic kidney disease     Diabetes mellitus, type 2 (H)     Diverticulosis of colon (without mention of hemorrhage) 10/01/2012    GI bleed     History of blood transfusion     HLD (hyperlipidemia)     Hypertension     Hypothyroidism     MARIA D (iron deficiency anemia)     Persistent atrial fibrillation (H)     Pulmonary hypertension (H)     Ulcerative (chronic) enterocolitis (H)        PAST SURGICAL HISTORY:     Past Surgical History:   Procedure Laterality Date    CHOLECYSTECTOMY  3/1987    COLON SURGERY  01/2001    Left colon resection; diverticulitis    COLONOSCOPY N/A 4/24/2017    Procedure: COMBINED COLONOSCOPY, SINGLE OR MULTIPLE BIOPSY/POLYPECTOMY BY BIOPSY;;  Surgeon: Radha Salguero MD;  Location: [facility] OR    COLONOSCOPY N/A 3/10/2023    Procedure: COLONOSCOPY;  Surgeon: Preeti James MD;  [facility]: [facility]    COLONOSCOPY WITH CO2 INSUFFLATION N/A 4/24/2017    Procedure: COLONOSCOPY WITH CO2 INSUFFLATION;  Colonoscopy Dx rectal bleeding, BMI 25.86, Pharm [facility] .[phone], ;  Surgeon: Radha Salguero MD;  Location: [facility] OR    CV LEFT ATRIAL APPENDAGE CLOSURE N/A 5/11/2023    Procedure: Left Atrial Appendage Closure;  Surgeon: Bobby Grimes MD;  Location:  HEART CARDIAC CATH LAB    CV RIGHT HEART CATH MEASUREMENTS RECORDED N/A 3/16/2020    Procedure: CV RIGHT HEART CATH;  Surgeon: Nader Muñoz MD;  Location:  HEART CARDIAC CATH LAB    ESOPHAGOSCOPY, GASTROSCOPY, DUODENOSCOPY (EGD), COMBINED N/A 1/23/2023    Procedure: ESOPHAGOGASTRODUODENOSCOPY, WITH BIOPSY;  Surgeon: Ricki Deal MD;  Location:  GI    ESOPHAGOSCOPY, GASTROSCOPY, DUODENOSCOPY (EGD), COMBINED N/A 3/10/2023    Procedure: Esophagoscopy, gastroscopy, duodenoscopy (EGD), combined;  Surgeon: Preeti James MD;  Location:  GI    GYN SURGERY  9/1983    tubal ligation    HERNIA REPAIR  12/2006    recurrent incisional hernia    SIGMOIDOSCOPY FLEXIBLE N/A 1/23/2023    Procedure: Sigmoidoscopy flexible;  Surgeon: Ricki Deal MD;  Location:  GI    THROAT SURGERY  12/1974    thyroidectomy      CURRENT MEDICATIONS:     Current Outpatient Medications   Medication Sig Dispense Refill    aspirin 81 MG EC tablet Take 1 tablet (81 mg) by mouth daily 90 tablet 3    atenolol (TENORMIN) 25 MG tablet Take 1 tablet (25 mg) by mouth daily 30 tablet 3    atorvastatin (LIPITOR) 40 MG tablet  Take 1 tablet (40 mg) by mouth daily 90 tablet 3    blood glucose (ACCU-CHEK FELIPE PLUS) test strip 200 strips by In Vitro route 2 times daily Use to test blood sugar 2 times daily or as directed. 200 strip 4    Cyanocobalamin (B-12) 1000 MCG TABS Take 1,000 mcg by mouth three times a week      diphenhydrAMINE (BENADRYL) 25 MG tablet Take 25 mg by mouth every 6 hours as needed for itching or allergies      dulaglutide (TRULICITY) 0.75 MG/0.5ML pen Inject 0.75 mg Subcutaneous every 7 days 2 mL 0    dulaglutide (TRULICITY) 1.5 MG/0.5ML pen Inject 1.5 mg Subcutaneous every 7 days After 4 doses of 0.75 mg 2 mL 1    empagliflozin (JARDIANCE) 10 MG TABS tablet Take 1 tablet (10 mg) by mouth daily 90 tablet 1    glipiZIDE (GLUCOTROL XL) 10 MG 24 hr tablet Take 2 tablets (20 mg) by mouth daily 60 tablet 3    hydrALAZINE (APRESOLINE) 50 MG tablet Take 1 tablet (50 mg) by mouth 3 times daily 180 tablet     isosorbide mononitrate (IMDUR) 30 MG 24 hr tablet Take 1 tablet (30 mg) by mouth daily 90 tablet 3    levothyroxine (SYNTHROID/LEVOTHROID) 125 MCG tablet Take 1 tablet (125 mcg) by mouth daily 90 tablet 0    losartan (COZAAR) 100 MG tablet Take 1 tablet (100 mg) by mouth daily 90 tablet 3    mesalamine (APRISO ER) 0.375 g 24 hr capsule TAKE 4 CAPSULES(1.5 GRAMS) BY MOUTH DAILY 360 capsule 1    pantoprazole (PROTONIX) 40 MG EC tablet Take 1 tablet (40 mg) by mouth daily 90 tablet 3    senna (SENOKOT) 8.6 MG tablet Take 1 tablet by mouth daily as needed for constipation 30 tablet 3    torsemide (DEMADEX) 10 MG tablet Take 1 tablet (10 mg) by mouth every other day        ALLERGIES:     Allergies   Allergen Reactions    Actos [Pioglitazone]      Fluid retention    Amlodipine      Leg swelling, hand swelling    Animal Dander     Doxycycline Hives    Dust Mites     Grass     Keflex [Cephalexin Hcl] Hives    Metoprolol      Swelling of lower legs and fatigue    Other [Seasonal Allergies]      pollen    Ranitidine      rash     Synthroid [Levothyroxine Sodium] Swelling     Allergic to brand name    Tetracycline Hives    Tylenol Hives    Ziac [Bisoprolol-Hydrochlorothiazide]       FAMILY HISTORY:     Family History   Problem Relation Age of Onset    Cerebrovascular Disease Mother     Cancer Father         bladder    Diverticulitis Brother     Diverticulitis Brother     Kidney Disease No family hx of     Crohn's Disease No family hx of     Ulcerative Colitis No family hx of     Stomach Cancer No family hx of     GERD No family hx of     Celiac Disease No family hx of     Anesthesia Reaction No family hx of       SOCIAL HISTORY:     Social History     Socioeconomic History    Marital status:      Spouse name: Raymundo; dementia;  2016    Number of children: 3    Years of education: None    Highest education level: None   Occupational History     Employer: [employer]   Tobacco Use    Smoking status: Never     Passive exposure: Never    Smokeless tobacco: Never   Vaping Use    Vaping status: Never Used   Substance and Sexual Activity    Alcohol use: Not Currently     Alcohol/week: 2.0 - 4.0 standard drinks of alcohol     Types: 1 - 2 Cans of beer, 1 - 2 Shots of liquor per week     Comment: occasional cocktail or beer    Drug use: No    Sexual activity: Not Currently   Other Topics Concern     Service No    Blood Transfusions No    Caffeine Concern No    Occupational Exposure No    Hobby Hazards No    Sleep Concern No    Stress Concern No    Weight Concern No    Special Diet No    Back Care No    Exercise Yes     Comment: off and on    Bike Helmet No    Seat Belt Yes    Self-Exams No   Social History Narrative    Laid off her job      Social Determinants of Health     Financial Resource Strain: Low Risk  (10/20/2023)    Financial Resource Strain     Within the past 12 months, have you or your family members you live with been unable to get utilities (heat, electricity) when it was really needed?: No   Food  Insecurity: Low Risk  (10/20/2023)    Food Insecurity     Within the past 12 months, did you worry that your food would run out before you got money to buy more?: No     Within the past 12 months, did the food you bought just not last and you didn t have money to get more?: No   Transportation Needs: Low Risk  (10/20/2023)    Transportation Needs     Within the past 12 months, has lack of transportation kept you from medical appointments, getting your medicines, non-medical meetings or appointments, work, or from getting things that you need?: No   Housing Stability: Low Risk  (10/20/2023)    Housing Stability     Do you have housing? : Yes     Are you worried about losing your housing?: No      REVIEW OF SYSTEMS:     Constitutional: No fevers or chills  Skin: No new rash or itching  Eyes: No acute change in vision  Ears/Nose/Throat: No purulent rhinorrhea, new hearing loss, or new vertigo  Respiratory: No cough or hemoptysis  Cardiovascular: See HPI  Gastrointestinal: No change in appetite, vomiting, hematemesis or diarrhea  Genitourinary: No dysuria or hematuria  Musculoskeletal: No new back pain, neck pain or muscle pain  Neurologic: No new headaches, focal weakness or behavior changes  Psychiatric: No hallucinations, excessive alcohol consumption or illegal drug usage  Hematologic/Lymphatic/Immunologic: No bleeding, chills, fever, night sweats or weight loss  Endocrine: No new cold intolerance, heat intolerance, polyphagia, polydipsia or polyuria      PHYSICAL EXAMINATION:     BP (!) 152/68 (BP Location: Left arm, Patient Position: Chair, Cuff Size: Adult Regular)   Pulse (!) 49   Wt 64.4 kg (142 lb)   LMP  (LMP Unknown)   SpO2 95%   BMI 25.35 kg/m      GENERAL: No acute distress.  HEENT: EOMI. Sclerae white, not injected. Nares clear. Pharynx without erythema or exudate.   Neck: No adenopathy. No thyromegaly. No jugular venous distension.   Heart: Regular rate and rhythm. Systolic ejection murmur.   Lungs:  Clear to auscultation. No ronchi, wheezes, rales.   Abdomen: Soft, nontender, nondistended. Bowel sounds present.  Extremities: No clubbing, cyanosis, or edema.   Neurologic: Alert and oriented to person/place/time, normal speech and affect. No focal deficits.  Skin: No petechiae, purpura or rash.     LABORATORY DATA:     LIPID RESULTS:  Lab Results   Component Value Date    CHOL 73 01/20/2023    CHOL 93 10/28/2020    HDL 29 (L) 01/20/2023    HDL 38 (L) 10/28/2020    LDL 27 01/20/2023    LDL 33 10/28/2020    TRIG 87 01/20/2023    TRIG 108 10/28/2020    CHOLHDLRATIO 4.3 05/18/2015     LIVER ENZYME RESULTS:  Lab Results   Component Value Date    AST 21 01/05/2024    AST 19 10/28/2020    ALT <5 01/05/2024    ALT 18 10/28/2020     CBC RESULTS:  Lab Results   Component Value Date    WBC 9.5 12/11/2023    WBC 7.3 10/28/2020    RBC 3.67 (L) 12/11/2023    RBC 3.99 10/28/2020    HGB 13.8 06/07/2024    HGB 11.8 02/08/2021    HCT 35.8 12/11/2023    HCT 38.2 10/28/2020    MCV 98 12/11/2023    MCV 96 10/28/2020    MCH 29.7 12/11/2023    MCH 30.8 10/28/2020    MCHC 30.4 (L) 12/11/2023    MCHC 32.2 10/28/2020    RDW 14.6 12/11/2023    RDW 13.9 10/28/2020     12/11/2023     10/28/2020     BMP RESULTS:  Lab Results   Component Value Date     06/07/2024     04/28/2021    POTASSIUM 4.4 06/07/2024    POTASSIUM 4.3 09/17/2023    POTASSIUM 4.5 04/28/2021    CHLORIDE 105 06/07/2024    CHLORIDE 109 04/27/2023    CHLORIDE 100 04/28/2021    CO2 20 (L) 06/07/2024    CO2 21 04/27/2023    CO2 26 04/28/2021    ANIONGAP 13 06/07/2024    ANIONGAP 7 04/27/2023    ANIONGAP 8 04/28/2021     (H) 06/07/2024     (H) 09/21/2023     (H) 04/27/2023    GLC 57 (L) 04/28/2021    BUN 32.6 (H) 06/07/2024    BUN 28 04/27/2023    BUN 23 04/28/2021    CR 1.81 (H) 06/07/2024    CR 1.21 (H) 04/28/2021    GFRESTIMATED 29 (L) 06/07/2024    GFRESTIMATED 30 (L) 12/30/2022    GFRESTIMATED 46 (L) 04/28/2021    GFRESTBLACK 53  (L) 04/28/2021    ABEL 9.7 06/07/2024    ABEL 9.8 04/28/2021      A1C RESULTS:  Lab Results   Component Value Date    A1C 10.5 (H) 04/05/2024    A1C 6.6 (H) 10/28/2020     INR RESULTS:  Lab Results   Component Value Date    INR 1.13 05/08/2023      PROCEDURES & FURTHER ASSESSMENTS:     ECG dated 6/20/24:  Sinus bradycardia    Echocardiogram dated 6/7/24:  Aortic valve Doppler parameters are consistent with severe normal-EF  (paradoxical) low-flow low-gradient aortic stenosis (PV 2.7 m/s MG 17 mmHg DVI  0.25 IVON 0.84 cm2 SVI 32.5 mL/m2.) The aortic valve is severely calcified and  leaflet motion appears restricted. Recommend structural cardiology (Valve  Clinic) referral.     Global and regional left ventricular function is normal with an EF of 60-65%.  Moderate to severe right ventricular dilation is present. Global right  ventricular function is moderately reduced.  Moderate to severe tricuspid insufficiency is present.  Pulmonary hypertension is present. Pulmonary artery systolic pressure is 73  mmHg (68 mmHg + RAP 15 mmHg).     This study was compared with the study from 9/18/23: Aortic valve parameters  are better assessed and are suggestive of paradoxical low-flow low-gradient  aortic stenosis. PA pressure is higher. There has otherwise been no significant change.    STS RISK SCORE 7.86%  NYHA CLASS: II    Mallampati score: 2    ASA physical status classification: 3     CLINICAL IMPRESSION:     Keerthi Montoya is a very pleasant 74 year old female with low flow low gradient severe aortic valvular stenosis referred to our clinic for evaluation and consideration for potential transcatheter aortic valve replacement (TAVR). Her severe aortic stenosis is characterized with an IVON of 0.87 cm2, mean PG 16.5 mmHg and peak V of 2.7 m/sec with LVEF 60-65% and DI 0.25 by echocardiogram on 6/7/24.      We discussed the natural history of severe aortic stenosis, associated symptoms and available treatment options including  transcatheter versus surgical aortic valve replacement. Patient is a appropriate candidate for transcatheter aortic valve replacement based on age, frailty, co-morbidities and surgical mortality risk estimation of 7.86% based on the STS score.     The pre-procedural TAVR evaluation currently requires additional assessment with CT TAVR, coronary angiogram, and right heart catheterization prior to presentation to the Heart team for discussion during our multi-disciplinary conference for consensus decision and procedural planning.     Plan Summary:  1) Paradoxical low flow low gradient aortic stenosis:  - Proceed with TAVR evalution   - CT TAVR   - Coronary angiogram, and right heart catheterization  - Following completion of TAVR work-up patient case will be discussed during our multi-disciplinary conference for consensus decision and procedural planning.    2) Severe pulmonary hypertension  - Last echo with PA pressure of nearly 73 mm Hg  - Prior RHC in 2020 with mixed pre-and post-capillary pulmonary hypertension with a mean PA pressure of ~45 mm Hg  - Repeat RHC as above  - Will refer to pulmonary hypertension clinic fir further management    Patient was evaluated in clinic with Dr. Bunn .    Osei Head MD  [facility] Cardiology Team    Greater than 30 minutes were spent with patient and family providing education regarding progression of aortic stenosis, symptom recognition and management as well as approach to treatment and post-procedural expectations.     CC  Patient Care Team:  Bnuny Meyers MD as PCP - General (Internal Medicine)  Preeti James MD as MD (Gastroenterology)  Swati Minor DO as Assigned Nephrology Provider  Jerry Robbins PA-C as Physician Assistant (Gastroenterology)  Bunny Meyers MD as Assigned PCP  Bunny Santa RP as Assigned [facility] Pharmacist  Allison Yang RN as Specialty Care Coordinator (Gastroenterology)  Gianfranco Melendez MD as Intern (Student  in organized health care education/training program)  Jethro Moore MD as MD (Hematology & Oncology)  Fracisco Hurtado, RN as Specialty Care Coordinator (Hematology & Oncology)  Vanessa Schneider, RN as Diabetes Educator (Diabetes Education)  Preeti James MD as Assigned Surgical Provider  Saba Lemon MD as MD (Advanced Heart Failure and Transplant Cardiology)  Sheela Galeas, RN as Specialty Care Coordinator (Cardiology)  Francisca Mustafa APRN CNP as Assigned Cancer Care Provider  Geena Ornelas MD as MD (Dermatology)  Lorena Ambriz, RN as Specialty Care Coordinator (Cardiology)  Davie Rapp MD as MD (Gastroenterology)  Dominique Saavedra NP as Assigned Heart and Vascular Provider  SELF, REFERRED

## 2024-06-20 NOTE — LETTER
6/20/2024      RE: Keerthi Montoya  [address] [place] [id number]       Dear Colleague,    Thank you for the opportunity to participate in the care of your patient, Keerthi Montoya, at the [facility]. Please see a copy of my visit note below.    I have seen and examined the patient with the TAVR team and reviewed the pertinent laboratory results. I agree with the assessment and plan of the clinic note above. I spent 40 minutes face-to-face and/or discussing and coordinating care.      Ton Bunn MD  Interventional Cardiology  Pager: [phone]      [facility] HEART CARE  CARDIOVASCULAR DIVISION    VALVE CLINIC CONSULTATION    PRIMARY CARDIOLOGIST: Dr. Chowdhury      PERTINENT CLINICAL HISTORY & REASON FOR CONSULTATION:     Keerthi Montoya is a very pleasant 74 year old female with low flow low gradient severe aortic valvular stenosis referred to our clinic for evaluation and consideration for potential transcatheter aortic valve replacement (TAVR). Her severe aortic stenosis is characterized with an IVON of 0.87 cm2, mean PG 16.5 mmHg and peak V of 2.7 m/sec with LVEF 60-65% and DI 0.25 by echocardiogram on 6/7/24.     Her additional past medical history is notable for chronic HFpEF, HLD, HTN, DM2, ulcerative pancolitis, CKD3, pulmonary HTN, persistent atrial fibrillation with (CHADS-VASc 4) with intolerance to anticoagulation due to history of GI bleeding s/p RY closure and Watchman (05/2023) .     Patient has been known to have aortic stenosis, followed serially by echo since 2/17/20. Most recent echo shows paradoxical low flow low gradient aortic stenosis. She says that she is doing well and denies any significant cardiac symptoms. She remains active and has no other acute complaints at this time.      PAST MEDICAL HISTORY:     Past Medical History:   Diagnosis Date    Chronic kidney disease      Diabetes mellitus, type 2 (H)     Diverticulosis of colon (without mention of hemorrhage) 10/01/2012    GI bleed     History of blood transfusion     HLD (hyperlipidemia)     Hypertension     Hypothyroidism     MARIA D (iron deficiency anemia)     Persistent atrial fibrillation (H)     Pulmonary hypertension (H)     Ulcerative (chronic) enterocolitis (H)       PAST SURGICAL HISTORY:     Past Surgical History:   Procedure Laterality Date    CHOLECYSTECTOMY  3/1987    COLON SURGERY  01/2001    Left colon resection; diverticulitis    COLONOSCOPY N/A 4/24/2017    Procedure: COMBINED COLONOSCOPY, SINGLE OR MULTIPLE BIOPSY/POLYPECTOMY BY BIOPSY;;  Surgeon: Radha Salguero MD;  Location: [facility] OR    COLONOSCOPY N/A 3/10/2023    Procedure: COLONOSCOPY;  Surgeon: Preeti James MD;  Location:  GI    COLONOSCOPY WITH CO2 INSUFFLATION N/A 4/24/2017    Procedure: COLONOSCOPY WITH CO2 INSUFFLATION;  Colonoscopy Dx rectal bleeding, BMI 25.86, Pharm [facility] .[phone], ;  Surgeon: Radha Salguero MD;  Location: [facility] OR    CV LEFT ATRIAL APPENDAGE CLOSURE N/A 5/11/2023    Procedure: Left Atrial Appendage Closure;  Surgeon: Bobby Grimes MD;  Location:  HEART CARDIAC CATH LAB    CV RIGHT HEART CATH MEASUREMENTS RECORDED N/A 3/16/2020    Procedure: CV RIGHT HEART CATH;  Surgeon: Nader Muñoz MD;  Location:  HEART CARDIAC CATH LAB    ESOPHAGOSCOPY, GASTROSCOPY, DUODENOSCOPY (EGD), COMBINED N/A 1/23/2023    Procedure: ESOPHAGOGASTRODUODENOSCOPY, WITH BIOPSY;  Surgeon: Ricki Deal MD;  Location:  GI    ESOPHAGOSCOPY, GASTROSCOPY, DUODENOSCOPY (EGD), COMBINED N/A 3/10/2023    Procedure: Esophagoscopy, gastroscopy, duodenoscopy (EGD), combined;  Surgeon: Preeti James MD;  Location:  GI    GYN SURGERY  9/1983    tubal ligation    HERNIA REPAIR  12/2006    recurrent incisional hernia    SIGMOIDOSCOPY FLEXIBLE N/A 1/23/2023    Procedure: Sigmoidoscopy flexible;  Surgeon: Ricki Deal  MD;  Location:  GI    THROAT SURGERY  12/1974    thyroidectomy      CURRENT MEDICATIONS:     Current Outpatient Medications   Medication Sig Dispense Refill    aspirin 81 MG EC tablet Take 1 tablet (81 mg) by mouth daily 90 tablet 3    atenolol (TENORMIN) 25 MG tablet Take 1 tablet (25 mg) by mouth daily 30 tablet 3    atorvastatin (LIPITOR) 40 MG tablet Take 1 tablet (40 mg) by mouth daily 90 tablet 3    blood glucose (ACCU-CHEK FELIPE PLUS) test strip 200 strips by In Vitro route 2 times daily Use to test blood sugar 2 times daily or as directed. 200 strip 4    Cyanocobalamin (B-12) 1000 MCG TABS Take 1,000 mcg by mouth three times a week      diphenhydrAMINE (BENADRYL) 25 MG tablet Take 25 mg by mouth every 6 hours as needed for itching or allergies      dulaglutide (TRULICITY) 0.75 MG/0.5ML pen Inject 0.75 mg Subcutaneous every 7 days 2 mL 0    dulaglutide (TRULICITY) 1.5 MG/0.5ML pen Inject 1.5 mg Subcutaneous every 7 days After 4 doses of 0.75 mg 2 mL 1    empagliflozin (JARDIANCE) 10 MG TABS tablet Take 1 tablet (10 mg) by mouth daily 90 tablet 1    glipiZIDE (GLUCOTROL XL) 10 MG 24 hr tablet Take 2 tablets (20 mg) by mouth daily 60 tablet 3    hydrALAZINE (APRESOLINE) 50 MG tablet Take 1 tablet (50 mg) by mouth 3 times daily 180 tablet     isosorbide mononitrate (IMDUR) 30 MG 24 hr tablet Take 1 tablet (30 mg) by mouth daily 90 tablet 3    levothyroxine (SYNTHROID/LEVOTHROID) 125 MCG tablet Take 1 tablet (125 mcg) by mouth daily 90 tablet 0    losartan (COZAAR) 100 MG tablet Take 1 tablet (100 mg) by mouth daily 90 tablet 3    mesalamine (APRISO ER) 0.375 g 24 hr capsule TAKE 4 CAPSULES(1.5 GRAMS) BY MOUTH DAILY 360 capsule 1    pantoprazole (PROTONIX) 40 MG EC tablet Take 1 tablet (40 mg) by mouth daily 90 tablet 3    senna (SENOKOT) 8.6 MG tablet Take 1 tablet by mouth daily as needed for constipation 30 tablet 3    torsemide (DEMADEX) 10 MG tablet Take 1 tablet (10 mg) by mouth every other day         ALLERGIES:     Allergies   Allergen Reactions    Actos [Pioglitazone]      Fluid retention    Amlodipine      Leg swelling, hand swelling    Animal Dander     Doxycycline Hives    Dust Mites     Grass     Keflex [Cephalexin Hcl] Hives    Metoprolol      Swelling of lower legs and fatigue    Other [Seasonal Allergies]      pollen    Ranitidine      rash    Synthroid [Levothyroxine Sodium] Swelling     Allergic to brand name    Tetracycline Hives    Tylenol Hives    Ziac [Bisoprolol-Hydrochlorothiazide]       FAMILY HISTORY:     Family History   Problem Relation Age of Onset    Cerebrovascular Disease Mother     Cancer Father         bladder    Diverticulitis Brother     Diverticulitis Brother     Kidney Disease No family hx of     Crohn's Disease No family hx of     Ulcerative Colitis No family hx of     Stomach Cancer No family hx of     GERD No family hx of     Celiac Disease No family hx of     Anesthesia Reaction No family hx of       SOCIAL HISTORY:     Social History     Socioeconomic History    Marital status:      Spouse name: Raymundo; dementia;  2016    Number of children: 3    Years of education: None    Highest education level: None   Occupational History     Employer: [employer]   Tobacco Use    Smoking status: Never     Passive exposure: Never    Smokeless tobacco: Never   Vaping Use    Vaping status: Never Used   Substance and Sexual Activity    Alcohol use: Not Currently     Alcohol/week: 2.0 - 4.0 standard drinks of alcohol     Types: 1 - 2 Cans of beer, 1 - 2 Shots of liquor per week     Comment: occasional cocktail or beer    Drug use: No    Sexual activity: Not Currently   Other Topics Concern     Service No    Blood Transfusions No    Caffeine Concern No    Occupational Exposure No    Hobby Hazards No    Sleep Concern No    Stress Concern No    Weight Concern No    Special Diet No    Back Care No    Exercise Yes     Comment: off and on    Bike Helmet No    Seat Belt Yes     Self-Exams No   Social History Narrative    Laid off her job 8/14     Social Determinants of Health     Financial Resource Strain: Low Risk  (10/20/2023)    Financial Resource Strain     Within the past 12 months, have you or your family members you live with been unable to get utilities (heat, electricity) when it was really needed?: No   Food Insecurity: Low Risk  (10/20/2023)    Food Insecurity     Within the past 12 months, did you worry that your food would run out before you got money to buy more?: No     Within the past 12 months, did the food you bought just not last and you didn t have money to get more?: No   Transportation Needs: Low Risk  (10/20/2023)    Transportation Needs     Within the past 12 months, has lack of transportation kept you from medical appointments, getting your medicines, non-medical meetings or appointments, work, or from getting things that you need?: No   Housing Stability: Low Risk  (10/20/2023)    Housing Stability     Do you have housing? : Yes     Are you worried about losing your housing?: No      REVIEW OF SYSTEMS:     Constitutional: No fevers or chills  Skin: No new rash or itching  Eyes: No acute change in vision  Ears/Nose/Throat: No purulent rhinorrhea, new hearing loss, or new vertigo  Respiratory: No cough or hemoptysis  Cardiovascular: See HPI  Gastrointestinal: No change in appetite, vomiting, hematemesis or diarrhea  Genitourinary: No dysuria or hematuria  Musculoskeletal: No new back pain, neck pain or muscle pain  Neurologic: No new headaches, focal weakness or behavior changes  Psychiatric: No hallucinations, excessive alcohol consumption or illegal drug usage  Hematologic/Lymphatic/Immunologic: No bleeding, chills, fever, night sweats or weight loss  Endocrine: No new cold intolerance, heat intolerance, polyphagia, polydipsia or polyuria      PHYSICAL EXAMINATION:     BP (!) 152/68 (BP Location: Left arm, Patient Position: Chair, Cuff Size: Adult Regular)    Pulse (!) 49   Wt 64.4 kg (142 lb)   LMP  (LMP Unknown)   SpO2 95%   BMI 25.35 kg/m      GENERAL: No acute distress.  HEENT: EOMI. Sclerae white, not injected. Nares clear. Pharynx without erythema or exudate.   Neck: No adenopathy. No thyromegaly. No jugular venous distension.   Heart: Regular rate and rhythm. Systolic ejection murmur.   Lungs: Clear to auscultation. No ronchi, wheezes, rales.   Abdomen: Soft, nontender, nondistended. Bowel sounds present.  Extremities: No clubbing, cyanosis, or edema.   Neurologic: Alert and oriented to person/place/time, normal speech and affect. No focal deficits.  Skin: No petechiae, purpura or rash.     LABORATORY DATA:     LIPID RESULTS:  Lab Results   Component Value Date    CHOL 73 01/20/2023    CHOL 93 10/28/2020    HDL 29 (L) 01/20/2023    HDL 38 (L) 10/28/2020    LDL 27 01/20/2023    LDL 33 10/28/2020    TRIG 87 01/20/2023    TRIG 108 10/28/2020    CHOLHDLRATIO 4.3 05/18/2015     LIVER ENZYME RESULTS:  Lab Results   Component Value Date    AST 21 01/05/2024    AST 19 10/28/2020    ALT <5 01/05/2024    ALT 18 10/28/2020     CBC RESULTS:  Lab Results   Component Value Date    WBC 9.5 12/11/2023    WBC 7.3 10/28/2020    RBC 3.67 (L) 12/11/2023    RBC 3.99 10/28/2020    HGB 13.8 06/07/2024    HGB 11.8 02/08/2021    HCT 35.8 12/11/2023    HCT 38.2 10/28/2020    MCV 98 12/11/2023    MCV 96 10/28/2020    MCH 29.7 12/11/2023    MCH 30.8 10/28/2020    MCHC 30.4 (L) 12/11/2023    MCHC 32.2 10/28/2020    RDW 14.6 12/11/2023    RDW 13.9 10/28/2020     12/11/2023     10/28/2020     BMP RESULTS:  Lab Results   Component Value Date     06/07/2024     04/28/2021    POTASSIUM 4.4 06/07/2024    POTASSIUM 4.3 09/17/2023    POTASSIUM 4.5 04/28/2021    CHLORIDE 105 06/07/2024    CHLORIDE 109 04/27/2023    CHLORIDE 100 04/28/2021    CO2 20 (L) 06/07/2024    CO2 21 04/27/2023    CO2 26 04/28/2021    ANIONGAP 13 06/07/2024    ANIONGAP 7 04/27/2023    ANIONGAP 8  04/28/2021     (H) 06/07/2024     (H) 09/21/2023     (H) 04/27/2023    GLC 57 (L) 04/28/2021    BUN 32.6 (H) 06/07/2024    BUN 28 04/27/2023    BUN 23 04/28/2021    CR 1.81 (H) 06/07/2024    CR 1.21 (H) 04/28/2021    GFRESTIMATED 29 (L) 06/07/2024    GFRESTIMATED 30 (L) 12/30/2022    GFRESTIMATED 46 (L) 04/28/2021    GFRESTBLACK 53 (L) 04/28/2021    ABEL 9.7 06/07/2024    ABEL 9.8 04/28/2021      A1C RESULTS:  Lab Results   Component Value Date    A1C 10.5 (H) 04/05/2024    A1C 6.6 (H) 10/28/2020     INR RESULTS:  Lab Results   Component Value Date    INR 1.13 05/08/2023      PROCEDURES & FURTHER ASSESSMENTS:     ECG dated 6/20/24:  Sinus bradycardia    Echocardiogram dated 6/7/24:  Aortic valve Doppler parameters are consistent with severe normal-EF  (paradoxical) low-flow low-gradient aortic stenosis (PV 2.7 m/s MG 17 mmHg DVI  0.25 IVON 0.84 cm2 SVI 32.5 mL/m2.) The aortic valve is severely calcified and  leaflet motion appears restricted. Recommend structural cardiology (Valve  Clinic) referral.     Global and regional left ventricular function is normal with an EF of 60-65%.  Moderate to severe right ventricular dilation is present. Global right  ventricular function is moderately reduced.  Moderate to severe tricuspid insufficiency is present.  Pulmonary hypertension is present. Pulmonary artery systolic pressure is 73  mmHg (68 mmHg + RAP 15 mmHg).     This study was compared with the study from 9/18/23: Aortic valve parameters  are better assessed and are suggestive of paradoxical low-flow low-gradient  aortic stenosis. PA pressure is higher. There has otherwise been no significant change.    STS RISK SCORE 7.86%  NYHA CLASS: II    Mallampati score: 2    ASA physical status classification: 3     CLINICAL IMPRESSION:     Keerthi Montoya is a very pleasant 74 year old female with low flow low gradient severe aortic valvular stenosis referred to our clinic for evaluation and consideration for  potential transcatheter aortic valve replacement (TAVR). Her severe aortic stenosis is characterized with an IVON of 0.87 cm2, mean PG 16.5 mmHg and peak V of 2.7 m/sec with LVEF 60-65% and DI 0.25 by echocardiogram on 6/7/24.      We discussed the natural history of severe aortic stenosis, associated symptoms and available treatment options including transcatheter versus surgical aortic valve replacement. Patient is a appropriate candidate for transcatheter aortic valve replacement based on age, frailty, co-morbidities and surgical mortality risk estimation of 7.86% based on the STS score.     The pre-procedural TAVR evaluation currently requires additional assessment with CT TAVR, coronary angiogram, and right heart catheterization prior to presentation to the Heart team for discussion during our multi-disciplinary conference for consensus decision and procedural planning.     Plan Summary:  1) Paradoxical low flow low gradient aortic stenosis:  - Proceed with TAVR evalution   - CT TAVR   - Coronary angiogram, and right heart catheterization  - Following completion of TAVR work-up patient case will be discussed during our multi-disciplinary conference for consensus decision and procedural planning.    2) Severe pulmonary hypertension  - Last echo with PA pressure of nearly 73 mm Hg  - Prior RHC in 2020 with mixed pre-and post-capillary pulmonary hypertension with a mean PA pressure of ~45 mm Hg  - Repeat RHC as above  - Will refer to pulmonary hypertension clinic fir further management    Patient was evaluated in clinic with Dr. Bunn .    Osei Head MD  [facility] Cardiology Team    Greater than 30 minutes were spent with patient and family providing education regarding progression of aortic stenosis, symptom recognition and management as well as approach to treatment and post-procedural expectations.     CC  Patient Care Team:  Bunny Meyers MD as PCP - General (Internal Medicine)  Preeti James,  MD as MD (Gastroenterology)  Swati Minor DO as Assigned Nephrology Provider  Jerry Robbins PA-C as Physician Assistant (Gastroenterology)  Bunny Meyers MD as Assigned PCP  Bunny [facility] as Assigned MTM Pharmacist  Trey, Allison Reed, RN as Specialty Care Coordinator (Gastroenterology)  Gianfranco Melendez MD as Intern (Student in organized health care education/training program)  Jethro Moore MD as MD (Hematology & Oncology)  Fracisco Hurtado, RN as Specialty Care Coordinator (Hematology & Oncology)  Vanessa Schneider, RN as Diabetes Educator (Diabetes Education)  Preeti James MD as Assigned Surgical Provider  Saba Lemon MD as MD (Advanced Heart Failure and Transplant Cardiology)  Sheela Galeas, RN as Specialty Care Coordinator (Cardiology)  Francisca Mustafa, APRN CNP as Assigned Cancer Care Provider  Geena Ornelas MD as MD (Dermatology)  Lorena Ambriz, RN as Specialty Care Coordinator (Cardiology)  Davie Rapp MD as MD (Gastroenterology)  Dominique Saavedra NP as Assigned Heart and Vascular Provider  SELF, REFERRED

## 2024-06-20 NOTE — LETTER
6/20/2024      RE: Keerthi Montoya  [address] [place] [id number]       Dear Colleague,    Thank you for the opportunity to participate in the care of your patient, Keerthi Montoya, at the [facility] HEART [facility]. Please see a copy of my visit note below.    [facility] CARDIOTHORACIC SURGERY CONSULT  Patient Name: Keerthi Montoya  Medical Record Number: [id number]  YOB: 1950  Room Number: Room/bed info not found  Referring Physician: Dr. Bunn    CC: Severe aortic stenosis    History of Present Illness: Keerthi Montoya is a very pleasant 74 year old female with low flow low gradient severe aortic valvular stenosis referred to our clinic for evaluation and consideration for potential transcatheter aortic valve replacement (TAVR). Her severe aortic stenosis is characterized with an IVON of 0.87 cm2, mean PG 16.5 mmHg and peak V of 2.7 m/sec with LVEF 60-65% and DI 0.25 by echocardiogram on 6/7/24.      Her additional past medical history is notable for chronic HFpEF, HLD, HTN, DM2, ulcerative pancolitis, CKD3, pulmonary HTN, persistent atrial fibrillation with (CHADS-VASc 4) with intolerance to anticoagulation due to history of GI bleeding s/p RY closure and Watchman (05/2023) .      Patient has been known to have aortic stenosis, followed serially by echo since 2/17/20. Most recent echo shows paradoxical low flow low gradient aortic stenosis. She says that she is doing well and denies any significant cardiac symptoms. She remains active and has no other acute complaints at this time.     Assessment and Plan:  Keerthi Montoya is a 74 year old female with severe aortic stenosis  1) Agree with TAVR  2) Surgical bailout - yes   3) Please call with questions or concerns.     Thank you for the opportunity to participate in the care of this patient.    Terence Wylie MD  Cardiothoracic  Surgery  [phone]    Past Medical History:  Past Medical History:   Diagnosis Date     Chronic kidney disease      Diabetes mellitus, type 2 (H)      Diverticulosis of colon (without mention of hemorrhage) 10/01/2012     GI bleed      History of blood transfusion      HLD (hyperlipidemia)      Hypertension      Hypothyroidism      MARIA D (iron deficiency anemia)      Persistent atrial fibrillation (H)      Pulmonary hypertension (H)      Ulcerative (chronic) enterocolitis (H)        Past Surgical History:  Past Surgical History:   Procedure Laterality Date     CHOLECYSTECTOMY  3/1987     COLON SURGERY  01/2001    Left colon resection; diverticulitis     COLONOSCOPY N/A 4/24/2017    Procedure: COMBINED COLONOSCOPY, SINGLE OR MULTIPLE BIOPSY/POLYPECTOMY BY BIOPSY;;  Surgeon: Radha Salguero MD;  Location: [facility] OR     COLONOSCOPY N/A 3/10/2023    Procedure: COLONOSCOPY;  Surgeon: Preeti James MD;  Location: [facility]     COLONOSCOPY WITH CO2 INSUFFLATION N/A 4/24/2017    Procedure: COLONOSCOPY WITH CO2 INSUFFLATION;  Colonoscopy Dx rectal bleeding, BMI 25.86, Pharm [facility] .[phone], ;  Surgeon: Radha Salguero MD;  Location: [facility] OR     CV CORONARY ANGIOGRAM N/A 7/15/2024    Procedure: Coronary Angiogram with possible intervention;  Surgeon: Bobby Grimes MD;  [facility] CARDIAC CATH LAB     CV LEFT ATRIAL APPENDAGE CLOSURE N/A 5/11/2023    Procedure: Left Atrial Appendage Closure;  Surgeon: Bobby Grimes MD;  [facility] CARD[facility] CATH LAB     CV RIGHT HEART CATH MEASUREMENTS RECORDED N/A 3/16/2020    Procedure: CV RIGHT HEART CATH;  Surgeon: Nader Muñoz MD;  [facility]ARD[facility] CATH LAB     CV RIGHT HEART CATH MEASUREMENTS RECORDED N/A 7/15/2024    Procedure: Right Heart Cath;  Surgeon: Bobby Grimes MD;  Location:  HEART CARDIAC CATH LAB     ESOPHAGOSCOPY, GASTROSCOPY, DUODENOSCOPY (EGD), COMBINED N/A 1/23/2023    Procedure:  ESOPHAGOGASTRODUODENOSCOPY, WITH BIOPSY;  Surgeon: Ricki Deal MD;  Location:  GI     ESOPHAGOSCOPY, GASTROSCOPY, DUODENOSCOPY (EGD), COMBINED N/A 3/10/2023    Procedure: Esophagoscopy, gastroscopy, duodenoscopy (EGD), combined;  Surgeon: Preeti James MD;  Location:  GI     GYN SURGERY  1983    tubal ligation     HERNIA REPAIR  2006    recurrent incisional hernia     SIGMOIDOSCOPY FLEXIBLE N/A 2023    Procedure: Sigmoidoscopy flexible;  Surgeon: Ricki Deal MD;  Location:  GI     THROAT SURGERY  1974    thyroidectomy        Family History:   Family History   Problem Relation Age of Onset     Cerebrovascular Disease Mother      Cancer Father         bladder     Diverticulitis Brother      Diverticulitis Brother      Kidney Disease No family hx of      Crohn's Disease No family hx of      Ulcerative Colitis No family hx of      Stomach Cancer No family hx of      GERD No family hx of      Celiac Disease No family hx of      Anesthesia Reaction No family hx of        Social History:  Social History     Socioeconomic History     Marital status:      Spouse name: Raymundo; dementia;       Number of children: 3     Years of education: Not on file     Highest education level: Not on file   Occupational History     Employer: [employer]   Tobacco Use     Smoking status: Never     Passive exposure: Never     Smokeless tobacco: Never   Vaping Use     Vaping status: Never Used   Substance and Sexual Activity     Alcohol use: Not Currently     Alcohol/week: 2.0 - 4.0 standard drinks of alcohol     Types: 1 - 2 Cans of beer, 1 - 2 Shots of liquor per week     Comment: occasional cocktail or beer     Drug use: No     Sexual activity: Not Currently   Other Topics Concern     Parent/sibling w/ CABG, MI or angioplasty before 65F 55M? Not Asked      Service No     Blood Transfusions No     Caffeine Concern No     Occupational Exposure No     Hobby Hazards No     Sleep  Concern No     Stress Concern No     Weight Concern No     Special Diet No     Back Care No     Exercise Yes     Comment: off and on     Bike Helmet No     Seat Belt Yes     Self-Exams No   Social History Narrative    Laid off her job 8/14     Social Determinants of Health     Financial Resource Strain: Low Risk  (10/20/2023)    Financial Resource Strain      Within the past 12 months, have you or your family members you live with been unable to get utilities (heat, electricity) when it was really needed?: No   Food Insecurity: Low Risk  (10/20/2023)    Food Insecurity      Within the past 12 months, did you worry that your food would run out before you got money to buy more?: No      Within the past 12 months, did the food you bought just not last and you didn t have money to get more?: No   Transportation Needs: Low Risk  (10/20/2023)    Transportation Needs      Within the past 12 months, has lack of transportation kept you from medical appointments, getting your medicines, non-medical meetings or appointments, work, or from getting things that you need?: No   Physical Activity: Not on file   Stress: Not on file   Social Connections: Not on file   Interpersonal Safety: Not on file   Housing Stability: Low Risk  (10/20/2023)    Housing Stability      Do you have housing? : Yes      Are you worried about losing your housing?: No       Allergies:   Allergies   Allergen Reactions     Actos [Pioglitazone]      Fluid retention     Amlodipine      Leg swelling, hand swelling     Animal Dander      Doxycycline Hives     Dust Mites      Grass      Keflex [Cephalexin Hcl] Hives     Metoprolol      Swelling of lower legs and fatigue     Other [Seasonal Allergies]      pollen     Ranitidine      rash     Synthroid [Levothyroxine Sodium] Swelling     Allergic to brand name     Tetracycline Hives     Tylenol Hives     Ziac [Bisoprolol-Hydrochlorothiazide]        Medications:  Current Outpatient Medications   Medication Sig  Dispense Refill     aspirin 81 MG EC tablet Take 1 tablet (81 mg) by mouth daily 90 tablet 3     atenolol (TENORMIN) 25 MG tablet Take 1 tablet (25 mg) by mouth daily 90 tablet 3     atorvastatin (LIPITOR) 40 MG tablet Take 1 tablet (40 mg) by mouth daily 90 tablet 3     blood glucose (ACCU-CHEK FELIPE PLUS) test strip 200 strips by In Vitro route 2 times daily Use to test blood sugar 2 times daily or as directed. 200 strip 4     Cyanocobalamin (B-12) 1000 MCG TABS Take 1,000 mcg by mouth three times a week       diphenhydrAMINE (BENADRYL) 25 MG tablet Take 25 mg by mouth every 6 hours as needed for itching or allergies       dulaglutide (TRULICITY) 1.5 MG/0.5ML pen Inject 1.5 mg Subcutaneous every 7 days After 4 doses of 0.75 mg 2 mL 1     empagliflozin (JARDIANCE) 10 MG TABS tablet Take 1 tablet (10 mg) by mouth daily 90 tablet 3     glipiZIDE (GLUCOTROL XL) 10 MG 24 hr tablet Take 2 tablets (20 mg) by mouth daily 60 tablet 3     hydrALAZINE (APRESOLINE) 50 MG tablet Take 1 tablet (50 mg) by mouth 3 times daily 180 tablet 3     isosorbide mononitrate (IMDUR) 30 MG 24 hr tablet Take 1 tablet (30 mg) by mouth daily 90 tablet 3     levothyroxine (SYNTHROID/LEVOTHROID) 125 MCG tablet Take 1 tablet (125 mcg) by mouth daily 90 tablet 0     losartan (COZAAR) 100 MG tablet Take 1 tablet (100 mg) by mouth daily 90 tablet 3     mesalamine (APRISO ER) 0.375 g 24 hr capsule TAKE 4 CAPSULES(1.5 GRAMS) BY MOUTH DAILY 360 capsule 1     pantoprazole (PROTONIX) 40 MG EC tablet Take 1 tablet (40 mg) by mouth daily 90 tablet 3     senna (SENOKOT) 8.6 MG tablet Take 1 tablet by mouth daily as needed for constipation 30 tablet 3     torsemide (DEMADEX) 10 MG tablet Take 1 tablet (10 mg) by mouth every other day       No current facility-administered medications for this visit.       Review of Systems:   A 10 point ROS was performed and is negative other than HPI.    Physical Exam:  BP (!) 152/68   Pulse (!) 49   Wt 64.4 kg (141 lb  "15.6 oz)   LMP  (LMP Unknown)   SpO2 95%   BMI 25.34 kg/m        Gen: NAD, resting comfortably in chair   Lungs: CTAB, non-labored breathing   CV: regular rhythm, normal rate   Abd: Soft, not tender, not distended   Ext: Motor, sensation, pulses intact   Neuro: AOx3    Labs:  Lab Results   Component Value Date    WBC 8.5 07/15/2024    WBC 7.3 10/28/2020     Lab Results   Component Value Date    RBC 3.98 07/15/2024    RBC 3.99 10/28/2020     Lab Results   Component Value Date    HGB 13.1 07/15/2024    HGB 11.8 02/08/2021     Lab Results   Component Value Date    HCT 41.0 07/15/2024    HCT 38.2 10/28/2020     No components found for: \"MCT\"  Lab Results   Component Value Date     07/15/2024    MCV 96 10/28/2020     Lab Results   Component Value Date    MCH 32.9 07/15/2024    MCH 30.8 10/28/2020     Lab Results   Component Value Date    MCHC 32.0 07/15/2024    MCHC 32.2 10/28/2020     Lab Results   Component Value Date    RDW 14.6 07/15/2024    RDW 13.9 10/28/2020     Lab Results   Component Value Date     07/15/2024     10/28/2020     Last Comprehensive Metabolic Panel:  Lab Results   Component Value Date     07/15/2024    POTASSIUM 4.0 07/15/2024    CHLORIDE 99 07/15/2024    CO2 22 07/15/2024    ANIONGAP 17 (H) 07/15/2024     (H) 07/15/2024    BUN 20.1 07/15/2024    CR 1.74 (H) 07/15/2024    GFRESTIMATED 31 (L) 07/15/2024    ABEL 9.6 07/15/2024           Imaging:  Echocardiogram dated 6/7/24:  Aortic valve Doppler parameters are consistent with severe normal-EF  (paradoxical) low-flow low-gradient aortic stenosis (PV 2.7 m/s MG 17 mmHg DVI  0.25 IVON 0.84 cm2 SVI 32.5 mL/m2.) The aortic valve is severely calcified and  leaflet motion appears restricted. Recommend structural cardiology (Valve  Clinic) referral.     Global and regional left ventricular function is normal with an EF of 60-65%.  Moderate to severe right ventricular dilation is present. Global right  ventricular function " is moderately reduced.  Moderate to severe tricuspid insufficiency is present.  Pulmonary hypertension is present. Pulmonary artery systolic pressure is 73  mmHg (68 mmHg + RAP 15 mmHg).     This study was compared with the study from 9/18/23: Aortic valve parameters  are better assessed and are suggestive of paradoxical low-flow low-gradient  aortic stenosis. PA pressure is higher. There has otherwise been no significant change.     STS RISK SCORE 7.86%  NYHA CLASS: II       Please do not hesitate to contact me if you have any questions/concerns.     Sincerely,     Terence Wylie MD

## 2024-06-20 NOTE — PATIENT INSTRUCTIONS
You were seen today in the Cardiovascular Clinic at the [facility].      Cardiology Providers you saw during your visit:   Dr. Bunn    Recommendations:    Coronary angiogram with Right and Left heart cath  CT TAVR  Referral to  team in cardiology    Pre-procedure instructions - CT Angiogram TAVR Patient Education    Your arrival time is TBD.  Location is [address] Waiting Room  How do I prepare for my exam? (Food and drink instructions)  **You will have contrast for this exam.**  No Food and Drink Restrictions     The day before your exam, drink extra fluids-at least six 8-ounce glasses (unless your doctor tells you to restrict your fluids).     What should I wear: Please wear loose clothing, such as a sweat suit or jogging clothes.  Avoid snaps, zippers and other metal. We may ask you to undress and put on a hospital gown.     How long does the exam take: Plan to spend up to 1 hour at the hospital     What should I bring: Please bring any scans or X-rays taken at other hospitals, if similar tests were done. It is safest to leave personal items at home.     Do I need a :  No  is needed.     What do I need to tell my doctor?  Be sure to tell your doctor:  * If you have any allergies.  * If you have diabetes as your medication may need to be adjusted for this exam.     What should I do after the exam: No restrictions, You may resume normal activities.     What is this test: A CT (computed tomography) scan is a series of pictures that allows us to look inside your body. The scanner creates images of the body in cross sections, much like slices of bread. This helps us see any problems more clearly. You may receive contrast (X-ray dye) before or during your scan. Contrast is given through an IV (small needle in your arm).     Who should I call with questions: If you have any questions, please call  the Imaging Department where you will have your exam. Directions, parking instructions, and other information is available on our website, [email or web address].     Pre-procedure instructions - Coronary Angiogram  Patient Education    Your arrival time is TBD.  Location is [address] Waiting Room  Please plan on being at the hospital all day.  At any time, emergencies and/or urgent cases may come up which could delay the start of your procedure.    Pre-procedure instructions - Coronary Angiogram  Shower in the evening before or the morning of the procedure  No solid food for 8 hours prior and nothing to drink 2 hours prior to arrival time  You can take your morning medications (except for diabetic and blood thinners) with sips of water.  Take 325 mg of Aspirin the night before your procedure and  325 mg the morning of procedure.  You will need to arrange a ride to drop you off and , as you will be unable to drive home. Prior to discharge you may be required to lay flat for approximately 2-6 hours in the recovery unit to ensure proper clotting of the artery. Please note: You cannot take an Uber/Taxi/etc unless you are accompanied by someone.              Diabetic Medication Instructions  Hold oral diabetic medication in morning of your procedure and for 48 hours after IV contrast is given  Typical instructions for insulin diabetic medication holding are below. However, please reach out to your Primary Care Provider or Endocrinologist for specific instructions  DO NOT take any oral diabetic medication, short-acting diabetes medications/insulin, humalog or regular insulin the morning of your test  Take   dose of long-acting insulin (Lantus, Levemir) the day of your test  Remember to bring your glucometer and insulin with you to take after your test if needed      GLP-1 Agonists Instructions  DO NOT take injectable GLP-1  agonists semaglutide Trulicity for 7 days prior your procedure.    You will need to follow up with one of our cardiology APPs 1-2 weeks after your procedure. If you need help scheduling or rescheduling your appointment, please call [phone]       Nursing questions:  VERONIQUE Duffy;  [employer] messages are great! Otherwise [phone] if you don't hear back within 24 hrs please call back.   For emergencies call [phone].      Thank you for your visit today!     Tati Gimenez RN  [facility]  TAVR, MitraClip and Watchman Programs  [facility]

## 2024-06-20 NOTE — NURSING NOTE
Chief Complaint   Patient presents with    New Patient     TAVR Work-up   STS risk score: 7.86%           Vitals were taken, medications reconciled and EKG performed.    Jeremy Malik, Facilitator  2:16 PM

## 2024-06-21 LAB
ATRIAL RATE - MUSE: 55 BPM
DIASTOLIC BLOOD PRESSURE - MUSE: NORMAL MMHG
INTERPRETATION ECG - MUSE: NORMAL
P AXIS - MUSE: 78 DEGREES
PR INTERVAL - MUSE: 166 MS
QRS DURATION - MUSE: 80 MS
QT - MUSE: 466 MS
QTC - MUSE: 445 MS
R AXIS - MUSE: 116 DEGREES
SYSTOLIC BLOOD PRESSURE - MUSE: NORMAL MMHG
T AXIS - MUSE: 244 DEGREES
VENTRICULAR RATE- MUSE: 55 BPM

## 2024-06-21 NOTE — PROGRESS NOTES
Hematology Follow Up Visit: June 24, 2024     Oncologist: Dr Jethro Moore  PCP: Bunny Meyers    Diagnosis: Iron deficiency anemia  Keerthi Montoya is a 75 yo female with PMH of  hypertension, diabetes mellitus type II, diverticulitis and ulcerative colitis who presented with low hemoglobin. History of Watchman procedure on 5/11/2023 for atrial fibrillation, not on anticoagulation. Hx of bleeding with blood thinners. Poor tolerance to PO iron. .   - 3/2023 last colonoscopy and upper GI endoscopy along with CT scan and MRI      Interval History:  Ms. Montoya comes to clinic for review of her anemia.  Pt states she is feeling well and denies SOB, chest pain, but does have some fatigue but not more than usual. Has aortic stentosis that is known and may need valve replacement.   Has not been ill. Has been eating a good and varied diet.   Still active.   Rest of comprehensive and complete ROS is reviewed and is negative.   Past Medical History:   Diagnosis Date    Chronic kidney disease     Diabetes mellitus, type 2 (H)     Diverticulosis of colon (without mention of hemorrhage) 10/01/2012    GI bleed     History of blood transfusion     HLD (hyperlipidemia)     Hypertension     Hypothyroidism     MARIA D (iron deficiency anemia)     Persistent atrial fibrillation (H)     Pulmonary hypertension (H)     Ulcerative (chronic) enterocolitis (H)      Current Outpatient Medications   Medication Sig Dispense Refill    aspirin 81 MG EC tablet Take 1 tablet (81 mg) by mouth daily 90 tablet 3    atenolol (TENORMIN) 25 MG tablet Take 1 tablet (25 mg) by mouth daily 30 tablet 3    atorvastatin (LIPITOR) 40 MG tablet Take 1 tablet (40 mg) by mouth daily 90 tablet 3    blood glucose (ACCU-CHEK FELIPE PLUS) test strip 200 strips by In Vitro route 2 times daily Use to test blood sugar 2 times daily or as directed. 200 strip 4    Cyanocobalamin (B-12) 1000 MCG TABS Take 1,000 mcg by mouth three times a week      diphenhydrAMINE (BENADRYL)  25 MG tablet Take 25 mg by mouth every 6 hours as needed for itching or allergies      dulaglutide (TRULICITY) 0.75 MG/0.5ML pen Inject 0.75 mg Subcutaneous every 7 days 2 mL 0    dulaglutide (TRULICITY) 1.5 MG/0.5ML pen Inject 1.5 mg Subcutaneous every 7 days After 4 doses of 0.75 mg 2 mL 1    empagliflozin (JARDIANCE) 10 MG TABS tablet Take 1 tablet (10 mg) by mouth daily 90 tablet 1    glipiZIDE (GLUCOTROL XL) 10 MG 24 hr tablet Take 2 tablets (20 mg) by mouth daily (Patient taking differently: Take 10 mg by mouth daily) 60 tablet 3    hydrALAZINE (APRESOLINE) 50 MG tablet Take 1 tablet (50 mg) by mouth 3 times daily 180 tablet     isosorbide mononitrate (IMDUR) 30 MG 24 hr tablet Take 1 tablet (30 mg) by mouth daily 90 tablet 3    levothyroxine (SYNTHROID/LEVOTHROID) 125 MCG tablet Take 1 tablet (125 mcg) by mouth daily 90 tablet 0    losartan (COZAAR) 100 MG tablet Take 1 tablet (100 mg) by mouth daily 90 tablet 3    mesalamine (APRISO ER) 0.375 g 24 hr capsule TAKE 4 CAPSULES(1.5 GRAMS) BY MOUTH DAILY 360 capsule 1    pantoprazole (PROTONIX) 40 MG EC tablet Take 1 tablet (40 mg) by mouth daily 90 tablet 3    senna (SENOKOT) 8.6 MG tablet Take 1 tablet by mouth daily as needed for constipation 30 tablet 3    torsemide (DEMADEX) 10 MG tablet Take 1 tablet (10 mg) by mouth every other day       No current facility-administered medications for this visit.     Allergies   Allergen Reactions    Actos [Pioglitazone]      Fluid retention    Amlodipine      Leg swelling, hand swelling    Animal Dander     Doxycycline Hives    Dust Mites     Grass     Keflex [Cephalexin Hcl] Hives    Metoprolol      Swelling of lower legs and fatigue    Other [Seasonal Allergies]      pollen    Ranitidine      rash    Synthroid [Levothyroxine Sodium] Swelling     Allergic to brand name    Tetracycline Hives    Tylenol Hives    Ziac [Bisoprolol-Hydrochlorothiazide]        Physical Exam:/62 (BP Location: Right arm, Patient Position:  "Chair, Cuff Size: Adult Regular)   Pulse 67   Temp 98.2  F (36.8  C) (Oral)   Ht 1.594 m (5' 2.76\")   Wt 63 kg (139 lb)   LMP  (LMP Unknown)   SpO2 91%   BMI 24.81 kg/m     ECOG PS- 0  Constitutional: No acute distress, pleasant. Cooperative.   ENT: PERRLA, sclera without erythema. Patent nasal passages. Appropriate dentition, no mouth sores.  Neck: Trachea midline, no adenopathy.   Resp: CTA, adequate depth and rate of respirations.   Cardiac: regular heart rhythm, known aortic stenosis. .   Abdomen: BS active, abdomen soft and non-tender. No masses or organomegaly.   MS:  5/5 muscle strength, adequate ROM.   Skin: No rashes, lesions, or wounds.  Neuro: A/O x 4, sensation intact. Gait normal.   Lymph: No palpable anterior/posterior cervical, axillary, or supraclavicular nodes.   Psych: Appropriate mentation and affect. Good conversation.     Laboratory/Imaging Results:    Latest Reference Range & Units 06/07/24 09:05   Sodium 135 - 145 mmol/L 138   Potassium 3.4 - 5.3 mmol/L 4.4   Chloride 98 - 107 mmol/L 105   Carbon Dioxide (CO2) 22 - 29 mmol/L 20 (L)   Urea Nitrogen 8.0 - 23.0 mg/dL 32.6 (H)   Creatinine 0.51 - 0.95 mg/dL 1.81 (H)   GFR Estimate >60 mL/min/1.73m2 29 (L)   Calcium 8.8 - 10.2 mg/dL 9.7   Anion Gap 7 - 15 mmol/L 13   Magnesium 1.7 - 2.3 mg/dL 2.0   Ferritin 11 - 328 ng/mL 138   Glucose 70 - 99 mg/dL 125 (H)   Iron 37 - 145 ug/dL 41   Iron Binding Capacity 240 - 430 ug/dL 228 (L)   Iron Sat Index 15 - 46 % 18   N-Terminal Pro Bnp 0 - 900 pg/mL 17,122 (H)   T4 Free 0.90 - 1.70 ng/dL 1.48   TSH 0.30 - 4.20 uIU/mL 10.90 (H)   Hemoglobin 11.7 - 15.7 g/dL 13.8   (L): Data is abnormally low  (H): Data is abnormally high    Recent Results (from the past 240 hour(s))   EKG 12-lead, tracing only (Same Day)    Collection Time: 06/20/24  2:20 PM   Result Value Ref Range    Systolic Blood Pressure  mmHg    Diastolic Blood Pressure  mmHg    Ventricular Rate 55 BPM    Atrial Rate 55 BPM    WI Interval 166 " ms    QRS Duration 80 ms     ms    QTc 445 ms    P Axis 78 degrees    R AXIS 116 degrees    T Axis 244 degrees    Interpretation ECG       Sinus bradycardia  Right axis deviation  Possible Right ventricular hypertrophy  T wave abnormality, consider anterior ischemia  Abnormal ECG  When compared with ECG of 17-SEP-2023 14:22,  Sinus rhythm has replaced Atrial fibrillation  Vent. rate has decreased BY  35 BPM  Inverted T waves have replaced nonspecific T wave abnormality in Anterior leads  Confirmed by MD WILLIS, ALEX ([id number]) on 6/21/2024 10:22:31 PM         Assessment and Plan:   Iron deficiency anemia - ferritin 138, iron 41, TIBC 228, and iron sat 18- Normal hgb=13.8. all better than previous review . No need for iron transfusion today.   Previously received Venafor in December 2023 with good tolerance.   This would be adequate for surgery if needed for Aortic stenosis.   RTC in 6 months for BLAISE visit and repeat labs (hgb, ferritin, TIBC).   Continue with Vitamin B12 orally. Continue with     Health Maintenance   - Overdue for mammogram- reminder given.   The total time of this encounter amounted to 30 minutes. This time included face to face time spent with the patient, prep work, review and ordering tests, and performing post visit documentation.  Francisca Mustafa,Erick  .

## 2024-06-22 DIAGNOSIS — R80.9 TYPE 2 DIABETES MELLITUS WITH MICROALBUMINURIA, WITHOUT LONG-TERM CURRENT USE OF INSULIN (H): ICD-10-CM

## 2024-06-22 DIAGNOSIS — E11.29 TYPE 2 DIABETES MELLITUS WITH MICROALBUMINURIA, WITHOUT LONG-TERM CURRENT USE OF INSULIN (H): ICD-10-CM

## 2024-06-24 ENCOUNTER — ONCOLOGY VISIT (OUTPATIENT)
Dept: ONCOLOGY | Facility: CLINIC | Age: 74
End: 2024-06-24
Attending: NURSE PRACTITIONER
Payer: MEDICARE

## 2024-06-24 VITALS
TEMPERATURE: 98.2 F | HEART RATE: 67 BPM | HEIGHT: 63 IN | DIASTOLIC BLOOD PRESSURE: 62 MMHG | OXYGEN SATURATION: 91 % | SYSTOLIC BLOOD PRESSURE: 136 MMHG | BODY MASS INDEX: 24.63 KG/M2 | WEIGHT: 139 LBS

## 2024-06-24 DIAGNOSIS — I35.0 AORTIC VALVE STENOSIS, ETIOLOGY OF CARDIAC VALVE DISEASE UNSPECIFIED: ICD-10-CM

## 2024-06-24 DIAGNOSIS — D50.9 IRON DEFICIENCY ANEMIA, UNSPECIFIED IRON DEFICIENCY ANEMIA TYPE: Primary | ICD-10-CM

## 2024-06-24 PROCEDURE — 99214 OFFICE O/P EST MOD 30 MIN: CPT | Performed by: NURSE PRACTITIONER

## 2024-06-24 PROCEDURE — G0463 HOSPITAL OUTPT CLINIC VISIT: HCPCS | Performed by: NURSE PRACTITIONER

## 2024-06-24 ASSESSMENT — PAIN SCALES - GENERAL: PAINLEVEL: NO PAIN (0)

## 2024-06-24 NOTE — NURSING NOTE
"Oncology Rooming Note    June 24, 2024 9:40 AM   Keerthi Montoya is a 74 year old female who presents for:    Chief Complaint   Patient presents with    Oncology Clinic Visit     Follow up     Initial Vitals: /62 (BP Location: Right arm, Patient Position: Chair, Cuff Size: Adult Regular)   Pulse 67   Temp 98.2  F (36.8  C) (Oral)   Ht 1.594 m (5' 2.76\")   Wt 63 kg (139 lb)   LMP  (LMP Unknown)   SpO2 91%   BMI 24.81 kg/m   Estimated body mass index is 24.81 kg/m  as calculated from the following:    Height as of this encounter: 1.594 m (5' 2.76\").    Weight as of this encounter: 63 kg (139 lb). Body surface area is 1.67 meters squared.  No Pain (0) Comment: Data Unavailable   No LMP recorded (lmp unknown). Patient is postmenopausal.  Allergies reviewed: Yes  Medications reviewed: Yes    Medications: Medication refills not needed today.  Pharmacy name entered into [employer]: [id number] - [place], [place] - [address] [facility]    Frailty Screening:   Is the patient here for a new oncology consult visit in cancer care? 2. No      Clinical concerns: NO       Toma Dudley CMA              "

## 2024-06-24 NOTE — LETTER
6/24/2024      Keerthi Montoya  [address] [place] [id number]      Dear Colleague,    Thank you for referring your patient, Keerthi Montoya, to the [facility]. Please see a copy of my visit note below.    Hematology Follow Up Visit: June 24, 2024     Oncologist: Dr Jethro Moore  PCP: Bunny Meyers    Diagnosis: Iron deficiency anemia  Keerthi Montoya is a 75 yo female with PMH of  hypertension, diabetes mellitus type II, diverticulitis and ulcerative colitis who presented with low hemoglobin. History of Watchman procedure on 5/11/2023 for atrial fibrillation, not on anticoagulation. Hx of bleeding with blood thinners. Poor tolerance to PO iron. .   - 3/2023 last colonoscopy and upper GI endoscopy along with CT scan and MRI      Interval History:  Ms. Montoya comes to clinic for review of her anemia.  Pt states she is feeling well and denies SOB, chest pain, but does have some fatigue but not more than usual. Has aortic stentosis that is known and may need valve replacement.   Has not been ill. Has been eating a good and varied diet.   Still active.   Rest of comprehensive and complete ROS is reviewed and is negative.   Past Medical History:   Diagnosis Date     Chronic kidney disease      Diabetes mellitus, type 2 (H)      Diverticulosis of colon (without mention of hemorrhage) 10/01/2012     GI bleed      History of blood transfusion      HLD (hyperlipidemia)      Hypertension      Hypothyroidism      MARIA D (iron deficiency anemia)      Persistent atrial fibrillation (H)      Pulmonary hypertension (H)      Ulcerative (chronic) enterocolitis (H)      Current Outpatient Medications   Medication Sig Dispense Refill     aspirin 81 MG EC tablet Take 1 tablet (81 mg) by mouth daily 90 tablet 3     atenolol (TENORMIN) 25 MG tablet Take 1 tablet (25 mg) by mouth daily 30 tablet 3     atorvastatin (LIPITOR) 40 MG tablet Take 1 tablet (40 mg) by mouth daily 90 tablet 3      blood glucose (ACCU-CHEK FELIPE PLUS) test strip 200 strips by In Vitro route 2 times daily Use to test blood sugar 2 times daily or as directed. 200 strip 4     Cyanocobalamin (B-12) 1000 MCG TABS Take 1,000 mcg by mouth three times a week       diphenhydrAMINE (BENADRYL) 25 MG tablet Take 25 mg by mouth every 6 hours as needed for itching or allergies       dulaglutide (TRULICITY) 0.75 MG/0.5ML pen Inject 0.75 mg Subcutaneous every 7 days 2 mL 0     dulaglutide (TRULICITY) 1.5 MG/0.5ML pen Inject 1.5 mg Subcutaneous every 7 days After 4 doses of 0.75 mg 2 mL 1     empagliflozin (JARDIANCE) 10 MG TABS tablet Take 1 tablet (10 mg) by mouth daily 90 tablet 1     glipiZIDE (GLUCOTROL XL) 10 MG 24 hr tablet Take 2 tablets (20 mg) by mouth daily (Patient taking differently: Take 10 mg by mouth daily) 60 tablet 3     hydrALAZINE (APRESOLINE) 50 MG tablet Take 1 tablet (50 mg) by mouth 3 times daily 180 tablet      isosorbide mononitrate (IMDUR) 30 MG 24 hr tablet Take 1 tablet (30 mg) by mouth daily 90 tablet 3     levothyroxine (SYNTHROID/LEVOTHROID) 125 MCG tablet Take 1 tablet (125 mcg) by mouth daily 90 tablet 0     losartan (COZAAR) 100 MG tablet Take 1 tablet (100 mg) by mouth daily 90 tablet 3     mesalamine (APRISO ER) 0.375 g 24 hr capsule TAKE 4 CAPSULES(1.5 GRAMS) BY MOUTH DAILY 360 capsule 1     pantoprazole (PROTONIX) 40 MG EC tablet Take 1 tablet (40 mg) by mouth daily 90 tablet 3     senna (SENOKOT) 8.6 MG tablet Take 1 tablet by mouth daily as needed for constipation 30 tablet 3     torsemide (DEMADEX) 10 MG tablet Take 1 tablet (10 mg) by mouth every other day       No current facility-administered medications for this visit.     Allergies   Allergen Reactions     Actos [Pioglitazone]      Fluid retention     Amlodipine      Leg swelling, hand swelling     Animal Dander      Doxycycline Hives     Dust Mites      Grass      Keflex [Cephalexin Hcl] Hives     Metoprolol      Swelling of lower legs and  "fatigue     Other [Seasonal Allergies]      pollen     Ranitidine      rash     Synthroid [Levothyroxine Sodium] Swelling     Allergic to brand name     Tetracycline Hives     Tylenol Hives     Ziac [Bisoprolol-Hydrochlorothiazide]        Physical Exam:/62 (BP Location: Right arm, Patient Position: Chair, Cuff Size: Adult Regular)   Pulse 67   Temp 98.2  F (36.8  C) (Oral)   Ht 1.594 m (5' 2.76\")   Wt 63 kg (139 lb)   LMP  (LMP Unknown)   SpO2 91%   BMI 24.81 kg/m     ECOG PS- 0  Constitutional: No acute distress, pleasant. Cooperative.   ENT: PERRLA, sclera without erythema. Patent nasal passages. Appropriate dentition, no mouth sores.  Neck: Trachea midline, no adenopathy.   Resp: CTA, adequate depth and rate of respirations.   Cardiac: regular heart rhythm, known aortic stenosis. .   Abdomen: BS active, abdomen soft and non-tender. No masses or organomegaly.   MS:  5/5 muscle strength, adequate ROM.   Skin: No rashes, lesions, or wounds.  Neuro: A/O x 4, sensation intact. Gait normal.   Lymph: No palpable anterior/posterior cervical, axillary, or supraclavicular nodes.   Psych: Appropriate mentation and affect. Good conversation.     Laboratory/Imaging Results:    Latest Reference Range & Units 06/07/24 09:05   Sodium 135 - 145 mmol/L 138   Potassium 3.4 - 5.3 mmol/L 4.4   Chloride 98 - 107 mmol/L 105   Carbon Dioxide (CO2) 22 - 29 mmol/L 20 (L)   Urea Nitrogen 8.0 - 23.0 mg/dL 32.6 (H)   Creatinine 0.51 - 0.95 mg/dL 1.81 (H)   GFR Estimate >60 mL/min/1.73m2 29 (L)   Calcium 8.8 - 10.2 mg/dL 9.7   Anion Gap 7 - 15 mmol/L 13   Magnesium 1.7 - 2.3 mg/dL 2.0   Ferritin 11 - 328 ng/mL 138   Glucose 70 - 99 mg/dL 125 (H)   Iron 37 - 145 ug/dL 41   Iron Binding Capacity 240 - 430 ug/dL 228 (L)   Iron Sat Index 15 - 46 % 18   N-Terminal Pro Bnp 0 - 900 pg/mL 17,122 (H)   T4 Free 0.90 - 1.70 ng/dL 1.48   TSH 0.30 - 4.20 uIU/mL 10.90 (H)   Hemoglobin 11.7 - 15.7 g/dL 13.8   (L): Data is abnormally low  (H): " Data is abnormally high    Recent Results (from the past 240 hour(s))   EKG 12-lead, tracing only (Same Day)    Collection Time: 06/20/24  2:20 PM   Result Value Ref Range    Systolic Blood Pressure  mmHg    Diastolic Blood Pressure  mmHg    Ventricular Rate 55 BPM    Atrial Rate 55 BPM    FL Interval 166 ms    QRS Duration 80 ms     ms    QTc 445 ms    P Axis 78 degrees    R AXIS 116 degrees    T Axis 244 degrees    Interpretation ECG       Sinus bradycardia  Right axis deviation  Possible Right ventricular hypertrophy  T wave abnormality, consider anterior ischemia  Abnormal ECG  When compared with ECG of 17-SEP-2023 14:22,  Sinus rhythm has replaced Atrial fibrillation  Vent. rate has decreased BY  35 BPM  Inverted T waves have replaced nonspecific T wave abnormality in Anterior leads  Confirmed by MD WILLIS, ALEX ([id number]) on 6/21/2024 10:22:31 PM         Assessment and Plan:   Iron deficiency anemia - ferritin 138, iron 41, TIBC 228, and iron sat 18- Normal hgb=13.8. all better than previous review . No need for iron transfusion today.   Previously received Venafor in December 2023 with good tolerance.   This would be adequate for surgery if needed for Aortic stenosis.   RTC in 6 months for BLAISE visit and repeat labs (hgb, ferritin, TIBC).   Continue with Vitamin B12 orally. Continue with     Health Maintenance   - Overdue for mammogram- reminder given.   The total time of this encounter amounted to 30 minutes. This time included face to face time spent with the patient, prep work, review and ordering tests, and performing post visit documentation.  Francisca Mustafa Cnp  .      Again, thank you for allowing me to participate in the care of your patient.        Sincerely,        Francisca Mustafa NP, APRN CNP

## 2024-06-25 ENCOUNTER — PRE VISIT (OUTPATIENT)
Dept: CARDIOLOGY | Facility: CLINIC | Age: 74
End: 2024-06-25
Payer: MEDICARE

## 2024-06-25 NOTE — TELEPHONE ENCOUNTER
RECORDS RECEIVED FROM:    DATE RECEIVED:    GENERAL RECORDS STATUS DETAILS   OFFICE NOTE from cardiologists Internal 6-20-24 Dejan   EKG (STRIPS & REPORTS) Internal 6-20-24   ECHOS (IMAGES AND REPORTS) Internal 6-7-24   PULMONARY HYPERTENSION      6 MINUTE WALK TEST N/A    PULMONARY FUNCTION TESTS N/A    RIGHT HEART CATH (IMAGES) Internal 5-11-23   SLEEP STUDY / OVERNIGHT OXIMETRY N/A    XR CHEST   (IMAGES AND REPORTS) Internal 9-17-23   CHEST CT  (IMAGES AND REPORTS) Internal 6-30-23   V/Q SCAN (IMAGES) N/A    LIVER US  (IMAGES AND REPORTS) N/A    ANGIOGRAMS (IMAGES) Internal CTA 10-6-23   STRESS TEST   (IMAGES AND REPORTS) N/A

## 2024-07-01 DIAGNOSIS — Z11.4 ENCOUNTER FOR SCREENING FOR HUMAN IMMUNODEFICIENCY VIRUS (HIV): ICD-10-CM

## 2024-07-01 DIAGNOSIS — R06.09 DOE (DYSPNEA ON EXERTION): ICD-10-CM

## 2024-07-01 DIAGNOSIS — R79.1 ABNORMAL COAGULATION PROFILE: ICD-10-CM

## 2024-07-01 DIAGNOSIS — R79.9 ABNORMAL FINDING OF BLOOD CHEMISTRY, UNSPECIFIED: ICD-10-CM

## 2024-07-01 DIAGNOSIS — Z11.59 ENCOUNTER FOR SCREENING FOR OTHER VIRAL DISEASES: ICD-10-CM

## 2024-07-01 DIAGNOSIS — I27.20 PULMONARY HTN (H): Primary | ICD-10-CM

## 2024-07-02 DIAGNOSIS — E11.29 TYPE 2 DIABETES MELLITUS WITH MICROALBUMINURIA, WITHOUT LONG-TERM CURRENT USE OF INSULIN (H): ICD-10-CM

## 2024-07-02 DIAGNOSIS — R80.9 TYPE 2 DIABETES MELLITUS WITH MICROALBUMINURIA, WITHOUT LONG-TERM CURRENT USE OF INSULIN (H): ICD-10-CM

## 2024-07-02 RX ORDER — GLIPIZIDE 5 MG/1
TABLET ORAL
Qty: 90 TABLET | Refills: 0 | OUTPATIENT
Start: 2024-07-02

## 2024-07-02 NOTE — TELEPHONE ENCOUNTER
glipiZIDE (GLUCOTROL XL) 10 MG 24 hr tablet 60 tablet 3 4/5/2024 -- No   Sig - Route: Take 2 tablets (20 mg) by mouth daily - Oral

## 2024-07-03 NOTE — TELEPHONE ENCOUNTER
glipiZIDE (GLUCOTROL XL) 10 MG 24 hr tablet 60 tablet 3 4/5/2024     Last Office Visit: 11/10/23  Future Office visit:  10/18/24     Sulfonylurea Agents [id number]/03/2024 01:20 PM   Protocol Details Has GFR on file in past 12 months and most recent value is normal    Recent (6 mo) or future (90 days) visit within the authorizing provider's specialty    Patient has a recent creatinine (normal) within the past 12 mos.     Component      Latest Ref Rng 6/7/2024  9:05 AM   Sodium      135 - 145 mmol/L 138    Potassium      3.4 - 5.3 mmol/L 4.4    Chloride      98 - 107 mmol/L 105    Carbon Dioxide (CO2)      22 - 29 mmol/L 20 (L)    Anion Gap      7 - 15 mmol/L 13    Urea Nitrogen      8.0 - 23.0 mg/dL 32.6 (H)    Creatinine      0.51 - 0.95 mg/dL 1.81 (H)    GFR Estimate      >60 mL/min/1.73m2 29 (L)    Calcium      8.8 - 10.2 mg/dL 9.7    Glucose      70 - 99 mg/dL 125 (H)         Routing refill request to provider for review/approval because:  Abnormal lab  Overdue visit  Remedios Santoro RN  P Red Flag Triage/MRT

## 2024-07-04 RX ORDER — GLIPIZIDE 10 MG/1
20 TABLET, FILM COATED, EXTENDED RELEASE ORAL DAILY
Qty: 60 TABLET | Refills: 3 | OUTPATIENT
Start: 2024-07-04

## 2024-07-08 ENCOUNTER — LAB (OUTPATIENT)
Dept: LAB | Facility: CLINIC | Age: 74
End: 2024-07-08
Attending: INTERNAL MEDICINE
Payer: MEDICARE

## 2024-07-08 ENCOUNTER — HOSPITAL ENCOUNTER (OUTPATIENT)
Dept: CT IMAGING | Facility: CLINIC | Age: 74
Discharge: HOME | End: 2024-07-08
Attending: INTERNAL MEDICINE
Payer: MEDICARE

## 2024-07-08 VITALS — DIASTOLIC BLOOD PRESSURE: 64 MMHG | SYSTOLIC BLOOD PRESSURE: 133 MMHG | OXYGEN SATURATION: 92 % | HEART RATE: 55 BPM

## 2024-07-08 DIAGNOSIS — I35.0 AORTIC STENOSIS: ICD-10-CM

## 2024-07-08 DIAGNOSIS — Z11.59 ENCOUNTER FOR SCREENING FOR OTHER VIRAL DISEASES: ICD-10-CM

## 2024-07-08 DIAGNOSIS — Z11.4 ENCOUNTER FOR SCREENING FOR HUMAN IMMUNODEFICIENCY VIRUS (HIV): ICD-10-CM

## 2024-07-08 DIAGNOSIS — R06.09 DOE (DYSPNEA ON EXERTION): ICD-10-CM

## 2024-07-08 DIAGNOSIS — I27.20 PULMONARY HTN (H): ICD-10-CM

## 2024-07-08 DIAGNOSIS — R79.1 ABNORMAL COAGULATION PROFILE: ICD-10-CM

## 2024-07-08 DIAGNOSIS — R79.9 ABNORMAL FINDING OF BLOOD CHEMISTRY, UNSPECIFIED: ICD-10-CM

## 2024-07-08 LAB
ALBUMIN SERPL BCG-MCNC: 4.4 G/DL (ref 3.5–5.2)
ALP SERPL-CCNC: 121 U/L (ref 40–150)
ALT SERPL W P-5'-P-CCNC: <5 U/L (ref 0–50)
ANION GAP SERPL CALCULATED.3IONS-SCNC: 15 MMOL/L (ref 7–15)
AST SERPL W P-5'-P-CCNC: 24 U/L (ref 0–45)
BILIRUB DIRECT SERPL-MCNC: 0.27 MG/DL (ref 0–0.3)
BILIRUB SERPL-MCNC: 0.7 MG/DL
BUN SERPL-MCNC: 22 MG/DL (ref 8–23)
CALCIUM SERPL-MCNC: 9.6 MG/DL (ref 8.8–10.2)
CHLORIDE SERPL-SCNC: 101 MMOL/L (ref 98–107)
CHOLEST SERPL-MCNC: 96 MG/DL
CREAT SERPL-MCNC: 1.61 MG/DL (ref 0.51–0.95)
CRP SERPL-MCNC: <3 MG/L
DEPRECATED HCO3 PLAS-SCNC: 21 MMOL/L (ref 22–29)
EGFRCR SERPLBLD CKD-EPI 2021: 34 ML/MIN/1.73M2
ERYTHROCYTE [DISTWIDTH] IN BLOOD BY AUTOMATED COUNT: 14.3 % (ref 10–15)
FASTING STATUS PATIENT QL REPORTED: NO
FASTING STATUS PATIENT QL REPORTED: NO
GLUCOSE SERPL-MCNC: 184 MG/DL (ref 70–99)
HBV CORE AB SERPL QL IA: NONREACTIVE
HBV SURFACE AB SERPL IA-ACNC: 5.83 M[IU]/ML
HBV SURFACE AB SERPL IA-ACNC: NONREACTIVE M[IU]/ML
HBV SURFACE AG SERPL QL IA: NONREACTIVE
HCT VFR BLD AUTO: 40.6 % (ref 35–47)
HCV AB SERPL QL IA: NONREACTIVE
HDLC SERPL-MCNC: 34 MG/DL
HGB BLD-MCNC: 13.1 G/DL (ref 11.7–15.7)
HIV 1+2 AB+HIV1 P24 AG SERPL QL IA: NONREACTIVE
INR PPP: 1.15 (ref 0.85–1.15)
IRON BINDING CAPACITY (ROCHE): 272 UG/DL (ref 240–430)
IRON SATN MFR SERPL: 23 % (ref 15–46)
IRON SERPL-MCNC: 62 UG/DL (ref 37–145)
LDLC SERPL CALC-MCNC: 41 MG/DL
MCH RBC QN AUTO: 33.2 PG (ref 26.5–33)
MCHC RBC AUTO-ENTMCNC: 32.3 G/DL (ref 31.5–36.5)
MCV RBC AUTO: 103 FL (ref 78–100)
NONHDLC SERPL-MCNC: 62 MG/DL
NT-PROBNP SERPL-MCNC: ABNORMAL PG/ML (ref 0–900)
PLATELET # BLD AUTO: 273 10E3/UL (ref 150–450)
POTASSIUM SERPL-SCNC: 4.1 MMOL/L (ref 3.4–5.3)
PROT SERPL-MCNC: 8.1 G/DL (ref 6.4–8.3)
RBC # BLD AUTO: 3.95 10E6/UL (ref 3.8–5.2)
RHEUMATOID FACT SERPL-ACNC: <10 IU/ML
SODIUM SERPL-SCNC: 137 MMOL/L (ref 135–145)
TRIGL SERPL-MCNC: 107 MG/DL
TSH SERPL DL<=0.005 MIU/L-ACNC: 11.9 UIU/ML (ref 0.3–4.2)
WBC # BLD AUTO: 6.9 10E3/UL (ref 4–11)

## 2024-07-08 PROCEDURE — 86140 C-REACTIVE PROTEIN: CPT

## 2024-07-08 PROCEDURE — 80053 COMPREHEN METABOLIC PANEL: CPT

## 2024-07-08 PROCEDURE — 74174 CTA ABD&PLVS W/CONTRAST: CPT | Mod: 26 | Performed by: STUDENT IN AN ORGANIZED HEALTH CARE EDUCATION/TRAINING PROGRAM

## 2024-07-08 PROCEDURE — 258N000003 HC RX IP 258 OP 636: Performed by: STUDENT IN AN ORGANIZED HEALTH CARE EDUCATION/TRAINING PROGRAM

## 2024-07-08 PROCEDURE — 85390 FIBRINOLYSINS SCREEN I&R: CPT | Mod: 26 | Performed by: PATHOLOGY

## 2024-07-08 PROCEDURE — G1010 CDSM STANSON: HCPCS | Performed by: STUDENT IN AN ORGANIZED HEALTH CARE EDUCATION/TRAINING PROGRAM

## 2024-07-08 PROCEDURE — 86706 HEP B SURFACE ANTIBODY: CPT

## 2024-07-08 PROCEDURE — 84238 ASSAY NONENDOCRINE RECEPTOR: CPT

## 2024-07-08 PROCEDURE — 86704 HEP B CORE ANTIBODY TOTAL: CPT

## 2024-07-08 PROCEDURE — 86431 RHEUMATOID FACTOR QUANT: CPT

## 2024-07-08 PROCEDURE — 83880 ASSAY OF NATRIURETIC PEPTIDE: CPT

## 2024-07-08 PROCEDURE — 250N000011 HC RX IP 250 OP 636: Performed by: STUDENT IN AN ORGANIZED HEALTH CARE EDUCATION/TRAINING PROGRAM

## 2024-07-08 PROCEDURE — 84443 ASSAY THYROID STIM HORMONE: CPT

## 2024-07-08 PROCEDURE — 85610 PROTHROMBIN TIME: CPT

## 2024-07-08 PROCEDURE — 87340 HEPATITIS B SURFACE AG IA: CPT

## 2024-07-08 PROCEDURE — 80061 LIPID PANEL: CPT

## 2024-07-08 PROCEDURE — 85613 RUSSELL VIPER VENOM DILUTED: CPT

## 2024-07-08 PROCEDURE — 71275 CT ANGIOGRAPHY CHEST: CPT | Mod: 26 | Performed by: STUDENT IN AN ORGANIZED HEALTH CARE EDUCATION/TRAINING PROGRAM

## 2024-07-08 PROCEDURE — 87389 HIV-1 AG W/HIV-1&-2 AB AG IA: CPT

## 2024-07-08 PROCEDURE — 86803 HEPATITIS C AB TEST: CPT

## 2024-07-08 PROCEDURE — 36415 COLL VENOUS BLD VENIPUNCTURE: CPT

## 2024-07-08 PROCEDURE — 85048 AUTOMATED LEUKOCYTE COUNT: CPT

## 2024-07-08 PROCEDURE — 83550 IRON BINDING TEST: CPT

## 2024-07-08 PROCEDURE — 71275 CT ANGIOGRAPHY CHEST: CPT | Mod: MG

## 2024-07-08 PROCEDURE — 83529 ASAY OF INTERLEUKIN-6 (IL-6): CPT

## 2024-07-08 PROCEDURE — 86038 ANTINUCLEAR ANTIBODIES: CPT

## 2024-07-08 RX ORDER — IOPAMIDOL 755 MG/ML
110 INJECTION, SOLUTION INTRAVASCULAR ONCE
Status: COMPLETED | OUTPATIENT
Start: 2024-07-08 | End: 2024-07-08

## 2024-07-08 RX ADMIN — IOPAMIDOL 110 ML: 755 INJECTION, SOLUTION INTRAVENOUS at 14:03

## 2024-07-08 RX ADMIN — SODIUM CHLORIDE 400 ML: 9 INJECTION, SOLUTION INTRAVENOUS at 12:11

## 2024-07-09 LAB
ANA PAT SER IF-IMP: ABNORMAL
ANA SER QL IF: ABNORMAL
ANA TITR SER IF: ABNORMAL {TITER}
DRVVT SCREEN RATIO: 0.92
LA PPP-IMP: NEGATIVE
LUPUS INTERPRETATION: NORMAL
PTT RATIO: 1.17
STFR SERPL-MCNC: 3.8 MG/L
THROMBIN TIME: 18.6 SECONDS (ref 13–19)

## 2024-07-10 LAB — IL6 SERPL-MCNC: 11.54 PG/ML

## 2024-07-12 ENCOUNTER — LAB (OUTPATIENT)
Dept: LAB | Facility: CLINIC | Age: 74
End: 2024-07-12
Attending: NURSE PRACTITIONER
Payer: MEDICARE

## 2024-07-12 ENCOUNTER — OFFICE VISIT (OUTPATIENT)
Dept: CARDIOLOGY | Facility: CLINIC | Age: 74
End: 2024-07-12
Attending: NURSE PRACTITIONER
Payer: MEDICARE

## 2024-07-12 VITALS
OXYGEN SATURATION: 94 % | DIASTOLIC BLOOD PRESSURE: 67 MMHG | SYSTOLIC BLOOD PRESSURE: 130 MMHG | HEART RATE: 53 BPM | BODY MASS INDEX: 25.54 KG/M2 | WEIGHT: 143.1 LBS

## 2024-07-12 DIAGNOSIS — I36.1 NONRHEUMATIC TRICUSPID VALVE REGURGITATION: ICD-10-CM

## 2024-07-12 DIAGNOSIS — I50.33 ACUTE ON CHRONIC DIASTOLIC CONGESTIVE HEART FAILURE (H): ICD-10-CM

## 2024-07-12 DIAGNOSIS — I48.19 PERSISTENT ATRIAL FIBRILLATION (H): ICD-10-CM

## 2024-07-12 DIAGNOSIS — I27.20 PULMONARY HTN (H): ICD-10-CM

## 2024-07-12 DIAGNOSIS — I10 HYPERTENSION GOAL BP (BLOOD PRESSURE) < 130/80: ICD-10-CM

## 2024-07-12 DIAGNOSIS — E53.8 VITAMIN B12 DEFICIENCY (NON ANEMIC): ICD-10-CM

## 2024-07-12 DIAGNOSIS — N18.30 STAGE 3 CHRONIC KIDNEY DISEASE, UNSPECIFIED WHETHER STAGE 3A OR 3B CKD (H): ICD-10-CM

## 2024-07-12 DIAGNOSIS — I50.30 HEART FAILURE WITH PRESERVED EJECTION FRACTION, NYHA CLASS I (H): Primary | ICD-10-CM

## 2024-07-12 DIAGNOSIS — D50.9 IRON DEFICIENCY ANEMIA, UNSPECIFIED IRON DEFICIENCY ANEMIA TYPE: ICD-10-CM

## 2024-07-12 DIAGNOSIS — E78.5 HYPERLIPIDEMIA LDL GOAL <100: ICD-10-CM

## 2024-07-12 DIAGNOSIS — I35.0 SEVERE AORTIC STENOSIS: ICD-10-CM

## 2024-07-12 LAB
ALBUMIN SERPL BCG-MCNC: 4.6 G/DL (ref 3.5–5.2)
ALP SERPL-CCNC: 128 U/L (ref 40–150)
ALT SERPL W P-5'-P-CCNC: 7 U/L (ref 0–50)
ANION GAP SERPL CALCULATED.3IONS-SCNC: 13 MMOL/L (ref 7–15)
AST SERPL W P-5'-P-CCNC: 39 U/L (ref 0–45)
BASOPHILS # BLD AUTO: 0.1 10E3/UL (ref 0–0.2)
BASOPHILS NFR BLD AUTO: 1 %
BILIRUB SERPL-MCNC: 0.6 MG/DL
BUN SERPL-MCNC: 15.3 MG/DL (ref 8–23)
CALCIUM SERPL-MCNC: 9.9 MG/DL (ref 8.8–10.2)
CHLORIDE SERPL-SCNC: 102 MMOL/L (ref 98–107)
CREAT SERPL-MCNC: 1.61 MG/DL (ref 0.51–0.95)
DEPRECATED HCO3 PLAS-SCNC: 22 MMOL/L (ref 22–29)
EGFRCR SERPLBLD CKD-EPI 2021: 34 ML/MIN/1.73M2
EOSINOPHIL # BLD AUTO: 0.3 10E3/UL (ref 0–0.7)
EOSINOPHIL NFR BLD AUTO: 3 %
ERYTHROCYTE [DISTWIDTH] IN BLOOD BY AUTOMATED COUNT: 14.5 % (ref 10–15)
FERRITIN SERPL-MCNC: 177 NG/ML (ref 11–328)
GLUCOSE SERPL-MCNC: 136 MG/DL (ref 70–99)
HCT VFR BLD AUTO: 42.2 % (ref 35–47)
HGB BLD-MCNC: 13.3 G/DL (ref 11.7–15.7)
IMM GRANULOCYTES # BLD: 0 10E3/UL
IMM GRANULOCYTES NFR BLD: 0 %
IRON BINDING CAPACITY (ROCHE): 276 UG/DL (ref 240–430)
IRON SATN MFR SERPL: 25 % (ref 15–46)
IRON SERPL-MCNC: 68 UG/DL (ref 37–145)
LDH SERPL L TO P-CCNC: 233 U/L (ref 0–250)
LYMPHOCYTES # BLD AUTO: 1.7 10E3/UL (ref 0.8–5.3)
LYMPHOCYTES NFR BLD AUTO: 21 %
MCH RBC QN AUTO: 32.4 PG (ref 26.5–33)
MCHC RBC AUTO-ENTMCNC: 31.5 G/DL (ref 31.5–36.5)
MCV RBC AUTO: 103 FL (ref 78–100)
MONOCYTES # BLD AUTO: 1 10E3/UL (ref 0–1.3)
MONOCYTES NFR BLD AUTO: 12 %
NEUTROPHILS # BLD AUTO: 5 10E3/UL (ref 1.6–8.3)
NEUTROPHILS NFR BLD AUTO: 63 %
NRBC # BLD AUTO: 0 10E3/UL
NRBC BLD AUTO-RTO: 0 /100
PLATELET # BLD AUTO: 271 10E3/UL (ref 150–450)
POTASSIUM SERPL-SCNC: 4.3 MMOL/L (ref 3.4–5.3)
PROT SERPL-MCNC: 8.4 G/DL (ref 6.4–8.3)
RBC # BLD AUTO: 4.11 10E6/UL (ref 3.8–5.2)
SODIUM SERPL-SCNC: 137 MMOL/L (ref 135–145)
VIT B12 SERPL-MCNC: 1309 PG/ML (ref 232–1245)
WBC # BLD AUTO: 8.1 10E3/UL (ref 4–11)

## 2024-07-12 PROCEDURE — 83615 LACTATE (LD) (LDH) ENZYME: CPT | Performed by: PATHOLOGY

## 2024-07-12 PROCEDURE — 83550 IRON BINDING TEST: CPT | Performed by: PATHOLOGY

## 2024-07-12 PROCEDURE — G0463 HOSPITAL OUTPT CLINIC VISIT: HCPCS | Performed by: NURSE PRACTITIONER

## 2024-07-12 PROCEDURE — 82668 ASSAY OF ERYTHROPOIETIN: CPT | Mod: 90 | Performed by: PATHOLOGY

## 2024-07-12 PROCEDURE — 83540 ASSAY OF IRON: CPT | Performed by: PATHOLOGY

## 2024-07-12 PROCEDURE — 82728 ASSAY OF FERRITIN: CPT | Performed by: PATHOLOGY

## 2024-07-12 PROCEDURE — 36415 COLL VENOUS BLD VENIPUNCTURE: CPT | Performed by: PATHOLOGY

## 2024-07-12 PROCEDURE — 99000 SPECIMEN HANDLING OFFICE-LAB: CPT | Performed by: PATHOLOGY

## 2024-07-12 PROCEDURE — 85025 COMPLETE CBC W/AUTO DIFF WBC: CPT | Performed by: PATHOLOGY

## 2024-07-12 PROCEDURE — 82607 VITAMIN B-12: CPT | Performed by: INTERNAL MEDICINE

## 2024-07-12 PROCEDURE — 80053 COMPREHEN METABOLIC PANEL: CPT | Performed by: PATHOLOGY

## 2024-07-12 PROCEDURE — 99214 OFFICE O/P EST MOD 30 MIN: CPT | Performed by: NURSE PRACTITIONER

## 2024-07-12 PROCEDURE — 84238 ASSAY NONENDOCRINE RECEPTOR: CPT | Mod: 90 | Performed by: PATHOLOGY

## 2024-07-12 RX ORDER — ATENOLOL 25 MG/1
25 TABLET ORAL DAILY
Qty: 90 TABLET | Refills: 3 | Status: ON HOLD | OUTPATIENT
Start: 2024-07-12 | End: 2024-09-05

## 2024-07-12 RX ORDER — HYDRALAZINE HYDROCHLORIDE 50 MG/1
50 TABLET, FILM COATED ORAL 3 TIMES DAILY
Qty: 180 TABLET | Refills: 3 | Status: SHIPPED | OUTPATIENT
Start: 2024-07-12

## 2024-07-12 RX ORDER — ISOSORBIDE MONONITRATE 30 MG/1
30 TABLET, EXTENDED RELEASE ORAL DAILY
Qty: 90 TABLET | Refills: 3 | Status: SHIPPED | OUTPATIENT
Start: 2024-07-12 | End: 2024-09-13

## 2024-07-12 ASSESSMENT — PAIN SCALES - GENERAL: PAINLEVEL: NO PAIN (0)

## 2024-07-12 NOTE — NURSING NOTE
Labs: Patient was given results of the laboratory testing obtained today. Patient demonstrated understanding of this information and agreed to call with further questions or concerns.     Med Reconcile: Reviewed and verified all current medications with the patient. The updated medication list was printed and given to the patient.    Return Appointment: Patient given instructions regarding scheduling next clinic visit. Patient demonstrated understanding of this information and agreed to call with further questions or concerns. CORE in 6 months.    Patient stated she understood all health information given and agreed to call with further questions or concerns.    Lorena Ambriz RN

## 2024-07-12 NOTE — PROGRESS NOTES
[place]ia is a 74 year old female with a  medical history is significant for longstanding hypertension since she was in her teens, diabetes, dyslipidemia, and ulcerative colitis.    Patient reports she developed shingles several weeks ago and with this, developed painful, itchy rash and started having SOB and abdominal bloating after this. She was admitted, and diuresed for almost 15 lbs weight gain.     She is feeling well.  Patient denies dyspnea with any of her ADLs or usual chores. She denies SOB, presyncope, syncope, orthopnea, PND, chest pain, palpitations, abdominal pain, nausea, emesis, LE edema or weight gain. Patient reports compliance with her medications, weighing herself daily, and watching her salt and fluid intake. Weight at home 138 lbs. She has more dryness in the mouth.  systolic at home today. Clinic appts can be stressful. TAVR evaluation.     Current meds:    Torsemide 10 mg- every other day   Hydralazine 50 mg TID  Losartan 100 mg daily  Imdur 30 mg daily  Jardiance 10 mg daily  Atenolol 25 mg daily      ROS:   Constitutional: No fever, chills, or sweats.  ENT: No visual disturbance, ear ache, epistaxis, sore throat.   Allergies/Immunologic: Negative  Respiratory: No cough, hemoptysis.   Cardiovascular: As per HPI.   GI: As per HPI.   : No urinary frequency, dysuria, or hematuria.   Integument: Negative.   Psychiatric: Negative.   Neuro: Negative.   Endocrinology: negative   Musculoskeletal: negative    EXAM:   GEN/CONSTITUIONAL: Appears comfortable, in no apparent distress   EYES: No icterus  ENT/MOUTH: Normal  JVP:  not seen at 90 degrees  RESPIRATORY: Clear to auscultation bilaterally   CARDIOVASCULAR: Regular S1 and S2, 2/6 JUANITA.   ABDOMEN: Soft, non-tender, positive bowel sounds   NEUROLOGIC: Grossly non-focal   PSYCHIATRIC: Normal affect  EXT: no LE edema. Normal pedal pulses.  Skin: No petechiae, purpura or rash       ECHO 8/25  Interpretation Summary   Global and regional left  ventricular function is normal with an EF of 60-65%.  Paradoxical septal motion consistent with right ventricular pressure and  volume overload is present.  Moderate to severe right ventricular dilation is present.  Global right ventricular function is moderately to severely reduced.  Calculated aortic valve area in severe AS range. Consider additional  evaluation if indicated.  Moderate to severe tricuspid insufficiency is present.  Dilation of the inferior vena cava is present with abnormal respiratory  variation in diameter.  Trivial pericardial effusion is present.    .  Assessment and Plan:  In summary, 74 year old female with a past medical history of chronic HFpEF, HLD, HTN, DM2, ulcerative pancolitis, CKD3, pulmonary HTN, moderate paradoxical low flow low gradient aortic stenosis, persistent atrial fibrillation with (CHADS-VASc 4) with intolerance to anticoagulation due to history of GI bleeding s/p RY closure and Watchman (05/2023) who was admitted in Sept '23 heart failure exacerbation and presents as HFpEF. Patient referred to PH clinic per interventional clinic.    Weight is 138 lbs at home, labs reviewed.   Appears euvolemic today . Labs are stable.   # Acute on Chronic Diastolic Heart Failure  # Moderate Pulmonary HTN  # Moderate Paradoxical Low Flow Low Gradient Aortic Stenosis    # Persistent rate-controlled A-fib s/p Watchman   # HTN  # HLD    - Volume Status: euvolemic  Torsemide 10 mg daily  - DAPT (given recent Watchman) continue ASA 81mg daily, Brilinta 60 mg BID x 6 months through 11/2023)  - Continue Lipitor 40 mg daily   - Continue Hydralazine 50 mg TID, Imdur 30 mg daily, Losartan 100 mg daily, Atenolol 25 mg daily  - Continue PTA Empagliflozin 10 mg daily  - Rate controlled - on Atenolol  - Will continue to monitor TTE yearly for now     # CKD Stage 3  Baseline appears to be around 1.5-1.9  - Will continue to monitor 2.4- last check      # DM Type II  Hgb A1c 8.7.   - Managed by PCP  -  Continue glipizide, empagliflozin and trulicity      Plan:  6 month follow up         Carolyn ADAME NP-C

## 2024-07-12 NOTE — PATIENT INSTRUCTIONS
Take your medicines every day, as directed    Changes made today:  No medication changes   Monitor Your Weight and Symptoms    Contact us if you:    Gain 2 pounds in one day or 5 pounds in one week  Feel more short of breath  Notice more leg swelling  Feel lightheadeded   Change your lifestyle    Limit Salt or Sodium:  2000 mg  Limit Fluids:  2000 mL or approximately 64 ounces  Eat a Heart Healthy Diet  Low in saturated fats  Stay Active:  Aim to move at least 150 minutes every  week         To Contact us    During Business Hours:  [phone], option # 1      After hours, weekends or holidays:   [phone], Option #4  Ask to speak to the On-Call Cardiologist. Inform them you are a CORE/heart failure patient at the [facility].     [employer] allows you to communicate directly with your heart team through secure messaging.  [employer] can be accessed any time on your phone, computer, or tablet.  If you need assistance, we'd be happy to help!         Keep your Heart Appointments:    Dr. Marcus 8/5 as scheduled    CORE in 6 months     Please consider attending our virtual support group which is held monthly. Please reach out to [place] at [phone] for more information if you are interested in attending.     2024 dates:  Monday, August 5th, 1-2pm     Monday, September 9th, 1-2pm     Monday, October 7th, 1-2pm     Monday, November 4th, 1-2pm     Monday, December 2nd, 1-2pm

## 2024-07-12 NOTE — NURSING NOTE
Chief Complaint   Patient presents with    Follow Up     Return CORE      Vitals were taken and medications reconciled.    Washington Franklin, EMT  7:19 AM

## 2024-07-14 LAB
EPO SERPL-ACNC: 53 MU/ML
STFR SERPL-MCNC: 3.9 MG/L

## 2024-07-15 ENCOUNTER — APPOINTMENT (OUTPATIENT)
Dept: LAB | Facility: CLINIC | Age: 74
End: 2024-07-15
Attending: INTERNAL MEDICINE
Payer: MEDICARE

## 2024-07-15 ENCOUNTER — APPOINTMENT (OUTPATIENT)
Dept: MEDSURG UNIT | Facility: CLINIC | Age: 74
End: 2024-07-15
Attending: INTERNAL MEDICINE
Payer: MEDICARE

## 2024-07-15 ENCOUNTER — HOSPITAL ENCOUNTER (OUTPATIENT)
Facility: CLINIC | Age: 74
Discharge: HOME | End: 2024-07-15
Attending: INTERNAL MEDICINE | Admitting: INTERNAL MEDICINE
Payer: MEDICARE

## 2024-07-15 VITALS
SYSTOLIC BLOOD PRESSURE: 139 MMHG | DIASTOLIC BLOOD PRESSURE: 69 MMHG | OXYGEN SATURATION: 97 % | HEART RATE: 47 BPM | RESPIRATION RATE: 18 BRPM | TEMPERATURE: 98.3 F

## 2024-07-15 DIAGNOSIS — Z98.890 S/P CORONARY ANGIOGRAM: Primary | ICD-10-CM

## 2024-07-15 DIAGNOSIS — E78.5 HYPERLIPIDEMIA LDL GOAL <100: ICD-10-CM

## 2024-07-15 DIAGNOSIS — I35.0 AORTIC STENOSIS: ICD-10-CM

## 2024-07-15 DIAGNOSIS — I25.10 CORONARY ATHEROSCLEROSIS OF NATIVE CORONARY ARTERY: ICD-10-CM

## 2024-07-15 DIAGNOSIS — K74.02 HEPATIC FIBROSIS, STAGE 3: Primary | ICD-10-CM

## 2024-07-15 LAB
ACT BLD: 165 SECONDS (ref 74–150)
ANION GAP SERPL CALCULATED.3IONS-SCNC: 17 MMOL/L (ref 7–15)
APTT PPP: 32 SECONDS (ref 22–38)
BUN SERPL-MCNC: 20.1 MG/DL (ref 8–23)
CALCIUM SERPL-MCNC: 9.6 MG/DL (ref 8.8–10.2)
CHLORIDE SERPL-SCNC: 99 MMOL/L (ref 98–107)
CREAT SERPL-MCNC: 1.74 MG/DL (ref 0.51–0.95)
DEPRECATED HCO3 PLAS-SCNC: 22 MMOL/L (ref 22–29)
EGFRCR SERPLBLD CKD-EPI 2021: 31 ML/MIN/1.73M2
ERYTHROCYTE [DISTWIDTH] IN BLOOD BY AUTOMATED COUNT: 14.6 % (ref 10–15)
GLUCOSE BLDC GLUCOMTR-MCNC: 110 MG/DL (ref 70–99)
GLUCOSE SERPL-MCNC: 82 MG/DL (ref 70–99)
HCT VFR BLD AUTO: 41 % (ref 35–47)
HGB BLD-MCNC: 13.1 G/DL (ref 11.7–15.7)
INR PPP: 1.16 (ref 0.85–1.15)
MCH RBC QN AUTO: 32.9 PG (ref 26.5–33)
MCHC RBC AUTO-ENTMCNC: 32 G/DL (ref 31.5–36.5)
MCV RBC AUTO: 103 FL (ref 78–100)
PLATELET # BLD AUTO: 224 10E3/UL (ref 150–450)
POTASSIUM SERPL-SCNC: 4 MMOL/L (ref 3.4–5.3)
RBC # BLD AUTO: 3.98 10E6/UL (ref 3.8–5.2)
SODIUM SERPL-SCNC: 138 MMOL/L (ref 135–145)
WBC # BLD AUTO: 8.5 10E3/UL (ref 4–11)

## 2024-07-15 PROCEDURE — 250N000011 HC RX IP 250 OP 636: Performed by: INTERNAL MEDICINE

## 2024-07-15 PROCEDURE — 93456 R HRT CORONARY ARTERY ANGIO: CPT | Performed by: INTERNAL MEDICINE

## 2024-07-15 PROCEDURE — 258N000003 HC RX IP 258 OP 636: Performed by: INTERNAL MEDICINE

## 2024-07-15 PROCEDURE — 250N000009 HC RX 250: Performed by: INTERNAL MEDICINE

## 2024-07-15 PROCEDURE — 999N000134 HC STATISTIC PP CARE STAGE 3

## 2024-07-15 PROCEDURE — C1894 INTRO/SHEATH, NON-LASER: HCPCS | Performed by: INTERNAL MEDICINE

## 2024-07-15 PROCEDURE — 82962 GLUCOSE BLOOD TEST: CPT

## 2024-07-15 PROCEDURE — 99152 MOD SED SAME PHYS/QHP 5/>YRS: CPT | Mod: GC | Performed by: INTERNAL MEDICINE

## 2024-07-15 PROCEDURE — 36415 COLL VENOUS BLD VENIPUNCTURE: CPT | Performed by: INTERNAL MEDICINE

## 2024-07-15 PROCEDURE — 999N000142 HC STATISTIC PROCEDURE PREP ONLY

## 2024-07-15 PROCEDURE — 85347 COAGULATION TIME ACTIVATED: CPT

## 2024-07-15 PROCEDURE — 85048 AUTOMATED LEUKOCYTE COUNT: CPT | Performed by: INTERNAL MEDICINE

## 2024-07-15 PROCEDURE — 93456 R HRT CORONARY ARTERY ANGIO: CPT | Mod: 26 | Performed by: INTERNAL MEDICINE

## 2024-07-15 PROCEDURE — 272N000001 HC OR GENERAL SUPPLY STERILE: Performed by: INTERNAL MEDICINE

## 2024-07-15 PROCEDURE — 99152 MOD SED SAME PHYS/QHP 5/>YRS: CPT | Performed by: INTERNAL MEDICINE

## 2024-07-15 PROCEDURE — 85730 THROMBOPLASTIN TIME PARTIAL: CPT | Performed by: INTERNAL MEDICINE

## 2024-07-15 PROCEDURE — 85610 PROTHROMBIN TIME: CPT | Performed by: INTERNAL MEDICINE

## 2024-07-15 PROCEDURE — C1751 CATH, INF, PER/CENT/MIDLINE: HCPCS | Performed by: INTERNAL MEDICINE

## 2024-07-15 PROCEDURE — 80048 BASIC METABOLIC PNL TOTAL CA: CPT | Performed by: INTERNAL MEDICINE

## 2024-07-15 PROCEDURE — 99153 MOD SED SAME PHYS/QHP EA: CPT | Performed by: INTERNAL MEDICINE

## 2024-07-15 RX ORDER — NALOXONE HYDROCHLORIDE 0.4 MG/ML
0.4 INJECTION, SOLUTION INTRAMUSCULAR; INTRAVENOUS; SUBCUTANEOUS
Status: DISCONTINUED | OUTPATIENT
Start: 2024-07-15 | End: 2024-07-15 | Stop reason: HOSPADM

## 2024-07-15 RX ORDER — LIDOCAINE 40 MG/G
CREAM TOPICAL
Status: DISCONTINUED | OUTPATIENT
Start: 2024-07-15 | End: 2024-07-15 | Stop reason: HOSPADM

## 2024-07-15 RX ORDER — NALOXONE HYDROCHLORIDE 0.4 MG/ML
0.2 INJECTION, SOLUTION INTRAMUSCULAR; INTRAVENOUS; SUBCUTANEOUS
Status: DISCONTINUED | OUTPATIENT
Start: 2024-07-15 | End: 2024-07-15 | Stop reason: HOSPADM

## 2024-07-15 RX ORDER — POTASSIUM CHLORIDE 750 MG/1
40 TABLET, EXTENDED RELEASE ORAL
Status: DISCONTINUED | OUTPATIENT
Start: 2024-07-15 | End: 2024-07-15 | Stop reason: HOSPADM

## 2024-07-15 RX ORDER — ASPIRIN 81 MG/1
243 TABLET, CHEWABLE ORAL ONCE
Status: COMPLETED | OUTPATIENT
Start: 2024-07-15 | End: 2024-07-15

## 2024-07-15 RX ORDER — ASPIRIN 81 MG/1
243 TABLET, CHEWABLE ORAL ONCE
Status: DISCONTINUED | OUTPATIENT
Start: 2024-07-15 | End: 2024-07-15 | Stop reason: HOSPADM

## 2024-07-15 RX ORDER — POTASSIUM CHLORIDE 750 MG/1
20 TABLET, EXTENDED RELEASE ORAL
Status: DISCONTINUED | OUTPATIENT
Start: 2024-07-15 | End: 2024-07-15 | Stop reason: HOSPADM

## 2024-07-15 RX ORDER — ATORVASTATIN CALCIUM 40 MG/1
40 TABLET, FILM COATED ORAL DAILY
Qty: 90 TABLET | Refills: 3 | Status: SHIPPED | OUTPATIENT
Start: 2024-07-15

## 2024-07-15 RX ORDER — FENTANYL CITRATE 50 UG/ML
25 INJECTION, SOLUTION INTRAMUSCULAR; INTRAVENOUS
Status: DISCONTINUED | OUTPATIENT
Start: 2024-07-15 | End: 2024-07-15 | Stop reason: HOSPADM

## 2024-07-15 RX ORDER — HEPARIN SODIUM 1000 [USP'U]/ML
INJECTION, SOLUTION INTRAVENOUS; SUBCUTANEOUS
Status: DISCONTINUED | OUTPATIENT
Start: 2024-07-15 | End: 2024-07-15 | Stop reason: HOSPADM

## 2024-07-15 RX ORDER — SODIUM CHLORIDE 9 MG/ML
INJECTION, SOLUTION INTRAVENOUS CONTINUOUS
Status: DISCONTINUED | OUTPATIENT
Start: 2024-07-15 | End: 2024-07-15 | Stop reason: HOSPADM

## 2024-07-15 RX ORDER — ASPIRIN 325 MG
325 TABLET ORAL ONCE
Status: COMPLETED | OUTPATIENT
Start: 2024-07-15 | End: 2024-07-15

## 2024-07-15 RX ORDER — OXYCODONE HYDROCHLORIDE 5 MG/1
5 TABLET ORAL EVERY 4 HOURS PRN
Status: DISCONTINUED | OUTPATIENT
Start: 2024-07-15 | End: 2024-07-15 | Stop reason: HOSPADM

## 2024-07-15 RX ORDER — ATROPINE SULFATE 0.1 MG/ML
0.5 INJECTION INTRAVENOUS
Status: DISCONTINUED | OUTPATIENT
Start: 2024-07-15 | End: 2024-07-15 | Stop reason: HOSPADM

## 2024-07-15 RX ORDER — FLUMAZENIL 0.1 MG/ML
0.2 INJECTION, SOLUTION INTRAVENOUS
Status: DISCONTINUED | OUTPATIENT
Start: 2024-07-15 | End: 2024-07-15 | Stop reason: HOSPADM

## 2024-07-15 RX ORDER — ASPIRIN 325 MG
325 TABLET ORAL ONCE
Status: DISCONTINUED | OUTPATIENT
Start: 2024-07-15 | End: 2024-07-15 | Stop reason: HOSPADM

## 2024-07-15 RX ORDER — OXYCODONE HYDROCHLORIDE 5 MG/1
10 TABLET ORAL EVERY 4 HOURS PRN
Status: DISCONTINUED | OUTPATIENT
Start: 2024-07-15 | End: 2024-07-15 | Stop reason: HOSPADM

## 2024-07-15 RX ORDER — FENTANYL CITRATE 50 UG/ML
INJECTION, SOLUTION INTRAMUSCULAR; INTRAVENOUS
Status: DISCONTINUED | OUTPATIENT
Start: 2024-07-15 | End: 2024-07-15 | Stop reason: HOSPADM

## 2024-07-15 RX ADMIN — SODIUM CHLORIDE: 9 INJECTION, SOLUTION INTRAVENOUS at 08:58

## 2024-07-15 RX ADMIN — LIDOCAINE: 40 CREAM TOPICAL at 08:58

## 2024-07-15 ASSESSMENT — ACTIVITIES OF DAILY LIVING (ADL)
ADLS_ACUITY_SCORE: 35

## 2024-07-15 NOTE — PROGRESS NOTES
Georgia arrived for RHC and Coronary angiogram.   will be son, Mike. [phone]  Consent done  Groin/wrists prepped, pedal pulses marked  Patient took full ASA last night and this morning.  Awaiting BMP

## 2024-07-15 NOTE — PROGRESS NOTES
Georgia arrived back from [facility]. TR band on right radial with 15cc air. Jenny, [facility] holding pressure on right neck.  Slightly hypertensive.  RIJ soft, small shadow.  May begin deflating TR at 1205.

## 2024-07-15 NOTE — TELEPHONE ENCOUNTER
atorvastatin (LIPITOR) 40 MG tablet         Last Written Prescription Date:  7/14/23  Last Fill Quantity: 90,   # refills: 3  Last Office Visit : 10/20/23  Future office visit: None  Routing refill request to provider for review/approval because:  Patient currently having Angiogram today, Is due for Labs 7/8/24 and Visit 7/14/24, None scheduled    Please address Refill request.

## 2024-07-15 NOTE — DISCHARGE INSTRUCTIONS
Going Home after Coronary Angiogram and Right Heart Catheterization  ______________________________________________    After you go home:  Have an adult stay with you for 24 hours.  Drink plenty of fluids.  You may eat your normal diet, unless your doctor tells you otherwise.  For 24 hours:  Relax and take it easy.  Do NOT smoke.  Do NOT make any important or legal decisions.  Do NOT drive or operate machines at home or at work.  Do NOT drink alcohol.  Remove the Band-Aid after 24 hours. If there is minor oozing, apply another Band-aid and remove it after 12 hours.  For 2 days, do NOT have sex or do any heavy exercise.  Do NOT take a bath, or use a hot tub or pool for at least 3 days. You may shower.    Care of neck site  You may resume all normal activity.  Monitor neck site for bleeding, swelling, or voice changes. If you notice bleeding or swelling immediately apply pressure to the site and call number below to speak with Cardiology Fellow.  If you experience any changes in your breathing you should call your doctor immediately or come to the closest Emergency Department.  Do not drive yourself.    Care of arm site  It is normal to have soreness at the puncture site and mild tingling in your hand for up to 3 days.  For 2 days, do not use your hand or arm to support your weight (such as rising from a chair) or bend your wrist (such as lifting a garage door).  For 2 days, do not lift more than 5 pounds or exercise your arm (tennis, golf or bowling).    If you start bleeding from the site in your arm:  Sit down and press firmly on the site with your fingers for 10 minutes. Call your doctor as soon as you can.  If the bleeding stops, sit still and keep your wrist straight for 2 hours.    Medicines  If you have started taking Plavix or Effient, do not stop taking it until you talk to your heart doctor (cardiologist).  If you are on metformin (Glucophage), do not restart it until you have blood tests (within 2 to 3  days after discharge). When your doctor tells you it is safe, you may restart the metformin.  If you have stopped any other medicines, check with your nurse or provider about when to restart them.    Call 911 right away if you have bleeding that is heavy or does not stop.    Call your doctor if:  You have a large or growing hard lump around the site.  The site is red, swollen, hot or tender.  Blood or fluid is draining from the site.  You have chills or a fever greater than 101 F (38 C).  Your leg or arm feels numb or cool.  You have hives, a rash or unusual itching.      [facility] [place]:

## 2024-07-16 ENCOUNTER — MYC MEDICAL ADVICE (OUTPATIENT)
Dept: GASTROENTEROLOGY | Facility: CLINIC | Age: 74
End: 2024-07-16
Payer: MEDICARE

## 2024-07-16 LAB
ATRIAL RATE - MUSE: 52 BPM
DIASTOLIC BLOOD PRESSURE - MUSE: NORMAL MMHG
INTERPRETATION ECG - MUSE: NORMAL
P AXIS - MUSE: 81 DEGREES
PR INTERVAL - MUSE: 178 MS
QRS DURATION - MUSE: 82 MS
QT - MUSE: 502 MS
QTC - MUSE: 466 MS
R AXIS - MUSE: 116 DEGREES
SYSTOLIC BLOOD PRESSURE - MUSE: NORMAL MMHG
T AXIS - MUSE: 89 DEGREES
VENTRICULAR RATE- MUSE: 52 BPM

## 2024-07-18 ENCOUNTER — CARE COORDINATION (OUTPATIENT)
Dept: CARDIOLOGY | Facility: CLINIC | Age: 74
End: 2024-07-18
Payer: MEDICARE

## 2024-07-18 NOTE — PROGRESS NOTES
Patient's case was discussed at Valve conference, attended by structural heart cardiologists, CV surgeons, CNP's, and structural heart care coordinators, on July 18, 2024.    Patient is appropriate to proceed with TAVR Ramsay 23.      Special considerations:    Will probably go left TF, but will make final decision on day of TAVR      Patient contacted to review discussion at the Valve Conference. Through shared decision making, patient agrees with the plan as outlined above. All questions answered.       Stacy Portillo RN  Lead Structural Heart Care Coordinator  TAVR, MitraClip and Watchman Programs  [facility]  Office: [phone]

## 2024-07-18 NOTE — PROGRESS NOTES
Attempted telephone call to Georgia to discuss Valve Conference, left message. Will attempt again tomorrow.    Stacy Portillo RN, BSN  Lead Structural Heart Coordinator  TAVR, MitraClip and Watchcurtis

## 2024-07-19 ENCOUNTER — OFFICE VISIT (OUTPATIENT)
Dept: PHARMACY | Facility: CLINIC | Age: 74
End: 2024-07-19
Payer: COMMERCIAL

## 2024-07-19 ENCOUNTER — CARE COORDINATION (OUTPATIENT)
Dept: CARDIOLOGY | Facility: CLINIC | Age: 74
End: 2024-07-19

## 2024-07-19 VITALS
OXYGEN SATURATION: 94 % | DIASTOLIC BLOOD PRESSURE: 69 MMHG | BODY MASS INDEX: 24.9 KG/M2 | WEIGHT: 139.5 LBS | HEART RATE: 54 BPM | SYSTOLIC BLOOD PRESSURE: 139 MMHG

## 2024-07-19 DIAGNOSIS — I35.0 SEVERE AORTIC STENOSIS: Primary | ICD-10-CM

## 2024-07-19 DIAGNOSIS — E11.29 TYPE 2 DIABETES MELLITUS WITH MICROALBUMINURIA, WITHOUT LONG-TERM CURRENT USE OF INSULIN (H): Primary | ICD-10-CM

## 2024-07-19 DIAGNOSIS — R80.9 TYPE 2 DIABETES MELLITUS WITH MICROALBUMINURIA, WITHOUT LONG-TERM CURRENT USE OF INSULIN (H): Primary | ICD-10-CM

## 2024-07-19 DIAGNOSIS — I10 HYPERTENSION GOAL BP (BLOOD PRESSURE) < 130/80: ICD-10-CM

## 2024-07-19 PROCEDURE — 99207 PR NO CHARGE LOS: CPT | Performed by: PHARMACIST

## 2024-07-19 RX ORDER — ASPIRIN 325 MG
325 TABLET ORAL DAILY
Status: CANCELLED | OUTPATIENT
Start: 2024-07-19

## 2024-07-19 RX ORDER — CLINDAMYCIN PHOSPHATE 900 MG/50ML
900 INJECTION, SOLUTION INTRAVENOUS
Status: CANCELLED | OUTPATIENT
Start: 2024-07-19

## 2024-07-19 RX ORDER — SODIUM CHLORIDE 9 MG/ML
INJECTION, SOLUTION INTRAVENOUS CONTINUOUS
Status: CANCELLED | OUTPATIENT
Start: 2024-07-19

## 2024-07-19 RX ORDER — LIDOCAINE 40 MG/G
CREAM TOPICAL
Status: CANCELLED | OUTPATIENT
Start: 2024-07-19

## 2024-07-19 NOTE — PROGRESS NOTES
Date: 7/19/2024    Time of Call: 11:10 AM     Diagnosis:  Severe aortic stenosis     [ TORB ] Ordering provider: Bobby Grimes MD  Order:   CASE REQUEST:  TAVR  Echo order (delroy procedural)  BMP, CBC, INR, ABO/TS  Pre-TAVR procedure orders  Echo order (one month post procedure)     Order received by: Mirta Childers RN     Follow-up/additional notes:   Attempted a phone call to Georgia.  left for her to return call. We are moving forward with appointments needed prior to TAVR procedure.  Tentative TAVR procedure date is August 7, 2nd case, but need to confirm with Georgia.  Plan from conference is Left Transfemoral, MAC, Ramsay 23.

## 2024-07-19 NOTE — PROGRESS NOTES
Medication Therapy Management (MTM) Encounter    ASSESSMENT:                            Medication Adherence/Access: No issues identified      Diabetes:   Patient is not meeting A1c goal of < 7%.  Self monitoring of blood glucose is at goal of fasting  mg/dL and post prandial < 180 mg/dL.  Patient would benefit from updated labs.     Hypertension:   Home blood pressure at goal of <130/80 mmHg.       PLAN:                            Continue medications as prescribed  If blood glucose do go below 80 mg/dL multiple times or below 70 at all, then decrease glipizide to 10 mg only.     Follow-up: Return in about 3 months (around 10/19/2024) for Follow up.      SUBJECTIVE/OBJECTIVE:                          Keerthi Montoya is a 74 year old female seen for a follow-up visit.       Reason for visit: diabetes follow-up.    Allergies/ADRs: Reviewed in chart  Past Medical History: Reviewed in chart  Tobacco: She reports that she has never smoked. She has never been exposed to tobacco smoke. She has never used smokeless tobacco.  Alcohol: none    Medication Adherence/Access: no issues reported    Diabetes   Jardiance 10 mg daily  Glipizide XL 20 mg daily  Trulicity 1.5 mg once weekly - no side effects. Had to hold a dose last week due to a procedure.   Discussed increasing Trulicity and decreasing glipizide. She would like to stay on this plan for a couple more months.     Kidney function too low for metformin.      Blood sugar monitorin time(s) daily. Ranges (patient reported): Fasting: mornings are  mg/dL. In the afternoon after she eats: down to 135 a couple hours after eating.       Symptoms of low blood sugar? none   Symptoms of high blood sugar? None.     Has joined an exercise group and is exercising three times per week.     Aspirin: No  Statin: Yes: atorvastatin 40 mg   ACEi/ARB: Yes: losartan    Lab Results   Component Value Date    UMALCR 109.82 (H) 06/15/2023      Lab Results   Component Value Date     A1C 10.5 04/05/2024    A1C 9.6 11/03/2023    A1C 8.7 09/17/2023    A1C 11.6 04/27/2023    A1C 6.9 01/20/2023    A1C 6.6 10/28/2020    A1C 7.0 05/12/2020    A1C 7.4 01/13/2020    A1C 7.9 01/28/2019    A1C 7.6 06/25/2018         Wt Readings from Last 4 Encounters:   07/19/24 139 lb 8 oz (63.3 kg)   07/12/24 143 lb 1.6 oz (64.9 kg)   06/24/24 139 lb (63 kg)   06/20/24 141 lb 15.6 oz (64.4 kg)                Eye exam is up to date  Foot exam: due    Hypertension   Isosorbide 30 mg daily  Losartan 100 mg daily  Hydralazine 50 mg three times a day   Aspirin 81 mg daily  Atenolol 25 mg daily  Torsemide 10 mg every other day      Patient reports no current medication side effects  Patient self monitors blood pressure.  Home BP monitoring 120's/60-70's mmHg HR: 50-60's bpm. Reports blood pressure are higher in clinic due to stress.                    Today's Vitals: /69   Pulse 54   Wt 139 lb 8 oz (63.3 kg)   LMP  (LMP Unknown)   SpO2 94%   BMI 24.90 kg/m    ----------------  Post Discharge Medication Reconciliation Status: discharge medications reconciled, continue medications without change.    I spent 10 minutes with this patient today. All changes were made via collaborative practice agreement with Bunny Meyers MD. A copy of the visit note was provided to the patient's provider(s).    A summary of these recommendations was sent via [employer].    Bunny Santa, Pharm. D., BCACP  Medication Therapy Management Pharmacist           Medication Therapy Recommendations  No medication therapy recommendations to display

## 2024-07-19 NOTE — PROGRESS NOTES
Transcatheter aortic valve replacement (TAVR)    August 7, Wednesday, 2nd case.  Check in to the hospital, at 8:00 a.m.      Please disregard any Mychart messages or reminders in regards to ECHO scheduling, this is done as part of procedure.   If you are unsure if your check in time has changed, please call number [phone].    3rd floor: Unit ,     [address]   parking is available in front of the hospital, 24 hours a day  -  Stop at the Information Desk and the admissions desk in the lobby.    -------------------------------------------------------------------------  Nothing to eat after midnight  Clear liquids like water, apple juice or 7up/Sprite is OK up to two hours prior to your scheduled procedure.    You may take your medications in the morning with a sip of water.      *Please use the special cleansing wipes, received at your pre-op History and Physical, the night before and the morning of your procedure.    Please focus the use of these wipes from neck to groin, avoiding the face and genital area.    If you did NOT receive these special cleansing wipes at your pre-op History and Physical, then:   * Please use a special soap such as hibicleanse or chlorhexadine.  (This can be purchased, over the counter, at a pharmacy) If this is not available, please use an antibacterial soap.  * Take a shower, using soap, the night before the procedure, and the morning of the procedure.    Please focus the use of this soap from neck to groin, avoiding the face and genital area.    Contrast Allergy:   No    Medications Instructions:    Please TAKE the following medications:  325 mg Asprin: Please take day before and day of procedure        Please HOLD the following medications morning of procedure:  B-12   hydrALAZINE    losartan    torsemide          Please HOLD the following diabetic medications morning of  procedure:  **Glipizide    Please HOLD the following diabetic medications 7 days before procedure:  **Weekly DM meds: Dulaglutide (Trulicity)    Please HOLD the following diabetic medications 4 days before the procedure:  **Jardiance (empagliflozin)   --- your last dose will be on August 2.  NO Jardiance starting on August 3.    OK to take the morning of the procedure:  atenolol   Atorvastatin  diphenhydrAMINE (Benadryl), if needed    isosorbide mononitrate    levothyroxine    mesalamine    pantoprazole          If any questions please contact:  Aura Childers RN  Structural Heart Care Coordinator  [facility]  Office: [phone]

## 2024-07-19 NOTE — PATIENT INSTRUCTIONS
"Recommendations from today's MTM visit:                                                    MTM (medication therapy management) is a service provided by a clinical pharmacist designed to help you get the most of out of your medicines.   Today we reviewed what your medicines are for, how to know if they are working, that your medicines are safe and how to make your medicine regimen as easy as possible.      Continue medications as prescribed  If blood glucose do go below 80 mg/dL multiple times or below 70 at all, then decrease glipizide to 10 mg only.     Follow-up: Return in about 3 months (around 10/19/2024) for Follow up.    It was great speaking with you today.  I value your experience and would be very thankful for your time in providing feedback in our clinic survey. In the next few days, you may receive an email or text message from [employer] with a link to a survey related to your  clinical pharmacist.\"     To schedule another MTM appointment, please call the clinic directly or you may call the MTM scheduling line at [phone] or toll-free at [phone].     My Clinical Pharmacist's contact information:                                                      Please feel free to contact me with any questions or concerns you have.      Bunny Santa, Pharm. D., [facility]  Medication Therapy Management Pharmacist    "

## 2024-07-19 NOTE — PROGRESS NOTES
Patient called back on 7/18 and spoke to Dominique Saavedra about results from valve conference. Plan on moving forward with scheduling TAVR.    Stacy

## 2024-07-22 DIAGNOSIS — I35.0 SEVERE AORTIC STENOSIS: Primary | ICD-10-CM

## 2024-07-22 NOTE — PROGRESS NOTES
[facility] CARDIOTHORACIC SURGERY CONSULT  Patient Name: Keerthi Montoya  Medical Record Number: [id number]  YOB: 1950  Room Number: Room/bed info not found  Referring Physician: Dr. Bunn    CC: Severe aortic stenosis    History of Present Illness: Keerthi Montoya is a very pleasant 74 year old female with low flow low gradient severe aortic valvular stenosis referred to our clinic for evaluation and consideration for potential transcatheter aortic valve replacement (TAVR). Her severe aortic stenosis is characterized with an IVON of 0.87 cm2, mean PG 16.5 mmHg and peak V of 2.7 m/sec with LVEF 60-65% and DI 0.25 by echocardiogram on 6/7/24.      Her additional past medical history is notable for chronic HFpEF, HLD, HTN, DM2, ulcerative pancolitis, CKD3, pulmonary HTN, persistent atrial fibrillation with (CHADS-VASc 4) with intolerance to anticoagulation due to history of GI bleeding s/p RY closure and Watchman (05/2023) .      Patient has been known to have aortic stenosis, followed serially by echo since 2/17/20. Most recent echo shows paradoxical low flow low gradient aortic stenosis. She says that she is doing well and denies any significant cardiac symptoms. She remains active and has no other acute complaints at this time.     Assessment and Plan:  Keerthi Montoya is a 74 year old female with severe aortic stenosis  1) Agree with TAVR  2) Surgical bailout - yes   3) Please call with questions or concerns.     Thank you for the opportunity to participate in the care of this patient.    Terence Wylie MD  Cardiothoracic Surgery  [phone]    Past Medical History:  Past Medical History:   Diagnosis Date    Chronic kidney disease     Diabetes mellitus, type 2 (H)     Diverticulosis of colon (without mention of hemorrhage) 10/01/2012    GI bleed     History of blood transfusion     HLD (hyperlipidemia)     Hypertension     Hypothyroidism     MARIA D (iron deficiency anemia)     Persistent atrial  fibrillation (H)     Pulmonary hypertension (H)     Ulcerative (chronic) enterocolitis (H)        Past Surgical History:  Past Surgical History:   Procedure Laterality Date    CHOLECYSTECTOMY  3/1987    COLON SURGERY  01/2001    Left colon resection; diverticulitis    COLONOSCOPY N/A 4/24/2017    Procedure: COMBINED COLONOSCOPY, SINGLE OR MULTIPLE BIOPSY/POLYPECTOMY BY BIOPSY;;  Surgeon: Radha Salguero MD;  Location: [facility] OR    COLONOSCOPY N/A 3/10/2023    Procedure: COLONOSCOPY;  Surgeon: Preeti James MD;  Location: [facility]    COLONOSCOPY WITH CO2 INSUFFLATION N/A 4/24/2017    Procedure: COLONOSCOPY WITH CO2 INSUFFLATION;  Colonoscopy Dx rectal bleeding, BMI 25.86, Pharm [facility] .[phone], ;  Surgeon: Radha Salguero MD;  Location: [facility] OR    CV CORONARY ANGIOGRAM N/A 7/15/2024    Procedure: Coronary Angiogram with possible intervention;  Surgeon: Bobby Grimes MD;  [facility]    CV LEFT ATRIAL APPENDAGE CLOSURE N/A 5/11/2023    Procedure: Left Atrial Appendage Closure;  Surgeon: Bobby Grimes MD;  [facility]    CV RIGHT HEART CATH MEASUREMENTS RECORDED N/A 3/16/2020    Procedure: CV RIGHT HEART CATH;  Surgeon: Nader Muñoz MD;  [facility]    CV RIGHT HEART CATH MEASUREMENTS RECORDED N/A 7/15/2024    Procedure: Right Heart Cath;  Surgeon: Bobby Grimes MD;  Location:  [facility] CARDIAC CATH LAB    ESOPHAGOSCOPY, GASTROSCOPY, DUODENOSCOPY (EGD), COMBINED N/A 1/23/2023    Procedure: ESOPHAGOGASTRODUODENOSCOPY, WITH BIOPSY;  Surgeon: Ricki Deal MD;  [facility]    ESOPHAGOSCOPY, GASTROSCOPY, DUODENOSCOPY (EGD), COMBINED N/A 3/10/2023    Procedure: Esophagoscopy, gastroscopy, duodenoscopy (EGD), combined;  Surgeon: Preeti James MD;  [facility]    GYN SURGERY  9/1983    tubal ligation    HERNIA REPAIR  12/2006    recurrent incisional hernia    SIGMOIDOSCOPY FLEXIBLE N/A  2023    Procedure: Sigmoidoscopy flexible;  Surgeon: Ricki Deal MD;  Location:  GI    THROAT SURGERY  1974    thyroidectomy        Family History:   Family History   Problem Relation Age of Onset    Cerebrovascular Disease Mother     Cancer Father         bladder    Diverticulitis Brother     Diverticulitis Brother     Kidney Disease No family hx of     Crohn's Disease No family hx of     Ulcerative Colitis No family hx of     Stomach Cancer No family hx of     GERD No family hx of     Celiac Disease No family hx of     Anesthesia Reaction No family hx of        Social History:  Social History     Socioeconomic History    Marital status:      Spouse name: Raymundo; dementia;      Number of children: 3    Years of education: Not on file    Highest education level: Not on file   Occupational History     Employer: [employer]   Tobacco Use    Smoking status: Never     Passive exposure: Never    Smokeless tobacco: Never   Vaping Use    Vaping status: Never Used   Substance and Sexual Activity    Alcohol use: Not Currently     Alcohol/week: 2.0 - 4.0 standard drinks of alcohol     Types: 1 - 2 Cans of beer, 1 - 2 Shots of liquor per week     Comment: occasional cocktail or beer    Drug use: No    Sexual activity: Not Currently   Other Topics Concern    Parent/sibling w/ CABG, MI or angioplasty before 65F 55M? Not Asked     Service No    Blood Transfusions No    Caffeine Concern No    Occupational Exposure No    Hobby Hazards No    Sleep Concern No    Stress Concern No    Weight Concern No    Special Diet No    Back Care No    Exercise Yes     Comment: off and on    Bike Helmet No    Seat Belt Yes    Self-Exams No   Social History Narrative    Laid off her job      Social Determinants of Health     Financial Resource Strain: Low Risk  (10/20/2023)    Financial Resource Strain     Within the past 12 months, have you or your family members you live with been unable to get  utilities (heat, electricity) when it was really needed?: No   Food Insecurity: Low Risk  (10/20/2023)    Food Insecurity     Within the past 12 months, did you worry that your food would run out before you got money to buy more?: No     Within the past 12 months, did the food you bought just not last and you didn t have money to get more?: No   Transportation Needs: Low Risk  (10/20/2023)    Transportation Needs     Within the past 12 months, has lack of transportation kept you from medical appointments, getting your medicines, non-medical meetings or appointments, work, or from getting things that you need?: No   Physical Activity: Not on file   Stress: Not on file   Social Connections: Not on file   Interpersonal Safety: Not on file   Housing Stability: Low Risk  (10/20/2023)    Housing Stability     Do you have housing? : Yes     Are you worried about losing your housing?: No       Allergies:   Allergies   Allergen Reactions    Actos [Pioglitazone]      Fluid retention    Amlodipine      Leg swelling, hand swelling    Animal Dander     Doxycycline Hives    Dust Mites     Grass     Keflex [Cephalexin Hcl] Hives    Metoprolol      Swelling of lower legs and fatigue    Other [Seasonal Allergies]      pollen    Ranitidine      rash    Synthroid [Levothyroxine Sodium] Swelling     Allergic to brand name    Tetracycline Hives    Tylenol Hives    Ziac [Bisoprolol-Hydrochlorothiazide]        Medications:  Current Outpatient Medications   Medication Sig Dispense Refill    aspirin 81 MG EC tablet Take 1 tablet (81 mg) by mouth daily 90 tablet 3    atenolol (TENORMIN) 25 MG tablet Take 1 tablet (25 mg) by mouth daily 90 tablet 3    atorvastatin (LIPITOR) 40 MG tablet Take 1 tablet (40 mg) by mouth daily 90 tablet 3    blood glucose (ACCU-CHEK FELIPE PLUS) test strip 200 strips by In Vitro route 2 times daily Use to test blood sugar 2 times daily or as directed. 200 strip 4    Cyanocobalamin (B-12) 1000 MCG TABS Take 1,000  mcg by mouth three times a week      diphenhydrAMINE (BENADRYL) 25 MG tablet Take 25 mg by mouth every 6 hours as needed for itching or allergies      dulaglutide (TRULICITY) 1.5 MG/0.5ML pen Inject 1.5 mg Subcutaneous every 7 days After 4 doses of 0.75 mg 2 mL 1    empagliflozin (JARDIANCE) 10 MG TABS tablet Take 1 tablet (10 mg) by mouth daily 90 tablet 3    glipiZIDE (GLUCOTROL XL) 10 MG 24 hr tablet Take 2 tablets (20 mg) by mouth daily 60 tablet 3    hydrALAZINE (APRESOLINE) 50 MG tablet Take 1 tablet (50 mg) by mouth 3 times daily 180 tablet 3    isosorbide mononitrate (IMDUR) 30 MG 24 hr tablet Take 1 tablet (30 mg) by mouth daily 90 tablet 3    levothyroxine (SYNTHROID/LEVOTHROID) 125 MCG tablet Take 1 tablet (125 mcg) by mouth daily 90 tablet 0    losartan (COZAAR) 100 MG tablet Take 1 tablet (100 mg) by mouth daily 90 tablet 3    mesalamine (APRISO ER) 0.375 g 24 hr capsule TAKE 4 CAPSULES(1.5 GRAMS) BY MOUTH DAILY 360 capsule 1    pantoprazole (PROTONIX) 40 MG EC tablet Take 1 tablet (40 mg) by mouth daily 90 tablet 3    senna (SENOKOT) 8.6 MG tablet Take 1 tablet by mouth daily as needed for constipation 30 tablet 3    torsemide (DEMADEX) 10 MG tablet Take 1 tablet (10 mg) by mouth every other day       No current facility-administered medications for this visit.       Review of Systems:   A 10 point ROS was performed and is negative other than HPI.    Physical Exam:  BP (!) 152/68   Pulse (!) 49   Wt 64.4 kg (141 lb 15.6 oz)   LMP  (LMP Unknown)   SpO2 95%   BMI 25.34 kg/m        Gen: NAD, resting comfortably in chair   Lungs: CTAB, non-labored breathing   CV: regular rhythm, normal rate   Abd: Soft, not tender, not distended   Ext: Motor, sensation, pulses intact   Neuro: AOx3    Labs:  Lab Results   Component Value Date    WBC 8.5 07/15/2024    WBC 7.3 10/28/2020     Lab Results   Component Value Date    RBC 3.98 07/15/2024    RBC 3.99 10/28/2020     Lab Results   Component Value Date    HGB 13.1  "07/15/2024    HGB 11.8 02/08/2021     Lab Results   Component Value Date    HCT 41.0 07/15/2024    HCT 38.2 10/28/2020     No components found for: \"MCT\"  Lab Results   Component Value Date     07/15/2024    MCV 96 10/28/2020     Lab Results   Component Value Date    MCH 32.9 07/15/2024    MCH 30.8 10/28/2020     Lab Results   Component Value Date    MCHC 32.0 07/15/2024    MCHC 32.2 10/28/2020     Lab Results   Component Value Date    RDW 14.6 07/15/2024    RDW 13.9 10/28/2020     Lab Results   Component Value Date     07/15/2024     10/28/2020     Last Comprehensive Metabolic Panel:  Lab Results   Component Value Date     07/15/2024    POTASSIUM 4.0 07/15/2024    CHLORIDE 99 07/15/2024    CO2 22 07/15/2024    ANIONGAP 17 (H) 07/15/2024     (H) 07/15/2024    BUN 20.1 07/15/2024    CR 1.74 (H) 07/15/2024    GFRESTIMATED 31 (L) 07/15/2024    ABEL 9.6 07/15/2024           Imaging:  Echocardiogram dated 6/7/24:  Aortic valve Doppler parameters are consistent with severe normal-EF  (paradoxical) low-flow low-gradient aortic stenosis (PV 2.7 m/s MG 17 mmHg DVI  0.25 IVON 0.84 cm2 SVI 32.5 mL/m2.) The aortic valve is severely calcified and  leaflet motion appears restricted. Recommend structural cardiology (Valve  Clinic) referral.     Global and regional left ventricular function is normal with an EF of 60-65%.  Moderate to severe right ventricular dilation is present. Global right  ventricular function is moderately reduced.  Moderate to severe tricuspid insufficiency is present.  Pulmonary hypertension is present. Pulmonary artery systolic pressure is 73  mmHg (68 mmHg + RAP 15 mmHg).     This study was compared with the study from 9/18/23: Aortic valve parameters  are better assessed and are suggestive of paradoxical low-flow low-gradient  aortic stenosis. PA pressure is higher. There has otherwise been no significant change.     STS RISK SCORE 7.86%  NYHA CLASS: II     "

## 2024-07-22 NOTE — PROGRESS NOTES
NOTE:  Initial procedure was rescheduled from August 7 to August 14.    Below is the corrected pre-op instructions with correct dates.     Transcatheter aortic valve replacement (TAVR)     August 14, Wednesday, 1st case.  Check in to the hospital, at 5:30 a.m.       Please disregard any Mychart messages or reminders in regards to ECHO scheduling, this is done as part of procedure.   If you are unsure if your check in time has changed, please call number [phone].     3rd floor: [address],      [address]  [id number]   parking is available in front of the hospital, 24 hours a day  -  Stop at the Information Desk and the admissions desk in the lobby.     -------------------------------------------------------------------------  Nothing to eat after midnight  Clear liquids like water, apple juice or 7up/Sprite is OK up to two hours prior to your scheduled procedure.    You may take your medications in the morning with a sip of water.        *Please use the special cleansing wipes, received at your pre-op History and Physical, the night before and the morning of your procedure.    Please focus the use of these wipes from neck to groin, avoiding the face and genital area.     If you did NOT receive these special cleansing wipes at your pre-op History and Physical, then:   * Please use a special soap such as hibicleanse or chlorhexadine.  (This can be purchased, over the counter, at a pharmacy) If this is not available, please use an antibacterial soap.  * Take a shower, using soap, the night before the procedure, and the morning of the procedure.    Please focus the use of this soap from neck to groin, avoiding the face and genital area.     Contrast Allergy:   No     Medications Instructions:     Please TAKE the following medications:  325 mg Asprin: Please take day before and day of procedure           Please HOLD the following medications  morning of procedure:  B-12   hydrALAZINE    losartan    torsemide             Please HOLD the following diabetic medications morning of procedure:  **Glipizide     Please HOLD the following diabetic medications 7 days before procedure:  **Weekly DM meds: Dulaglutide (Trulicity)     Please HOLD the following diabetic medications 3 days before the procedure:  **Jardiance (empagliflozin)   --- your last dose will be on August 10.  NO Jardiance starting on August 11.     OK to take the morning of the procedure:  atenolol   Atorvastatin  diphenhydrAMINE (Benadryl), if needed           isosorbide mononitrate    levothyroxine    mesalamine    pantoprazole             If any questions please contact:  Aura Childers RN  Structural Heart Care Coordinator  [facility]  Office: [phone]

## 2024-07-25 NOTE — TELEPHONE ENCOUNTER
Called to remind patient of their upcoming appointment with our GI clinic, on Fri Aug 2nd at 10:00am with Kami Lim PA-C. This appointment is scheduled as a video visit. You will receive a call approximately 30 minutes prior to check you in, you must be in [place] for this visit., if your appointment is virtual (video or telephone) you need to be in [place] for the visit. To reschedule or cancel patient to call [phone].    Vesna Franco

## 2024-07-28 LAB
ABO/RH(D): NORMAL
ANTIBODY SCREEN: NEGATIVE
SPECIMEN EXPIRATION DATE: NORMAL

## 2024-07-29 ENCOUNTER — OFFICE VISIT (OUTPATIENT)
Dept: GASTROENTEROLOGY | Facility: CLINIC | Age: 74
End: 2024-07-29
Attending: INTERNAL MEDICINE
Payer: MEDICARE

## 2024-07-29 ENCOUNTER — PRE VISIT (OUTPATIENT)
Dept: GASTROENTEROLOGY | Facility: CLINIC | Age: 74
End: 2024-07-29

## 2024-07-29 ENCOUNTER — LAB (OUTPATIENT)
Dept: LAB | Facility: CLINIC | Age: 74
End: 2024-07-29
Attending: INTERNAL MEDICINE
Payer: MEDICARE

## 2024-07-29 DIAGNOSIS — I35.0 SEVERE AORTIC STENOSIS: ICD-10-CM

## 2024-07-29 DIAGNOSIS — R80.9 TYPE 2 DIABETES MELLITUS WITH MICROALBUMINURIA, WITHOUT LONG-TERM CURRENT USE OF INSULIN (H): ICD-10-CM

## 2024-07-29 DIAGNOSIS — K74.02 HEPATIC FIBROSIS, STAGE 3: ICD-10-CM

## 2024-07-29 DIAGNOSIS — K74.00 FIBROSIS OF LIVER: ICD-10-CM

## 2024-07-29 DIAGNOSIS — E11.29 TYPE 2 DIABETES MELLITUS WITH MICROALBUMINURIA, WITHOUT LONG-TERM CURRENT USE OF INSULIN (H): ICD-10-CM

## 2024-07-29 LAB
ALBUMIN SERPL BCG-MCNC: 4.7 G/DL (ref 3.5–5.2)
ALP SERPL-CCNC: 116 U/L (ref 40–150)
ALT SERPL W P-5'-P-CCNC: <5 U/L (ref 0–50)
ANION GAP SERPL CALCULATED.3IONS-SCNC: 12 MMOL/L (ref 7–15)
AST SERPL W P-5'-P-CCNC: 25 U/L (ref 0–45)
BILIRUB DIRECT SERPL-MCNC: 0.35 MG/DL (ref 0–0.3)
BILIRUB SERPL-MCNC: 0.9 MG/DL
BUN SERPL-MCNC: 21.4 MG/DL (ref 8–23)
CALCIUM SERPL-MCNC: 10.1 MG/DL (ref 8.8–10.4)
CHLORIDE SERPL-SCNC: 100 MMOL/L (ref 98–107)
CREAT SERPL-MCNC: 1.68 MG/DL (ref 0.51–0.95)
CREAT UR-MCNC: 35.5 MG/DL
EGFRCR SERPLBLD CKD-EPI 2021: 32 ML/MIN/1.73M2
ERYTHROCYTE [DISTWIDTH] IN BLOOD BY AUTOMATED COUNT: 14.4 % (ref 10–15)
GLUCOSE SERPL-MCNC: 145 MG/DL (ref 70–99)
HBA1C MFR BLD: 7.2 %
HCO3 SERPL-SCNC: 25 MMOL/L (ref 22–29)
HCT VFR BLD AUTO: 44 % (ref 35–47)
HGB BLD-MCNC: 14 G/DL (ref 11.7–15.7)
INR PPP: 1.14 (ref 0.85–1.15)
MCH RBC QN AUTO: 32.3 PG (ref 26.5–33)
MCHC RBC AUTO-ENTMCNC: 31.8 G/DL (ref 31.5–36.5)
MCV RBC AUTO: 101 FL (ref 78–100)
MICROALBUMIN UR-MCNC: 54.7 MG/L
MICROALBUMIN/CREAT UR: 154.08 MG/G CR (ref 0–25)
PLATELET # BLD AUTO: 260 10E3/UL (ref 150–450)
POTASSIUM SERPL-SCNC: 4.7 MMOL/L (ref 3.4–5.3)
PROT SERPL-MCNC: 8.6 G/DL (ref 6.4–8.3)
RBC # BLD AUTO: 4.34 10E6/UL (ref 3.8–5.2)
SODIUM SERPL-SCNC: 137 MMOL/L (ref 135–145)
WBC # BLD AUTO: 7.2 10E3/UL (ref 4–11)

## 2024-07-29 PROCEDURE — 36415 COLL VENOUS BLD VENIPUNCTURE: CPT | Performed by: PATHOLOGY

## 2024-07-29 PROCEDURE — 82248 BILIRUBIN DIRECT: CPT | Performed by: PATHOLOGY

## 2024-07-29 PROCEDURE — 80053 COMPREHEN METABOLIC PANEL: CPT | Performed by: PATHOLOGY

## 2024-07-29 PROCEDURE — 83036 HEMOGLOBIN GLYCOSYLATED A1C: CPT | Performed by: INTERNAL MEDICINE

## 2024-07-29 PROCEDURE — 85610 PROTHROMBIN TIME: CPT | Performed by: PATHOLOGY

## 2024-07-29 PROCEDURE — 99215 OFFICE O/P EST HI 40 MIN: CPT | Mod: GC | Performed by: INTERNAL MEDICINE

## 2024-07-29 PROCEDURE — 99000 SPECIMEN HANDLING OFFICE-LAB: CPT | Performed by: PATHOLOGY

## 2024-07-29 PROCEDURE — 86900 BLOOD TYPING SEROLOGIC ABO: CPT

## 2024-07-29 PROCEDURE — 85027 COMPLETE CBC AUTOMATED: CPT | Performed by: PATHOLOGY

## 2024-07-29 PROCEDURE — 82043 UR ALBUMIN QUANTITATIVE: CPT | Performed by: INTERNAL MEDICINE

## 2024-07-29 PROCEDURE — G0463 HOSPITAL OUTPT CLINIC VISIT: HCPCS | Performed by: INTERNAL MEDICINE

## 2024-07-29 NOTE — PROGRESS NOTES
HEPATOLOGY CLINIC VISIT - NEW PATIENT    CC/REFERRING MD:   Preeti James  REASON FOR CONSULTATION:  Fibrosis     HPI:  74 year old female with a PMHx pertinent for HTN, HLD, DMII, hypothyroidism, pulmonary hypertension, ulcerative colitis, diverticulosis complicated by diverticulitis status post sigmoid colectomy who was referred to liver clinic for evaluation of fibrosis.    Briefly, patient underwent an upper endoscopy on 3/2023 in the setting of unexplained iron deficiency anemia and at that time was found to have incidental finding of gastric mucosal changes concerning for portal hypertensive gastropathy.  She subsequently done had a FibroScan that revealed stage III fibrosis without steatosis.  Additional testing notable for negative workup of hepatitis C, negative hepatitis B serologies, negative celiac serologies, normal ferritin; LILLIAN serologies borderline positive however anti-smooth muscle antibody and antimitochondrial antibody not performed.  Most recent liver biochemistries within normal limits.  CT of the abdomen pelvis with contrast done in 2022 with normal liver size and spleen size and no evidence of portal hypertension with portosystemic collaterals.     Patient denies any past medical history of liver disease.  Denies any jaundice, scleral icterus, pruritus, lower extremity edema, lethargy/confusion.  Does note longstanding history of non-insulin-dependent diabetes mellitus.  States that she was diagnosed with hypothyroidism following a partial thyroidectomy at the age of 24; thyroidectomy performed for an enlarged/obstructive goiter.  Of note, patient with established pulmonary hypertension in the setting of severe tricuspid insufficiency; also has underlying severe aortic stenosis and is due to undergo TAVR on 8/14/2024.    With regards to pertinent risk factors, patient denies any history of significant alcohol use endorsing social alcohol intake when she was younger and denying any recent  alcohol intake.  She denies any family history of liver disease.          ROS:  10pt ROS performed and otherwise negative.    Allergies  Allergies   Allergen Reactions    Actos [Pioglitazone]      Fluid retention    Amlodipine      Leg swelling, hand swelling    Animal Dander     Doxycycline Hives    Dust Mites     Grass     Keflex [Cephalexin Hcl] Hives    Metoprolol      Swelling of lower legs and fatigue    Other [Seasonal Allergies]      pollen    Ranitidine      rash    Synthroid [Levothyroxine Sodium] Swelling     Allergic to brand name    Tetracycline Hives    Tylenol Hives    Ziac [Bisoprolol-Hydrochlorothiazide]        Medications:   Medications reviewed with patient today, see Medication List/Assessment for details.    Current Outpatient Medications   Medication Sig Dispense Refill    aspirin 81 MG EC tablet Take 1 tablet (81 mg) by mouth daily 90 tablet 3    atenolol (TENORMIN) 25 MG tablet Take 1 tablet (25 mg) by mouth daily 90 tablet 3    atorvastatin (LIPITOR) 40 MG tablet Take 1 tablet (40 mg) by mouth daily 90 tablet 3    blood glucose (ACCU-CHEK FELIPE PLUS) test strip 200 strips by In Vitro route 2 times daily Use to test blood sugar 2 times daily or as directed. 200 strip 4    Cyanocobalamin (B-12) 1000 MCG TABS Take 1,000 mcg by mouth three times a week      diphenhydrAMINE (BENADRYL) 25 MG tablet Take 25 mg by mouth every 6 hours as needed for itching or allergies      dulaglutide (TRULICITY) 1.5 MG/0.5ML pen Inject 1.5 mg Subcutaneous every 7 days After 4 doses of 0.75 mg 2 mL 1    empagliflozin (JARDIANCE) 10 MG TABS tablet Take 1 tablet (10 mg) by mouth daily 90 tablet 3    glipiZIDE (GLUCOTROL XL) 10 MG 24 hr tablet Take 2 tablets (20 mg) by mouth daily 60 tablet 3    hydrALAZINE (APRESOLINE) 50 MG tablet Take 1 tablet (50 mg) by mouth 3 times daily 180 tablet 3    isosorbide mononitrate (IMDUR) 30 MG 24 hr tablet Take 1 tablet (30 mg) by mouth daily 90 tablet 3    levothyroxine  (SYNTHROID/LEVOTHROID) 125 MCG tablet Take 1 tablet (125 mcg) by mouth daily 90 tablet 0    losartan (COZAAR) 100 MG tablet Take 1 tablet (100 mg) by mouth daily 90 tablet 3    mesalamine (APRISO ER) 0.375 g 24 hr capsule TAKE 4 CAPSULES(1.5 GRAMS) BY MOUTH DAILY 360 capsule 1    pantoprazole (PROTONIX) 40 MG EC tablet Take 1 tablet (40 mg) by mouth daily 90 tablet 3    senna (SENOKOT) 8.6 MG tablet Take 1 tablet by mouth daily as needed for constipation 30 tablet 3    torsemide (DEMADEX) 10 MG tablet Take 1 tablet (10 mg) by mouth every other day       No current facility-administered medications for this visit.          Surgical History:   Past Surgical History:   Procedure Laterality Date    CHOLECYSTECTOMY  3/1987    COLON SURGERY  01/2001    Left colon resection; diverticulitis    COLONOSCOPY N/A 4/24/2017    Procedure: COMBINED COLONOSCOPY, SINGLE OR MULTIPLE BIOPSY/POLYPECTOMY BY BIOPSY;;  Surgeon: Radha Salguero MD;  Location: [facility] OR    COLONOSCOPY N/A 3/10/2023    Procedure: COLONOSCOPY;  Surgeon: Preeti James MD;  Location:  GI    COLONOSCOPY WITH CO2 INSUFFLATION N/A 4/24/2017    Procedure: COLONOSCOPY WITH CO2 INSUFFLATION;  Colonoscopy Dx rectal bleeding, BMI 25.86, Pharm [facility] .[phone], ;  Surgeon: Radha Salguero MD;  Location: [facility] OR    CV CORONARY ANGIOGRAM N/A 7/15/2024    Procedure: Coronary Angiogram with possible intervention;  Surgeon: Bobby Grimes MD;  Location:  HEART CARDIAC CATH LAB    CV LEFT ATRIAL APPENDAGE CLOSURE N/A 5/11/2023    Procedure: Left Atrial Appendage Closure;  Surgeon: Bobby Grimes MD;  Location:  HEART CARDIAC CATH LAB    CV RIGHT HEART CATH MEASUREMENTS RECORDED N/A 3/16/2020    Procedure: CV RIGHT HEART CATH;  Surgeon: Nader Muñoz MD;  Location:  HEART CARDIAC CATH LAB    CV RIGHT HEART CATH MEASUREMENTS RECORDED N/A 7/15/2024    Procedure: Right Heart Cath;  Surgeon: Bobby Grimes MD;  Location:   HEART CARDIAC CATH LAB    ESOPHAGOSCOPY, GASTROSCOPY, DUODENOSCOPY (EGD), COMBINED N/A 1/23/2023    Procedure: ESOPHAGOGASTRODUODENOSCOPY, WITH BIOPSY;  Surgeon: Ricki Deal MD;  Location:  GI    ESOPHAGOSCOPY, GASTROSCOPY, DUODENOSCOPY (EGD), COMBINED N/A 3/10/2023    Procedure: Esophagoscopy, gastroscopy, duodenoscopy (EGD), combined;  Surgeon: Preeti James MD;  Location:  GI    GYN SURGERY  9/1983    tubal ligation    HERNIA REPAIR  12/2006    recurrent incisional hernia    SIGMOIDOSCOPY FLEXIBLE N/A 1/23/2023    Procedure: Sigmoidoscopy flexible;  Surgeon: Ricki Deal MD;  Location:  GI    THROAT SURGERY  12/1974    thyroidectomy         Past Medical History:  As noted above.  Past Medical History:   Diagnosis Date    Chronic kidney disease     Diabetes mellitus, type 2 (H)     Diverticulosis of colon (without mention of hemorrhage) 10/01/2012    GI bleed     History of blood transfusion     HLD (hyperlipidemia)     Hypertension     Hypothyroidism     MARIA D (iron deficiency anemia)     Persistent atrial fibrillation (H)     Pulmonary hypertension (H)     Ulcerative (chronic) enterocolitis (H)        Social History:   Social History     Tobacco Use    Smoking status: Never     Passive exposure: Never    Smokeless tobacco: Never   Substance Use Topics    Alcohol use: Not Currently     Alcohol/week: 2.0 - 4.0 standard drinks of alcohol     Types: 1 - 2 Cans of beer, 1 - 2 Shots of liquor per week     Comment: occasional cocktail or beer         Family History:   No colon/panc/esophageal/other GI CA, no other HNPCC-related [place].  No celiac, no other AI/liver  Family History   Problem Relation Age of Onset    Cerebrovascular Disease Mother     Cancer Father         bladder    Diverticulitis Brother     Diverticulitis Brother     Kidney Disease No family hx of     Crohn's Disease No family hx of     Ulcerative Colitis No family hx of     Stomach Cancer No family hx of     GERD No family hx of      Celiac Disease No family hx of     Anesthesia Reaction No family hx of          Vitals:   LMP  (LMP Unknown)   There is no height or weight on file to calculate BMI.    Physical Examination:  Constitutional: Well-developed, well-nourished, in no apparent distress.    HEENT: Normocephalic.  scleral icterus. Moist oral mucosa.   Neck/Lymph: Normal ROM, supple. No thyromegaly.  No lymphadenopathy  Cardiac:  Regular rate and rhythm.    Respiratory: Clear to auscultation bilaterally.   GI:  Abdomen soft, non-distended, non-tender. BS present. No shifting dullness. No overt hepatosplenomegaly.     Skin:  Skin is warm and dry. No rash noted.  No jaundice. No spider nevi noted.  No palmar erythema  Peripheral Vascular: No lower extremity edema.   Musculoskeletal:  ROM intact, reduced muscle bulk  for age   Psychiatric: Normal mood and affect. Behavior is normal.  Neuro:  No asterixis, No tremor    Labs:   Lab Results   Component Value Date     07/29/2024    POTASSIUM 4.7 07/29/2024    CHLORIDE 100 07/29/2024    ANIONGAP 12 07/29/2024    CO2 25 07/29/2024    BUN 21.4 07/29/2024    CR 1.68 (H) 07/29/2024    GFRESTIMATED 32 (L) 07/29/2024    ABEL 10.1 07/29/2024      Lab Results   Component Value Date    WBC 7.2 07/29/2024    HGB 14.0 07/29/2024    HCT 44.0 07/29/2024     (H) 07/29/2024    MCH 32.3 07/29/2024    MCHC 31.8 07/29/2024    RDW 14.4 07/29/2024     07/29/2024     Lab Results   Component Value Date    ALBUMIN 4.7 07/29/2024    ALKPHOS 116 07/29/2024    AST 25 07/29/2024     Lab Results   Component Value Date    INR 1.14 07/29/2024       MELD 3.0: 14 at 7/29/2024 12:48 PM  MELD-Na: 13 at 7/29/2024 12:48 PM  Calculated from:  Serum Creatinine: 1.68 mg/dL at 7/29/2024 12:48 PM  Serum Sodium: 137 mmol/L at 7/29/2024 12:48 PM  Total Bilirubin: 0.9 mg/dL (Using min of 1 mg/dL) at 7/29/2024 12:48 PM  Serum Albumin: 4.7 g/dL (Using max of 3.5 g/dL) at 7/29/2024 12:48 PM  INR(ratio): 1.14 at 7/29/2024 12:48  PM  Age at listing (hypothetical): 74 years  Sex: Female at 7/29/2024 12:48 PM      Procedures:    EGD 3/2023:   Impression:    - Gastric erosions and duodenal ulcer seen in January                          have healed.                          - Gastroesophageal flap valve classified as Hill Grade                          III (minimal fold, loose to endoscope, hiatal hernia                          likely).                          - 4 cm hiatal hernia.                          - Portal hypertensive gastropathy.                          - Normal duodenal bulb, first portion of the duodenum                          and second portion of the duodenum.     Right Heart Cath 7/2024:    Prox RCA to Mid RCA lesion is 40% stenosed.    Prox LAD to Mid LAD lesion is 20% stenosed.    Prox Cx to Mid Cx lesion is 30% stenosed.    Right sided filling pressures are mildly elevated.    Left sided filling pressures are normal.    Moderately elevated pulmonary artery hypertension.    Reduced cardiac output level.       Relevant Imaging  CT A/P 12/2022:   1. Colonic diverticulosis without evidence of diverticulitis.     2. Moderate colonic stool burden.     3. Relative left hepatic lobe hypertrophy, subtle nodular contour and  fissural widening indicates cirrhotic morphology.           ASSESSMENT/PLAN:  74 year old female with a PMHx pertinent for HTN, HLD, DMII, hypothyroidism, pulmonary hypertension, ulcerative colitis, diverticulosis complicated by diverticulitis status post sigmoid colectomy who was referred to liver clinic for evaluation of fibrosis.      #. Stage 3 Fibrosis   -Patient with elastography findings of stage III fibrosis without steatosis.  No clinical evidence of underlying liver disease. Normal INR and albumin suggests intact synthetic liver function.  Normal spleen size and platelet count argue against clinically significant portal hypertension; most recent imaging without radiographic findings of portal  hypertension.  -Unclear etiology of underlying liver disease.  Given underlying metabolic syndrome and valvular heart disease/pulmonary hypertension, differential includes MASLD vs. congestive hepatopathy mediated fibrosis.  -In the absence of advanced fibrosis/cirrhosis and clinical decompensation secondary to chronic liver disease, limited utility of further workup at this time.    Plan:  -In the absence of clinical sequelae of chronic liver disease and absence of underlying advanced fibrosis/cirrhosis, no indication for further work-up from a hepatology standpoint.   -Yearly Liver Biochemistries per PCP   -Hepatitis B immunization per PCP     RTC not clinically indicated at this time        Thank you very much for allowing me to participate in the care of this patient.  If you have any questions regarding my recommendations, please do not hesitate to contact me.        Patient discussed and seen with Staff Dr. Rapp, who is in agreement with the above.       LAKHWINDER BELLA MD  Gastroenterology/Transplant Hepatology Fellow, PGY-6  [facility]   Department of Gastroenterology, Hepatology and Nutrition

## 2024-07-29 NOTE — PATIENT INSTRUCTIONS
You were seen in clinic today for abnormal imaging of your liver that was suggestive of fibrosis i.e. scarring in your liver. We discussed that   This could be due to several reasons :     (1) a complication of your long standing diabetes, hypothyroidism and hypertension (MASLD) or    (2) a complication of your underlying heart disease, or    (3) Fibrosis of unclear cause     It is reassuring that you are not experiencing any complications of liver disease.     For now, we recommend that you continue to follow in your primary care clinic and GI clinic.     If you have any questions about your liver disease care or tests, you can call the clinic at [phone].

## 2024-07-29 NOTE — PROGRESS NOTES
Hepatology Staff    This patient was referred by Dr. James because of possible possible hepatic fibrosis.    I saw, examined, and discussed the case with Dr. José. I agree with her assessment and plan.    This patient may have advanced fibrosis based on imaging tests, but does not have evidence of decompensation.  Her liver enzymes, albumin, INR, and platelet count are normal.  Her endoscopy last year showed suggested portal hypertensive gastropathy, but no varices were noted.    I agree that further evaluation in hepatology clinic is not needed.  She will continue to be followed in IBD clinic as well as her primary care clinic.  She will be undergoing a TAVR for her aortic stenosis in the near future.    About 45 minutes spent today with the patient, reviewing results, and coordinating care.

## 2024-07-29 NOTE — LETTER
7/29/2024      Keerthi Montoya  [address] [place] [id number]      Dear Colleague,    Thank you for referring your patient, Keerthi Montoya, to the [facility] [place]. Please see a copy of my visit note below.    HEPATOLOGY CLINIC VISIT - NEW PATIENT    CC/REFERRING MD:   Preeti James  REASON FOR CONSULTATION:  Fibrosis     HPI:  74 year old female with a PMHx pertinent for HTN, HLD, DMII, hypothyroidism, pulmonary hypertension, ulcerative colitis, diverticulosis complicated by diverticulitis status post sigmoid colectomy who was referred to liver clinic for evaluation of fibrosis.    Briefly, patient underwent an upper endoscopy on 3/2023 in the setting of unexplained iron deficiency anemia and at that time was found to have incidental finding of gastric mucosal changes concerning for portal hypertensive gastropathy.  She subsequently done had a FibroScan that revealed stage III fibrosis without steatosis.  Additional testing notable for negative workup of hepatitis C, negative hepatitis B serologies, negative celiac serologies, normal ferritin; LILLIAN serologies borderline positive however anti-smooth muscle antibody and antimitochondrial antibody not performed.  Most recent liver biochemistries within normal limits.  CT of the abdomen pelvis with contrast done in 2022 with normal liver size and spleen size and no evidence of portal hypertension with portosystemic collaterals.     Patient denies any past medical history of liver disease.  Denies any jaundice, scleral icterus, pruritus, lower extremity edema, lethargy/confusion.  Does note longstanding history of non-insulin-dependent diabetes mellitus.  States that she was diagnosed with hypothyroidism following a partial thyroidectomy at the age of 24; thyroidectomy performed for an enlarged/obstructive goiter.  Of note, patient with established pulmonary hypertension in the setting of severe tricuspid  insufficiency; also has underlying severe aortic stenosis and is due to undergo TAVR on 8/14/2024.    With regards to pertinent risk factors, patient denies any history of significant alcohol use endorsing social alcohol intake when she was younger and denying any recent alcohol intake.  She denies any family history of liver disease.          ROS:  10pt ROS performed and otherwise negative.    Allergies  Allergies   Allergen Reactions     Actos [Pioglitazone]      Fluid retention     Amlodipine      Leg swelling, hand swelling     Animal Dander      Doxycycline Hives     Dust Mites      Grass      Keflex [Cephalexin Hcl] Hives     Metoprolol      Swelling of lower legs and fatigue     Other [Seasonal Allergies]      pollen     Ranitidine      rash     Synthroid [Levothyroxine Sodium] Swelling     Allergic to brand name     Tetracycline Hives     Tylenol Hives     Ziac [Bisoprolol-Hydrochlorothiazide]        Medications:   Medications reviewed with patient today, see Medication List/Assessment for details.    Current Outpatient Medications   Medication Sig Dispense Refill     aspirin 81 MG EC tablet Take 1 tablet (81 mg) by mouth daily 90 tablet 3     atenolol (TENORMIN) 25 MG tablet Take 1 tablet (25 mg) by mouth daily 90 tablet 3     atorvastatin (LIPITOR) 40 MG tablet Take 1 tablet (40 mg) by mouth daily 90 tablet 3     blood glucose (ACCU-CHEK FELIPE PLUS) test strip 200 strips by In Vitro route 2 times daily Use to test blood sugar 2 times daily or as directed. 200 strip 4     Cyanocobalamin (B-12) 1000 MCG TABS Take 1,000 mcg by mouth three times a week       diphenhydrAMINE (BENADRYL) 25 MG tablet Take 25 mg by mouth every 6 hours as needed for itching or allergies       dulaglutide (TRULICITY) 1.5 MG/0.5ML pen Inject 1.5 mg Subcutaneous every 7 days After 4 doses of 0.75 mg 2 mL 1     empagliflozin (JARDIANCE) 10 MG TABS tablet Take 1 tablet (10 mg) by mouth daily 90 tablet 3     glipiZIDE (GLUCOTROL XL) 10  MG 24 hr tablet Take 2 tablets (20 mg) by mouth daily 60 tablet 3     hydrALAZINE (APRESOLINE) 50 MG tablet Take 1 tablet (50 mg) by mouth 3 times daily 180 tablet 3     isosorbide mononitrate (IMDUR) 30 MG 24 hr tablet Take 1 tablet (30 mg) by mouth daily 90 tablet 3     levothyroxine (SYNTHROID/LEVOTHROID) 125 MCG tablet Take 1 tablet (125 mcg) by mouth daily 90 tablet 0     losartan (COZAAR) 100 MG tablet Take 1 tablet (100 mg) by mouth daily 90 tablet 3     mesalamine (APRISO ER) 0.375 g 24 hr capsule TAKE 4 CAPSULES(1.5 GRAMS) BY MOUTH DAILY 360 capsule 1     pantoprazole (PROTONIX) 40 MG EC tablet Take 1 tablet (40 mg) by mouth daily 90 tablet 3     senna (SENOKOT) 8.6 MG tablet Take 1 tablet by mouth daily as needed for constipation 30 tablet 3     torsemide (DEMADEX) 10 MG tablet Take 1 tablet (10 mg) by mouth every other day       No current facility-administered medications for this visit.          Surgical History:   Past Surgical History:   Procedure Laterality Date     CHOLECYSTECTOMY  3/1987     COLON SURGERY  01/2001    Left colon resection; diverticulitis     COLONOSCOPY N/A 4/24/2017    Procedure: COMBINED COLONOSCOPY, SINGLE OR MULTIPLE BIOPSY/POLYPECTOMY BY BIOPSY;;  Surgeon: Rdaha Salguero MD;  Location: [facility] OR     COLONOSCOPY N/A 3/10/2023    Procedure: COLONOSCOPY;  Surgeon: Preeti James MD;  Location:  GI     COLONOSCOPY WITH CO2 INSUFFLATION N/A 4/24/2017    Procedure: COLONOSCOPY WITH CO2 INSUFFLATION;  Colonoscopy Dx rectal bleeding, BMI 25.86, Pharm [facility] .[phone], ;  Surgeon: Radha Salguero MD;  Location: [facility] OR     CV CORONARY ANGIOGRAM N/A 7/15/2024    Procedure: Coronary Angiogram with possible intervention;  Surgeon: Bobby Grimes MD;  Location:  HEART CARDIAC CATH LAB     CV LEFT ATRIAL APPENDAGE CLOSURE N/A 5/11/2023    Procedure: Left Atrial Appendage Closure;  Surgeon: Bobby Grimes MD;  Location:  HEART CARDIAC CATH LAB     CV RIGHT HEART  CATH MEASUREMENTS RECORDED N/A 3/16/2020    Procedure: CV RIGHT HEART CATH;  Surgeon: Nader Muñoz MD;  Location:  HEART CARDIAC CATH LAB     CV RIGHT HEART CATH MEASUREMENTS RECORDED N/A 7/15/2024    Procedure: Right Heart Cath;  Surgeon: Bobby Grimes MD;  Location:  HEART CARDIAC CATH LAB     ESOPHAGOSCOPY, GASTROSCOPY, DUODENOSCOPY (EGD), COMBINED N/A 1/23/2023    Procedure: ESOPHAGOGASTRODUODENOSCOPY, WITH BIOPSY;  Surgeon: Ricki Deal MD;  Location:  GI     ESOPHAGOSCOPY, GASTROSCOPY, DUODENOSCOPY (EGD), COMBINED N/A 3/10/2023    Procedure: Esophagoscopy, gastroscopy, duodenoscopy (EGD), combined;  Surgeon: Preeti James MD;  Location:  GI     GYN SURGERY  9/1983    tubal ligation     HERNIA REPAIR  12/2006    recurrent incisional hernia     SIGMOIDOSCOPY FLEXIBLE N/A 1/23/2023    Procedure: Sigmoidoscopy flexible;  Surgeon: Ricki Deal MD;  Location:  GI     THROAT SURGERY  12/1974    thyroidectomy         Past Medical History:  As noted above.  Past Medical History:   Diagnosis Date     Chronic kidney disease      Diabetes mellitus, type 2 (H)      Diverticulosis of colon (without mention of hemorrhage) 10/01/2012     GI bleed      History of blood transfusion      HLD (hyperlipidemia)      Hypertension      Hypothyroidism      MARIA D (iron deficiency anemia)      Persistent atrial fibrillation (H)      Pulmonary hypertension (H)      Ulcerative (chronic) enterocolitis (H)        Social History:   Social History     Tobacco Use     Smoking status: Never     Passive exposure: Never     Smokeless tobacco: Never   Substance Use Topics     Alcohol use: Not Currently     Alcohol/week: 2.0 - 4.0 standard drinks of alcohol     Types: 1 - 2 Cans of beer, 1 - 2 Shots of liquor per week     Comment: occasional cocktail or beer         Family History:   No colon/panc/esophageal/other GI CA, no other HNPCC-related [place].  No celiac, no other AI/liver  Family History   Problem  Relation Age of Onset     Cerebrovascular Disease Mother      Cancer Father         bladder     Diverticulitis Brother      Diverticulitis Brother      Kidney Disease No family hx of      Crohn's Disease No family hx of      Ulcerative Colitis No family hx of      Stomach Cancer No family hx of      GERD No family hx of      Celiac Disease No family hx of      Anesthesia Reaction No family hx of          Vitals:   LMP  (LMP Unknown)   There is no height or weight on file to calculate BMI.    Physical Examination:  Constitutional: Well-developed, well-nourished, in no apparent distress.    HEENT: Normocephalic.  scleral icterus. Moist oral mucosa.   Neck/Lymph: Normal ROM, supple. No thyromegaly.  No lymphadenopathy  Cardiac:  Regular rate and rhythm.    Respiratory: Clear to auscultation bilaterally.   GI:  Abdomen soft, non-distended, non-tender. BS present. No shifting dullness. No overt hepatosplenomegaly.     Skin:  Skin is warm and dry. No rash noted.  No jaundice. No spider nevi noted.  No palmar erythema  Peripheral Vascular: No lower extremity edema.   Musculoskeletal:  ROM intact, reduced muscle bulk  for age   Psychiatric: Normal mood and affect. Behavior is normal.  Neuro:  No asterixis, No tremor    Labs:   Lab Results   Component Value Date     07/29/2024    POTASSIUM 4.7 07/29/2024    CHLORIDE 100 07/29/2024    ANIONGAP 12 07/29/2024    CO2 25 07/29/2024    BUN 21.4 07/29/2024    CR 1.68 (H) 07/29/2024    GFRESTIMATED 32 (L) 07/29/2024    ABEL 10.1 07/29/2024      Lab Results   Component Value Date    WBC 7.2 07/29/2024    HGB 14.0 07/29/2024    HCT 44.0 07/29/2024     (H) 07/29/2024    MCH 32.3 07/29/2024    MCHC 31.8 07/29/2024    RDW 14.4 07/29/2024     07/29/2024     Lab Results   Component Value Date    ALBUMIN 4.7 07/29/2024    ALKPHOS 116 07/29/2024    AST 25 07/29/2024     Lab Results   Component Value Date    INR 1.14 07/29/2024       MELD 3.0: 14 at 7/29/2024 12:48  PM  MELD-Na: 13 at 7/29/2024 12:48 PM  Calculated from:  Serum Creatinine: 1.68 mg/dL at 7/29/2024 12:48 PM  Serum Sodium: 137 mmol/L at 7/29/2024 12:48 PM  Total Bilirubin: 0.9 mg/dL (Using min of 1 mg/dL) at 7/29/2024 12:48 PM  Serum Albumin: 4.7 g/dL (Using max of 3.5 g/dL) at 7/29/2024 12:48 PM  INR(ratio): 1.14 at 7/29/2024 12:48 PM  Age at listing (hypothetical): 74 years  Sex: Female at 7/29/2024 12:48 PM      Procedures:    EGD 3/2023:   Impression:    - Gastric erosions and duodenal ulcer seen in January                          have healed.                          - Gastroesophageal flap valve classified as Hill Grade                          III (minimal fold, loose to endoscope, hiatal hernia                          likely).                          - 4 cm hiatal hernia.                          - Portal hypertensive gastropathy.                          - Normal duodenal bulb, first portion of the duodenum                          and second portion of the duodenum.     Right Heart Cath 7/2024:     Prox RCA to Mid RCA lesion is 40% stenosed.     Prox LAD to Mid LAD lesion is 20% stenosed.     Prox Cx to Mid Cx lesion is 30% stenosed.     Right sided filling pressures are mildly elevated.     Left sided filling pressures are normal.     Moderately elevated pulmonary artery hypertension.     Reduced cardiac output level.       Relevant Imaging  CT A/P 12/2022:   1. Colonic diverticulosis without evidence of diverticulitis.     2. Moderate colonic stool burden.     3. Relative left hepatic lobe hypertrophy, subtle nodular contour and  fissural widening indicates cirrhotic morphology.           ASSESSMENT/PLAN:  74 year old female with a PMHx pertinent for HTN, HLD, DMII, hypothyroidism, pulmonary hypertension, ulcerative colitis, diverticulosis complicated by diverticulitis status post sigmoid colectomy who was referred to liver clinic for evaluation of fibrosis.      #. Stage 3 Fibrosis   -Patient with  elastography findings of stage III fibrosis without steatosis.  No clinical evidence of underlying liver disease. Normal INR and albumin suggests intact synthetic liver function.  Normal spleen size and platelet count argue against clinically significant portal hypertension; most recent imaging without radiographic findings of portal hypertension.  -Unclear etiology of underlying liver disease.  Given underlying metabolic syndrome and valvular heart disease/pulmonary hypertension, differential includes MASLD vs. congestive hepatopathy mediated fibrosis.  -In the absence of advanced fibrosis/cirrhosis and clinical decompensation secondary to chronic liver disease, limited utility of further workup at this time.    Plan:  -In the absence of clinical sequelae of chronic liver disease and absence of underlying advanced fibrosis/cirrhosis, no indication for further work-up from a hepatology standpoint.   -Yearly Liver Biochemistries per PCP   -Hepatitis B immunization per PCP     RTC not clinically indicated at this time        Thank you very much for allowing me to participate in the care of this patient.  If you have any questions regarding my recommendations, please do not hesitate to contact me.        Patient discussed and seen with Staff Dr. Rapp, who is in agreement with the above.       LAKHWINDER JOSÉ MD  Gastroenterology/Transplant Hepatology Fellow, PGY-6  [facility]   Department of Gastroenterology, Hepatology and Nutrition    Hepatology Staff    This patient was referred by Dr. James because of possible possible hepatic fibrosis.    I saw, examined, and discussed the case with Dr. José. I agree with her assessment and plan.    This patient may have advanced fibrosis based on imaging tests, but does not have evidence of decompensation.  Her liver enzymes, albumin, INR, and platelet count are normal.  Her endoscopy last year showed suggested portal hypertensive gastropathy, but no varices  were noted.    I agree that further evaluation in hepatology clinic is not needed.  She will continue to be followed in IBD clinic as well as her primary care clinic.  She will be undergoing a TAVR for her aortic stenosis in the near future.    About 45 minutes spent today with the patient, reviewing results, and coordinating care.      Again, thank you for allowing me to participate in the care of your patient.        Sincerely,        Davie Rapp MD

## 2024-07-31 DIAGNOSIS — I50.23 ACUTE ON CHRONIC SYSTOLIC CONGESTIVE HEART FAILURE (H): ICD-10-CM

## 2024-07-31 DIAGNOSIS — I35.0 SEVERE AORTIC STENOSIS: ICD-10-CM

## 2024-07-31 DIAGNOSIS — I35.0 AORTIC STENOSIS: Primary | ICD-10-CM

## 2024-08-02 ENCOUNTER — TELEPHONE (OUTPATIENT)
Dept: CARDIOLOGY | Facility: CLINIC | Age: 74
End: 2024-08-02

## 2024-08-02 ENCOUNTER — VIRTUAL VISIT (OUTPATIENT)
Dept: GASTROENTEROLOGY | Facility: CLINIC | Age: 74
End: 2024-08-02
Attending: INTERNAL MEDICINE
Payer: MEDICARE

## 2024-08-02 VITALS — HEIGHT: 63 IN | BODY MASS INDEX: 24.63 KG/M2 | WEIGHT: 139 LBS

## 2024-08-02 DIAGNOSIS — K52.9 COLITIS: ICD-10-CM

## 2024-08-02 PROCEDURE — 99214 OFFICE O/P EST MOD 30 MIN: CPT | Mod: 95 | Performed by: DIETITIAN, REGISTERED

## 2024-08-02 RX ORDER — MESALAMINE 0.38 G/1
1.5 CAPSULE, EXTENDED RELEASE ORAL DAILY
Qty: 360 CAPSULE | Refills: 3 | Status: SHIPPED | OUTPATIENT
Start: 2024-08-02

## 2024-08-02 ASSESSMENT — PAIN SCALES - GENERAL: PAINLEVEL: NO PAIN (0)

## 2024-08-02 NOTE — PROGRESS NOTES
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     Endoscopic assessment:   EGD 2023:  Impression:            - Gastric erosions and duodenal ulcer seen in January                          have healed.                          - Gastroesophageal flap valve classified as Hill Grade                          III (minimal fold, loose to endoscope, hiatal hernia                          likely).                          - 4 cm hiatal hernia.                          - Portal hypertensive gastropathy.                          - Normal duodenal bulb, first portion of the duodenum                          and second portion of the duodenum.     CSCOPE 2023:    Impression:            - Inactive (Barrera Score 0) ulcerative colitis, in                          remission since the last examination.                          - Perianal skin tags found on perianal exam.                          - The examined portion of the ileum was normal.                          - Diverticulosis in the sigmoid colon and in the                          ascending colon.     colonoscopy 12/12/17 normal ileum, patchy mild erythema and mucosa edema in rectum and sigmoid colon, markedly improved compared to 4/20/2017 outside colonoscopy report.  Mild patchy mucosal granularity noted throughout the  remainder of the colon.  Rectal polyp 4 mm sessile removed.  Diverticulosis in transverse and descending colon.  Internal and external hemorrhoids noted.  Copious brown opaque semiliquid stool and medication material throughout colon interfering with visualization.  Barrera score 0.  Only biopsies obtained were from the rectal polyp, noted to be an inflammatory polyp.  No evidence of dysplasia.  No other biopsies obtained.  Enterography: --  Fecal calprotectin: --   C diff: --  Barrera score:   Stool freq: 0 (baseline stools frequency)  Rectal bleedin (None)  PGA: 0 (normal)  Endoscopy: Not done    Remission: <3   Mild disease: 3-5  Moderate disease: 6-10  Severe disease: >10    IBDQ Score Date IBDQ - Total Score  (Higher score better)   2023   9:00 AM 58   5/15/2017   7:36 AM 51     ASSESSMENT/PLAN  Ms. Montoya is a 73 year old with past medical history to include hypertension, DM2, diabetic nephropathy, multinodular goiter s/p complete thyroidectomy ~40 yrs ago complicated by postoperative hypothyroidism, diverticulosis complicated by diverticulitis with prior limited left hemicolectomy , open cholecystectomy in , s/p ex-lap 30 yrs ago for presumed ectopic pregnancy (apparent diverticulitis, nonsurgical management on discovery), s/p tubal ligation  complicated postsurgical VAH s/p mesh repair  here for follow-up for UC pancolitis follow up.    1.  Pan-UC: She continues to be in clinical remission with mesalamine monotherapy.  Labs this month stable.  Last endoscopic assessment was in 3/2023, noting Barrera 0.    -- Continue Apriso 1.5 g daily    2. CKD stage 3:   Likely from combination of DM and HTN. Mesalamine can also cause AIN but recent Cr has been stable.  Last checked 2024 lives 1.68.  -- Continue to follow up with nephrology.     3.  Stage III liver fibrosis  Elastography showed stage III liver fibrosis without steatosis.  LFTs have been normal.  Platelets, INR, albumin normal.  Seen by  hepatology clinic last month (Dr. Rapp). Per their assessment possible MASLD vs. congestive hepatopathy Given no symptoms and unremarkable labs recommended no further workup/intervention.  Recommend monitoring at least hepatic panel yearly.  Follow-up with liver clinic as needed.    Cancer Screening:  Colon cancer screening:  Given pancolitis colonoscopy every 2-3 years recommended after 8 years of disease.  Dysplasia screening is recommended 2025.    Misc:  -- Avoid tobacco use  -- Avoid NSAIDs as there is potentially a 25% chance of causing an IBD flare    RTC 1 year     33 minutes spent on the date of the encounter doing chart review, history and exam, documentation and further activities as noted above.  It was a pleasure to participate in the care of this patient; please contact us with any further questions.      HPI:   Ms. Montoya is a 71 year old with past medical history to include hypertension, DM2, diabetic nephropathy, multinodular goiter s/p complete thyroidectomy ~40 yrs ago complicated by postoperative hypothyroidism, diverticulosis complicated by diverticulitis with prior limited left hemicolectomy 2002, open cholecystectomy in 2000, s/p ex-lap 30 yrs ago for presumed ectopic pregnancy (apparent diverticulitis, nonsurgical management on discovery), s/p tubal ligation 1983 complicated postsurgical VAH s/p mesh repair 2007 here for follow-up for UC pancolitis follow up.       Interval history 8/3/2022:  Patient is doing well and is at baseline bowel pattern with 1-2 soft formed stools per day.  No urgency or nighttime stools. No blood in the stool. Appetite is good with stable weight around 140lbs. Taking mesalamine 1.5gm daily, vitamin D and oral iron supplement. CBC and LFTs were unremarkable. Her Cr is 1.9 (recent baseline 1.6-1.8). She is being seen by nephrology.     Interval history 8/2023:  Had a Watchman procedure and on Brilinta. No recurrent GI bleeding. Continues on protonix BID.      Currently: 2 BMs per day. No rectal bleeding, urgency.  Taking mesalamine 1.5gm daily,   Having IV iron.     Interval history 8/2024 (video visit):  Having TAVR this month for her aortic stenosis.     Saw hepatology (Dr. Rapp) 7/29/24 for stage 3 fibrosis seen on elastography without steatosis. Per their assessment possible MASLD vs. congestive hepatopathy mediated fibrosis. Given no symptoms, liver enzymes wnl, INR, plts, albumin normal recommended no further workup/intervention.     Today she reports she continues to do well from a GI standpoint.  Continues on Apriso, 4 pills/day, no issues with this.  Having 2 formed stools per day without urgency.  No tenesmus.  No nocturnal stools.  No blood in stool.  Appetite is good, weight stable.  No EIM.    Watches grandchildren during the week.  Ages 7 and 9.      ROS:  Complete 10 System ROS performed. All are negative except as documented below, in the HPI, or in patient questionnaire from today's visit.    No fevers or chills  No weight loss  No blurry vision, double vision or change in vision  No sore throat  No lymphadenopathy  No headache, paraesthesias, or weakness in a limb  No shortness of breath or wheezing  No chest pain or pressure  No arthralgias or myalgias  No rashes or skin changes  No odynophagia or dysphagia  No BRBPR, hematochezia, melena  No dysuria, frequency or urgency  No hot/cold intolerance or polyria  No anxiety or depression    Extra intestinal manifestations of IBD:  No uveitis/episcleritis  No aphthous ulcers   No arthritis   No erythema nodosum/pyoderma gangrenosum.     PERTINENT PAST MEDICAL HISTORY:  Past Medical History:   Diagnosis Date    Chronic kidney disease     Diabetes mellitus, type 2 (H)     Diverticulosis of colon (without mention of hemorrhage) 10/01/2012    GI bleed     History of blood transfusion     HLD (hyperlipidemia)     Hypertension     Hypothyroidism     MARIA D (iron deficiency anemia)     Persistent atrial  fibrillation (H)     Pulmonary hypertension (H)     Ulcerative (chronic) enterocolitis (H)        PREVIOUS SURGERIES:  Past Surgical History:   Procedure Laterality Date    CHOLECYSTECTOMY  3/1987    COLON SURGERY  01/2001    Left colon resection; diverticulitis    COLONOSCOPY N/A 4/24/2017    Procedure: COMBINED COLONOSCOPY, SINGLE OR MULTIPLE BIOPSY/POLYPECTOMY BY BIOPSY;;  Surgeon: Radha Salguero MD;  Location: [facility]    COLONOSCOPY N/A 3/10/2023    Procedure: COLONOSCOPY;  Surgeon: Preeti James MD;  Location: [facility]    COLONOSCOPY WITH CO2 INSUFFLATION N/A 4/24/2017    Procedure: COLONOSCOPY WITH CO2 INSUFFLATION;  Colonoscopy Dx rectal bleeding, BMI 25.86, Pharm [facility] .[phone], ;  Surgeon: Radha Salguero MD;  Location: [facility]    CV CORONARY ANGIOGRAM N/A 7/15/2024    Procedure: Coronary Angiogram with possible intervention;  Surgeon: Bobby Grimes MD;  [facility]    CV LEFT ATRIAL APPENDAGE CLOSURE N/A 5/11/2023    Procedure: Left Atrial Appendage Closure;  Surgeon: Bobby Grimes MD;  [facility]    CV RIGHT HEART CATH MEASUREMENTS RECORDED N/A 3/16/2020    Procedure: CV RIGHT HEART CATH;  Surgeon: Nader Muñoz MD;  [facility]    CV RIGHT HEART CATH MEASUREMENTS RECORDED N/A 7/15/2024    Procedure: Right Heart Cath;  Surgeon: Bobby Grimes MD;  [facility]    ESOPHAGOSCOPY, GASTROSCOPY, DUODENOSCOPY (EGD), COMBINED N/A 1/23/2023    Procedure: ESOPHAGOGASTRODUODENOSCOPY, WITH BIOPSY;  Surgeon: Ricki Deal MD;  [facility]    ESOPHAGOSCOPY, GASTROSCOPY, DUODENOSCOPY (EGD), COMBINED N/A 3/10/2023    Procedure: Esophagoscopy, gastroscopy, duodenoscopy (EGD), combined;  Surgeon: Preeti James MD;  [facility]    GYN SURGERY  9/1983    tubal ligation    HERNIA REPAIR  12/2006    recurrent incisional hernia    SIGMOIDOSCOPY FLEXIBLE N/A 1/23/2023     Procedure: Sigmoidoscopy flexible;  Surgeon: Ricki Deal MD;  Location:  GI    THROAT SURGERY  12/1974    thyroidectomy     ALLERGIES:     Allergies   Allergen Reactions    Actos [Pioglitazone]      Fluid retention    Amlodipine      Leg swelling, hand swelling    Animal Dander     Doxycycline Hives    Dust Mites     Grass     Keflex [Cephalexin Hcl] Hives    Metoprolol      Swelling of lower legs and fatigue    Other [Seasonal Allergies]      pollen    Ranitidine      rash    Synthroid [Levothyroxine Sodium] Swelling     Allergic to brand name    Tetracycline Hives    Tylenol Hives    Ziac [Bisoprolol-Hydrochlorothiazide]        PERTINENT MEDICATIONS:    Current Outpatient Medications:     aspirin 81 MG EC tablet, Take 1 tablet (81 mg) by mouth daily, Disp: 90 tablet, Rfl: 3    atenolol (TENORMIN) 25 MG tablet, Take 1 tablet (25 mg) by mouth daily, Disp: 90 tablet, Rfl: 3    atorvastatin (LIPITOR) 40 MG tablet, Take 1 tablet (40 mg) by mouth daily, Disp: 90 tablet, Rfl: 3    blood glucose (ACCU-CHEK FELIPE PLUS) test strip, 200 strips by In Vitro route 2 times daily Use to test blood sugar 2 times daily or as directed., Disp: 200 strip, Rfl: 4    Cyanocobalamin (B-12) 1000 MCG TABS, Take 1,000 mcg by mouth three times a week, Disp: , Rfl:     diphenhydrAMINE (BENADRYL) 25 MG tablet, Take 25 mg by mouth every 6 hours as needed for itching or allergies, Disp: , Rfl:     dulaglutide (TRULICITY) 1.5 MG/0.5ML pen, Inject 1.5 mg Subcutaneous every 7 days After 4 doses of 0.75 mg, Disp: 2 mL, Rfl: 1    empagliflozin (JARDIANCE) 10 MG TABS tablet, Take 1 tablet (10 mg) by mouth daily, Disp: 90 tablet, Rfl: 3    glipiZIDE (GLUCOTROL XL) 10 MG 24 hr tablet, Take 2 tablets (20 mg) by mouth daily, Disp: 60 tablet, Rfl: 3    hydrALAZINE (APRESOLINE) 50 MG tablet, Take 1 tablet (50 mg) by mouth 3 times daily, Disp: 180 tablet, Rfl: 3    isosorbide mononitrate (IMDUR) 30 MG 24 hr tablet, Take 1 tablet (30 mg) by mouth  daily, Disp: 90 tablet, Rfl: 3    levothyroxine (SYNTHROID/LEVOTHROID) 125 MCG tablet, Take 1 tablet (125 mcg) by mouth daily, Disp: 90 tablet, Rfl: 0    losartan (COZAAR) 100 MG tablet, Take 1 tablet (100 mg) by mouth daily, Disp: 90 tablet, Rfl: 3    mesalamine (APRISO ER) 0.375 g 24 hr capsule, TAKE 4 CAPSULES(1.5 GRAMS) BY MOUTH DAILY, Disp: 360 capsule, Rfl: 1    pantoprazole (PROTONIX) 40 MG EC tablet, Take 1 tablet (40 mg) by mouth daily, Disp: 90 tablet, Rfl: 3    senna (SENOKOT) 8.6 MG tablet, Take 1 tablet by mouth daily as needed for constipation, Disp: 30 tablet, Rfl: 3    torsemide (DEMADEX) 10 MG tablet, Take 1 tablet (10 mg) by mouth every other day, Disp: , Rfl:     SOCIAL HISTORY:  Social History     Socioeconomic History    Marital status:      Spouse name: Raymundo; dementia;      Number of children: 3    Years of education: Not on file    Highest education level: Not on file   Occupational History     Employer: [employer]   Tobacco Use    Smoking status: Never     Passive exposure: Never    Smokeless tobacco: Never   Vaping Use    Vaping status: Never Used   Substance and Sexual Activity    Alcohol use: Not Currently     Alcohol/week: 2.0 - 4.0 standard drinks of alcohol     Types: 1 - 2 Cans of beer, 1 - 2 Shots of liquor per week     Comment: occasional cocktail or beer    Drug use: No    Sexual activity: Not Currently   Other Topics Concern    Parent/sibling w/ CABG, MI or angioplasty before 65F 55M? Not Asked     Service No    Blood Transfusions No    Caffeine Concern No    Occupational Exposure No    Hobby Hazards No    Sleep Concern No    Stress Concern No    Weight Concern No    Special Diet No    Back Care No    Exercise Yes     Comment: off and on    Bike Helmet No    Seat Belt Yes    Self-Exams No   Social History Narrative    Laid off her job      Social Determinants of Health     Financial Resource Strain: Low Risk  (10/20/2023)    Financial Resource  Strain     Within the past 12 months, have you or your family members you live with been unable to get utilities (heat, electricity) when it was really needed?: No   Food Insecurity: Low Risk  (10/20/2023)    Food Insecurity     Within the past 12 months, did you worry that your food would run out before you got money to buy more?: No     Within the past 12 months, did the food you bought just not last and you didn t have money to get more?: No   Transportation Needs: Low Risk  (10/20/2023)    Transportation Needs     Within the past 12 months, has lack of transportation kept you from medical appointments, getting your medicines, non-medical meetings or appointments, work, or from getting things that you need?: No   Physical Activity: Not on file   Stress: Not on file   Social Connections: Not on file   Interpersonal Safety: Not on file   Housing Stability: Low Risk  (10/20/2023)    Housing Stability     Do you have housing? : Yes     Are you worried about losing your housing?: No       FAMILY HISTORY:  Family History   Problem Relation Age of Onset    Cerebrovascular Disease Mother     Cancer Father         bladder    Diverticulitis Brother     Diverticulitis Brother     Kidney Disease No family hx of     Crohn's Disease No family hx of     Ulcerative Colitis No family hx of     Stomach Cancer No family hx of     GERD No family hx of     Celiac Disease No family hx of     Anesthesia Reaction No family hx of        Past/family/social history reviewed and no changes    PHYSICAL EXAMINATION:  Constitutional: aaox3, cooperative, pleasant, not dyspneic/diaphoretic, no acute distress  Vitals reviewed: LMP  (LMP Unknown)   Wt:   Wt Readings from Last 2 Encounters:   07/19/24 63.3 kg (139 lb 8 oz)   07/12/24 64.9 kg (143 lb 1.6 oz)      Eyes: Sclera anicteric/injected  Ears/nose/mouth/throat: Not examined.   Neck:  Not examined.   CV: No edema  Respiratory: Unlabored breathing  Lymph:  Not examined.   Abd:  Not examined.    Skin: no jaundice  Psych: Normal affect  MSK: Normal gait      PERTINENT STUDIES:  Most recent CBC:  Recent Labs   Lab Test 07/29/24  1248 07/15/24  0802   WBC 7.2 8.5   HGB 14.0 13.1   HCT 44.0 41.0    224     Most recent hepatic panel:  Recent Labs   Lab Test 07/29/24  1248 07/12/24  0633   ALT <5 7   AST 25 39     Most recent creatinine:  Recent Labs   Lab Test 07/29/24  1248 07/15/24  0802   CR 1.68* 1.74*

## 2024-08-02 NOTE — LETTER
8/2/2024      Keerthi Montoya  [address] [place] [id number]      Dear Colleague,    Thank you for referring your patient, Keerthi Montoya, to the [facility] [place]. Please see a copy of my visit note below.    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     Endoscopic assessment:   EGD 2023:  Impression:            - Gastric erosions and duodenal ulcer seen in January                          have healed.                          - Gastroesophageal flap valve classified as Hill Grade                          III (minimal fold, loose to endoscope, hiatal hernia                          likely).                          - 4 cm hiatal hernia.                          - Portal hypertensive gastropathy.                          - Normal duodenal bulb, first portion of the duodenum                          and second portion of the duodenum.     CSCOPE 2023:    Impression:            - Inactive (Barrera Score 0) ulcerative colitis, in                          remission since the last examination.                          - Perianal skin tags found on perianal exam.                          - The examined portion of the ileum was normal.                          - Diverticulosis in the sigmoid colon and in the                           ascending colon.     colonoscopy 17 normal ileum, patchy mild erythema and mucosa edema in rectum and sigmoid colon, markedly improved compared to 2017 outside colonoscopy report.  Mild patchy mucosal granularity noted throughout the remainder of the colon.  Rectal polyp 4 mm sessile removed.  Diverticulosis in transverse and descending colon.  Internal and external hemorrhoids noted.  Copious brown opaque semiliquid stool and medication material throughout colon interfering with visualization.  Barrera score 0.  Only biopsies obtained were from the rectal polyp, noted to be an inflammatory polyp.  No evidence of dysplasia.  No other biopsies obtained.  Enterography: --  Fecal calprotectin: --   C diff: --  Barrera score:   Stool freq: 0 (baseline stools frequency)  Rectal bleedin (None)  PGA: 0 (normal)  Endoscopy: Not done    Remission: <3   Mild disease: 3-5  Moderate disease: 6-10  Severe disease: >10    IBDQ Score Date IBDQ - Total Score  (Higher score better)   2023   9:00 AM 58   5/15/2017   7:36 AM 51     ASSESSMENT/PLAN  Ms. Montoya is a 73 year old with past medical history to include hypertension, DM2, diabetic nephropathy, multinodular goiter s/p complete thyroidectomy ~40 yrs ago complicated by postoperative hypothyroidism, diverticulosis complicated by diverticulitis with prior limited left hemicolectomy , open cholecystectomy in , s/p ex-lap 30 yrs ago for presumed ectopic pregnancy (apparent diverticulitis, nonsurgical management on discovery), s/p tubal ligation  complicated postsurgical [facility] s/p mesh repair  here for follow-up for UC pancolitis follow up.    1.  Pan-UC: She continues to be in clinical remission with mesalamine monotherapy.  Labs this month stable.  Last endoscopic assessment was in 3/2023, noting Barrera 0.    -- Continue Apriso 1.5 g daily    2. CKD stage 3:   Likely from combination of DM and HTN. Mesalamine can also cause AIN but  recent Cr has been stable.  Last checked 7/29/2024 lives 1.68.  -- Continue to follow up with nephrology.     3.  Stage III liver fibrosis  Elastography showed stage III liver fibrosis without steatosis.  LFTs have been normal.  Platelets, INR, albumin normal.  Seen by hepatology clinic last month (Dr. Rapp). Per their assessment possible MASLD vs. congestive hepatopathy Given no symptoms and unremarkable labs recommended no further workup/intervention.  Recommend monitoring at least hepatic panel yearly.  Follow-up with liver clinic as needed.    Cancer Screening:  Colon cancer screening:  Given pancolitis colonoscopy every 2-3 years recommended after 8 years of disease.  Dysplasia screening is recommended 2025.    Misc:  -- Avoid tobacco use  -- Avoid NSAIDs as there is potentially a 25% chance of causing an IBD flare    RTC 1 year     33 minutes spent on the date of the encounter doing chart review, history and exam, documentation and further activities as noted above.  It was a pleasure to participate in the care of this patient; please contact us with any further questions.      HPI:   Ms. Montoya is a 71 year old with past medical history to include hypertension, DM2, diabetic nephropathy, multinodular goiter s/p complete thyroidectomy ~40 yrs ago complicated by postoperative hypothyroidism, diverticulosis complicated by diverticulitis with prior limited left hemicolectomy 2002, open cholecystectomy in 2000, s/p ex-lap 30 yrs ago for presumed ectopic pregnancy (apparent diverticulitis, nonsurgical management on discovery), s/p tubal ligation 1983 complicated postsurgical [facility] s/p mesh repair 2007 here for follow-up for UC pancolitis follow up.       Interval history 8/3/2022:  Patient is doing well and is at baseline bowel pattern with 1-2 soft formed stools per day.  No urgency or nighttime stools. No blood in the stool. Appetite is good with stable weight around 140lbs. Taking mesalamine 1.5gm daily,  vitamin D and oral iron supplement. CBC and LFTs were unremarkable. Her Cr is 1.9 (recent baseline 1.6-1.8). She is being seen by nephrology.     Interval history 8/2023:  Had a Watchman procedure and on Brilinta. No recurrent GI bleeding. Continues on protonix BID.     Currently: 2 BMs per day. No rectal bleeding, urgency.  Taking mesalamine 1.5gm daily,   Having IV iron.     Interval history 8/2024 (video visit):  Having TAVR this month for her aortic stenosis.     Saw hepatology (Dr. Rapp) 7/29/24 for stage 3 fibrosis seen on elastography without steatosis. Per their assessment possible MASLD vs. congestive hepatopathy mediated fibrosis. Given no symptoms, liver enzymes wnl, INR, plts, albumin normal recommended no further workup/intervention.     Today she reports she continues to do well from a GI standpoint.  Continues on Apriso, 4 pills/day, no issues with this.  Having 2 formed stools per day without urgency.  No tenesmus.  No nocturnal stools.  No blood in stool.  Appetite is good, weight stable.  No EIM.    Watches grandchildren during the week.  Ages 7 and 9.      ROS:  Complete 10 System ROS performed. All are negative except as documented below, in the HPI, or in patient questionnaire from today's visit.    No fevers or chills  No weight loss  No blurry vision, double vision or change in vision  No sore throat  No lymphadenopathy  No headache, paraesthesias, or weakness in a limb  No shortness of breath or wheezing  No chest pain or pressure  No arthralgias or myalgias  No rashes or skin changes  No odynophagia or dysphagia  No BRBPR, hematochezia, melena  No dysuria, frequency or urgency  No hot/cold intolerance or polyria  No anxiety or depression    Extra intestinal manifestations of IBD:  No uveitis/episcleritis  No aphthous ulcers   No arthritis   No erythema nodosum/pyoderma gangrenosum.     PERTINENT PAST MEDICAL HISTORY:  Past Medical History:   Diagnosis Date    Chronic kidney disease      Diabetes mellitus, type 2 (H)     Diverticulosis of colon (without mention of hemorrhage) 10/01/2012    GI bleed     History of blood transfusion     HLD (hyperlipidemia)     Hypertension     Hypothyroidism     MARIA D (iron deficiency anemia)     Persistent atrial fibrillation (H)     Pulmonary hypertension (H)     Ulcerative (chronic) enterocolitis (H)        PREVIOUS SURGERIES:  Past Surgical History:   Procedure Laterality Date    CHOLECYSTECTOMY  3/1987    COLON SURGERY  01/2001    Left colon resection; diverticulitis    COLONOSCOPY N/A 4/24/2017    Procedure: COMBINED COLONOSCOPY, SINGLE OR MULTIPLE BIOPSY/POLYPECTOMY BY BIOPSY;;  Surgeon: Radha Salguero MD;  Location: [facility] OR    COLONOSCOPY N/A 3/10/2023    Procedure: COLONOSCOPY;  Surgeon: Preeti James MD;  Location: [facility]    COLONOSCOPY WITH CO2 INSUFFLATION N/A 4/24/2017    Procedure: COLONOSCOPY WITH CO2 INSUFFLATION;  Colonoscopy Dx rectal bleeding, BMI 25.86, Pharm [facility] .[phone], ;  Surgeon: Radha Salguero MD;  Location: [facility] OR    CV CORONARY ANGIOGRAM N/A 7/15/2024    Procedure: Coronary Angiogram with possible intervention;  Surgeon: Bobby Grimes MD;  Location: [facility]ARD[facility]    CV LEFT ATRIAL APPENDAGE CLOSURE N/A 5/11/2023    Procedure: Left Atrial Appendage Closure;  Surgeon: Bobby Grimes MD;  Location: [facility]    CV RIGHT HEART CATH MEASUREMENTS RECORDED N/A 3/16/2020    Procedure: CV RIGHT HEART CATH;  Surgeon: Nader Muñoz MD;  Location: [facility]    CV RIGHT HEART CATH MEASUREMENTS RECORDED N/A 7/15/2024    Procedure: Right Heart Cath;  Surgeon: Bobby Grimes MD;  [facility]    ESOPHAGOSCOPY, GASTROSCOPY, DUODENOSCOPY (EGD), COMBINED N/A 1/23/2023    Procedure: ESOPHAGOGASTRODUODENOSCOPY, WITH BIOPSY;  Surgeon: Ricki Deal MD;  Location: [facility]    ESOPHAGOSCOPY, GASTROSCOPY, DUODENOSCOPY (EGD), COMBINED N/A  3/10/2023    Procedure: Esophagoscopy, gastroscopy, duodenoscopy (EGD), combined;  Surgeon: Preeti James MD;  Location:  GI    GYN SURGERY  9/1983    tubal ligation    HERNIA REPAIR  12/2006    recurrent incisional hernia    SIGMOIDOSCOPY FLEXIBLE N/A 1/23/2023    Procedure: Sigmoidoscopy flexible;  Surgeon: Ricki Deal MD;  Location:  GI    THROAT SURGERY  12/1974    thyroidectomy     ALLERGIES:     Allergies   Allergen Reactions    Actos [Pioglitazone]      Fluid retention    Amlodipine      Leg swelling, hand swelling    Animal Dander     Doxycycline Hives    Dust Mites     Grass     Keflex [Cephalexin Hcl] Hives    Metoprolol      Swelling of lower legs and fatigue    Other [Seasonal Allergies]      pollen    Ranitidine      rash    Synthroid [Levothyroxine Sodium] Swelling     Allergic to brand name    Tetracycline Hives    Tylenol Hives    Ziac [Bisoprolol-Hydrochlorothiazide]        PERTINENT MEDICATIONS:    Current Outpatient Medications:     aspirin 81 MG EC tablet, Take 1 tablet (81 mg) by mouth daily, Disp: 90 tablet, Rfl: 3    atenolol (TENORMIN) 25 MG tablet, Take 1 tablet (25 mg) by mouth daily, Disp: 90 tablet, Rfl: 3    atorvastatin (LIPITOR) 40 MG tablet, Take 1 tablet (40 mg) by mouth daily, Disp: 90 tablet, Rfl: 3    blood glucose (ACCU-CHEK FELIPE PLUS) test strip, 200 strips by In Vitro route 2 times daily Use to test blood sugar 2 times daily or as directed., Disp: 200 strip, Rfl: 4    Cyanocobalamin (B-12) 1000 MCG TABS, Take 1,000 mcg by mouth three times a week, Disp: , Rfl:     diphenhydrAMINE (BENADRYL) 25 MG tablet, Take 25 mg by mouth every 6 hours as needed for itching or allergies, Disp: , Rfl:     dulaglutide (TRULICITY) 1.5 MG/0.5ML pen, Inject 1.5 mg Subcutaneous every 7 days After 4 doses of 0.75 mg, Disp: 2 mL, Rfl: 1    empagliflozin (JARDIANCE) 10 MG TABS tablet, Take 1 tablet (10 mg) by mouth daily, Disp: 90 tablet, Rfl: 3    glipiZIDE (GLUCOTROL XL) 10 MG 24 hr  tablet, Take 2 tablets (20 mg) by mouth daily, Disp: 60 tablet, Rfl: 3    hydrALAZINE (APRESOLINE) 50 MG tablet, Take 1 tablet (50 mg) by mouth 3 times daily, Disp: 180 tablet, Rfl: 3    isosorbide mononitrate (IMDUR) 30 MG 24 hr tablet, Take 1 tablet (30 mg) by mouth daily, Disp: 90 tablet, Rfl: 3    levothyroxine (SYNTHROID/LEVOTHROID) 125 MCG tablet, Take 1 tablet (125 mcg) by mouth daily, Disp: 90 tablet, Rfl: 0    losartan (COZAAR) 100 MG tablet, Take 1 tablet (100 mg) by mouth daily, Disp: 90 tablet, Rfl: 3    mesalamine (APRISO ER) 0.375 g 24 hr capsule, TAKE 4 CAPSULES(1.5 GRAMS) BY MOUTH DAILY, Disp: 360 capsule, Rfl: 1    pantoprazole (PROTONIX) 40 MG EC tablet, Take 1 tablet (40 mg) by mouth daily, Disp: 90 tablet, Rfl: 3    senna (SENOKOT) 8.6 MG tablet, Take 1 tablet by mouth daily as needed for constipation, Disp: 30 tablet, Rfl: 3    torsemide (DEMADEX) 10 MG tablet, Take 1 tablet (10 mg) by mouth every other day, Disp: , Rfl:     SOCIAL HISTORY:  Social History     Socioeconomic History    Marital status:      Spouse name: Raymundo; dementia;  2016    Number of children: 3    Years of education: Not on file    Highest education level: Not on file   Occupational History     Employer: [employer]   Tobacco Use    Smoking status: Never     Passive exposure: Never    Smokeless tobacco: Never   Vaping Use    Vaping status: Never Used   Substance and Sexual Activity    Alcohol use: Not Currently     Alcohol/week: 2.0 - 4.0 standard drinks of alcohol     Types: 1 - 2 Cans of beer, 1 - 2 Shots of liquor per week     Comment: occasional cocktail or beer    Drug use: No    Sexual activity: Not Currently   Other Topics Concern    Parent/sibling w/ CABG, MI or angioplasty before 65F 55M? Not Asked     Service No    Blood Transfusions No    Caffeine Concern No    Occupational Exposure No    Hobby Hazards No    Sleep Concern No    Stress Concern No    Weight Concern No    Special Diet No     Back Care No    Exercise Yes     Comment: off and on    Bike Helmet No    Seat Belt Yes    Self-Exams No   Social History Narrative    Laid off her job 8/14     Social Determinants of Health     Financial Resource Strain: Low Risk  (10/20/2023)    Financial Resource Strain     Within the past 12 months, have you or your family members you live with been unable to get utilities (heat, electricity) when it was really needed?: No   Food Insecurity: Low Risk  (10/20/2023)    Food Insecurity     Within the past 12 months, did you worry that your food would run out before you got money to buy more?: No     Within the past 12 months, did the food you bought just not last and you didn t have money to get more?: No   Transportation Needs: Low Risk  (10/20/2023)    Transportation Needs     Within the past 12 months, has lack of transportation kept you from medical appointments, getting your medicines, non-medical meetings or appointments, work, or from getting things that you need?: No   Physical Activity: Not on file   Stress: Not on file   Social Connections: Not on file   Interpersonal Safety: Not on file   Housing Stability: Low Risk  (10/20/2023)    Housing Stability     Do you have housing? : Yes     Are you worried about losing your housing?: No       FAMILY HISTORY:  Family History   Problem Relation Age of Onset    Cerebrovascular Disease Mother     Cancer Father         bladder    Diverticulitis Brother     Diverticulitis Brother     Kidney Disease No family hx of     Crohn's Disease No family hx of     Ulcerative Colitis No family hx of     Stomach Cancer No family hx of     GERD No family hx of     Celiac Disease No family hx of     Anesthesia Reaction No family hx of        Past/family/social history reviewed and no changes    PHYSICAL EXAMINATION:  Constitutional: aaox3, cooperative, pleasant, not dyspneic/diaphoretic, no acute distress  Vitals reviewed: LMP  (LMP Unknown)   Wt:   Wt Readings from Last 2  Encounters:   07/19/24 63.3 kg (139 lb 8 oz)   07/12/24 64.9 kg (143 lb 1.6 oz)      Eyes: Sclera anicteric/injected  Ears/nose/mouth/throat: Not examined.   Neck:  Not examined.   CV: No edema  Respiratory: Unlabored breathing  Lymph:  Not examined.   Abd:  Not examined.   Skin: no jaundice  Psych: Normal affect  MSK: Normal gait      PERTINENT STUDIES:  Most recent CBC:  Recent Labs   Lab Test 07/29/24  1248 07/15/24  0802   WBC 7.2 8.5   HGB 14.0 13.1   HCT 44.0 41.0    224     Most recent hepatic panel:  Recent Labs   Lab Test 07/29/24  1248 07/12/24  0633   ALT <5 7   AST 25 39     Most recent creatinine:  Recent Labs   Lab Test 07/29/24  1248 07/15/24  0802   CR 1.68* 1.74*       Again, thank you for allowing me to participate in the care of your patient.      Sincerely,    Kami Lim PA-C

## 2024-08-02 NOTE — TELEPHONE ENCOUNTER
Patient Name: Keerthi Montoya Age: 74 year old, female, 1950 MRN: [id number]   Referring Provider:  Ton Bunn MD Appt:  8/5/24       PH Medications:  NONE      Patient History: Pt has a history of HLD, CAD, PAH, HFpEF, A.S., HTN, Anemia, CKD3, DM2, Diverticulosis      Recent Testing:       Date Test Epic Media/Scan Care Everywhere    7/15/24 RHC             RA      14/16/12        PA:  84/16/39   PVR: 5.86   RV      RV 67/7/16        PAW:    PCWP 12/14/12      COI:Thermodilution CO/CI 2.8/1.7  []  []   []     na Angio/Stress []  []   []     na Echo           RVSP                                               LVEF                         RV                              []  []   []     7/15/24 EKG        52bpm    Sinus bradycardia with sinus arrhythmia  Right axis deviation  Possible Right ventricular hypertrophy  T wave abnormality, consider anterior ischemia    []   []   []     na 6MWT       Meters/Lowest Sa02                      Max HR []   []   []     na PFT             Fev1/FVC                      FEV1                      FVC                      DLCO []   []   []     na Sleep Study   []  []   []     7/8/24 Chest CT  angio  IMPRESSION:     1.  See below for TAVR related aortic annular and vascular  measurements.   2.  No acute aortic dissection, intramural hematoma or contained  rupture noted.  3.  Please review the separate Radiology report for incidental  noncardiac findings.   []  []   []     NA V/Q Scan []   []   []     NA Abd/Liver US []   []   []      Misc:  []   []   []         []   []   []      Jonny Harper RN on 8/2/2024 at 12:31 PM

## 2024-08-02 NOTE — PROGRESS NOTES
Virtual Visit Details    Type of service:  Video Visit   Video Start Time:  10:00  Video End Time: 10:15 AM    Originating Location (pt. Location): Home    Distant Location (provider location):  Off-site  Platform used for Video Visit: W[employer]

## 2024-08-02 NOTE — PATIENT INSTRUCTIONS
It was a pleasure meeting with you today and discussing your healthcare plan. Below is a summary of what we covered:    -- Continue mesalimine (4 pills) daily - refill sent today  -- Continue to monitor labs at least yearly  -- Colonoscopy in 2025  -- Follow up in 1 year      Please see below for any additional questions and scheduling guidelines.    Sign up for [employer]): [employer] patient portal serves as a secure platform for accessing your medical records from the [facility]. Additionally, [employer] facilitates easy, timely, and secure messaging with your care team. If you have not signed up, you may do so by using the provided code or calling [phone].    Coordinating your care after your visit:  There are multiple options for scheduling your follow-up care based on your provider's recommendation.    How do I schedule a follow-up clinic appointment:   After your appointment, you may receive scheduling assistance with the Clinic Coordinators by having a seat in the waiting room and a Clinic Coordinator will call you up to schedule.  Virtual visits or after you leave the clinic:  Your provider has placed a follow-up order in the [employer] portal for scheduling your return appointment. A member of the scheduling team will contact you to schedule.  [employer] Scheduling: Timely scheduling through [employer] is advised to ensure appointment availability.   Call to schedule: You may schedule your follow-up appointment(s) by calling [phone], option 1.    How do I schedule my endoscopy or colonoscopy procedure:  If a procedure, such as a colonoscopy or upper endoscopy was ordered by your provider, the scheduling team will contact you to schedule this procedure. Or you may choose to call to schedule at   [phone], option 2.  Please allow 20-30 minutes when scheduling a procedure.    How do I get my blood work done? To get your blood work done, you need to schedule a lab appointment at an [facility]  Laboratory. There are multiple ways to schedule:   At the clinic: The Clinic Coordinator you meet after your visit can help you schedule a lab appointment.   [employer] scheduling: [employer] offers online lab scheduling at all [facility] laboratory locations.   Call to schedule: You can call [phone] to schedule your lab appointment.    How do I schedule my imaging study: To schedule imaging studies, such as CT scans, ultrasounds, MRIs, or X-rays, contact Imaging Services at [phone].    How do I schedule a referral to another doctor: If your provider recommended a referral to another specialist(s), the referral order was placed by your provider. You will receive a phone call to schedule this referral, or you may choose to call the number attached to the referral to self-schedule.    For Post-Visit Question(s):  For any inquiries following today's visit:  Please utilize [employer] messaging and allow 48 hours for reply or contact the Call Center during normal business hours at [phone], option 3.  For Emergent After-hours questions, contact the On-Call GI Fellow through the [facility]  at [phone].  In addition, you may contact your Nurse directly using the provided contact information.    Test Results:  Test results will be accessible via [employer] in compliance with the 21st Century Cures Act. This means that your results will be available to you at the same time as your provider. Often you may see your results before your provider does. Results are reviewed by staff within two weeks with communication follow-up. Results may be released in the patient portal prior to your care team review.    Prescription Refill(s):  Medication prescribed by your provider will be addressed during your visit. For future refills, please coordinate with your pharmacy. If you have not had a recent clinic visit or routine labs, for your safety, your provider may not be able to refill your prescription.

## 2024-08-02 NOTE — NURSING NOTE
Current patient location: [address] [place] [id number]    Is the patient currently in the state of [place]? YES    Visit mode:VIDEO    If the visit is dropped, the patient can be reconnected by: VIDEO VISIT: Text to cell phone:   Telephone Information:   Mobile [phone]       Will anyone else be joining the visit? NO  (If patient encounters technical issues they should call [phone])    How would you like to obtain your AVS? MyChart    Are changes needed to the allergy or medication list? No    Are refills needed on medications prescribed by this physician? NO    Rooming Documentation:  Assigned questionnaire(s) completed      Reason for visit: ISAI GUIDRY

## 2024-08-05 ENCOUNTER — ALLIED HEALTH/NURSE VISIT (OUTPATIENT)
Dept: CARDIOLOGY | Facility: CLINIC | Age: 74
End: 2024-08-05

## 2024-08-05 ENCOUNTER — OFFICE VISIT (OUTPATIENT)
Dept: CARDIOLOGY | Facility: CLINIC | Age: 74
End: 2024-08-05
Attending: FAMILY MEDICINE
Payer: MEDICARE

## 2024-08-05 ENCOUNTER — OFFICE VISIT (OUTPATIENT)
Dept: PULMONOLOGY | Facility: CLINIC | Age: 74
End: 2024-08-05
Payer: MEDICARE

## 2024-08-05 ENCOUNTER — TELEPHONE (OUTPATIENT)
Dept: CARDIOLOGY | Facility: CLINIC | Age: 74
End: 2024-08-05

## 2024-08-05 VITALS
BODY MASS INDEX: 24.45 KG/M2 | SYSTOLIC BLOOD PRESSURE: 142 MMHG | WEIGHT: 138 LBS | DIASTOLIC BLOOD PRESSURE: 75 MMHG | HEART RATE: 53 BPM | OXYGEN SATURATION: 91 %

## 2024-08-05 DIAGNOSIS — I50.810 RVF (RIGHT VENTRICULAR FAILURE) (H): ICD-10-CM

## 2024-08-05 DIAGNOSIS — Z11.59 ENCOUNTER FOR SCREENING FOR OTHER VIRAL DISEASES: ICD-10-CM

## 2024-08-05 DIAGNOSIS — I27.20 PULMONARY HTN (H): Primary | ICD-10-CM

## 2024-08-05 DIAGNOSIS — I27.20 PULMONARY HTN (H): ICD-10-CM

## 2024-08-05 DIAGNOSIS — Z00.6 RESEARCH SUBJECT: Primary | ICD-10-CM

## 2024-08-05 DIAGNOSIS — R06.09 DOE (DYSPNEA ON EXERTION): ICD-10-CM

## 2024-08-05 LAB
6 MIN WALK (FT): 1300 FT
6 MIN WALK (M): 396 M

## 2024-08-05 PROCEDURE — 94618 PULMONARY STRESS TESTING: CPT | Performed by: INTERNAL MEDICINE

## 2024-08-05 PROCEDURE — 94726 PLETHYSMOGRAPHY LUNG VOLUMES: CPT | Performed by: INTERNAL MEDICINE

## 2024-08-05 PROCEDURE — 94375 RESPIRATORY FLOW VOLUME LOOP: CPT | Performed by: INTERNAL MEDICINE

## 2024-08-05 PROCEDURE — 99215 OFFICE O/P EST HI 40 MIN: CPT | Performed by: INTERNAL MEDICINE

## 2024-08-05 PROCEDURE — 94729 DIFFUSING CAPACITY: CPT | Performed by: INTERNAL MEDICINE

## 2024-08-05 PROCEDURE — G0463 HOSPITAL OUTPT CLINIC VISIT: HCPCS | Performed by: INTERNAL MEDICINE

## 2024-08-05 ASSESSMENT — PAIN SCALES - GENERAL: PAINLEVEL: NO PAIN (0)

## 2024-08-05 NOTE — NURSING NOTE
"Patient educated to the following during visit and educated to contact RN or MD for any questions.     Plan reviewed with patient:   Plan for scheduled TAVR safety TBD. Dr. Marcus will consult with other PH providers on this and we will  Update patient moving forward        Reviewed Med list and verified all medications with patient.   Lab:  results reviewed in clinic. Patient demonstrated understanding.   Diet: Patient instructed regarding a heart healthy diet, including discussion of reduced fat and sodium intake. Patient demonstrated understanding of this information and agreed to call with further questions or concerns  Completed AVS  Follow-up orders placed  Marked chart \"Ready for Checkout\"  Sent msg to Jonny Harper RN (P: [phone])      Patient verbalized understanding of plan and follow-up and agreed to call with further questions or concerns.     Patient will be scheduled in future    Zuleika Glover RN      "

## 2024-08-05 NOTE — NURSING NOTE
Chief Complaint   Patient presents with    New Patient     New PH referral from Ton Bunn MD with 6mwt and pft prior       Vitals were taken, medications reconciled.    Paige Thurner, Facilitator   8:30 AM

## 2024-08-05 NOTE — PATIENT INSTRUCTIONS
You were seen today in the Pulmonary Hypertension Clinic at the [facility].     Cardiology Provider you saw during your visit:    Dr. Marcus    Recommendations:   We will call you with an update on safety with proceeding with TAVR per Dr. Marcus recomendations    Follow-up:   This is to be determined      Please call us immediately if you have any syncope (fainting or passing out), chest pain, edema (swelling or weight gain), or decline in your functional status (general decline in how you are feeling).    If you have emergent concerns after hours or on the weekend, please call our on-call Cardiologist at [phone], option [phone]. For emergencies call [phone].     Thank you for allowing us to be a part of your care here at the [facility]    Please visit the pulmonary hypertension website for more information and support. [email or web address]    If you have questions or concerns please contact us at:    Jonny Harper RN (P: [phone])    Nurse Coordinator       Pulmonary Hypertension     [facility]         [facility]   (Prior Authorizations)    ()  Clinic   Clinic   Pulmonary Hypertension   Pulmonary Hypertension  [facility]  [facility]  (P)[phone]    (P) [phone]  (F) [phone]

## 2024-08-05 NOTE — TELEPHONE ENCOUNTER
Called patient after visit to update Dr. Marcus reviewed case with TAVR team for safety to proceed with procedure. At this time patient is OK to have procedure and should follow-up with Dr. Marcus in about 2 months following procedure    Zuleika Glover RN on 8/5/2024 at 11:06 AM

## 2024-08-05 NOTE — PROGRESS NOTES
Research Title: [place] Pulmonary Hypertension Repository Study (MEASURE)  IRB #: [id number]  PI: Angeline Marcus MD ([phone])   Study coordinators: Jacque Howell ([phone]), Sarah Baker ([phone]), Thea Paez ([phone])  Estimated duration of study: Until no longer receiving clinical care at [facility]    Patient was approached for updated consent for participation in the MEASURE registry.  The current IRB approved consent form was reviewed and discussed at length with the patient, including purpose, nature of the research, risk & benefits. Confidentiality issues and the option for data/specimen sharing were also reviewed. Patient was informed that participation is voluntary and that refusal to participate will involve no penalty or decrease benefits to which the patient is entitled. They were informed that they may discontinue their involvement at any time.    Patient had the opportunity to read the entire written consent form, ask any questions and concerns of the study, and was offered sufficient time to consider the research trial.  All questions and concerns were addressed and the patient was able to clearly state what study participation involved. Patient voluntarily signed the combined consent and HIPAA form prior to any research data collection and/or blood sample collection.  A signed copy of the combined consent form was given to the patient.    Patient was consented on 5 August 2024 at 9:40 and opted in both sub-studies.

## 2024-08-05 NOTE — LETTER
2024      RE: Keerthi Montoya  [address] [place] [id number]       Dear Colleague,    Thank you for the opportunity to participate in the care of your patient, Keerthi Montoya, at the [facility] [place] at [facility]. Please see a copy of my visit note below.    Service Date: 2024      RE: Keerthi Montoya   MRN: [id number]   : 1950      Dear Dr. Almendarez:      We had the pleasure of seeing Keerthi Montoya in our Pulmonary Hypertension Clinic at the [facility].  Although you are familiar with her history, please allow me to summarize it for the purpose of our records.      Ms. Montoya is a very pleasant 74-year-old female with past medical history significant for systemic hypertension diagnosed at the age of 17, type 2 diabetes mellitus, ulcerative colitis, diverticulitis, hypothyroidism, HFpEF, atrial fibrillation - S/P Watchman device, and low flow low gradient Aortic stenosis, and pulmonary hypertension.     She was recently diagnosed with low-flow low gradient aortic stenosis.  He is currently being evaluated for TAVR procedure.  This is scheduled for next week.  She carries a diagnosis of pulmonary hypertension over the last 4 years.  She does have severely elevated pulmonary artery pressures.  She was referred to us for further management of this.    In fact, we have seen her for pulmonary hypertension 4 years ago.  She had a very thorough workup at that time.  She was diagnosed with pulmonary hypertension due to heart failure with preserved ejection fraction and left atrial stiffness.  She had documented elevated left-sided filling pressure with a significantly tall V waves.  Her echocardiogram at that time showed grade 3 diastolic dysfunction.  We deferred pulmonary vasodilator therapy as there is no indication.  She was followed by Dr. Almendarez up until now recently.  She has  now established care with Dr. Lopez after Dr. Almendarez's departure.     As a part of the TAVR workup,She had a repeat right heart catheterization in June of this year.  These continue to show moderate pulmonary hypertension however this time her pulmonary capillary wedge pressure was low normal.  I personally reviewed her right heart catheterization tracings.  Her RA pressure was 12.  Her RV was 74/12.  PA was 84/20 with a mean of 39.  Pulmonary capillarywedge pressure of 14 with a V wave up to 17.  Her estimated Martín cardiac output is 4.4 with an index of 2.6 and thermodilution cardiac output was 2.8 with an index of 1.7.  Her PVR was 8.9 Wood units based on theromodilution and 5.7 LUKE based on estimated martín output.     We reviewed her echocardiogram.  Her echocardiogram continues to show moderate to severe right ventricular dilatation with moderate to severely reduced right ventricular function.  Her interventricular septum was flattened consistent with right ventricular pressure and volume overload.  She also has moderate to severe right atrial enlargement.  Her left ventricular systolic function was normal.  Diastolic function could not be evaluated properly due to the atrial fibrillation.  She does have severe left atrial enlargement.    From a symptomatic perspective, she states that she is asymptomatic.  She denies having any exertional shortness of breath.  No exertional chest pain or chest pressure.  No lower extremity swelling and abdominal distention.  No presyncope or syncope.  No recent hospitalization or ER visits.    She has no history of parenchymal lung disease.  No history of pulmonary embolism or DVT.  No history of coronary artery disease.  No history of sickle cell disease.  She has not used any diet pills in the past.      PAST MEDICAL HISTORY:   1.  Hypertension.   2.  Diabetes mellitus, controlled on medication.   3.  Hypertension, longstanding since the age of 52.   4.   Atrial fibrillation  status post Watchman device due to GI bleeding  5.  Heart failure with preserved ejection fraction with left atrial stiffness   6.  Ulcerative colitis.   7.  Diverticulitis, status post colectomy.   8.  Incisional hernia.      PAST SURGICAL HISTORY:   1.  Status post thyroidectomy at the age of 24.   2.  Status post cholecystectomy.   3.  Colectomy for diverticulitis.   4.  Incisional hernia repair.   5.  Status post Watchman device     MEDICATIONS:   Current Outpatient Medications   Medication Sig Dispense Refill     aspirin 81 MG EC tablet Take 1 tablet (81 mg) by mouth daily 90 tablet 3     atenolol (TENORMIN) 25 MG tablet Take 1 tablet (25 mg) by mouth daily 90 tablet 3     atorvastatin (LIPITOR) 40 MG tablet Take 1 tablet (40 mg) by mouth daily 90 tablet 3     blood glucose (ACCU-CHEK FELIPE PLUS) test strip 200 strips by In Vitro route 2 times daily Use to test blood sugar 2 times daily or as directed. 200 strip 4     Cyanocobalamin (B-12) 1000 MCG TABS Take 1,000 mcg by mouth three times a week       diphenhydrAMINE (BENADRYL) 25 MG tablet Take 25 mg by mouth every 6 hours as needed for itching or allergies       dulaglutide (TRULICITY) 1.5 MG/0.5ML pen Inject 1.5 mg Subcutaneous every 7 days After 4 doses of 0.75 mg 2 mL 1     empagliflozin (JARDIANCE) 10 MG TABS tablet Take 1 tablet (10 mg) by mouth daily 90 tablet 3     glipiZIDE (GLUCOTROL XL) 10 MG 24 hr tablet Take 2 tablets (20 mg) by mouth daily 60 tablet 3     hydrALAZINE (APRESOLINE) 50 MG tablet Take 1 tablet (50 mg) by mouth 3 times daily 180 tablet 3     isosorbide mononitrate (IMDUR) 30 MG 24 hr tablet Take 1 tablet (30 mg) by mouth daily 90 tablet 3     levothyroxine (SYNTHROID/LEVOTHROID) 125 MCG tablet Take 1 tablet (125 mcg) by mouth daily 90 tablet 0     losartan (COZAAR) 100 MG tablet Take 1 tablet (100 mg) by mouth daily 90 tablet 3     mesalamine (APRISO ER) 0.375 g 24 hr capsule Take 4 capsules (1.5 g) by mouth daily 360 capsule 3      pantoprazole (PROTONIX) 40 MG EC tablet Take 1 tablet (40 mg) by mouth daily 90 tablet 3     senna (SENOKOT) 8.6 MG tablet Take 1 tablet by mouth daily as needed for constipation 30 tablet 3     torsemide (DEMADEX) 10 MG tablet Take 1 tablet (10 mg) by mouth every other day       No current facility-administered medications for this visit.     REVIEW OF SYSTEMS:  A detailed 10-point review of systems was obtained as described in the History of Present Illness.  All other systems reviewed and are negative.      SOCIAL HISTORY:  She does not smoke.  She drinks alcohol socially.  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  She is a .  Her    from Alzheimer's.  She has 5 kids altogether.  All of them are grown and healthy.      FAMILY HISTORY:  Mom had valvular heart disease, aortic aneurysm, but she  of stroke.  No other significant family history.  No history of sudden cardiac death, premature coronary artery disease, pulmonary hypertension or cardiomyopathy.      PHYSICAL EXAMINATION:    BP (!) 142/75 (BP Location: Left arm, Patient Position: Chair, Cuff Size: Adult Regular)   Pulse 53   Wt 62.6 kg (138 lb)   LMP  (LMP Unknown)   SpO2 91%   BMI 24.45 kg/m    She was comfortable.  She was in no apparent distress.  She had no pallor, cyanosis or jaundice.  Her neck exam revealed no jugular venous distention.  Cardiac auscultation revealed normal S1, loud P2, 2/6 holosystolic murmur in the right lower sternal border consistent with a TR.  She had no rub or gallop.  Auscultation of her lungs revealed equal air entry on both sides with no added sounds.  Her abdomen was soft with normal bowel sounds, no tenderness, no rigidity, no guarding.  She had no focal neurological deficit.  Her extremities showed no edema.     CBC RESULTS:   Recent Labs   Lab Test 24  1248   WBC 7.2   RBC 4.34   HGB 14.0   HCT 44.0   *   MCH 32.3   MCHC 31.8   RDW 14.4        Recent Labs   Lab Test  07/29/24  1248 07/15/24  0833 07/15/24  0802     --  138   POTASSIUM 4.7  --  4.0   CHLORIDE 100  --  99   CO2 25  --  22   ANIONGAP 12  --  17*   * 110* 82   BUN 21.4  --  20.1   CR 1.68*  --  1.74*   ABEL 10.1  --  9.6     Liver Function Studies -   Recent Labs   Lab Test 07/29/24  1248   PROTTOTAL 8.6*   ALBUMIN 4.7   BILITOTAL 0.9   ALKPHOS 116   AST 25   ALT <5     Lab Results   Component Value Date    NTBNPI 18,607 (H) 09/17/2023     Lab Results   Component Value Date    NTBNP 10,045 (H) 07/08/2024    NTBNP 2,679 (H) 03/16/2020        ASSESSMENT AND PLAN:      In summary, Ms. Keerthi Montoya is a 74-year-old female with past medical history significant for hypertension, diabetes, heart failure preserved ejection fraction, atrial fibrillation, pulmonary hypertension and low-flow low gradient aortic stenosis who is being referred to us for further evaluation management of pulm hypertension.    She has combined pre and postcapillary pulmonary hypertension due to her longstanding left-sided diastolic dysfunction.  Her aortic stenosis is also probably contributing to this.  Although her most recent right heart catheterization showed a high normal wedge pressure raising a question of pulmonary arterial hypertension, she has several evidence suggesting that she has pulmonary venous hypertension due to left heart disease.  Her prior echocardiogram and she was not in atrial fibrillation shows advanced grade 3 diastolic dysfunction.  She also has severe left atrial enlargement.  Her prior right heart catheterization when she was not in atrial fibrillation showed significantly elevated left-sided filling pressure with a tall V wave suggestive of left atrial stiffness.    Clearly, she has severe combined pre and postcapillary pulm hypertension that is leading to moderate RV dysfunction.  A small subset of patients with heart failure with preserved ejection fraction can go on to develop severe pulm postcapillary  pulmonary venous hypertension.    Unfortunately pulmonary vasodilator therapies have not shown to be safe or effective in this patient population.  The best line of therapy would be to treat her underlying left heart disease.  She is already on empagliflozin for HFpEF.  She is euvolemic.  She is not in heart failure.  Her blood pressure is adequately controlled.    With respect to TAVR, definitely the presence of pulmonary hypertension and moderate to severe RV dysfunction increases the periprocedural complication.  Treating her with pulmonary vasodilator therapy will not decrease her periprocedural risk.  Her systemic blood pressure should be maintained above her PA pressure during the procedure to avoid RV ischemia and cardiogenic shock.  If she will need to be intubated her blood pressure needs to be adequately maintained during intubation.    We have now several clinical trials that could potentially help patients with pulmonary hypertension or heart failure with preserved ejection fraction.  Will consider these after she recovers from her TAVR procedure.    She will continue to follow with Dr. Chowdhury for her heart failure with preserved action fraction.  I have recommended her to return to see us in 3 months after her TAVR procedure to see whether she would benefit from the ongoing clinical trials for pulm hypertension with heart failure specialization fraction.    It was a pleasure meeting MsAudrey Keerthi Montoya in our Pulmonary Hypertension Clinic at the [facility].  We thank you for involving us in her care.  Please do not hesitate to call us in the interim if you have any further questions.     Total time today was 65 minutes reviewing notes, imaging, labs, patient visit, orders and documentation.        Sincerely,    Angeline Marcus MD   [facility]  Heart Failure, Transplant, and Mechanical Circulatory Support Cardiology   Cardiovascular  Division  [facility]   [phone]      Please do not hesitate to contact me if you have any questions/concerns.     Sincerely,     Angeline Marcus MD

## 2024-08-06 LAB
DLCOCOR-%PRED-PRE: 67 %
DLCOCOR-PRE: 12.38 ML/MIN/MMHG
DLCOUNC-%PRED-PRE: 68 %
DLCOUNC-PRE: 12.6 ML/MIN/MMHG
DLCOUNC-PRED: 18.34 ML/MIN/MMHG
ERV-%PRED-PRE: 16 %
ERV-PRE: 0.15 L
ERV-PRED: 0.92 L
EXPTIME-PRE: 4.3 SEC
FEF2575-%PRED-PRE: 91 %
FEF2575-PRE: 1.52 L/SEC
FEF2575-PRED: 1.66 L/SEC
FEFMAX-%PRED-PRE: 69 %
FEFMAX-PRE: 3.66 L/SEC
FEFMAX-PRED: 5.3 L/SEC
FEV1-%PRED-PRE: 72 %
FEV1-PRE: 1.41 L
FEV1FEV6-PRE: 82 %
FEV1FEV6-PRED: 79 %
FEV1FVC-PRE: 81 %
FEV1FVC-PRED: 79 %
FEV1SVC-PRE: 84 %
FEV1SVC-PRED: 65 %
FIFMAX-PRE: 2.57 L/SEC
FRCPLETH-%PRED-PRE: 103 %
FRCPLETH-PRE: 2.74 L
FRCPLETH-PRED: 2.66 L
FVC-%PRED-PRE: 70 %
FVC-PRE: 1.73 L
FVC-PRED: 2.46 L
IC-%PRED-PRE: 82 %
IC-PRE: 1.51 L
IC-PRED: 1.84 L
RVPLETH-%PRED-PRE: 126 %
RVPLETH-PRE: 2.59 L
RVPLETH-PRED: 2.06 L
TLCPLETH-%PRED-PRE: 89 %
TLCPLETH-PRE: 4.26 L
TLCPLETH-PRED: 4.77 L
VA-%PRED-PRE: 67 %
VA-PRE: 2.95 L
VC-%PRED-PRE: 56 %
VC-PRE: 1.66 L
VC-PRED: 2.96 L

## 2024-08-07 ENCOUNTER — TELEPHONE (OUTPATIENT)
Dept: CARDIOLOGY | Facility: CLINIC | Age: 74
End: 2024-08-07
Payer: MEDICARE

## 2024-08-07 DIAGNOSIS — I27.20 PULMONARY HTN (H): Primary | ICD-10-CM

## 2024-08-07 DIAGNOSIS — R06.09 DOE (DYSPNEA ON EXERTION): ICD-10-CM

## 2024-08-07 NOTE — TELEPHONE ENCOUNTER
Telephone call to discuss upcoming medication holds. No questions, able to verbalize.     Stacy Portillo, RN, BSN  Lead Structural Heart Coordinator  TAVR, MitraClip and Watchman

## 2024-08-07 NOTE — PROGRESS NOTES
Service Date: 2024      RE: Keerthi Montoya   MRN: [id number]   : 1950      Dear Dr. Almendarez:      We had the pleasure of seeing Keerthi Montoya in our Pulmonary Hypertension Clinic at the [facility].  Although you are familiar with her history, please allow me to summarize it for the purpose of our records.      Ms. Montoya is a very pleasant 74-year-old female with past medical history significant for systemic hypertension diagnosed at the age of 17, type 2 diabetes mellitus, ulcerative colitis, diverticulitis, hypothyroidism, HFpEF, atrial fibrillation - S/P Watchman device, and low flow low gradient Aortic stenosis, and pulmonary hypertension.     She was recently diagnosed with low-flow low gradient aortic stenosis.  He is currently being evaluated for TAVR procedure.  This is scheduled for next week.  She carries a diagnosis of pulmonary hypertension over the last 4 years.  She does have severely elevated pulmonary artery pressures.  She was referred to us for further management of this.    In fact, we have seen her for pulmonary hypertension 4 years ago.  She had a very thorough workup at that time.  She was diagnosed with pulmonary hypertension due to heart failure with preserved ejection fraction and left atrial stiffness.  She had documented elevated left-sided filling pressure with a significantly tall V waves.  Her echocardiogram at that time showed grade 3 diastolic dysfunction.  We deferred pulmonary vasodilator therapy as there is no indication.  She was followed by Dr. Almendarez up until now recently.  She has now established care with Dr. Lopez after Dr. Almendarez's departure.     As a part of the TAVR workup,She had a repeat right heart catheterization in  of this year.  These continue to show moderate pulmonary hypertension however this time her pulmonary capillary wedge pressure was low normal.  I personally reviewed her right heart catheterization  tracings.  Her RA pressure was 12.  Her RV was 74/12.  PA was 84/20 with a mean of 39.  Pulmonary capillarywedge pressure of 14 with a V wave up to 17.  Her estimated Martín cardiac output is 4.4 with an index of 2.6 and thermodilution cardiac output was 2.8 with an index of 1.7.  Her PVR was 8.9 Wood units based on theromodilution and 5.7 LUKE based on estimated martín output.     We reviewed her echocardiogram.  Her echocardiogram continues to show moderate to severe right ventricular dilatation with moderate to severely reduced right ventricular function.  Her interventricular septum was flattened consistent with right ventricular pressure and volume overload.  She also has moderate to severe right atrial enlargement.  Her left ventricular systolic function was normal.  Diastolic function could not be evaluated properly due to the atrial fibrillation.  She does have severe left atrial enlargement.    From a symptomatic perspective, she states that she is asymptomatic.  She denies having any exertional shortness of breath.  No exertional chest pain or chest pressure.  No lower extremity swelling and abdominal distention.  No presyncope or syncope.  No recent hospitalization or ER visits.    She has no history of parenchymal lung disease.  No history of pulmonary embolism or DVT.  No history of coronary artery disease.  No history of sickle cell disease.  She has not used any diet pills in the past.      PAST MEDICAL HISTORY:   1.  Hypertension.   2.  Diabetes mellitus, controlled on medication.   3.  Hypertension, longstanding since the age of 52.   4.   Atrial fibrillation status post Watchman device due to GI bleeding  5.  Heart failure with preserved ejection fraction with left atrial stiffness   6.  Ulcerative colitis.   7.  Diverticulitis, status post colectomy.   8.  Incisional hernia.      PAST SURGICAL HISTORY:   1.  Status post thyroidectomy at the age of 24.   2.  Status post cholecystectomy.   3.  Colectomy  for diverticulitis.   4.  Incisional hernia repair.   5.  Status post Watchman device     MEDICATIONS:   Current Outpatient Medications   Medication Sig Dispense Refill    aspirin 81 MG EC tablet Take 1 tablet (81 mg) by mouth daily 90 tablet 3    atenolol (TENORMIN) 25 MG tablet Take 1 tablet (25 mg) by mouth daily 90 tablet 3    atorvastatin (LIPITOR) 40 MG tablet Take 1 tablet (40 mg) by mouth daily 90 tablet 3    blood glucose (ACCU-CHEK FELIPE PLUS) test strip 200 strips by In Vitro route 2 times daily Use to test blood sugar 2 times daily or as directed. 200 strip 4    Cyanocobalamin (B-12) 1000 MCG TABS Take 1,000 mcg by mouth three times a week      diphenhydrAMINE (BENADRYL) 25 MG tablet Take 25 mg by mouth every 6 hours as needed for itching or allergies      dulaglutide (TRULICITY) 1.5 MG/0.5ML pen Inject 1.5 mg Subcutaneous every 7 days After 4 doses of 0.75 mg 2 mL 1    empagliflozin (JARDIANCE) 10 MG TABS tablet Take 1 tablet (10 mg) by mouth daily 90 tablet 3    glipiZIDE (GLUCOTROL XL) 10 MG 24 hr tablet Take 2 tablets (20 mg) by mouth daily 60 tablet 3    hydrALAZINE (APRESOLINE) 50 MG tablet Take 1 tablet (50 mg) by mouth 3 times daily 180 tablet 3    isosorbide mononitrate (IMDUR) 30 MG 24 hr tablet Take 1 tablet (30 mg) by mouth daily 90 tablet 3    levothyroxine (SYNTHROID/LEVOTHROID) 125 MCG tablet Take 1 tablet (125 mcg) by mouth daily 90 tablet 0    losartan (COZAAR) 100 MG tablet Take 1 tablet (100 mg) by mouth daily 90 tablet 3    mesalamine (APRISO ER) 0.375 g 24 hr capsule Take 4 capsules (1.5 g) by mouth daily 360 capsule 3    pantoprazole (PROTONIX) 40 MG EC tablet Take 1 tablet (40 mg) by mouth daily 90 tablet 3    senna (SENOKOT) 8.6 MG tablet Take 1 tablet by mouth daily as needed for constipation 30 tablet 3    torsemide (DEMADEX) 10 MG tablet Take 1 tablet (10 mg) by mouth every other day       No current facility-administered medications for this visit.     REVIEW OF SYSTEMS:  A  detailed 10-point review of systems was obtained as described in the History of Present Illness.  All other systems reviewed and are negative.      SOCIAL HISTORY:  She does not smoke.  She drinks alcohol socially.  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  She is a .  Her    from Alzheimer's.  She has 5 kids altogether.  All of them are grown and healthy.      FAMILY HISTORY:  Mom had valvular heart disease, aortic aneurysm, but she  of stroke.  No other significant family history.  No history of sudden cardiac death, premature coronary artery disease, pulmonary hypertension or cardiomyopathy.      PHYSICAL EXAMINATION:    BP (!) 142/75 (BP Location: Left arm, Patient Position: Chair, Cuff Size: Adult Regular)   Pulse 53   Wt 62.6 kg (138 lb)   LMP  (LMP Unknown)   SpO2 91%   BMI 24.45 kg/m    She was comfortable.  She was in no apparent distress.  She had no pallor, cyanosis or jaundice.  Her neck exam revealed no jugular venous distention.  Cardiac auscultation revealed normal S1, loud P2, 2/6 holosystolic murmur in the right lower sternal border consistent with a TR.  She had no rub or gallop.  Auscultation of her lungs revealed equal air entry on both sides with no added sounds.  Her abdomen was soft with normal bowel sounds, no tenderness, no rigidity, no guarding.  She had no focal neurological deficit.  Her extremities showed no edema.     CBC RESULTS:   Recent Labs   Lab Test 24  1248   WBC 7.2   RBC 4.34   HGB 14.0   HCT 44.0   *   MCH 32.3   MCHC 31.8   RDW 14.4        Recent Labs   Lab Test 24  1248 07/15/24  0833 07/15/24  0802     --  138   POTASSIUM 4.7  --  4.0   CHLORIDE 100  --  99   CO2 25  --  22   ANIONGAP 12  --  17*   * 110* 82   BUN 21.4  --  20.1   CR 1.68*  --  1.74*   ABEL 10.1  --  9.6     Liver Function Studies -   Recent Labs   Lab Test 24  1248   PROTTOTAL 8.6*   ALBUMIN 4.7   BILITOTAL 0.9   ALKPHOS 116   AST 25    ALT <5     Lab Results   Component Value Date    NTBNPI 18,607 (H) 09/17/2023     Lab Results   Component Value Date    NTBNP 10,045 (H) 07/08/2024    NTBNP 2,679 (H) 03/16/2020        ASSESSMENT AND PLAN:      In summary, Ms. Keerthi Montoya is a 74-year-old female with past medical history significant for hypertension, diabetes, heart failure preserved ejection fraction, atrial fibrillation, pulmonary hypertension and low-flow low gradient aortic stenosis who is being referred to us for further evaluation management of pulm hypertension.    She has combined pre and postcapillary pulmonary hypertension due to her longstanding left-sided diastolic dysfunction.  Her aortic stenosis is also probably contributing to this.  Although her most recent right heart catheterization showed a high normal wedge pressure raising a question of pulmonary arterial hypertension, she has several evidence suggesting that she has pulmonary venous hypertension due to left heart disease.  Her prior echocardiogram and she was not in atrial fibrillation shows advanced grade 3 diastolic dysfunction.  She also has severe left atrial enlargement.  Her prior right heart catheterization when she was not in atrial fibrillation showed significantly elevated left-sided filling pressure with a tall V wave suggestive of left atrial stiffness.    Clearly, she has severe combined pre and postcapillary pulm hypertension that is leading to moderate RV dysfunction.  A small subset of patients with heart failure with preserved ejection fraction can go on to develop severe pulm postcapillary pulmonary venous hypertension.    Unfortunately pulmonary vasodilator therapies have not shown to be safe or effective in this patient population.  The best line of therapy would be to treat her underlying left heart disease.  She is already on empagliflozin for HFpEF.  She is euvolemic.  She is not in heart failure.  Her blood pressure is adequately  controlled.    With respect to TAVR, definitely the presence of pulmonary hypertension and moderate to severe RV dysfunction increases the periprocedural complication.  Treating her with pulmonary vasodilator therapy will not decrease her periprocedural risk.  Her systemic blood pressure should be maintained above her PA pressure during the procedure to avoid RV ischemia and cardiogenic shock.  If she will need to be intubated her blood pressure needs to be adequately maintained during intubation.    We have now several clinical trials that could potentially help patients with pulmonary hypertension or heart failure with preserved ejection fraction.  Will consider these after she recovers from her TAVR procedure.    She will continue to follow with Dr. Chowdhury for her heart failure with preserved action fraction.  I have recommended her to return to see us in 3 months after her TAVR procedure to see whether she would benefit from the ongoing clinical trials for pulm hypertension with heart failure specialization fraction.    It was a pleasure meeting Ms. [place] Jan in our Pulmonary Hypertension Clinic at the [facility].  We thank you for involving us in her care.  Please do not hesitate to call us in the interim if you have any further questions.     Total time today was 65 minutes reviewing notes, imaging, labs, patient visit, orders and documentation.        Sincerely,    Angeline Marcus MD   [facility]  Heart Failure, Transplant, and Mechanical Circulatory Support Cardiology   Cardiovascular Division  [facility]   [phone]

## 2024-08-09 ENCOUNTER — TELEPHONE (OUTPATIENT)
Dept: CARDIOLOGY | Facility: CLINIC | Age: 74
End: 2024-08-09
Payer: MEDICARE

## 2024-08-09 NOTE — TELEPHONE ENCOUNTER
Attempted telephone call to [place], left voicemail.    Stacy Portillo, RN, BSN  Lead Structural Heart Coordinator  TAVR, MitraClip and Watchman

## 2024-08-09 NOTE — TELEPHONE ENCOUNTER
[place] was ok with change TAVR date, requested 9/4. Request granted.    Stacy Portillo, RN, BSN  Lead Structural Heart Coordinator  TAVR, MitraClip and Watchman

## 2024-08-12 ENCOUNTER — MYC REFILL (OUTPATIENT)
Dept: NEPHROLOGY | Facility: CLINIC | Age: 74
End: 2024-08-12

## 2024-08-12 DIAGNOSIS — E11.29 TYPE 2 DIABETES MELLITUS WITH MICROALBUMINURIA, WITHOUT LONG-TERM CURRENT USE OF INSULIN (H): ICD-10-CM

## 2024-08-12 DIAGNOSIS — I50.33 ACUTE ON CHRONIC DIASTOLIC CONGESTIVE HEART FAILURE (H): ICD-10-CM

## 2024-08-12 DIAGNOSIS — R80.9 TYPE 2 DIABETES MELLITUS WITH MICROALBUMINURIA, WITHOUT LONG-TERM CURRENT USE OF INSULIN (H): ICD-10-CM

## 2024-08-12 RX ORDER — TORSEMIDE 10 MG/1
10 TABLET ORAL EVERY OTHER DAY
Qty: 40 TABLET | Refills: 1 | Status: SHIPPED | OUTPATIENT
Start: 2024-08-12

## 2024-08-15 ENCOUNTER — TELEPHONE (OUTPATIENT)
Dept: CARDIOLOGY | Facility: CLINIC | Age: 74
End: 2024-08-15
Payer: MEDICARE

## 2024-08-15 NOTE — TELEPHONE ENCOUNTER
GLIPIZIDE ER 10MG TABLETS     Last Written Prescription Date:  4/5/24  Last Fill Quantity: 60,   # refills: 3  Last Office Visit : 11/20/23  Future Office visit:  10/18/24 Mira Rios refill request to provider for review/approval because:  Fails due to abn GFR(32) on 7/29/24  GFR Estimate   Date Value Ref Range Status   07/29/2024 32 (L) >60 mL/min/1.73m2 Final     Comment:     eGFR calculated using 2021 CKD-EPI equation.   04/28/2021 46 (L) >60 mL/min/[1.73_m2] Final     Comment:     Non  GFR Calc  Starting 12/18/2018, serum creatinine based estimated GFR (eGFR) will be   calculated using the Chronic Kidney Disease Epidemiology Collaboration   (CKD-EPI) equation.       GFR, ESTIMATED POCT   Date Value Ref Range Status   12/30/2022 30 (L) >60 mL/min/1.73m2 Final

## 2024-08-16 ENCOUNTER — CARE COORDINATION (OUTPATIENT)
Dept: CARDIOLOGY | Facility: CLINIC | Age: 74
End: 2024-08-16

## 2024-08-16 RX ORDER — GLIPIZIDE 10 MG/1
20 TABLET, FILM COATED, EXTENDED RELEASE ORAL DAILY
Qty: 60 TABLET | Refills: 3 | Status: SHIPPED | OUTPATIENT
Start: 2024-08-16

## 2024-08-16 NOTE — PROGRESS NOTES
Transcatheter aortic valve replacement (TAVR)    Check in to the hospital, at 05:30.      Please disregard any [place] messages or reminders in regards to ECHO scheduling, this is done as part of procedure.   If you are unsure if your check in time has changed, please call number [phone].    3rd floor: [address],     [facility] [place]   [address] [place]  [address]        parking is available in front of the hospital, 24 hours a day  -  Stop at the Information Desk and the admissions desk in the lobby.    -------------------------------------------------------------------------  Nothing to eat after midnight  Clear liquids like water, apple juice or 7up/Sprite is OK up to two hours prior to your scheduled arrival time.    You may take your medications in the morning with a sip of water.    * Take a shower, using Chlorhexidine Gluconate (CHG) soap, Hibiclens, the night before the procedure, and the morning of the procedure.    Shower using your usual soap and shampoo.  Turn off the water or move away from the shower stream.  Apply Hibiclens directly on your skin or on a wet washcloth, rub thoroughly for 5 minutes, from neck to groin, avoiding the face and genital area.  Rise soap off, do not wash with regular soap afterwards.    If you did NOT receive these Hibiclens soap at your pre-op History and Physical, then:   *Chlorhexidine Gluconate (CHG) soap is available free of charge at any [facility] pharmacy  *Antibacterial soap can be used      Contrast Allergy:   No    Medications Instructions:    Needs to be discussed with Dominique Saavedra:    mesalamine (APRISO ER)   (day of surgery but risk of flair vs risk of bleeding)      Please TAKE the following NEW dose of Aspirin:  325 mg Asprin: Please take day before and day of procedure    Please TAKE the following medications morning of procedure as prescribed:    atenolol (TENORMIN)     atorvastatin (LIPITOR)     hydrALAZINE  (APRESOLINE)     isosorbide mononitrate (IMDUR)     levothyroxine (SYNTHROID/LEVOTHROID)     pantoprazole (PROTONIX)     Please HOLD the following medications morning of procedure:    Cyanocobalamin (B-12)     losartan (COZAAR)     senna (SENOKOT)    torsemide (DEMADEX)     glipiZIDE (GLUCOTROL XL)       Special Medication Considerations:  Diabetic medication Instructions:  Please HOLD the following diabetic medications morning of procedure:    glipiZIDE (GLUCOTROL XL)     Please HOLD the following diabetic medications 7 days before procedure:    Dulaglutide (Trulicity), your last dose will be on 8/27.    Please HOLD the following diabetic medications 2 days before procedure:    Jardiance (empagliflozin), your last dose will be on 9/1.       If any questions please contact:  Stacy Portillo RN  Structural Heart Care Coordinator  [facility]  Office: [phone]

## 2024-08-25 LAB
ABO/RH(D): NORMAL
ANTIBODY SCREEN: NEGATIVE
SPECIMEN EXPIRATION DATE: NORMAL

## 2024-08-25 NOTE — PROGRESS NOTES
STRUCTURAL HEART CLINIC  H&P    Referring Provider: Dr. Bunn     History of Present Illness  Keerthi Montoya is a 74 year old female who presents for pre-operative H&P in preparation for transcatheter aortic valve replacement on 9/4/24 at [facility].     Patient with a history of paradoxical low flow low gradient severe aortic stenosis, characterized with an IVON of 0.87 cm2, mean PG 16.5 mmHg and peak V of 2.7 m/sec with LVEF 60-65% and DI 0.25 by echocardiogram on 6/7/24. 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 We are moving forward with TAVR to improve left sided heart failure. Patient was evaluated in structural heart clinic where she was deemed an appropriate TAVR candidate. She was counseled for the above procedure.      Her additional past medical history is notable for chronic HFpEF, HLD, HTN, DM2, ulcerative pancolitis, CKD3, severe pulmonary HTN WHO group II, persistent atrial fibrillation with (CHADS-VASc 4) with intolerance to anticoagulation due to history of GI bleeding s/p RY closure and Watchman (05/2023) .      History of blood transfusions/reactions: No  History of abnormal bleeding/anti-platelet use: Yes GI bleeding resolved   Steroid use in the last year: No  Personal or family history with difficulty with anesthesia: No   Infection precautions: No  Difficulty w/intubation/bedrest: No      Current Medications:  Current Outpatient Medications   Medication Sig Dispense Refill    aspirin 81 MG EC tablet Take 1 tablet (81 mg) by mouth daily 90 tablet 3    atenolol (TENORMIN) 25 MG tablet Take 1 tablet (25 mg) by mouth daily 90 tablet 3    atorvastatin (LIPITOR) 40 MG tablet Take 1 tablet (40 mg) by mouth daily 90 tablet 3    blood glucose (ACCU-CHEK FELIPE PLUS) test strip 200 strips by In Vitro route 2 times daily Use to test blood sugar 2 times daily or as directed. 200 strip 4    Cyanocobalamin (B-12) 1000  MCG TABS Take 1,000 mcg by mouth three times a week      diphenhydrAMINE (BENADRYL) 25 MG tablet Take 25 mg by mouth every 6 hours as needed for itching or allergies      dulaglutide (TRULICITY) 1.5 MG/0.5ML pen Inject 1.5 mg Subcutaneous every 7 days After 4 doses of 0.75 mg 2 mL 1    empagliflozin (JARDIANCE) 10 MG TABS tablet Take 1 tablet (10 mg) by mouth daily 90 tablet 3    glipiZIDE (GLUCOTROL XL) 10 MG 24 hr tablet TAKE 2 TABLETS(20 MG) BY MOUTH DAILY 60 tablet 3    hydrALAZINE (APRESOLINE) 50 MG tablet Take 1 tablet (50 mg) by mouth 3 times daily 180 tablet 3    isosorbide mononitrate (IMDUR) 30 MG 24 hr tablet Take 1 tablet (30 mg) by mouth daily 90 tablet 3    levothyroxine (SYNTHROID/LEVOTHROID) 125 MCG tablet Take 1 tablet (125 mcg) by mouth daily 90 tablet 0    losartan (COZAAR) 100 MG tablet Take 1 tablet (100 mg) by mouth daily 90 tablet 3    mesalamine (APRISO ER) 0.375 g 24 hr capsule Take 4 capsules (1.5 g) by mouth daily 360 capsule 3    pantoprazole (PROTONIX) 40 MG EC tablet Take 1 tablet (40 mg) by mouth daily 90 tablet 3    senna (SENOKOT) 8.6 MG tablet Take 1 tablet by mouth daily as needed for constipation 30 tablet 3    torsemide (DEMADEX) 10 MG tablet Take 1 tablet (10 mg) by mouth every other day 40 tablet 1       Allergies:     Allergies   Allergen Reactions    Actos [Pioglitazone]      Fluid retention    Amlodipine      Leg swelling, hand swelling    Animal Dander     Doxycycline Hives    Dust Mites     Grass     Keflex [Cephalexin Hcl] Hives    Metoprolol      Swelling of lower legs and fatigue    Other [Seasonal Allergies]      pollen    Ranitidine      rash    Synthroid [Levothyroxine Sodium] Swelling     Allergic to brand name    Tetracycline Hives    Tylenol Hives    Ziac [Bisoprolol-Hydrochlorothiazide]        Past Medical History:  Past Medical History:   Diagnosis Date    Chronic kidney disease     Diabetes mellitus, type 2 (H)     Diverticulosis of colon (without mention of  hemorrhage) 10/01/2012    GI bleed     History of blood transfusion     HLD (hyperlipidemia)     Hypertension     Hypothyroidism     MARIA D (iron deficiency anemia)     Persistent atrial fibrillation (H)     Pulmonary hypertension (H)     Ulcerative (chronic) enterocolitis (H)        Past Surgical History:  Past Surgical History:   Procedure Laterality Date    CHOLECYSTECTOMY  3/1987    COLON SURGERY  01/2001    Left colon resection; diverticulitis    COLONOSCOPY N/A 4/24/2017    Procedure: COMBINED COLONOSCOPY, SINGLE OR MULTIPLE BIOPSY/POLYPECTOMY BY BIOPSY;;  Surgeon: Radha Salguero MD;  Location: [facility] OR    COLONOSCOPY N/A 3/10/2023    Procedure: COLONOSCOPY;  Surgeon: Preeti James MD;  Location: [facility]    COLONOSCOPY WITH CO2 INSUFFLATION N/A 4/24/2017    Procedure: COLONOSCOPY WITH CO2 INSUFFLATION;  Colonoscopy Dx rectal bleeding, BMI 25.86, Pharm [facility] .[phone], ;  Surgeon: Radha Salguero MD;  Location: [facility] OR    CV CORONARY ANGIOGRAM N/A 7/15/2024    Procedure: Coronary Angiogram with possible intervention;  Surgeon: Bobby Grimes MD;  Location: [facility]    CV LEFT ATRIAL APPENDAGE CLOSURE N/A 5/11/2023    Procedure: Left Atrial Appendage Closure;  Surgeon: Bobby Grimes MD;  Location: [facility]    CV RIGHT HEART CATH MEASUREMENTS RECORDED N/A 3/16/2020    Procedure: CV RIGHT HEART CATH;  Surgeon: Nader Muñoz MD;  Location: [facility]    CV RIGHT HEART CATH MEASUREMENTS RECORDED N/A 7/15/2024    Procedure: Right Heart Cath;  Surgeon: Bobby Grimes MD;  [facility]    ESOPHAGOSCOPY, GASTROSCOPY, DUODENOSCOPY (EGD), COMBINED N/A 1/23/2023    Procedure: ESOPHAGOGASTRODUODENOSCOPY, WITH BIOPSY;  Surgeon: Ricki Deal MD;  Location: [facility]    ESOPHAGOSCOPY, GASTROSCOPY, DUODENOSCOPY (EGD), COMBINED N/A 3/10/2023    Procedure: Esophagoscopy, gastroscopy, duodenoscopy (EGD), combined;   Surgeon: Preeti James MD;  Location:  GI    GYN SURGERY  1983    tubal ligation    HERNIA REPAIR  2006    recurrent incisional hernia    SIGMOIDOSCOPY FLEXIBLE N/A 2023    Procedure: Sigmoidoscopy flexible;  Surgeon: Ricki Deal MD;  Location:  GI    THROAT SURGERY  1974    thyroidectomy       Family History:  Family History   Problem Relation Age of Onset    Cerebrovascular Disease Mother     Cancer Father         bladder    Diverticulitis Brother     Diverticulitis Brother     Kidney Disease No family hx of     Crohn's Disease No family hx of     Ulcerative Colitis No family hx of     Stomach Cancer No family hx of     GERD No family hx of     Celiac Disease No family hx of     Anesthesia Reaction No family hx of        Social History:  Social History     Socioeconomic History    Marital status:      Spouse name: Raymundo; dementia;      Number of children: 3   Occupational History     Employer: [employer]   Tobacco Use    Smoking status: Never     Passive exposure: Never    Smokeless tobacco: Never   Vaping Use    Vaping status: Never Used   Substance and Sexual Activity    Alcohol use: Not Currently     Alcohol/week: 2.0 - 4.0 standard drinks of alcohol     Types: 1 - 2 Cans of beer, 1 - 2 Shots of liquor per week     Comment: occasional cocktail or beer    Drug use: No    Sexual activity: Not Currently   Other Topics Concern     Service No    Blood Transfusions No    Caffeine Concern No    Occupational Exposure No    Hobby Hazards No    Sleep Concern No    Stress Concern No    Weight Concern No    Special Diet No    Back Care No    Exercise Yes     Comment: off and on    Bike Helmet No    Seat Belt Yes    Self-Exams No   Social History Narrative    Laid off her job      Social Determinants of Health     Financial Resource Strain: Low Risk  (10/20/2023)    Financial Resource Strain     Within the past 12 months, have you or your family members you live  with been unable to get utilities (heat, electricity) when it was really needed?: No   Food Insecurity: Low Risk  (10/20/2023)    Food Insecurity     Within the past 12 months, did you worry that your food would run out before you got money to buy more?: No     Within the past 12 months, did the food you bought just not last and you didn t have money to get more?: No   Transportation Needs: Low Risk  (10/20/2023)    Transportation Needs     Within the past 12 months, has lack of transportation kept you from medical appointments, getting your medicines, non-medical meetings or appointments, work, or from getting things that you need?: No   Housing Stability: Low Risk  (10/20/2023)    Housing Stability     Do you have housing? : Yes     Are you worried about losing your housing?: No       Review of Systems:    Functional status: Independent in ADL's. Able to achieve  METS > 4     Constitutional: No fever, chills, or sweats. No weight gain/loss   ENT: No visual disturbance, ear ache, epistaxis, sore throat  Allergies/Immunologic: Negative.   Respiratory: No cough, hemoptysia  Cardiovascular: As per HPI  GI: No nausea, vomiting, hematemesis, melena, or hematochezia  : No urinary frequency, dysuria, or hematuria  Integument: Negative  Psychiatric: Negative  Neuro: Negative  Endocrinology: Negative   Musculoskeletal: Negative      Physical Exam:  Vitals: BP (!) 144/68 (BP Location: Left arm, Patient Position: Chair, Cuff Size: Adult Regular)   Pulse 52   Wt 62.3 kg (137 lb 4.8 oz)   LMP  (LMP Unknown)   SpO2 95%   BMI 24.32 kg/m     General: NAD  HEENT:  Dentition intact.    Neck: No jugular venous distension.   Heart: RRR with 3/6 JUANITA at RUSB  Lungs: CTA.    Abdomen: Soft, nontender, nondistended.   Extremities: No clubbing, cyanosis, or edema.  The pulses are +4/4 at the radial, brachial, femoral, popliteal, DP, and PT sites bilaterally.  No bruits are noted.  Neurologic: Alert and oriented to person/place/time,  normal speech, gait and affect  Skin: No petechiae, purpura or rash.      Diagnostic Studies:    ECG 7/15/24:  Personally reviewed and interpreted by me.  SB RAD normal QRS 82ms    Coronary angiogram RHC 7/15/24    Prox RCA to Mid RCA lesion is 40% stenosed.    Prox LAD to Mid LAD lesion is 20% stenosed.    Prox Cx to Mid Cx lesion is 30% stenosed.    Right sided filling pressures are mildly elevated.    Left sided filling pressures are normal.    Moderately elevated pulmonary artery hypertension.    Reduced cardiac output level.     Elevated right sided filling pressures with moderately elevated pulmonary artery pressure.  Normal coronary arteries.       CT TAVR 7/8/24:  Narrative & Impression   Procedure: CT ANGIOGRAM TAVR   Examination Date: 7/8/2024 2:33 PM   Indication: Severe aortic stenosis, Pre TAVR  Ordering Provider: DUY HERNÁNDEZ     TECHNIQUE: ECG gated CT of the heart, aorta and nongated CT of the  chest abdomen pelvis without and with IV contrast  110cc of isovue 370  was performed per the ROLLY protocol on a Siemens Dual Source Flash  scanner without incident. A noncontrast CT of the chest abdomen and  pelvis was performed followed by contrast-enhanced CTA of the heart in  a spiral gated mode with limited field-of-view to evaluate aortic  valve/annulus and cardiac morphology followed by gated high pitch  spiral CTA of the chest, abdomen, pelvis at 120 kVP to evaluate the  thoracoabdominal aorta and iliac vasculature. Scan protocol was  optimized to minimize radiation exposure. The patient received 400 mL  of 0.9% saline over two hours prior to the examination, as per the  TAVR low GFR protocol. The total radiation exposure was 1303 DLP and  18.2 mSv. Images were reconstructed and analyzed on a [employer]  Workstation.                                                                       IMPRESSION:     1.  See below for TAVR related aortic annular and vascular  measurements.   2.  No acute aortic  dissection, intramural hematoma or contained  rupture noted.  3.  Please review the separate Radiology report for incidental  noncardiac findings.     FINDINGS:     1.  Moderately calcified tricuspid aortic valve. The aortic valve  calcium score is 864. The LVOT is not calcified. The ascending aorta  is mildly calcified, aortic arch is  moderately calcified, the  descending thoracic aorta is moderately calcified. There is mild  mitral annular calcification.  2.  The aortic valve area is 1.29 cm2 (by planimetry).  3.  There are no adverse aortic root features, i.e. coronary heights  are adequate and there is no significant aortic root calcification.  4.  There are mild native coronary calcifications. This examination  non-diagnostic for a luminal coronary evaluation.  5.  There is moderate RV enlargement. There is moderate RA  enlargement.  6.  There is a left atrial appendage closure device in the left atrial  appendage with partial contrast opacification of the appendage.   7.  There is severe posterior wall calcification at the LCFA and RCFA  access sites.   8.  The arch vessel branching pattern is normal.   9.  The suprarenal abdominal aorta is moderately calcified; infrarenal  abdominal aorta is severely calcified.  10.  Mildly calcified origins of celiac trunk, superior mesenteric  artery and inferior mesenteric artery.  There are single bilateral  renal arteries; all renal arteries have moderately calcified origins.   11.  There is no acute aortic pathology, such as dissection,  intramural hematoma, or contained rupture.      MEASUREMENTS:   Representative dimensions of the thoracoabdominal aorta are as  follows:     1. AORTIC ANNULUS MEASUREMENTS:     1.  The average aortic annulus diameter is 23.6 mm, (long diameter is  26.8 mm and short diameter is 20.9 mm)  2.  Aortic annulus area is 4.36 cm2  3.  Aortic annulus perimeter is 75.6 mm  4.  Suggested 3-cusp coaxial angle for aortic valve is (Indonesian 17 , CRA  18)  and alternate A-P coaxial angle is (Polish 0 , CRA 2), these angles  will vary depending upon the patient's body position  5.  Aortic root angle is 43 degrees  6.  Left main - Annulus distance: 11.4 mm, RCA - Annulus distance:  15.4 mm     2. LVOT MEASUREMENTS:     1.  The average LVOT diameter (measured 4 mm below annular plane) is  23.4 mm  2.  LVOT area is 4.31 cm2  3.  LVOT perimeter is 75.3 mm     3. AORTA MEASUREMENTS     1.  The aortic root at the sinuses of Valsalva: 35.0 mm x  32.1 mm x  29.0 mm  2.  The sinotubular junction:  29.3 mm x 29.2 mm  3.  Sinotubular junction height: 20.5 mm x 18.3 mm  4.  Proximal ascending aorta: 34.5 mm x 32.6 mm  5.  Distal abdominal aorta proximal to the bifurcation: 13.5 mm x 11.9  mm  6.  Innominate artery 3 cm from ostium: 13.9 mm x 13.6 mm  7.  Left common carotid artery 3 cm from ostium: 7.5 mm x 6.8 mm     4. ILIOFEMORAL MEASUREMENTS:     RIGHT:  1.  Right common iliac artery: 7.6 mm x 6.4 mm with circumferential  plaque  2.  Right external iliac artery: 7.6 mm x 6.2 mm   3.  Right common femoral artery: 7.0 mm x 4.7 mm (average 5.6 mm)  4.  Tortuosity: moderate   5.  Calcification: moderate      LEFT:  1.  Left common iliac artery: 9.1 mm x  9.0 mm with circumferential  plaque  2.  Left external iliac artery: 7.7 mm x 6.6 mm  3.  Left common femoral artery: 6.6 mm x 5.4 mm (average diameter 5.8  mm)  4.  Tortuosity: mild   5.  Calcification: mild      5. SUBCLAVIAN MEASUREMENTS:     RIGHT:  1. Minimal luminal diameter: 9.4 mm x  8.5 mm  2. Tortuosity: mild   3. Calcification: mild      LEFT:  1. Minimal luminal diameter: 10.3 mm x  9.4 mm  2. Tortuosity: mild   3. Calcification: mild      OTHER FINDINGS:   1.  Please review the separate Radiology report for incidental  noncardiac findings.       Echocardiogram 6/7/24  Interpretation Summary     Aortic valve Doppler parameters are consistent with severe normal-EF  (paradoxical) low-flow low-gradient aortic stenosis (PV  2.7 m/s MG 17 mmHg DVI  0.25 IVON 0.84 cm2 SVI 32.5 mL/m2.) The aortic valve is severely calcified and  leaflet motion appears restricted. Recommend structural cardiology (Valve  Clinic) referral.     Global and regional left ventricular function is normal with an EF of 60-65%.  Moderate to severe right ventricular dilation is present. Global right  ventricular function is moderately reduced.  Moderate to severe tricuspid insufficiency is present.  Pulmonary hypertension is present. Pulmonary artery systolic pressure is 73  mmHg (68 mmHg + RAP 15 mmHg).     This study was compared with the study from 9/18/23: Aortic valve parameters  are better assessed and are suggestive of paradoxical low-flow low-gradient  aortic stenosis. PA pressure is higher. There has otherwise been no  significant change.  ______________________________________________________________________________  Left Ventricle  Left ventricular size is normal. Left ventricular wall thickness is normal.  Global and regional left ventricular function is normal with an EF of 60-65%.  Diastolic function not assessed due to significant mitral annular  calcification. Flattened septum is consistent with right ventricular pressure  overload.     Right Ventricle  Moderate to severe right ventricular dilation is present. Global right  ventricular function is moderately reduced.     Atria  Mild left atrial enlargement is present. Moderate to severe right atrial  enlargement is present.     Mitral Valve  Moderate mitral annular calcification is present.     Aortic Valve  Severe aortic valve calcification is present.     Tricuspid Valve  Moderate to severe tricuspid insufficiency is present. Pulmonary hypertension  is present. Pulmonary artery systolic pressure is 73 mmHg (68 mmHg + RAP 15  mmHg).     Pulmonic Valve  Mild pulmonic insufficiency is present.     Vessels  The aorta root is normal. IVC diameter >2.1 cm collapsing <50% with sniff  suggests a high RA  pressure estimated at 15 mmHg or greater.     Pericardium  No pericardial effusion is present.     Compared to Previous Study  This study was compared with the study from 9/18/23 . Aortic valve parameters  are better assessed and are suggestive of paradoxical low-flow low-gradient  aortic stenosis. PA pressure is higher. There has otherwise been no  significant change.    Laboratory Studies:  Personally reviewed and interpreted by me.    LIPID RESULTS:  Lab Results   Component Value Date    CHOL 96 07/08/2024    CHOL 93 10/28/2020    HDL 34 (L) 07/08/2024    HDL 38 (L) 10/28/2020    LDL 41 07/08/2024    LDL 33 10/28/2020    TRIG 107 07/08/2024    TRIG 108 10/28/2020    CHOLHDLRATIO 4.3 05/18/2015       LIVER ENZYME RESULTS:  Lab Results   Component Value Date    AST 25 07/29/2024    AST 19 10/28/2020    ALT <5 07/29/2024    ALT 18 10/28/2020       CBC RESULTS:  Lab Results   Component Value Date    WBC 7.2 07/29/2024    WBC 7.3 10/28/2020    RBC 4.34 07/29/2024    RBC 3.99 10/28/2020    HGB 14.0 07/29/2024    HGB 11.8 02/08/2021    HCT 44.0 07/29/2024    HCT 38.2 10/28/2020     (H) 07/29/2024    MCV 96 10/28/2020    MCH 32.3 07/29/2024    MCH 30.8 10/28/2020    MCHC 31.8 07/29/2024    MCHC 32.2 10/28/2020    RDW 14.4 07/29/2024    RDW 13.9 10/28/2020     07/29/2024     10/28/2020       BMP RESULTS:  Lab Results   Component Value Date     07/29/2024     04/28/2021    POTASSIUM 4.7 07/29/2024    POTASSIUM 4.3 09/17/2023    POTASSIUM 4.5 04/28/2021    CHLORIDE 100 07/29/2024    CHLORIDE 109 04/27/2023    CHLORIDE 100 04/28/2021    CO2 25 07/29/2024    CO2 21 04/27/2023    CO2 26 04/28/2021    ANIONGAP 12 07/29/2024    ANIONGAP 7 04/27/2023    ANIONGAP 8 04/28/2021     (H) 07/29/2024     (H) 07/15/2024     (H) 04/27/2023    GLC 57 (L) 04/28/2021    BUN 21.4 07/29/2024    BUN 28 04/27/2023    BUN 23 04/28/2021    CR 1.68 (H) 07/29/2024    CR 1.21 (H) 04/28/2021     GFRESTIMATED 32 (L) 07/29/2024    GFRESTIMATED 30 (L) 12/30/2022    GFRESTIMATED 46 (L) 04/28/2021    GFRESTBLACK 53 (L) 04/28/2021    ABEL 10.1 07/29/2024    ABEL 9.8 04/28/2021        A1C RESULTS:  Lab Results   Component Value Date    A1C 7.2 (H) 07/29/2024    A1C 6.6 (H) 10/28/2020       INR RESULTS:  Lab Results   Component Value Date    INR 1.14 07/29/2024    INR 1.16 (H) 07/15/2024       Assessment and Plan   Keerthi Montoya is a 74 year old female who presents for pre-operative H&P in preparation for transcatheter aortic valve replacement on 9/4/24 at [facility].     She has the following specific operative considerations:      - RCRI score 3 corresponding with a 5.4% perioperative risk of MACE      - PURA # of risks 2/8      - VTE risk = 2. If equal to or > 4, pharmacologic prophylaxis is indicated      - RIsk of PONV score = 2. If > 2, anti-emetic intervention recommended    1. Paradoxical low flow low gradient severe aortic valve stenosis:   2. Acute on chronic diastolic Heart Failure, NYHA class II, Stage C:  3. Severe WHO GROUP II PH:  4. RV failure:   Patient with paradoxical low flow low gradient severe aortic stenosis, characterized with an IVON of 0.87 cm2, mean PG 16.5 mmHg and peak V of 2.7 m/sec with LVEF 60-65% and DI 0.25 by echocardiogram on 6/7/24. 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 We are moving forward with TAVR to improve left sided heart failure. Today she is not severely volume overloaded on exam though RHC filling pressures high and NT-BNP 11,900 consistent with acute heart failure. Continue current diuretic regimen and proceed with TAVR. Patient was evaluated in structural heart clinic where she was deemed an appropriate TAVR candidate. She was counseled for the above procedure.     Holding torsemide DOS  Continue ASA pre procedure - 325 night before and morning of  All questions answered  Type and screen  orders complete  Supplies for scrubbing provided  No known contrast dye allergy   No trouble with anesthesia in the past  Labs today stable, no s/s of infection    5. Persistent atrial fibrillation:   Patient with persistent atrial fibrillation with (CHADS-VASc 4) with intolerance to anticoagulation due to history of GI bleeding s/p RY closure and Watchman (05/2023).   - ASA 81 mg daily   - Continue atenolol DOS      6. Hypertension:   7. Hyperlipidemia:  Holding losartan DOS  Continue hydral, imdur and atorvastatin     8. Type II DM:   Holding glipizide DOS. Holding trulicity 1 week pre procedure and jardiance 3 days pre procedure.     9. CKD stage III-IV:  Baseline creatin 1.7-2.2, GFR 26-34. Today creat 2.0 and GFR 26. Minimize contrast intra procedure.     10. UC:   Holding mesalamine 1 day pre procedure and 3 days post procedure     Medication Recommendations:    Please TAKE the following NEW dose of Aspirin:  325 mg Asprin: Please take day before and day of procedure     Please TAKE the following medications morning of procedure as prescribed:    atenolol (TENORMIN)     atorvastatin (LIPITOR)     hydrALAZINE (APRESOLINE)     isosorbide mononitrate (IMDUR)     levothyroxine (SYNTHROID/LEVOTHROID)     pantoprazole (PROTONIX)      Please HOLD the following medications morning of procedure:    Cyanocobalamin (B-12)     losartan (COZAAR)     senna (SENOKOT)    torsemide (DEMADEX)     glipiZIDE (GLUCOTROL XL)         Special Medication Considerations:  Diabetic medication Instructions:  Please HOLD the following diabetic medications morning of procedure:    glipiZIDE (GLUCOTROL XL)      Please HOLD the following diabetic medications 7 days before procedure:    Dulaglutide (Trulicity), your last dose will be on 8/27.     Please HOLD the following diabetic medications 3 days before procedure:    Jardiance (empagliflozin), your last dose will be on 8/31.        Patient is optimized and is acceptable candidate for the  proposed procedure.  No further diagnostic evaluation is needed. Pre-procedure instructions provided in written format.     WENDI Marr, CNP  Structural Heart Care Nurse Practitioner  [facility]  Clinic: [phone]  Pager: [phone]

## 2024-08-26 ENCOUNTER — LAB (OUTPATIENT)
Dept: LAB | Facility: CLINIC | Age: 74
End: 2024-08-26
Attending: NURSE PRACTITIONER
Payer: MEDICARE

## 2024-08-26 ENCOUNTER — OFFICE VISIT (OUTPATIENT)
Dept: CARDIOLOGY | Facility: CLINIC | Age: 74
End: 2024-08-26
Attending: NURSE PRACTITIONER
Payer: MEDICARE

## 2024-08-26 VITALS
WEIGHT: 137.3 LBS | BODY MASS INDEX: 24.32 KG/M2 | HEART RATE: 52 BPM | OXYGEN SATURATION: 95 % | DIASTOLIC BLOOD PRESSURE: 68 MMHG | SYSTOLIC BLOOD PRESSURE: 144 MMHG

## 2024-08-26 DIAGNOSIS — Z01.810 PRE-OPERATIVE CARDIOVASCULAR EXAMINATION: Primary | ICD-10-CM

## 2024-08-26 DIAGNOSIS — I35.0 SEVERE AORTIC STENOSIS: ICD-10-CM

## 2024-08-26 DIAGNOSIS — I50.23 ACUTE ON CHRONIC SYSTOLIC CONGESTIVE HEART FAILURE (H): ICD-10-CM

## 2024-08-26 DIAGNOSIS — R94.6 THYROID FUNCTION TEST ABNORMAL: ICD-10-CM

## 2024-08-26 DIAGNOSIS — I50.33 ACUTE ON CHRONIC DIASTOLIC CONGESTIVE HEART FAILURE (H): ICD-10-CM

## 2024-08-26 LAB
ANION GAP SERPL CALCULATED.3IONS-SCNC: 12 MMOL/L (ref 7–15)
BUN SERPL-MCNC: 35.6 MG/DL (ref 8–23)
CALCIUM SERPL-MCNC: 10.1 MG/DL (ref 8.8–10.4)
CHLORIDE SERPL-SCNC: 103 MMOL/L (ref 98–107)
CREAT SERPL-MCNC: 2 MG/DL (ref 0.51–0.95)
EGFRCR SERPLBLD CKD-EPI 2021: 26 ML/MIN/1.73M2
ERYTHROCYTE [DISTWIDTH] IN BLOOD BY AUTOMATED COUNT: 14.3 % (ref 10–15)
GLUCOSE SERPL-MCNC: 71 MG/DL (ref 70–99)
HCO3 SERPL-SCNC: 21 MMOL/L (ref 22–29)
HCT VFR BLD AUTO: 43.5 % (ref 35–47)
HGB BLD-MCNC: 13.9 G/DL (ref 11.7–15.7)
INR PPP: 1.11 (ref 0.85–1.15)
MCH RBC QN AUTO: 32.3 PG (ref 26.5–33)
MCHC RBC AUTO-ENTMCNC: 32 G/DL (ref 31.5–36.5)
MCV RBC AUTO: 101 FL (ref 78–100)
NT-PROBNP SERPL-MCNC: ABNORMAL PG/ML (ref 0–900)
PLATELET # BLD AUTO: 217 10E3/UL (ref 150–450)
POTASSIUM SERPL-SCNC: 4.9 MMOL/L (ref 3.4–5.3)
RBC # BLD AUTO: 4.31 10E6/UL (ref 3.8–5.2)
SODIUM SERPL-SCNC: 136 MMOL/L (ref 135–145)
TSH SERPL DL<=0.005 MIU/L-ACNC: 4 UIU/ML (ref 0.3–4.2)
WBC # BLD AUTO: 7.4 10E3/UL (ref 4–11)

## 2024-08-26 PROCEDURE — 36415 COLL VENOUS BLD VENIPUNCTURE: CPT | Performed by: PATHOLOGY

## 2024-08-26 PROCEDURE — 86900 BLOOD TYPING SEROLOGIC ABO: CPT

## 2024-08-26 PROCEDURE — 83880 ASSAY OF NATRIURETIC PEPTIDE: CPT | Performed by: PATHOLOGY

## 2024-08-26 PROCEDURE — 80048 BASIC METABOLIC PNL TOTAL CA: CPT | Performed by: PATHOLOGY

## 2024-08-26 PROCEDURE — 85027 COMPLETE CBC AUTOMATED: CPT | Performed by: PATHOLOGY

## 2024-08-26 PROCEDURE — 85610 PROTHROMBIN TIME: CPT | Performed by: PATHOLOGY

## 2024-08-26 PROCEDURE — G0463 HOSPITAL OUTPT CLINIC VISIT: HCPCS | Performed by: NURSE PRACTITIONER

## 2024-08-26 PROCEDURE — 99214 OFFICE O/P EST MOD 30 MIN: CPT | Performed by: NURSE PRACTITIONER

## 2024-08-26 PROCEDURE — 84443 ASSAY THYROID STIM HORMONE: CPT | Performed by: PATHOLOGY

## 2024-08-26 ASSESSMENT — PAIN SCALES - GENERAL: PAINLEVEL: NO PAIN (0)

## 2024-08-26 NOTE — NURSING NOTE
Chief Complaint   Patient presents with    Follow Up     Return TAVR- TAVR H&P     Vitals were taken and medications reconciled.    Javad Mcclain, AMOL  11:01 AM

## 2024-08-26 NOTE — PROGRESS NOTES
Structural Heart Coordinator visit:  Provided additional education regarding TAVR/MitraClip procedure, after being present for discussion with physician. Explained the work-up process and next steps for patient. Patient provided our direct contact number and instructed to call with any questions.         Pre-TAVR     KCCQ Results:   1a. 5  1b. 5  1c. 5  2. 5  3. 7  4. 7  5. 5  6. 5  7. 5  8a. 6  8b. 6  8c. 6    Denise Perdomo CMA  Structural Heart   [facility]

## 2024-08-26 NOTE — LETTER
8/26/2024      RE: Keerthi Montoya  [address] [place] [phone]       Dear Colleague,    Thank you for the opportunity to participate in the care of your patient, Keerthi Montoya, at the [facility] [place] [facility]. Please see a copy of my visit note below.        STRUCTURAL HEART CLINIC  H&P    Referring Provider: Dr. Bunn     History of Present Illness  Keerthi Montoya is a 74 year old female who presents for pre-operative H&P in preparation for transcatheter aortic valve replacement on 9/4/24 at [facility].     Patient with a history of paradoxical low flow low gradient severe aortic stenosis, characterized with an IVON of 0.87 cm2, mean PG 16.5 mmHg and peak V of 2.7 m/sec with LVEF 60-65% and DI 0.25 by echocardiogram on 6/7/24. 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 We are moving forward with TAVR to improve left sided heart failure. Patient was evaluated in structural heart clinic where she was deemed an appropriate TAVR candidate. She was counseled for the above procedure.      Her additional past medical history is notable for chronic HFpEF, HLD, HTN, DM2, ulcerative pancolitis, CKD3, severe pulmonary HTN WHO group II, persistent atrial fibrillation with (CHADS-VASc 4) with intolerance to anticoagulation due to history of GI bleeding s/p RY closure and Watchman (05/2023) .      History of blood transfusions/reactions: No  History of abnormal bleeding/anti-platelet use: Yes GI bleeding resolved   Steroid use in the last year: No  Personal or family history with difficulty with anesthesia: No   Infection precautions: No  Difficulty w/intubation/bedrest: No      Current Medications:  Current Outpatient Medications   Medication Sig Dispense Refill     aspirin 81 MG EC tablet Take 1 tablet (81 mg) by mouth daily 90 tablet 3      atenolol (TENORMIN) 25 MG tablet Take 1 tablet (25 mg) by mouth daily 90 tablet 3     atorvastatin (LIPITOR) 40 MG tablet Take 1 tablet (40 mg) by mouth daily 90 tablet 3     blood glucose (ACCU-CHEK FELIPE PLUS) test strip 200 strips by In Vitro route 2 times daily Use to test blood sugar 2 times daily or as directed. 200 strip 4     Cyanocobalamin (B-12) 1000 MCG TABS Take 1,000 mcg by mouth three times a week       diphenhydrAMINE (BENADRYL) 25 MG tablet Take 25 mg by mouth every 6 hours as needed for itching or allergies       dulaglutide (TRULICITY) 1.5 MG/0.5ML pen Inject 1.5 mg Subcutaneous every 7 days After 4 doses of 0.75 mg 2 mL 1     empagliflozin (JARDIANCE) 10 MG TABS tablet Take 1 tablet (10 mg) by mouth daily 90 tablet 3     glipiZIDE (GLUCOTROL XL) 10 MG 24 hr tablet TAKE 2 TABLETS(20 MG) BY MOUTH DAILY 60 tablet 3     hydrALAZINE (APRESOLINE) 50 MG tablet Take 1 tablet (50 mg) by mouth 3 times daily 180 tablet 3     isosorbide mononitrate (IMDUR) 30 MG 24 hr tablet Take 1 tablet (30 mg) by mouth daily 90 tablet 3     levothyroxine (SYNTHROID/LEVOTHROID) 125 MCG tablet Take 1 tablet (125 mcg) by mouth daily 90 tablet 0     losartan (COZAAR) 100 MG tablet Take 1 tablet (100 mg) by mouth daily 90 tablet 3     mesalamine (APRISO ER) 0.375 g 24 hr capsule Take 4 capsules (1.5 g) by mouth daily 360 capsule 3     pantoprazole (PROTONIX) 40 MG EC tablet Take 1 tablet (40 mg) by mouth daily 90 tablet 3     senna (SENOKOT) 8.6 MG tablet Take 1 tablet by mouth daily as needed for constipation 30 tablet 3     torsemide (DEMADEX) 10 MG tablet Take 1 tablet (10 mg) by mouth every other day 40 tablet 1       Allergies:     Allergies   Allergen Reactions     Actos [Pioglitazone]      Fluid retention     Amlodipine      Leg swelling, hand swelling     Animal Dander      Doxycycline Hives     Dust Mites      Grass      Keflex [Cephalexin Hcl] Hives     Metoprolol      Swelling of lower legs and fatigue     Other  [Seasonal Allergies]      pollen     Ranitidine      rash     Synthroid [Levothyroxine Sodium] Swelling     Allergic to brand name     Tetracycline Hives     Tylenol Hives     Ziac [Bisoprolol-Hydrochlorothiazide]        Past Medical History:  Past Medical History:   Diagnosis Date     Chronic kidney disease      Diabetes mellitus, type 2 (H)      Diverticulosis of colon (without mention of hemorrhage) 10/01/2012     GI bleed      History of blood transfusion      HLD (hyperlipidemia)      Hypertension      Hypothyroidism      MARIA D (iron deficiency anemia)      Persistent atrial fibrillation (H)      Pulmonary hypertension (H)      Ulcerative (chronic) enterocolitis (H)        Past Surgical History:  Past Surgical History:   Procedure Laterality Date     CHOLECYSTECTOMY  3/1987     COLON SURGERY  01/2001    Left colon resection; diverticulitis     COLONOSCOPY N/A 4/24/2017    Procedure: COMBINED COLONOSCOPY, SINGLE OR MULTIPLE BIOPSY/POLYPECTOMY BY BIOPSY;;  Surgeon: Radha Salguero MD;  Location: [facility] OR     COLONOSCOPY N/A 3/10/2023    Procedure: COLONOSCOPY;  Surgeon: Preeti James MD;  Location: [facility]     COLONOSCOPY WITH CO2 INSUFFLATION N/A 4/24/2017    Procedure: COLONOSCOPY WITH CO2 INSUFFLATION;  Colonoscopy Dx rectal bleeding, BMI 25.86, Pharm [facility] .[phone], ;  Surgeon: Radha Salguero MD;  Location: [facility] OR     CV CORONARY ANGIOGRAM N/A 7/15/2024    Procedure: Coronary Angiogram with possible intervention;  Surgeon: Bobby Grimes MD;  [facility]     CV LEFT ATRIAL APPENDAGE CLOSURE N/A 5/11/2023    Procedure: Left Atrial Appendage Closure;  Surgeon: Bobby Grimes MD;  Location: [facility]     CV RIGHT HEART CATH MEASUREMENTS RECORDED N/A 3/16/2020    Procedure: CV RIGHT HEART CATH;  Surgeon: Nader Muñoz MD;  Location: [facility]     CV RIGHT HEART CATH MEASUREMENTS RECORDED N/A 7/15/2024    Procedure:  Right Heart Cath;  Surgeon: Bobby Grimes MD;  Location:  HEART CARDIAC CATH LAB     ESOPHAGOSCOPY, GASTROSCOPY, DUODENOSCOPY (EGD), COMBINED N/A 2023    Procedure: ESOPHAGOGASTRODUODENOSCOPY, WITH BIOPSY;  Surgeon: Ricki Deal MD;  Location:  GI     ESOPHAGOSCOPY, GASTROSCOPY, DUODENOSCOPY (EGD), COMBINED N/A 3/10/2023    Procedure: Esophagoscopy, gastroscopy, duodenoscopy (EGD), combined;  Surgeon: Preeti James MD;  Location:  GI     GYN SURGERY  1983    tubal ligation     HERNIA REPAIR  2006    recurrent incisional hernia     SIGMOIDOSCOPY FLEXIBLE N/A 2023    Procedure: Sigmoidoscopy flexible;  Surgeon: Ricki Deal MD;  Location:  GI     THROAT SURGERY  1974    thyroidectomy       Family History:  Family History   Problem Relation Age of Onset     Cerebrovascular Disease Mother      Cancer Father         bladder     Diverticulitis Brother      Diverticulitis Brother      Kidney Disease No family hx of      Crohn's Disease No family hx of      Ulcerative Colitis No family hx of      Stomach Cancer No family hx of      GERD No family hx of      Celiac Disease No family hx of      Anesthesia Reaction No family hx of        Social History:  Social History     Socioeconomic History     Marital status:      Spouse name: Raymundo; dementia;       Number of children: 3   Occupational History     Employer: [employer]   Tobacco Use     Smoking status: Never     Passive exposure: Never     Smokeless tobacco: Never   Vaping Use     Vaping status: Never Used   Substance and Sexual Activity     Alcohol use: Not Currently     Alcohol/week: 2.0 - 4.0 standard drinks of alcohol     Types: 1 - 2 Cans of beer, 1 - 2 Shots of liquor per week     Comment: occasional cocktail or beer     Drug use: No     Sexual activity: Not Currently   Other Topics Concern      Service No     Blood Transfusions No     Caffeine Concern No     Occupational Exposure No     Hobby  Hazards No     Sleep Concern No     Stress Concern No     Weight Concern No     Special Diet No     Back Care No     Exercise Yes     Comment: off and on     Bike Helmet No     Seat Belt Yes     Self-Exams No   Social History Narrative    Laid off her job 8/14     Social Determinants of Health     Financial Resource Strain: Low Risk  (10/20/2023)    Financial Resource Strain      Within the past 12 months, have you or your family members you live with been unable to get utilities (heat, electricity) when it was really needed?: No   Food Insecurity: Low Risk  (10/20/2023)    Food Insecurity      Within the past 12 months, did you worry that your food would run out before you got money to buy more?: No      Within the past 12 months, did the food you bought just not last and you didn t have money to get more?: No   Transportation Needs: Low Risk  (10/20/2023)    Transportation Needs      Within the past 12 months, has lack of transportation kept you from medical appointments, getting your medicines, non-medical meetings or appointments, work, or from getting things that you need?: No   Housing Stability: Low Risk  (10/20/2023)    Housing Stability      Do you have housing? : Yes      Are you worried about losing your housing?: No       Review of Systems:    Functional status: Independent in ADL's. Able to achieve  METS > 4     Constitutional: No fever, chills, or sweats. No weight gain/loss   ENT: No visual disturbance, ear ache, epistaxis, sore throat  Allergies/Immunologic: Negative.   Respiratory: No cough, hemoptysia  Cardiovascular: As per HPI  GI: No nausea, vomiting, hematemesis, melena, or hematochezia  : No urinary frequency, dysuria, or hematuria  Integument: Negative  Psychiatric: Negative  Neuro: Negative  Endocrinology: Negative   Musculoskeletal: Negative      Physical Exam:  Vitals: BP (!) 144/68 (BP Location: Left arm, Patient Position: Chair, Cuff Size: Adult Regular)   Pulse 52   Wt 62.3 kg (137  lb 4.8 oz)   LMP  (LMP Unknown)   SpO2 95%   BMI 24.32 kg/m     General: NAD  HEENT:  Dentition intact.    Neck: No jugular venous distension.   Heart: RRR with 3/6 JUANITA at RUSB  Lungs: CTA.    Abdomen: Soft, nontender, nondistended.   Extremities: No clubbing, cyanosis, or edema.  The pulses are +4/4 at the radial, brachial, femoral, popliteal, DP, and PT sites bilaterally.  No bruits are noted.  Neurologic: Alert and oriented to person/place/time, normal speech, gait and affect  Skin: No petechiae, purpura or rash.      Diagnostic Studies:    ECG 7/15/24:  Personally reviewed and interpreted by me.  SB RAD normal QRS 82ms    Coronary angiogram RHC 7/15/24     Prox RCA to Mid RCA lesion is 40% stenosed.     Prox LAD to Mid LAD lesion is 20% stenosed.     Prox Cx to Mid Cx lesion is 30% stenosed.     Right sided filling pressures are mildly elevated.     Left sided filling pressures are normal.     Moderately elevated pulmonary artery hypertension.     Reduced cardiac output level.     Elevated right sided filling pressures with moderately elevated pulmonary artery pressure.  Normal coronary arteries.       CT TAVR 7/8/24:  Narrative & Impression   Procedure: CT ANGIOGRAM TAVR   Examination Date: 7/8/2024 2:33 PM   Indication: Severe aortic stenosis, Pre TAVR  Ordering Provider: DUY HERNÁNDEZ     TECHNIQUE: ECG gated CT of the heart, aorta and nongated CT of the  chest abdomen pelvis without and with IV contrast  110cc of isovue 370  was performed per the ROLLY protocol on a Siemens Dual Source Flash  scanner without incident. A noncontrast CT of the chest abdomen and  pelvis was performed followed by contrast-enhanced CTA of the heart in  a spiral gated mode with limited field-of-view to evaluate aortic  valve/annulus and cardiac morphology followed by gated high pitch  spiral CTA of the chest, abdomen, pelvis at 120 kVP to evaluate the  thoracoabdominal aorta and iliac vasculature. Scan protocol  was  optimized to minimize radiation exposure. The patient received 400 mL  of 0.9% saline over two hours prior to the examination, as per the  TAVR low GFR protocol. The total radiation exposure was 1303 DLP and  18.2 mSv. Images were reconstructed and analyzed on a [employer]  Workstation.                                                                       IMPRESSION:     1.  See below for TAVR related aortic annular and vascular  measurements.   2.  No acute aortic dissection, intramural hematoma or contained  rupture noted.  3.  Please review the separate Radiology report for incidental  noncardiac findings.     FINDINGS:     1.  Moderately calcified tricuspid aortic valve. The aortic valve  calcium score is 864. The LVOT is not calcified. The ascending aorta  is mildly calcified, aortic arch is  moderately calcified, the  descending thoracic aorta is moderately calcified. There is mild  mitral annular calcification.  2.  The aortic valve area is 1.29 cm2 (by planimetry).  3.  There are no adverse aortic root features, i.e. coronary heights  are adequate and there is no significant aortic root calcification.  4.  There are mild native coronary calcifications. This examination  non-diagnostic for a luminal coronary evaluation.  5.  There is moderate RV enlargement. There is moderate RA  enlargement.  6.  There is a left atrial appendage closure device in the left atrial  appendage with partial contrast opacification of the appendage.   7.  There is severe posterior wall calcification at the LCFA and RCFA  access sites.   8.  The arch vessel branching pattern is normal.   9.  The suprarenal abdominal aorta is moderately calcified; infrarenal  abdominal aorta is severely calcified.  10.  Mildly calcified origins of celiac trunk, superior mesenteric  artery and inferior mesenteric artery.  There are single bilateral  renal arteries; all renal arteries have moderately calcified origins.   11.  There is no acute  aortic pathology, such as dissection,  intramural hematoma, or contained rupture.      MEASUREMENTS:   Representative dimensions of the thoracoabdominal aorta are as  follows:     1. AORTIC ANNULUS MEASUREMENTS:     1.  The average aortic annulus diameter is 23.6 mm, (long diameter is  26.8 mm and short diameter is 20.9 mm)  2.  Aortic annulus area is 4.36 cm2  3.  Aortic annulus perimeter is 75.6 mm  4.  Suggested 3-cusp coaxial angle for aortic valve is (Kyrgyz 17 , CRA  18) and alternate A-P coaxial angle is (Kyrgyz 0 , CRA 2), these angles  will vary depending upon the patient's body position  5.  Aortic root angle is 43 degrees  6.  Left main - Annulus distance: 11.4 mm, RCA - Annulus distance:  15.4 mm     2. LVOT MEASUREMENTS:     1.  The average LVOT diameter (measured 4 mm below annular plane) is  23.4 mm  2.  LVOT area is 4.31 cm2  3.  LVOT perimeter is 75.3 mm     3. AORTA MEASUREMENTS     1.  The aortic root at the sinuses of Valsalva: 35.0 mm x  32.1 mm x  29.0 mm  2.  The sinotubular junction:  29.3 mm x 29.2 mm  3.  Sinotubular junction height: 20.5 mm x 18.3 mm  4.  Proximal ascending aorta: 34.5 mm x 32.6 mm  5.  Distal abdominal aorta proximal to the bifurcation: 13.5 mm x 11.9  mm  6.  Innominate artery 3 cm from ostium: 13.9 mm x 13.6 mm  7.  Left common carotid artery 3 cm from ostium: 7.5 mm x 6.8 mm     4. ILIOFEMORAL MEASUREMENTS:     RIGHT:  1.  Right common iliac artery: 7.6 mm x 6.4 mm with circumferential  plaque  2.  Right external iliac artery: 7.6 mm x 6.2 mm   3.  Right common femoral artery: 7.0 mm x 4.7 mm (average 5.6 mm)  4.  Tortuosity: moderate   5.  Calcification: moderate      LEFT:  1.  Left common iliac artery: 9.1 mm x  9.0 mm with circumferential  plaque  2.  Left external iliac artery: 7.7 mm x 6.6 mm  3.  Left common femoral artery: 6.6 mm x 5.4 mm (average diameter 5.8  mm)  4.  Tortuosity: mild   5.  Calcification: mild      5. SUBCLAVIAN MEASUREMENTS:     RIGHT:  1. Minimal  luminal diameter: 9.4 mm x  8.5 mm  2. Tortuosity: mild   3. Calcification: mild      LEFT:  1. Minimal luminal diameter: 10.3 mm x  9.4 mm  2. Tortuosity: mild   3. Calcification: mild      OTHER FINDINGS:   1.  Please review the separate Radiology report for incidental  noncardiac findings.       Echocardiogram 6/7/24  Interpretation Summary     Aortic valve Doppler parameters are consistent with severe normal-EF  (paradoxical) low-flow low-gradient aortic stenosis (PV 2.7 m/s MG 17 mmHg DVI  0.25 IVON 0.84 cm2 SVI 32.5 mL/m2.) The aortic valve is severely calcified and  leaflet motion appears restricted. Recommend structural cardiology (Valve  Clinic) referral.     Global and regional left ventricular function is normal with an EF of 60-65%.  Moderate to severe right ventricular dilation is present. Global right  ventricular function is moderately reduced.  Moderate to severe tricuspid insufficiency is present.  Pulmonary hypertension is present. Pulmonary artery systolic pressure is 73  mmHg (68 mmHg + RAP 15 mmHg).     This study was compared with the study from 9/18/23: Aortic valve parameters  are better assessed and are suggestive of paradoxical low-flow low-gradient  aortic stenosis. PA pressure is higher. There has otherwise been no  significant change.  ______________________________________________________________________________  Left Ventricle  Left ventricular size is normal. Left ventricular wall thickness is normal.  Global and regional left ventricular function is normal with an EF of 60-65%.  Diastolic function not assessed due to significant mitral annular  calcification. Flattened septum is consistent with right ventricular pressure  overload.     Right Ventricle  Moderate to severe right ventricular dilation is present. Global right  ventricular function is moderately reduced.     Atria  Mild left atrial enlargement is present. Moderate to severe right atrial  enlargement is present.     Mitral  Valve  Moderate mitral annular calcification is present.     Aortic Valve  Severe aortic valve calcification is present.     Tricuspid Valve  Moderate to severe tricuspid insufficiency is present. Pulmonary hypertension  is present. Pulmonary artery systolic pressure is 73 mmHg (68 mmHg + RAP 15  mmHg).     Pulmonic Valve  Mild pulmonic insufficiency is present.     Vessels  The aorta root is normal. IVC diameter >2.1 cm collapsing <50% with sniff  suggests a high RA pressure estimated at 15 mmHg or greater.     Pericardium  No pericardial effusion is present.     Compared to Previous Study  This study was compared with the study from 9/18/23 . Aortic valve parameters  are better assessed and are suggestive of paradoxical low-flow low-gradient  aortic stenosis. PA pressure is higher. There has otherwise been no  significant change.    Laboratory Studies:  Personally reviewed and interpreted by me.    LIPID RESULTS:  Lab Results   Component Value Date    CHOL 96 07/08/2024    CHOL 93 10/28/2020    HDL 34 (L) 07/08/2024    HDL 38 (L) 10/28/2020    LDL 41 07/08/2024    LDL 33 10/28/2020    TRIG 107 07/08/2024    TRIG 108 10/28/2020    CHOLHDLRATIO 4.3 05/18/2015       LIVER ENZYME RESULTS:  Lab Results   Component Value Date    AST 25 07/29/2024    AST 19 10/28/2020    ALT <5 07/29/2024    ALT 18 10/28/2020       CBC RESULTS:  Lab Results   Component Value Date    WBC 7.2 07/29/2024    WBC 7.3 10/28/2020    RBC 4.34 07/29/2024    RBC 3.99 10/28/2020    HGB 14.0 07/29/2024    HGB 11.8 02/08/2021    HCT 44.0 07/29/2024    HCT 38.2 10/28/2020     (H) 07/29/2024    MCV 96 10/28/2020    MCH 32.3 07/29/2024    MCH 30.8 10/28/2020    MCHC 31.8 07/29/2024    MCHC 32.2 10/28/2020    RDW 14.4 07/29/2024    RDW 13.9 10/28/2020     07/29/2024     10/28/2020       BMP RESULTS:  Lab Results   Component Value Date     07/29/2024     04/28/2021    POTASSIUM 4.7 07/29/2024    POTASSIUM 4.3 09/17/2023     POTASSIUM 4.5 04/28/2021    CHLORIDE 100 07/29/2024    CHLORIDE 109 04/27/2023    CHLORIDE 100 04/28/2021    CO2 25 07/29/2024    CO2 21 04/27/2023    CO2 26 04/28/2021    ANIONGAP 12 07/29/2024    ANIONGAP 7 04/27/2023    ANIONGAP 8 04/28/2021     (H) 07/29/2024     (H) 07/15/2024     (H) 04/27/2023    GLC 57 (L) 04/28/2021    BUN 21.4 07/29/2024    BUN 28 04/27/2023    BUN 23 04/28/2021    CR 1.68 (H) 07/29/2024    CR 1.21 (H) 04/28/2021    GFRESTIMATED 32 (L) 07/29/2024    GFRESTIMATED 30 (L) 12/30/2022    GFRESTIMATED 46 (L) 04/28/2021    GFRESTBLACK 53 (L) 04/28/2021    ABEL 10.1 07/29/2024    ABEL 9.8 04/28/2021        A1C RESULTS:  Lab Results   Component Value Date    A1C 7.2 (H) 07/29/2024    A1C 6.6 (H) 10/28/2020       INR RESULTS:  Lab Results   Component Value Date    INR 1.14 07/29/2024    INR 1.16 (H) 07/15/2024       Assessment and Plan   Keerthi Montoya is a 74 year old female who presents for pre-operative H&P in preparation for transcatheter aortic valve replacement on 9/4/24 at [facility].     She has the following specific operative considerations:      - RCRI score 3 corresponding with a 5.4% perioperative risk of MACE      - PURA # of risks 2/8      - VTE risk = 2. If equal to or > 4, pharmacologic prophylaxis is indicated      - RIsk of PONV score = 2. If > 2, anti-emetic intervention recommended    1. Paradoxical low flow low gradient severe aortic valve stenosis:   2. Acute on chronic diastolic Heart Failure, NYHA class II, Stage C:  3. Severe WHO GROUP II PH:  4. RV failure:   Patient with paradoxical low flow low gradient severe aortic stenosis, characterized with an IVON of 0.87 cm2, mean PG 16.5 mmHg and peak V of 2.7 m/sec with LVEF 60-65% and DI 0.25 by echocardiogram on 6/7/24. 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 We are moving forward with TAVR to improve left sided heart failure.  Today she is not severely volume overloaded on exam though RHC filling pressures high and NT-BNP 11,900 consistent with acute heart failure. Continue current diuretic regimen and proceed with TAVR. Patient was evaluated in structural heart clinic where she was deemed an appropriate TAVR candidate. She was counseled for the above procedure.     Holding torsemide DOS  Continue ASA pre procedure - 325 night before and morning of  All questions answered  Type and screen orders complete  Supplies for scrubbing provided  No known contrast dye allergy   No trouble with anesthesia in the past  Labs today stable, no s/s of infection    5. Persistent atrial fibrillation:   Patient with persistent atrial fibrillation with (CHADS-VASc 4) with intolerance to anticoagulation due to history of GI bleeding s/p RY closure and Watchman (05/2023).   - ASA 81 mg daily   - Continue atenolol DOS      6. Hypertension:   7. Hyperlipidemia:  Holding losartan DOS  Continue hydral, imdur and atorvastatin     8. Type II DM:   Holding glipizide DOS. Holding trulicity 1 week pre procedure and jardiance 3 days pre procedure.     9. CKD stage III-IV:  Baseline creatin 1.7-2.2, GFR 26-34. Today creat 2.0 and GFR 26. Minimize contrast intra procedure.     10. UC:   Holding mesalamine 1 day pre procedure and 3 days post procedure     Medication Recommendations:    Please TAKE the following NEW dose of Aspirin:  325 mg Asprin: Please take day before and day of procedure     Please TAKE the following medications morning of procedure as prescribed:     atenolol (TENORMIN)      atorvastatin (LIPITOR)      hydrALAZINE (APRESOLINE)      isosorbide mononitrate (IMDUR)      levothyroxine (SYNTHROID/LEVOTHROID)      pantoprazole (PROTONIX)      Please HOLD the following medications morning of procedure:     Cyanocobalamin (B-12)      losartan (COZAAR)      senna (SENOKOT)     torsemide (DEMADEX)      glipiZIDE (GLUCOTROL XL)         Special Medication  Considerations:  Diabetic medication Instructions:  Please HOLD the following diabetic medications morning of procedure:     glipiZIDE (GLUCOTROL XL)      Please HOLD the following diabetic medications 7 days before procedure:     Dulaglutide (Trulicity), your last dose will be on 8/27.     Please HOLD the following diabetic medications 3 days before procedure:     Jardiance (empagliflozin), your last dose will be on 8/31.        Patient is optimized and is acceptable candidate for the proposed procedure.  No further diagnostic evaluation is needed. Pre-procedure instructions provided in written format.     WENDI Marr, CNP  Structural Heart Care Nurse Practitioner  [facility]  Clinic: [phone]  Pager: [phone]      Structural Heart Coordinator visit:  Provided additional education regarding TAVR/MitraClip procedure, after being present for discussion with physician. Explained the work-up process and next steps for patient. Patient provided our direct contact number and instructed to call with any questions.         Pre-TAVR     KCCQ Results:   1a. 5  1b. 5  1c. 5  2. 5  3. 7  4. 7  5. 5  6. 5  7. 5  8a. 6  8b. 6  8c. 6    Denise Perdomo CMA  Structural Heart   [facility]       Please do not hesitate to contact me if you have any questions/concerns.     Sincerely,     Dominique Saavedra, WAYLON

## 2024-08-26 NOTE — PATIENT INSTRUCTIONS
Transcatheter aortic valve replacement (TAVR)     Check in to the hospital, at 05:30.       Please disregard any TodoCast TVt messages or reminders in regards to ECHO scheduling, this is done as part of procedure.   If you are unsure if your check in time has changed, please call number [phone].     3rd floor: [address]     [facility], [address]  [address]        parking is available in front of the hospital, 24 hours a day  -  Stop at the Information Desk and the admissions desk in the lobby.     -------------------------------------------------------------------------  Nothing to eat after midnight  Clear liquids like water, apple juice or 7up/Sprite is OK up to two hours prior to your scheduled arrival time.    You may take your medications in the morning with a sip of water.     * Take a shower, using Chlorhexidine Gluconate (CHG) soap, Hibiclens, the night before the procedure, and the morning of the procedure.    Shower using your usual soap and shampoo.  Turn off the water or move away from the shower stream.  Apply Hibiclens directly on your skin or on a wet washcloth, rub thoroughly for 5 minutes, from neck to groin, avoiding the face and genital area.  Rise soap off, do not wash with regular soap afterwards.     If you did NOT receive these Hibiclens soap at your pre-op History and Physical, then:   *Chlorhexidine Gluconate (CHG) soap is available free of charge at any [place] pharmacy  *Antibacterial soap can be used        Contrast Allergy:   No     Medications Instructions:     Needs to be discussed with Dominique Saavedra:    mesalamine (APRISO ER)   (day of surgery but risk of flair vs risk of bleeding)        Please TAKE the following NEW dose of Aspirin:  325 mg Asprin: Please take day before and day of procedure     Please TAKE the following medications morning of procedure as prescribed:    atenolol (TENORMIN)     atorvastatin (LIPITOR)      hydrALAZINE (APRESOLINE)     isosorbide mononitrate (IMDUR)     levothyroxine (SYNTHROID/LEVOTHROID)     pantoprazole (PROTONIX)      Please HOLD the following medications morning of procedure:    Cyanocobalamin (B-12)     losartan (COZAAR)     senna (SENOKOT)    torsemide (DEMADEX)     glipiZIDE (GLUCOTROL XL)         Special Medication Considerations:  Diabetic medication Instructions:  Please HOLD the following diabetic medications morning of procedure:    glipiZIDE (GLUCOTROL XL)      Please HOLD the following diabetic medications 7 days before procedure:    Dulaglutide (Trulicity), your last dose will be on 8/27.     Please HOLD the following diabetic medications 2 days before procedure:    Jardiance (empagliflozin), your last dose will be on 9/1.         If any questions please contact:  Stacy Portillo RN  Structural Heart Care Coordinator  [facility]  Office: [phone]

## 2024-08-28 NOTE — H&P
[facility]      CSI History and Physicial  Keerthi Montoya MRN: [id number]  1950  Date of Admission:(Not on file)  Primary care provider: Bunny Meyers      Assessment and Plan:     Keerthi Montoya is a 74 y.o.F with a PMhx severe low flow low gradient aortic stenosis, HFpEF, HTN, T2DM, HLD, CKD3, pulmonary hypertension (WHO II), persistent atrial fibrillation with intolerance to anticoagulation due to GI bleeding s/p RY closure and Watchman (5/2023) admitted 9/4 for planned TAVR procedure.      # Severe aortic stenosis, now s/p Transcatheter Aortic Valve Replacement.   # Acute on Chronic Diastolic Heart Failure  # Pulmonary Hypertension (WHO II)  # HTN  # HLD  Patient with severe aortic stenosis characterized with mean gradient 17 mmHG, IVON 0.84 Cm^2, peak velocity 2.7 M/s, as well as acute on chronic heart failure as evidenced by  NT BNP of 11,972 and symptoms of progressive exertional dyspnea. Now s/p TAVR with a 23mm Ultra Resilia valve with marked improvement in heart failure symptoms. Post procedure echo showing well-seated 23 mm Ramsay Trini 3 Ultra Resilia  TAVR bioprosthesis, with resolution of aortic stenosis (peak velocity 1.1 m/s,  mean gradient 2 mmHg, DVI 0.50, IVON 1.7 cm2, SVI 29 mL/m2.) There is trace  valvular AI associated with a guidewire crossing the aortic valve at the time  of imaging. There is no paravalular AI.  Sheath sites soft without hematoma. Possible junctional rhythm post procedure. Patient bradycardic at baseline. Neuro's intact    - Volume Status: Euvolemic, continue PTA Torsemide 10 mg every other day  - ASA for anti-platelet therapy  - BP  control: continue PTA Losartan 100 mg daily, hydralazine 50 mg TID, Imdur 30  mg daily  - Statin: continue PTA Lipitor 40 mg daily  - Bedrest per protocol   - Neuro checks, per protocol  - Echocardiogram tomorrow  - Monitor groin sites  - EKG in morning   - Torres can be removed once patient is out of bed   - Cardiac  rehab/PT/OT  - Diurese as needed/able  - Daily weights  - Strict intake/output  - Continuous telemetry monitoring  - Plan for BioTel at discharge  - Lifelong antibiotic prophylaxis prior to all dental procedures.      # Persistent Atrial Fibrillation  # s/p Watchman 5/2023  Currently in SB  - Rate control: Hold PTA Atenolol 25 mg daily while monitor for HB, given HFpEF would consider stopping BB all together, if pt having issues with rate control, ultilize Diltiazem   - ASA as listed above    # CKD 3b  Baseline around 1.6-1.9. Cr 1.7 on admission  - Diuresis as listed above  - Trend BMP    # T2DM  Most recent A1C 7.2. On Glipizide PTA  - Hold oral meds 48 hours post TAVR  - Medium sliding scale insulin   - POCT glucose checks  - Hypoglycemia protocol in place    Clinically Significant Risk Factors Present on Admission   # Drug Induced Platelet Defect: home medication list includes an antiplatelet medication   # Hypertension: Noted on problem list  # Chronic heart failure with preserved ejection fraction: heart failure noted on problem list and last echo with EF >50%   # DMII: A1C = 7.2 % (Ref range: <5.7 %) within past 6 months          Cardiac Arrhythmia: Atrial fibrillation: Persistent  Cardiomyopathy  Non-Rheumatic Valve Disease: Aortic valve stenosis  CKD POA List: Stage 3b (GFR 30-44)  Pulmonary Heart Disease (Pulmonary hypertension or Cor pulmonale): Pulmonary Hypertension, unspecified    FEN: Regular diet  Prophylaxis:  DVT: s/p Watchman  Disposition: Home in 1-2 days with cardiac rehab pending stable post-op period  Code Status: FULL CODE    Medically Ready for Discharge: Anticipated Tomorrow       Gina Jones PA-C   [facility] Structural/Interventional Cardiology Team  Pager [phone]           Chief Complaint:   Planned TAVR         History of Present Illness:   Keerthi Montoya is a 74 y.o.F with a PMhx severe low flow low gradient aortic stenosis, HFpEF, HTN, T2DM, HLD, CKD3, pulmonary hypertension (WHO II),  persistent atrial fibrillation with intolerance to anticoagulation due to GI bleeding s/p RY closure and Watchman (5/2023) admitted 9/4 for planned TAVR procedure.     Prior to procedure, pt noted to have a mean gradient 16.5 mmHG, IVON 0.8 Cm^2, peak velocity 2.7 M/s.  Patient relatively asymptomatic, but there has been concern that PH is related to left sided filling pressure that may improve after TAVR procedure.     Patient is now s/p 23 mm TAVR. Procedure went well.     At time of interview, patient reports feeling well and denies any pain.            Review of Systems:    10 point review of systems negative except for stated above in HPI.          Past Medical History:   Medical History reviewed.   Past Medical History:   Diagnosis Date    Chronic kidney disease     Diabetes mellitus, type 2 (H)     Diverticulosis of colon (without mention of hemorrhage) 10/01/2012    GI bleed     History of blood transfusion     HLD (hyperlipidemia)     Hypertension     Hypothyroidism     MARIA D (iron deficiency anemia)     Persistent atrial fibrillation (H)     Pulmonary hypertension (H)     Ulcerative (chronic) enterocolitis (H)              Past Surgical History:   Surgical History reviewed.   Past Surgical History:   Procedure Laterality Date    CHOLECYSTECTOMY  3/1987    COLON SURGERY  01/2001    Left colon resection; diverticulitis    COLONOSCOPY N/A 4/24/2017    Procedure: COMBINED COLONOSCOPY, SINGLE OR MULTIPLE BIOPSY/POLYPECTOMY BY BIOPSY;;  Surgeon: Radha Salguero MD;  Location: [facility] OR    COLONOSCOPY N/A 3/10/2023    Procedure: COLONOSCOPY;  Surgeon: Preeti James MD;  Location: [facility] GI    COLONOSCOPY WITH CO2 INSUFFLATION N/A 4/24/2017    Procedure: COLONOSCOPY WITH CO2 INSUFFLATION;  Colonoscopy Dx rectal bleeding, BMI 25.86, Pharm [facility] .[phone], ;  Surgeon: Radha Salguero MD;  Location: [facility] OR    CV CORONARY ANGIOGRAM N/A 7/15/2024    Procedure: Coronary Angiogram with possible intervention;   Surgeon: Bobby Grimes MD;  Location:  HEART CARDIAC CATH LAB    CV LEFT ATRIAL APPENDAGE CLOSURE N/A 5/11/2023    Procedure: Left Atrial Appendage Closure;  Surgeon: Bobby Grimes MD;  Location:  HEART CARDIAC CATH LAB    CV RIGHT HEART CATH MEASUREMENTS RECORDED N/A 3/16/2020    Procedure: CV RIGHT HEART CATH;  Surgeon: Nader Muñoz MD;  Location:  HEART CARDIAC CATH LAB    CV RIGHT HEART CATH MEASUREMENTS RECORDED N/A 7/15/2024    Procedure: Right Heart Cath;  Surgeon: Bobby Grimes MD;  Location:  HEART CARDIAC CATH LAB    ESOPHAGOSCOPY, GASTROSCOPY, DUODENOSCOPY (EGD), COMBINED N/A 1/23/2023    Procedure: ESOPHAGOGASTRODUODENOSCOPY, WITH BIOPSY;  Surgeon: Ricki Deal MD;  Location:  GI    ESOPHAGOSCOPY, GASTROSCOPY, DUODENOSCOPY (EGD), COMBINED N/A 3/10/2023    Procedure: Esophagoscopy, gastroscopy, duodenoscopy (EGD), combined;  Surgeon: Preeti James MD;  Location:  GI    GYN SURGERY  9/1983    tubal ligation    HERNIA REPAIR  12/2006    recurrent incisional hernia    SIGMOIDOSCOPY FLEXIBLE N/A 1/23/2023    Procedure: Sigmoidoscopy flexible;  Surgeon: Ricki Deal MD;  Location:  GI    THROAT SURGERY  12/1974    thyroidectomy             Social History:   Social History reviewed.  Social History     Tobacco Use    Smoking status: Never     Passive exposure: Never    Smokeless tobacco: Never   Substance Use Topics    Alcohol use: Not Currently     Alcohol/week: 2.0 - 4.0 standard drinks of alcohol     Types: 1 - 2 Cans of beer, 1 - 2 Shots of liquor per week     Comment: occasional cocktail or beer             Family History:   Family History reviewed.   Family History   Problem Relation Age of Onset    Cerebrovascular Disease Mother     Cancer Father         bladder    Diverticulitis Brother     Diverticulitis Brother     Kidney Disease No family hx of     Crohn's Disease No family hx of     Ulcerative Colitis No family hx of     Stomach Cancer No  family hx of     GERD No family hx of     Celiac Disease No family hx of     Anesthesia Reaction No family hx of              Allergies:     Allergies   Allergen Reactions    Actos [Pioglitazone]      Fluid retention    Amlodipine      Leg swelling, hand swelling    Doxycycline Hives and Rash     Edema in hands/feet      Keflex [Cephalexin Hcl] Hives     Swelling hands/feet      Metoprolol Other (See Comments)     Swelling of lower legs and fatigue    Synthroid [Levothyroxine Sodium] Swelling     Allergic to brand name, hives all over body, edema all over body      Tylenol Hives     Hives/rash all over body    Ziac [Bisoprolol-Hydrochlorothiazide] Other (See Comments), Hives and Itching     Hands/feet swell up, hives all over body      Animal Dander Other (See Comments)     Sneezing, itchy nose    Grass Other (See Comments)     Itchy nose/eyes, sneezing, coughing      Tetracycline Hives and Itching     Swelling hands/feet      Dust Mites Other (See Comments)     Itchy eyes, sneezing    Other [Seasonal Allergies] Other (See Comments)     Pollen coughing/sneezing             Medications:   Medications Reviewed.   Current Facility-Administered Medications   Medication Dose Route Frequency Provider Last Rate Last Admin    aspirin (ASA) tablet 325 mg  325 mg Oral Daily Bobby Grimes MD   243 mg at 09/04/24 0726    dexmedeTOMIDine (PRECEDEX) 4 mcg/mL in sodium chloride 0.9 % 100 mL infusion  0.1-1.2 mcg/kg/hr Intravenous Continuous Bobby Grimes MD 6.2 mL/hr at 09/04/24 1017 0.4 mcg/kg/hr at 09/04/24 1017    EPINEPHrine (ADRENALIN) 5 mg in  mL infusion  0.01-0.3 mcg/kg/min Intravenous Continuous Bobby Grimes MD        HOLD: metformin (GLUCOPHAGE, GLUMETZA, FORTAMET, RIOMET) and metformin containing medications: alogliptin/metformin (KAZANO), glipizide/metformin (METAGLIP), glyburide/metformin (GLUCOVANCE), rosiglitazone/metformin (AVANDAMET), dapagliflozin/metformin (XIGDUO XR),  sitagliptin/metformin (JANUMET, JANUMET XR), linagliptin/metformin (JENTADUETO), repaglinide/metformin (PRANDIMET), saxagliptin/metformin (KOMBIGLYZE XR), canagliflozin/metformin (INVOKAMET), and pioglitazone/metformin (ACTOPLUS MET, ACTOPLUS MET XR) the morning of the procedure.   Does not apply HOLD Bobby Grimes MD        iopamidol (ISOVUE-370) solution    Once PRN Bobby Grimes MD   63 mL at 09/04/24 1027    lidocaine (LMX4) cream   Topical Q1H PRN Behrens, Christopher J, MD        lidocaine (LMX4) cream   Topical Q1H PRN Bobby Grimes MD        lidocaine 1 % 0.1-1 mL  0.1-1 mL Other Q1H PRN Behrens, Christopher J, MD        lidocaine 1 % 0.1-1 mL  0.1-1 mL Other Q1H PRN Bobby Grimes MD   0.3 mL at 09/04/24 0639    lidocaine 1 %    Once PRN Bobby Grimes MD   5 mL at 09/04/24 0851    lidocaine 1 %    Once PRN Bobby Grimes MD   6 mL at 09/04/24 0859    Patient is NOT allergic to contrast dye   Does not apply DOES NOT GO TO Bobby Abarca MD        sodium chloride (PF) 0.9% PF flush 3 mL  3 mL Intracatheter Q8H Behrens, Christopher J, MD        sodium chloride (PF) 0.9% PF flush 3 mL  3 mL Intracatheter q1 min prn Behrens, Christopher J, MD        sodium chloride (PF) 0.9% PF flush 3 mL  3 mL Intracatheter Q8H Bobby Grimes MD        sodium chloride (PF) 0.9% PF flush 3 mL  3 mL Intracatheter Q1H PRN Bobby Grimes MD   3 mL at 09/04/24 0639    sodium chloride (PF) 0.9% PF flush 3 mL  3 mL Intracatheter q1 min Bobby Sheets MD        sodium chloride 0.9 % infusion   Intravenous Continuous Bobby Grimes MD         Facility-Administered Medications Ordered in Other Encounters   Medication Dose Route Frequency Provider Last Rate Last Admin    dexmedeTOMIDine (PRECEDEX) 4 mcg/mL bolus   Intravenous Continuous PRN Thuan Cardenas MD   8 mcg at 09/04/24 0854    fentaNYL (PF) (SUBLIMAZE) injection   Intravenous PRN Thuan Cardenas MD   25 mcg at  "09/04/24 0844    heparin (porcine) injection   Intravenous PRN Thuan Cardenas MD   3,000 Units at 09/04/24 0939    lactated ringers infusion   Intravenous Continuous PRN Thuan Cardenas MD   New Bag at 09/04/24 0807    lactated ringers infusion   Intravenous Continuous PRN Thuan Cardenas  mL/hr at 09/04/24 0815 New Bag at 09/04/24 0815    midazolam (VERSED) injection   Intravenous PRN Thuan Cardenas MD   2 mg at 09/04/24 0808    nitroGLYcerin 25 mg in D5W 250 mL infusion CENTRAL   Intravenous PRN Thuan Cardenas MD   50 mcg at 09/04/24 1009    norepinephrine  bolus 6.4 mcg/mL   Intravenous Continuous PRN Thuan Cardenas MD   12.8 mcg at 09/04/24 0953    protamine injection   Intravenous PRN Thuan Cardenas MD   150 mg at 09/04/24 1003             Physical Exam:   Vitals were reviewed.  Blood pressure 134/61, pulse (!) 41, temperature 98.5  F (36.9  C), temperature source Oral, resp. rate 16, height 1.6 m (5' 3\"), weight 62 kg (136 lb 11 oz), SpO2 97%, not currently breastfeeding.    General: AAOx3, NAD; slightly sedated   Skin: Not jaundiced, no rash, no ecchymoses; incision site CDI, soft, non-tender, no signs of hematoma. No bruit  HEENT: MMM, PERRLA, EOM intact  CV: RRR, normal S1S2, no murmur, clicks, rubs  Resp: Clear to auscultation bilaterally, no wheezes, rhonchi  Extremities: warm and well perfused, palpable pedal pulses, no edema  Neuro: No lateralizing symptoms or focal neurologic deficits          Labs:   Routine Labs:  No results found for: \"TROPI\", \"TROPONIN\", \"TROPR\", \"TROPN\"  CMP  Recent Labs   Lab 09/04/24  0758 09/04/24  0552     --    POTASSIUM 4.5  --    CHLORIDE 104  --    CO2 19*  --    ANIONGAP 12  --    * 114*   BUN 26.7*  --    CR 1.70*  --    GFRESTIMATED 32*  --    ABEL 9.3  --      CBC  Recent Labs   Lab 09/04/24  0758   WBC 6.6   RBC 3.72*   HGB 12.1   HCT 38.1   *   MCH 32.5   MCHC 31.8   RDW 14.2        INR  No lab results found in last 7 days.        "   Diagnostics:    EKG 12Lead 87/15/2024: SB, HR 52    Echo 6/7/2024:  Interpretation Summary     Aortic valve Doppler parameters are consistent with severe normal-EF  (paradoxical) low-flow low-gradient aortic stenosis (PV 2.7 m/s MG 17 mmHg DVI  0.25 IVON 0.84 cm2 SVI 32.5 mL/m2.) The aortic valve is severely calcified and  leaflet motion appears restricted. Recommend structural cardiology (Valve  Clinic) referral.     Global and regional left ventricular function is normal with an EF of 60-65%.  Moderate to severe right ventricular dilation is present. Global right  ventricular function is moderately reduced.  Moderate to severe tricuspid insufficiency is present.  Pulmonary hypertension is present. Pulmonary artery systolic pressure is 73  mmHg (68 mmHg + RAP 15 mmHg).     This study was compared with the study from 9/18/23: Aortic valve parameters  are better assessed and are suggestive of paradoxical low-flow low-gradient  aortic stenosis. PA pressure is higher. There has otherwise been no  significant change.    Coronary Angiogram/Right heart Cath 7/15/2024  Conclusion    Prox RCA to Mid RCA lesion is 40% stenosed.    Prox LAD to Mid LAD lesion is 20% stenosed.    Prox Cx to Mid Cx lesion is 30% stenosed.    Right sided filling pressures are mildly elevated.    Left sided filling pressures are normal.    Moderately elevated pulmonary artery hypertension.    Reduced cardiac output level.     Elevated right sided filling pressures with moderately elevated pulmonary artery pressure.  Normal coronary arteries.         Plan   Follow bedrest per protocol   Continued medical management and lifestyle modifications for cardiovascular risk factor optimizations.   Discharge today per protocol     Recommendations  General Recommendations:  - Patient given specific instructions regarding care of arteriotomy site, activity restrictions, signs and symptoms of cardiac or vascular complications and to seek immediate medical  evaluation should they occur.   - Arterial sheath removed from radial artery with TR Band placement.     Coronary Findings  Diagnostic  Dominance: Right  Left Main   The vessel was visualized by selective angiography. There was 0% vessel disease.      Left Anterior Descending   The vessel was visualized by selective angiography. There was 0% vessel disease.   Prox LAD to Mid LAD lesion is 20% stenosed.      Left Circumflex   The vessel was visualized by selective angiography. There was 0% vessel disease.   Prox Cx to Mid Cx lesion is 30% stenosed.      Right Coronary Artery   The vessel was visualized by selective angiography. There was 0% vessel disease.   Prox RCA to Mid RCA lesion is 40% stenosed.         Intervention   No interventions have been documented.     Hemodynamics  RA 14/16/12  RV 67/7/16  PA 84/16/39  PCWP 12/14/12  PCW% 96.1  PA% 64.1  Measured Faizan CO/CI 4.4/2.6  Thermodilution CO/CI 2.8/1.7  PVR 5.86  /53/81  Hgb 12.4 Right sided filling pressures are mildly elevated. Left sided filling pressures are normal. Moderately elevated pulmonary artery hypertension. Reduced cardiac output level.       Medical Decision Making       50 MINUTES SPENT BY ME on the date of service doing chart review, history, exam, documentation & further activities per the note.          Bobby Grimes M.D  Division of Cardiology  .

## 2024-08-30 ENCOUNTER — DOCUMENTATION ONLY (OUTPATIENT)
Dept: LAB | Facility: CLINIC | Age: 74
End: 2024-08-30
Payer: MEDICARE

## 2024-08-30 NOTE — PROGRESS NOTES
Keerthi Montoya has an upcoming lab appointment:    Future Appointments   Date Time Provider Department Center   9/4/2024  5:30 AM [email or web address] [facility]   9/6/2024  8:00 AM LAB FIRST FLOOR [facility]   9/13/2024 12:00 PM Dominique Saavedra, WAYLON [facility]   10/11/2024  9:30 AM  LAB [facility]   10/11/2024 10:00 AM [email or web address] [facility]   10/11/2024 11:00 AM Dominique Saavedra, WAYLON [facility]   10/18/2024  9:30 AM Bunny Meyers MD [facility]   10/24/2024  2:00 PM Geena Ornelas MD [facility]   11/4/2024  7:30 AM LAB FIRST FLOOR [facility]   11/4/2024  8:00 AM Swati Minor DO [facility]   11/25/2024  7:30 AM  LAB [facility]   11/25/2024  8:30 AM Angeline Marcus MD [facility]   12/30/2024 10:15 AM [facility] PIV LAB [facility]   12/30/2024 10:45 AM Francisca Mustafa APRN CNP [facility]   1/10/2025  6:30 AM  LAB [facility]   1/10/2025  7:00 AM Carolyn Bonilla APRN CNP [facility]     Patient is scheduled for the following lab(s): [facility] labs    There is no order available. Please review and place future orders as appropriate.      Thank you,  Carolin Aleman

## 2024-09-02 DIAGNOSIS — N18.4 CKD (CHRONIC KIDNEY DISEASE) STAGE 4, GFR 15-29 ML/MIN (H): Primary | ICD-10-CM

## 2024-09-03 ENCOUNTER — ANESTHESIA EVENT (OUTPATIENT)
Dept: SURGERY | Facility: CLINIC | Age: 74
DRG: 266 | End: 2024-09-03
Payer: MEDICARE

## 2024-09-03 ASSESSMENT — ENCOUNTER SYMPTOMS: DYSRHYTHMIAS: 1

## 2024-09-03 NOTE — ANESTHESIA PREPROCEDURE EVALUATION
Anesthesia Pre-Procedure Evaluation    Patient: Keerthi Montoya   MRN: [id number] : 1950        Procedure : Procedure(s):  Transcatheter Aortic Valve Replacement-Femoral Approach  OR TRANSCATHETER AORTIC VALVE REPLACEMENT, FEMORAL PERCUTANEOUS APPROACH (STANDBY)          Past Medical History:   Diagnosis Date    Chronic kidney disease     Diabetes mellitus, type 2 (H)     Diverticulosis of colon (without mention of hemorrhage) 10/01/2012    GI bleed     History of blood transfusion     HLD (hyperlipidemia)     Hypertension     Hypothyroidism     MARIA D (iron deficiency anemia)     Persistent atrial fibrillation (H)     Pulmonary hypertension (H)     Ulcerative (chronic) enterocolitis (H)       Past Surgical History:   Procedure Laterality Date    CHOLECYSTECTOMY  3/1987    COLON SURGERY  2001    Left colon resection; diverticulitis    COLONOSCOPY N/A 2017    Procedure: COMBINED COLONOSCOPY, SINGLE OR MULTIPLE BIOPSY/POLYPECTOMY BY BIOPSY;;  Surgeon: Radha Salguero MD;  Location: [facility]    COLONOSCOPY N/A 3/10/2023    Procedure: COLONOSCOPY;  Surgeon: Preeti James MD;  Location: [facility]    COLONOSCOPY WITH CO2 INSUFFLATION N/A 2017    Procedure: COLONOSCOPY WITH CO2 INSUFFLATION;  Colonoscopy Dx rectal bleeding, BMI 25.86, Pharm [facility] .[phone], ;  Surgeon: Radha Salguero MD;  Location: [facility] OR    CV CORONARY ANGIOGRAM N/A 7/15/2024    Procedure: Coronary Angiogram with possible intervention;  Surgeon: Bobby Grimes MD;  Location:  HEART CARDIAC CATH LAB    CV LEFT ATRIAL APPENDAGE CLOSURE N/A 2023    Procedure: Left Atrial Appendage Closure;  Surgeon: Bobby Grimes MD;  [facility]    CV RIGHT HEART CATH MEASUREMENTS RECORDED N/A 3/16/2020    Procedure: CV RIGHT HEART CATH;  Surgeon: Nader Muñoz MD;  [facility]    CV RIGHT HEART CATH MEASUREMENTS RECORDED N/A 7/15/2024    Procedure: Right  Heart Cath;  Surgeon: Bobby Grimes MD;  Location:  HEART CARDIAC CATH LAB    ESOPHAGOSCOPY, GASTROSCOPY, DUODENOSCOPY (EGD), COMBINED N/A 1/23/2023    Procedure: ESOPHAGOGASTRODUODENOSCOPY, WITH BIOPSY;  Surgeon: Ricki Deal MD;  Location:  GI    ESOPHAGOSCOPY, GASTROSCOPY, DUODENOSCOPY (EGD), COMBINED N/A 3/10/2023    Procedure: Esophagoscopy, gastroscopy, duodenoscopy (EGD), combined;  Surgeon: Preeti James MD;  Location:  GI    GYN SURGERY  9/1983    tubal ligation    HERNIA REPAIR  12/2006    recurrent incisional hernia    SIGMOIDOSCOPY FLEXIBLE N/A 1/23/2023    Procedure: Sigmoidoscopy flexible;  Surgeon: Ricki Deal MD;  Location:  GI    THROAT SURGERY  12/1974    thyroidectomy      Allergies   Allergen Reactions    Actos [Pioglitazone]      Fluid retention    Amlodipine      Leg swelling, hand swelling    Animal Dander     Doxycycline Hives    Dust Mites     Grass     Keflex [Cephalexin Hcl] Hives    Metoprolol      Swelling of lower legs and fatigue    Other [Seasonal Allergies]      pollen    Ranitidine      rash    Synthroid [Levothyroxine Sodium] Swelling     Allergic to brand name    Tetracycline Hives    Tylenol Hives    Ziac [Bisoprolol-Hydrochlorothiazide]       Social History     Tobacco Use    Smoking status: Never     Passive exposure: Never    Smokeless tobacco: Never   Substance Use Topics    Alcohol use: Not Currently     Alcohol/week: 2.0 - 4.0 standard drinks of alcohol     Types: 1 - 2 Cans of beer, 1 - 2 Shots of liquor per week     Comment: occasional cocktail or beer      Wt Readings from Last 1 Encounters:   08/26/24 62.3 kg (137 lb 4.8 oz)        Anesthesia Evaluation   Pt has had prior anesthetic. Type: MAC and General.    No history of anesthetic complications       ROS/MED HX  ENT/Pulmonary:    (-) tobacco use, asthma, COPD and recent URI   Neurologic:    (-) no seizures, no CVA, no TIA and migraines   Cardiovascular: Comment:   CT Angioram  7/08/2024  IMPRESSION:  1. Mild aortic valve leaflet calcifications which may correlate with aortic stenosis.  2. Aortic, coronary and iliofemoral atherosclerosis. 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  3. Watchman device.  4. Short segment celiac axis origin less than 50% narrowing with mild poststenotic dilation. Incidental dual hepatic artery supply with left hepatic artery originating from the celiac axis and right hepatic artery as an accessory/replaced vessel from the SMA.     IMPRESSION:  1.  See below for TAVR related aortic annular and vascular measurements.   2.  No acute aortic dissection, intramural hematoma or contained rupture noted.  3.  Please review the separate Radiology report for incidental noncardiac findings.    FINDINGS:  1.  Moderately calcified tricuspid aortic valve. The aortic valve calcium score is 864. The LVOT is not calcified. The ascending aorta is mildly calcified, aortic arch is  moderately calcified, the descending thoracic aorta is moderately calcified. There is mild mitral annular calcification.  2.  The aortic valve area is 1.29 cm2 (by planimetry).  3.  There are no adverse aortic root features, i.e. coronary heights are adequate and there is no significant aortic root calcification.  4.  There are mild native coronary calcifications. This examination non-diagnostic for a luminal coronary evaluation.  5.  There is moderate RV enlargement. There is moderate RA enlargement.  6.  There is a left atrial appendage closure device in the left atrial appendage with partial contrast opacification of the appendage.   7.  There is severe posterior wall calcification at the LCFA and RCFA access sites.   8.  The arch vessel branching pattern is normal.   9.  The suprarenal abdominal aorta is moderately calcified; infrarenal abdominal aorta is severely calcified.  10.  Mildly calcified origins of celiac trunk, superior mesenteric artery and inferior mesenteric artery.  There  are single bilateral renal arteries; all renal arteries have moderately calcified origins.   11.  There is no acute aortic pathology, such as dissection, intramural hematoma, or contained rupture.     MEASUREMENTS:  AORTIC ANNULUS MEASUREMENTS:   1.  The average aortic annulus diameter is 23.6 mm, (long diameter is 26.8 mm and short diameter is 20.9 mm)  2.  Aortic annulus area is 4.36 cm2  3.  Aortic annulus perimeter is 75.6 mm  4.  Suggested 3-cusp coaxial angle for aortic valve is (Jamaican 17 , CRA 18) and alternate A-P coaxial angle is (Jamaican 0 , CRA 2), these angles will vary depending upon the patient's body position  5.  Aortic root angle is 43 degrees  6.  Left main - Annulus distance: 11.4 mm, RCA - Annulus distance: 15.4 mm     LVOT MEASUREMENTS:  1.  The average LVOT diameter (measured 4 mm below annular plane) is 23.4 mm  2.  LVOT area is 4.31 cm2  3.  LVOT perimeter is 75.3 mm     AORTA MEASUREMENTS  1.  The aortic root at the sinuses of Valsalva: 35.0 mm x  32.1 mm x 29.0 mm  2.  The sinotubular junction:  29.3 mm x 29.2 mm  3.  Sinotubular junction height: 20.5 mm x 18.3 mm  4.  Proximal ascending aorta: 34.5 mm x 32.6 mm  5.  Distal abdominal aorta proximal to the bifurcation: 13.5 mm x 11.9 mm  6.  Innominate artery 3 cm from ostium: 13.9 mm x 13.6 mm  7.  Left common carotid artery 3 cm from ostium: 7.5 mm x 6.8 mm    (+) Dyslipidemia hypertension- -  CAD -  - -   Taking blood thinners Pt has received instructions:  CHF                  dysrhythmias (intolerance to anticoagulation due to history of GI bleeding s/p RY closure and Watchman (05/2023)), a-fib,  valvular problems/murmurs type: AS    pulmonary hypertension, Previous cardiac testing   Echo: Date: 6/07/2024 Results:  Interpretation Summary  Aortic valve Doppler parameters are consistent with severe normal-EF (paradoxical) low-flow low-gradient aortic stenosis (PV 2.7 m/s MG 17 mmHg DVI 0.25 IVON 0.84 cm2 SVI 32.5 mL/m2.) The aortic valve is  severely calcified and leaflet motion appears restricted. Recommend structural cardiology ([facility]) referral.     Global and regional left ventricular function is normal with an EF of 60-65%.  Moderate to severe right ventricular dilation is present. Global right ventricular function is moderately reduced.  Moderate to severe tricuspid insufficiency is present.  Pulmonary hypertension is present. Pulmonary artery systolic pressure is 73 mmHg (68 mmHg + RAP 15 mmHg).     This study was compared with the study from 9/18/23: Aortic valve parameters are better assessed and are suggestive of paradoxical low-flow low-gradient aortic stenosis. PA pressure is higher. There has otherwise been no significant change.  _____________________________    Left Ventricle  Left ventricular size is normal. Left ventricular wall thickness is normal. Global and regional left ventricular function is normal with an EF of 60-65%. Diastolic function not assessed due to significant mitral annular calcification. Flattened septum is consistent with right ventricular pressure overload.     Right Ventricle  Moderate to severe right ventricular dilation is present. Global right ventricular function is moderately reduced.     Atria  Mild left atrial enlargement is present. Moderate to severe right atrial enlargement is present.     Mitral Valve  Moderate mitral annular calcification is present.     Aortic Valve  Severe aortic valve calcification is present.     Tricuspid Valve  Moderate to severe tricuspid insufficiency is present. Pulmonary hypertension is present. Pulmonary artery systolic pressure is 73 mmHg (68 mmHg + RAP 15 mmHg).     Pulmonic Valve  Mild pulmonic insufficiency is present.     Vessels  The aorta root is normal. IVC diameter >2.1 cm collapsing <50% with sniff suggests a high RA pressure estimated at 15 mmHg or greater.     Pericardium  No pericardial effusion is present.    Stress Test:  Date: Results:    ECG Reviewed:   Date: 7/15/2024 Results:  Sinus bradycardia with sinus arrhythmia   Right axis deviation   Possible Right ventricular hypertrophy   T wave abnormality, consider anterior ischemia   Abnormal ECG   When compared with ECG of 20-JUN-2024 14:20,   No significant change was found   Cath:  Date: 7/15/2024 Results:  Conclusion     Prox RCA to Mid RCA lesion is 40% stenosed.     Prox LAD to Mid LAD lesion is 20% stenosed.     Prox Cx to Mid Cx lesion is 30% stenosed.     Right sided filling pressures are mildly elevated.     Left sided filling pressures are normal.     Moderately elevated pulmonary artery hypertension.     Reduced cardiac output level.     Elevated right sided filling pressures with moderately elevated pulmonary artery pressure. Normal coronary arteries.    Coronary Findings  Dominance: Right  Left Main  The vessel was visualized by selective angiography. There was 0% vessel disease.    Left Anterior Descending  The vessel was visualized by selective angiography. There was 0% vessel disease.  Prox LAD to Mid LAD lesion is 20% stenosed.    Left Circumflex  The vessel was visualized by selective angiography. There was 0% vessel disease.  Prox Cx to Mid Cx lesion is 30% stenosed.    Right Coronary Artery  The vessel was visualized by selective angiography. There was 0% vessel disease.  Prox RCA to Mid RCA lesion is 40% stenosed.    Hemodynamics  RA 14/16/12  RV 67/7/16  PA 84/16/39  PCWP 12/14/12  PCW% 96.1  PA% 64.1  Measured Faizan CO/CI 4.4/2.6  Thermodilution CO/CI 2.8/1.7  PVR 5.86  /53/81  Hgb 12.4 Right sided filling pressures are mildly elevated. Left sided filling pressures are normal. Moderately elevated pulmonary artery hypertension. Reduced cardiac output level.    METS/Exercise Tolerance: >4 METS    Hematologic:     (+)      anemia, history of blood transfusion, no previous transfusion reaction, Known PRBC Anitbodies:No       Musculoskeletal:  - neg musculoskeletal ROS     GI/Hepatic: Comment:  Hx of PUD    (+)       Inflammatory bowel disease (ulcerative colitis),          (-) GERD   Renal/Genitourinary:     (+) renal disease, type: CRI, Pt does not require dialysis,           Endo:     (+)  type II DM, Last HgA1c: 7.2, date: 07/29/2024, Not using insulin,     thyroid problem, hypothyroidism,           Psychiatric/Substance Use:       Infectious Disease:    (-) Recent Fever   Malignancy:  - neg malignancy ROS     Other:            Physical Exam    Airway        Mallampati: I   TM distance: > 3 FB   Neck ROM: full   Mouth opening: > 3 cm    Respiratory Devices and Support         Dental       (+) Modest Abnormalities - crowns, retainers, 1 or 2 missing teeth    B=Bridge, C=Chipped, L=Loose, M=Missing    Cardiovascular          Rhythm and rate: regular and bradycardia     Pulmonary           breath sounds clear to auscultation           OUTSIDE LABS:  CBC:   Lab Results   Component Value Date    WBC 7.4 08/26/2024    WBC 7.2 07/29/2024    HGB 13.9 08/26/2024    HGB 14.0 07/29/2024    HCT 43.5 08/26/2024    HCT 44.0 07/29/2024     08/26/2024     07/29/2024     BMP:   Lab Results   Component Value Date     08/26/2024     07/29/2024    POTASSIUM 4.9 08/26/2024    POTASSIUM 4.7 07/29/2024    CHLORIDE 103 08/26/2024    CHLORIDE 100 07/29/2024    CO2 21 (L) 08/26/2024    CO2 25 07/29/2024    BUN 35.6 (H) 08/26/2024    BUN 21.4 07/29/2024    CR 2.00 (H) 08/26/2024    CR 1.68 (H) 07/29/2024    GLC 71 08/26/2024     (H) 07/29/2024     COAGS:   Lab Results   Component Value Date    PTT 32 07/15/2024    INR 1.11 08/26/2024     POC:   Lab Results   Component Value Date     (H) 04/24/2017     HEPATIC:   Lab Results   Component Value Date    ALBUMIN 4.7 07/29/2024    PROTTOTAL 8.6 (H) 07/29/2024    ALT <5 07/29/2024    AST 25 07/29/2024    ALKPHOS 116 07/29/2024    BILITOTAL 0.9 07/29/2024     OTHER:   Lab Results   Component Value Date    LACT 0.8 10/14/2023    A1C 7.2 (H)  07/29/2024    ABEL 10.1 08/26/2024    PHOS 3.6 05/06/2024    MAG 2.0 06/07/2024    LIPASE 77 (H) 10/13/2023    TSH 4.00 08/26/2024    T4 1.48 06/07/2024    CRP <2.9 03/16/2020    SED 12 01/20/2023       Anesthesia Plan    ASA Status:  4    NPO Status:  NPO Appropriate    Anesthesia Type: MAC.         Techniques and Equipment:     - Lines/Monitors: 2nd IV     Consents    Anesthesia Plan(s) and associated risks, benefits, and realistic alternatives discussed. Questions answered and patient/representative(s) expressed understanding.     - Discussed: Risks, Benefits and Alternatives for BOTH SEDATION and the PROCEDURE were discussed     - Discussed with:  Patient      - Extended Intubation/Ventilatory Support Discussed: Yes.      - Patient is DNR/DNI Status: No     Use of blood products discussed: Yes.     - Discussed with: Patient.     - Consented: consented to blood products     Postoperative Care    Pain management: Multi-modal analgesia.   PONV prophylaxis: Ondansetron (or other 5HT-3)     Comments:               Dina Woodson MD    I have reviewed the pertinent notes and labs in the chart from the past 30 days and (re)examined the patient.  Any updates or changes from those notes are reflected in this note.              # DMII: A1C = 7.2 % (Ref range: <5.7 %) within past 6 months

## 2024-09-04 ENCOUNTER — HOSPITAL ENCOUNTER (INPATIENT)
Facility: CLINIC | Age: 74
LOS: 1 days | Discharge: HOME | DRG: 266 | End: 2024-09-05
Attending: INTERNAL MEDICINE | Admitting: THORACIC SURGERY (CARDIOTHORACIC VASCULAR SURGERY)
Payer: MEDICARE

## 2024-09-04 ENCOUNTER — ANESTHESIA (OUTPATIENT)
Dept: SURGERY | Facility: CLINIC | Age: 74
DRG: 266 | End: 2024-09-04
Payer: MEDICARE

## 2024-09-04 ENCOUNTER — CARE COORDINATION (OUTPATIENT)
Dept: CARDIOLOGY | Facility: CLINIC | Age: 74
End: 2024-09-04

## 2024-09-04 ENCOUNTER — HOSPITAL ENCOUNTER (OUTPATIENT)
Dept: CARDIOLOGY | Facility: CLINIC | Age: 74
Discharge: HOME | DRG: 266 | End: 2024-09-04
Attending: INTERNAL MEDICINE | Admitting: INTERNAL MEDICINE
Payer: MEDICARE

## 2024-09-04 DIAGNOSIS — Z95.2 HISTORY OF TRANSCATHETER AORTIC VALVE REPLACEMENT (TAVR): Primary | ICD-10-CM

## 2024-09-04 DIAGNOSIS — I35.0 AORTIC STENOSIS, SEVERE: ICD-10-CM

## 2024-09-04 DIAGNOSIS — R00.1 SINUS BRADYCARDIA: ICD-10-CM

## 2024-09-04 DIAGNOSIS — I35.0 SEVERE AORTIC STENOSIS: ICD-10-CM

## 2024-09-04 DIAGNOSIS — Z95.2 S/P TAVR (TRANSCATHETER AORTIC VALVE REPLACEMENT): Primary | ICD-10-CM

## 2024-09-04 LAB
ACT BLD: 111 SECONDS (ref 74–150)
ACT BLD: 228 SECONDS (ref 74–150)
ACT BLD: 249 SECONDS (ref 74–150)
ACT BLD: 283 SECONDS (ref 74–150)
ANION GAP SERPL CALCULATED.3IONS-SCNC: 12 MMOL/L (ref 7–15)
BLD PROD TYP BPU: NORMAL
BLD PROD TYP BPU: NORMAL
BLOOD COMPONENT TYPE: NORMAL
BLOOD COMPONENT TYPE: NORMAL
BUN SERPL-MCNC: 26.7 MG/DL (ref 8–23)
CALCIUM SERPL-MCNC: 9.3 MG/DL (ref 8.8–10.4)
CHLORIDE SERPL-SCNC: 104 MMOL/L (ref 98–107)
CODING SYSTEM: NORMAL
CODING SYSTEM: NORMAL
CREAT SERPL-MCNC: 1.7 MG/DL (ref 0.51–0.95)
CROSSMATCH: NORMAL
CROSSMATCH: NORMAL
EGFRCR SERPLBLD CKD-EPI 2021: 32 ML/MIN/1.73M2
ERYTHROCYTE [DISTWIDTH] IN BLOOD BY AUTOMATED COUNT: 14.2 % (ref 10–15)
GLUCOSE BLDC GLUCOMTR-MCNC: 109 MG/DL (ref 70–99)
GLUCOSE BLDC GLUCOMTR-MCNC: 114 MG/DL (ref 70–99)
GLUCOSE BLDC GLUCOMTR-MCNC: 151 MG/DL (ref 70–99)
GLUCOSE BLDC GLUCOMTR-MCNC: 186 MG/DL (ref 70–99)
GLUCOSE SERPL-MCNC: 113 MG/DL (ref 70–99)
HCO3 SERPL-SCNC: 19 MMOL/L (ref 22–29)
HCT VFR BLD AUTO: 38.1 % (ref 35–47)
HGB BLD-MCNC: 12.1 G/DL (ref 11.7–15.7)
ISSUE DATE AND TIME: NORMAL
ISSUE DATE AND TIME: NORMAL
MCH RBC QN AUTO: 32.5 PG (ref 26.5–33)
MCHC RBC AUTO-ENTMCNC: 31.8 G/DL (ref 31.5–36.5)
MCV RBC AUTO: 102 FL (ref 78–100)
PLATELET # BLD AUTO: 205 10E3/UL (ref 150–450)
POTASSIUM SERPL-SCNC: 4.5 MMOL/L (ref 3.4–5.3)
RBC # BLD AUTO: 3.72 10E6/UL (ref 3.8–5.2)
SODIUM SERPL-SCNC: 135 MMOL/L (ref 135–145)
UNIT ABO/RH: NORMAL
UNIT ABO/RH: NORMAL
UNIT NUMBER: NORMAL
UNIT NUMBER: NORMAL
UNIT STATUS: NORMAL
UNIT STATUS: NORMAL
UNIT TYPE ISBT: 5100
UNIT TYPE ISBT: 5100
WBC # BLD AUTO: 6.6 10E3/UL (ref 4–11)

## 2024-09-04 PROCEDURE — 250N000013 HC RX MED GY IP 250 OP 250 PS 637

## 2024-09-04 PROCEDURE — 02RF38Z REPLACEMENT OF AORTIC VALVE WITH ZOOPLASTIC TISSUE, PERCUTANEOUS APPROACH: ICD-10-PCS | Performed by: INTERNAL MEDICINE

## 2024-09-04 PROCEDURE — 85347 COAGULATION TIME ACTIVATED: CPT

## 2024-09-04 PROCEDURE — 410N000003 HC PER-PERFUSION 1ST 30 MIN: Performed by: INTERNAL MEDICINE

## 2024-09-04 PROCEDURE — 272N000001 HC OR GENERAL SUPPLY STERILE: Performed by: INTERNAL MEDICINE

## 2024-09-04 PROCEDURE — C1769 GUIDE WIRE: HCPCS | Performed by: INTERNAL MEDICINE

## 2024-09-04 PROCEDURE — 33361 REPLACE AORTIC VALVE PERQ: CPT | Performed by: INTERNAL MEDICINE

## 2024-09-04 PROCEDURE — C1894 INTRO/SHEATH, NON-LASER: HCPCS | Performed by: INTERNAL MEDICINE

## 2024-09-04 PROCEDURE — 250N000011 HC RX IP 250 OP 636: Performed by: ANESTHESIOLOGY

## 2024-09-04 PROCEDURE — 85014 HEMATOCRIT: CPT | Performed by: INTERNAL MEDICINE

## 2024-09-04 PROCEDURE — 250N000009 HC RX 250: Performed by: INTERNAL MEDICINE

## 2024-09-04 PROCEDURE — P9016 RBC LEUKOCYTES REDUCED: HCPCS | Performed by: INTERNAL MEDICINE

## 2024-09-04 PROCEDURE — 258N000003 HC RX IP 258 OP 636: Performed by: ANESTHESIOLOGY

## 2024-09-04 PROCEDURE — 93306 TTE W/DOPPLER COMPLETE: CPT

## 2024-09-04 PROCEDURE — C1760 CLOSURE DEV, VASC: HCPCS | Performed by: INTERNAL MEDICINE

## 2024-09-04 PROCEDURE — C1730 CATH, EP, 19 OR FEW ELECT: HCPCS | Performed by: INTERNAL MEDICINE

## 2024-09-04 PROCEDURE — 36415 COLL VENOUS BLD VENIPUNCTURE: CPT | Performed by: INTERNAL MEDICINE

## 2024-09-04 PROCEDURE — 93010 ELECTROCARDIOGRAM REPORT: CPT | Mod: XU | Performed by: INTERNAL MEDICINE

## 2024-09-04 PROCEDURE — 93306 TTE W/DOPPLER COMPLETE: CPT | Mod: 26 | Performed by: INTERNAL MEDICINE

## 2024-09-04 PROCEDURE — C1889 IMPLANT/INSERT DEVICE, NOC: HCPCS | Performed by: INTERNAL MEDICINE

## 2024-09-04 PROCEDURE — 250N000013 HC RX MED GY IP 250 OP 250 PS 637: Performed by: INTERNAL MEDICINE

## 2024-09-04 PROCEDURE — 370N000017 HC ANESTHESIA TECHNICAL FEE, PER MIN: Performed by: INTERNAL MEDICINE

## 2024-09-04 PROCEDURE — 99221 1ST HOSP IP/OBS SF/LOW 40: CPT | Mod: 25

## 2024-09-04 PROCEDURE — 80048 BASIC METABOLIC PNL TOTAL CA: CPT | Performed by: INTERNAL MEDICINE

## 2024-09-04 PROCEDURE — 250N000011 HC RX IP 250 OP 636: Performed by: INTERNAL MEDICINE

## 2024-09-04 PROCEDURE — 33361 REPLACE AORTIC VALVE PERQ: CPT | Mod: 62 | Performed by: THORACIC SURGERY (CARDIOTHORACIC VASCULAR SURGERY)

## 2024-09-04 PROCEDURE — 120N000003 HC R&B IMCU UMMC

## 2024-09-04 PROCEDURE — 93005 ELECTROCARDIOGRAM TRACING: CPT

## 2024-09-04 PROCEDURE — 86923 COMPATIBILITY TEST ELECTRIC: CPT | Performed by: INTERNAL MEDICINE

## 2024-09-04 PROCEDURE — 250N000009 HC RX 250: Performed by: ANESTHESIOLOGY

## 2024-09-04 DEVICE — VALVE AORTIC TRANSCATHETER HEART 23MM SAPIEN 3 ULTRA RESILIA: Type: IMPLANTABLE DEVICE | Status: FUNCTIONAL

## 2024-09-04 RX ORDER — HYDROMORPHONE HCL IN WATER/PF 6 MG/30 ML
0.2 PATIENT CONTROLLED ANALGESIA SYRINGE INTRAVENOUS EVERY 5 MIN PRN
Status: DISCONTINUED | OUTPATIENT
Start: 2024-09-04 | End: 2024-09-04 | Stop reason: HOSPADM

## 2024-09-04 RX ORDER — FENTANYL CITRATE 50 UG/ML
25 INJECTION, SOLUTION INTRAMUSCULAR; INTRAVENOUS EVERY 5 MIN PRN
Status: DISCONTINUED | OUTPATIENT
Start: 2024-09-04 | End: 2024-09-04 | Stop reason: HOSPADM

## 2024-09-04 RX ORDER — CLINDAMYCIN PHOSPHATE 900 MG/50ML
900 INJECTION, SOLUTION INTRAVENOUS
Status: COMPLETED | OUTPATIENT
Start: 2024-09-04 | End: 2024-09-04

## 2024-09-04 RX ORDER — NALOXONE HYDROCHLORIDE 0.4 MG/ML
0.1 INJECTION, SOLUTION INTRAMUSCULAR; INTRAVENOUS; SUBCUTANEOUS
Status: DISCONTINUED | OUTPATIENT
Start: 2024-09-04 | End: 2024-09-04 | Stop reason: HOSPADM

## 2024-09-04 RX ORDER — SODIUM CHLORIDE, SODIUM LACTATE, POTASSIUM CHLORIDE, CALCIUM CHLORIDE 600; 310; 30; 20 MG/100ML; MG/100ML; MG/100ML; MG/100ML
INJECTION, SOLUTION INTRAVENOUS CONTINUOUS PRN
Status: DISCONTINUED | OUTPATIENT
Start: 2024-09-04 | End: 2024-09-04

## 2024-09-04 RX ORDER — FENTANYL CITRATE 50 UG/ML
50 INJECTION, SOLUTION INTRAMUSCULAR; INTRAVENOUS EVERY 5 MIN PRN
Status: DISCONTINUED | OUTPATIENT
Start: 2024-09-04 | End: 2024-09-04 | Stop reason: HOSPADM

## 2024-09-04 RX ORDER — HALOPERIDOL 5 MG/ML
1 INJECTION INTRAMUSCULAR
Status: DISCONTINUED | OUTPATIENT
Start: 2024-09-04 | End: 2024-09-04 | Stop reason: HOSPADM

## 2024-09-04 RX ORDER — ONDANSETRON 4 MG/1
4 TABLET, ORALLY DISINTEGRATING ORAL EVERY 30 MIN PRN
Status: DISCONTINUED | OUTPATIENT
Start: 2024-09-04 | End: 2024-09-04 | Stop reason: HOSPADM

## 2024-09-04 RX ORDER — ASPIRIN 81 MG/1
81 TABLET ORAL DAILY
Status: DISCONTINUED | OUTPATIENT
Start: 2024-09-05 | End: 2024-09-05 | Stop reason: HOSPADM

## 2024-09-04 RX ORDER — HYDRALAZINE HYDROCHLORIDE 50 MG/1
50 TABLET, FILM COATED ORAL 3 TIMES DAILY
Status: DISCONTINUED | OUTPATIENT
Start: 2024-09-04 | End: 2024-09-05 | Stop reason: HOSPADM

## 2024-09-04 RX ORDER — HYDRALAZINE HYDROCHLORIDE 20 MG/ML
10 INJECTION INTRAMUSCULAR; INTRAVENOUS
Status: DISCONTINUED | OUTPATIENT
Start: 2024-09-04 | End: 2024-09-05 | Stop reason: HOSPADM

## 2024-09-04 RX ORDER — FENTANYL CITRATE 50 UG/ML
INJECTION, SOLUTION INTRAMUSCULAR; INTRAVENOUS PRN
Status: DISCONTINUED | OUTPATIENT
Start: 2024-09-04 | End: 2024-09-04

## 2024-09-04 RX ORDER — ONDANSETRON 2 MG/ML
4 INJECTION INTRAMUSCULAR; INTRAVENOUS EVERY 30 MIN PRN
Status: DISCONTINUED | OUTPATIENT
Start: 2024-09-04 | End: 2024-09-04 | Stop reason: HOSPADM

## 2024-09-04 RX ORDER — HYDROMORPHONE HCL IN WATER/PF 6 MG/30 ML
0.4 PATIENT CONTROLLED ANALGESIA SYRINGE INTRAVENOUS EVERY 5 MIN PRN
Status: DISCONTINUED | OUTPATIENT
Start: 2024-09-04 | End: 2024-09-04 | Stop reason: HOSPADM

## 2024-09-04 RX ORDER — DIMENHYDRINATE 50 MG/ML
25 INJECTION, SOLUTION INTRAMUSCULAR; INTRAVENOUS
Status: DISCONTINUED | OUTPATIENT
Start: 2024-09-04 | End: 2024-09-04 | Stop reason: HOSPADM

## 2024-09-04 RX ORDER — DEXMEDETOMIDINE HYDROCHLORIDE 4 UG/ML
.1-1.2 INJECTION, SOLUTION INTRAVENOUS CONTINUOUS
Status: DISCONTINUED | OUTPATIENT
Start: 2024-09-04 | End: 2024-09-05

## 2024-09-04 RX ORDER — DEXTROSE MONOHYDRATE 25 G/50ML
25-50 INJECTION, SOLUTION INTRAVENOUS
Status: DISCONTINUED | OUTPATIENT
Start: 2024-09-04 | End: 2024-09-05 | Stop reason: HOSPADM

## 2024-09-04 RX ORDER — DEXAMETHASONE SODIUM PHOSPHATE 4 MG/ML
4 INJECTION, SOLUTION INTRA-ARTICULAR; INTRALESIONAL; INTRAMUSCULAR; INTRAVENOUS; SOFT TISSUE
Status: DISCONTINUED | OUTPATIENT
Start: 2024-09-04 | End: 2024-09-04 | Stop reason: HOSPADM

## 2024-09-04 RX ORDER — SODIUM CHLORIDE, SODIUM LACTATE, POTASSIUM CHLORIDE, CALCIUM CHLORIDE 600; 310; 30; 20 MG/100ML; MG/100ML; MG/100ML; MG/100ML
INJECTION, SOLUTION INTRAVENOUS CONTINUOUS
Status: DISCONTINUED | OUTPATIENT
Start: 2024-09-04 | End: 2024-09-04 | Stop reason: HOSPADM

## 2024-09-04 RX ORDER — EPINEPHRINE IN 0.9 % SOD CHLOR 5 MG/250ML
.01-.3 PLASTIC BAG, INJECTION (ML) INTRAVENOUS CONTINUOUS
Status: DISCONTINUED | OUTPATIENT
Start: 2024-09-04 | End: 2024-09-05

## 2024-09-04 RX ORDER — SODIUM CHLORIDE 9 MG/ML
INJECTION, SOLUTION INTRAVENOUS CONTINUOUS
Status: DISCONTINUED | OUTPATIENT
Start: 2024-09-04 | End: 2024-09-04 | Stop reason: HOSPADM

## 2024-09-04 RX ORDER — LIDOCAINE 40 MG/G
CREAM TOPICAL
Status: DISCONTINUED | OUTPATIENT
Start: 2024-09-04 | End: 2024-09-04 | Stop reason: HOSPADM

## 2024-09-04 RX ORDER — NICOTINE POLACRILEX 4 MG
15-30 LOZENGE BUCCAL
Status: DISCONTINUED | OUTPATIENT
Start: 2024-09-04 | End: 2024-09-05 | Stop reason: HOSPADM

## 2024-09-04 RX ORDER — PROTAMINE SULFATE 10 MG/ML
INJECTION, SOLUTION INTRAVENOUS PRN
Status: DISCONTINUED | OUTPATIENT
Start: 2024-09-04 | End: 2024-09-04

## 2024-09-04 RX ORDER — ASPIRIN 325 MG
325 TABLET ORAL DAILY
Status: DISCONTINUED | OUTPATIENT
Start: 2024-09-04 | End: 2024-09-04 | Stop reason: HOSPADM

## 2024-09-04 RX ORDER — GLIPIZIDE 10 MG/1
20 TABLET, FILM COATED, EXTENDED RELEASE ORAL DAILY
Status: DISCONTINUED | OUTPATIENT
Start: 2024-09-05 | End: 2024-09-05 | Stop reason: HOSPADM

## 2024-09-04 RX ORDER — OXYCODONE HYDROCHLORIDE 10 MG/1
10 TABLET ORAL
Status: DISCONTINUED | OUTPATIENT
Start: 2024-09-04 | End: 2024-09-04 | Stop reason: HOSPADM

## 2024-09-04 RX ORDER — TORSEMIDE 10 MG/1
10 TABLET ORAL EVERY OTHER DAY
Status: DISCONTINUED | OUTPATIENT
Start: 2024-09-05 | End: 2024-09-05 | Stop reason: HOSPADM

## 2024-09-04 RX ORDER — IOPAMIDOL 755 MG/ML
INJECTION, SOLUTION INTRAVASCULAR
Status: DISCONTINUED | OUTPATIENT
Start: 2024-09-04 | End: 2024-09-04 | Stop reason: HOSPADM

## 2024-09-04 RX ORDER — HYDROXYZINE HYDROCHLORIDE 10 MG/1
10 TABLET, FILM COATED ORAL EVERY 6 HOURS PRN
Status: DISCONTINUED | OUTPATIENT
Start: 2024-09-04 | End: 2024-09-04 | Stop reason: HOSPADM

## 2024-09-04 RX ORDER — METOPROLOL TARTRATE 1 MG/ML
1-2 INJECTION, SOLUTION INTRAVENOUS EVERY 5 MIN PRN
Status: DISCONTINUED | OUTPATIENT
Start: 2024-09-04 | End: 2024-09-04 | Stop reason: HOSPADM

## 2024-09-04 RX ORDER — LOSARTAN POTASSIUM 50 MG/1
100 TABLET ORAL DAILY
Status: DISCONTINUED | OUTPATIENT
Start: 2024-09-05 | End: 2024-09-05 | Stop reason: HOSPADM

## 2024-09-04 RX ORDER — DIPHENHYDRAMINE HCL 25 MG
25 CAPSULE ORAL EVERY 6 HOURS PRN
Status: DISCONTINUED | OUTPATIENT
Start: 2024-09-04 | End: 2024-09-05 | Stop reason: HOSPADM

## 2024-09-04 RX ORDER — NITROGLYCERIN 10 MG/100ML
INJECTION INTRAVENOUS PRN
Status: DISCONTINUED | OUTPATIENT
Start: 2024-09-04 | End: 2024-09-04

## 2024-09-04 RX ORDER — HEPARIN SODIUM 1000 [USP'U]/ML
INJECTION, SOLUTION INTRAVENOUS; SUBCUTANEOUS PRN
Status: DISCONTINUED | OUTPATIENT
Start: 2024-09-04 | End: 2024-09-04

## 2024-09-04 RX ORDER — ATORVASTATIN CALCIUM 40 MG/1
40 TABLET, FILM COATED ORAL EVERY EVENING
Status: DISCONTINUED | OUTPATIENT
Start: 2024-09-05 | End: 2024-09-05 | Stop reason: HOSPADM

## 2024-09-04 RX ORDER — ISOSORBIDE MONONITRATE 30 MG/1
30 TABLET, EXTENDED RELEASE ORAL DAILY
Status: DISCONTINUED | OUTPATIENT
Start: 2024-09-05 | End: 2024-09-05 | Stop reason: HOSPADM

## 2024-09-04 RX ORDER — OXYCODONE HYDROCHLORIDE 5 MG/1
5 TABLET ORAL
Status: DISCONTINUED | OUTPATIENT
Start: 2024-09-04 | End: 2024-09-04 | Stop reason: HOSPADM

## 2024-09-04 RX ORDER — NITROGLYCERIN 0.4 MG/1
0.4 TABLET SUBLINGUAL EVERY 5 MIN PRN
Status: DISCONTINUED | OUTPATIENT
Start: 2024-09-04 | End: 2024-09-05 | Stop reason: HOSPADM

## 2024-09-04 RX ADMIN — CLINDAMYCIN IN 5 PERCENT DEXTROSE 900 MG: 18 INJECTION, SOLUTION INTRAVENOUS at 06:57

## 2024-09-04 RX ADMIN — PROTAMINE SULFATE 150 MG: 10 INJECTION, SOLUTION INTRAVENOUS at 10:03

## 2024-09-04 RX ADMIN — NITROGLYCERIN 50 MCG: 10 INJECTION INTRAVENOUS at 10:07

## 2024-09-04 RX ADMIN — HEPARIN SODIUM 9000 UNITS: 1000 INJECTION INTRAVENOUS; SUBCUTANEOUS at 09:18

## 2024-09-04 RX ADMIN — SODIUM CHLORIDE, POTASSIUM CHLORIDE, SODIUM LACTATE AND CALCIUM CHLORIDE: 600; 310; 30; 20 INJECTION, SOLUTION INTRAVENOUS at 08:07

## 2024-09-04 RX ADMIN — DEXMEDETOMIDINE HYDROCHLORIDE 0.6 MCG/KG/HR: 4 INJECTION, SOLUTION INTRAVENOUS at 08:17

## 2024-09-04 RX ADMIN — NOREPINEPHRINE BITARTRATE 12.8 MCG: 1 INJECTION, SOLUTION, CONCENTRATE INTRAVENOUS at 09:53

## 2024-09-04 RX ADMIN — DEXMEDETOMIDINE HYDROCHLORIDE 8 MCG: 100 INJECTION, SOLUTION INTRAVENOUS at 08:15

## 2024-09-04 RX ADMIN — HEPARIN SODIUM 3000 UNITS: 1000 INJECTION INTRAVENOUS; SUBCUTANEOUS at 09:39

## 2024-09-04 RX ADMIN — DIPHENHYDRAMINE HYDROCHLORIDE 25 MG: 25 CAPSULE ORAL at 22:59

## 2024-09-04 RX ADMIN — DEXMEDETOMIDINE HYDROCHLORIDE 8 MCG: 100 INJECTION, SOLUTION INTRAVENOUS at 08:54

## 2024-09-04 RX ADMIN — DEXMEDETOMIDINE HYDROCHLORIDE 8 MCG: 100 INJECTION, SOLUTION INTRAVENOUS at 08:30

## 2024-09-04 RX ADMIN — MIDAZOLAM 2 MG: 1 INJECTION INTRAMUSCULAR; INTRAVENOUS at 08:08

## 2024-09-04 RX ADMIN — DEXMEDETOMIDINE HYDROCHLORIDE 8 MCG: 100 INJECTION, SOLUTION INTRAVENOUS at 08:26

## 2024-09-04 RX ADMIN — NITROGLYCERIN 50 MCG: 10 INJECTION INTRAVENOUS at 10:09

## 2024-09-04 RX ADMIN — HYDRALAZINE HYDROCHLORIDE 50 MG: 50 TABLET ORAL at 19:54

## 2024-09-04 RX ADMIN — ASPIRIN 243 MG: 81 TABLET ORAL at 07:26

## 2024-09-04 RX ADMIN — HEPARIN SODIUM 4000 UNITS: 1000 INJECTION INTRAVENOUS; SUBCUTANEOUS at 09:28

## 2024-09-04 RX ADMIN — SODIUM CHLORIDE, SODIUM LACTATE, POTASSIUM CHLORIDE, CALCIUM CHLORIDE: 600; 310; 30; 20 INJECTION, SOLUTION INTRAVENOUS at 08:15

## 2024-09-04 RX ADMIN — LIDOCAINE HYDROCHLORIDE 0.3 ML: 10 INJECTION, SOLUTION EPIDURAL; INFILTRATION; INTRACAUDAL; PERINEURAL at 06:39

## 2024-09-04 RX ADMIN — FENTANYL CITRATE 25 MCG: 50 INJECTION INTRAMUSCULAR; INTRAVENOUS at 08:44

## 2024-09-04 ASSESSMENT — ACTIVITIES OF DAILY LIVING (ADL)
ADLS_ACUITY_SCORE: 27
ADLS_ACUITY_SCORE: 18
ADLS_ACUITY_SCORE: 25
ADLS_ACUITY_SCORE: 18
ADLS_ACUITY_SCORE: 18
ADLS_ACUITY_SCORE: 27
ADLS_ACUITY_SCORE: 18
ADLS_ACUITY_SCORE: 20
ADLS_ACUITY_SCORE: 25
ADLS_ACUITY_SCORE: 18
ADLS_ACUITY_SCORE: 18
ADLS_ACUITY_SCORE: 25
ADLS_ACUITY_SCORE: 18
ADLS_ACUITY_SCORE: 25
ADLS_ACUITY_SCORE: 25

## 2024-09-04 ASSESSMENT — LIFESTYLE VARIABLES: TOBACCO_USE: 0

## 2024-09-04 ASSESSMENT — ENCOUNTER SYMPTOMS: SEIZURES: 0

## 2024-09-04 ASSESSMENT — COPD QUESTIONNAIRES: COPD: 0

## 2024-09-04 NOTE — BRIEF OP NOTE
[facility]    Brief Operative Note    Pre-operative diagnosis: Severe aortic stenosis [I35.0]  Post-operative diagnosis Same    Procedure: Transcatheter Aortic Valve Replacement-Femoral Approach, N/A - Heart    Surgeon: Surgeons and Role:  Panel 1:     * Bobby Grimes MD - Primary     * Osei Head MD - Fellow - Assisting  Panel 2:     * Gina Rodriguez MD - Primary  Anesthesia: General   Estimated Blood Loss: 50 mL  Incisions:  None  Drains: None  Specimens: None  Findings: By transthoracic echo there was no significant gradient across the valve.  There was no aortic insufficiency,  perivalvular leak or pericardial effusion.  DSA revealed no narrowing or extravasation of the left femoral artery.  Complications:  None .  Implants:   Implant Name Type Inv. Item Serial No.  Lot No. LRB No. Used Action   VALVE AORTIC TRANSCATHETER HEART 23MM EDMOND 3 ULTRA RESILIA - [id number] Valve VALVE AORTIC TRANSCATHETER HEART 23MM EDMOND 3 ULTRA RESILIA [id number] [employer] [id number]  1 Implanted

## 2024-09-04 NOTE — ANESTHESIA POSTPROCEDURE EVALUATION
Patient: Keerthi Montoya    Procedure: Procedure(s):  Transcatheter Aortic Valve Replacement-Femoral Approach       Anesthesia Type:  MAC    Note:  Disposition: Admission   Postop Pain Control: Uneventful            Sign Out: Well controlled pain   PONV: No   Neuro/Psych: Uneventful            Sign Out: Acceptable/Baseline neuro status   Airway/Respiratory: Uneventful            Sign Out: Acceptable/Baseline resp. status   CV/Hemodynamics: Uneventful            Sign Out: Acceptable CV status; No obvious hypovolemia; No obvious fluid overload   Other NRE: NONE   DID A NON-ROUTINE EVENT OCCUR? No           Last vitals:  Vitals Value Taken Time   BP 98/42 09/04/24 1200   Temp 36.4  C (97.5  F) 09/04/24 1055   Pulse 36 09/04/24 1208   Resp 20 09/04/24 1208   SpO2 93 % 09/04/24 1208   Vitals shown include unfiled device data.    Electronically Signed By: Ti Carrasco MD  September 4, 2024  12:09 PM

## 2024-09-04 NOTE — PLAN OF CARE
Goal Outcome Evaluation:      Plan of Care Reviewed With: patient    [facility]     Keerthi Montoya is a 74 y.o.F with a PMhx severe low flow low gradient aortic stenosis, HFpEF, HTN, T2DM, HLD, CKD3, pulmonary hypertension (WHO II), persistent atrial fibrillation with intolerance to anticoagulation due to GI bleeding s/p RY closure and Watchman (5/2023) admitted 9/4 for planned TAVR procedure.      # Severe aortic stenosis, now s/p Transcatheter Aortic Valve Replacement.   # Acute on Chronic Diastolic Heart Failure  # Pulmonary Hypertension (WHO II)  # HTN  # HLD  Above taken from current H&P  Patient historically is Sinus Fritz with heart rates  35 to 50/minute. MD aware and parameters adjusted by Talha Velásquez MD.  2 RN skin assessment completed with Mariama Wilkerson RN.  Pt off bedrest at 1645. Patient ambulated to bathroom with standby assist. Gait steady and balance is intact. Patient denies any signs or symptoms of dizziness or lightheadedness.  Neuro assessments completed, neuros intact. See flowsheets for detail of vital signs, pulse and neuro assessments.

## 2024-09-04 NOTE — ANESTHESIA POSTPROCEDURE EVALUATION
Patient: Keerthi Montoya    Procedure: Procedure(s):  Transcatheter Aortic Valve Replacement-Femoral Approach       Anesthesia Type:  MAC    Note:  Disposition: Admission   Postop Pain Control: Uneventful            Sign Out: Well controlled pain   PONV: No   Neuro/Psych: Uneventful            Sign Out: Acceptable/Baseline neuro status   Airway/Respiratory: Uneventful            Sign Out: Acceptable/Baseline resp. status   CV/Hemodynamics: Uneventful            Sign Out: Acceptable CV status; No obvious hypovolemia; No obvious fluid overload   Other NRE: NONE   DID A NON-ROUTINE EVENT OCCUR? No           Last vitals:  Vitals Value Taken Time   /53 09/04/24 1130   Temp 36.4  C (97.5  F) 09/04/24 1055   Pulse 39 09/04/24 1133   Resp 21 09/04/24 1133   SpO2 91 % 09/04/24 1133   Vitals shown include unfiled device data.    Electronically Signed By: Thuan Cardenas MD  September 4, 2024  11:34 AM

## 2024-09-04 NOTE — PHARMACY-ADMISSION MEDICATION HISTORY
Pharmacist Admission Medication History    Admission medication history is complete. The information provided in this note is only as accurate as the sources available at the time of the update.    Information Source(s): Patient, CareEverywhere/SureScripts, and nursing completed medication history on admission  via phone    Changes made to PTA medication list:  Added: None  Deleted: None  Changed: None    Allergies reviewed with patient and updates made in EHR: yes    Medication History Completed By: Quinton Gallo PharmD 9/4/2024 3:10 PM    PTA Med List   Medication Sig Note Last Dose    aspirin 81 MG EC tablet Take 1 tablet (81 mg) by mouth daily 8/30/2024: Take 325mg night before and morning of procedre 9/4/2024 at 0300    atenolol (TENORMIN) 25 MG tablet Take 1 tablet (25 mg) by mouth daily 8/30/2024: Take per PCP 9/4/2024 at 0300    atorvastatin (LIPITOR) 40 MG tablet Take 1 tablet (40 mg) by mouth daily 8/30/2024: Take per PCP 9/4/2024 at 0300    blood glucose (ACCU-CHEK FELIPE PLUS) test strip 200 strips by In Vitro route 2 times daily Use to test blood sugar 2 times daily or as directed.  9/3/2024    diphenhydrAMINE (BENADRYL) 25 MG tablet Take 25 mg by mouth every 6 hours as needed for itching or allergies  9/3/2024    glipiZIDE (GLUCOTROL XL) 10 MG 24 hr tablet TAKE 2 TABLETS(20 MG) BY MOUTH DAILY 8/30/2024: Hold morning of procedure   9/3/2024 at 1700    hydrALAZINE (APRESOLINE) 50 MG tablet Take 1 tablet (50 mg) by mouth 3 times daily 8/30/2024: Take per PCP 9/4/2024 at 0300    isosorbide mononitrate (IMDUR) 30 MG 24 hr tablet Take 1 tablet (30 mg) by mouth daily  9/4/2024 at 0300    levothyroxine (SYNTHROID/LEVOTHROID) 125 MCG tablet Take 1 tablet (125 mcg) by mouth daily  9/4/2024 at 0300    losartan (COZAAR) 100 MG tablet Take 1 tablet (100 mg) by mouth daily 8/30/2024: Hold morning of procedure per PCP 9/3/2024    mesalamine (APRISO ER) 0.375 g 24 hr capsule Take 4 capsules (1.5 g) by mouth daily   9/3/2024    pantoprazole (PROTONIX) 40 MG EC tablet Take 1 tablet (40 mg) by mouth daily  9/4/2024 at 0300    senna (SENOKOT) 8.6 MG tablet Take 1 tablet by mouth daily as needed for constipation 8/30/2024: Hold morning of procedure Past Month

## 2024-09-04 NOTE — ANESTHESIA CARE TRANSFER NOTE
Patient: Keerthi oMntoya    Procedure: Procedure(s):  Transcatheter Aortic Valve Replacement-Femoral Approach       Diagnosis: Severe aortic stenosis [I35.0]  Diagnosis Additional Information: No value filed.    Anesthesia Type:   MAC     Note:    Oropharynx: oropharynx clear of all foreign objects and spontaneously breathing  Level of Consciousness: awake  Oxygen Supplementation: nasal cannula  Level of Supplemental Oxygen (L/min / FiO2): 2  Independent Airway: airway patency satisfactory and stable  Dentition: dentition unchanged  Vital Signs Stable: post-procedure vital signs reviewed and stable  Report to RN Given: handoff report given  Patient transferred to: PACU    Handoff Report: Identifed the Patient, Identified the Reponsible Provider, Reviewed the pertinent medical history, Discussed the surgical course, Reviewed Intra-OP anesthesia mangement and issues during anesthesia, Set expectations for post-procedure period and Allowed opportunity for questions and acknowledgement of understanding      Vitals:  Vitals Value Taken Time   /58    Temp     Pulse 43 09/04/24 1056   Resp 14 09/04/24 1056   SpO2 93 % 09/04/24 1056   Vitals shown include unfiled device data.    Electronically Signed By: WENDI Hylton CRNA  September 4, 2024  10:56 AM

## 2024-09-04 NOTE — OP NOTE
OPERATING ROOM:  Room 24  DATE  OF OPERATION:  September 4, 2024  PROCEDURE PERFORMED:  Left transfemoral transcatheter aortic valve replacement using an Ramsay Trini 3 Ultra Resilia valve at nominal plus 2  ATTENDING SURGEON:  Gina Rodriguez MD  STRUCTURAL HEART CARDIOLOGIST:  Bobby Grimes MD  STRUCTURAL HEART FELLOW:  Osei Head MD  ANESTHESIA:  General endotracheal  SKIN PREP:  Betadine  INCISIONS:  None  DRAINS:  None  SPECIMENS:  None  CULTURES:   None  CLOSURE: Perclose devices x 2    PREOPERATIVE DIAGNOSES:  1.   Low flow, low gradient aortic stenosis  2.   HFpEF  3.   Atrial  fibrillation S/P Watchman  4.   Pulmonary hypertension  5.   Diabetes mellitus    POSTOPERATIVE DIAGNOSES:  Same    BRIEF HISTORY:  Mrs. Montoya is a 74 year old woman with low flow, low gradient aortic stenosis.  Given her multiple medical co-morbidities she was felt to be a good candidate to undergo transcatheter aortic valve replacement and the above procedure was planned.    FINDINGS AT OPERATION:  After valve deployment by transthoracic echo there was no significant gradient across the valve.  There was no aortic insufficiency, perivalvular leak or pericardial effusion.  After removal of the Ramsay eSheath and deployment of the Perclose devices DSA revealed no narrowing or extravasation of the left femoral artery.    PROCEDURE: The patient arrived in the operating room and was positioned supine.  Satisfactory general endotracheal anesthesia was induced.  Her neck, chest, abdomen, and both groin areas were prepped and draped in a standard sterile fashion.   The right femoral artery and vein were cannulated.  Through the right femoral vein a temporary pacing wire was placed in the right ventricle.   It was tested and captured well.  The left femoral artery was cannulated and the Ramsay eSheath was passed up it.  Heparin was administered.  The necessary intracardiac wires were placed.  On the back table a 23 mm  Ramsay Trini 3 Ultra Resilia valve was prepared at nominal plus 2.   When the ACT was appropriate the valve and its delivery system were passed up the Ramsay eSheath, prepared and positioned.  Rapid pacing was done and the valve was deployed.  The valve delivery system was removed from the heart and transthoracic echo was done with the above results.  The valve delivery system and intracardiac wires were removed.   Protamine was administered to reverse the heparin.  The Ramsay eSheath was removed and the Perclose devices deployed.  DSA was done with the above results.   The estimated blood loss was 50 mL.   Mrs. Montoya was extubated and brought to the PACU in stable condition.

## 2024-09-04 NOTE — Clinical Note
Crossing the Aortic Valve. AL inserted over the Jwire. Wire removed, striaght wire inserted and Catheter advanced to the LV. Straight wire removed and 260 exchange wire inserted. AL Removed and Pigtail Inserted over the 260 wire.

## 2024-09-04 NOTE — PRE-PROCEDURE
Patient was seen and examined by me.  She is ready to undergo a transfemoral TAVR.  She understands that the risks for this procedure include: bleeding, infection, stroke, heart block requiring a pacemaker, cardiac perforation, aortic root rupture, aortic dissection, and an operative mortality of 1 to 2 percent.  She accepts these risks and is ready to proceed this morning.  Informed consent has been obtained.

## 2024-09-04 NOTE — PROGRESS NOTES
[place] Cardiomyopathy Questionnaire  (CQ-12)  Date: 9/4/24    Pre TAVR        5 Meter/15 foot Walk test  Trial 1  5.2  Trial 2   5.5  Trial 3  5.9

## 2024-09-05 ENCOUNTER — APPOINTMENT (OUTPATIENT)
Dept: CARDIOLOGY | Facility: CLINIC | Age: 74
DRG: 266 | End: 2024-09-05
Attending: NURSE PRACTITIONER
Payer: MEDICARE

## 2024-09-05 ENCOUNTER — HOSPITAL ENCOUNTER (INPATIENT)
Dept: CARDIOLOGY | Facility: CLINIC | Age: 74
Discharge: HOME | DRG: 266 | End: 2024-09-05
Admitting: INTERNAL MEDICINE
Payer: MEDICARE

## 2024-09-05 ENCOUNTER — APPOINTMENT (OUTPATIENT)
Dept: OCCUPATIONAL THERAPY | Facility: CLINIC | Age: 74
DRG: 266 | End: 2024-09-05
Attending: NURSE PRACTITIONER
Payer: MEDICARE

## 2024-09-05 VITALS
SYSTOLIC BLOOD PRESSURE: 114 MMHG | TEMPERATURE: 98.1 F | DIASTOLIC BLOOD PRESSURE: 49 MMHG | WEIGHT: 136.9 LBS | HEART RATE: 52 BPM | BODY MASS INDEX: 24.26 KG/M2 | HEIGHT: 63 IN | OXYGEN SATURATION: 97 % | RESPIRATION RATE: 18 BRPM

## 2024-09-05 DIAGNOSIS — R00.1 SINUS BRADYCARDIA: ICD-10-CM

## 2024-09-05 DIAGNOSIS — I35.0 AORTIC STENOSIS, SEVERE: ICD-10-CM

## 2024-09-05 DIAGNOSIS — Z95.2 HISTORY OF TRANSCATHETER AORTIC VALVE REPLACEMENT (TAVR): ICD-10-CM

## 2024-09-05 LAB
ANION GAP SERPL CALCULATED.3IONS-SCNC: 9 MMOL/L (ref 7–15)
BUN SERPL-MCNC: 24.1 MG/DL (ref 8–23)
CALCIUM SERPL-MCNC: 8.9 MG/DL (ref 8.8–10.4)
CHLORIDE SERPL-SCNC: 104 MMOL/L (ref 98–107)
CREAT SERPL-MCNC: 1.64 MG/DL (ref 0.51–0.95)
EGFRCR SERPLBLD CKD-EPI 2021: 33 ML/MIN/1.73M2
ERYTHROCYTE [DISTWIDTH] IN BLOOD BY AUTOMATED COUNT: 14.1 % (ref 10–15)
GLUCOSE SERPL-MCNC: 85 MG/DL (ref 70–99)
HCO3 SERPL-SCNC: 20 MMOL/L (ref 22–29)
HCT VFR BLD AUTO: 33.2 % (ref 35–47)
HGB BLD-MCNC: 10.6 G/DL (ref 11.7–15.7)
LVEF ECHO: NORMAL
MAGNESIUM SERPL-MCNC: 2 MG/DL (ref 1.7–2.3)
MCH RBC QN AUTO: 32.2 PG (ref 26.5–33)
MCHC RBC AUTO-ENTMCNC: 31.9 G/DL (ref 31.5–36.5)
MCV RBC AUTO: 101 FL (ref 78–100)
PLATELET # BLD AUTO: 172 10E3/UL (ref 150–450)
POTASSIUM SERPL-SCNC: 4.5 MMOL/L (ref 3.4–5.3)
RBC # BLD AUTO: 3.29 10E6/UL (ref 3.8–5.2)
SODIUM SERPL-SCNC: 133 MMOL/L (ref 135–145)
WBC # BLD AUTO: 7.8 10E3/UL (ref 4–11)

## 2024-09-05 PROCEDURE — 93325 DOPPLER ECHO COLOR FLOW MAPG: CPT

## 2024-09-05 PROCEDURE — 999N000096 CARDIAC MOBILE TELEMETRY MONITOR

## 2024-09-05 PROCEDURE — 97530 THERAPEUTIC ACTIVITIES: CPT | Mod: GO

## 2024-09-05 PROCEDURE — 83735 ASSAY OF MAGNESIUM: CPT | Performed by: INTERNAL MEDICINE

## 2024-09-05 PROCEDURE — 97165 OT EVAL LOW COMPLEX 30 MIN: CPT | Mod: GO

## 2024-09-05 PROCEDURE — 250N000013 HC RX MED GY IP 250 OP 250 PS 637

## 2024-09-05 PROCEDURE — 80048 BASIC METABOLIC PNL TOTAL CA: CPT | Performed by: INTERNAL MEDICINE

## 2024-09-05 PROCEDURE — 99239 HOSP IP/OBS DSCHRG MGMT >30: CPT | Mod: 25

## 2024-09-05 PROCEDURE — 250N000013 HC RX MED GY IP 250 OP 250 PS 637: Performed by: INTERNAL MEDICINE

## 2024-09-05 PROCEDURE — 36415 COLL VENOUS BLD VENIPUNCTURE: CPT | Performed by: INTERNAL MEDICINE

## 2024-09-05 PROCEDURE — 85027 COMPLETE CBC AUTOMATED: CPT | Performed by: INTERNAL MEDICINE

## 2024-09-05 PROCEDURE — 93010 ELECTROCARDIOGRAM REPORT: CPT | Mod: XU | Performed by: INTERNAL MEDICINE

## 2024-09-05 PROCEDURE — 93325 DOPPLER ECHO COLOR FLOW MAPG: CPT | Mod: 26 | Performed by: INTERNAL MEDICINE

## 2024-09-05 PROCEDURE — 97535 SELF CARE MNGMENT TRAINING: CPT | Mod: GO

## 2024-09-05 PROCEDURE — 93321 DOPPLER ECHO F-UP/LMTD STD: CPT | Mod: 26 | Performed by: INTERNAL MEDICINE

## 2024-09-05 PROCEDURE — 93308 TTE F-UP OR LMTD: CPT | Mod: 26 | Performed by: INTERNAL MEDICINE

## 2024-09-05 PROCEDURE — 93005 ELECTROCARDIOGRAM TRACING: CPT

## 2024-09-05 RX ADMIN — TORSEMIDE 10 MG: 10 TABLET ORAL at 10:44

## 2024-09-05 RX ADMIN — EMPAGLIFLOZIN 10 MG: 10 TABLET, FILM COATED ORAL at 10:36

## 2024-09-05 RX ADMIN — LOSARTAN POTASSIUM 100 MG: 50 TABLET, FILM COATED ORAL at 10:36

## 2024-09-05 RX ADMIN — ASPIRIN 81 MG: 81 TABLET ORAL at 10:36

## 2024-09-05 RX ADMIN — LEVOTHYROXINE SODIUM 125 MCG: 0.03 TABLET ORAL at 10:36

## 2024-09-05 RX ADMIN — HYDRALAZINE HYDROCHLORIDE 50 MG: 50 TABLET ORAL at 10:37

## 2024-09-05 RX ADMIN — ISOSORBIDE MONONITRATE 30 MG: 30 TABLET, EXTENDED RELEASE ORAL at 10:44

## 2024-09-05 ASSESSMENT — ACTIVITIES OF DAILY LIVING (ADL)
ADLS_ACUITY_SCORE: 28
ADLS_ACUITY_SCORE: 27
ADLS_ACUITY_SCORE: 27
ADLS_ACUITY_SCORE: 28
ADLS_ACUITY_SCORE: 27
ADLS_ACUITY_SCORE: 28
ADLS_ACUITY_SCORE: 27

## 2024-09-05 NOTE — PROGRESS NOTES
09/05/24 1000   Appointment Info   Signing Clinician's Name / Credentials (OT) Yon Minaya OTR/L   Rehab Comments (OT) TAVR   Living Environment   People in Home alone   Current Living Arrangements house   Home Accessibility stairs to enter home;stairs within home   Number of Stairs, Main Entrance 2   Stair Railings, Main Entrance railing on right side (ascending)   Number of Stairs, Within Home, Primary ten   Stair Railings, Within Home, Primary railing on left side (ascending)  (will have family assist with laundry in basement.)   Transportation Anticipated car, drives self;family or friend will provide   Living Environment Comments Pt has a rambler, tub/shower with grab bars.   Self-Care   Usual Activity Tolerance good   Current Activity Tolerance moderate   Regular Exercise No   Fall history within last six months no   Activity/Exercise/Self-Care Comment Pt is independent at baseline.   General Information   Onset of Illness/Injury or Date of Surgery 09/04/24   Referring Physician Bobby Grimes MD   Patient/Family Therapy Goal Statement (OT) Pt would like to return home.   Additional Occupational Profile Info/Pertinent History of Current Problem Keerthi Montoya is a 74 y.o.F with a PMhx severe low flow low gradient aortic stenosis, HFpEF, HTN, T2DM, HLD, CKD3, pulmonary hypertension (WHO II), persistent atrial fibrillation with intolerance to anticoagulation due to GI bleeding s/p RY closure and Watchman (5/2023) admitted 9/4 for planned TAVR procedure.   Existing Precautions/Restrictions cardiac;fall   Left Upper Extremity (Weight-bearing Status)   (5lbs)   Right Upper Extremity (Weight-bearing Status)   (5lbs)   Left Lower Extremity (Weight-bearing Status) full weight-bearing (FWB)   Right Lower Extremity (Weight-bearing Status) full weight-bearing (FWB)   Cognitive Status Examination   Orientation Status orientation to person, place and time   Visual Perception   Visual Impairment/Limitations WFL    Sensory   Sensory Quick Adds sensation intact   Pain Assessment   Patient Currently in Pain No   Range of Motion Comprehensive   Comment, General Range of Motion BUE AROM WFL,   Strength Comprehensive (MMT)   Comment, General Manual Muscle Testing (MMT) Assessment Pt presents with generalized weakness post op.   Bed Mobility   Comment (Bed Mobility) SBA   Transfers   Transfer Comments CGA   Activities of Daily Living   BADL Assessment/Intervention bathing;upper body dressing;lower body dressing;toileting;grooming   Bathing Assessment/Intervention   [place] Level (Bathing) set up;verbal cues;minimum assist (75% patient effort)   Comment, (Bathing) Per clinical judgement.   Upper Body Dressing Assessment/Training   Comment, (Upper Body Dressing) Per clinical judgement.   [place] Level (Upper Body Dressing) set up;verbal cues   Lower Body Dressing Assessment/Training   [place] Level (Lower Body Dressing) set up;verbal cues   Grooming Assessment/Training   [place] Level (Grooming) independent   Toileting   [place] Level (Toileting) set up;verbal cues;contact guard assist   Clinical Impression   Criteria for Skilled Therapeutic Interventions Met (OT) Yes, treatment indicated   OT Diagnosis Decreased tolerance for ADLS/transfers   Influenced by the following impairments .   OT Problem List-Impairments impacting ADL problems related to;activity tolerance impaired;flexibility;range of motion (ROM);strength;post-surgical precautions   Assessment of Occupational Performance 1-3 Performance Deficits   Planned Therapy Interventions (OT) ADL retraining;IADL retraining;bed mobility training;ROM;strengthening;stretching;transfer training;home program guidelines;progressive activity/exercise;risk factor education   Clinical Decision Making Complexity (OT) problem focused assessment/low complexity   Risk & Benefits of therapy have been explained evaluation/treatment results reviewed;care plan/treatment  goals reviewed;risks/benefits reviewed;patient   OT Total Evaluation Time   OT Eval, Low Complexity Minutes (46122) 10   OT Goals   Therapy Frequency (OT) Daily   OT Predicted Duration/Target Date for Goal Attainment 09/12/24   OT Discharge Planning   OT Plan OT: Ambulation, Precautions review, CR.   OT Discharge Recommendation (DC Rec) home with outpatient cardiac rehab;home with assist   OT Rationale for DC Rec Pt is currently functioning near baseline function but mildly deconditioned post op. Pt will be able to return home with A and OP CR.   OT Brief overview of current status SBA/CGA   Total Session Time   Total Session Time (sum of timed and untimed services) 10

## 2024-09-05 NOTE — PROGRESS NOTES
-Pt had an echocardiogram and based on the results the Pt was given orders to discharge to home with family assistance.  -Pt up [place] with safe judgement.   -Pt to be transported home by her son.  -Pt reviewed and understood her discharge instructions, follow-up appointments and prescriptions.

## 2024-09-05 NOTE — PLAN OF CARE
1900-0730    Diagnosis: s/p TAVR      Neuro: A&O x4, neuro checks Q2H. Able to make needs known. CMS intact.   Cardiac: SB (HR 40-50). Denied chest pain, dizziness, palpitations. VSS, afebrile.  Respiratory: was on RA, however, desats to 86-88%, was on 2L NC overnight. Denied SOB.   GI/: WDL.   Diet/Appetite: Regular diet  Skin: Bilateral groin sites. CDI.   LDA: Left PIV SL. Pt accidentally pulled out right PIV while sleeping.   Activity: SBA.  Pain: Incisional pain reported. Declined medication.   Plan: Continue with POC. Notify CARDS CSI of pertinent changes.

## 2024-09-05 NOTE — DISCHARGE SUMMARY
[address] [address]  p: [phone]    Discharge Summary: Cardiology Service    Keerthi Montoya MRN# [id number]   YOB: 1950 Age: 74 year old     Admission Date: 9/4/2024  Discharge Date: 09/05/24    Discharge Diagnoses:  # Severe aortic stenosis, now s/p Transcatheter Aortic Valve Replacement.   # Acute on Chronic Diastolic Heart Failure  # Pulmonary Hypertension (WHO II)  # HTN  # HLD  # Persistent Atrial Fibrillation s/p Watchman 5/2023  # CKD 3b  # T2DM    Brief HPI:  Keerthi Montoya is a 74 y.o.F with a PMhx severe low flow low gradient aortic stenosis, HFpEF, HTN, T2DM, HLD, CKD3, pulmonary hypertension (WHO II), persistent atrial fibrillation with intolerance to anticoagulation due to GI bleeding s/p RY closure and Watchman (5/2023) admitted 9/4 for planned TAVR procedure.     Hospital Course by Diagnosis:  # Severe aortic stenosis, now s/p Transcatheter Aortic Valve Replacement.   # Acute on Chronic Diastolic Heart Failure  # Pulmonary Hypertension (WHO II)  # HTN  # HLD  Patient with severe aortic stenosis characterized with mean gradient 17 mmHG, IVON 0.84 Cm^2, peak velocity 2.7 M/s, as well as acute on chronic heart failure as evidenced by  NT BNP of 11,972 and symptoms of progressive exertional dyspnea. Now s/p TAVR with a 23mm Ultra Resilia valve with marked improvement in heart failure symptoms. Post procedure echo showing well-seated 23 mm Ramsay Trini 3 Ultra Resilia  TAVR bioprosthesis, with resolution of aortic stenosis (peak velocity 1.1 m/s,  mean gradient 2 mmHg, DVI 0.50, IVON 1.7 cm2, SVI 29 mL/m2.) There is trace  valvular AI associated with a guidewire crossing the aortic valve at the time  of imaging. There is no paravalular AI.  Sheath sites soft without hematoma. Possible junctional rhythm post procedure. Patient bradycardic at baseline. Neuro's intact     - Volume Status: Euvolemic, continue PTA  Torsemide 10 mg every other day  - ASA for anti-platelet therapy  - BP  control: continue PTA Losartan 100 mg daily, hydralazine 50 mg TID, Imdur 30  mg daily  - Statin: continue PTA Lipitor 40 mg daily  - Echocardiogram 9/5: Doppler interrogation of the aortic valve is normal. The peak velocity across the aortic valve is 1.6 m/sec. The mean gradient is 5 mmHg. The V2/V1 ratio is 0.71.  - Groin sites c/d/I without hematoma  - EKG 9/5: SB with  PACs and RVH  - Cardiac rehab as OP   - Diurese as needed/able  - Daily weights  - 2 week BioTel at discharge due to possible junctional rhythm post-procedure  - Lifelong antibiotic prophylaxis prior to all dental procedures.      # Persistent Atrial Fibrillation s/p Watchman 5/2023  Currently in SB  - Rate control: StopPTA Atenolol 25 mg daily due to HFpEF and significant bradycardia; if pt having issues with rate control, could consider Diltiazem as OP   - ASA as listed above     # CKD 3b  Baseline around 1.6-1.9. Cr 1.7 on admission  - Diuresis as listed above  - Trend BMP     # T2DM  Most recent A1C 7.2. On Glipizide PTA  - Resume PTA regimen       Pertinent Procedures:  1. TAVR    Consults:  None    Medication Changes:  Stop Atenolol 25mg daily     Discharge medications:   Current Discharge Medication List        CONTINUE these medications which have NOT CHANGED    Details   aspirin 81 MG EC tablet Take 1 tablet (81 mg) by mouth daily  Qty: 90 tablet, Refills: 3      atorvastatin (LIPITOR) 40 MG tablet Take 1 tablet (40 mg) by mouth daily  Qty: 90 tablet, Refills: 3    Associated Diagnoses: Hyperlipidemia LDL goal <100      blood glucose (ACCU-CHEK FELIPE PLUS) test strip 200 strips by In Vitro route 2 times daily Use to test blood sugar 2 times daily or as directed.  Qty: 200 strip, Refills: 4      diphenhydrAMINE (BENADRYL) 25 MG tablet Take 25 mg by mouth every 6 hours as needed for itching or allergies      glipiZIDE (GLUCOTROL XL) 10 MG 24 hr tablet TAKE 2 TABLETS(20  MG) BY MOUTH DAILY  Qty: 60 tablet, Refills: 3    Associated Diagnoses: Type 2 diabetes mellitus with microalbuminuria, without long-term current use of insulin (H)      hydrALAZINE (APRESOLINE) 50 MG tablet Take 1 tablet (50 mg) by mouth 3 times daily  Qty: 180 tablet, Refills: 3    Associated Diagnoses: Hypertension goal BP (blood pressure) < 130/80      isosorbide mononitrate (IMDUR) 30 MG 24 hr tablet Take 1 tablet (30 mg) by mouth daily  Qty: 90 tablet, Refills: 3    Associated Diagnoses: Acute on chronic diastolic congestive heart failure (H)      levothyroxine (SYNTHROID/LEVOTHROID) 125 MCG tablet Take 1 tablet (125 mcg) by mouth daily  Qty: 90 tablet, Refills: 0    Comments: Patient needs generic levothyroxine, is allergic to brand name synthroid.  Associated Diagnoses: Postoperative hypothyroidism      losartan (COZAAR) 100 MG tablet Take 1 tablet (100 mg) by mouth daily  Qty: 90 tablet, Refills: 3    Associated Diagnoses: Hypertension goal BP (blood pressure) < 140/90      mesalamine (APRISO ER) 0.375 g 24 hr capsule Take 4 capsules (1.5 g) by mouth daily  Qty: 360 capsule, Refills: 3    Associated Diagnoses: Colitis      pantoprazole (PROTONIX) 40 MG EC tablet Take 1 tablet (40 mg) by mouth daily  Qty: 90 tablet, Refills: 3    Associated Diagnoses: Gastrointestinal hemorrhage, unspecified gastrointestinal hemorrhage type      senna (SENOKOT) 8.6 MG tablet Take 1 tablet by mouth daily as needed for constipation  Qty: 30 tablet, Refills: 3    Associated Diagnoses: Constipation, unspecified constipation type      Cyanocobalamin (B-12) 1000 MCG TABS Take 1,000 mcg by mouth three times a week      dulaglutide (TRULICITY) 1.5 MG/0.5ML pen Inject 1.5 mg Subcutaneous every 7 days After 4 doses of 0.75 mg  Qty: 2 mL, Refills: 1    Associated Diagnoses: Type 2 diabetes mellitus with microalbuminuria, without long-term current use of insulin (H)      empagliflozin (JARDIANCE) 10 MG TABS tablet Take 1 tablet (10 mg)  by mouth daily  Qty: 90 tablet, Refills: 3    Associated Diagnoses: Acute on chronic diastolic congestive heart failure (H)      torsemide (DEMADEX) 10 MG tablet Take 1 tablet (10 mg) by mouth every other day  Qty: 40 tablet, Refills: 1    Associated Diagnoses: Acute on chronic diastolic congestive heart failure (H)           STOP taking these medications       atenolol (TENORMIN) 25 MG tablet Comments:   Reason for Stopping:               Follow-up:  Return TAVR 9/13 + 10/11 as scheduled  Cardiac Rehab 9/19   11/25  [facility] 1/10    Labs or imaging requiring follow-up after discharge:  Echo, BMP, CBC     Code status:  Full Code     Condition on discharge  Temp:  [97.6  F (36.4  C)-99.1  F (37.3  C)] 98.1  F (36.7  C)  Pulse:  [37-52] 52  Resp:  [18-27] 18  BP: ()/(42-73) 114/49  Cuff Mean (mmHg):  [71] 71  SpO2:  [90 %-100 %] 97 %    General: AAOx3, NAD  Skin: Not jaundiced, no rash, no ecchymoses; incision site CDI, soft, non-tender, no signs of hematoma. No bruit  HEENT: MMM, PERRLA, EOM intact  CV: RRR, normal S1S2, no murmur, clicks, rubs  Resp: Clear to auscultation bilaterally, no wheezes, rhonchi  Extremities: warm and well perfused, palpable pedal pulses, no edema, groin sites c/d/I without hematoma   Neuro: No lateralizing symptoms or focal neurologic deficits    Imaging with results:  Echocardiogram 9/5/24:  Interpretation Summary  s/p TAVR with 23 mm Ramsay Trini 3 Ultra Resilia prosthesis on 9/4/24. Valve  is well seated with normal doppler assessment (PV 1.6m/s, MG 5mmHg).     Left ventricular function is normal.The ejection fraction is 55-60%.  Moderate to severe right ventricular dilation. Global right ventricular  function is moderately reduced.  Moderate tricuspid insufficiency.  Severe pulmonary hypertension.  Estimated pulmonary artery systolic pressure is 85 mmHg.  Dilation of the inferior vena cava is present with abnormal respiratory  variation in diameter.  No pericardial  effusion.  ______________________________________________________________________________  Left Ventricle  Left ventricular function is normal.The ejection fraction is 55-60%.     Right Ventricle  Moderate to severe right ventricular dilation is present. Global right  ventricular function is moderately reduced.     Mitral Valve  Moderate mitral annular calcification is present. Trace mitral insufficiency  is present.     Aortic Valve  Transcutaneous aortic valve replacement is present. The prosthetic aortic  valve is well-seated. There is no paravalvular regurgitation. There is no  valvular regurgitation. Doppler interrogation of the aortic valve is normal.  The peak velocity across the aortic valve is 1.6 m/sec. The mean gradient is 5  mmHg. The V2/V1 ratio is 0.71.     Tricuspid Valve  Moderate tricuspid insufficiency is present. Estimated pulmonary artery  systolic pressure is 70 mmHg plus right atrial pressure. Severe pulmonary  hypertension is present.     Vessels  Dilation of the inferior vena cava is present with abnormal respiratory  variation in diameter.     Pericardium  No pericardial effusion is present.     Compared to Previous Study  This study was compared with the study from 24 .     ______________________________________________________________________________  MMode/2D Measurements & Calculations  LVOT diam: 2.1 cm  LVOT area: 3.3 cm2     Doppler Measurements & Calculations  Ao V2 max: 163.0 cm/sec  Ao max PG: 10.6 mmHg  Ao V2 mean: 102.0 cm/sec  Ao mean P.0 mmHg  Ao V2 VTI: 36.4 cm  IVON(I,D): 2.3 cm2  IVON(V,D): 2.4 cm2  Ao acc time: 0.08 sec     LV V1 max P.4 mmHg  LV V1 max: 116.0 cm/sec  LV V1 VTI: 25.1 cm  SV(LVOT): 83.6 ml  SI(LVOT): 50.9 ml/m2  TR max michael: 417.8 cm/sec  TR max P.8 mmHg  AV Michael Ratio (DI): 0.71  IVON Index (cm2/m2): 1.4    TAVR 24:  1. Lifestyle-limiting severe aortic stenosis.   2. Successful transfemoral transcatheter aortic valve replacement with a 23 mm  Ramsay Trini 3 Ultra Resilia valve.  3. Temporary pacing   4. Left heart catheterization with LVEDP of 5 mmHg prior to valve deployment.  5. Left common femoral arteriotomy successfully closed with Perclose closure devices. Manual pressure was used to obtain hemostasis of the right sided arteriotomy and venotomy.     Other imaging studies:      Patient Care Team:  Bunny Meyers MD as PCP - General (Internal Medicine)  Preeti James MD as MD (Gastroenterology)  Swati Minor DO as Assigned Nephrology Provider  Jerry Robbins PA-C as Physician Assistant (Gastroenterology)  Bunny Meyers MD as Assigned PCP  Bunny [facility] as Assigned MTM Pharmacist  Allison Yang, RN as Specialty Care Coordinator (Gastroenterology)  Gianfranco Melendez MD as Intern (Student in organized health care education/training program)  Jethro Moore MD as MD (Hematology & Oncology)  Fracisco Hurtado, RN as Specialty Care Coordinator (Hematology & Oncology)  Vanessa Schneider RN as Diabetes Educator (Diabetes Education)  Preeti James MD as Assigned Surgical Provider  Saba Lemon MD as MD (Advanced Heart Failure and Transplant Cardiology)  Sheela Galeas, VERONIQUE as Specialty Care Coordinator (Cardiology)  Francisca Mustafa APRN CNP as Assigned Cancer Care Provider  Geena Ornelas MD as MD (Dermatology)  Lorena Ambriz, RN as Specialty Care Coordinator (Cardiology)  Davie Rapp MD as MD (Gastroenterology)  Carolyn Bonilla APRN CNP as Assigned Heart and Vascular Provider  Zuleika Glover, RN as Specialty Care Coordinator (Cardiology)  Christal Barker, RN as Specialty Care Coordinator (Cardiology)  Stacy Stokes, RN as Specialty Care Coordinator (Cardiology)  oJnny Harper, RN as Specialty Care Coordinator  Angeline Marcus MD as MD (Cardiovascular Disease)  Kami Lim PA-C as Assigned Gastroenterology Provider    Gina Jones MS, PA-C  [facility] Cardiology  Patient  discussed with staff cardiologist, Dr. Grimes, who agrees with the above documentation and plan. Documentation represents joint decision making.     Time Spent on this Encounter   I, Gina Jones PA-C, personally saw the patient today and spent greater than 30 minutes discharging this patient.     Physician Attestation   I have reviewed and discussed with the advanced practice provider their history, physical and plan for Keerthi Montoya. I did not participate in a shared visit by interviewing or examining the patient and this should be billed as an advanced practice provider only visit.    Bobby Grimes MD  Interventional Cardiology

## 2024-09-06 NOTE — PLAN OF CARE
Occupational Therapy and Cardiac Rehab Discharge Summary    Reason for therapy discharge:    Discharged to home with outpatient therapy.    Progress towards therapy goal(s). See goals on Care Plan in [facility] electronic health record for goal details.  Goals partially met.  Barriers to achieving goals:   discharge from facility.    Therapy recommendation(s):    Continued therapy is recommended.  Rationale/Recommendations:  Home with A and OP CR.

## 2024-09-08 LAB
ATRIAL RATE - MUSE: 41 BPM
ATRIAL RATE - MUSE: 45 BPM
ATRIAL RATE - MUSE: 49 BPM
DIASTOLIC BLOOD PRESSURE - MUSE: NORMAL MMHG
INTERPRETATION ECG - MUSE: NORMAL
P AXIS - MUSE: 70 DEGREES
P AXIS - MUSE: 79 DEGREES
P AXIS - MUSE: 80 DEGREES
PR INTERVAL - MUSE: 172 MS
PR INTERVAL - MUSE: 186 MS
PR INTERVAL - MUSE: 190 MS
QRS DURATION - MUSE: 78 MS
QT - MUSE: 488 MS
QT - MUSE: 516 MS
QT - MUSE: 560 MS
QTC - MUSE: 440 MS
QTC - MUSE: 446 MS
QTC - MUSE: 462 MS
R AXIS - MUSE: 106 DEGREES
R AXIS - MUSE: 107 DEGREES
R AXIS - MUSE: 116 DEGREES
SYSTOLIC BLOOD PRESSURE - MUSE: NORMAL MMHG
T AXIS - MUSE: -29 DEGREES
T AXIS - MUSE: -70 DEGREES
T AXIS - MUSE: 14 DEGREES
VENTRICULAR RATE- MUSE: 41 BPM
VENTRICULAR RATE- MUSE: 45 BPM
VENTRICULAR RATE- MUSE: 49 BPM

## 2024-09-11 DIAGNOSIS — E89.0 POSTOPERATIVE HYPOTHYROIDISM: ICD-10-CM

## 2024-09-12 ENCOUNTER — HOSPITAL ENCOUNTER (OUTPATIENT)
Dept: CARDIAC REHAB | Facility: CLINIC | Age: 74
Discharge: HOME | End: 2024-09-12
Attending: THORACIC SURGERY (CARDIOTHORACIC VASCULAR SURGERY)
Payer: MEDICARE

## 2024-09-12 DIAGNOSIS — Z95.2 S/P TAVR (TRANSCATHETER AORTIC VALVE REPLACEMENT): ICD-10-CM

## 2024-09-12 PROCEDURE — 93798 PHYS/QHP OP CAR RHAB W/ECG: CPT

## 2024-09-13 ENCOUNTER — OFFICE VISIT (OUTPATIENT)
Dept: CARDIOLOGY | Facility: CLINIC | Age: 74
End: 2024-09-13
Attending: NURSE PRACTITIONER
Payer: MEDICARE

## 2024-09-13 VITALS
DIASTOLIC BLOOD PRESSURE: 73 MMHG | SYSTOLIC BLOOD PRESSURE: 173 MMHG | OXYGEN SATURATION: 94 % | HEART RATE: 74 BPM | BODY MASS INDEX: 23.38 KG/M2 | WEIGHT: 132 LBS

## 2024-09-13 DIAGNOSIS — Z95.2 HISTORY OF TRANSCATHETER AORTIC VALVE REPLACEMENT (TAVR): Primary | ICD-10-CM

## 2024-09-13 DIAGNOSIS — I50.33 ACUTE ON CHRONIC DIASTOLIC CONGESTIVE HEART FAILURE (H): ICD-10-CM

## 2024-09-13 DIAGNOSIS — I35.0 AORTIC STENOSIS, SEVERE: ICD-10-CM

## 2024-09-13 PROCEDURE — G0463 HOSPITAL OUTPT CLINIC VISIT: HCPCS | Performed by: NURSE PRACTITIONER

## 2024-09-13 PROCEDURE — 99214 OFFICE O/P EST MOD 30 MIN: CPT | Performed by: NURSE PRACTITIONER

## 2024-09-13 RX ORDER — ISOSORBIDE MONONITRATE 30 MG/1
30 TABLET, EXTENDED RELEASE ORAL DAILY
Qty: 90 TABLET | Refills: 3 | Status: SHIPPED | OUTPATIENT
Start: 2024-09-13

## 2024-09-13 ASSESSMENT — PAIN SCALES - GENERAL: PAINLEVEL: NO PAIN (0)

## 2024-09-13 NOTE — NURSING NOTE
Chief Complaint   Patient presents with    Follow Up     TAVR 1 week Follow u       Vitals were taken, medications reconciled.     Eugenio De Jesus, Clinic Assistant    11:53 AM

## 2024-09-13 NOTE — LETTER
9/13/2024      RE: Keerthi Montoya  [address] [place] [id number]       Dear Colleague,    Thank you for the opportunity to participate in the care of your patient, Keerthi Montoya, at the [facility] [place] at [facility]. Please see a copy of my visit note below.        [facility] HEART CARE  CARDIOVASCULAR DIVISION    VALVE CLINIC RETURN VISIT    PRIMARY CARDIOLOGIST: Dr. Sanderson       PERTINENT CLINICAL HISTORY:     Keerthi Montoya is a very pleasant 74 year old female who presents for 1 week TAVR follow-up. She has a history of  HFpEF, HTN, T2DM, HLD, CKD3, pulmonary hypertension (WHO II), RV failure, persistent atrial fibrillation with intolerance to anticoagulation due to GI bleeding s/p RY closure and Watchman (5/2023) and severe paradoxical low flow low gradient severe aortic stenosis s/p TAVR 9/4 with a 23 mm Ramsay Trini 3 Ultra Resilia.  The TAVR and post-procedural course were notable for no complications. Her POD#1 ECHO showed mean PG 5 mmHg, no mention of severe PVL. She was noted to be SB at baseline, with intermittent junctioncal intra procedure. She was discharged on ASA therapy. Atenolol held on discharge due to low HR.    She presents today with no specific cardiovascular concerns. She states she has been feeling well since her valve replacement. She is attending cardiac rehab. She denies any chest pain, shortness of breath , orthopnea or PND.  She has not had any pre or aaron syncope. She has not had any palpitations or LE edema. Home BP has been 130-140, Bp at cardiac rehab was 120s yesterday. Groin sites are healing well. Overall she feels great.      PAST MEDICAL HISTORY:     Past Medical History:   Diagnosis Date     Chronic kidney disease      Diabetes mellitus, type 2 (H)      Diverticulosis of colon (without mention of hemorrhage) 10/01/2012     GI bleed      History of blood transfusion      HLD  (hyperlipidemia)      Hypertension      Hypothyroidism      MARIA D (iron deficiency anemia)      Persistent atrial fibrillation (H)      Pulmonary hypertension (H)      Ulcerative (chronic) enterocolitis (H)         PAST SURGICAL HISTORY:     Past Surgical History:   Procedure Laterality Date     CHOLECYSTECTOMY  3/1987     COLON SURGERY  01/2001    Left colon resection; diverticulitis     COLONOSCOPY N/A 4/24/2017    Procedure: COMBINED COLONOSCOPY, SINGLE OR MULTIPLE BIOPSY/POLYPECTOMY BY BIOPSY;;  Surgeon: Radha Salguero MD;  Location: [facility] OR     COLONOSCOPY N/A 3/10/2023    Procedure: COLONOSCOPY;  Surgeon: Preeti James MD;  Location: [facility] GI     COLONOSCOPY WITH CO2 INSUFFLATION N/A 4/24/2017    Procedure: COLONOSCOPY WITH CO2 INSUFFLATION;  Colonoscopy Dx rectal bleeding, BMI 25.86, Pharm [facility] .[phone], ;  Surgeon: Radha Salguero MD;  Location: [facility] OR     CV CORONARY ANGIOGRAM N/A 7/15/2024    Procedure: Coronary Angiogram with possible intervention;  Surgeon: Bobby Grimes MD;  Location:  HEART CARDIAC CATH LAB     CV LEFT ATRIAL APPENDAGE CLOSURE N/A 5/11/2023    Procedure: Left Atrial Appendage Closure;  Surgeon: Bobby Grimes MD;  [facility]     CV RIGHT HEART CATH MEASUREMENTS RECORDED N/A 3/16/2020    Procedure: CV RIGHT HEART CATH;  Surgeon: Nader Muñoz MD;  [facility]     CV RIGHT HEART CATH MEASUREMENTS RECORDED N/A 7/15/2024    Procedure: Right Heart Cath;  Surgeon: Bobby Grimes MD;  Location:  HEART CARDIAC CATH LAB     CV TRANSCATHETER AORTIC VALVE REPLACEMENT-FEMORAL APPROACH N/A 9/4/2024    Procedure: Transcatheter Aortic Valve Replacement-Femoral Approach;  Surgeon: Bobby Grimes MD;  Location: [facility] OR     ESOPHAGOSCOPY, GASTROSCOPY, DUODENOSCOPY (EGD), COMBINED N/A 1/23/2023    Procedure: ESOPHAGOGASTRODUODENOSCOPY, WITH BIOPSY;  Surgeon: Ricki Deal MD;  Location: [facility]      ESOPHAGOSCOPY, GASTROSCOPY, DUODENOSCOPY (EGD), COMBINED N/A 3/10/2023    Procedure: Esophagoscopy, gastroscopy, duodenoscopy (EGD), combined;  Surgeon: Preeti James MD;  Location:  GI     GYN SURGERY  9/1983    tubal ligation     HERNIA REPAIR  12/2006    recurrent incisional hernia     SIGMOIDOSCOPY FLEXIBLE N/A 1/23/2023    Procedure: Sigmoidoscopy flexible;  Surgeon: Ricki Deal MD;  Location:  GI     THROAT SURGERY  12/1974    thyroidectomy        CURRENT MEDICATIONS:     Current Outpatient Medications   Medication Sig Dispense Refill     aspirin 81 MG EC tablet Take 1 tablet (81 mg) by mouth daily 90 tablet 3     atorvastatin (LIPITOR) 40 MG tablet Take 1 tablet (40 mg) by mouth daily 90 tablet 3     blood glucose (ACCU-CHEK FELIPE PLUS) test strip 200 strips by In Vitro route 2 times daily Use to test blood sugar 2 times daily or as directed. 200 strip 4     Cyanocobalamin (B-12) 1000 MCG TABS Take 1,000 mcg by mouth three times a week       diphenhydrAMINE (BENADRYL) 25 MG tablet Take 25 mg by mouth every 6 hours as needed for itching or allergies       empagliflozin (JARDIANCE) 10 MG TABS tablet Take 1 tablet (10 mg) by mouth daily 90 tablet 3     glipiZIDE (GLUCOTROL XL) 10 MG 24 hr tablet TAKE 2 TABLETS(20 MG) BY MOUTH DAILY 60 tablet 3     hydrALAZINE (APRESOLINE) 50 MG tablet Take 1 tablet (50 mg) by mouth 3 times daily 180 tablet 3     isosorbide mononitrate (IMDUR) 30 MG 24 hr tablet Take 1 tablet (30 mg) by mouth daily 90 tablet 3     levothyroxine (SYNTHROID/LEVOTHROID) 125 MCG tablet Take 1 tablet (125 mcg) by mouth daily 90 tablet 0     losartan (COZAAR) 100 MG tablet Take 1 tablet (100 mg) by mouth daily 90 tablet 3     mesalamine (APRISO ER) 0.375 g 24 hr capsule Take 4 capsules (1.5 g) by mouth daily 360 capsule 3     pantoprazole (PROTONIX) 40 MG EC tablet Take 1 tablet (40 mg) by mouth daily 90 tablet 3     senna (SENOKOT) 8.6 MG tablet Take 1 tablet by mouth daily as needed for  constipation 30 tablet 3     torsemide (DEMADEX) 10 MG tablet Take 1 tablet (10 mg) by mouth every other day 40 tablet 1     dulaglutide (TRULICITY) 1.5 MG/0.5ML pen Inject 1.5 mg Subcutaneous every 7 days After 4 doses of 0.75 mg (Patient not taking: Reported on 2024) 2 mL 1        ALLERGIES:     Allergies   Allergen Reactions     Actos [Pioglitazone]      Fluid retention     Amlodipine      Leg swelling, hand swelling     Doxycycline Hives and Rash     Edema in hands/feet       Keflex [Cephalexin Hcl] Hives     Swelling hands/feet       Metoprolol Other (See Comments)     Swelling of lower legs and fatigue     Synthroid [Levothyroxine Sodium] Swelling     Allergic to brand name, hives all over body, edema all over body       Tylenol Hives     Hives/rash all over body     Ziac [Bisoprolol-Hydrochlorothiazide] Other (See Comments), Hives and Itching     Hands/feet swell up, hives all over body       Animal Dander Other (See Comments)     Sneezing, itchy nose     Grass Other (See Comments)     Itchy nose/eyes, sneezing, coughing       Tetracycline Hives and Itching     Swelling hands/feet       Dust Mites Other (See Comments)     Itchy eyes, sneezing     Other [Seasonal Allergies] Other (See Comments)     Pollen coughing/sneezing        FAMILY HISTORY:     Family History   Problem Relation Age of Onset     Cerebrovascular Disease Mother      Cancer Father         bladder     Diverticulitis Brother      Diverticulitis Brother      Kidney Disease No family hx of      Crohn's Disease No family hx of      Ulcerative Colitis No family hx of      Stomach Cancer No family hx of      GERD No family hx of      Celiac Disease No family hx of      Anesthesia Reaction No family hx of         SOCIAL HISTORY:     Social History     Socioeconomic History     Marital status:      Spouse name: Raymundo; dementia;       Number of children: 3     Years of education: None     Highest education level: None   Occupational  History     Employer: [employer]   Tobacco Use     Smoking status: Never     Passive exposure: Never     Smokeless tobacco: Never   Vaping Use     Vaping status: Never Used   Substance and Sexual Activity     Alcohol use: Not Currently     Alcohol/week: 2.0 - 4.0 standard drinks of alcohol     Types: 1 - 2 Cans of beer, 1 - 2 Shots of liquor per week     Comment: occasional cocktail or beer     Drug use: No     Sexual activity: Not Currently   Other Topics Concern      Service No     Blood Transfusions No     Caffeine Concern No     Occupational Exposure No     Hobby Hazards No     Sleep Concern No     Stress Concern No     Weight Concern No     Special Diet No     Back Care No     Exercise Yes     Comment: off and on     Bike Helmet No     Seat Belt Yes     Self-Exams No   Social History Narrative    Laid off her job 8/14     Social Determinants of Health     Financial Resource Strain: Low Risk  (9/4/2024)    Financial Resource Strain      Within the past 12 months, have you or your family members you live with been unable to get utilities (heat, electricity) when it was really needed?: No   Food Insecurity: Low Risk  (9/4/2024)    Food Insecurity      Within the past 12 months, did you worry that your food would run out before you got money to buy more?: No      Within the past 12 months, did the food you bought just not last and you didn t have money to get more?: No   Transportation Needs: High Risk (9/4/2024)    Transportation Needs      Within the past 12 months, has lack of transportation kept you from medical appointments, getting your medicines, non-medical meetings or appointments, work, or from getting things that you need?: Yes   Interpersonal Safety: Low Risk  (9/4/2024)    Interpersonal Safety      Do you feel physically and emotionally safe where you currently live?: Yes      Within the past 12 months, have you been hit, slapped, kicked or otherwise physically hurt by someone?: No       Within the past 12 months, have you been humiliated or emotionally abused in other ways by your partner or ex-partner?: No   Housing Stability: Low Risk  (9/4/2024)    Housing Stability      Do you have housing? : Yes      Are you worried about losing your housing?: No        REVIEW OF SYSTEMS:     Constitutional: No fevers or chills  Skin: No new rash or itching  Eyes: No acute change in vision  Ears/Nose/Throat: No purulent rhinorrhea, new hearing loss, or new vertigo  Respiratory: No cough or hemoptysis  Cardiovascular: See HPI  Gastrointestinal: No change in appetite, vomiting, hematemesis or diarrhea  Genitourinary: No dysuria or hematuria  Musculoskeletal: No new back pain, neck pain or muscle pain  Neurologic: No new headaches, focal weakness or behavior changes  Psychiatric: No hallucinations, excessive alcohol consumption or illegal drug usage  Hematologic/Lymphatic/Immunologic: No bleeding, chills, fever, night sweats or weight loss  Endocrine: No new cold intolerance, heat intolerance, polyphagia, polydipsia or polyuria      PHYSICAL EXAMINATION:     BP (!) 173/73 (BP Location: Right arm, Patient Position: Chair, Cuff Size: Adult Regular)   Pulse 74   Wt 59.9 kg (132 lb)   LMP  (LMP Unknown)   SpO2 94%   BMI 23.38 kg/m      GENERAL: No acute distress.  HEENT: EOMI. Sclerae white, not injected. Nares clear. Pharynx without erythema or exudate.   Neck: No adenopathy. No thyromegaly. No jugular venous distension.   Heart: Regular rate and rhythm. No murmur.   Lungs: Clear to auscultation. No ronchi, wheezes, rales.   Abdomen: Soft, nontender, nondistended. Bowel sounds present.  Extremities: No clubbing, cyanosis, or edema.   Neurologic: Alert and oriented to person/place/time, normal speech and affect. No focal deficits.  Skin: No petechiae, purpura or rash.     LABORATORY DATA:     LIPID RESULTS:  Lab Results   Component Value Date    CHOL 96 07/08/2024    CHOL 93 10/28/2020    HDL 34 (L) 07/08/2024     HDL 38 (L) 10/28/2020    LDL 41 07/08/2024    LDL 33 10/28/2020    TRIG 107 07/08/2024    TRIG 108 10/28/2020    CHOLHDLRATIO 4.3 05/18/2015       LIVER ENZYME RESULTS:  Lab Results   Component Value Date    AST 25 07/29/2024    AST 19 10/28/2020    ALT <5 07/29/2024    ALT 18 10/28/2020       CBC RESULTS:  Lab Results   Component Value Date    WBC 7.8 09/05/2024    WBC 7.3 10/28/2020    RBC 3.29 (L) 09/05/2024    RBC 3.99 10/28/2020    HGB 10.6 (L) 09/05/2024    HGB 11.8 02/08/2021    HCT 33.2 (L) 09/05/2024    HCT 38.2 10/28/2020     (H) 09/05/2024    MCV 96 10/28/2020    MCH 32.2 09/05/2024    MCH 30.8 10/28/2020    MCHC 31.9 09/05/2024    MCHC 32.2 10/28/2020    RDW 14.1 09/05/2024    RDW 13.9 10/28/2020     09/05/2024     10/28/2020       BMP RESULTS:  Lab Results   Component Value Date     (L) 09/05/2024     04/28/2021    POTASSIUM 4.5 09/05/2024    POTASSIUM 4.3 09/17/2023    POTASSIUM 4.5 04/28/2021    CHLORIDE 104 09/05/2024    CHLORIDE 109 04/27/2023    CHLORIDE 100 04/28/2021    CO2 20 (L) 09/05/2024    CO2 21 04/27/2023    CO2 26 04/28/2021    ANIONGAP 9 09/05/2024    ANIONGAP 7 04/27/2023    ANIONGAP 8 04/28/2021    GLC 85 09/05/2024     (H) 09/04/2024     (H) 04/27/2023    GLC 57 (L) 04/28/2021    BUN 24.1 (H) 09/05/2024    BUN 28 04/27/2023    BUN 23 04/28/2021    CR 1.64 (H) 09/05/2024    CR 1.21 (H) 04/28/2021    GFRESTIMATED 33 (L) 09/05/2024    GFRESTIMATED 30 (L) 12/30/2022    GFRESTIMATED 46 (L) 04/28/2021    GFRESTBLACK 53 (L) 04/28/2021    ABEL 8.9 09/05/2024    ABEL 9.8 04/28/2021        A1C RESULTS:  Lab Results   Component Value Date    A1C 7.2 (H) 07/29/2024    A1C 6.6 (H) 10/28/2020       INR RESULTS:  Lab Results   Component Value Date    INR 1.11 08/26/2024    INR 1.14 07/29/2024          PROCEDURES & FURTHER ASSESSMENTS:   Echocardiogram 9/5/24:  Interpretation Summary  s/p TAVR with 23 mm Ramsay Trini 3 Ultra Resilia prosthesis on  9/4/24. Valve  is well seated with normal doppler assessment (PV 1.6m/s, MG 5mmHg).     Left ventricular function is normal.The ejection fraction is 55-60%.  Moderate to severe right ventricular dilation. Global right ventricular  function is moderately reduced.  Moderate tricuspid insufficiency.  Severe pulmonary hypertension.  Estimated pulmonary artery systolic pressure is 85 mmHg.  Dilation of the inferior vena cava is present with abnormal respiratory  variation in diameter.  No pericardial effusion.  ______________________________________________________________________________  Left Ventricle  Left ventricular function is normal.The ejection fraction is 55-60%.     Right Ventricle  Moderate to severe right ventricular dilation is present. Global right  ventricular function is moderately reduced.     Mitral Valve  Moderate mitral annular calcification is present. Trace mitral insufficiency  is present.     Aortic Valve  Transcutaneous aortic valve replacement is present. The prosthetic aortic  valve is well-seated. There is no paravalvular regurgitation. There is no  valvular regurgitation. Doppler interrogation of the aortic valve is normal.  The peak velocity across the aortic valve is 1.6 m/sec. The mean gradient is 5  mmHg. The V2/V1 ratio is 0.71.     Tricuspid Valve  Moderate tricuspid insufficiency is present. Estimated pulmonary artery  systolic pressure is 70 mmHg plus right atrial pressure. Severe pulmonary  hypertension is present.     Vessels  Dilation of the inferior vena cava is present with abnormal respiratory  variation in diameter.     Pericardium  No pericardial effusion is present.     Compared to Previous Study  This study was compared with the study from 9/4/24 .     ______________________________________________________________________________  MMode/2D Measurements & Calculations  LVOT diam: 2.1 cm  LVOT area: 3.3 cm2     Doppler Measurements & Calculations  Ao V2 max: 163.0 cm/sec  Ao  max PG: 10.6 mmHg  Ao V2 mean: 102.0 cm/sec  Ao mean P.0 mmHg  Ao V2 VTI: 36.4 cm  IVON(I,D): 2.3 cm2  IVON(V,D): 2.4 cm2  Ao acc time: 0.08 sec     LV V1 max P.4 mmHg  LV V1 max: 116.0 cm/sec  LV V1 VTI: 25.1 cm  SV(LVOT): 83.6 ml  SI(LVOT): 50.9 ml/m2  TR max michael: 417.8 cm/sec  TR max P.8 mmHg  AV Michael Ratio (DI): 0.71  IVON Index (cm2/m2): 1.4     TAVR 24:  1. Lifestyle-limiting severe aortic stenosis.   2. Successful transfemoral transcatheter aortic valve replacement with a 23 mm Ramsay Trini 3 Ultra Resilia valve.  3. Temporary pacing   4. Left heart catheterization with LVEDP of 5 mmHg prior to valve deployment.  5. Left common femoral arteriotomy successfully closed with Perclose closure devices. Manual pressure was used to obtain hemostasis of the right sided arteriotomy and venotomy.      Other imaging studies:         NYHA Class: I    Amyloid screening: no     CLINICAL IMPRESSION:   Keerthi Montoya is a very pleasant 74 year old female who presents for 1 week TAVR follow-up. She has a history of  HFpEF, HTN, T2DM, HLD, CKD3, pulmonary hypertension (WHO II), RV failure, persistent atrial fibrillation with intolerance to anticoagulation due to GI bleeding s/p RY closure and Watchman (2023) and severe paradoxical low flow low gradient severe aortic stenosis s/p TAVR  with a 23 mm Ramsay Trini 3 Ultra Resilia.  The TAVR and post-procedural course were notable for no complications. Her POD#1 ECHO showed mean PG 5 mmHg, no mention of severe PVL. She was noted to be SB at baseline, with intermittent junctioncal intra procedure. She was discharged on ASA therapy. Atenolol held on discharge due to low HR. She is feeling well today. No concerns. Groin sites are healing well. Continues to wear cardiac monitor.     Plan Summary:  1) Aspirin 81 mg daily lifelong  2) Continue cardiac rehab   3) Lifelong antibiotic prophylaxis prior to all dental procedures.  4) RTC 1 month       Dominique Quentin  APRN, CNP  [facility] Cardiology Team      CC  Patient Care Team:  Bunny Meyers MD as PCP - General (Internal Medicine)  Preeti James MD as MD (Gastroenterology)  Swati Minor DO as Assigned Nephrology Provider  Jerry Robbins PA-C as Physician Assistant (Gastroenterology)  Bunny Meyers MD as Assigned PCP  Bunny Santa, [facility] as Assigned MTM Pharmacist  Allison Hdz, RN as Specialty Care Coordinator (Gastroenterology)  Gianfranco Melendez MD as Intern (Student in organized health care education/training program)  Jethro Moore MD as MD (Hematology & Oncology)  Fracisco Hurtado, RN as Specialty Care Coordinator (Hematology & Oncology)  Vanessa Schneider RN as Diabetes Educator (Diabetes Education)  Preeti James MD as Assigned Surgical Provider  Saba Lemon MD as MD (Advanced Heart Failure and Transplant Cardiology)  Sheela Galeas, VERONIQUE as Specialty Care Coordinator (Cardiology)  Francisca Mustafa, APRN CNP as Assigned Cancer Care Provider  Geena Ornelas MD as MD (Dermatology)  Lorena Ambriz, RN as Specialty Care Coordinator (Cardiology)  Davie Rapp MD as MD (Gastroenterology)  Carolyn Bonilla APRN CNP as Assigned Heart and Vascular Provider  Zuleika Glover, RN as Specialty Care Coordinator (Cardiology)  Christal Barker, RN as Specialty Care Coordinator (Cardiology)  Stacy Stokes, RN as Specialty Care Coordinator (Cardiology)  Jonny Harper, VERONIQUE as Specialty Care Coordinator  Angeline Marcus MD as MD (Cardiovascular Disease)  Kami Lim PA-C as Assigned Gastroenterology Provider  LINDSAY BROCK        Please do not hesitate to contact me if you have any questions/concerns.     Sincerely,     Lindsay Brock NP

## 2024-09-13 NOTE — PROGRESS NOTES
[facility] HEART [facility]  CARDIOVASCULAR DIVISION    VALVE CLINIC RETURN VISIT    PRIMARY CARDIOLOGIST: Dr. Sanderson       PERTINENT CLINICAL HISTORY:     Keerthi Montoya is a very pleasant 74 year old female who presents for 1 week TAVR follow-up. She has a history of  HFpEF, HTN, T2DM, HLD, CKD3, pulmonary hypertension (WHO II), RV failure, persistent atrial fibrillation with intolerance to anticoagulation due to GI bleeding s/p RY closure and Watchman (5/2023) and severe paradoxical low flow low gradient severe aortic stenosis s/p TAVR 9/4 with a 23 mm Ramsay Trini 3 Ultra Resilia.  The TAVR and post-procedural course were notable for no complications. Her POD#1 ECHO showed mean PG 5 mmHg, no mention of severe PVL. She was noted to be SB at baseline, with intermittent junctioncal intra procedure. She was discharged on ASA therapy. Atenolol held on discharge due to low HR.    She presents today with no specific cardiovascular concerns. She states she has been feeling well since her valve replacement. She is attending cardiac rehab. She denies any chest pain, shortness of breath , orthopnea or PND.  She has not had any pre or aaron syncope. She has not had any palpitations or LE edema. Home BP has been 130-140, Bp at cardiac rehab was 120s yesterday. Groin sites are healing well. Overall she feels great.      PAST MEDICAL HISTORY:     Past Medical History:   Diagnosis Date    Chronic kidney disease     Diabetes mellitus, type 2 (H)     Diverticulosis of colon (without mention of hemorrhage) 10/01/2012    GI bleed     History of blood transfusion     HLD (hyperlipidemia)     Hypertension     Hypothyroidism     MARIA D (iron deficiency anemia)     Persistent atrial fibrillation (H)     Pulmonary hypertension (H)     Ulcerative (chronic) enterocolitis (H)         PAST SURGICAL HISTORY:     Past Surgical History:   Procedure Laterality Date    CHOLECYSTECTOMY  3/1987    COLON SURGERY  01/2001    Left colon resection;  diverticulitis    COLONOSCOPY N/A 4/24/2017    Procedure: COMBINED COLONOSCOPY, SINGLE OR MULTIPLE BIOPSY/POLYPECTOMY BY BIOPSY;;  Surgeon: Radha Salguero MD;  Location: [facility] OR    COLONOSCOPY N/A 3/10/2023    Procedure: COLONOSCOPY;  Surgeon: Preeti James MD;  Location: [facility] GI    COLONOSCOPY WITH CO2 INSUFFLATION N/A 4/24/2017    Procedure: COLONOSCOPY WITH CO2 INSUFFLATION;  Colonoscopy Dx rectal bleeding, BMI 25.86, Pharm [facility] .[phone], ;  Surgeon: Radha Salguero MD;  Location: [facility] OR    CV CORONARY ANGIOGRAM N/A 7/15/2024    Procedure: Coronary Angiogram with possible intervention;  Surgeon: Bobby Grimes MD;  Location: [facility] CARDIAC CATH LAB    CV LEFT ATRIAL APPENDAGE CLOSURE N/A 5/11/2023    Procedure: Left Atrial Appendage Closure;  Surgeon: Bobby Grimes MD;  Location: [facility] CARDIAC CATH LAB    CV RIGHT HEART CATH MEASUREMENTS RECORDED N/A 3/16/2020    Procedure: CV RIGHT HEART CATH;  Surgeon: Nader Muñoz MD;  Location: [facility] C[facility] CATH LAB    CV RIGHT HEART CATH MEASUREMENTS RECORDED N/A 7/15/2024    Procedure: Right Heart Cath;  Surgeon: Bobby Grimes MD;  [facility]    CV TRANSCATHETER AORTIC VALVE REPLACEMENT-FEMORAL APPROACH N/A 9/4/2024    Procedure: Transcatheter Aortic Valve Replacement-Femoral Approach;  Surgeon: Bobby Grimes MD;  Location: [facility]    ESOPHAGOSCOPY, GASTROSCOPY, DUODENOSCOPY (EGD), COMBINED N/A 1/23/2023    Procedure: ESOPHAGOGASTRODUODENOSCOPY, WITH BIOPSY;  Surgeon: Ricki Deal MD;  Location: [facility]    ESOPHAGOSCOPY, GASTROSCOPY, DUODENOSCOPY (EGD), COMBINED N/A 3/10/2023    Procedure: Esophagoscopy, gastroscopy, duodenoscopy (EGD), combined;  Surgeon: Preeti James MD;  Location: [facility]    GYN SURGERY  9/1983    tubal ligation    HERNIA REPAIR  12/2006    recurrent incisional hernia    SIGMOIDOSCOPY FLEXIBLE N/A 1/23/2023    Procedure: Sigmoidoscopy flexible;  Surgeon:  Ricki Deal MD;  Location:  GI    THROAT SURGERY  12/1974    thyroidectomy        CURRENT MEDICATIONS:     Current Outpatient Medications   Medication Sig Dispense Refill    aspirin 81 MG EC tablet Take 1 tablet (81 mg) by mouth daily 90 tablet 3    atorvastatin (LIPITOR) 40 MG tablet Take 1 tablet (40 mg) by mouth daily 90 tablet 3    blood glucose (ACCU-CHEK FELIPE PLUS) test strip 200 strips by In Vitro route 2 times daily Use to test blood sugar 2 times daily or as directed. 200 strip 4    Cyanocobalamin (B-12) 1000 MCG TABS Take 1,000 mcg by mouth three times a week      diphenhydrAMINE (BENADRYL) 25 MG tablet Take 25 mg by mouth every 6 hours as needed for itching or allergies      empagliflozin (JARDIANCE) 10 MG TABS tablet Take 1 tablet (10 mg) by mouth daily 90 tablet 3    glipiZIDE (GLUCOTROL XL) 10 MG 24 hr tablet TAKE 2 TABLETS(20 MG) BY MOUTH DAILY 60 tablet 3    hydrALAZINE (APRESOLINE) 50 MG tablet Take 1 tablet (50 mg) by mouth 3 times daily 180 tablet 3    isosorbide mononitrate (IMDUR) 30 MG 24 hr tablet Take 1 tablet (30 mg) by mouth daily 90 tablet 3    levothyroxine (SYNTHROID/LEVOTHROID) 125 MCG tablet Take 1 tablet (125 mcg) by mouth daily 90 tablet 0    losartan (COZAAR) 100 MG tablet Take 1 tablet (100 mg) by mouth daily 90 tablet 3    mesalamine (APRISO ER) 0.375 g 24 hr capsule Take 4 capsules (1.5 g) by mouth daily 360 capsule 3    pantoprazole (PROTONIX) 40 MG EC tablet Take 1 tablet (40 mg) by mouth daily 90 tablet 3    senna (SENOKOT) 8.6 MG tablet Take 1 tablet by mouth daily as needed for constipation 30 tablet 3    torsemide (DEMADEX) 10 MG tablet Take 1 tablet (10 mg) by mouth every other day 40 tablet 1    dulaglutide (TRULICITY) 1.5 MG/0.5ML pen Inject 1.5 mg Subcutaneous every 7 days After 4 doses of 0.75 mg (Patient not taking: Reported on 9/13/2024) 2 mL 1        ALLERGIES:     Allergies   Allergen Reactions    Actos [Pioglitazone]      Fluid retention    Amlodipine       Leg swelling, hand swelling    Doxycycline Hives and Rash     Edema in hands/feet      Keflex [Cephalexin Hcl] Hives     Swelling hands/feet      Metoprolol Other (See Comments)     Swelling of lower legs and fatigue    Synthroid [Levothyroxine Sodium] Swelling     Allergic to brand name, hives all over body, edema all over body      Tylenol Hives     Hives/rash all over body    Ziac [Bisoprolol-Hydrochlorothiazide] Other (See Comments), Hives and Itching     Hands/feet swell up, hives all over body      Animal Dander Other (See Comments)     Sneezing, itchy nose    Grass Other (See Comments)     Itchy nose/eyes, sneezing, coughing      Tetracycline Hives and Itching     Swelling hands/feet      Dust Mites Other (See Comments)     Itchy eyes, sneezing    Other [Seasonal Allergies] Other (See Comments)     Pollen coughing/sneezing        FAMILY HISTORY:     Family History   Problem Relation Age of Onset    Cerebrovascular Disease Mother     Cancer Father         bladder    Diverticulitis Brother     Diverticulitis Brother     Kidney Disease No family hx of     Crohn's Disease No family hx of     Ulcerative Colitis No family hx of     Stomach Cancer No family hx of     GERD No family hx of     Celiac Disease No family hx of     Anesthesia Reaction No family hx of         SOCIAL HISTORY:     Social History     Socioeconomic History    Marital status:      Spouse name: Raymundo; dementia;  2016    Number of children: 3    Years of education: None    Highest education level: None   Occupational History     Employer: [employer]   Tobacco Use    Smoking status: Never     Passive exposure: Never    Smokeless tobacco: Never   Vaping Use    Vaping status: Never Used   Substance and Sexual Activity    Alcohol use: Not Currently     Alcohol/week: 2.0 - 4.0 standard drinks of alcohol     Types: 1 - 2 Cans of beer, 1 - 2 Shots of liquor per week     Comment: occasional cocktail or beer    Drug use: No    Sexual  activity: Not Currently   Other Topics Concern     Service No    Blood Transfusions No    Caffeine Concern No    Occupational Exposure No    Hobby Hazards No    Sleep Concern No    Stress Concern No    Weight Concern No    Special Diet No    Back Care No    Exercise Yes     Comment: off and on    Bike Helmet No    Seat Belt Yes    Self-Exams No   Social History Narrative    Laid off her job 8/14     Social Determinants of Health     Financial Resource Strain: Low Risk  (9/4/2024)    Financial Resource Strain     Within the past 12 months, have you or your family members you live with been unable to get utilities (heat, electricity) when it was really needed?: No   Food Insecurity: Low Risk  (9/4/2024)    Food Insecurity     Within the past 12 months, did you worry that your food would run out before you got money to buy more?: No     Within the past 12 months, did the food you bought just not last and you didn t have money to get more?: No   Transportation Needs: High Risk (9/4/2024)    Transportation Needs     Within the past 12 months, has lack of transportation kept you from medical appointments, getting your medicines, non-medical meetings or appointments, work, or from getting things that you need?: Yes   Interpersonal Safety: Low Risk  (9/4/2024)    Interpersonal Safety     Do you feel physically and emotionally safe where you currently live?: Yes     Within the past 12 months, have you been hit, slapped, kicked or otherwise physically hurt by someone?: No     Within the past 12 months, have you been humiliated or emotionally abused in other ways by your partner or ex-partner?: No   Housing Stability: Low Risk  (9/4/2024)    Housing Stability     Do you have housing? : Yes     Are you worried about losing your housing?: No        REVIEW OF SYSTEMS:     Constitutional: No fevers or chills  Skin: No new rash or itching  Eyes: No acute change in vision  Ears/Nose/Throat: No purulent rhinorrhea, new  hearing loss, or new vertigo  Respiratory: No cough or hemoptysis  Cardiovascular: See HPI  Gastrointestinal: No change in appetite, vomiting, hematemesis or diarrhea  Genitourinary: No dysuria or hematuria  Musculoskeletal: No new back pain, neck pain or muscle pain  Neurologic: No new headaches, focal weakness or behavior changes  Psychiatric: No hallucinations, excessive alcohol consumption or illegal drug usage  Hematologic/Lymphatic/Immunologic: No bleeding, chills, fever, night sweats or weight loss  Endocrine: No new cold intolerance, heat intolerance, polyphagia, polydipsia or polyuria      PHYSICAL EXAMINATION:     BP (!) 173/73 (BP Location: Right arm, Patient Position: Chair, Cuff Size: Adult Regular)   Pulse 74   Wt 59.9 kg (132 lb)   LMP  (LMP Unknown)   SpO2 94%   BMI 23.38 kg/m      GENERAL: No acute distress.  HEENT: EOMI. Sclerae white, not injected. Nares clear. Pharynx without erythema or exudate.   Neck: No adenopathy. No thyromegaly. No jugular venous distension.   Heart: Regular rate and rhythm. No murmur.   Lungs: Clear to auscultation. No ronchi, wheezes, rales.   Abdomen: Soft, nontender, nondistended. Bowel sounds present.  Extremities: No clubbing, cyanosis, or edema.   Neurologic: Alert and oriented to person/place/time, normal speech and affect. No focal deficits.  Skin: No petechiae, purpura or rash.     LABORATORY DATA:     LIPID RESULTS:  Lab Results   Component Value Date    CHOL 96 07/08/2024    CHOL 93 10/28/2020    HDL 34 (L) 07/08/2024    HDL 38 (L) 10/28/2020    LDL 41 07/08/2024    LDL 33 10/28/2020    TRIG 107 07/08/2024    TRIG 108 10/28/2020    CHOLHDLRATIO 4.3 05/18/2015       LIVER ENZYME RESULTS:  Lab Results   Component Value Date    AST 25 07/29/2024    AST 19 10/28/2020    ALT <5 07/29/2024    ALT 18 10/28/2020       CBC RESULTS:  Lab Results   Component Value Date    WBC 7.8 09/05/2024    WBC 7.3 10/28/2020    RBC 3.29 (L) 09/05/2024    RBC 3.99 10/28/2020    HGB  10.6 (L) 09/05/2024    HGB 11.8 02/08/2021    HCT 33.2 (L) 09/05/2024    HCT 38.2 10/28/2020     (H) 09/05/2024    MCV 96 10/28/2020    MCH 32.2 09/05/2024    MCH 30.8 10/28/2020    MCHC 31.9 09/05/2024    MCHC 32.2 10/28/2020    RDW 14.1 09/05/2024    RDW 13.9 10/28/2020     09/05/2024     10/28/2020       BMP RESULTS:  Lab Results   Component Value Date     (L) 09/05/2024     04/28/2021    POTASSIUM 4.5 09/05/2024    POTASSIUM 4.3 09/17/2023    POTASSIUM 4.5 04/28/2021    CHLORIDE 104 09/05/2024    CHLORIDE 109 04/27/2023    CHLORIDE 100 04/28/2021    CO2 20 (L) 09/05/2024    CO2 21 04/27/2023    CO2 26 04/28/2021    ANIONGAP 9 09/05/2024    ANIONGAP 7 04/27/2023    ANIONGAP 8 04/28/2021    GLC 85 09/05/2024     (H) 09/04/2024     (H) 04/27/2023    GLC 57 (L) 04/28/2021    BUN 24.1 (H) 09/05/2024    BUN 28 04/27/2023    BUN 23 04/28/2021    CR 1.64 (H) 09/05/2024    CR 1.21 (H) 04/28/2021    GFRESTIMATED 33 (L) 09/05/2024    GFRESTIMATED 30 (L) 12/30/2022    GFRESTIMATED 46 (L) 04/28/2021    GFRESTBLACK 53 (L) 04/28/2021    ABEL 8.9 09/05/2024    AEBL 9.8 04/28/2021        A1C RESULTS:  Lab Results   Component Value Date    A1C 7.2 (H) 07/29/2024    A1C 6.6 (H) 10/28/2020       INR RESULTS:  Lab Results   Component Value Date    INR 1.11 08/26/2024    INR 1.14 07/29/2024          PROCEDURES & FURTHER ASSESSMENTS:   Echocardiogram 9/5/24:  Interpretation Summary  s/p TAVR with 23 mm Ramsay Trini 3 Ultra Resilia prosthesis on 9/4/24. Valve  is well seated with normal doppler assessment (PV 1.6m/s, MG 5mmHg).     Left ventricular function is normal.The ejection fraction is 55-60%.  Moderate to severe right ventricular dilation. Global right ventricular  function is moderately reduced.  Moderate tricuspid insufficiency.  Severe pulmonary hypertension.  Estimated pulmonary artery systolic pressure is 85 mmHg.  Dilation of the inferior vena cava is present with abnormal  respiratory  variation in diameter.  No pericardial effusion.  ______________________________________________________________________________  Left Ventricle  Left ventricular function is normal.The ejection fraction is 55-60%.     Right Ventricle  Moderate to severe right ventricular dilation is present. Global right  ventricular function is moderately reduced.     Mitral Valve  Moderate mitral annular calcification is present. Trace mitral insufficiency  is present.     Aortic Valve  Transcutaneous aortic valve replacement is present. The prosthetic aortic  valve is well-seated. There is no paravalvular regurgitation. There is no  valvular regurgitation. Doppler interrogation of the aortic valve is normal.  The peak velocity across the aortic valve is 1.6 m/sec. The mean gradient is 5  mmHg. The V2/V1 ratio is 0.71.     Tricuspid Valve  Moderate tricuspid insufficiency is present. Estimated pulmonary artery  systolic pressure is 70 mmHg plus right atrial pressure. Severe pulmonary  hypertension is present.     Vessels  Dilation of the inferior vena cava is present with abnormal respiratory  variation in diameter.     Pericardium  No pericardial effusion is present.     Compared to Previous Study  This study was compared with the study from 24 .     ______________________________________________________________________________  MMode/2D Measurements & Calculations  LVOT diam: 2.1 cm  LVOT area: 3.3 cm2     Doppler Measurements & Calculations  Ao V2 max: 163.0 cm/sec  Ao max PG: 10.6 mmHg  Ao V2 mean: 102.0 cm/sec  Ao mean P.0 mmHg  Ao V2 VTI: 36.4 cm  IVON(I,D): 2.3 cm2  IVON(V,D): 2.4 cm2  Ao acc time: 0.08 sec     LV V1 max P.4 mmHg  LV V1 max: 116.0 cm/sec  LV V1 VTI: 25.1 cm  SV(LVOT): 83.6 ml  SI(LVOT): 50.9 ml/m2  TR max michael: 417.8 cm/sec  TR max P.8 mmHg  AV Michael Ratio (DI): 0.71  IVON Index (cm2/m2): 1.4     TAVR 24:  1. Lifestyle-limiting severe aortic stenosis.   2. Successful  transfemoral transcatheter aortic valve replacement with a 23 mm Ramsay Trini 3 Ultra Resilia valve.  3. Temporary pacing   4. Left heart catheterization with LVEDP of 5 mmHg prior to valve deployment.  5. Left common femoral arteriotomy successfully closed with Perclose closure devices. Manual pressure was used to obtain hemostasis of the right sided arteriotomy and venotomy.      Other imaging studies:         NYHA Class: I    Amyloid screening: no     CLINICAL IMPRESSION:   Keerthi Montoya is a very pleasant 74 year old female who presents for 1 week TAVR follow-up. She has a history of  HFpEF, HTN, T2DM, HLD, CKD3, pulmonary hypertension (WHO II), RV failure, persistent atrial fibrillation with intolerance to anticoagulation due to GI bleeding s/p RY closure and Watchman (5/2023) and severe paradoxical low flow low gradient severe aortic stenosis s/p TAVR 9/4 with a 23 mm Ramsay Trini 3 Ultra Resilia.  The TAVR and post-procedural course were notable for no complications. Her POD#1 ECHO showed mean PG 5 mmHg, no mention of severe PVL. She was noted to be SB at baseline, with intermittent junctioncal intra procedure. She was discharged on ASA therapy. Atenolol held on discharge due to low HR. She is feeling well today. No concerns. Groin sites are healing well. Continues to wear cardiac monitor.     Plan Summary:  1) Aspirin 81 mg daily lifelong  2) Continue cardiac rehab   3) Lifelong antibiotic prophylaxis prior to all dental procedures.  4) RTC 1 month       WENDI Marr, CNP  [facility] Cardiology Team      CC  Patient Care Team:  Bunny Meyers MD as PCP - General (Internal Medicine)  Preeti James MD as MD (Gastroenterology)  Swati Minor DO as Assigned Nephrology Provider  Jerry Robbins PA-C as Physician Assistant (Gastroenterology)  Bunny Meyers MD as Assigned PCP  Bunny Santa RPH as Assigned MTM Pharmacist  Allison Hdz RN as Specialty Care Coordinator  (Gastroenterology)  Gianfranco Melendez MD as Intern (Student in organized health care education/training program)  Jethro Moore MD as MD (Hematology & Oncology)  Fracisco Hurtado, RN as Specialty Care Coordinator (Hematology & Oncology)  Vanessa Schneider, RN as Diabetes Educator (Diabetes Education)  Preeti James MD as Assigned Surgical Provider  Saba Lemon MD as MD (Advanced Heart Failure and Transplant Cardiology)  Sheela Galeas, VERONIQUE as Specialty Care Coordinator (Cardiology)  Francisca Mustafa, APRN CNP as Assigned Cancer Care Provider  Geena Ornelas MD as MD (Dermatology)  Lorena Ambriz, RN as Specialty Care Coordinator (Cardiology)  Davie Rapp MD as MD (Gastroenterology)  Carolyn Bonilla, APRN CNP as Assigned Heart and Vascular Provider  Zuleika Glover, RN as Specialty Care Coordinator (Cardiology)  Christal Barker, VERONIQUE as Specialty Care Coordinator (Cardiology)  Stacy Stokes, RN as Specialty Care Coordinator (Cardiology)  Jonny Harper, RN as Specialty Care Coordinator  Angelien Marcus MD as MD (Cardiovascular Disease)  Kami Lim PA-C as Assigned Gastroenterology Provider  LINDSAY BROCK

## 2024-09-13 NOTE — PATIENT INSTRUCTIONS
You were seen today in the Cardiovascular Clinic at the [facility] by:       WENDI ALLRED CNP     Your visit summary and instructions are as follows:    Continue aspirin 81 mg daily lifelong.  Delay any nonurgent dental procedures for the first 6 month post TAVR.  For all future dental cleanings and procedures you will need to take antibiotics prior - see instructions below.   Continue cardiac rehab   Follow-up in Valve Clinic in 1 month with echo, labs and ECG prior     Prevention of Infective (Bacterial) Endocarditis:  You are at increased risk for developing adverse outcomes from infective endocarditis (IE), also known as bacterial endocarditis (BE) because of the new device in your heart. The guidelines for prevention of IE are to give patients antibiotics prior to any dental procedures that involve manipulation of gingival tissue or the periapical region of teeth, or perforation of the oral mucosa:      It is recommended to take Amoxicillin 2 gm by mouth as a single dose 30 to 60 minutes before procedure.     OR if allergic to Penicillin or Ampicillin:     Cephalexin 2 gm by mouth, or  Clindamycin 600 mg by mouth, or  Azithromycin or Clarithromycin 500 mg PO       Thank you for your visit today!      Please MyChart message me or call my nurse if you have any questions or concerns.     During Business Hours:   [phone], Structural Heart  Celia Perdomo     After hours, weekends or holidays:   [phone], Option #4  Ask to speak to the On-Call Cardiologist. Inform them you are a cardiology patient at the [facility].

## 2024-09-16 ENCOUNTER — MYC MEDICAL ADVICE (OUTPATIENT)
Dept: NEPHROLOGY | Facility: CLINIC | Age: 74
End: 2024-09-16

## 2024-09-16 ENCOUNTER — HOSPITAL ENCOUNTER (OUTPATIENT)
Dept: CARDIAC REHAB | Facility: CLINIC | Age: 74
Discharge: HOME | End: 2024-09-16
Attending: THORACIC SURGERY (CARDIOTHORACIC VASCULAR SURGERY)
Payer: MEDICARE

## 2024-09-16 PROCEDURE — 93798 PHYS/QHP OP CAR RHAB W/ECG: CPT

## 2024-09-18 RX ORDER — LEVOTHYROXINE SODIUM 125 UG/1
125 TABLET ORAL DAILY
Qty: 90 TABLET | Refills: 0 | Status: SHIPPED | OUTPATIENT
Start: 2024-09-18

## 2024-09-18 NOTE — TELEPHONE ENCOUNTER
levothyroxine (SYNTHROID/LEVOTHROID) 125 MCG tablet            Last Written Prescription Date:  6/11/24  Last Fill Quantity: 90,   # refills: 0  Last Office Visit : 10/20/23  Future Office visit:  10/18/24  TSH  8/26/24  Refilled per protocol  Lisset ALEMAN RN  [facility]/Red Flag Triage & Med Refill Team

## 2024-09-20 ENCOUNTER — HOSPITAL ENCOUNTER (OUTPATIENT)
Dept: CARDIAC REHAB | Facility: CLINIC | Age: 74
Discharge: HOME | End: 2024-09-20
Attending: THORACIC SURGERY (CARDIOTHORACIC VASCULAR SURGERY)
Payer: MEDICARE

## 2024-09-20 PROCEDURE — 93798 PHYS/QHP OP CAR RHAB W/ECG: CPT

## 2024-09-23 ENCOUNTER — HOSPITAL ENCOUNTER (OUTPATIENT)
Dept: CARDIAC REHAB | Facility: CLINIC | Age: 74
Discharge: HOME | End: 2024-09-23
Attending: THORACIC SURGERY (CARDIOTHORACIC VASCULAR SURGERY)
Payer: MEDICARE

## 2024-09-23 PROCEDURE — 93798 PHYS/QHP OP CAR RHAB W/ECG: CPT

## 2024-09-23 PROCEDURE — 93797 PHYS/QHP OP CAR RHAB WO ECG: CPT | Mod: XU

## 2024-09-27 ENCOUNTER — HOSPITAL ENCOUNTER (OUTPATIENT)
Dept: CARDIAC REHAB | Facility: CLINIC | Age: 74
Discharge: HOME | End: 2024-09-27
Attending: THORACIC SURGERY (CARDIOTHORACIC VASCULAR SURGERY)
Payer: MEDICARE

## 2024-09-27 PROCEDURE — 93798 PHYS/QHP OP CAR RHAB W/ECG: CPT

## 2024-09-30 ENCOUNTER — HOSPITAL ENCOUNTER (OUTPATIENT)
Dept: CARDIAC REHAB | Facility: CLINIC | Age: 74
Discharge: HOME | End: 2024-09-30
Attending: THORACIC SURGERY (CARDIOTHORACIC VASCULAR SURGERY)
Payer: MEDICARE

## 2024-09-30 PROCEDURE — 93798 PHYS/QHP OP CAR RHAB W/ECG: CPT

## 2024-10-04 ENCOUNTER — HOSPITAL ENCOUNTER (OUTPATIENT)
Dept: CARDIAC REHAB | Facility: CLINIC | Age: 74
Discharge: HOME | End: 2024-10-04
Attending: THORACIC SURGERY (CARDIOTHORACIC VASCULAR SURGERY)
Payer: MEDICARE

## 2024-10-04 PROCEDURE — 93798 PHYS/QHP OP CAR RHAB W/ECG: CPT

## 2024-10-07 ENCOUNTER — HOSPITAL ENCOUNTER (OUTPATIENT)
Dept: CARDIAC REHAB | Facility: CLINIC | Age: 74
Discharge: HOME | End: 2024-10-07
Attending: THORACIC SURGERY (CARDIOTHORACIC VASCULAR SURGERY)
Payer: MEDICARE

## 2024-10-07 PROCEDURE — 93798 PHYS/QHP OP CAR RHAB W/ECG: CPT

## 2024-10-11 ENCOUNTER — TELEPHONE (OUTPATIENT)
Dept: CARDIOLOGY | Facility: CLINIC | Age: 74
End: 2024-10-11

## 2024-10-11 ENCOUNTER — HOSPITAL ENCOUNTER (OUTPATIENT)
Dept: CARDIAC REHAB | Facility: CLINIC | Age: 74
Discharge: HOME | End: 2024-10-11
Attending: THORACIC SURGERY (CARDIOTHORACIC VASCULAR SURGERY)
Payer: MEDICARE

## 2024-10-11 ENCOUNTER — LAB (OUTPATIENT)
Dept: LAB | Facility: CLINIC | Age: 74
End: 2024-10-11
Attending: INTERNAL MEDICINE
Payer: MEDICARE

## 2024-10-11 ENCOUNTER — ANCILLARY PROCEDURE (OUTPATIENT)
Dept: CARDIOLOGY | Facility: CLINIC | Age: 74
End: 2024-10-11
Attending: INTERNAL MEDICINE
Payer: MEDICARE

## 2024-10-11 DIAGNOSIS — N18.4 CKD (CHRONIC KIDNEY DISEASE) STAGE 4, GFR 15-29 ML/MIN (H): ICD-10-CM

## 2024-10-11 DIAGNOSIS — I48.19 PERSISTENT ATRIAL FIBRILLATION (H): ICD-10-CM

## 2024-10-11 DIAGNOSIS — D50.9 IRON DEFICIENCY ANEMIA, UNSPECIFIED IRON DEFICIENCY ANEMIA TYPE: ICD-10-CM

## 2024-10-11 DIAGNOSIS — N18.30 STAGE 3 CHRONIC KIDNEY DISEASE, UNSPECIFIED WHETHER STAGE 3A OR 3B CKD (H): ICD-10-CM

## 2024-10-11 DIAGNOSIS — I35.0 SEVERE AORTIC STENOSIS: ICD-10-CM

## 2024-10-11 DIAGNOSIS — E53.8 VITAMIN B12 DEFICIENCY (NON ANEMIC): ICD-10-CM

## 2024-10-11 LAB
ALBUMIN MFR UR ELPH: 9.1 MG/DL
ALBUMIN SERPL BCG-MCNC: 4.5 G/DL (ref 3.5–5.2)
ANION GAP SERPL CALCULATED.3IONS-SCNC: 13 MMOL/L (ref 7–15)
BASOPHILS # BLD AUTO: 0.1 10E3/UL (ref 0–0.2)
BASOPHILS NFR BLD AUTO: 1 %
BUN SERPL-MCNC: 43.7 MG/DL (ref 8–23)
CALCIUM SERPL-MCNC: 10.2 MG/DL (ref 8.8–10.4)
CHLORIDE SERPL-SCNC: 104 MMOL/L (ref 98–107)
CREAT SERPL-MCNC: 1.42 MG/DL (ref 0.51–0.95)
CREAT UR-MCNC: 37.4 MG/DL
EGFRCR SERPLBLD CKD-EPI 2021: 39 ML/MIN/1.73M2
EOSINOPHIL # BLD AUTO: 0.3 10E3/UL (ref 0–0.7)
EOSINOPHIL NFR BLD AUTO: 4 %
ERYTHROCYTE [DISTWIDTH] IN BLOOD BY AUTOMATED COUNT: 13.1 % (ref 10–15)
FERRITIN SERPL-MCNC: 326 NG/ML (ref 11–328)
GLUCOSE SERPL-MCNC: 159 MG/DL (ref 70–99)
HCO3 SERPL-SCNC: 18 MMOL/L (ref 22–29)
HCT VFR BLD AUTO: 42.1 % (ref 35–47)
HGB BLD-MCNC: 13.8 G/DL (ref 11.7–15.7)
IMM GRANULOCYTES # BLD: 0 10E3/UL
IMM GRANULOCYTES NFR BLD: 0 %
IRON BINDING CAPACITY (ROCHE): 245 UG/DL (ref 240–430)
IRON SATN MFR SERPL: 40 % (ref 15–46)
IRON SERPL-MCNC: 99 UG/DL (ref 37–145)
LVEF ECHO: NORMAL
LYMPHOCYTES # BLD AUTO: 1.3 10E3/UL (ref 0.8–5.3)
LYMPHOCYTES NFR BLD AUTO: 17 %
MCH RBC QN AUTO: 32.2 PG (ref 26.5–33)
MCHC RBC AUTO-ENTMCNC: 32.8 G/DL (ref 31.5–36.5)
MCV RBC AUTO: 98 FL (ref 78–100)
MONOCYTES # BLD AUTO: 1 10E3/UL (ref 0–1.3)
MONOCYTES NFR BLD AUTO: 12 %
NEUTROPHILS # BLD AUTO: 5 10E3/UL (ref 1.6–8.3)
NEUTROPHILS NFR BLD AUTO: 65 %
NRBC # BLD AUTO: 0 10E3/UL
NRBC BLD AUTO-RTO: 0 /100
PHOSPHATE SERPL-MCNC: 4.8 MG/DL (ref 2.5–4.5)
PLATELET # BLD AUTO: 204 10E3/UL (ref 150–450)
POTASSIUM SERPL-SCNC: 4.8 MMOL/L (ref 3.4–5.3)
PROT/CREAT 24H UR: 0.24 MG/MG CR (ref 0–0.2)
RBC # BLD AUTO: 4.28 10E6/UL (ref 3.8–5.2)
SODIUM SERPL-SCNC: 135 MMOL/L (ref 135–145)
WBC # BLD AUTO: 7.7 10E3/UL (ref 4–11)

## 2024-10-11 PROCEDURE — 83540 ASSAY OF IRON: CPT | Performed by: PATHOLOGY

## 2024-10-11 PROCEDURE — 80069 RENAL FUNCTION PANEL: CPT | Performed by: PATHOLOGY

## 2024-10-11 PROCEDURE — 82728 ASSAY OF FERRITIN: CPT | Performed by: PATHOLOGY

## 2024-10-11 PROCEDURE — 93306 TTE W/DOPPLER COMPLETE: CPT | Mod: GC | Performed by: INTERNAL MEDICINE

## 2024-10-11 PROCEDURE — 84156 ASSAY OF PROTEIN URINE: CPT | Performed by: PATHOLOGY

## 2024-10-11 PROCEDURE — 85025 COMPLETE CBC W/AUTO DIFF WBC: CPT | Performed by: PATHOLOGY

## 2024-10-11 PROCEDURE — 93798 PHYS/QHP OP CAR RHAB W/ECG: CPT

## 2024-10-11 PROCEDURE — 83550 IRON BINDING TEST: CPT | Performed by: PATHOLOGY

## 2024-10-11 PROCEDURE — 36415 COLL VENOUS BLD VENIPUNCTURE: CPT | Performed by: PATHOLOGY

## 2024-10-11 NOTE — TELEPHONE ENCOUNTER
10/11/2024 10:33AM Carievaldez Howard  Pt came to pod 3N and requested we call her back to reschedule the following:     Appointment type: Return TAVR  Provider: Dominique Saavedra NP  Return date: Next available that works for pt  Specialty phone number: [phone] option 1  Additional appointment(s) needed: NA  Additonal Notes: 10/11 Pt stopped by pod 3N to reschedule Return TAVR w/ Dominique Saavedra on 10/17. Let pt know cardiology schedulers cannot reschedule and will have Celia Perdomo give her a call to schedule. Sent message to Celia to reschedule. ASHLEE Howard 10/11/2024 10:33AM

## 2024-10-14 ENCOUNTER — HOSPITAL ENCOUNTER (OUTPATIENT)
Dept: CARDIAC REHAB | Facility: CLINIC | Age: 74
Discharge: HOME | End: 2024-10-14
Attending: THORACIC SURGERY (CARDIOTHORACIC VASCULAR SURGERY)
Payer: MEDICARE

## 2024-10-14 ENCOUNTER — MYC REFILL (OUTPATIENT)
Dept: GASTROENTEROLOGY | Facility: CLINIC | Age: 74
End: 2024-10-14
Payer: MEDICARE

## 2024-10-14 DIAGNOSIS — K92.2 GASTROINTESTINAL HEMORRHAGE, UNSPECIFIED GASTROINTESTINAL HEMORRHAGE TYPE: ICD-10-CM

## 2024-10-14 PROCEDURE — 93798 PHYS/QHP OP CAR RHAB W/ECG: CPT

## 2024-10-15 SDOH — HEALTH STABILITY: PHYSICAL HEALTH: ON AVERAGE, HOW MANY MINUTES DO YOU ENGAGE IN EXERCISE AT THIS LEVEL?: 30 MIN

## 2024-10-15 SDOH — HEALTH STABILITY: PHYSICAL HEALTH: ON AVERAGE, HOW MANY DAYS PER WEEK DO YOU ENGAGE IN MODERATE TO STRENUOUS EXERCISE (LIKE A BRISK WALK)?: 3 DAYS

## 2024-10-15 ASSESSMENT — SOCIAL DETERMINANTS OF HEALTH (SDOH): HOW OFTEN DO YOU GET TOGETHER WITH FRIENDS OR RELATIVES?: MORE THAN THREE TIMES A WEEK

## 2024-10-16 ENCOUNTER — OFFICE VISIT (OUTPATIENT)
Dept: INTERNAL MEDICINE | Facility: CLINIC | Age: 74
End: 2024-10-16
Payer: MEDICARE

## 2024-10-16 VITALS
SYSTOLIC BLOOD PRESSURE: 128 MMHG | HEART RATE: 63 BPM | HEIGHT: 63 IN | WEIGHT: 132.3 LBS | OXYGEN SATURATION: 98 % | DIASTOLIC BLOOD PRESSURE: 63 MMHG | BODY MASS INDEX: 23.44 KG/M2

## 2024-10-16 DIAGNOSIS — Z23 NEED FOR INFLUENZA VACCINATION: ICD-10-CM

## 2024-10-16 DIAGNOSIS — E11.29 TYPE 2 DIABETES MELLITUS WITH MICROALBUMINURIA, WITHOUT LONG-TERM CURRENT USE OF INSULIN (H): ICD-10-CM

## 2024-10-16 DIAGNOSIS — R80.9 TYPE 2 DIABETES MELLITUS WITH MICROALBUMINURIA, WITHOUT LONG-TERM CURRENT USE OF INSULIN (H): ICD-10-CM

## 2024-10-16 DIAGNOSIS — Z12.31 VISIT FOR SCREENING MAMMOGRAM: ICD-10-CM

## 2024-10-16 DIAGNOSIS — Z00.00 ENCOUNTER FOR MEDICARE ANNUAL WELLNESS EXAM: Primary | ICD-10-CM

## 2024-10-16 DIAGNOSIS — Z23 NEED FOR COVID-19 VACCINE: ICD-10-CM

## 2024-10-16 DIAGNOSIS — K92.2 GASTROINTESTINAL HEMORRHAGE, UNSPECIFIED GASTROINTESTINAL HEMORRHAGE TYPE: ICD-10-CM

## 2024-10-16 DIAGNOSIS — E89.0 POSTOPERATIVE HYPOTHYROIDISM: ICD-10-CM

## 2024-10-16 DIAGNOSIS — E55.9 VITAMIN D DEFICIENCY: ICD-10-CM

## 2024-10-16 PROCEDURE — 91320 SARSCV2 VAC 30MCG TRS-SUC IM: CPT

## 2024-10-16 PROCEDURE — G0439 PPPS, SUBSEQ VISIT: HCPCS

## 2024-10-16 PROCEDURE — G0008 ADMIN INFLUENZA VIRUS VAC: HCPCS

## 2024-10-16 PROCEDURE — 90662 IIV NO PRSV INCREASED AG IM: CPT

## 2024-10-16 PROCEDURE — 99214 OFFICE O/P EST MOD 30 MIN: CPT | Mod: 25

## 2024-10-16 PROCEDURE — 90480 ADMN SARSCOV2 VAC 1/ONLY CMP: CPT

## 2024-10-16 RX ORDER — LEVOTHYROXINE SODIUM 125 UG/1
125 TABLET ORAL DAILY
Qty: 90 TABLET | Refills: 2 | Status: SHIPPED | OUTPATIENT
Start: 2024-10-16

## 2024-10-16 RX ORDER — PANTOPRAZOLE SODIUM 40 MG/1
40 TABLET, DELAYED RELEASE ORAL DAILY
Qty: 90 TABLET | Refills: 3 | OUTPATIENT
Start: 2024-10-16

## 2024-10-16 RX ORDER — PANTOPRAZOLE SODIUM 40 MG/1
40 TABLET, DELAYED RELEASE ORAL DAILY
Qty: 90 TABLET | Refills: 3 | Status: SHIPPED | OUTPATIENT
Start: 2024-10-16

## 2024-10-16 RX ORDER — GLIPIZIDE 10 MG/1
20 TABLET, FILM COATED, EXTENDED RELEASE ORAL DAILY
Qty: 180 TABLET | Refills: 1 | Status: SHIPPED | OUTPATIENT
Start: 2024-10-16

## 2024-10-16 NOTE — PATIENT INSTRUCTIONS
Consider obtaining tetanus vaccine and RSV vaccine at your local pharmacy.     Patient Education   Preventive Care Advice   This is general advice given by our system to help you stay healthy. However, your care team may have specific advice just for you. Please talk to your care team about your preventive care needs.  Nutrition  Eat 5 or more servings of fruits and vegetables each day.  Try wheat bread, brown rice and whole grain pasta (instead of white bread, rice, and pasta).  Get enough calcium and vitamin D. Check the label on foods and aim for 100% of the RDA (recommended daily allowance).  Lifestyle  Exercise at least 150 minutes each week  (30 minutes a day, 5 days a week).  Do muscle strengthening activities 2 days a week. These help control your weight and prevent disease.  No smoking.  Wear sunscreen to prevent skin cancer.  Have a dental exam and cleaning every 6 months.  Yearly exams  See your health care team every year to talk about:  Any changes in your health.  Any medicines your care team has prescribed.  Preventive care, family planning, and ways to prevent chronic diseases.  Shots (vaccines)   HPV shots (up to age 26), if you've never had them before.  Hepatitis B shots (up to age 59), if you've never had them before.  COVID-19 shot: Get this shot when it's due.  Flu shot: Get a flu shot every year.  Tetanus shot: Get a tetanus shot every 10 years.  Pneumococcal, hepatitis A, and RSV shots: Ask your care team if you need these based on your risk.  Shingles shot (for age 50 and up)  General health tests  Diabetes screening:  Starting at age 35, Get screened for diabetes at least every 3 years.  If you are younger than age 35, ask your care team if you should be screened for diabetes.  Cholesterol test: At age 39, start having a cholesterol test every 5 years, or more often if advised.  Bone density scan (DEXA): At age 50, ask your care team if you should have this scan for osteoporosis (brittle  bones).  Hepatitis C: Get tested at least once in your life.  STIs (sexually transmitted infections)  Before age 24: Ask your care team if you should be screened for STIs.  After age 24: Get screened for STIs if you're at risk. You are at risk for STIs (including HIV) if:  You are sexually active with more than one person.  You don't use condoms every time.  You or a partner was diagnosed with a sexually transmitted infection.  If you are at risk for HIV, ask about PrEP medicine to prevent HIV.  Get tested for HIV at least once in your life, whether you are at risk for HIV or not.  Cancer screening tests  Cervical cancer screening: If you have a cervix, begin getting regular cervical cancer screening tests starting at age 21.  Breast cancer scan (mammogram): If you've ever had breasts, begin having regular mammograms starting at age 40. This is a scan to check for breast cancer.  Colon cancer screening: It is important to start screening for colon cancer at age 45.  Have a colonoscopy test every 10 years (or more often if you're at risk) Or, ask your provider about stool tests like a FIT test every year or Cologuard test every 3 years.  To learn more about your testing options, visit:   .  For help making a decision, visit:   https://bit.ly/bd70168.  Prostate cancer screening test: If you have a prostate, ask your care team if a prostate cancer screening test (PSA) at age 55 is right for you.  Lung cancer screening: If you are a current or former smoker ages 50 to 80, ask your care team if ongoing lung cancer screenings are right for you.  For informational purposes only. Not to replace the advice of your health care provider. Copyright   2023 [employer]. All rights reserved. Clinically reviewed by the [facility] Transitions Program. [employer] 091753 - REV 01/24.

## 2024-10-16 NOTE — PROGRESS NOTES
Preventive Care Visit  [facility]  Jennifer Marr NP, Nurse Practitioner Primary Care  Oct 16, 2024      Assessment & Plan     Encounter for Medicare annual wellness exam  Completed today.     Type 2 diabetes mellitus with microalbuminuria, without long-term current use of insulin (H)  On glipizide, jardiance. She is no longer taking Trulicity and she prefers to avoid use if possible. Reported home blood sugars at goal. Will plan to check A1c with next lab draw in November. If A1c elevated, recommend restarting Trulicity. Last visit with [facility] pharmacy in July, due for follow-up.   - Hemoglobin A1c  - glipiZIDE (GLUCOTROL XL) 10 MG 24 hr tablet  Dispense: 180 tablet; Refill: 1    Postoperative hypothyroidism  TSH 4.0 on 8/26. Levothyroxine reiflled.   - levothyroxine (SYNTHROID/LEVOTHROID) 125 MCG tablet  Dispense: 90 tablet; Refill: 2    Gastrointestinal hemorrhage, unspecified gastrointestinal hemorrhage type  She requests a refill of pantoprazole for GERD. History of GI bleed, no current symptoms. Refilled.   - pantoprazole (PROTONIX) 40 MG EC tablet  Dispense: 90 tablet; Refill: 3    Vitamin D deficiency  History of vitamin D deficiency, no longer on a supplement. Will check lab.   - Vitamin D Deficiency    Visit for screening mammogram  Due for annual mammogram. Ordered.   - MA Screen Bilateral w/Diego    Need for COVID-19 vaccine  Ordered.   - COVID-19 12+ (PFIZER)    Need for influenza vaccination  Ordered.   - INFLUENZA HIGH DOSE, TRIVALENT, PF (FLUZONE)        Counseling  Appropriate preventive services were addressed with this patient via screening, questionnaire, or discussion as appropriate for fall prevention, nutrition, physical activity.  Checklist reviewing preventive services available has been given to the patient.  Reviewed patient's diet, addressing concerns and/or questions.   She is at risk for lack of exercise and has been provided with information to  increase physical activity for the benefit of her well-being.     Return in about 6 months (around 4/16/2025).    Addy Pendleton is a 74 year old, presenting for the following:  Wellness Visit        10/16/2024    10:48 AM   Additional Questions   Roomed by Carisa BOLANOS   Accompanied by N/A       Health Care Directive  Patient does not have a Health Care Directive or Living Will: Discussed advance care planning with patient; however, patient declined at this time.    TEO Montoya presents to the clinic today for an annual physical. She has a history of aortic stenosis s/p TAVR on 9/04/24, CAD, HFpEF, pulmonary hypertension, hypertension, hyperlipidemia, persistent atrial fibrillation (s/p RY closure and Watchman 5/2023), type 2 DM, postoperative hypothyroidism, CKD, anemia, ulcerative colitis, and liver fibrosis.    She reports feeling improved today after the TAVR last month. She has been participating in cardiac rehab on Mondays and Fridays. Reports this is going well for her, feels good with activity. She would like her vitamin D checked. Notes she stopped her supplement a few months ago. She stopped Trulicity prior to procedures this August. She has not restarted it, prefers not to at this time. Reports her blood sugars are typically  in the morning. 111 today.     Angiogram completed 7/15/24 without intervention. Prox LAD to Mid LAD lesion is 20% stenosed. Prox Cx to Mid Cx lesion is 30% stenosed. Prox RCA to Mid RCA lesion is 40% stenosed.    Colonoscopy completed 3/10/23, due for repeat 1-3 years after due to colitis history. Virtual visit completed with Kami Lim on 8/02/24.     Saw Dr. Rapp 7/29/24 for stage 3 liver fibrosis. Plan for yearly liver labs in primary care, hep B immunizations. Vaccines declined today.     LDL 41, HDL 34 on 7/08/24        10/15/2024   General Health   How would you rate your overall physical health? Good   Feel stress (tense, anxious, or unable to  sleep) Not at all            10/15/2024   Nutrition   Diet: Regular (no restrictions)            10/15/2024   Exercise   Days per week of moderate/strenous exercise 3 days   Average minutes spent exercising at this level 30 min            10/15/2024   Social Factors   Frequency of gathering with friends or relatives More than three times a week   Worry food won't last until get money to buy more No   Food not last or not have enough money for food? No   Do you have housing? (Housing is defined as stable permanent housing and does not include staying ouside in a car, in a tent, in an abandoned building, in an overnight shelter, or couch-surfing.) Yes   Are you worried about losing your housing? No   Lack of transportation? No   Unable to get utilities (heat,electricity)? No            10/15/2024   Fall Risk   Fallen 2 or more times in the past year? No   Trouble with walking or balance? No             10/15/2024   Activities of Daily Living- Home Safety   Needs help with the following daily activites None of the above   Safety concerns in the home None of the above            10/15/2024   Dental   Dentist two times every year? Yes            10/15/2024   Hearing Screening   Hearing concerns? None of the above            10/15/2024   Driving Risk Screening   Patient/family members have concerns about driving No            10/15/2024   General Alertness/Fatigue Screening   Have you been more tired than usual lately? No            10/15/2024   Urinary Incontinence Screening   Bothered by leaking urine in past 6 months No            10/15/2024   TB Screening   Were you born outside of the [place]? No              Today's PHQ-2 Score:       10/16/2024    10:56 AM   PHQ-2 ( 1999 Pfizer)   Q1: Little interest or pleasure in doing things 0   Q2: Feeling down, depressed or hopeless 0   PHQ-2 Score 0         10/15/2024   Substance Use   Alcohol more than 3/day or more than 7/wk Not Applicable   Do you have a current opioid  prescription? No   How severe/bad is pain from 1 to 10? 0/10 (No Pain)   Do you use any other substances recreationally? No        Social History     Tobacco Use    Smoking status: Never     Passive exposure: Never    Smokeless tobacco: Never   Vaping Use    Vaping status: Never Used   Substance Use Topics    Alcohol use: Yes     Alcohol/week: 2.0 - 4.0 standard drinks of alcohol     Types: 1 - 2 Cans of beer, 1 - 2 Shots of liquor per week     Comment: Once in a great while, one every few months    Drug use: No           10/7/2022   LAST FHS-7 RESULTS   1st degree relative breast or ovarian cancer No   Any relative bilateral breast cancer No   Any male have breast cancer No   Any ONE woman have BOTH breast AND ovarian cancer No   Any woman with breast cancer before 50yrs No   2 or more relatives with breast AND/OR ovarian cancer No   2 or more relatives with breast AND/OR bowel cancer No           Mammogram Screening - Mammogram every 1-2 years updated in Health Maintenance based on mutual decision making    ASCVD Risk   The ASCVD Risk score (Nixon DK, et al., 2019) failed to calculate for the following reasons:    The valid total cholesterol range is 130 to 320 mg/dL          Reviewed and updated as needed this visit by Provider   Tobacco  Allergies  Meds   Med Hx  Surg Hx  Fam Hx  Soc Hx Sexual   Activity          Current providers sharing in care for this patient include:  Patient Care Team:  Bunny Meyers MD as PCP - General (Internal Medicine)  Preeti James MD as MD (Gastroenterology)  Swati Minor DO as Assigned Nephrology Provider  Jerry Robbins PA-C as Physician Assistant (Gastroenterology)  Bunny Meyers MD as Assigned PCP  Bunny Santa RPH as Assigned MTM Pharmacist  Allison Hdz RN as Specialty Care Coordinator (Gastroenterology)  Gianfranco Melendez MD as Intern (Student in organized health care education/training program)  Jethro Moore MD as MD  (Hematology & Oncology)  Fracisco Hurtado, RN as Specialty Care Coordinator (Hematology & Oncology)  Vanessa Schneider, RN as Diabetes Educator (Diabetes Education)  Preeti James MD as Assigned Surgical Provider  Saba Lemon MD as MD (Advanced Heart Failure and Transplant Cardiology)  Sheela Galeas, VERONIQUE as Specialty Care Coordinator (Cardiology)  Francisca Mustafa, APRN CNP as Assigned Cancer Care Provider  Geena Ornelas MD as MD (Dermatology)  Lorena Ambirz, RN as Specialty Care Coordinator (Cardiology)  Davie Rapp MD as MD (Gastroenterology)  Zuleika Glover, RN as Specialty Care Coordinator (Cardiology)  Christal Barker, VERONIQUE as Specialty Care Coordinator (Cardiology)  Stacy Stokes, RN as Specialty Care Coordinator (Cardiology)  Jonny Harper, VERONIQUE as Specialty Care Coordinator  Angeline Marcus MD as MD (Cardiovascular Disease)  Kami Lim PA-C as Assigned Gastroenterology Provider  Dominique Saavedra NP as Assigned Heart and Vascular Provider  Tracy Yoon [facility] as MTM Pharmacist  Lydia Warren, RN as Registered Nurse (Cardiology)    The following health maintenance items are reviewed in Epic and correct as of today:  Health Maintenance   Topic Date Due    HF ACTION PLAN  Never done    ANNUAL REVIEW OF HM ORDERS  Never done    RSV VACCINE (1 - Risk 60-74 years 1-dose series) Never done    DTAP/TDAP/TD IMMUNIZATION (2 - Td or Tdap) 07/26/2023    COLORECTAL CANCER SCREENING  03/10/2024    MAMMO SCREENING  10/07/2024    COVID-19 Vaccine (7 - 2024-25 season) 12/11/2024    A1C  01/29/2025    EYE EXAM  04/01/2025    BMP  04/11/2025    LIPID  07/08/2025    ALT  07/29/2025    MICROALBUMIN  07/29/2025    CBC  10/11/2025    HEMOGLOBIN  10/11/2025    MEDICARE ANNUAL WELLNESS VISIT  10/16/2025    DIABETIC FOOT EXAM  10/16/2025    FALL RISK ASSESSMENT  10/16/2025    ADVANCE CARE PLANNING  10/16/2029    DEXA  07/13/2033    TSH W/FREE T4 REFLEX  Completed    HEPATITIS C SCREENING   "Completed    PHQ-2 (once per calendar year)  Completed    INFLUENZA VACCINE  Completed    Pneumococcal Vaccine: 65+ Years  Completed    URINALYSIS  Completed    ZOSTER IMMUNIZATION  Completed    HPV IMMUNIZATION  Aged Out    MENINGITIS IMMUNIZATION  Aged Out    RSV MONOCLONAL ANTIBODY  Aged Out       Review of Systems  Constitutional, HEENT, cardiovascular, pulmonary, GI, , musculoskeletal, neuro, skin, endocrine and psych systems are negative, except as otherwise noted.     Objective    Exam  /63 (BP Location: Right arm, Patient Position: Sitting, Cuff Size: Adult Small)   Pulse 63   Ht 1.6 m (5' 2.99\")   Wt 60 kg (132 lb 4.8 oz)   LMP  (LMP Unknown)   SpO2 98%   BMI 23.44 kg/m     Estimated body mass index is 23.44 kg/m  as calculated from the following:    Height as of this encounter: 1.6 m (5' 2.99\").    Weight as of this encounter: 60 kg (132 lb 4.8 oz).    Physical Exam  GENERAL: alert and no distress  EYES: Eyes grossly normal to inspection, PERRL and conjunctivae and sclerae normal  HENT: ear canals and TM's normal, nose and mouth without ulcers or lesions  NECK: no adenopathy, no asymmetry, masses, or scars  RESP: lungs clear to auscultation - no rales, rhonchi or wheezes  CV: regular rate and rhythm, normal S1 S2, no S3 or S4, no murmur, click or rub, no peripheral edema  ABDOMEN: soft, nontender, no hepatosplenomegaly, no masses and bowel sounds normal  MS: no gross musculoskeletal defects noted, no edema  SKIN: no suspicious lesions or rashes  NEURO: Normal strength and tone, mentation intact and speech normal  PSYCH: mentation appears normal, affect normal/bright  Diabetic foot exam: normal DP and PT pulses, no ulcerative lesions, thickened nails to right foot, and normal sensory exam        10/16/2024   Mini Cog   Clock Draw Score 2 Normal   3 Item Recall 3 objects recalled   Mini Cog Total Score 5                 Signed Electronically by: Jennifer Marr NP    "

## 2024-10-16 NOTE — TELEPHONE ENCOUNTER
Refill denied. Patient had a wellness visit today with PCP and her provider renewed the order appropriately.

## 2024-10-17 ENCOUNTER — PATIENT OUTREACH (OUTPATIENT)
Dept: CARE COORDINATION | Facility: CLINIC | Age: 74
End: 2024-10-17
Payer: MEDICARE

## 2024-10-17 DIAGNOSIS — E83.52 HYPERCALCEMIA: Primary | ICD-10-CM

## 2024-10-21 ENCOUNTER — HOSPITAL ENCOUNTER (OUTPATIENT)
Dept: CARDIAC REHAB | Facility: CLINIC | Age: 74
Discharge: HOME | End: 2024-10-21
Attending: THORACIC SURGERY (CARDIOTHORACIC VASCULAR SURGERY)
Payer: MEDICARE

## 2024-10-21 PROCEDURE — 93798 PHYS/QHP OP CAR RHAB W/ECG: CPT

## 2024-10-25 ENCOUNTER — HOSPITAL ENCOUNTER (OUTPATIENT)
Dept: CARDIAC REHAB | Facility: CLINIC | Age: 74
Discharge: HOME | End: 2024-10-25
Attending: THORACIC SURGERY (CARDIOTHORACIC VASCULAR SURGERY)
Payer: MEDICARE

## 2024-10-25 PROCEDURE — 93798 PHYS/QHP OP CAR RHAB W/ECG: CPT

## 2024-10-28 ENCOUNTER — HOSPITAL ENCOUNTER (OUTPATIENT)
Dept: CARDIAC REHAB | Facility: CLINIC | Age: 74
Discharge: HOME | End: 2024-10-28
Attending: THORACIC SURGERY (CARDIOTHORACIC VASCULAR SURGERY)
Payer: MEDICARE

## 2024-10-28 PROCEDURE — 93798 PHYS/QHP OP CAR RHAB W/ECG: CPT

## 2024-10-31 DIAGNOSIS — N18.30 CKD (CHRONIC KIDNEY DISEASE) STAGE 3, GFR 30-59 ML/MIN (H): Primary | ICD-10-CM

## 2024-11-01 ENCOUNTER — OFFICE VISIT (OUTPATIENT)
Dept: CARDIOLOGY | Facility: CLINIC | Age: 74
End: 2024-11-01
Attending: NURSE PRACTITIONER
Payer: MEDICARE

## 2024-11-01 ENCOUNTER — HOSPITAL ENCOUNTER (OUTPATIENT)
Dept: CARDIAC REHAB | Facility: CLINIC | Age: 74
Discharge: HOME | End: 2024-11-01
Attending: THORACIC SURGERY (CARDIOTHORACIC VASCULAR SURGERY)
Payer: MEDICARE

## 2024-11-01 VITALS
BODY MASS INDEX: 23.19 KG/M2 | WEIGHT: 130.9 LBS | OXYGEN SATURATION: 98 % | HEART RATE: 81 BPM | DIASTOLIC BLOOD PRESSURE: 57 MMHG | SYSTOLIC BLOOD PRESSURE: 116 MMHG

## 2024-11-01 DIAGNOSIS — Z95.2 S/P TAVR (TRANSCATHETER AORTIC VALVE REPLACEMENT): Primary | ICD-10-CM

## 2024-11-01 DIAGNOSIS — I27.20 PULMONARY HYPERTENSION (H): ICD-10-CM

## 2024-11-01 DIAGNOSIS — I50.810 RVF (RIGHT VENTRICULAR FAILURE) (H): ICD-10-CM

## 2024-11-01 DIAGNOSIS — R00.1 SINUS BRADYCARDIA: ICD-10-CM

## 2024-11-01 PROCEDURE — 99214 OFFICE O/P EST MOD 30 MIN: CPT | Performed by: NURSE PRACTITIONER

## 2024-11-01 PROCEDURE — G0463 HOSPITAL OUTPT CLINIC VISIT: HCPCS | Performed by: NURSE PRACTITIONER

## 2024-11-01 PROCEDURE — 93005 ELECTROCARDIOGRAM TRACING: CPT

## 2024-11-01 PROCEDURE — 93798 PHYS/QHP OP CAR RHAB W/ECG: CPT

## 2024-11-01 RX ORDER — AZITHROMYCIN 250 MG/1
500 TABLET, FILM COATED ORAL
Qty: 2 TABLET | Refills: 2 | Status: SHIPPED | OUTPATIENT
Start: 2024-11-01

## 2024-11-01 ASSESSMENT — PAIN SCALES - GENERAL: PAINLEVEL_OUTOF10: NO PAIN (0)

## 2024-11-01 NOTE — PROGRESS NOTES
[facility] HEART [facility]  CARDIOVASCULAR DIVISION    VALVE CLINIC RETURN VISIT    PRIMARY CARDIOLOGIST: Dr. Sanderson       PERTINENT CLINICAL HISTORY:     Keerthi Montoya is a very pleasant 74 year old female who presents for 1 month TAVR follow-up. She has a history of  HFpEF, HTN, T2DM, HLD, CKD3, pulmonary hypertension (WHO II), RV failure, persistent atrial fibrillation with intolerance to anticoagulation due to GI bleeding s/p RY closure and Watchman (5/2023) and severe paradoxical low flow low gradient severe aortic stenosis s/p TAVR 9/4 with a 23 mm Ramsay Trini 3 Ultra Resilia.  The TAVR and post-procedural course were notable for no complications. Her POD#1 ECHO showed mean PG 5 mmHg, no mention of severe PVL. She was noted to be SB at baseline, with intermittent junctioncal intra procedure. She was discharged on ASA therapy. Atenolol held on discharge due to low HR.    She presents today with no specific cardiovascular concerns. She states she has been feeling well since her valve replacement. She is attending cardiac rehab. She denies any chest pain, shortness of breath , orthopnea or PND.  She has not had any pre or aaron syncope. She has not had any palpitations or LE edema. Home BP has been 120-130, Bp at cardiac rehab was 120s yesterday. Groin sites are healing well. Overall she feels great.      PAST MEDICAL HISTORY:     Past Medical History:   Diagnosis Date    Chronic kidney disease     Congestive heart failure (H)     Diabetes mellitus, type 2 (H)     Diverticulosis of colon (without mention of hemorrhage) 10/01/2012    GI bleed     History of blood transfusion     HLD (hyperlipidemia)     Hypertension     Hypothyroidism     MARIA D (iron deficiency anemia)     Persistent atrial fibrillation (H)     Pulmonary hypertension (H)     Ulcerative (chronic) enterocolitis (H)         PAST SURGICAL HISTORY:     Past Surgical History:   Procedure Laterality Date    ABDOMEN SURGERY      CARDIAC SURGERY       CHOLECYSTECTOMY  03/01/1987    COLON SURGERY  01/01/2001    Left colon resection; diverticulitis    COLONOSCOPY N/A 04/24/2017    Procedure: COMBINED COLONOSCOPY, SINGLE OR MULTIPLE BIOPSY/POLYPECTOMY BY BIOPSY;;  Surgeon: Radha Salguero MD;  Location: [facility] OR    COLONOSCOPY N/A 03/10/2023    Procedure: COLONOSCOPY;  Surgeon: Preeti James MD;  Location: [facility]    COLONOSCOPY WITH CO2 INSUFFLATION N/A 04/24/2017    Procedure: COLONOSCOPY WITH CO2 INSUFFLATION;  Colonoscopy Dx rectal bleeding, BMI 25.86, Pharm [facility] .[phone], ;  Surgeon: Radha Salguero MD;  Location: [facility] OR    CV CORONARY ANGIOGRAM N/A 07/15/2024    Procedure: Coronary Angiogram with possible intervention;  Surgeon: Bobby Grimes MD;  [facility] CARD[facility]AC [facility]    CV LEFT ATRIAL APPENDAGE CLOSURE N/A 05/11/2023    Procedure: Left Atrial Appendage Closure;  Surgeon: Bobby Grimes MD;  [facility] CARDIAC CATH [facility]    CV RIGHT HEART CATH MEASUREMENTS RECORDED N/A 03/16/2020    Procedure: CV RIGHT HEART CATH;  Surgeon: Nader Muñoz MD;  [facility]    CV RIGHT HEART CATH MEASUREMENTS RECORDED N/A 07/15/2024    Procedure: Right Heart Cath;  Surgeon: Bobby Grimes MD;  [facility]    CV TRANSCATHETER AORTIC VALVE REPLACEMENT-FEMORAL APPROACH N/A 09/04/2024    Procedure: Transcatheter Aortic Valve Replacement-Femoral Approach;  Surgeon: Bboby Grimes MD;  Location[facility]    ESOPHAGOSCOPY, GASTROSCOPY, DUODENOSCOPY (EGD), COMBINED N/A 01/23/2023    Procedure: ESOPHAGOGASTRODUODENOSCOPY, WITH BIOPSY;  Surgeon: Ricki Deal MD;  Location: [facility]    ESOPHAGOSCOPY, GASTROSCOPY, DUODENOSCOPY (EGD), COMBINED N/A 03/10/2023    Procedure: Esophagoscopy, gastroscopy, duodenoscopy (EGD), combined;  Surgeon: Preeti James MD;  Location: [facility]    GYN SURGERY  09/01/1983    tubal ligation    HERNIA REPAIR  12/01/2006    recurrent  incisional hernia    SIGMOIDOSCOPY FLEXIBLE N/A 01/23/2023    Procedure: Sigmoidoscopy flexible;  Surgeon: Ricki Deal MD;  Location:  GI    THROAT SURGERY  12/01/1974    thyroidectomy        CURRENT MEDICATIONS:     Current Outpatient Medications   Medication Sig Dispense Refill    aspirin 81 MG EC tablet Take 1 tablet (81 mg) by mouth daily 90 tablet 3    atorvastatin (LIPITOR) 40 MG tablet Take 1 tablet (40 mg) by mouth daily 90 tablet 3    blood glucose (ACCU-CHEK FELIPE PLUS) test strip 200 strips by In Vitro route 2 times daily Use to test blood sugar 2 times daily or as directed. 200 strip 4    Cyanocobalamin (B-12) 1000 MCG TABS Take 1,000 mcg by mouth three times a week      diphenhydrAMINE (BENADRYL) 25 MG tablet Take 25 mg by mouth every 6 hours as needed for itching or allergies      dulaglutide (TRULICITY) 1.5 MG/0.5ML pen Inject 1.5 mg Subcutaneous every 7 days After 4 doses of 0.75 mg (Patient not taking: Reported on 9/13/2024) 2 mL 1    empagliflozin (JARDIANCE) 10 MG TABS tablet Take 1 tablet (10 mg) by mouth daily 90 tablet 3    glipiZIDE (GLUCOTROL XL) 10 MG 24 hr tablet Take 2 tablets (20 mg) by mouth daily. 180 tablet 1    hydrALAZINE (APRESOLINE) 50 MG tablet Take 1 tablet (50 mg) by mouth 3 times daily 180 tablet 3    isosorbide mononitrate (IMDUR) 30 MG 24 hr tablet Take 1 tablet (30 mg) by mouth daily. 90 tablet 3    levothyroxine (SYNTHROID/LEVOTHROID) 125 MCG tablet Take 1 tablet (125 mcg) by mouth daily. 90 tablet 2    losartan (COZAAR) 100 MG tablet Take 1 tablet (100 mg) by mouth daily 90 tablet 3    mesalamine (APRISO ER) 0.375 g 24 hr capsule Take 4 capsules (1.5 g) by mouth daily 360 capsule 3    pantoprazole (PROTONIX) 40 MG EC tablet Take 1 tablet (40 mg) by mouth daily. 90 tablet 3    senna (SENOKOT) 8.6 MG tablet Take 1 tablet by mouth daily as needed for constipation 30 tablet 3    torsemide (DEMADEX) 10 MG tablet Take 1 tablet (10 mg) by mouth every other day 40 tablet 1         ALLERGIES:     Allergies   Allergen Reactions    Actos [Pioglitazone]      Fluid retention    Amlodipine      Leg swelling, hand swelling    Doxycycline Hives and Rash     Edema in hands/feet      Keflex [Cephalexin Hcl] Hives     Swelling hands/feet      Metoprolol Other (See Comments)     Swelling of lower legs and fatigue    Synthroid [Levothyroxine Sodium] Swelling     Allergic to brand name, hives all over body, edema all over body      Tylenol Hives     Hives/rash all over body    Ziac [Bisoprolol-Hydrochlorothiazide] Other (See Comments), Hives and Itching     Hands/feet swell up, hives all over body      Animal Dander Other (See Comments)     Sneezing, itchy nose    Grass Other (See Comments)     Itchy nose/eyes, sneezing, coughing      Tetracycline Hives and Itching     Swelling hands/feet      Dust Mites Other (See Comments)     Itchy eyes, sneezing    Other [Seasonal Allergies] Other (See Comments)     Pollen coughing/sneezing        FAMILY HISTORY:     Family History   Problem Relation Age of Onset    Cerebrovascular Disease Mother     Cancer Father         bladder    Diverticulitis Brother     Diabetes Brother     Diverticulitis Brother     Hypertension Brother     Kidney Disease No family hx of     Crohn's Disease No family hx of     Ulcerative Colitis No family hx of     Stomach Cancer No family hx of     GERD No family hx of     Celiac Disease No family hx of     Anesthesia Reaction No family hx of         SOCIAL HISTORY:     Social History     Socioeconomic History    Marital status:      Spouse name: Raymundo; dementia;  2016    Number of children: 3    Years of education: None    Highest education level: None   Occupational History     Employer: [employer]   Tobacco Use    Smoking status: Never     Passive exposure: Never    Smokeless tobacco: Never   Vaping Use    Vaping status: Never Used   Substance and Sexual Activity    Alcohol use: Not Currently     Alcohol/week: 2.0 -  4.0 standard drinks of alcohol     Types: 1 - 2 Cans of beer, 1 - 2 Shots of liquor per week     Comment: occasional cocktail or beer    Drug use: No    Sexual activity: Not Currently   Other Topics Concern     Service No    Blood Transfusions No    Caffeine Concern No    Occupational Exposure No    Hobby Hazards No    Sleep Concern No    Stress Concern No    Weight Concern No    Special Diet No    Back Care No    Exercise Yes     Comment: off and on    Bike Helmet No    Seat Belt Yes    Self-Exams No   Social History Narrative    Laid off her job 8/14     Social Determinants of Health     Financial Resource Strain: Low Risk  (9/4/2024)    Financial Resource Strain     Within the past 12 months, have you or your family members you live with been unable to get utilities (heat, electricity) when it was really needed?: No   Food Insecurity: Low Risk  (9/4/2024)    Food Insecurity     Within the past 12 months, did you worry that your food would run out before you got money to buy more?: No     Within the past 12 months, did the food you bought just not last and you didn t have money to get more?: No   Transportation Needs: High Risk (9/4/2024)    Transportation Needs     Within the past 12 months, has lack of transportation kept you from medical appointments, getting your medicines, non-medical meetings or appointments, work, or from getting things that you need?: Yes   Interpersonal Safety: Low Risk  (9/4/2024)    Interpersonal Safety     Do you feel physically and emotionally safe where you currently live?: Yes     Within the past 12 months, have you been hit, slapped, kicked or otherwise physically hurt by someone?: No     Within the past 12 months, have you been humiliated or emotionally abused in other ways by your partner or ex-partner?: No   Housing Stability: Low Risk  (9/4/2024)    Housing Stability     Do you have housing? : Yes     Are you worried about losing your housing?: No        REVIEW OF  SYSTEMS:     Constitutional: No fevers or chills  Skin: No new rash or itching  Eyes: No acute change in vision  Ears/Nose/Throat: No purulent rhinorrhea, new hearing loss, or new vertigo  Respiratory: No cough or hemoptysis  Cardiovascular: See HPI  Gastrointestinal: No change in appetite, vomiting, hematemesis or diarrhea  Genitourinary: No dysuria or hematuria  Musculoskeletal: No new back pain, neck pain or muscle pain  Neurologic: No new headaches, focal weakness or behavior changes  Psychiatric: No hallucinations, excessive alcohol consumption or illegal drug usage  Hematologic/Lymphatic/Immunologic: No bleeding, chills, fever, night sweats or weight loss  Endocrine: No new cold intolerance, heat intolerance, polyphagia, polydipsia or polyuria      PHYSICAL EXAMINATION:     /57 (BP Location: Right arm, Patient Position: Chair, Cuff Size: Adult Regular)   Pulse 81   Wt 59.4 kg (130 lb 14.4 oz)   LMP  (LMP Unknown)   SpO2 98%   BMI 23.19 kg/m      GENERAL: No acute distress.  HEENT: EOMI. Sclerae white, not injected. Nares clear. Pharynx without erythema or exudate.   Neck: No adenopathy. No thyromegaly. No jugular venous distension.   Heart: Regular rate and rhythm. No murmur.   Lungs: Clear to auscultation. No ronchi, wheezes, rales.   Abdomen: Soft, nontender, nondistended. Bowel sounds present.  Extremities: No clubbing, cyanosis, or edema.   Neurologic: Alert and oriented to person/place/time, normal speech and affect. No focal deficits.  Skin: No petechiae, purpura or rash.     LABORATORY DATA:     LIPID RESULTS:  Lab Results   Component Value Date    CHOL 96 07/08/2024    CHOL 93 10/28/2020    HDL 34 (L) 07/08/2024    HDL 38 (L) 10/28/2020    LDL 41 07/08/2024    LDL 33 10/28/2020    TRIG 107 07/08/2024    TRIG 108 10/28/2020    CHOLHDLRATIO 4.3 05/18/2015       LIVER ENZYME RESULTS:  Lab Results   Component Value Date    AST 25 07/29/2024    AST 19 10/28/2020    ALT <5 07/29/2024    ALT 18  10/28/2020       CBC RESULTS:  Lab Results   Component Value Date    WBC 7.7 10/11/2024    WBC 7.3 10/28/2020    RBC 4.28 10/11/2024    RBC 3.99 10/28/2020    HGB 13.8 10/11/2024    HGB 11.8 02/08/2021    HCT 42.1 10/11/2024    HCT 38.2 10/28/2020    MCV 98 10/11/2024    MCV 96 10/28/2020    MCH 32.2 10/11/2024    MCH 30.8 10/28/2020    MCHC 32.8 10/11/2024    MCHC 32.2 10/28/2020    RDW 13.1 10/11/2024    RDW 13.9 10/28/2020     10/11/2024     10/28/2020       BMP RESULTS:  Lab Results   Component Value Date     10/11/2024     04/28/2021    POTASSIUM 4.8 10/11/2024    POTASSIUM 4.3 09/17/2023    POTASSIUM 4.5 04/28/2021    CHLORIDE 104 10/11/2024    CHLORIDE 109 04/27/2023    CHLORIDE 100 04/28/2021    CO2 18 (L) 10/11/2024    CO2 21 04/27/2023    CO2 26 04/28/2021    ANIONGAP 13 10/11/2024    ANIONGAP 7 04/27/2023    ANIONGAP 8 04/28/2021     (H) 10/11/2024     (H) 09/04/2024     (H) 04/27/2023    GLC 57 (L) 04/28/2021    BUN 43.7 (H) 10/11/2024    BUN 28 04/27/2023    BUN 23 04/28/2021    CR 1.42 (H) 10/11/2024    CR 1.21 (H) 04/28/2021    GFRESTIMATED 39 (L) 10/11/2024    GFRESTIMATED 30 (L) 12/30/2022    GFRESTIMATED 46 (L) 04/28/2021    GFRESTBLACK 53 (L) 04/28/2021    ABEL 10.2 10/11/2024    ABEL 9.8 04/28/2021        A1C RESULTS:  Lab Results   Component Value Date    A1C 7.2 (H) 07/29/2024    A1C 6.6 (H) 10/28/2020       INR RESULTS:  Lab Results   Component Value Date    INR 1.11 08/26/2024    INR 1.14 07/29/2024          PROCEDURES & FURTHER ASSESSMENTS:   EKG 11/1/24  NSR with PACs        ECHO 10/11/24    Interpretation Summary  Global and regional left ventricular function is normal with an EF of 60-65%.  Global right ventricular function is moderately reduced.  S/p TAVR with 23 mm Ramsay Trini 3 Ultra Resilia prosthesis on 9/4/24.  Doppler interrogation of the aortic valve is normal. (MG 7 mm Hg, PV 1.9 m/s,  DVI 0.66).  There is trace paravalvular  regurgitation.  Notch noted in pulmonic flow suggestive of Pulm HTN.     This study was compared with the study from 9/5/24 .  No significant changes noted.     ______________________________________________________________________________  Left Ventricle  Left ventricular size is normal. Global and regional left ventricular function  is normal with an EF of 60-65%. Relative wall thickness is increased  consistent with concentric remodeling. Left ventricular diastolic function is  not assessable.     Right Ventricle  Mild right ventricular dilation is present. Global right ventricular function  is moderately reduced.     Atria  The right atria appears normal. Mild left atrial enlargement is present.     Mitral Valve  Mild to moderate mitral annular calcification is present. Trace mitral  insufficiency is present.     Aortic Valve  S/p TAVR with 23 mm Ramsay Trini 3 Ultra Resilia prosthesis. The peak  velocity across the aortic valve is 1.9 m/sec. The mean gradient is 7 mmHg.  The prosthetic aortic valve is well-seated. Doppler interrogation of the  aortic valve is normal. There is trace paravalvular regurgitation.     Tricuspid Valve  The tricuspid valve is normal. Trace tricuspid insufficiency is present.  Pulmonary artery systolic pressure cannot be assessed.     Pulmonic Valve  The valve leaflets are not well visualized. Mild pulmonic insufficiency is  present.     Vessels  The thoracic aorta is normal. IVC diameter <2.1 cm collapsing >50% with sniff  suggests a normal RA pressure of 3 mmHg.     Pericardium  No pericardial effusion is present.     Compared to Previous Study  This study was compared with the study from 9/5/24 . No significant changes  noted.     Attestation  I have personally viewed the imaging and agree with the interpretation and  report as documented by the fellow, Raquel Ross, and/or edited by me.  ______________________________________________________________________________  MMode/2D  Measurements & Calculations  IVSd: 0.94 cm  LVIDd: 3.9 cm  LVIDs: 2.4 cm  LVPWd: 0.94 cm  FS: 38.2 %  LV mass(C)d: 110.0 grams  LV mass(C)dI: 67.9 grams/m2  asc Aorta Diam: 2.9 cm  LVOT diam: 2.1 cm  LVOT area: 3.3 cm2  Asc Ao diam index BSA (cm/m2): 1.8  Asc Ao diam index Ht(cm/m): 1.8  LA Volume (BP): 69.2 ml     LA Volume Index (BP): 42.7 ml/m2  RV Base: 4.0 cm  RWT: 0.49  TAPSE: 1.7 cm     Doppler Measurements & Calculations  MV E max michael: 101.0 cm/sec  MV A max michael: 51.4 cm/sec  MV E/A: 2.0  MV max P.6 mmHg  MV mean P.9 mmHg  MV V2 VTI: 30.3 cm  MVA(VTI): 2.7 cm2  MV dec slope: 539.7 cm/sec2  MV dec time: 0.19 sec  Ao V2 max: 187.0 cm/sec  Ao max P.0 mmHg  Ao V2 mean: 124.0 cm/sec  Ao mean P.0 mmHg  Ao V2 VTI: 36.5 cm  IVON(I,D): 2.2 cm2  IVON(V,D): 2.2 cm2  LV V1 max P.3 mmHg  LV V1 max: 122.6 cm/sec  LV V1 VTI: 24.4 cm  SV(LVOT): 81.8 ml  SI(LVOT): 50.5 ml/m2  PA V2 max: 95.1 cm/sec  PA max PG: 3.6 mmHg  PA acc time: 0.10 sec  AV Michael Ratio (DI): 0.66     IVON Index (cm2/m2): 1.4  RV S Michael: 11.7 cm/sec        Echocardiogram 24:  Interpretation Summary  s/p TAVR with 23 mm Ramsay Trini 3 Ultra Resilia prosthesis on 24. Valve  is well seated with normal doppler assessment (PV 1.6m/s, MG 5mmHg).     Left ventricular function is normal.The ejection fraction is 55-60%.  Moderate to severe right ventricular dilation. Global right ventricular  function is moderately reduced.  Moderate tricuspid insufficiency.  Severe pulmonary hypertension.  Estimated pulmonary artery systolic pressure is 85 mmHg.  Dilation of the inferior vena cava is present with abnormal respiratory  variation in diameter.  No pericardial effusion.  ______________________________________________________________________________  Left Ventricle  Left ventricular function is normal.The ejection fraction is 55-60%.     Right Ventricle  Moderate to severe right ventricular dilation is present. Global right  ventricular  function is moderately reduced.     Mitral Valve  Moderate mitral annular calcification is present. Trace mitral insufficiency  is present.     Aortic Valve  Transcutaneous aortic valve replacement is present. The prosthetic aortic  valve is well-seated. There is no paravalvular regurgitation. There is no  valvular regurgitation. Doppler interrogation of the aortic valve is normal.  The peak velocity across the aortic valve is 1.6 m/sec. The mean gradient is 5  mmHg. The V2/V1 ratio is 0.71.     Tricuspid Valve  Moderate tricuspid insufficiency is present. Estimated pulmonary artery  systolic pressure is 70 mmHg plus right atrial pressure. Severe pulmonary  hypertension is present.     Vessels  Dilation of the inferior vena cava is present with abnormal respiratory  variation in diameter.     Pericardium  No pericardial effusion is present.     Compared to Previous Study  This study was compared with the study from 24 .     ______________________________________________________________________________  MMode/2D Measurements & Calculations  LVOT diam: 2.1 cm  LVOT area: 3.3 cm2     Doppler Measurements & Calculations  Ao V2 max: 163.0 cm/sec  Ao max PG: 10.6 mmHg  Ao V2 mean: 102.0 cm/sec  Ao mean P.0 mmHg  Ao V2 VTI: 36.4 cm  IVON(I,D): 2.3 cm2  IVON(V,D): 2.4 cm2  Ao acc time: 0.08 sec     LV V1 max P.4 mmHg  LV V1 max: 116.0 cm/sec  LV V1 VTI: 25.1 cm  SV(LVOT): 83.6 ml  SI(LVOT): 50.9 ml/m2  TR max michael: 417.8 cm/sec  TR max P.8 mmHg  AV Michael Ratio (DI): 0.71  IVON Index (cm2/m2): 1.4     TAVR 24:  1. Lifestyle-limiting severe aortic stenosis.   2. Successful transfemoral transcatheter aortic valve replacement with a 23 mm Ramsay Trini 3 Ultra Resilia valve.  3. Temporary pacing   4. Left heart catheterization with LVEDP of 5 mmHg prior to valve deployment.  5. Left common femoral arteriotomy successfully closed with Perclose closure devices. Manual pressure was used to obtain hemostasis of the  right sided arteriotomy and venotomy.      Other imaging studies:         NYHA Class: I    Amyloid screening: no     CLINICAL IMPRESSION:   Keerthi Montoya is a very pleasant 74 year old female who presents for 1 month TAVR follow-up. She has a history of  HFpEF, HTN, T2DM, HLD, CKD3, pulmonary hypertension (WHO II), RV failure, persistent atrial fibrillation with intolerance to anticoagulation due to GI bleeding s/p RY closure and Watchman (5/2023) and severe paradoxical low flow low gradient severe aortic stenosis s/p TAVR 9/4 with a 23 mm Ramsay Trini 3 Ultra Resilia.  The TAVR and post-procedural course were notable for no complications. Her POD#1 ECHO showed mean PG 5 mmHg, no mention of severe PVL. She was noted to be SB at baseline, with intermittent junctioncal intra procedure. She was discharged on ASA therapy. Atenolol held on discharge due to low HR.     # Severe paradoxical low flow low gradient aortic stenosis  # HFpEF  # Severe PH  # Sinus jv, resolved  Doing well clinically. No cardiovascular concerns. Currently no significant heart failure symptoms, appears euvolemic on exam. ECHO shows LVEF 60%, mean gradient is 7 mmHg with trace paravalvular regurgitation. EKG today with NSR. Cardiac monitor pending. Labs stable today.  - Continue ASA 81 mg daily and SBE prophylaxis lifelong  - Continue losartan 100 mg daily  - Continue Jardiance 10 mg daily and torsemide 10 mg every other day  - Continue hydralazine and imdur  - defer atenolol, no strong indication  - Dr. Sanderson later in the month     # Paroxysmal a-fib w/contraindication to anticoagulation due to GI bleeding  # Healed gastric ulcers/Ulcerative colitis (inactive)  Now s/p successful implantation of 24mm Watchman FLX. Post-procedure LAURA showed no evidence of delroy-device leak and no pericardial effusion. Patient seen in PACU. VSS, neuros intact, groin sites soft without hematoma. Post procedure TTE with small anterior effusion. Now  completed 6 months of DAPT and on ASA monotherapy.  - ASA 81 mg daily  - Defer atenolol, if she has increasing palpitations could resume low dose    # HTN  # HLD   - Continue losartan, hydral, Imdur and atorvastatin     # CKD  - Stable creatinine 1.4 and GFR of 39. Continue to monitor     # Diabetes type II   - Hgb A1C 7.2.   - Continue current antidiabetics  - Further diabetes management per PCP.     RTC: Structural 8/2025 with echo, labs and EKG prior     WENDI Marr, CNP  [facility] Cardiology Team      CC  Patient Care Team:  Bunny Meyers MD as PCP - General (Internal Medicine)  Preeti James MD as MD (Gastroenterology)  Swati Minor DO as Assigned Nephrology Provider  Jerry Robbins PA-C as Physician Assistant (Gastroenterology)  Bunny Meyers MD as Assigned PCP  Bunny Santa [facility] as Assigned MTM Pharmacist  Allison Hdz, RN as Specialty Care Coordinator (Gastroenterology)  Gianfranco Melendez MD as Intern (Student in organized health care education/training program)  Jethro Moore MD as MD (Hematology & Oncology)  Fracisco Hurtado, RN as Specialty Care Coordinator (Hematology & Oncology)  Vanessa Schneider, RN as Diabetes Educator (Diabetes Education)  Preeti James MD as Assigned Surgical Provider  Saba Lemon MD as MD (Advanced Heart Failure and Transplant Cardiology)  Sheela Galeas, RN as Specialty Care Coordinator (Cardiology)  Francisca Mustafa APRN CNP as Assigned Cancer Care Provider  Geena Ornelas MD as MD (Dermatology)  Lorena Ambriz, RN as Specialty Care Coordinator (Cardiology)  Davie Rapp MD as MD (Gastroenterology)  Zuleika Glover, RN as Specialty Care Coordinator (Cardiology)  Christal Barker, VERONIQUE as Specialty Care Coordinator (Cardiology)  Stacy Stokes, RN as Specialty Care Coordinator (Cardiology)  Jonny Harper, VERONIQUE as Specialty Care Coordinator  Angeline Marcus MD as MD (Cardiovascular Disease)  Kami Lim PA-C  as Assigned Gastroenterology Provider  Dominique Saavedra, NP as Assigned Heart and Vascular Provider  Ame Yoon [facility] as MTM Pharmacist  Lydia Warren, RN as Registered Nurse (Cardiology)  DOMINIQUE SAAVEDRA

## 2024-11-01 NOTE — LETTER
11/1/2024      RE: Keerthi Montoya  [address] [place] [id number]       Dear Colleague,    Thank you for the opportunity to participate in the care of your patient, Keerthi Montoya, at the [facility] [place] at [facility]. Please see a copy of my visit note below.        [facility] HEART CARE  CARDIOVASCULAR DIVISION    VALVE CLINIC RETURN VISIT    PRIMARY CARDIOLOGIST: Dr. Sanderson       PERTINENT CLINICAL HISTORY:     Keerthi Montoya is a very pleasant 74 year old female who presents for 1 month TAVR follow-up. She has a history of  HFpEF, HTN, T2DM, HLD, CKD3, pulmonary hypertension (WHO II), RV failure, persistent atrial fibrillation with intolerance to anticoagulation due to GI bleeding s/p RY closure and Watchman (5/2023) and severe paradoxical low flow low gradient severe aortic stenosis s/p TAVR 9/4 with a 23 mm Ramsay Trini 3 Ultra Resilia.  The TAVR and post-procedural course were notable for no complications. Her POD#1 ECHO showed mean PG 5 mmHg, no mention of severe PVL. She was noted to be SB at baseline, with intermittent junctioncal intra procedure. She was discharged on ASA therapy. Atenolol held on discharge due to low HR.    She presents today with no specific cardiovascular concerns. She states she has been feeling well since her valve replacement. She is attending cardiac rehab. She denies any chest pain, shortness of breath , orthopnea or PND.  She has not had any pre or aaron syncope. She has not had any palpitations or LE edema. Home BP has been 120-130, Bp at cardiac rehab was 120s yesterday. Groin sites are healing well. Overall she feels great.      PAST MEDICAL HISTORY:     Past Medical History:   Diagnosis Date     Chronic kidney disease      Congestive heart failure (H)      Diabetes mellitus, type 2 (H)      Diverticulosis of colon (without mention of hemorrhage) 10/01/2012     GI bleed       History of blood transfusion      HLD (hyperlipidemia)      Hypertension      Hypothyroidism      MARIA D (iron deficiency anemia)      Persistent atrial fibrillation (H)      Pulmonary hypertension (H)      Ulcerative (chronic) enterocolitis (H)         PAST SURGICAL HISTORY:     Past Surgical History:   Procedure Laterality Date     ABDOMEN SURGERY       CARDIAC SURGERY       CHOLECYSTECTOMY  03/01/1987     COLON SURGERY  01/01/2001    Left colon resection; diverticulitis     COLONOSCOPY N/A 04/24/2017    Procedure: COMBINED COLONOSCOPY, SINGLE OR MULTIPLE BIOPSY/POLYPECTOMY BY BIOPSY;;  Surgeon: Radha Salguero MD;  Location: [facility] OR     COLONOSCOPY N/A 03/10/2023    Procedure: COLONOSCOPY;  Surgeon: Preeti James MD;  Location: [facility] GI     COLONOSCOPY WITH CO2 INSUFFLATION N/A 04/24/2017    Procedure: COLONOSCOPY WITH CO2 INSUFFLATION;  Colonoscopy Dx rectal bleeding, BMI 25.86, Pharm Waleen .[phone], ;  Surgeon: Radha Salguero MD;  Location: [facility] OR     CV CORONARY ANGIOGRAM N/A 07/15/2024    Procedure: Coronary Angiogram with possible intervention;  Surgeon: Bobby Grimes MD;  Location: [facility] CARDIAC CATH LAB     CV LEFT ATRIAL APPENDAGE CLOSURE N/A 05/11/2023    Procedure: Left Atrial Appendage Closure;  Surgeon: Bobby Grimes MD;  Location: [facility]ATH LAB     CV RIGHT HEART CATH MEASUREMENTS RECORDED N/A 03/16/2020    Procedure: CV RIGHT HEART CATH;  Surgeon: Nader Muñoz MD;  Location: [facility]ATH [facility]     CV RIGHT HEART CATH MEASUREMENTS RECORDED N/A 07/15/2024    Procedure: Right Heart Cath;  Surgeon: Bobby Grimes MD;  [facility]     CV TRANSCATHETER AORTIC VALVE REPLACEMENT-FEMORAL APPROACH N/A 09/04/2024    Procedure: Transcatheter Aortic Valve Replacement-Femoral Approach;  Surgeon: Bobby Grimes MD;  Location: [facility] OR     ESOPHAGOSCOPY, GASTROSCOPY, DUODENOSCOPY (EGD), COMBINED N/A 01/23/2023     Procedure: ESOPHAGOGASTRODUODENOSCOPY, WITH BIOPSY;  Surgeon: Ricki Deal MD;  Location:  GI     ESOPHAGOSCOPY, GASTROSCOPY, DUODENOSCOPY (EGD), COMBINED N/A 03/10/2023    Procedure: Esophagoscopy, gastroscopy, duodenoscopy (EGD), combined;  Surgeon: Preeti James MD;  Location:  GI     GYN SURGERY  09/01/1983    tubal ligation     HERNIA REPAIR  12/01/2006    recurrent incisional hernia     SIGMOIDOSCOPY FLEXIBLE N/A 01/23/2023    Procedure: Sigmoidoscopy flexible;  Surgeon: Ricki Deal MD;  Location:  GI     THROAT SURGERY  12/01/1974    thyroidectomy        CURRENT MEDICATIONS:     Current Outpatient Medications   Medication Sig Dispense Refill     aspirin 81 MG EC tablet Take 1 tablet (81 mg) by mouth daily 90 tablet 3     atorvastatin (LIPITOR) 40 MG tablet Take 1 tablet (40 mg) by mouth daily 90 tablet 3     blood glucose (ACCU-CHEK FELIPE PLUS) test strip 200 strips by In Vitro route 2 times daily Use to test blood sugar 2 times daily or as directed. 200 strip 4     Cyanocobalamin (B-12) 1000 MCG TABS Take 1,000 mcg by mouth three times a week       diphenhydrAMINE (BENADRYL) 25 MG tablet Take 25 mg by mouth every 6 hours as needed for itching or allergies       dulaglutide (TRULICITY) 1.5 MG/0.5ML pen Inject 1.5 mg Subcutaneous every 7 days After 4 doses of 0.75 mg (Patient not taking: Reported on 9/13/2024) 2 mL 1     empagliflozin (JARDIANCE) 10 MG TABS tablet Take 1 tablet (10 mg) by mouth daily 90 tablet 3     glipiZIDE (GLUCOTROL XL) 10 MG 24 hr tablet Take 2 tablets (20 mg) by mouth daily. 180 tablet 1     hydrALAZINE (APRESOLINE) 50 MG tablet Take 1 tablet (50 mg) by mouth 3 times daily 180 tablet 3     isosorbide mononitrate (IMDUR) 30 MG 24 hr tablet Take 1 tablet (30 mg) by mouth daily. 90 tablet 3     levothyroxine (SYNTHROID/LEVOTHROID) 125 MCG tablet Take 1 tablet (125 mcg) by mouth daily. 90 tablet 2     losartan (COZAAR) 100 MG tablet Take 1 tablet (100 mg) by mouth daily 90  tablet 3     mesalamine (APRISO ER) 0.375 g 24 hr capsule Take 4 capsules (1.5 g) by mouth daily 360 capsule 3     pantoprazole (PROTONIX) 40 MG EC tablet Take 1 tablet (40 mg) by mouth daily. 90 tablet 3     senna (SENOKOT) 8.6 MG tablet Take 1 tablet by mouth daily as needed for constipation 30 tablet 3     torsemide (DEMADEX) 10 MG tablet Take 1 tablet (10 mg) by mouth every other day 40 tablet 1        ALLERGIES:     Allergies   Allergen Reactions     Actos [Pioglitazone]      Fluid retention     Amlodipine      Leg swelling, hand swelling     Doxycycline Hives and Rash     Edema in hands/feet       Keflex [Cephalexin Hcl] Hives     Swelling hands/feet       Metoprolol Other (See Comments)     Swelling of lower legs and fatigue     Synthroid [Levothyroxine Sodium] Swelling     Allergic to brand name, hives all over body, edema all over body       Tylenol Hives     Hives/rash all over body     Ziac [Bisoprolol-Hydrochlorothiazide] Other (See Comments), Hives and Itching     Hands/feet swell up, hives all over body       Animal Dander Other (See Comments)     Sneezing, itchy nose     Grass Other (See Comments)     Itchy nose/eyes, sneezing, coughing       Tetracycline Hives and Itching     Swelling hands/feet       Dust Mites Other (See Comments)     Itchy eyes, sneezing     Other [Seasonal Allergies] Other (See Comments)     Pollen coughing/sneezing        FAMILY HISTORY:     Family History   Problem Relation Age of Onset     Cerebrovascular Disease Mother      Cancer Father         bladder     Diverticulitis Brother      Diabetes Brother      Diverticulitis Brother      Hypertension Brother      Kidney Disease No family hx of      Crohn's Disease No family hx of      Ulcerative Colitis No family hx of      Stomach Cancer No family hx of      GERD No family hx of      Celiac Disease No family hx of      Anesthesia Reaction No family hx of         SOCIAL HISTORY:     Social History     Socioeconomic History      Marital status:      Spouse name: Raymundo; dementia;  2016     Number of children: 3     Years of education: None     Highest education level: None   Occupational History     Employer: [employer]   Tobacco Use     Smoking status: Never     Passive exposure: Never     Smokeless tobacco: Never   Vaping Use     Vaping status: Never Used   Substance and Sexual Activity     Alcohol use: Not Currently     Alcohol/week: 2.0 - 4.0 standard drinks of alcohol     Types: 1 - 2 Cans of beer, 1 - 2 Shots of liquor per week     Comment: occasional cocktail or beer     Drug use: No     Sexual activity: Not Currently   Other Topics Concern      Service No     Blood Transfusions No     Caffeine Concern No     Occupational Exposure No     Hobby Hazards No     Sleep Concern No     Stress Concern No     Weight Concern No     Special Diet No     Back Care No     Exercise Yes     Comment: off and on     Bike Helmet No     Seat Belt Yes     Self-Exams No   Social History Narrative    Laid off her job      Social Determinants of Health     Financial Resource Strain: Low Risk  (2024)    Financial Resource Strain      Within the past 12 months, have you or your family members you live with been unable to get utilities (heat, electricity) when it was really needed?: No   Food Insecurity: Low Risk  (2024)    Food Insecurity      Within the past 12 months, did you worry that your food would run out before you got money to buy more?: No      Within the past 12 months, did the food you bought just not last and you didn t have money to get more?: No   Transportation Needs: High Risk (2024)    Transportation Needs      Within the past 12 months, has lack of transportation kept you from medical appointments, getting your medicines, non-medical meetings or appointments, work, or from getting things that you need?: Yes   Interpersonal Safety: Low Risk  (2024)    Interpersonal Safety      Do you feel  physically and emotionally safe where you currently live?: Yes      Within the past 12 months, have you been hit, slapped, kicked or otherwise physically hurt by someone?: No      Within the past 12 months, have you been humiliated or emotionally abused in other ways by your partner or ex-partner?: No   Housing Stability: Low Risk  (9/4/2024)    Housing Stability      Do you have housing? : Yes      Are you worried about losing your housing?: No        REVIEW OF SYSTEMS:     Constitutional: No fevers or chills  Skin: No new rash or itching  Eyes: No acute change in vision  Ears/Nose/Throat: No purulent rhinorrhea, new hearing loss, or new vertigo  Respiratory: No cough or hemoptysis  Cardiovascular: See HPI  Gastrointestinal: No change in appetite, vomiting, hematemesis or diarrhea  Genitourinary: No dysuria or hematuria  Musculoskeletal: No new back pain, neck pain or muscle pain  Neurologic: No new headaches, focal weakness or behavior changes  Psychiatric: No hallucinations, excessive alcohol consumption or illegal drug usage  Hematologic/Lymphatic/Immunologic: No bleeding, chills, fever, night sweats or weight loss  Endocrine: No new cold intolerance, heat intolerance, polyphagia, polydipsia or polyuria      PHYSICAL EXAMINATION:     /57 (BP Location: Right arm, Patient Position: Chair, Cuff Size: Adult Regular)   Pulse 81   Wt 59.4 kg (130 lb 14.4 oz)   LMP  (LMP Unknown)   SpO2 98%   BMI 23.19 kg/m      GENERAL: No acute distress.  HEENT: EOMI. Sclerae white, not injected. Nares clear. Pharynx without erythema or exudate.   Neck: No adenopathy. No thyromegaly. No jugular venous distension.   Heart: Regular rate and rhythm. No murmur.   Lungs: Clear to auscultation. No ronchi, wheezes, rales.   Abdomen: Soft, nontender, nondistended. Bowel sounds present.  Extremities: No clubbing, cyanosis, or edema.   Neurologic: Alert and oriented to person/place/time, normal speech and affect. No focal  deficits.  Skin: No petechiae, purpura or rash.     LABORATORY DATA:     LIPID RESULTS:  Lab Results   Component Value Date    CHOL 96 07/08/2024    CHOL 93 10/28/2020    HDL 34 (L) 07/08/2024    HDL 38 (L) 10/28/2020    LDL 41 07/08/2024    LDL 33 10/28/2020    TRIG 107 07/08/2024    TRIG 108 10/28/2020    CHOLHDLRATIO 4.3 05/18/2015       LIVER ENZYME RESULTS:  Lab Results   Component Value Date    AST 25 07/29/2024    AST 19 10/28/2020    ALT <5 07/29/2024    ALT 18 10/28/2020       CBC RESULTS:  Lab Results   Component Value Date    WBC 7.7 10/11/2024    WBC 7.3 10/28/2020    RBC 4.28 10/11/2024    RBC 3.99 10/28/2020    HGB 13.8 10/11/2024    HGB 11.8 02/08/2021    HCT 42.1 10/11/2024    HCT 38.2 10/28/2020    MCV 98 10/11/2024    MCV 96 10/28/2020    MCH 32.2 10/11/2024    MCH 30.8 10/28/2020    MCHC 32.8 10/11/2024    MCHC 32.2 10/28/2020    RDW 13.1 10/11/2024    RDW 13.9 10/28/2020     10/11/2024     10/28/2020       BMP RESULTS:  Lab Results   Component Value Date     10/11/2024     04/28/2021    POTASSIUM 4.8 10/11/2024    POTASSIUM 4.3 09/17/2023    POTASSIUM 4.5 04/28/2021    CHLORIDE 104 10/11/2024    CHLORIDE 109 04/27/2023    CHLORIDE 100 04/28/2021    CO2 18 (L) 10/11/2024    CO2 21 04/27/2023    CO2 26 04/28/2021    ANIONGAP 13 10/11/2024    ANIONGAP 7 04/27/2023    ANIONGAP 8 04/28/2021     (H) 10/11/2024     (H) 09/04/2024     (H) 04/27/2023    GLC 57 (L) 04/28/2021    BUN 43.7 (H) 10/11/2024    BUN 28 04/27/2023    BUN 23 04/28/2021    CR 1.42 (H) 10/11/2024    CR 1.21 (H) 04/28/2021    GFRESTIMATED 39 (L) 10/11/2024    GFRESTIMATED 30 (L) 12/30/2022    GFRESTIMATED 46 (L) 04/28/2021    GFRESTBLACK 53 (L) 04/28/2021    ABEL 10.2 10/11/2024    ABEL 9.8 04/28/2021        A1C RESULTS:  Lab Results   Component Value Date    A1C 7.2 (H) 07/29/2024    A1C 6.6 (H) 10/28/2020       INR RESULTS:  Lab Results   Component Value Date    INR 1.11 08/26/2024    INR  1.14 07/29/2024          PROCEDURES & FURTHER ASSESSMENTS:   EKG 11/1/24  NSR with PACs        ECHO 10/11/24    Interpretation Summary  Global and regional left ventricular function is normal with an EF of 60-65%.  Global right ventricular function is moderately reduced.  S/p TAVR with 23 mm Ramsay Trini 3 Ultra Resilia prosthesis on 9/4/24.  Doppler interrogation of the aortic valve is normal. (MG 7 mm Hg, PV 1.9 m/s,  DVI 0.66).  There is trace paravalvular regurgitation.  Notch noted in pulmonic flow suggestive of Pulm HTN.     This study was compared with the study from 9/5/24 .  No significant changes noted.     ______________________________________________________________________________  Left Ventricle  Left ventricular size is normal. Global and regional left ventricular function  is normal with an EF of 60-65%. Relative wall thickness is increased  consistent with concentric remodeling. Left ventricular diastolic function is  not assessable.     Right Ventricle  Mild right ventricular dilation is present. Global right ventricular function  is moderately reduced.     Atria  The right atria appears normal. Mild left atrial enlargement is present.     Mitral Valve  Mild to moderate mitral annular calcification is present. Trace mitral  insufficiency is present.     Aortic Valve  S/p TAVR with 23 mm Ramsay Trini 3 Ultra Resilia prosthesis. The peak  velocity across the aortic valve is 1.9 m/sec. The mean gradient is 7 mmHg.  The prosthetic aortic valve is well-seated. Doppler interrogation of the  aortic valve is normal. There is trace paravalvular regurgitation.     Tricuspid Valve  The tricuspid valve is normal. Trace tricuspid insufficiency is present.  Pulmonary artery systolic pressure cannot be assessed.     Pulmonic Valve  The valve leaflets are not well visualized. Mild pulmonic insufficiency is  present.     Vessels  The thoracic aorta is normal. IVC diameter <2.1 cm collapsing >50% with  sniff  suggests a normal RA pressure of 3 mmHg.     Pericardium  No pericardial effusion is present.     Compared to Previous Study  This study was compared with the study from 24 . No significant changes  noted.     Attestation  I have personally viewed the imaging and agree with the interpretation and  report as documented by the fellow, Raquel Ross, and/or edited by me.  ______________________________________________________________________________  MMode/2D Measurements & Calculations  IVSd: 0.94 cm  LVIDd: 3.9 cm  LVIDs: 2.4 cm  LVPWd: 0.94 cm  FS: 38.2 %  LV mass(C)d: 110.0 grams  LV mass(C)dI: 67.9 grams/m2  asc Aorta Diam: 2.9 cm  LVOT diam: 2.1 cm  LVOT area: 3.3 cm2  Asc Ao diam index BSA (cm/m2): 1.8  Asc Ao diam index Ht(cm/m): 1.8  LA Volume (BP): 69.2 ml     LA Volume Index (BP): 42.7 ml/m2  RV Base: 4.0 cm  RWT: 0.49  TAPSE: 1.7 cm     Doppler Measurements & Calculations  MV E max michael: 101.0 cm/sec  MV A max michael: 51.4 cm/sec  MV E/A: 2.0  MV max P.6 mmHg  MV mean P.9 mmHg  MV V2 VTI: 30.3 cm  MVA(VTI): 2.7 cm2  MV dec slope: 539.7 cm/sec2  MV dec time: 0.19 sec  Ao V2 max: 187.0 cm/sec  Ao max P.0 mmHg  Ao V2 mean: 124.0 cm/sec  Ao mean P.0 mmHg  Ao V2 VTI: 36.5 cm  IVON(I,D): 2.2 cm2  IVON(V,D): 2.2 cm2  LV V1 max P.3 mmHg  LV V1 max: 122.6 cm/sec  LV V1 VTI: 24.4 cm  SV(LVOT): 81.8 ml  SI(LVOT): 50.5 ml/m2  PA V2 max: 95.1 cm/sec  PA max PG: 3.6 mmHg  PA acc time: 0.10 sec  AV Michael Ratio (DI): 0.66     IVON Index (cm2/m2): 1.4  RV S Michael: 11.7 cm/sec        Echocardiogram 24:  Interpretation Summary  s/p TAVR with 23 mm Ramsay Trini 3 Ultra Resilia prosthesis on 24. Valve  is well seated with normal doppler assessment (PV 1.6m/s, MG 5mmHg).     Left ventricular function is normal.The ejection fraction is 55-60%.  Moderate to severe right ventricular dilation. Global right ventricular  function is moderately reduced.  Moderate tricuspid insufficiency.  Severe pulmonary  hypertension.  Estimated pulmonary artery systolic pressure is 85 mmHg.  Dilation of the inferior vena cava is present with abnormal respiratory  variation in diameter.  No pericardial effusion.  ______________________________________________________________________________  Left Ventricle  Left ventricular function is normal.The ejection fraction is 55-60%.     Right Ventricle  Moderate to severe right ventricular dilation is present. Global right  ventricular function is moderately reduced.     Mitral Valve  Moderate mitral annular calcification is present. Trace mitral insufficiency  is present.     Aortic Valve  Transcutaneous aortic valve replacement is present. The prosthetic aortic  valve is well-seated. There is no paravalvular regurgitation. There is no  valvular regurgitation. Doppler interrogation of the aortic valve is normal.  The peak velocity across the aortic valve is 1.6 m/sec. The mean gradient is 5  mmHg. The V2/V1 ratio is 0.71.     Tricuspid Valve  Moderate tricuspid insufficiency is present. Estimated pulmonary artery  systolic pressure is 70 mmHg plus right atrial pressure. Severe pulmonary  hypertension is present.     Vessels  Dilation of the inferior vena cava is present with abnormal respiratory  variation in diameter.     Pericardium  No pericardial effusion is present.     Compared to Previous Study  This study was compared with the study from 24 .     ______________________________________________________________________________  MMode/2D Measurements & Calculations  LVOT diam: 2.1 cm  LVOT area: 3.3 cm2     Doppler Measurements & Calculations  Ao V2 max: 163.0 cm/sec  Ao max PG: 10.6 mmHg  Ao V2 mean: 102.0 cm/sec  Ao mean P.0 mmHg  Ao V2 VTI: 36.4 cm  IVON(I,D): 2.3 cm2  IVON(V,D): 2.4 cm2  Ao acc time: 0.08 sec     LV V1 max P.4 mmHg  LV V1 max: 116.0 cm/sec  LV V1 VTI: 25.1 cm  SV(LVOT): 83.6 ml  SI(LVOT): 50.9 ml/m2  TR max michael: 417.8 cm/sec  TR max P.8 mmHg  AV Michael  Ratio (DI): 0.71  IVON Index (cm2/m2): 1.4     TAVR 9/4/24:  1. Lifestyle-limiting severe aortic stenosis.   2. Successful transfemoral transcatheter aortic valve replacement with a 23 mm Ramsay Trini 3 Ultra Resilia valve.  3. Temporary pacing   4. Left heart catheterization with LVEDP of 5 mmHg prior to valve deployment.  5. Left common femoral arteriotomy successfully closed with Perclose closure devices. Manual pressure was used to obtain hemostasis of the right sided arteriotomy and venotomy.      Other imaging studies:         NYHA Class: I    Amyloid screening: no     CLINICAL IMPRESSION:   Keerthi Montoya is a very pleasant 74 year old female who presents for 1 month TAVR follow-up. She has a history of  HFpEF, HTN, T2DM, HLD, CKD3, pulmonary hypertension (WHO II), RV failure, persistent atrial fibrillation with intolerance to anticoagulation due to GI bleeding s/p RY closure and Watchman (5/2023) and severe paradoxical low flow low gradient severe aortic stenosis s/p TAVR 9/4 with a 23 mm Ramsay Trini 3 Ultra Resilia.  The TAVR and post-procedural course were notable for no complications. Her POD#1 ECHO showed mean PG 5 mmHg, no mention of severe PVL. She was noted to be SB at baseline, with intermittent junctioncal intra procedure. She was discharged on ASA therapy. Atenolol held on discharge due to low HR.     # Severe paradoxical low flow low gradient aortic stenosis  # HFpEF  # Severe PH  # Sinus jv, resolved  Doing well clinically. No cardiovascular concerns. Currently no significant heart failure symptoms, appears euvolemic on exam. ECHO shows LVEF 60%, mean gradient is 7 mmHg with trace paravalvular regurgitation. EKG today with NSR. Cardiac monitor pending. Labs stable today.  - Continue ASA 81 mg daily and SBE prophylaxis lifelong  - Continue losartan 100 mg daily  - Continue Jardiance 10 mg daily and torsemide 10 mg every other day  - Continue hydralazine and imdur  - defer atenolol, no  strong indication  - Dr. Sanderson later in the month     # Paroxysmal a-fib w/contraindication to anticoagulation due to GI bleeding  # Healed gastric ulcers/Ulcerative colitis (inactive)  Now s/p successful implantation of 24mm Watchman FLX. Post-procedure LAURA showed no evidence of delroy-device leak and no pericardial effusion. Patient seen in PACU. VSS, neuros intact, groin sites soft without hematoma. Post procedure TTE with small anterior effusion. Now completed 6 months of DAPT and on ASA monotherapy.  - ASA 81 mg daily  - Defer atenolol, if she has increasing palpitations could resume low dose    # HTN  # HLD   - Continue losartan, hydral, Imdur and atorvastatin     # CKD  - Stable creatinine 1.4 and GFR of 39. Continue to monitor     # Diabetes type II   - Hgb A1C 7.2.   - Continue current antidiabetics  - Further diabetes management per PCP.     RTC: Structural 8/2025 with echo, labs and EKG prior     WENDI Marr, CNP  [facility] Cardiology Team      CC  Patient Care Team:  Bunny Meyers MD as PCP - General (Internal Medicine)  Preeti James MD as MD (Gastroenterology)  Swati Minor DO as Assigned Nephrology Provider  Jerry Robbins PA-C as Physician Assistant (Gastroenterology)  Bunny Meyers MD as Assigned PCP  Bunny Santa CHRISTIN as Assigned MTM Pharmacist  Allison Hdz, RN as Specialty Care Coordinator (Gastroenterology)  Gianfranco Melendez MD as Intern (Student in organized health care education/training program)  Jethro Moore MD as MD (Hematology & Oncology)  Fracisco Hurtado, RN as Specialty Care Coordinator (Hematology & Oncology)  Vanessa Schneider, RN as Diabetes Educator (Diabetes Education)  Preeti James MD as Assigned Surgical Provider  Saba Lemon MD as MD (Advanced Heart Failure and Transplant Cardiology)  Sheela Galeas, VERONIQUE as Specialty Care Coordinator (Cardiology)  Francisca Mustafa, APRN CNP as Assigned Cancer Care Provider  Geena Ornelas MD as  MD (Dermatology)  Lorena Ambriz, RN as Specialty Care Coordinator (Cardiology)  Davie Rapp MD as MD (Gastroenterology)  Zuleika Glover, RN as Specialty Care Coordinator (Cardiology)  Christal Barker, VERONIQUE as Specialty Care Coordinator (Cardiology)  Stacy Stokes, RN as Specialty Care Coordinator (Cardiology)  Jonny Harper, RN as Specialty Care Coordinator  Angeline Marcus MD as MD (Cardiovascular Disease)  Kami Lim PA-C as Assigned Gastroenterology Provider  Dominique Brock NP as Assigned Heart and Vascular Provider  Ame Yoon [facility] as MTM Pharmacist  Lydia Warren, RN as Registered Nurse (Cardiology)  DOMINIQUE BROCK        Please do not hesitate to contact me if you have any questions/concerns.     Sincerely,     Dominique Brock NP

## 2024-11-01 NOTE — NURSING NOTE
No chief complaint on file.      Vitals were take, medications reconciled and EKG performed.    Grzegorz Martin, EMT    9:50 AM

## 2024-11-01 NOTE — NURSING NOTE
[place] Cardiomyopathy Questionnaire  (CQ-12)  Date: 11/1/2024    30-day post-TAVR      1.  A.  5      B.  5       C.  5  2.  5  3.  7  4.  7  5.  5  6.  5  7.  5  8.  A. 5     B.  5     C.  5

## 2024-11-01 NOTE — PATIENT INSTRUCTIONS
You were seen today in the Cardiovascular Clinic at the [facility] by:       WENDI ALLRED CNP     Your visit summary and instructions are as follows:    Continue aspirin 81 mg daily lifelong.  Delay any nonurgent dental procedures for the first 6 month post TAVR.  For all future dental cleanings and procedures you will need to take antibiotics prior - see instructions below.   Continue cardiac rehab   Follow-up Dr. ARRIOLA at the end of the month   Follow-up in Valve Clinic in 1 year with echo, labs and ECG prior     Prevention of Infective (Bacterial) Endocarditis:  You are at increased risk for developing adverse outcomes from infective endocarditis (IE), also known as bacterial endocarditis (BE) because of the new device in your heart. The guidelines for prevention of IE are to give patients antibiotics prior to any dental procedures that involve manipulation of gingival tissue or the periapical region of teeth, or perforation of the oral mucosa:      It is recommended to take Amoxicillin 2 gm by mouth as a single dose 30 to 60 minutes before procedure.     OR if allergic to Penicillin or Ampicillin:     Cephalexin 2 gm by mouth, or  Clindamycin 600 mg by mouth, or  Azithromycin or Clarithromycin 500 mg PO       Thank you for your visit today!      Please MyChart message me or call my nurse if you have any questions or concerns.     During Business Hours:   [phone], Structural Heart  Celia Perdomo     After hours, weekends or holidays:   [phone], Option #4  Ask to speak to the On-Call Cardiologist. Inform them you are a cardiology patient at the [facility].

## 2024-11-04 ENCOUNTER — HOSPITAL ENCOUNTER (OUTPATIENT)
Dept: CARDIAC REHAB | Facility: CLINIC | Age: 74
Discharge: HOME | End: 2024-11-04
Attending: THORACIC SURGERY (CARDIOTHORACIC VASCULAR SURGERY)
Payer: MEDICARE

## 2024-11-04 ENCOUNTER — OFFICE VISIT (OUTPATIENT)
Dept: NEPHROLOGY | Facility: CLINIC | Age: 74
End: 2024-11-04
Payer: MEDICARE

## 2024-11-04 ENCOUNTER — LAB (OUTPATIENT)
Dept: LAB | Facility: CLINIC | Age: 74
End: 2024-11-04
Payer: MEDICARE

## 2024-11-04 VITALS
HEART RATE: 82 BPM | WEIGHT: 130 LBS | DIASTOLIC BLOOD PRESSURE: 59 MMHG | BODY MASS INDEX: 23.04 KG/M2 | OXYGEN SATURATION: 97 % | SYSTOLIC BLOOD PRESSURE: 128 MMHG | HEIGHT: 63 IN

## 2024-11-04 DIAGNOSIS — R80.9 TYPE 2 DIABETES MELLITUS WITH MICROALBUMINURIA, WITHOUT LONG-TERM CURRENT USE OF INSULIN (H): ICD-10-CM

## 2024-11-04 DIAGNOSIS — I10 HYPERTENSION GOAL BP (BLOOD PRESSURE) < 130/80: ICD-10-CM

## 2024-11-04 DIAGNOSIS — E87.20 METABOLIC ACIDOSIS: ICD-10-CM

## 2024-11-04 DIAGNOSIS — E11.29 TYPE 2 DIABETES MELLITUS WITH MICROALBUMINURIA, WITHOUT LONG-TERM CURRENT USE OF INSULIN (H): ICD-10-CM

## 2024-11-04 DIAGNOSIS — N18.30 CKD (CHRONIC KIDNEY DISEASE) STAGE 3, GFR 30-59 ML/MIN (H): ICD-10-CM

## 2024-11-04 DIAGNOSIS — N25.81 SECONDARY RENAL HYPERPARATHYROIDISM (H): Primary | ICD-10-CM

## 2024-11-04 DIAGNOSIS — E83.52 HYPERCALCEMIA: ICD-10-CM

## 2024-11-04 DIAGNOSIS — I50.33 ACUTE ON CHRONIC DIASTOLIC CONGESTIVE HEART FAILURE (H): ICD-10-CM

## 2024-11-04 DIAGNOSIS — N25.81 SECONDARY RENAL HYPERPARATHYROIDISM (H): ICD-10-CM

## 2024-11-04 DIAGNOSIS — E55.9 VITAMIN D DEFICIENCY: ICD-10-CM

## 2024-11-04 DIAGNOSIS — N18.4 CHRONIC KIDNEY DISEASE, STAGE IV (SEVERE) (H): ICD-10-CM

## 2024-11-04 LAB
ALBUMIN SERPL BCG-MCNC: 4.6 G/DL (ref 3.5–5.2)
ALBUMIN UR-MCNC: NEGATIVE MG/DL
ANION GAP SERPL CALCULATED.3IONS-SCNC: 12 MMOL/L (ref 7–15)
APPEARANCE UR: CLEAR
ATRIAL RATE - MUSE: 79 BPM
BACTERIA #/AREA URNS HPF: ABNORMAL /HPF
BILIRUB UR QL STRIP: NEGATIVE
BUN SERPL-MCNC: 46.5 MG/DL (ref 8–23)
CALCIUM SERPL-MCNC: 9.9 MG/DL (ref 8.8–10.4)
CHLORIDE SERPL-SCNC: 104 MMOL/L (ref 98–107)
COLOR UR AUTO: ABNORMAL
CREAT SERPL-MCNC: 2.15 MG/DL (ref 0.51–0.95)
DIASTOLIC BLOOD PRESSURE - MUSE: NORMAL MMHG
EGFRCR SERPLBLD CKD-EPI 2021: 24 ML/MIN/1.73M2
EST. AVERAGE GLUCOSE BLD GHB EST-MCNC: 146 MG/DL
GLUCOSE BLDC GLUCOMTR-MCNC: 159 MG/DL (ref 70–99)
GLUCOSE BLDC GLUCOMTR-MCNC: 216 MG/DL (ref 70–99)
GLUCOSE SERPL-MCNC: 162 MG/DL (ref 70–99)
GLUCOSE UR STRIP-MCNC: >1000 MG/DL
HBA1C MFR BLD: 6.7 % (ref 0–5.6)
HCO3 SERPL-SCNC: 21 MMOL/L (ref 22–29)
HGB UR QL STRIP: NEGATIVE
INTERPRETATION ECG - MUSE: NORMAL
KETONES UR STRIP-MCNC: NEGATIVE MG/DL
LEUKOCYTE ESTERASE UR QL STRIP: ABNORMAL
Lab: NORMAL
NITRATE UR QL: NEGATIVE
P AXIS - MUSE: 74 DEGREES
PERFORMING LABORATORY: NORMAL
PH UR STRIP: 5 [PH] (ref 5–7)
PHOSPHATE SERPL-MCNC: 4.1 MG/DL (ref 2.5–4.5)
POTASSIUM SERPL-SCNC: 5 MMOL/L (ref 3.4–5.3)
PR INTERVAL - MUSE: 160 MS
PTH-INTACT SERPL-MCNC: 79 PG/ML (ref 15–65)
QRS DURATION - MUSE: 68 MS
QT - MUSE: 410 MS
QTC - MUSE: 470 MS
R AXIS - MUSE: 95 DEGREES
RBC #/AREA URNS AUTO: ABNORMAL /HPF
SKIP: ABNORMAL
SODIUM SERPL-SCNC: 137 MMOL/L (ref 135–145)
SP GR UR STRIP: 1.01 (ref 1–1.03)
SPECIMEN STATUS: NORMAL
SQUAMOUS #/AREA URNS AUTO: ABNORMAL /LPF
SYSTOLIC BLOOD PRESSURE - MUSE: NORMAL MMHG
T AXIS - MUSE: 23 DEGREES
TEST NAME: NORMAL
TRANS CELLS #/AREA URNS HPF: ABNORMAL /HPF
UROBILINOGEN UR STRIP-MCNC: NORMAL MG/DL
VENTRICULAR RATE- MUSE: 79 BPM
VIT D+METAB SERPL-MCNC: 31 NG/ML (ref 20–50)
VIT D+METAB SERPL-MCNC: 35 NG/ML (ref 20–50)
WBC #/AREA URNS AUTO: ABNORMAL /HPF

## 2024-11-04 PROCEDURE — 82306 VITAMIN D 25 HYDROXY: CPT

## 2024-11-04 PROCEDURE — 82397 CHEMILUMINESCENT ASSAY: CPT

## 2024-11-04 PROCEDURE — 81001 URINALYSIS AUTO W/SCOPE: CPT

## 2024-11-04 PROCEDURE — 83036 HEMOGLOBIN GLYCOSYLATED A1C: CPT

## 2024-11-04 PROCEDURE — 83970 ASSAY OF PARATHORMONE: CPT

## 2024-11-04 PROCEDURE — 80069 RENAL FUNCTION PANEL: CPT

## 2024-11-04 PROCEDURE — 36415 COLL VENOUS BLD VENIPUNCTURE: CPT

## 2024-11-04 PROCEDURE — 99214 OFFICE O/P EST MOD 30 MIN: CPT | Performed by: INTERNAL MEDICINE

## 2024-11-04 PROCEDURE — 93798 PHYS/QHP OP CAR RHAB W/ECG: CPT

## 2024-11-04 ASSESSMENT — PAIN SCALES - GENERAL: PAINLEVEL_OUTOF10: NO PAIN (0)

## 2024-11-04 ASSESSMENT — PATIENT HEALTH QUESTIONNAIRE - PHQ9: SUM OF ALL RESPONSES TO PHQ QUESTIONS 1-9: 0

## 2024-11-04 NOTE — PATIENT INSTRUCTIONS
Trial decreasing torsemide to twice a week - send an update In a few weeks on if any swelling returning.

## 2024-11-04 NOTE — NURSING NOTE
Keerthi Montoya's goals for this visit include:   Chief Complaint   Patient presents with    RECHECK     6mo recheck CKD       She requests these members of her care team be copied on today's visit information: no    PCP: Bunny Meyers    Referring Provider:  Swati Minor DO  [address]  [place] [address]    /59 (BP Location: Left arm, Patient Position: Sitting, Cuff Size: Adult Regular)   Pulse 82   Wt 59 kg (130 lb)   LMP  (LMP Unknown)   SpO2 97%   BMI 23.03 kg/m      Do you need any medication refills at today's visit? No    Lucy Purdy LPN

## 2024-11-04 NOTE — PROGRESS NOTES
"11/04/24   HPI: Keerthi Montoya is a 74 year old female who presents for follow-up of proteinuria.     History taken from previous notes:  Ms. Montoya's hx is significant for DM dx at least 10 years ago - very poor control for years (documented A1C levels 9.4-11.7% from 2011 to 2017); no known retinopathy per patient. She has longstanding hypertension dx when she was 18 years old. Additional hx includes ulcerative colitis and hypothyroidism (following thyroidectomy at age 24). Her ulcerative colitis was dx ~2017 but she feels she likely had trouble with this disease for years. She reports having loose stool for a long time prior to receiving therapy for her UC; she feels the blood in the stool may have contaminated previous urine samples potentially.                  02/10/20 :  Creatinine has been 0.9-1.13 in the past year; up slightly today at 1.3. Last UPCR 0.42 g/g in May. On losartan 100 mg daily. Bronchitis - started on levaquin on Saturday. No swelling, no NSAIDs. Blood pressure at times has been down to 118 - she reports blood pressures less than 130 typically but at times above. She has lost weight intentionally - 158 lbs last year and now 146 lbs. She does not smoke. She has not been eating/drinking today.      08/03/20:  In the setting of COVID-19 pandemic, this visit was changed to a telephone visit. Patient did not have video capability.  Because of murmur appreciated on exam at our previous visit, I sent her for a TTE which showed aortic stenosis as well as diastolic dysfunction. She was seen by cardiology in March and continues to follow with them at this time. She is felt to have severe pulmonary hypertension, CHF, along with aortic stenosis. She is currently taking lasix 20 mg BID. She remains on losartan 100 mg daily. She reports feeling \"great\". She does have some swelling at times - Dr. Almendarez restarted norvasc. BP has been 124/58; typically 120s. Breathing has been stable. Stable UC " sxs.    02/09/21:  video visit. Cr 1.18-1.55 in the past year; 1.15 last week. Feeling well. No issues with ulcerative colitis. BP at home has not been registering recently. She is seeing cardiology next week but again virtual. No swelling concerns. Currently taking lasix 40 mg AM and 20 mg PM. No NSAIDs.     04/26/22: video visit. Creatinine is up at this time. She reports feeling good. Babysitting full time. She reports that she cut be slightly dry at this time. Weights have been right around 140. No swelling. Breathing is stable. Blood pressure at home three times a week - has been 120s/60s. She is taking torsemide 20 mg AM and 10 mg afternoon. She has been active. UC is well controlled. She has been on Jardiance for the past month.     05/02/23: video visit. Torsemide has since been changed to just as needed. Not needed very often. UPCR is the lowest it has been at 0.22 g/g. Baseline seems to be 1.4-1.7 at this time. A1C very high last week above 11%. UC lately has been good. She has loose stools at times. Did not tolerate oral iron previously. BP in the 114 range.     11/07/23: video visit. Doing fine at this time. She had shingles but doing much better - head to toe afffected - hospitalized for volume overload. Eating and drinking well. On torsemide 10 mg daily. ON losartan 100 mg and Jardiance. She denies difficulty with dehydration. No swelling now for the past 4-5 weeks. BP lately has been good - 103/69. She doesn't have other readings right now. She reports BP will vary - not high for a long time. About 1-2 times a week she will see the BP less than 110. No loose stools.     05/06/24: in person visit. She reports feeling well overall. She was recently changed on her diabetes meds and feels things are likely improved control. She denies any lightheadedness or dizziness but does have increased thirsty and dry mouth at times. No swelling. No sob, no orthopnea. No orthostatic sxs. She has not had sxs of volume  overload for some time and weight has been stable at 135-136 lbs. She does question whether she could be dry at times. hR at home is typically 47-60 - never less than 47. BP is typically in the 120s at home. No calcium supplements. No tums/rolaids. She has cut back on calcium intake.     11/04/24: in person visit. Creatinine has been 1.4-2. Since April - 1.42 last month. She has had multiple changes in health in the past year - shingles in Sept 2023. TAVR in Sept 2024. Had an angiogram in July 2024. She has been taking torsemide since her shingles infection in Sept 2023 - had swelling at that time that improved. Took 3 months to improve but has not had swelling in a long time and remains on torsemide.     Current Outpatient Medications   Medication Sig Dispense Refill    aspirin 81 MG EC tablet Take 1 tablet (81 mg) by mouth daily 90 tablet 3    atorvastatin (LIPITOR) 40 MG tablet Take 1 tablet (40 mg) by mouth daily 90 tablet 3    blood glucose (ACCU-CHEK FELIPE PLUS) test strip 200 strips by In Vitro route 2 times daily Use to test blood sugar 2 times daily or as directed. 200 strip 4    Cyanocobalamin (B-12) 1000 MCG TABS Take 1,000 mcg by mouth three times a week      diphenhydrAMINE (BENADRYL) 25 MG tablet Take 25 mg by mouth every 6 hours as needed for itching or allergies      empagliflozin (JARDIANCE) 10 MG TABS tablet Take 1 tablet (10 mg) by mouth daily 90 tablet 3    glipiZIDE (GLUCOTROL XL) 10 MG 24 hr tablet Take 2 tablets (20 mg) by mouth daily. 180 tablet 1    hydrALAZINE (APRESOLINE) 50 MG tablet Take 1 tablet (50 mg) by mouth 3 times daily 180 tablet 3    isosorbide mononitrate (IMDUR) 30 MG 24 hr tablet Take 1 tablet (30 mg) by mouth daily. 90 tablet 3    levothyroxine (SYNTHROID/LEVOTHROID) 125 MCG tablet Take 1 tablet (125 mcg) by mouth daily. 90 tablet 2    losartan (COZAAR) 100 MG tablet Take 1 tablet (100 mg) by mouth daily 90 tablet 3    mesalamine (APRISO ER) 0.375 g 24 hr capsule Take 4  capsules (1.5 g) by mouth daily 360 capsule 3    pantoprazole (PROTONIX) 40 MG EC tablet Take 1 tablet (40 mg) by mouth daily. 90 tablet 3    senna (SENOKOT) 8.6 MG tablet Take 1 tablet by mouth daily as needed for constipation 30 tablet 3    torsemide (DEMADEX) 10 MG tablet Take 1 tablet (10 mg) by mouth every other day 40 tablet 1    azithromycin (ZITHROMAX) 250 MG tablet Take 2 tablets (500 mg) by mouth once as needed (Take 30-60 minutes prior to dental procedure or cleaning). (Patient not taking: Reported on 11/4/2024) 2 tablet 2    dulaglutide (TRULICITY) 1.5 MG/0.5ML pen Inject 1.5 mg Subcutaneous every 7 days After 4 doses of 0.75 mg (Patient not taking: Reported on 9/13/2024) 2 mL 1     No current facility-administered medications for this visit.       Exam:GENERAL APPEARANCE: alert and no distress  CV - RRR  Ext - no edema    Results:   Lab on 11/04/2024   Component Date Value Ref Range Status    Estimated Average Glucose 11/04/2024 146 (H)  <117 mg/dL Final    Hemoglobin A1C 11/04/2024 6.7 (H)  0.0 - 5.6 % Final    Normal <5.7%   Prediabetes 5.7-6.4%    Diabetes 6.5% or higher     Note: Adopted from ADA consensus guidelines.    Specimen Status 11/04/2024 Specimen received. Reordered and sent to performing laboratory. Report to follow upon completion.   Final    Performing Laboratory 11/04/2024 [facility]   Final    Test Name 11/04/2024 PTHrP (parathyroid related peptide) - please send to [facility] - do not run in house.   Final    Test Code 11/04/2024 PTHrP (parathyroid related peptide) - please send to [facility] - do not run in house.   Final    Color Urine 11/04/2024 Light Yellow  Colorless, Straw, Light Yellow, Yellow Final    Appearance Urine 11/04/2024 Clear  Clear Final    Glucose Urine 11/04/2024 >1000 (A)  Negative mg/dL Final    Bilirubin Urine 11/04/2024 Negative  Negative Final    Ketones Urine 11/04/2024 Negative  Negative mg/dL Final    Specific Gravity Urine 11/04/2024 1.009  0.999 -  1.035 Final    Blood Urine 11/04/2024 Negative  Negative Final    pH Urine 11/04/2024 5.0  5.0 - 7.0 Final    Protein Albumin Urine 11/04/2024 Negative  Negative mg/dL Final    Urobilinogen Urine 11/04/2024 Normal  Normal, 2.0 mg/dL Final    Nitrite Urine 11/04/2024 Negative  Negative Final    Leukocyte Esterase Urine 11/04/2024 Small (A)  Negative Final           Lab results were reviewed and interpreted.       Assessment/Plan:   1. CKD Stage 4: risks for CKD include poorly controlled diabetes as well as longstanding hypertension. Mesalamine has potential implications on the kidney but with her function stable, this seems less likely to be causing kidney injury previously. She does have proteinuria without hematuria which does fit with diabetic nephropathy. She is already on losartan 100 mg daily. Educated on benefits of optimal weight, avoidance of NSAIDs, blood pressure well controlled and also diabetes control as the important things to keep the kidney function stable. Myeloma testing normal previously. Now on SGLT2 inhibitor.   - Stressed the importance of good diabetes control  - Stressed the risk of kidney progression if diabetes remains uncontrolled.   - much variability - lowering torsemide with hopes of seeing less hemodynami variability.      2. Hypertension: goal of <130/80      3. Ulcerative colitis: she reports good control with mesalamine. Very rarely mesalamine causes an acute interstitial picture but there are reports of this occurring as low as 0.3% of the time. We will plan to follow. On discussion with her, she feels that she may have been chronically volume depleted prior to getting her treatment for ulcerative colitis.     4. Hypothyroidism: well controlled - 4 in August.      5. DM:  Very poor control documented in our system from 2011 to 2017 with A1C levels between 9.4 and 11.7% at this time. It is possible that it was uncontrolled even further back as A1C levels are not available at this  time dating back further than 2011. Encouraged ongoing optimization of diabetes to help slow progression of kidney disease. A1C high in April at 10.5 % but improved to 6.7% most recently.      6. CHF/pulmonary hypertension/aortic stenosis: lowering torsemide as noted above. Stressed the importance of adding addt torsemide as needed if weight gain/swelling/increased SOB.     7. SEcondary hyperparathyroidism:  in May 2024.  Vitamin D at goal at this time.  Calcium 9.9.     9 Metabolic acidosis: bicarbonate 21 -  will monitor for now.     Patient Instructions   Trial decreasing torsemide to twice a week - send an update In a few weeks on if any swelling returning.          Swati Minor, DO

## 2024-11-06 DIAGNOSIS — N18.4 CKD (CHRONIC KIDNEY DISEASE) STAGE 4, GFR 15-29 ML/MIN (H): Primary | ICD-10-CM

## 2024-11-07 LAB — MAYO MISC RESULT: NORMAL

## 2024-11-08 ENCOUNTER — HOSPITAL ENCOUNTER (OUTPATIENT)
Dept: CARDIAC REHAB | Facility: CLINIC | Age: 74
Discharge: HOME | End: 2024-11-08
Attending: THORACIC SURGERY (CARDIOTHORACIC VASCULAR SURGERY)
Payer: MEDICARE

## 2024-11-08 PROCEDURE — 93798 PHYS/QHP OP CAR RHAB W/ECG: CPT

## 2024-11-11 ENCOUNTER — HOSPITAL ENCOUNTER (OUTPATIENT)
Dept: CARDIAC REHAB | Facility: CLINIC | Age: 74
Discharge: HOME | End: 2024-11-11
Attending: THORACIC SURGERY (CARDIOTHORACIC VASCULAR SURGERY)
Payer: MEDICARE

## 2024-11-11 LAB
GLUCOSE BLDC GLUCOMTR-MCNC: 123 MG/DL (ref 70–99)
GLUCOSE BLDC GLUCOMTR-MCNC: 136 MG/DL (ref 70–99)

## 2024-11-11 PROCEDURE — 93798 PHYS/QHP OP CAR RHAB W/ECG: CPT

## 2024-11-12 ENCOUNTER — PATIENT OUTREACH (OUTPATIENT)
Dept: NEPHROLOGY | Facility: CLINIC | Age: 74
End: 2024-11-12
Payer: MEDICARE

## 2024-11-12 NOTE — TELEPHONE ENCOUNTER
Contacted patient regarding the following lab result note per Dr. Minor:    Attached are your labs we discussed at your visit. As we discussed, your creatinine was slightly higher on this most recent check. Lowering the torsemide just slightly as we discussed may keep the fluid off of you but also help the kidney function. The piece that I didn't set up well at your visit - I recommend doing another lab check in the next 2-3 weeks for another time point. I have this lab order in your chart and it can be done at the [facility] closes to you.    PTH related peptide was normal.    Swati Minor,     [place] verbalized understanding and will plan to repeat a renal panel on 11/25 when she has other provider lab work done as well. Added to lab appointment note for this date. Patient had not further questions or concerns.

## 2024-11-15 ENCOUNTER — HOSPITAL ENCOUNTER (OUTPATIENT)
Dept: CARDIAC REHAB | Facility: CLINIC | Age: 74
Discharge: HOME | End: 2024-11-15
Attending: THORACIC SURGERY (CARDIOTHORACIC VASCULAR SURGERY)
Payer: MEDICARE

## 2024-11-15 PROCEDURE — 93798 PHYS/QHP OP CAR RHAB W/ECG: CPT

## 2024-11-18 ENCOUNTER — HOSPITAL ENCOUNTER (OUTPATIENT)
Dept: CARDIAC REHAB | Facility: CLINIC | Age: 74
Discharge: HOME | End: 2024-11-18
Attending: THORACIC SURGERY (CARDIOTHORACIC VASCULAR SURGERY)
Payer: MEDICARE

## 2024-11-18 PROCEDURE — 93798 PHYS/QHP OP CAR RHAB W/ECG: CPT

## 2024-11-22 ENCOUNTER — HOSPITAL ENCOUNTER (OUTPATIENT)
Dept: CARDIAC REHAB | Facility: CLINIC | Age: 74
Discharge: HOME | End: 2024-11-22
Attending: THORACIC SURGERY (CARDIOTHORACIC VASCULAR SURGERY)
Payer: MEDICARE

## 2024-11-22 PROCEDURE — 93798 PHYS/QHP OP CAR RHAB W/ECG: CPT

## 2024-11-25 ENCOUNTER — HOSPITAL ENCOUNTER (OUTPATIENT)
Dept: CARDIAC REHAB | Facility: CLINIC | Age: 74
Discharge: HOME | End: 2024-11-25
Attending: THORACIC SURGERY (CARDIOTHORACIC VASCULAR SURGERY)
Payer: MEDICARE

## 2024-11-25 ENCOUNTER — OFFICE VISIT (OUTPATIENT)
Dept: CARDIOLOGY | Facility: CLINIC | Age: 74
End: 2024-11-25
Attending: INTERNAL MEDICINE
Payer: MEDICARE

## 2024-11-25 ENCOUNTER — LAB (OUTPATIENT)
Dept: LAB | Facility: CLINIC | Age: 74
End: 2024-11-25
Attending: INTERNAL MEDICINE
Payer: MEDICARE

## 2024-11-25 VITALS
OXYGEN SATURATION: 96 % | DIASTOLIC BLOOD PRESSURE: 66 MMHG | SYSTOLIC BLOOD PRESSURE: 151 MMHG | WEIGHT: 134.4 LBS | HEART RATE: 75 BPM | BODY MASS INDEX: 24.11 KG/M2

## 2024-11-25 DIAGNOSIS — I27.20 PULMONARY HTN (H): ICD-10-CM

## 2024-11-25 DIAGNOSIS — R06.09 DOE (DYSPNEA ON EXERTION): ICD-10-CM

## 2024-11-25 DIAGNOSIS — N18.4 CKD (CHRONIC KIDNEY DISEASE) STAGE 4, GFR 15-29 ML/MIN (H): ICD-10-CM

## 2024-11-25 LAB
ALBUMIN SERPL BCG-MCNC: 4.1 G/DL (ref 3.5–5.2)
ANION GAP SERPL CALCULATED.3IONS-SCNC: 10 MMOL/L (ref 7–15)
BUN SERPL-MCNC: 24.5 MG/DL (ref 8–23)
CALCIUM SERPL-MCNC: 9.6 MG/DL (ref 8.8–10.4)
CHLORIDE SERPL-SCNC: 108 MMOL/L (ref 98–107)
CREAT SERPL-MCNC: 1.37 MG/DL (ref 0.51–0.95)
EGFRCR SERPLBLD CKD-EPI 2021: 41 ML/MIN/1.73M2
ERYTHROCYTE [DISTWIDTH] IN BLOOD BY AUTOMATED COUNT: 13.2 % (ref 10–15)
GLUCOSE SERPL-MCNC: 161 MG/DL (ref 70–99)
HCO3 SERPL-SCNC: 21 MMOL/L (ref 22–29)
HCT VFR BLD AUTO: 40.1 % (ref 35–47)
HGB BLD-MCNC: 13 G/DL (ref 11.7–15.7)
MCH RBC QN AUTO: 32.2 PG (ref 26.5–33)
MCHC RBC AUTO-ENTMCNC: 32.4 G/DL (ref 31.5–36.5)
MCV RBC AUTO: 99 FL (ref 78–100)
NT-PROBNP SERPL-MCNC: 3191 PG/ML (ref 0–900)
PHOSPHATE SERPL-MCNC: 3 MG/DL (ref 2.5–4.5)
PLATELET # BLD AUTO: 195 10E3/UL (ref 150–450)
POTASSIUM SERPL-SCNC: 5 MMOL/L (ref 3.4–5.3)
RBC # BLD AUTO: 4.04 10E6/UL (ref 3.8–5.2)
SODIUM SERPL-SCNC: 139 MMOL/L (ref 135–145)
WBC # BLD AUTO: 6.8 10E3/UL (ref 4–11)

## 2024-11-25 PROCEDURE — G0463 HOSPITAL OUTPT CLINIC VISIT: HCPCS | Performed by: INTERNAL MEDICINE

## 2024-11-25 PROCEDURE — 36415 COLL VENOUS BLD VENIPUNCTURE: CPT | Performed by: PATHOLOGY

## 2024-11-25 PROCEDURE — 99215 OFFICE O/P EST HI 40 MIN: CPT | Performed by: INTERNAL MEDICINE

## 2024-11-25 PROCEDURE — 85027 COMPLETE CBC AUTOMATED: CPT | Performed by: PATHOLOGY

## 2024-11-25 PROCEDURE — 80069 RENAL FUNCTION PANEL: CPT | Performed by: PATHOLOGY

## 2024-11-25 PROCEDURE — 93798 PHYS/QHP OP CAR RHAB W/ECG: CPT | Performed by: OCCUPATIONAL THERAPIST

## 2024-11-25 PROCEDURE — 83880 ASSAY OF NATRIURETIC PEPTIDE: CPT | Performed by: PATHOLOGY

## 2024-11-25 RX ORDER — LIDOCAINE 40 MG/G
CREAM TOPICAL
OUTPATIENT
Start: 2024-11-25

## 2024-11-25 ASSESSMENT — PAIN SCALES - GENERAL: PAINLEVEL_OUTOF10: NO PAIN (0)

## 2024-11-25 NOTE — PROGRESS NOTES
Service Date: 2024      RE: Keerthi Montoya   MRN: [id number]   : 1950      Dear Dr. Almendarez:      We had the pleasure of seeing Keerthi Montoya in our Pulmonary Hypertension Clinic at the [facility].  As you know, is a 74-year-old female with past medical history significant for hypertension, diabetes, heart failure preserved ejection fraction, atrial fibrillation, pulmonary hypertension and low-flow low gradient aortic stenosis      We saw her this summer prior to her transcatheter aortic valve implantation for a preop assessment.  We recommended her to follow-up with us for further management of her pulm hypertension after her transcatheter aortic valve implantation.  She tolerated the transcatheter aortic valve implantation very well.  She was quite asymptomatic prior to the procedure.  Thus, she has not noticed any significant changes after her TAVR.    She continues to take care of her grandkids and remains very active.  She denies having any exertional shortness of breath.  Able to do all her activities without limitation.  I would currently characterize her as functional class I.  She has no chest pain or chest pressure.  No exertional presyncope or syncope.  No lower extremity swelling or abdominal distention.  No PND or orthopnea.  No recent hospitalization or ER visits.     PAST MEDICAL HISTORY:   1.  Hypertension.   2.  Diabetes mellitus, controlled on medication.   3.  Hypertension, longstanding since the age of 52.   4.   Atrial fibrillation status post Watchman device due to GI bleeding  5.  Heart failure with preserved ejection fraction with left atrial stiffness   6.  Ulcerative colitis.   7.  Diverticulitis, status post colectomy.   8.  Incisional hernia.      PAST SURGICAL HISTORY:   1.  Status post thyroidectomy at the age of 24.   2.  Status post cholecystectomy.   3.  Colectomy for diverticulitis.   4.  Incisional hernia repair.   5.  Status post  Watchman device     MEDICATIONS:   Current Outpatient Medications   Medication Sig Dispense Refill    aspirin 81 MG EC tablet Take 1 tablet (81 mg) by mouth daily 90 tablet 3    atorvastatin (LIPITOR) 40 MG tablet Take 1 tablet (40 mg) by mouth daily 90 tablet 3    azithromycin (ZITHROMAX) 250 MG tablet Take 2 tablets (500 mg) by mouth once as needed (Take 30-60 minutes prior to dental procedure or cleaning). 2 tablet 2    blood glucose (ACCU-CHEK FELIPE PLUS) test strip 200 strips by In Vitro route 2 times daily Use to test blood sugar 2 times daily or as directed. 200 strip 4    Cyanocobalamin (B-12) 1000 MCG TABS Take 1,000 mcg by mouth three times a week      diphenhydrAMINE (BENADRYL) 25 MG tablet Take 25 mg by mouth every 6 hours as needed for itching or allergies      empagliflozin (JARDIANCE) 10 MG TABS tablet Take 1 tablet (10 mg) by mouth daily 90 tablet 3    glipiZIDE (GLUCOTROL XL) 10 MG 24 hr tablet Take 2 tablets (20 mg) by mouth daily. 180 tablet 1    hydrALAZINE (APRESOLINE) 50 MG tablet Take 1 tablet (50 mg) by mouth 3 times daily 180 tablet 3    isosorbide mononitrate (IMDUR) 30 MG 24 hr tablet Take 1 tablet (30 mg) by mouth daily. 90 tablet 3    levothyroxine (SYNTHROID/LEVOTHROID) 125 MCG tablet Take 1 tablet (125 mcg) by mouth daily. 90 tablet 2    losartan (COZAAR) 100 MG tablet Take 1 tablet (100 mg) by mouth daily 90 tablet 3    mesalamine (APRISO ER) 0.375 g 24 hr capsule Take 4 capsules (1.5 g) by mouth daily 360 capsule 3    pantoprazole (PROTONIX) 40 MG EC tablet Take 1 tablet (40 mg) by mouth daily. 90 tablet 3    senna (SENOKOT) 8.6 MG tablet Take 1 tablet by mouth daily as needed for constipation 30 tablet 3    torsemide (DEMADEX) 10 MG tablet Take 1 tablet (10 mg) by mouth every other day 40 tablet 1    dulaglutide (TRULICITY) 1.5 MG/0.5ML pen Inject 1.5 mg Subcutaneous every 7 days After 4 doses of 0.75 mg (Patient not taking: Reported on 11/25/2024) 2 mL 1     No current  facility-administered medications for this visit.     REVIEW OF SYSTEMS:  A detailed 10-point review of systems was obtained as described in the History of Present Illness.  All other systems reviewed and are negative.      SOCIAL HISTORY:  She does not smoke.  She drinks alcohol socially.  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  She is a .  Her    from Alzheimer's.  She has 5 kids altogether.  All of them are grown and healthy.      FAMILY HISTORY:  Mom had valvular heart disease, aortic aneurysm, but she  of stroke.  No other significant family history.  No history of sudden cardiac death, premature coronary artery disease, pulmonary hypertension or cardiomyopathy.      PHYSICAL EXAMINATION:    BP (!) 151/66 (BP Location: Right arm, Patient Position: Chair, Cuff Size: Adult Regular)   Pulse 75   Wt 61 kg (134 lb 6.4 oz)   LMP  (LMP Unknown)   SpO2 96%   BMI 24.11 kg/m    She was comfortable.  She was in no apparent distress.  She had no pallor, cyanosis or jaundice.  Her neck exam revealed no jugular venous distention.  Cardiac auscultation revealed normal S1, loud P2, 2/6 holosystolic murmur in the right lower sternal border consistent with a TR.  She had no rub or gallop.  Auscultation of her lungs revealed equal air entry on both sides with no added sounds.  Her abdomen was soft with normal bowel sounds, no tenderness, no rigidity, no guarding.  She had no focal neurological deficit.  Her extremities showed no edema.     Recent Results (from the past week)   N terminal pro BNP outpatient    Collection Time: 24  7:40 AM   Result Value Ref Range    N Terminal Pro BNP Outpatient 3,191 (H) 0 - 900 pg/mL   CBC with platelets    Collection Time: 24  7:40 AM   Result Value Ref Range    WBC Count 6.8 4.0 - 11.0 10e3/uL    RBC Count 4.04 3.80 - 5.20 10e6/uL    Hemoglobin 13.0 11.7 - 15.7 g/dL    Hematocrit 40.1 35.0 - 47.0 %    MCV 99 78 - 100 fL    MCH 32.2 26.5 - 33.0 pg     MCHC 32.4 31.5 - 36.5 g/dL    RDW 13.2 10.0 - 15.0 %    Platelet Count 195 150 - 450 10e3/uL   Renal panel    Collection Time: 11/25/24  7:40 AM   Result Value Ref Range    Sodium 139 135 - 145 mmol/L    Potassium 5.0 3.4 - 5.3 mmol/L    Chloride 108 (H) 98 - 107 mmol/L    Carbon Dioxide (CO2) 21 (L) 22 - 29 mmol/L    Anion Gap 10 7 - 15 mmol/L    Glucose 161 (H) 70 - 99 mg/dL    Urea Nitrogen 24.5 (H) 8.0 - 23.0 mg/dL    Creatinine 1.37 (H) 0.51 - 0.95 mg/dL    GFR Estimate 41 (L) >60 mL/min/1.73m2    Calcium 9.6 8.8 - 10.4 mg/dL    Albumin 4.1 3.5 - 5.2 g/dL    Phosphorus 3.0 2.5 - 4.5 mg/dL     Echocardiogram (10/2024)  Global and regional left ventricular function is normal with an EF of 60-65%.  Global right ventricular function is moderately reduced.  S/p TAVR with 23 mm Ramsay Trini 3 Ultra Resilia prosthesis on 9/4/24.  Doppler interrogation of the aortic valve is normal. (MG 7 mm Hg, PV 1.9 m/s,  DVI 0.66).  There is trace paravalvular regurgitation.  Notch noted in pulmonic flow suggestive of Pulm HTN.     This study was compared with the study from 9/5/24 .  No significant changes noted.     ASSESSMENT AND PLAN:      In summary, Ms. Keerthi Montoya is a 74-year-old female with past medical history significant for hypertension, diabetes, heart failure preserved ejection fraction, atrial fibrillation,  low-flow low gradient aortic stenosis status post transcatheter aortic valve replacement, and combined pre and postcapillary pulm hypertension who returns today for follow-up.      1.  Combined pre and postcapillary pulm hypertension   2.  Heart failure with preserved ejection fraction   3.  Aortic stenosis status post transcatheter aortic valve replacement   4.  Moderate RV dysfunction.  Clinically not in right heart failure.    She is currently not in heart failure.  She is functional class I.  She is euvolemic on exam.  Her NT proBNP continues to remain elevated but it is downtrending after her  transcatheter aortic valve replacement.  She is on SGLT2 inhibitors which I recommend her to continue.  She is euvolemic on the current dose of torsemide 10 mg every other day which I recommend her to continue.  She understands importance of low-salt diet and fluid restriction.  Her systemic blood pressure is adequately controlled.  Her transcatheter aortic valve is functioning well with no residual stenosis or regurgitation.    As she really had a significant precapillary component, I have recommended her to have a repeat right heart catheterization in 6 months after her transcatheter aortic valve replacement to assess how much residual precapillary competent she has after her transcatheter aortic valve replacement.  We can decide on the need for pulmonary vasodilator therapy based on his repeat right heart catheterization.  Will have you scheduled in February 2025.  She could potentially participate in our phase 3 clinical trial evaluating the safety and efficacy of oral Levosimendan if she still continues to have significant combined pre and postcapillary pulm hypertension.    She will call us in the interim should any further worsening symptoms.    It was a pleasure meeting MsAudrey Keerthi Montoya in our Pulmonary Hypertension Clinic at the [facility].  We thank you for involving us in her care.  Please do not hesitate to call us in the interim if you have any further questions.     Total time today was 44 minutes reviewing notes, imaging, labs, patient visit, orders and documentation.        Sincerely,    Angeline Marcus MD   [facility]  Heart Failure, Transplant, and Mechanical Circulatory Support Cardiology   Cardiovascular Division  [facility]   [phone]

## 2024-11-25 NOTE — NURSING NOTE
"Patient educated to the following during visit and educated to contact RN or MD for any questions.     Plan reviewed with patient:   Schedule in March:  RHC with exercise, CPX, ECho        Reviewed Med list and verified all medications with patient.   Lab:  results reviewed in clinic. Patient demonstrated understanding.   Diet: Patient instructed regarding a heart healthy diet, including discussion of reduced fat and sodium intake. Patient demonstrated understanding of this information and agreed to call with further questions or concerns  Completed AVS  Follow-up orders placed  Marked chart \"Ready for Checkout\"  Sent msg to Jonny Harper RN (P: [phone])  and  CHRISTO Hernandez    Patient verbalized understanding of plan and follow-up and agreed to call with further questions or concerns.     Patient will be scheduled in future    Zuleika Glover RN      "

## 2024-11-25 NOTE — LETTER
2024      RE: Keerthi Montoya  [address] [place] [id number]       Dear Colleague,    Thank you for the opportunity to participate in the care of your patient, Keerthi Montoya, at the [facility] [place] [facility]. Please see a copy of my visit note below.    Service Date: 2024      RE: Keerthi Montoya   MRN: [id number]   : 1950      Dear Dr. Almendarez:      We had the pleasure of seeing Keerthi Montoya in our Pulmonary Hypertension Clinic at the [facility].  As you know, is a 74-year-old female with past medical history significant for hypertension, diabetes, heart failure preserved ejection fraction, atrial fibrillation, pulmonary hypertension and low-flow low gradient aortic stenosis      We saw her this summer prior to her transcatheter aortic valve implantation for a preop assessment.  We recommended her to follow-up with us for further management of her pulm hypertension after her transcatheter aortic valve implantation.  She tolerated the transcatheter aortic valve implantation very well.  She was quite asymptomatic prior to the procedure.  Thus, she has not noticed any significant changes after her TAVR.    She continues to take care of her grandkids and remains very active.  She denies having any exertional shortness of breath.  Able to do all her activities without limitation.  I would currently characterize her as functional class I.  She has no chest pain or chest pressure.  No exertional presyncope or syncope.  No lower extremity swelling or abdominal distention.  No PND or orthopnea.  No recent hospitalization or ER visits.     PAST MEDICAL HISTORY:   1.  Hypertension.   2.  Diabetes mellitus, controlled on medication.   3.  Hypertension, longstanding since the age of 52.   4.   Atrial fibrillation status post Watchman device due to GI bleeding  5.  Heart  failure with preserved ejection fraction with left atrial stiffness   6.  Ulcerative colitis.   7.  Diverticulitis, status post colectomy.   8.  Incisional hernia.      PAST SURGICAL HISTORY:   1.  Status post thyroidectomy at the age of 24.   2.  Status post cholecystectomy.   3.  Colectomy for diverticulitis.   4.  Incisional hernia repair.   5.  Status post Watchman device     MEDICATIONS:   Current Outpatient Medications   Medication Sig Dispense Refill     aspirin 81 MG EC tablet Take 1 tablet (81 mg) by mouth daily 90 tablet 3     atorvastatin (LIPITOR) 40 MG tablet Take 1 tablet (40 mg) by mouth daily 90 tablet 3     azithromycin (ZITHROMAX) 250 MG tablet Take 2 tablets (500 mg) by mouth once as needed (Take 30-60 minutes prior to dental procedure or cleaning). 2 tablet 2     blood glucose (ACCU-CHEK FELIPE PLUS) test strip 200 strips by In Vitro route 2 times daily Use to test blood sugar 2 times daily or as directed. 200 strip 4     Cyanocobalamin (B-12) 1000 MCG TABS Take 1,000 mcg by mouth three times a week       diphenhydrAMINE (BENADRYL) 25 MG tablet Take 25 mg by mouth every 6 hours as needed for itching or allergies       empagliflozin (JARDIANCE) 10 MG TABS tablet Take 1 tablet (10 mg) by mouth daily 90 tablet 3     glipiZIDE (GLUCOTROL XL) 10 MG 24 hr tablet Take 2 tablets (20 mg) by mouth daily. 180 tablet 1     hydrALAZINE (APRESOLINE) 50 MG tablet Take 1 tablet (50 mg) by mouth 3 times daily 180 tablet 3     isosorbide mononitrate (IMDUR) 30 MG 24 hr tablet Take 1 tablet (30 mg) by mouth daily. 90 tablet 3     levothyroxine (SYNTHROID/LEVOTHROID) 125 MCG tablet Take 1 tablet (125 mcg) by mouth daily. 90 tablet 2     losartan (COZAAR) 100 MG tablet Take 1 tablet (100 mg) by mouth daily 90 tablet 3     mesalamine (APRISO ER) 0.375 g 24 hr capsule Take 4 capsules (1.5 g) by mouth daily 360 capsule 3     pantoprazole (PROTONIX) 40 MG EC tablet Take 1 tablet (40 mg) by mouth daily. 90 tablet 3      senna (SENOKOT) 8.6 MG tablet Take 1 tablet by mouth daily as needed for constipation 30 tablet 3     torsemide (DEMADEX) 10 MG tablet Take 1 tablet (10 mg) by mouth every other day 40 tablet 1     dulaglutide (TRULICITY) 1.5 MG/0.5ML pen Inject 1.5 mg Subcutaneous every 7 days After 4 doses of 0.75 mg (Patient not taking: Reported on 2024) 2 mL 1     No current facility-administered medications for this visit.     REVIEW OF SYSTEMS:  A detailed 10-point review of systems was obtained as described in the History of Present Illness.  All other systems reviewed and are negative.      SOCIAL HISTORY:  She does not smoke.  She drinks alcohol socially.  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  She is a .  Her    from Alzheimer's.  She has 5 kids altogether.  All of them are grown and healthy.      FAMILY HISTORY:  Mom had valvular heart disease, aortic aneurysm, but she  of stroke.  No other significant family history.  No history of sudden cardiac death, premature coronary artery disease, pulmonary hypertension or cardiomyopathy.      PHYSICAL EXAMINATION:    BP (!) 151/66 (BP Location: Right arm, Patient Position: Chair, Cuff Size: Adult Regular)   Pulse 75   Wt 61 kg (134 lb 6.4 oz)   LMP  (LMP Unknown)   SpO2 96%   BMI 24.11 kg/m    She was comfortable.  She was in no apparent distress.  She had no pallor, cyanosis or jaundice.  Her neck exam revealed no jugular venous distention.  Cardiac auscultation revealed normal S1, loud P2, 2/6 holosystolic murmur in the right lower sternal border consistent with a TR.  She had no rub or gallop.  Auscultation of her lungs revealed equal air entry on both sides with no added sounds.  Her abdomen was soft with normal bowel sounds, no tenderness, no rigidity, no guarding.  She had no focal neurological deficit.  Her extremities showed no edema.     Recent Results (from the past week)   N terminal pro BNP outpatient    Collection Time: 24   7:40 AM   Result Value Ref Range    N Terminal Pro BNP Outpatient 3,191 (H) 0 - 900 pg/mL   CBC with platelets    Collection Time: 11/25/24  7:40 AM   Result Value Ref Range    WBC Count 6.8 4.0 - 11.0 10e3/uL    RBC Count 4.04 3.80 - 5.20 10e6/uL    Hemoglobin 13.0 11.7 - 15.7 g/dL    Hematocrit 40.1 35.0 - 47.0 %    MCV 99 78 - 100 fL    MCH 32.2 26.5 - 33.0 pg    MCHC 32.4 31.5 - 36.5 g/dL    RDW 13.2 10.0 - 15.0 %    Platelet Count 195 150 - 450 10e3/uL   Renal panel    Collection Time: 11/25/24  7:40 AM   Result Value Ref Range    Sodium 139 135 - 145 mmol/L    Potassium 5.0 3.4 - 5.3 mmol/L    Chloride 108 (H) 98 - 107 mmol/L    Carbon Dioxide (CO2) 21 (L) 22 - 29 mmol/L    Anion Gap 10 7 - 15 mmol/L    Glucose 161 (H) 70 - 99 mg/dL    Urea Nitrogen 24.5 (H) 8.0 - 23.0 mg/dL    Creatinine 1.37 (H) 0.51 - 0.95 mg/dL    GFR Estimate 41 (L) >60 mL/min/1.73m2    Calcium 9.6 8.8 - 10.4 mg/dL    Albumin 4.1 3.5 - 5.2 g/dL    Phosphorus 3.0 2.5 - 4.5 mg/dL     Echocardiogram (10/2024)  Global and regional left ventricular function is normal with an EF of 60-65%.  Global right ventricular function is moderately reduced.  S/p TAVR with 23 mm Ramsay Trini 3 Ultra Resilia prosthesis on 9/4/24.  Doppler interrogation of the aortic valve is normal. (MG 7 mm Hg, PV 1.9 m/s,  DVI 0.66).  There is trace paravalvular regurgitation.  Notch noted in pulmonic flow suggestive of Pulm HTN.     This study was compared with the study from 9/5/24 .  No significant changes noted.     ASSESSMENT AND PLAN:      In summary, Ms. Keerthi Montoya is a 74-year-old female with past medical history significant for hypertension, diabetes, heart failure preserved ejection fraction, atrial fibrillation,  low-flow low gradient aortic stenosis status post transcatheter aortic valve replacement, and combined pre and postcapillary pulm hypertension who returns today for follow-up.      1.  Combined pre and postcapillary pulm hypertension   2.  Heart  failure with preserved ejection fraction   3.  Aortic stenosis status post transcatheter aortic valve replacement   4.  Moderate RV dysfunction.  Clinically not in right heart failure.    She is currently not in heart failure.  She is functional class I.  She is euvolemic on exam.  Her NT proBNP continues to remain elevated but it is downtrending after her transcatheter aortic valve replacement.  She is on SGLT2 inhibitors which I recommend her to continue.  She is euvolemic on the current dose of torsemide 10 mg every other day which I recommend her to continue.  She understands importance of low-salt diet and fluid restriction.  Her systemic blood pressure is adequately controlled.  Her transcatheter aortic valve is functioning well with no residual stenosis or regurgitation.    As she really had a significant precapillary component, I have recommended her to have a repeat right heart catheterization in 6 months after her transcatheter aortic valve replacement to assess how much residual precapillary competent she has after her transcatheter aortic valve replacement.  We can decide on the need for pulmonary vasodilator therapy based on his repeat right heart catheterization.  Will have you scheduled in February 2025.  She could potentially participate in our phase 3 clinical trial evaluating the safety and efficacy of oral Levosimendan if she still continues to have significant combined pre and postcapillary pulm hypertension.    She will call us in the interim should any further worsening symptoms.    It was a pleasure meeting Ms. Georgia Jan in our Pulmonary Hypertension Clinic at the [facility].  We thank you for involving us in her care.  Please do not hesitate to call us in the interim if you have any further questions.     Total time today was 44 minutes reviewing notes, imaging, labs, patient visit, orders and documentation.        Sincerely,    Angeline Marcus MD    [facility]  Heart Failure, Transplant, and Mechanical Circulatory Support Cardiology   Cardiovascular Division  [facility]   [phone]      Please do not hesitate to contact me if you have any questions/concerns.     Sincerely,     Angeline Marcus MD

## 2024-11-25 NOTE — PATIENT INSTRUCTIONS
You were seen today in the Pulmonary Hypertension Clinic at the [facility].     Cardiology Provider you saw during your visit:    Dr. Marcus    Medication Changes:  No changes    Recommendations:       Follow-up:   Follow-up in March with an exercise right heart catheterization, echocardiogram, cardiopulmonary exercise stress test  Our  will call you closer to that date      Please call us immediately if you have any syncope (fainting or passing out), chest pain, edema (swelling or weight gain), or decline in your functional status (general decline in how you are feeling).    If you have emergent concerns after hours or on the weekend, please call our on-call Cardiologist at [phone], option 4. For emergencies call [phone].     Thank you for allowing us to be a part of your care here at the [facility]    Please visit the pulmonary hypertension website for more information and support. [email or web address]    If you have questions or concerns please contact us at:    Jonny Harper RN (P: [phone])    Nurse Coordinator       Pulmonary Hypertension     [facility]         [facility]   (Prior Auths & Patient Assistance )             ()  Clinic   Clinic   Pulmonary Hypertension   Pulmonary Hypertension  [facility]  [facility]  (P)[phone]    (P) [phone]  (F) [phone]

## 2024-11-25 NOTE — NURSING NOTE
Chief Complaint   Patient presents with    Follow Up     Return ph after TAVR       Vitals were take, medications reconciled     Grzegorz Martin, EMT    8:25 AM

## 2024-11-29 ENCOUNTER — HOSPITAL ENCOUNTER (OUTPATIENT)
Dept: CARDIAC REHAB | Facility: CLINIC | Age: 74
Discharge: HOME | End: 2024-11-29
Attending: THORACIC SURGERY (CARDIOTHORACIC VASCULAR SURGERY)
Payer: MEDICARE

## 2024-11-29 PROCEDURE — 93798 PHYS/QHP OP CAR RHAB W/ECG: CPT

## 2024-12-02 ENCOUNTER — HOSPITAL ENCOUNTER (OUTPATIENT)
Dept: CARDIAC REHAB | Facility: CLINIC | Age: 74
Discharge: HOME | End: 2024-12-02
Attending: THORACIC SURGERY (CARDIOTHORACIC VASCULAR SURGERY)
Payer: MEDICARE

## 2024-12-02 PROCEDURE — 93798 PHYS/QHP OP CAR RHAB W/ECG: CPT

## 2024-12-06 ENCOUNTER — HOSPITAL ENCOUNTER (OUTPATIENT)
Dept: CARDIAC REHAB | Facility: CLINIC | Age: 74
Discharge: HOME | End: 2024-12-06
Attending: THORACIC SURGERY (CARDIOTHORACIC VASCULAR SURGERY)
Payer: MEDICARE

## 2024-12-06 PROCEDURE — 93798 PHYS/QHP OP CAR RHAB W/ECG: CPT

## 2024-12-09 ENCOUNTER — HOSPITAL ENCOUNTER (OUTPATIENT)
Dept: CARDIAC REHAB | Facility: CLINIC | Age: 74
Discharge: HOME | End: 2024-12-09
Attending: THORACIC SURGERY (CARDIOTHORACIC VASCULAR SURGERY)
Payer: MEDICARE

## 2024-12-09 PROCEDURE — 93798 PHYS/QHP OP CAR RHAB W/ECG: CPT

## 2024-12-10 LAB
GLUCOSE BLDC GLUCOMTR-MCNC: 133 MG/DL (ref 70–99)
GLUCOSE BLDC GLUCOMTR-MCNC: 89 MG/DL (ref 70–99)

## 2024-12-13 ENCOUNTER — HOSPITAL ENCOUNTER (OUTPATIENT)
Dept: CARDIAC REHAB | Facility: CLINIC | Age: 74
Discharge: HOME | End: 2024-12-13
Attending: THORACIC SURGERY (CARDIOTHORACIC VASCULAR SURGERY)
Payer: MEDICARE

## 2024-12-13 PROCEDURE — 93798 PHYS/QHP OP CAR RHAB W/ECG: CPT | Performed by: OCCUPATIONAL THERAPIST

## 2024-12-16 ENCOUNTER — HOSPITAL ENCOUNTER (OUTPATIENT)
Dept: CARDIAC REHAB | Facility: CLINIC | Age: 74
Discharge: HOME | End: 2024-12-16
Attending: THORACIC SURGERY (CARDIOTHORACIC VASCULAR SURGERY)
Payer: MEDICARE

## 2024-12-16 LAB
GLUCOSE BLDC GLUCOMTR-MCNC: 118 MG/DL (ref 70–99)
GLUCOSE BLDC GLUCOMTR-MCNC: 123 MG/DL (ref 70–99)

## 2024-12-16 PROCEDURE — 93798 PHYS/QHP OP CAR RHAB W/ECG: CPT

## 2024-12-20 ENCOUNTER — HOSPITAL ENCOUNTER (OUTPATIENT)
Dept: CARDIAC REHAB | Facility: CLINIC | Age: 74
Discharge: HOME | End: 2024-12-20
Attending: THORACIC SURGERY (CARDIOTHORACIC VASCULAR SURGERY)
Payer: MEDICARE

## 2024-12-20 PROCEDURE — 93798 PHYS/QHP OP CAR RHAB W/ECG: CPT

## 2024-12-21 ENCOUNTER — HEALTH MAINTENANCE LETTER (OUTPATIENT)
Age: 74
End: 2024-12-21

## 2024-12-23 ENCOUNTER — HOSPITAL ENCOUNTER (OUTPATIENT)
Dept: CARDIAC REHAB | Facility: CLINIC | Age: 74
Discharge: HOME | End: 2024-12-23
Attending: THORACIC SURGERY (CARDIOTHORACIC VASCULAR SURGERY)
Payer: MEDICARE

## 2024-12-23 PROCEDURE — 93798 PHYS/QHP OP CAR RHAB W/ECG: CPT

## 2024-12-27 ENCOUNTER — HOSPITAL ENCOUNTER (OUTPATIENT)
Dept: CARDIAC REHAB | Facility: CLINIC | Age: 74
Discharge: HOME | End: 2024-12-27
Attending: THORACIC SURGERY (CARDIOTHORACIC VASCULAR SURGERY)
Payer: MEDICARE

## 2024-12-27 PROCEDURE — 93798 PHYS/QHP OP CAR RHAB W/ECG: CPT

## 2024-12-30 ENCOUNTER — HOSPITAL ENCOUNTER (OUTPATIENT)
Dept: CARDIAC REHAB | Facility: CLINIC | Age: 74
Discharge: HOME | End: 2024-12-30
Attending: THORACIC SURGERY (CARDIOTHORACIC VASCULAR SURGERY)
Payer: MEDICARE

## 2024-12-30 PROCEDURE — 93798 PHYS/QHP OP CAR RHAB W/ECG: CPT

## 2025-01-07 DIAGNOSIS — I50.33 ACUTE ON CHRONIC DIASTOLIC CONGESTIVE HEART FAILURE (H): Primary | ICD-10-CM

## 2025-01-10 ENCOUNTER — LAB (OUTPATIENT)
Dept: LAB | Facility: CLINIC | Age: 75
End: 2025-01-10
Attending: NURSE PRACTITIONER
Payer: MEDICARE

## 2025-01-10 DIAGNOSIS — I50.33 ACUTE ON CHRONIC DIASTOLIC CONGESTIVE HEART FAILURE (H): ICD-10-CM

## 2025-01-10 LAB
ANION GAP SERPL CALCULATED.3IONS-SCNC: 12 MMOL/L (ref 7–15)
BUN SERPL-MCNC: 35.3 MG/DL (ref 8–23)
CALCIUM SERPL-MCNC: 9.5 MG/DL (ref 8.8–10.4)
CHLORIDE SERPL-SCNC: 106 MMOL/L (ref 98–107)
CREAT SERPL-MCNC: 1.74 MG/DL (ref 0.51–0.95)
EGFRCR SERPLBLD CKD-EPI 2021: 31 ML/MIN/1.73M2
GLUCOSE SERPL-MCNC: 116 MG/DL (ref 70–99)
HCO3 SERPL-SCNC: 21 MMOL/L (ref 22–29)
POTASSIUM SERPL-SCNC: 4.5 MMOL/L (ref 3.4–5.3)
SODIUM SERPL-SCNC: 139 MMOL/L (ref 135–145)

## 2025-01-10 PROCEDURE — 36415 COLL VENOUS BLD VENIPUNCTURE: CPT | Performed by: PATHOLOGY

## 2025-01-10 PROCEDURE — 80048 BASIC METABOLIC PNL TOTAL CA: CPT | Performed by: PATHOLOGY

## 2025-01-22 DIAGNOSIS — I27.20 PULMONARY HTN (H): Primary | ICD-10-CM

## 2025-01-22 DIAGNOSIS — R06.02 SOB (SHORTNESS OF BREATH): ICD-10-CM

## 2025-02-05 ENCOUNTER — ANCILLARY PROCEDURE (OUTPATIENT)
Dept: MAMMOGRAPHY | Facility: CLINIC | Age: 75
End: 2025-02-05
Attending: NURSE PRACTITIONER
Payer: MEDICARE

## 2025-02-05 DIAGNOSIS — Z12.31 ENCOUNTER FOR SCREENING MAMMOGRAM FOR BREAST CANCER: ICD-10-CM

## 2025-02-05 PROCEDURE — 77067 SCR MAMMO BI INCL CAD: CPT | Mod: GC | Performed by: STUDENT IN AN ORGANIZED HEALTH CARE EDUCATION/TRAINING PROGRAM

## 2025-02-05 PROCEDURE — 77063 BREAST TOMOSYNTHESIS BI: CPT | Mod: GC | Performed by: STUDENT IN AN ORGANIZED HEALTH CARE EDUCATION/TRAINING PROGRAM

## 2025-02-14 DIAGNOSIS — I50.33 ACUTE ON CHRONIC DIASTOLIC CONGESTIVE HEART FAILURE (H): ICD-10-CM

## 2025-02-14 NOTE — TELEPHONE ENCOUNTER
Received medication refill request from pharmacy    Confirmed medication has not been refilled within the last 30 days.     Medication/Dose/Frequency: torsemide (DEMADEX) 10 MG tablet   Sig - Route: Take 1 tablet (10 mg) by mouth every other day - Oral    [facility] [phone]    Provider: Dr. Minor                     Routed to RN per protocol.     Lucy Purdy LPN

## 2025-02-14 NOTE — TELEPHONE ENCOUNTER
Left message for patient. Would like to confirm which dosing of Torsemide that she is currently taking. Request is for Tosemide every other day. Last note mentions decreasing to twice weekly. Asked for a call back or mychart message.

## 2025-02-17 DIAGNOSIS — I50.33 ACUTE ON CHRONIC DIASTOLIC CONGESTIVE HEART FAILURE (H): ICD-10-CM

## 2025-02-17 RX ORDER — TORSEMIDE 10 MG/1
TABLET ORAL
Qty: 30 TABLET | Refills: 3 | Status: SHIPPED | OUTPATIENT
Start: 2025-02-17

## 2025-02-17 RX ORDER — TORSEMIDE 10 MG/1
10 TABLET ORAL EVERY OTHER DAY
Qty: 40 TABLET | Refills: 1 | Status: SHIPPED | OUTPATIENT
Start: 2025-02-17 | End: 2025-02-17

## 2025-02-17 NOTE — TELEPHONE ENCOUNTER
Nephrology Note: Medication Refill Request    Medication Refill Request:     Medication/Dose/Frequency: Torsemide 10 twice weekly on Monday and Friday per cardiology on 1/10/25  [facility]  Provider:  Dr Minor                         SITUATION/BACKROUND:                 Last office visit: 11/4/24        Future office visit: 5/12/25     ASSESSMENT:     Neph Assessments:    Recent Labs:  CBC Results:  Recent Labs   Lab Test 01/03/25  1005   WBC 8.8   RBC 4.17   HGB 13.5   HCT 41.0   MCV 98   MCH 32.4   MCHC 32.9   RDW 12.4        Last Renal Panel:  Sodium   Date Value Ref Range Status   01/10/2025 139 135 - 145 mmol/L Final   04/28/2021 134 133 - 144 mmol/L Final     Potassium   Date Value Ref Range Status   01/10/2025 4.5 3.4 - 5.3 mmol/L Final   04/28/2021 4.5 3.4 - 5.3 mmol/L Final     Potassium Whole Blood   Date Value Ref Range Status   09/17/2023 4.3 3.4 - 5.3 mmol/L Final     Chloride   Date Value Ref Range Status   01/10/2025 106 98 - 107 mmol/L Final   04/27/2023 109 94 - 109 mmol/L Final   04/28/2021 100 94 - 109 mmol/L Final     Carbon Dioxide   Date Value Ref Range Status   04/28/2021 26 20 - 32 mmol/L Final     Carbon Dioxide (CO2)   Date Value Ref Range Status   01/10/2025 21 (L) 22 - 29 mmol/L Final   04/27/2023 21 20 - 32 mmol/L Final     Anion Gap   Date Value Ref Range Status   01/10/2025 12 7 - 15 mmol/L Final   04/27/2023 7 3 - 14 mmol/L Final   04/28/2021 8 3 - 14 mmol/L Final     Glucose   Date Value Ref Range Status   01/10/2025 116 (H) 70 - 99 mg/dL Final   04/27/2023 277 (H) 70 - 99 mg/dL Final   04/28/2021 57 (L) 70 - 99 mg/dL Final     GLUCOSE BY METER POCT   Date Value Ref Range Status   12/16/2024 123 (H) 70 - 99 mg/dL Final     Urea Nitrogen   Date Value Ref Range Status   01/10/2025 35.3 (H) 8.0 - 23.0 mg/dL Final   04/27/2023 28 7 - 30 mg/dL Final   04/28/2021 23 7 - 30 mg/dL Final     Creatinine   Date Value Ref Range Status   01/10/2025 1.74  (H) 0.51 - 0.95 mg/dL Final   04/28/2021 1.21 (H) 0.52 - 1.04 mg/dL Final     GFR Estimate   Date Value Ref Range Status   01/10/2025 31 (L) >60 mL/min/1.73m2 Final     Comment:     eGFR calculated using 2021 CKD-EPI equation.   04/28/2021 46 (L) >60 mL/min/[1.73_m2] Final     Comment:     Non  GFR Calc  Starting 12/18/2018, serum creatinine based estimated GFR (eGFR) will be   calculated using the Chronic Kidney Disease Epidemiology Collaboration   (CKD-EPI) equation.       GFR, ESTIMATED POCT   Date Value Ref Range Status   12/30/2022 30 (L) >60 mL/min/1.73m2 Final     Calcium   Date Value Ref Range Status   01/10/2025 9.5 8.8 - 10.4 mg/dL Final     Comment:     Reference intervals for this test were updated on 7/16/2024 to reflect our healthy population more accurately. There may be differences in the flagging of prior results with similar values performed with this method. Those prior results can be interpreted in the context of the updated reference intervals.   04/28/2021 9.8 8.5 - 10.1 mg/dL Final     Phosphorus   Date Value Ref Range Status   11/25/2024 3.0 2.5 - 4.5 mg/dL Final   02/08/2021 3.5 2.5 - 4.5 mg/dL Final     Albumin   Date Value Ref Range Status   11/25/2024 4.1 3.5 - 5.2 g/dL Final   04/27/2023 3.7 3.4 - 5.0 g/dL Final   02/08/2021 4.1 3.4 - 5.0 g/dL Final       Current Medication List:   Current Outpatient Medications (Antihypertensive, Cardiovascular, Diuretics, Beta blockers, Calcium blockers, Anticoagulants)   Medication Sig    torsemide (DEMADEX) 10 MG tablet Take 1 tablet (10 mg) by mouth every other day. Take 1 tablet (10 mg) twice a week    hydrALAZINE (APRESOLINE) 50 MG tablet Take 1 tablet (50 mg) by mouth 3 times daily    losartan (COZAAR) 100 MG tablet Take 1 tablet (100 mg) by mouth daily     Current Outpatient Medications (Other)   Medication Sig    aspirin 81 MG EC tablet Take 1 tablet (81 mg) by mouth daily    atorvastatin (LIPITOR) 40 MG tablet Take 1 tablet (40  mg) by mouth daily    azithromycin (ZITHROMAX) 250 MG tablet Take 2 tablets (500 mg) by mouth once as needed (Take 30-60 minutes prior to dental procedure or cleaning).    blood glucose (ACCU-CHEK FELIPE PLUS) test strip 200 strips by In Vitro route 2 times daily Use to test blood sugar 2 times daily or as directed.    Cyanocobalamin (B-12) 1000 MCG TABS Take 1,000 mcg by mouth three times a week    diphenhydrAMINE (BENADRYL) 25 MG tablet Take 25 mg by mouth every 6 hours as needed for itching or allergies    dulaglutide (TRULICITY) 1.5 MG/0.5ML pen Inject 1.5 mg Subcutaneous every 7 days After 4 doses of 0.75 mg (Patient not taking: Reported on 1/10/2025)    empagliflozin (JARDIANCE) 10 MG TABS tablet Take 1 tablet (10 mg) by mouth daily    glipiZIDE (GLUCOTROL XL) 10 MG 24 hr tablet Take 2 tablets (20 mg) by mouth daily.    isosorbide mononitrate (IMDUR) 30 MG 24 hr tablet Take 1 tablet (30 mg) by mouth daily.    levothyroxine (SYNTHROID/LEVOTHROID) 125 MCG tablet Take 1 tablet (125 mcg) by mouth daily.    mesalamine (APRISO ER) 0.375 g 24 hr capsule Take 4 capsules (1.5 g) by mouth daily    pantoprazole (PROTONIX) 40 MG EC tablet Take 1 tablet (40 mg) by mouth daily.    senna (SENOKOT) 8.6 MG tablet Take 1 tablet by mouth daily as needed for constipation       Has patient had kidney transplant in the prior 1 year: no.   Has patient been seen the last 12 months: Yes.  Associated labs reviewed for medication: Yes    PLAN:     Medication refilled per protocol: Yes    CEDRICK ARBOLEDA RN

## 2025-03-04 ENCOUNTER — HOSPITAL ENCOUNTER (OUTPATIENT)
Dept: CARDIOLOGY | Facility: CLINIC | Age: 75
Discharge: HOME | End: 2025-03-04
Attending: INTERNAL MEDICINE
Payer: MEDICARE

## 2025-03-04 VITALS — WEIGHT: 138.23 LBS | BODY MASS INDEX: 24.49 KG/M2 | HEIGHT: 63 IN

## 2025-03-04 DIAGNOSIS — I27.20 PULMONARY HTN (H): ICD-10-CM

## 2025-03-04 DIAGNOSIS — R06.02 SOB (SHORTNESS OF BREATH): ICD-10-CM

## 2025-03-04 LAB — LVEF ECHO: NORMAL

## 2025-03-04 PROCEDURE — 93306 TTE W/DOPPLER COMPLETE: CPT

## 2025-03-04 PROCEDURE — 94621 CARDIOPULM EXERCISE TESTING: CPT

## 2025-03-06 ENCOUNTER — TELEPHONE (OUTPATIENT)
Dept: CARDIOLOGY | Facility: CLINIC | Age: 75
End: 2025-03-06
Payer: MEDICARE

## 2025-03-06 LAB
CARDIOPULMONARY ANAEROBIC THRESHOLD PREDICTED PEAK: 48 %
CARDIOPULMONARY ANAEROBIC THRESHOLD VO2: 10.9 ML/KG/MIN
CARDIOPULMONARY BLOOD PRESSURE REST: NORMAL MMHG
CARDIOPULMONARY BREATHING RESERVE REST: 79.3
CARDIOPULMONARY BREATHING RESERVE V02MAX: 37
CARDIOPULMONARY CO2 OUTPUT REST: 238 ML/MIN
CARDIOPULMONARY CO2 OUTPUT VO2MAX: 895 ML/MIN
CARDIOPULMONARY FEV 1.0 (L) ACTUAL: 1.59
CARDIOPULMONARY FEV 1.0 (L) PRECENT: 78 %
CARDIOPULMONARY FEV 1.0 (L) PREDICTED: 2.04
CARDIOPULMONARY FEV 1.0 FVC (%) ACTUAL: 80.4
CARDIOPULMONARY FEV 1.0 FVC (%) PERCENT: 107 %
CARDIOPULMONARY FEV 1.0 FVC (%) PREDICTED: 75.3
CARDIOPULMONARY FUNCTIONAL CAPACITY MAX ML/KG/MIN: 11.9 ML/KG/MIN
CARDIOPULMONARY FUNCTIONAL CAPACITY PERCENT: 52 %
CARDIOPULMONARY FUNCTIONAL CAPACITY PREDICTED: 22.8 ML/KG/MIN
CARDIOPULMONARY FVC (L) ACTUAL: 1.98
CARDIOPULMONARY FVC (L) PERCENT: 73 %
CARDIOPULMONARY FVC (L) PREDICTED: 2.72
CARDIOPULMONARY HEART RATE REST: 129 BPM
CARDIOPULMONARY MET'S REST: 1.3
CARDIOPULMONARY MINUTE VENTILATION REST: 11.5 L/MIN
CARDIOPULMONARY MINUTE VENTILATION VO2MAX: 35.4 L/MIN
CARDIOPULMONARY MYOCARDIAC O2 DEMAND MAX: NORMAL
CARDIOPULMONARY OXYGEN CONSUMPTION REST: 4.7 ML/KG/MIN
CARDIOPULMONARY OXYGEN CONSUMPTION VO2MAX: 11.9 ML/KG/MIN
CARDIOPULMONARY OXYGEN PULSE REST: 2 ML/BEAT
CARDIOPULMONARY OXYGEN PULSE VO2MAX: 5 ML/BEAT
CARDIOPULMONARY OXYGEN SATURATION- OXIMETRY REST: 99 %
CARDIOPULMONARY OXYGEN SATURATION- OXIMETRY VO2MAX: 98 %
CARDIOPULMONARY PET C02 REST: 29
CARDIOPULMONARY PET C02 VO2MAX: 30
CARDIOPULMONARY PET02 REST: 106
CARDIOPULMONARY PET02 V02 MAX: 112
CARDIOPULMONARY RER: 1.13
CARDIOPULMONARY RESPIRALORY EXCHANGE RATIO VO2MAX: 1.13
CARDIOPULMONARY RESPIRALORY EXCHANGE RATIO: 0.81
CARDIOPULMONARY RESPIRATORY RATE REST: 20 BR/MIN
CARDIOPULMONARY RESPIRATORY RATE VO2MAX: 30 BR/MIN
CARDIOPULMONARY STRESS BASE 1 BP MMHG: NORMAL MMHG
CARDIOPULMONARY STRESS BASE 1 BPA: 141 BPM
CARDIOPULMONARY STRESS BASE 1 SPO2: 98 % SPO2
CARDIOPULMONARY STRESS BASE 1 TIME SEC: 0 SEC
CARDIOPULMONARY STRESS BASE 1 TIME: 1 MINS
CARDIOPULMONARY STRESS BASE 2 BP MMHG: NORMAL MMHG
CARDIOPULMONARY STRESS BASE 2 BPA: 141 BPM
CARDIOPULMONARY STRESS BASE 2 TIME SEC: 0 SEC
CARDIOPULMONARY STRESS BASE 2 TIME: 3 MINS
CARDIOPULMONARY STRESS BASE 3 BP MMHG: NORMAL MMHG
CARDIOPULMONARY STRESS BASE 3 BPA: 109 BPM
CARDIOPULMONARY STRESS BASE 3 TIME SEC: 0 SEC
CARDIOPULMONARY STRESS BASE 3 TIME: 5 MINS
CARDIOPULMONARY STRESS PHASE 1 BP MMHG: NORMAL MMHG
CARDIOPULMONARY STRESS PHASE 1 BPM: 129 BPM
CARDIOPULMONARY STRESS PHASE 1 SPO2: 99 % SPO2
CARDIOPULMONARY STRESS PHASE 1 TIME SEC: 0 SEC
CARDIOPULMONARY STRESS PHASE 1 TIME: 2 MINS
CARDIOPULMONARY STRESS PHASE 2 BP MMHG: NORMAL MMHG
CARDIOPULMONARY STRESS PHASE 2 BPM: 131 BPM
CARDIOPULMONARY STRESS PHASE 2 SPO2: 98 % SPO2
CARDIOPULMONARY STRESS PHASE 2 TIME SEC: 0 SEC
CARDIOPULMONARY STRESS PHASE 2 TIME: 4 MINS
CARDIOPULMONARY STRESS PHASE 3 BP MMHG: NORMAL MMHG
CARDIOPULMONARY STRESS PHASE 3 BPM: 133 BPM
CARDIOPULMONARY STRESS PHASE 3 SPO2: 98 % SPO2
CARDIOPULMONARY STRESS PHASE 3 TIME SEC: 0 SEC
CARDIOPULMONARY STRESS PHASE 3 TIME: 6 MINS
CARDIOPULMONARY STRESS PHASE 4 BP MMHG: NORMAL MMHG
CARDIOPULMONARY STRESS PHASE 4 BPM: 135 BPM
CARDIOPULMONARY STRESS PHASE 4 SPO2: 97 % SPO2
CARDIOPULMONARY STRESS PHASE 4 TIME SEC: 0 SEC
CARDIOPULMONARY STRESS PHASE 4 TIME: 8 MINS
CARDIOPULMONARY STRESS PHASE 5 BP MMHG: NORMAL MMHG
CARDIOPULMONARY STRESS PHASE 5 BPM: 138 BPM
CARDIOPULMONARY STRESS PHASE 5 SPO2: 98 % SPO2
CARDIOPULMONARY STRESS PHASE 5 TIME SEC: 0 SEC
CARDIOPULMONARY STRESS PHASE 5 TIME: 10 MINS
CARDIOPULMONARY STRESS PHASE 6 BP MMHG: NORMAL MMHG
CARDIOPULMONARY STRESS PHASE 6 BPA: 139 BPM
CARDIOPULMONARY STRESS PHASE 6 SPO2: 97 % SPO2
CARDIOPULMONARY STRESS PHASE 6 TIME SEC: 0 SEC
CARDIOPULMONARY STRESS PHASE 6 TIME: 12 MINS
CARDIOPULMONARY STRESS PHASE 7 BP MMHG: NORMAL MMHG
CARDIOPULMONARY STRESS PHASE 7 BPM: 144 BPM
CARDIOPULMONARY STRESS PHASE 7 SPO2: 98 % SPO2
CARDIOPULMONARY STRESS PHASE 7 TIME SEC: 41 SEC
CARDIOPULMONARY STRESS PHASE 7 TIME: 13 MINS
CARDIOPULMONARY SVC (L) ACTUAL: 1.98
CARDIOPULMONARY SVC (L) PERCENT: 73 %
CARDIOPULMONARY SVC (L) PREDICTED: 2.72
CARDIOPULMONARY TIDAL VOLUME REST: 569 ML
CARDIOPULMONARY TIDAL VOLUME VO2MAX: 1184 ML
CARDIOPULMONARY VE/VCO2 SLOPE: 37.54
CARDIOPULMONARY VENTILATORY EQUIVALENT 02 REST: 39
CARDIOPULMONARY VENTILATORY EQUIVALENT 02 V02: 44
CARDIOPULMONARY VENTILATORY EQUIVALENT C02 REST: 48
CARDIOPULMONARY VENTILATORY EQUIVALENT C02 SLOPE VO2MAX: 37.54
CARDIOPULMONARY VENTILATORY EQUIVALENT C02 VO2MAX: 40
CV STRESS MAX HR HE: 144
PREDICTED VO2MAX: 11.9
RATED PERCEIVED EXERTION: 18
STRESS ANGINA INDEX: 0
STRESS ECHO BASELINE BP: NORMAL MMHG
STRESS ECHO BASELINE HR: 127 BPM
STRESS ECHO CALCULATED PERCENT HR: 98 %
STRESS ECHO LAST STRESS BP: NORMAL MMHG
STRESS ECHO POST ESTIMATED WORKLOAD: 3.4 METS
STRESS ECHO POST EXERCISE DUR MIN: 13 MIN
STRESS ECHO POST EXERCISE DUR SEC: 41 SEC
STRESS ECHO TARGET HR: 147

## 2025-03-06 NOTE — TELEPHONE ENCOUNTER
Spoke with pt about the below instructions.    Pre-procedure instructions - Right Heart Cath with Exercise  Patient Education    Your arrival time is 1215pm on Friday 3/14/25.  Location is [address] Waiting Room  Please plan on being at the hospital all day. If you are on dialysis, DO NOT schedule on a dialysis day.  At any time, emergencies and/or urgent cases may come up which could delay the start of your procedure.    Pre-procedure instructions - Right heart catheterization  No solid food for 8 hours prior  Nothing to drink 2 hours prior to arrival time  You can take your morning medications (except diabetic and blood thinners) with sips of water  We recommend you arrange for a ride to drop you off and pick you up, in the instance, you are unable to drive home, however you should be able to function as you normally would after the procedure     Diabetic Medication Instructions  Hold oral diabetic medication in morning of your procedure and for 48 hours after IV contrast is given  Typical instructions for insulin diabetic medication holding are below. However, please reach out to your Primary Care Provider or Endocrinologist for specific instructions  DO NOT take any oral diabetic medication, short-acting diabetes medications/insulin, humalog or regular insulin the morning of your test  Take   dose of long-acting insulin (Lantus, Levemir) the day of your test  Remember to bring your glucometer and insulin with you to take after your test if needed  GLP-1 Agonists Instructions  DO NOT take injectable GLP-1 agonists semaglutide (Ozempic, Wegovy), dulaglutide (Trulicity), exenatide ER (Bydureon), tirzepatide (Mounjaro), or oral semaglutide (Rybelsus) for 7 days prior your procedure  Hold once daily injectable GLP-1 agonists exenatide (Byetta), liraglutide (Saxenda, Victoza), lixisenatide (Soligua) the day before and day of  your procedure              Anticoagulation Medication Instructions   NA  Nurse to write N/A if not currently taking    You will not need a follow-up appt because Dr Marcus will go over the results with you after testing.    Pt verbalized understanding and had no questions at the time of instruction.      Jonny Harper RN on 3/6/2025 at 8:00 AM

## 2025-03-14 ENCOUNTER — APPOINTMENT (OUTPATIENT)
Dept: MEDSURG UNIT | Facility: CLINIC | Age: 75
End: 2025-03-14
Attending: INTERNAL MEDICINE
Payer: MEDICARE

## 2025-03-14 ENCOUNTER — APPOINTMENT (OUTPATIENT)
Dept: LAB | Facility: CLINIC | Age: 75
End: 2025-03-14
Attending: INTERNAL MEDICINE
Payer: MEDICARE

## 2025-03-14 ENCOUNTER — HOSPITAL ENCOUNTER (OUTPATIENT)
Facility: CLINIC | Age: 75
Discharge: HOME | End: 2025-03-14
Attending: INTERNAL MEDICINE | Admitting: INTERNAL MEDICINE
Payer: MEDICARE

## 2025-03-14 VITALS
RESPIRATION RATE: 16 BRPM | OXYGEN SATURATION: 97 % | DIASTOLIC BLOOD PRESSURE: 78 MMHG | SYSTOLIC BLOOD PRESSURE: 112 MMHG | TEMPERATURE: 97.4 F

## 2025-03-14 DIAGNOSIS — I27.20 PULMONARY HTN (H): ICD-10-CM

## 2025-03-14 LAB
ANION GAP SERPL CALCULATED.3IONS-SCNC: 14 MMOL/L (ref 7–15)
BASOPHILS # BLD AUTO: 0.1 10E3/UL (ref 0–0.2)
BASOPHILS NFR BLD AUTO: 1 %
BUN SERPL-MCNC: 35.8 MG/DL (ref 8–23)
CALCIUM SERPL-MCNC: 10 MG/DL (ref 8.8–10.4)
CHLORIDE SERPL-SCNC: 102 MMOL/L (ref 98–107)
CREAT SERPL-MCNC: 1.58 MG/DL (ref 0.51–0.95)
EGFRCR SERPLBLD CKD-EPI 2021: 34 ML/MIN/1.73M2
EOSINOPHIL # BLD AUTO: 0.2 10E3/UL (ref 0–0.7)
EOSINOPHIL NFR BLD AUTO: 3 %
ERYTHROCYTE [DISTWIDTH] IN BLOOD BY AUTOMATED COUNT: 12.4 % (ref 10–15)
GLUCOSE BLDC GLUCOMTR-MCNC: 100 MG/DL (ref 70–99)
GLUCOSE SERPL-MCNC: 122 MG/DL (ref 70–99)
HCO3 SERPL-SCNC: 19 MMOL/L (ref 22–29)
HCT VFR BLD AUTO: 44.3 % (ref 35–47)
HGB BLD-MCNC: 14.8 G/DL (ref 11.7–15.7)
HGB BLD-MCNC: 14.8 G/DL (ref 11.7–15.7)
HGB BLD-MCNC: 15 G/DL (ref 11.7–15.7)
IMM GRANULOCYTES # BLD: 0 10E3/UL
IMM GRANULOCYTES NFR BLD: 0 %
INR PPP: 1 (ref 0.85–1.15)
LYMPHOCYTES # BLD AUTO: 1.7 10E3/UL (ref 0.8–5.3)
LYMPHOCYTES NFR BLD AUTO: 21 %
MCH RBC QN AUTO: 32.5 PG (ref 26.5–33)
MCHC RBC AUTO-ENTMCNC: 33.4 G/DL (ref 31.5–36.5)
MCV RBC AUTO: 97 FL (ref 78–100)
MONOCYTES # BLD AUTO: 1.1 10E3/UL (ref 0–1.3)
MONOCYTES NFR BLD AUTO: 13 %
NEUTROPHILS # BLD AUTO: 5.2 10E3/UL (ref 1.6–8.3)
NEUTROPHILS NFR BLD AUTO: 63 %
NRBC # BLD AUTO: 0 10E3/UL
NRBC BLD AUTO-RTO: 0 /100
NT-PROBNP SERPL-MCNC: 2091 PG/ML (ref 0–900)
OXYHGB MFR BLDV: 43 % (ref 70–75)
OXYHGB MFR BLDV: 45 % (ref 70–75)
PLATELET # BLD AUTO: 222 10E3/UL (ref 150–450)
POTASSIUM SERPL-SCNC: 4.3 MMOL/L (ref 3.4–5.3)
RBC # BLD AUTO: 4.55 10E6/UL (ref 3.8–5.2)
SODIUM SERPL-SCNC: 135 MMOL/L (ref 135–145)
WBC # BLD AUTO: 8.3 10E3/UL (ref 4–11)

## 2025-03-14 PROCEDURE — 250N000009 HC RX 250: Performed by: INTERNAL MEDICINE

## 2025-03-14 PROCEDURE — 36415 COLL VENOUS BLD VENIPUNCTURE: CPT | Performed by: INTERNAL MEDICINE

## 2025-03-14 PROCEDURE — 93451 RIGHT HEART CATH: CPT | Performed by: INTERNAL MEDICINE

## 2025-03-14 PROCEDURE — 83880 ASSAY OF NATRIURETIC PEPTIDE: CPT | Performed by: INTERNAL MEDICINE

## 2025-03-14 PROCEDURE — 85610 PROTHROMBIN TIME: CPT | Performed by: INTERNAL MEDICINE

## 2025-03-14 PROCEDURE — 85004 AUTOMATED DIFF WBC COUNT: CPT | Performed by: INTERNAL MEDICINE

## 2025-03-14 PROCEDURE — 272N000001 HC OR GENERAL SUPPLY STERILE: Performed by: INTERNAL MEDICINE

## 2025-03-14 PROCEDURE — 80048 BASIC METABOLIC PNL TOTAL CA: CPT | Performed by: INTERNAL MEDICINE

## 2025-03-14 PROCEDURE — 93451 RIGHT HEART CATH: CPT | Mod: 26 | Performed by: INTERNAL MEDICINE

## 2025-03-14 PROCEDURE — 85018 HEMOGLOBIN: CPT

## 2025-03-14 PROCEDURE — 999N000142 HC STATISTIC PROCEDURE PREP ONLY

## 2025-03-14 PROCEDURE — 85048 AUTOMATED LEUKOCYTE COUNT: CPT | Performed by: INTERNAL MEDICINE

## 2025-03-14 PROCEDURE — 82810 BLOOD GASES O2 SAT ONLY: CPT

## 2025-03-14 PROCEDURE — C1751 CATH, INF, PER/CENT/MIDLINE: HCPCS | Performed by: INTERNAL MEDICINE

## 2025-03-14 PROCEDURE — 999N000133 HC STATISTIC PP CARE STAGE 2

## 2025-03-14 PROCEDURE — C1894 INTRO/SHEATH, NON-LASER: HCPCS | Performed by: INTERNAL MEDICINE

## 2025-03-14 PROCEDURE — 93464 EXERCISE W/HEMODYNAMIC MEAS: CPT | Mod: 26 | Performed by: INTERNAL MEDICINE

## 2025-03-14 PROCEDURE — 93464 EXERCISE W/HEMODYNAMIC MEAS: CPT | Performed by: INTERNAL MEDICINE

## 2025-03-14 PROCEDURE — 82962 GLUCOSE BLOOD TEST: CPT

## 2025-03-14 RX ORDER — LIDOCAINE 40 MG/G
CREAM TOPICAL
Status: COMPLETED | OUTPATIENT
Start: 2025-03-14 | End: 2025-03-14

## 2025-03-14 RX ADMIN — LIDOCAINE: 40 CREAM TOPICAL at 13:16

## 2025-03-14 ASSESSMENT — ACTIVITIES OF DAILY LIVING (ADL)
ADLS_ACUITY_SCORE: 60

## 2025-03-14 NOTE — PROGRESS NOTES
Georgia returned s/p RHC with exercise  HR still elevated, 137, she denies SOB and fatigue  RIJ site intact, no bleeding or swelling  /78  Awaiting   Then anticipate discharge to home

## 2025-03-14 NOTE — Clinical Note
Patient signed a 12 and will be kept within the [facility]'s network per his psychiatrist's request as she works for [email or web address] and can more easily access his records and be involved in his treatment and aftercare planning that way  Crisis placed a call to  at [facility] inquiring about male beds within the network  Awaiting a return call with that information  The ECG shows a sinus rhythm. ECG rate  = 92 bpm.

## 2025-03-14 NOTE — PRE-PROCEDURE
GENERAL PRE-PROCEDURE:   Procedure:  Right heart catheterizati  Date/Time:  3/14/2025 1:28 PM    Verbal consent obtained?: Yes    Written consent obtained?: Yes    Risks and benefits: Risks, benefits and alternatives were discussed    DC Plan: Appropriate discharge home plan in place for patients who are going home after procedure   Consent given by:  Patient  Patient states understanding of procedure being performed: Yes    Patient's understanding of procedure matches consent: Yes    Procedure consent matches procedure scheduled: Yes    Appropriately NPO:  Yes  Lungs:  Lungs clear with good breath sounds bilaterally  Heart:  Normal heart sounds and rate  History & Physical reviewed:  History and physical reviewed and updates made (see comment)  H&P Comments:  Clinically Significant Risk Factors Present on Admission    Cardiovascular: Diastolic    Fluid & Electrolyte Disorders : Not present on admission    Gastroenterology : Not present on admission    Hematology/Oncology : Not present on admission    Nephrology : Not present on admission    Neurology : Not present on admission    Pulmonology : Not present on admission    Systemic : Not present on admission    [unfilled]    Statement of review:  I have reviewed the lab findings, diagnostic data, medications, and the plan for sedation    Consenting/Education for Cardiology Procedure: Right heart catheterization     Patient understands we would like to perform the listed procedure(s) due to PAH management.    The patient understands the following:     The procedure was described to the patient in detail.    No sedation is planned for this procedure. Patient understands risks and complications of the procedure which include but are not limited to bruising/swelling around the incision site, infection, bleeding, allergic reaction to local anesthetic, air embolism, arterial puncture, stroke, heart attack, need for emergency heart surgery, death.        Patient verbalized understanding of risks and benefits and has elected to proceed with the procedure or procedures listed above.    Aleksey Preciado, WAYLON  Interventional Cardiology

## 2025-03-14 NOTE — Clinical Note
dry, intact, no bleeding and no hematoma. RIJ 7Fr- removed, pressure held until hemostasis, primapore.

## 2025-03-14 NOTE — DISCHARGE INSTRUCTIONS
[facility]                        Interventional Cardiology  Discharge Instructions   Post Right Heart Cath and/or Heart Biopsy      AFTER YOU GO HOME:  DO drink plenty of fluids  DO resume your regular diet and medications unless otherwise instructed by your Primary Physician  Do Not scrub the procedure site vigorously  No lotion or powder to the puncture site for 3 days    CALL YOUR PRIMARY PHYSICIAN IF: You may resume all normal activity.  Monitor neck site for bleeding, swelling, or voice changes. If you notice bleeding or swelling immediately apply pressure to the site and call number below to speak with Cardiology Fellow.  If you experience any changes in your breathing you should call your doctor immediately or come to the closest Emergency Department.  Do not drive yourself.    ADDITIONAL INSTRUCTIONS: Medications: You are to resume all home medications including anticoagulation therapy unless otherwise advised by your primary cardiologist or nurse coordinator.    Follow Up: Per your primary cardiology team    If you have any questions or concerns regarding your procedure site please call [phone] at anytime and ask for Cardiology Fellow on call.  They are available 24 hours a day.  You may also contact the Cardiology Clinic after hours number at [phone].                                                       Telephone Numbers [phone] Monday-Friday 8:00 am to 4:30 pm    [phone] After 4:30 pm Monday-Friday, Weekends & Holidays  Ask for Interventional Cardiologist on call. Someone is on call 24 hours/day   [facility] toll free number [phone] Monday-Friday 8:00 am to 4:30 pm   [facility] Emergency Dept [phone]

## 2025-03-14 NOTE — PROGRESS NOTES
[place] arrived on 2A for RHC  RIJ prepped with emla  cream  Consent done  Preprocedure complete  Awaiting BMP result  Discharge instructions reviewed and questions answered while waiting for procedure.

## 2025-03-15 NOTE — PROGRESS NOTES
Service Date: 2025    RE: Keerthi Montoya   MRN: [id number]   : 1950      Dear Dr. Almendarez:      We had the pleasure of seeing Keerthi Montoya in our Pulmonary Hypertension Clinic at the [facility].  As you know, is a 75-year-old female with past medical history significant for hypertension, diabetes, heart failure preserved ejection fraction, atrial fibrillation, pulmonary hypertension and low-flow low gradient aortic stenosis - S/P TAVR in 2024 summer.     She returns today for follow up with a repeat RHC to assess her pulmonary vascular hemodynamics after TAVR. 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 She is currently not on any PAH-specific therapies.      She continues to take care of her grandkids and remains very active.  She denies having any exertional shortness of breath.  Able to do all her activities without limitation.  I would currently characterize her as functional class I.  She has no chest pain or chest pressure.  No exertional presyncope or syncope.  No lower extremity swelling or abdominal distention.  No PND or orthopnea.  No recent hospitalization or ER visits.     PAST MEDICAL HISTORY:   1.  Hypertension, longstanding since the age of 52.   2.  Diabetes mellitus, controlled on medication.   3.  Atrial fibrillation status post Watchman device due to GI bleeding  4.  Heart failure with preserved ejection fraction with left atrial stiffness   5.  Ulcerative colitis.   6.  Diverticulitis, status post colectomy.   7.  Incisional hernia.      PAST SURGICAL HISTORY:   1.  Status post thyroidectomy at the age of 24.   2.  Status post cholecystectomy.   3.  Colectomy for diverticulitis.   4.  Incisional hernia repair.   5.  Status post Watchman device     MEDICATIONS:   No current facility-administered medications for this encounter.     Current Outpatient Medications   Medication Sig Dispense Refill    aspirin 81 MG EC tablet Take 1 tablet  (81 mg) by mouth daily 90 tablet 3    atorvastatin (LIPITOR) 40 MG tablet Take 1 tablet (40 mg) by mouth daily 90 tablet 3    Cyanocobalamin (B-12) 1000 MCG TABS Take 1,000 mcg by mouth three times a week      diphenhydrAMINE (BENADRYL) 25 MG tablet Take 25 mg by mouth every 6 hours as needed for itching or allergies      empagliflozin (JARDIANCE) 10 MG TABS tablet Take 1 tablet (10 mg) by mouth daily 90 tablet 3    glipiZIDE (GLUCOTROL XL) 10 MG 24 hr tablet Take 2 tablets (20 mg) by mouth daily. 180 tablet 1    hydrALAZINE (APRESOLINE) 50 MG tablet Take 1 tablet (50 mg) by mouth 3 times daily 180 tablet 3    isosorbide mononitrate (IMDUR) 30 MG 24 hr tablet Take 1 tablet (30 mg) by mouth daily. 90 tablet 3    levothyroxine (SYNTHROID/LEVOTHROID) 125 MCG tablet Take 1 tablet (125 mcg) by mouth daily. 90 tablet 2    losartan (COZAAR) 100 MG tablet Take 1 tablet (100 mg) by mouth daily 90 tablet 3    mesalamine (APRISO ER) 0.375 g 24 hr capsule Take 4 capsules (1.5 g) by mouth daily 360 capsule 3    pantoprazole (PROTONIX) 40 MG EC tablet Take 1 tablet (40 mg) by mouth daily. 90 tablet 3    senna (SENOKOT) 8.6 MG tablet Take 1 tablet by mouth daily as needed for constipation 30 tablet 3    torsemide (DEMADEX) 10 MG tablet Take 1 tablet (10 mg) twice a week 30 tablet 3    azithromycin (ZITHROMAX) 250 MG tablet Take 2 tablets (500 mg) by mouth once as needed (Take 30-60 minutes prior to dental procedure or cleaning). 2 tablet 2    blood glucose (ACCU-CHEK FELIPE PLUS) test strip 200 strips by In Vitro route 2 times daily Use to test blood sugar 2 times daily or as directed. 200 strip 4    dulaglutide (TRULICITY) 1.5 MG/0.5ML pen Inject 1.5 mg Subcutaneous every 7 days After 4 doses of 0.75 mg (Patient not taking: Reported on 1/10/2025) 2 mL 1     REVIEW OF SYSTEMS:  A detailed 10-point review of systems was obtained as described in the History of Present Illness.  All other systems reviewed and are negative.      SOCIAL  HISTORY:  She does not smoke.  She drinks alcohol socially.  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  She is a .  Her    from Alzheimer's.  She has 5 kids altogether.  All of them are grown and healthy.      FAMILY HISTORY:  Mom had valvular heart disease, aortic aneurysm, but she  of stroke.  No other significant family history.  No history of sudden cardiac death, premature coronary artery disease, pulmonary hypertension or cardiomyopathy.      PHYSICAL EXAMINATION:    /78   Temp 97.4  F (36.3  C) (Oral)   Resp 16   LMP  (LMP Unknown)   SpO2 97%   She was comfortable.  She was in no apparent distress.  She had no pallor, cyanosis or jaundice.  Her neck exam revealed no jugular venous distention.  Cardiac auscultation revealed normal S1, loud P2, 2/6 holosystolic murmur in the right lower sternal border consistent with a TR.  She had no rub or gallop.  Auscultation of her lungs revealed equal air entry on both sides with no added sounds.  Her abdomen was soft with normal bowel sounds, no tenderness, no rigidity, no guarding.  She had no focal neurological deficit.  Her extremities showed no edema.     Recent Results (from the past week)   INR    Collection Time: 25 12:32 PM   Result Value Ref Range    INR 1.00 0.85 - 1.15   Basic metabolic panel    Collection Time: 25 12:32 PM   Result Value Ref Range    Sodium 135 135 - 145 mmol/L    Potassium 4.3 3.4 - 5.3 mmol/L    Chloride 102 98 - 107 mmol/L    Carbon Dioxide (CO2) 19 (L) 22 - 29 mmol/L    Anion Gap 14 7 - 15 mmol/L    Urea Nitrogen 35.8 (H) 8.0 - 23.0 mg/dL    Creatinine 1.58 (H) 0.51 - 0.95 mg/dL    GFR Estimate 34 (L) >60 mL/min/1.73m2    Calcium 10.0 8.8 - 10.4 mg/dL    Glucose 122 (H) 70 - 99 mg/dL   NT probnp inpatient and ED    Collection Time: 25 12:32 PM   Result Value Ref Range    N terminal Pro BNP Inpatient 2,091 (H) 0 - 900 pg/mL   CBC with platelets and differential    Collection Time: 25  12:32 PM   Result Value Ref Range    WBC Count 8.3 4.0 - 11.0 10e3/uL    RBC Count 4.55 3.80 - 5.20 10e6/uL    Hemoglobin 14.8 11.7 - 15.7 g/dL    Hematocrit 44.3 35.0 - 47.0 %    MCV 97 78 - 100 fL    MCH 32.5 26.5 - 33.0 pg    MCHC 33.4 31.5 - 36.5 g/dL    RDW 12.4 10.0 - 15.0 %    Platelet Count 222 150 - 450 10e3/uL    % Neutrophils 63 %    % Lymphocytes 21 %    % Monocytes 13 %    % Eosinophils 3 %    % Basophils 1 %    % Immature Granulocytes 0 %    NRBCs per 100 WBC 0 <1 /100    Absolute Neutrophils 5.2 1.6 - 8.3 10e3/uL    Absolute Lymphocytes 1.7 0.8 - 5.3 10e3/uL    Absolute Monocytes 1.1 0.0 - 1.3 10e3/uL    Absolute Eosinophils 0.2 0.0 - 0.7 10e3/uL    Absolute Basophils 0.1 0.0 - 0.2 10e3/uL    Absolute Immature Granulocytes 0.0 <=0.4 10e3/uL    Absolute NRBCs 0.0 10e3/uL   Glucose by meter    Collection Time: 03/14/25  1:33 PM   Result Value Ref Range    GLUCOSE BY METER POCT 100 (H) 70 - 99 mg/dL   Hemoglobin POCT    Collection Time: 03/14/25  2:34 PM   Result Value Ref Range    Hemoglobin POCT 14.8 11.7 - 15.7 g/dL   Oxyhemoglobin, venous POCT    Collection Time: 03/14/25  2:34 PM   Result Value Ref Range    Oxyhemoglobin Venous POCT 45 (L) 70 - 75 %   Hemoglobin POCT    Collection Time: 03/14/25  2:35 PM   Result Value Ref Range    Hemoglobin POCT 15.0 11.7 - 15.7 g/dL   Oxyhemoglobin, venous POCT    Collection Time: 03/14/25  2:35 PM   Result Value Ref Range    Oxyhemoglobin Venous POCT 43 (L) 70 - 75 %     Echocardiogram (03/2025)  Global and regional left ventricular function is hyperkinetic with an EF >70%.  Flattened septum is consistent with right ventricular pressure overload.  Moderate right ventricular dilation is present.  Global right ventricular function is moderately reduced.  Pulmonary hypertension is present.  The right ventricular systolic pressure is 47mmHg above the right atrial  pressure.  S/p TAVR with 23 mm Ramsay Trini 3 Ultra Resilia prosthesis on 9/4/24.  The prosthetic  aortic valve is well-seated.  Doppler interrogation of the aortic valve is normal.  The inferior vena cava was normal in size with preserved respiratory  variability.  No pericardial effusion is present.     This study was compared with the study from 10/2024 .  IVS septal flattening is worse, HR increased.    RHC (03/2025) with exercise  Baseline  RA: 8 mmHg  RV: 57/8 mmHg  PA: 76/22 (39) mmHg  W: 15 mmHg  PA sat: 66.2%  VO2 173 ml/min  Faizan CO/CI: 3.3/2.0  PVR: 7.3 LUKE    Upright  RA 1 mmHg  PA 65/20 (32) mmHg  W 7 mmHg  Faizan CO/CI: 3.3/2.0  PVR: 7.6 LUKE    Mid Exercise  RA 1 mmHg  /27 (56) mmHg  W 12 mmHg  VO2 444 ml/min  Faizan CO/CI 4.5/2.7  PVR: 9.8 LUKE    Peak Exercise  RA 4 mmHg  /25 (62) mmHg  W 19 mmHg  VO2 748 ml/min  Faizan CO/CI 7.2/4.4  PVR: 6.0 LUKE    Delta mPAP/CO -  7.6  Delta PCWP/CO - 3.1  Reduction in PVR with exercise      Hemodynamic data has been modified in Epic per physician review.        ASSESSMENT AND PLAN:      In summary, Ms. Keerthi Montoya is a 75-year-old female with past medical history significant for hypertension, diabetes, heart failure preserved ejection fraction, atrial fibrillation,  low-flow low gradient aortic stenosis status post transcatheter aortic valve replacement, and combined pre and postcapillary pulm hypertension who returns today for follow-up.      1.  Combined pre and postcapillary pulm hypertension   2.  Heart failure with preserved ejection fraction   3.  Aortic stenosis status post transcatheter aortic valve replacement   4.  Moderate RV dysfunction.  Clinically not in right heart failure.    She is currently not in heart failure.  She is functional class I.  She is euvolemic on exam.  Her NT proBNP continues to remain elevated but it is downtrending after her transcatheter aortic valve replacement.  She is on SGLT2 inhibitors which I recommend her to continue.  She is euvolemic on the current dose of torsemide 10 mg every other day which I recommend her to  continue.  She understands importance of low-salt diet and fluid restriction.  Her systemic blood pressure is adequately controlled.  Her transcatheter aortic valve is functioning well with no residual stenosis or regurgitation.    She does have severe pre-capillary component out of proportion to her left sided heart disease. Although her PCWP is borderline at rest, with exercise her PCWP increases and PVR drops. Thus, her pathophysiology is consistent with coexisting diastolic dysfunction. Also, she does not have significant lung disease, CTED, or other associated causes of PAH.     She could potentially participate in our phase 3 clinical trial evaluating the safety and efficacy of oral Levosimendan if she meets inclusion and exclusion criteria. PAH specific therapies would be off-label.     She will call us in the interim should any further worsening symptoms.    It was a pleasure meeting MsAudrey Keerthi Montoya in our Pulmonary Hypertension Clinic at the [facility].  We thank you for involving us in her care.  Please do not hesitate to call us in the interim if you have any further questions.     Total time today was 55 minutes reviewing notes, imaging, labs, patient visit, orders and documentation.        Sincerely,    Angeline Marcus MD   [facility]  Heart Failure, Transplant, and Mechanical Circulatory Support Cardiology   Cardiovascular Division  [facility]   [phone]

## 2025-04-01 ENCOUNTER — TELEPHONE (OUTPATIENT)
Dept: CARDIOLOGY | Facility: CLINIC | Age: 75
End: 2025-04-01

## 2025-04-01 NOTE — TELEPHONE ENCOUNTER
Called and lvm for pt to call back to get 1yr s/p TAVR appts scheduled (ryan, echo, and labs) window is 7/6-11/3.  Jackie Pinto  [facility]   (W) [phone]

## 2025-04-07 ENCOUNTER — TELEPHONE (OUTPATIENT)
Dept: CARDIOLOGY | Facility: CLINIC | Age: 75
End: 2025-04-07
Payer: MEDICARE

## 2025-04-07 NOTE — TELEPHONE ENCOUNTER
Patient Contacted for the patient to call back and schedule the following:    Appointment type: RTN TAVR  Provider: VINOD  Return date: 7/25/2025  Specialty phone number: [phone] OPT 1  Additional appointment(s) needed: ECHO AND FASTING LABS  Additonal Notes: N/A

## 2025-04-12 ENCOUNTER — HEALTH MAINTENANCE LETTER (OUTPATIENT)
Age: 75
End: 2025-04-12

## 2025-04-15 DIAGNOSIS — R80.9 TYPE 2 DIABETES MELLITUS WITH MICROALBUMINURIA, WITHOUT LONG-TERM CURRENT USE OF INSULIN (H): ICD-10-CM

## 2025-04-15 DIAGNOSIS — E11.29 TYPE 2 DIABETES MELLITUS WITH MICROALBUMINURIA, WITHOUT LONG-TERM CURRENT USE OF INSULIN (H): ICD-10-CM

## 2025-04-18 ENCOUNTER — APPOINTMENT (OUTPATIENT)
Dept: CARDIOLOGY | Facility: CLINIC | Age: 75
End: 2025-04-18
Attending: STUDENT IN AN ORGANIZED HEALTH CARE EDUCATION/TRAINING PROGRAM
Payer: MEDICARE

## 2025-04-18 ENCOUNTER — HOSPITAL ENCOUNTER (EMERGENCY)
Facility: CLINIC | Age: 75
Discharge: HOME | End: 2025-04-18
Attending: EMERGENCY MEDICINE | Admitting: EMERGENCY MEDICINE
Payer: MEDICARE

## 2025-04-18 VITALS
OXYGEN SATURATION: 96 % | DIASTOLIC BLOOD PRESSURE: 56 MMHG | SYSTOLIC BLOOD PRESSURE: 106 MMHG | HEART RATE: 68 BPM | TEMPERATURE: 97.6 F | BODY MASS INDEX: 24.27 KG/M2 | WEIGHT: 137 LBS | HEIGHT: 63 IN | RESPIRATION RATE: 20 BRPM

## 2025-04-18 DIAGNOSIS — I48.91 ATRIAL FIBRILLATION WITH RVR (H): ICD-10-CM

## 2025-04-18 LAB
ALBUMIN SERPL BCG-MCNC: 4.6 G/DL (ref 3.5–5.2)
ALP SERPL-CCNC: 130 U/L (ref 40–150)
ALT SERPL W P-5'-P-CCNC: 7 U/L (ref 0–50)
ANION GAP SERPL CALCULATED.3IONS-SCNC: 12 MMOL/L (ref 7–15)
AST SERPL W P-5'-P-CCNC: 23 U/L (ref 0–45)
BASOPHILS # BLD AUTO: 0.1 10E3/UL (ref 0–0.2)
BASOPHILS NFR BLD AUTO: 1 %
BILIRUB SERPL-MCNC: 0.7 MG/DL
BUN SERPL-MCNC: 35.1 MG/DL (ref 8–23)
CALCIUM SERPL-MCNC: 10.1 MG/DL (ref 8.8–10.4)
CHLORIDE SERPL-SCNC: 103 MMOL/L (ref 98–107)
CREAT SERPL-MCNC: 1.91 MG/DL (ref 0.51–0.95)
EGFRCR SERPLBLD CKD-EPI 2021: 27 ML/MIN/1.73M2
EOSINOPHIL # BLD AUTO: 0.2 10E3/UL (ref 0–0.7)
EOSINOPHIL NFR BLD AUTO: 2 %
ERYTHROCYTE [DISTWIDTH] IN BLOOD BY AUTOMATED COUNT: 11.9 % (ref 10–15)
GLUCOSE BLDC GLUCOMTR-MCNC: 144 MG/DL (ref 70–99)
GLUCOSE SERPL-MCNC: 152 MG/DL (ref 70–99)
HCO3 SERPL-SCNC: 21 MMOL/L (ref 22–29)
HCT VFR BLD AUTO: 45.5 % (ref 35–47)
HGB BLD-MCNC: 14.9 G/DL (ref 11.7–15.7)
IMM GRANULOCYTES # BLD: 0 10E3/UL
IMM GRANULOCYTES NFR BLD: 0 %
LVEF ECHO: NORMAL
LYMPHOCYTES # BLD AUTO: 1.5 10E3/UL (ref 0.8–5.3)
LYMPHOCYTES NFR BLD AUTO: 18 %
MAGNESIUM SERPL-MCNC: 2.3 MG/DL (ref 1.7–2.3)
MCH RBC QN AUTO: 32.7 PG (ref 26.5–33)
MCHC RBC AUTO-ENTMCNC: 32.7 G/DL (ref 31.5–36.5)
MCV RBC AUTO: 100 FL (ref 78–100)
MONOCYTES # BLD AUTO: 1 10E3/UL (ref 0–1.3)
MONOCYTES NFR BLD AUTO: 12 %
NEUTROPHILS # BLD AUTO: 5.5 10E3/UL (ref 1.6–8.3)
NEUTROPHILS NFR BLD AUTO: 67 %
NRBC # BLD AUTO: 0 10E3/UL
NRBC BLD AUTO-RTO: 0 /100
PHOSPHATE SERPL-MCNC: 4 MG/DL (ref 2.5–4.5)
PLATELET # BLD AUTO: 211 10E3/UL (ref 150–450)
POTASSIUM SERPL-SCNC: 4.3 MMOL/L (ref 3.4–5.3)
PROT SERPL-MCNC: 8.4 G/DL (ref 6.4–8.3)
RBC # BLD AUTO: 4.55 10E6/UL (ref 3.8–5.2)
SODIUM SERPL-SCNC: 136 MMOL/L (ref 135–145)
T4 FREE SERPL-MCNC: 1.9 NG/DL (ref 0.9–1.7)
TSH SERPL DL<=0.005 MIU/L-ACNC: 0.21 UIU/ML (ref 0.3–4.2)
WBC # BLD AUTO: 8.3 10E3/UL (ref 4–11)

## 2025-04-18 PROCEDURE — 99285 EMERGENCY DEPT VISIT HI MDM: CPT | Performed by: EMERGENCY MEDICINE

## 2025-04-18 PROCEDURE — 83735 ASSAY OF MAGNESIUM: CPT | Performed by: EMERGENCY MEDICINE

## 2025-04-18 PROCEDURE — 96375 TX/PRO/DX INJ NEW DRUG ADDON: CPT | Performed by: EMERGENCY MEDICINE

## 2025-04-18 PROCEDURE — 93320 DOPPLER ECHO COMPLETE: CPT | Mod: 26 | Performed by: INTERNAL MEDICINE

## 2025-04-18 PROCEDURE — 250N000009 HC RX 250: Performed by: INTERNAL MEDICINE

## 2025-04-18 PROCEDURE — 85004 AUTOMATED DIFF WBC COUNT: CPT | Performed by: EMERGENCY MEDICINE

## 2025-04-18 PROCEDURE — 99152 MOD SED SAME PHYS/QHP 5/>YRS: CPT | Mod: GC | Performed by: INTERNAL MEDICINE

## 2025-04-18 PROCEDURE — 84100 ASSAY OF PHOSPHORUS: CPT | Performed by: EMERGENCY MEDICINE

## 2025-04-18 PROCEDURE — 93312 ECHO TRANSESOPHAGEAL: CPT | Mod: 26 | Performed by: INTERNAL MEDICINE

## 2025-04-18 PROCEDURE — 36415 COLL VENOUS BLD VENIPUNCTURE: CPT | Performed by: EMERGENCY MEDICINE

## 2025-04-18 PROCEDURE — 99221 1ST HOSP IP/OBS SF/LOW 40: CPT | Mod: 25 | Performed by: INTERNAL MEDICINE

## 2025-04-18 PROCEDURE — 250N000011 HC RX IP 250 OP 636: Performed by: EMERGENCY MEDICINE

## 2025-04-18 PROCEDURE — 96374 THER/PROPH/DIAG INJ IV PUSH: CPT | Mod: 59 | Performed by: EMERGENCY MEDICINE

## 2025-04-18 PROCEDURE — 92960 CARDIOVERSION ELECTRIC EXT: CPT

## 2025-04-18 PROCEDURE — 250N000011 HC RX IP 250 OP 636: Mod: JZ | Performed by: INTERNAL MEDICINE

## 2025-04-18 PROCEDURE — 93325 DOPPLER ECHO COLOR FLOW MAPG: CPT

## 2025-04-18 PROCEDURE — 84439 ASSAY OF FREE THYROXINE: CPT | Performed by: EMERGENCY MEDICINE

## 2025-04-18 PROCEDURE — 93005 ELECTROCARDIOGRAM TRACING: CPT | Performed by: EMERGENCY MEDICINE

## 2025-04-18 PROCEDURE — 84443 ASSAY THYROID STIM HORMONE: CPT | Performed by: EMERGENCY MEDICINE

## 2025-04-18 PROCEDURE — 82962 GLUCOSE BLOOD TEST: CPT

## 2025-04-18 PROCEDURE — 99285 EMERGENCY DEPT VISIT HI MDM: CPT | Mod: 25 | Performed by: EMERGENCY MEDICINE

## 2025-04-18 PROCEDURE — 93010 ELECTROCARDIOGRAM REPORT: CPT | Mod: 77 | Performed by: EMERGENCY MEDICINE

## 2025-04-18 PROCEDURE — 76376 3D RENDER W/INTRP POSTPROCES: CPT

## 2025-04-18 PROCEDURE — 76376 3D RENDER W/INTRP POSTPROCES: CPT | Mod: 26 | Performed by: INTERNAL MEDICINE

## 2025-04-18 PROCEDURE — 250N000013 HC RX MED GY IP 250 OP 250 PS 637: Performed by: STUDENT IN AN ORGANIZED HEALTH CARE EDUCATION/TRAINING PROGRAM

## 2025-04-18 PROCEDURE — 93005 ELECTROCARDIOGRAM TRACING: CPT | Mod: 59,76 | Performed by: EMERGENCY MEDICINE

## 2025-04-18 PROCEDURE — 93010 ELECTROCARDIOGRAM REPORT: CPT | Mod: 76 | Performed by: EMERGENCY MEDICINE

## 2025-04-18 PROCEDURE — 93325 DOPPLER ECHO COLOR FLOW MAPG: CPT | Mod: 26 | Performed by: INTERNAL MEDICINE

## 2025-04-18 PROCEDURE — 84155 ASSAY OF PROTEIN SERUM: CPT | Performed by: EMERGENCY MEDICINE

## 2025-04-18 PROCEDURE — 370N000017 HC ANESTHESIA TECHNICAL FEE, PER MIN

## 2025-04-18 RX ORDER — ADENOSINE 3 MG/ML
12 INJECTION, SOLUTION INTRAVENOUS ONCE
Status: COMPLETED | OUTPATIENT
Start: 2025-04-18 | End: 2025-04-18

## 2025-04-18 RX ORDER — NALOXONE HYDROCHLORIDE 0.4 MG/ML
0.2 INJECTION, SOLUTION INTRAMUSCULAR; INTRAVENOUS; SUBCUTANEOUS
Status: DISCONTINUED | OUTPATIENT
Start: 2025-04-18 | End: 2025-04-18 | Stop reason: HOSPADM

## 2025-04-18 RX ORDER — NALOXONE HYDROCHLORIDE 0.4 MG/ML
0.4 INJECTION, SOLUTION INTRAMUSCULAR; INTRAVENOUS; SUBCUTANEOUS
Status: DISCONTINUED | OUTPATIENT
Start: 2025-04-18 | End: 2025-04-18 | Stop reason: HOSPADM

## 2025-04-18 RX ORDER — HEPARIN SODIUM 10000 [USP'U]/100ML
0-5000 INJECTION, SOLUTION INTRAVENOUS CONTINUOUS
Status: DISCONTINUED | OUTPATIENT
Start: 2025-04-18 | End: 2025-04-18

## 2025-04-18 RX ORDER — FLUMAZENIL 0.1 MG/ML
0.2 INJECTION, SOLUTION INTRAVENOUS
Status: DISCONTINUED | OUTPATIENT
Start: 2025-04-18 | End: 2025-04-18 | Stop reason: HOSPADM

## 2025-04-18 RX ORDER — SODIUM CHLORIDE 9 MG/ML
1000 INJECTION, SOLUTION INTRAVENOUS CONTINUOUS
Status: DISCONTINUED | OUTPATIENT
Start: 2025-04-18 | End: 2025-04-18 | Stop reason: HOSPADM

## 2025-04-18 RX ORDER — LIDOCAINE HYDROCHLORIDE 20 MG/ML
15 SOLUTION OROPHARYNGEAL ONCE
Status: COMPLETED | OUTPATIENT
Start: 2025-04-18 | End: 2025-04-18

## 2025-04-18 RX ORDER — ACYCLOVIR 200 MG/1
9.5 CAPSULE ORAL
Status: DISCONTINUED | OUTPATIENT
Start: 2025-04-18 | End: 2025-04-18 | Stop reason: HOSPADM

## 2025-04-18 RX ORDER — FENTANYL CITRATE 50 UG/ML
25 INJECTION, SOLUTION INTRAMUSCULAR; INTRAVENOUS
Status: DISCONTINUED | OUTPATIENT
Start: 2025-04-18 | End: 2025-04-18 | Stop reason: HOSPADM

## 2025-04-18 RX ADMIN — TOPICAL ANESTHETIC 0.5 ML: 200 SPRAY DENTAL; PERIODONTAL at 14:18

## 2025-04-18 RX ADMIN — MIDAZOLAM 1 MG: 1 INJECTION INTRAMUSCULAR; INTRAVENOUS at 14:31

## 2025-04-18 RX ADMIN — LIDOCAINE HYDROCHLORIDE 15 ML: 20 SOLUTION ORAL at 14:18

## 2025-04-18 RX ADMIN — APIXABAN 5 MG: 5 TABLET, FILM COATED ORAL at 13:51

## 2025-04-18 RX ADMIN — FENTANYL CITRATE 50 MCG: 50 INJECTION, SOLUTION INTRAMUSCULAR; INTRAVENOUS at 14:28

## 2025-04-18 RX ADMIN — MIDAZOLAM 2 MG: 1 INJECTION INTRAMUSCULAR; INTRAVENOUS at 14:28

## 2025-04-18 RX ADMIN — FENTANYL CITRATE 25 MCG: 50 INJECTION, SOLUTION INTRAMUSCULAR; INTRAVENOUS at 14:31

## 2025-04-18 RX ADMIN — ADENOSINE 12 MG: 3 INJECTION INTRAVENOUS at 12:44

## 2025-04-18 ASSESSMENT — ACTIVITIES OF DAILY LIVING (ADL)
ADLS_ACUITY_SCORE: 60

## 2025-04-18 ASSESSMENT — COLUMBIA-SUICIDE SEVERITY RATING SCALE - C-SSRS
2. HAVE YOU ACTUALLY HAD ANY THOUGHTS OF KILLING YOURSELF IN THE PAST MONTH?: NO
1. IN THE PAST MONTH, HAVE YOU WISHED YOU WERE DEAD OR WISHED YOU COULD GO TO SLEEP AND NOT WAKE UP?: NO
6. HAVE YOU EVER DONE ANYTHING, STARTED TO DO ANYTHING, OR PREPARED TO DO ANYTHING TO END YOUR LIFE?: NO

## 2025-04-18 NOTE — ED TRIAGE NOTES
"Chief Complaint: Patient ambulatory to triage for evaluation of irregular EKG at clinic.    Onset of Symptoms: Unknown    Home Remedies: NA    Triage Vital Signs: BP (!) 157/84   Pulse (!) 154   Temp 97.6  F (36.4  C)   Resp 16   Ht 1.6 m (5' 3\")   Wt 62.1 kg (137 lb)   LMP  (LMP Unknown)   SpO2 97%   BMI 24.27 kg/m      Hieu Abreu RN  April 18, 2025       Triage Assessment (Adult)       Row Name 04/18/25 1137          Triage Assessment    Airway WDL WDL        Respiratory WDL    Respiratory WDL WDL        Skin Circulation/Temperature WDL    Skin Circulation/Temperature WDL WDL        Cardiac WDL    Cardiac WDL X;rhythm     Pulse Rate & Regularity tachycardic        Peripheral/Neurovascular WDL    Peripheral Neurovascular WDL WDL        Cognitive/Neuro/Behavioral WDL    Cognitive/Neuro/Behavioral WDL WDL                     "

## 2025-04-18 NOTE — CONSULTS
Cardiology Inpatient Consultation  Date of Service: 04/18/2025  Patient: Keerthi Montoya  MRN: [id number]  Admission Date: 4/18/2025  Hospital Day: 0            IMPRESSION     Paroxysmal A fib with RVR s/p Watchman  Chronic HFpEF  Moderate pHTN  AS s/p TAVR              ASSESSMENT AND PLAN     Keerthi Montoya is a 74 yo F with hx of paroxysmal A fib s/p Watchman, chronic HFpEF, pHTN, AS s/p TAVR 9/4/24 presenting with A fib with RVR.     Patient is hemodynamically and asymptomatic. When rate is slowed with adenosine, findings are most c/w A fib with RVR. Given patient's extensive cardiac hx, recommend cardioversion to optimize her prior to discharge.     Given patient's hx of GI bleeding (which is why she received a Watchman device) more than two years ago, we discussed the risks and benefits of re-starting anticoagulation- patient was amenable to start. Advised her that she can be on it for only 30 days and then transition back to ASA only.     Discussed risks benefits of LAURA/DCCV today, patient agrees and consented for procedure today.     Recommendations:   - LAURA/DCCV today, patient consented, please keep NPO  - Patient will need to be on oral anticoagulation for 30 days after DC. Prefer apixaban 5mg BID. After 30 days of AC, can transition back to ASA 81mg daily   - If DCCV successful, patient to DC home today. She said her son was able to pick her up. I'll arrange her follow-up with us.     Plan of care discussed with Dr. Uiras, who agrees with above plan.    Thank you for consulting the cardiovascular services at the [facility]. Please do not hesitate to call us with any questions.     Calixto Torres MD  Cardiology Fellow, PGY-4  Pager: [phone]    I very much appreciated the opportunity to see and assess Keerthi Montoya in the hospital with CV Fellow Dr Torres  . The note above summarizes my findings and current recommendations.  Please do not hesitate to contact my  office if you have any questions or concerns.      Bunny Urias MD  Cardiac Arrhythmia Service  [facility]  [phone]            HISTORY OF PRESENT ILLNESS     Keerthi Montoya is a 74 yo F with hx of paroxysmal A fib s/p Watchman, chronic HFpEF, pHTN, AS s/p TAVR 9/4/24 presenting with A fib with RVR.     Patient states that she is feeling well with no complaints. Came in for routine PCP visit and was found to have elevated HR and was brought to the ER for further evaluation.    At the time of interview, the patient denies chest pain, dyspnea at rest or with exertion, orthopnea, PND, palpitations, lightheadedness, or syncope.     Review of Systems:    Complete review of systems was performed and negative except per HPI.             CARDIAC HISTORY AND IMAGING     12-Lead ECG:  After administration of 12mg adenosine, A fib with RVR appears to be most likely    Transthoracic Echocardiogram(s):  3/4/25  Global and regional left ventricular function is hyperkinetic with an EF >70%.  Flattened septum is consistent with right ventricular pressure overload.  Moderate right ventricular dilation is present.  Global right ventricular function is moderately reduced.  Pulmonary hypertension is present.  The right ventricular systolic pressure is 47mmHg above the right atrial  pressure.  S/p TAVR with 23 mm Ramsay Trini 3 Ultra Resilia prosthesis on 9/4/24.  The prosthetic aortic valve is well-seated.  Doppler interrogation of the aortic valve is normal.  The inferior vena cava was normal in size with preserved respiratory  variability.  No pericardial effusion is present.     This study was compared with the study from 10/2024 .  IVS septal flattening is worse, HR increased.    Catheterization(s):   3/14/25  RA: 8 mmHg  RV: 57/8 mmHg  PA: 76/22 (39) mmHg  W: 15 mmHg  PA sat: 66.2%  VO2 173 ml/min  Faizan CO/CI: 3.3/2.0  PVR: 7.3 LUKE     CMR and/or Additional Imaging  None            PAST MEDICAL HISTORY       Past Medical History:   Diagnosis Date    Chronic kidney disease     Congestive heart failure (H)     Diabetes mellitus, type 2 (H)     Diverticulosis of colon (without mention of hemorrhage) 10/01/2012    GI bleed     History of blood transfusion     HLD (hyperlipidemia)     Hypertension     Hypothyroidism     MARIA D (iron deficiency anemia)     Persistent atrial fibrillation (H)     Pulmonary hypertension (H)     Ulcerative (chronic) enterocolitis (H)      Active Problems:  Patient Active Problem List    Diagnosis Date Noted    Vitamin B12 deficiency (non anemic) 01/26/2019     Priority: High     224 in 6/18; po B12 added ; too high with replacement; dose decreased      Hyperlipidemia LDL goal <100 07/26/2013     Priority: High      in 7/13; not taking prava 40 regularly      Aortic stenosis, severe 09/04/2024     Priority: Medium    Coronary atherosclerosis of native coronary artery 07/15/2024     Priority: Medium    Status post coronary angiogram 07/15/2024     Priority: Medium    Acute on chronic diastolic congestive heart failure (H) 09/17/2023     Priority: Medium    Severe pulmonary hypertension (H) 09/17/2023     Priority: Medium    Persistent atrial fibrillation (H) 05/11/2023     Priority: Medium    Anemia, iron deficiency 05/02/2023     Priority: Medium    Anemia due to blood loss, acute 01/20/2023     Priority: Medium    Gastrointestinal hemorrhage, unspecified gastrointestinal hemorrhage type 01/20/2023     Priority: Medium    Posterior vitreous detachment, left 01/06/2021     Priority: Medium     With hemorrhage in 11/20 per ophthalmology.                        Also she has moderate nonproliferative diabetic retinopathy in both eyes      Anemia, unspecified type 10/28/2020     Priority: Medium    Elevated serum immunoglobulin free light chains 10/28/2020     Priority: Medium    Heart failure with preserved ejection fraction, NYHA class I (H) 09/29/2020     Priority: Medium    Diastolic  "dysfunction 04/01/2020     Priority: Medium    Aortic stenosis 02/25/2020     Priority: Medium     Mild aortic stenosis (PV 2.5 m/s MG 12 mmHg.) Cannot exclude a bicuspid aortic  valve. This is a plausible basis for a systolic murmur.  Left ventricular function, chamber size, wall motion, and wall thickness are  normal.The EF is 60-65%.  Mildly dilated RV with normal RV function.  Mild to moderate tricuspid insufficiency is present.  Grade 3 diastolic dysfunction with elevated left-sided filling pressures.  Severe pulmonary hypertension.      Pulmonary hypertension (H) 02/25/2020     Priority: Medium     Mild aortic stenosis (PV 2.5 m/s MG 12 mmHg.) Cannot exclude a bicuspid aortic  valve. This is a plausible basis for a systolic murmur.  Left ventricular function, chamber size, wall motion, and wall thickness are  normal.The EF is 60-65%.  Mildly dilated RV with normal RV function.  Mild to moderate tricuspid insufficiency is present.  Grade 3 diastolic dysfunction with elevated left-sided filling pressures.  Severe pulmonary hypertension.      CKD (chronic kidney disease) stage 3, GFR 30-59 ml/min (H) 06/03/2019     Priority: Medium    Proteinuria, unspecified type 01/28/2019     Priority: Medium    Ulcerative pancolitis without complication (H) 09/10/2018     Priority: Medium    Cervical cancer screening 08/07/2018     Priority: Medium     Pt age 68  Normal paps in   2010: NIL pap  2015: NIL/Neg cotest  2018:NIL/Neg cotest  No history of LUBNA 2 or greater found in epic.   No further cervical cancer screening recommended.   Hm updated.             Type 2 diabetes mellitus with microalbuminuria, without long-term current use of insulin (H) 07/30/2018     Priority: Medium    Hypertension goal BP (blood pressure) < 130/80 06/25/2018     Priority: Medium    Postoperative hypothyroidism 05/15/2017     Priority: Medium    Colitis 09/10/2015     Priority: Medium     Colonoscopy 8/15:  \"mildly active chronic colitis\" on bx " "of sigmoid and rectum; Dx?;                    In 4/17, \"colitis\" rectum and distal sigmoid; path pending         Rectal bleeding 07/27/2015     Priority: Medium    Abdominal pain, left lower quadrant 06/28/2015     Priority: Medium     CT in 6/15 diverticulosis, no diverticulitis.         \"prominent lymph nodes of uncertain etiology\"; 6 month f/u CT suggested by Radiology            Aspirin not indicated 05/18/2015     Priority: Medium     Not tolerated by pt; GI distress      Diverticulosis of large intestine 10/01/2012     Priority: Medium     Colonoscopy 8/12; tics only, no strictures.            CT shows 2.6 cm R ovarian cyst.         Pt has hx of ovarian cysts on CT and US in 2006. In 1/07, she saw Dr. Stiven Griffin re: a 3 cm L ovarian cyst; CA-125 nml at 16 ( his 1/07 letter describes a L ovarian cyst; pt recalls this was R sided, like the current CT states)                     CT in 6/15 diverticulosis no diverticulitis; \"some increased number and mildly prominent nodes within the small bowel mesentery and posterior pelvis of ? Significance; f/u CT 3-6 months advised.               Problem list name updated by automated process. Provider to review      Vitamin D deficiency 10/01/2012     Priority: Medium     Problem list name updated by automated process. Provider to review      Preventive measure 07/26/2013     Priority: Low     APRIMA DATA BASE UNDER THE 7/26/13 NOTE  Colonoscopy 8/12; 8/15; 4/17, 12/17  Pap 2/10,6/15;8/18                                Mammogram 1/14, 6/16, 7/17, 7/18, 1/20  DXA 2005; all scores >+1 ;                  in 7/18, spine is +, fem necks are 0       Social History:  Social History     Tobacco Use    Smoking status: Never     Passive exposure: Never    Smokeless tobacco: Never   Vaping Use    Vaping status: Never Used   Substance Use Topics    Alcohol use: Yes     Alcohol/week: 2.0 - 4.0 standard drinks of alcohol     Types: 1 - 2 Cans of beer, 1 - 2 Shots of liquor per week "     Comment: Once in a great while, one every few months    Drug use: No     Family History:  Family History   Problem Relation Age of Onset    Cerebrovascular Disease Mother     Cancer Father         bladder    Diverticulitis Brother     Diabetes Brother     Diverticulitis Brother     Hypertension Brother     Kidney Disease No family hx of     Crohn's Disease No family hx of     Ulcerative Colitis No family hx of     Stomach Cancer No family hx of     GERD No family hx of     Celiac Disease No family hx of     Anesthesia Reaction No family hx of      Medications:  Current Facility-Administered Medications   Medication Dose Route Frequency Provider Last Rate Last Admin    apixaban ANTICOAGULANT (ELIQUIS) tablet 5 mg  5 mg Oral Once Emiliano Torres MD        pharmacy alert - intermittent dosing  1 each Other See Admin Instructions Peyton Moura, [facility]         Current Facility-Administered Medications   Medication Dose Route Frequency Provider Last Rate Last Admin             PHYSICAL EXAM     Temp:  [97.6  F (36.4  C)-97.9  F (36.6  C)] 97.6  F (36.4  C)  Pulse:  [111-154] 145  Resp:  [16] 16  BP: (127-170)/(77-99) 170/99  SpO2:  [96 %-99 %] 97 %  No intake or output data in the 24 hours ending 04/18/25 1350  GEN: WDWN, NAD.  CV: RRR, normal S1, S2. No murmurs. JVP flat. No edema. Symmetric 2+ radial pulses.  Pulm: Clear bilaterally with normal WOB.  Abd: Nondistended.            DIAGNOSTICS     All labs and imaging were reviewed, of note:    CMP  Recent Labs   Lab 04/18/25  1238 04/18/25  1228   NA  --  136   POTASSIUM  --  4.3   CHLORIDE  --  103   CO2  --  21*   ANIONGAP  --  12   * 152*   BUN  --  35.1*   CR  --  1.91*   GFRESTIMATED  --  27*   ABEL  --  10.1   MAG  --  2.3   PHOS  --  4.0   PROTTOTAL  --  8.4*   ALBUMIN  --  4.6   BILITOTAL  --  0.7   ALKPHOS  --  130   AST  --  23   ALT  --  7     CBC  Recent Labs   Lab 04/18/25  1228   WBC 8.3   RBC 4.55   HGB 14.9   HCT 45.5      MCH 32.7   MCHC  32.7   RDW 11.9        INRNo lab results found in last 7 days.  Arterial Blood GasNo lab results found in last 7 days.  Troponin  Lab Results   Component Value Date    CTROPT 28 (H) 09/17/2023

## 2025-04-18 NOTE — PROGRESS NOTES
Pt arrived in ECHO department for scheduled LAURA.   Procedure explained, questions answered and consent signed. Patient verbalized understanding of need for  home if discharged today.  Pt's throat sprayed at 1420, therefore pt will not be able to eat or drink until 2 hours after at 1620. Informed pt of this time and encouraged to start with warm fluids and soft foods.    Pt tolerated procedure well, and was given a total of 75 mcg IV fentanyl and 3 mg IV versed for conscious sedation. Pt denied throat or chest pain after LAURA complete.   LAURA probe 66 used for procedure.  No clots visualized on LAURA so proceeded with DCCV. Anesthesia gave pt 30 mg IV brevital for sedation and pt was DCCV at 150 Joules to a SR.  Pt denied chest or throat pain after procedure and was transferred back to after awake and VSS.   Report given to MARI Chang RN.

## 2025-04-18 NOTE — PRE-PROCEDURE
GENERAL PRE-PROCEDURE:   Procedure:  LAURA/DCCV  Date/Time:  4/18/2025 2:12 PM    Written consent obtained?: Yes    Risks and benefits: Risks, benefits and alternatives were discussed    Consent given by:  Patient  Patient states understanding of procedure being performed: Yes    Patient's understanding of procedure matches consent: Yes    Procedure consent matches procedure scheduled: Yes    Expected level of sedation:  Moderate  Appropriately NPO:  Yes  ASA Class:  2  Mallampati  :  Grade 2- soft palate, base of uvula, tonsillar pillars, and portion of posterior pharyngeal wall visible  Lungs:  Lungs clear with good breath sounds bilaterally  Heart:  Normal heart sounds with tachycardia  History & Physical reviewed:  History and physical reviewed and no updates needed  Statement of review:  I have reviewed the lab findings, diagnostic data, medications, and the plan for sedation

## 2025-04-18 NOTE — PROGRESS NOTES
"Vital signs:  Temp: 97.6  F (36.4  C) Temp src: Oral BP: 106/56 Pulse: 68   Resp: 20 SpO2: 96 % O2 Device: None (Room air) Oxygen Delivery: 4 LPM Height: 160 cm (5' 3\") Weight: 62.1 kg (137 lb)  Estimated body mass index is 24.27 kg/m  as calculated from the following:    Height as of this encounter: 1.6 m (5' 3\").    Weight as of this encounter: 62.1 kg (137 lb).     Patient completed LAURA and cardioversion.  VS stable.  Patient's son will be coming to bring patient home.  IV pulled and all belongings sent with patient.  Discharge prescriptions sent to our discharge pharmacy for Rocco to take times 30 days. Discharge orders gone over and all questions answered.   "

## 2025-04-18 NOTE — CONSULTS
Discharge Pharmacy Test Claim    Patient's [facility] Medicare Part D plan covers Eliquis and Xarelto. Expected monthly copay for each is $47. If patient reaches $2000 out of pocket, copay reduces to $0.    Test Claim Copay   Eliquis 47.00   Xarelto 47.00       Madyson Villeda  [facility] Pharmacy Liaison (A - L)  Phone: [phone] Fax: [phone]  Available on Teams & Vocera  Disclaimer: Pharmacy test claims are extimates and may not reflect final costs. Suggested alternatives aim to be cost-effective but may not be therapeutically equivalent as this consult is informational and does not constitute medical advice. Clinical decisions should be made by qualified healthcare providers.

## 2025-04-18 NOTE — TELEPHONE ENCOUNTER
glipiZIDE (GLUCOTROL XL) 10 MG 24 hr tablet 180 tablet 1 10/16/2024 -- No   Sig - Route: Take 2 tablets (20 mg) by mouth daily. - Oral     LOV: 4/18/2025  [facility] [place]        Current in ED for abnormal labs - routing to care team for follow-up.     Last Comprehensive Metabolic Panel:  Lab Results   Component Value Date     04/18/2025    POTASSIUM 4.3 04/18/2025    CHLORIDE 103 04/18/2025    CO2 21 (L) 04/18/2025    ANIONGAP 12 04/18/2025     (H) 04/18/2025    BUN 35.1 (H) 04/18/2025    CR 1.91 (H) 04/18/2025    GFRESTIMATED 27 (L) 04/18/2025    ABEL 10.1 04/18/2025     Hemoglobin A1C   Date Value Ref Range Status   11/04/2024 6.7 (H) 0.0 - 5.6 % Final     Comment:     Normal <5.7%   Prediabetes 5.7-6.4%    Diabetes 6.5% or higher     Note: Adopted from ADA consensus guidelines.   10/28/2020 6.6 (H) 0 - 5.6 % Final     Comment:     Normal <5.7% Prediabetes 5.7-6.4%  Diabetes 6.5% or higher - adopted from ADA   consensus guidelines.         Request from pharmacy:  Requested Prescriptions   Pending Prescriptions Disp Refills    glipiZIDE (GLUCOTROL XL) 10 MG 24 hr tablet 180 tablet 1     Sig: Take 2 tablets (20 mg) by mouth daily.       Sulfonylurea Agents Failed - 4/18/2025  3:39 PM        Failed - Has GFR on file in past 12 months and most recent value is normal        Failed - Recent (6 mo) or future (90 days) visit within the authorizing provider's specialty     The patient must have completed an in-person or virtual visit within the past 6 months or has a future visit scheduled within the next 90 days with the authorizing provider s specialty.  Urgent care and e-visits do not quality as an office visit for this protocol.          Failed - Patient has a recent creatinine (normal) within the past 12 mos.     Recent Labs   Lab Test 04/18/25  1228   CR 1.91*                 Passed - Patient has documented A1c within the specified period of time.     If HgbA1C is 8  or greater, it needs to be on file within the past 3 months.  If less than 8, must be on file within the past 6 months.     Recent Labs   Lab Test 11/04/24  0741   A1C 6.7*             Passed - Medication is active on med list and the sig matches. RN to manually verify dose and sig if red X/fail.     If the protocol passes (green check), you do not need to verify med dose and sig.    A prescription matches if they are the same clinical intention.    For Example: once daily and every morning are the same.    The protocol can not identify upper and lower case letters as matching and will fail.     For Example: Take 1 tablet (50 mg) by mouth daily     TAKE 1 TABLET (50 MG) BY MOUTH DAILY    For all fails (red x), verify dose and sig.    If the refill does match what is on file, the RN can still proceed to approve the refill request.       If they do not match, route to the appropriate provider.             Passed - Medication indicated for associated diagnosis     Medication is associated with one or more of the following diagnoses:  Maturity-onset diabetes of the young   Peripheral circulatory disorder due to type 2 diabetes mellitus   Type 2 diabetes mellitus           Passed - Patient is age 18 or older        Passed - No active pregnancy on record        Passed - Patient has not had a positive pregnancy test within the past 12 mos.

## 2025-04-18 NOTE — ED NOTES
Bed: ED15  Expected date:   Expected time:   Means of arrival:   Comments:  Patient coming back (Keerthi Montoya)

## 2025-04-18 NOTE — ED PROVIDER NOTES
[place] EMERGENCY DEPARTMENT ([facility])    4/18/25       ED PROVIDER NOTE     History     Chief Complaint   Patient presents with    Abnormal Labs    Tachycardia     HPI  Keerthi Montoya is a 75 year old female with a history of HFpEF, AS s/p TAVR, AF s/p RY closure, and pulmonary hypertension who presents to the ED for evaluation of abnormal labs. Patient reports she was at primary care visit where she was found to be tachycardic with heart rate in the 140s. She was advised to present to the ED for further evaluation and workup. Patient reports feeling well. She denies chest pain, palpitations, shortness of breath, fever, vomiting, or diarrhea. She has a history of a Watchman procedure (5/2023) and a TAVR (9/2024). She follows with Dr. Marcus for cardiology concerns. She is not on blood thinners currently. She notes a history of bleeding secondary taking blood thinners requiring her to receive a blood transfusion.      Past Medical History  Past Medical History:   Diagnosis Date    Chronic kidney disease     Congestive heart failure (H)     Diabetes mellitus, type 2 (H)     Diverticulosis of colon (without mention of hemorrhage) 10/01/2012    GI bleed     History of blood transfusion     HLD (hyperlipidemia)     Hypertension     Hypothyroidism     MARIA D (iron deficiency anemia)     Persistent atrial fibrillation (H)     Pulmonary hypertension (H)     Ulcerative (chronic) enterocolitis (H)      Past Surgical History:   Procedure Laterality Date    ABDOMEN SURGERY      CARDIAC SURGERY      CHOLECYSTECTOMY  03/01/1987    COLON SURGERY  01/01/2001    Left colon resection; diverticulitis    COLONOSCOPY N/A 04/24/2017    Procedure: COMBINED COLONOSCOPY, SINGLE OR MULTIPLE BIOPSY/POLYPECTOMY BY BIOPSY;;  Surgeon: Radha Salguero MD;  Location:  OR    COLONOSCOPY N/A 03/10/2023    Procedure: COLONOSCOPY;  Surgeon: Preeti James MD;  Location:  GI    COLONOSCOPY WITH CO2 INSUFFLATION N/A  04/24/2017    Procedure: COLONOSCOPY WITH CO2 INSUFFLATION;  Colonoscopy Dx rectal bleeding, BMI 25.86, Pharm [facility] .[phone], ;  Surgeon: Radha Salguero MD;  Location: [facility] OR    CV CORONARY ANGIOGRAM N/A 07/15/2024    Procedure: Coronary Angiogram with possible intervention;  Surgeon: Bobby Grimes MD;  Location:  HEART CARDIAC CATH LAB    CV LEFT ATRIAL APPENDAGE CLOSURE N/A 05/11/2023    Procedure: Left Atrial Appendage Closure;  Surgeon: Bobby Grimes MD;  Location:  HEART CARDIAC CATH LAB    CV RIGHT HEART CATH MEASUREMENTS RECORDED N/A 03/16/2020    Procedure: CV RIGHT HEART CATH;  Surgeon: Nader Muñoz MD;  Location:  HEART CARDIAC CATH LAB    CV RIGHT HEART CATH MEASUREMENTS RECORDED N/A 07/15/2024    Procedure: Right Heart Cath;  Surgeon: Bobby Grimes MD;  Location:  HEART CARDIAC CATH LAB    CV RIGHT HEART CATH MEASUREMENTS RECORDED N/A 3/14/2025    Procedure: Heart Cath Right Heart Cath;  Surgeon: Angeline Marcus MD;  Location:  HEART CARDIAC CATH LAB    CV RIGHT HEART EXERCISE STRESS STUDY N/A 3/14/2025    Procedure: Right Heart Exercise Stress Study;  Surgeon: Angeline Marcus MD;  Location:  HEART CARDIAC CATH LAB    CV TRANSCATHETER AORTIC VALVE REPLACEMENT-FEMORAL APPROACH N/A 09/04/2024    Procedure: Transcatheter Aortic Valve Replacement-Femoral Approach;  Surgeon: Bobby Grimes MD;  Location:  OR    ESOPHAGOSCOPY, GASTROSCOPY, DUODENOSCOPY (EGD), COMBINED N/A 01/23/2023    Procedure: ESOPHAGOGASTRODUODENOSCOPY, WITH BIOPSY;  Surgeon: Ricki Deal MD;  Location:  GI    ESOPHAGOSCOPY, GASTROSCOPY, DUODENOSCOPY (EGD), COMBINED N/A 03/10/2023    Procedure: Esophagoscopy, gastroscopy, duodenoscopy (EGD), combined;  Surgeon: Preeti James MD;  Location:  GI    GYN SURGERY  09/01/1983    tubal ligation    HERNIA REPAIR  12/01/2006    recurrent incisional hernia    SIGMOIDOSCOPY FLEXIBLE N/A 01/23/2023     Procedure: Sigmoidoscopy flexible;  Surgeon: Ricki Deal MD;  Location:  GI    THROAT SURGERY  12/01/1974    thyroidectomy     [START ON 4/19/2025] apixaban ANTICOAGULANT (ELIQUIS) 5 MG tablet  atorvastatin (LIPITOR) 40 MG tablet  azithromycin (ZITHROMAX) 250 MG tablet  blood glucose (ACCU-CHEK FELIPE PLUS) test strip  Cyanocobalamin (B-12) 1000 MCG TABS  diphenhydrAMINE (BENADRYL) 25 MG tablet  dulaglutide (TRULICITY) 1.5 MG/0.5ML pen  empagliflozin (JARDIANCE) 10 MG TABS tablet  glipiZIDE (GLUCOTROL XL) 10 MG 24 hr tablet  hydrALAZINE (APRESOLINE) 50 MG tablet  isosorbide mononitrate (IMDUR) 30 MG 24 hr tablet  levothyroxine (SYNTHROID/LEVOTHROID) 125 MCG tablet  losartan (COZAAR) 100 MG tablet  mesalamine (APRISO ER) 0.375 g 24 hr capsule  pantoprazole (PROTONIX) 40 MG EC tablet  senna (SENOKOT) 8.6 MG tablet  torsemide (DEMADEX) 10 MG tablet      Allergies   Allergen Reactions    Actos [Pioglitazone]      Fluid retention    Amlodipine      Leg swelling, hand swelling    Doxycycline Hives and Rash     Edema in hands/feet      Keflex [Cephalexin Hcl] Hives     Swelling hands/feet      Metoprolol Other (See Comments)     Swelling of lower legs and fatigue    Synthroid [Levothyroxine Sodium] Swelling     Allergic to brand name, hives all over body, edema all over body      Tylenol Hives     Hives/rash all over body    Ziac [Bisoprolol-Hydrochlorothiazide] Other (See Comments), Hives and Itching     Hands/feet swell up, hives all over body      Animal Dander Other (See Comments)     Sneezing, itchy nose    Grass Other (See Comments)     Itchy nose/eyes, sneezing, coughing      Tetracycline Hives and Itching     Swelling hands/feet      Dust Mites Other (See Comments)     Itchy eyes, sneezing    Other [Seasonal Allergies] Other (See Comments)     Pollen coughing/sneezing     Family History  Family History   Problem Relation Age of Onset    Cerebrovascular Disease Mother     Cancer Father         bladder     "Diverticulitis Brother     Diabetes Brother     Diverticulitis Brother     Hypertension Brother     Kidney Disease No family hx of     Crohn's Disease No family hx of     Ulcerative Colitis No family hx of     Stomach Cancer No family hx of     GERD No family hx of     Celiac Disease No family hx of     Anesthesia Reaction No family hx of      Social History   Social History     Tobacco Use    Smoking status: Never     Passive exposure: Never    Smokeless tobacco: Never   Vaping Use    Vaping status: Never Used   Substance Use Topics    Alcohol use: Yes     Alcohol/week: 2.0 - 4.0 standard drinks of alcohol     Types: 1 - 2 Cans of beer, 1 - 2 Shots of liquor per week     Comment: Once in a great while, one every few months    Drug use: No      Past medical history, past surgical history, medications, allergies, family history, and social history were reviewed with the patient. No additional pertinent items.   A medically appropriate review of systems was performed with pertinent positives and negatives noted in the HPI, and all other systems negative.    Physical Exam   BP: (!) 157/84  Pulse: (!) 154  Temp: 97.6  F (36.4  C)  Resp: 16  Height: 160 cm (5' 3\")  Weight: 62.1 kg (137 lb)  SpO2: 97 %  Physical Exam  Vitals and nursing note reviewed.   Constitutional:       General: She is not in acute distress.     Appearance: She is not ill-appearing, toxic-appearing or diaphoretic.   HENT:      Head: Normocephalic and atraumatic.   Eyes:      Extraocular Movements: Extraocular movements intact.      Conjunctiva/sclera: Conjunctivae normal.   Cardiovascular:      Rate and Rhythm: Tachycardia present.      Heart sounds: Normal heart sounds.   Pulmonary:      Effort: Pulmonary effort is normal. No respiratory distress.      Breath sounds: Normal breath sounds.   Abdominal:      General: Abdomen is flat.      Palpations: Abdomen is soft.      Tenderness: There is no abdominal tenderness.   Musculoskeletal:         " General: No tenderness. Normal range of motion.      Cervical back: Normal range of motion and neck supple.   Skin:     General: Skin is warm.      Findings: No rash.   Neurological:      General: No focal deficit present.      Mental Status: She is alert and oriented to person, place, and time.   Psychiatric:         Mood and Affect: Mood normal.         Behavior: Behavior normal.         Thought Content: Thought content normal.         Judgment: Judgment normal.           ED Course, Procedures, & Data      Procedures            EKG Interpretation:      Interpreted by Rosanna Riley MD  Time reviewed: 11:59 PM  Symptoms at time of EKG: Tachycardia  Rhythm: SVT  Rate: 141  Axis: RAD  Ectopy: none  Conduction: normal  ST Segments/ T Waves: No ST-T wave changes  Q Waves: none  Comparison to prior: Different than old EKG done on November 1, 2024    Clinical Impression: SVT         EKG Interpretation:      Interpreted by Rosanna Riley MD  Time reviewed: 1:02 PM  Symptoms at time of EKG: Tachycardia  Rhythm: SVT  Rate: 149  Axis: RAD  Ectopy: none  Conduction: normal  ST Segments/ T Waves: No ST-T wave changes  Q Waves: none  Comparison to prior: Unchanged in comparison to old EKG done earlier today  Clinical Impression: SVT         Results for orders placed or performed during the hospital encounter of 04/18/25   Magnesium     Status: Normal   Result Value Ref Range    Magnesium 2.3 1.7 - 2.3 mg/dL   Comprehensive metabolic panel     Status: Abnormal   Result Value Ref Range    Sodium 136 135 - 145 mmol/L    Potassium 4.3 3.4 - 5.3 mmol/L    Carbon Dioxide (CO2) 21 (L) 22 - 29 mmol/L    Anion Gap 12 7 - 15 mmol/L    Urea Nitrogen 35.1 (H) 8.0 - 23.0 mg/dL    Creatinine 1.91 (H) 0.51 - 0.95 mg/dL    GFR Estimate 27 (L) >60 mL/min/1.73m2    Calcium 10.1 8.8 - 10.4 mg/dL    Chloride 103 98 - 107 mmol/L    Glucose 152 (H) 70 - 99 mg/dL    Alkaline Phosphatase 130 40 - 150 U/L    AST 23 0 - 45 U/L    ALT 7 0 - 50 U/L     Protein Total 8.4 (H) 6.4 - 8.3 g/dL    Albumin 4.6 3.5 - 5.2 g/dL    Bilirubin Total 0.7 <=1.2 mg/dL   TSH with free T4 reflex     Status: Abnormal   Result Value Ref Range    TSH 0.21 (L) 0.30 - 4.20 uIU/mL   Phosphorus     Status: Normal   Result Value Ref Range    Phosphorus 4.0 2.5 - 4.5 mg/dL   CBC with platelets and differential     Status: None   Result Value Ref Range    WBC Count 8.3 4.0 - 11.0 10e3/uL    RBC Count 4.55 3.80 - 5.20 10e6/uL    Hemoglobin 14.9 11.7 - 15.7 g/dL    Hematocrit 45.5 35.0 - 47.0 %     78 - 100 fL    MCH 32.7 26.5 - 33.0 pg    MCHC 32.7 31.5 - 36.5 g/dL    RDW 11.9 10.0 - 15.0 %    Platelet Count 211 150 - 450 10e3/uL    % Neutrophils 67 %    % Lymphocytes 18 %    % Monocytes 12 %    % Eosinophils 2 %    % Basophils 1 %    % Immature Granulocytes 0 %    NRBCs per 100 WBC 0 <1 /100    Absolute Neutrophils 5.5 1.6 - 8.3 10e3/uL    Absolute Lymphocytes 1.5 0.8 - 5.3 10e3/uL    Absolute Monocytes 1.0 0.0 - 1.3 10e3/uL    Absolute Eosinophils 0.2 0.0 - 0.7 10e3/uL    Absolute Basophils 0.1 0.0 - 0.2 10e3/uL    Absolute Immature Granulocytes 0.0 <=0.4 10e3/uL    Absolute NRBCs 0.0 10e3/uL   Glucose by meter     Status: Abnormal   Result Value Ref Range    GLUCOSE BY METER POCT 144 (H) 70 - 99 mg/dL   T4 free     Status: Abnormal   Result Value Ref Range    Free T4 1.90 (H) 0.90 - 1.70 ng/dL   EKG 12 lead     Status: None (Preliminary result)   Result Value Ref Range    Systolic Blood Pressure  mmHg    Diastolic Blood Pressure  mmHg    Ventricular Rate 141 BPM    Atrial Rate  BPM    UT Interval  ms    QRS Duration 80 ms     ms    QTc 496 ms    P Axis  degrees    R AXIS 100 degrees    T Axis 44 degrees    Interpretation ECG       Supraventricular tachycardia  Rightward axis  Cannot rule out Anterior infarct , age undetermined  Abnormal ECG     EKG 12 lead     Status: None (Preliminary result)   Result Value Ref Range    Systolic Blood Pressure  mmHg    Diastolic Blood  Pressure  mmHg    Ventricular Rate 149 BPM    Atrial Rate  BPM    CA Interval  ms    QRS Duration 60 ms     ms    QTc 459 ms    P Axis  degrees    R AXIS 90 degrees    T Axis -38 degrees    Interpretation ECG       Supraventricular tachycardia  Rightward axis  Nonspecific ST and T wave abnormality  Abnormal ECG     EKG 12-lead, tracing only     Status: None (Preliminary result)   Result Value Ref Range    Systolic Blood Pressure  mmHg    Diastolic Blood Pressure  mmHg    Ventricular Rate 78 BPM    Atrial Rate 78 BPM    CA Interval 176 ms    QRS Duration 64 ms     ms    QTc 462 ms    P Axis 84 degrees    R AXIS 79 degrees    T Axis 35 degrees    Interpretation ECG       Sinus rhythm with marked sinus arrhythmia with occasional Premature ventricular complexes  Low voltage QRS  Borderline ECG  When compared with ECG of 18-Apr-2025 12:58, (unconfirmed)  Premature ventricular complexes are now Present  Vent. rate has decreased by  71 bpm  ST no longer depressed in Anterior leads  Nonspecific T wave abnormality has replaced inverted T waves in Inferior leads  Nonspecific T wave abnormality no longer evident in Lateral leads     Cardioversion     Status: None    Narrative    Sheela Lino PA-C     4/18/2025  3:42 PM  [facility]    Cardioversion    Date/Time: 4/18/2025 3:20 PM    Performed by: Sheela Lino PA-C  Authorized by: Emiliano Torres MD    Risks, benefits and alternatives discussed.      UNIVERSAL PROTOCOL   Site Marked: NA  Prior Images Obtained and Reviewed:  NA  Required items: Required blood products, implants, devices and special   equipment available    Patient identity confirmed:  Verbally with patient  Patient was reevaluated immediately before administering moderate or deep   sedation or anesthesia  Confirmation Checklist:  Patient's identity using two indicators, relevant   allergies, procedure was appropriate and matched the consent  or emergent   situation and correct equipment/implants were available  Time out: Immediately prior to the procedure a time out was called    Universal Protocol: the Joint Commission Universal Protocol was followed      PRE-PROCEDURE DETAILS:     Rhythm:  Atrial fibrillation    Electrode placement:  Anterior-posterior  Attempt one:     Cardioversion mode:  Synchronous    Waveform:  Biphasic    Shock (Joules):  150    Shock outcome:  Conversion to normal sinus rhythm (with frequent ectopy)  Post-procedure details:     Patient status:  Awake      PROCEDURE  Describe Procedure: Echo staff performed LAURA prior and reported no   intracardiac thrombus. 150J biphasic synchronized shock delivered with   successful conversion.     Patient Tolerance:  Patient tolerated the procedure well with no immediate   complications   Transesophageal Echocardiogram     Status: None   Result Value Ref Range    LVEF  55-60%     Narrative    [id number]  [id number]  [id number]  [id number]^ANTOINETTE^LIA                                                                       Version 2     [facility],[place]  Echocardiography Laboratory  [address] [place] [id number]     Name: XIOMY LORD  MRN: [id number]  : 1950  Study Date: 2025 02:25 PM  Age: 75 yrs  Gender: Female  Patient Location: [facility]  Reason For Study: Afib  Ordering Physician: LIA CURRY  Performed By: Ten Reyes Dr.     Attestation  I was present during LAURA probe placement by the fellow, Ten Reyes. I  personally viewed the imaging and agree with the interpretation and report as  documented by the fellow.  ______________________________________________________________________________  Interpretation Summary  There is a 24 mm Watchman FLX closure device in the left atrial appendage. It  is well-seated without evidence of thrombus on the left atrial side of the  device. There is no evidence of delroy-device leak on color  Doppler.  Global and regional left ventricular function is normal with an EF of 55-60%.  Moderate right ventricular dilation is present. Global right ventricular  function is moderately reduced.  S/p TAVR with 23 mm Ramsay Trini 3 Ultra Resilia prosthesis on 9/4/24. The  prosthetic aortic valve is well-seated. Doppler interrogation of the aortic  valve is normal. The mean gradient is 5 mmHg.  No pericardial effusion is present.     This study was compared with the study from 03/04/2025. There has been no  change.  The patient's rhythm is atrial fibrillation.  ______________________________________________________________________________  Procedure  Transesophageal Echocardiogram with two-dimensional, color and spectral  Doppler. 3D image acquisition, reconstruction, and real-time interpretation  was performed. Procedure location Echo Lab. Informed consent for  Transesophegeal echo obtained. LAURA Probe #66 was used during the procedure.  Patient was sedated using Fentanyl 75 mcg. Patient was sedated using Versed 3  mg. The heart rate, respiratory rate, oxygen saturations, blood pressure, and  response to care were monitored throughout the procedure with the assistance  of the nurse. I determined this patient to be an appropriate candidate for the  planned sedation and procedure and have reassessed the patient immediately  prior to sedation and procedure. Total sedation time: 24 minutes of continuous  bedside 1:1 monitoring. The Transducer was inserted without difficulty . The  patient tolerated the procedure well. Complications None. The patient's rhythm  is atrial fibrillation. Good quality two-dimensional was performed and  interpreted. Good quality color and spectral Doppler were performed and  interpreted.     Left Ventricle  Global and regional left ventricular function is normal with an EF of 55-60%.     Right Ventricle  Moderate right ventricular dilation is present. Global right ventricular  function is  moderately reduced.     Atria  Moderate biatrial enlargement is present. There is a 24 mm Watchman FLX  closure device in the left atrial appendage. It is well-seated without  evidence of thrombus on the left atrial side of the device. There is no  evidence of delroy-device leak on color Doppler.     Mitral Valve  Mild mitral annular calcification is present. Trace to mild mitral  insufficiency is present.     Aortic Valve  S/p TAVR with 23 mm Ramsay Trini 3 Ultra Resilia prosthesis on 24. The  prosthetic aortic valve is well-seated. Doppler interrogation of the aortic  valve is normal. The mean gradient is 5 mmHg.     Tricuspid Valve  The tricuspid valve is normal. Trace tricuspid insufficiency is present.     Pulmonic Valve  The pulmonic valve is normal. Trace pulmonic insufficiency is present.     Vessels  The aorta root is normal. The thoracic aorta is normal.     Pericardium  No pericardial effusion is present.     Compared to Previous Study  This study was compared with the study from 2025 . There has been no  change.     ______________________________________________________________________________  Doppler Measurements & Calculations     Ao V2 max: 154.0 cm/sec  Ao max P.5 mmHg  Ao V2 mean: 104.2 cm/sec  Ao mean P.1 mmHg  Ao V2 VTI: 21.2 cm     ______________________________________________________________________________  Report approved by: LILI VALDES MD on 2025 03:45 PM         CBC with platelets differential     Status: None    Narrative    The following orders were created for panel order CBC with platelets differential.  Procedure                               Abnormality         Status                     ---------                               -----------         ------                     CBC with platelets and ...[[id number]]                      Final result                 Please view results for these tests on the individual orders.   Results for orders placed or  performed in visit on 04/18/25   EKG 12-lead complete w/read - Clinics     Status: None (Preliminary result)   Result Value Ref Range    Systolic Blood Pressure  mmHg    Diastolic Blood Pressure  mmHg    Ventricular Rate 142 BPM    Atrial Rate 147 BPM    MI Interval  ms    QRS Duration 82 ms     ms    QTc 495 ms    P Axis  degrees    R AXIS 93 degrees    T Axis 36 degrees    Interpretation ECG       Supraventricular tachycardia  Rightward axis  Nonspecific ST abnormality  Abnormal ECG  When compared with ECG of 01-Nov-2024 09:36,  Premature supraventricular complexes are no longer Present  Vent. rate has increased by  63 bpm  Non-specific change in ST segment in Lateral leads  T wave inversion no longer evident in Anterior leads    Unconfirmed report - interpretation of this ECG is computer generated - see medical record for final interpretation       Medications   pharmacy alert - intermittent dosing (has no administration in time range)   sodium chloride 0.9 % infusion (1,000 mLs Intravenous Not Given 4/18/25 1540)   sodium chloride bacteriostatic 0.9 % flush 9.5 mL (has no administration in time range)   midazolam (VERSED) injection 0.5 mg (1 mg Intravenous $Given 4/18/25 1431)   fentaNYL (PF) (SUBLIMAZE) injection 25 mcg (25 mcg Intravenous $Given 4/18/25 1431)   naloxone (NARCAN) injection 0.2 mg (has no administration in time range)   naloxone (NARCAN) injection 0.4 mg (has no administration in time range)   naloxone (NARCAN) injection 0.2 mg (has no administration in time range)   naloxone (NARCAN) injection 0.4 mg (has no administration in time range)   flumazenil (ROMAZICON) injection 0.2 mg (has no administration in time range)   adenosine (ADENOCARD) injection 12 mg (12 mg Intravenous $Given 4/18/25 1244)   apixaban ANTICOAGULANT (ELIQUIS) tablet 5 mg (5 mg Oral $Given 4/18/25 1351)   lidocaine (viscous) (XYLOCAINE) 2 % solution 15 mL (15 mLs Mouth/Throat $Given 4/18/25 1418)   benzocaine 20%  (HURRICAINE/TOPEX) 20 % spray 0.5-2 mL (0.5 mLs Mouth/Throat $Given 4/18/25 1414)     Labs Ordered and Resulted from Time of ED Arrival to Time of ED Departure   COMPREHENSIVE METABOLIC PANEL - Abnormal       Result Value    Sodium 136      Potassium 4.3      Carbon Dioxide (CO2) 21 (*)     Anion Gap 12      Urea Nitrogen 35.1 (*)     Creatinine 1.91 (*)     GFR Estimate 27 (*)     Calcium 10.1      Chloride 103      Glucose 152 (*)     Alkaline Phosphatase 130      AST 23      ALT 7      Protein Total 8.4 (*)     Albumin 4.6      Bilirubin Total 0.7     TSH WITH FREE T4 REFLEX - Abnormal    TSH 0.21 (*)    GLUCOSE BY METER - Abnormal    GLUCOSE BY METER POCT 144 (*)    T4 FREE - Abnormal    Free T4 1.90 (*)    MAGNESIUM - Normal    Magnesium 2.3     PHOSPHORUS - Normal    Phosphorus 4.0     CBC WITH PLATELETS AND DIFFERENTIAL    WBC Count 8.3      RBC Count 4.55      Hemoglobin 14.9      Hematocrit 45.5            MCH 32.7      MCHC 32.7      RDW 11.9      Platelet Count 211      % Neutrophils 67      % Lymphocytes 18      % Monocytes 12      % Eosinophils 2      % Basophils 1      % Immature Granulocytes 0      NRBCs per 100 WBC 0      Absolute Neutrophils 5.5      Absolute Lymphocytes 1.5      Absolute Monocytes 1.0      Absolute Eosinophils 0.2      Absolute Basophils 0.1      Absolute Immature Granulocytes 0.0      Absolute NRBCs 0.0       Transesophageal Echocardiogram   Final Result      Cardioversion   Final Result             Critical care was not performed.     Medical Decision Making  The patient's presentation was of high complexity (an acute health issue posing potential threat to life or bodily function).    The patient's evaluation involved:  review of external note(s) from 1 sources (see separate area of note for details)  review of 3+ test result(s) ordered prior to this encounter (see separate area of note for details)  ordering and/or review of 3+ test(s) in this encounter (see separate  area of note for details)  discussion of management or test interpretation with another health professional (see separate area of note for details)    The patient's management necessitated high risk (a decision regarding hospitalization).    Assessment & Plan    Keerthi Montoya is a 75 year old female sent from her primary clinic due to tachycardia.  Differential diagnosis: Arrhythmia, electrolyte abnormalities, thyroid disorder, unlikely ACS.    After thorough history and physical exam patient appears to be in no acute distress.  I will obtain EKG and laboratory studies for further diagnostic evaluation and consult the cardiology team given her history of pulmonary hypertension, heart failure, and watchman's procedure.  Patient agrees with the plan.    Case was discussed with cardiology fellow who discussed the case with Dr. Bedolla from EP service.  They recommended to give the patient a dose of 12 mg IV adenosine and run continuous twelve-lead ECG, which we did.  Patient's heart rate returned back to 140s subsequently.  She tolerated the procedure well without any difficulties.    1:23 PM  Cardiology asked initiate heparin infusion, which I did order.    Cardiology evaluated the patient in the emergency department. They are planning on taking her to their lab for LAURA and possible subsequent DC cardioversion.      Laboratory studies returned with no leukocytosis, WBC is normal at 8300. There is no anemia, hemoglobin is normal at 14.9.  Electrolytes show mild dehydration with elevated creatinine of 1.91. TSH is somewhat low at 0.21 and T4 is elevated 1.9. Patient was successfully cardioverted by cardiology team after unremarkable LAURA. She was given a dose of oral apixaban in the cardiology suite. Recommendations were made for her to be discharged with a prescription for apixaban for 30 days and follow-up as an outpatient. She agrees with the plan. She is stable for discharge. She will return if she has any further  concerns.     I have reviewed the nursing notes. I have reviewed the findings, diagnosis, plan and need for follow up with the patient.    This part of the medical record was transcribed by Jennifer Gómez, Medical Scribe, from a dictation done by Rosanna Riley MD.      Current Discharge Medication List        START taking these medications    Details   apixaban ANTICOAGULANT (ELIQUIS) 5 MG tablet Take 1 tablet (5 mg) by mouth 2 times daily.  Qty: 59 tablet, Refills: 0    Comments: First dose given on April 18, 2025 in the hospital.             Final diagnoses:   Atrial fibrillation with RVR (H)   ISonia, am serving as a trained medical scribe to document services personally performed by Rosanna Riley MD, MD, based on the provider's statements to me.     Osvaldo PITT Nikola, MD, MD, was physically present and have reviewed and verified the accuracy of this note documented by Sonia Trujillo.     Rosanna Riley MD  [facility] EMERGENCY DEPARTMENT  4/18/2025     Rosanna Riley MD  04/18/25 [id number]

## 2025-04-18 NOTE — DISCHARGE INSTRUCTIONS
Please make an appointment to follow up with Your Primary Care Provider in 3-5 days for further evaluation and recommendations.  Please take the blood thinner that was prescribed to you today for 30 days.  Afterwards you can continue taking your aspirin.  DO NOT take aspirin while you are taking the blood thinner (Eliquis).  If you experience any palpitations, lightheadedness, chest pain, or shortness of breath, come back to the emergency department.      GOING HOME AFTER YOUR TRANSESOPHAGEAL ECHOCARDIOGRAM AND CARDIOVERSION    FOR NEXT 24 HOURS:  An adult should stay with you.  Relax and take it easy.  DO NOT make any important legal decisions.  DO NOT drive or operate machines at home or at work.  DO NOT consume any alcohol today.  Resume your regular diet 2 HOURS after procedure @ 4:20 PM and drink plenty of fluids.  If your throat is sore, eat cold, bland, soft foods.  If you have any redness/skin soreness where the patches were placed, you may use aloe vera gel or 1% hydrocortisone cream to the skin (sold at drug stores)  Take Tylenol (Acetaminophen) per your provider's recommendations as needed to help relieve local pain.     CALL YOUR HEALTHCARE PROVIDER IF:  New pain or trouble swallowing  New stomach or chest pain  You develop nausea or vomiting  Fever above 100.6 F or greater  You develop hives or a rash or any unexplained itching  Have irregular heartbeat or fast pulse  Feel faint, dizzy, or lightheaded  Have chest pain with increased activity  Have bleeding issues from blood-thinning medicines (vomiting that looks like coffee grounds or contains blood OR black, bloody stools).    CALL 911 RIGHT AWAY IF YOU HAVE:  Pain in your chest, arm, shoulder, neck, or upper back  Shortness of breath  Loss of vision, speech, or strength or coordination in any body part  Weakness in your arm or leg  Uncontrolled bleeding  Feel unstable in any way     FOLLOW UP APPOINTMENT:    Follow up as scheduled with EP/Cardiology  Provider     ADDITIONAL INFORMATION:    If you have any questions:        Call the Echo Lab Nurse at [phone], press 4 (Monday-Friday 7:00 am - 4:00 pm)        Call the hospital: [phone] and ask them to page [phone] (after 4:00 pm and on   weekends or holidays)      Cardiovascular Clinic:   [address] [place] [address]. [place] [address]  Your Care Team:  EP Cardiology   Telephone Number     Petra Walters RN [phone]    After business hours: [phone], select option 4, ask for EP Fellow on call to be paged.     For scheduling appts or procedures:    Kayleigh Stevens   [phone]   For the Device Clinic (Pacemakers and ICD's)   RN's  During business hours: [phone]     After business hours:   [phone]- select option 4 and ask for job code [id number].       As always, Thank you for trusting us with your health care needs!

## 2025-04-18 NOTE — PROCEDURES
[facility]    Cardioversion    Date/Time: 4/18/2025 3:20 PM    Performed by: Sheela Lino PA-C  Authorized by: Emiliano Torres MD    Risks, benefits and alternatives discussed.      UNIVERSAL PROTOCOL   Site Marked: NA  Prior Images Obtained and Reviewed:  NA  Required items: Required blood products, implants, devices and special equipment available    Patient identity confirmed:  Verbally with patient  Patient was reevaluated immediately before administering moderate or deep sedation or anesthesia  Confirmation Checklist:  Patient's identity using two indicators, relevant allergies, procedure was appropriate and matched the consent or emergent situation and correct equipment/implants were available  Time out: Immediately prior to the procedure a time out was called    Universal Protocol: the Joint Commission Universal Protocol was followed      PRE-PROCEDURE DETAILS:     Rhythm:  Atrial fibrillation    Electrode placement:  Anterior-posterior  Attempt one:     Cardioversion mode:  Synchronous    Waveform:  Biphasic    Shock (Joules):  150    Shock outcome:  Conversion to normal sinus rhythm (with frequent ectopy)  Post-procedure details:     Patient status:  Awake      PROCEDURE  Describe Procedure: Echo staff performed LAURA prior and reported no intracardiac thrombus. 150J biphasic synchronized shock delivered with successful conversion.     Patient Tolerance:  Patient tolerated the procedure well with no immediate complications    Anticoagulation: Eliquis. LAURA performed prior without evidence of intracardiac thrombus.     Sheela Lino PA-C  [facility]  Electrophysiology Consult Service  Pager #[phone]

## 2025-04-19 LAB
ATRIAL RATE - MUSE: NORMAL BPM
ATRIAL RATE - MUSE: NORMAL BPM
DIASTOLIC BLOOD PRESSURE - MUSE: NORMAL MMHG
DIASTOLIC BLOOD PRESSURE - MUSE: NORMAL MMHG
INTERPRETATION ECG - MUSE: NORMAL
INTERPRETATION ECG - MUSE: NORMAL
P AXIS - MUSE: NORMAL DEGREES
P AXIS - MUSE: NORMAL DEGREES
PR INTERVAL - MUSE: NORMAL MS
PR INTERVAL - MUSE: NORMAL MS
QRS DURATION - MUSE: 60 MS
QRS DURATION - MUSE: 80 MS
QT - MUSE: 292 MS
QT - MUSE: 324 MS
QTC - MUSE: 459 MS
QTC - MUSE: 496 MS
R AXIS - MUSE: 100 DEGREES
R AXIS - MUSE: 90 DEGREES
SYSTOLIC BLOOD PRESSURE - MUSE: NORMAL MMHG
SYSTOLIC BLOOD PRESSURE - MUSE: NORMAL MMHG
T AXIS - MUSE: -38 DEGREES
T AXIS - MUSE: 44 DEGREES
VENTRICULAR RATE- MUSE: 141 BPM
VENTRICULAR RATE- MUSE: 149 BPM

## 2025-04-20 LAB
ATRIAL RATE - MUSE: 78 BPM
DIASTOLIC BLOOD PRESSURE - MUSE: NORMAL MMHG
INTERPRETATION ECG - MUSE: NORMAL
P AXIS - MUSE: 84 DEGREES
PR INTERVAL - MUSE: 176 MS
QRS DURATION - MUSE: 64 MS
QT - MUSE: 406 MS
QTC - MUSE: 462 MS
R AXIS - MUSE: 79 DEGREES
SYSTOLIC BLOOD PRESSURE - MUSE: NORMAL MMHG
T AXIS - MUSE: 35 DEGREES
VENTRICULAR RATE- MUSE: 78 BPM

## 2025-04-21 ENCOUNTER — TELEPHONE (OUTPATIENT)
Dept: CARDIOLOGY | Facility: CLINIC | Age: 75
End: 2025-04-21
Payer: MEDICARE

## 2025-04-21 RX ORDER — GLIPIZIDE 10 MG/1
20 TABLET, FILM COATED, EXTENDED RELEASE ORAL DAILY
Qty: 180 TABLET | Refills: 3 | Status: SHIPPED | OUTPATIENT
Start: 2025-04-21

## 2025-04-21 NOTE — TELEPHONE ENCOUNTER
Called and LVM for patient to call me back to schedule an DCCV cardioversion appt with an EP BLAISE next available.

## 2025-04-23 DIAGNOSIS — N18.4 CHRONIC KIDNEY DISEASE, STAGE IV (SEVERE) (H): Primary | ICD-10-CM

## 2025-04-29 NOTE — CONFIDENTIAL NOTE
DIAGNOSIS:   Hepatic steatosis    Appt Date:  07.07.2025     NOTES STATUS DETAILS   OFFICE NOTE from referring provider Internal 04.18.2025 Bunny Meyers MD    MEDICATION LIST Internal    IMAGING     ENDOSCOPY (IF AVAILABLE) Internal 03.10.2023   COLONOSCOPY (IF AVAILABLE) Internal 03.10.2023    FIBROSCAN, US ELASTOGRAPHY, FIBROSIS SCAN, MR ELASTOGRAPHY Internal 03.05.2024 Liver FibroScan    LABS     HEPATIC PANEL (LIVER PANEL) Internal 07.29.2024    BASIC METABOLIC PANEL Internal 03.14.2025   COMPLETE METABOLIC PANEL Internal 04.18.2025   COMPLETE BLOOD COUNT (CBC) Internal 04.18.2025    INTERNATIONAL NORMALIZED RATIO (INR) Internal 03.14.2025   HEPATITIS C ANTIBODY Internal 07.08.2024   HEPATITIS B SURFACE ANTIGEN Internal 07.08.2024   HEPATITIS B SURFACE ANTIBODY Internal 07.08.2024   HEPATITIS B CORE ANTIBODY Internal 07.08.2024

## 2025-05-08 ENCOUNTER — OFFICE VISIT (OUTPATIENT)
Dept: PHARMACY | Facility: CLINIC | Age: 75
End: 2025-05-08
Attending: INTERNAL MEDICINE
Payer: MEDICARE

## 2025-05-08 VITALS — OXYGEN SATURATION: 96 % | SYSTOLIC BLOOD PRESSURE: 157 MMHG | DIASTOLIC BLOOD PRESSURE: 66 MMHG | HEART RATE: 68 BPM

## 2025-05-08 DIAGNOSIS — E78.5 HYPERLIPIDEMIA LDL GOAL <100: ICD-10-CM

## 2025-05-08 DIAGNOSIS — I25.10 ATHEROSCLEROSIS OF NATIVE CORONARY ARTERY OF NATIVE HEART WITHOUT ANGINA PECTORIS: ICD-10-CM

## 2025-05-08 DIAGNOSIS — K92.2 GASTROINTESTINAL HEMORRHAGE, UNSPECIFIED GASTROINTESTINAL HEMORRHAGE TYPE: ICD-10-CM

## 2025-05-08 DIAGNOSIS — R80.9 TYPE 2 DIABETES MELLITUS WITH MICROALBUMINURIA, WITHOUT LONG-TERM CURRENT USE OF INSULIN (H): Primary | ICD-10-CM

## 2025-05-08 DIAGNOSIS — K52.9 COLITIS: ICD-10-CM

## 2025-05-08 DIAGNOSIS — K59.00 CONSTIPATION, UNSPECIFIED CONSTIPATION TYPE: ICD-10-CM

## 2025-05-08 DIAGNOSIS — N18.4 CKD (CHRONIC KIDNEY DISEASE) STAGE 4, GFR 15-29 ML/MIN (H): ICD-10-CM

## 2025-05-08 DIAGNOSIS — I35.0 NONRHEUMATIC AORTIC VALVE STENOSIS: ICD-10-CM

## 2025-05-08 DIAGNOSIS — I50.30 HEART FAILURE WITH PRESERVED EJECTION FRACTION, NYHA CLASS I (H): ICD-10-CM

## 2025-05-08 DIAGNOSIS — Z78.9 TAKES DIETARY SUPPLEMENTS: ICD-10-CM

## 2025-05-08 DIAGNOSIS — E03.9 HYPOTHYROIDISM, UNSPECIFIED TYPE: ICD-10-CM

## 2025-05-08 DIAGNOSIS — E11.29 TYPE 2 DIABETES MELLITUS WITH MICROALBUMINURIA, WITHOUT LONG-TERM CURRENT USE OF INSULIN (H): Primary | ICD-10-CM

## 2025-05-08 DIAGNOSIS — L29.9 ITCHING: ICD-10-CM

## 2025-05-08 DIAGNOSIS — I10 HYPERTENSION GOAL BP (BLOOD PRESSURE) < 130/80: ICD-10-CM

## 2025-05-08 DIAGNOSIS — I48.19 PERSISTENT ATRIAL FIBRILLATION (H): ICD-10-CM

## 2025-05-08 DIAGNOSIS — Z95.2 S/P TAVR (TRANSCATHETER AORTIC VALVE REPLACEMENT): ICD-10-CM

## 2025-05-08 PROCEDURE — 99207 PR NO CHARGE LOS: CPT

## 2025-05-08 NOTE — PROGRESS NOTES
Medication Therapy Management (MTM) Encounter    ASSESSMENT:                            Medication Adherence/Access: No issues identified.    Diabetes with CKD (CrCl 27.2 mL/min, eGFR 32 mL/min/1.73m2)  Stable, patient is meeting A1c goal of <7%.  She is due for recheck. She would like to wait until August.  Self monitoring of blood glucose is at goal of fasting  mg/dL. Doses of medications are appropriate based on current kidney function.    Heart Failure HFpEF (>/=50%), Hypertension, Hyperlipidemia, CAD, Aortic Stenosis s/p TAVR (9/2024), Atrial Fibrillation s/p RY Closure and Watchman (5/2023)  Stable, follows with cardiology.  Blood pressure is at goal of <130/80 mmHg and LDL is at goal of <100 mg/dL.     Hypothyroidism   Stable, TSH in range on 5/7/25.    GERD, Constipation, Colitis   Stable.     Allergy   Stable.     Dental Prophylaxis s/p TAVR  Stable.    Supplements   Stable.    PLAN:                            You are due to recheck A1c (lab appointment made for 8/22/2025 at 1 pm)    Follow-up: with MTM in clinic on:  Friday Aug 22, 2025   Appt at 2:00 PM (30 min)         SUBJECTIVE/OBJECTIVE:                          Keerthi Montoya is a 75 year old female seen for a follow-up visit. Patient was previously following with MTM pharmacist, Bunny Santa. Last MTM visit was 7/19/2024.    Reason for visit: Annual medication review    Allergies/ADRs: Reviewed in chart  Past Medical History: Reviewed in chart  Tobacco: She reports that she has never smoked. She has never been exposed to tobacco smoke. She has never used smokeless tobacco.  Alcohol: none    Medication Adherence/Access: no issues reported.    Diabetes with CKD  Jardiance 10 mg daily  Glipizide XL 20 mg daily  Trulicity 1.5 mg weekly - not taking  Patient reports no current medication side effects.  She reports holding Trulicity for a procedure last year and she hasn't restarted it. She would like to continue without if possible.  Blood sugar  monitoring: Tasting blood sugar is usually <120 mg/dL. No blood sugar <70 mg/dL or symptoms of hypoglycemia.  Diet/exercise: Not discussed in detail today.     Eye exam in the last 12 months? Yes  Foot exam is up to date    Heart Failure HFpEF (>/=50%), Hypertension, Hyperlipidemia, CAD, Aortic Stenosis s/p TAVR (9/2024), Atrial Fibrillation s/p RY Closure and Watchman (5/2023) - Follows with Cardiology  ACEi/ARB/ARNI: Losartan 100 mg daily  SGLT2i: Jardiance 10 mg daily  Diuretic(s): Torsemide 10 mg twice weekly  Hydralazine 50 mg 3 times daily  Atorvastatin 40 mg daily  Isosorbide mononitrate 30 mg daily  Eliquis 5 mg twice daily  Patient reports no current medication side effects.     Patient reports these HF symptoms: none.    Patient is not measuring daily weights.  Patient self-monitors blood pressure. Typically 120s/50-60 mmHg, pulse 60-80 bpm.  Patient is following a low sodium diet and is avoiding alcohol.  Was recommended to take Eliquis for 30 days after cardioversion on 4/18/25, then transition back to aspirin due to history of GI bleeding.     Hypothyroidism   Levothyroxine 125 mcg daily  Takes consistently with regard to other medications and food.  Patient is having the following symptoms: none.      GERD , Constipation, Colitis   Pantoprazole 40 mg daily  Senna S 1 tablet daily as needed  Mesalamine 1.5 grams daily  Patient reports no current medication side effects.  Patient feels that current regimen is effective.  The patient does have a history of GI bleed.     Allergy   Diphenhydramine 25 mg every 6 hours as needed - rarely uses  Patient reports no current medication side effects .  Primary symptoms are itching.   Patient feels that current therapy is effective.     Dental Prophylaxis s/p TAVR  Azithromycin 500 mg before dental appointments  Patient reports no current medication side effects.    Supplements   Vitamin B12 1000 mcg 3 times weekly  No reported issues at this time.      Today's  Vitals: LMP  (LMP Unknown)   ----------------    I spent 35 minutes with this patient today. All changes were made via collaborative practice agreement with Bunny Meyers MD.     A summary of these recommendations was sent via [employer].    Ame Yoon PharmD (Hailie), MPH  Medication Therapy Management Pharmacist      Medication Therapy Recommendations  No medication therapy recommendations to display

## 2025-05-12 ENCOUNTER — PATIENT OUTREACH (OUTPATIENT)
Dept: NEPHROLOGY | Facility: CLINIC | Age: 75
End: 2025-05-12

## 2025-05-12 ENCOUNTER — LAB (OUTPATIENT)
Dept: LAB | Facility: CLINIC | Age: 75
End: 2025-05-12
Payer: MEDICARE

## 2025-05-12 ENCOUNTER — OFFICE VISIT (OUTPATIENT)
Dept: NEPHROLOGY | Facility: CLINIC | Age: 75
End: 2025-05-12
Payer: MEDICARE

## 2025-05-12 ENCOUNTER — RESULTS FOLLOW-UP (OUTPATIENT)
Dept: NEPHROLOGY | Facility: CLINIC | Age: 75
End: 2025-05-12

## 2025-05-12 VITALS
BODY MASS INDEX: 24.73 KG/M2 | OXYGEN SATURATION: 92 % | HEART RATE: 66 BPM | DIASTOLIC BLOOD PRESSURE: 55 MMHG | SYSTOLIC BLOOD PRESSURE: 154 MMHG | WEIGHT: 139.6 LBS

## 2025-05-12 DIAGNOSIS — N25.81 SECONDARY RENAL HYPERPARATHYROIDISM: ICD-10-CM

## 2025-05-12 DIAGNOSIS — I10 HYPERTENSION GOAL BP (BLOOD PRESSURE) < 130/80: ICD-10-CM

## 2025-05-12 DIAGNOSIS — E89.0 POSTOPERATIVE HYPOTHYROIDISM: Primary | ICD-10-CM

## 2025-05-12 DIAGNOSIS — N30.00 ACUTE CYSTITIS WITHOUT HEMATURIA: ICD-10-CM

## 2025-05-12 DIAGNOSIS — E89.0 POSTOPERATIVE HYPOTHYROIDISM: ICD-10-CM

## 2025-05-12 DIAGNOSIS — R30.0 DYSURIA: Primary | ICD-10-CM

## 2025-05-12 DIAGNOSIS — K51.00 ULCERATIVE PANCOLITIS WITHOUT COMPLICATION (H): ICD-10-CM

## 2025-05-12 DIAGNOSIS — N18.32 STAGE 3B CHRONIC KIDNEY DISEASE (H): ICD-10-CM

## 2025-05-12 DIAGNOSIS — E11.29 TYPE 2 DIABETES MELLITUS WITH MICROALBUMINURIA, WITHOUT LONG-TERM CURRENT USE OF INSULIN (H): ICD-10-CM

## 2025-05-12 DIAGNOSIS — R80.9 TYPE 2 DIABETES MELLITUS WITH MICROALBUMINURIA, WITHOUT LONG-TERM CURRENT USE OF INSULIN (H): ICD-10-CM

## 2025-05-12 DIAGNOSIS — N18.4 CHRONIC KIDNEY DISEASE, STAGE IV (SEVERE) (H): ICD-10-CM

## 2025-05-12 DIAGNOSIS — E87.20 METABOLIC ACIDOSIS: ICD-10-CM

## 2025-05-12 LAB
ALBUMIN MFR UR ELPH: <6 MG/DL
ALBUMIN SERPL BCG-MCNC: 4.5 G/DL (ref 3.5–5.2)
ALBUMIN UR-MCNC: NEGATIVE MG/DL
ANION GAP SERPL CALCULATED.3IONS-SCNC: 13 MMOL/L (ref 7–15)
APPEARANCE UR: CLEAR
BACTERIA #/AREA URNS HPF: ABNORMAL /HPF
BILIRUB UR QL STRIP: NEGATIVE
BUN SERPL-MCNC: 31.2 MG/DL (ref 8–23)
CALCIUM SERPL-MCNC: 9.8 MG/DL (ref 8.8–10.4)
CHLORIDE SERPL-SCNC: 104 MMOL/L (ref 98–107)
COLOR UR AUTO: COLORLESS
CREAT SERPL-MCNC: 1.65 MG/DL (ref 0.51–0.95)
CREAT UR-MCNC: 17.9 MG/DL
EGFRCR SERPLBLD CKD-EPI 2021: 32 ML/MIN/1.73M2
GLUCOSE SERPL-MCNC: 100 MG/DL (ref 70–99)
GLUCOSE UR STRIP-MCNC: 500 MG/DL
HCO3 SERPL-SCNC: 19 MMOL/L (ref 22–29)
HGB BLD-MCNC: 13.1 G/DL (ref 11.7–15.7)
HGB UR QL STRIP: NEGATIVE
KETONES UR STRIP-MCNC: NEGATIVE MG/DL
LEUKOCYTE ESTERASE UR QL STRIP: ABNORMAL
NITRATE UR QL: NEGATIVE
PH UR STRIP: 5.5 [PH] (ref 5–7)
PHOSPHATE SERPL-MCNC: 3.4 MG/DL (ref 2.5–4.5)
POTASSIUM SERPL-SCNC: 4.7 MMOL/L (ref 3.4–5.3)
PROT/CREAT 24H UR: NORMAL MG/G{CREAT}
RBC #/AREA URNS AUTO: ABNORMAL /HPF
SKIP: ABNORMAL
SODIUM SERPL-SCNC: 136 MMOL/L (ref 135–145)
SP GR UR STRIP: 1 (ref 1–1.03)
TSH SERPL DL<=0.005 MIU/L-ACNC: 0.3 UIU/ML (ref 0.3–4.2)
UROBILINOGEN UR STRIP-MCNC: NORMAL MG/DL
WBC #/AREA URNS AUTO: ABNORMAL /HPF

## 2025-05-12 PROCEDURE — 82570 ASSAY OF URINE CREATININE: CPT

## 2025-05-12 PROCEDURE — 83970 ASSAY OF PARATHORMONE: CPT

## 2025-05-12 PROCEDURE — 3077F SYST BP >= 140 MM HG: CPT | Performed by: INTERNAL MEDICINE

## 2025-05-12 PROCEDURE — 82306 VITAMIN D 25 HYDROXY: CPT

## 2025-05-12 PROCEDURE — 3078F DIAST BP <80 MM HG: CPT | Performed by: INTERNAL MEDICINE

## 2025-05-12 PROCEDURE — 82043 UR ALBUMIN QUANTITATIVE: CPT

## 2025-05-12 PROCEDURE — 84156 ASSAY OF PROTEIN URINE: CPT

## 2025-05-12 PROCEDURE — 85018 HEMOGLOBIN: CPT

## 2025-05-12 PROCEDURE — 80069 RENAL FUNCTION PANEL: CPT

## 2025-05-12 PROCEDURE — 84443 ASSAY THYROID STIM HORMONE: CPT

## 2025-05-12 PROCEDURE — 36415 COLL VENOUS BLD VENIPUNCTURE: CPT

## 2025-05-12 PROCEDURE — 81001 URINALYSIS AUTO W/SCOPE: CPT

## 2025-05-12 PROCEDURE — 99214 OFFICE O/P EST MOD 30 MIN: CPT | Performed by: INTERNAL MEDICINE

## 2025-05-12 NOTE — PROGRESS NOTES
"05/12/25   HPI: Keerthi Montoya is a 75 year old female who presents for follow-up of proteinuria.     History taken from previous notes:  Ms. Montoya's hx is significant for DM dx at least 10 years ago - very poor control for years (documented A1C levels 9.4-11.7% from 2011 to 2017); no known retinopathy per patient. She has longstanding hypertension dx when she was 18 years old. Additional hx includes ulcerative colitis and hypothyroidism (following thyroidectomy at age 24). Her ulcerative colitis was dx ~2017 but she feels she likely had trouble with this disease for years. She reports having loose stool for a long time prior to receiving therapy for her UC; she feels the blood in the stool may have contaminated previous urine samples potentially.                  02/10/20 :  Creatinine has been 0.9-1.13 in the past year; up slightly today at 1.3. Last UPCR 0.42 g/g in May. On losartan 100 mg daily. Bronchitis - started on levaquin on Saturday. No swelling, no NSAIDs. Blood pressure at times has been down to 118 - she reports blood pressures less than 130 typically but at times above. She has lost weight intentionally - 158 lbs last year and now 146 lbs. She does not smoke. She has not been eating/drinking today.      08/03/20:  In the setting of COVID-19 pandemic, this visit was changed to a telephone visit. Patient did not have video capability.  Because of murmur appreciated on exam at our previous visit, I sent her for a TTE which showed aortic stenosis as well as diastolic dysfunction. She was seen by cardiology in March and continues to follow with them at this time. She is felt to have severe pulmonary hypertension, CHF, along with aortic stenosis. She is currently taking lasix 20 mg BID. She remains on losartan 100 mg daily. She reports feeling \"great\". She does have some swelling at times - Dr. Almendarez restarted norvasc. BP has been 124/58; typically 120s. Breathing has been stable. Stable UC " sxs.    02/09/21:  video visit. Cr 1.18-1.55 in the past year; 1.15 last week. Feeling well. No issues with ulcerative colitis. BP at home has not been registering recently. She is seeing cardiology next week but again virtual. No swelling concerns. Currently taking lasix 40 mg AM and 20 mg PM. No NSAIDs.     04/26/22: video visit. Creatinine is up at this time. She reports feeling good. Babysitting full time. She reports that she cut be slightly dry at this time. Weights have been right around 140. No swelling. Breathing is stable. Blood pressure at home three times a week - has been 120s/60s. She is taking torsemide 20 mg AM and 10 mg afternoon. She has been active. UC is well controlled. She has been on Jardiance for the past month.     05/02/23: video visit. Torsemide has since been changed to just as needed. Not needed very often. UPCR is the lowest it has been at 0.22 g/g. Baseline seems to be 1.4-1.7 at this time. A1C very high last week above 11%. UC lately has been good. She has loose stools at times. Did not tolerate oral iron previously. BP in the 114 range.     11/07/23: video visit. Doing fine at this time. She had shingles but doing much better - head to toe afffected - hospitalized for volume overload. Eating and drinking well. On torsemide 10 mg daily. ON losartan 100 mg and Jardiance. She denies difficulty with dehydration. No swelling now for the past 4-5 weeks. BP lately has been good - 103/69. She doesn't have other readings right now. She reports BP will vary - not high for a long time. About 1-2 times a week she will see the BP less than 110. No loose stools.     05/06/24: in person visit. She reports feeling well overall. She was recently changed on her diabetes meds and feels things are likely improved control. She denies any lightheadedness or dizziness but does have increased thirsty and dry mouth at times. No swelling. No sob, no orthopnea. No orthostatic sxs. She has not had sxs of volume  overload for some time and weight has been stable at 135-136 lbs. She does question whether she could be dry at times. hR at home is typically 47-60 - never less than 47. BP is typically in the 120s at home. No calcium supplements. No tums/rolaids. She has cut back on calcium intake.     11/04/24: in person visit. Creatinine has been 1.4-2. Since April - 1.42 last month. She has had multiple changes in health in the past year - shingles in Sept 2023. TAVR in Sept 2024. Had an angiogram in July 2024. She has been taking torsemide since her shingles infection in Sept 2023 - had swelling at that time that improved. Took 3 months to improve but has not had swelling in a long time and remains on torsemide.     05/12/25: in person visit. She is doing well. Weight is stable at 130-133 lbs. Seh is on Jardinace and losartan. BP mostly less than 130 at home. She is only using torsemide BIW and denies any SOB or swelling. She is off of vitamin D. Recently told of TSH being low but told plan was to reevaluate in near future. She had recent afib s/p correction.     Current Outpatient Medications   Medication Sig Dispense Refill    apixaban ANTICOAGULANT (ELIQUIS) 5 MG tablet Take 1 tablet (5 mg) by mouth 2 times daily. 59 tablet 0    atorvastatin (LIPITOR) 40 MG tablet Take 1 tablet (40 mg) by mouth daily 90 tablet 3    azithromycin (ZITHROMAX) 250 MG tablet Take 2 tablets (500 mg) by mouth once as needed (Take 30-60 minutes prior to dental procedure or cleaning). 2 tablet 2    blood glucose (ACCU-CHEK FELIPE PLUS) test strip 200 strips by In Vitro route 2 times daily Use to test blood sugar 2 times daily or as directed. 200 strip 4    Cyanocobalamin (B-12) 1000 MCG TABS Take 1,000 mcg by mouth three times a week      diphenhydrAMINE (BENADRYL) 25 MG tablet Take 25 mg by mouth every 6 hours as needed for itching or allergies      empagliflozin (JARDIANCE) 10 MG TABS tablet Take 1 tablet (10 mg) by mouth daily 90 tablet 3     glipiZIDE (GLUCOTROL XL) 10 MG 24 hr tablet Take 2 tablets (20 mg) by mouth daily. 180 tablet 3    hydrALAZINE (APRESOLINE) 50 MG tablet Take 1 tablet (50 mg) by mouth 3 times daily 180 tablet 3    isosorbide mononitrate (IMDUR) 30 MG 24 hr tablet Take 1 tablet (30 mg) by mouth daily. 90 tablet 3    levothyroxine (SYNTHROID/LEVOTHROID) 125 MCG tablet Take 1 tablet (125 mcg) by mouth daily. 90 tablet 2    losartan (COZAAR) 100 MG tablet Take 1 tablet (100 mg) by mouth daily 90 tablet 3    mesalamine (APRISO ER) 0.375 g 24 hr capsule Take 4 capsules (1.5 g) by mouth daily 360 capsule 3    pantoprazole (PROTONIX) 40 MG EC tablet Take 1 tablet (40 mg) by mouth daily. 90 tablet 3    senna (SENOKOT) 8.6 MG tablet Take 1 tablet by mouth daily as needed for constipation 30 tablet 3    torsemide (DEMADEX) 10 MG tablet Take 1 tablet (10 mg) twice a week 30 tablet 3    dulaglutide (TRULICITY) 1.5 MG/0.5ML pen Inject 1.5 mg Subcutaneous every 7 days After 4 doses of 0.75 mg (Patient not taking: Reported on 5/12/2025) 2 mL 1     No current facility-administered medications for this visit.       Exam:GENERAL APPEARANCE: alert and no distress  CV - RRR  Ext - no edema    Results:   Lab on 05/12/2025   Component Date Value Ref Range Status    Sodium 05/12/2025 136  135 - 145 mmol/L Final    Potassium 05/12/2025 4.7  3.4 - 5.3 mmol/L Final    Chloride 05/12/2025 104  98 - 107 mmol/L Final    Carbon Dioxide (CO2) 05/12/2025 19 (L)  22 - 29 mmol/L Final    Anion Gap 05/12/2025 13  7 - 15 mmol/L Final    Glucose 05/12/2025 100 (H)  70 - 99 mg/dL Final    Urea Nitrogen 05/12/2025 31.2 (H)  8.0 - 23.0 mg/dL Final    Creatinine 05/12/2025 1.65 (H)  0.51 - 0.95 mg/dL Final    GFR Estimate 05/12/2025 32 (L)  >60 mL/min/1.73m2 Final    eGFR calculated using 2021 CKD-EPI equation.    Calcium 05/12/2025 9.8  8.8 - 10.4 mg/dL Final    Albumin 05/12/2025 4.5  3.5 - 5.2 g/dL Final    Phosphorus 05/12/2025 3.4  2.5 - 4.5 mg/dL Final    Hemoglobin  05/12/2025 13.1  11.7 - 15.7 g/dL Final    Total Protein Urine mg/dL 05/12/2025 <6.0    mg/dL Final    The reference ranges have not been established in urine protein. The results should be integrated into the clinical context for interpretation.    Total Protein Urine mg/mg Creat 05/12/2025    Final    Unable to calculate, urine creatinine or protein is outside the detectable limits.    Creatinine Urine mg/dL 05/12/2025 17.9  mg/dL Final    The reference ranges have not been established in urine creatinine. The results should be integrated into the clinical context for interpretation.    Color Urine 05/12/2025 Colorless  Colorless, Straw, Light Yellow, Yellow Final    Appearance Urine 05/12/2025 Clear  Clear Final    Glucose Urine 05/12/2025 500 (A)  Negative mg/dL Final    Bilirubin Urine 05/12/2025 Negative  Negative Final    Ketones Urine 05/12/2025 Negative  Negative mg/dL Final    Specific Gravity Urine 05/12/2025 1.003  0.999 - 1.035 Final    Blood Urine 05/12/2025 Negative  Negative Final    pH Urine 05/12/2025 5.5  5.0 - 7.0 Final    Protein Albumin Urine 05/12/2025 Negative  Negative mg/dL Final    Urobilinogen Urine 05/12/2025 Normal  0.2, 1.0, <2.0, Normal mg/dL Final    Nitrite Urine 05/12/2025 Negative  Negative Final    Leukocyte Esterase Urine 05/12/2025 Moderate (A)  Negative Final    Bacteria Urine 05/12/2025 Few (A)  None Seen /HPF Final    RBC Urine 05/12/2025 0-2  0-2 /HPF /HPF Final    WBC Urine 05/12/2025 10-25 (A)  0-5 /HPF /HPF Final    TSH 05/12/2025 0.30  0.30 - 4.20 uIU/mL Final              Lab results were reviewed and interpreted.       Assessment/Plan:   1. CKD Stage 3b:  risks for CKD include poorly controlled diabetes as well as longstanding hypertension. Mesalamine has potential implications on the kidney but with her function stable, this seems less likely to be causing kidney injury previously. She does have proteinuria without hematuria which does fit with diabetic nephropathy.  She is already on losartan 100 mg daily. Educated on benefits of optimal weight, avoidance of NSAIDs, blood pressure well controlled and also diabetes control as the important things to keep the kidney function stable. Myeloma testing normal previously. On SGLT2 inhibitor.   - Stressed the importance of good diabetes control  - Stressed the risk of kidney progression if diabetes remains uncontrolled.   - much variability - asked her to trial taking the torsemide just as needed for weight gain/SOB/other sxs of volume overload.      2. Hypertension: goal of <130/80      3. Ulcerative colitis: she reports good control with mesalamine. Very rarely mesalamine causes an acute interstitial picture but there are reports of this occurring as low as 0.3% of the time. We will plan to follow. On discussion with her, she feels that she may have been chronically volume depleted prior to getting her treatment for ulcerative colitis.     4. Hypothyroidism: well controlled - 4 in August.      5. DM:  Very poor control documented in our system from 2011 to 2017 with A1C levels between 9.4 and 11.7% at this time. It is possible that it was uncontrolled even further back as A1C levels are not available at this time dating back further than 2011. Encouraged ongoing optimization of diabetes to help slow progression of kidney disease. A1C high in April at 10.5 % but improved to 6.7% most recently.      6. CHF/pulmonary hypertension/aortic stenosis: lowering torsemide as noted above. Stressed the importance of adding addt torsemide as needed if weight gain/swelling/increased SOB.     7. SEcondary hyperparathyroidism:  in Nov. She is now off of vitamin d supplementation - vitamin D level is at goal. Held previously as calcium was at the upper limit of normal.     9 Metabolic acidosis: bicarbonate 19 - will keep current sodium bicarbonate dosing.     Patient Instructions   Repeat BP before leaving today  Monitor weight daily - trial  changing the torsemide to being used just as needed for weight gain/shortness of breath  Labs and follow-up in 6 months         Swati Minor, DO

## 2025-05-12 NOTE — PROGRESS NOTES
Nephrology Note: Nurse In Person Visit    SUBJECTIVE/OBJECTIVE:                                                    Keerthi Montoya is a 75 year old female who presents for Chronic Disease Management (Offered Kidney Smart)    Patient is accompanied by self    Patient is being treated for CKD Stage 4.    Patient concerns: has no specific complaints and has been feeling well..    Patient Journey Status:  Active    Neph Tracking Flowsheet Last Filled Values       Kidney Smart CKD Basics Referral 05/12/25  Offered 7/18/24 via [facility] as well    Patient's Referral Dates Auto Populate Patient's Referral Dates    MTM Referral 4/21/25    Journey Referral 5/14/24            ASSESSMENT:                                                        Neph Assessments:  Recent Labs      Latest Ref Rng & Units 5/12/2025 4/18/2025 3/14/2025   Neph Labs   Sodium 135 - 145 mmol/L 136  136  135    GFR Estimate >60 mL/min/1.73m2 32  27  34    Potassium 3.4 - 5.3 mmol/L 4.7  4.3  4.3    Creatinine 0.51 - 0.95 mg/dL 1.65  1.91  1.58    Magnesium 1.7 - 2.3 mg/dL  2.3     Urea Nitrogen 8.0 - 23.0 mg/dL 31.2  35.1  35.8    Hemoglobin 11.7 - 15.7 g/dL 13.1  14.9  15.0     14.8     14.8        Multiple values from one day are sorted in reverse-chronological order         PLAN:                                                      Education:  Method:  general discussion/verbal explanation  Kidney Smart discussed and offered. She is agreeable to take the class at this time. Referral to be placed.     Education material provided and patient was given an opportunity to ask questions.      Follow Up:   Patient to follow up as scheduled at next appointment     Patient verbalized understanding and will follow up as recommended.    Time spent with patient at today's visit was 5 minutes.     Heidi Marrero RN

## 2025-05-12 NOTE — NURSING NOTE
Keerthi Montoya's goals for this visit include:   Chief Complaint   Patient presents with    RECHECK     6 month follow up CKD        She requests these members of her care team be copied on today's visit information: no     PCP: Bunny Meyers    Referring Provider:  Referred Self, MD  No address on file    BP (!) 159/68 (BP Location: Left arm, Patient Position: Chair, Cuff Size: Adult Regular)   Pulse 58   Wt 63.3 kg (139 lb 9.6 oz)   LMP  (LMP Unknown)   SpO2 92%   BMI 24.73 kg/m      Do you need any medication refills at today's visit? No     ANDREZ Temple   Neph/Pulm [facility]

## 2025-05-12 NOTE — PATIENT INSTRUCTIONS
Repeat BP before leaving today  Monitor weight daily - trial changing the torsemide to being used just as needed for weight gain/shortness of breath  Labs and follow-up in 6 months

## 2025-05-13 LAB
CREAT UR-MCNC: 18.2 MG/DL
MICROALBUMIN UR-MCNC: <12 MG/L
MICROALBUMIN/CREAT UR: NORMAL MG/G{CREAT}
PTH-INTACT SERPL-MCNC: 66 PG/ML (ref 15–65)

## 2025-05-13 RX ORDER — GRANULES FOR ORAL 3 G/1
3 POWDER ORAL ONCE
Qty: 1 PACKET | Refills: 0 | Status: SHIPPED | OUTPATIENT
Start: 2025-05-13 | End: 2025-05-13

## 2025-05-13 NOTE — PATIENT INSTRUCTIONS
"Recommendations from today's MTM visit:                                                    MTM (medication therapy management) is a service provided by a clinical pharmacist designed to help you get the most of out of your medicines.   Today we reviewed what your medicines are for, how to know if they are working, that your medicines are safe and how to make your medicine regimen as easy as possible.      No changes today     Follow-up: with MTM in clinic on:  Friday Aug 22, 2025   Appt at 2:00 PM (30 min)         It was great speaking with you today.  I value your experience and would be very thankful for your time in providing feedback in our clinic survey. In the next few days, you may receive an email or text message from [employer] with a link to a survey related to your  clinical pharmacist.\"     To schedule another MTM appointment, please call the clinic directly or you may call the MTM scheduling line at [phone] or toll-free at [phone].     My Clinical Pharmacist's contact information:                                                      Please feel free to contact me with any questions or concerns you have.      Ame Yoon (Hailie), PharmD, MPH  Medication Therapy Management Pharmacist    "

## 2025-05-14 LAB
DEPRECATED CALCIDIOL+CALCIFEROL SERPL-MC: <36 UG/L (ref 20–75)
VITAMIN D2 SERPL-MCNC: <5 UG/L
VITAMIN D3 SERPL-MCNC: 31 UG/L

## 2025-05-15 DIAGNOSIS — E89.0 POSTOPERATIVE HYPOTHYROIDISM: ICD-10-CM

## 2025-05-15 RX ORDER — LEVOTHYROXINE SODIUM 125 UG/1
125 TABLET ORAL DAILY
Qty: 90 TABLET | Refills: 1 | Status: SHIPPED | OUTPATIENT
Start: 2025-05-15

## 2025-05-20 DIAGNOSIS — I10 HYPERTENSION GOAL BP (BLOOD PRESSURE) < 140/90: ICD-10-CM

## 2025-05-21 RX ORDER — LOSARTAN POTASSIUM 100 MG/1
100 TABLET ORAL DAILY
Qty: 90 TABLET | Refills: 3 | Status: SHIPPED | OUTPATIENT
Start: 2025-05-21

## 2025-05-22 NOTE — TELEPHONE ENCOUNTER
/Last Written Prescription:  5/10/24  90 : 3  ----------------------  Last Visit Date: 1/10/25  Future Visit Date: 6/24/25  ----------------------  Refill decision: Medication refilled per  Medication Refill in Ambulatory Care  policy.    Request from pharmacy:  Requested Prescriptions   Pending Prescriptions Disp Refills    losartan (COZAAR) 100 MG tablet 90 tablet 3     Sig: Take 1 tablet (100 mg) by mouth daily.       Angiotensin-II Receptors Failed - 5/21/2025 10:28 PM        Failed - Most recent blood pressure under 140/90 in past 12 months     BP Readings from Last 3 Encounters:   05/12/25 (!) 154/55   05/08/25 (!) 157/66   04/18/25 106/56       No data recorded            Failed - Has GFR on file in past 12 months and most recent value is normal        Passed - Medication is active on med list and the sig matches. RN to manually verify dose and sig if red X/fail.             Passed - Medication indicated for associated diagnosis     The medication is prescribed for one or more of the following conditions:    Chronic Kidney Disease (CDK)    Heart Failure (HF)    Diabetes, Nephropathy   Hypertension    Coronary Artery Disease (CAD)   Raynaud's Disease   Ischemic cardiomyopathy   Cardiomyopathy   Pulmonary Hypertension          Passed - Recent (12 month) or future (90 days) visit with authorizing provider's specialty (provided they have been seen in the past 15 months)     The patient must have completed an in-person or virtual visit within the past 12 months or has a future visit scheduled within the next 90 days with the authorizing provider s specialty.  Urgent care and e-visits do not qualify as an office visit for this protocol.          Passed - Patient is age 18 or older        Passed - No active pregnancy on record        Passed - Normal serum potassium on file in past 12 months     Recent Labs   Lab Test 05/12/25  1013   POTASSIUM 4.7                    Passed - No positive pregnancy test in past 12  months

## 2025-05-24 ENCOUNTER — HEALTH MAINTENANCE LETTER (OUTPATIENT)
Age: 75
End: 2025-05-24

## 2025-06-23 DIAGNOSIS — K76.0 FATTY LIVER: ICD-10-CM

## 2025-06-23 DIAGNOSIS — K74.02 HEPATIC FIBROSIS, STAGE 3: Primary | ICD-10-CM

## 2025-06-23 NOTE — PROGRESS NOTES
ELECTROPHYSIOLOGY CLINIC VISIT    Assessment/Recommendations   Assessment/Plan:    Keerthi Montoya is a 76 yo F with persistent atrial fibrillation with intolerance to anticoagulation due to GI bleeding s/p RY closure and Watchman (2023), HFpEF, HTN, T2DM, HLD, CKD3, pHTN, AS s/p TAVR 24.    Paroxysmal Atrial Fibrillation:   We discussed the management/treatment options for AF includin. Stroke Prophylaxis: CHADSVASC (+age, +female, +HTN, +DMII) 4, corresponding to a 4.0% annual stroke / systemic embolism event rate. Indicating need for long term AC. Hx of GI bleeding on xarelto and ASA, now s/p Watchman 2023. Was recently on short course of eliquis surrounding DCCV. Patient denies bleeding issues and stopped eliquis after 30 day supply ran out. Remains on ASA.    2. Rate Control: Not currently on. Could consider BB/CCB if recurrences.   3. Rhythm Control: Cardioversion, Antiarrhythmics and/or ablation are options for rhythm control. S/p LAURA/DCCV 25 with successful conversion to NSR. EKG today in clinic with NSR HR 60 bpm. She denies AF recurrences since. Will continue conservative management at this time, but could consider AAT or ablation in the future if burden increases.  4. Risk Factor Management: Statin, BP control, and PURA evaluation as indicated. BP in clinic today 159/71, 140/60 upon manual recheck. She reports that she usually takes her hydralazine at noon and has not taken it yet today. BP at home usually 120s/50s-70s. No med changes made today.     Follow up in one year, or sooner as needed.        History of Present Illness/Subjective    Ms. Keerthi Montoya is a 75 year old female who comes in today for EP follow-up of AF.    Keerthi Montoya is a 76 yo F with persistent atrial fibrillation with intolerance to anticoagulation due to GI bleeding s/p RY closure and Watchman (2023), HFpEF, HTN, T2DM, HLD, CKD3, pHTN, AS s/p TAVR 24.Initially diagnosed with AF in . Had GI  bleeding on ASA in 2021. Had been on xarelto for persistent AF until severe GI bleed in 1/2023 with Hgb 5.9. Underwent Watchman 4/24/23. She presented to [facility] ER 4/18/25 with A fib with RVR. EKG with AF  bpm. Patient remained hemodynamically and asymptomatic. When rate was slowed with adenosine, findings are most c/w A fib with RVR. EP consulted and given patient's extensive cardiac hx, recommended cardioversion to optimize her prior to discharge. Given patient's hx of GI bleeding (which is why she received a Watchman device) more than two years ago, the risks and benefits of re-starting anticoagulation were discussed and patient was agreeable. Advised her that she can be on it for only 30 days and then transition back to ASA only. Risks and benefits of LAURA/DCCV were discussed and she underwent successful cardioversion with conversion to NSR 4/18/25.     She presents today in follow up s/p DCCV. She reports feeling well. She denies recurrences since the DCCV. She denied significant symptoms while she was in AF. Denies bleeding issues while on eliquis surrounding cardioversion. She otherwise denies chest discomfort, palpitations, abdominal fullness/bloating or peripheral edema, shortness of breath, paroxysmal nocturnal dyspnea, orthopnea, lightheadedness, dizziness, pre-syncope, or syncope. She stopped taking her twice weekly torsemide per recommendation of her nephrologist and denies swelling issues. She remains active using the treadmill and weights in her home as well as chasing around her grandkids and denies exertional symptoms. Presenting 12 lead ECG shows normal sinus rhythm Vent Rate 60 bpm,  ms, QRS 66 ms, QTc 456 ms. Current cardiac medications include: atorvastatin 40 mg, empagliflozin 10 mg, hydralazine 50 mg TID, losartan 100 mg.     I have reviewed and updated the patient's Past Medical History, Social History, Family History and Medication List.     Cardiographics (Personally Reviewed) :    4/18/25 LAURA:  Interpretation Summary  There is a 24 mm Watchman FLX closure device in the left atrial appendage. It  is well-seated without evidence of thrombus on the left atrial side of the  device. There is no evidence of delroy-device leak on color Doppler.  Global and regional left ventricular function is normal with an EF of 55-60%.  Moderate right ventricular dilation is present. Global right ventricular  function is moderately reduced.  S/p TAVR with 23 mm Ramsay Trini 3 Ultra Resilia prosthesis on 9/4/24. The  prosthetic aortic valve is well-seated. Doppler interrogation of the aortic  valve is normal. The mean gradient is 5 mmHg.  No pericardial effusion is present.     This study was compared with the study from 03/04/2025. There has been no  change.  The patient's rhythm is atrial fibrillation.    3/4/25 Echo:   Interpretation Summary  Global and regional left ventricular function is hyperkinetic with an EF >70%.  Flattened septum is consistent with right ventricular pressure overload.  Moderate right ventricular dilation is present.  Global right ventricular function is moderately reduced.  Pulmonary hypertension is present.  The right ventricular systolic pressure is 47mmHg above the right atrial  pressure.  S/p TAVR with 23 mm Ramsay Trini 3 Ultra Resilia prosthesis on 9/4/24.  The prosthetic aortic valve is well-seated.  Doppler interrogation of the aortic valve is normal.  The inferior vena cava was normal in size with preserved respiratory  variability.  No pericardial effusion is present.     This study was compared with the study from 10/2024 .  IVS septal flattening is worse, HR increased.    3/14/25 RHC:    Moderate pulmonary hypertension with high normal PCWP and reduced cardiac output at rest    Abnormal increase in PA pressure and PCWP with drop in PVR during exercise suggestive of occult pulmonary venous hypertension    Normal augmentation of cardiac output with exercise    3/4/25  "Cardiopulmonary Stress Test:    A cardiopulmonary stress test was performed following a [place] ramp protocol with the patient exercising for 13 minutes and 41 seconds under the supervision of Dr. Pedroza.  Blood pressure demonstrated a normal response to exercise.  Heart rate demonstrated a blunted response to exercise.     The patient completed a Cardiopulmonary Stress Test. The patient did not report any symptoms during exercise. The patient terminated the test for leg fatigue.        Physical Examination   BP (!) 140/60   Pulse 62   Ht 1.6 m (5' 3\")   Wt 60.8 kg (134 lb)   LMP  (LMP Unknown)   SpO2 99%   BMI 23.74 kg/m    Wt Readings from Last 3 Encounters:   06/24/25 60.8 kg (134 lb)   05/12/25 63.3 kg (139 lb 9.6 oz)   04/18/25 62.1 kg (137 lb)     General Appearance:   Alert, well-appearing and in no acute distress.   HEENT: Atraumatic, normocephalic.    Chest/Lungs:   Respirations unlabored.  Lungs are clear to auscultation.   Cardiovascular:   Regular rate and rhythm.  S1/S2. Systolic murmur.    Abdomen:  Soft, nontender, nondistended.   Extremities: No cyanosis or clubbing. No edema.     Musculoskeletal: Moves all extremities.     Skin: Warm, dry, intact.    Neurologic: Mood and affect are appropriate.  Alert and oriented to person, place, time, and situation.          Medications  Allergies   Current Outpatient Medications   Medication Sig Dispense Refill    atorvastatin (LIPITOR) 40 MG tablet Take 1 tablet (40 mg) by mouth daily 90 tablet 3    azithromycin (ZITHROMAX) 250 MG tablet Take 2 tablets (500 mg) by mouth once as needed (Take 30-60 minutes prior to dental procedure or cleaning). 2 tablet 2    blood glucose (ACCU-CHEK FELIPE PLUS) test strip 200 strips by In Vitro route 2 times daily Use to test blood sugar 2 times daily or as directed. 200 strip 4    Cyanocobalamin (B-12) 1000 MCG TABS Take 1,000 mcg by mouth three times a week      diphenhydrAMINE (BENADRYL) 25 MG tablet Take 25 mg by mouth " every 6 hours as needed for itching or allergies      empagliflozin (JARDIANCE) 10 MG TABS tablet Take 1 tablet (10 mg) by mouth daily 90 tablet 3    glipiZIDE (GLUCOTROL XL) 10 MG 24 hr tablet Take 2 tablets (20 mg) by mouth daily. 180 tablet 3    hydrALAZINE (APRESOLINE) 50 MG tablet Take 1 tablet (50 mg) by mouth 3 times daily. 180 tablet 2    isosorbide mononitrate (IMDUR) 30 MG 24 hr tablet Take 1 tablet (30 mg) by mouth daily. 90 tablet 3    levothyroxine (SYNTHROID/LEVOTHROID) 125 MCG tablet Take 1 tablet (125 mcg) by mouth daily. 90 tablet 1    losartan (COZAAR) 100 MG tablet Take 1 tablet (100 mg) by mouth daily. 90 tablet 3    mesalamine (APRISO ER) 0.375 g 24 hr capsule Take 4 capsules (1.5 g) by mouth daily 360 capsule 3    pantoprazole (PROTONIX) 40 MG EC tablet Take 1 tablet (40 mg) by mouth daily. 90 tablet 3    senna (SENOKOT) 8.6 MG tablet Take 1 tablet by mouth daily as needed for constipation 30 tablet 3    dulaglutide (TRULICITY) 1.5 MG/0.5ML pen Inject 1.5 mg Subcutaneous every 7 days After 4 doses of 0.75 mg (Patient not taking: Reported on 6/24/2025) 2 mL 1    torsemide (DEMADEX) 10 MG tablet Take 1 tablet (10 mg) twice a week (Patient not taking: Reported on 6/24/2025) 30 tablet 3    Allergies   Allergen Reactions    Actos [Pioglitazone]      Fluid retention    Amlodipine      Leg swelling, hand swelling    Doxycycline Hives and Rash     Edema in hands/feet      Keflex [Cephalexin Hcl] Hives     Swelling hands/feet      Metoprolol Other (See Comments)     Swelling of lower legs and fatigue    Synthroid [Levothyroxine Sodium] Swelling     Allergic to brand name, hives all over body, edema all over body      Tylenol Hives     Hives/rash all over body    Ziac [Bisoprolol-Hydrochlorothiazide] Other (See Comments), Hives and Itching     Hands/feet swell up, hives all over body      Animal Dander Other (See Comments)     Sneezing, itchy nose    Grass Other (See Comments)     Itchy nose/eyes,  sneezing, coughing      Tetracycline Hives and Itching     Swelling hands/feet      Dust Mites Other (See Comments)     Itchy eyes, sneezing    Other [Seasonal Allergies] Other (See Comments)     Pollen coughing/sneezing         Lab Results (Personally Reviewed)    Chemistry/lipid CBC Cardiac Enzymes/BNP/TSH/INR   Lab Results   Component Value Date    BUN 31.2 (H) 05/12/2025     05/12/2025    CO2 19 (L) 05/12/2025     Creatinine   Date Value Ref Range Status   05/12/2025 1.65 (H) 0.51 - 0.95 mg/dL Final   04/28/2021 1.21 (H) 0.52 - 1.04 mg/dL Final       Lab Results   Component Value Date    CHOL 96 07/08/2024    HDL 34 (L) 07/08/2024    LDL 41 07/08/2024      Lab Results   Component Value Date    WBC 8.3 04/18/2025    HGB 13.1 05/12/2025    HCT 45.5 04/18/2025     04/18/2025     04/18/2025    Lab Results   Component Value Date    TSH 0.30 05/12/2025    INR 1.00 03/14/2025        The patient states understanding and is agreeable with the plan.     Sheela Lino PA-C  [facility]  Electrophysiology Consult Service  Pager #[phone]    I spent a total of 15 minutes face to face with Keerthi Montoya during today's office visit. I have spent an additional 15 minutes today on chart review and documentation.

## 2025-06-24 ENCOUNTER — OFFICE VISIT (OUTPATIENT)
Dept: CARDIOLOGY | Facility: CLINIC | Age: 75
End: 2025-06-24
Payer: MEDICARE

## 2025-06-24 VITALS
HEART RATE: 62 BPM | OXYGEN SATURATION: 99 % | SYSTOLIC BLOOD PRESSURE: 140 MMHG | DIASTOLIC BLOOD PRESSURE: 60 MMHG | HEIGHT: 63 IN | WEIGHT: 134 LBS | BODY MASS INDEX: 23.74 KG/M2

## 2025-06-24 DIAGNOSIS — R00.1 SINUS BRADYCARDIA: Primary | ICD-10-CM

## 2025-06-24 LAB
ATRIAL RATE - MUSE: 60 BPM
DIASTOLIC BLOOD PRESSURE - MUSE: NORMAL MMHG
INTERPRETATION ECG - MUSE: NORMAL
P AXIS - MUSE: 86 DEGREES
PR INTERVAL - MUSE: 158 MS
QRS DURATION - MUSE: 66 MS
QT - MUSE: 456 MS
QTC - MUSE: 456 MS
R AXIS - MUSE: 73 DEGREES
SYSTOLIC BLOOD PRESSURE - MUSE: NORMAL MMHG
T AXIS - MUSE: -10 DEGREES
VENTRICULAR RATE- MUSE: 60 BPM

## 2025-06-24 NOTE — NURSING NOTE
"Chief Complaint   Patient presents with    Follow Up       Initial LMP  (LMP Unknown)  Estimated body mass index is 24.73 kg/m  as calculated from the following:    Height as of 4/18/25: 1.6 m (5' 3\").    Weight as of 5/12/25: 63.3 kg (139 lb 9.6 oz)..  BP completed using cuff size: lori BREWER  "

## 2025-06-24 NOTE — PATIENT INSTRUCTIONS
Take your medicines every day, as directed     Changes made today:  No changes        Cardiology Care Coordinators:      Emily VILLAFUERTE RN     Cardiology Rooming Staff:  Malathi NASCIMENTO EMT    Phone  [phone]      Fax [phone]    To Contact us     During Business Hours:  [phone]     If you are needing refills please contact your pharmacy.     For urgent after hour care please call the [facility] at [phone] or the [facility] at [phone] and ask to speak to the cardiologist on call.    If you are having a medical emergency, please call 911.            HOW TO CHECK YOUR BLOOD PRESSURE AT HOME:     Avoid eating, smoking, and exercising for at least 30 minutes before taking a reading.     Be sure you have taken your BP medication at least 2-3 hours before you check it.      Sit quietly for 10 minutes before a reading.      Sit in a chair with your feet flat on the floor. Rest your  arm on a table so that the arm cuff is at the same level as your heart.     Remain still during the reading.  Record your blood pressure and pulse in a log and bring to your next appointment.       Use [employer] allows you to communicate directly with your heart team through secure messaging.  [employer] can be accessed any time on your phone, computer, or tablet.  If you need assistance, we'd be happy to help!             Keep your Heart Appointments:     Follow-up in 1 year

## 2025-07-01 ENCOUNTER — MYC REFILL (OUTPATIENT)
Dept: GASTROENTEROLOGY | Facility: CLINIC | Age: 75
End: 2025-07-01
Payer: MEDICARE

## 2025-07-01 DIAGNOSIS — K52.9 COLITIS: ICD-10-CM

## 2025-07-01 RX ORDER — MESALAMINE 0.38 G/1
1.5 CAPSULE, EXTENDED RELEASE ORAL DAILY
Qty: 360 CAPSULE | Refills: 0 | Status: SHIPPED | OUTPATIENT
Start: 2025-07-01

## 2025-07-01 NOTE — TELEPHONE ENCOUNTER
Last clinic visit: 8/2/24  Next clinic visit: Not scheduled. Will have patient schedule for further refills.   Last set of labs: 6/23/25    Mesalamine refill sent to pharmacy.    Per Kami Lim's last visit:   -- Continue Apriso 1.5 g daily

## 2025-07-07 ENCOUNTER — OFFICE VISIT (OUTPATIENT)
Dept: GASTROENTEROLOGY | Facility: CLINIC | Age: 75
End: 2025-07-07
Attending: INTERNAL MEDICINE
Payer: MEDICARE

## 2025-07-07 ENCOUNTER — LAB (OUTPATIENT)
Dept: LAB | Facility: CLINIC | Age: 75
End: 2025-07-07
Attending: PHYSICIAN ASSISTANT
Payer: MEDICARE

## 2025-07-07 ENCOUNTER — PRE VISIT (OUTPATIENT)
Dept: GASTROENTEROLOGY | Facility: CLINIC | Age: 75
End: 2025-07-07

## 2025-07-07 VITALS
HEIGHT: 63 IN | WEIGHT: 136 LBS | BODY MASS INDEX: 24.1 KG/M2 | HEART RATE: 78 BPM | OXYGEN SATURATION: 95 % | SYSTOLIC BLOOD PRESSURE: 165 MMHG | RESPIRATION RATE: 16 BRPM | DIASTOLIC BLOOD PRESSURE: 64 MMHG

## 2025-07-07 DIAGNOSIS — K76.0 FATTY LIVER: ICD-10-CM

## 2025-07-07 DIAGNOSIS — K74.02 HEPATIC FIBROSIS, STAGE 3: Primary | ICD-10-CM

## 2025-07-07 DIAGNOSIS — K76.6 PORTAL HYPERTENSIVE GASTROPATHY (H): Primary | ICD-10-CM

## 2025-07-07 DIAGNOSIS — I27.20 PULMONARY HYPERTENSION (H): ICD-10-CM

## 2025-07-07 DIAGNOSIS — K74.02 HEPATIC FIBROSIS, STAGE 3: ICD-10-CM

## 2025-07-07 DIAGNOSIS — K31.89 PORTAL HYPERTENSIVE GASTROPATHY (H): Primary | ICD-10-CM

## 2025-07-07 DIAGNOSIS — K76.0 HEPATIC STEATOSIS: ICD-10-CM

## 2025-07-07 DIAGNOSIS — K76.6 PORTAL HYPERTENSIVE GASTROPATHY (H): ICD-10-CM

## 2025-07-07 DIAGNOSIS — R30.0 DYSURIA: ICD-10-CM

## 2025-07-07 DIAGNOSIS — R00.0 HEART RATE FAST: ICD-10-CM

## 2025-07-07 DIAGNOSIS — K31.89 PORTAL HYPERTENSIVE GASTROPATHY (H): ICD-10-CM

## 2025-07-07 LAB
ALBUMIN SERPL BCG-MCNC: 4.4 G/DL (ref 3.5–5.2)
ALP SERPL-CCNC: 115 U/L (ref 40–150)
ALT SERPL W P-5'-P-CCNC: 7 U/L (ref 0–50)
ANION GAP SERPL CALCULATED.3IONS-SCNC: 13 MMOL/L (ref 7–15)
AST SERPL W P-5'-P-CCNC: 23 U/L (ref 0–45)
BASOPHILS # BLD AUTO: 0.1 10E3/UL (ref 0–0.2)
BASOPHILS NFR BLD AUTO: 1 %
BILIRUB SERPL-MCNC: 0.8 MG/DL
BILIRUBIN DIRECT (ROCHE PRO & PURE): 0.32 MG/DL (ref 0–0.45)
BUN SERPL-MCNC: 26.6 MG/DL (ref 8–23)
CALCIUM SERPL-MCNC: 10 MG/DL (ref 8.8–10.4)
CHLORIDE SERPL-SCNC: 105 MMOL/L (ref 98–107)
CREAT SERPL-MCNC: 1.43 MG/DL (ref 0.51–0.95)
EGFRCR SERPLBLD CKD-EPI 2021: 39 ML/MIN/1.73M2
EOSINOPHIL # BLD AUTO: 0.3 10E3/UL (ref 0–0.7)
EOSINOPHIL NFR BLD AUTO: 3 %
ERYTHROCYTE [DISTWIDTH] IN BLOOD BY AUTOMATED COUNT: 12 % (ref 10–15)
GLUCOSE SERPL-MCNC: 140 MG/DL (ref 70–99)
HCO3 SERPL-SCNC: 19 MMOL/L (ref 22–29)
HCT VFR BLD AUTO: 41.5 % (ref 35–47)
HGB BLD-MCNC: 13.6 G/DL (ref 11.7–15.7)
IMM GRANULOCYTES # BLD: 0 10E3/UL
IMM GRANULOCYTES NFR BLD: 0 %
INR PPP: 1 (ref 0.85–1.15)
LYMPHOCYTES # BLD AUTO: 1.6 10E3/UL (ref 0.8–5.3)
LYMPHOCYTES NFR BLD AUTO: 20 %
MAGNESIUM SERPL-MCNC: 2.1 MG/DL (ref 1.7–2.3)
MCH RBC QN AUTO: 32 PG (ref 26.5–33)
MCHC RBC AUTO-ENTMCNC: 32.8 G/DL (ref 31.5–36.5)
MCV RBC AUTO: 98 FL (ref 78–100)
MONOCYTES # BLD AUTO: 1 10E3/UL (ref 0–1.3)
MONOCYTES NFR BLD AUTO: 12 %
NEUTROPHILS # BLD AUTO: 5.2 10E3/UL (ref 1.6–8.3)
NEUTROPHILS NFR BLD AUTO: 64 %
NRBC # BLD AUTO: 0 10E3/UL
NRBC BLD AUTO-RTO: 0 /100
PLATELET # BLD AUTO: 204 10E3/UL (ref 150–450)
POTASSIUM SERPL-SCNC: 4.6 MMOL/L (ref 3.4–5.3)
PROT SERPL-MCNC: 7.9 G/DL (ref 6.4–8.3)
PROTHROMBIN TIME: 13.4 SECONDS (ref 11.8–14.8)
RBC # BLD AUTO: 4.25 10E6/UL (ref 3.8–5.2)
SODIUM SERPL-SCNC: 137 MMOL/L (ref 135–145)
T4 FREE SERPL-MCNC: 1.88 NG/DL (ref 0.9–1.7)
TSH SERPL DL<=0.005 MIU/L-ACNC: 0.13 UIU/ML (ref 0.3–4.2)
WBC # BLD AUTO: 8.2 10E3/UL (ref 4–11)

## 2025-07-07 PROCEDURE — G2211 COMPLEX E/M VISIT ADD ON: HCPCS | Performed by: PHYSICIAN ASSISTANT

## 2025-07-07 PROCEDURE — 3077F SYST BP >= 140 MM HG: CPT | Performed by: PHYSICIAN ASSISTANT

## 2025-07-07 PROCEDURE — 1126F AMNT PAIN NOTED NONE PRSNT: CPT | Performed by: PHYSICIAN ASSISTANT

## 2025-07-07 PROCEDURE — 99000 SPECIMEN HANDLING OFFICE-LAB: CPT | Performed by: PATHOLOGY

## 2025-07-07 PROCEDURE — 3078F DIAST BP <80 MM HG: CPT | Performed by: PHYSICIAN ASSISTANT

## 2025-07-07 PROCEDURE — 87086 URINE CULTURE/COLONY COUNT: CPT | Performed by: INTERNAL MEDICINE

## 2025-07-07 PROCEDURE — G0463 HOSPITAL OUTPT CLINIC VISIT: HCPCS | Performed by: PHYSICIAN ASSISTANT

## 2025-07-07 PROCEDURE — 99215 OFFICE O/P EST HI 40 MIN: CPT | Performed by: PHYSICIAN ASSISTANT

## 2025-07-07 ASSESSMENT — PAIN SCALES - GENERAL: PAINLEVEL_OUTOF10: NO PAIN (0)

## 2025-07-07 NOTE — LETTER
7/7/2025      Keerthi Montoya  [address] [place] [id number]      Dear Colleague,    Thank you for referring your patient, Keerthi Montoya, to the [facility] [place]. Please see a copy of my visit note below.    Hepatology Clinic note  Keerthi Montoya   Date of Birth 1950    REASON FOR CONSULTATION: hepatic steatosis   REFERRING PROVIDER: Bunny Meyers MD          Assessment/plan:   Keerthi Montoya is a 75 year old female with history of HFpEF, AS s/p TAVR, AF s/p RY closure, and mod-severe pulmonary hypertension incidentally found to have portal hypertensive gastropathy in 2023, without other signs of portal hypertension (varices/splenomegaly or low platelets). Patient looks good and great functional status at this time.  Historically normal transaminases and normal liver function. Last FibroScan completed 3/1/2024 showing stage III, 12.2 kPa.  Discussed this could be falsely elevated in the setting of right sided heart disease.     # History of portal hypertension without varices, unclear etiology (cirrhotic/noncirrhotic):   - Positive LILLIAN, IgG elevated in the setting of UC. She also has risk factors for MASLD (DM type 2, HLD, CKD)   - Transjugular liver biopsy for portal pressure measurements to determine etiology and need for medical optimization and routine screening with hepatology     # Variceal screening up to date:   - EGD due in 2026     # History of moderate pulmonary hypertension/Mod RV dysfunction  - No BHAVNA today on exam   - Continue medical optimization as able and follow up with Cardiology (PulmHTN team)     # Follow-up in clinic with myself or transplant hepatology (if found to have cirrhosis) based on above in six months     Erika Bryson PA-C   [facility] Hepatology     Total time for E/M services performed on the date of the encounter 45 minutes.  This included review of previous: clinic visits, hospital records, lab results,  imaging studies, and procedural documentation. Time also includes patient visit, documentation and discussion with other providers.  The findings from this review are summarized in the above note.   -----------------------------------------------------       HPI:   Keerthi Montoya is a 75 year old female presenting for follow up    Endoscopic findings portal hypertensive gastropathy  FibroScan: Stage 3 fibrosis, 12.2 kPa grade 0 steatosis   EGD:   1/2023: normal esophagus, gastric ulcer, erosive gastropathy, duodenal ulcer (non-bleeding)  Imaging:   3/2023: PHG    Last seen by Dr. Rapp/Dr. José on 7/29/2024.   History of ER visits secondary to A-Fib, last 4/18/2025.  S/p watchman's device. S/p TAVR on 9/4/2024.    She underwent successful cardioversion on 4/18/2025. Not taking anticoagulation; takes aspirin daily.    3/14/25 RHC:  - Moderate pulmonary hypertension with high normal PCWP and reduced cardiac output at rest  - Abnormal increase in PA pressure and PCWP with drop in PVR during exercise suggestive of occult pulmonary venous hypertension  She reports Dr. Moscoso stopped torsemide several months ago.     - No new medications started in the past year.  - Appetite reported as good, with no significant weight changes in the past year (only a couple of pounds fluctuation).  - Was taking torsemide twice a week until about April 2025, then discontinued by physician; no swelling or fluid in legs since stopping torsemide.  - Reports regular bowel movements.  - Denies blood or blockage in stools.  - Denies recent fever, sweats, or chills; states has been feeling well for a long time.  - No problems with shortness of breath or breathing; last episode of shortness of breath was associated with atrial fibrillation, which was treated with cardioversion.  - No current symptoms of tiredness or breathing problems.    Home BP - 120's30/50-60's     Patient also denies melena, hematochezia or hematemesis.    Patient  denies  fevers, sweats or chills.    PMH:    has a past medical history of Chronic kidney disease, Congestive heart failure (H), Diabetes mellitus, type 2 (H), Diverticulosis of colon (without mention of hemorrhage) (10/01/2012), GI bleed, History of blood transfusion, HLD (hyperlipidemia), Hypertension, Hypothyroidism, MARIA D (iron deficiency anemia), Persistent atrial fibrillation (H), Pulmonary hypertension (H), and Ulcerative (chronic) enterocolitis (H).     SMH:    has a past surgical history that includes hernia repair (12/01/2006); Cholecystectomy (03/01/1987); Colon surgery (01/01/2001); Throat surgery (12/01/1974); GYN surgery (09/01/1983); Colonoscopy with CO2 insufflation (N/A, 04/24/2017); Colonoscopy (N/A, 04/24/2017); Right Heart Catheterization (N/A, 03/16/2020); Esophagoscopy, gastroscopy, duodenoscopy (EGD), combined (N/A, 01/23/2023); Sigmoidoscopy flexible (N/A, 01/23/2023); Colonoscopy (N/A, 03/10/2023); Esophagoscopy, gastroscopy, duodenoscopy (EGD), combined (N/A, 03/10/2023); Left Atrial Appendage Closure (N/A, 05/11/2023); Coronary Angiogram (N/A, 07/15/2024); Right Heart Catheterization (N/A, 07/15/2024); Transcatheter Aortic Valve Replacement-Femoral Approach (N/A, 09/04/2024); Abdomen surgery; Cardiac surgery; Right Heart Catheterization (N/A, 3/14/2025); Right Heart Exercise Stress Study (N/A, 3/14/2025); and Anesthesia cardioversion (N/A, 4/18/2025).     Medications:   Accu-Chek Aaliyah Plus  atorvastatin  azithromycin  B-12 Tabs  diphenhydrAMINE  dulaglutide  empagliflozin Tabs  glipiZIDE  hydrALAZINE  isosorbide mononitrate  levothyroxine  losartan  mesalamine  pantoprazole  senna       No alcohol.   Activity - takes care of grandkids     Previous work-up:   Lab Results   Component Value Date    HEPBANG Nonreactive 07/08/2024    HBCAB Nonreactive 07/08/2024    AUSAB 5.83 07/08/2024    HCVAB Nonreactive 07/08/2024    ASIA 183 01/03/2025    IRONSAT 43 01/03/2025    TTG 1 05/15/2017    TTGG 1  05/15/2017     10/28/2020    ALEA Borderline Positive (A) 2024    ANAT1 1:80 2024    TSH 0.30 2025    CHOL 96 2024    HDL 34 (L) 2024    LDL 41 2024    TRIG 107 2024    A1C 6.7 (H) 2024        Recent Labs   Lab Test 25  1228 24  1248 24  0633 24  1151 24  1243 10/16/23  1537 23  0845 23  1622 23  1129 23  0919   ALKPHOS 130 116 128 121 117 175* 189* 120* 114* 106*   ALT 7 <5 7 <5 <5 8 10 <5 5 11   AST 23 25 39 24 21 52* 63* 44 32 44*   BILITOTAL 0.7 0.9 0.6 0.7 0.5 0.6 0.9 1.1 0.7 0.4             Allergies:     Allergies   Allergen Reactions     Actos [Pioglitazone]      Fluid retention     Amlodipine      Leg swelling, hand swelling     Doxycycline Hives and Rash     Edema in hands/feet       Keflex [Cephalexin Hcl] Hives     Swelling hands/feet       Metoprolol Other (See Comments)     Swelling of lower legs and fatigue     Synthroid [Levothyroxine Sodium] Swelling     Allergic to brand name, hives all over body, edema all over body       Tylenol Hives     Hives/rash all over body     Ziac [Bisoprolol-Hydrochlorothiazide] Other (See Comments), Hives and Itching     Hands/feet swell up, hives all over body       Animal Dander Other (See Comments)     Sneezing, itchy nose     Grass Other (See Comments)     Itchy nose/eyes, sneezing, coughing       Tetracycline Hives and Itching     Swelling hands/feet       Dust Mites Other (See Comments)     Itchy eyes, sneezing     Other [Seasonal Allergies] Other (See Comments)     Pollen coughing/sneezing            Social History:     Social History     Socioeconomic History     Marital status:      Spouse name: Raymundo; dementia;  2016     Number of children: 3     Years of education: Not on file     Highest education level: Not on file   Occupational History     Employer: [employer]   Tobacco Use     Smoking status: Never     Passive exposure: Never      Smokeless tobacco: Never   Vaping Use     Vaping status: Never Used   Substance and Sexual Activity     Alcohol use: Yes     Alcohol/week: 2.0 - 4.0 standard drinks of alcohol     Types: 1 - 2 Cans of beer, 1 - 2 Shots of liquor per week     Comment: Once in a great while, one every few months     Drug use: No     Sexual activity: Not Currently   Other Topics Concern     Parent/sibling w/ CABG, MI or angioplasty before 65F 55M? 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 8/14     Social Drivers of Health     Financial Resource Strain: Low Risk  (10/15/2024)    Financial Resource Strain      Within the past 12 months, have you or your family members you live with been unable to get utilities (heat, electricity) when it was really needed?: No   Food Insecurity: Low Risk  (10/15/2024)    Food Insecurity      Within the past 12 months, did you worry that your food would run out before you got money to buy more?: No      Within the past 12 months, did the food you bought just not last and you didn t have money to get more?: No   Transportation Needs: Low Risk  (10/15/2024)    Transportation Needs      Within the past 12 months, has lack of transportation kept you from medical appointments, getting your medicines, non-medical meetings or appointments, work, or from getting things that you need?: No   Recent Concern: Transportation Needs - High Risk (9/4/2024)    Transportation Needs      Within the past 12 months, has lack of transportation kept you from medical appointments, getting your medicines, non-medical meetings or appointments, work, or from getting things that you need?: Yes    Physical Activity: Insufficiently Active (10/15/2024)    Exercise Vital Sign      Days of Exercise per Week: 3 days      Minutes of Exercise per Session: 30 min   Stress: No Stress Concern Present (10/15/2024)    [employer] [place] [employer] - Occupational Stress Questionnaire      Feeling of Stress : Not at all   Social Connections: Unknown (10/15/2024)    Social Connection and Isolation Panel [NHANES]      Frequency of Communication with Friends and Family: Not on file      Frequency of Social Gatherings with Friends and Family: More than three times a week      Attends Temple Services: Not on file      Active Member of Clubs or Organizations: Not on file      Attends Club or Organization Meetings: Not on file      Marital Status: Not on file   Interpersonal Safety: Low Risk  (3/14/2025)    Interpersonal Safety      Do you feel physically and emotionally safe where you currently live?: Yes      Within the past 12 months, have you been hit, slapped, kicked or otherwise physically hurt by someone?: No      Within the past 12 months, have you been humiliated or emotionally abused in other ways by your partner or ex-partner?: No   Housing Stability: Low Risk  (10/15/2024)    Housing Stability      Do you have housing? : Yes      Are you worried about losing your housing?: No            Family History:     Family History   Problem Relation Age of Onset     Cerebrovascular Disease Mother      Cancer Father         bladder     Diverticulitis Brother      Diabetes Brother      Diverticulitis Brother      Hypertension Brother      Kidney Disease No family hx of      Crohn's Disease No family hx of      Ulcerative Colitis No family hx of      Stomach Cancer No family hx of      GERD No family hx of      Celiac Disease No family hx of      Anesthesia Reaction No family hx of             Review of Systems:     Gen: See HPI     HEENT: No change in vision or hearing, mouth sores, dysphagia, lymph nodes  Resp:  "No shortness of breath, coughing, hx of asthma  CV: No chest pain, palpitations, syncope   GI: See HPI  : No dysuria, history of stones, urine color    Skin: No rash; no pruritus or psoriasis  MS: No arthralgias, myalgias, joint swelling  Neuro: No memory changes, confusion, numbness    Heme: No difficulty clotting, bruising, bleeding  Psych:  No anxiety, depression, agitation          Physical Exam:   BP (!) 165/64   Pulse 78   Resp 16   Ht 1.6 m (5' 3\")   Wt 61.7 kg (136 lb)   LMP  (LMP Unknown)   SpO2 95%   BMI 24.09 kg/m      Gen: A&Ox3, NAD, well developed  HEENT: non-icteric   Lung: no conversational dyspnea   Lym- no palpable lymphadenopathy  Abd: soft, NT, ND, no palpable splenomegaly, liver is not palpable.   Ext: no edema, intact pulses.   Skin: No rash, no palmar erythema, telangiectasias or jaundice  Neuro: grossly intact, no asterixis   Psych: appropriate mood and affects         Data:   Reviewed in person and significant for:    Lab Results   Component Value Date     05/12/2025     04/28/2021      Lab Results   Component Value Date    POTASSIUM 4.7 05/12/2025    POTASSIUM 4.3 09/17/2023    POTASSIUM 4.5 04/28/2021     Lab Results   Component Value Date    CHLORIDE 104 05/12/2025    CHLORIDE 109 04/27/2023    CHLORIDE 100 04/28/2021     Lab Results   Component Value Date    CO2 19 05/12/2025    CO2 21 04/27/2023    CO2 26 04/28/2021     Lab Results   Component Value Date    BUN 31.2 05/12/2025    BUN 28 04/27/2023    BUN 23 04/28/2021     Lab Results   Component Value Date    CR 1.65 05/12/2025    CR 1.21 04/28/2021       Lab Results   Component Value Date    WBC 8.3 04/18/2025    WBC 7.3 10/28/2020     Lab Results   Component Value Date    HGB 13.1 05/12/2025    HGB 11.8 02/08/2021     Lab Results   Component Value Date    HCT 45.5 04/18/2025    HCT 38.2 10/28/2020     Lab Results   Component Value Date     04/18/2025    MCV 96 10/28/2020     Lab Results   Component Value " "Date     04/18/2025     10/28/2020       Lab Results   Component Value Date    AST 23 04/18/2025    AST 19 10/28/2020     Lab Results   Component Value Date    ALT 7 04/18/2025    ALT 18 10/28/2020     No results found for: \"BILICONJ\"   Lab Results   Component Value Date    BILITOTAL 0.7 04/18/2025    BILITOTAL 0.6 10/28/2020       Lab Results   Component Value Date    ALBUMIN 4.5 05/12/2025    ALBUMIN 3.7 04/27/2023    ALBUMIN 4.1 02/08/2021     Lab Results   Component Value Date    PROTTOTAL 8.4 04/18/2025    PROTTOTAL 8.0 10/28/2020      Lab Results   Component Value Date    ALKPHOS 130 04/18/2025    ALKPHOS 104 10/28/2020       Lab Results   Component Value Date    INR 1.00 03/14/2025         Imaging:        Patient: Keerthi Montoya   YOB: 1950   Medical Record Number: [id number]     ORDERING PROVIDER:  Preeti James MD     EXAMINATION:     Liver FibroScan     DATE OF EXAM:   3/1/24     INDICATION:    Liver fibrosis assessment     HISTORY:     other: Portal hypertensive gastropathy (H)     A series of at least 10 Vibration Controlled Transient Elastography (VCTE) measurements were performed by placing the XL probe over the center of the liver parenchyma and mechanically inducing a 50 Hertz shear wave.     Each resulting VCTE measurement was analyzed to determine shear wave propagation speed and calculate the equivalent liver stiffness.     All measurements were reviewed by the  and physician for technical accuracy. Data variability across the acquired measurements was quantified with IQR/Median Percentage.     FINDINGS:     The median liver stiffness was 12.2 kPa with IQR/Median percentage of 20 %.     The measure CAP ultrasound attenuation rate value was 159 dB/m.     IMPRESSION:       1. Estimated liver fibrosis is Stage 3   2. Steatosis Grade S0     The results of the FibroScan examination were assessed by taking into account the quality of the measurement thumbnails, " number of measurements, and IQR/Median ratio. I have personally reviewed the examination and initial interpretation, and I agree with the findings.      Erika Bryson PA-C       Left Ventricle  Global and regional left ventricular function is normal with an EF of 55-60%.     Right Ventricle  Moderate right ventricular dilation is present. Global right ventricular  function is moderately reduced.     Atria  Moderate biatrial enlargement is present. There is a 24 mm Watchman FLX  closure device in the left atrial appendage. It is well-seated without  evidence of thrombus on the left atrial side of the device. There is no  evidence of delroy-device leak on color Doppler.     Mitral Valve  Mild mitral annular calcification is present. Trace to mild mitral  insufficiency is present.     Aortic Valve  S/p TAVR with 23 mm Ramsay Trini 3 Ultra Resilia prosthesis on 24. The  prosthetic aortic valve is well-seated. Doppler interrogation of the aortic  valve is normal. The mean gradient is 5 mmHg.     Tricuspid Valve  The tricuspid valve is normal. Trace tricuspid insufficiency is present.     Pulmonic Valve  The pulmonic valve is normal. Trace pulmonic insufficiency is present.     Vessels  The aorta root is normal. The thoracic aorta is normal.     Pericardium  No pericardial effusion is present.     Compared to Previous Study  This study was compared with the study from 2025 . There has been no  change.     ______________________________________________________________________________  Doppler Measurements & Calculations     Ao V2 max: 154.0 cm/sec  Ao max P.5 mmHg  Ao V2 mean: 104.2 cm/sec  Ao mean P.1 mmHg  Ao V2 VTI: 21.2 cm       Again, thank you for allowing me to participate in the care of your patient.        Sincerely,        Erika Bryson PA-C    Electronically signed

## 2025-07-07 NOTE — PROGRESS NOTES
Hepatology Clinic note  Keerthi Montoya   Date of Birth 1950    REASON FOR CONSULTATION: hepatic steatosis   REFERRING PROVIDER: Bunny Meyers MD          Assessment/plan:   Keerthi Montoya is a 75 year old female with history of HFpEF, AS s/p TAVR, AF s/p RY closure, and mod-severe pulmonary hypertension incidentally found to have portal hypertensive gastropathy in 2023, without other signs of portal hypertension (varices/splenomegaly or low platelets). Patient looks good and great functional status at this time.  Historically normal transaminases and normal liver function. Last FibroScan completed 3/1/2024 showing stage III, 12.2 kPa.  Discussed this could be falsely elevated in the setting of right sided heart disease.     # History of portal hypertension without varices, unclear etiology (cirrhotic/noncirrhotic):   - Positive LILLIAN, IgG elevated in the setting of UC. She also has risk factors for MASLD (DM type 2, HLD, CKD)   - Transjugular liver biopsy for portal pressure measurements to determine etiology and need for medical optimization and routine screening with hepatology     # Variceal screening up to date:   - EGD due in 2026     # History of moderate pulmonary hypertension/Mod RV dysfunction  - No BHAVNA today on exam   - Continue medical optimization as able and follow up with Cardiology (PulmHTN team)     # Follow-up in clinic with myself or transplant hepatology (if found to have cirrhosis) based on above in six months     Erika Bryson PA-C   [facility]     Total time for E/M services performed on the date of the encounter 45 minutes.  This included review of previous: clinic visits, hospital records, lab results, imaging studies, and procedural documentation. Time also includes patient visit, documentation and discussion with other providers.  The findings from this review are summarized in the above note.   -----------------------------------------------------        HPI:   Keerthi Montoya is a 75 year old female presenting for follow up    Endoscopic findings portal hypertensive gastropathy  FibroScan: Stage 3 fibrosis, 12.2 kPa grade 0 steatosis   EGD:   1/2023: normal esophagus, gastric ulcer, erosive gastropathy, duodenal ulcer (non-bleeding)  Imaging:   3/2023: PHG    Last seen by Dr. Rapp/Dr. José on 7/29/2024.   History of ER visits secondary to A-Fib, last 4/18/2025.  S/p watchman's device. S/p TAVR on 9/4/2024.    She underwent successful cardioversion on 4/18/2025. Not taking anticoagulation; takes aspirin daily.    3/14/25 RHC:  - Moderate pulmonary hypertension with high normal PCWP and reduced cardiac output at rest  - Abnormal increase in PA pressure and PCWP with drop in PVR during exercise suggestive of occult pulmonary venous hypertension  She reports Dr. Moscoso stopped torsemide several months ago.     - No new medications started in the past year.  - Appetite reported as good, with no significant weight changes in the past year (only a couple of pounds fluctuation).  - Was taking torsemide twice a week until about April 2025, then discontinued by physician; no swelling or fluid in legs since stopping torsemide.  - Reports regular bowel movements.  - Denies blood or blockage in stools.  - Denies recent fever, sweats, or chills; states has been feeling well for a long time.  - No problems with shortness of breath or breathing; last episode of shortness of breath was associated with atrial fibrillation, which was treated with cardioversion.  - No current symptoms of tiredness or breathing problems.    Home BP - 120's30/50-60's     Patient also denies melena, hematochezia or hematemesis.    Patient denies  fevers, sweats or chills.    PMH:    has a past medical history of Chronic kidney disease, Congestive heart failure (H), Diabetes mellitus, type 2 (H), Diverticulosis of colon (without mention of hemorrhage) (10/01/2012), GI bleed, History of blood  transfusion, HLD (hyperlipidemia), Hypertension, Hypothyroidism, MARIA D (iron deficiency anemia), Persistent atrial fibrillation (H), Pulmonary hypertension (H), and Ulcerative (chronic) enterocolitis (H).     SMH:    has a past surgical history that includes hernia repair (12/01/2006); Cholecystectomy (03/01/1987); Colon surgery (01/01/2001); Throat surgery (12/01/1974); GYN surgery (09/01/1983); Colonoscopy with CO2 insufflation (N/A, 04/24/2017); Colonoscopy (N/A, 04/24/2017); Right Heart Catheterization (N/A, 03/16/2020); Esophagoscopy, gastroscopy, duodenoscopy (EGD), combined (N/A, 01/23/2023); Sigmoidoscopy flexible (N/A, 01/23/2023); Colonoscopy (N/A, 03/10/2023); Esophagoscopy, gastroscopy, duodenoscopy (EGD), combined (N/A, 03/10/2023); Left Atrial Appendage Closure (N/A, 05/11/2023); Coronary Angiogram (N/A, 07/15/2024); Right Heart Catheterization (N/A, 07/15/2024); Transcatheter Aortic Valve Replacement-Femoral Approach (N/A, 09/04/2024); Abdomen surgery; Cardiac surgery; Right Heart Catheterization (N/A, 3/14/2025); Right Heart Exercise Stress Study (N/A, 3/14/2025); and Anesthesia cardioversion (N/A, 4/18/2025).     Medications:   Accu-Chek Aaliyah Plus  atorvastatin  azithromycin  B-12 Tabs  diphenhydrAMINE  dulaglutide  empagliflozin Tabs  glipiZIDE  hydrALAZINE  isosorbide mononitrate  levothyroxine  losartan  mesalamine  pantoprazole  senna       No alcohol.   Activity - takes care of grandkids     Previous work-up:   Lab Results   Component Value Date    HEPBANG Nonreactive 07/08/2024    HBCAB Nonreactive 07/08/2024    AUSAB 5.83 07/08/2024    HCVAB Nonreactive 07/08/2024    ASIA 183 01/03/2025    IRONSAT 43 01/03/2025    TTG 1 05/15/2017    TTGG 1 05/15/2017     10/28/2020    ALEA Borderline Positive (A) 07/08/2024    ANAT1 1:80 07/08/2024    TSH 0.30 05/12/2025    CHOL 96 07/08/2024    HDL 34 (L) 07/08/2024    LDL 41 07/08/2024    TRIG 107 07/08/2024    A1C 6.7 (H) 11/04/2024        Recent  Labs   Lab Test 25  1228 24  1248 24  0633 24  1151 24  1243 10/16/23  1537 23  0845 23  1622 23  1129 23  0919   ALKPHOS 130 116 128 121 117 175* 189* 120* 114* 106*   ALT 7 <5 7 <5 <5 8 10 <5 5 11   AST 23 25 39 24 21 52* 63* 44 32 44*   BILITOTAL 0.7 0.9 0.6 0.7 0.5 0.6 0.9 1.1 0.7 0.4             Allergies:     Allergies   Allergen Reactions    Actos [Pioglitazone]      Fluid retention    Amlodipine      Leg swelling, hand swelling    Doxycycline Hives and Rash     Edema in hands/feet      Keflex [Cephalexin Hcl] Hives     Swelling hands/feet      Metoprolol Other (See Comments)     Swelling of lower legs and fatigue    Synthroid [Levothyroxine Sodium] Swelling     Allergic to brand name, hives all over body, edema all over body      Tylenol Hives     Hives/rash all over body    Ziac [Bisoprolol-Hydrochlorothiazide] Other (See Comments), Hives and Itching     Hands/feet swell up, hives all over body      Animal Dander Other (See Comments)     Sneezing, itchy nose    Grass Other (See Comments)     Itchy nose/eyes, sneezing, coughing      Tetracycline Hives and Itching     Swelling hands/feet      Dust Mites Other (See Comments)     Itchy eyes, sneezing    Other [Seasonal Allergies] Other (See Comments)     Pollen coughing/sneezing            Social History:     Social History     Socioeconomic History    Marital status:      Spouse name: Raymundo; dementia;  2016    Number of children: 3    Years of education: Not on file    Highest education level: Not on file   Occupational History     Employer: [employer]   Tobacco Use    Smoking status: Never     Passive exposure: Never    Smokeless tobacco: Never   Vaping Use    Vaping status: Never Used   Substance and Sexual Activity    Alcohol use: Yes     Alcohol/week: 2.0 - 4.0 standard drinks of alcohol     Types: 1 - 2 Cans of beer, 1 - 2 Shots of liquor per week     Comment: Once in a great while,  one every few months    Drug use: No    Sexual activity: Not Currently   Other Topics Concern    Parent/sibling w/ CABG, MI or angioplasty before 65F 55M? Not Asked     Service No    Blood Transfusions No    Caffeine Concern No    Occupational Exposure No    Hobby Hazards No    Sleep Concern No    Stress Concern No    Weight Concern No    Special Diet No    Back Care No    Exercise Yes     Comment: off and on    Bike Helmet No    Seat Belt Yes    Self-Exams No   Social History Narrative    Laid off her job 8/14     Social Drivers of Health     Financial Resource Strain: Low Risk  (10/15/2024)    Financial Resource Strain     Within the past 12 months, have you or your family members you live with been unable to get utilities (heat, electricity) when it was really needed?: No   Food Insecurity: Low Risk  (10/15/2024)    Food Insecurity     Within the past 12 months, did you worry that your food would run out before you got money to buy more?: No     Within the past 12 months, did the food you bought just not last and you didn t have money to get more?: No   Transportation Needs: Low Risk  (10/15/2024)    Transportation Needs     Within the past 12 months, has lack of transportation kept you from medical appointments, getting your medicines, non-medical meetings or appointments, work, or from getting things that you need?: No   Recent Concern: Transportation Needs - High Risk (9/4/2024)    Transportation Needs     Within the past 12 months, has lack of transportation kept you from medical appointments, getting your medicines, non-medical meetings or appointments, work, or from getting things that you need?: Yes   Physical Activity: Insufficiently Active (10/15/2024)    Exercise Vital Sign     Days of Exercise per Week: 3 days     Minutes of Exercise per Session: 30 min   Stress: No Stress Concern Present (10/15/2024)    [employer] - Occupational Stress Questionnaire     Feeling of  Stress : Not at all   Social Connections: Unknown (10/15/2024)    Social Connection and Isolation Panel [NHANES]     Frequency of Communication with Friends and Family: Not on file     Frequency of Social Gatherings with Friends and Family: More than three times a week     Attends [employer] Services: Not on file     Active Member of Clubs or Organizations: Not on file     Attends Club or Organization Meetings: Not on file     Marital Status: Not on file   Interpersonal Safety: Low Risk  (3/14/2025)    Interpersonal Safety     Do you feel physically and emotionally safe where you currently live?: Yes     Within the past 12 months, have you been hit, slapped, kicked or otherwise physically hurt by someone?: No     Within the past 12 months, have you been humiliated or emotionally abused in other ways by your partner or ex-partner?: No   Housing Stability: Low Risk  (10/15/2024)    Housing Stability     Do you have housing? : Yes     Are you worried about losing your housing?: No            Family History:     Family History   Problem Relation Age of Onset    Cerebrovascular Disease Mother     Cancer Father         bladder    Diverticulitis Brother     Diabetes Brother     Diverticulitis Brother     Hypertension Brother     Kidney Disease No family hx of     Crohn's Disease No family hx of     Ulcerative Colitis No family hx of     Stomach Cancer No family hx of     GERD No family hx of     Celiac Disease No family hx of     Anesthesia Reaction No family hx of             Review of Systems:     Gen: See HPI     HEENT: No change in vision or hearing, mouth sores, dysphagia, lymph nodes  Resp: No shortness of breath, coughing, hx of asthma  CV: No chest pain, palpitations, syncope   GI: See HPI  : No dysuria, history of stones, urine color    Skin: No rash; no pruritus or psoriasis  MS: No arthralgias, myalgias, joint swelling  Neuro: No memory changes, confusion, numbness    Heme: No difficulty clotting, bruising,  "bleeding  Psych:  No anxiety, depression, agitation          Physical Exam:   BP (!) 165/64   Pulse 78   Resp 16   Ht 1.6 m (5' 3\")   Wt 61.7 kg (136 lb)   LMP  (LMP Unknown)   SpO2 95%   BMI 24.09 kg/m      Gen: A&Ox3, NAD, well developed  HEENT: non-icteric   Lung: no conversational dyspnea   Lym- no palpable lymphadenopathy  Abd: soft, NT, ND, no palpable splenomegaly, liver is not palpable.   Ext: no edema, intact pulses.   Skin: No rash, no palmar erythema, telangiectasias or jaundice  Neuro: grossly intact, no asterixis   Psych: appropriate mood and affects         Data:   Reviewed in person and significant for:    Lab Results   Component Value Date     05/12/2025     04/28/2021      Lab Results   Component Value Date    POTASSIUM 4.7 05/12/2025    POTASSIUM 4.3 09/17/2023    POTASSIUM 4.5 04/28/2021     Lab Results   Component Value Date    CHLORIDE 104 05/12/2025    CHLORIDE 109 04/27/2023    CHLORIDE 100 04/28/2021     Lab Results   Component Value Date    CO2 19 05/12/2025    CO2 21 04/27/2023    CO2 26 04/28/2021     Lab Results   Component Value Date    BUN 31.2 05/12/2025    BUN 28 04/27/2023    BUN 23 04/28/2021     Lab Results   Component Value Date    CR 1.65 05/12/2025    CR 1.21 04/28/2021       Lab Results   Component Value Date    WBC 8.3 04/18/2025    WBC 7.3 10/28/2020     Lab Results   Component Value Date    HGB 13.1 05/12/2025    HGB 11.8 02/08/2021     Lab Results   Component Value Date    HCT 45.5 04/18/2025    HCT 38.2 10/28/2020     Lab Results   Component Value Date     04/18/2025    MCV 96 10/28/2020     Lab Results   Component Value Date     04/18/2025     10/28/2020       Lab Results   Component Value Date    AST 23 04/18/2025    AST 19 10/28/2020     Lab Results   Component Value Date    ALT 7 04/18/2025    ALT 18 10/28/2020     No results found for: \"BILICONJ\"   Lab Results   Component Value Date    BILITOTAL 0.7 04/18/2025    BILITOTAL 0.6 " 10/28/2020       Lab Results   Component Value Date    ALBUMIN 4.5 05/12/2025    ALBUMIN 3.7 04/27/2023    ALBUMIN 4.1 02/08/2021     Lab Results   Component Value Date    PROTTOTAL 8.4 04/18/2025    PROTTOTAL 8.0 10/28/2020      Lab Results   Component Value Date    ALKPHOS 130 04/18/2025    ALKPHOS 104 10/28/2020       Lab Results   Component Value Date    INR 1.00 03/14/2025         Imaging:        Patient: Keerthi Montoya   YOB: 1950   Medical Record Number: [id number]     ORDERING PROVIDER:  Preeti James MD     EXAMINATION:     Liver FibroScan     DATE OF EXAM:   3/1/24     INDICATION:    Liver fibrosis assessment     HISTORY:     other: Portal hypertensive gastropathy (H)     A series of at least 10 Vibration Controlled Transient Elastography (VCTE) measurements were performed by placing the XL probe over the center of the liver parenchyma and mechanically inducing a 50 Hertz shear wave.     Each resulting VCTE measurement was analyzed to determine shear wave propagation speed and calculate the equivalent liver stiffness.     All measurements were reviewed by the  and physician for technical accuracy. Data variability across the acquired measurements was quantified with IQR/Median Percentage.     FINDINGS:     The median liver stiffness was 12.2 kPa with IQR/Median percentage of 20 %.     The measure CAP ultrasound attenuation rate value was 159 dB/m.     IMPRESSION:       1. Estimated liver fibrosis is Stage 3   2. Steatosis Grade S0     The results of the FibroScan examination were assessed by taking into account the quality of the measurement thumbnails, number of measurements, and IQR/Median ratio. I have personally reviewed the examination and initial interpretation, and I agree with the findings.      Erika Bryson PA-C       Left Ventricle  Global and regional left ventricular function is normal with an EF of 55-60%.     Right Ventricle  Moderate right ventricular dilation is  present. Global right ventricular  function is moderately reduced.     Atria  Moderate biatrial enlargement is present. There is a 24 mm Watchman FLX  closure device in the left atrial appendage. It is well-seated without  evidence of thrombus on the left atrial side of the device. There is no  evidence of delroy-device leak on color Doppler.     Mitral Valve  Mild mitral annular calcification is present. Trace to mild mitral  insufficiency is present.     Aortic Valve  S/p TAVR with 23 mm Ramsay Trini 3 Ultra Resilia prosthesis on 24. The  prosthetic aortic valve is well-seated. Doppler interrogation of the aortic  valve is normal. The mean gradient is 5 mmHg.     Tricuspid Valve  The tricuspid valve is normal. Trace tricuspid insufficiency is present.     Pulmonic Valve  The pulmonic valve is normal. Trace pulmonic insufficiency is present.     Vessels  The aorta root is normal. The thoracic aorta is normal.     Pericardium  No pericardial effusion is present.     Compared to Previous Study  This study was compared with the study from 2025 . There has been no  change.     ______________________________________________________________________________  Doppler Measurements & Calculations     Ao V2 max: 154.0 cm/sec  Ao max P.5 mmHg  Ao V2 mean: 104.2 cm/sec  Ao mean P.1 mmHg  Ao V2 VTI: 21.2 cm

## 2025-07-07 NOTE — Clinical Note
Please place Transjugular liver biopsy with portal pressure measurements. history of portal hypertension without varices, unclear etiology:   HFpEF, AS s/p TAVR, AF s/p RY closure, and moderate pulmonary hypertension FibroScan

## 2025-07-07 NOTE — NURSING NOTE
"Chief Complaint   Patient presents with    New Patient     New consult but is a return to the department     BP (!) 165/64   Pulse 78   Resp 16   Ht 1.6 m (5' 3\")   Wt 61.7 kg (136 lb)   LMP  (LMP Unknown)   SpO2 95%   BMI 24.09 kg/m      Nathan Aquino, NIC CMA at 11:50 AM on 7/7/2025     "

## 2025-07-08 LAB — BACTERIA UR CULT: NO GROWTH

## 2025-07-10 ENCOUNTER — TELEPHONE (OUTPATIENT)
Dept: GASTROENTEROLOGY | Facility: CLINIC | Age: 75
End: 2025-07-10
Payer: MEDICARE

## 2025-07-16 ENCOUNTER — TELEPHONE (OUTPATIENT)
Dept: GASTROENTEROLOGY | Facility: CLINIC | Age: 75
End: 2025-07-16
Payer: MEDICARE

## 2025-07-16 ENCOUNTER — MYC MEDICAL ADVICE (OUTPATIENT)
Dept: GASTROENTEROLOGY | Facility: CLINIC | Age: 75
End: 2025-07-16
Payer: MEDICARE

## 2025-07-16 DIAGNOSIS — K51.00 ULCERATIVE PANCOLITIS WITHOUT COMPLICATION (H): Primary | ICD-10-CM

## 2025-07-16 RX ORDER — MESALAMINE 1.2 G/1
2400 TABLET, DELAYED RELEASE ORAL
Qty: 180 TABLET | Refills: 1 | Status: SHIPPED | OUTPATIENT
Start: 2025-07-16

## 2025-07-16 NOTE — TELEPHONE ENCOUNTER
INCOMING FAX:    Facility:  Name: [facility]  Phone: [phone]  Fax: [phone]                   What:  Notification that Rx benefit no longer covers mesalamine (Apriso) 0.375mg ER capsules. Request for altnerative to be prescribed; benefit plan includes mesalamine 1.2g.     Action:  Test claim run by pharmacy liaison. Confirmed generic Lialda - mesalamine 1.2g tablets are covered. Confirmed with [facility] pharmacy equivalent dosing: Apriso 1.5g daily = Lialda 2.4g daily.           Prescription updated to generic Lialda 2.4g daily.

## 2025-07-16 NOTE — TELEPHONE ENCOUNTER
Left Voicemail (1st Attempt) and Sent Mychart (1st Attempt) for the patient to call back and schedule the following:    Appointment type: Return IBD   Provider: NATALIA Hernandez  Return date: Next Avail   Specialty phone number: [phone]     ----     Allison Hdz, RN  [id number]  Hello,    I'm not sure how far out Kami Lim is booking after she returns from maternity leave, but this patient is due for follow-up in August of this year. Does Kami have anything in October when she returns? Could you please help this patient schedule follow-up?    Thank you!  Allison

## 2025-07-24 NOTE — PROGRESS NOTES
[facility] HEART [facility]  CARDIOVASCULAR DIVISION    VALVE CLINIC RETURN VISIT    PRIMARY CARDIOLOGIST: Dr. Sanderson       PERTINENT CLINICAL HISTORY:     Keerthi Montoya is a very pleasant 75 year old female who presents for 1 year TAVR follow-up. She has a history of  HFpEF, HTN, T2DM, HLD, CKD3, pulmonary hypertension (WHO II), RV failure, persistent atrial fibrillation with intolerance to anticoagulation due to GI bleeding s/p RY closure with Watchman (5/2023), LAURA.DCCV 4/2025 and severe paradoxical low flow low gradient severe aortic stenosis s/p TAVR 9/4 with a 23 mm Ramsay Trini 3 Ultra Resilia.  The TAVR and post-procedural course were notable for no complications. Her POD#1 ECHO showed mean PG 5 mmHg, no mention of severe PVL. She was noted to be SB at baseline, with intermittent junctioncal intra procedure. She was discharged on ASA therapy. Atenolol held on discharge due to low HR.    She presents today with no specific cardiovascular concerns. She states she has been feeling well since her valve replacement. She is attending cardiac rehab. She denies any chest pain, shortness of breath , orthopnea or PND.  She has not had any pre or aaron syncope. She has not had any palpitations or LE edema. Home BP has been 120-130, Bp at cardiac rehab was 120s yesterday. Groin sites are healing well. Overall she feels great.     Interval history 7/25/2025:  Patient presents to the clinic today for a 1 year follow-up post TAVR.  Patient does not have any cardiac concerns and is doing well at this time.  Patient has been exercising 3 times a week by walking on the treadmill and states she is going to increase that to 5 as she has been feeling well.  Previous blood pressures in clinic have been hypertensive in the past but patient regularly checks at home which she has been systolically between 120s 130s.  Discussed graduating from TAVR clinic which patient is in agreement with this plan.  Patient understands  lifelong aspirin use along with antibiotics prior to any dental procedures.           PAST MEDICAL HISTORY:     Past Medical History:   Diagnosis Date    Chronic kidney disease     Congestive heart failure (H)     Diabetes mellitus, type 2 (H)     Diverticulosis of colon (without mention of hemorrhage) 10/01/2012    GI bleed     History of blood transfusion     HLD (hyperlipidemia)     Hypertension     Hypothyroidism     MARIA D (iron deficiency anemia)     Persistent atrial fibrillation (H)     Pulmonary hypertension (H)     Ulcerative (chronic) enterocolitis (H)         PAST SURGICAL HISTORY:     Past Surgical History:   Procedure Laterality Date    ABDOMEN SURGERY      ANESTHESIA CARDIOVERSION N/A 4/18/2025    Procedure: Anesthesia cardioversion;  Surgeon: GENERIC ANESTHESIA PROVIDER;  Location: [facility] OR    CARDIAC SURGERY      CHOLECYSTECTOMY  03/01/1987    COLON SURGERY  01/01/2001    Left colon resection; diverticulitis    COLONOSCOPY N/A 04/24/2017    Procedure: COMBINED COLONOSCOPY, SINGLE OR MULTIPLE BIOPSY/POLYPECTOMY BY BIOPSY;;  Surgeon: Radha Salguero MD;  Location: [facility] OR    COLONOSCOPY N/A 03/10/2023    Procedure: COLONOSCOPY;  Surgeon: Preeti James MD;  Location: [facility] GI    COLONOSCOPY WITH CO2 INSUFFLATION N/A 04/24/2017    Procedure: COLONOSCOPY WITH CO2 INSUFFLATION;  Colonoscopy Dx rectal bleeding, BMI 25.86, Pharm [facility] .[phone], ;  Surgeon: Radha Salguero MD;  Location: [facility] OR    CV CORONARY ANGIOGRAM N/A 07/15/2024    Procedure: Coronary Angiogram with possible intervention;  Surgeon: Bobby Grimes MD;  Location:  HEART CARDIAC CATH LAB    CV LEFT ATRIAL APPENDAGE CLOSURE N/A 05/11/2023    Procedure: Left Atrial Appendage Closure;  Surgeon: Bobby Grimes MD;  Location:  HEART CARDIAC CATH LAB    CV RIGHT HEART CATH MEASUREMENTS RECORDED N/A 03/16/2020    Procedure: CV RIGHT HEART CATH;  Surgeon: Nader Muñoz MD;  Location:  HEART CARDIAC CATH  LAB    CV RIGHT HEART CATH MEASUREMENTS RECORDED N/A 07/15/2024    Procedure: Right Heart Cath;  Surgeon: Bobby Grimes MD;  Location:  HEART CARDIAC CATH LAB    CV RIGHT HEART CATH MEASUREMENTS RECORDED N/A 3/14/2025    Procedure: Heart Cath Right Heart Cath;  Surgeon: Angeline Marcus MD;  Location:  HEART CARDIAC CATH LAB    CV RIGHT HEART EXERCISE STRESS STUDY N/A 3/14/2025    Procedure: Right Heart Exercise Stress Study;  Surgeon: Angeline Marcus MD;  Location:  HEART CARDIAC CATH LAB    CV TRANSCATHETER AORTIC VALVE REPLACEMENT-FEMORAL APPROACH N/A 09/04/2024    Procedure: Transcatheter Aortic Valve Replacement-Femoral Approach;  Surgeon: Bobby Grimes MD;  Location:  OR    ESOPHAGOSCOPY, GASTROSCOPY, DUODENOSCOPY (EGD), COMBINED N/A 01/23/2023    Procedure: ESOPHAGOGASTRODUODENOSCOPY, WITH BIOPSY;  Surgeon: Ricki Deal MD;  Location:  GI    ESOPHAGOSCOPY, GASTROSCOPY, DUODENOSCOPY (EGD), COMBINED N/A 03/10/2023    Procedure: Esophagoscopy, gastroscopy, duodenoscopy (EGD), combined;  Surgeon: Preeti James MD;  Location:  GI    GYN SURGERY  09/01/1983    tubal ligation    HERNIA REPAIR  12/01/2006    recurrent incisional hernia    SIGMOIDOSCOPY FLEXIBLE N/A 01/23/2023    Procedure: Sigmoidoscopy flexible;  Surgeon: Ricki Deal MD;  Location:  GI    THROAT SURGERY  12/01/1974    thyroidectomy        CURRENT MEDICATIONS:     Current Outpatient Medications   Medication Sig Dispense Refill    atorvastatin (LIPITOR) 40 MG tablet Take 1 tablet (40 mg) by mouth daily 90 tablet 3    azithromycin (ZITHROMAX) 250 MG tablet Take 2 tablets (500 mg) by mouth once as needed (Take 30-60 minutes prior to dental procedure or cleaning). 2 tablet 2    blood glucose (ACCU-CHEK FELIPE PLUS) test strip 200 strips by In Vitro route 2 times daily Use to test blood sugar 2 times daily or as directed. 200 strip 4    Cyanocobalamin (B-12) 1000 MCG TABS Take 1,000 mcg by mouth three times  a week      diphenhydrAMINE (BENADRYL) 25 MG tablet Take 25 mg by mouth every 6 hours as needed for itching or allergies      dulaglutide (TRULICITY) 1.5 MG/0.5ML pen Inject 1.5 mg Subcutaneous every 7 days After 4 doses of 0.75 mg (Patient not taking: Reported on 7/11/2025) 2 mL 1    empagliflozin (JARDIANCE) 10 MG TABS tablet Take 1 tablet (10 mg) by mouth daily 90 tablet 3    glipiZIDE (GLUCOTROL XL) 10 MG 24 hr tablet Take 2 tablets (20 mg) by mouth daily. 180 tablet 3    hydrALAZINE (APRESOLINE) 50 MG tablet Take 1 tablet (50 mg) by mouth 3 times daily. 180 tablet 2    isosorbide mononitrate (IMDUR) 30 MG 24 hr tablet Take 1 tablet (30 mg) by mouth daily. 90 tablet 3    levothyroxine (SYNTHROID/LEVOTHROID) 112 MCG tablet Take 1 tablet (112 mcg) by mouth every morning (before breakfast). 90 tablet 2    levothyroxine (SYNTHROID/LEVOTHROID) 125 MCG tablet Take 1 tablet (125 mcg) by mouth daily. 90 tablet 1    losartan (COZAAR) 100 MG tablet Take 1 tablet (100 mg) by mouth daily. 90 tablet 3    mesalamine (LIALDA) 1.2 g DR tablet Take 2 tablets (2,400 mg) by mouth daily (with breakfast). 180 tablet 1    pantoprazole (PROTONIX) 40 MG EC tablet Take 1 tablet (40 mg) by mouth daily. 90 tablet 3    senna (SENOKOT) 8.6 MG tablet Take 1 tablet by mouth daily as needed for constipation 30 tablet 3        ALLERGIES:     Allergies   Allergen Reactions    Actos [Pioglitazone]      Fluid retention    Amlodipine      Leg swelling, hand swelling    Doxycycline Hives and Rash     Edema in hands/feet      Keflex [Cephalexin Hcl] Hives     Swelling hands/feet      Metoprolol Other (See Comments)     Swelling of lower legs and fatigue    Synthroid [Levothyroxine Sodium] Swelling     Allergic to brand name, hives all over body, edema all over body      Tylenol Hives     Hives/rash all over body    Ziac [Bisoprolol-Hydrochlorothiazide] Other (See Comments), Hives and Itching     Hands/feet swell up, hives all over body      Animal  Dander Other (See Comments)     Sneezing, itchy nose    Grass Other (See Comments)     Itchy nose/eyes, sneezing, coughing      Tetracycline Hives and Itching     Swelling hands/feet      Dust Mites Other (See Comments)     Itchy eyes, sneezing    Other [Seasonal Allergies] Other (See Comments)     Pollen coughing/sneezing        FAMILY HISTORY:     Family History   Problem Relation Age of Onset    Cerebrovascular Disease Mother     Cancer Father         bladder    Diverticulitis Brother     Diabetes Brother     Diverticulitis Brother     Hypertension Brother     Kidney Disease No family hx of     Crohn's Disease No family hx of     Ulcerative Colitis No family hx of     Stomach Cancer No family hx of     GERD No family hx of     Celiac Disease No family hx of     Anesthesia Reaction No family hx of         SOCIAL HISTORY:     Social History     Socioeconomic History    Marital status:      Spouse name: Raymundo; dementia;  2016    Number of children: 3    Years of education: None    Highest education level: None   Occupational History     Employer: [employer]   Tobacco Use    Smoking status: Never     Passive exposure: Never    Smokeless tobacco: Never   Vaping Use    Vaping status: Never Used   Substance and Sexual Activity    Alcohol use: Not Currently     Alcohol/week: 2.0 - 4.0 standard drinks of alcohol     Types: 1 - 2 Cans of beer, 1 - 2 Shots of liquor per week     Comment: occasional cocktail or beer    Drug use: No    Sexual activity: Not Currently   Other Topics Concern     Service No    Blood Transfusions No    Caffeine Concern No    Occupational Exposure No    Hobby Hazards No    Sleep Concern No    Stress Concern No    Weight Concern No    Special Diet No    Back Care No    Exercise Yes     Comment: off and on    Bike Helmet No    Seat Belt Yes    Self-Exams No   Social History Narrative    Laid off her job      Social Determinants of Health     Financial Resource Strain:  Low Risk  (9/4/2024)    Financial Resource Strain     Within the past 12 months, have you or your family members you live with been unable to get utilities (heat, electricity) when it was really needed?: No   Food Insecurity: Low Risk  (9/4/2024)    Food Insecurity     Within the past 12 months, did you worry that your food would run out before you got money to buy more?: No     Within the past 12 months, did the food you bought just not last and you didn t have money to get more?: No   Transportation Needs: High Risk (9/4/2024)    Transportation Needs     Within the past 12 months, has lack of transportation kept you from medical appointments, getting your medicines, non-medical meetings or appointments, work, or from getting things that you need?: Yes   Interpersonal Safety: Low Risk  (9/4/2024)    Interpersonal Safety     Do you feel physically and emotionally safe where you currently live?: Yes     Within the past 12 months, have you been hit, slapped, kicked or otherwise physically hurt by someone?: No     Within the past 12 months, have you been humiliated or emotionally abused in other ways by your partner or ex-partner?: No   Housing Stability: Low Risk  (9/4/2024)    Housing Stability     Do you have housing? : Yes     Are you worried about losing your housing?: No        REVIEW OF SYSTEMS:     Constitutional: No fevers or chills  Skin: No new rash or itching  Eyes: No acute change in vision  Ears/Nose/Throat: No purulent rhinorrhea, new hearing loss, or new vertigo  Respiratory: No cough or hemoptysis  Cardiovascular: See HPI  Gastrointestinal: No change in appetite, vomiting, hematemesis or diarrhea  Genitourinary: No dysuria or hematuria  Musculoskeletal: No new back pain, neck pain or muscle pain  Neurologic: No new headaches, focal weakness or behavior changes  Psychiatric: No hallucinations, excessive alcohol consumption or illegal drug usage  Hematologic/Lymphatic/Immunologic: No bleeding, chills,  fever, night sweats or weight loss  Endocrine: No new cold intolerance, heat intolerance, polyphagia, polydipsia or polyuria      PHYSICAL EXAMINATION:     BP (!) 173/72 (BP Location: Right arm, Patient Position: Chair, Cuff Size: Adult Regular)   Pulse 75   Wt 63.5 kg (139 lb 14.4 oz)   LMP  (LMP Unknown)   SpO2 94%   BMI 24.78 kg/m      GENERAL: No acute distress.  HEENT: EOMI. Sclerae white, not injected. Nares clear. Pharynx without erythema or exudate.   Neck: No adenopathy. No thyromegaly. No jugular venous distension.   Heart: Regular rate and rhythm. 2/6 JUANITA   Lungs: Clear to auscultation. No ronchi, wheezes, rales.   Abdomen: Soft, nontender, nondistended. Bowel sounds present.  Extremities: No clubbing, cyanosis, or edema.   Neurologic: Alert and oriented to person/place/time, normal speech and affect. No focal deficits.  Skin: No petechiae, purpura or rash.     LABORATORY DATA:     LIPID RESULTS:  Lab Results   Component Value Date    CHOL 96 07/08/2024    CHOL 93 10/28/2020    HDL 34 (L) 07/08/2024    HDL 38 (L) 10/28/2020    LDL 41 07/08/2024    LDL 33 10/28/2020    TRIG 107 07/08/2024    TRIG 108 10/28/2020    CHOLHDLRATIO 4.3 05/18/2015       LIVER ENZYME RESULTS:  Lab Results   Component Value Date    AST 23 07/07/2025    AST 19 10/28/2020    ALT 7 07/07/2025    ALT 18 10/28/2020       CBC RESULTS:  Lab Results   Component Value Date    WBC 8.2 07/07/2025    WBC 7.3 10/28/2020    RBC 4.25 07/07/2025    RBC 3.99 10/28/2020    HGB 13.6 07/07/2025    HGB 11.8 02/08/2021    HCT 41.5 07/07/2025    HCT 38.2 10/28/2020    MCV 98 07/07/2025    MCV 96 10/28/2020    MCH 32.0 07/07/2025    MCH 30.8 10/28/2020    MCHC 32.8 07/07/2025    MCHC 32.2 10/28/2020    RDW 12.0 07/07/2025    RDW 13.9 10/28/2020     07/07/2025     10/28/2020       BMP RESULTS:  Lab Results   Component Value Date     07/07/2025     04/28/2021    POTASSIUM 4.6 07/07/2025    POTASSIUM 4.3 09/17/2023     POTASSIUM 4.5 04/28/2021    CHLORIDE 105 07/07/2025    CHLORIDE 109 04/27/2023    CHLORIDE 100 04/28/2021    CO2 19 (L) 07/07/2025    CO2 21 04/27/2023    CO2 26 04/28/2021    ANIONGAP 13 07/07/2025    ANIONGAP 7 04/27/2023    ANIONGAP 8 04/28/2021     (H) 07/07/2025     (H) 04/18/2025     (H) 04/27/2023    GLC 57 (L) 04/28/2021    BUN 26.6 (H) 07/07/2025    BUN 28 04/27/2023    BUN 23 04/28/2021    CR 1.43 (H) 07/07/2025    CR 1.21 (H) 04/28/2021    GFRESTIMATED 39 (L) 07/07/2025    GFRESTIMATED 30 (L) 12/30/2022    GFRESTIMATED 46 (L) 04/28/2021    GFRESTBLACK 53 (L) 04/28/2021    ABEL 10.0 07/07/2025    ABEL 9.8 04/28/2021        A1C RESULTS:  Lab Results   Component Value Date    A1C 6.7 (H) 11/04/2024    A1C 6.6 (H) 10/28/2020       INR RESULTS:  Lab Results   Component Value Date    INR 1.00 07/07/2025    INR 1.00 03/14/2025          PROCEDURES & FURTHER ASSESSMENTS:   EKG 07/25/2025    ECHO 07/25/2025  Interpretation Summary  Global and regional left ventricular function is normal with an EF of 55-60%.  Flattened septum is consistent with right ventricular pressure overload.  Moderate right ventricular dilation is present.  Global right ventricular function is moderately reduced.  S/p TAVR with 23 mm Ramsay Trini 3 Ultra Resilia prosthesis on 9/4/24  The prosthetic aortic valve is well-seated.  The mean gradient across the aortic valve is 18 mmHg.  Pulmonary hypertension is present.  The right ventricular systolic pressure is 94mmHg above the right atrial  pressure.  Dilation of the inferior vena cava is present with normal respiratory  variation in diameter.  No pericardial effusion is present.     This study was compared with the study from 3/4/2025 .  Pulmonary HTN and MG across TAVR valve has worsened. MG was 5 mmHg in the  previous study.  The patient's rhythm is normal sinus.  ______________________________________________________________________________  Left Ventricle  Left  ventricular size is normal. Left ventricular wall thickness is normal.  Global and regional left ventricular function is normal with an EF of 55-60%.  Left ventricular diastolic function is not assessable. Flattened septum is  consistent with right ventricular pressure overload.     Right Ventricle  Moderate right ventricular dilation is present. Global right ventricular  function is moderately reduced.     Atria  Mild to moderate left atrial enlargement is present.     Mitral Valve  Mild mitral annular calcification is present. Trace mitral insufficiency is  present.     Aortic Valve  The mean AoV pressure gradient is 18.0 mmHg. The calculated aortic valve are  is 1.1 cm^2. The mean gradient across the aortic valve is 18 mmHg. S/p TAVR  with 23 mm Ramsay Trini 3 Ultra Resilia prosthesis on 9/4/24. There is trace  paravalvular regurgitation. The prosthetic aortic valve is well-seated.     Tricuspid Valve  The tricuspid valve is normal. Mild to moderate tricuspid insufficiency is  present. Pulmonary hypertension is present. The right ventricular systolic  pressure is 94mmHg above the right atrial pressure.     Pulmonic Valve  Trace pulmonic insufficiency is present.     Vessels  Dilation of the inferior vena cava is present with normal respiratory  variation in diameter.     Pericardium  No pericardial effusion is present.     Compared to Previous Study  This study was compared with the study from 3/4/2025 . Pulmonary HTN and MG  across TAVR valve has worsened.  ______________________________________________________________________________  MMode/2D Measurements & Calculations     IVSd: 0.68 cm  LVIDd: 4.6 cm  LVIDs: 2.8 cm  LVPWd: 0.80 cm  FS: 39.9 %  LV mass(C)d: 105.7 grams  LV mass(C)dI: 64.2 grams/m2  asc Aorta Diam: 3.1 cm  LVOT diam: 2.0 cm  LVOT area: 3.0 cm2  Asc Ao diam index BSA (cm/m2): 1.9  Asc Ao diam index Ht(cm/m): 1.9  LA Volume (BP): 50.4 ml  LA Volume Index (BP): 30.5 ml/m2     RWT: 0.35  TAPSE:  1.9 cm     Doppler Measurements & Calculations  MV E max michael: 160.0 cm/sec  MV A max michael: 37.2 cm/sec  MV E/A: 4.3  MV max P.5 mmHg  MV mean PG: 3.1 mmHg  MV V2 VTI: 37.6 cm  MVA(VTI): 1.7 cm2  MV dec time: 0.16 sec  Ao V2 max: 287.0 cm/sec  Ao max P.9 mmHg  Ao V2 mean: 198.0 cm/sec  Ao mean P.0 mmHg  Ao V2 VTI: 58.2 cm  IVON(I,D): 1.1 cm2  IVON(V,D): 1.0 cm2  Ao acc time: 0.11 sec  LV V1 max PG: 3.8 mmHg  LV V1 max: 97.4 cm/sec  LV V1 VTI: 21.1 cm  SV(LVOT): 63.3 ml  SI(LVOT): 38.5 ml/m2  PA V2 max: 79.0 cm/sec  PA max P.5 mmHg  PA acc time: 0.08 sec  TR max michael: 484.3 cm/sec  TR max P.8 mmHg  AV Michael Ratio (DI): 0.34  IVON Index (cm2/m2): 0.66  RV S Michael: 10.4 cm/sec         EKG 24  NSR with PACs        ECHO 10/11/24    Interpretation Summary  Global and regional left ventricular function is normal with an EF of 60-65%.  Global right ventricular function is moderately reduced.  S/p TAVR with 23 mm Ramsay Trini 3 Ultra Resilia prosthesis on 24.  Doppler interrogation of the aortic valve is normal. (MG 7 mm Hg, PV 1.9 m/s,  DVI 0.66).  There is trace paravalvular regurgitation.  Notch noted in pulmonic flow suggestive of Pulm HTN.     This study was compared with the study from 24 .  No significant changes noted.     ______________________________________________________________________________  Left Ventricle  Left ventricular size is normal. Global and regional left ventricular function  is normal with an EF of 60-65%. Relative wall thickness is increased  consistent with concentric remodeling. Left ventricular diastolic function is  not assessable.     Right Ventricle  Mild right ventricular dilation is present. Global right ventricular function  is moderately reduced.     Atria  The right atria appears normal. Mild left atrial enlargement is present.     Mitral Valve  Mild to moderate mitral annular calcification is present. Trace mitral  insufficiency is present.     Aortic  Valve  S/p TAVR with 23 mm Ramsay Trini 3 Ultra Resilia prosthesis. The peak  velocity across the aortic valve is 1.9 m/sec. The mean gradient is 7 mmHg.  The prosthetic aortic valve is well-seated. Doppler interrogation of the  aortic valve is normal. There is trace paravalvular regurgitation.     Tricuspid Valve  The tricuspid valve is normal. Trace tricuspid insufficiency is present.  Pulmonary artery systolic pressure cannot be assessed.     Pulmonic Valve  The valve leaflets are not well visualized. Mild pulmonic insufficiency is  present.     Vessels  The thoracic aorta is normal. IVC diameter <2.1 cm collapsing >50% with sniff  suggests a normal RA pressure of 3 mmHg.     Pericardium  No pericardial effusion is present.     Compared to Previous Study  This study was compared with the study from 24 . No significant changes  noted.     Attestation  I have personally viewed the imaging and agree with the interpretation and  report as documented by the fellow, Raquel Ross, and/or edited by me.  ______________________________________________________________________________  MMode/2D Measurements & Calculations  IVSd: 0.94 cm  LVIDd: 3.9 cm  LVIDs: 2.4 cm  LVPWd: 0.94 cm  FS: 38.2 %  LV mass(C)d: 110.0 grams  LV mass(C)dI: 67.9 grams/m2  asc Aorta Diam: 2.9 cm  LVOT diam: 2.1 cm  LVOT area: 3.3 cm2  Asc Ao diam index BSA (cm/m2): 1.8  Asc Ao diam index Ht(cm/m): 1.8  LA Volume (BP): 69.2 ml     LA Volume Index (BP): 42.7 ml/m2  RV Base: 4.0 cm  RWT: 0.49  TAPSE: 1.7 cm     Doppler Measurements & Calculations  MV E max marcia: 101.0 cm/sec  MV A max marcia: 51.4 cm/sec  MV E/A: 2.0  MV max P.6 mmHg  MV mean P.9 mmHg  MV V2 VTI: 30.3 cm  MVA(VTI): 2.7 cm2  MV dec slope: 539.7 cm/sec2  MV dec time: 0.19 sec  Ao V2 max: 187.0 cm/sec  Ao max P.0 mmHg  Ao V2 mean: 124.0 cm/sec  Ao mean P.0 mmHg  Ao V2 VTI: 36.5 cm  IVON(I,D): 2.2 cm2  IVON(V,D): 2.2 cm2  LV V1 max P.3 mmHg  LV V1 max: 122.6 cm/sec  LV  V1 VTI: 24.4 cm  SV(LVOT): 81.8 ml  SI(LVOT): 50.5 ml/m2  PA V2 max: 95.1 cm/sec  PA max PG: 3.6 mmHg  PA acc time: 0.10 sec  AV Michael Ratio (DI): 0.66     IVON Index (cm2/m2): 1.4  RV S Michael: 11.7 cm/sec    Echocardiogram 9/5/24:  Interpretation Summary  s/p TAVR with 23 mm Ramsay Trini 3 Ultra Resilia prosthesis on 9/4/24. Valve  is well seated with normal doppler assessment (PV 1.6m/s, MG 5mmHg).     Left ventricular function is normal.The ejection fraction is 55-60%.  Moderate to severe right ventricular dilation. Global right ventricular  function is moderately reduced.  Moderate tricuspid insufficiency.  Severe pulmonary hypertension.  Estimated pulmonary artery systolic pressure is 85 mmHg.  Dilation of the inferior vena cava is present with abnormal respiratory  variation in diameter.  No pericardial effusion.  ______________________________________________________________________________  Left Ventricle  Left ventricular function is normal.The ejection fraction is 55-60%.     Right Ventricle  Moderate to severe right ventricular dilation is present. Global right  ventricular function is moderately reduced.     Mitral Valve  Moderate mitral annular calcification is present. Trace mitral insufficiency  is present.     Aortic Valve  Transcutaneous aortic valve replacement is present. The prosthetic aortic  valve is well-seated. There is no paravalvular regurgitation. There is no  valvular regurgitation. Doppler interrogation of the aortic valve is normal.  The peak velocity across the aortic valve is 1.6 m/sec. The mean gradient is 5  mmHg. The V2/V1 ratio is 0.71.     Tricuspid Valve  Moderate tricuspid insufficiency is present. Estimated pulmonary artery  systolic pressure is 70 mmHg plus right atrial pressure. Severe pulmonary  hypertension is present.     Vessels  Dilation of the inferior vena cava is present with abnormal respiratory  variation in diameter.     Pericardium  No pericardial effusion is  present.     Compared to Previous Study  This study was compared with the study from 24 .     ______________________________________________________________________________  MMode/2D Measurements & Calculations  LVOT diam: 2.1 cm  LVOT area: 3.3 cm2     Doppler Measurements & Calculations  Ao V2 max: 163.0 cm/sec  Ao max PG: 10.6 mmHg  Ao V2 mean: 102.0 cm/sec  Ao mean P.0 mmHg  Ao V2 VTI: 36.4 cm  IVON(I,D): 2.3 cm2  IVON(V,D): 2.4 cm2  Ao acc time: 0.08 sec     LV V1 max P.4 mmHg  LV V1 max: 116.0 cm/sec  LV V1 VTI: 25.1 cm  SV(LVOT): 83.6 ml  SI(LVOT): 50.9 ml/m2  TR max michael: 417.8 cm/sec  TR max P.8 mmHg  AV Michael Ratio (DI): 0.71  IVON Index (cm2/m2): 1.4     TAVR 24:  1. Lifestyle-limiting severe aortic stenosis.   2. Successful transfemoral transcatheter aortic valve replacement with a 23 mm Ramsay Trini 3 Ultra Resilia valve.  3. Temporary pacing   4. Left heart catheterization with LVEDP of 5 mmHg prior to valve deployment.  5. Left common femoral arteriotomy successfully closed with Perclose closure devices. Manual pressure was used to obtain hemostasis of the right sided arteriotomy and venotomy.      Other imaging studies:       NYHA Class: I    Amyloid screening: no     CLINICAL IMPRESSION:   Keerthi Montoya is a very pleasant 75 year old female who presents for 1 year TAVR follow-up. She has a history of  HFpEF, HTN, T2DM, HLD, CKD3, pulmonary hypertension (WHO II), RV failure, persistent atrial fibrillation with intolerance to anticoagulation due to GI bleeding s/p RY closure and Watchman (2023) and severe paradoxical low flow low gradient severe aortic stenosis s/p TAVR 2024 with a 23 mm Ramsay Trini 3 Ultra Resilia.  The TAVR and post-procedural course were notable for no complications. Her POD#1 ECHO showed mean PG 5 mmHg, no mention of severe PVL. She was noted to be SB at baseline, with intermittent junctioncal intra procedure. She was discharged on ASA therapy.       #  Severe paradoxical low flow low gradient aortic stenosis s/p TAVR   # HFpEF  # Severe PH  # Sinus jv, resolved  Doing well clinically. Currently no significant heart failure symptoms, appears euvolemic on exam.  ECHO today shows well seated TAVR with increased from 5 mmHg to 18 mmHg. PH also appears to have worsened with RVSP 94 above RA. Plan to further evaluate increased gradient with CT. Will message PH team about worsening PH.   - CT heart pending   - Continue ASA 81 mg daily and SBE prophylaxis lifelong  - Continue losartan 100 mg daily  - Continue Jardiance 10 mg daily  - Continue hydralazine and imdur  - Dr. Sanderson scheduled 11/2025     # Paroxysmal a-fib w/contraindication to anticoagulation due to GI bleeding  # Healed gastric ulcers/Ulcerative colitis (inactive)  Now s/p successful implantation of 24mm Watchman FLX. Post-procedure LAURA showed no evidence of delroy-device leak and no pericardial effusion. Patient seen in PACU. VSS, neuros intact, groin sites soft without hematoma. Post procedure TTE with small anterior effusion. Now completed 6 months of DAPT and on ASA monotherapy.  - ASA 81 mg daily    # HTN  # HLD   - Continue losartan, hydral, Imdur and atorvastatin     # CKD  - Stable creatinine 1.4 and GFR of 39. Continue to monitor     # Diabetes type II   - Hgb A1C 7.2.   - Continue current antidiabetics  - Further diabetes management per PCP.     RTC: Can graduate from structural heart cardiology pending CT heart, continue to follow with PH team and CORE    WENDI Marr, CNP  [facility] Cardiology Team      CC  Patient Care Team:  Bunny Meyers MD as PCP - General (Internal Medicine)  Preeti James MD as MD (Gastroenterology)  Swati Minor DO as Assigned Nephrology Provider  Jerry Robbins PA-C as Physician Assistant (Gastroenterology)  Allison Hdz, RN as Specialty Care Coordinator (Gastroenterology)  Gianfranco Melendez MD as Intern (Student in [facility] health care  education/training program)  Jethro Moore MD as MD (Hematology & Oncology)  Fracisco Hurtado, RN as Specialty Care Coordinator (Hematology & Oncology)  Vanessa Schneider, RN as Diabetes Educator (Diabetes Education)  Saba Lemon MD as MD (Advanced Heart Failure and Transplant Cardiology)  Sheela Galeas, RN as Specialty Care Coordinator (Cardiology)  Francisca Mustafa, APRN CNP as Assigned Cancer Care Provider  Geena Ornelas MD as MD (Dermatology)  Lorena Ambriz, RN as Specialty Care Coordinator (Cardiology)  Davie Rapp MD as MD (Gastroenterology)  Zuleika Glover, RN as Specialty Care Coordinator (Cardiology)  Christal Barker, VERONIQUE as Specialty Care Coordinator (Cardiology)  Stacy Stokes, RN as Specialty Care Coordinator (Cardiology)  Jonny Harper, RN as Specialty Care Coordinator  Angeline Marcus MD as MD (Cardiovascular Disease)  Lydia Warren, RN as Registered Nurse (Cardiology)  Jennifer Marr, WAYLON as Assigned PCP  Terence Wylie MD as Assigned Heart and Vascular Surgical Provider  Erika Bryson PA-C as Physician Assistant (Gastroenterology)  Heidi Marrero, RN as Specialty Care Coordinator (Nephrology)  Ame Yoon [facility] as Assigned MTM Pharmacist  Carolyn Bonilla, APRN CNP as Assigned Heart and Vascular Provider  Erika Bryson PA-C as Assigned Gastroenterology Provider  LINDSAY BROCK

## 2025-07-25 ENCOUNTER — ANCILLARY PROCEDURE (OUTPATIENT)
Dept: CARDIOLOGY | Facility: CLINIC | Age: 75
End: 2025-07-25
Attending: NURSE PRACTITIONER
Payer: MEDICARE

## 2025-07-25 VITALS
OXYGEN SATURATION: 94 % | SYSTOLIC BLOOD PRESSURE: 173 MMHG | BODY MASS INDEX: 24.78 KG/M2 | DIASTOLIC BLOOD PRESSURE: 72 MMHG | HEART RATE: 75 BPM | WEIGHT: 139.9 LBS

## 2025-07-25 DIAGNOSIS — Z95.2 S/P TAVR (TRANSCATHETER AORTIC VALVE REPLACEMENT): ICD-10-CM

## 2025-07-25 DIAGNOSIS — E78.5 HYPERLIPIDEMIA LDL GOAL <100: ICD-10-CM

## 2025-07-25 DIAGNOSIS — E11.29 TYPE 2 DIABETES MELLITUS WITH MICROALBUMINURIA, WITHOUT LONG-TERM CURRENT USE OF INSULIN (H): ICD-10-CM

## 2025-07-25 DIAGNOSIS — R80.9 TYPE 2 DIABETES MELLITUS WITH MICROALBUMINURIA, WITHOUT LONG-TERM CURRENT USE OF INSULIN (H): ICD-10-CM

## 2025-07-25 DIAGNOSIS — Z95.818 PRESENCE OF WATCHMAN LEFT ATRIAL APPENDAGE CLOSURE DEVICE: Primary | ICD-10-CM

## 2025-07-25 DIAGNOSIS — I10 HYPERTENSION GOAL BP (BLOOD PRESSURE) < 130/80: ICD-10-CM

## 2025-07-25 LAB
ATRIAL RATE - MUSE: 72 BPM
DIASTOLIC BLOOD PRESSURE - MUSE: NORMAL MMHG
INTERPRETATION ECG - MUSE: NORMAL
LVEF ECHO: NORMAL
P AXIS - MUSE: 77 DEGREES
PR INTERVAL - MUSE: 168 MS
QRS DURATION - MUSE: 68 MS
QT - MUSE: 400 MS
QTC - MUSE: 438 MS
R AXIS - MUSE: 103 DEGREES
SYSTOLIC BLOOD PRESSURE - MUSE: NORMAL MMHG
T AXIS - MUSE: 6 DEGREES
VENTRICULAR RATE- MUSE: 72 BPM

## 2025-07-25 PROCEDURE — 99214 OFFICE O/P EST MOD 30 MIN: CPT | Mod: 25 | Performed by: NURSE PRACTITIONER

## 2025-07-25 PROCEDURE — 3077F SYST BP >= 140 MM HG: CPT | Performed by: NURSE PRACTITIONER

## 2025-07-25 PROCEDURE — 93306 TTE W/DOPPLER COMPLETE: CPT | Performed by: INTERNAL MEDICINE

## 2025-07-25 PROCEDURE — 93005 ELECTROCARDIOGRAM TRACING: CPT

## 2025-07-25 PROCEDURE — G0463 HOSPITAL OUTPT CLINIC VISIT: HCPCS | Performed by: NURSE PRACTITIONER

## 2025-07-25 PROCEDURE — 3078F DIAST BP <80 MM HG: CPT | Performed by: NURSE PRACTITIONER

## 2025-07-25 PROCEDURE — 82784 ASSAY IGA/IGD/IGG/IGM EACH: CPT | Performed by: PHYSICIAN ASSISTANT

## 2025-07-25 PROCEDURE — 1126F AMNT PAIN NOTED NONE PRSNT: CPT | Performed by: NURSE PRACTITIONER

## 2025-07-25 RX ORDER — ASPIRIN 81 MG/1
81 TABLET ORAL DAILY
COMMUNITY

## 2025-07-25 ASSESSMENT — PAIN SCALES - GENERAL: PAINLEVEL_OUTOF10: NO PAIN (0)

## 2025-07-25 NOTE — PATIENT INSTRUCTIONS
You were seen today in the Cardiovascular Clinic at the [facility] by:       WENDI ALLRED CNP     Your visit summary and instructions are as follows:     Continue current medications  Continue checking blood pressure at home  Follow-up with your regular cardiologist as scheduled  Continue aspirin lifelong  Take antibiotics prior to any dental procedure  You are able to graduate from the TAVR clinic and can be seen if pain changes or concerns.        Thank you for your visit today!      Please MyChart message me or call my nurse if you have any questions or concerns.     During Business Hours:   [phone], Structural Heart  [facility]      After hours, weekends or holidays:   [phone], Option #4  Ask to speak to the On-Call Cardiologist. Inform them you are a cardiology patient at the [facility].

## 2025-07-25 NOTE — LETTER
7/25/2025      RE: Keerthi Montoya  [address] [place] [id number]       Dear Colleague,    Thank you for the opportunity to participate in the care of your patient, Keerthi Montoya, at the [facility] [place] [facility]. Please see a copy of my visit note below.        [facility] HEART CARE  CARDIOVASCULAR DIVISION    VALVE CLINIC RETURN VISIT    PRIMARY CARDIOLOGIST: Dr. Sanderson       PERTINENT CLINICAL HISTORY:     Keerthi Montoya is a very pleasant 75 year old female who presents for 1 year TAVR follow-up. She has a history of  HFpEF, HTN, T2DM, HLD, CKD3, pulmonary hypertension (WHO II), RV failure, persistent atrial fibrillation with intolerance to anticoagulation due to GI bleeding s/p RY closure with Watchman (5/2023), LAURA.DCCV 4/2025 and severe paradoxical low flow low gradient severe aortic stenosis s/p TAVR 9/4 with a 23 mm Ramsay Trini 3 Ultra Resilia.  The TAVR and post-procedural course were notable for no complications. Her POD#1 ECHO showed mean PG 5 mmHg, no mention of severe PVL. She was noted to be SB at baseline, with intermittent junctioncal intra procedure. She was discharged on ASA therapy. Atenolol held on discharge due to low HR.    She presents today with no specific cardiovascular concerns. She states she has been feeling well since her valve replacement. She is attending cardiac rehab. She denies any chest pain, shortness of breath , orthopnea or PND.  She has not had any pre or aaron syncope. She has not had any palpitations or LE edema. Home BP has been 120-130, Bp at cardiac rehab was 120s yesterday. Groin sites are healing well. Overall she feels great.     Interval history 7/25/2025:  Patient presents to the clinic today for a 1 year follow-up post TAVR.  Patient does not have any cardiac concerns and is doing well at this time.  Patient has been exercising 3 times a week by walking on the  treadmill and states she is going to increase that to 5 as she has been feeling well.  Previous blood pressures in clinic have been hypertensive in the past but patient regularly checks at home which she has been systolically between 120s 130s.  Discussed graduating from TAVR clinic which patient is in agreement with this plan.  Patient understands lifelong aspirin use along with antibiotics prior to any dental procedures.           PAST MEDICAL HISTORY:     Past Medical History:   Diagnosis Date     Chronic kidney disease      Congestive heart failure (H)      Diabetes mellitus, type 2 (H)      Diverticulosis of colon (without mention of hemorrhage) 10/01/2012     GI bleed      History of blood transfusion      HLD (hyperlipidemia)      Hypertension      Hypothyroidism      MARIA D (iron deficiency anemia)      Persistent atrial fibrillation (H)      Pulmonary hypertension (H)      Ulcerative (chronic) enterocolitis (H)         PAST SURGICAL HISTORY:     Past Surgical History:   Procedure Laterality Date     ABDOMEN SURGERY       ANESTHESIA CARDIOVERSION N/A 4/18/2025    Procedure: Anesthesia cardioversion;  Surgeon: GENERIC ANESTHESIA PROVIDER;  Location: [facility] OR     CARDIAC SURGERY       CHOLECYSTECTOMY  03/01/1987     COLON SURGERY  01/01/2001    Left colon resection; diverticulitis     COLONOSCOPY N/A 04/24/2017    Procedure: COMBINED COLONOSCOPY, SINGLE OR MULTIPLE BIOPSY/POLYPECTOMY BY BIOPSY;;  Surgeon: Radha Salguero MD;  Location: [facility] OR     COLONOSCOPY N/A 03/10/2023    Procedure: COLONOSCOPY;  Surgeon: Preeti James MD;  Location: [facility] GI     COLONOSCOPY WITH CO2 INSUFFLATION N/A 04/24/2017    Procedure: COLONOSCOPY WITH CO2 INSUFFLATION;  Colonoscopy Dx rectal bleeding, BMI 25.86, Pharm [facility] .[phone], ;  Surgeon: Radha Salguero MD;  Location: [facility] OR     CV CORONARY ANGIOGRAM N/A 07/15/2024    Procedure: Coronary Angiogram with possible intervention;  Surgeon: Bobby Grimes MD;   Location:  HEART CARDIAC CATH LAB     CV LEFT ATRIAL APPENDAGE CLOSURE N/A 05/11/2023    Procedure: Left Atrial Appendage Closure;  Surgeon: Bobby Grimes MD;  Location:  HEART CARDIAC CATH LAB     CV RIGHT HEART CATH MEASUREMENTS RECORDED N/A 03/16/2020    Procedure: CV RIGHT HEART CATH;  Surgeon: Nader Muñoz MD;  Location:  HEART CARDIAC CATH LAB     CV RIGHT HEART CATH MEASUREMENTS RECORDED N/A 07/15/2024    Procedure: Right Heart Cath;  Surgeon: Bobby Grimes MD;  Location:  HEART CARDIAC CATH LAB     CV RIGHT HEART CATH MEASUREMENTS RECORDED N/A 3/14/2025    Procedure: Heart Cath Right Heart Cath;  Surgeon: Angeline Marcus MD;  Location:  HEART CARDIAC CATH LAB     CV RIGHT HEART EXERCISE STRESS STUDY N/A 3/14/2025    Procedure: Right Heart Exercise Stress Study;  Surgeon: Angeline Marcus MD;  Location:  HEART CARDIAC CATH LAB     CV TRANSCATHETER AORTIC VALVE REPLACEMENT-FEMORAL APPROACH N/A 09/04/2024    Procedure: Transcatheter Aortic Valve Replacement-Femoral Approach;  Surgeon: Bobby Grimes MD;  Location:  OR     ESOPHAGOSCOPY, GASTROSCOPY, DUODENOSCOPY (EGD), COMBINED N/A 01/23/2023    Procedure: ESOPHAGOGASTRODUODENOSCOPY, WITH BIOPSY;  Surgeon: Ricki Deal MD;  Location:  GI     ESOPHAGOSCOPY, GASTROSCOPY, DUODENOSCOPY (EGD), COMBINED N/A 03/10/2023    Procedure: Esophagoscopy, gastroscopy, duodenoscopy (EGD), combined;  Surgeon: Preeti James MD;  Location:  GI     GYN SURGERY  09/01/1983    tubal ligation     HERNIA REPAIR  12/01/2006    recurrent incisional hernia     SIGMOIDOSCOPY FLEXIBLE N/A 01/23/2023    Procedure: Sigmoidoscopy flexible;  Surgeon: Ricki Deal MD;  Location:  GI     THROAT SURGERY  12/01/1974    thyroidectomy        CURRENT MEDICATIONS:     Current Outpatient Medications   Medication Sig Dispense Refill     atorvastatin (LIPITOR) 40 MG tablet Take 1 tablet (40 mg) by mouth daily 90 tablet 3      azithromycin (ZITHROMAX) 250 MG tablet Take 2 tablets (500 mg) by mouth once as needed (Take 30-60 minutes prior to dental procedure or cleaning). 2 tablet 2     blood glucose (ACCU-CHEK FELIPE PLUS) test strip 200 strips by In Vitro route 2 times daily Use to test blood sugar 2 times daily or as directed. 200 strip 4     Cyanocobalamin (B-12) 1000 MCG TABS Take 1,000 mcg by mouth three times a week       diphenhydrAMINE (BENADRYL) 25 MG tablet Take 25 mg by mouth every 6 hours as needed for itching or allergies       dulaglutide (TRULICITY) 1.5 MG/0.5ML pen Inject 1.5 mg Subcutaneous every 7 days After 4 doses of 0.75 mg (Patient not taking: Reported on 7/11/2025) 2 mL 1     empagliflozin (JARDIANCE) 10 MG TABS tablet Take 1 tablet (10 mg) by mouth daily 90 tablet 3     glipiZIDE (GLUCOTROL XL) 10 MG 24 hr tablet Take 2 tablets (20 mg) by mouth daily. 180 tablet 3     hydrALAZINE (APRESOLINE) 50 MG tablet Take 1 tablet (50 mg) by mouth 3 times daily. 180 tablet 2     isosorbide mononitrate (IMDUR) 30 MG 24 hr tablet Take 1 tablet (30 mg) by mouth daily. 90 tablet 3     levothyroxine (SYNTHROID/LEVOTHROID) 112 MCG tablet Take 1 tablet (112 mcg) by mouth every morning (before breakfast). 90 tablet 2     levothyroxine (SYNTHROID/LEVOTHROID) 125 MCG tablet Take 1 tablet (125 mcg) by mouth daily. 90 tablet 1     losartan (COZAAR) 100 MG tablet Take 1 tablet (100 mg) by mouth daily. 90 tablet 3     mesalamine (LIALDA) 1.2 g DR tablet Take 2 tablets (2,400 mg) by mouth daily (with breakfast). 180 tablet 1     pantoprazole (PROTONIX) 40 MG EC tablet Take 1 tablet (40 mg) by mouth daily. 90 tablet 3     senna (SENOKOT) 8.6 MG tablet Take 1 tablet by mouth daily as needed for constipation 30 tablet 3        ALLERGIES:     Allergies   Allergen Reactions     Actos [Pioglitazone]      Fluid retention     Amlodipine      Leg swelling, hand swelling     Doxycycline Hives and Rash     Edema in hands/feet       Keflex [Cephalexin  Hcl] Hives     Swelling hands/feet       Metoprolol Other (See Comments)     Swelling of lower legs and fatigue     Synthroid [Levothyroxine Sodium] Swelling     Allergic to brand name, hives all over body, edema all over body       Tylenol Hives     Hives/rash all over body     Ziac [Bisoprolol-Hydrochlorothiazide] Other (See Comments), Hives and Itching     Hands/feet swell up, hives all over body       Animal Dander Other (See Comments)     Sneezing, itchy nose     Grass Other (See Comments)     Itchy nose/eyes, sneezing, coughing       Tetracycline Hives and Itching     Swelling hands/feet       Dust Mites Other (See Comments)     Itchy eyes, sneezing     Other [Seasonal Allergies] Other (See Comments)     Pollen coughing/sneezing        FAMILY HISTORY:     Family History   Problem Relation Age of Onset     Cerebrovascular Disease Mother      Cancer Father         bladder     Diverticulitis Brother      Diabetes Brother      Diverticulitis Brother      Hypertension Brother      Kidney Disease No family hx of      Crohn's Disease No family hx of      Ulcerative Colitis No family hx of      Stomach Cancer No family hx of      GERD No family hx of      Celiac Disease No family hx of      Anesthesia Reaction No family hx of         SOCIAL HISTORY:     Social History     Socioeconomic History     Marital status:      Spouse name: Raymundo; dementia;  2016     Number of children: 3     Years of education: None     Highest education level: None   Occupational History     Employer: [employer]   Tobacco Use     Smoking status: Never     Passive exposure: Never     Smokeless tobacco: Never   Vaping Use     Vaping status: Never Used   Substance and Sexual Activity     Alcohol use: Not Currently     Alcohol/week: 2.0 - 4.0 standard drinks of alcohol     Types: 1 - 2 Cans of beer, 1 - 2 Shots of liquor per week     Comment: occasional cocktail or beer     Drug use: No     Sexual activity: Not Currently    Other Topics Concern      Service No     Blood Transfusions No     Caffeine Concern No     Occupational Exposure No     Hobby Hazards No     Sleep Concern No     Stress Concern No     Weight Concern No     Special Diet No     Back Care No     Exercise Yes     Comment: off and on     Bike Helmet No     Seat Belt Yes     Self-Exams No   Social History Narrative    Laid off her job 8/14     Social Determinants of Health     Financial Resource Strain: Low Risk  (9/4/2024)    Financial Resource Strain      Within the past 12 months, have you or your family members you live with been unable to get utilities (heat, electricity) when it was really needed?: No   Food Insecurity: Low Risk  (9/4/2024)    Food Insecurity      Within the past 12 months, did you worry that your food would run out before you got money to buy more?: No      Within the past 12 months, did the food you bought just not last and you didn t have money to get more?: No   Transportation Needs: High Risk (9/4/2024)    Transportation Needs      Within the past 12 months, has lack of transportation kept you from medical appointments, getting your medicines, non-medical meetings or appointments, work, or from getting things that you need?: Yes   Interpersonal Safety: Low Risk  (9/4/2024)    Interpersonal Safety      Do you feel physically and emotionally safe where you currently live?: Yes      Within the past 12 months, have you been hit, slapped, kicked or otherwise physically hurt by someone?: No      Within the past 12 months, have you been humiliated or emotionally abused in other ways by your partner or ex-partner?: No   Housing Stability: Low Risk  (9/4/2024)    Housing Stability      Do you have housing? : Yes      Are you worried about losing your housing?: No        REVIEW OF SYSTEMS:     Constitutional: No fevers or chills  Skin: No new rash or itching  Eyes: No acute change in vision  Ears/Nose/Throat: No purulent rhinorrhea, new hearing  loss, or new vertigo  Respiratory: No cough or hemoptysis  Cardiovascular: See HPI  Gastrointestinal: No change in appetite, vomiting, hematemesis or diarrhea  Genitourinary: No dysuria or hematuria  Musculoskeletal: No new back pain, neck pain or muscle pain  Neurologic: No new headaches, focal weakness or behavior changes  Psychiatric: No hallucinations, excessive alcohol consumption or illegal drug usage  Hematologic/Lymphatic/Immunologic: No bleeding, chills, fever, night sweats or weight loss  Endocrine: No new cold intolerance, heat intolerance, polyphagia, polydipsia or polyuria      PHYSICAL EXAMINATION:     BP (!) 173/72 (BP Location: Right arm, Patient Position: Chair, Cuff Size: Adult Regular)   Pulse 75   Wt 63.5 kg (139 lb 14.4 oz)   LMP  (LMP Unknown)   SpO2 94%   BMI 24.78 kg/m      GENERAL: No acute distress.  HEENT: EOMI. Sclerae white, not injected. Nares clear. Pharynx without erythema or exudate.   Neck: No adenopathy. No thyromegaly. No jugular venous distension.   Heart: Regular rate and rhythm. 2/6 JUANITA   Lungs: Clear to auscultation. No ronchi, wheezes, rales.   Abdomen: Soft, nontender, nondistended. Bowel sounds present.  Extremities: No clubbing, cyanosis, or edema.   Neurologic: Alert and oriented to person/place/time, normal speech and affect. No focal deficits.  Skin: No petechiae, purpura or rash.     LABORATORY DATA:     LIPID RESULTS:  Lab Results   Component Value Date    CHOL 96 07/08/2024    CHOL 93 10/28/2020    HDL 34 (L) 07/08/2024    HDL 38 (L) 10/28/2020    LDL 41 07/08/2024    LDL 33 10/28/2020    TRIG 107 07/08/2024    TRIG 108 10/28/2020    CHOLHDLRATIO 4.3 05/18/2015       LIVER ENZYME RESULTS:  Lab Results   Component Value Date    AST 23 07/07/2025    AST 19 10/28/2020    ALT 7 07/07/2025    ALT 18 10/28/2020       CBC RESULTS:  Lab Results   Component Value Date    WBC 8.2 07/07/2025    WBC 7.3 10/28/2020    RBC 4.25 07/07/2025    RBC 3.99 10/28/2020    HGB 13.6  07/07/2025    HGB 11.8 02/08/2021    HCT 41.5 07/07/2025    HCT 38.2 10/28/2020    MCV 98 07/07/2025    MCV 96 10/28/2020    MCH 32.0 07/07/2025    MCH 30.8 10/28/2020    MCHC 32.8 07/07/2025    MCHC 32.2 10/28/2020    RDW 12.0 07/07/2025    RDW 13.9 10/28/2020     07/07/2025     10/28/2020       BMP RESULTS:  Lab Results   Component Value Date     07/07/2025     04/28/2021    POTASSIUM 4.6 07/07/2025    POTASSIUM 4.3 09/17/2023    POTASSIUM 4.5 04/28/2021    CHLORIDE 105 07/07/2025    CHLORIDE 109 04/27/2023    CHLORIDE 100 04/28/2021    CO2 19 (L) 07/07/2025    CO2 21 04/27/2023    CO2 26 04/28/2021    ANIONGAP 13 07/07/2025    ANIONGAP 7 04/27/2023    ANIONGAP 8 04/28/2021     (H) 07/07/2025     (H) 04/18/2025     (H) 04/27/2023    GLC 57 (L) 04/28/2021    BUN 26.6 (H) 07/07/2025    BUN 28 04/27/2023    BUN 23 04/28/2021    CR 1.43 (H) 07/07/2025    CR 1.21 (H) 04/28/2021    GFRESTIMATED 39 (L) 07/07/2025    GFRESTIMATED 30 (L) 12/30/2022    GFRESTIMATED 46 (L) 04/28/2021    GFRESTBLACK 53 (L) 04/28/2021    ABEL 10.0 07/07/2025    ABEL 9.8 04/28/2021        A1C RESULTS:  Lab Results   Component Value Date    A1C 6.7 (H) 11/04/2024    A1C 6.6 (H) 10/28/2020       INR RESULTS:  Lab Results   Component Value Date    INR 1.00 07/07/2025    INR 1.00 03/14/2025          PROCEDURES & FURTHER ASSESSMENTS:   EKG 07/25/2025    ECHO 07/25/2025  Interpretation Summary  Global and regional left ventricular function is normal with an EF of 55-60%.  Flattened septum is consistent with right ventricular pressure overload.  Moderate right ventricular dilation is present.  Global right ventricular function is moderately reduced.  S/p TAVR with 23 mm Ramsay Trini 3 Ultra Resilia prosthesis on 9/4/24  The prosthetic aortic valve is well-seated.  The mean gradient across the aortic valve is 18 mmHg.  Pulmonary hypertension is present.  The right ventricular systolic pressure is 94mmHg  above the right atrial  pressure.  Dilation of the inferior vena cava is present with normal respiratory  variation in diameter.  No pericardial effusion is present.     This study was compared with the study from 3/4/2025 .  Pulmonary HTN and MG across TAVR valve has worsened. MG was 5 mmHg in the  previous study.  The patient's rhythm is normal sinus.  ______________________________________________________________________________  Left Ventricle  Left ventricular size is normal. Left ventricular wall thickness is normal.  Global and regional left ventricular function is normal with an EF of 55-60%.  Left ventricular diastolic function is not assessable. Flattened septum is  consistent with right ventricular pressure overload.     Right Ventricle  Moderate right ventricular dilation is present. Global right ventricular  function is moderately reduced.     Atria  Mild to moderate left atrial enlargement is present.     Mitral Valve  Mild mitral annular calcification is present. Trace mitral insufficiency is  present.     Aortic Valve  The mean AoV pressure gradient is 18.0 mmHg. The calculated aortic valve are  is 1.1 cm^2. The mean gradient across the aortic valve is 18 mmHg. S/p TAVR  with 23 mm Ramsay Trini 3 Ultra Resilia prosthesis on 9/4/24. There is trace  paravalvular regurgitation. The prosthetic aortic valve is well-seated.     Tricuspid Valve  The tricuspid valve is normal. Mild to moderate tricuspid insufficiency is  present. Pulmonary hypertension is present. The right ventricular systolic  pressure is 94mmHg above the right atrial pressure.     Pulmonic Valve  Trace pulmonic insufficiency is present.     Vessels  Dilation of the inferior vena cava is present with normal respiratory  variation in diameter.     Pericardium  No pericardial effusion is present.     Compared to Previous Study  This study was compared with the study from 3/4/2025 . Pulmonary HTN and MG  across TAVR valve has  worsened.  ______________________________________________________________________________  MMode/2D Measurements & Calculations     IVSd: 0.68 cm  LVIDd: 4.6 cm  LVIDs: 2.8 cm  LVPWd: 0.80 cm  FS: 39.9 %  LV mass(C)d: 105.7 grams  LV mass(C)dI: 64.2 grams/m2  asc Aorta Diam: 3.1 cm  LVOT diam: 2.0 cm  LVOT area: 3.0 cm2  Asc Ao diam index BSA (cm/m2): 1.9  Asc Ao diam index Ht(cm/m): 1.9  LA Volume (BP): 50.4 ml  LA Volume Index (BP): 30.5 ml/m2     RWT: 0.35  TAPSE: 1.9 cm     Doppler Measurements & Calculations  MV E max michael: 160.0 cm/sec  MV A max michael: 37.2 cm/sec  MV E/A: 4.3  MV max P.5 mmHg  MV mean PG: 3.1 mmHg  MV V2 VTI: 37.6 cm  MVA(VTI): 1.7 cm2  MV dec time: 0.16 sec  Ao V2 max: 287.0 cm/sec  Ao max P.9 mmHg  Ao V2 mean: 198.0 cm/sec  Ao mean P.0 mmHg  Ao V2 VTI: 58.2 cm  IVON(I,D): 1.1 cm2  IVON(V,D): 1.0 cm2  Ao acc time: 0.11 sec  LV V1 max PG: 3.8 mmHg  LV V1 max: 97.4 cm/sec  LV V1 VTI: 21.1 cm  SV(LVOT): 63.3 ml  SI(LVOT): 38.5 ml/m2  PA V2 max: 79.0 cm/sec  PA max P.5 mmHg  PA acc time: 0.08 sec  TR max michael: 484.3 cm/sec  TR max P.8 mmHg  AV Michael Ratio (DI): 0.34  IVON Index (cm2/m2): 0.66  RV S Michael: 10.4 cm/sec         EKG 24  NSR with PACs        ECHO 10/11/24    Interpretation Summary  Global and regional left ventricular function is normal with an EF of 60-65%.  Global right ventricular function is moderately reduced.  S/p TAVR with 23 mm Ramsay Trini 3 Ultra Resilia prosthesis on 24.  Doppler interrogation of the aortic valve is normal. (MG 7 mm Hg, PV 1.9 m/s,  DVI 0.66).  There is trace paravalvular regurgitation.  Notch noted in pulmonic flow suggestive of Pulm HTN.     This study was compared with the study from 24 .  No significant changes noted.     ______________________________________________________________________________  Left Ventricle  Left ventricular size is normal. Global and regional left ventricular function  is normal with an EF of 60-65%.  Relative wall thickness is increased  consistent with concentric remodeling. Left ventricular diastolic function is  not assessable.     Right Ventricle  Mild right ventricular dilation is present. Global right ventricular function  is moderately reduced.     Atria  The right atria appears normal. Mild left atrial enlargement is present.     Mitral Valve  Mild to moderate mitral annular calcification is present. Trace mitral  insufficiency is present.     Aortic Valve  S/p TAVR with 23 mm Ramsay Trini 3 Ultra Resilia prosthesis. The peak  velocity across the aortic valve is 1.9 m/sec. The mean gradient is 7 mmHg.  The prosthetic aortic valve is well-seated. Doppler interrogation of the  aortic valve is normal. There is trace paravalvular regurgitation.     Tricuspid Valve  The tricuspid valve is normal. Trace tricuspid insufficiency is present.  Pulmonary artery systolic pressure cannot be assessed.     Pulmonic Valve  The valve leaflets are not well visualized. Mild pulmonic insufficiency is  present.     Vessels  The thoracic aorta is normal. IVC diameter <2.1 cm collapsing >50% with sniff  suggests a normal RA pressure of 3 mmHg.     Pericardium  No pericardial effusion is present.     Compared to Previous Study  This study was compared with the study from 9/5/24 . No significant changes  noted.     Attestation  I have personally viewed the imaging and agree with the interpretation and  report as documented by the fellow, Raquel Ross, and/or edited by me.  ______________________________________________________________________________  MMode/2D Measurements & Calculations  IVSd: 0.94 cm  LVIDd: 3.9 cm  LVIDs: 2.4 cm  LVPWd: 0.94 cm  FS: 38.2 %  LV mass(C)d: 110.0 grams  LV mass(C)dI: 67.9 grams/m2  asc Aorta Diam: 2.9 cm  LVOT diam: 2.1 cm  LVOT area: 3.3 cm2  Asc Ao diam index BSA (cm/m2): 1.8  Asc Ao diam index Ht(cm/m): 1.8  LA Volume (BP): 69.2 ml     LA Volume Index (BP): 42.7 ml/m2  RV Base: 4.0 cm  RWT:  0.49  TAPSE: 1.7 cm     Doppler Measurements & Calculations  MV E max michael: 101.0 cm/sec  MV A max michael: 51.4 cm/sec  MV E/A: 2.0  MV max P.6 mmHg  MV mean P.9 mmHg  MV V2 VTI: 30.3 cm  MVA(VTI): 2.7 cm2  MV dec slope: 539.7 cm/sec2  MV dec time: 0.19 sec  Ao V2 max: 187.0 cm/sec  Ao max P.0 mmHg  Ao V2 mean: 124.0 cm/sec  Ao mean P.0 mmHg  Ao V2 VTI: 36.5 cm  IVON(I,D): 2.2 cm2  IVON(V,D): 2.2 cm2  LV V1 max P.3 mmHg  LV V1 max: 122.6 cm/sec  LV V1 VTI: 24.4 cm  SV(LVOT): 81.8 ml  SI(LVOT): 50.5 ml/m2  PA V2 max: 95.1 cm/sec  PA max PG: 3.6 mmHg  PA acc time: 0.10 sec  AV Michael Ratio (DI): 0.66     IVON Index (cm2/m2): 1.4  RV S Michael: 11.7 cm/sec    Echocardiogram 24:  Interpretation Summary  s/p TAVR with 23 mm Ramsay Trini 3 Ultra Resilia prosthesis on 24. Valve  is well seated with normal doppler assessment (PV 1.6m/s, MG 5mmHg).     Left ventricular function is normal.The ejection fraction is 55-60%.  Moderate to severe right ventricular dilation. Global right ventricular  function is moderately reduced.  Moderate tricuspid insufficiency.  Severe pulmonary hypertension.  Estimated pulmonary artery systolic pressure is 85 mmHg.  Dilation of the inferior vena cava is present with abnormal respiratory  variation in diameter.  No pericardial effusion.  ______________________________________________________________________________  Left Ventricle  Left ventricular function is normal.The ejection fraction is 55-60%.     Right Ventricle  Moderate to severe right ventricular dilation is present. Global right  ventricular function is moderately reduced.     Mitral Valve  Moderate mitral annular calcification is present. Trace mitral insufficiency  is present.     Aortic Valve  Transcutaneous aortic valve replacement is present. The prosthetic aortic  valve is well-seated. There is no paravalvular regurgitation. There is no  valvular regurgitation. Doppler interrogation of the aortic valve is  normal.  The peak velocity across the aortic valve is 1.6 m/sec. The mean gradient is 5  mmHg. The V2/V1 ratio is 0.71.     Tricuspid Valve  Moderate tricuspid insufficiency is present. Estimated pulmonary artery  systolic pressure is 70 mmHg plus right atrial pressure. Severe pulmonary  hypertension is present.     Vessels  Dilation of the inferior vena cava is present with abnormal respiratory  variation in diameter.     Pericardium  No pericardial effusion is present.     Compared to Previous Study  This study was compared with the study from 24 .     ______________________________________________________________________________  MMode/2D Measurements & Calculations  LVOT diam: 2.1 cm  LVOT area: 3.3 cm2     Doppler Measurements & Calculations  Ao V2 max: 163.0 cm/sec  Ao max PG: 10.6 mmHg  Ao V2 mean: 102.0 cm/sec  Ao mean P.0 mmHg  Ao V2 VTI: 36.4 cm  IVON(I,D): 2.3 cm2  IVON(V,D): 2.4 cm2  Ao acc time: 0.08 sec     LV V1 max P.4 mmHg  LV V1 max: 116.0 cm/sec  LV V1 VTI: 25.1 cm  SV(LVOT): 83.6 ml  SI(LVOT): 50.9 ml/m2  TR max michael: 417.8 cm/sec  TR max P.8 mmHg  AV Michael Ratio (DI): 0.71  IVON Index (cm2/m2): 1.4     TAVR 24:  1. Lifestyle-limiting severe aortic stenosis.   2. Successful transfemoral transcatheter aortic valve replacement with a 23 mm Ramsay Trini 3 Ultra Resilia valve.  3. Temporary pacing   4. Left heart catheterization with LVEDP of 5 mmHg prior to valve deployment.  5. Left common femoral arteriotomy successfully closed with Perclose closure devices. Manual pressure was used to obtain hemostasis of the right sided arteriotomy and venotomy.      Other imaging studies:       NYHA Class: I    Amyloid screening: no     CLINICAL IMPRESSION:   Keerthi Montoya is a very pleasant 75 year old female who presents for 1 year TAVR follow-up. She has a history of  HFpEF, HTN, T2DM, HLD, CKD3, pulmonary hypertension (WHO II), RV failure, persistent atrial fibrillation with intolerance  to anticoagulation due to GI bleeding s/p RY closure and Watchman (5/2023) and severe paradoxical low flow low gradient severe aortic stenosis s/p TAVR 9/2024 with a 23 mm Ramsay Trini 3 Ultra Resilia.  The TAVR and post-procedural course were notable for no complications. Her POD#1 ECHO showed mean PG 5 mmHg, no mention of severe PVL. She was noted to be SB at baseline, with intermittent junctioncal intra procedure. She was discharged on ASA therapy.       # Severe paradoxical low flow low gradient aortic stenosis s/p TAVR   # HFpEF  # Severe PH  # Sinus jv, resolved  Doing well clinically. Currently no significant heart failure symptoms, appears euvolemic on exam.  ECHO today shows well seated TAVR with increased from 5 mmHg to 18 mmHg. PH also appears to have worsened with RVSP 94 above RA. Plan to further evaluate increased gradient with CT. Will message PH team about worsening PH.   - CT heart pending   - Continue ASA 81 mg daily and SBE prophylaxis lifelong  - Continue losartan 100 mg daily  - Continue Jardiance 10 mg daily  - Continue hydralazine and imdur  - Dr. Sanderson scheduled 11/2025     # Paroxysmal a-fib w/contraindication to anticoagulation due to GI bleeding  # Healed gastric ulcers/Ulcerative colitis (inactive)  Now s/p successful implantation of 24mm Watchman FLX. Post-procedure LAURA showed no evidence of delroy-device leak and no pericardial effusion. Patient seen in PACU. VSS, neuros intact, groin sites soft without hematoma. Post procedure TTE with small anterior effusion. Now completed 6 months of DAPT and on ASA monotherapy.  - ASA 81 mg daily    # HTN  # HLD   - Continue losartan, hydral, Imdur and atorvastatin     # CKD  - Stable creatinine 1.4 and GFR of 39. Continue to monitor     # Diabetes type II   - Hgb A1C 7.2.   - Continue current antidiabetics  - Further diabetes management per PCP.     RTC: Can graduate from structural heart cardiology pending CT heart, continue to follow with  PH team and CORE    WENDI Marr, CNP  [facility] Cardiology Team      CC  Patient Care Team:  Bunny Meyers MD as PCP - General (Internal Medicine)  Preeti James MD as MD (Gastroenterology)  Swati Minor DO as Assigned Nephrology Provider  Jerry Robbins PA-C as Physician Assistant (Gastroenterology)  Allison Hdz, RN as Specialty Care Coordinator (Gastroenterology)  Gianfranco Melendez MD as Intern (Student in organized health care education/training program)  Jethro Moore MD as MD (Hematology & Oncology)  Fracisco Hurtado, RN as Specialty Care Coordinator (Hematology & Oncology)  Vanessa Schneider, RN as Diabetes Educator (Diabetes Education)  Saba Lemon MD as MD (Advanced Heart Failure and Transplant Cardiology)  Sheela Galeas, VERONIQUE as Specialty Care Coordinator (Cardiology)  Francisca Mustafa, APRN CNP as Assigned Cancer Care Provider  Geena Ornelas MD as MD (Dermatology)  Lorena Ambriz, RN as Specialty Care Coordinator (Cardiology)  Davie Rapp MD as MD (Gastroenterology)  Zuleika Glover, RN as Specialty Care Coordinator (Cardiology)  Christal Barker, RN as Specialty Care Coordinator (Cardiology)  Stacy Stokes, RN as Specialty Care Coordinator (Cardiology)  Jonny Harper, RN as Specialty Care Coordinator  Angeline Marcus MD as MD (Cardiovascular Disease)  Lydia Warren, RN as Registered Nurse (Cardiology)  Jennifer Marr, WAYLON as Assigned PCP  Terence Wylie MD as Assigned Heart and Vascular Surgical Provider  Erika Bryson PA-C as Physician Assistant (Gastroenterology)  Heidi Marrero, RN as Specialty Care Coordinator (Nephrology)  Ame Yoon, [facility] as Assigned MTM Pharmacist  Carolyn Bonilla, WENDI CNP as Assigned Heart and Vascular Provider  Erika Bryson PA-C as Assigned Gastroenterology Provider  LINDSAY BROCK        Please do not hesitate to contact me if you have any questions/concerns.      Sincerely,     Dominique Saavedra NP

## 2025-07-25 NOTE — NURSING NOTE
[place] Cardiomyopathy Questionnaire  (CQ-12)  Date: 7/25/25    1 year post-TAVR      1.  A.  5      B.  5       C.  5  2.  5  3.  7  4.  7  5.  5  6.  5  7.  5  8.  A. 5     B.  5     C.  5

## 2025-07-28 ENCOUNTER — RESULTS FOLLOW-UP (OUTPATIENT)
Dept: GASTROENTEROLOGY | Facility: CLINIC | Age: 75
End: 2025-07-28
Payer: MEDICARE

## 2025-07-28 ENCOUNTER — CARE COORDINATION (OUTPATIENT)
Dept: CARDIOLOGY | Facility: CLINIC | Age: 75
End: 2025-07-28
Payer: MEDICARE

## 2025-07-28 DIAGNOSIS — R94.39 ABNORMAL RESULT OF OTHER CARDIOVASCULAR FUNCTION STUDY: ICD-10-CM

## 2025-07-28 DIAGNOSIS — Z95.2 S/P TAVR (TRANSCATHETER AORTIC VALVE REPLACEMENT): Primary | ICD-10-CM

## 2025-07-28 NOTE — PROGRESS NOTES
Telephone call to [place] to review echo results, mean gradient is increasing. Dominique Saavedra recommends CT heart to r/o thrombus. All questioned answered at this time.        Stacy Portillo RN  Lead Structural Heart Coordinator  TAVR, MitraClip and Watchman

## 2025-07-28 NOTE — TELEPHONE ENCOUNTER
In-basket message sent to IR scheduling pool.    VICK HurtadoN, RN, PHN  Hepatology Clinic  [facility]  [facility]

## 2025-07-31 ENCOUNTER — PATIENT OUTREACH (OUTPATIENT)
Dept: NEPHROLOGY | Facility: CLINIC | Age: 75
End: 2025-07-31
Payer: MEDICARE

## 2025-07-31 DIAGNOSIS — D50.9 IRON DEFICIENCY ANEMIA, UNSPECIFIED IRON DEFICIENCY ANEMIA TYPE: Primary | ICD-10-CM

## 2025-07-31 RX ORDER — MEPERIDINE HYDROCHLORIDE 25 MG/ML
25 INJECTION INTRAMUSCULAR; INTRAVENOUS; SUBCUTANEOUS
OUTPATIENT
Start: 2025-08-04

## 2025-07-31 RX ORDER — EPINEPHRINE 1 MG/ML
0.3 INJECTION, SOLUTION, CONCENTRATE INTRAVENOUS EVERY 5 MIN PRN
OUTPATIENT
Start: 2025-08-04

## 2025-07-31 RX ORDER — DIPHENHYDRAMINE HYDROCHLORIDE 50 MG/ML
50 INJECTION, SOLUTION INTRAMUSCULAR; INTRAVENOUS
Start: 2025-08-04

## 2025-07-31 RX ORDER — METHYLPREDNISOLONE SODIUM SUCCINATE 40 MG/ML
40 INJECTION INTRAMUSCULAR; INTRAVENOUS
Start: 2025-08-04

## 2025-07-31 RX ORDER — HEPARIN SODIUM,PORCINE 10 UNIT/ML
5-20 VIAL (ML) INTRAVENOUS DAILY PRN
OUTPATIENT
Start: 2025-08-04

## 2025-07-31 RX ORDER — HEPARIN SODIUM (PORCINE) LOCK FLUSH IV SOLN 100 UNIT/ML 100 UNIT/ML
5 SOLUTION INTRAVENOUS
OUTPATIENT
Start: 2025-08-04

## 2025-07-31 RX ORDER — ALBUTEROL SULFATE 0.83 MG/ML
2.5 SOLUTION RESPIRATORY (INHALATION)
OUTPATIENT
Start: 2025-08-04

## 2025-07-31 RX ORDER — DIPHENHYDRAMINE HYDROCHLORIDE 50 MG/ML
25 INJECTION, SOLUTION INTRAMUSCULAR; INTRAVENOUS
Start: 2025-08-04

## 2025-07-31 RX ORDER — ALBUTEROL SULFATE 90 UG/1
1-2 INHALANT RESPIRATORY (INHALATION)
Start: 2025-08-04

## 2025-07-31 NOTE — PROGRESS NOTES
Nephtracking updated completed for CKD Journey. Kidney Smart completed 6/3/25. No modality of interest mentioned. Will continue to follow given GFR 39.    Neph Tracking Flowsheet Last Filled Values       Kidney Smart CKD Basics Referral 05/12/25  Offered 7/18/24 via [employer] as well    Kidney Smart CKD Basics Complete 06/03/25    Patient's Referral Dates Auto Populate Patient's Referral Dates    MTM Referral 4/21/25    Journey Referral 5/14/24

## 2025-08-04 ENCOUNTER — HOSPITAL ENCOUNTER (OUTPATIENT)
Dept: CT IMAGING | Facility: CLINIC | Age: 75
Discharge: HOME | End: 2025-08-04
Attending: NURSE PRACTITIONER
Payer: MEDICARE

## 2025-08-04 ENCOUNTER — INFUSION THERAPY VISIT (OUTPATIENT)
Dept: INFUSION THERAPY | Facility: CLINIC | Age: 75
End: 2025-08-04
Payer: MEDICARE

## 2025-08-04 ENCOUNTER — LAB (OUTPATIENT)
Dept: LAB | Facility: CLINIC | Age: 75
End: 2025-08-04
Payer: MEDICARE

## 2025-08-04 VITALS
DIASTOLIC BLOOD PRESSURE: 73 MMHG | TEMPERATURE: 98 F | OXYGEN SATURATION: 94 % | HEART RATE: 60 BPM | SYSTOLIC BLOOD PRESSURE: 151 MMHG | RESPIRATION RATE: 16 BRPM

## 2025-08-04 DIAGNOSIS — R94.6 THYROID FUNCTION TEST ABNORMAL: ICD-10-CM

## 2025-08-04 DIAGNOSIS — R94.39 ABNORMAL RESULT OF OTHER CARDIOVASCULAR FUNCTION STUDY: ICD-10-CM

## 2025-08-04 DIAGNOSIS — Z95.2 S/P TAVR (TRANSCATHETER AORTIC VALVE REPLACEMENT): ICD-10-CM

## 2025-08-04 DIAGNOSIS — D50.9 IRON DEFICIENCY ANEMIA, UNSPECIFIED IRON DEFICIENCY ANEMIA TYPE: Primary | ICD-10-CM

## 2025-08-04 LAB
ANION GAP SERPL CALCULATED.3IONS-SCNC: 14 MMOL/L (ref 7–15)
BUN SERPL-MCNC: 23.1 MG/DL (ref 8–23)
CALCIUM SERPL-MCNC: 10 MG/DL (ref 8.8–10.4)
CHLORIDE SERPL-SCNC: 104 MMOL/L (ref 98–107)
CREAT SERPL-MCNC: 1.3 MG/DL (ref 0.51–0.95)
EGFRCR SERPLBLD CKD-EPI 2021: 43 ML/MIN/1.73M2
GLUCOSE SERPL-MCNC: 204 MG/DL (ref 70–99)
HCO3 SERPL-SCNC: 18 MMOL/L (ref 22–29)
POTASSIUM SERPL-SCNC: 4.4 MMOL/L (ref 3.4–5.3)
SODIUM SERPL-SCNC: 136 MMOL/L (ref 135–145)
T4 FREE SERPL-MCNC: 2.09 NG/DL (ref 0.9–1.7)
TSH SERPL DL<=0.005 MIU/L-ACNC: 0.11 UIU/ML (ref 0.3–4.2)

## 2025-08-04 PROCEDURE — 75572 CT HRT W/3D IMAGE: CPT | Mod: 26 | Performed by: INTERNAL MEDICINE

## 2025-08-04 PROCEDURE — 250N000011 HC RX IP 250 OP 636: Performed by: INTERNAL MEDICINE

## 2025-08-04 PROCEDURE — 84443 ASSAY THYROID STIM HORMONE: CPT | Performed by: PATHOLOGY

## 2025-08-04 PROCEDURE — 258N000003 HC RX IP 258 OP 636: Performed by: NURSE PRACTITIONER

## 2025-08-04 PROCEDURE — 36415 COLL VENOUS BLD VENIPUNCTURE: CPT | Performed by: PATHOLOGY

## 2025-08-04 PROCEDURE — 75572 CT HRT W/3D IMAGE: CPT

## 2025-08-04 PROCEDURE — 80048 BASIC METABOLIC PNL TOTAL CA: CPT | Performed by: PATHOLOGY

## 2025-08-04 PROCEDURE — 84439 ASSAY OF FREE THYROXINE: CPT | Performed by: PATHOLOGY

## 2025-08-04 RX ORDER — IOPAMIDOL 755 MG/ML
110 INJECTION, SOLUTION INTRAVASCULAR ONCE
Status: COMPLETED | OUTPATIENT
Start: 2025-08-04 | End: 2025-08-04

## 2025-08-04 RX ORDER — METHYLPREDNISOLONE SODIUM SUCCINATE 40 MG/ML
40 INJECTION INTRAMUSCULAR; INTRAVENOUS
Start: 2025-08-04

## 2025-08-04 RX ORDER — DIPHENHYDRAMINE HYDROCHLORIDE 50 MG/ML
25 INJECTION, SOLUTION INTRAMUSCULAR; INTRAVENOUS
Start: 2025-08-04

## 2025-08-04 RX ORDER — HEPARIN SODIUM,PORCINE 10 UNIT/ML
5-20 VIAL (ML) INTRAVENOUS DAILY PRN
OUTPATIENT
Start: 2025-08-04

## 2025-08-04 RX ORDER — EPINEPHRINE 1 MG/ML
0.3 INJECTION, SOLUTION INTRAMUSCULAR; SUBCUTANEOUS EVERY 5 MIN PRN
OUTPATIENT
Start: 2025-08-04

## 2025-08-04 RX ORDER — ALBUTEROL SULFATE 90 UG/1
1-2 INHALANT RESPIRATORY (INHALATION)
Start: 2025-08-04

## 2025-08-04 RX ORDER — MEPERIDINE HYDROCHLORIDE 25 MG/ML
25 INJECTION INTRAMUSCULAR; INTRAVENOUS; SUBCUTANEOUS
OUTPATIENT
Start: 2025-08-04

## 2025-08-04 RX ORDER — ALBUTEROL SULFATE 0.83 MG/ML
2.5 SOLUTION RESPIRATORY (INHALATION)
OUTPATIENT
Start: 2025-08-04

## 2025-08-04 RX ORDER — HEPARIN SODIUM (PORCINE) LOCK FLUSH IV SOLN 100 UNIT/ML 100 UNIT/ML
5 SOLUTION INTRAVENOUS
OUTPATIENT
Start: 2025-08-04

## 2025-08-04 RX ORDER — DIPHENHYDRAMINE HYDROCHLORIDE 50 MG/ML
50 INJECTION, SOLUTION INTRAMUSCULAR; INTRAVENOUS
Start: 2025-08-04

## 2025-08-04 RX ADMIN — IOPAMIDOL 110 ML: 755 INJECTION, SOLUTION INTRAVENOUS at 13:47

## 2025-08-04 RX ADMIN — SODIUM CHLORIDE 400 ML: 0.9 INJECTION, SOLUTION INTRAVENOUS at 11:08

## 2025-08-09 ENCOUNTER — RESULTS FOLLOW-UP (OUTPATIENT)
Dept: INTERNAL MEDICINE | Facility: CLINIC | Age: 75
End: 2025-08-09
Payer: MEDICARE

## 2025-08-09 DIAGNOSIS — R94.6 THYROID FUNCTION TEST ABNORMAL: Primary | ICD-10-CM

## 2025-08-10 ENCOUNTER — MYC REFILL (OUTPATIENT)
Dept: CARDIOLOGY | Facility: CLINIC | Age: 75
End: 2025-08-10
Payer: MEDICARE

## 2025-08-10 ENCOUNTER — MYC REFILL (OUTPATIENT)
Dept: INTERNAL MEDICINE | Facility: CLINIC | Age: 75
End: 2025-08-10
Payer: MEDICARE

## 2025-08-10 DIAGNOSIS — E78.5 HYPERLIPIDEMIA LDL GOAL <100: ICD-10-CM

## 2025-08-10 DIAGNOSIS — I50.33 ACUTE ON CHRONIC DIASTOLIC CONGESTIVE HEART FAILURE (H): ICD-10-CM

## 2025-08-12 RX ORDER — ATORVASTATIN CALCIUM 40 MG/1
40 TABLET, FILM COATED ORAL DAILY
Qty: 90 TABLET | Refills: 2 | Status: SHIPPED | OUTPATIENT
Start: 2025-08-12

## 2025-08-13 RX ORDER — LEVOTHYROXINE SODIUM 100 UG/1
100 TABLET ORAL
Qty: 90 TABLET | Refills: 1 | Status: SHIPPED | OUTPATIENT
Start: 2025-08-13

## (undated) DEVICE — Device

## (undated) DEVICE — DRAPE STERI FLUOROSCOPE 35X43"1012 LATEX FREE

## (undated) DEVICE — KIT HAND CONTROL ACIST 016795

## (undated) DEVICE — WIRE GUIDE 0.035"X150CM EMERALD STR 502542

## (undated) DEVICE — DRSG TEGADERM 4X4 3/4" 1626W

## (undated) DEVICE — INTRO SHEATH 7FRX10CM PINNACLE RSS702

## (undated) DEVICE — CATH ANGIO SUPERTORQUE PLUS JL4 6FRX100CM 533620

## (undated) DEVICE — PACK HEART RIGHT CUSTOM SAN32RHF18

## (undated) DEVICE — KIT MICROINTRODUCER VASCULAR  4FRX21GAX4CM

## (undated) DEVICE — SHEATH INTRODUCER DRYSEAL FLEX W/HYDRO CT 16FRX33CM DSF1633

## (undated) DEVICE — SPONGE RAY-TEC 4X8" 7318

## (undated) DEVICE — DRAPE TIBURON CARDIOVASCULAR PERI-GROIN LF 9154

## (undated) DEVICE — SHEATH WITH DILATOR ACCESS SYSTEM FXD CURVE M635TU80010

## (undated) DEVICE — PREP CHLORAPREP 26ML TINTED HI-LITE ORANGE 930815

## (undated) DEVICE — INTRO SHEATH 7FRX25CM PINNACLE RSS706

## (undated) DEVICE — WIRE GUIDE LUNDERQUIST 0.035"X260CM DBL CVD

## (undated) DEVICE — TUBING PRESSURE 30"

## (undated) DEVICE — CATH EP PACEL 5FRX110CM 1MM RIGHT HEART CURVE 401763

## (undated) DEVICE — PACK HEART LEFT CUSTOM

## (undated) DEVICE — KIT VALVE AORTIC SAPIEN 3 ULTRA RESILIA OD23 MM S3URCM23A

## (undated) DEVICE — CATH TRANSSEPTAL VERSACROSS 45D 63X230CM VXSK0032

## (undated) DEVICE — KIT HAND CONTROL ACIST 014644 AR-P54

## (undated) DEVICE — PACK GOWN 3/PK DISP XL SBA32GPFCB

## (undated) DEVICE — CLOSURE DEVICE 6FR VASC PROGLIDE MEDICATED SUTURE 12673-03

## (undated) DEVICE — DILATOR VASCULAR 14FRX20CM G00996

## (undated) DEVICE — LINEN TOWEL PACK X30 5481

## (undated) DEVICE — GW VASC .035IN DIA 260CML 7CML 3 MM RADIUS J CURVE 502455

## (undated) DEVICE — SYR 50ML LL W/O NDL 309653

## (undated) DEVICE — DRAPE IOBAN INCISE 23X17" 6650EZ

## (undated) DEVICE — INTRO SHEATH MICRO PLATINUM TIP 4FRX40CM 7274

## (undated) DEVICE — ESU GROUND PAD ADULT W/CORD E7507

## (undated) DEVICE — BOWL STERILE 32OZ DYND50320

## (undated) DEVICE — GUIDEWIRE VASC SAFARI2 0.035X275CM H74939406XS1

## (undated) DEVICE — WIRE GUIDE 0.035"X150CM EMERALD J TIP 502521

## (undated) DEVICE — INFLATION DEVICE ATRION QL2530

## (undated) DEVICE — DRSG PRIMAPORE 02X3" 7133

## (undated) DEVICE — SLEEVE TR BAND RADIAL COMPRESSION DEVICE 24CM TRB24-REG

## (undated) DEVICE — INTRO SHTH 7FR 12CM DIL GW LL HUB FSTCTH .038IN

## (undated) DEVICE — DILATOR VASCULAR 12FRX20CM G00995

## (undated) DEVICE — MANIFOLD KIT ANGIO AUTOMATED 014613

## (undated) DEVICE — INTRO SHEATH 6FRX25CM PINNACLE RSS606

## (undated) DEVICE — CATH ANGIO INFINITI PIGTAIL 145 6 SH 6FRX110CM  534-652S

## (undated) DEVICE — CATH ANGIO SUPERTORQUE AL1 6FRX100CM 532-645

## (undated) DEVICE — WIRE GUIDE 0.035"X145CM AMPLATZ XSTIFF J THSCF-35-145-3-AES

## (undated) DEVICE — DILATOR VASCULAR 10FRX20CM G00993

## (undated) DEVICE — SHTH INTRO 0.021IN ID 6FR DIA

## (undated) DEVICE — SOL WATER IRRIG 1000ML BOTTLE 07139-09

## (undated) DEVICE — CATH ANGIO IMPULSE 6FR 110CML  PIGTAIL

## (undated) DEVICE — KIT RIGHT HEART CATH 60130719

## (undated) DEVICE — CATH ANGIO SUPERTORQUE PLUS JR4 6FRX100CM 533621

## (undated) RX ORDER — NITROGLYCERIN 5 MG/ML
VIAL (ML) INTRAVENOUS
Status: DISPENSED
Start: 2024-07-15

## (undated) RX ORDER — LIDOCAINE HYDROCHLORIDE 10 MG/ML
INJECTION, SOLUTION EPIDURAL; INFILTRATION; INTRACAUDAL; PERINEURAL
Status: DISPENSED
Start: 2024-09-04

## (undated) RX ORDER — PROPOFOL 10 MG/ML
INJECTION, EMULSION INTRAVENOUS
Status: DISPENSED
Start: 2023-01-23

## (undated) RX ORDER — FENTANYL CITRATE 50 UG/ML
INJECTION, SOLUTION INTRAMUSCULAR; INTRAVENOUS
Status: DISPENSED
Start: 2024-07-15

## (undated) RX ORDER — HEPARIN SODIUM 1000 [USP'U]/ML
INJECTION, SOLUTION INTRAVENOUS; SUBCUTANEOUS
Status: DISPENSED
Start: 2023-05-11

## (undated) RX ORDER — FENTANYL CITRATE 50 UG/ML
INJECTION, SOLUTION INTRAMUSCULAR; INTRAVENOUS
Status: DISPENSED
Start: 2024-09-04

## (undated) RX ORDER — CLINDAMYCIN PHOSPHATE 900 MG/50ML
INJECTION, SOLUTION INTRAVENOUS
Status: DISPENSED
Start: 2024-09-04

## (undated) RX ORDER — BUPIVACAINE HYDROCHLORIDE 5 MG/ML
INJECTION, SOLUTION EPIDURAL; INTRACAUDAL
Status: DISPENSED
Start: 2024-09-04

## (undated) RX ORDER — FENTANYL CITRATE 50 UG/ML
INJECTION, SOLUTION INTRAMUSCULAR; INTRAVENOUS
Status: DISPENSED
Start: 2025-04-18

## (undated) RX ORDER — LIDOCAINE HYDROCHLORIDE 20 MG/ML
SOLUTION OROPHARYNGEAL
Status: DISPENSED
Start: 2025-04-18

## (undated) RX ORDER — PROTAMINE SULFATE 10 MG/ML
INJECTION, SOLUTION INTRAVENOUS
Status: DISPENSED
Start: 2023-05-11

## (undated) RX ORDER — SODIUM CHLORIDE, SODIUM LACTATE, POTASSIUM CHLORIDE, CALCIUM CHLORIDE 600; 310; 30; 20 MG/100ML; MG/100ML; MG/100ML; MG/100ML
INJECTION, SOLUTION INTRAVENOUS
Status: DISPENSED
Start: 2023-05-11

## (undated) RX ORDER — ASPIRIN 325 MG
TABLET ORAL
Status: DISPENSED
Start: 2023-05-11

## (undated) RX ORDER — ASPIRIN 81 MG/1
TABLET ORAL
Status: DISPENSED
Start: 2024-09-04

## (undated) RX ORDER — HEPARIN SODIUM 1000 [USP'U]/ML
INJECTION, SOLUTION INTRAVENOUS; SUBCUTANEOUS
Status: DISPENSED
Start: 2024-09-04

## (undated) RX ORDER — ONDANSETRON 2 MG/ML
INJECTION INTRAMUSCULAR; INTRAVENOUS
Status: DISPENSED
Start: 2023-05-11

## (undated) RX ORDER — SODIUM NITROPRUSSIDE 25 MG/ML
INJECTION INTRAVENOUS
Status: DISPENSED
Start: 2020-03-16

## (undated) RX ORDER — LIDOCAINE 40 MG/G
CREAM TOPICAL
Status: DISPENSED
Start: 2020-03-16

## (undated) RX ORDER — HEPARIN SODIUM 1000 [USP'U]/ML
INJECTION, SOLUTION INTRAVENOUS; SUBCUTANEOUS
Status: DISPENSED
Start: 2024-07-15

## (undated) RX ORDER — NICARDIPINE HCL-0.9% SOD CHLOR 1 MG/10 ML
SYRINGE (ML) INTRAVENOUS
Status: DISPENSED
Start: 2024-07-15

## (undated) RX ORDER — FENTANYL CITRATE 50 UG/ML
INJECTION, SOLUTION INTRAMUSCULAR; INTRAVENOUS
Status: DISPENSED
Start: 2023-03-10

## (undated) RX ORDER — SODIUM CHLORIDE 9 MG/ML
INJECTION, SOLUTION INTRAVENOUS
Status: DISPENSED
Start: 2024-07-15

## (undated) RX ORDER — PROPOFOL 10 MG/ML
INJECTION, EMULSION INTRAVENOUS
Status: DISPENSED
Start: 2024-09-04

## (undated) RX ORDER — SIMETHICONE 40MG/0.6ML
SUSPENSION, DROPS(FINAL DOSAGE FORM)(ML) ORAL
Status: DISPENSED
Start: 2023-03-10

## (undated) RX ORDER — LIDOCAINE 40 MG/G
CREAM TOPICAL
Status: DISPENSED
Start: 2024-07-15

## (undated) RX ORDER — CLINDAMYCIN PHOSPHATE 900 MG/50ML
INJECTION, SOLUTION INTRAVENOUS
Status: DISPENSED
Start: 2023-05-11

## (undated) RX ORDER — LIDOCAINE HYDROCHLORIDE 10 MG/ML
INJECTION, SOLUTION EPIDURAL; INFILTRATION; INTRACAUDAL; PERINEURAL
Status: DISPENSED
Start: 2023-05-11

## (undated) RX ORDER — FENTANYL CITRATE 50 UG/ML
INJECTION, SOLUTION INTRAMUSCULAR; INTRAVENOUS
Status: DISPENSED
Start: 2023-05-11

## (undated) RX ORDER — LIDOCAINE 40 MG/G
CREAM TOPICAL
Status: DISPENSED
Start: 2025-03-14